# Patient Record
Sex: MALE | Race: WHITE | NOT HISPANIC OR LATINO
[De-identification: names, ages, dates, MRNs, and addresses within clinical notes are randomized per-mention and may not be internally consistent; named-entity substitution may affect disease eponyms.]

---

## 2019-02-25 ENCOUNTER — APPOINTMENT (OUTPATIENT)
Dept: NEUROLOGY | Facility: CLINIC | Age: 79
End: 2019-02-25
Payer: MEDICARE

## 2019-02-25 VITALS
HEIGHT: 68 IN | BODY MASS INDEX: 30.31 KG/M2 | DIASTOLIC BLOOD PRESSURE: 89 MMHG | HEART RATE: 93 BPM | SYSTOLIC BLOOD PRESSURE: 184 MMHG | WEIGHT: 200 LBS

## 2019-02-25 DIAGNOSIS — Z78.9 OTHER SPECIFIED HEALTH STATUS: ICD-10-CM

## 2019-02-25 PROCEDURE — 99213 OFFICE O/P EST LOW 20 MIN: CPT

## 2019-02-25 PROCEDURE — 99203 OFFICE O/P NEW LOW 30 MIN: CPT

## 2019-02-25 NOTE — DISCUSSION/SUMMARY
[FreeTextEntry1] : Patient with PD who has morning hypokinesia and more awareness of balance issues. \par Constipation is present\par Diplopia with CI\par \par \par Recommendations\par - Increase Sinemet CR to 50/200 qhs and leave Sinemet IR regimen the same for now\par - I asked him to keep track of when he experiences some imbalance during the day to determine if it is related to wearing off\par - Cont exercise regimen and balance program\par - take prune juice daily for constipation\par - he is schedule to see his ophthalmologist in a week. I also referred him for a c/s with Dr. Ely Vasquez for the diplopia. He preferred to defer MRI brain and blood work until after the consultations. \par - RTC 3months

## 2019-02-25 NOTE — PHYSICAL EXAM
[General Appearance - Alert] : alert [General Appearance - In No Acute Distress] : in no acute distress [Cranial Nerves Oculomotor (III)] : extraocular motion intact [Cranial Nerves Facial (VII)] : face symmetrical [Sensation Tactile Decrease] : light touch was intact [FreeTextEntry1] : There is masked facies. EOMI + CI. There are mild finger tremors in his hands at rest. Rapid movements are mildly slowly, left greater than right. There is  rigidity at the wrists and neck. Stands easily and walks with mildly stooped posture. Strides are good and left armswing is depressed. Recovers on pull test.  [FreeTextEntry8] : refer to Movement exam

## 2019-02-25 NOTE — HISTORY OF PRESENT ILLNESS
[FreeTextEntry1] : Patient with a 4 year hx of PD. Since his last visit, he tried tapering of abilify, which he takes for anxiety, and developed insomnia. He subsequently went back on it.  He remains on sinemet 25/100 1 tab TID. He has noticed mild increase in stiffness and some balance issues at various times of the day when standing. He is doing [employer] and participates in a dance program. He has not had any falls. In the mornings he is particularly rigid and slow. He is not really aware of sinemet kinetics or has LID.  He reports some diplopia when looking to both sides for approximately 1 month. This also occurs when he reads.  He thinks it may be related to eye lid surgery he had done in the past. \par \par Nonmotor\par sleeps well\par + constipation - has BM every other day. Uses prune juice. \par Denies any dysphagia\par \par PD meds \par sinemet 25/100 1 tab ([id number])\par Sinemet CR 25/100 1 tab qhs

## 2019-02-25 NOTE — REASON FOR VISIT
[Initial Eval - Existing Diagnosis] : an initial evaluation of an existing diagnosis [Spouse] : spouse [FreeTextEntry1] : for Parkinson's disease

## 2019-03-25 ENCOUNTER — RX RENEWAL (OUTPATIENT)
Age: 79
End: 2019-03-25

## 2019-06-10 ENCOUNTER — APPOINTMENT (OUTPATIENT)
Dept: NEUROLOGY | Facility: CLINIC | Age: 79
End: 2019-06-10

## 2019-07-15 ENCOUNTER — APPOINTMENT (OUTPATIENT)
Dept: NEUROLOGY | Facility: CLINIC | Age: 79
End: 2019-07-15
Payer: MEDICARE

## 2019-07-15 VITALS
DIASTOLIC BLOOD PRESSURE: 74 MMHG | HEART RATE: 85 BPM | HEIGHT: 68 IN | WEIGHT: 200 LBS | SYSTOLIC BLOOD PRESSURE: 177 MMHG | BODY MASS INDEX: 30.31 KG/M2

## 2019-07-15 PROCEDURE — 99214 OFFICE O/P EST MOD 30 MIN: CPT

## 2019-07-15 RX ORDER — CARBIDOPA AND LEVODOPA 50; 200 MG/1; MG/1
50-200 TABLET, EXTENDED RELEASE ORAL
Qty: 30 | Refills: 5 | Status: DISCONTINUED | COMMUNITY
Start: 2019-02-25 | End: 2019-07-15

## 2019-07-16 NOTE — END OF VISIT
[] : Fellow [FreeTextEntry3] : Patient continues to have considerable overnight stiffness and hypokinesia despite taking CR 50/200. Daytime c/l IR doses work well with absence of off time. Recent hamstring injury is improving and using a cane to maintain balance during this time. \par \par Recommendations:\par D/c CR and trial of rytary 145 3 tabs qhs\par cont sinemet IR 25/100 1 tab TID\par Cont PT and exercises\par RTO 3months [>50% of Time Spent on Counseling for ____] : Greater than 50% of the encounter time was spent on counseling for [unfilled] [Time Spent: ___ minutes] : I have spent [unfilled] minutes of face to face time with the patient

## 2019-07-16 NOTE — REASON FOR VISIT
[Initial Eval - Existing Diagnosis] : an initial evaluation of an existing diagnosis [Follow-Up: _____] : a [unfilled] follow-up visit [Spouse] : spouse [FreeTextEntry1] : for Parkinson's disease

## 2019-07-16 NOTE — HISTORY OF PRESENT ILLNESS
[FreeTextEntry1] : Patient with a hx of PD diagnosed in March of 2016. Since his last visit, he tried tapering of abilify, which he takes for anxiety, and developed insomnia, and has since resumed it. He has noticed mild increase in stiffness and some balance issues at various times of the day when standing, with mornings being the worst before he takes his first dose of Sinemet. He is doing [employer] and participates in a dance program. He has not had any falls. In the mornings he is particularly rigid and slow. He denies LID. \par \par He saw a Neuro-opthalmologist at [facility], Dr. Griffin, who treated his diplopia with prism glasses with success. He went to [place] for a trip last month and when he returned tore his hamstring tendon in 2 places. He is currently undergoing therapy and conservative treatment, with improvement and no pain. But, he does feel more unsteady since then. He reports that turning in bed has become more difficult. No wearing off of medications in the daytime.\par \par Nonmotor\par sleeps well\par + constipation - has BM every other day. Uses prune juice. \par Denies any dysphagia\par \par PD meds \par sinemet 25/100 1 tab ([id number]-8P) - works within 30 mins, then walking better. \par Sinemet CR 25/100 1 tab qhs\par

## 2019-07-16 NOTE — PHYSICAL EXAM
[General Appearance - Alert] : alert [General Appearance - In No Acute Distress] : in no acute distress [Cranial Nerves Oculomotor (III)] : extraocular motion intact [Cranial Nerves Facial (VII)] : face symmetrical [Sensation Tactile Decrease] : light touch was intact [FreeTextEntry1] : There is mildly masked facies. EOMI + CI. There are mild finger tremors in his R hand at rest. Rapid movements are mildly slowly, R greater than L. There is mild rigidity at the wrists and neck. Stands easily and walks with mildly stooped posture. Strides are good and left arm swing is depressed with a emergent tremor of the L hand not seen at rest. Recovers on pull test.  [FreeTextEntry8] : refer to Movement exam

## 2019-07-16 NOTE — DISCUSSION/SUMMARY
[FreeTextEntry1] : Patient with PD who has morning hypokinesia and more awareness of balance issues. \par Constipation is present. Resolved Diplopia since last visit. \par \par \par Recommendations\par - Discontinue Sinemet CR to 50/200 qhs and start Rytary 145mg 3 tabs at bedtime \par - Continue Sinemet IR regimen the same for now, 1 tab TID\par - Cont exercise regimen and balance program/physical therapy as tolerated\par - take prune juice daily for constipation\par - RTC 3 months

## 2019-07-29 ENCOUNTER — OTHER (OUTPATIENT)
Age: 79
End: 2019-07-29

## 2019-09-18 ENCOUNTER — APPOINTMENT (OUTPATIENT)
Dept: NEUROLOGY | Facility: CLINIC | Age: 79
End: 2019-09-18

## 2019-09-18 VITALS
BODY MASS INDEX: 29.86 KG/M2 | SYSTOLIC BLOOD PRESSURE: 171 MMHG | HEART RATE: 81 BPM | WEIGHT: 197 LBS | HEIGHT: 68 IN | DIASTOLIC BLOOD PRESSURE: 67 MMHG

## 2019-09-18 NOTE — DISCUSSION/SUMMARY
[FreeTextEntry1] : Overall bradykinesia throughout the day. Rytary at bedtime has notable positive impact\par \par Recommendations:\par - raise sinemet to 1.5 tabs 3 times per day ([phone]) to see if he gains greater independence with certain ADLs. \par - cont rytary 3 tabs at bedtime

## 2019-09-18 NOTE — PHYSICAL EXAM
[FreeTextEntry1] : Last sinemet was 5hrs\par \par There is mild masking. There is mild right hand rest tremor with trace postural and 1-2+ kinetic tremor. There is mild R>L bradykinesia with greater distal involvement. There is 2+ right side rigidity. Walks with good SL and turns. pull test is negative

## 2019-09-18 NOTE — HISTORY OF PRESENT ILLNESS
[FreeTextEntry1] : Study Visit\par \par -Reports that rytary has improved his overnight and morning akinesia. \par - Denies any motor fluctuations or awareness of sinemet kicking in\par - right hand tremor is intermittent, and increases when anxious\par - fell once due to tripping over a hose\par - needs assistance with dressing and occasionally showering. Feeds himself. \par \par \par Nonmotor\par sleeps well\par + constipation - has BM every other day. Uses prune juice. \par Denies any dysphagia\par \par PD meds \par sinemet 25/100 1 tab ([id number])\par Rytary 145 3 tabs qhs

## 2019-10-15 ENCOUNTER — APPOINTMENT (OUTPATIENT)
Dept: NEUROLOGY | Facility: CLINIC | Age: 79
End: 2019-10-15

## 2019-11-06 ENCOUNTER — RX RENEWAL (OUTPATIENT)
Age: 79
End: 2019-11-06

## 2019-12-20 ENCOUNTER — APPOINTMENT (OUTPATIENT)
Dept: NEUROLOGY | Facility: CLINIC | Age: 79
End: 2019-12-20

## 2019-12-20 VITALS
WEIGHT: 200 LBS | HEART RATE: 78 BPM | DIASTOLIC BLOOD PRESSURE: 76 MMHG | SYSTOLIC BLOOD PRESSURE: 157 MMHG | BODY MASS INDEX: 30.31 KG/M2 | HEIGHT: 68 IN

## 2019-12-20 NOTE — HISTORY OF PRESENT ILLNESS
[FreeTextEntry1] : Study Visit\par \par He reports missing one dose of sinemet daily. Its either the 2nd or 3rd dose that is missed\par over night hypokinesia is better and sleep has also improved. \par He has modest levodopa kinetics. \par He feels he is moving slower that 6months ago\par Denies any falls and thinks his FOG has resolved with med changes\par \par Nonmotor\par sleeps well\par + constipation - has BM every other day. Uses prune juice. \par Denies any dysphagia\par occasional choking episodes\par no OH\par \par \par PD meds \par sinemet 25/100 1.5 tab ([id number])\par Rytary 145 3 tabs qhs\par \par Psych\par abilify 5mg qd\par ritalin\par \par

## 2019-12-20 NOTE — DISCUSSION/SUMMARY
[FreeTextEntry1] : Global bradykinesia in part due med noncompliance and possibly  GI motility component \par \par Plan\par - take sinemet 1.5 tabs q4hrs (3doses/d)\par - take rytary 145 mg 3 tabs qhs. will consider full rytary conversation in future\par - take prune juice daily

## 2019-12-20 NOTE — PHYSICAL EXAM
[FreeTextEntry1] : There is 2+ masking. Speech is 1+. there is mild left greater than right bradykinesia with minimal rigidity. 2+ neck rigidity. SL is good and turns well. No FOG. No LID.

## 2020-05-27 ENCOUNTER — APPOINTMENT (OUTPATIENT)
Dept: NEUROLOGY | Facility: CLINIC | Age: 80
End: 2020-05-27

## 2020-05-27 NOTE — HISTORY OF PRESENT ILLNESS
[Home] : at home, [unfilled] , at the time of the visit. [Other Location: e.g. Home (Enter Location, City,State)___] : at [unfilled] [Verbal consent obtained from patient] : the patient, [unfilled] [FreeTextEntry1] : Study Visit\par \par He has not noticed significant changes with rytary regimen\par denies daytime somnolence. \par No MF or LID\par pain in lower back has been off and on for several months. Feels it impacts his gait\par He is fairly sedentary most of the day. Sits at his computer. \par Does home exercises daily\par Tremor is not bothersome\par Saw Dr. Diallo of urology and found ot have BPH\par \par Nonmotor\par sleeps well\par + constipation - has BM every other day. Uses prune juice. \par \par PD meds \par Rytary 145 3 tabs qid (930A, 230p, 730p, 11p)\par \par Psych\par abilify 5mg qd\par ritalin\par \par pLAN\par Change rytary dosing every 4hrs ( 4doses/d) \par Will focus on stretches and PT for LBP.  Consider MRI \par

## 2020-07-10 ENCOUNTER — APPOINTMENT (OUTPATIENT)
Dept: UROLOGY | Facility: CLINIC | Age: 80
End: 2020-07-10
Payer: MEDICARE

## 2020-07-10 VITALS
RESPIRATION RATE: 14 BRPM | HEART RATE: 74 BPM | HEIGHT: 68 IN | BODY MASS INDEX: 30.31 KG/M2 | SYSTOLIC BLOOD PRESSURE: 179 MMHG | DIASTOLIC BLOOD PRESSURE: 82 MMHG | TEMPERATURE: 98.8 F | WEIGHT: 200 LBS | OXYGEN SATURATION: 97 %

## 2020-07-10 DIAGNOSIS — Z86.39 PERSONAL HISTORY OF OTHER ENDOCRINE, NUTRITIONAL AND METABOLIC DISEASE: ICD-10-CM

## 2020-07-10 DIAGNOSIS — Z80.1 FAMILY HISTORY OF MALIGNANT NEOPLASM OF TRACHEA, BRONCHUS AND LUNG: ICD-10-CM

## 2020-07-10 DIAGNOSIS — Z87.438 PERSONAL HISTORY OF OTHER DISEASES OF MALE GENITAL ORGANS: ICD-10-CM

## 2020-07-10 DIAGNOSIS — Z80.0 FAMILY HISTORY OF MALIGNANT NEOPLASM OF DIGESTIVE ORGANS: ICD-10-CM

## 2020-07-10 DIAGNOSIS — Z78.9 OTHER SPECIFIED HEALTH STATUS: ICD-10-CM

## 2020-07-10 DIAGNOSIS — Z86.69 PERSONAL HISTORY OF OTHER DISEASES OF THE NERVOUS SYSTEM AND SENSE ORGANS: ICD-10-CM

## 2020-07-10 PROCEDURE — 99204 OFFICE O/P NEW MOD 45 MIN: CPT

## 2020-07-10 NOTE — ASSESSMENT
[FreeTextEntry1] : Discussed Laser enucleation of prostate with morcellation (HOLEP/ThuLEP).\par \par Indications, options including chronic Storm catheter, suprapubic catheter, intermittent self-catheterization, transurethral resection of prostate, transurethral vaporization of prostate with laser or electrocautery, open or laparoscopic enucleation of the prostate, all discussed.\par \par Potential complications of transurethral holmium or thulium laser enucleation of prostate with morcellation discussed. This included risk of infection, bleeding, transfusion, conversion to open enucleation, need for staged procedure, adjacent organ injury, bladder injury, clot retention of urine requiring return to operating room for cystoscopy clot evacuation, prolonged duration of Storm catheterization, urethral stricture, transient or permanent urinary incontinence, retrograde ejaculation is a permanent and irreversible complication, redo or staged surgery due to equipment malfunction, anesthetic complications, cardiac complications, all discussed. \par \par Printed information including of the above and other more uncommon complications provided to patient.\par Given Parkinson's, expect incontinence to be more pronounced, and risk of it being permanent increased compared to non-Parkinson's patients.\par \par Plan\par 1) Follow up in two weeks with records of previous urology workup

## 2020-07-22 ENCOUNTER — APPOINTMENT (OUTPATIENT)
Dept: UROLOGY | Facility: CLINIC | Age: 80
End: 2020-07-22
Payer: MEDICARE

## 2020-07-22 VITALS
HEIGHT: 68 IN | BODY MASS INDEX: 29.55 KG/M2 | WEIGHT: 195 LBS | DIASTOLIC BLOOD PRESSURE: 78 MMHG | HEART RATE: 74 BPM | SYSTOLIC BLOOD PRESSURE: 169 MMHG | RESPIRATION RATE: 17 BRPM

## 2020-07-22 VITALS — TEMPERATURE: 97.9 F

## 2020-07-22 PROCEDURE — 99214 OFFICE O/P EST MOD 30 MIN: CPT

## 2020-07-22 NOTE — REVIEW OF SYSTEMS
[Eyesight Problems] : eyesight problems [Poor quality erections] : Poor quality erections [Told you have blood in urine on a urine test] : told blood was present in a urine test [History of kidney stones] : history of kidney stones [Bladder pressure] : experiences bladder pressure [Slow urine stream] : slow urine stream [Leakage of urine with urgency] : leakage of urine with urgency [Difficulty Walking] : difficulty walking [Depression] : depression [Muscle Weakness] : muscle weakness [Incontinence] : incontinence [Nocturia] : nocturia [Negative] : Heme/Lymph

## 2020-07-22 NOTE — HISTORY OF PRESENT ILLNESS
[Urinary Urgency] : urinary urgency [Urinary Frequency] : urinary frequency [Nocturia] : nocturia [Weak Stream] : weak stream [Post-Void Dribbling] : post-void dribbling [FreeTextEntry1] : 79 yr old male presents to establish care for BPH. Pt complains of 20 years of urgency/frequency. Nocturia x1. Pt complaining of weak stream and dribbling. Pt denies dysuria and hematuria. Pt states "PSA always run high", biopsy x 2-3- all negative. \par Seeks evaluation for laser enucleation of prostate. He has done research on his own and spoken to former patient about the surgery. \par He has seen Dr. silvino Diallo from [facility] and was counseled on Laparoscopic robot assisted enucleation.\par \par Surgical hx: B/L THR\par Medical hx: Parkinson's, HLD\par Allergies: none\par Social: Alcohol-occasionally, Smoking-never, Drug- never, Occupation- retired  of [employer] and [employer]\par Family hx: Mother- lung ca, Father- esophageal ca \par Medications: Rytary ER, Desvenlafaxine ER, methylphenidate HCL, aripiprazole 5mg, atorvastitin, finasteride 5 mg (taking for 2 months), tamsulosin HCL 0.4 mg, aspirin 81 mg\par \par  [None] : None

## 2020-07-22 NOTE — PHYSICAL EXAM
[Heart Rate And Rhythm] : Heart rate and rhythm were normal [Abdomen Soft] : soft [FreeTextEntry1] : unsteady gait [General Appearance - Well Developed] : well developed [General Appearance - Well Nourished] : well nourished [Normal Appearance] : normal appearance [Well Groomed] : well groomed [General Appearance - In No Acute Distress] : no acute distress [Skin Color & Pigmentation] : normal skin color and pigmentation [] : no respiratory distress [Oriented To Time, Place, And Person] : oriented to person, place, and time [Exaggerated Use Of Accessory Muscles For Inspiration] : no accessory muscle use [Affect] : the affect was normal [Not Anxious] : not anxious [Mood] : the mood was normal [Normal Station and Gait] : the gait and station were normal for the patient's age

## 2020-07-22 NOTE — REASON FOR VISIT
[Initial Visit ___] : [unfilled] is here today for an initial visit  for [unfilled] [Follow-up Visit ___] : a follow-up visit  for [unfilled] [Spouse] : spouse

## 2020-07-26 NOTE — HISTORY OF PRESENT ILLNESS
[FreeTextEntry1] : see previous note\par patient is here to further discuss treatment options\par records received and reviewed with patient\par \par patient's symptoms have worsened since the diagnosis of his Parkinsons disease\par a trial of mirabegron and flomax did not help\par last prostate US 5/2020 showed a prostate of 122 cc\par PSA levels stable around 9 according to the patient, had 2 prostate biopsies in the past that were negative\par

## 2020-07-26 NOTE — ASSESSMENT
[FreeTextEntry1] : Pt wants to proceed with laser enucleation of prostate.\par We discussed alternatives, including open simple prostatectomy and robotic simple prostatectomy. \par Also re-visited discussion on potential effect of Parkinson on bladder and sphincteric function, and explained the higher risk of incontinence after surgery. Pt willing to accept these risks and proceed with surgery.

## 2020-07-26 NOTE — REVIEW OF SYSTEMS
[Hesitancy] : no urinary hesitancy [Dysuria] : no dysuria [Testicular Pain] : no testicular pain [Genital Lesion] : no genital lesions

## 2020-08-06 ENCOUNTER — APPOINTMENT (OUTPATIENT)
Dept: NEUROLOGY | Facility: CLINIC | Age: 80
End: 2020-08-06

## 2020-08-06 NOTE — HISTORY OF PRESENT ILLNESS
[FreeTextEntry1] : Study visit\par \par Feels more wobbly when walking but has no freezing. \par He is not aware of levodopa kinetics\par Morning hypokinesia is bothersome and improves later in the morning\par Denies any falls\par He is fairly sedentary and denies daytime somnolence\par Has slight tremors in his hands\par + constipation - BM qod. Uses miralax prn\par \par Meds\par Rytary 145 3 tabs 4 times per day\par \par \par Plan\par change first dose of rytary to 3tabs 195mg, leave rest of the regimen the same\par take miralax daily\par increase daily exercises\par \par f/u 3months

## 2020-09-26 ENCOUNTER — APPOINTMENT (OUTPATIENT)
Dept: DISASTER EMERGENCY | Facility: CLINIC | Age: 80
End: 2020-09-26

## 2020-09-27 LAB — SARS-COV-2 N GENE NPH QL NAA+PROBE: NOT DETECTED

## 2020-09-28 ENCOUNTER — TRANSCRIPTION ENCOUNTER (OUTPATIENT)
Age: 80
End: 2020-09-28

## 2020-09-29 ENCOUNTER — RESULT REVIEW (OUTPATIENT)
Age: 80
End: 2020-09-29

## 2020-09-29 ENCOUNTER — APPOINTMENT (OUTPATIENT)
Dept: UROLOGY | Facility: HOSPITAL | Age: 80
End: 2020-09-29

## 2020-09-29 ENCOUNTER — OUTPATIENT (OUTPATIENT)
Dept: OUTPATIENT SERVICES | Facility: HOSPITAL | Age: 80
LOS: 1 days | End: 2020-09-29
Payer: MEDICARE

## 2020-09-29 VITALS
SYSTOLIC BLOOD PRESSURE: 168 MMHG | OXYGEN SATURATION: 97 % | HEART RATE: 69 BPM | DIASTOLIC BLOOD PRESSURE: 61 MMHG | TEMPERATURE: 98 F | RESPIRATION RATE: 16 BRPM | WEIGHT: 199.96 LBS | HEIGHT: 68 IN

## 2020-09-29 DIAGNOSIS — Z01.818 ENCOUNTER FOR OTHER PREPROCEDURAL EXAMINATION: ICD-10-CM

## 2020-09-29 DIAGNOSIS — Z96.649 PRESENCE OF UNSPECIFIED ARTIFICIAL HIP JOINT: Chronic | ICD-10-CM

## 2020-09-29 DIAGNOSIS — N40.1 BENIGN PROSTATIC HYPERPLASIA WITH LOWER URINARY TRACT SYMPTOMS: ICD-10-CM

## 2020-09-29 DIAGNOSIS — Z98.49 CATARACT EXTRACTION STATUS, UNSPECIFIED EYE: Chronic | ICD-10-CM

## 2020-09-29 LAB
ANION GAP SERPL CALC-SCNC: 13 MMOL/L — SIGNIFICANT CHANGE UP (ref 5–17)
BLD GP AB SCN SERPL QL: NEGATIVE — SIGNIFICANT CHANGE UP
BUN SERPL-MCNC: 11 MG/DL — SIGNIFICANT CHANGE UP (ref 7–23)
CALCIUM SERPL-MCNC: 8.8 MG/DL — SIGNIFICANT CHANGE UP (ref 8.4–10.5)
CHLORIDE SERPL-SCNC: 107 MMOL/L — SIGNIFICANT CHANGE UP (ref 96–108)
CO2 SERPL-SCNC: 23 MMOL/L — SIGNIFICANT CHANGE UP (ref 22–31)
CREAT SERPL-MCNC: 0.88 MG/DL — SIGNIFICANT CHANGE UP (ref 0.5–1.3)
GLUCOSE SERPL-MCNC: 131 MG/DL — HIGH (ref 70–99)
HCT VFR BLD CALC: 44.7 % — SIGNIFICANT CHANGE UP (ref 39–50)
HGB BLD-MCNC: 14.4 G/DL — SIGNIFICANT CHANGE UP (ref 13–17)
MCHC RBC-ENTMCNC: 30 PG — SIGNIFICANT CHANGE UP (ref 27–34)
MCHC RBC-ENTMCNC: 32.2 GM/DL — SIGNIFICANT CHANGE UP (ref 32–36)
MCV RBC AUTO: 93.1 FL — SIGNIFICANT CHANGE UP (ref 80–100)
NRBC # BLD: 0 /100 WBCS — SIGNIFICANT CHANGE UP (ref 0–0)
PLATELET # BLD AUTO: 177 K/UL — SIGNIFICANT CHANGE UP (ref 150–400)
POTASSIUM SERPL-MCNC: 4.1 MMOL/L — SIGNIFICANT CHANGE UP (ref 3.5–5.3)
POTASSIUM SERPL-SCNC: 4.1 MMOL/L — SIGNIFICANT CHANGE UP (ref 3.5–5.3)
RBC # BLD: 4.8 M/UL — SIGNIFICANT CHANGE UP (ref 4.2–5.8)
RBC # FLD: 13.1 % — SIGNIFICANT CHANGE UP (ref 10.3–14.5)
RH IG SCN BLD-IMP: NEGATIVE — SIGNIFICANT CHANGE UP
RH IG SCN BLD-IMP: NEGATIVE — SIGNIFICANT CHANGE UP
SODIUM SERPL-SCNC: 143 MMOL/L — SIGNIFICANT CHANGE UP (ref 135–145)
WBC # BLD: 10.13 K/UL — SIGNIFICANT CHANGE UP (ref 3.8–10.5)
WBC # FLD AUTO: 10.13 K/UL — SIGNIFICANT CHANGE UP (ref 3.8–10.5)

## 2020-09-29 PROCEDURE — 52649 PROSTATE LASER ENUCLEATION: CPT

## 2020-09-29 PROCEDURE — 88305 TISSUE EXAM BY PATHOLOGIST: CPT | Mod: 26

## 2020-09-29 RX ORDER — FINASTERIDE 5 MG/1
5 TABLET, FILM COATED ORAL DAILY
Refills: 0 | Status: DISCONTINUED | OUTPATIENT
Start: 2020-09-29 | End: 2020-10-13

## 2020-09-29 RX ORDER — ACETAMINOPHEN 500 MG
650 TABLET ORAL EVERY 6 HOURS
Refills: 0 | Status: DISCONTINUED | OUTPATIENT
Start: 2020-09-29 | End: 2020-10-13

## 2020-09-29 RX ORDER — ARIPIPRAZOLE 15 MG/1
5 TABLET ORAL DAILY
Refills: 0 | Status: DISCONTINUED | OUTPATIENT
Start: 2020-09-29 | End: 2020-10-13

## 2020-09-29 RX ORDER — POLYETHYLENE GLYCOL 3350 17 G/17G
17 POWDER, FOR SOLUTION ORAL DAILY
Refills: 0 | Status: DISCONTINUED | OUTPATIENT
Start: 2020-09-29 | End: 2020-10-13

## 2020-09-29 RX ORDER — FUROSEMIDE 40 MG
10 TABLET ORAL ONCE
Refills: 0 | Status: COMPLETED | OUTPATIENT
Start: 2020-09-30 | End: 2020-09-30

## 2020-09-29 RX ORDER — TAMSULOSIN HYDROCHLORIDE 0.4 MG/1
0.8 CAPSULE ORAL AT BEDTIME
Refills: 0 | Status: DISCONTINUED | OUTPATIENT
Start: 2020-09-29 | End: 2020-10-13

## 2020-09-29 RX ORDER — HYDROMORPHONE HYDROCHLORIDE 2 MG/ML
0.5 INJECTION INTRAMUSCULAR; INTRAVENOUS; SUBCUTANEOUS
Refills: 0 | Status: DISCONTINUED | OUTPATIENT
Start: 2020-09-29 | End: 2020-09-29

## 2020-09-29 RX ORDER — DESVENLAFAXINE 50 MG/1
50 TABLET, EXTENDED RELEASE ORAL DAILY
Refills: 0 | Status: DISCONTINUED | OUTPATIENT
Start: 2020-09-29 | End: 2020-10-13

## 2020-09-29 RX ORDER — ATORVASTATIN CALCIUM 80 MG/1
10 TABLET, FILM COATED ORAL AT BEDTIME
Refills: 0 | Status: DISCONTINUED | OUTPATIENT
Start: 2020-09-29 | End: 2020-10-13

## 2020-09-29 RX ORDER — ONDANSETRON 8 MG/1
4 TABLET, FILM COATED ORAL ONCE
Refills: 0 | Status: DISCONTINUED | OUTPATIENT
Start: 2020-09-29 | End: 2020-09-29

## 2020-09-29 RX ORDER — SENNA PLUS 8.6 MG/1
2 TABLET ORAL AT BEDTIME
Refills: 0 | Status: DISCONTINUED | OUTPATIENT
Start: 2020-09-29 | End: 2020-10-13

## 2020-09-29 RX ORDER — HYDROMORPHONE HYDROCHLORIDE 2 MG/ML
1 INJECTION INTRAMUSCULAR; INTRAVENOUS; SUBCUTANEOUS
Refills: 0 | Status: DISCONTINUED | OUTPATIENT
Start: 2020-09-29 | End: 2020-09-29

## 2020-09-29 RX ORDER — CARBIDOPA AND LEVODOPA 25; 100 MG/1; MG/1
3 TABLET ORAL
Refills: 0 | Status: DISCONTINUED | OUTPATIENT
Start: 2020-09-29 | End: 2020-10-13

## 2020-09-29 RX ORDER — FUROSEMIDE 40 MG
10 TABLET ORAL ONCE
Refills: 0 | Status: COMPLETED | OUTPATIENT
Start: 2020-09-29 | End: 2020-09-29

## 2020-09-29 RX ORDER — OXYCODONE HYDROCHLORIDE 5 MG/1
5 TABLET ORAL ONCE
Refills: 0 | Status: DISCONTINUED | OUTPATIENT
Start: 2020-09-29 | End: 2020-09-29

## 2020-09-29 RX ORDER — CEFAZOLIN SODIUM 1 G
2000 VIAL (EA) INJECTION EVERY 8 HOURS
Refills: 0 | Status: DISCONTINUED | OUTPATIENT
Start: 2020-09-29 | End: 2020-10-13

## 2020-09-29 RX ORDER — SODIUM CHLORIDE 9 MG/ML
1000 INJECTION, SOLUTION INTRAVENOUS
Refills: 0 | Status: DISCONTINUED | OUTPATIENT
Start: 2020-09-29 | End: 2020-10-13

## 2020-09-29 RX ORDER — CARBIDOPA AND LEVODOPA 25; 100 MG/1; MG/1
1 TABLET ORAL THREE TIMES A DAY
Refills: 0 | Status: DISCONTINUED | OUTPATIENT
Start: 2020-09-29 | End: 2020-09-29

## 2020-09-29 RX ORDER — CARBIDOPA AND LEVODOPA 25; 100 MG/1; MG/1
2 TABLET ORAL THREE TIMES A DAY
Refills: 0 | Status: DISCONTINUED | OUTPATIENT
Start: 2020-09-29 | End: 2020-09-29

## 2020-09-29 RX ORDER — HEPARIN SODIUM 5000 [USP'U]/ML
5000 INJECTION INTRAVENOUS; SUBCUTANEOUS EVERY 8 HOURS
Refills: 0 | Status: DISCONTINUED | OUTPATIENT
Start: 2020-09-29 | End: 2020-10-13

## 2020-09-29 RX ORDER — CARBIDOPA AND LEVODOPA 25; 100 MG/1; MG/1
3 TABLET ORAL ONCE
Refills: 0 | Status: COMPLETED | OUTPATIENT
Start: 2020-09-29 | End: 2020-09-29

## 2020-09-29 RX ADMIN — CARBIDOPA AND LEVODOPA 3 CAPSULE(S): 25; 100 TABLET ORAL at 13:50

## 2020-09-29 RX ADMIN — DESVENLAFAXINE 50 MILLIGRAM(S): 50 TABLET, EXTENDED RELEASE ORAL at 15:37

## 2020-09-29 RX ADMIN — ARIPIPRAZOLE 5 MILLIGRAM(S): 15 TABLET ORAL at 15:37

## 2020-09-29 RX ADMIN — ATORVASTATIN CALCIUM 10 MILLIGRAM(S): 80 TABLET, FILM COATED ORAL at 21:00

## 2020-09-29 RX ADMIN — Medication 10 MILLIGRAM(S): at 12:16

## 2020-09-29 RX ADMIN — HEPARIN SODIUM 5000 UNIT(S): 5000 INJECTION INTRAVENOUS; SUBCUTANEOUS at 21:00

## 2020-09-29 RX ADMIN — Medication 100 MILLIGRAM(S): at 17:26

## 2020-09-29 RX ADMIN — Medication 100 MILLIGRAM(S): at 21:00

## 2020-09-29 RX ADMIN — CARBIDOPA AND LEVODOPA 3 CAPSULE(S): 25; 100 TABLET ORAL at 18:05

## 2020-09-29 RX ADMIN — CARBIDOPA AND LEVODOPA 3 CAPSULE(S): 25; 100 TABLET ORAL at 20:58

## 2020-09-29 RX ADMIN — HEPARIN SODIUM 5000 UNIT(S): 5000 INJECTION INTRAVENOUS; SUBCUTANEOUS at 17:26

## 2020-09-29 RX ADMIN — Medication 650 MILLIGRAM(S): at 23:58

## 2020-09-29 RX ADMIN — FINASTERIDE 5 MILLIGRAM(S): 5 TABLET, FILM COATED ORAL at 15:37

## 2020-09-29 RX ADMIN — TAMSULOSIN HYDROCHLORIDE 0.8 MILLIGRAM(S): 0.4 CAPSULE ORAL at 21:00

## 2020-09-29 RX ADMIN — Medication 650 MILLIGRAM(S): at 23:28

## 2020-09-29 RX ADMIN — SODIUM CHLORIDE 100 MILLILITER(S): 9 INJECTION, SOLUTION INTRAVENOUS at 12:13

## 2020-09-29 NOTE — ASU PATIENT PROFILE, ADULT - PSH
History of hip replacement, total  right hip 2008  Status post cataract extraction  right ey cataract extraction with IOL 6/26/2015

## 2020-09-29 NOTE — PROGRESS NOTE ADULT - SUBJECTIVE AND OBJECTIVE BOX
Post op Check    Pt seen and examined without complaints. Pain is controlled. Denies SOB/CP/N/V.     Vital Signs Last 24 Hrs  T(C): 36 (29 Sep 2020 13:00), Max: 36.7 (29 Sep 2020 06:30)  T(F): 96.8 (29 Sep 2020 13:00), Max: 98.1 (29 Sep 2020 06:30)  HR: 67 (29 Sep 2020 13:00) (65 - 69)  BP: 155/70 (29 Sep 2020 13:00) (132/64 - 168/61)  BP(mean): 100 (29 Sep 2020 13:00) (92 - 104)  RR: 15 (29 Sep 2020 13:00) (14 - 16)  SpO2: 100% (29 Sep 2020 13:00) (94% - 100%)    I&O's Summary      Physical Exam  Gen: NAD, A&Ox3  Pulm: No respiratory distress, no subcostal retractions  Abd: Soft, NT, ND  : No suprapubic tenderness. 3-way catheter secured with on fast drip CBI with clear pink output                          14.4   10.13 )-----------( 177      ( 29 Sep 2020 11:57 )             44.7       09-29    143  |  107  |  11  ----------------------------<  131<H>  4.1   |  23  |  0.88    Ca    8.8      29 Sep 2020 11:57

## 2020-09-29 NOTE — PRE-ANESTHESIA EVALUATION ADULT - NSANTHOSAYNRD_GEN_A_CORE
No. LIOR screening performed.  STOP BANG Legend: 0-2 = LOW Risk; 3-4 = INTERMEDIATE Risk; 5-8 = HIGH Risk

## 2020-09-29 NOTE — ASU PREOP CHECKLIST - 1.
3/13/2017          Jessica CHAPPELL [id number] [address] [address]    Dear Mae Ruvalcaba,     Here are the results from your recent testing :    Imaging Orders  CT scan    Results and Recommendations     There were no signs of compression of the ch
Emotional support and pre-op teaching provided to patient.

## 2020-09-29 NOTE — H&P ADULT - HISTORY OF PRESENT ILLNESS
79 years old male patient with parkinsons disease.  has a long history of more than 20 years with urinary urgency and frequency. multiple trials of medical therapy were unsuccessful.  last prostate measurment was 122 gr.  known elevated stable PSA values around 9, had 2 negative biopsies  patient is currently scheduled for laser enucleation of prostate with morcellation.

## 2020-09-30 VITALS
HEART RATE: 62 BPM | OXYGEN SATURATION: 97 % | RESPIRATION RATE: 18 BRPM | SYSTOLIC BLOOD PRESSURE: 112 MMHG | TEMPERATURE: 98 F | DIASTOLIC BLOOD PRESSURE: 59 MMHG

## 2020-09-30 LAB
ANION GAP SERPL CALC-SCNC: 10 MMOL/L — SIGNIFICANT CHANGE UP (ref 5–17)
BUN SERPL-MCNC: 15 MG/DL — SIGNIFICANT CHANGE UP (ref 7–23)
CALCIUM SERPL-MCNC: 9 MG/DL — SIGNIFICANT CHANGE UP (ref 8.4–10.5)
CHLORIDE SERPL-SCNC: 104 MMOL/L — SIGNIFICANT CHANGE UP (ref 96–108)
CO2 SERPL-SCNC: 24 MMOL/L — SIGNIFICANT CHANGE UP (ref 22–31)
CREAT SERPL-MCNC: 0.99 MG/DL — SIGNIFICANT CHANGE UP (ref 0.5–1.3)
GLUCOSE SERPL-MCNC: 120 MG/DL — HIGH (ref 70–99)
HCT VFR BLD CALC: 41.6 % — SIGNIFICANT CHANGE UP (ref 39–50)
HGB BLD-MCNC: 13.8 G/DL — SIGNIFICANT CHANGE UP (ref 13–17)
MCHC RBC-ENTMCNC: 30.4 PG — SIGNIFICANT CHANGE UP (ref 27–34)
MCHC RBC-ENTMCNC: 33.2 GM/DL — SIGNIFICANT CHANGE UP (ref 32–36)
MCV RBC AUTO: 91.6 FL — SIGNIFICANT CHANGE UP (ref 80–100)
NRBC # BLD: 0 /100 WBCS — SIGNIFICANT CHANGE UP (ref 0–0)
PLATELET # BLD AUTO: 200 K/UL — SIGNIFICANT CHANGE UP (ref 150–400)
POTASSIUM SERPL-MCNC: 4.5 MMOL/L — SIGNIFICANT CHANGE UP (ref 3.5–5.3)
POTASSIUM SERPL-SCNC: 4.5 MMOL/L — SIGNIFICANT CHANGE UP (ref 3.5–5.3)
RBC # BLD: 4.54 M/UL — SIGNIFICANT CHANGE UP (ref 4.2–5.8)
RBC # FLD: 12.8 % — SIGNIFICANT CHANGE UP (ref 10.3–14.5)
SODIUM SERPL-SCNC: 138 MMOL/L — SIGNIFICANT CHANGE UP (ref 135–145)
WBC # BLD: 12.53 K/UL — HIGH (ref 3.8–10.5)
WBC # FLD AUTO: 12.53 K/UL — HIGH (ref 3.8–10.5)

## 2020-09-30 PROCEDURE — 86850 RBC ANTIBODY SCREEN: CPT

## 2020-09-30 PROCEDURE — 52649 PROSTATE LASER ENUCLEATION: CPT

## 2020-09-30 PROCEDURE — C1889: CPT

## 2020-09-30 PROCEDURE — 86900 BLOOD TYPING SEROLOGIC ABO: CPT

## 2020-09-30 PROCEDURE — 80048 BASIC METABOLIC PNL TOTAL CA: CPT

## 2020-09-30 PROCEDURE — C9399: CPT

## 2020-09-30 PROCEDURE — C1782: CPT

## 2020-09-30 PROCEDURE — 85027 COMPLETE CBC AUTOMATED: CPT

## 2020-09-30 PROCEDURE — 86901 BLOOD TYPING SEROLOGIC RH(D): CPT

## 2020-09-30 PROCEDURE — 88305 TISSUE EXAM BY PATHOLOGIST: CPT

## 2020-09-30 PROCEDURE — 93010 ELECTROCARDIOGRAM REPORT: CPT | Mod: 76

## 2020-09-30 PROCEDURE — 93005 ELECTROCARDIOGRAM TRACING: CPT

## 2020-09-30 RX ORDER — CARBIDOPA AND LEVODOPA 25; 100 MG/1; MG/1
1 TABLET ORAL
Qty: 0 | Refills: 0 | DISCHARGE
Start: 2020-09-30

## 2020-09-30 RX ORDER — CARBIDOPA AND LEVODOPA 25; 100 MG/1; MG/1
1 TABLET ORAL
Qty: 0 | Refills: 0 | DISCHARGE

## 2020-09-30 RX ORDER — CARBIDOPA AND LEVODOPA 25; 100 MG/1; MG/1
2 TABLET ORAL
Qty: 0 | Refills: 0 | DISCHARGE

## 2020-09-30 RX ORDER — POLYETHYLENE GLYCOL 3350 17 G/17G
17 POWDER, FOR SOLUTION ORAL
Qty: 0 | Refills: 0 | DISCHARGE
Start: 2020-09-30

## 2020-09-30 RX ORDER — CHLORHEXIDINE GLUCONATE 213 G/1000ML
1 SOLUTION TOPICAL DAILY
Refills: 0 | Status: DISCONTINUED | OUTPATIENT
Start: 2020-09-30 | End: 2020-10-13

## 2020-09-30 RX ORDER — SENNA PLUS 8.6 MG/1
2 TABLET ORAL
Qty: 0 | Refills: 0 | DISCHARGE
Start: 2020-09-30

## 2020-09-30 RX ORDER — CEPHALEXIN 500 MG
1 CAPSULE ORAL
Qty: 6 | Refills: 0
Start: 2020-09-30 | End: 2020-10-02

## 2020-09-30 RX ORDER — CARBIDOPA AND LEVODOPA 25; 100 MG/1; MG/1
3 TABLET ORAL
Qty: 0 | Refills: 0 | DISCHARGE
Start: 2020-09-30

## 2020-09-30 RX ADMIN — Medication 10 MILLIGRAM(S): at 05:23

## 2020-09-30 RX ADMIN — FINASTERIDE 5 MILLIGRAM(S): 5 TABLET, FILM COATED ORAL at 12:10

## 2020-09-30 RX ADMIN — CARBIDOPA AND LEVODOPA 3 CAPSULE(S): 25; 100 TABLET ORAL at 12:10

## 2020-09-30 RX ADMIN — CARBIDOPA AND LEVODOPA 3 CAPSULE(S): 25; 100 TABLET ORAL at 16:11

## 2020-09-30 RX ADMIN — CARBIDOPA AND LEVODOPA 3 CAPSULE(S): 25; 100 TABLET ORAL at 10:38

## 2020-09-30 RX ADMIN — DESVENLAFAXINE 50 MILLIGRAM(S): 50 TABLET, EXTENDED RELEASE ORAL at 12:10

## 2020-09-30 RX ADMIN — CHLORHEXIDINE GLUCONATE 1 APPLICATION(S): 213 SOLUTION TOPICAL at 10:39

## 2020-09-30 RX ADMIN — HEPARIN SODIUM 5000 UNIT(S): 5000 INJECTION INTRAVENOUS; SUBCUTANEOUS at 05:23

## 2020-09-30 RX ADMIN — ARIPIPRAZOLE 5 MILLIGRAM(S): 15 TABLET ORAL at 12:10

## 2020-09-30 RX ADMIN — Medication 100 MILLIGRAM(S): at 05:24

## 2020-09-30 NOTE — PROGRESS NOTE ADULT - SUBJECTIVE AND OBJECTIVE BOX
UROLOGY DAILY PROGRESS NOTE:     Subjective: Patient seen and examined at bedside. No overnight events.       Objective:  Vital signs  T(F): , Max: 98.9 (09-30-20 @ 01:00)  HR: 55 (09-30-20 @ 04:58)  BP: 120/67 (09-30-20 @ 04:58)  SpO2: 93% (09-30-20 @ 04:58)  Wt(kg): --    I&O's Summary    29 Sep 2020 07:01  -  30 Sep 2020 07:00  --------------------------------------------------------  IN: 53524 mL / OUT: 65554 mL / NET: 2240 mL        Gen: NAD  Pulm: No respiratory distress, no subcostal retractions  CV: RRR, no JVD  Abd: Soft, NT, ND  : CBI off- urine clear     Labs:  09-30  12.53 / 41.6  /0.99   09-29  10.13 / 44.7  /0.88                           13.8   12.53 )-----------( 200      ( 30 Sep 2020 05:41 )             41.6     09-30    138  |  104  |  15  ----------------------------<  120<H>  4.5   |  24  |  0.99    Ca    9.0      30 Sep 2020 05:41      Urine Cx:

## 2020-09-30 NOTE — PROGRESS NOTE ADULT - ASSESSMENT
A/P: 79y Male PMHx of Parkinson, BPH and HLD s/p Holep POD 1    - DVT prophylaxis/OOB  - Regular Diet  - AM labs  - AM Lasix  - TOV/ PVR  - Ancef  - Dc home today

## 2020-10-05 LAB — SURGICAL PATHOLOGY STUDY: SIGNIFICANT CHANGE UP

## 2020-10-14 ENCOUNTER — APPOINTMENT (OUTPATIENT)
Dept: UROLOGY | Facility: CLINIC | Age: 80
End: 2020-10-14
Payer: MEDICARE

## 2020-10-14 VITALS
RESPIRATION RATE: 17 BRPM | DIASTOLIC BLOOD PRESSURE: 70 MMHG | HEIGHT: 68 IN | WEIGHT: 200 LBS | SYSTOLIC BLOOD PRESSURE: 153 MMHG | HEART RATE: 71 BPM | BODY MASS INDEX: 30.31 KG/M2

## 2020-10-14 VITALS — TEMPERATURE: 97.6 F

## 2020-10-14 PROCEDURE — 99024 POSTOP FOLLOW-UP VISIT: CPT

## 2020-10-14 RX ORDER — TAMSULOSIN HYDROCHLORIDE 0.4 MG/1
0.4 CAPSULE ORAL
Refills: 0 | Status: COMPLETED | COMMUNITY
End: 2020-10-14

## 2020-10-14 NOTE — PHYSICAL EXAM
[Normal Appearance] : normal appearance [General Appearance - Well Developed] : well developed [General Appearance - Well Nourished] : well nourished [Well Groomed] : well groomed [General Appearance - In No Acute Distress] : no acute distress [Abdomen Soft] : soft [Abdomen Tenderness] : non-tender [Skin Color & Pigmentation] : normal skin color and pigmentation [Edema] : no peripheral edema [Exaggerated Use Of Accessory Muscles For Inspiration] : no accessory muscle use [] : no respiratory distress [Oriented To Time, Place, And Person] : oriented to person, place, and time [Affect] : the affect was normal [Mood] : the mood was normal [Not Anxious] : not anxious [Normal Station and Gait] : the gait and station were normal for the patient's age

## 2020-10-16 NOTE — HISTORY OF PRESENT ILLNESS
[Hematuria - Gross] : gross hematuria [None] : None [Urinary Incontinence] : no urinary incontinence [FreeTextEntry1] : 80 yo patient for follow up after HoLEP on 9/29/20\par pathology - 73 grams BPH\par \par patient is feeling well, is voiding with good stream and with no complains\par has mild hematuria that is improving, no dysuria, no incontinence\par PVR today: 10 ml [Urinary Urgency] : no urinary urgency [Urinary Retention] : no urinary retention [Urinary Frequency] : no urinary frequency [Weak Stream] : no weak stream [Straining] : no straining [Intermittency] : no intermittency [Dysuria] : no dysuria [Bladder Spasm] : no bladder spasm [Abdominal Pain] : no abdominal pain

## 2020-10-16 NOTE — ASSESSMENT
[FreeTextEntry1] : \par Plan\par Uroflow and PVR next visit\par PSA next visit\par f/u 3 months\par

## 2020-11-10 ENCOUNTER — APPOINTMENT (OUTPATIENT)
Dept: NEUROLOGY | Facility: CLINIC | Age: 80
End: 2020-11-10
Payer: MEDICARE

## 2020-11-10 VITALS
HEART RATE: 75 BPM | BODY MASS INDEX: 30.31 KG/M2 | HEIGHT: 68 IN | DIASTOLIC BLOOD PRESSURE: 74 MMHG | WEIGHT: 200 LBS | SYSTOLIC BLOOD PRESSURE: 157 MMHG

## 2020-11-10 VITALS — SYSTOLIC BLOOD PRESSURE: 148 MMHG | HEART RATE: 74 BPM | DIASTOLIC BLOOD PRESSURE: 69 MMHG

## 2020-11-10 VITALS — TEMPERATURE: 96.7 F

## 2020-11-10 PROCEDURE — 99214 OFFICE O/P EST MOD 30 MIN: CPT

## 2020-11-10 NOTE — DISCUSSION/SUMMARY
[FreeTextEntry1] : Patient with PD who has morning hypokinesia, balance issues. Recently increased rytary AM dose to 195 mg, which he reports has helped with his walking. He has developed vivid dreams, but no lisa hallucinations, which could be levodopa side effect. Will need to monitor.\par \par Plan\par - take rytary 195 mg 3 tabs in AM, then rytary 145 mg 3 tabs 3 times per day. \par - c/w miralax, colace, prune juice\par - RTC 3 months

## 2020-11-10 NOTE — HISTORY OF PRESENT ILLNESS
[FreeTextEntry1] : \par reports walking has improved.\par Morning hypokinesia still present, but improves throughout the day.\par Denies any falls. reports that has been able to walk further distances, but has been more labored. \par doing daily exercises, which includes resistance training and walking.\par Has slight tremors in his hands, which has been stable.\par + constipation - BM qod. Uses miralax, colace, and prune juice, which has been helping. \par no sleeping issues, wakes up 1x daily.\par +vivid dreams, more prominent recently. no acting out of dreams.   \par occasional hallucinations - will see a person shadow at times moving.\par reports cognition, memory good.\par denies orthostatic symptoms.  \par c/o morning stiffness,\par \par Meds\par Rytary 195 3 tabs in AM, then rytary 145 3 times per day

## 2020-11-10 NOTE — PHYSICAL EXAM
[FreeTextEntry1] : There is 2+ masking. Speech is 1+. there is mild left greater than right bradykinesia with minimal rigidity. . SL is good and turns well. No FOG. No LID. +resting tremor b/l hands

## 2020-11-10 NOTE — END OF VISIT
[] : Resident [FreeTextEntry3] : Patient feels gait is somewhat better following changes to rytary. Continues to fatigue when he goes for his afternoon 1/ 2mi walk but not sure it he does it at the end of his dose period. Is not aware of levodopa kinetics. His exam is stable with noted improvement in SL. Advised him to monitor his afternoon fatigue in relation to time he takes his rytary. May consider increasing third dose of rytary in the future. Vivid dreams may be fueled by levodopa and will monitor closely. f/u 3months [Time Spent: ___ minutes] : I have spent [unfilled] minutes of time on the encounter. [>50% of the face to face encounter time was spent on counseling and/or coordination of care for ___] : Greater than 50% of the face to face encounter time was spent on counseling and/or coordination of care for [unfilled]

## 2020-12-09 ENCOUNTER — APPOINTMENT (OUTPATIENT)
Dept: OPHTHALMOLOGY | Facility: CLINIC | Age: 80
End: 2020-12-09
Payer: MEDICARE

## 2020-12-09 ENCOUNTER — NON-APPOINTMENT (OUTPATIENT)
Age: 80
End: 2020-12-09

## 2020-12-09 PROCEDURE — 92004 COMPRE OPH EXAM NEW PT 1/>: CPT

## 2020-12-21 ENCOUNTER — RX RENEWAL (OUTPATIENT)
Age: 80
End: 2020-12-21

## 2021-01-13 ENCOUNTER — APPOINTMENT (OUTPATIENT)
Dept: UROLOGY | Facility: CLINIC | Age: 81
End: 2021-01-13

## 2021-01-19 ENCOUNTER — NON-APPOINTMENT (OUTPATIENT)
Age: 81
End: 2021-01-19

## 2021-01-20 ENCOUNTER — APPOINTMENT (OUTPATIENT)
Dept: OPHTHALMOLOGY | Facility: CLINIC | Age: 81
End: 2021-01-20
Payer: MEDICARE

## 2021-01-20 ENCOUNTER — NON-APPOINTMENT (OUTPATIENT)
Age: 81
End: 2021-01-20

## 2021-01-20 PROCEDURE — 66821 AFTER CATARACT LASER SURGERY: CPT | Mod: RT

## 2021-01-29 ENCOUNTER — APPOINTMENT (OUTPATIENT)
Dept: UROLOGY | Facility: CLINIC | Age: 81
End: 2021-01-29
Payer: MEDICARE

## 2021-01-29 PROCEDURE — 99214 OFFICE O/P EST MOD 30 MIN: CPT

## 2021-01-29 RX ORDER — AMOXICILLIN AND CLAVULANATE POTASSIUM 875; 125 MG/1; MG/1
875-125 TABLET, COATED ORAL
Qty: 14 | Refills: 0 | Status: COMPLETED | COMMUNITY
Start: 2020-09-24 | End: 2021-01-29

## 2021-01-29 NOTE — PHYSICAL EXAM
[General Appearance - Well Developed] : well developed [General Appearance - Well Nourished] : well nourished [Normal Appearance] : normal appearance [Well Groomed] : well groomed [General Appearance - In No Acute Distress] : no acute distress [Abdomen Soft] : soft [Abdomen Tenderness] : non-tender [Skin Color & Pigmentation] : normal skin color and pigmentation [Edema] : no peripheral edema [] : no respiratory distress [Exaggerated Use Of Accessory Muscles For Inspiration] : no accessory muscle use [Oriented To Time, Place, And Person] : oriented to person, place, and time [Affect] : the affect was normal [Mood] : the mood was normal [Not Anxious] : not anxious [Normal Station and Gait] : the gait and station were normal for the patient's age

## 2021-01-30 NOTE — HISTORY OF PRESENT ILLNESS
[Hematuria - Gross] : gross hematuria [None] : None [FreeTextEntry1] : 79 yo patient for follow up after HoLEP on 9/29/20\par pathology - 73 grams BPH\par \par Here for f/u\par \par Uroflow: pt voided just before visit\par PVR = 20cc\par \par c/o ED, inconsistent results. he takes sildenafil 100mg right before intercourse. sometimes erections work normally, sometimes he has erection but loses it shortly after penetration, sometimes no erections at all. has normal orgasm with retrograde ejaculation\par \par c/o urinary urgency\par no incontinence [Urinary Incontinence] : no urinary incontinence [Urinary Retention] : no urinary retention [Urinary Urgency] : no urinary urgency [Urinary Frequency] : no urinary frequency [Straining] : no straining [Weak Stream] : no weak stream [Intermittency] : no intermittency [Dysuria] : no dysuria [Bladder Spasm] : no bladder spasm [Abdominal Pain] : no abdominal pain

## 2021-01-30 NOTE — ASSESSMENT
[FreeTextEntry1] : instructed on how to take silenafil\par trial of VESIcare 5mg for urgency (had used previously before surgery)\par Plan\par Uroflow and PVR next visit\par PSA today\par f/u 12 months\par

## 2021-02-09 ENCOUNTER — NON-APPOINTMENT (OUTPATIENT)
Age: 81
End: 2021-02-09

## 2021-02-09 ENCOUNTER — APPOINTMENT (OUTPATIENT)
Dept: OPHTHALMOLOGY | Facility: CLINIC | Age: 81
End: 2021-02-09
Payer: MEDICARE

## 2021-02-09 PROCEDURE — 92012 INTRM OPH EXAM EST PATIENT: CPT | Mod: 24

## 2021-02-10 ENCOUNTER — APPOINTMENT (OUTPATIENT)
Dept: NEUROLOGY | Facility: CLINIC | Age: 81
End: 2021-02-10
Payer: MEDICARE

## 2021-02-10 VITALS
DIASTOLIC BLOOD PRESSURE: 73 MMHG | HEIGHT: 68 IN | WEIGHT: 200 LBS | HEART RATE: 78 BPM | SYSTOLIC BLOOD PRESSURE: 158 MMHG | BODY MASS INDEX: 30.31 KG/M2

## 2021-02-10 VITALS — TEMPERATURE: 97.7 F

## 2021-02-10 PROCEDURE — 99214 OFFICE O/P EST MOD 30 MIN: CPT

## 2021-02-10 NOTE — DISCUSSION/SUMMARY
[FreeTextEntry1] : Patient with PD who feels more unsteady and experiencing intermittent nocturnal hallucinations; this could be related to recent overall LEDD increase\par \par Patient was counseled on the following recommendations:\par \par - reduce first dose of rytary back to 3 tabs 145. Leave rest of the regimen unchanged\par - cont PT and exercises\par - consider adding sinemet with rytary 145 if afternoon balance issues persist\par - f/u 2 months

## 2021-02-10 NOTE — HISTORY OF PRESENT ILLNESS
[FreeTextEntry1] : Since increasing first dose of rytary to 3 tabs 195, he feels his balance is off. \par He is not aware of sinemet kinetics\par Denies any falls\par He is doing balance therapy and exercise regularly\par He feels the best in the mornings\par He is not napping much\par Constipation is controlled\par Sleep is stable\par Notes recent noctural illusions/VH with retention of insight\par \par \par Meds\par rytary 195 3 tabs qAM\par Rytary 145 3 tabs 4 times per day\par dulcolax/prune juice\par abilify 5mg qd\par ritallin 18mg ER

## 2021-02-10 NOTE — PHYSICAL EXAM
[FreeTextEntry1] : There is 2+ masking. Speech is 1+. Mild resting finger tremors in both hands. there is mild left greater than right bradykinesia with minimal rigidity. . SL is good and turns well. No FOG. No LID. Pull test negative

## 2021-02-11 LAB
PSA FREE FLD-MCNC: 11 %
PSA FREE SERPL-MCNC: 0.27 NG/ML
PSA SERPL-MCNC: 2.59 NG/ML

## 2021-03-31 ENCOUNTER — RX RENEWAL (OUTPATIENT)
Age: 81
End: 2021-03-31

## 2021-04-28 ENCOUNTER — APPOINTMENT (OUTPATIENT)
Dept: NEUROLOGY | Facility: CLINIC | Age: 81
End: 2021-04-28
Payer: MEDICARE

## 2021-04-28 VITALS
SYSTOLIC BLOOD PRESSURE: 190 MMHG | WEIGHT: 200 LBS | BODY MASS INDEX: 30.31 KG/M2 | HEIGHT: 68 IN | DIASTOLIC BLOOD PRESSURE: 80 MMHG | HEART RATE: 86 BPM

## 2021-04-28 PROCEDURE — 99214 OFFICE O/P EST MOD 30 MIN: CPT

## 2021-04-28 RX ORDER — LEVODOPA AND CARBIDOPA 195; 48.75 MG/1; MG/1
48.75-195 CAPSULE, EXTENDED RELEASE ORAL
Qty: 270 | Refills: 3 | Status: DISCONTINUED | COMMUNITY
Start: 2020-08-06 | End: 2021-04-28

## 2021-04-28 NOTE — DISCUSSION/SUMMARY
[FreeTextEntry1] : Patient with PD who feels he is walking slower. His exam today is unchanged but suspect that his endurance is limited. \par Patient was counseled on the following recommendations:\par - increase rytary as follows: 2 tabs 145 + 1 tab 195 q4hrs (doses 1-3); dose 4 - 3 tabs 145\par - cont constipation regimen\par - needs f/u with PCP asap for BP management\par - cont PT and exercises\par - he is interested in the PD generation genetic test and the TOPAZ trial\par \par - RTC 3months

## 2021-04-28 NOTE — PHYSICAL EXAM
[FreeTextEntry1] : There is 2+ masking. Speech is 1+. 2+ left hand rest tremor. there is mild left greater than right bradykinesia with minimal rigidity.  SL is good and turns well. No FOG. No LID. Pull test negative

## 2021-04-28 NOTE — HISTORY OF PRESENT ILLNESS
[FreeTextEntry1] : Patient reports that since his last visit, he is walking slower. He is taking rytary 145 3 tabs QID w/o awareness of kinetics. Denies any falls. Hi BPs have been trending high of later. He walks daily for 30 minutes. He has more difficulty with dressing as well. He does PT weekly\par \par Nonmotor:\par + constipation - has BM qod\par sleep is ok\par VH are not present\par \par Meds\par Rytary 145 3 tabs 4 times per day\par dulcolax/prune juice/miralax\par abilify 5mg qd\par ritallin 18mg ER

## 2021-06-08 ENCOUNTER — NON-APPOINTMENT (OUTPATIENT)
Age: 81
End: 2021-06-08

## 2021-06-08 ENCOUNTER — APPOINTMENT (OUTPATIENT)
Dept: OPHTHALMOLOGY | Facility: CLINIC | Age: 81
End: 2021-06-08
Payer: MEDICARE

## 2021-06-08 PROCEDURE — 92015 DETERMINE REFRACTIVE STATE: CPT

## 2021-06-24 ENCOUNTER — EMERGENCY (EMERGENCY)
Facility: HOSPITAL | Age: 81
LOS: 1 days | End: 2021-06-24
Attending: EMERGENCY MEDICINE
Payer: MEDICARE

## 2021-06-24 VITALS
HEIGHT: 68 IN | TEMPERATURE: 94 F | WEIGHT: 250 LBS | SYSTOLIC BLOOD PRESSURE: 134 MMHG | DIASTOLIC BLOOD PRESSURE: 90 MMHG | HEART RATE: 78 BPM | RESPIRATION RATE: 18 BRPM

## 2021-06-24 DIAGNOSIS — Z98.49 CATARACT EXTRACTION STATUS, UNSPECIFIED EYE: Chronic | ICD-10-CM

## 2021-06-24 DIAGNOSIS — Z96.649 PRESENCE OF UNSPECIFIED ARTIFICIAL HIP JOINT: Chronic | ICD-10-CM

## 2021-06-24 LAB
ALBUMIN SERPL ELPH-MCNC: 4.1 G/DL — SIGNIFICANT CHANGE UP (ref 3.3–5)
ALP SERPL-CCNC: 67 U/L — SIGNIFICANT CHANGE UP (ref 40–120)
ALT FLD-CCNC: 5 U/L — LOW (ref 10–45)
ANION GAP SERPL CALC-SCNC: 12 MMOL/L — SIGNIFICANT CHANGE UP (ref 5–17)
AST SERPL-CCNC: 25 U/L — SIGNIFICANT CHANGE UP (ref 10–40)
BASOPHILS # BLD AUTO: 0.05 K/UL — SIGNIFICANT CHANGE UP (ref 0–0.2)
BASOPHILS NFR BLD AUTO: 0.6 % — SIGNIFICANT CHANGE UP (ref 0–2)
BILIRUB SERPL-MCNC: 0.6 MG/DL — SIGNIFICANT CHANGE UP (ref 0.2–1.2)
BUN SERPL-MCNC: 16 MG/DL — SIGNIFICANT CHANGE UP (ref 7–23)
CALCIUM SERPL-MCNC: 9.5 MG/DL — SIGNIFICANT CHANGE UP (ref 8.4–10.5)
CHLORIDE SERPL-SCNC: 107 MMOL/L — SIGNIFICANT CHANGE UP (ref 96–108)
CK SERPL-CCNC: 156 U/L — SIGNIFICANT CHANGE UP (ref 30–200)
CO2 SERPL-SCNC: 22 MMOL/L — SIGNIFICANT CHANGE UP (ref 22–31)
CREAT SERPL-MCNC: 0.9 MG/DL — SIGNIFICANT CHANGE UP (ref 0.5–1.3)
EOSINOPHIL # BLD AUTO: 0.1 K/UL — SIGNIFICANT CHANGE UP (ref 0–0.5)
EOSINOPHIL NFR BLD AUTO: 1.2 % — SIGNIFICANT CHANGE UP (ref 0–6)
GLUCOSE SERPL-MCNC: 152 MG/DL — HIGH (ref 70–99)
HCT VFR BLD CALC: 46.3 % — SIGNIFICANT CHANGE UP (ref 39–50)
HGB BLD-MCNC: 14.7 G/DL — SIGNIFICANT CHANGE UP (ref 13–17)
IMM GRANULOCYTES NFR BLD AUTO: 0.2 % — SIGNIFICANT CHANGE UP (ref 0–1.5)
LYMPHOCYTES # BLD AUTO: 1.31 K/UL — SIGNIFICANT CHANGE UP (ref 1–3.3)
LYMPHOCYTES # BLD AUTO: 15.5 % — SIGNIFICANT CHANGE UP (ref 13–44)
MCHC RBC-ENTMCNC: 29.1 PG — SIGNIFICANT CHANGE UP (ref 27–34)
MCHC RBC-ENTMCNC: 31.7 GM/DL — LOW (ref 32–36)
MCV RBC AUTO: 91.7 FL — SIGNIFICANT CHANGE UP (ref 80–100)
MONOCYTES # BLD AUTO: 0.43 K/UL — SIGNIFICANT CHANGE UP (ref 0–0.9)
MONOCYTES NFR BLD AUTO: 5.1 % — SIGNIFICANT CHANGE UP (ref 2–14)
NEUTROPHILS # BLD AUTO: 6.53 K/UL — SIGNIFICANT CHANGE UP (ref 1.8–7.4)
NEUTROPHILS NFR BLD AUTO: 77.4 % — HIGH (ref 43–77)
NRBC # BLD: 0 /100 WBCS — SIGNIFICANT CHANGE UP (ref 0–0)
PLATELET # BLD AUTO: 200 K/UL — SIGNIFICANT CHANGE UP (ref 150–400)
POTASSIUM SERPL-MCNC: 4.1 MMOL/L — SIGNIFICANT CHANGE UP (ref 3.5–5.3)
POTASSIUM SERPL-SCNC: 4.1 MMOL/L — SIGNIFICANT CHANGE UP (ref 3.5–5.3)
PROT SERPL-MCNC: 6.6 G/DL — SIGNIFICANT CHANGE UP (ref 6–8.3)
RBC # BLD: 5.05 M/UL — SIGNIFICANT CHANGE UP (ref 4.2–5.8)
RBC # FLD: 14.1 % — SIGNIFICANT CHANGE UP (ref 10.3–14.5)
SODIUM SERPL-SCNC: 141 MMOL/L — SIGNIFICANT CHANGE UP (ref 135–145)
WBC # BLD: 8.44 K/UL — SIGNIFICANT CHANGE UP (ref 3.8–10.5)
WBC # FLD AUTO: 8.44 K/UL — SIGNIFICANT CHANGE UP (ref 3.8–10.5)

## 2021-06-24 PROCEDURE — 73590 X-RAY EXAM OF LOWER LEG: CPT | Mod: 26,RT

## 2021-06-24 PROCEDURE — 73090 X-RAY EXAM OF FOREARM: CPT | Mod: 26,LT

## 2021-06-24 PROCEDURE — 73522 X-RAY EXAM HIPS BI 3-4 VIEWS: CPT | Mod: 26

## 2021-06-24 PROCEDURE — 73562 X-RAY EXAM OF KNEE 3: CPT | Mod: 26,50

## 2021-06-24 PROCEDURE — 71045 X-RAY EXAM CHEST 1 VIEW: CPT | Mod: 26

## 2021-06-24 PROCEDURE — 99285 EMERGENCY DEPT VISIT HI MDM: CPT | Mod: GC

## 2021-06-24 PROCEDURE — 73030 X-RAY EXAM OF SHOULDER: CPT | Mod: 26,LT

## 2021-06-24 PROCEDURE — 93010 ELECTROCARDIOGRAM REPORT: CPT

## 2021-06-24 RX ORDER — ACETAMINOPHEN 500 MG
1000 TABLET ORAL ONCE
Refills: 0 | Status: COMPLETED | OUTPATIENT
Start: 2021-06-24 | End: 2021-06-24

## 2021-06-24 RX ADMIN — Medication 400 MILLIGRAM(S): at 22:40

## 2021-06-24 NOTE — ED PROVIDER NOTE - PATIENT PORTAL LINK FT
You can access the FollowMyHealth Patient Portal offered by [facility] by registering at the following website: [email or web address]. By joining [facility]’s FollowMyHealth portal, you will also be able to view your health information using other applications (apps) compatible with our system.

## 2021-06-24 NOTE — ED ADULT TRIAGE NOTE - ISOLATION TYPE:
None 70 y/o female with hx  of ureteral injury during hysterectomy 1998.   Pt reports she  has been having left flank pain x 2 months which has been worsening.  Dx with left hydronephrosis, scheduled for  Left laparoscopic Nephrectomy 5/24/18.

## 2021-06-24 NOTE — ED PROVIDER NOTE - ATTENDING CONTRIBUTION TO CARE
attending Juan: 80yM h/o Parkinson disease, on ASA presents after mechanical trip and fall, witnessed by wife with +head trauma without LOC. Did not attempt to ambulate after fall. Placed in cervical collar and back board by EMS. Tdap UTD. Complaints of headache and pain to R calf. Primary survey intact, Abrasion and hematoma to L parietal region, no midline spinal tenderness, stable pelvis, soft LE compartments b/l. Will obtain labs, CT and xray imaging eval traumatic injury, pain control, reassess

## 2021-06-24 NOTE — ED PROVIDER NOTE - CARE PLAN
Principal Discharge DX:	Fall  Secondary Diagnosis:	Abrasion  Secondary Diagnosis:	Pleural effusion

## 2021-06-24 NOTE — ED PROVIDER NOTE - PHYSICAL EXAMINATION
Vitals: I have reviewed the patients vital signs. hypothermic on oral temp however rectal normal  General: appears uncomfotable  HEENT: L sided contusion and abrasion along temporal region, small oozing active bleeding, EOMI, PERRL, no maxilofacial trauma, no malocclusion  Neck: no JVD, no tracheal deviation, no midline tenderness, in c collar from ems  Chest/Lungs: no trauma, symmetric chest rise, lung sounds clear bilaterally, speaking in complete sentences  Heart: Regular rate, regular rhythm, skin well perfused, 2+ pulses  Abdomen: Obese, soft, nontender, no peritoneal signs  Neuro: A+Ox3, CNII-XII intact, non dysarthric speech, neurovascular intact without paresthesias or focal weakness  Eyes: EOMI, no conjunctival injection  MSK: Able to range all extremities with mild limitation of L shoulder due to pain, as well as bilateral knees due to pain. Right calf tender to palpation, compartments soft.   Skin: numerous small abrasions with minimal oozing, most notably and largest are L scalp, l forearm, L posterior shoulder, bilateral knees. Laceration to R thumb  Back: no midline tenderness, no step offs or obvious external trauma

## 2021-06-24 NOTE — ED PROVIDER NOTE - OBJECTIVE STATEMENT
81 y/o M pmh parkinsons, on ASA eod, here s/p mechanical trip and fall, witnessed, no loc, onto L side. Hit head and L arm. No ambulation after fall. BIBEMS with c-collar on back board. C/o pain to L side of head, L shoulder, bilateral knees. Numerous small abrasions, bandaged PTA, no significant active bleeding. One lac to the R thumb. Endorses R calf cramping and pain. Denies n/v/d, cp, sob, abd pain, urinary sx, recent infectious sx. Tetanus UTD.

## 2021-06-24 NOTE — ED PROVIDER NOTE - NSFOLLOWUPINSTRUCTIONS_ED_ALL_ED_FT
You came to the ER after a fall. We assessed you with Xrays and CT scans and did not find any acute fractures or dislocations or bleeding. We believe you are stable for discharge. You can take motrin and tylenol per  guidelines as needed for the pain.     Please return to the ER if you fall again, are unable to walk, have persistent nausea/vomiting, blurry vision, are unable to move or feel part of your body, or for any concerns you would like evaluated.     We did note a PLEURAL EFFUSION on your chest X ray. It is not causing a low oxygen level currently. It is unclear what the cause is. You should follow up with your primary care doctor within a week for further assessment and treatment.     -If you do not have a PMD, please call [phone] to find one convenient for you or call our clinic at [phone].

## 2021-06-24 NOTE — ED PROVIDER NOTE - NS ED ROS FT
Constitutional: (-) fever (-) vomiting  Eyes/ENT: (-) vision changes  Cardiovascular: (-) chest pain, (-) wheezing  Respiratory: (-) cough, (-) shortness of breath  Gastrointestinal: (-) vomiting, (-) diarrhea, (-) abdominal pain  : (-) dysuria   Musculoskeletal: +shoulder and knee pain  Integumentary: (-) rash, (-) edema  Neurological: (-)loc  Allergic/Immunologic: (-) pruritus  Endocrine: No history of thyroid disease

## 2021-06-24 NOTE — ED PROVIDER NOTE - CLINICAL SUMMARY MEDICAL DECISION MAKING FREE TEXT BOX
81 y/o M hx parkinsons, on ASA every other day, s/p mechanical trip and fall without LOC. Numerous abrasions and contusions. Most notably L parietal region without obvious deformity, no maxillofacial trauma or malocclusion. No midline tenderness, arrived in c collar. Lungs CTAB, L shoulder pain without obvious deformity. No abd tenderness, pelvis stable, BL LE sensation and strength intact, no paresthesias. Concern for ICH, do not believe pt has fractures or significant hemorrhage however will get basics, ct head/c spine and keep in collar until cleared, xray of areas of pain, ofirmev, reassess. Tetanus UTD.

## 2021-06-24 NOTE — ED ADULT NURSE NOTE - OBJECTIVE STATEMENT
79y/o male history of parkinson's presents to the ED via EMS from home c/o mechanical trip and fall with no LOC, no AC, with hematoma noted to left temporal region with no active bleeding noted. pt has no complaints at this time. Pt is AOx4, patent airway, clear lung sounds, soft non tender abdomen, strong peripheral pulses, skin is warm dry and intact, no changes in bowel/bladder patterns, ambulates independently at baseline. Patient denies fever, chills, n/v, weakness, abd pain, diarrhea/constipation, numbness/tingling, urinary s/s, in no respiratory distress, no chest pain, Patient safety provided with call bell within reach and bed in the lowest position.

## 2021-06-24 NOTE — ED PROVIDER NOTE - PROGRESS NOTE DETAILS
Yogi: patient ambulatory, saturating well, no complaints, tbdc. will notify of pleural effusion, f/u pcp

## 2021-06-25 VITALS — RESPIRATION RATE: 19 BRPM | OXYGEN SATURATION: 96 %

## 2021-06-25 PROCEDURE — 96374 THER/PROPH/DIAG INJ IV PUSH: CPT

## 2021-06-25 PROCEDURE — 73030 X-RAY EXAM OF SHOULDER: CPT

## 2021-06-25 PROCEDURE — 73522 X-RAY EXAM HIPS BI 3-4 VIEWS: CPT

## 2021-06-25 PROCEDURE — 72125 CT NECK SPINE W/O DYE: CPT | Mod: 26,MH

## 2021-06-25 PROCEDURE — 82550 ASSAY OF CK (CPK): CPT

## 2021-06-25 PROCEDURE — 73590 X-RAY EXAM OF LOWER LEG: CPT

## 2021-06-25 PROCEDURE — 93005 ELECTROCARDIOGRAM TRACING: CPT

## 2021-06-25 PROCEDURE — 99284 EMERGENCY DEPT VISIT MOD MDM: CPT | Mod: 25

## 2021-06-25 PROCEDURE — 70450 CT HEAD/BRAIN W/O DYE: CPT

## 2021-06-25 PROCEDURE — 70450 CT HEAD/BRAIN W/O DYE: CPT | Mod: 26,MH

## 2021-06-25 PROCEDURE — 80053 COMPREHEN METABOLIC PANEL: CPT

## 2021-06-25 PROCEDURE — 73562 X-RAY EXAM OF KNEE 3: CPT

## 2021-06-25 PROCEDURE — 96375 TX/PRO/DX INJ NEW DRUG ADDON: CPT

## 2021-06-25 PROCEDURE — 73090 X-RAY EXAM OF FOREARM: CPT

## 2021-06-25 PROCEDURE — 71045 X-RAY EXAM CHEST 1 VIEW: CPT

## 2021-06-25 PROCEDURE — 72125 CT NECK SPINE W/O DYE: CPT

## 2021-06-25 PROCEDURE — 85025 COMPLETE CBC W/AUTO DIFF WBC: CPT

## 2021-06-25 RX ORDER — KETOROLAC TROMETHAMINE 30 MG/ML
15 SYRINGE (ML) INJECTION ONCE
Refills: 0 | Status: DISCONTINUED | OUTPATIENT
Start: 2021-06-25 | End: 2021-06-25

## 2021-06-25 RX ADMIN — Medication 15 MILLIGRAM(S): at 03:50

## 2021-07-02 ENCOUNTER — APPOINTMENT (OUTPATIENT)
Dept: UROLOGY | Facility: CLINIC | Age: 81
End: 2021-07-02
Payer: MEDICARE

## 2021-07-02 PROCEDURE — 99213 OFFICE O/P EST LOW 20 MIN: CPT

## 2021-07-02 NOTE — ASSESSMENT
[FreeTextEntry1] : The patient is a 80 year old male presenting today for \par He has nocturia 1-2 times per night, slightly improved from 3 times per night.\par He notes his urinary stream is slightly stronger, but denies intermittency.\par Hematuria has subsided. \par He had a HoLEP done in 2020. \par \par This patient has a mechanical problem with voiding, he is markedly obese and this has resulted in him having difficulty grasping his penis so he doesn’t void on his scrotum and elsewhere. He is also complained of ED. In view of these combination of difficulties, he may be a candidate for a semi-rigid penile prosthesis. I have asked him to see Dr. Aburto to discuss this. This patient has Parkinsons and he and his wife both state that he eats very small meals and as much as he has tried to lose weight, this has not happening for him. He was circumcised as a  so I do not advise a repeat circumcision and we would only be removing redundant skin that has prolapsed because of his obesity. \par \par I advised him that he is having trouble with initiating urination because he his overweight and he needs to lose weight. \par Before visiting Dr. Aburto, I recommend the patient sits to void and see if it is improved.\par \par The patient will return to the office to see either myself or Dr. Talbot\par \par I spent 25 minutes with the patient.

## 2021-07-02 NOTE — HISTORY OF PRESENT ILLNESS
[FreeTextEntry1] : The patient is a 80 year old male presenting today for \par He has nocturia 1-2 times per night, slightly improved from 3 times per night.\par He notes his urinary stream is slightly stronger, but denies intermittency.\par Hematuria has subsided. \par He had a HoLEP done in 2020. \par \par This patient has a mechanical problem with voiding, he is markedly obese and this has resulted in him having difficulty grasping his penis so he doesn’t void on his scrotum and elsewhere. He is also complained of ED. In view of these combination of difficulties, he may be a candidate for a semi-rigid penile prosthesis. I have asked him to see Dr. Aburto to discuss this. This patient has Parkinsons and he and his wife both state that he eats very small meals and as much as he has tried to lose weight, this has not happening for him. He was circumcised as a  so I do not advise a repeat circumcision and we would only be removing redundant skin that has prolapsed because of his obesity. \par \par I advised him that he is having trouble with initiating urination because he his overweight and he needs to lose weight. \par Before visiting Dr. Aburto, I recommend the patient sits to void and see if it is improved.\par \par The patient will return to the office to see either myself or Dr. Talbot\par \par

## 2021-07-07 ENCOUNTER — APPOINTMENT (OUTPATIENT)
Dept: NEUROLOGY | Facility: CLINIC | Age: 81
End: 2021-07-07

## 2021-07-22 ENCOUNTER — RX RENEWAL (OUTPATIENT)
Age: 81
End: 2021-07-22

## 2021-08-03 ENCOUNTER — APPOINTMENT (OUTPATIENT)
Dept: UROLOGY | Facility: CLINIC | Age: 81
End: 2021-08-03
Payer: MEDICARE

## 2021-08-03 VITALS
RESPIRATION RATE: 16 BRPM | HEART RATE: 73 BPM | DIASTOLIC BLOOD PRESSURE: 75 MMHG | OXYGEN SATURATION: 98 % | SYSTOLIC BLOOD PRESSURE: 138 MMHG

## 2021-08-03 PROCEDURE — 99214 OFFICE O/P EST MOD 30 MIN: CPT

## 2021-08-03 RX ORDER — CARBIDOPA AND LEVODOPA 25; 100 MG/1; MG/1
25-100 TABLET ORAL
Qty: 135 | Refills: 3 | Status: COMPLETED | COMMUNITY
End: 2021-08-03

## 2021-08-03 NOTE — HISTORY OF PRESENT ILLNESS
[FreeTextEntry1] : ALBINO RIVAS, a 80 year old male, presented to the office with the chief complaint of erectile dysfunction and penis retreats back into pubis and cannot control urination.\par \par Nocturia x1\par \par Flow improved since [place] did TURP\par \par No morning erections\par \par Can achieve erections for intercourse occasionally \par \par Size impacted since surgery \par \par Only felt orgasm twice since surgery\par

## 2021-08-03 NOTE — PHYSICAL EXAM
[General Appearance - Well Developed] : well developed [General Appearance - Well Nourished] : well nourished [Normal Appearance] : normal appearance [Well Groomed] : well groomed [General Appearance - In No Acute Distress] : no acute distress [Abdomen Soft] : soft [Abdomen Tenderness] : non-tender [Costovertebral Angle Tenderness] : no ~M costovertebral angle tenderness [Edema] : no peripheral edema [] : no respiratory distress [Respiration, Rhythm And Depth] : normal respiratory rhythm and effort [Exaggerated Use Of Accessory Muscles For Inspiration] : no accessory muscle use [Oriented To Time, Place, And Person] : oriented to person, place, and time [Affect] : the affect was normal [Mood] : the mood was normal [Not Anxious] : not anxious [Normal Station and Gait] : the gait and station were normal for the patient's age [No Focal Deficits] : no focal deficits [No Palpable Adenopathy] : no palpable adenopathy [Urethral Meatus] : meatus normal [Urinary Bladder Findings] : the bladder was normal on palpation [Scrotum] : the scrotum was normal [Testes Mass (___cm)] : there were no testicular masses [FreeTextEntry1] : penile retraction with pubic fat

## 2021-08-03 NOTE — ASSESSMENT
[FreeTextEntry1] : ED\par \par Hidden penis - frustrated with urinating.\par \par No erectile function.\par \par Parkinson\par \par Pubic fat pat \par penis stretched length is 6 inch \par \par start cialis 5 mg daily \par posture PT\par \par if not better than may consider trial to stop Ritalin as may constrict vessels\par \par may consider trazodone at low dose works for penile retraction \par \par \par The submitted E/M billing level for this visit reflects the total time spent on the day of the visit including face-to-face time spent with the patient, non-face-to-face review of medical records and relevant information, documentation, and asynchronous communication with the patient after a visit via phone, email, or patient’s eHR portal after the visit.  \par \par The medical records reviewed are either scanned into the chart or reviewed with the patient using a patient’s electronic medical records portal for patients with records not available to [facility] via electronic transmission platforms from other institutions and labs. \par \par Time spend counseling and performing coordination of care was also included in determining the appropriate EM billing level.\par \par I have reviewed and verified information regarding the chief complaint and history recorded by the ancillary staff and/or the patient. I have independently reviewed and interpreted tests performed by other physicians and facilities as necessary. \par \par I have discussed with the patient differential diagnosis, reason for auxiliary tests if ordered, risks, benefits, alternatives, and complications of each form of therapy were discussed.

## 2021-09-24 ENCOUNTER — APPOINTMENT (OUTPATIENT)
Dept: NEUROLOGY | Facility: CLINIC | Age: 81
End: 2021-09-24

## 2021-09-24 VITALS — HEIGHT: 66.5 IN | WEIGHT: 184 LBS | BODY MASS INDEX: 29.22 KG/M2

## 2021-09-24 NOTE — HISTORY OF PRESENT ILLNESS
[FreeTextEntry1] : Study visit\par \par Since last visit, he had a heart stent placed\par Has had 2 falls: 1) mechanical in nature; 2) occurred in bathroom when trying to pick an object up from the ground\par Had TURP with improvement in nocturia\par Feels imbalanced in the afternoons\par However, overall movement is better\par Needs help with dressing, can do all other ADLs independently\par \par \par Nonmotor:\par + constipation - uses metamucil\par sleep is ok\par VH are not present\par \par Meds\par rytary 195  1 tab AM and 7p\par Rytary 145 2 tab AM - 3p - 7p - 11\par dulcolax/prune juice/miralax\par abilify 5mg qd\par ritallin 18mg ER\par desvenlafaxine 50mg qd\par \par \par Exam:\par There is 2+ L>R bradykinesia with 1+ rigidity\par 2+ rest tremors in his hands\par Walks with better SL and turns. Left armswing depressed\par \par Plan\par can add 1 tab 195 to 3Pm rytary 145 dose\par add senna qhs\par leave rest of the regimen the same\par \par f/u 3months

## 2021-09-28 ENCOUNTER — APPOINTMENT (OUTPATIENT)
Dept: UROLOGY | Facility: CLINIC | Age: 81
End: 2021-09-28
Payer: MEDICARE

## 2021-09-28 DIAGNOSIS — Q55.64 HIDDEN PENIS: ICD-10-CM

## 2021-09-28 PROCEDURE — 99213 OFFICE O/P EST LOW 20 MIN: CPT

## 2021-10-04 NOTE — ASSESSMENT
[FreeTextEntry1] : excellent response to Cialis 5 mg \par patient wanted to try Cialis 20 mg to get better erection possibly for penetration \par \par Rx given \par follow up in 1 year\par \par \par reviewed labs \par FT low - TT normal \par \par would not treat \par expected for age \par \par The submitted E/M billing level for this visit reflects the total time spent on the day of the visit including documentation in EMR, face-to-face time spent with the patient, non-face-to-face review of medical records and relevant information, review of laboratory results available via [employer] portal, as well using a patient’s electronic medical records portal for patients with records not available to [facility] directly. \par \par Time spend counseling and performing coordination of care was also included in determining the appropriate EM billing level.\par \par I have reviewed and verified information regarding the chief complaint and history recorded by the ancillary staff and/or the patient. I have independently reviewed and interpreted tests performed by other physicians and facilities as necessary. I have reviewed images pertinent to providing the care to  the patient.  \par \par I have discussed with the patient differential diagnosis, reason for auxiliary tests if ordered, risks, benefits, alternatives, and complications of each form of therapy included surgical therapy. Off label use of most of medications used in andrology was reviewed. \par \par \par

## 2021-10-04 NOTE — HISTORY OF PRESENT ILLNESS
[FreeTextEntry1] : 81 y/o male came for f/u retracted penis \par \par patient is happy. his penis is not retracted anymore\par \par patient achieved full erection and was able to have a full sexual activity\par \par patient is asking if can take more tadalafil to have better erection\par \par S testosterone: 509\par \par \par \par

## 2021-11-11 ENCOUNTER — APPOINTMENT (OUTPATIENT)
Dept: CARDIOLOGY | Facility: CLINIC | Age: 81
End: 2021-11-11
Payer: MEDICARE

## 2021-11-11 ENCOUNTER — NON-APPOINTMENT (OUTPATIENT)
Age: 81
End: 2021-11-11

## 2021-11-11 VITALS
DIASTOLIC BLOOD PRESSURE: 68 MMHG | WEIGHT: 176 LBS | SYSTOLIC BLOOD PRESSURE: 130 MMHG | HEIGHT: 68 IN | OXYGEN SATURATION: 97 % | HEART RATE: 78 BPM | RESPIRATION RATE: 16 BRPM | BODY MASS INDEX: 26.67 KG/M2

## 2021-11-11 PROCEDURE — 99205 OFFICE O/P NEW HI 60 MIN: CPT

## 2021-11-11 PROCEDURE — 93000 ELECTROCARDIOGRAM COMPLETE: CPT

## 2021-11-22 ENCOUNTER — RX RENEWAL (OUTPATIENT)
Age: 81
End: 2021-11-22

## 2021-12-14 ENCOUNTER — APPOINTMENT (OUTPATIENT)
Dept: NEUROLOGY | Facility: CLINIC | Age: 81
End: 2021-12-14
Payer: MEDICARE

## 2021-12-14 PROCEDURE — 99214 OFFICE O/P EST MOD 30 MIN: CPT

## 2021-12-14 RX ORDER — ARIPIPRAZOLE 5 MG/1
5 TABLET ORAL DAILY
Refills: 0 | Status: DISCONTINUED | COMMUNITY
End: 2021-12-14

## 2021-12-14 NOTE — DISCUSSION/SUMMARY
[FreeTextEntry1] : PD with increased bradykinesia and shuffling following cardiac procedure. There is component of deconditioning present\par constipation\par depression\par \par Patient was counseled on the following recommendations:\par - increase rytary to 2 tabs 145 + 1 tab 195 four times per day\par - increase desvenlafaxine to 75mg ER qd\par - add miralax qd for constipation \par - increase PT and daily exercises\par \par f/u 1month\par

## 2021-12-14 NOTE — HISTORY OF PRESENT ILLNESS
[FreeTextEntry1] : Since August he has had a cardiac stent and PPM placed. His mobility has declined over the past several months. He fell a few weeks ago in the bathroom; he lost balance while turning around. \par His depression has increased and has been on desvenlafaxine for 10years. No longer on abilify\par Unaware of LD kinetics. Does not have FOG or LID\par \par \par Nonmotor:\par + constipation - BM qod\par sleep is fragmented\par VH are not present\par \par Meds\par rytary 195  1 tab AM and 7p\par Rytary 145 2 tab  AM - 3p - 7p - 11\par desvenlafaxine 50mg qd\par spironolactone 25mg qd\par torsemide 20mg qd\par tadalafil

## 2021-12-14 NOTE — PHYSICAL EXAM
[FreeTextEntry1] : There is 2+ masking. Speech is 1+. 1+ left hand rest tremor. there is mild-moderate  left greater than right bradykinesia with minimal rigidity.  SL is good and turns well. No FOG. No LID. Pull test negative

## 2021-12-16 ENCOUNTER — APPOINTMENT (OUTPATIENT)
Dept: CARDIOLOGY | Facility: CLINIC | Age: 81
End: 2021-12-16
Payer: MEDICARE

## 2021-12-16 VITALS
HEIGHT: 68 IN | DIASTOLIC BLOOD PRESSURE: 72 MMHG | SYSTOLIC BLOOD PRESSURE: 140 MMHG | BODY MASS INDEX: 27.28 KG/M2 | HEART RATE: 70 BPM | OXYGEN SATURATION: 99 % | WEIGHT: 180 LBS

## 2021-12-16 PROCEDURE — 99215 OFFICE O/P EST HI 40 MIN: CPT

## 2021-12-22 ENCOUNTER — NON-APPOINTMENT (OUTPATIENT)
Age: 81
End: 2021-12-22

## 2021-12-28 ENCOUNTER — INPATIENT (INPATIENT)
Facility: HOSPITAL | Age: 81
LOS: 2 days | Discharge: HOME | End: 2021-12-31
Attending: INTERNAL MEDICINE | Admitting: INTERNAL MEDICINE
Payer: MEDICARE

## 2021-12-28 VITALS
HEIGHT: 68 IN | HEART RATE: 70 BPM | OXYGEN SATURATION: 100 % | RESPIRATION RATE: 20 BRPM | TEMPERATURE: 98 F | SYSTOLIC BLOOD PRESSURE: 153 MMHG | DIASTOLIC BLOOD PRESSURE: 78 MMHG

## 2021-12-28 DIAGNOSIS — R41.82 ALTERED MENTAL STATUS, UNSPECIFIED: ICD-10-CM

## 2021-12-28 DIAGNOSIS — Z96.649 PRESENCE OF UNSPECIFIED ARTIFICIAL HIP JOINT: Chronic | ICD-10-CM

## 2021-12-28 DIAGNOSIS — Z98.49 CATARACT EXTRACTION STATUS, UNSPECIFIED EYE: Chronic | ICD-10-CM

## 2021-12-28 LAB
ALBUMIN SERPL ELPH-MCNC: 3.8 G/DL — SIGNIFICANT CHANGE UP (ref 3.3–5)
ALP SERPL-CCNC: 102 U/L — SIGNIFICANT CHANGE UP (ref 40–120)
ALT FLD-CCNC: <5 U/L — LOW (ref 4–41)
ANION GAP SERPL CALC-SCNC: 12 MMOL/L — SIGNIFICANT CHANGE UP (ref 7–14)
APPEARANCE UR: ABNORMAL
APTT BLD: 40.5 SEC — HIGH (ref 27–36.3)
AST SERPL-CCNC: 28 U/L — SIGNIFICANT CHANGE UP (ref 4–40)
BASOPHILS # BLD AUTO: 0.05 K/UL — SIGNIFICANT CHANGE UP (ref 0–0.2)
BASOPHILS NFR BLD AUTO: 0.5 % — SIGNIFICANT CHANGE UP (ref 0–2)
BILIRUB SERPL-MCNC: 0.5 MG/DL — SIGNIFICANT CHANGE UP (ref 0.2–1.2)
BILIRUB UR-MCNC: NEGATIVE — SIGNIFICANT CHANGE UP
BUN SERPL-MCNC: 21 MG/DL — SIGNIFICANT CHANGE UP (ref 7–23)
CALCIUM SERPL-MCNC: 9.5 MG/DL — SIGNIFICANT CHANGE UP (ref 8.4–10.5)
CHLORIDE SERPL-SCNC: 101 MMOL/L — SIGNIFICANT CHANGE UP (ref 98–107)
CO2 SERPL-SCNC: 26 MMOL/L — SIGNIFICANT CHANGE UP (ref 22–31)
COLOR SPEC: YELLOW — SIGNIFICANT CHANGE UP
CREAT SERPL-MCNC: 1.49 MG/DL — HIGH (ref 0.5–1.3)
DIFF PNL FLD: ABNORMAL
EOSINOPHIL # BLD AUTO: 0.08 K/UL — SIGNIFICANT CHANGE UP (ref 0–0.5)
EOSINOPHIL NFR BLD AUTO: 0.8 % — SIGNIFICANT CHANGE UP (ref 0–6)
GLUCOSE SERPL-MCNC: 133 MG/DL — HIGH (ref 70–99)
GLUCOSE UR QL: NEGATIVE — SIGNIFICANT CHANGE UP
HCT VFR BLD CALC: 43.8 % — SIGNIFICANT CHANGE UP (ref 39–50)
HGB BLD-MCNC: 13.6 G/DL — SIGNIFICANT CHANGE UP (ref 13–17)
IANC: 7.93 K/UL — SIGNIFICANT CHANGE UP (ref 1.5–8.5)
IMM GRANULOCYTES NFR BLD AUTO: 0.4 % — SIGNIFICANT CHANGE UP (ref 0–1.5)
INR BLD: 2.43 RATIO — HIGH (ref 0.88–1.16)
KETONES UR-MCNC: NEGATIVE — SIGNIFICANT CHANGE UP
LEUKOCYTE ESTERASE UR-ACNC: NEGATIVE — SIGNIFICANT CHANGE UP
LYMPHOCYTES # BLD AUTO: 1.07 K/UL — SIGNIFICANT CHANGE UP (ref 1–3.3)
LYMPHOCYTES # BLD AUTO: 10.9 % — LOW (ref 13–44)
MCHC RBC-ENTMCNC: 30.3 PG — SIGNIFICANT CHANGE UP (ref 27–34)
MCHC RBC-ENTMCNC: 31.1 GM/DL — LOW (ref 32–36)
MCV RBC AUTO: 97.6 FL — SIGNIFICANT CHANGE UP (ref 80–100)
MONOCYTES # BLD AUTO: 0.68 K/UL — SIGNIFICANT CHANGE UP (ref 0–0.9)
MONOCYTES NFR BLD AUTO: 6.9 % — SIGNIFICANT CHANGE UP (ref 2–14)
NEUTROPHILS # BLD AUTO: 7.93 K/UL — HIGH (ref 1.8–7.4)
NEUTROPHILS NFR BLD AUTO: 80.5 % — HIGH (ref 43–77)
NITRITE UR-MCNC: NEGATIVE — SIGNIFICANT CHANGE UP
NRBC # BLD: 0 /100 WBCS — SIGNIFICANT CHANGE UP
NRBC # FLD: 0 K/UL — SIGNIFICANT CHANGE UP
PH UR: 6.5 — SIGNIFICANT CHANGE UP (ref 5–8)
PLATELET # BLD AUTO: 232 K/UL — SIGNIFICANT CHANGE UP (ref 150–400)
POTASSIUM SERPL-MCNC: 3.7 MMOL/L — SIGNIFICANT CHANGE UP (ref 3.5–5.3)
POTASSIUM SERPL-SCNC: 3.7 MMOL/L — SIGNIFICANT CHANGE UP (ref 3.5–5.3)
PROT SERPL-MCNC: 7.6 G/DL — SIGNIFICANT CHANGE UP (ref 6–8.3)
PROT UR-MCNC: ABNORMAL
PROTHROM AB SERPL-ACNC: 26.8 SEC — HIGH (ref 10.6–13.6)
RBC # BLD: 4.49 M/UL — SIGNIFICANT CHANGE UP (ref 4.2–5.8)
RBC # FLD: 14.7 % — HIGH (ref 10.3–14.5)
SODIUM SERPL-SCNC: 139 MMOL/L — SIGNIFICANT CHANGE UP (ref 135–145)
SP GR SPEC: 1.02 — SIGNIFICANT CHANGE UP (ref 1–1.05)
TROPONIN T, HIGH SENSITIVITY RESULT: 104 NG/L — CRITICAL HIGH
TROPONIN T, HIGH SENSITIVITY RESULT: 97 NG/L — CRITICAL HIGH
TSH SERPL-MCNC: 1.15 UIU/ML — SIGNIFICANT CHANGE UP (ref 0.27–4.2)
UROBILINOGEN FLD QL: ABNORMAL
WBC # BLD: 9.85 K/UL — SIGNIFICANT CHANGE UP (ref 3.8–10.5)
WBC # FLD AUTO: 9.85 K/UL — SIGNIFICANT CHANGE UP (ref 3.8–10.5)

## 2021-12-28 PROCEDURE — 70450 CT HEAD/BRAIN W/O DYE: CPT | Mod: 26,MG

## 2021-12-28 PROCEDURE — 99285 EMERGENCY DEPT VISIT HI MDM: CPT | Mod: 25

## 2021-12-28 PROCEDURE — G1004: CPT

## 2021-12-28 PROCEDURE — 93010 ELECTROCARDIOGRAM REPORT: CPT

## 2021-12-28 RX ORDER — CARBIDOPA AND LEVODOPA 25; 100 MG/1; MG/1
2 TABLET ORAL ONCE
Refills: 0 | Status: COMPLETED | OUTPATIENT
Start: 2021-12-28 | End: 2021-12-28

## 2021-12-28 RX ORDER — CARBIDOPA AND LEVODOPA 25; 100 MG/1; MG/1
3 TABLET ORAL ONCE
Refills: 0 | Status: COMPLETED | OUTPATIENT
Start: 2021-12-28 | End: 2021-12-28

## 2021-12-28 RX ADMIN — CARBIDOPA AND LEVODOPA 2 TABLET(S): 25; 100 TABLET ORAL at 19:17

## 2021-12-28 RX ADMIN — CARBIDOPA AND LEVODOPA 3 TABLET(S): 25; 100 TABLET ORAL at 16:35

## 2021-12-28 NOTE — ED PROVIDER NOTE - PHYSICAL EXAMINATION
-Cranial Nerves:  --CN II: PERRLA  --CN III, IV, VI: EOMI b/l  --CN V1-3: Facial sensation intact to touch  --CN VII: No facial asymmetry or droop  --CN VIII: Hearing intact to rubbing fingers b/l  --CN IX, X: Palate elevates symmetrically. Normal phonation  --CN XI: Heading turning and shoulder shrug intact b/l  --CN XII: Tongue midline with normal movements     Normal bilateral FTN.

## 2021-12-28 NOTE — ED ADULT NURSE NOTE - OBJECTIVE STATEMENT
Patient arrives to room 26, A&Ox3, ambulatory at baseline, c/o weakness that began this AM. patient states he woke up with weakness and frequent urination. patient states he has a PMH of PPM, cardiac stents, parkinsons disease. no neuro deficits noted, BL  strength equal. breathing even and unlabored, pallor/diaphoresis not noted. Denies chest pain, shortness of breath, nausea, vomiting or diarrhea. patient pending orders at this time.

## 2021-12-28 NOTE — CONSULT NOTE ADULT - SUBJECTIVE AND OBJECTIVE BOX
Neurology Consult    ALBINO RIVAS  81y Male  MRN-[id number]      HPI:  81 y.o. 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 At baseline patient is reported to be fully oriented, however per wife, he has had intermittent episodes of confusion for the past 1.5 months, as well as reduced PO intake. This AM he was reported to be AOx1 to self only, for which Neurology was consulted. In the ED patient was noted to have some improvement status and reported his strength was back to baseline. On evaluation patient reports symptoms of increased urinary urgency for the past 1.5 months, seemingly worse at nighttime. He reports associated hematuria, however is uncertain of the timing with respect to voiding. He does not report any recent fever, chills, nausea, vomiting, dysuria, diarrhea, or recent fall. He expresses concern his symptoms may be somehow related to his medications. Of note, he follows with Movement Disorders specialist Dr. Donte Langley in the outpatient setting, and was recently seen on 21. At that time, his Rytary regimen was adjusted to 2 tabs 145mg + 1 tab 195mg four times per day. Additionally his Desvenlafaxine ER was increased to 75mg mg qAM. He reports compliance with his medication regimen. At baseline he states he is able to ambulate independently.     A 10-system ROS was performed and is negative except as noted above and/or in the HPI.      PAST MEDICAL & SURGICAL HISTORY:  Hypertension    Endogenous hyperlipemia    Benign prostatic hypertrophy    Depression    History of hip replacement, total  right hip     Status post cataract extraction  right ey cataract extraction with IOL 2015        FAMILY HISTORY:      SOCIAL HISTORY:       MEDICATIONS    Home Medications:  ARIPiprazole 5 mg oral tablet: 1 tab(s) orally once a day (29 Sep 2020 07:10)  atorvastatin 10 mg oral tablet: 1 tab(s) orally once a day (29 Sep 2020 07:10)  desvenlafaxine 50 mg oral tablet, extended release: 1 tab(s) orally once a day (29 Sep 2020 07:10)  finasteride 5 mg oral tablet: 1 tab(s) orally once a day (29 Sep 2020 07:10)  methylphenidate 25 mg/24 hr oral capsule, (20/80 release) extended release:  (29 Sep 2020 07:10)  polyethylene glycol 3350 oral powder for reconstitution: 17 gram(s) orally once a day (30 Sep 2020 10:11)  Rytary 36.25 mg-145 mg oral capsule, extended release: 3 cap(s) orally 3 times a day (30 Sep 2020 15:55)  Rytary 48.75 mg-195 mg oral capsule, extended release: 3 cap(s) orally once a day (30 Sep 2020 15:55)  senna oral tablet: 2 tab(s) orally once a day (at bedtime) (30 Sep 2020 10:11)  tamsulosin 0.4 mg oral capsule: 1 cap(s) orally once a day (29 Sep 2020 07:10)    MEDICATIONS  (STANDING):    MEDICATIONS  (PRN):      Allergies    No Known Allergies    Intolerances        VITALS & EXAMINATION    Height (cm): 172.7 (12-28 @ 11:45)    Vital Signs Last 24 Hrs  T(C): 36.9 (28 Dec 2021 20:30), Max: 36.9 (28 Dec 2021 20:30)  T(F): 98.5 (28 Dec 2021 20:30), Max: 98.5 (28 Dec 2021 20:30)  HR: 70 (28 Dec 2021 20:30) (70 - 73)  BP: 144/80 (28 Dec 2021 20:30) (136/73 - 153/78)  RR: 17 (28 Dec 2021 20:30) (17 - 20)  SpO2: 99% (28 Dec 2021 20:30) (99% - 100%)    General:  Constitutional: M, appears stated age, in no apparent distress including pain.  Head: Normocephalic & atraumatic.  Respiratory: Breathing comfortably on room air.    Neurological (>12):  MS: Eyes open, alert, oriented to person, place (states "[facility]", situation and month, but not year (state ""). Normal affect. Follows all commands. Recalls 3/3 words at 3-5 minutes. Unable to do serial 7s, however able to say months in reverse with 1 mistake (skipped august).  Language: Speech is clear, fluent with good repetition & comprehension , naming intact (cell phone, pen, paper).  CNs: PERRLA (3mm OU). EOMI, no nystagmus, no diplopia. V1-3 intact to LT, well developed masseter muscles b/l. No facial asymmetry b/l, full eye closure strength b/l. Symmetric palate elevation in midline. Gag reflex deferred. Head turning & shoulder shrug intact b/l. Tongue midline, normal movements.  Motor: Mild tremor at rest in R hand. No pronator drift.              Deltoid	Biceps	Triceps	  R	5	5	5	 	  L	5	5	5	    	H-Flex	D-Flex	P-Flex  R	5	5	5 	   L	5	5	5	   Sensation: Intact to LT b/l throughout.  Reflexes:              Biceps(C5)       BR(C6)     Triceps(C7)               Patellar(L4)      R	2	          2	             2		        1		     L	2	          2	             2		        1		     Coordination: No dysmetria to FTN/HTS.  Gait: Deferred.    LABS:    CBC                       13.6   9.85  )-----------( 232      ( 28 Dec 2021 13:52 )             43.8     Chem     139  |  101  |  21  ----------------------------<  133<H>  3.7   |  26  |  1.49<H>    Ca    9.5      28 Dec 2021 13:52  Mg     2.30         TPro  7.6  /  Alb  3.8  /  TBili  0.5  /  DBili  x   /  AST  28  /  ALT  <5<L>  /  AlkPhos  102      Coags PT/INR - ( 28 Dec 2021 13:52 )   PT: 26.8 sec;   INR: 2.43 ratio         PTT - ( 28 Dec 2021 13:52 )  PTT:40.5 sec  LFTs LIVER FUNCTIONS - ( 28 Dec 2021 13:52 )  Alb: 3.8 g/dL / Pro: 7.6 g/dL / ALK PHOS: 102 U/L / ALT: <5 U/L / AST: 28 U/L / GGT: x           Lipid Panel   A1c   Cardiac enzymes     U/A Urinalysis Basic - ( 28 Dec 2021 15:55 )    Color: Yellow / Appearance: Slightly Turbid / S.019 / pH: x  Gluc: x / Ketone: Negative  / Bili: Negative / Urobili: 3 mg/dL   Blood: x / Protein: 300 mg/dL / Nitrite: Negative   Leuk Esterase: Negative / RBC: 14 /HPF / WBC 13 /HPF   Sq Epi: x / Non Sq Epi: 14 /HPF / Bacteria: Moderate        STUDIES & IMAGING  < from: CT Head No Cont (21 @ 18:57) >  FINDINGS:  VENTRICLES AND SULCI:  Prominent in size compatible with age-appropriate   volume loss  INTRA-AXIAL:  No mass, blood or abnormal attenuationis seen.  EXTRA-AXIAL:  No mass or collection is seen.  VISUALIZED SINUSES:  Clear.  VISUALIZED MASTOIDS:  Clear.  CALVARIUM:  Normal.  MISCELLANEOUS:  Intraocular lens implants    IMPRESSION:  No mass effect, hemorrhage or evidence of acute intracranial pathology.    < end of copied text >   ALBINO RIVAS  81y Male  MRN-[id number]      HPI:  81 y.o. 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 At baseline patient is reported to be fully oriented, however per wife, he has had intermittent episodes of confusion for the past 1.5 months, as well as reduced PO intake. This AM he was reported to be AOx1 to self only, for which Neurology was consulted. In the ED patient was reported to have some improvement in his mental status to AOx2 and reported his strength was back to baseline. On evaluation patient reports symptoms of increased urinary urgency for the past 1.5 months, seemingly worse at nighttime. He reports associated hematuria, however is uncertain of the timing with respect to voiding. He does not report any recent fever, chills, nausea, vomiting, dysuria, diarrhea, or recent fall. He expresses concern his symptoms may be somehow related to his medications. Of note, he follows with Movement Disorders specialist Dr. Donte Langley in the outpatient setting, and was recently seen on 21. At that time he was noted to have some deconditioning as well as increased bradykinesia and shuffling thought likely related to his recent cardiac procedure. His Rytary regimen was adjusted to 2 tabs 145mg + 1 tab 195mg four times per day, and his Desvenlafaxine ER was increased to 75mg mg qAM. He reports compliance with his medication regimen. At baseline he states he is able to ambulate independently, however per chart review he uses a walker/rollator at home.     A 10-system ROS was performed and is negative except as noted above and/or in the HPI.      PAST MEDICAL & SURGICAL HISTORY:  Hypertension    Endogenous hyperlipemia    Benign prostatic hypertrophy    Depression    History of hip replacement, total  right hip     Status post cataract extraction  right ey cataract extraction with IOL 2015        FAMILY HISTORY:      SOCIAL HISTORY:       MEDICATIONS    Home Medications:  ARIPiprazole 5 mg oral tablet: 1 tab(s) orally once a day (29 Sep 2020 07:10)  atorvastatin 10 mg oral tablet: 1 tab(s) orally once a day (29 Sep 2020 07:10)  desvenlafaxine 50 mg oral tablet, extended release: 1 tab(s) orally once a day (29 Sep 2020 07:10)  finasteride 5 mg oral tablet: 1 tab(s) orally once a day (29 Sep 2020 07:10)  methylphenidate 25 mg/24 hr oral capsule, (20/80 release) extended release:  (29 Sep 2020 07:10)  polyethylene glycol 3350 oral powder for reconstitution: 17 gram(s) orally once a day (30 Sep 2020 10:11)  Rytary 36.25 mg-145 mg oral capsule, extended release: 3 cap(s) orally 3 times a day (30 Sep 2020 15:55)  Rytary 48.75 mg-195 mg oral capsule, extended release: 3 cap(s) orally once a day (30 Sep 2020 15:55)  senna oral tablet: 2 tab(s) orally once a day (at bedtime) (30 Sep 2020 10:11)  tamsulosin 0.4 mg oral capsule: 1 cap(s) orally once a day (29 Sep 2020 07:10)    MEDICATIONS  (STANDING):    MEDICATIONS  (PRN):      Allergies    No Known Allergies    Intolerances      VITALS & EXAMINATION    Height (cm): 172.7 (12-28 @ 11:45)    Vital Signs Last 24 Hrs  T(C): 36.9 (28 Dec 2021 20:30), Max: 36.9 (28 Dec 2021 20:30)  T(F): 98.5 (28 Dec 2021 20:30), Max: 98.5 (28 Dec 2021 20:30)  HR: 70 (28 Dec 2021 20:30) (70 - 73)  BP: 144/80 (28 Dec 2021 20:30) (136/73 - 153/78)  RR: 17 (28 Dec 2021 20:30) (17 - 20)  SpO2: 99% (28 Dec 2021 20:30) (99% - 100%)    General:  Constitutional: M, appears stated age, in no apparent distress including pain.  Head: Normocephalic & atraumatic.  Respiratory: Breathing comfortably on room air.    Neurological (>12):  MS: Eyes open, alert, oriented to person, place ("[facility]"), situation and month, but not year ("1919"). Follows all commands. Recalls 3/3 words at 3-5 minutes. Unable to do serial 7s, however able to say months in reverse with 1 mistake (skipped August).  Language: Speech is clear, fluent with good repetition & comprehension , naming intact (cell phone, pen, paper).  CNs: PERRLA (3mm OU). EOMI, no nystagmus, no diplopia. + Masked facies. V1-3 intact to LT. No facial asymmetry b/l, full eye closure strength b/l. Symmetric palate elevation in midline. Gag reflex deferred. Head turning & shoulder shrug intact b/l. Tongue midline, normal movements.  Motor: Mild tremor at rest in the R hand. No pronator drift.              Deltoid	Biceps	Triceps	  R	5	5	5	 	  L	5	5	5	    	H-Flex	D-Flex	P-Flex  R	5	5	5 	   L	5	5	5	   Sensation: Intact to LT b/l throughout.  Reflexes:              Biceps(C5)       BR(C6)     Triceps(C7)               Patellar(L4)      R	2	          2	             2		        1		     L	2	          2	             2		        1		     Coordination: No dysmetria to FTN/HTS.  Gait: Deferred.    LABS:    CBC                       13.6   9.85  )-----------( 232      ( 28 Dec 2021 13:52 )             43.8     Chem -    139  |  101  |  21  ----------------------------<  133<H>  3.7   |  26  |  1.49<H>    Ca    9.5      28 Dec 2021 13:52  Mg     2.30         TPro  7.6  /  Alb  3.8  /  TBili  0.5  /  DBili  x   /  AST  28  /  ALT  <5<L>  /  AlkPhos  102  12-    Coags PT/INR - ( 28 Dec 2021 13:52 )   PT: 26.8 sec;   INR: 2.43 ratio         PTT - ( 28 Dec 2021 13:52 )  PTT:40.5 sec  LFTs LIVER FUNCTIONS - ( 28 Dec 2021 13:52 )  Alb: 3.8 g/dL / Pro: 7.6 g/dL / ALK PHOS: 102 U/L / ALT: <5 U/L / AST: 28 U/L / GGT: x           Lipid Panel   A1c   Cardiac enzymes     U/A Urinalysis Basic - ( 28 Dec 2021 15:55 )    Color: Yellow / Appearance: Slightly Turbid / S.019 / pH: x  Gluc: x / Ketone: Negative  / Bili: Negative / Urobili: 3 mg/dL   Blood: x / Protein: 300 mg/dL / Nitrite: Negative   Leuk Esterase: Negative / RBC: 14 /HPF / WBC 13 /HPF   Sq Epi: x / Non Sq Epi: 14 /HPF / Bacteria: Moderate        STUDIES & IMAGING  < from: CT Head No Cont (21 @ 18:57) >  FINDINGS:  VENTRICLES AND SULCI:  Prominent in size compatible with age-appropriate   volume loss  INTRA-AXIAL:  No mass, blood or abnormal attenuation is seen.  EXTRA-AXIAL:  No mass or collection is seen.  VISUALIZED SINUSES:  Clear.  VISUALIZED MASTOIDS:  Clear.  CALVARIUM:  Normal.  MISCELLANEOUS:  Intraocular lens implants    IMPRESSION:  No mass effect, hemorrhage or evidence of acute intracranial pathology.    < end of copied text >   ALBINO RIVAS  81y Male  MRN-[id number]      HPI:  81 y.o. 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 At baseline patient is reported to be fully oriented, however per wife, he has had intermittent episodes of confusion for the past 1.5 months, as well as reduced PO intake. This AM he was reported to be AOx1 to self only, for which Neurology was consulted. In the ED patient was reported to have some improvement in his mental status to AOx2 and reported his strength was back to baseline. On evaluation patient reports symptoms of increased urinary urgency for the past 1.5 months, seemingly worse at nighttime. He reports associated hematuria, however is uncertain of the timing with respect to voiding. He does not report any recent fever, chills, nausea, vomiting, dysuria, diarrhea, or recent fall. He expresses concern his symptoms may be somehow related to his medications. Of note, he follows with Movement Disorders specialist Dr. Donte Langley in the outpatient setting, and was recently seen on 21. At that time he was noted to have some deconditioning as well as increased bradykinesia and shuffling thought likely related to his recent cardiac procedure. His Rytary regimen was adjusted to 2 tabs 145mg + 1 tab 195mg four times per day, and his Desvenlafaxine ER was increased to 75mg mg qAM. He reports compliance with his medication regimen. At baseline he states he is able to ambulate independently, however per chart review he uses a walker/rollator at home.     A 10-system ROS was performed and is negative except as noted above and/or in the HPI.      PAST MEDICAL & SURGICAL HISTORY:  Hypertension    Endogenous hyperlipemia    Benign prostatic hypertrophy    Depression    History of hip replacement, total  right hip     Status post cataract extraction  right ey cataract extraction with IOL 2015        FAMILY HISTORY:      SOCIAL HISTORY:       MEDICATIONS    Home Medications:  ARIPiprazole 5 mg oral tablet: 1 tab(s) orally once a day (29 Sep 2020 07:10)  atorvastatin 10 mg oral tablet: 1 tab(s) orally once a day (29 Sep 2020 07:10)  desvenlafaxine 50 mg oral tablet, extended release: 1 tab(s) orally once a day (29 Sep 2020 07:10)  finasteride 5 mg oral tablet: 1 tab(s) orally once a day (29 Sep 2020 07:10)  methylphenidate 25 mg/24 hr oral capsule, (20/80 release) extended release:  (29 Sep 2020 07:10)  polyethylene glycol 3350 oral powder for reconstitution: 17 gram(s) orally once a day (30 Sep 2020 10:11)  Rytary 36.25 mg-145 mg oral capsule, extended release: 3 cap(s) orally 3 times a day (30 Sep 2020 15:55)  Rytary 48.75 mg-195 mg oral capsule, extended release: 3 cap(s) orally once a day (30 Sep 2020 15:55)  senna oral tablet: 2 tab(s) orally once a day (at bedtime) (30 Sep 2020 10:11)  tamsulosin 0.4 mg oral capsule: 1 cap(s) orally once a day (29 Sep 2020 07:10)    MEDICATIONS  (STANDING):    MEDICATIONS  (PRN):      Allergies    No Known Allergies    Intolerances      VITALS & EXAMINATION    Height (cm): 172.7 (12-28 @ 11:45)    Vital Signs Last 24 Hrs  T(C): 36.9 (28 Dec 2021 20:30), Max: 36.9 (28 Dec 2021 20:30)  T(F): 98.5 (28 Dec 2021 20:30), Max: 98.5 (28 Dec 2021 20:30)  HR: 70 (28 Dec 2021 20:30) (70 - 73)  BP: 144/80 (28 Dec 2021 20:30) (136/73 - 153/78)  RR: 17 (28 Dec 2021 20:30) (17 - 20)  SpO2: 99% (28 Dec 2021 20:30) (99% - 100%)    General:  Constitutional: M, appears stated age, in no apparent distress including pain.  Head: Normocephalic & atraumatic.  Respiratory: Breathing comfortably on room air.    Neurological (>12):  MS: Eyes open, alert, oriented to person, place ("[facility]"), situation and month, but not year ("1919"). Follows all commands. Recalls 3/3 words at 3-5 minutes. Unable to do serial 7s, however able to say months in reverse with 1 mistake (skipped August).  Language: Speech is clear, fluent with good repetition & comprehension , naming intact (cell phone, pen, paper).  CNs: PERRLA (3mm OU). EOMI, no nystagmus, no diplopia. + Masked facies. V1-3 intact to LT. No facial asymmetry b/l, full eye closure strength b/l. Symmetric palate elevation in midline. Gag reflex deferred. Head turning & shoulder shrug intact b/l. Tongue midline, normal movements.  Motor: Mild tremor at rest in the R hand. No pronator drift.              Deltoid	Biceps	Triceps	  R	5	5	5	 	  L	5	5	5	    	H-Flex	D-Flex	P-Flex  R	5	5	5 	   L	5	5	5	   Sensation: Intact to LT b/l throughout.  Reflexes:              Biceps(C5)       BR(C6)     Triceps(C7)               Patellar(L4)      R	2	          2	             2		        1		     L	2	          2	             2		        1		     Coordination: No dysmetria to FTN.  Gait: Deferred.    LABS:    CBC                       13.6   9.85  )-----------( 232      ( 28 Dec 2021 13:52 )             43.8     Chem     139  |  101  |  21  ----------------------------<  133<H>  3.7   |  26  |  1.49<H>    Ca    9.5      28 Dec 2021 13:52  Mg     2.30         TPro  7.6  /  Alb  3.8  /  TBili  0.5  /  DBili  x   /  AST  28  /  ALT  <5<L>  /  AlkPhos  102  12-    Coags PT/INR - ( 28 Dec 2021 13:52 )   PT: 26.8 sec;   INR: 2.43 ratio         PTT - ( 28 Dec 2021 13:52 )  PTT:40.5 sec  LFTs LIVER FUNCTIONS - ( 28 Dec 2021 13:52 )  Alb: 3.8 g/dL / Pro: 7.6 g/dL / ALK PHOS: 102 U/L / ALT: <5 U/L / AST: 28 U/L / GGT: x           Lipid Panel   A1c   Cardiac enzymes     U/A Urinalysis Basic - ( 28 Dec 2021 15:55 )    Color: Yellow / Appearance: Slightly Turbid / S.019 / pH: x  Gluc: x / Ketone: Negative  / Bili: Negative / Urobili: 3 mg/dL   Blood: x / Protein: 300 mg/dL / Nitrite: Negative   Leuk Esterase: Negative / RBC: 14 /HPF / WBC 13 /HPF   Sq Epi: x / Non Sq Epi: 14 /HPF / Bacteria: Moderate        STUDIES & IMAGING  < from: CT Head No Cont (21 @ 18:57) >  FINDINGS:  VENTRICLES AND SULCI:  Prominent in size compatible with age-appropriate   volume loss  INTRA-AXIAL:  No mass, blood or abnormal attenuation is seen.  EXTRA-AXIAL:  No mass or collection is seen.  VISUALIZED SINUSES:  Clear.  VISUALIZED MASTOIDS:  Clear.  CALVARIUM:  Normal.  MISCELLANEOUS:  Intraocular lens implants    IMPRESSION:  No mass effect, hemorrhage or evidence of acute intracranial pathology.    < end of copied text >

## 2021-12-28 NOTE — CONSULT NOTE ADULT - ASSESSMENT
81 y.o. 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 This AM he was reported to be AOx1 to self only, for which Neurology was consulted. VS on presentation: 153/78, 70/min, 20/min. On exam patient is AOx2 to person/place, with some deficits in concentration noted, however STM intact and rest of exam wnl. Labs with INR 2.43, PT 26.8, PTT 40.5, trop 104 -> 97, Cr 1.49 (prior b/l appears to be approx 0.90). UA: moderate blood, 14 RBC, 13 WBC, moderate bacteria, (14 epithelial cells). CTH negative for acute pathology    Impression: Transient recurrent episodes of mental status change of uncertain etiology. Rule out infectious etiology such as UTI, vs cardiac etiology. Less likely toxic/metabolic, however possible medication related component, although less likely given timing of symptoms prior to recent changes.     Recommendations  Labs/Studies:  [] UA, Ucx, syphillis screen  [] NH3, lactate, CK, B9, B12, TSH  [] EKG  [] Trend troponin    Other:  [] Touch base with patient's Neurologist, Dr. Langley  [] Telemonitoring; VS/Neurochecks per protocol  [] Hematuria workup  [] Rest of care per primary team    Case to be discussed with Neurology attending, Dr. Lopez. 81 y.o. 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 This AM he was reported to be AOx1 to self only, for which Neurology was consulted. VS on presentation: 153/78, 70/min, 20/min. On exam patient is AOx2 to person/place, with some deficits in concentration noted, however STM intact and rest of exam c/w prior. Labs with INR 2.43, PT 26.8, PTT 40.5, trop 104 -> 97, Cr 1.49 (prior b/l appears to be approx 0.90). UA: moderate blood, 14 RBC, 13 WBC, moderate bacteria, (14 epithelial cells). CTH negative for acute pathology    Impression: Transient recurrent episodes of mental status change of uncertain etiology. Rule out infectious etiology such as UTI, vs cardiac etiology. Less likely toxic/metabolic, however possible medication related component, although less likely given timing of symptoms prior to recent changes.     Recommendations    Labs/Studies:  [] UA, Ucx, syphillis screen  [] NH3, lactate, CK, B9, B12, TSH  [] EKG  [] Trend troponin    Other:  [] Touch base with patient's Neurologist, Dr. Langley  [] Telemonitoring; VS/Neurochecks per protocol  [] Hematuria workup  [] Rest of care per primary team    Case to be discussed/seen with Neurology attending, Dr. Lopez.

## 2021-12-28 NOTE — ED PROVIDER NOTE - CARE PLAN
1 Principal Discharge DX:	Altered mental status   Principal Discharge DX:	ALAYNA (acute kidney injury)  Secondary Diagnosis:	Altered mental status  Secondary Diagnosis:	Parkinsons disease

## 2021-12-28 NOTE — CONSULT NOTE ADULT - ATTENDING COMMENTS
The case was presented on rounds, the chart and neuro imaging were reviewed and the patient was examined at the bedside.  Agree with above history.  On today's examination, the patient is awake, alert and oriented other than to the year which he states is 2020.  While his wife is not present to corroborate, he is able to report a reasonably detailed history of his experience with Parkinson's disease.  He states he started treatment with Rytary approximately 6 months ago and the dose has been titrated by his movement disorders neurologist.  Two weeks ago, the dose was increased but it is not clear if he is taking the medications as prescribed.  He is also taking Abilify for unclear reasons.  The neurological examination is otherwise positive for mild stigmata of Parkinson's disease including mild facial masking, mildly decreased blink rate, mild bradykinesia, minimal resting tremor in the upper extremities, asymmetric increased tone with cogwheeling rigidity, right greater than left.  He is currently being treated for a urinary tract infection which may have contributed to his weakness and change in mental status.  It is not clear whether there is correlation between his confusion and escalating doses of Rytary which is known to cause confusion as a side effect.  We will attempt to contact his treating neurologist to clarify dosing.  Since Rytary is not on the hospital formulary he will take his home medications while admitted.  If not available, would start treatment with Sinemet 25/250 3 times a day.

## 2021-12-28 NOTE — ED PROVIDER NOTE - ATTENDING CONTRIBUTION TO CARE
Dr. Tate: 82 yo male with PMH Parkinson's disease, HTN, BPH, s/p laser treatment of prostate disease, in ED with generalized fatigue/weakness.  At baseline pt is able to ambulate slowly on his own without a walker, and he is still able to, but somewhat slower than usual.  He endorses feeling generally weak for a few weeks.  No fever, N/V/D, CP/SOB, abdominal pain or other complaints.  On exam pt chronically-ill appearing but in NAD, heart RRR, lungs CTAB, abd NTND, extremities without swelling, strength equally decreased (?4/5) in all extremities and skin without rash.  Pt was able to stand with assistance and then to walk slowly with shuffling gate on his own in room, states he usually walks slightly faster.

## 2021-12-28 NOTE — ED PROVIDER NOTE - CLINICAL SUMMARY MEDICAL DECISION MAKING FREE TEXT BOX
Pt is a 82 y/o M PMhx parkinson's disease (on Ritary), HTN, BPH, h/o laser nucleation of prostate p/w weakness today. -- possibly 2/2 to parkinson's disease; possibly UTI; possibly electrolyte derangement; possibly multifactorial -- labs, ua, ucx, tsh, trop, CT head

## 2021-12-28 NOTE — ED ADULT NURSE NOTE - NSIMPLEMENTINTERV_GEN_ALL_ED
Implemented All Fall with Harm Risk Interventions:  [place] to call system. Call bell, personal items and telephone within reach. Instruct patient to call for assistance. Room bathroom lighting operational. Non-slip footwear when patient is off stretcher. Physically safe environment: no spills, clutter or unnecessary equipment. Stretcher in lowest position, wheels locked, appropriate side rails in place. Provide visual cue, wrist band, yellow gown, etc. Monitor gait and stability. Monitor for mental status changes and reorient to person, place, and time. Review medications for side effects contributing to fall risk. Reinforce activity limits and safety measures with patient and family. Provide visual clues: red socks.

## 2021-12-28 NOTE — ED PROVIDER NOTE - OBJECTIVE STATEMENT
Pt is a 82 y/o M PMhx parkinson's disease (on Ritary), HTN, BPH, h/o laser nucleation of prostate p/w weakness today.  Pt reports upon waking up this morning, he felt "weak all over."  Pt reports he ate breakfast and took his AM dosage of Ritary after which weakness resolved.  Pt reports his strength is back to baseline at this time.  Pt reports he has been feeling weak every morning for a few weeks.  Pt reports he has had urinary urgency and intermittent hematuria for past 1 week.  Pt denies any fevers, chills, nausea, vomiting, chest pain, SOB, headache, neck pain, back pain, abdominal pain, diarrhea, melena, brbpr, dysuria, cloudy urine, known sick contacts, recent illness, room spinning dizziness, lightheadedness, changes in vision/hearing.  Pt reports he is ambulatory independently at baseline.  Supplemental history from pt's wife, Opal, who states that pt has had intermittent bouts of confusion for past 1.5 months.  She reports this morning, pt did not know where he was.  She reports pt is usually A&Ox3 at baseline.  She reports that he has had changes in his Ritary regimen 2 weeks ago by neurologist Dr. Donte Langley.  She reports no recent falls or head injuries.  She notes he has had decreased PO intake.

## 2021-12-29 DIAGNOSIS — I10 ESSENTIAL (PRIMARY) HYPERTENSION: ICD-10-CM

## 2021-12-29 DIAGNOSIS — R00.1 BRADYCARDIA, UNSPECIFIED: ICD-10-CM

## 2021-12-29 DIAGNOSIS — G20 PARKINSON'S DISEASE: ICD-10-CM

## 2021-12-29 DIAGNOSIS — Z29.8 ENCOUNTER FOR OTHER SPECIFIED PROPHYLACTIC MEASURES: ICD-10-CM

## 2021-12-29 DIAGNOSIS — N40.0 BENIGN PROSTATIC HYPERPLASIA WITHOUT LOWER URINARY TRACT SYMPTOMS: ICD-10-CM

## 2021-12-29 DIAGNOSIS — I25.10 ATHEROSCLEROTIC HEART DISEASE OF NATIVE CORONARY ARTERY WITHOUT ANGINA PECTORIS: ICD-10-CM

## 2021-12-29 DIAGNOSIS — R41.0 DISORIENTATION, UNSPECIFIED: ICD-10-CM

## 2021-12-29 DIAGNOSIS — I48.91 UNSPECIFIED ATRIAL FIBRILLATION: ICD-10-CM

## 2021-12-29 DIAGNOSIS — Z29.9 ENCOUNTER FOR PROPHYLACTIC MEASURES, UNSPECIFIED: ICD-10-CM

## 2021-12-29 DIAGNOSIS — Z95.0 PRESENCE OF CARDIAC PACEMAKER: Chronic | ICD-10-CM

## 2021-12-29 LAB
ALBUMIN SERPL ELPH-MCNC: 3.6 G/DL — SIGNIFICANT CHANGE UP (ref 3.3–5)
ALP SERPL-CCNC: 102 U/L — SIGNIFICANT CHANGE UP (ref 40–120)
ALT FLD-CCNC: 5 U/L — SIGNIFICANT CHANGE UP (ref 4–41)
ANION GAP SERPL CALC-SCNC: 15 MMOL/L — HIGH (ref 7–14)
ANION GAP SERPL CALC-SCNC: 16 MMOL/L — HIGH (ref 7–14)
AST SERPL-CCNC: 26 U/L — SIGNIFICANT CHANGE UP (ref 4–40)
BILIRUB SERPL-MCNC: 0.7 MG/DL — SIGNIFICANT CHANGE UP (ref 0.2–1.2)
BUN SERPL-MCNC: 18 MG/DL — SIGNIFICANT CHANGE UP (ref 7–23)
BUN SERPL-MCNC: 19 MG/DL — SIGNIFICANT CHANGE UP (ref 7–23)
CALCIUM SERPL-MCNC: 9.4 MG/DL — SIGNIFICANT CHANGE UP (ref 8.4–10.5)
CALCIUM SERPL-MCNC: 9.4 MG/DL — SIGNIFICANT CHANGE UP (ref 8.4–10.5)
CHLORIDE SERPL-SCNC: 101 MMOL/L — SIGNIFICANT CHANGE UP (ref 98–107)
CHLORIDE SERPL-SCNC: 102 MMOL/L — SIGNIFICANT CHANGE UP (ref 98–107)
CK MB CFR SERPL CALC: 5.9 NG/ML — SIGNIFICANT CHANGE UP
CK SERPL-CCNC: 92 U/L — SIGNIFICANT CHANGE UP (ref 30–200)
CO2 SERPL-SCNC: 20 MMOL/L — LOW (ref 22–31)
CO2 SERPL-SCNC: 22 MMOL/L — SIGNIFICANT CHANGE UP (ref 22–31)
CREAT SERPL-MCNC: 1.47 MG/DL — HIGH (ref 0.5–1.3)
CREAT SERPL-MCNC: 1.52 MG/DL — HIGH (ref 0.5–1.3)
CULTURE RESULTS: SIGNIFICANT CHANGE UP
FOLATE SERPL-MCNC: 17.3 NG/ML — SIGNIFICANT CHANGE UP (ref 3.1–17.5)
GLUCOSE SERPL-MCNC: 112 MG/DL — HIGH (ref 70–99)
GLUCOSE SERPL-MCNC: 124 MG/DL — HIGH (ref 70–99)
HCT VFR BLD CALC: 45.1 % — SIGNIFICANT CHANGE UP (ref 39–50)
HGB BLD-MCNC: 14.2 G/DL — SIGNIFICANT CHANGE UP (ref 13–17)
LDH SERPL L TO P-CCNC: 267 U/L — HIGH (ref 135–225)
MAGNESIUM SERPL-MCNC: 2.2 MG/DL — SIGNIFICANT CHANGE UP (ref 1.6–2.6)
MAGNESIUM SERPL-MCNC: 2.3 MG/DL — SIGNIFICANT CHANGE UP (ref 1.6–2.6)
MCHC RBC-ENTMCNC: 29.5 PG — SIGNIFICANT CHANGE UP (ref 27–34)
MCHC RBC-ENTMCNC: 31.5 GM/DL — LOW (ref 32–36)
MCV RBC AUTO: 93.8 FL — SIGNIFICANT CHANGE UP (ref 80–100)
NRBC # BLD: 0 /100 WBCS — SIGNIFICANT CHANGE UP
NRBC # FLD: 0 K/UL — SIGNIFICANT CHANGE UP
PHOSPHATE SERPL-MCNC: 2.3 MG/DL — LOW (ref 2.5–4.5)
PHOSPHATE SERPL-MCNC: 2.4 MG/DL — LOW (ref 2.5–4.5)
PLATELET # BLD AUTO: 258 K/UL — SIGNIFICANT CHANGE UP (ref 150–400)
POTASSIUM SERPL-MCNC: 3.4 MMOL/L — LOW (ref 3.5–5.3)
POTASSIUM SERPL-MCNC: 3.6 MMOL/L — SIGNIFICANT CHANGE UP (ref 3.5–5.3)
POTASSIUM SERPL-SCNC: 3.4 MMOL/L — LOW (ref 3.5–5.3)
POTASSIUM SERPL-SCNC: 3.6 MMOL/L — SIGNIFICANT CHANGE UP (ref 3.5–5.3)
PROT SERPL-MCNC: 7.3 G/DL — SIGNIFICANT CHANGE UP (ref 6–8.3)
RBC # BLD: 4.81 M/UL — SIGNIFICANT CHANGE UP (ref 4.2–5.8)
RBC # FLD: 14.6 % — HIGH (ref 10.3–14.5)
SARS-COV-2 RNA SPEC QL NAA+PROBE: SIGNIFICANT CHANGE UP
SODIUM SERPL-SCNC: 138 MMOL/L — SIGNIFICANT CHANGE UP (ref 135–145)
SODIUM SERPL-SCNC: 138 MMOL/L — SIGNIFICANT CHANGE UP (ref 135–145)
SPECIMEN SOURCE: SIGNIFICANT CHANGE UP
TROPONIN T, HIGH SENSITIVITY RESULT: 102 NG/L — CRITICAL HIGH
TROPONIN T, HIGH SENSITIVITY RESULT: 107 NG/L — CRITICAL HIGH
VIT B12 SERPL-MCNC: 2000 PG/ML — HIGH (ref 200–900)
WBC # BLD: 10.5 K/UL — SIGNIFICANT CHANGE UP (ref 3.8–10.5)
WBC # FLD AUTO: 10.5 K/UL — SIGNIFICANT CHANGE UP (ref 3.8–10.5)

## 2021-12-29 PROCEDURE — 93306 TTE W/DOPPLER COMPLETE: CPT | Mod: 26

## 2021-12-29 PROCEDURE — 99223 1ST HOSP IP/OBS HIGH 75: CPT | Mod: GC

## 2021-12-29 RX ORDER — FINASTERIDE 5 MG/1
5 TABLET, FILM COATED ORAL DAILY
Refills: 0 | Status: DISCONTINUED | OUTPATIENT
Start: 2021-12-29 | End: 2021-12-31

## 2021-12-29 RX ORDER — POTASSIUM CHLORIDE 20 MEQ
40 PACKET (EA) ORAL EVERY 4 HOURS
Refills: 0 | Status: COMPLETED | OUTPATIENT
Start: 2021-12-29 | End: 2021-12-29

## 2021-12-29 RX ORDER — CARBIDOPA AND LEVODOPA 25; 100 MG/1; MG/1
1 TABLET ORAL
Refills: 0 | Status: DISCONTINUED | OUTPATIENT
Start: 2021-12-29 | End: 2021-12-30

## 2021-12-29 RX ORDER — CEFTRIAXONE 500 MG/1
1000 INJECTION, POWDER, FOR SOLUTION INTRAMUSCULAR; INTRAVENOUS EVERY 24 HOURS
Refills: 0 | Status: DISCONTINUED | OUTPATIENT
Start: 2021-12-29 | End: 2021-12-31

## 2021-12-29 RX ORDER — TAMSULOSIN HYDROCHLORIDE 0.4 MG/1
0.4 CAPSULE ORAL AT BEDTIME
Refills: 0 | Status: DISCONTINUED | OUTPATIENT
Start: 2021-12-29 | End: 2021-12-31

## 2021-12-29 RX ORDER — CARBIDOPA AND LEVODOPA 25; 100 MG/1; MG/1
1.5 TABLET ORAL
Refills: 0 | Status: DISCONTINUED | OUTPATIENT
Start: 2021-12-29 | End: 2021-12-31

## 2021-12-29 RX ORDER — POLYETHYLENE GLYCOL 3350 17 G/17G
17 POWDER, FOR SOLUTION ORAL AT BEDTIME
Refills: 0 | Status: DISCONTINUED | OUTPATIENT
Start: 2021-12-29 | End: 2021-12-31

## 2021-12-29 RX ORDER — CARBIDOPA AND LEVODOPA 25; 100 MG/1; MG/1
2 TABLET ORAL THREE TIMES A DAY
Refills: 0 | Status: DISCONTINUED | OUTPATIENT
Start: 2021-12-29 | End: 2021-12-29

## 2021-12-29 RX ORDER — CARBIDOPA AND LEVODOPA 25; 100 MG/1; MG/1
2 TABLET ORAL
Refills: 0 | Status: DISCONTINUED | OUTPATIENT
Start: 2021-12-29 | End: 2021-12-30

## 2021-12-29 RX ORDER — ARIPIPRAZOLE 15 MG/1
5 TABLET ORAL DAILY
Refills: 0 | Status: DISCONTINUED | OUTPATIENT
Start: 2021-12-29 | End: 2021-12-31

## 2021-12-29 RX ORDER — SENNA PLUS 8.6 MG/1
2 TABLET ORAL AT BEDTIME
Refills: 0 | Status: DISCONTINUED | OUTPATIENT
Start: 2021-12-29 | End: 2021-12-31

## 2021-12-29 RX ORDER — HEPARIN SODIUM 5000 [USP'U]/ML
5000 INJECTION INTRAVENOUS; SUBCUTANEOUS EVERY 12 HOURS
Refills: 0 | Status: DISCONTINUED | OUTPATIENT
Start: 2021-12-29 | End: 2021-12-30

## 2021-12-29 RX ORDER — CARBIDOPA AND LEVODOPA 25; 100 MG/1; MG/1
3 TABLET ORAL
Qty: 0 | Refills: 0 | DISCHARGE

## 2021-12-29 RX ORDER — ATORVASTATIN CALCIUM 80 MG/1
10 TABLET, FILM COATED ORAL AT BEDTIME
Refills: 0 | Status: DISCONTINUED | OUTPATIENT
Start: 2021-12-29 | End: 2021-12-31

## 2021-12-29 RX ORDER — APIXABAN 2.5 MG/1
5 TABLET, FILM COATED ORAL ONCE
Refills: 0 | Status: DISCONTINUED | OUTPATIENT
Start: 2021-12-29 | End: 2021-12-29

## 2021-12-29 RX ORDER — CLOPIDOGREL BISULFATE 75 MG/1
75 TABLET, FILM COATED ORAL DAILY
Refills: 0 | Status: DISCONTINUED | OUTPATIENT
Start: 2021-12-29 | End: 2021-12-31

## 2021-12-29 RX ADMIN — TAMSULOSIN HYDROCHLORIDE 0.4 MILLIGRAM(S): 0.4 CAPSULE ORAL at 21:27

## 2021-12-29 RX ADMIN — CLOPIDOGREL BISULFATE 75 MILLIGRAM(S): 75 TABLET, FILM COATED ORAL at 12:06

## 2021-12-29 RX ADMIN — ATORVASTATIN CALCIUM 10 MILLIGRAM(S): 80 TABLET, FILM COATED ORAL at 21:25

## 2021-12-29 RX ADMIN — CARBIDOPA AND LEVODOPA 1.5 TABLET(S): 25; 100 TABLET ORAL at 21:27

## 2021-12-29 RX ADMIN — SENNA PLUS 2 TABLET(S): 8.6 TABLET ORAL at 21:25

## 2021-12-29 RX ADMIN — ARIPIPRAZOLE 5 MILLIGRAM(S): 15 TABLET ORAL at 12:07

## 2021-12-29 RX ADMIN — HEPARIN SODIUM 5000 UNIT(S): 5000 INJECTION INTRAVENOUS; SUBCUTANEOUS at 17:48

## 2021-12-29 RX ADMIN — POLYETHYLENE GLYCOL 3350 17 GRAM(S): 17 POWDER, FOR SOLUTION ORAL at 21:25

## 2021-12-29 RX ADMIN — Medication 40 MILLIEQUIVALENT(S): at 14:13

## 2021-12-29 RX ADMIN — CEFTRIAXONE 100 MILLIGRAM(S): 500 INJECTION, POWDER, FOR SOLUTION INTRAMUSCULAR; INTRAVENOUS at 06:33

## 2021-12-29 RX ADMIN — Medication 40 MILLIEQUIVALENT(S): at 17:48

## 2021-12-29 NOTE — H&P ADULT - PROBLEM SELECTOR PLAN 6
- Continue ASA, plavix, statin   - Low fat, low cholesterol diet - Continue ASA, plavix, statin   - f/u med rec if pt is on warfarin  - Low fat, low cholesterol diet - Continue ASA, plavix, statin  - f/u on medication reconciliation if pt is still on Eliquis  - Low fat, low cholesterol diet

## 2021-12-29 NOTE — PATIENT PROFILE ADULT - FALL HARM RISK - HARM RISK INTERVENTIONS
Assistance with ambulation/Assistance OOB with selected safe patient handling equipment/Communicate Risk of Fall with Harm to all staff/Discuss with provider need for PT consult/Monitor gait and stability/Reinforce activity limits and safety measures with patient and family/Tailored Fall Risk Interventions/Visual Cue: Yellow wristband and red socks/Bed in lowest position, wheels locked, appropriate side rails in place/Call bell, personal items and telephone in reach/Instruct patient to call for assistance before getting out of bed or chair/Non-slip footwear when patient is out of bed/[place] to call system/Physically safe environment - no spills, clutter or unnecessary equipment/Purposeful Proactive Rounding/Room/bathroom lighting operational, light cord in reach

## 2021-12-29 NOTE — H&P ADULT - MUSCULOSKELETAL
ROM intact/no joint swelling/no joint erythema/no joint warmth/no calf tenderness/diminished strength detailed exam details…

## 2021-12-29 NOTE — PROGRESS NOTE ADULT - PROBLEM SELECTOR PLAN 4
- c/w Plavix, ASA,   - f/u on medication reconciliation if pt is still on Eliquis  - asymptomatic  - f/u PTT/PT/INR  - c/w telemetry monitoring

## 2021-12-29 NOTE — H&P ADULT - ASSESSMENT
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

## 2021-12-29 NOTE — H&P ADULT - PROBLEM SELECTOR PLAN 7
- f/u w/ pt's outpt neurologist  - c/w current home regimen - f/u w/ pt's outpt neurologist for inpt regimen, as current home regimen not available in formulary - current home regimen not available in formulary, reach out to pt's wife to get home Rytary approved by hospital pharmacy  - c/w Sinemet 25/100 mg 2 tabs TID until Rytary is dropped off by pt's family

## 2021-12-29 NOTE — H&P ADULT - NSICDXPASTSURGICALHX_GEN_ALL_CORE_FT
PAST SURGICAL HISTORY:  History of hip replacement, total R hip 2008 & L hip    Presence of cardiac pacemaker 10/2021    Status post cataract extraction right eye cataract extraction with IOL 6/26/2015

## 2021-12-29 NOTE — H&P ADULT - NEGATIVE NEUROLOGICAL SYMPTOMS
no transient paralysis/no paresthesias/no syncope/no vertigo/no loss of sensation/no headache/no loss of consciousness

## 2021-12-29 NOTE — H&P ADULT - NSHPSOCIALHISTORY_GEN_ALL_CORE
Pt is a retired . Resides with his wife. Denies any current/previous tobacco, illicit drugs, or alcohol use.

## 2021-12-29 NOTE — H&P ADULT - NEGATIVE ENMT SYMPTOMS
no hearing difficulty/no ear pain/no vertigo/no sinus symptoms/no nasal congestion/no nasal discharge

## 2021-12-29 NOTE — PHARMACOTHERAPY INTERVENTION NOTE - COMMENTS
Medication history is complete. Medication list updated in Outpatient Medication Record (OMR). Please call spectra [phone] if you have any questions.

## 2021-12-29 NOTE — H&P ADULT - PROBLEM SELECTOR PLAN 1
- Neuro reccs appreciated  - Rule out infectious etiology such as UTI, vs cardiac etiology.   - Less likely toxic/metabolic, however possible medication related component, although less likely given timing of symptoms prior to recent changes.   - f/u UCx, syphilis screen, ammonia, lactate, CK, B9, B12, TSH  - EKG: atrial fibrillation w/ competing junctional pacemaker, ?RBBB; rpt EKG PRN if pt becomes symptomatic  - trops 104>97>107  - f/u w/ pt's outpt neurologist, Dr. Langley  - telemonitoring & neuro checks q4h - Neuro reccs appreciated  - Rule out infectious etiology such as UTI, vs cardiac etiology.   - Less likely toxic/metabolic, however possible medication related component, although less likely given timing of symptoms prior to recent changes.   - f/u UCx, syphilis screen, ammonia, lactate, CK, B9, B12, TSH  - EKG: atrial fibrillation w/ competing junctional pacemaker, ?RBBB; rpt EKG PRN if pt becomes symptomatic  - trops 104>97>107, trend  - TTE  - f/u w/ pt's outpt neurologist, Dr. Langley  - telemonitoring & neuro checks q4h

## 2021-12-29 NOTE — H&P ADULT - HISTORY OF PRESENT ILLNESS
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 Reports he felt "weak all over" after waking up this morning. However, after eating breakfast and taking his AM dose of Rytary, his weakness subsided and currently endorses that he is at his baseline. Also, reports some urinary urgency and hematuria over the past 1 wk. 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 Reports he felt "weak all over" after waking up this morning. However, after eating breakfast and taking his AM dose of Rytary, his weakness subsided and pt reports he now at his baseline. Pt uses a walker & rollator at home and resides with his wife. Reports some unclear history of recent urinary urgency and hematuria over the past 1 wk. Upon interview, pt had to be redirected and referred to the hospital as a hotel and asked which room his wife was checked into. Pt endorses he follows w/ Dr. Langley and recently had a change in his Rytary regimen to 2 tabs 145mg + 1 tab 195mg four times per day. Additionally his Desvenlafaxine ER was increased to 75mg mg qAM. Otherwise, pt denies any fever/chills, HA, vertigo, dizziness, lightheadedness, changes in vision/hearing, neck/back pain, CP, SOB, abd pain, n/v, melena, hematochezia, dysuria, sick contacts, or recent illness. Pt is vaccinated for COVID x3 (Moderna).

## 2021-12-29 NOTE — PHYSICAL THERAPY INITIAL EVALUATION ADULT - ADDITIONAL COMMENTS
Patient lives with wife in private home, + steps to negotiate. Patient was not using assistive device prior to admission. Patient was getting assist with ADL prior to admission from wife.

## 2021-12-29 NOTE — H&P ADULT - NSICDXPASTMEDICALHX_GEN_ALL_CORE_FT
PAST MEDICAL HISTORY:  Atrial fibrillation     BPH (benign prostatic hyperplasia) s/p laser nucleation 09/20    Bradycardia 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/21    CAD (coronary artery disease) s/p PCI 08/21    Hyperlipidemia     Hypertension     Parkinsons disease

## 2021-12-29 NOTE — H&P ADULT - NSHPLABSRESULTS_GEN_ALL_CORE
Labs:                        13.6   9.85  )-----------( 232      ( 28 Dec 2021 13:52 )             43.8         138  |  101  |  18  ----------------------------<  112<H>  3.6   |  22  |  1.47<H>    Ca    9.4      29 Dec 2021 02:36  Phos  2.3       Mg     2.30         TPro  7.6  /  Alb  3.8  /  TBili  0.5  /  DBili  x   /  AST  28  /  ALT  <5<L>  /  AlkPhos  102      PT/INR: PT: 26.8 sec | INR: 2.43 ratio (21 @ 13:52)  PTT: 40.5 sec (21 @ 13:52)    CARDIAC ENZYMES:  Troponin T, High Sensitivity: 107 ng/L (21 @ 02:36)  Troponin T, High Sensitivity: 97 ng/L (21 @ 16:33)  Troponin T, High Sensitivity: 104 ng/L (21 @ 13:52)    Urinalysis Basic (21 @ 03:52):  Color: Yellow / Appearance: Slightly Turbid / S.019 / pH: x  Gluc: x / Ketone: Negative / Bili: Negative / Urobili: 3 mg/dL  Blood: x / Protein: 300 mg/dL / Nitrite: Negative  Leuk Esterase: Negative / RBC: 14 / WBC: 13  Sq Epi: x / Non Sq Epi: x / Bacteria: Moderate    CAPILLARY BLOOD GLUCOSE:  POCT Blood Glucose: 162 mg/dL (21 @ 11:52)    COVID-19/Full RVP Panel: pending*****

## 2021-12-29 NOTE — H&P ADULT - PROBLEM SELECTOR PLAN 2
- hematuria workup  - f/u UCx, syphilis screen  - monitor for urinary retention - c/o urgency  - start on ceftriaxone for UTI  - hematuria workup; if hematuria persists will hold AC  - if hematuria persists, order CT abdomen and Urology c/s  - f/u UCx, syphilis screen  - monitor for urinary retention

## 2021-12-29 NOTE — CHART NOTE - NSCHARTNOTEFT_GEN_A_CORE
Case discussed with outpatient Neurologist Dr. Donte Langley. We recommend sinemet while waiting for family to bring in his Rytary. Suspect acute mental status change on the morning of arrival is likely due to infection, given positive urinalysis.     Plan:  [] Sinemet 25/100mg -- take 1.5 tabs four times a day -- 8AM, 12PM, 4PM, 8PM.  [] When Rytary is brought in from family, we will start patient on dose prior to increase on 12/14. Please order as follows:   - 195mg 1 tablet in AM, 1 tablet in 7PM  - 145mg 2 tablets in AM, 3PM, 7PM, 11PM    Please call Neurology [facility] #[phone] for any questions, concerns, or issues.    -  Woodrow Lee MD  PGY-2 Neurologist

## 2021-12-29 NOTE — H&P ADULT - ATTENDING COMMENTS
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 DD includes UTI, Parkinson's progression, suboptimal med regimen /compliance. Seen by neuro reccs, appreciated. Will treat presumptively for uti; if hematuria persists, would obtain CT abd/urol eval. Hold ac for now pending repeat INR, Hb. Unclear cause for  troponin elev; pt denies cp/sob, SCr 1.47. would obtain tte, cards eval. Contact oupt  neurologist to confirm PD med regimen, follow pharmacy med rec.

## 2021-12-29 NOTE — H&P ADULT - NSICDXFAMILYHX_GEN_ALL_CORE_FT
FAMILY HISTORY:  Father  Still living? Unknown  Family history of esophageal cancer, Age at diagnosis: Age Unknown    Mother  Still living? Unknown  Family history of lung cancer, Age at diagnosis: Age Unknown    Grandparent  Still living? Unknown  FH: myocardial infarction, Age at diagnosis: Age Unknown

## 2021-12-30 ENCOUNTER — TRANSCRIPTION ENCOUNTER (OUTPATIENT)
Age: 81
End: 2021-12-30

## 2021-12-30 LAB
ANION GAP SERPL CALC-SCNC: 14 MMOL/L — SIGNIFICANT CHANGE UP (ref 7–14)
BUN SERPL-MCNC: 21 MG/DL — SIGNIFICANT CHANGE UP (ref 7–23)
CALCIUM SERPL-MCNC: 9.5 MG/DL — SIGNIFICANT CHANGE UP (ref 8.4–10.5)
CHLORIDE SERPL-SCNC: 106 MMOL/L — SIGNIFICANT CHANGE UP (ref 98–107)
CO2 SERPL-SCNC: 21 MMOL/L — LOW (ref 22–31)
CREAT SERPL-MCNC: 1.51 MG/DL — HIGH (ref 0.5–1.3)
CULTURE RESULTS: SIGNIFICANT CHANGE UP
GLUCOSE SERPL-MCNC: 105 MG/DL — HIGH (ref 70–99)
HCT VFR BLD CALC: 45 % — SIGNIFICANT CHANGE UP (ref 39–50)
HGB BLD-MCNC: 14 G/DL — SIGNIFICANT CHANGE UP (ref 13–17)
MAGNESIUM SERPL-MCNC: 2.4 MG/DL — SIGNIFICANT CHANGE UP (ref 1.6–2.6)
MCHC RBC-ENTMCNC: 29.6 PG — SIGNIFICANT CHANGE UP (ref 27–34)
MCHC RBC-ENTMCNC: 31.1 GM/DL — LOW (ref 32–36)
MCV RBC AUTO: 95.1 FL — SIGNIFICANT CHANGE UP (ref 80–100)
NRBC # BLD: 0 /100 WBCS — SIGNIFICANT CHANGE UP
NRBC # FLD: 0 K/UL — SIGNIFICANT CHANGE UP
PHOSPHATE SERPL-MCNC: 1.9 MG/DL — LOW (ref 2.5–4.5)
PLATELET # BLD AUTO: 277 K/UL — SIGNIFICANT CHANGE UP (ref 150–400)
POTASSIUM SERPL-MCNC: 4.3 MMOL/L — SIGNIFICANT CHANGE UP (ref 3.5–5.3)
POTASSIUM SERPL-SCNC: 4.3 MMOL/L — SIGNIFICANT CHANGE UP (ref 3.5–5.3)
RBC # BLD: 4.73 M/UL — SIGNIFICANT CHANGE UP (ref 4.2–5.8)
RBC # FLD: 14.6 % — HIGH (ref 10.3–14.5)
SODIUM SERPL-SCNC: 141 MMOL/L — SIGNIFICANT CHANGE UP (ref 135–145)
SPECIMEN SOURCE: SIGNIFICANT CHANGE UP
WBC # BLD: 9.78 K/UL — SIGNIFICANT CHANGE UP (ref 3.8–10.5)
WBC # FLD AUTO: 9.78 K/UL — SIGNIFICANT CHANGE UP (ref 3.8–10.5)

## 2021-12-30 RX ORDER — POTASSIUM PHOSPHATE, MONOBASIC POTASSIUM PHOSPHATE, DIBASIC 236; 224 MG/ML; MG/ML
30 INJECTION, SOLUTION INTRAVENOUS ONCE
Refills: 0 | Status: COMPLETED | OUTPATIENT
Start: 2021-12-30 | End: 2021-12-30

## 2021-12-30 RX ORDER — OLANZAPINE 15 MG/1
2.5 TABLET, FILM COATED ORAL ONCE
Refills: 0 | Status: DISCONTINUED | OUTPATIENT
Start: 2021-12-30 | End: 2021-12-31

## 2021-12-30 RX ORDER — APIXABAN 2.5 MG/1
2.5 TABLET, FILM COATED ORAL
Refills: 0 | Status: DISCONTINUED | OUTPATIENT
Start: 2021-12-30 | End: 2021-12-31

## 2021-12-30 RX ORDER — DIPHENHYDRAMINE HCL 50 MG
25 CAPSULE ORAL ONCE
Refills: 0 | Status: COMPLETED | OUTPATIENT
Start: 2021-12-30 | End: 2021-12-30

## 2021-12-30 RX ORDER — SODIUM CHLORIDE 9 MG/ML
1000 INJECTION, SOLUTION INTRAVENOUS
Refills: 0 | Status: DISCONTINUED | OUTPATIENT
Start: 2021-12-30 | End: 2021-12-31

## 2021-12-30 RX ADMIN — POLYETHYLENE GLYCOL 3350 17 GRAM(S): 17 POWDER, FOR SOLUTION ORAL at 22:40

## 2021-12-30 RX ADMIN — ARIPIPRAZOLE 5 MILLIGRAM(S): 15 TABLET ORAL at 12:07

## 2021-12-30 RX ADMIN — TAMSULOSIN HYDROCHLORIDE 0.4 MILLIGRAM(S): 0.4 CAPSULE ORAL at 22:41

## 2021-12-30 RX ADMIN — FINASTERIDE 5 MILLIGRAM(S): 5 TABLET, FILM COATED ORAL at 12:07

## 2021-12-30 RX ADMIN — POTASSIUM PHOSPHATE, MONOBASIC POTASSIUM PHOSPHATE, DIBASIC 83.33 MILLIMOLE(S): 236; 224 INJECTION, SOLUTION INTRAVENOUS at 13:05

## 2021-12-30 RX ADMIN — HEPARIN SODIUM 5000 UNIT(S): 5000 INJECTION INTRAVENOUS; SUBCUTANEOUS at 05:26

## 2021-12-30 RX ADMIN — CARBIDOPA AND LEVODOPA 1.5 TABLET(S): 25; 100 TABLET ORAL at 12:09

## 2021-12-30 RX ADMIN — ATORVASTATIN CALCIUM 10 MILLIGRAM(S): 80 TABLET, FILM COATED ORAL at 22:41

## 2021-12-30 RX ADMIN — CARBIDOPA AND LEVODOPA 1.5 TABLET(S): 25; 100 TABLET ORAL at 22:41

## 2021-12-30 RX ADMIN — Medication 25 MILLIGRAM(S): at 02:00

## 2021-12-30 RX ADMIN — HEPARIN SODIUM 5000 UNIT(S): 5000 INJECTION INTRAVENOUS; SUBCUTANEOUS at 16:48

## 2021-12-30 RX ADMIN — CARBIDOPA AND LEVODOPA 1.5 TABLET(S): 25; 100 TABLET ORAL at 08:01

## 2021-12-30 RX ADMIN — CLOPIDOGREL BISULFATE 75 MILLIGRAM(S): 75 TABLET, FILM COATED ORAL at 12:08

## 2021-12-30 RX ADMIN — CEFTRIAXONE 100 MILLIGRAM(S): 500 INJECTION, POWDER, FOR SOLUTION INTRAMUSCULAR; INTRAVENOUS at 05:26

## 2021-12-30 RX ADMIN — CARBIDOPA AND LEVODOPA 1.5 TABLET(S): 25; 100 TABLET ORAL at 15:44

## 2021-12-30 RX ADMIN — APIXABAN 2.5 MILLIGRAM(S): 2.5 TABLET, FILM COATED ORAL at 22:41

## 2021-12-30 RX ADMIN — SODIUM CHLORIDE 50 MILLILITER(S): 9 INJECTION, SOLUTION INTRAVENOUS at 22:42

## 2021-12-30 RX ADMIN — SENNA PLUS 2 TABLET(S): 8.6 TABLET ORAL at 22:41

## 2021-12-30 NOTE — PROGRESS NOTE ADULT - ATTENDING COMMENTS
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 DD includes UTI, Parkinson's progression, suboptimal med regimen /compliance. Seen by neuro reccs, appreciated. Will treat presumptively for uti; if hematuria persists, would obtain CT abd/urol eval. Hold ac for now pending repeat INR, Hb. Unclear cause for  troponin elev; pt denies cp/sob, SCr 1.47. would obtain tte, cards eval. Contact oupt  neurologist to confirm PD med regimen, follow pharmacy med rec.
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 DD includes UTI, Parkinson's progression, suboptimal med regimen /compliance. Seen by neuro reccs, appreciated. Will treat presumptively for uti; if hematuria persists, would obtain CT abd/urol eval. Hold ac for now pending repeat INR, Hb. Unclear cause for  troponin elev; pt denies cp/sob, SCr 1.47. would obtain tte, cards eval. Contact oupt  neurologist to confirm PD med regimen, follow pharmacy med rec.

## 2021-12-30 NOTE — PROGRESS NOTE ADULT - PROBLEM SELECTOR PLAN 6
- Continue ASA, plavix, statin  - f/u on medication reconciliation if pt is still on Eliquis  - Low fat, low cholesterol diet
- Continue ASA, plavix, statin  - f/u on medication reconciliation if pt is still on Eliquis  - Low fat, low cholesterol diet

## 2021-12-30 NOTE — PROGRESS NOTE ADULT - PROBLEM SELECTOR PLAN 3
- Continue Home BP meds w/ parameters   - monitor BP   - DASH/TLC diet
- Continue Home BP meds w/ parameters   - monitor BP   - DASH/TLC diet

## 2021-12-30 NOTE — PROVIDER CONTACT NOTE (OTHER) - SITUATION
Pt was found with knees on blanket at side of the bed praying over bed side. VIOLETA Carlson and RACHELLE Tyson notified.

## 2021-12-30 NOTE — PROGRESS NOTE ADULT - PROBLEM SELECTOR PLAN 4
- c/w Plavix, ASA,   - f/u on medication reconciliation if pt is still on Eliquis  - asymptomatic  - f/u PTT/PT/INR  - c/w telemetry monitoring  Cont A/C. TTE results noted with aortic valve problems. Cardiology to evaluate in AM

## 2021-12-30 NOTE — DISCHARGE NOTE PROVIDER - CARE PROVIDER_API CALL
NAOMI PRIDE [place]  Internal Medicine  [id number] [place] [address]  Phone: [phone]  Fax: [phone]  Established Patient  Scheduled Appointment: 01/03/2022

## 2021-12-30 NOTE — PROGRESS NOTE ADULT - PROBLEM SELECTOR PLAN 1
- Neuro reccs appreciated  - Rule out infectious etiology such as UTI,   FU Urine culture  -
Unknown etiology. Urine culture is unrevealing. Neuro has to FU.

## 2021-12-30 NOTE — DISCHARGE NOTE PROVIDER - NSDCCPCAREPLAN_GEN_ALL_CORE_FT
PRINCIPAL DISCHARGE DIAGNOSIS  Diagnosis: Altered mental status  Assessment and Plan of Treatment: Please continue your medications as prescribed and allow help with daily acitivities of living. Maintain a safe environment and make movements in a careful manner to prevent falls. Ensure that you are eating and drinking adequately and maintaining a healthy sleep cycle. Your acute altered mental status is likely from a Urinary tract infection. Continue antibiotics as directed and monitor for signs/symptoms of infection, such as, fever/chills, burning/pain with urination, urinary frequency/hesitancy, cloudy urine, or blood in urine..      SECONDARY DISCHARGE DIAGNOSES  Diagnosis: Parkinsons disease  Assessment and Plan of Treatment: Continue your home regimen as directed.    Diagnosis: Atrial fibrillation  Assessment and Plan of Treatment: Please continue your medications as directed and follow-up with your primary provider/cardiologist to further manage your care. Monitor for signs/symptoms of uncontrolled atrial fibrillation, such as, increased heart rate, palpitations, chest pain, dizziness, or shortness of breath - Return to emergency room if these signs/symptoms are present.    Diagnosis: CAD (coronary artery disease)  Assessment and Plan of Treatment: s/p PCI 08/21; Continue all medications as directed    Diagnosis: BPH (benign prostatic hyperplasia)  Assessment and Plan of Treatment: s/p laser nucleation 09/20; Continue all medications as directed    Diagnosis: Hypertension  Assessment and Plan of Treatment: Continue blood pressure medication regimen as directed. Monitor for any visual changes, headaches or dizziness.  Monitor blood pressure regularly.  Follow up with your PCP for further management for high blood pressure.

## 2021-12-30 NOTE — DISCHARGE NOTE PROVIDER - NSDCFUSCHEDAPPT_GEN_ALL_CORE_FT
ALBINO RIVAS ; 01/20/2022 ; NPP Neuro [address] [facility]  ALIBNO RIVAS ; 02/11/2022 ; NPP Urology [phone]   ALBINO RIVAS ; 03/17/2022 ; NPP Cardio [address] [facility]

## 2021-12-30 NOTE — PROGRESS NOTE ADULT - REASON FOR ADMISSION
worsening generalized weakness over the past month
worsening generalized weakness over the past month

## 2021-12-30 NOTE — PROGRESS NOTE ADULT - ASSESSMENT
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

## 2021-12-30 NOTE — PROGRESS NOTE ADULT - PROBLEM SELECTOR PLAN 2
- c/o urgency  - start on ceftriaxone for UTI  - hematuria workup; if hematuria persists will hold AC  - if hematuria persists, order CT abdomen and Urology c/s  - f/u UCx, syphilis screen  - monitor for urinary retention
UA + but Urine culture does not reveal UTI. DC antibiotics

## 2021-12-30 NOTE — DISCHARGE NOTE PROVIDER - NSDCMRMEDTOKEN_GEN_ALL_CORE_FT
Crestor 40 mg oral tablet: 1 tab(s) orally once a day  desvenlafaxine (as succinate) 50 mg oral tablet, extended release: 1 tab(s) orally once a day  Eliquis 5 mg oral tablet: 1 tab(s) orally 2 times a day  Plavix 75 mg oral tablet: 1 tab(s) orally once a day  Rytary 36.25 mg-145 mg oral capsule, extended release: 2 cap(s) orally 4 times a day  Rytary 48.75 mg-195 mg oral capsule, extended release: 1 cap(s) orally 2 times a day (in the morning and during supper)  solifenacin 5 mg oral tablet: 1 tab(s) orally once a day  spironolactone 25 mg oral tablet: 1 tab(s) orally once a day  tadalafil 5 mg oral tablet: 1 tab(s) orally once a day  torsemide 20 mg oral tablet: 1 tab(s) orally once a day   apixaban 2.5 mg oral tablet: 1 tab(s) orally 2 times a day  cefuroxime 500 mg oral tablet: 1 tab(s) orally every 12 hours  Crestor 40 mg oral tablet: 1 tab(s) orally once a day  desvenlafaxine (as succinate) 50 mg oral tablet, extended release: 1 tab(s) orally once a day  Plavix 75 mg oral tablet: 1 tab(s) orally once a day  Rytary 36.25 mg-145 mg oral capsule, extended release: 2 cap(s) orally 4 times a day  Rytary 48.75 mg-195 mg oral capsule, extended release: 1 cap(s) orally 2 times a day (in the morning and during supper)  solifenacin 5 mg oral tablet: 1 tab(s) orally once a day  spironolactone 25 mg oral tablet: 1 tab(s) orally once a day  tadalafil 5 mg oral tablet: 1 tab(s) orally once a day  torsemide 20 mg oral tablet: 1 tab(s) orally once a day

## 2021-12-30 NOTE — PROGRESS NOTE ADULT - PROBLEM SELECTOR PLAN 7
- current home regimen not available in formulary, reach out to pt's wife to get home Rytary approved by hospital pharmacy  - c/w Sinemet 25/100 mg 2 tabs TID   Neurlogy to FU
- current home regimen not available in formulary, reach out to pt's wife to get home Rytary approved by hospital pharmacy  - c/w Sinemet 25/100 mg 2 tabs TID until Rytary is dropped off by pt's family

## 2021-12-30 NOTE — PROGRESS NOTE ADULT - SUBJECTIVE AND OBJECTIVE BOX
Patient is a 81y old  Male who presents with a chief complaint of worsening generalized weakness over the past month (29 Dec 2021 02:44)      HPI:  No new symptoms.      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    Parkinsons disease    CAD (coronary artery disease)  s/p PCI     History of hip replacement, total  R hip  &amp; L hip    Status post cataract extraction  right eye cataract extraction with IOL 2015    Presence of cardiac pacemaker  10/2021        Review of Systems:   CONSTITUTIONAL: No fever, weight loss, or fatigue  EYES: No eye pain, visual disturbances, or discharge  ENMT:  No difficulty hearing, tinnitus, vertigo; No sinus or throat pain  NECK: No pain or stiffness  BREASTS: No pain, masses, or nipple discharge  RESPIRATORY: No cough, wheezing, chills or hemoptysis; No shortness of breath  CARDIOVASCULAR: No chest pain, palpitations, dizziness, or leg swelling  GASTROINTESTINAL: No abdominal or epigastric pain. No nausea, vomiting, or hematemesis; No diarrhea or constipation. No melena or hematochezia.  GENITOURINARY: No dysuria, frequency, hematuria, or incontinence  NEUROLOGICAL: No headaches, memory loss, loss of strength, numbness, or tremors  SKIN: No itching, burning, rashes, or lesions   LYMPH NODES: No enlarged glands  ENDOCRINE: No heat or cold intolerance; No hair loss  MUSCULOSKELETAL: No joint pain or swelling; No muscle, back, or extremity pain  PSYCHIATRIC: No depression, anxiety, mood swings, or difficulty sleeping  HEME/LYMPH: No easy bruising, or bleeding gums  ALLERY AND IMMUNOLOGIC: No hives or eczema    Allergies    No Known Allergies    Intolerances        Social History:     FAMILY HISTORY:  Family history of lung cancer (Mother)    Family history of esophageal cancer (Father)    FH: myocardial infarction (Grandparent)        MEDICATIONS  (STANDING):  ARIPiprazole 5 milliGRAM(s) Oral daily  atorvastatin 10 milliGRAM(s) Oral at bedtime  carbidopa 36.25 mG/levodopa 145 mG ER 2 Capsule(s) Oral four times a day  carbidopa 48.75 mG/levodopa 195 mG ER 1 Capsule(s) Oral four times a day  cefTRIAXone   IVPB 1000 milliGRAM(s) IV Intermittent every 24 hours  clopidogrel Tablet 75 milliGRAM(s) Oral daily  finasteride 5 milliGRAM(s) Oral daily  heparin   Injectable 5000 Unit(s) SubCutaneous every 12 hours  polyethylene glycol 3350 17 Gram(s) Oral at bedtime  senna 2 Tablet(s) Oral at bedtime  tamsulosin 0.4 milliGRAM(s) Oral at bedtime    MEDICATIONS  (PRN):        CAPILLARY BLOOD GLUCOSE      POCT Blood Glucose.: 162 mg/dL (28 Dec 2021 11:52)    I&O's Summary      PHYSICAL EXAM:  Vital Signs Last 24 Hrs  T(C): 36.6 (29 Dec 2021 10:33), Max: 36.9 (28 Dec 2021 20:30)  T(F): 97.8 (29 Dec 2021 10:33), Max: 98.5 (28 Dec 2021 20:30)  HR: 69 (29 Dec 2021 10:33) (69 - 73)  BP: 150/70 (29 Dec 2021 10:33) (136/73 - 161/75)  BP(mean): --  RR: 16 (29 Dec 2021 08:43) (16 - 20)  SpO2: 98% (29 Dec 2021 10:33) (98% - 100%)    GENERAL: NAD, well-developed  HEAD:  Atraumatic, Normocephalic  EYES: EOMI, PERRLA, conjunctiva and sclera clear  NECK: Supple, No JVD  CHEST/LUNG: Clear to auscultation bilaterally; No wheeze  HEART: Regular rate and rhythm; No murmurs, rubs, or gallops  ABDOMEN: Soft, Nontender, Nondistended; Bowel sounds present  EXTREMITIES:  2+ Peripheral Pulses, No clubbing, cyanosis, or edema  PSYCH: AAOx3  NEUROLOGY: non-focal  SKIN: No rashes or lesions    LABS:                        14.2   10.50 )-----------( 258      ( 29 Dec 2021 07:04 )             45.1     -    138  |  102  |  19  ----------------------------<  124<H>  3.4<L>   |  20<L>  |  1.52<H>    Ca    9.4      29 Dec 2021 07:04  Phos  2.4     -  Mg     2.20         TPro  7.3  /  Alb  3.6  /  TBili  0.7  /  DBili  x   /  AST  26  /  ALT  5   /  AlkPhos  102  12-29    PT/INR - ( 28 Dec 2021 13:52 )   PT: 26.8 sec;   INR: 2.43 ratio         PTT - ( 28 Dec 2021 13:52 )  PTT:40.5 sec  CARDIAC MARKERS ( 29 Dec 2021 07:04 )  x     / x     / 92 U/L / x     / 5.9 ng/mL      Urinalysis Basic - ( 28 Dec 2021 15:55 )    Color: Yellow / Appearance: Slightly Turbid / S.019 / pH: x  Gluc: x / Ketone: Negative  / Bili: Negative / Urobili: 3 mg/dL   Blood: x / Protein: 300 mg/dL / Nitrite: Negative   Leuk Esterase: Negative / RBC: 14 /HPF / WBC 13 /HPF   Sq Epi: x / Non Sq Epi: 14 /HPF / Bacteria: Moderate        RADIOLOGY & ADDITIONAL TESTS:    Imaging Personally Reviewed:    Consultant(s) Notes Reviewed:      Care Discussed with Consultants/Other Providers:  
Patient is a 81y old  Male who presents with a chief complaint of worsening generalized weakness over the past month (29 Dec 2021 02:44)      HPI:  Remains sleepy and lethargic. 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    Parkinsons disease    CAD (coronary artery disease)  s/p PCI     History of hip replacement, total  R hip  &amp; L hip    Status post cataract extraction  right eye cataract extraction with IOL 2015    Presence of cardiac pacemaker  10/2021        Review of Systems:   CONSTITUTIONAL: No fever, weight loss, or fatigue  EYES: No eye pain, visual disturbances, or discharge  ENMT:  No difficulty hearing, tinnitus, vertigo; No sinus or throat pain  NECK: No pain or stiffness  BREASTS: No pain, masses, or nipple discharge  RESPIRATORY: No cough, wheezing, chills or hemoptysis; No shortness of breath  CARDIOVASCULAR: No chest pain, palpitations, dizziness, or leg swelling  GASTROINTESTINAL: No abdominal or epigastric pain. No nausea, vomiting, or hematemesis; No diarrhea or constipation. No melena or hematochezia.  GENITOURINARY: No dysuria, frequency, hematuria, or incontinence  NEUROLOGICAL: No headaches, memory loss, loss of strength, numbness, or tremors  SKIN: No itching, burning, rashes, or lesions   LYMPH NODES: No enlarged glands  ENDOCRINE: No heat or cold intolerance; No hair loss  MUSCULOSKELETAL: No joint pain or swelling; No muscle, back, or extremity pain  PSYCHIATRIC: No depression, anxiety, mood swings, or difficulty sleeping  HEME/LYMPH: No easy bruising, or bleeding gums  ALLERY AND IMMUNOLOGIC: No hives or eczema    Allergies    No Known Allergies    Intolerances        Social History:     FAMILY HISTORY:  Family history of lung cancer (Mother)    Family history of esophageal cancer (Father)    FH: myocardial infarction (Grandparent)        MEDICATIONS  (STANDING):  ARIPiprazole 5 milliGRAM(s) Oral daily  atorvastatin 10 milliGRAM(s) Oral at bedtime  carbidopa 36.25 mG/levodopa 145 mG ER 2 Capsule(s) Oral four times a day  carbidopa 48.75 mG/levodopa 195 mG ER 1 Capsule(s) Oral four times a day  cefTRIAXone   IVPB 1000 milliGRAM(s) IV Intermittent every 24 hours  clopidogrel Tablet 75 milliGRAM(s) Oral daily  finasteride 5 milliGRAM(s) Oral daily  heparin   Injectable 5000 Unit(s) SubCutaneous every 12 hours  polyethylene glycol 3350 17 Gram(s) Oral at bedtime  senna 2 Tablet(s) Oral at bedtime  tamsulosin 0.4 milliGRAM(s) Oral at bedtime    MEDICATIONS  (PRN):        CAPILLARY BLOOD GLUCOSE      POCT Blood Glucose.: 162 mg/dL (28 Dec 2021 11:52)    I&O's Summary      PHYSICAL EXAM:  Vital Signs Last 24 Hrs  T(C): 36.6 (29 Dec 2021 10:33), Max: 36.9 (28 Dec 2021 20:30)  T(F): 97.8 (29 Dec 2021 10:33), Max: 98.5 (28 Dec 2021 20:30)  HR: 69 (29 Dec 2021 10:33) (69 - 73)  BP: 150/70 (29 Dec 2021 10:33) (136/73 - 161/75)  BP(mean): --  RR: 16 (29 Dec 2021 08:43) (16 - 20)  SpO2: 98% (29 Dec 2021 10:33) (98% - 100%)    GENERAL: NAD, well-developed  HEAD:  Atraumatic, Normocephalic  EYES: EOMI, PERRLA, conjunctiva and sclera clear  NECK: Supple, No JVD  CHEST/LUNG: Clear to auscultation bilaterally; No wheeze  HEART: Regular rate and rhythm; No murmurs, rubs, or gallops  ABDOMEN: Soft, Nontender, Nondistended; Bowel sounds present  EXTREMITIES:  2+ Peripheral Pulses, No clubbing, cyanosis, or edema  PSYCH: AAOx3  NEUROLOGY: non-focal  SKIN: No rashes or lesions    LABS:                        14.2   10.50 )-----------( 258      ( 29 Dec 2021 07:04 )             45.1     -    138  |  102  |  19  ----------------------------<  124<H>  3.4<L>   |  20<L>  |  1.52<H>    Ca    9.4      29 Dec 2021 07:04  Phos  2.4       Mg     2.20         TPro  7.3  /  Alb  3.6  /  TBili  0.7  /  DBili  x   /  AST  26  /  ALT  5   /  AlkPhos  102      PT/INR - ( 28 Dec 2021 13:52 )   PT: 26.8 sec;   INR: 2.43 ratio         PTT - ( 28 Dec 2021 13:52 )  PTT:40.5 sec  CARDIAC MARKERS ( 29 Dec 2021 07:04 )  x     / x     / 92 U/L / x     / 5.9 ng/mL      Urinalysis Basic - ( 28 Dec 2021 15:55 )    Color: Yellow / Appearance: Slightly Turbid / S.019 / pH: x  Gluc: x / Ketone: Negative  / Bili: Negative / Urobili: 3 mg/dL   Blood: x / Protein: 300 mg/dL / Nitrite: Negative   Leuk Esterase: Negative / RBC: 14 /HPF / WBC 13 /HPF   Sq Epi: x / Non Sq Epi: 14 /HPF / Bacteria: Moderate        RADIOLOGY & ADDITIONAL TESTS:    Imaging Personally Reviewed:    Consultant(s) Notes Reviewed:      Care Discussed with Consultants/Other Providers:

## 2021-12-30 NOTE — DISCHARGE NOTE PROVIDER - HOSPITAL COURSE
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 CT head Negative. Neurology consulted - Suspect acute mental status change on the morning of arrival is likely due to infection, given positive urinalysis. Ceftriaxone x 3 doses received in hospital, will discharge on Ceftin for 2 more days for a total of 5 days. Sinemet given in hospital, as Rytary is non-formulary. Restart Rytary upon discharge. TTE done and unremarkable. Will need follow up with PCP (appointment on Monday) and neurology (Dr. Langley, 1/20/21). Continue ASA, Plavix, statin for CAD s/p PCI and Eliquis for AFib.  PT recommends Home PT, CM made aware.    On 12/31 this case was reviewed with Dr. Zuñiga, the patient is medically stable and optimized for discharge. All medications were reviewed and prescriptions were sent to mutually agreed upon pharmacy.

## 2021-12-31 ENCOUNTER — TRANSCRIPTION ENCOUNTER (OUTPATIENT)
Age: 81
End: 2021-12-31

## 2021-12-31 VITALS
SYSTOLIC BLOOD PRESSURE: 136 MMHG | RESPIRATION RATE: 18 BRPM | TEMPERATURE: 98 F | HEART RATE: 72 BPM | OXYGEN SATURATION: 99 % | DIASTOLIC BLOOD PRESSURE: 75 MMHG

## 2021-12-31 LAB
ANION GAP SERPL CALC-SCNC: 14 MMOL/L — SIGNIFICANT CHANGE UP (ref 7–14)
BUN SERPL-MCNC: 19 MG/DL — SIGNIFICANT CHANGE UP (ref 7–23)
CALCIUM SERPL-MCNC: 9.2 MG/DL — SIGNIFICANT CHANGE UP (ref 8.4–10.5)
CHLORIDE SERPL-SCNC: 104 MMOL/L — SIGNIFICANT CHANGE UP (ref 98–107)
CO2 SERPL-SCNC: 22 MMOL/L — SIGNIFICANT CHANGE UP (ref 22–31)
CREAT SERPL-MCNC: 1.44 MG/DL — HIGH (ref 0.5–1.3)
GLUCOSE SERPL-MCNC: 101 MG/DL — HIGH (ref 70–99)
MAGNESIUM SERPL-MCNC: 2.2 MG/DL — SIGNIFICANT CHANGE UP (ref 1.6–2.6)
PHOSPHATE SERPL-MCNC: 2.9 MG/DL — SIGNIFICANT CHANGE UP (ref 2.5–4.5)
POTASSIUM SERPL-MCNC: 4.2 MMOL/L — SIGNIFICANT CHANGE UP (ref 3.5–5.3)
POTASSIUM SERPL-SCNC: 4.2 MMOL/L — SIGNIFICANT CHANGE UP (ref 3.5–5.3)
SODIUM SERPL-SCNC: 140 MMOL/L — SIGNIFICANT CHANGE UP (ref 135–145)
T PALLIDUM AB TITR SER: NEGATIVE — SIGNIFICANT CHANGE UP

## 2021-12-31 RX ORDER — APIXABAN 2.5 MG/1
1 TABLET, FILM COATED ORAL
Qty: 0 | Refills: 0 | DISCHARGE

## 2021-12-31 RX ORDER — CEFUROXIME AXETIL 250 MG
1 TABLET ORAL
Qty: 4 | Refills: 0
Start: 2021-12-31 | End: 2022-01-01

## 2021-12-31 RX ORDER — CEFUROXIME AXETIL 250 MG
250 TABLET ORAL EVERY 12 HOURS
Refills: 0 | Status: DISCONTINUED | OUTPATIENT
Start: 2022-01-01 | End: 2021-12-31

## 2021-12-31 RX ORDER — APIXABAN 2.5 MG/1
1 TABLET, FILM COATED ORAL
Qty: 60 | Refills: 0
Start: 2021-12-31 | End: 2022-01-29

## 2021-12-31 RX ADMIN — SODIUM CHLORIDE 50 MILLILITER(S): 9 INJECTION, SOLUTION INTRAVENOUS at 07:01

## 2021-12-31 RX ADMIN — CARBIDOPA AND LEVODOPA 1.5 TABLET(S): 25; 100 TABLET ORAL at 10:01

## 2021-12-31 RX ADMIN — CARBIDOPA AND LEVODOPA 1.5 TABLET(S): 25; 100 TABLET ORAL at 11:27

## 2021-12-31 RX ADMIN — FINASTERIDE 5 MILLIGRAM(S): 5 TABLET, FILM COATED ORAL at 11:27

## 2021-12-31 RX ADMIN — CLOPIDOGREL BISULFATE 75 MILLIGRAM(S): 75 TABLET, FILM COATED ORAL at 11:27

## 2021-12-31 RX ADMIN — ARIPIPRAZOLE 5 MILLIGRAM(S): 15 TABLET ORAL at 11:27

## 2021-12-31 RX ADMIN — APIXABAN 2.5 MILLIGRAM(S): 2.5 TABLET, FILM COATED ORAL at 06:02

## 2021-12-31 RX ADMIN — CEFTRIAXONE 100 MILLIGRAM(S): 500 INJECTION, POWDER, FOR SOLUTION INTRAMUSCULAR; INTRAVENOUS at 06:03

## 2021-12-31 NOTE — DISCHARGE NOTE NURSING/CASE MANAGEMENT/SOCIAL WORK - NSDCVIVACCINE_GEN_ALL_CORE_FT
Tdap; 03-Jul-2016 07:38; Yoli King (RN); [employer]; [id number]; IntraMuscular; Deltoid Left.; 0.5 milliLiter(s); VIS (VIS Published: 09-May-2013, VIS Presented: 03-Jul-2016);

## 2021-12-31 NOTE — DISCHARGE NOTE NURSING/CASE MANAGEMENT/SOCIAL WORK - PATIENT PORTAL LINK FT
You can access the FollowMyHealth Patient Portal offered by [facility] by registering at the following website: [email or web address]. By joining [facility]’s FollowMyHealth portal, you will also be able to view your health information using other applications (apps) compatible with our system.

## 2021-12-31 NOTE — DISCHARGE NOTE NURSING/CASE MANAGEMENT/SOCIAL WORK - NSDCPEFALRISK_GEN_ALL_CORE
For information on Fall & Injury Prevention, visit: [email or web address] OR  [email or web address] OR  https://www.cdc.gov/steadi/patient.html

## 2022-01-18 PROBLEM — R00.1 BRADYCARDIA, UNSPECIFIED: Chronic | Status: ACTIVE | Noted: 2021-12-29

## 2022-01-18 PROBLEM — I25.10 ATHEROSCLEROTIC HEART DISEASE OF NATIVE CORONARY ARTERY WITHOUT ANGINA PECTORIS: Chronic | Status: ACTIVE | Noted: 2021-12-29

## 2022-01-18 PROBLEM — I48.91 UNSPECIFIED ATRIAL FIBRILLATION: Chronic | Status: ACTIVE | Noted: 2021-12-29

## 2022-01-18 PROBLEM — E78.5 HYPERLIPIDEMIA, UNSPECIFIED: Chronic | Status: ACTIVE | Noted: 2021-12-29

## 2022-01-18 PROBLEM — I10 ESSENTIAL (PRIMARY) HYPERTENSION: Chronic | Status: ACTIVE | Noted: 2021-12-29

## 2022-01-18 PROBLEM — N40.0 BENIGN PROSTATIC HYPERPLASIA WITHOUT LOWER URINARY TRACT SYMPTOMS: Chronic | Status: ACTIVE | Noted: 2021-12-29

## 2022-01-18 PROBLEM — G20 PARKINSON'S DISEASE: Chronic | Status: ACTIVE | Noted: 2021-12-29

## 2022-01-20 ENCOUNTER — APPOINTMENT (OUTPATIENT)
Dept: NEUROLOGY | Facility: CLINIC | Age: 82
End: 2022-01-20
Payer: MEDICARE

## 2022-01-20 ENCOUNTER — RX RENEWAL (OUTPATIENT)
Age: 82
End: 2022-01-20

## 2022-01-20 PROCEDURE — 99214 OFFICE O/P EST MOD 30 MIN: CPT | Mod: 95

## 2022-01-20 NOTE — HISTORY OF PRESENT ILLNESS
[FreeTextEntry1] : Patient was hospitalized at [facility] recently with UTI -delirium. His symptoms have resolved but his wife says that patient has not fully returned to cognitive baseline--patient gets confused with dates. \par He remains on the same rytary regimen\par He does not have dyskinesia or motor fluctuations\par His gait has returned to baseline and doing PT twice a week\par He feels drowsy in the mornings\par Has trouble turning in bed\par He sometimes sees things passing in the periphery of his vision. \par His cardiac status has been stable\par \par \par Nonmotor:\par + constipation - fiber, miralax\par sleep is fragmented\par VH are not present\par chronic diplopia treated with prisms\par \par Meds\par rytary 195  1 tab AM and 7p\par Rytary 145 2 tab  AM - 3p - 7p - 11\par desvenlafaxine 50mg qd\par spironolactone 25mg qd\par torsemide 20mg qd\par tadalafil

## 2022-01-20 NOTE — DISCUSSION/SUMMARY
[FreeTextEntry1] : PD with recent UTI induced exacerbation. He motorically stable and has mild residual cognitive deficits\par \par Patient was counseled on the following recommendations:\par reduce rytary 145 at 11pm to 1 tab to see if morning grogginess improves\par cont PT and daily exercises\par cont miralax\par f/u cards\par \par f/u 1month\par . \par

## 2022-01-20 NOTE — REASON FOR VISIT
[Home] : at home, [unfilled] , at the time of the visit. [Medical Office: ([facility])___] : at the medical office located in  [Verbal consent obtained from patient] : the patient, [unfilled]

## 2022-01-20 NOTE — PHYSICAL EXAM
[FreeTextEntry1] : There is 2+ masking. Speech is 1+.there is mild left greater than right bradykinesia..  SL is good and turns well. No FOG. No LID.

## 2022-02-01 ENCOUNTER — APPOINTMENT (OUTPATIENT)
Dept: NEUROLOGY | Facility: CLINIC | Age: 82
End: 2022-02-01
Payer: MEDICARE

## 2022-02-01 VITALS
HEIGHT: 68 IN | BODY MASS INDEX: 27.28 KG/M2 | HEART RATE: 70 BPM | RESPIRATION RATE: 15 BRPM | DIASTOLIC BLOOD PRESSURE: 78 MMHG | WEIGHT: 180 LBS | SYSTOLIC BLOOD PRESSURE: 157 MMHG

## 2022-02-01 PROCEDURE — 99214 OFFICE O/P EST MOD 30 MIN: CPT

## 2022-02-01 NOTE — HISTORY OF PRESENT ILLNESS
[FreeTextEntry1] : \par Over the weekend patient developed confusion and mild hallucinations. His wife reports that he is moving slower as well. He had a similar presentation 6 weeks ago related to a UTI\par He will be having UA today by his GP\par Since his last visit, his last dose of rytary was reduced to 1 tab. He continues to have daytime drowsiness, which worsened over the last 2 days\par \par \par Nonmotor:\par + constipation - fiber, miralax\par sleep is fragmented\par \par Meds\par rytary 195  1 tab AM and 7p\par Rytary 145 2 tab  AM - 3p - 7p - (1) 11\par desvenlafaxine 50mg qd\par spironolactone 25mg qd\par torsemide 20mg qd\par tadalafil

## 2022-02-01 NOTE — PHYSICAL EXAM
[FreeTextEntry1] : There is 2+ masking. Speech is 1+. 2+ left hand rest tremor. there is mild-moderate  left greater than right bradykinesia with minimal rigidity.  SL is good and turns well. No FOG. No LID. Pull test negative

## 2022-02-01 NOTE — DISCUSSION/SUMMARY
[FreeTextEntry1] : Increased bradykinesia with confusion. R/o infectious process\par \par Patient was counseled on the following recommendations:\par \par will f/u with GP today for UA. Suggested covid test as well\par leave PD meds the same\par \par will call me in 1-2 days to let me know results of his test

## 2022-02-11 ENCOUNTER — APPOINTMENT (OUTPATIENT)
Dept: UROLOGY | Facility: CLINIC | Age: 82
End: 2022-02-11
Payer: MEDICARE

## 2022-02-11 VITALS
DIASTOLIC BLOOD PRESSURE: 84 MMHG | BODY MASS INDEX: 27.28 KG/M2 | HEART RATE: 69 BPM | WEIGHT: 180 LBS | OXYGEN SATURATION: 95 % | RESPIRATION RATE: 14 BRPM | SYSTOLIC BLOOD PRESSURE: 147 MMHG | TEMPERATURE: 97.7 F | HEIGHT: 68 IN

## 2022-02-11 DIAGNOSIS — R41.89 OTHER SYMPTOMS AND SIGNS INVOLVING COGNITIVE FUNCTIONS AND AWARENESS: ICD-10-CM

## 2022-02-11 DIAGNOSIS — N52.9 MALE ERECTILE DYSFUNCTION, UNSPECIFIED: ICD-10-CM

## 2022-02-11 PROCEDURE — 99213 OFFICE O/P EST LOW 20 MIN: CPT

## 2022-02-12 PROBLEM — N52.9 ERECTILE DYSFUNCTION OF ORGANIC ORIGIN: Status: ACTIVE | Noted: 2021-08-03

## 2022-02-12 NOTE — HISTORY OF PRESENT ILLNESS
[FreeTextEntry1] : Patient is here for follow-up with his wife\par See notes from last visit\par He has seen Dr. Aburto for his ED complaints\par \par Urine flow strong\par But he continues to have urinary urgency incontinence\par He is on Vesicare 5 mg daily\par \par PVR 0 cc\par \par \par current meds\par Rytary\par Clopidogrel\par Eliquis\par Rosuvastatin\par Solifenacin 5mg\par Tadalafil 5mg\par Desvenlafaxine\par Torsemide\par Klor-Con\par Vit B12\par Vit D3\par One a day Vitamin, mens

## 2022-02-12 NOTE — ASSESSMENT
[FreeTextEntry1] : Increase Vesicare to 10 mg\par Reassess patient in 3 months with telehealth visit

## 2022-02-14 ENCOUNTER — TRANSCRIPTION ENCOUNTER (OUTPATIENT)
Age: 82
End: 2022-02-14

## 2022-02-14 ENCOUNTER — APPOINTMENT (OUTPATIENT)
Dept: NEUROLOGY | Facility: CLINIC | Age: 82
End: 2022-02-14
Payer: MEDICARE

## 2022-02-14 PROCEDURE — 95816 EEG AWAKE AND DROWSY: CPT

## 2022-02-18 ENCOUNTER — APPOINTMENT (OUTPATIENT)
Dept: PULMONOLOGY | Facility: CLINIC | Age: 82
End: 2022-02-18
Payer: MEDICARE

## 2022-02-18 ENCOUNTER — NON-APPOINTMENT (OUTPATIENT)
Age: 82
End: 2022-02-18

## 2022-02-18 VITALS
TEMPERATURE: 98 F | WEIGHT: 179 LBS | SYSTOLIC BLOOD PRESSURE: 110 MMHG | RESPIRATION RATE: 16 BRPM | BODY MASS INDEX: 28.77 KG/M2 | DIASTOLIC BLOOD PRESSURE: 60 MMHG | HEART RATE: 70 BPM | HEIGHT: 66 IN | OXYGEN SATURATION: 98 %

## 2022-02-18 DIAGNOSIS — Z83.3 FAMILY HISTORY OF DIABETES MELLITUS: ICD-10-CM

## 2022-02-18 PROCEDURE — ZZZZZ: CPT

## 2022-02-18 PROCEDURE — 71046 X-RAY EXAM CHEST 2 VIEWS: CPT

## 2022-02-18 PROCEDURE — 94618 PULMONARY STRESS TESTING: CPT

## 2022-02-18 PROCEDURE — 99204 OFFICE O/P NEW MOD 45 MIN: CPT | Mod: 25

## 2022-02-18 PROCEDURE — 94010 BREATHING CAPACITY TEST: CPT

## 2022-02-18 RX ORDER — RIVASTIGMINE TARTRATE 1.5 MG/1
1.5 CAPSULE ORAL
Qty: 60 | Refills: 3 | Status: DISCONTINUED | COMMUNITY
Start: 2022-02-11 | End: 2022-02-18

## 2022-02-18 NOTE — ASSESSMENT
[FreeTextEntry1] : Mr. RIVAS is a 81 year male with a history of Parkinson's (Dx 2016), Depression, BPH, CAD, stent placement, HLD who now comes in for an initial pulmonary evaluation for Abnormal CT chest c/w with b/l pleural effusions, SOB, ?chronic "CHF", drug effect, amyloid, LIOR\par \par The patient's SOB is felt to be multifactorial:\par -poor mechanics of breathing (poor balance) \par -out of shape/overweight\par -Pulmonary\par     -Bilateral pleural effusion\par -Cardiac (Billy Dickey) \par    -CAD\par    -?Amyloid\par \par Abnormal CT c/w B/l, right significantly larger than left  Pleural Effusion DDX:\par -Cardiac\par -Drug\par -PE\par -CA such as Lymphoma \par \par Problem 1: Pleural Effusion (right significantly larger than left) \par -complete Thoracentesis for various chemistries, cultures \par -Recommended Evaluation with Dr. Rufus Oneal \par \par \par Problem 2: Abnormal PFTs c/w restrictive dysfunction ;likely due to pleural effusion \par -complete D-Dimer \par -Full PFTs with KULWINDER on next visit\par \par Problem 3: r/o LIOR (RF: Elevated Mallampati class, neck size 17, Parkinson's)\par -complete home sleep study\par -Sleep apnea is associated with adverse clinical consequences which can affect most organ systems. Cardiovascular disease risk includes arrhythmias, atrial fibrillation, hypertension, coronary artery disease, and stroke. Metabolic disorders include diabetes type 2, non-alcoholic fatty liver disease. Mood disorder especially depression; and cognitive decline especially in the elderly. Associations with chronic reflux/Delacruz’s esophagus some but not all inclusive. \par -Reasons include arousal consistent with hypopnea; respiratory events most prominent in REM sleep or supine position; therefore sleep staging and body position are important for accurate diagnosis and estimation of AHI. \par \par Problem 4:Cardiac\par - complete blood work: BNP \par -Recommend cardiac follow up evaluation with cardiologist if needed (Billy Dickey) \par \par Problem 5:overweight/out of shape\par - Weight loss, exercise and diet control were discussed and are highly encouraged. Treatment options were given such as aqua therapy, and contacting a nutritionist. Recommended to use the elliptical, stationary bike, less use of treadmill. Mindful eating was explained to the patient. Obesity is associated with worsening asthma, SOB, and potential for cardiac disease, diabetes, and other underlying medical conditions.\par \par Problem 6: Poor mechanics of breathing (Poor Balance) \par -Recommended Wim Hof and Buteyko breathing techniques \par - Proper breathing techniques were reviewed with an emphasis on exhalation. Patient instructed to breath in for 1 second and out for four seconds. Patient was encouraged not to talk while walking.\par \par Problem 7: Health Maintenance\par -s/p flu shot\par -recommended strep pneumonia vaccines: Prevnar-13 vaccine, follow by Pneumo vaccine 23 one year following\par -recommended early intervention for URIs\par -recommended regular osteoporosis evaluations\par -recommended early dermatological evaluations\par -recommended after the age of 50 to the age of 70, colonoscopy every 5 years\par \par f/u in 6-8 weeks\par pt is encouraged to call or fax the office with any questions or concerns.\par

## 2022-02-18 NOTE — ADDENDUM
[FreeTextEntry1] : Documented by Familia Doherty acting as a scribe for Dr. Pavan Jamil on (02/18/2022).\par \par All medical record entries made by the Scribe were at my, Dr. Pavan Jamil's, direction and personally dictated by me on (02/18/2022). I have reviewed the chart and agree that the record accurately reflects my personal performance of the history, physical exam, assessment and plan. I have also personally directed, reviewed, and agree with the discharge instructions.\par

## 2022-02-18 NOTE — REASON FOR VISIT
[Initial] : an initial visit [Spouse] : spouse [TextBox_44] : Abnormal CT chest c/w with b/l pleural effusions, SOB, ?chronic "CHF", drug effect, amyloid, LIOR

## 2022-02-18 NOTE — PHYSICAL EXAM
[No Acute Distress] : no acute distress [Normal Oropharynx] : normal oropharynx [III] : Mallampati Class: III [Normal Appearance] : normal appearance [No Neck Mass] : no neck mass [Normal Rate/Rhythm] : normal rate/rhythm [Normal S1, S2] : normal s1, s2 [No Murmurs] : no murmurs [No Resp Distress] : no resp distress [Clear to Auscultation Bilaterally] : clear to auscultation bilaterally [No Abnormalities] : no abnormalities [Benign] : benign [Normal Gait] : normal gait [No Clubbing] : no clubbing [No Cyanosis] : no cyanosis [No Edema] : no edema [FROM] : FROM [Normal Color/ Pigmentation] : normal color/ pigmentation [No Focal Deficits] : no focal deficits [Oriented x3] : oriented x3 [Normal Affect] : normal affect [TextBox_2] : OW [TextBox_68] : I:E 1:3; Clear  [TextBox_89] : ventral hernia

## 2022-02-18 NOTE — PROCEDURE
[FreeTextEntry1] : CT chest 11/30/2021 reveals Large right and small left pleural effusion not significantly changed with associated compression atelectasis of the adjacent parenchyman the RML, LLL, stable. Cardiomegaly with right atrial dilatation; AICD present\par \par PFT revealed normal flows, with a FEV1 of 1.81L, which is 75% of predicted, with a normal flow volume loop\par \par Feno was unable to be completed  ; a normal value being less than 25. Fractional exhaled nitric oxide (FENO) is regarded as a simple, noninvasive method for assessing eosinophilic airway inflammation. Produced by a variety of cells within the lung, nitric oxide (NO) concentrations are generally low in healthy individuals. However, high concentrations of NO appear to be involved in nonspecific host defense mechanisms and chronic inflammatory  diseases such as asthma. The American Thoracic Society (ATS) therefore recommended using FENO to aid in the diagnosis and monitoring of eosinophilic airway inflammation and asthma, and for identifying steroid responsive individuals whose chronic respiratory symptoms may be caused by airway inflammation \par \par CXR revealed a normal sized heart; mod to large right pleural effusion. left permanent pacemaker no sig effusion on the left \par \par 6 minute walk test reveals a low saturation of 97% with no evidence of dyspnea or fatigue; walked 293.4meters\par  \par  \par \par

## 2022-02-18 NOTE — HISTORY OF PRESENT ILLNESS
[TextBox_4] : Mr. RIVAS is a 81 year male with a history of Parkinson's (Dx 2016), Depression, BPH, CAD, stent placement, HLD who now comes in for an initial pulmonary evaluation for Abnormal CT with pleural effusions, SOB. His chief complaint is\par -he is s/p CT chest and CT brain \par -he denies SOB \par -He denies SOB on his back or in the middle of the night\par -He notes GARCIA on stairs and inclines \par -He notes mild dysphagia with large pills associated with Parkinson's \par -he notes bowels are irregular due to Parkinson's \par -he now generally needs more sleep with increased fatigue\par -His memory and concentration are declining\par - he reports being able to fall asleep while watching a boring TV show \par -his neck size is approximately 17 \par -his fatigue has increased with progression of Parkinson's\par -denies h/o TB or asbestos exposure \par -he notes dry mouth recently \par -He has been on Diuretics for the past 6-8 months \par \par - patient denies any headaches, nausea, vomiting, fever, chills, sweats, chest pain, chest pressure, palpitations, coughing, wheezing, fatigue, diarrhea, constipation, dysphagia, myalgias, dizziness, leg swelling, leg pain, itchy eyes, itchy ears, heartburn, reflux or sour taste in the mouth

## 2022-02-22 ENCOUNTER — LABORATORY RESULT (OUTPATIENT)
Age: 82
End: 2022-02-22

## 2022-02-23 LAB — DEPRECATED D DIMER PPP IA-ACNC: 289 NG/ML DDU

## 2022-02-24 ENCOUNTER — FORM ENCOUNTER (OUTPATIENT)
Age: 82
End: 2022-02-24

## 2022-02-24 ENCOUNTER — NON-APPOINTMENT (OUTPATIENT)
Age: 82
End: 2022-02-24

## 2022-02-25 ENCOUNTER — NON-APPOINTMENT (OUTPATIENT)
Age: 82
End: 2022-02-25

## 2022-02-25 ENCOUNTER — APPOINTMENT (OUTPATIENT)
Dept: SLEEP CENTER | Facility: CLINIC | Age: 82
End: 2022-02-25
Payer: MEDICARE

## 2022-02-25 ENCOUNTER — OUTPATIENT (OUTPATIENT)
Dept: OUTPATIENT SERVICES | Facility: HOSPITAL | Age: 82
LOS: 1 days | End: 2022-02-25
Payer: MEDICARE

## 2022-02-25 DIAGNOSIS — Z95.0 PRESENCE OF CARDIAC PACEMAKER: Chronic | ICD-10-CM

## 2022-02-25 DIAGNOSIS — Z96.649 PRESENCE OF UNSPECIFIED ARTIFICIAL HIP JOINT: Chronic | ICD-10-CM

## 2022-02-25 DIAGNOSIS — Z98.49 CATARACT EXTRACTION STATUS, UNSPECIFIED EYE: Chronic | ICD-10-CM

## 2022-02-25 PROCEDURE — 95806 SLEEP STUDY UNATT&RESP EFFT: CPT

## 2022-02-25 PROCEDURE — 95806 SLEEP STUDY UNATT&RESP EFFT: CPT | Mod: 26

## 2022-02-25 RX ORDER — TORSEMIDE 5 MG/1
TABLET ORAL
Refills: 0 | Status: DISCONTINUED | COMMUNITY
End: 2022-02-25

## 2022-02-28 ENCOUNTER — APPOINTMENT (OUTPATIENT)
Dept: THORACIC SURGERY | Facility: CLINIC | Age: 82
End: 2022-02-28
Payer: MEDICARE

## 2022-02-28 ENCOUNTER — NON-APPOINTMENT (OUTPATIENT)
Age: 82
End: 2022-02-28

## 2022-02-28 VITALS
RESPIRATION RATE: 16 BRPM | HEART RATE: 70 BPM | DIASTOLIC BLOOD PRESSURE: 78 MMHG | WEIGHT: 179 LBS | BODY MASS INDEX: 28.77 KG/M2 | OXYGEN SATURATION: 97 % | HEIGHT: 66 IN | SYSTOLIC BLOOD PRESSURE: 149 MMHG

## 2022-02-28 PROCEDURE — 99205 OFFICE O/P NEW HI 60 MIN: CPT

## 2022-02-28 RX ORDER — POTASSIUM CHLORIDE 1500 MG/1
TABLET, EXTENDED RELEASE ORAL
Refills: 0 | Status: DISCONTINUED | COMMUNITY
End: 2022-02-28

## 2022-02-28 RX ORDER — METHYLPHENIDATE HYDROCHLORIDE 18 MG/1
18 TABLET, EXTENDED RELEASE ORAL DAILY
Refills: 0 | Status: DISCONTINUED | COMMUNITY
End: 2022-02-28

## 2022-02-28 RX ORDER — ASPIRIN 325 MG
81 TABLET ORAL
Refills: 0 | Status: DISCONTINUED | COMMUNITY
End: 2022-02-28

## 2022-02-28 RX ORDER — SPIRONOLACTONE 25 MG/1
25 TABLET ORAL DAILY
Qty: 30 | Refills: 0 | Status: DISCONTINUED | COMMUNITY
Start: 2021-08-03 | End: 2022-02-28

## 2022-02-28 RX ORDER — ATORVASTATIN CALCIUM 10 MG/1
10 TABLET, FILM COATED ORAL DAILY
Refills: 0 | Status: DISCONTINUED | COMMUNITY
End: 2022-02-28

## 2022-03-02 DIAGNOSIS — Z01.818 ENCOUNTER FOR OTHER PREPROCEDURAL EXAMINATION: ICD-10-CM

## 2022-03-04 ENCOUNTER — OUTPATIENT (OUTPATIENT)
Dept: OUTPATIENT SERVICES | Facility: HOSPITAL | Age: 82
LOS: 1 days | End: 2022-03-04

## 2022-03-04 VITALS
DIASTOLIC BLOOD PRESSURE: 76 MMHG | TEMPERATURE: 98 F | RESPIRATION RATE: 18 BRPM | SYSTOLIC BLOOD PRESSURE: 145 MMHG | HEART RATE: 70 BPM | WEIGHT: 173.94 LBS | OXYGEN SATURATION: 98 % | HEIGHT: 65 IN

## 2022-03-04 DIAGNOSIS — Z01.812 ENCOUNTER FOR PREPROCEDURAL LABORATORY EXAMINATION: ICD-10-CM

## 2022-03-04 DIAGNOSIS — Z95.0 PRESENCE OF CARDIAC PACEMAKER: ICD-10-CM

## 2022-03-04 DIAGNOSIS — Z96.649 PRESENCE OF UNSPECIFIED ARTIFICIAL HIP JOINT: Chronic | ICD-10-CM

## 2022-03-04 DIAGNOSIS — Z95.0 PRESENCE OF CARDIAC PACEMAKER: Chronic | ICD-10-CM

## 2022-03-04 DIAGNOSIS — G20 PARKINSON'S DISEASE: ICD-10-CM

## 2022-03-04 DIAGNOSIS — F41.8 OTHER SPECIFIED ANXIETY DISORDERS: ICD-10-CM

## 2022-03-04 DIAGNOSIS — J90 PLEURAL EFFUSION, NOT ELSEWHERE CLASSIFIED: ICD-10-CM

## 2022-03-04 DIAGNOSIS — I25.10 ATHEROSCLEROTIC HEART DISEASE OF NATIVE CORONARY ARTERY WITHOUT ANGINA PECTORIS: ICD-10-CM

## 2022-03-04 DIAGNOSIS — Z98.61 CORONARY ANGIOPLASTY STATUS: Chronic | ICD-10-CM

## 2022-03-04 DIAGNOSIS — Z98.49 CATARACT EXTRACTION STATUS, UNSPECIFIED EYE: Chronic | ICD-10-CM

## 2022-03-04 LAB
ALBUMIN SERPL ELPH-MCNC: 4.4 G/DL
ALP BLD-CCNC: 89 U/L
ALT SERPL-CCNC: 6 U/L
ANION GAP SERPL CALC-SCNC: 17 MMOL/L
AST SERPL-CCNC: 22 U/L
BASOPHILS # BLD AUTO: 0.07 K/UL
BASOPHILS NFR BLD AUTO: 1 %
BILIRUB SERPL-MCNC: 0.6 MG/DL
BLD GP AB SCN SERPL QL: NEGATIVE — SIGNIFICANT CHANGE UP
BUN SERPL-MCNC: 16 MG/DL
CALCIUM SERPL-MCNC: 9.8 MG/DL
CHLORIDE SERPL-SCNC: 101 MMOL/L
CO2 SERPL-SCNC: 26 MMOL/L
CREAT SERPL-MCNC: 1.85 MG/DL
EGFR: 37 ML/MIN/1.73M2
EOSINOPHIL # BLD AUTO: 0.12 K/UL
EOSINOPHIL NFR BLD AUTO: 1.6 %
ESTIMATED AVERAGE GLUCOSE: 137 MG/DL
GLUCOSE SERPL-MCNC: 118 MG/DL
HBA1C MFR BLD HPLC: 6.4 %
HCT VFR BLD CALC: 48.2 %
HGB BLD-MCNC: 14.8 G/DL
IMM GRANULOCYTES NFR BLD AUTO: 0.3 %
LYMPHOCYTES # BLD AUTO: 1.68 K/UL
LYMPHOCYTES NFR BLD AUTO: 22.9 %
MAN DIFF?: NORMAL
MCHC RBC-ENTMCNC: 28.7 PG
MCHC RBC-ENTMCNC: 30.7 GM/DL
MCV RBC AUTO: 93.4 FL
MONOCYTES # BLD AUTO: 0.6 K/UL
MONOCYTES NFR BLD AUTO: 8.2 %
NEUTROPHILS # BLD AUTO: 4.86 K/UL
NEUTROPHILS NFR BLD AUTO: 66 %
NT-PROBNP SERPL-MCNC: 1916 PG/ML
PLATELET # BLD AUTO: 211 K/UL
POTASSIUM SERPL-SCNC: 3.5 MMOL/L
PROT SERPL-MCNC: 7.2 G/DL
RBC # BLD: 5.16 M/UL
RBC # FLD: 16.7 %
RH IG SCN BLD-IMP: NEGATIVE — SIGNIFICANT CHANGE UP
SODIUM SERPL-SCNC: 143 MMOL/L
TSH SERPL-ACNC: 2.33 UIU/ML
WBC # FLD AUTO: 7.35 K/UL

## 2022-03-04 RX ORDER — CLOPIDOGREL BISULFATE 75 MG/1
0 TABLET, FILM COATED ORAL
Qty: 0 | Refills: 1 | DISCHARGE

## 2022-03-04 RX ORDER — CLOPIDOGREL BISULFATE 75 MG/1
1 TABLET, FILM COATED ORAL
Qty: 0 | Refills: 0 | DISCHARGE

## 2022-03-04 RX ORDER — TADALAFIL 10 MG/1
1 TABLET, FILM COATED ORAL
Qty: 0 | Refills: 0 | DISCHARGE

## 2022-03-04 RX ORDER — ARIPIPRAZOLE 15 MG/1
1 TABLET ORAL
Qty: 0 | Refills: 0 | DISCHARGE

## 2022-03-04 RX ORDER — CARBIDOPA AND LEVODOPA 25; 100 MG/1; MG/1
2 TABLET ORAL
Qty: 0 | Refills: 0 | DISCHARGE

## 2022-03-04 RX ORDER — SPIRONOLACTONE 25 MG/1
1 TABLET, FILM COATED ORAL
Qty: 0 | Refills: 0 | DISCHARGE

## 2022-03-04 RX ORDER — DESVENLAFAXINE 50 MG/1
1 TABLET, EXTENDED RELEASE ORAL
Qty: 0 | Refills: 0 | DISCHARGE

## 2022-03-04 RX ORDER — APIXABAN 2.5 MG/1
0 TABLET, FILM COATED ORAL
Qty: 0 | Refills: 1 | DISCHARGE

## 2022-03-04 RX ORDER — SODIUM CHLORIDE 9 MG/ML
1000 INJECTION, SOLUTION INTRAVENOUS
Refills: 0 | Status: DISCONTINUED | OUTPATIENT
Start: 2022-03-17 | End: 2022-03-17

## 2022-03-04 RX ORDER — FINASTERIDE 5 MG/1
1 TABLET, FILM COATED ORAL
Qty: 0 | Refills: 0 | DISCHARGE

## 2022-03-04 RX ORDER — METHYLPHENIDATE HCL 5 MG
0 TABLET ORAL
Qty: 0 | Refills: 0 | DISCHARGE

## 2022-03-04 RX ORDER — SOLIFENACIN SUCCINATE 10 MG/1
1 TABLET ORAL
Qty: 0 | Refills: 0 | DISCHARGE

## 2022-03-04 RX ORDER — TAMSULOSIN HYDROCHLORIDE 0.4 MG/1
1 CAPSULE ORAL
Qty: 0 | Refills: 0 | DISCHARGE

## 2022-03-04 RX ORDER — ATORVASTATIN CALCIUM 80 MG/1
1 TABLET, FILM COATED ORAL
Qty: 0 | Refills: 0 | DISCHARGE

## 2022-03-04 RX ORDER — ROSUVASTATIN CALCIUM 5 MG/1
1 TABLET ORAL
Qty: 0 | Refills: 0 | DISCHARGE

## 2022-03-04 NOTE — H&P PST ADULT - PROBLEM SELECTOR PLAN 1
Scheduled right VAT, pleural biopsy, drainage of right pleural effusion, right Pleurx catheter placement on 3/17/2022  Written & verbal preop instructions, gi prophylaxis & surgical soap given  Pt verbalized good understanding.  Teach back done on surgical soap instructions.  Instructed last dose Tadalafil on 3/15/2022  Pt with multiple comorbidities -medical eval requested PST & surgeon  Pending copy of report

## 2022-03-04 NOTE — H&P PST ADULT - NSICDXPASTMEDICALHX_GEN_ALL_CORE_FT
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

## 2022-03-04 NOTE — CONSULT LETTER
[Dear  ___] : Dear  [unfilled], [Consult Letter:] : I had the pleasure of evaluating your patient, [unfilled]. [( Thank you for referring [unfilled] for consultation for _____ )] : Thank you for referring [unfilled] for consultation for [unfilled] [Please see my note below.] : Please see my note below. [Consult Closing:] : Thank you very much for allowing me to participate in the care of this patient.  If you have any questions, please do not hesitate to contact me. [Sincerely,] : Sincerely, [FreeTextEntry2] : Dr. Pavan Jamil (Pulm/Ref)\par Santhosh Avila (PCP) [FreeTextEntry3] : Rufus Oneal MD \par Attending Surgeon \par Division of Thoracic Surgery \par , Cardiovascular and Thoracic Surgery \par [facility] [employer] [facility] at \[facility]\""/[facility]\par \par

## 2022-03-04 NOTE — H&P PST ADULT - NSANTHOSAYNRD_GEN_A_CORE
pending results of sleep study done approx 1 week  with Dr Jamil/Elizabeth. LIOR screening performed.  STOP BANG Legend: 0-2 = LOW Risk; 3-4 = INTERMEDIATE Risk; 5-8 = HIGH Risk

## 2022-03-04 NOTE — PHYSICAL EXAM
[Restricted in physically strenuous activity but ambulatory and able to carry out work of a light or sedentary nature] : Status 1- Restricted in physically strenuous activity but ambulatory and able to carry out work of a light or sedentary nature, e.g., light house work, office work [General Appearance - Alert] : alert [General Appearance - In No Acute Distress] : in no acute distress [Sclera] : the sclera and conjunctiva were normal [PERRL With Normal Accommodation] : pupils were equal in size, round, and reactive to light [Extraocular Movements] : extraocular movements were intact [Outer Ear] : the ears and nose were normal in appearance [Oropharynx] : the oropharynx was normal [Neck Appearance] : the appearance of the neck was normal [Neck Cervical Mass (___cm)] : no neck mass was observed [Jugular Venous Distention Increased] : there was no jugular-venous distention [Thyroid Diffuse Enlargement] : the thyroid was not enlarged [Thyroid Nodule] : there were no palpable thyroid nodules [Auscultation Breath Sounds / Voice Sounds] : lungs were clear to auscultation bilaterally [Heart Rate And Rhythm] : heart rate was normal and rhythm regular [Heart Sounds] : normal S1 and S2 [Heart Sounds Gallop] : no gallops [Murmurs] : no murmurs [Heart Sounds Pericardial Friction Rub] : no pericardial rub [Examination Of The Chest] : the chest was normal in appearance [Chest Visual Inspection Thoracic Asymmetry] : no chest asymmetry [Diminished Respiratory Excursion] : normal chest expansion [2+] : left 2+ [Breast Appearance] : normal in appearance [Breast Palpation Mass] : no palpable masses [Bowel Sounds] : normal bowel sounds [Abdomen Soft] : soft [Abdomen Tenderness] : non-tender [Abdomen Mass (___ Cm)] : no abdominal mass palpated [Cervical Lymph Nodes Enlarged Posterior Bilaterally] : posterior cervical [Cervical Lymph Nodes Enlarged Anterior Bilaterally] : anterior cervical [Supraclavicular Lymph Nodes Enlarged Bilaterally] : supraclavicular [No CVA Tenderness] : no ~M costovertebral angle tenderness [No Spinal Tenderness] : no spinal tenderness [Abnormal Walk] : normal gait [Nail Clubbing] : no clubbing  or cyanosis of the fingernails [Musculoskeletal - Swelling] : no joint swelling seen [Motor Tone] : muscle strength and tone were normal [Skin Color & Pigmentation] : normal skin color and pigmentation [Skin Turgor] : normal skin turgor [] : no rash [Deep Tendon Reflexes (DTR)] : deep tendon reflexes were 2+ and symmetric [Sensation] : the sensory exam was normal to light touch and pinprick [No Focal Deficits] : no focal deficits [Oriented To Time, Place, And Person] : oriented to person, place, and time [Impaired Insight] : insight and judgment were intact [Affect] : the affect was normal [FreeTextEntry1] : deferred

## 2022-03-04 NOTE — H&P PST ADULT - PROBLEM SELECTOR PLAN 4
pt with 1 coronary stent inserted in August 2021 &  h/o afib  Pt on eliquis & plavix.  Pt has appointment with cardiologist on 3/10/22  Pt to be given instructions on eliquis & plavix for preprocedure at appointment  Pending copy of recent echo, cardiac cath report & cardiology eval

## 2022-03-04 NOTE — H&P PST ADULT - PROBLEM SELECTOR PLAN 3
Pt instructed to take quetiapine & desvenlafaxine on morning of surgery. Pt instructed to take quetiapine & desvenlafaxine on morning of surgery.  LIOR precautions recommended.

## 2022-03-04 NOTE — H&P PST ADULT - NS MD HP INPLANTS MED DEV
1 coronary stent, [employer]  implanted 10/6/2021, Model #erial # [id number]/Pacemaker/Vascular stents/Clips 1 coronary stent, [employer] inserted 10/6/2021, Model # L331 serial # [id number]/Pacemaker/Vascular stents/Clips

## 2022-03-04 NOTE — H&P PST ADULT - NSICDXPASTSURGICALHX_GEN_ALL_CORE_FT
PAST SURGICAL HISTORY:  H/O coronary angioplasty 8/2021 1 stent inserted    History of hip replacement, total R hip 2008 & L hip    Presence of cardiac pacemaker 10/2021    Status post cataract extraction right eye cataract extraction with IOL 6/26/2015

## 2022-03-04 NOTE — H&P PST ADULT - NEGATIVE ENMT SYMPTOMS
no hearing difficulty/no tinnitus/no vertigo/no sinus symptoms/no nasal obstruction/no post-nasal discharge/no throat pain/no dysphagia

## 2022-03-04 NOTE — H&P PST ADULT - BP NONINVASIVE SYSTOLIC (MM HG)
[facility]    Brief Operative Note    Pre-operative diagnosis: CAD (coronary artery disease) [I25.10], Atrial Fibrillation  Post-operative diagnosis Same  Procedure: CORONARY ARTERY BYPASS X 3 ENDOSCOPIC VEIN HARVEST, INTERNAL MAMMARY ARTERY, ANESTHESIA TRANSESOPHAGEAL ECHOCARDIOGRAM, LEFT ATRIAL APPENDAGE LIGATION AND EPI AORTIC ULTRASOUND  Surgeon: Surgeon(s) and Role:     * Ashley Bai MD - Primary     * Royal Reid MD - Fellow   Ronel Pulido - Surg Tech/First Assist  Anesthesia: General   Estimated blood loss: 600 mL from 5/1/2019  7:01 AM to 5/1/2019 11:17 AM  Drains:     Chest Tube 1 Anterior Mediastinal 32 Fr. (Active)       Chest Tube 2 Left Pleural 24 Fr. (Active)       Urethral Catheter Latex;Temperature probe 16 Fr. (Active)     Specimens:   ID Type Source Tests Collected by Time   A : Left atrial appendage Tissue Tissue SURGICAL PATHOLOGY EXAM Ashley Bai MD 5/1/2019 0913     Findings:   LIMA to LAD, GSV to Diagonal and RPDA.  Complications: None.  Implants:           Implant Name Type Inv. Item Serial No.  Lot No. LRB No. Used Action   LEAD ARTIRIAL PACING TEMPORARY 53CM [id number] - [id number] Lead LEAD ARTIRIAL PACING TEMPORARY 53CM 6495F  [employer] PA [id number] N/A 2 Implanted   PLEDGET PTFE SOFT PREMIPATCH 9X5MM [id number] - [id number] Graft PLEDGET PTFE SOFT PREMIPATCH 9X5MM [id number]  [employer] [id number] N/A 2 Implanted   RAMOS STERNAL WIRE SINGLE #6 046-032 - [id number] Wire RAMOS STERNAL WIRE SINGLE #6 046-032  [employer] [id number] N/A 1 Implanted   RAMOS MYOSTERNAL WIRE - [id number] Wire RAMOS MYOSTERNAL WIRE  [employer] [id number] N/A 1 Implanted      145

## 2022-03-04 NOTE — HISTORY OF PRESENT ILLNESS
[FreeTextEntry1] : Mr. 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 \par \par CT chest on 11/30/2021:\par - interval increase in size of large right pleural effusion, previously moderate with associated compression atelectasis of the inferior RUL, RML, RLL\par - new small left pleural effusion with left basilar compression atelectasis. \par \par CT chest on 02/06/2022:\par - large right and small left pleural effusion unchanged from 11/30/2021 with associated compression atelectasis of the RML, RLL, and LLL not significantly changed\par - a 3 mm RUL calcified granuloma\par \par Of note, D-Dimer 289 (<=229) was advised to f/u with cardiologist by Dr. Jamil.\par \par Patient is here today for CT surgery consultation, referred by Dr. Pavan Jamil (Pulm). Admits to SOB on exertion.

## 2022-03-04 NOTE — H&P PST ADULT - NSICDXFAMILYHX_GEN_ALL_CORE_FT
Time-based billing (NON-critical care)
FAMILY HISTORY:  Father  Still living? Unknown  Family history of esophageal cancer, Age at diagnosis: Age Unknown    Mother  Still living? Unknown  Family history of lung cancer, Age at diagnosis: Age Unknown    Grandparent  Still living? Unknown  FH: myocardial infarction, Age at diagnosis: Age Unknown

## 2022-03-04 NOTE — ASSESSMENT
[FreeTextEntry1] : Mr. 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 \par \par CT chest on 02/06/2022:\par - large right and small left pleural effusion unchanged from 11/30/2021 with associated compression atelectasis of the RML, RLL, and LLL not significantly changed\par - a 3 mm RUL calcified granuloma\par \par I have reviewed the patient's medical records and diagnostic images at time of this office consultation and have made the following recommendation:\par 1. CT scan reviewed, I recommended a Rt VATS pleural biopsy, drainage of Rt pleural effusion, Rt PleurX catheter placement. Risks and benefits and alternatives explained to patient, all questions answered, patient agreed to proceed with surgery.\par 2. Medical and cardiac clearances, PST, COVID\par \par \par I personally performed the services described in the documentation, reviewed the documentation recorded by the scribe in my presence and it accurately and completely records my words and actions.\par \par I, Tere Aguirre NP, am scribing for and the presence of PARK Gomez, the following sections HISTORY OF PRESENT ILLNESS, PAST MEDICAL/FAMILY/SOCIAL HISTORY; REVIEW OF SYSTEMS; VITAL SIGNS; PHYSICAL EXAM; DISPOSITION.\par \par

## 2022-03-07 RX ORDER — LEVODOPA AND CARBIDOPA 195; 48.75 MG/1; MG/1
48.75-195 CAPSULE, EXTENDED RELEASE ORAL
Qty: 360 | Refills: 5 | Status: DISCONTINUED | COMMUNITY
Start: 2021-04-28 | End: 2022-03-07

## 2022-03-08 ENCOUNTER — APPOINTMENT (OUTPATIENT)
Dept: CARDIOLOGY | Facility: CLINIC | Age: 82
End: 2022-03-08
Payer: MEDICARE

## 2022-03-08 PROCEDURE — 93306 TTE W/DOPPLER COMPLETE: CPT

## 2022-03-09 DIAGNOSIS — G47.33 OBSTRUCTIVE SLEEP APNEA (ADULT) (PEDIATRIC): ICD-10-CM

## 2022-03-10 ENCOUNTER — APPOINTMENT (OUTPATIENT)
Dept: CARDIOLOGY | Facility: CLINIC | Age: 82
End: 2022-03-10
Payer: MEDICARE

## 2022-03-10 VITALS
SYSTOLIC BLOOD PRESSURE: 140 MMHG | BODY MASS INDEX: 27.13 KG/M2 | HEIGHT: 68 IN | DIASTOLIC BLOOD PRESSURE: 70 MMHG | OXYGEN SATURATION: 99 % | HEART RATE: 68 BPM | WEIGHT: 179 LBS

## 2022-03-10 PROBLEM — J90 PLEURAL EFFUSION, NOT ELSEWHERE CLASSIFIED: Chronic | Status: ACTIVE | Noted: 2022-03-04

## 2022-03-10 PROCEDURE — 99215 OFFICE O/P EST HI 40 MIN: CPT

## 2022-03-15 LAB
ALBUMIN SERPL ELPH-MCNC: 4.7 G/DL
ALP BLD-CCNC: 96 U/L
ALT SERPL-CCNC: 8 U/L
ANION GAP SERPL CALC-SCNC: 19 MMOL/L
AST SERPL-CCNC: 25 U/L
BASOPHILS # BLD AUTO: 0.07 K/UL
BASOPHILS NFR BLD AUTO: 1 %
BILIRUB SERPL-MCNC: 0.6 MG/DL
BUN SERPL-MCNC: 21 MG/DL
CALCIUM SERPL-MCNC: 10 MG/DL
CHLORIDE SERPL-SCNC: 96 MMOL/L
CO2 SERPL-SCNC: 27 MMOL/L
CREAT SERPL-MCNC: 1.95 MG/DL
EGFR: 34 ML/MIN/1.73M2
EOSINOPHIL # BLD AUTO: 0.1 K/UL
EOSINOPHIL NFR BLD AUTO: 1.5 %
ESTIMATED AVERAGE GLUCOSE: 131 MG/DL
GLUCOSE SERPL-MCNC: 135 MG/DL
HBA1C MFR BLD HPLC: 6.2 %
HCT VFR BLD CALC: 48 %
HGB BLD-MCNC: 14.9 G/DL
IMM GRANULOCYTES NFR BLD AUTO: 0.3 %
LYMPHOCYTES # BLD AUTO: 1.71 K/UL
LYMPHOCYTES NFR BLD AUTO: 24.8 %
MAN DIFF?: NORMAL
MCHC RBC-ENTMCNC: 28.5 PG
MCHC RBC-ENTMCNC: 31 GM/DL
MCV RBC AUTO: 92 FL
MONOCYTES # BLD AUTO: 0.48 K/UL
MONOCYTES NFR BLD AUTO: 7 %
NEUTROPHILS # BLD AUTO: 4.51 K/UL
NEUTROPHILS NFR BLD AUTO: 65.4 %
NT-PROBNP SERPL-MCNC: 2348 PG/ML
PLATELET # BLD AUTO: 211 K/UL
POTASSIUM SERPL-SCNC: 3.1 MMOL/L
PROT SERPL-MCNC: 7.4 G/DL
RBC # BLD: 5.22 M/UL
RBC # FLD: 15.8 %
SARS-COV-2 N GENE NPH QL NAA+PROBE: NOT DETECTED
SODIUM SERPL-SCNC: 141 MMOL/L
WBC # FLD AUTO: 6.89 K/UL

## 2022-03-16 ENCOUNTER — TRANSCRIPTION ENCOUNTER (OUTPATIENT)
Age: 82
End: 2022-03-16

## 2022-03-16 NOTE — ASU PATIENT PROFILE, ADULT - FALL HARM RISK - HARM RISK INTERVENTIONS

## 2022-03-16 NOTE — ASU PATIENT PROFILE, ADULT - NSICDXPASTMEDICALHX_GEN_ALL_CORE_FT
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

## 2022-03-17 ENCOUNTER — APPOINTMENT (OUTPATIENT)
Dept: THORACIC SURGERY | Facility: HOSPITAL | Age: 82
End: 2022-03-17

## 2022-03-17 ENCOUNTER — INPATIENT (INPATIENT)
Facility: HOSPITAL | Age: 82
LOS: 0 days | Discharge: HOME HEALTH | End: 2022-03-18
Attending: THORACIC SURGERY (CARDIOTHORACIC VASCULAR SURGERY) | Admitting: THORACIC SURGERY (CARDIOTHORACIC VASCULAR SURGERY)
Payer: MEDICARE

## 2022-03-17 ENCOUNTER — RESULT REVIEW (OUTPATIENT)
Age: 82
End: 2022-03-17

## 2022-03-17 ENCOUNTER — TRANSCRIPTION ENCOUNTER (OUTPATIENT)
Age: 82
End: 2022-03-17

## 2022-03-17 VITALS
TEMPERATURE: 98 F | WEIGHT: 179.9 LBS | HEART RATE: 70 BPM | SYSTOLIC BLOOD PRESSURE: 138 MMHG | HEIGHT: 68 IN | RESPIRATION RATE: 16 BRPM | OXYGEN SATURATION: 98 % | DIASTOLIC BLOOD PRESSURE: 64 MMHG

## 2022-03-17 DIAGNOSIS — Z96.649 PRESENCE OF UNSPECIFIED ARTIFICIAL HIP JOINT: Chronic | ICD-10-CM

## 2022-03-17 DIAGNOSIS — J90 PLEURAL EFFUSION, NOT ELSEWHERE CLASSIFIED: ICD-10-CM

## 2022-03-17 DIAGNOSIS — Z98.49 CATARACT EXTRACTION STATUS, UNSPECIFIED EYE: Chronic | ICD-10-CM

## 2022-03-17 DIAGNOSIS — Z95.0 PRESENCE OF CARDIAC PACEMAKER: Chronic | ICD-10-CM

## 2022-03-17 DIAGNOSIS — Z98.61 CORONARY ANGIOPLASTY STATUS: Chronic | ICD-10-CM

## 2022-03-17 LAB — RH IG SCN BLD-IMP: NEGATIVE — SIGNIFICANT CHANGE UP

## 2022-03-17 PROCEDURE — 88112 CYTOPATH CELL ENHANCE TECH: CPT | Mod: 26

## 2022-03-17 PROCEDURE — 71045 X-RAY EXAM CHEST 1 VIEW: CPT | Mod: 26

## 2022-03-17 PROCEDURE — 32609 THORACOSCOPY W/BX PLEURA: CPT

## 2022-03-17 PROCEDURE — 88305 TISSUE EXAM BY PATHOLOGIST: CPT | Mod: 26,59

## 2022-03-17 PROCEDURE — 32550 INSERT PLEURAL CATH: CPT

## 2022-03-17 PROCEDURE — 88305 TISSUE EXAM BY PATHOLOGIST: CPT | Mod: 26

## 2022-03-17 DEVICE — PLEURX CATHETER KIT: Type: IMPLANTABLE DEVICE | Site: RIGHT | Status: FUNCTIONAL

## 2022-03-17 RX ORDER — ACETAMINOPHEN 500 MG
975 TABLET ORAL EVERY 6 HOURS
Refills: 0 | Status: DISCONTINUED | OUTPATIENT
Start: 2022-03-17 | End: 2022-03-18

## 2022-03-17 RX ORDER — HEPARIN SODIUM 5000 [USP'U]/ML
5000 INJECTION INTRAVENOUS; SUBCUTANEOUS EVERY 8 HOURS
Refills: 0 | Status: DISCONTINUED | OUTPATIENT
Start: 2022-03-17 | End: 2022-03-18

## 2022-03-17 RX ORDER — BUMETANIDE 0.25 MG/ML
3 INJECTION INTRAMUSCULAR; INTRAVENOUS DAILY
Refills: 0 | Status: DISCONTINUED | OUTPATIENT
Start: 2022-03-17 | End: 2022-03-18

## 2022-03-17 RX ORDER — ONDANSETRON 8 MG/1
4 TABLET, FILM COATED ORAL ONCE
Refills: 0 | Status: DISCONTINUED | OUTPATIENT
Start: 2022-03-17 | End: 2022-03-17

## 2022-03-17 RX ORDER — HEPARIN SODIUM 5000 [USP'U]/ML
5000 INJECTION INTRAVENOUS; SUBCUTANEOUS ONCE
Refills: 0 | Status: COMPLETED | OUTPATIENT
Start: 2022-03-17 | End: 2022-03-17

## 2022-03-17 RX ORDER — PSYLLIUM SEED (WITH DEXTROSE)
1 POWDER (GRAM) ORAL
Refills: 0 | Status: DISCONTINUED | OUTPATIENT
Start: 2022-03-17 | End: 2022-03-18

## 2022-03-17 RX ORDER — OXYCODONE HYDROCHLORIDE 5 MG/1
5 TABLET ORAL ONCE
Refills: 0 | Status: DISCONTINUED | OUTPATIENT
Start: 2022-03-17 | End: 2022-03-17

## 2022-03-17 RX ORDER — HYDROMORPHONE HYDROCHLORIDE 2 MG/ML
0.25 INJECTION INTRAMUSCULAR; INTRAVENOUS; SUBCUTANEOUS
Refills: 0 | Status: DISCONTINUED | OUTPATIENT
Start: 2022-03-17 | End: 2022-03-17

## 2022-03-17 RX ORDER — ACETAMINOPHEN 500 MG
975 TABLET ORAL ONCE
Refills: 0 | Status: COMPLETED | OUTPATIENT
Start: 2022-03-17 | End: 2022-03-17

## 2022-03-17 RX ORDER — POTASSIUM CHLORIDE 20 MEQ
1 PACKET (EA) ORAL
Qty: 0 | Refills: 0 | DISCHARGE

## 2022-03-17 RX ORDER — CARBIDOPA AND LEVODOPA 25; 100 MG/1; MG/1
1 TABLET ORAL DAILY
Refills: 0 | Status: DISCONTINUED | OUTPATIENT
Start: 2022-03-17 | End: 2022-03-17

## 2022-03-17 RX ORDER — CARBIDOPA AND LEVODOPA 25; 100 MG/1; MG/1
3 TABLET ORAL
Refills: 0 | Status: DISCONTINUED | OUTPATIENT
Start: 2022-03-17 | End: 2022-03-17

## 2022-03-17 RX ORDER — OXYBUTYNIN CHLORIDE 5 MG
10 TABLET ORAL
Refills: 0 | Status: DISCONTINUED | OUTPATIENT
Start: 2022-03-17 | End: 2022-03-18

## 2022-03-17 RX ORDER — QUETIAPINE FUMARATE 200 MG/1
25 TABLET, FILM COATED ORAL AT BEDTIME
Refills: 0 | Status: DISCONTINUED | OUTPATIENT
Start: 2022-03-17 | End: 2022-03-18

## 2022-03-17 RX ORDER — CARBIDOPA AND LEVODOPA 25; 100 MG/1; MG/1
2 TABLET ORAL
Refills: 0 | Status: DISCONTINUED | OUTPATIENT
Start: 2022-03-17 | End: 2022-03-18

## 2022-03-17 RX ADMIN — Medication 10 MILLIGRAM(S): at 21:08

## 2022-03-17 RX ADMIN — CARBIDOPA AND LEVODOPA 2 CAPSULE(S): 25; 100 TABLET ORAL at 20:05

## 2022-03-17 RX ADMIN — HEPARIN SODIUM 5000 UNIT(S): 5000 INJECTION INTRAVENOUS; SUBCUTANEOUS at 10:05

## 2022-03-17 RX ADMIN — HEPARIN SODIUM 5000 UNIT(S): 5000 INJECTION INTRAVENOUS; SUBCUTANEOUS at 21:07

## 2022-03-17 RX ADMIN — QUETIAPINE FUMARATE 25 MILLIGRAM(S): 200 TABLET, FILM COATED ORAL at 21:08

## 2022-03-17 RX ADMIN — Medication 975 MILLIGRAM(S): at 10:05

## 2022-03-17 RX ADMIN — Medication 1 PACKET(S): at 23:22

## 2022-03-17 RX ADMIN — Medication 975 MILLIGRAM(S): at 23:22

## 2022-03-17 NOTE — BRIEF OPERATIVE NOTE - OPERATION/FINDINGS
With light sedation, RVATS with drainage of pleural effusion, pleural biopsy, and placement of tunneled Pleural catheter  800 cc serous fluid drained

## 2022-03-17 NOTE — PATIENT PROFILE ADULT - FALL HARM RISK - HARM RISK INTERVENTIONS
Assistance with ambulation/Assistance OOB with selected safe patient handling equipment/Communicate Risk of Fall with Harm to all staff/Discuss with provider need for PT consult/Monitor gait and stability/Reinforce activity limits and safety measures with patient and family/Tailored Fall Risk Interventions/Visual Cue: Yellow wristband and red socks/Bed in lowest position, wheels locked, appropriate side rails in place/Call bell, personal items and telephone in reach/Instruct patient to call for assistance before getting out of bed or chair/Non-slip footwear when patient is out of bed/[place] to call system/Physically safe environment - no spills, clutter or unnecessary equipment/Purposeful Proactive Rounding/Room/bathroom lighting operational, light cord in reach

## 2022-03-17 NOTE — BRIEF OPERATIVE NOTE - NSICDXBRIEFPOSTOP_GEN_ALL_CORE_FT
POST-OP DIAGNOSIS:  Pleural effusion, not elsewhere classified 17-Mar-2022 13:57:01  Amadou Talley

## 2022-03-17 NOTE — PROGRESS NOTE ADULT - SUBJECTIVE AND OBJECTIVE BOX
Pt is without complaints except for a slight pain at his new PleurX site upon deep inspiration.    VSS    General: awake and alert  Chest: R PleurX to Pleurevac on WS, no AL, serosanguinous drainage    CXR: No PTX    A: s/p R VATS, pleural bx, drainage of effusion, PleurX    P: Cap PleurX and Discharge home with VNS 3/18

## 2022-03-18 ENCOUNTER — TRANSCRIPTION ENCOUNTER (OUTPATIENT)
Age: 82
End: 2022-03-18

## 2022-03-18 VITALS
RESPIRATION RATE: 16 BRPM | DIASTOLIC BLOOD PRESSURE: 63 MMHG | TEMPERATURE: 98 F | OXYGEN SATURATION: 97 % | HEART RATE: 69 BPM | SYSTOLIC BLOOD PRESSURE: 125 MMHG

## 2022-03-18 PROCEDURE — 71045 X-RAY EXAM CHEST 1 VIEW: CPT | Mod: 26

## 2022-03-18 PROCEDURE — 99238 HOSP IP/OBS DSCHRG MGMT 30/<: CPT

## 2022-03-18 RX ORDER — APIXABAN 2.5 MG/1
1 TABLET, FILM COATED ORAL
Qty: 0 | Refills: 0 | DISCHARGE

## 2022-03-18 RX ORDER — CLOPIDOGREL BISULFATE 75 MG/1
1 TABLET, FILM COATED ORAL
Qty: 0 | Refills: 0 | DISCHARGE

## 2022-03-18 RX ORDER — TRAMADOL HYDROCHLORIDE 50 MG/1
1 TABLET ORAL
Qty: 12 | Refills: 0
Start: 2022-03-18 | End: 2022-03-20

## 2022-03-18 RX ADMIN — CARBIDOPA AND LEVODOPA 2 CAPSULE(S): 25; 100 TABLET ORAL at 09:46

## 2022-03-18 RX ADMIN — Medication 10 MILLIGRAM(S): at 09:46

## 2022-03-18 NOTE — DISCHARGE NOTE PROVIDER - NSDCFUADDINST_GEN_ALL_CORE_FT
The visiting nurse should come about three times a week to drain your chest via the catheter.  If you develop increasing redness, pus, fevers, or shortness of breath, call Dr Oneal. The visiting nurse should come about three times a week to drain your chest via the catheter.  If you develop increasing redness, pus, fevers, or shortness of breath, call Dr Oneal.  Walk frequently. You may climb stairs. Continue to use incentive spirometer. You may shower but Pleurx site and dressing cannot get wet. Please cover  or sponge bathe instead.

## 2022-03-18 NOTE — DISCHARGE NOTE PROVIDER - CARE PROVIDER_API CALL
Rufus Oneal)  Surgery; Thoracic Surgery  [phone] [place] [address], [place] [address]  Phone: [phone]  Fax: [phone]  Established Patient  Follow Up Time: 2 weeks

## 2022-03-18 NOTE — DISCHARGE NOTE PROVIDER - NSDCACTIVITY_GEN_ALL_CORE
Do not drive or operate machinery/Showering allowed/Do not make important decisions/Stairs allowed/Driving allowed/Walking - Indoors allowed/No heavy lifting/straining/Walking - Outdoors allowed Do not drive or operate machinery/Showering allowed/Do not make important decisions/Stairs allowed/Walking - Indoors allowed/No heavy lifting/straining/Walking - Outdoors allowed

## 2022-03-18 NOTE — DISCHARGE NOTE NURSING/CASE MANAGEMENT/SOCIAL WORK - NSDCFUADDAPPT_GEN_ALL_CORE_FT
See Dr Oneal in about 2 weeks.  Call for an appointment [phone] and bring a new chest X-ray with you.    Follow up with your PCP/Oncologist in 2 weeks.

## 2022-03-18 NOTE — DISCHARGE NOTE PROVIDER - NSDCFUADDAPPT_GEN_ALL_CORE_FT
Additional Note: Reassured benign. See Dr Oneal in about 2 weeks.  Call for an appointment and bring a new chest X-ray with you.   Hide Include Location In Plan Question?: No Detail Level: Generalized Detail Level: Detailed See Dr Oneal in about 2 weeks.  Call for an appointment [phone] and bring a new chest X-ray with you.    Follow up with your PCP/Oncologist in 2 weeks.

## 2022-03-18 NOTE — DISCHARGE NOTE NURSING/CASE MANAGEMENT/SOCIAL WORK - PATIENT PORTAL LINK FT
You can access the FollowMyHealth Patient Portal offered by [facility] by registering at the following website: [email or web address]. By joining [facility]’s FollowMyHealth portal, you will also be able to view your health information using other applications (apps) compatible with our system.

## 2022-03-18 NOTE — DISCHARGE NOTE NURSING/CASE MANAGEMENT/SOCIAL WORK - NSDCPEFALRISK_GEN_ALL_CORE
For information on Fall & Injury Prevention, visit: [email or web address] OR  [email or web address] OR  https://www.cdc.gov/steadi/patient.html

## 2022-03-18 NOTE — DISCHARGE NOTE PROVIDER - HOSPITAL COURSE
80 y/o male with h/o PPM for bradycardia and a coronary stent for CAD underwent a right VATS, pleural biopsy, drainage of right pleural effusion, right Pleurx catheter placement. on 3/17/22.   He had found the effusion via an imaging study for cardiac related issues and had only c/o mild GARCIA.  Post-op the PleurX was capped and he as discharged home for follow up. 80 y/o male with h/o PPM for bradycardia and a coronary stent for CAD underwent a right VATS, pleural biopsy, drainage of right pleural effusion, right Pleurx catheter placement. on 3/17/22.   He had found the effusion via an imaging study for cardiac related issues and had only c/o mild GARCIA.  Post-op pt did well. Today pt feels good. Less SOB. OOB w minimal assist. CXR stable. Rt. Pleurx site c/d/i  Rt. Pleurx capped at bedside this am. All VNS arranged and approved. Pt cleared for d/c to home by Cisco Oneal.

## 2022-03-18 NOTE — DISCHARGE NOTE NURSING/CASE MANAGEMENT/SOCIAL WORK - NSSCNAMETXT_GEN_ALL_CORE
[facility] [phone] initial visit will be day after discharge home. A nurse will call prior to the home visit.

## 2022-03-18 NOTE — DISCHARGE NOTE PROVIDER - NSDCCPCAREPLAN_GEN_ALL_CORE_FT
PRINCIPAL DISCHARGE DIAGNOSIS  Diagnosis: Pleural effusion, not elsewhere classified  Assessment and Plan of Treatment:

## 2022-03-18 NOTE — DISCHARGE NOTE PROVIDER - NSDCMRMEDTOKEN_GEN_ALL_CORE_FT
bumetanide 2 mg oral tablet: 1 tab(s) orally once a day  clopidogrel 75 mg oral tablet: 1 tab(s) orally once a day  desvenlafaxine: 75 milligram(s) orally once a day  Eliquis 2.5 mg oral tablet: 1 tab(s) orally 2 times a day  Metamucil: 1 cap(s) orally 5 times a day  One A Day Men&#x27;s Complete oral tablet: 1 tab(s) orally once a day  QUEtiapine 25 mg oral tablet: 1 tab(s) orally once a day (at bedtime)  rosuvastatin 40 mg oral tablet: 1 tab(s) orally once a day (at bedtime)  Rytary 36.25 mg-145 mg oral capsule, extended release: 2 cap(s) orally 3 times a day  Rytary 36.25 mg-145 mg oral capsule, extended release: 1 cap(s) orally once a day (at bedtime)  solifenacin 10 mg oral tablet: 1 tab(s) orally once a day  tadalafil 5 mg oral tablet: 1 tab(s) orally once a day  Vitamin B12: 1 tab(s) orally once a day  Vitamin D3: 1 tab(s) orally once a day   bumetanide 2 mg oral tablet: 1 tab(s) orally once a day  clopidogrel 75 mg oral tablet: 1 tab(s) orally once a day. Resume taking tomorrow, 3/19.  desvenlafaxine: 75 milligram(s) orally once a day  Eliquis 2.5 mg oral tablet: 1 tab(s) orally 2 times a day. Resume taking tomorrow, 3/19.   Metamucil: 1 cap(s) orally 5 times a day  One A Day Men&#x27;s Complete oral tablet: 1 tab(s) orally once a day  QUEtiapine 25 mg oral tablet: 1 tab(s) orally once a day (at bedtime)  rosuvastatin 40 mg oral tablet: 1 tab(s) orally once a day (at bedtime)  Rytary 36.25 mg-145 mg oral capsule, extended release: 2 cap(s) orally 3 times a day  Rytary 36.25 mg-145 mg oral capsule, extended release: 1 cap(s) orally once a day (at bedtime)  solifenacin 10 mg oral tablet: 1 tab(s) orally once a day  tadalafil 5 mg oral tablet: 1 tab(s) orally once a day  traMADol 50 mg oral tablet: 1 tab(s) orally every 6 hours, As Needed for severe pain MDD:4   Tylenol 325 mg oral tablet: 2 tab(s) orally every 6 hours, As Needed  Vitamin B12: 1 tab(s) orally once a day  Vitamin D3: 1 tab(s) orally once a day

## 2022-03-18 NOTE — DISCHARGE NOTE PROVIDER - NSDCFUSCHEDAPPT_GEN_ALL_CORE_FT
[facility] ; 03/24/2022 ; NPP Cardio [address] [facility] ; 03/25/2022 ; NPP PulmMed [address] [facility] ; 03/25/2022 ; NPP PulmMed [address] [facility] ; 04/11/2022 ; NPP PulmMed [address] [facility] ; 04/15/2022 ; NPP Neuro [address] [facility] ; 05/13/2022 ; NPP Urology [address]

## 2022-03-22 LAB — SURGICAL PATHOLOGY STUDY: SIGNIFICANT CHANGE UP

## 2022-03-23 LAB — NON-GYNECOLOGICAL CYTOLOGY STUDY: SIGNIFICANT CHANGE UP

## 2022-03-24 ENCOUNTER — APPOINTMENT (OUTPATIENT)
Dept: CARDIOLOGY | Facility: CLINIC | Age: 82
End: 2022-03-24
Payer: MEDICARE

## 2022-03-24 VITALS
SYSTOLIC BLOOD PRESSURE: 130 MMHG | OXYGEN SATURATION: 95 % | HEART RATE: 70 BPM | WEIGHT: 179 LBS | DIASTOLIC BLOOD PRESSURE: 64 MMHG | HEIGHT: 68 IN | BODY MASS INDEX: 27.13 KG/M2

## 2022-03-24 PROCEDURE — 99215 OFFICE O/P EST HI 40 MIN: CPT

## 2022-03-25 ENCOUNTER — APPOINTMENT (OUTPATIENT)
Dept: PULMONOLOGY | Facility: CLINIC | Age: 82
End: 2022-03-25
Payer: MEDICARE

## 2022-03-25 VITALS
TEMPERATURE: 97.3 F | SYSTOLIC BLOOD PRESSURE: 123 MMHG | OXYGEN SATURATION: 97 % | HEART RATE: 71 BPM | HEIGHT: 68 IN | RESPIRATION RATE: 16 BRPM | WEIGHT: 161 LBS | BODY MASS INDEX: 24.4 KG/M2 | DIASTOLIC BLOOD PRESSURE: 60 MMHG

## 2022-03-25 PROCEDURE — 94729 DIFFUSING CAPACITY: CPT

## 2022-03-25 PROCEDURE — 95012 NITRIC OXIDE EXP GAS DETER: CPT

## 2022-03-25 PROCEDURE — 94010 BREATHING CAPACITY TEST: CPT

## 2022-03-25 PROCEDURE — ZZZZZ: CPT

## 2022-03-25 PROCEDURE — 94799 UNLISTED PULMONARY SVC/PX: CPT

## 2022-03-25 PROCEDURE — 99214 OFFICE O/P EST MOD 30 MIN: CPT | Mod: 25

## 2022-03-25 PROCEDURE — 94727 GAS DIL/WSHOT DETER LNG VOL: CPT

## 2022-03-25 NOTE — REASON FOR VISIT
[Follow-Up] : a follow-up visit [Spouse] : spouse [TextBox_44] : Abnormal CT chest c/w with b/l pleural effusions, SOB, ?chronic "CHF", drug effect, amyloid, LIOR

## 2022-03-25 NOTE — ASSESSMENT
[FreeTextEntry1] : Mr. RIVAS is a 81 year male with a history of Parkinson's (Dx 2016), Depression, BPH, CAD, stent placement, HLD who now comes in for a follow-up pulmonary evaluation for Abnormal CT chest c/w with b/l pleural effusions, SOB, ?chronic "CHF", drug effect, amyloid, LIOR - improved s/p right VATS PleurX cath placement\par \par ******************************************************Pre-op Clearance*************************************************************** \par \par The patient's SOB is felt to be multifactorial:\par -poor mechanics of breathing (poor balance) \par -out of shape/overweight\par -Pulmonary\par     -Bilateral pleural effusion - c/w CHF\par -Cardiac (Billy Keli) \par    -CAD\par    -?Amyloid\par \par Abnormal CT c/w B/l, right significantly larger than left  Pleural Effusion DDX:\par -Cardiac #1\par -Drug\par -PE\par -CA such as Lymphoma \par \par Problem 1: Pleural Effusion (right significantly larger than left) s/p PleurX catheter\par -s/p various chemistries, cultures \par -Recommended follow up Evaluation with Dr. Rufus Oneal - drainage 3x/week \par \par \par Problem 2: Abnormal PFTs c/w restrictive dysfunction ;likely due to pleural effusion \par -s/p D-Dimer WNL\par -s/p Ffull PFTs with KULWINDER improved\par \par Problem 3: r/o LIOR (RF: Elevated Mallampati class, neck size 17, Parkinson's)\par -complete home sleep study\par -Sleep apnea is associated with adverse clinical consequences which can affect most organ systems. Cardiovascular disease risk includes arrhythmias, atrial fibrillation, hypertension, coronary artery disease, and stroke. Metabolic disorders include diabetes type 2, non-alcoholic fatty liver disease. Mood disorder especially depression; and cognitive decline especially in the elderly. Associations with chronic reflux/Delacruz’s esophagus some but not all inclusive. \par -Reasons include arousal consistent with hypopnea; respiratory events most prominent in REM sleep or supine position; therefore sleep staging and body position are important for accurate diagnosis and estimation of AHI. \par \par Problem 4:Cardiac\par - s/p blood work: BNP 2348 high\par -Recommend cardiac follow up evaluation with cardiologist if needed (Billy Dickey and Dr. Mayo) \par \par Problem 5:overweight/out of shape\par - Weight loss, exercise and diet control were discussed and are highly encouraged. Treatment options were given such as aqua therapy, and contacting a nutritionist. Recommended to use the elliptical, stationary bike, less use of treadmill. Mindful eating was explained to the patient. Obesity is associated with worsening asthma, SOB, and potential for cardiac disease, diabetes, and other underlying medical conditions.\par \par Problem 6: Poor mechanics of breathing (Poor Balance) \par -Recommended Wim Hof and Buteyko breathing techniques \par - Proper breathing techniques were reviewed with an emphasis on exhalation. Patient instructed to breath in for 1 second and out for four seconds. Patient was encouraged not to talk while walking.\par \par Problem 7: Pulmonary Pre-Op Clearance for TAVR\par -at this point in time there are no absolute pulmonary contraindications to go forward with the planned procedure \par -at the time of surgery s/he should have optimal pain control, incentive spirometry, early ambulation, DVT and GI prophylaxis. \par \par Problem 8: Health Maintenance\par -s/p flu shot\par -recommended strep pneumonia vaccines: Prevnar-13 vaccine, follow by Pneumo vaccine 23 one year following\par -recommended early intervention for URIs\par -recommended regular osteoporosis evaluations\par -recommended early dermatological evaluations\par -recommended after the age of 50 to the age of 70, colonoscopy every 5 years\par \par f/u in 6-8 weeks\par pt is encouraged to call or fax the office with any questions or concerns.\par

## 2022-03-25 NOTE — ADDENDUM
[FreeTextEntry1] : Documented by Reuben Coates acting as a scribe for Dr. Pavan Jamil on 03/25/2022\par \par All medical record entries made by the scribe were at my, Dr. Pavan Jamil's, direction and personally dictated by me on 03/25/2022. I have reviewed the chart and agree that the record accurately reflects my personal performance of the history, physical exam, assessment, and plan. I have also personally directed, reviewed, and agree with the discharge instructions. \par

## 2022-03-25 NOTE — HISTORY OF PRESENT ILLNESS
[TextBox_4] : Mr. RIVAS is a 81 year male with a history of Parkinson's (Dx 2016), Depression, BPH, CAD, stent placement, HLD who now comes in for a follow-up pulmonary evaluation for Abnormal CT with pleural effusions, SOB. His chief complaint is\par -he notes he is not feeling bad\par -he notes constipation since getting Parkinsons\par -he notes getting 7 hours of sleep\par -he notes walking for exercise\par -he notes that he is having problems with his vision and needs a special lens (prism) for his glasses\par -he notes recently seeing Dr. Oneal\par -he notes having a PleurX catheter \par \par \par -patient denies any headaches, nausea, vomiting, fever, chills, sweats, chest pain, chest pressure, palpitations, coughing, wheezing, fatigue, diarrhea, constipation, dysphagia, myalgias, dizziness, leg swelling, leg pain, itchy eyes, itchy ears, heartburn, reflux or sour taste in the mouth

## 2022-03-25 NOTE — PROCEDURE
[FreeTextEntry1] : FENO was 31; a normal value being less than 25. Fractional exhaled nitric oxide (FENO) is regarded as a simple, noninvasive method for assessing eosinophilic airway inflammation. Produced by a variety of cells within the lung, nitric oxide (NO) concentrations are generally low in healthy individuals. However, high concentrations of NO appear to be involved in nonspecific host defense mechanisms and chronic inflammatory  diseases such as asthma. The American Thoracic Society (ATS) therefore recommended using FENO to aid in the diagnosis and monitoring of eosinophilic airway inflammation and asthma, and for identifying steroid responsive individuals whose chronic respiratory symptoms may be caused by airway inflammation \par \par Full PFT revealed normal flows, with a FEV1 of 2.90L, which is 108% of predicted, normal lung volumes, and a diffusion of 19.6, which is 107% of predicted, with a normal flow volume loop

## 2022-04-01 ENCOUNTER — RX RENEWAL (OUTPATIENT)
Age: 82
End: 2022-04-01

## 2022-04-01 ENCOUNTER — EMERGENCY (EMERGENCY)
Facility: HOSPITAL | Age: 82
LOS: 1 days | Discharge: HOME | End: 2022-04-01
Attending: EMERGENCY MEDICINE | Admitting: EMERGENCY MEDICINE
Payer: MEDICARE

## 2022-04-01 VITALS
TEMPERATURE: 98 F | RESPIRATION RATE: 16 BRPM | SYSTOLIC BLOOD PRESSURE: 128 MMHG | HEIGHT: 68 IN | HEART RATE: 70 BPM | DIASTOLIC BLOOD PRESSURE: 59 MMHG | OXYGEN SATURATION: 100 %

## 2022-04-01 DIAGNOSIS — Z98.61 CORONARY ANGIOPLASTY STATUS: Chronic | ICD-10-CM

## 2022-04-01 DIAGNOSIS — Z96.649 PRESENCE OF UNSPECIFIED ARTIFICIAL HIP JOINT: Chronic | ICD-10-CM

## 2022-04-01 DIAGNOSIS — Z95.0 PRESENCE OF CARDIAC PACEMAKER: Chronic | ICD-10-CM

## 2022-04-01 DIAGNOSIS — Z98.49 CATARACT EXTRACTION STATUS, UNSPECIFIED EYE: Chronic | ICD-10-CM

## 2022-04-01 PROCEDURE — 99283 EMERGENCY DEPT VISIT LOW MDM: CPT | Mod: GC

## 2022-04-01 NOTE — ED ADULT TRIAGE NOTE - CHIEF COMPLAINT QUOTE
C/o constipation 6x days and rectal pain. LBM Sunday. Denies abd pain, nausea or vomiting. PMH constipation, parkinson's, afib, pacemaker, HTN, HLD

## 2022-04-02 LAB — SARS-COV-2 RNA SPEC QL NAA+PROBE: SIGNIFICANT CHANGE UP

## 2022-04-02 PROCEDURE — 74018 RADEX ABDOMEN 1 VIEW: CPT | Mod: 26

## 2022-04-02 RX ORDER — POLYETHYLENE GLYCOL 3350 17 G/17G
17 POWDER, FOR SOLUTION ORAL ONCE
Refills: 0 | Status: COMPLETED | OUTPATIENT
Start: 2022-04-02 | End: 2022-04-02

## 2022-04-02 RX ORDER — MINERAL OIL
133 OIL (ML) MISCELLANEOUS ONCE
Refills: 0 | Status: COMPLETED | OUTPATIENT
Start: 2022-04-02 | End: 2022-04-02

## 2022-04-02 RX ORDER — POLYETHYLENE GLYCOL 3350 17 G/17G
17 POWDER, FOR SOLUTION ORAL
Qty: 255 | Refills: 0
Start: 2022-04-02 | End: 2022-04-16

## 2022-04-02 RX ORDER — MULTIVIT WITH MIN/MFOLATE/K2 340-15/3 G
1 POWDER (GRAM) ORAL ONCE
Refills: 0 | Status: COMPLETED | OUTPATIENT
Start: 2022-04-02 | End: 2022-04-02

## 2022-04-02 RX ORDER — SENNA PLUS 8.6 MG/1
1 TABLET ORAL
Qty: 30 | Refills: 0
Start: 2022-04-02 | End: 2022-04-16

## 2022-04-02 RX ORDER — SENNA PLUS 8.6 MG/1
1 TABLET ORAL ONCE
Refills: 0 | Status: COMPLETED | OUTPATIENT
Start: 2022-04-02 | End: 2022-04-02

## 2022-04-02 RX ADMIN — Medication 1 BOTTLE: at 02:55

## 2022-04-02 RX ADMIN — Medication 133 MILLILITER(S): at 02:08

## 2022-04-02 RX ADMIN — POLYETHYLENE GLYCOL 3350 17 GRAM(S): 17 POWDER, FOR SOLUTION ORAL at 01:33

## 2022-04-02 RX ADMIN — SENNA PLUS 1 TABLET(S): 8.6 TABLET ORAL at 01:39

## 2022-04-02 NOTE — ED ADULT NURSE NOTE - OBJECTIVE STATEMENT
Pt received to room 2 a/o x 3 c/o constipation since sunday.  Pt states he had rectal pain that started today. Pt tried an enema at home with no relief. Pt has had hx of this in the past but hasn't been this bad. Respirations even and unlabored. Lung sounds clear with equal chest rise bilaterally. No complaints of chest pain, headache, nausea, dizziness, vomiting  SOB, fever, chills verbalized.

## 2022-04-02 NOTE — ED PROVIDER NOTE - OBJECTIVE STATEMENT
82 y/o male with h/o PPM for bradycardia and a coronary stent for CAD underwent a right VATS, parkinson's, pleural biopsy, drainage of right pleural effusion, right Pleurx catheter placement presents with x6 days of constipation and today had some rectal pain. Has had constipation in the past, but not to this extent. Tried to do enema today but without success. denies nausea/vomiting, abd pain, fevers/chills, denies hx abd surgeries. Has had a hernia in the past. denies hx malignancy weight loss night sweats, not currently on bowel regimen.    meds: bumex, plavix, venlafaxine, eliquis, seroquel, statin, rytary, tadalafil

## 2022-04-02 NOTE — ED PROVIDER NOTE - PHYSICAL EXAMINATION
[Const] well-appearing, resting comfortably, no acute distress  [HEENT] PERRL, EOMI, moist mucus membranes  [Neck] Supple, trachea midline  [CV] +S1/S2, no m/r/g appreciated  [Lungs] Clear to auscultations bilaterally, no adventitious lung sounds  [Abd] soft, non-tender, nondistended in all 4 quadrants, small umbilical hernia noted  [MSK] 5/5 upper extremity and lower extremity str bilaterally  [Skin] warm, dry, well-perfused  [Neuro] A&Ox3  [Rectal] stool burden in rectal vault, removed moderate amount, no bleeding hemorrhoids noted

## 2022-04-02 NOTE — ED PROVIDER NOTE - NSFOLLOWUPINSTRUCTIONS_ED_ALL_ED_FT
You were seen in the Emergency Department for constipation.   Today you had an x-ray  A medication senna and miralax was prescribed and sent to your pharmacy, please take it as directed.   Please follow-up with your primary care doctor within the next few days    1) Advance activity as tolerated.    2) Continue all previously prescribed medications as directed.    3) Follow up with your primary care physician in 24-48 hours - take copies of your results.    4) Return to the Emergency Department for worsening or persistent symptoms, and/or ANY NEW OR CONCERNING SYMPTOMS such as nausea/vomiting, abdominal pain, fevers/chills.

## 2022-04-02 NOTE — ED PROVIDER NOTE - PATIENT PORTAL LINK FT
You can access the FollowMyHealth Patient Portal offered by [facility] by registering at the following website: [email or web address]. By joining [facility]’s FollowMyHealth portal, you will also be able to view your health information using other applications (apps) compatible with our system.

## 2022-04-02 NOTE — ED PROVIDER NOTE - ATTENDING CONTRIBUTION TO CARE
I performed a face-to-face evaluation of the patient and performed a history and physical examination along with the resident or ACP, and/or medical student above.  I agree with the history and physical examination as documented by the resident or ACP, and/or medical student above.  Messer:   80yo M w/ pmh as above, no abd surgeries, p/w constipation x 6 days, disimpacted here in ED by resident, but still significant stool burden. XR, meds, reassess.

## 2022-04-02 NOTE — ED PROVIDER NOTE - PROGRESS NOTE DETAILS
Fernandez, PGY-3: pt disimpacted, feeling improved however did not have full bowel movement s/p senna/miralax/enema. Mag citrate bottle given to-go, no abd pain, vss, no concern for intra-abdominal pathology at this time, XR nonobstructive bowel gas, f/u outpatient. senna /miralax prescribed.

## 2022-04-02 NOTE — ED PROVIDER NOTE - CLINICAL SUMMARY MEDICAL DECISION MAKING FREE TEXT BOX
82 y/o male with h/o PPM for bradycardia and a coronary stent for CAD underwent a right VATS, pleural biopsy, drainage of right pleural effusion, parkinson's presents with constipation x6 days likely 2/2 parkinson's and medication induced (on venlafaxine). pex benign, vss, no abd pain distension or tenderness. disimpaction done, will give bowel regimen, enema, if cannot have bowel movement consider CDU. 80 y/o male with h/o PPM for bradycardia and a coronary stent for CAD underwent a right VATS, pleural biopsy, drainage of right pleural effusion, parkinson's presents with constipation x6 days likely 2/2 parkinson's and medication induced (on venlafaxine). pex benign, vss, no abd pain distension or tenderness. disimpaction done, will give bowel regimen, enema, if cannot have bowel movement consider CDU. XR to r/o obvious obstruction ileus or sigmoid volvulus 82 y/o male with h/o PPM for bradycardia and a coronary stent for CAD underwent a right VATS, pleural biopsy, drainage of right pleural effusion, parkinson's presents with constipation x6 days likely 2/2 parkinson's and medication induced (on venlafaxine). pex benign, vss, no abd pain distension or tenderness. disimpaction done, will give bowel regimen, enema, if cannot have bowel movement consider CDU. XR to r/o obvious obstruction ileus or sigmoid volvulus or gal's.

## 2022-04-04 ENCOUNTER — APPOINTMENT (OUTPATIENT)
Dept: THORACIC SURGERY | Facility: CLINIC | Age: 82
End: 2022-04-04
Payer: MEDICARE

## 2022-04-04 ENCOUNTER — OUTPATIENT (OUTPATIENT)
Dept: OUTPATIENT SERVICES | Facility: HOSPITAL | Age: 82
LOS: 1 days | End: 2022-04-04
Payer: MEDICARE

## 2022-04-04 ENCOUNTER — RESULT REVIEW (OUTPATIENT)
Age: 82
End: 2022-04-04

## 2022-04-04 ENCOUNTER — APPOINTMENT (OUTPATIENT)
Dept: RADIOLOGY | Facility: HOSPITAL | Age: 82
End: 2022-04-04

## 2022-04-04 VITALS
HEIGHT: 68 IN | HEART RATE: 70 BPM | SYSTOLIC BLOOD PRESSURE: 122 MMHG | RESPIRATION RATE: 17 BRPM | DIASTOLIC BLOOD PRESSURE: 72 MMHG | BODY MASS INDEX: 25.01 KG/M2 | OXYGEN SATURATION: 97 % | WEIGHT: 165 LBS

## 2022-04-04 DIAGNOSIS — Z98.61 CORONARY ANGIOPLASTY STATUS: Chronic | ICD-10-CM

## 2022-04-04 DIAGNOSIS — Z98.49 CATARACT EXTRACTION STATUS, UNSPECIFIED EYE: Chronic | ICD-10-CM

## 2022-04-04 DIAGNOSIS — Z96.649 PRESENCE OF UNSPECIFIED ARTIFICIAL HIP JOINT: Chronic | ICD-10-CM

## 2022-04-04 DIAGNOSIS — J90 PLEURAL EFFUSION, NOT ELSEWHERE CLASSIFIED: ICD-10-CM

## 2022-04-04 DIAGNOSIS — Z95.0 PRESENCE OF CARDIAC PACEMAKER: Chronic | ICD-10-CM

## 2022-04-04 PROCEDURE — 99214 OFFICE O/P EST MOD 30 MIN: CPT

## 2022-04-04 PROCEDURE — 71046 X-RAY EXAM CHEST 2 VIEWS: CPT | Mod: 26

## 2022-04-04 NOTE — PHYSICAL EXAM
[Neck Appearance] : the appearance of the neck was normal [Neck Cervical Mass (___cm)] : no neck mass was observed [Jugular Venous Distention Increased] : there was no jugular-venous distention [Thyroid Diffuse Enlargement] : the thyroid was not enlarged [Thyroid Nodule] : there were no palpable thyroid nodules [] : no respiratory distress [Auscultation Breath Sounds / Voice Sounds] : lungs were clear to auscultation bilaterally [Heart Rate And Rhythm] : heart rate was normal and rhythm regular [Heart Sounds] : normal S1 and S2 [Heart Sounds Gallop] : no gallops [Murmurs] : no murmurs [Heart Sounds Pericardial Friction Rub] : no pericardial rub [Examination Of The Chest] : the chest was normal in appearance [Chest Visual Inspection Thoracic Asymmetry] : no chest asymmetry [Diminished Respiratory Excursion] : normal chest expansion

## 2022-04-08 NOTE — DATA REVIEWED
[FreeTextEntry1] : Echo: 3/8/22\par EF 45%, moderate aortic insufficiency, mild/moderate mitral regurgitation

## 2022-04-08 NOTE — CONSULT LETTER
[Dear  ___] : Dear  [unfilled], [Consult Letter:] : I had the pleasure of evaluating your patient, [unfilled]. [( Thank you for referring [unfilled] for consultation for _____ )] : Thank you for referring [unfilled] for consultation for [unfilled] [Please see my note below.] : Please see my note below. [Consult Closing:] : Thank you very much for allowing me to participate in the care of this patient.  If you have any questions, please do not hesitate to contact me. [Sincerely,] : Sincerely, [FreeTextEntry2] : Dr. Pavan Jamil (Pulm/Ref)\par Santhosh Avila (PCP) [FreeTextEntry3] : Rufus Oneal MD \par Attending Surgeon \par Division of Thoracic Surgery \par , Cardiovascular and Thoracic Surgery \par [facility] [employer] [facility]\par \par

## 2022-04-08 NOTE — HISTORY OF PRESENT ILLNESS
[FreeTextEntry1] : Mr. Jeffrey is a 81 year old male referred by Dr. Clinton Dickey for evaluation of aortic insufficiency. He has had recurrent pleural effusions and underwent Right VATS and Pleurx placement with Dr. Oneal on 3/17/22. Path of right pleura bx revealed fragments of pleura with chronic inflammation and reactive changes. Fragments of skeletal muscle. Right pleural fluid negative for malignant cells. Catheter is currently being drained three times weekly for roughly 300cc. He was noted to have severe aortic insufficiency on TTE in March and is here for discussion of possible treatment options. \par \par He reports\par \par His past medical history also includes CAD s/p PCI on 8/31/21, Parkinsons Disease, bradycardia requiring PPM placement on 10/6/21, and BPH.

## 2022-04-08 NOTE — HISTORY OF PRESENT ILLNESS
[FreeTextEntry1] : Mr. 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 \par \par Patient is s/p Right VATS, pleural bx and placement of Pleurx catheter on 03/17/2022. Path of right pleura bx revealed fragments of pleura with chronic inflammation and reactive changes. Fragments of skeletal muscle. Right pleural fluid negative for malignant cells. \par \par Patient is currently drained three times a week with ~ 320 ml fluid was drained each time. \par \par CXR today: reviewed. \par \par Patient is here today for a follow up. Patient denies shortness of breath, cough, chest pain, fever, chills.

## 2022-04-11 ENCOUNTER — APPOINTMENT (OUTPATIENT)
Dept: UROLOGY | Facility: CLINIC | Age: 82
End: 2022-04-11
Payer: MEDICARE

## 2022-04-11 ENCOUNTER — APPOINTMENT (OUTPATIENT)
Dept: PULMONOLOGY | Facility: CLINIC | Age: 82
End: 2022-04-11
Payer: MEDICARE

## 2022-04-11 ENCOUNTER — OUTPATIENT (OUTPATIENT)
Dept: OUTPATIENT SERVICES | Facility: HOSPITAL | Age: 82
LOS: 1 days | End: 2022-04-11
Payer: MEDICARE

## 2022-04-11 VITALS
SYSTOLIC BLOOD PRESSURE: 130 MMHG | RESPIRATION RATE: 16 BRPM | BODY MASS INDEX: 24.4 KG/M2 | WEIGHT: 161 LBS | HEART RATE: 71 BPM | DIASTOLIC BLOOD PRESSURE: 60 MMHG | OXYGEN SATURATION: 97 % | HEIGHT: 68 IN | TEMPERATURE: 96.6 F

## 2022-04-11 DIAGNOSIS — Z96.649 PRESENCE OF UNSPECIFIED ARTIFICIAL HIP JOINT: Chronic | ICD-10-CM

## 2022-04-11 DIAGNOSIS — Z98.49 CATARACT EXTRACTION STATUS, UNSPECIFIED EYE: Chronic | ICD-10-CM

## 2022-04-11 DIAGNOSIS — Z95.0 PRESENCE OF CARDIAC PACEMAKER: Chronic | ICD-10-CM

## 2022-04-11 DIAGNOSIS — Z98.61 CORONARY ANGIOPLASTY STATUS: Chronic | ICD-10-CM

## 2022-04-11 DIAGNOSIS — R35.0 FREQUENCY OF MICTURITION: ICD-10-CM

## 2022-04-11 PROCEDURE — 99214 OFFICE O/P EST MOD 30 MIN: CPT | Mod: 25

## 2022-04-11 PROCEDURE — 71046 X-RAY EXAM CHEST 2 VIEWS: CPT

## 2022-04-11 PROCEDURE — 52000 CYSTOURETHROSCOPY: CPT

## 2022-04-11 PROCEDURE — 88112 CYTOPATH CELL ENHANCE TECH: CPT | Mod: 26

## 2022-04-11 RX ORDER — NITROFURANTOIN (MONOHYDRATE/MACROCRYSTALS) 25; 75 MG/1; MG/1
100 CAPSULE ORAL
Qty: 10 | Refills: 0 | Status: DISCONTINUED | COMMUNITY
Start: 2022-03-30

## 2022-04-11 RX ORDER — POLYETHYLENE GLYCOL 3350 17 G/17G
17 POWDER, FOR SOLUTION ORAL
Qty: 238 | Refills: 0 | Status: ACTIVE | COMMUNITY
Start: 2022-04-02

## 2022-04-11 NOTE — PHYSICAL EXAM
[No Acute Distress] : no acute distress [Normal Oropharynx] : normal oropharynx [III] : Mallampati Class: III [Normal Appearance] : normal appearance [No Neck Mass] : no neck mass [Normal Rate/Rhythm] : normal rate/rhythm [Normal S1, S2] : normal s1, s2 [No Murmurs] : no murmurs [No Resp Distress] : no resp distress [Clear to Auscultation Bilaterally] : clear to auscultation bilaterally [No Abnormalities] : no abnormalities [Benign] : benign [Normal Gait] : normal gait [No Clubbing] : no clubbing [No Cyanosis] : no cyanosis [No Edema] : no edema [FROM] : FROM [Normal Color/ Pigmentation] : normal color/ pigmentation [No Focal Deficits] : no focal deficits [Oriented x3] : oriented x3 [Normal Affect] : normal affect [Murmur ___ / 6] : murmur [unfilled] / 6 [TextBox_2] : OW, dry mouth  [TextBox_68] : I:E 1:3; Clear  [TextBox_89] : ventral hernia

## 2022-04-11 NOTE — PROCEDURE
[FreeTextEntry1] : ECHO (03/08/2022) IMPRESSION: Mild- moderate mitral regurgitation. Moderate, eccentric aortic regurgitation. Moderate concentric left ventricular hypertrophy. Moderate segmental left ventricular systolic dysfunction. Inferolateral an d mid-basal anterolateral appear akinetic. Increased E/e is consistent with elevated left ventricular filling pressure. Normal right atrium. Normal right ventricular size with reduced RV function. Estimated pulmonary artery systolic pressure equals 37 mm Hg, assuming right atrial pressure equals 8mmHg, consistent with borderline pulmonary pressures. \par \par (03/25/2022)Full PFT revealed normal flows, with a FEV1 of 2.90L,which is 108% of predicted,  normal lung volumes, and a diffusion of  19.6, which is `07% of predicted with a normal flow volume loop\par \par CXR revealed a very mild cardiomegaly; there was no significant evidence of infiltrate or effusion-- permanent pace maker on left

## 2022-04-11 NOTE — ADDENDUM
[FreeTextEntry1] : Documented by Cruz Luciano acting as a scribe for Dr. Pavan Jamil on (04/11/2022).\par \par All medical record record entries made by the Scribe were at my, Dr. Pavan Jamil's, direction and personally dictated by me on (04/11/2022). I have reviewed the chart and agree that the record accurately reflects my personal performance of the history, physical exam, assessment and plan. I have personally directed, reviewed, and agree with the discharge instructions.

## 2022-04-11 NOTE — ASSESSMENT
[FreeTextEntry1] : Mr. RIVAS is a 81 year male with a history of Parkinson's (Dx 2016), Depression, BPH, CAD, stent placement, HLD who now comes in for a follow-up pulmonary evaluation for Abnormal CT chest c/w with b/l pleural effusions, SOB, ?chronic "CHF", drug effect, amyloid, LIOR - improved s/p right VATS PleurX cath placement- improved overall- awaiting structural heart evaluation (4/12) \par  \par \par The patient's SOB is felt to be multifactorial:\par -poor mechanics of breathing (poor balance) \par -out of shape/overweight\par -Pulmonary\par     -Bilateral pleural effusion - c/w CHF\par -Cardiac (Billy Keli) \par    -CAD\par    -?Amyloid\par \par Abnormal CT c/w B/l, right significantly larger than left  Pleural Effusion DDX:\par -Cardiac #1\par -Drug\par -PE\par -CA such as Lymphoma \par \par Problem 1: Pleural Effusion (right significantly larger than left) s/p PleurX catheter\par -s/p various chemistries, cultures \par -Recommended follow up Evaluation with Dr. Rufus Oneal - drainage 3x/week \par \par \par Problem 2: Abnormal PFTs c/w restrictive dysfunction ;likely due to pleural effusion \par -s/p D-Dimer WNL\par -s/p Ffull PFTs with KULWINDER improved\par \par Problem 3: + r/o LIOR (RF: Elevated Mallampati class, neck size 17, Parkinson's) (mild)\par -complete home sleep study- "Excite" DD unabvle \par -Sleep apnea is associated with adverse clinical consequences which can affect most organ systems. Cardiovascular disease risk includes arrhythmias, atrial fibrillation, hypertension, coronary artery disease, and stroke. Metabolic disorders include diabetes type 2, non-alcoholic fatty liver disease. Mood disorder especially depression; and cognitive decline especially in the elderly. Associations with chronic reflux/Delacruz’s esophagus some but not all inclusive. \par -Reasons include arousal consistent with hypopnea; respiratory events most prominent in REM sleep or supine position; therefore sleep staging and body position are important for accurate diagnosis and estimation of AHI. \par \par Problem 4:Cardiac (structural heart evaluation- TAVR)\par - s/p blood work: BNP 2348 high\par -Recommend cardiac follow up evaluation with cardiologist if needed (Billy Dickey and Dr. Orellana)\par \par Problem 5:overweight/out of shape\par - Weight loss, exercise and diet control were discussed and are highly encouraged. Treatment options were given such as aqua therapy, and contacting a nutritionist. Recommended to use the elliptical, stationary bike, less use of treadmill. Mindful eating was explained to the patient. Obesity is associated with worsening asthma, SOB, and potential for cardiac disease, diabetes, and other underlying medical conditions.\par \par Problem 6: Poor mechanics of breathing (Poor Balance) \par -Recommended Wim Hof and Buteyko breathing techniques \par - Proper breathing techniques were reviewed with an emphasis on exhalation. Patient instructed to breath in for 1 second and out for four seconds. Patient was encouraged not to talk while walking.\par \par Problem 7: Pulmonary Pre-Op Clearance for TAVR\par -at this point in time there are no absolute pulmonary contraindications to go forward with the planned procedure \par -at the time of surgery s/he should have optimal pain control, incentive spirometry, early ambulation, DVT and GI prophylaxis. \par \par Problem 8: Health Maintenance\par -s/p flu shot\par -recommended strep pneumonia vaccines: Prevnar-13 vaccine, follow by Pneumo vaccine 23 one year following\par -recommended early intervention for URIs\par -recommended regular osteoporosis evaluations\par -recommended early dermatological evaluations\par -recommended after the age of 50 to the age of 70, colonoscopy every 5 years\par \par f/u in 6-8 weeks\par pt is encouraged to call or fax the office with any questions or concerns.\par

## 2022-04-11 NOTE — HISTORY OF PRESENT ILLNESS
[TextBox_4] : Mr. RIVAS is a 81 year male with a history of Parkinson's (Dx 2016), Depression, BPH, CAD, stent placement, HLD who now comes in for a follow-up pulmonary evaluation for Abnormal CT with pleural effusions, SOB. His chief complaint is\par - he notes surgery went well\par - he rachelle having a lot confusion with his thinking \par - he notes feeling well in general\par - he notes having constipation \par - he notes he is given new rx including MiraLax and is working \par - he denies coughing and wheezing \par \par - He denies any headaches, nausea, vomiting, fever, chills, sweats, chest pain, chest pressure, palpitations, SOB, coughing, wheezing, fatigue, diarrhea, dysphagia, myalgias, dizziness, leg swelling, leg pain, itchy eyes, itchy ears, heartburn, reflux, or sour taste in the mouth

## 2022-04-12 ENCOUNTER — NON-APPOINTMENT (OUTPATIENT)
Age: 82
End: 2022-04-12

## 2022-04-12 ENCOUNTER — RESULT REVIEW (OUTPATIENT)
Age: 82
End: 2022-04-12

## 2022-04-12 ENCOUNTER — APPOINTMENT (OUTPATIENT)
Dept: CARDIOTHORACIC SURGERY | Facility: CLINIC | Age: 82
End: 2022-04-12

## 2022-04-12 ENCOUNTER — APPOINTMENT (OUTPATIENT)
Dept: CARDIOTHORACIC SURGERY | Facility: CLINIC | Age: 82
End: 2022-04-12
Payer: MEDICARE

## 2022-04-12 VITALS
DIASTOLIC BLOOD PRESSURE: 73 MMHG | OXYGEN SATURATION: 97 % | TEMPERATURE: 98.1 F | HEART RATE: 69 BPM | BODY MASS INDEX: 24.55 KG/M2 | WEIGHT: 162 LBS | RESPIRATION RATE: 16 BRPM | HEIGHT: 68 IN | SYSTOLIC BLOOD PRESSURE: 118 MMHG

## 2022-04-12 DIAGNOSIS — R06.00 DYSPNEA, UNSPECIFIED: ICD-10-CM

## 2022-04-12 PROCEDURE — 93000 ELECTROCARDIOGRAM COMPLETE: CPT

## 2022-04-12 PROCEDURE — 99204 OFFICE O/P NEW MOD 45 MIN: CPT

## 2022-04-12 RX ORDER — POTASSIUM CHLORIDE 750 MG/1
10 TABLET, FILM COATED, EXTENDED RELEASE ORAL
Refills: 0 | Status: DISCONTINUED | COMMUNITY
End: 2022-04-12

## 2022-04-12 RX ORDER — SOLIFENACIN SUCCINATE 5 MG/1
5 TABLET ORAL
Qty: 90 | Refills: 0 | Status: DISCONTINUED | COMMUNITY
Start: 2022-04-03 | End: 2022-04-12

## 2022-04-12 RX ORDER — BUMETANIDE 2 MG/1
2 TABLET ORAL
Refills: 0 | Status: DISCONTINUED | COMMUNITY
End: 2022-04-12

## 2022-04-12 RX ORDER — TADALAFIL 20 MG/1
20 TABLET ORAL
Qty: 30 | Refills: 11 | Status: DISCONTINUED | OUTPATIENT
Start: 2021-09-28 | End: 2022-04-12

## 2022-04-12 RX ORDER — PIMAVANSERIN TARTRATE 34 MG/1
34 CAPSULE ORAL
Qty: 30 | Refills: 3 | Status: DISCONTINUED | COMMUNITY
Start: 2022-02-24 | End: 2022-04-12

## 2022-04-13 LAB
BACTERIA UR CULT: NORMAL
URINE CYTOLOGY: NORMAL

## 2022-04-14 NOTE — REVIEW OF SYSTEMS
[Feeling Fatigued] : feeling fatigued [Lower Ext Edema] : lower extremity edema [Change in Appetite] : change in appetite [Negative] : Heme/Lymph [Fever] : no fever [Chills] : no chills [Dyspnea on exertion] : not dyspnea during exertion [Chest Discomfort] : no chest discomfort [Abdominal Pain] : no abdominal pain [FreeTextEntry4] : no active dental issues [FreeTextEntry9] : see HPI [de-identified] : see HPI

## 2022-04-14 NOTE — HISTORY OF PRESENT ILLNESS
[FreeTextEntry1] : Mr. Banerjee is a 81 year old male referred by Dr. Clinton Dickey for evaluation of aortic insufficiency. He has had recurrent pleural effusions and underwent a right VATS and Pleurex catheter placement with Dr. Oneal on 3/17/22. Pathology assessment of the right pleural biopsy revealed fragments of pleura with chronic inflammatory and reactive changes. Right pleural fluid was negative for malignant cells. The catheter is currently being drained three times weekly (per their report, with roughly 300cc). He was noted to have severe aortic insufficiency on TTE in March and is here for discussion of catheter-based treatment options. \par \par He states he is feeling "not bad at all" but notes that the Pleurex catheter has not impacted his quality of life "at all". He has Parkinsons disease and is sedentary at baseline. He walks occasionally but has had increasing difficulty due to his Parkinsons disease. He exercises at home twenty minutes daily with no decline in stamina or activity tolerance. He has no dyspnea, angina, PND, orthopnea, dizziness, or syncope. He has some baseline LE edema that he states has improved since being on diuretics. \par \par His past medical history also includes CAD (s/p PCI on 8/31/21), Parkinsons Disease that was diagnosed six years ago (the treatment of which was complicated by hallucinations that have improved since his medications were adjusted), BPH, and bradycardia requiring a PPM placement on 10/6/21.\par \par I have reviewed his echocardiogram with Dr. Degroot in extensive detail; the LVEF is depressed, there is no aortic stenosis, aortic insufficiency is moderate, and both the LV and RV are thickened, suggesting a possible infiltrative process. He states his referral by Rudy Jamil and Keli was prompted by his recent TTE which demonstrated "a new leakage" in his aortic valve. He had a PCI and a PPM in 2021, as noted above. He reports no pain. His "main symptoms" are fatigue and a poor appetite. He reports no FH of known heart disease other than a grandmother who had an MI "at an early age" in [place]. He remains on Plavix for his prior PCI and Eliquis for AFIB.

## 2022-04-14 NOTE — PHYSICAL EXAM
[Frail] : frail [No Murmur] : no murmur [Normal Rate] : normal [Rhythm Regular] : regular [___ +] : bilateral [unfilled]U+ pretibial pitting edema [Clear Lung Fields] : clear lung fields [Soft] : abdomen soft [Normal Gait] : normal gait [No Rash] : no rash [Normal] : alert and oriented, normal memory [Right Carotid Bruit] : no bruit heard over the right carotid [Left Carotid Bruit] : no bruit heard over the left carotid [de-identified] : trace BLE bilaterally

## 2022-04-14 NOTE — CARDIOLOGY SUMMARY
[de-identified] : V paced 70 [de-identified] : 3/8/22\par EF 45%, moderate aortic insufficiency, mild/moderate mitral regurgitation

## 2022-04-14 NOTE — DISCUSSION/SUMMARY
[FreeTextEntry1] : Mr Banerjee is referred for evaluation of aortic insufficiency (AI) and diastolic heart failure. I have reviewed his TTE in detail which demonstrates moderate AI (of unclear hemodynamic significance with a DBP of 73 mmHg). The echocardiographic findings, per Dr Degroot, are suggestive of a potential infiltrative disorder--I recommended he have a NM SPECT (he can't have an MRI due to his PPM). As I explained in detail, there are no on-label transcatheter options for the treatment of AI. If he is found to progress to severe AI, I mentioned consideration for evaluation by the SH team at [facility], as they are participating in a trial for patients with symptomatic AI (using the JESSICA valve). I recommended he follow up with Rudy Dickey and Omero as well and I will notify them of our impressions and recommendations. No changes were made to his regimen today.

## 2022-04-15 ENCOUNTER — APPOINTMENT (OUTPATIENT)
Dept: NEUROLOGY | Facility: CLINIC | Age: 82
End: 2022-04-15
Payer: MEDICARE

## 2022-04-15 VITALS
DIASTOLIC BLOOD PRESSURE: 68 MMHG | HEART RATE: 69 BPM | WEIGHT: 162 LBS | SYSTOLIC BLOOD PRESSURE: 123 MMHG | HEIGHT: 68 IN | BODY MASS INDEX: 24.55 KG/M2

## 2022-04-15 PROCEDURE — 99214 OFFICE O/P EST MOD 30 MIN: CPT

## 2022-04-15 NOTE — HISTORY OF PRESENT ILLNESS
[FreeTextEntry1] : Patient feels that he is walking slower. He has not fallen\par His hallucinations are better controlled. There is more insight into them as well.There are days when he has no VH. His paranoia has also dampened\par Underwent VATS with catheter being drained at home by visiting RN. He is also undergoing Aortic insuff w/u \par He does some exercises weekly. \par \par \par Nonmotor:\par + constipation - fiber, miralax/senna. Needed disimpaction at ED on 4/1\par sleep is fragmented\par - dysphagia\par \par \par Meds\par Rytary 145 2 tab  AM - 3p - 7p \par seroquel 25mg qhs, 1/2 tab in the afternoon\par desvenlafaxine 75mg qd\par spironolactone 25mg qd\par torsemide 20mg qd\par tadalafil \par \par prior\par exelon

## 2022-04-15 NOTE — DISCUSSION/SUMMARY
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 Rytary may be contributing to residual symptoms along with medical conditions.Motorically stable but shuffling more. \par \par Patient was counseled on the following recommendations:\par \par -  will switch off rytary to sinemet  1.5 tabs in AM, 1 tab at 2Pm, 5p, 8p\par - cont seroquel 25mg qhs, 1/2 tab afternoon \par - consider nuplazid in the future if VH \par - increase physical activity and exercising for gait\par - f/u cardiology and pulmonary\par - cont miralax qd\par

## 2022-04-15 NOTE — PHYSICAL EXAM
[FreeTextEntry1] : There is 2+ masking. Speech is 1+. Tremor is absent.  there is mild left greater than right bradykinesia with minimal rigidity. Walks with good speed. SL are short. Turns en bloc with no FOG. Postural reflexes intact

## 2022-04-19 DIAGNOSIS — R39.15 URGENCY OF URINATION: ICD-10-CM

## 2022-04-19 DIAGNOSIS — R31.29 OTHER MICROSCOPIC HEMATURIA: ICD-10-CM

## 2022-04-19 DIAGNOSIS — N40.1 BENIGN PROSTATIC HYPERPLASIA WITH LOWER URINARY TRACT SYMPTOMS: ICD-10-CM

## 2022-04-21 ENCOUNTER — NON-APPOINTMENT (OUTPATIENT)
Age: 82
End: 2022-04-21

## 2022-04-22 ENCOUNTER — NON-APPOINTMENT (OUTPATIENT)
Age: 82
End: 2022-04-22

## 2022-04-25 ENCOUNTER — NON-APPOINTMENT (OUTPATIENT)
Age: 82
End: 2022-04-25

## 2022-04-25 RX ORDER — LEVODOPA AND CARBIDOPA 145; 36.25 MG/1; MG/1
36.25-145 CAPSULE, EXTENDED RELEASE ORAL 3 TIMES DAILY
Qty: 180 | Refills: 3 | Status: DISCONTINUED | COMMUNITY
Start: 2019-07-15 | End: 2022-04-25

## 2022-04-28 ENCOUNTER — NON-APPOINTMENT (OUTPATIENT)
Age: 82
End: 2022-04-28

## 2022-04-29 ENCOUNTER — INPATIENT (INPATIENT)
Facility: HOSPITAL | Age: 82
LOS: 12 days | Discharge: NURSING, REHAB OR LONG-TERM CARE | End: 2022-05-12
Attending: INTERNAL MEDICINE | Admitting: INTERNAL MEDICINE
Payer: MEDICARE

## 2022-04-29 VITALS
HEART RATE: 60 BPM | HEIGHT: 68 IN | RESPIRATION RATE: 16 BRPM | OXYGEN SATURATION: 98 % | TEMPERATURE: 98 F | DIASTOLIC BLOOD PRESSURE: 64 MMHG | SYSTOLIC BLOOD PRESSURE: 127 MMHG

## 2022-04-29 DIAGNOSIS — G20 PARKINSON'S DISEASE: ICD-10-CM

## 2022-04-29 DIAGNOSIS — Z29.9 ENCOUNTER FOR PROPHYLACTIC MEASURES, UNSPECIFIED: ICD-10-CM

## 2022-04-29 DIAGNOSIS — R44.1 VISUAL HALLUCINATIONS: ICD-10-CM

## 2022-04-29 DIAGNOSIS — Z98.49 CATARACT EXTRACTION STATUS, UNSPECIFIED EYE: Chronic | ICD-10-CM

## 2022-04-29 DIAGNOSIS — R31.9 HEMATURIA, UNSPECIFIED: ICD-10-CM

## 2022-04-29 DIAGNOSIS — R62.7 ADULT FAILURE TO THRIVE: ICD-10-CM

## 2022-04-29 DIAGNOSIS — Z98.61 CORONARY ANGIOPLASTY STATUS: Chronic | ICD-10-CM

## 2022-04-29 DIAGNOSIS — Z95.0 PRESENCE OF CARDIAC PACEMAKER: Chronic | ICD-10-CM

## 2022-04-29 DIAGNOSIS — E78.5 HYPERLIPIDEMIA, UNSPECIFIED: ICD-10-CM

## 2022-04-29 DIAGNOSIS — Z96.649 PRESENCE OF UNSPECIFIED ARTIFICIAL HIP JOINT: Chronic | ICD-10-CM

## 2022-04-29 DIAGNOSIS — N17.9 ACUTE KIDNEY FAILURE, UNSPECIFIED: ICD-10-CM

## 2022-04-29 DIAGNOSIS — I48.20 CHRONIC ATRIAL FIBRILLATION, UNSPECIFIED: ICD-10-CM

## 2022-04-29 LAB
ALBUMIN SERPL ELPH-MCNC: 4 G/DL — SIGNIFICANT CHANGE UP (ref 3.3–5)
ALP SERPL-CCNC: 98 U/L — SIGNIFICANT CHANGE UP (ref 40–120)
ALT FLD-CCNC: 10 U/L — SIGNIFICANT CHANGE UP (ref 4–41)
ANION GAP SERPL CALC-SCNC: 16 MMOL/L — HIGH (ref 7–14)
APPEARANCE UR: CLEAR — SIGNIFICANT CHANGE UP
AST SERPL-CCNC: 35 U/L — SIGNIFICANT CHANGE UP (ref 4–40)
BACTERIA # UR AUTO: ABNORMAL
BASOPHILS # BLD AUTO: 0.03 K/UL — SIGNIFICANT CHANGE UP (ref 0–0.2)
BASOPHILS NFR BLD AUTO: 0.5 % — SIGNIFICANT CHANGE UP (ref 0–2)
BILIRUB SERPL-MCNC: 0.8 MG/DL — SIGNIFICANT CHANGE UP (ref 0.2–1.2)
BILIRUB UR-MCNC: NEGATIVE — SIGNIFICANT CHANGE UP
BUN SERPL-MCNC: 21 MG/DL — SIGNIFICANT CHANGE UP (ref 7–23)
CALCIUM SERPL-MCNC: 9.2 MG/DL — SIGNIFICANT CHANGE UP (ref 8.4–10.5)
CHLORIDE SERPL-SCNC: 88 MMOL/L — LOW (ref 98–107)
CO2 SERPL-SCNC: 33 MMOL/L — HIGH (ref 22–31)
COLOR SPEC: YELLOW — SIGNIFICANT CHANGE UP
CREAT SERPL-MCNC: 1.98 MG/DL — HIGH (ref 0.5–1.3)
DIFF PNL FLD: ABNORMAL
EGFR: 34 ML/MIN/1.73M2 — LOW
EOSINOPHIL # BLD AUTO: 0.05 K/UL — SIGNIFICANT CHANGE UP (ref 0–0.5)
EOSINOPHIL NFR BLD AUTO: 0.8 % — SIGNIFICANT CHANGE UP (ref 0–6)
EPI CELLS # UR: 4 /HPF — SIGNIFICANT CHANGE UP (ref 0–5)
FLUAV AG NPH QL: SIGNIFICANT CHANGE UP
FLUBV AG NPH QL: SIGNIFICANT CHANGE UP
GLUCOSE SERPL-MCNC: 111 MG/DL — HIGH (ref 70–99)
GLUCOSE UR QL: NEGATIVE — SIGNIFICANT CHANGE UP
HCT VFR BLD CALC: 43.1 % — SIGNIFICANT CHANGE UP (ref 39–50)
HGB BLD-MCNC: 13.7 G/DL — SIGNIFICANT CHANGE UP (ref 13–17)
HYALINE CASTS # UR AUTO: 1 /LPF — SIGNIFICANT CHANGE UP (ref 0–7)
IANC: 4.48 K/UL — SIGNIFICANT CHANGE UP (ref 1.8–7.4)
IMM GRANULOCYTES NFR BLD AUTO: 0.3 % — SIGNIFICANT CHANGE UP (ref 0–1.5)
KETONES UR-MCNC: NEGATIVE — SIGNIFICANT CHANGE UP
LEUKOCYTE ESTERASE UR-ACNC: NEGATIVE — SIGNIFICANT CHANGE UP
LYMPHOCYTES # BLD AUTO: 1.14 K/UL — SIGNIFICANT CHANGE UP (ref 1–3.3)
LYMPHOCYTES # BLD AUTO: 18.2 % — SIGNIFICANT CHANGE UP (ref 13–44)
MAGNESIUM SERPL-MCNC: 2.7 MG/DL — HIGH (ref 1.6–2.6)
MCHC RBC-ENTMCNC: 30 PG — SIGNIFICANT CHANGE UP (ref 27–34)
MCHC RBC-ENTMCNC: 31.8 GM/DL — LOW (ref 32–36)
MCV RBC AUTO: 94.5 FL — SIGNIFICANT CHANGE UP (ref 80–100)
MONOCYTES # BLD AUTO: 0.53 K/UL — SIGNIFICANT CHANGE UP (ref 0–0.9)
MONOCYTES NFR BLD AUTO: 8.5 % — SIGNIFICANT CHANGE UP (ref 2–14)
NEUTROPHILS # BLD AUTO: 4.48 K/UL — SIGNIFICANT CHANGE UP (ref 1.8–7.4)
NEUTROPHILS NFR BLD AUTO: 71.7 % — SIGNIFICANT CHANGE UP (ref 43–77)
NITRITE UR-MCNC: NEGATIVE — SIGNIFICANT CHANGE UP
NRBC # BLD: 0 /100 WBCS — SIGNIFICANT CHANGE UP
NRBC # FLD: 0 K/UL — SIGNIFICANT CHANGE UP
PH UR: 6.5 — SIGNIFICANT CHANGE UP (ref 5–8)
PHOSPHATE SERPL-MCNC: 2.3 MG/DL — LOW (ref 2.5–4.5)
PLATELET # BLD AUTO: 184 K/UL — SIGNIFICANT CHANGE UP (ref 150–400)
POTASSIUM SERPL-MCNC: 2.8 MMOL/L — CRITICAL LOW (ref 3.5–5.3)
POTASSIUM SERPL-SCNC: 2.8 MMOL/L — CRITICAL LOW (ref 3.5–5.3)
PROT SERPL-MCNC: 7.1 G/DL — SIGNIFICANT CHANGE UP (ref 6–8.3)
PROT UR-MCNC: ABNORMAL
RBC # BLD: 4.56 M/UL — SIGNIFICANT CHANGE UP (ref 4.2–5.8)
RBC # FLD: 16.4 % — HIGH (ref 10.3–14.5)
RBC CASTS # UR COMP ASSIST: 529 /HPF — HIGH (ref 0–4)
RSV RNA NPH QL NAA+NON-PROBE: SIGNIFICANT CHANGE UP
SARS-COV-2 RNA SPEC QL NAA+PROBE: SIGNIFICANT CHANGE UP
SODIUM SERPL-SCNC: 137 MMOL/L — SIGNIFICANT CHANGE UP (ref 135–145)
SP GR SPEC: 1.02 — SIGNIFICANT CHANGE UP (ref 1–1.05)
TROPONIN T, HIGH SENSITIVITY RESULT: 104 NG/L — CRITICAL HIGH
TROPONIN T, HIGH SENSITIVITY RESULT: 132 NG/L — CRITICAL HIGH
UROBILINOGEN FLD QL: ABNORMAL
WBC # BLD: 6.25 K/UL — SIGNIFICANT CHANGE UP (ref 3.8–10.5)
WBC # FLD AUTO: 6.25 K/UL — SIGNIFICANT CHANGE UP (ref 3.8–10.5)
WBC UR QL: 6 /HPF — HIGH (ref 0–5)

## 2022-04-29 PROCEDURE — 99223 1ST HOSP IP/OBS HIGH 75: CPT

## 2022-04-29 PROCEDURE — 99285 EMERGENCY DEPT VISIT HI MDM: CPT | Mod: 25

## 2022-04-29 PROCEDURE — 93010 ELECTROCARDIOGRAM REPORT: CPT

## 2022-04-29 PROCEDURE — 71045 X-RAY EXAM CHEST 1 VIEW: CPT | Mod: 26

## 2022-04-29 PROCEDURE — 70450 CT HEAD/BRAIN W/O DYE: CPT | Mod: 26,MA

## 2022-04-29 RX ORDER — QUETIAPINE FUMARATE 200 MG/1
1 TABLET, FILM COATED ORAL
Qty: 0 | Refills: 0 | DISCHARGE

## 2022-04-29 RX ORDER — SENNA PLUS 8.6 MG/1
2 TABLET ORAL AT BEDTIME
Refills: 0 | Status: DISCONTINUED | OUTPATIENT
Start: 2022-04-29 | End: 2022-05-12

## 2022-04-29 RX ORDER — QUETIAPINE FUMARATE 200 MG/1
50 TABLET, FILM COATED ORAL ONCE
Refills: 0 | Status: COMPLETED | OUTPATIENT
Start: 2022-04-29 | End: 2022-04-29

## 2022-04-29 RX ORDER — CARBIDOPA AND LEVODOPA 25; 100 MG/1; MG/1
2 TABLET ORAL
Qty: 0 | Refills: 0 | DISCHARGE

## 2022-04-29 RX ORDER — POLYETHYLENE GLYCOL 3350 17 G/17G
17 POWDER, FOR SOLUTION ORAL DAILY
Refills: 0 | Status: DISCONTINUED | OUTPATIENT
Start: 2022-04-29 | End: 2022-05-12

## 2022-04-29 RX ORDER — ATORVASTATIN CALCIUM 80 MG/1
40 TABLET, FILM COATED ORAL AT BEDTIME
Refills: 0 | Status: DISCONTINUED | OUTPATIENT
Start: 2022-04-29 | End: 2022-05-12

## 2022-04-29 RX ORDER — CARBIDOPA AND LEVODOPA 25; 100 MG/1; MG/1
1 TABLET ORAL
Qty: 0 | Refills: 0 | DISCHARGE

## 2022-04-29 RX ORDER — ACETAMINOPHEN 500 MG
2 TABLET ORAL
Qty: 0 | Refills: 0 | DISCHARGE

## 2022-04-29 RX ORDER — CARBIDOPA AND LEVODOPA 25; 100 MG/1; MG/1
1.5 TABLET ORAL
Refills: 0 | Status: DISCONTINUED | OUTPATIENT
Start: 2022-04-29 | End: 2022-05-12

## 2022-04-29 RX ORDER — SODIUM CHLORIDE 9 MG/ML
1000 INJECTION, SOLUTION INTRAVENOUS
Refills: 0 | Status: DISCONTINUED | OUTPATIENT
Start: 2022-04-29 | End: 2022-05-09

## 2022-04-29 RX ORDER — CARBIDOPA AND LEVODOPA 25; 100 MG/1; MG/1
1 TABLET ORAL
Refills: 0 | Status: DISCONTINUED | OUTPATIENT
Start: 2022-04-29 | End: 2022-05-12

## 2022-04-29 RX ORDER — POTASSIUM CHLORIDE 20 MEQ
40 PACKET (EA) ORAL ONCE
Refills: 0 | Status: COMPLETED | OUTPATIENT
Start: 2022-04-29 | End: 2022-04-29

## 2022-04-29 RX ORDER — POTASSIUM CHLORIDE 20 MEQ
10 PACKET (EA) ORAL
Refills: 0 | Status: COMPLETED | OUTPATIENT
Start: 2022-04-29 | End: 2022-04-29

## 2022-04-29 RX ORDER — APIXABAN 2.5 MG/1
2.5 TABLET, FILM COATED ORAL
Refills: 0 | Status: DISCONTINUED | OUTPATIENT
Start: 2022-04-29 | End: 2022-05-08

## 2022-04-29 RX ORDER — DESVENLAFAXINE 50 MG/1
75 TABLET, EXTENDED RELEASE ORAL DAILY
Refills: 0 | Status: DISCONTINUED | OUTPATIENT
Start: 2022-04-29 | End: 2022-04-30

## 2022-04-29 RX ORDER — CLOPIDOGREL BISULFATE 75 MG/1
75 TABLET, FILM COATED ORAL DAILY
Refills: 0 | Status: DISCONTINUED | OUTPATIENT
Start: 2022-04-29 | End: 2022-05-12

## 2022-04-29 RX ORDER — SODIUM CHLORIDE 9 MG/ML
1000 INJECTION, SOLUTION INTRAVENOUS ONCE
Refills: 0 | Status: COMPLETED | OUTPATIENT
Start: 2022-04-29 | End: 2022-04-29

## 2022-04-29 RX ADMIN — ATORVASTATIN CALCIUM 40 MILLIGRAM(S): 80 TABLET, FILM COATED ORAL at 23:36

## 2022-04-29 RX ADMIN — SENNA PLUS 2 TABLET(S): 8.6 TABLET ORAL at 23:34

## 2022-04-29 RX ADMIN — SODIUM CHLORIDE 1000 MILLILITER(S): 9 INJECTION, SOLUTION INTRAVENOUS at 12:59

## 2022-04-29 RX ADMIN — Medication 40 MILLIEQUIVALENT(S): at 14:19

## 2022-04-29 RX ADMIN — Medication 100 MILLIEQUIVALENT(S): at 15:25

## 2022-04-29 RX ADMIN — Medication 100 MILLIEQUIVALENT(S): at 14:20

## 2022-04-29 RX ADMIN — Medication 100 MILLIEQUIVALENT(S): at 18:21

## 2022-04-29 NOTE — H&P ADULT - PROBLEM SELECTOR PLAN 5
Javier c/w eliquis Assessment:  - the patient presented with new visual hallucinations  - likely from worsening parkinsons dementia secondary to medication changes or disease progression    Plan:  - neuro consult  - fall risk  - will c/w simemet  - will give seroquel 50mg PRN for agitation and anxiety - patient with known hx of pleural effusion s/o pleurx cath placement  - only drained 17cc this AM  - would recommend IR consult for removal of pleurx if deemed no longer indicated  - CXR done shows clear lungs

## 2022-04-29 NOTE — CONSULT NOTE ADULT - SUBJECTIVE AND OBJECTIVE BOX
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 Wife at bedside as well. Patient states earlier today he felt generalized weakness but mainly in his legs which he felt he couldn't stand up. Wife endorsed change in personality in the past week where he has notably become more irritable which is unlike his baseline. He denies any chest pain, SOB, palpitations. He was recently started on seroquel.     In the ED, VSS.     EKG shows Vpacing with underlying Afib     PMHx:   Hypertension    Endogenous hyperlipemia    Benign prostatic hypertrophy    Depression    Hypertension    Hyperlipidemia    BPH (benign prostatic hyperplasia)    Atrial fibrillation    Bradycardia    Parkinsons disease    CAD (coronary artery disease)    Pleural effusion, not elsewhere classified        PSHx:   History of hip replacement, total    Status post cataract extraction    Presence of cardiac pacemaker    H/O coronary angioplasty        Allergies:  No Known Allergies      Home Meds:    Current Medications:   potassium chloride  10 mEq/100 mL IVPB 10 milliEquivalent(s) IV Intermittent every 1 hour      FAMILY HISTORY:  Family history of lung cancer (Mother)    Family history of esophageal cancer (Father)    FH: myocardial infarction (Grandparent)        Social History:  Smoking History:  Alcohol Use:  Drug Use:    REVIEW OF SYSTEMS:  Constitutional:     [x ] negative [ ] fevers [ ] chills [ ] weight loss [ ] weight gain  HEENT:                  [x ] negative [ ] dry eyes [ ] eye irritation [ ] postnasal drip [ ] nasal congestion  CV:                         [ x] negative  [ ] chest pain [ ] orthopnea [ ] palpitations [ ] murmur  Resp:                     [x ] negative [ ] cough [ ] shortness of breath [ ] dyspnea [ ] wheezing [ ] sputum [ ]hemoptysis  GI:                          [ x] negative [ ] nausea [ ] vomiting [ ] diarrhea [ ] constipation [ ] abd pain [ ] dysphagia   :                        [ x] negative [ ] dysuria [ ] nocturia [ ] hematuria [ ] increased urinary frequency  Musculoskeletal: [x ] negative [ ] back pain [ ] myalgias [ ] arthralgias [ ] fracture  Skin:                       [ x] negative [ ] rash [ ] itch  Neurological:        [ x] negative [ ] headache [ ] dizziness [ ] syncope [ ] weakness [ ] numbness  Psychiatric:           [ x] negative [ ] anxiety [ ] depression  Endocrine:            [ x] negative [ ] diabetes [ ] thyroid problem  Heme/Lymph:      [ x] negative [ ] anemia [ ] bleeding problem  Allergic/Immune: [ x] negative [ ] itchy eyes [ ] nasal discharge [ ] hives [ ] angioedema    [ x] All other systems negative  [ ] Unable to assess ROS due to      Physical Exam:  T(F): 98.2 (04-29), Max: 98.2 (04-29)  HR: 60 (04-29) (60 - 60)  BP: 122/68 (04-29) (122/68 - 127/64)  RR: 18 (04-29)  SpO2: 100% (04-29)  GENERAL: No acute distress   CHEST/LUNG: Clear to auscultation bilaterally; No wheeze, equal breath sounds bilaterally   HEART: Regular rate and rhythm; No murmurs, rubs, or gallops  EXTREMITIES:  No clubbing, cyanosis, or edema  PSYCH: Nl behavior, nl affect  NEUROLOGY: AAOx2, non-focal   SKIN: Normal color, No rashes or lesions  LINES:    Cardiovascular Diagnostic Testing:    ECG: Personally reviewed:    Echo: Personally reviewed:    Stress Testing:    Cath:    Imaging:    CXR: Personally reviewed    Labs: Personally reviewed                        13.7   6.25  )-----------( 184      ( 29 Apr 2022 12:56 )             43.1     04-29    137  |  88<L>  |  21  ----------------------------<  111<H>  2.8<LL>   |  33<H>  |  1.98<H>    Ca    9.2      29 Apr 2022 12:56  Phos  2.3     04-29  Mg     2.70     04-29    TPro  7.1  /  Alb  4.0  /  TBili  0.8  /  DBili  x   /  AST  35  /  ALT  10  /  AlkPhos  98  04-29

## 2022-04-29 NOTE — ED PROVIDER NOTE - ATTENDING CONTRIBUTION TO CARE
Attending note:   After face to face evaluation of this patient, I concur with above noted hx, pe, and care plan for this patient.  Wilder: 81 yom with hx of Parkinson's afib, brought in by wife for confusion, and worsening hallucinations. Pt unsure if due to meds. Pt not eating or drinking well. Pt denies any specific complaints. Pt appears dry but with 1+ pitting edema, some rigidity (mild), clear lungs, RRR, abd soft and non tender with right sided abd fullness below drain with no erythema edema or tn. Right sided chest drain in place for hx of pleural effusions. Pt appears dry - failure to thrive with change in mentation although calm now - labs, CT head, CXR, fluids and reassess.

## 2022-04-29 NOTE — H&P ADULT - NSHPREVIEWOFSYSTEMS_GEN_ALL_CORE
REVIEW OF SYSTEMS:    CONSTITUTIONAL: No weakness, fevers or chills  EYES/ENT: No visual changes;  No dysphagia; No sore throat; No rhinorrhea; No sinus pain/pressure  NECK: No pain or stiffness  RESPIRATORY: No cough, wheezing, hemoptysis; No shortness of breath  CARDIOVASCULAR: No chest pain or palpitations; No lower extremity edema  GASTROINTESTINAL: No abdominal or epigastric pain. No nausea, vomiting, or hematemesis; No diarrhea, + constipation. No melena or hematochezia.  GENITOURINARY: No dysuria, frequency or hematuria  NEUROLOGICAL: No numbness, + weakness in legs  MSK: ambulates with walker  SKIN: No itching, burning, rashes, or lesions   All other review of systems is negative unless indicated above.

## 2022-04-29 NOTE — H&P ADULT - NSHPLABSRESULTS_GEN_ALL_CORE
13.7   6.25  )-----------( 184      ( 2022 12:56 )             43.1           137  |  88<L>  |  21  ----------------------------<  111<H>  2.8<LL>   |  33<H>  |  1.98<H>    Ca    9.2      2022 12:56  Phos  2.3       Mg     2.70         TPro  7.1  /  Alb  4.0  /  TBili  0.8  /  DBili  x   /  AST  35  /  ALT  10  /  AlkPhos  98        CARDIAC MARKERS ( 2022 12:59 )  x     / x     / 126 U/L / x     / 5.0 ng/mL                Urinalysis Basic - ( 2022 18:06 )    Color: Yellow / Appearance: Clear / S.017 / pH: x  Gluc: x / Ketone: Negative  / Bili: Negative / Urobili: 3 mg/dL   Blood: x / Protein: 300 mg/dL / Nitrite: Negative   Leuk Esterase: Negative / RBC: 529 /HPF / WBC 6 /HPF   Sq Epi: x / Non Sq Epi: 4 /HPF / Bacteria: Occasional

## 2022-04-29 NOTE — ED ADULT NURSE REASSESSMENT NOTE - NS ED NURSE REASSESS COMMENT FT1
resting in bed, no complaints. Patiet is on monitor. Patient updated on plan.,awaiting or more results.

## 2022-04-29 NOTE — H&P ADULT - PROBLEM SELECTOR PLAN 2
Assessment:  - recent cystoscopy 1 week ago by outpatient urologist found granulation tissue on prostate  - UA shows hematuria    Plan:  - UA only shows hematuria, no bacteria  - monitor for now  - urologist outpatient follow up

## 2022-04-29 NOTE — ED ADULT NURSE NOTE - NSIMPLEMENTINTERV_GEN_ALL_ED
Implemented All Fall with Harm Risk Interventions:  [place] to call system. Call bell, personal items and telephone within reach. Instruct patient to call for assistance. Room bathroom lighting operational. Non-slip footwear when patient is off stretcher. Physically safe environment: no spills, clutter or unnecessary equipment. Stretcher in lowest position, wheels locked, appropriate side rails in place. Provide visual cue, wrist band, yellow gown, etc. Monitor gait and stability. Monitor for mental status changes and reorient to person, place, and time. Review medications for side effects contributing to fall risk. Reinforce activity limits and safety measures with patient and family. Provide visual clues: red socks.

## 2022-04-29 NOTE — ED ADULT NURSE REASSESSMENT NOTE - NS ED NURSE REASSESS COMMENT FT1
received report from  day shift GARTH Lares.  Pt is a/o x 3. pt has no new complaints at this time. pt endorses weakness consistent with chief complaint pt has GONZALO and BLE normal and equal strength. 20g to the LAC and DC from day shift. 20G to the RAC placed and infusions continued. no complaints of chest pain, headache, nausea, dizziness, vomiting, SOB, fever, chills   verbalized. pt respirations even and unlabored with no accessory muscle use. pt paced on monitor normal rhythm. pt in NAD changed and cleaned. will continue to monitor.

## 2022-04-29 NOTE — ED PROVIDER NOTE - PHYSICAL EXAMINATION
Vital signs reviewed  GENERAL: dry on appearance   HEAD: NCAT  EYES: Anicteric  ENT: dry mucous membranes   NECK: Supple, non tender  RESPIRATORY: Normal respiratory effort. CTA B/L. No wheezing, rales, rhonchi  CARDIOVASCULAR: Regular rate and rhythm  ABDOMEN: Soft. Nondistended. Nontender. No guarding or rebound. No CVA tenderness.  MUSCULOSKELETAL/EXTREMITIES: Brisk cap refill. 2+ radial pulses. No leg edema.  SKIN:  Warm and dry  NEURO: AAOx1-2. No gross FND.  PSYCHIATRIC: Cooperative. Affect appropriate.

## 2022-04-29 NOTE — H&P ADULT - PROBLEM SELECTOR PLAN 1
Assessment:  - patient presented for weakness and inability to climb the stairs  - on physical exam the patient was able to lift both legs up by himself without assistance, good strength b/l  - low suspicion for spinal cord compression  - likely due to worsening of parkinson's disease - there was also reports of changing his parkinson meds recently  - CT head negative for acute pathology    Plan:  - PT smith  - neurology consult for parkinson's disease and hallucinations  - c/w kristy

## 2022-04-29 NOTE — ED PROVIDER NOTE - NS ED ROS FT
Constitutional: No fever, chills.  Eyes:  No visual changes  ENMT:  No neck pain  Cardiac:  No chest pain  Respiratory:  No cough, SOB  GI:  No nausea, vomiting, diarrhea, abdominal pain.  :  No dysuria, hematuria  MS:  No back pain.  Neuro:  increase weakness, hallucinations   Skin:  No skin rash

## 2022-04-29 NOTE — ED PROVIDER NOTE - OBJECTIVE STATEMENT
82 y/o M hx of parkinsons, afib p/w 10 days of worsening decline state, weakness b/l legs, difficulty walking. +confusions and hallucinations 1 month ago due to medication changes (unsure), as per wife. +poor appetite, trouble managing patient at home. wife having trouble managing patients ADLs.   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 +confusions and hallucinations 1 month ago due to medication changes (unsure), as per wife. +poor appetite, trouble managing patient at home. wife having trouble managing patients ADLs.   pt denies any fever, chills, cp, palpitations, sob, abdominal pain, v/d, dysuria, numbness

## 2022-04-29 NOTE — H&P ADULT - NSHPPHYSICALEXAM_GEN_ALL_CORE
PHYSICAL EXAM:  VITALS: Vital Signs Last 24 Hrs  T(C): 37.6 (29 Apr 2022 21:14), Max: 37.6 (29 Apr 2022 21:14)  T(F): 99.6 (29 Apr 2022 21:14), Max: 99.6 (29 Apr 2022 21:14)  HR: 60 (29 Apr 2022 20:04) (60 - 60)  BP: 137/65 (29 Apr 2022 20:04) (122/68 - 137/65)  BP(mean): --  RR: 18 (29 Apr 2022 20:04) (16 - 18)  SpO2: 98% (29 Apr 2022 20:04) (98% - 100%)  GENERAL: NAD, well-developed, well-nourished  HEAD:  Atraumatic, Normocephalic  EYES: EOMI, PERRL, conjunctiva and sclera clear  ENT: Moist Mucus Membranes present, no ulcers appreciated  NECK: Supple, No JVD  CHEST/LUNG: Clear to auscultation bilaterally; No wheezes, rales or rhonchi, no accessory muscle use, + R sided Pleurx cath on the R lower chest wall, + L sided pacemaker  HEART: Regular rate and rhythm; No murmurs, rubs, or gallops, (+)S1, S2  ABDOMEN: Soft, Nontender, Nondistended; Normal Bowel sounds   EXTREMITIES:  2+ Peripheral Pulses, No clubbing, cyanosis, or edema, 5/5 strength in both legs, 5/5 dorsiflexion of feet b/l  PSYCH: normal mood and affect  NEUROLOGY: AAOx3, non-focal  SKIN: No rashes or lesions

## 2022-04-29 NOTE — ED PROVIDER NOTE - CLINICAL SUMMARY MEDICAL DECISION MAKING FREE TEXT BOX
80 y/o  atrial fibrillation, CAD, bradycardia hypertension, hyperlipidemia, Parkinson disease, BPH p/w weakness, FTT as per wife bedside. patient having difficulty with ADLs at home due to weakness, pending labs for metabolic causes and trop given new weakness. likely admission

## 2022-04-29 NOTE — CONSULT NOTE ADULT - ASSESSMENT
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 Cardiology consulted for elevated troponin.     #Elevated troponin  EKG non-ischemic. No symptoms of chest pain or SOB. Troponin number similar to prior troponin in December 2021 likely 2/2 CKD/ALAYNA. Troponin peaked at 130s.   -No need for ACS protocol at this time   -EKG prn for chest pain   -Please obtain patient's med rec to see if on DAPT vs apixaban, ideally should not be on triple therapy for his CAD     Will sign off, please call back w/ any questions

## 2022-04-29 NOTE — H&P ADULT - PROBLEM SELECTOR PLAN 4
Assessment:  - the patient presented with new visual hallucinations  - likely from worsening parkinsons dementia secondary to medication changes or disease progression    Plan:  - neuro consult  - fall risk  - will c/w simemet  - will give seroquel 50mg PRN for agitation and anxiety Assessment:  - patient with troponins 132 -> 102  - no chest pain at bedside  - known to have troponemia from previous values, likely secondary to CKD/ALAYNA  - EKG does not show ST changes, unchanged from previous    Plan:  - troponins decreasing  - cardiology consulted - low suspicion for ACS at this time. I also agree  - will trend troponins and get repeat EKG if patient complains of chest pain

## 2022-04-29 NOTE — ED ADULT NURSE NOTE - OBJECTIVE STATEMENT
patient received to room 13. Patient is a&ox4 but with some confusion. Patient stated he was started on Seroquel and has been having confusion, lethargy, weakness. Patient wife advocating for  states to come in to be checked. Patient denies any recent falls, n/v, fevers. Patient is on the monitor, paced (patient has pacemaker) Patient  noted to have a  dressing on the right side of the chest, no errthyma, no redness, no purlent drainage. Some fullness on the right side compared to the left.  Patient  iv placed from Ems, 20g on left arm. Patient is awaiting for results.

## 2022-04-29 NOTE — H&P ADULT - PROBLEM SELECTOR PLAN 3
Assessment:  - patient with no hx of CKD presented with new onset ALAYNA with Cr of 1.9. Previously Cr 1.4  - hx of taking bumex 3mg QD  - etiology likely from diuretic use and dehydration    Plan:  - hold diuretics  - resuscitate with gentle IV fluids  - Echo showed normal EF from 12/2021 Assessment:  - patient with no hx of CKD presented with new onset ALAYNA with Cr of 1.9. Previously Cr 1.4  - hx of taking bumex 3mg QD  - etiology likely from diuretic use and dehydration    Plan:  - hold diuretics  - resuscitate with gentle IV fluids  - Echo showed normal EF from 12/2021  - would recommend nephrology consult of Cr does not improve  - f/u urine lytes

## 2022-04-29 NOTE — ED ADULT TRIAGE NOTE - CHIEF COMPLAINT QUOTE
Pt c/o increasing  weakness sand confusion since Easter since the medication was changed. Denies chest pain, sob, dizziness, N/V/D, chills.  IV 20 G LAC with NS in progress

## 2022-04-29 NOTE — H&P ADULT - ASSESSMENT
80 y/o M with pmhx of parkinson's disease, CAD with stent, HLD, BPH, afib, presented to the ED for hallucinations, anxiety and weakness. The patient does not have any obvious infectious cause at this time. Likely to be worsening of the parkinson's disease.

## 2022-04-29 NOTE — H&P ADULT - HISTORY OF PRESENT ILLNESS
This is a 80 y/o M with pmhx of parkinson's disease, CAD with stent, HLD, BPH, afib, presented to the ED for hallucinations, anxiety and weakness. The patient himself endorse that yesterday he could not walk up the stairs with a walker because he couldn't lift his legs. The patient denies hx of falls or back pain or leg pain. He states he has no strength in his legs to lift to stand on the stairs.  He reports that his parkinson meds were changed recently 2 weeks ago. The patient also endorsed new hallucinations 2 days ago but has not had any new hallucinations recently. I spoke with the wife to obtain collateral, she reports that yesterday last night the patient was hallucinating, saying he is seeing a woman yelling at him. He also would wake up in the middle of the night and walks around, then goes back to sleep, then wakes up again. Would wake up 3-4 times a night. This morning the patient was more angry towards the wife which is not usual for him. The visiting nurse came by to check on his Pleurx cath and drain the liquid (he had gotten a Pleurx cath last month for pleural effusion and was discharged with it) and the RN noticed his personality changed and recommended ED admission. He drained 17 cc from the cath this AM. This is a 80 y/o M with pmhx of parkinson's disease, CAD with stent, HLD, BPH, afib, presented to the ED for hallucinations, anxiety and weakness. The patient himself endorse that yesterday he could not walk up the stairs with a walker because he couldn't lift his legs. The patient denies hx of falls or back pain or leg pain. He states he has no strength in his legs to lift to stand on the stairs.  He reports that his parkinson meds were changed recently 2 weeks ago. The patient also endorsed new hallucinations 2 days ago but has not had any new hallucinations recently. I spoke with the wife to obtain collateral, she reports that yesterday last night the patient was hallucinating, saying he is seeing a woman yelling at him. He also would wake up in the middle of the night and walks around, then goes back to sleep, then wakes up again. Would wake up 3-4 times a night, which had been happening for the last month. This morning the patient was more angry towards the wife which is not usual for him. The visiting nurse came by to check on his Pleurx cath and drain the liquid (he had gotten a Pleurx cath last month for pleural effusion and was discharged with it) and the RN noticed his personality changed and recommended ED admission. He drained 17 cc from the cath this AM. The patient was initially on seroquel 25mg but that was not working so it was increased to 100mg recently by his PCP. His parkinson's disease meds were also changed - his Rikarvy was discontinued and he was back on sinemet. Patient denies chest pain and shortness of breath. The patient also had a cystscopy outpatient with his urologist for unexplained frequent UTI and hematuria - they found granulation tissue on the prostate which is likely the source of the bleeding.

## 2022-04-29 NOTE — H&P ADULT - PROBLEM SELECTOR PLAN 6
- c/w statin Javier c/w eliquis Assessment:  - the patient presented with new visual hallucinations  - likely from worsening parkinsons dementia secondary to medication changes or disease progression    Plan:  - neuro consult  - fall risk  - will c/w simemet  - will give seroquel 50mg PRN for agitation and anxiety

## 2022-04-30 DIAGNOSIS — G20 PARKINSON'S DISEASE: ICD-10-CM

## 2022-04-30 DIAGNOSIS — F41.9 ANXIETY DISORDER, UNSPECIFIED: ICD-10-CM

## 2022-04-30 DIAGNOSIS — J90 PLEURAL EFFUSION, NOT ELSEWHERE CLASSIFIED: ICD-10-CM

## 2022-04-30 DIAGNOSIS — I21.A1 MYOCARDIAL INFARCTION TYPE 2: ICD-10-CM

## 2022-04-30 LAB
ALBUMIN SERPL ELPH-MCNC: 3.7 G/DL — SIGNIFICANT CHANGE UP (ref 3.3–5)
ALP SERPL-CCNC: 96 U/L — SIGNIFICANT CHANGE UP (ref 40–120)
ALT FLD-CCNC: 15 U/L — SIGNIFICANT CHANGE UP (ref 4–41)
ANION GAP SERPL CALC-SCNC: 14 MMOL/L — SIGNIFICANT CHANGE UP (ref 7–14)
ANION GAP SERPL CALC-SCNC: 16 MMOL/L — HIGH (ref 7–14)
AST SERPL-CCNC: 31 U/L — SIGNIFICANT CHANGE UP (ref 4–40)
BASOPHILS # BLD AUTO: 0.04 K/UL — SIGNIFICANT CHANGE UP (ref 0–0.2)
BASOPHILS NFR BLD AUTO: 0.4 % — SIGNIFICANT CHANGE UP (ref 0–2)
BILIRUB SERPL-MCNC: 0.8 MG/DL — SIGNIFICANT CHANGE UP (ref 0.2–1.2)
BUN SERPL-MCNC: 18 MG/DL — SIGNIFICANT CHANGE UP (ref 7–23)
BUN SERPL-MCNC: 20 MG/DL — SIGNIFICANT CHANGE UP (ref 7–23)
CALCIUM SERPL-MCNC: 9.1 MG/DL — SIGNIFICANT CHANGE UP (ref 8.4–10.5)
CALCIUM SERPL-MCNC: 9.3 MG/DL — SIGNIFICANT CHANGE UP (ref 8.4–10.5)
CHLORIDE SERPL-SCNC: 89 MMOL/L — LOW (ref 98–107)
CHLORIDE SERPL-SCNC: 92 MMOL/L — LOW (ref 98–107)
CO2 SERPL-SCNC: 26 MMOL/L — SIGNIFICANT CHANGE UP (ref 22–31)
CO2 SERPL-SCNC: 31 MMOL/L — SIGNIFICANT CHANGE UP (ref 22–31)
CREAT SERPL-MCNC: 1.9 MG/DL — HIGH (ref 0.5–1.3)
CREAT SERPL-MCNC: 1.99 MG/DL — HIGH (ref 0.5–1.3)
CULTURE RESULTS: SIGNIFICANT CHANGE UP
EGFR: 34 ML/MIN/1.73M2 — LOW
EGFR: 35 ML/MIN/1.73M2 — LOW
EOSINOPHIL # BLD AUTO: 0.02 K/UL — SIGNIFICANT CHANGE UP (ref 0–0.5)
EOSINOPHIL NFR BLD AUTO: 0.2 % — SIGNIFICANT CHANGE UP (ref 0–6)
GLUCOSE SERPL-MCNC: 123 MG/DL — HIGH (ref 70–99)
GLUCOSE SERPL-MCNC: 132 MG/DL — HIGH (ref 70–99)
HCT VFR BLD CALC: 42.1 % — SIGNIFICANT CHANGE UP (ref 39–50)
HGB BLD-MCNC: 13.8 G/DL — SIGNIFICANT CHANGE UP (ref 13–17)
IANC: 7.75 K/UL — HIGH (ref 1.8–7.4)
IMM GRANULOCYTES NFR BLD AUTO: 0.3 % — SIGNIFICANT CHANGE UP (ref 0–1.5)
LYMPHOCYTES # BLD AUTO: 1.81 K/UL — SIGNIFICANT CHANGE UP (ref 1–3.3)
LYMPHOCYTES # BLD AUTO: 17.6 % — SIGNIFICANT CHANGE UP (ref 13–44)
MAGNESIUM SERPL-MCNC: 2.4 MG/DL — SIGNIFICANT CHANGE UP (ref 1.6–2.6)
MAGNESIUM SERPL-MCNC: 2.4 MG/DL — SIGNIFICANT CHANGE UP (ref 1.6–2.6)
MCHC RBC-ENTMCNC: 30.8 PG — SIGNIFICANT CHANGE UP (ref 27–34)
MCHC RBC-ENTMCNC: 32.8 GM/DL — SIGNIFICANT CHANGE UP (ref 32–36)
MCV RBC AUTO: 94 FL — SIGNIFICANT CHANGE UP (ref 80–100)
MONOCYTES # BLD AUTO: 0.65 K/UL — SIGNIFICANT CHANGE UP (ref 0–0.9)
MONOCYTES NFR BLD AUTO: 6.3 % — SIGNIFICANT CHANGE UP (ref 2–14)
NEUTROPHILS # BLD AUTO: 7.75 K/UL — HIGH (ref 1.8–7.4)
NEUTROPHILS NFR BLD AUTO: 75.2 % — SIGNIFICANT CHANGE UP (ref 43–77)
NRBC # BLD: 0 /100 WBCS — SIGNIFICANT CHANGE UP
NRBC # FLD: 0 K/UL — SIGNIFICANT CHANGE UP
PHOSPHATE SERPL-MCNC: 2.2 MG/DL — LOW (ref 2.5–4.5)
PHOSPHATE SERPL-MCNC: 2.3 MG/DL — LOW (ref 2.5–4.5)
PLATELET # BLD AUTO: 176 K/UL — SIGNIFICANT CHANGE UP (ref 150–400)
POTASSIUM SERPL-MCNC: 2.4 MMOL/L — CRITICAL LOW (ref 3.5–5.3)
POTASSIUM SERPL-MCNC: 3.4 MMOL/L — LOW (ref 3.5–5.3)
POTASSIUM SERPL-SCNC: 2.4 MMOL/L — CRITICAL LOW (ref 3.5–5.3)
POTASSIUM SERPL-SCNC: 3.4 MMOL/L — LOW (ref 3.5–5.3)
PROT SERPL-MCNC: 6.9 G/DL — SIGNIFICANT CHANGE UP (ref 6–8.3)
RBC # BLD: 4.48 M/UL — SIGNIFICANT CHANGE UP (ref 4.2–5.8)
RBC # FLD: 16.7 % — HIGH (ref 10.3–14.5)
SODIUM SERPL-SCNC: 134 MMOL/L — LOW (ref 135–145)
SODIUM SERPL-SCNC: 134 MMOL/L — LOW (ref 135–145)
SPECIMEN SOURCE: SIGNIFICANT CHANGE UP
WBC # BLD: 10.3 K/UL — SIGNIFICANT CHANGE UP (ref 3.8–10.5)
WBC # FLD AUTO: 10.3 K/UL — SIGNIFICANT CHANGE UP (ref 3.8–10.5)

## 2022-04-30 PROCEDURE — 90792 PSYCH DIAG EVAL W/MED SRVCS: CPT | Mod: 95

## 2022-04-30 PROCEDURE — 93010 ELECTROCARDIOGRAM REPORT: CPT

## 2022-04-30 RX ORDER — DESVENLAFAXINE 50 MG/1
50 TABLET, EXTENDED RELEASE ORAL DAILY
Refills: 0 | Status: DISCONTINUED | OUTPATIENT
Start: 2022-05-01 | End: 2022-05-12

## 2022-04-30 RX ORDER — HALOPERIDOL DECANOATE 100 MG/ML
1 INJECTION INTRAMUSCULAR ONCE
Refills: 0 | Status: COMPLETED | OUTPATIENT
Start: 2022-04-30 | End: 2022-04-30

## 2022-04-30 RX ORDER — LANOLIN ALCOHOL/MO/W.PET/CERES
3 CREAM (GRAM) TOPICAL AT BEDTIME
Refills: 0 | Status: DISCONTINUED | OUTPATIENT
Start: 2022-04-30 | End: 2022-05-12

## 2022-04-30 RX ORDER — POTASSIUM CHLORIDE 20 MEQ
40 PACKET (EA) ORAL EVERY 4 HOURS
Refills: 0 | Status: COMPLETED | OUTPATIENT
Start: 2022-04-30 | End: 2022-04-30

## 2022-04-30 RX ORDER — HALOPERIDOL DECANOATE 100 MG/ML
1 INJECTION INTRAMUSCULAR ONCE
Refills: 0 | Status: DISCONTINUED | OUTPATIENT
Start: 2022-04-30 | End: 2022-04-30

## 2022-04-30 RX ORDER — POTASSIUM CHLORIDE 20 MEQ
10 PACKET (EA) ORAL
Refills: 0 | Status: COMPLETED | OUTPATIENT
Start: 2022-04-30 | End: 2022-04-30

## 2022-04-30 RX ADMIN — ATORVASTATIN CALCIUM 40 MILLIGRAM(S): 80 TABLET, FILM COATED ORAL at 20:39

## 2022-04-30 RX ADMIN — Medication 100 MILLIEQUIVALENT(S): at 05:50

## 2022-04-30 RX ADMIN — CARBIDOPA AND LEVODOPA 1.5 TABLET(S): 25; 100 TABLET ORAL at 13:06

## 2022-04-30 RX ADMIN — SODIUM CHLORIDE 75 MILLILITER(S): 9 INJECTION, SOLUTION INTRAVENOUS at 18:00

## 2022-04-30 RX ADMIN — CARBIDOPA AND LEVODOPA 1 TABLET(S): 25; 100 TABLET ORAL at 15:16

## 2022-04-30 RX ADMIN — Medication 100 MILLIEQUIVALENT(S): at 04:21

## 2022-04-30 RX ADMIN — POLYETHYLENE GLYCOL 3350 17 GRAM(S): 17 POWDER, FOR SOLUTION ORAL at 12:58

## 2022-04-30 RX ADMIN — Medication 100 MILLIEQUIVALENT(S): at 02:06

## 2022-04-30 RX ADMIN — CARBIDOPA AND LEVODOPA 1 TABLET(S): 25; 100 TABLET ORAL at 20:40

## 2022-04-30 RX ADMIN — APIXABAN 2.5 MILLIGRAM(S): 2.5 TABLET, FILM COATED ORAL at 20:40

## 2022-04-30 RX ADMIN — CLOPIDOGREL BISULFATE 75 MILLIGRAM(S): 75 TABLET, FILM COATED ORAL at 12:58

## 2022-04-30 RX ADMIN — HALOPERIDOL DECANOATE 1 MILLIGRAM(S): 100 INJECTION INTRAMUSCULAR at 01:58

## 2022-04-30 RX ADMIN — SENNA PLUS 2 TABLET(S): 8.6 TABLET ORAL at 20:39

## 2022-04-30 RX ADMIN — CARBIDOPA AND LEVODOPA 1 TABLET(S): 25; 100 TABLET ORAL at 17:53

## 2022-04-30 RX ADMIN — SODIUM CHLORIDE 75 MILLILITER(S): 9 INJECTION, SOLUTION INTRAVENOUS at 00:24

## 2022-04-30 RX ADMIN — DESVENLAFAXINE 75 MILLIGRAM(S): 50 TABLET, EXTENDED RELEASE ORAL at 15:13

## 2022-04-30 RX ADMIN — Medication 40 MILLIEQUIVALENT(S): at 02:23

## 2022-04-30 RX ADMIN — Medication 40 MILLIEQUIVALENT(S): at 06:37

## 2022-04-30 NOTE — PATIENT PROFILE ADULT - FALL HARM RISK - HARM RISK INTERVENTIONS
Assistance with ambulation/Assistance OOB with selected safe patient handling equipment/Communicate Risk of Fall with Harm to all staff/Discuss with provider need for PT consult/Monitor gait and stability/Reinforce activity limits and safety measures with patient and family/Tailored Fall Risk Interventions/Visual Cue: Yellow wristband and red socks/Bed in lowest position, wheels locked, appropriate side rails in place/Call bell, personal items and telephone in reach/Instruct patient to call for assistance before getting out of bed or chair/Non-slip footwear when patient is out of bed/[place] to call system/Physically safe environment - no spills, clutter or unnecessary equipment/Purposeful Proactive Rounding/Room/bathroom lighting operational, light cord in reach

## 2022-04-30 NOTE — ED ADULT NURSE REASSESSMENT NOTE - NS ED NURSE REASSESS COMMENT FT1
report given to E2 RN Eryn. pt respirations even and unlabored. 20g to the RAC with no redness or swelling. pt changed and repositioned and stable for transport.

## 2022-04-30 NOTE — BH CONSULTATION LIAISON ASSESSMENT NOTE - RISK ASSESSMENT
Risk Factors: acute medical condition, hx depression, endorses visual hallucinations, insomnia, agitation, combativeness  Protective Factors: residential stability, supportive family, no hx si/sa, no hx inpatient psychiatric admissions, no access to firearms

## 2022-04-30 NOTE — CONSULT NOTE ADULT - ATTENDING COMMENTS
elevated troponin in the setting of elevated SCr and personality changes while change in Parkinson's therapy.  denies any cardiac sx  ECG afib, V-paced with ectopic beat and no clear evidence of ischemia  Not ACS.    he should be on a single anti-platelet agent and apixaban for his afib and CAD unless there is otherwise a contraindication.    no further inpt cardiac testing at this time.
History reviewed.   Sleeping now but easily arousable to voice and remains awake during exam.   Oriented to self, family members only.  Conversation with short sentences.   Calm now.   Follows commands.   Saccadic pursuit.   Masked facies.   Low frequency rest tremor in L >R hand.   L>R rigidity.   Bradykinesia.    A/P  Parkinson disease with dementia and psychosis.  Continue his home dose of medication for Parkinson disease - sinemet 25/100:  1 1/2 tabs at 11AM and 1 tab at 2PM, 5PM, 8PM.  Management of psychosis per psychiatry - thank you.  D/W his neurologist - Dr. Langley.   D/W wife and daughter.   Thank you

## 2022-04-30 NOTE — BH CONSULTATION LIAISON ASSESSMENT NOTE - NSBHCHARTREVIEWVS_PSY_A_CORE FT
Vital Signs Last 24 Hrs  T(C): 36.6 (30 Apr 2022 17:00), Max: 37.6 (29 Apr 2022 21:14)  T(F): 97.8 (30 Apr 2022 17:00), Max: 99.6 (29 Apr 2022 21:14)  HR: 62 (30 Apr 2022 17:00) (60 - 62)  BP: 121/71 (30 Apr 2022 17:00) (121/71 - 145/60)  BP(mean): --  RR: 18 (30 Apr 2022 17:00) (18 - 18)  SpO2: 98% (30 Apr 2022 17:00) (98% - 98%)

## 2022-04-30 NOTE — BH CONSULTATION LIAISON ASSESSMENT NOTE - CASE SUMMARY
Met with the patient along with Barbara Kumar NP via tele platform on 4/30. Case discussed and plan agreed upon.

## 2022-04-30 NOTE — PHYSICAL THERAPY INITIAL EVALUATION ADULT - ADDITIONAL COMMENTS
Patient lives with wife in private home, 3 steps to enter and flight to bedroom but has chairlift at home. Patient was independent in ambulation but owns walkers from prior surgeries. Patient was getting assist with ADL prior to admission.

## 2022-04-30 NOTE — CONSULT NOTE ADULT - SUBJECTIVE AND OBJECTIVE BOX
Neurology Consultation     HPI: Patient FP is a 80 y/o M PMHx parkinson's disease, CAD with stent, HLD, BPH, afib, p/w  hallucinations, anxiety and weakness. Day before admission, had difficulty ambulating up stairs due to LE weakness. States his parkinsons meds were changed recently 2 weeks ago. Had new hallucinations 2 days ago but none recently. Per chart review, pt's wife reports night before admission pt was hallucinating, saying he is seeing a woman yelling at him. He also would wake up in the middle of the night and walks around, then goes back to sleep, then wakes up again. Would wake up 3-4 times a night, which had been happening for the last month. During the morning patient was more angry towards the wife which is not usual for him. The visiting nurse came by to check on his Pleurx cath and drain the liquid (he had gotten a Pleurx cath last month for pleural effusion and was discharged with it) and the RN noticed his personality changed and recommended ED admission. He drained 17 cc from the cath this AM. The patient was initially on seroquel 25mg but that was not working so it was increased to 100mg recently by his PCP.     Patient followed by Dr Donte Langley (neuro). Patient has had delusional thoughts for which rytary was discontinued. Per his last few communications w/ pt/family in 2022 for increasing paranoia, poor sleep, restlessness, re-emerging psychosis in AM and persisting in late AM.     Currently while at [facility] on sinemet 1 tab 25/100 at 2PM, 5PM, 8PM, 1.5 tab 11AM. Also on desvenlafaxine ER. Will need to clarify dosing. Patient was recommended to c/w seroquel      INCOMPLETE  addition of sinemet ER  1 tab qhs may have modestly helped with overnight restlessness.  Patient was confused this morning and wanted to call the police. Restlessness partially responsive to valium 2mg overnight. Wife gave him 1 tab seroquel instead of 1.5tabs qhs as well    advised that we add sinemet ER  1 tab qhs. I suspect that his dopaminergic level is low overnight and triggering some akathisia. Increase seroquel 25mg to 1.5 tabs qh and 1/2 tab in AM   sinemet  1.5 tabs in AM, 1 tab at 2Pm, 5p, 8p  - cont seroquel 25mg qhs, 1/2 tab afternoon   - consider nuplazid in the future if      Also had a cystscopy outpatient with his urologist for unexplained frequent UTI and hematuria - they found granulation tissue on the prostate which is likely the source of the bleeding.       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    Parkinsons disease    CAD (coronary artery disease)  s/p PCI     Pleural effusion, not elsewhere classified    History of hip replacement, total  R hip  &amp; L hip    Status post cataract extraction  right eye cataract extraction with IOL 2015    Presence of cardiac pacemaker  10/2021    H/O coronary angioplasty  2021 1 stent inserted      FAMILY HISTORY:  Family history of lung cancer (Mother)    Family history of esophageal cancer (Father)    FH: myocardial infarction (Grandparent)      SOCIAL HISTORY: no smoking, alcohol, drug use hx    MEDICATIONS (HOME):  Home Medications:  bumetanide 1 mg oral tablet: 1 tab(s) orally once a day (2022 21:54)  bumetanide 2 mg oral tablet: 1 tab(s) orally once a day (17 Mar 2022 10:14)  carbidopa-levodopa 25 mg-100 mg oral tablet: 1.5 tab(s) orally once a day at 11AM (2022 21:46)  carbidopa-levodopa 25 mg-100 mg oral tablet: 1 tab(s) orally 3 times a day at 2PM, 5PM, 8PM (2022 21:47)  clopidogrel 75 mg oral tablet: 1 tab(s) orally once a day. Resume taking tomorrow, 3/19. (18 Mar 2022 09:36)  desvenlafaxine: 75 milligram(s) orally once a day (17 Mar 2022 10:14)  Eliquis 2.5 mg oral tablet: 1 tab(s) orally 2 times a day. Resume taking tomorrow, 3/19.  (18 Mar 2022 09:36)  Metamucil: 1 cap(s) orally 5 times a day (17 Mar 2022 10:14)  One A Day Men&#x27;s Complete oral tablet: 1 tab(s) orally once a day (17 Mar 2022 10:14)  QUEtiapine 100 mg oral tablet: 1 tab(s) orally once a day (at bedtime) (2022 21:50)  rosuvastatin 40 mg oral tablet: 1 tab(s) orally once a day (at bedtime) (17 Mar 2022 10:14)  solifenacin 10 mg oral tablet: 1 tab(s) orally once a day (17 Mar 2022 10:14)  tadalafil 5 mg oral tablet: 1 tab(s) orally once a day (17 Mar 2022 10:14)  Vitamin B12: 1 tab(s) orally once a day (17 Mar 2022 10:14)  Vitamin D3: 1 tab(s) orally once a day (17 Mar 2022 10:14)    MEDICATIONS  (STANDING):  apixaban 2.5 milliGRAM(s) Oral two times a day  atorvastatin 40 milliGRAM(s) Oral at bedtime  carbidopa/levodopa  25/100 1.5 Tablet(s) Oral <User Schedule>  carbidopa/levodopa  25/100 1 Tablet(s) Oral <User Schedule>  clopidogrel Tablet 75 milliGRAM(s) Oral daily  desvenlafaxine ER 75 milliGRAM(s) Oral daily  haloperidol    Concentrate 1 milliGRAM(s) Oral once  lactated ringers. 1000 milliLiter(s) (75 mL/Hr) IV Continuous <Continuous>  polyethylene glycol 3350 17 Gram(s) Oral daily  senna 2 Tablet(s) Oral at bedtime    MEDICATIONS  (PRN):    ALLERGIES/INTOLERANCES:  Allergies  No Known Allergies    Intolerances    VITALS & EXAMINATION:  Vital Signs Last 24 Hrs  T(C): 36.2 (2022 09:36), Max: 37.6 (2022 21:14)  T(F): 97.1 (2022 09:36), Max: 99.6 (2022 21:14)  HR: 60 (2022 09:36) (60 - 60)  BP: 137/62 (2022 09:36) (122/68 - 145/60)  BP(mean): --  RR: 18 (2022 09:36) (16 - 18)  SpO2: 98% (2022 09:36) (98% - 100%)       LABORATORY:  CBC                       13.8   10.30 )-----------( 176      ( 2022 05:01 )             42.1     Chem     134<L>  |  92<L>  |  18  ----------------------------<  123<H>  3.4<L>   |  26  |  1.90<H>    Ca    9.3      2022 05:01  Phos  2.3     04-30  Mg     2.40     -30    TPro  6.9  /  Alb  3.7  /  TBili  0.8  /  DBili  x   /  AST  31  /  ALT  15  /  AlkPhos  96  04-30    LFTs LIVER FUNCTIONS - ( 2022 05:01 )  Alb: 3.7 g/dL / Pro: 6.9 g/dL / ALK PHOS: 96 U/L / ALT: 15 U/L / AST: 31 U/L / GGT: x           Coagulopathy   Lipid Panel   A1c   Cardiac enzymes CARDIAC MARKERS ( 2022 12:59 )  x     / x     / 126 U/L / x     / 5.0 ng/mL      U/A Urinalysis Basic - ( 2022 18:06 )    Color: Yellow / Appearance: Clear / S.017 / pH: x  Gluc: x / Ketone: Negative  / Bili: Negative / Urobili: 3 mg/dL   Blood: x / Protein: 300 mg/dL / Nitrite: Negative   Leuk Esterase: Negative / RBC: 529 /HPF / WBC 6 /HPF   Sq Epi: x / Non Sq Epi: 4 /HPF / Bacteria: Occasional      CSF  Other    STUDIES & IMAGING: (EEG, CT, MR, U/S, TTE/AJ): Neurology Consultation     HPI: Patient FP is a 82 y/o M PMHx parkinson's disease, CAD with stent, HLD, BPH, afib, p/w  hallucinations, anxiety and weakness. Has had recent difficulty ambulating up stairs due to LE weakness as well as new hallucinations few days ago. The night before admission pt was hallucinating, saying he is seeing a woman yelling at him. He also would wake up in the middle of the night and walk around, concerning for insomnia. Would wake up 3-4 times a night. Would also become very angry towards the wife which is not usual for him. The visiting nurse came by to check on his Pleurx cath and drain the liquid (he had gotten a Pleurx cath last month for pleural effusion and was discharged with it) and the RN noticed his personality changed and recommended ED admission. He drained 17 cc from the cath this AM. The patient was initially on seroquel 25mg but that was not working so it was increased to 100mg recently by his PCP.     Patient followed by Dr Donte Langley (neuro). Patient has had delusional thoughts for which rytary was discontinued. Per his last few communications w/ pt/family in 2022 for increasing paranoia, poor sleep, restlessness, re-emerging psychosis in AM and persisting in late AM he recommended patient be evaluated by psychiatry. Currently on sinemet 1 tab 25/100 at 2PM, 5PM, 8PM, 1.5 tab 11AM. Also on desvenlafaxine ER. Patient had tried seroquel 25mg BID per Dr Langley (obtained collaterals from him) but wife states he did not have any marked improvement. He was also considering starting nuplazid potentially.       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    Parkinsons disease    CAD (coronary artery disease)  s/p PCI     Pleural effusion, not elsewhere classified    History of hip replacement, total  R hip  &amp; L hip    Status post cataract extraction  right eye cataract extraction with IOL 2015    Presence of cardiac pacemaker  10/2021    H/O coronary angioplasty  2021 1 stent inserted    FAMILY HISTORY:  Family history of lung cancer (Mother)    Family history of esophageal cancer (Father)    FH: myocardial infarction (Grandparent)    MEDICATIONS (HOME):  Home Medications:  bumetanide 1 mg oral tablet: 1 tab(s) orally once a day (2022 21:54)  bumetanide 2 mg oral tablet: 1 tab(s) orally once a day (17 Mar 2022 10:14)  carbidopa-levodopa 25 mg-100 mg oral tablet: 1.5 tab(s) orally once a day at 11AM (2022 21:46)  carbidopa-levodopa 25 mg-100 mg oral tablet: 1 tab(s) orally 3 times a day at 2PM, 5PM, 8PM (2022 21:47)  clopidogrel 75 mg oral tablet: 1 tab(s) orally once a day. Resume taking tomorrow, 3/19. (18 Mar 2022 09:36)  desvenlafaxine: 75 milligram(s) orally once a day (17 Mar 2022 10:14)  Eliquis 2.5 mg oral tablet: 1 tab(s) orally 2 times a day. Resume taking tomorrow, 3/19.  (18 Mar 2022 09:36)  Metamucil: 1 cap(s) orally 5 times a day (17 Mar 2022 10:14)  One A Day Men&#x27;s Complete oral tablet: 1 tab(s) orally once a day (17 Mar 2022 10:14)  QUEtiapine 100 mg oral tablet: 1 tab(s) orally once a day (at bedtime) (2022 21:50)  rosuvastatin 40 mg oral tablet: 1 tab(s) orally once a day (at bedtime) (17 Mar 2022 10:14)  solifenacin 10 mg oral tablet: 1 tab(s) orally once a day (17 Mar 2022 10:14)  tadalafil 5 mg oral tablet: 1 tab(s) orally once a day (17 Mar 2022 10:14)  Vitamin B12: 1 tab(s) orally once a day (17 Mar 2022 10:14)  Vitamin D3: 1 tab(s) orally once a day (17 Mar 2022 10:14)    MEDICATIONS  (STANDING):  apixaban 2.5 milliGRAM(s) Oral two times a day  atorvastatin 40 milliGRAM(s) Oral at bedtime  carbidopa/levodopa  25/100 1.5 Tablet(s) Oral <User Schedule>  carbidopa/levodopa  25/100 1 Tablet(s) Oral <User Schedule>  clopidogrel Tablet 75 milliGRAM(s) Oral daily  desvenlafaxine ER 75 milliGRAM(s) Oral daily  haloperidol    Concentrate 1 milliGRAM(s) Oral once  lactated ringers. 1000 milliLiter(s) (75 mL/Hr) IV Continuous <Continuous>  polyethylene glycol 3350 17 Gram(s) Oral daily  senna 2 Tablet(s) Oral at bedtime    MEDICATIONS  (PRN):    ALLERGIES/INTOLERANCES:  Allergies  No Known Allergies    Intolerances    VITALS & EXAMINATION:  Vital Signs Last 24 Hrs  T(C): 36.2 (2022 09:36), Max: 37.6 (2022 21:14)  T(F): 97.1 (2022 09:36), Max: 99.6 (2022 21:14)  HR: 60 (2022 09:36) (60 - 60)  BP: 137/62 (2022 09:36) (122/68 - 145/60)  BP(mean): --  RR: 18 (2022 09:36) (16 - 18)  SpO2: 98% (2022 09:36) (98% - 100%)     General:  Constitutional: Male, appears stated age, in no apparent distress, sleeping initially.  Head: Normocephalic; Eyes: clear sclera;  Extremities: No cyanosis; Skin: No lisa edema of LE  Resp: breathing comfortably     Neurological (>12):  MS: Awake, alert. Follows most simple commands.   Language: Speech is clear, fluent, intact comprehension, registration of words. Has some psychomotor slowing.   CNs: PERRL. VFF to confrontation. EOMI. V1-3 intact to LT, No facial asymmetry b/l, full eye closure strength b/l.      Motor: + resting tremor. Has increase in UE tone with cogwheel rigidity worse in LUE. Otherwise moving all extremities x4 5/5 including lower extremities.   Sensation: Intact to LT b/l., Cortical: Extinction on DSS (neglect): none   Coordination: No dysmetria to FTN, has resting tremor. No lisa action tremor.      LABORATORY:  CBC                       13.8   10.30 )-----------( 176      ( 2022 05:01 )             42.1     Chem 04-    134<L>  |  92<L>  |  18  ----------------------------<  123<H>  3.4<L>   |  26  |  1.90<H>    Ca    9.3      2022 05:01  Phos  2.3     04-30  Mg     2.40     -30    TPro  6.9  /  Alb  3.7  /  TBili  0.8  /  DBili  x   /  AST  31  /  ALT  15  /  AlkPhos  96  04-30    LFTs LIVER FUNCTIONS - ( 2022 05:01 )  Alb: 3.7 g/dL / Pro: 6.9 g/dL / ALK PHOS: 96 U/L / ALT: 15 U/L / AST: 31 U/L / GGT: x           Coagulopathy   Lipid Panel   A1c   Cardiac enzymes CARDIAC MARKERS ( 2022 12:59 )  x     / x     / 126 U/L / x     / 5.0 ng/mL      U/A Urinalysis Basic - ( 2022 18:06 )    Color: Yellow / Appearance: Clear / S.017 / pH: x  Gluc: x / Ketone: Negative  / Bili: Negative / Urobili: 3 mg/dL   Blood: x / Protein: 300 mg/dL / Nitrite: Negative   Leuk Esterase: Negative / RBC: 529 /HPF / WBC 6 /HPF   Sq Epi: x / Non Sq Epi: 4 /HPF / Bacteria: Occasional      CSF  Other    STUDIES & IMAGING: (EEG, CT, MR, U/S, TTE/AJ):

## 2022-04-30 NOTE — PATIENT PROFILE ADULT - FUNCTIONAL SCREEN CURRENT LEVEL: COMMUNICATION, MLM
confusion at times/0 = understands/communicates without difficulty confusion at times/2 = difficulty understanding (not related to language barrier)

## 2022-04-30 NOTE — CONSULT NOTE ADULT - ASSESSMENT
Patient FP is a 80 y/o M PMHx parkinson's disease, CAD with stent, HLD, BPH, afib, p/w  hallucinations, anxiety and weakness. Has had recent difficulty ambulating up stairs due to LE weakness as well as new hallucinations few days ago. The night before admission pt was hallucinating, saying he is seeing a woman yelling at him. He also would wake up in the middle of the night and walk around, concerning for insomnia. Would wake up 3-4 times a night. Would also become very angry towards the wife which is not usual for him. The visiting nurse came by to check on his Pleurx cath and drain the liquid (he had gotten a Pleurx cath last month for pleural effusion and was discharged with it) and the RN noticed his personality changed and recommended ED admission. He drained 17 cc from the cath this AM. The patient was initially on seroquel 25mg but that was not working so it was increased to 100mg recently by his PCP.     Patient followed by Dr Donte Langley (neuro). Patient has had delusional thoughts for which rytary was discontinued. Per his last few communications w/ pt/family in april 2022 for increasing paranoia, poor sleep, restlessness, re-emerging psychosis in AM and persisting in late AM he recommended patient be evaluated by psychiatry. Currently on sinemet 1 tab 25/100 at 2PM, 5PM, 8PM, 1.5 tab 11AM. Also on desvenlafaxine ER. Patient had tried seroquel 25mg BID per Dr Langley (obtained collaterals from him) but wife states he did not have any marked improvement. He was also considering starting nuplazid potentially.      Recommendations:  [ ] basic infectious workup, address metabolic derangements.   [ ] discussed with Dr Langley, would like to continue current sinemet regimen. He has tried several medications in past including seroquel without patient improvement in symptoms. He is requesting patient be evaluated by psychiatry for medication management for his paranoia, psychosis.     Discussed with neurology attending Dr Donte Langley and will be formally staffed by attending during morning rounds. Recommendations will be finalized once signed by attending. Patient FP is a 82 y/o M PMHx parkinson's disease, CAD with stent, HLD, BPH, afib, p/w  hallucinations, anxiety and weakness. Has had recent difficulty ambulating up stairs due to LE weakness as well as new hallucinations few days ago. The night before admission pt was hallucinating, saying he is seeing a woman yelling at him. He also would wake up in the middle of the night and walk around, concerning for insomnia. Would wake up 3-4 times a night. Would also become very angry towards the wife which is not usual for him. The visiting nurse came by to check on his Pleurx cath and drain the liquid (he had gotten a Pleurx cath last month for pleural effusion and was discharged with it) and the RN noticed his personality changed and recommended ED admission. He drained 17 cc from the cath this AM. The patient was initially on seroquel 25mg but that was not working so it was increased to 100mg recently by his PCP.     Patient followed by Dr Donte Langley (neuro). Patient has had delusional thoughts for which rytary was discontinued. Per his last few communications w/ pt/family in april 2022 for increasing paranoia, poor sleep, restlessness, re-emerging psychosis in AM and persisting in late AM he recommended patient be evaluated by psychiatry. Currently on sinemet 25/100:  1.5 tab at 11AM and 1 tab at 2PM, 5PM, 8PM . Also on desvenlafaxine ER. Patient had tried seroquel 25mg BID per Dr Langley (obtained collaterals from him) but wife states he did not have any marked improvement. He was also considering starting nuplazid potentially.      Recommendations:  [ ] basic infectious workup, address metabolic derangements.   [ ] discussed with Dr Langley, would like to continue current sinemet regimen. He has tried several medications in past including seroquel without patient improvement in symptoms. He is requesting patient be evaluated by psychiatry for medication management for his paranoia, psychosis.     Discussed with neurology attending Dr Donte Langley and will be formally staffed by attending during morning rounds. Recommendations will be finalized once signed by attending.

## 2022-04-30 NOTE — BH CONSULTATION LIAISON ASSESSMENT NOTE - LEGAL HISTORY
Pt had water pill added to his medication about 9 days ago. He does get dizzy spells. Continue or modify medication? none known

## 2022-04-30 NOTE — BH CONSULTATION LIAISON ASSESSMENT NOTE - NSBHCHARTREVIEWLAB_PSY_A_CORE FT
CBC Full  -  ( 30 Apr 2022 05:01 )  WBC Count : 10.30 K/uL  RBC Count : 4.48 M/uL  Hemoglobin : 13.8 g/dL  Hematocrit : 42.1 %  Platelet Count - Automated : 176 K/uL  Mean Cell Volume : 94.0 fL  Mean Cell Hemoglobin : 30.8 pg  Mean Cell Hemoglobin Concentration : 32.8 gm/dL  Auto Neutrophil # : 7.75 K/uL  Auto Lymphocyte # : 1.81 K/uL  Auto Monocyte # : 0.65 K/uL  Auto Eosinophil # : 0.02 K/uL  Auto Basophil # : 0.04 K/uL  Auto Neutrophil % : 75.2 %  Auto Lymphocyte % : 17.6 %  Auto Monocyte % : 6.3 %  Auto Eosinophil % : 0.2 %  Auto Basophil % : 0.4 %  04-30    134<L>  |  92<L>  |  18  ----------------------------<  123<H>  3.4<L>   |  26  |  1.90<H>    Ca    9.3      30 Apr 2022 05:01  Phos  2.3     04-30  Mg     2.40     04-30    TPro  6.9  /  Alb  3.7  /  TBili  0.8  /  DBili  x   /  AST  31  /  ALT  15  /  AlkPhos  96  04-30

## 2022-04-30 NOTE — BH CONSULTATION LIAISON ASSESSMENT NOTE - MEDICAL RECORD REVIEWED
CLINICAL NUTRITION SERVICES    Reason for Assessment:  Low-sodium (2 g/day) nutrition education    Diet History:  Pt reports following low sodium diet, monitors sodium on food labels and does not add extra to foods. Declined need for further reveiw    Nutrition Diagnosis:  No diagnosis at this time     Nutrition Prescription/Recs:  Continue low-sodium diet.      Interventions:  Nutrition Education- reviewed low sodium diet principles, discussed patient may go over sodium for meals as long as within the recommendation of 2 g/day     Goals:    Pt will verbalize at least five high sodium foods and the importance of avoiding added salt to foods for cooking or seasoning foods.     Follow-up:   Patient to ask any further nutrition-related questions before discharge. In addition, pt may request outpatient RD appointment.    Elise Burrows RD, LD  6B pager: [phone]     Yes

## 2022-04-30 NOTE — BH CONSULTATION LIAISON ASSESSMENT NOTE - SUMMARY
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    Patient seen, alert to self, pleasantly confused, however, able to provide history, reports feeling "anxious," endorses visual hallucinations of seeing "people," dneies suicidal ideation, denies auditory hallucinations.        Collateral obtained from spouse Opal who reports change in spouse on Friday- combative towards her (first time), she reports patient has sleep disturbance which includes anxiety, agitation and combativeness starting at 11PM, she states Seroquel is ineffective, she is amenable to medication management of sleep disturbances.    Discussed with primary team prolonged qtc interval as well as recommendations below:    PLAN:  -DEFER to primary team-no psychiatric indication for 1:1 constant observation  -Labs: b12, folate, tsh  -Decrease Pristiq to 50mg daily as this is his home dose  -REPEAT EKG, if qtc is >500 considering consulted cardiology for manual calculation.  -If qtc <500: Change Seroquel to 50mg at 6PM (give dose now after repeat EKG and if qtc <500)                                  Seroquel 50mg 6AM  -Melatonin 3mg at 8PM  -If qtc <500: Anxiety/Agitation: Seroquel 25mg po q6h prn  -If qtc <500: Severe Agitation:   Zyprexa 1.25mg im q8h prn-DO not given Ativan im within 1 hour of Zyprexa im as can cause sedation and or respiratory depression  -Hold above psychotropics if qtc >500  -If qtc >500: Agitation: Ativan 0.5-1mg po/iv/im q6h prn-hold if rr<12, monitor for sedation       80yo , retired, male, domiciled with spouse with PMHx Parkinson's Disease, 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    Patient seen, alert to self, pleasantly confused, however, able to provide history, reports feeling "anxious," endorses visual hallucinations of seeing "people," dneies suicidal ideation, denies auditory hallucinations.        Collateral obtained from spouse Opal who reports change in spouse on Friday- combative towards her (first time), she reports patient has sleep disturbance which includes anxiety, agitation and combativeness starting at 11PM, she states Seroquel is ineffective, she is amenable to medication management of sleep disturbances.    Discussed with primary team prolonged qtc interval as well as recommendations below:    PLAN:  -DEFER to primary team-no psychiatric indication for 1:1 constant observation  -Labs: b12, folate, tsh  -Decrease Pristiq to 50mg daily as this is his home dose  -REPEAT EKG, if qtc is >500 considering consulted cardiology for manual calculation.  -If qtc <500: Change Seroquel to 50mg at 6PM (give dose now after repeat EKG and if qtc <500) AND                     Seroquel 50mg 6AM  -Melatonin 3mg at 8PM  -If qtc <500: Anxiety/Agitation: Seroquel 25mg po q6h prn  -If qtc <500: Severe Agitation:   Zyprexa 1.25mg im q8h prn-DO not given Ativan im within 1 hour of Zyprexa im as can cause sedation and or respiratory depression  -Hold above psychotropics if qtc >500  -If qtc >500: Agitation: Ativan 0.5mg-1mg po/iv/im q6h prn-hold if rr<12, monitor for sedation

## 2022-04-30 NOTE — BH CONSULTATION LIAISON ASSESSMENT NOTE - HPI (INCLUDE ILLNESS QUALITY, SEVERITY, DURATION, TIMING, CONTEXT, MODIFYING FACTORS, ASSOCIATED SIGNS AND SYMPTOMS)
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    Patient seen accompanied by spouse, Opal at bedside. Patient is calm, pleasant, confused to situation, month and year, however spouse states earlier patient was less confused. Patient states he feels "anxious," he endorses hx of depression for past 15 years-prescribed Pristiq by PCP, he denies any outpatient psychiatric treatment, denies any inpatient psychiatric admissions, he denies any hx si/sa, denies ah, endorses vh-seeing people, he denies any drug/etoh abuse, denies any legal issues.     Collateral obtained from spouse Opal who reports on Friday patient became combative towards her (first time), she also corroborates patient has visual hallucinations, denies auditory hallucinations, she reports patient wakes up combative, however, as day progresses he is more calm, she states patient will be drowsy from 6-10PM (she states since spouse switched to Sinemet from Rytary (cognitive problems) he is more sleepy) and she is unable to get him to go to bed, at approximately 11PM at night she gives patient his Seroquel 100mg (Seroquel managed by neurologist, Dr. Langley), however, she feels it is ineffective because patient becomes anxious, agitated and cannot sleep. She states thus far she has managed patient at home and is amenable to medication management for sleep disturbances.

## 2022-04-30 NOTE — PHYSICAL THERAPY INITIAL EVALUATION ADULT - PERTINENT HX OF CURRENT PROBLEM, REHAB EVAL
82 y/o M with pmhx of parkinson's disease, CAD with stent, HLD, BPH, afib, presented to the ED for hallucinations, anxiety and weakness. The patient does not have any obvious infectious cause at this time. Likely to be worsening of the parkinson's disease.

## 2022-04-30 NOTE — PROGRESS NOTE ADULT - SUBJECTIVE AND OBJECTIVE BOX
Patient is a 81y old  Male who presents with a chief complaint of hallucinations (2022 11:02)      HPI:  Afebrile    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    Parkinsons disease    CAD (coronary artery disease)  s/p PCI     Pleural effusion, not elsewhere classified    History of hip replacement, total  R hip  &amp; L hip    Status post cataract extraction  right eye cataract extraction with IOL 2015    Presence of cardiac pacemaker  10/2021    H/O coronary angioplasty  2021 1 stent inserted        Review of Systems:   CONSTITUTIONAL: No fever, weight loss, or fatigue  EYES: No eye pain, visual disturbances, or discharge  ENMT:  No difficulty hearing, tinnitus, vertigo; No sinus or throat pain  NECK: No pain or stiffness  BREASTS: No pain, masses, or nipple discharge  RESPIRATORY: No cough, wheezing, chills or hemoptysis; No shortness of breath  CARDIOVASCULAR: No chest pain, palpitations, dizziness, or leg swelling  GASTROINTESTINAL: No abdominal or epigastric pain. No nausea, vomiting, or hematemesis; No diarrhea or constipation. No melena or hematochezia.  GENITOURINARY: No dysuria, frequency, hematuria, or incontinence  NEUROLOGICAL: No headaches, memory loss, loss of strength, numbness, or tremors  SKIN: No itching, burning, rashes, or lesions   LYMPH NODES: No enlarged glands  ENDOCRINE: No heat or cold intolerance; No hair loss  MUSCULOSKELETAL: No joint pain or swelling; No muscle, back, or extremity pain  PSYCHIATRIC: No depression, anxiety, mood swings, or difficulty sleeping  HEME/LYMPH: No easy bruising, or bleeding gums  ALLERY AND IMMUNOLOGIC: No hives or eczema    Allergies    No Known Allergies    Intolerances        Social History:     FAMILY HISTORY:  Family history of lung cancer (Mother)    Family history of esophageal cancer (Father)    FH: myocardial infarction (Grandparent)        MEDICATIONS  (STANDING):  apixaban 2.5 milliGRAM(s) Oral two times a day  atorvastatin 40 milliGRAM(s) Oral at bedtime  carbidopa/levodopa  25/100 1.5 Tablet(s) Oral <User Schedule>  carbidopa/levodopa  25/100 1 Tablet(s) Oral <User Schedule>  clopidogrel Tablet 75 milliGRAM(s) Oral daily  desvenlafaxine ER 75 milliGRAM(s) Oral daily  lactated ringers. 1000 milliLiter(s) (75 mL/Hr) IV Continuous <Continuous>  polyethylene glycol 3350 17 Gram(s) Oral daily  senna 2 Tablet(s) Oral at bedtime    MEDICATIONS  (PRN):        CAPILLARY BLOOD GLUCOSE        I&O's Summary      PHYSICAL EXAM:  Vital Signs Last 24 Hrs  T(C): 36.6 (2022 17:00), Max: 37.6 (2022 21:14)  T(F): 97.8 (2022 17:00), Max: 99.6 (2022 21:14)  HR: 62 (2022 17:00) (60 - 62)  BP: 121/71 (2022 17:00) (121/71 - 145/60)  BP(mean): --  RR: 18 (2022 17:00) (18 - 18)  SpO2: 98% (2022 17:00) (98% - 98%)    GENERAL: NAD, well-developed  HEAD:  Atraumatic, Normocephalic  EYES: EOMI, PERRLA, conjunctiva and sclera clear  NECK: Supple, No JVD  CHEST/LUNG: Clear to auscultation bilaterally; No wheeze  HEART: Regular rate and rhythm; No murmurs, rubs, or gallops  ABDOMEN: Soft, Nontender, Nondistended; Bowel sounds present  EXTREMITIES:  2+ Peripheral Pulses, No clubbing, cyanosis, or edema  PSYCH: AAOx3  NEUROLOGY: non-focal  SKIN: No rashes or lesions    LABS:                        13.8   10.30 )-----------( 176      ( 2022 05:01 )             42.1     04-30    134<L>  |  92<L>  |  18  ----------------------------<  123<H>  3.4<L>   |  26  |  1.90<H>    Ca    9.3      2022 05:01  Phos  2.3     04-30  Mg     2.40     04-30    TPro  6.9  /  Alb  3.7  /  TBili  0.8  /  DBili  x   /  AST  31  /  ALT  15  /  AlkPhos  96  04-30      CARDIAC MARKERS ( 2022 12:59 )  x     / x     / 126 U/L / x     / 5.0 ng/mL      Urinalysis Basic - ( 2022 18:06 )    Color: Yellow / Appearance: Clear / S.017 / pH: x  Gluc: x / Ketone: Negative  / Bili: Negative / Urobili: 3 mg/dL   Blood: x / Protein: 300 mg/dL / Nitrite: Negative   Leuk Esterase: Negative / RBC: 529 /HPF / WBC 6 /HPF   Sq Epi: x / Non Sq Epi: 4 /HPF / Bacteria: Occasional        RADIOLOGY & ADDITIONAL TESTS:    Imaging Personally Reviewed:    Consultant(s) Notes Reviewed:      Care Discussed with Consultants/Other Providers:

## 2022-04-30 NOTE — BH CONSULTATION LIAISON ASSESSMENT NOTE - CURRENT MEDICATION
MEDICATIONS  (STANDING):  apixaban 2.5 milliGRAM(s) Oral two times a day  atorvastatin 40 milliGRAM(s) Oral at bedtime  carbidopa/levodopa  25/100 1.5 Tablet(s) Oral <User Schedule>  carbidopa/levodopa  25/100 1 Tablet(s) Oral <User Schedule>  clopidogrel Tablet 75 milliGRAM(s) Oral daily  desvenlafaxine ER 75 milliGRAM(s) Oral daily  lactated ringers. 1000 milliLiter(s) (75 mL/Hr) IV Continuous <Continuous>  polyethylene glycol 3350 17 Gram(s) Oral daily  senna 2 Tablet(s) Oral at bedtime    MEDICATIONS  (PRN):

## 2022-05-01 LAB
ANION GAP SERPL CALC-SCNC: 12 MMOL/L — SIGNIFICANT CHANGE UP (ref 7–14)
BUN SERPL-MCNC: 18 MG/DL — SIGNIFICANT CHANGE UP (ref 7–23)
CALCIUM SERPL-MCNC: 9.1 MG/DL — SIGNIFICANT CHANGE UP (ref 8.4–10.5)
CHLORIDE SERPL-SCNC: 91 MMOL/L — LOW (ref 98–107)
CO2 SERPL-SCNC: 30 MMOL/L — SIGNIFICANT CHANGE UP (ref 22–31)
CREAT SERPL-MCNC: 1.63 MG/DL — HIGH (ref 0.5–1.3)
EGFR: 43 ML/MIN/1.73M2 — LOW
FOLATE SERPL-MCNC: 17 NG/ML — SIGNIFICANT CHANGE UP (ref 3.1–17.5)
GLUCOSE SERPL-MCNC: 101 MG/DL — HIGH (ref 70–99)
MAGNESIUM SERPL-MCNC: 2.2 MG/DL — SIGNIFICANT CHANGE UP (ref 1.6–2.6)
PHOSPHATE SERPL-MCNC: 2.4 MG/DL — LOW (ref 2.5–4.5)
POTASSIUM SERPL-MCNC: 2.8 MMOL/L — CRITICAL LOW (ref 3.5–5.3)
POTASSIUM SERPL-SCNC: 2.8 MMOL/L — CRITICAL LOW (ref 3.5–5.3)
SODIUM SERPL-SCNC: 133 MMOL/L — LOW (ref 135–145)
TSH SERPL-MCNC: 1.07 UIU/ML — SIGNIFICANT CHANGE UP (ref 0.27–4.2)
VIT B12 SERPL-MCNC: 1167 PG/ML — HIGH (ref 200–900)

## 2022-05-01 PROCEDURE — 99223 1ST HOSP IP/OBS HIGH 75: CPT

## 2022-05-01 RX ORDER — POTASSIUM CHLORIDE 20 MEQ
10 PACKET (EA) ORAL
Refills: 0 | Status: COMPLETED | OUTPATIENT
Start: 2022-05-01 | End: 2022-05-01

## 2022-05-01 RX ORDER — POTASSIUM CHLORIDE 20 MEQ
40 PACKET (EA) ORAL EVERY 4 HOURS
Refills: 0 | Status: COMPLETED | OUTPATIENT
Start: 2022-05-01 | End: 2022-05-01

## 2022-05-01 RX ORDER — POTASSIUM PHOSPHATE, MONOBASIC POTASSIUM PHOSPHATE, DIBASIC 236; 224 MG/ML; MG/ML
30 INJECTION, SOLUTION INTRAVENOUS ONCE
Refills: 0 | Status: COMPLETED | OUTPATIENT
Start: 2022-05-01 | End: 2022-05-01

## 2022-05-01 RX ADMIN — Medication 100 MILLIEQUIVALENT(S): at 12:17

## 2022-05-01 RX ADMIN — CARBIDOPA AND LEVODOPA 1 TABLET(S): 25; 100 TABLET ORAL at 20:43

## 2022-05-01 RX ADMIN — CARBIDOPA AND LEVODOPA 1 TABLET(S): 25; 100 TABLET ORAL at 16:32

## 2022-05-01 RX ADMIN — DESVENLAFAXINE 50 MILLIGRAM(S): 50 TABLET, EXTENDED RELEASE ORAL at 11:37

## 2022-05-01 RX ADMIN — CLOPIDOGREL BISULFATE 75 MILLIGRAM(S): 75 TABLET, FILM COATED ORAL at 11:37

## 2022-05-01 RX ADMIN — Medication 100 MILLIEQUIVALENT(S): at 10:35

## 2022-05-01 RX ADMIN — CARBIDOPA AND LEVODOPA 1.5 TABLET(S): 25; 100 TABLET ORAL at 11:37

## 2022-05-01 RX ADMIN — Medication 40 MILLIEQUIVALENT(S): at 11:37

## 2022-05-01 RX ADMIN — APIXABAN 2.5 MILLIGRAM(S): 2.5 TABLET, FILM COATED ORAL at 16:32

## 2022-05-01 RX ADMIN — POLYETHYLENE GLYCOL 3350 17 GRAM(S): 17 POWDER, FOR SOLUTION ORAL at 11:31

## 2022-05-01 RX ADMIN — CARBIDOPA AND LEVODOPA 1 TABLET(S): 25; 100 TABLET ORAL at 13:03

## 2022-05-01 RX ADMIN — APIXABAN 2.5 MILLIGRAM(S): 2.5 TABLET, FILM COATED ORAL at 05:36

## 2022-05-01 RX ADMIN — Medication 100 MILLIEQUIVALENT(S): at 08:45

## 2022-05-01 RX ADMIN — POTASSIUM PHOSPHATE, MONOBASIC POTASSIUM PHOSPHATE, DIBASIC 83.33 MILLIMOLE(S): 236; 224 INJECTION, SOLUTION INTRAVENOUS at 08:20

## 2022-05-01 RX ADMIN — ATORVASTATIN CALCIUM 40 MILLIGRAM(S): 80 TABLET, FILM COATED ORAL at 20:43

## 2022-05-01 RX ADMIN — SENNA PLUS 2 TABLET(S): 8.6 TABLET ORAL at 20:43

## 2022-05-01 RX ADMIN — Medication 3 MILLIGRAM(S): at 20:44

## 2022-05-01 RX ADMIN — Medication 40 MILLIEQUIVALENT(S): at 15:20

## 2022-05-01 NOTE — CONSULT NOTE ADULT - ASSESSMENT
CARDIOLOGY ATTENDING    Agree with above. Continue lifelong a/c for AF, and asa 81mg daily for CAD. Patient's cardiologist is Billy Dickey ([facility]), and patient is already being seen by house cardiology on this admission (see note 4/29). Therefore will sign off, and defer all cardiology and EP care to house cardiology and house EP. D/W Zara

## 2022-05-01 NOTE — CONSULT NOTE ADULT - SUBJECTIVE AND OBJECTIVE BOX
HISTORY OF PRESENT ILLNESS: HPI:  This is a 80 y/o M with pmhx of parkinson's disease, CAD with stent, HLD, BPH, afib on Eliquis, PPM, presented to the ED for hallucinations, anxiety and weakness.  PT seen at bedside today, pleasantly confused, unable to obtain ROS, denies pain.  hx obtained from chart.     The patient himself endorse that yesterday he could not walk up the stairs with a walker because he couldn't lift his legs. The patient denies hx of falls or back pain or leg pain. He states he has no strength in his legs to lift to stand on the stairs.  He reports that his parkinson meds were changed recently 2 weeks ago. The patient also endorsed new hallucinations 2 days ago but has not had any new hallucinations recently. I spoke with the wife to obtain collateral, she reports that yesterday last night the patient was hallucinating, saying he is seeing a woman yelling at him. He also would wake up in the middle of the night and walks around, then goes back to sleep, then wakes up again. Would wake up 3-4 times a night, which had been happening for the last month. This morning the patient was more angry towards the wife which is not usual for him. The visiting nurse came by to check on his Pleurx cath and drain the liquid (he had gotten a Pleurx cath last month for pleural effusion and was discharged with it) and the RN noticed his personality changed and recommended ED admission. He drained 17 cc from the cath this AM. The patient was initially on seroquel 25mg but that was not working so it was increased to 100mg recently by his PCP. His parkinson's disease meds were also changed - his Rikarvy was discontinued and he was back on sinemet. Patient denies chest pain and shortness of breath. The patient also had a cystscopy outpatient with his urologist for unexplained frequent UTI and hematuria - they found granulation tissue on the prostate which is likely the source of the bleeding. (29 Apr 2022 22:02)      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    Parkinsons disease    CAD (coronary artery disease)  s/p PCI 08/21    Pleural effusion, not elsewhere classified    History of hip replacement, total  R hip 2008 &amp; L hip    Status post cataract extraction  right eye cataract extraction with IOL 6/26/2015    Presence of cardiac pacemaker  10/2021    H/O coronary angioplasty  8/2021 1 stent inserted      MEDICATIONS  (STANDING):  apixaban 2.5 milliGRAM(s) Oral two times a day  atorvastatin 40 milliGRAM(s) Oral at bedtime  carbidopa/levodopa  25/100 1.5 Tablet(s) Oral <User Schedule>  carbidopa/levodopa  25/100 1 Tablet(s) Oral <User Schedule>  clopidogrel Tablet 75 milliGRAM(s) Oral daily  desvenlafaxine ER 50 milliGRAM(s) Oral daily  lactated ringers. 1000 milliLiter(s) (75 mL/Hr) IV Continuous <Continuous>  melatonin 3 milliGRAM(s) Oral at bedtime  polyethylene glycol 3350 17 Gram(s) Oral daily  potassium chloride    Tablet ER 40 milliEquivalent(s) Oral every 4 hours  potassium chloride  10 mEq/100 mL IVPB 10 milliEquivalent(s) IV Intermittent every 1 hour  senna 2 Tablet(s) Oral at bedtime      Allergies  No Known Allergies      FAMILY HISTORY:  Family history of lung cancer (Mother)    Family history of esophageal cancer (Father)    FH: myocardial infarction (Grandparent)    Non-contributary for premature coronary disease or sudden cardiac death    SOCIAL HISTORY:    [ x] Non-smoker  [ ] Smoker  [ ] Alcohol    FLU VACCINE THIS YEAR STARTS IN AUGUST:  [ ] Yes    [ ] No    IF OVER 65 HAVE YOU EVER HAD A PNA VACCINE:  [ ] Yes    [ ] No       [ ] N/A      REVIEW OF SYSTEMS:  [ ]chest pain  [  ]shortness of breath  [  ]palpitations  [  ]syncope  [ ]near syncope [ ]upper extremity weakness   [ ] lower extremity weakness  [  ]diplopia  [  ]altered mental status   [  ]fevers  [ ]chills [ ]nausea  [ ]vomiting  [  ]dysphagia    [ ]abdominal pain  [ ]melena  [ ]BRBPR    [  ]epistaxis  [  ]rash    [ ]lower extremity edema        [x ] All others negative	  [ ] Unable to obtain      LABS:	 	    CARDIAC MARKERS:  CARDIAC MARKERS ( 29 Apr 2022 12:59 )  x     / x     / 126 U/L / x     / 5.0 ng/mL  TROP T 132, 104                          13.8   10.30 )-----------( 176      ( 30 Apr 2022 05:01 )             42.1       133<L>  |  91<L>  |  18  ----------------------------<  101<H>  2.8<LL>   |  30  |  1.63<H>    Ca    9.1      01 May 2022 07:04  Phos  2.4     05-01  Mg     2.20     05-01    TPro  6.9  /  Alb  3.7  /  TBili  0.8  /  DBili  x   /  AST  31  /  ALT  15  /  AlkPhos  96  04-30    Creatinine Trend: 1.63<--, 1.90<--, 1.99<--, 1.98<--    TSH: Thyroid Stimulating Hormone, Serum: 1.07 uIU/mL (05-01 @ 07:04)    PHYSICAL EXAM:  T(C): 36.8 (05-01-22 @ 04:33), Max: 36.8 (05-01-22 @ 04:33)  HR: 60 (05-01-22 @ 04:33) (60 - 62)  BP: 120/60 (05-01-22 @ 04:33) (120/60 - 137/62)  RR: 18 (05-01-22 @ 04:33) (18 - 18)  SpO2: 100% (05-01-22 @ 04:33) (97% - 100%)      Gen: Appears well in NAD  HEENT:  (-)icterus (-)pallor  CV: N S1 S2 1/6 ALYSHA (+)2 Pulses B/l  Resp:  Clear to ausculatation B/L, normal effort  GI: (+) BS Soft, NT, ND  Lymph:  (-)Edema, (-)obvious lymphadenopathy  Skin: Warm to touch, Normal turgor  Psych: pleasantly confused    TELEMETRY: 	      ECG:  	     < from: CT Head No Cont (04.29.22 @ 17:25) >    IMPRESSION:    No hydrocephalus, acute intracranial hemorrhage, mass effect, or brain   edema.    < end of copied text >      ASSESSMENT/PLAN: 	This is a 80 y/o M with pmhx of parkinson's disease, CAD with stent, HLD, BPH, afib on Eliquis, PPM, presented to the ED for hallucinations, anxiety and weakness. Cardio consulted for elevated Troponins and CAD/AF management    --Pt with elevated Trop t and normal CPK, not c/w ACS  --Pt without unstable anginal sx or decomp CHF  --interrogate PPM  --check TTE  --clarify GOC  --cont lifelong AC for AFib if no contraindications  --start SAPT (ASA 81mg QD) for hx CAD if no contraindications  --further reccs pending hospital course and GOC

## 2022-05-02 ENCOUNTER — APPOINTMENT (OUTPATIENT)
Dept: NEUROLOGY | Facility: CLINIC | Age: 82
End: 2022-05-02

## 2022-05-02 LAB
ANION GAP SERPL CALC-SCNC: 14 MMOL/L — SIGNIFICANT CHANGE UP (ref 7–14)
BUN SERPL-MCNC: 21 MG/DL — SIGNIFICANT CHANGE UP (ref 7–23)
CALCIUM SERPL-MCNC: 9.7 MG/DL — SIGNIFICANT CHANGE UP (ref 8.4–10.5)
CHLORIDE SERPL-SCNC: 99 MMOL/L — SIGNIFICANT CHANGE UP (ref 98–107)
CO2 SERPL-SCNC: 26 MMOL/L — SIGNIFICANT CHANGE UP (ref 22–31)
CREAT SERPL-MCNC: 1.75 MG/DL — HIGH (ref 0.5–1.3)
EGFR: 39 ML/MIN/1.73M2 — LOW
GLUCOSE SERPL-MCNC: 103 MG/DL — HIGH (ref 70–99)
MAGNESIUM SERPL-MCNC: 2 MG/DL — SIGNIFICANT CHANGE UP (ref 1.6–2.6)
PHOSPHATE SERPL-MCNC: 3.1 MG/DL — SIGNIFICANT CHANGE UP (ref 2.5–4.5)
POTASSIUM SERPL-MCNC: 4.1 MMOL/L — SIGNIFICANT CHANGE UP (ref 3.5–5.3)
POTASSIUM SERPL-SCNC: 4.1 MMOL/L — SIGNIFICANT CHANGE UP (ref 3.5–5.3)
SODIUM SERPL-SCNC: 139 MMOL/L — SIGNIFICANT CHANGE UP (ref 135–145)

## 2022-05-02 PROCEDURE — 99232 SBSQ HOSP IP/OBS MODERATE 35: CPT

## 2022-05-02 PROCEDURE — 93306 TTE W/DOPPLER COMPLETE: CPT | Mod: 26

## 2022-05-02 PROCEDURE — 99222 1ST HOSP IP/OBS MODERATE 55: CPT

## 2022-05-02 PROCEDURE — 93010 ELECTROCARDIOGRAM REPORT: CPT

## 2022-05-02 PROCEDURE — 93280 PM DEVICE PROGR EVAL DUAL: CPT | Mod: 26

## 2022-05-02 RX ORDER — ACETAMINOPHEN 500 MG
650 TABLET ORAL ONCE
Refills: 0 | Status: COMPLETED | OUTPATIENT
Start: 2022-05-02 | End: 2022-05-02

## 2022-05-02 RX ORDER — ASPIRIN/CALCIUM CARB/MAGNESIUM 324 MG
81 TABLET ORAL DAILY
Refills: 0 | Status: DISCONTINUED | OUTPATIENT
Start: 2022-05-02 | End: 2022-05-11

## 2022-05-02 RX ADMIN — DESVENLAFAXINE 50 MILLIGRAM(S): 50 TABLET, EXTENDED RELEASE ORAL at 12:00

## 2022-05-02 RX ADMIN — CARBIDOPA AND LEVODOPA 1 TABLET(S): 25; 100 TABLET ORAL at 21:02

## 2022-05-02 RX ADMIN — ATORVASTATIN CALCIUM 40 MILLIGRAM(S): 80 TABLET, FILM COATED ORAL at 21:01

## 2022-05-02 RX ADMIN — Medication 650 MILLIGRAM(S): at 23:51

## 2022-05-02 RX ADMIN — CARBIDOPA AND LEVODOPA 1 TABLET(S): 25; 100 TABLET ORAL at 14:18

## 2022-05-02 RX ADMIN — Medication 3 MILLIGRAM(S): at 21:00

## 2022-05-02 RX ADMIN — APIXABAN 2.5 MILLIGRAM(S): 2.5 TABLET, FILM COATED ORAL at 17:05

## 2022-05-02 RX ADMIN — APIXABAN 2.5 MILLIGRAM(S): 2.5 TABLET, FILM COATED ORAL at 05:01

## 2022-05-02 RX ADMIN — CLOPIDOGREL BISULFATE 75 MILLIGRAM(S): 75 TABLET, FILM COATED ORAL at 12:01

## 2022-05-02 RX ADMIN — POLYETHYLENE GLYCOL 3350 17 GRAM(S): 17 POWDER, FOR SOLUTION ORAL at 12:07

## 2022-05-02 RX ADMIN — CARBIDOPA AND LEVODOPA 1 TABLET(S): 25; 100 TABLET ORAL at 17:05

## 2022-05-02 RX ADMIN — Medication 81 MILLIGRAM(S): at 12:01

## 2022-05-02 RX ADMIN — SENNA PLUS 2 TABLET(S): 8.6 TABLET ORAL at 21:01

## 2022-05-02 RX ADMIN — CARBIDOPA AND LEVODOPA 1.5 TABLET(S): 25; 100 TABLET ORAL at 12:01

## 2022-05-02 NOTE — PROGRESS NOTE ADULT - ASSESSMENT
80 y/o M with pmhx of parkinson's disease, CAD with stent, HLD, BPH, afib, presented to the ED for hallucinations, anxiety and weakness. The patient does not have any obvious infectious cause at this time. Likely to be worsening of the parkinson's disease. 07-Nov-2018 21:58

## 2022-05-02 NOTE — BH CONSULTATION LIAISON PROGRESS NOTE - NSBHASSESSMENTFT_PSY_ALL_CORE
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    On initial assessment, Patient seen, alert to self, pleasantly confused, however, able to provide history, reports feeling "anxious," endorses visual hallucinations of seeing "people," denies suicidal ideation, denies auditory hallucinations. Collateral obtained from spouse Opal who reports change in spouse on Friday- combative towards her (first time), she reports patient has sleep disturbance which includes anxiety, agitation and combativeness starting at 11PM, she states Seroquel is ineffective, she is amenable to medication management of sleep disturbances.    5/2 - Pt calm and cooperative, appears withdrawn, denies AVH, SIIP, HIIP    PLAN:  -DEFER to primary team-no psychiatric indication for 1:1 constant observation  -Reviewed labs: b12, folate, tsh - wnl  -continue Pristiq 50mg daily per home dose  -considered Abilify to target psychosis, least risks of QTc prolongation and EPS, however Pt refused at this time   -REPEAT EKG, if qtc is >500 considering consulted cardiology for manual calculation.  -If qtc <500: Change Seroquel to 50mg at 6PM (give dose now after repeat EKG and if qtc <500) AND                     Seroquel 50mg 6AM  -Melatonin 3mg at 8PM  -If qtc <500: Anxiety/Agitation: Seroquel 25mg po q6h prn  -If qtc <500: Severe Agitation:   Zyprexa 1.25mg im q8h prn-DO not given Ativan im within 1 hour of Zyprexa im as can cause sedation and or respiratory depression  -Hold above psychotropics if qtc >500  -If qtc >500: Agitation: Ativan 0.5mg-1mg po/iv/im q6h prn-hold if rr<12, monitor for sedation

## 2022-05-02 NOTE — PROCEDURE NOTE - ADDITIONAL PROCEDURE DETAILS
Device sensing and pacing well.  Ventricular capture threshold good: valuated via iterative testing.  Atrial capture threshold not evaluated due to patient in AFib.   AT/AFib burden 100%. Patient on Eliquis.   Battery life expectancy 10.5 years. .   No NSVT/VT epiosdes.   No reprogramming.  Patient followed at [facility].

## 2022-05-02 NOTE — BH CONSULTATION LIAISON PROGRESS NOTE - NSBHFUPINTERVALHXFT_PSY_A_CORE
Chart reviewed. Case discussed in interdisciplinary rounds. No PRNs required for agitation. Medication adherent. On interview, Pt is cooperative, AAOx2 to self and location (not date). Pt appears withdrawn with eyes closed. Spoke with Pt's wife Opal at bedside. Pt reports mood as "good", denies feeling depressed or anxious since admission. Pt denies current paranoia, visual or auditory hallucinations. Pt and wife both report hx of AVH of seeing and hearing another woman at home. Denies any suicidality or homicidality. Provided psychoeducation on adding Abilify to target AVH and augment mood - Pt was previously prescribed Abilify 10mg qd by general practitioner but discontinued to avoid worsening parkinsonian symptoms; Pt denies any AVH or paranoia while taking Abilify. Discussed risks (i.e. worsening QTc prolongation, torsades de pointe, and EPS), with caveat that Abilify has the lowest risks of side effect compared to other antipsychotics. Pt appreciates risks/benefits however refuses to start Abilify for now as he prefers to minimize polypharmacy.

## 2022-05-02 NOTE — PROGRESS NOTE ADULT - SUBJECTIVE AND OBJECTIVE BOX
Patient is a 81y old  Male who presents with a chief complaint of hallucinations (2022 11:02)    2022  HPI:  Afebrile  No distress  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    Parkinsons disease    CAD (coronary artery disease)  s/p PCI     Pleural effusion, not elsewhere classified    History of hip replacement, total  R hip  &amp; L hip    Status post cataract extraction  right eye cataract extraction with IOL 2015    Presence of cardiac pacemaker  10/2021    H/O coronary angioplasty  2021 1 stent inserted        Review of Systems:   CONSTITUTIONAL: No fever, weight loss, or fatigue  EYES: No eye pain, visual disturbances, or discharge  ENMT:  No difficulty hearing, tinnitus, vertigo; No sinus or throat pain  NECK: No pain or stiffness  BREASTS: No pain, masses, or nipple discharge  RESPIRATORY: No cough, wheezing, chills or hemoptysis; No shortness of breath  CARDIOVASCULAR: No chest pain, palpitations, dizziness, or leg swelling  GASTROINTESTINAL: No abdominal or epigastric pain. No nausea, vomiting, or hematemesis; No diarrhea or constipation. No melena or hematochezia.  GENITOURINARY: No dysuria, frequency, hematuria, or incontinence  NEUROLOGICAL: No headaches, memory loss, loss of strength, numbness, or tremors  SKIN: No itching, burning, rashes, or lesions   LYMPH NODES: No enlarged glands  ENDOCRINE: No heat or cold intolerance; No hair loss  MUSCULOSKELETAL: No joint pain or swelling; No muscle, back, or extremity pain  PSYCHIATRIC: No depression, anxiety, mood swings, or difficulty sleeping  HEME/LYMPH: No easy bruising, or bleeding gums  ALLERY AND IMMUNOLOGIC: No hives or eczema    Allergies    No Known Allergies    Intolerances        Social History:     FAMILY HISTORY:  Family history of lung cancer (Mother)    Family history of esophageal cancer (Father)    FH: myocardial infarction (Grandparent)        MEDICATIONS  (STANDING):  apixaban 2.5 milliGRAM(s) Oral two times a day  atorvastatin 40 milliGRAM(s) Oral at bedtime  carbidopa/levodopa  25/100 1.5 Tablet(s) Oral <User Schedule>  carbidopa/levodopa  25/100 1 Tablet(s) Oral <User Schedule>  clopidogrel Tablet 75 milliGRAM(s) Oral daily  desvenlafaxine ER 75 milliGRAM(s) Oral daily  lactated ringers. 1000 milliLiter(s) (75 mL/Hr) IV Continuous <Continuous>  polyethylene glycol 3350 17 Gram(s) Oral daily  senna 2 Tablet(s) Oral at bedtime    MEDICATIONS  (PRN):        CAPILLARY BLOOD GLUCOSE        I&O's Summary      PHYSICAL EXAM:  Vital Signs Last 24 Hrs  T(C): 36.6 (2022 17:00), Max: 37.6 (2022 21:14)  T(F): 97.8 (2022 17:00), Max: 99.6 (2022 21:14)  HR: 62 (:00) (60 - 62)  BP: 121/71 (2022 17:00) (121/71 - 145/60)  BP(mean): --  RR: 18 (2022 17:00) (18 - 18)  SpO2: 98% (2022 17:00) (98% - 98%)    GENERAL: NAD, well-developed  HEAD:  Atraumatic, Normocephalic  EYES: EOMI, PERRLA, conjunctiva and sclera clear  NECK: Supple, No JVD  CHEST/LUNG: Clear to auscultation bilaterally; No wheeze  HEART: Regular rate and rhythm; No murmurs, rubs, or gallops  ABDOMEN: Soft, Nontender, Nondistended; Bowel sounds present  EXTREMITIES:  2+ Peripheral Pulses, No clubbing, cyanosis, or edema  PSYCH: AAOx3  NEUROLOGY: non-focal  SKIN: No rashes or lesions    LABS:                        13.8   10.30 )-----------( 176      ( 2022 05:01 )             42.1     04-30    134<L>  |  92<L>  |  18  ----------------------------<  123<H>  3.4<L>   |  26  |  1.90<H>    Ca    9.3      2022 05:01  Phos  2.3     04-30  Mg     2.40     04-30    TPro  6.9  /  Alb  3.7  /  TBili  0.8  /  DBili  x   /  AST  31  /  ALT  15  /  AlkPhos  96  04-30      CARDIAC MARKERS ( 2022 12:59 )  x     / x     / 126 U/L / x     / 5.0 ng/mL      Urinalysis Basic - ( 2022 18:06 )    Color: Yellow / Appearance: Clear / S.017 / pH: x  Gluc: x / Ketone: Negative  / Bili: Negative / Urobili: 3 mg/dL   Blood: x / Protein: 300 mg/dL / Nitrite: Negative   Leuk Esterase: Negative / RBC: 529 /HPF / WBC 6 /HPF   Sq Epi: x / Non Sq Epi: 4 /HPF / Bacteria: Occasional        RADIOLOGY & ADDITIONAL TESTS:    Imaging Personally Reviewed:    Consultant(s) Notes Reviewed:      Care Discussed with Consultants/Other Providers:

## 2022-05-02 NOTE — PROGRESS NOTE ADULT - SUBJECTIVE AND OBJECTIVE BOX
Movement Disorders    Patient is well known to me. He has PD and has developed paranoia and hallucinations over the past several weeks. Changes in levodopa regimen provided improvement in his daytime symptoms, but continue to have overnight psychosis, insomnia and restlessness that spread to the morning hours in the last 1-2 weeks. Trial of exelon was not beneficial and addition of seroquel with uptitration at home has had variable efficacy. Use of trazodone and valium at night also had little effects on his insomnia.     Since admission, patient has been calmer and is on seroquel 50mg BID per     On exam, he was drowsy this morning. Followed commands well. Tone was 1+ with mild resting tremors. His bradykinesia was mild-moderate.     Impression: PD with emergence of neurobehavioral dysregulation suggestive of progression to PDD    - leave carbidopa/levodopa regimen unchanged  - appreciate  recommendations and await f/u.    Movement Disorders    Patient is well known to me. He has PD and has developed paranoia and hallucinations over the past several weeks. Changes in levodopa regimen provided improvement in his daytime symptoms, but continue to have overnight psychosis, insomnia and restlessness that spread to the morning hours in the last 1-2 weeks. Trial of exelon was not beneficial and addition of seroquel with uptitration at home has had variable efficacy. Use of trazodone and valium at night also had little effects on his insomnia.     Since admission, patient has been calmer and is on seroquel 50mg BID per     On exam, he was drowsy this morning. Followed commands well. Tone was 1+ with mild resting tremors. His bradykinesia was mild-moderate.     Impression: PD with emergence of neurobehavioral dysregulation suggestive of progression to PDD    - leave carbidopa/levodopa regimen unchanged  - appreciate  recommendations and await f/u.   - PT  Movement Disorders    Patient is well known to me. He has PD and has developed paranoia and hallucinations over the past several weeks. Changes in levodopa regimen provided improvement in his daytime symptoms, but continue to have overnight psychosis, insomnia and restlessness that spread to the morning hours in the last 1-2 weeks. Trial of exelon was not beneficial and addition of seroquel with uptitration at home has had variable efficacy. Use of trazodone and valium at night also had little effects on his insomnia.     Since admission, patient has been calmer and is on seroquel 50mg BID per     On exam, he was drowsy this morning. Followed commands well. Tone was 1+ with mild resting tremors. His bradykinesia was mild-moderate.     Impression: PD with emergence of neurobehavioral dysregulation suggestive of progression to PDD vs. exacerbation of underlying mood disorder due to recent medical conditions.     - leave carbidopa/levodopa regimen unchanged  - appreciate  recommendations and await f/u.   - PT

## 2022-05-03 LAB
ANION GAP SERPL CALC-SCNC: 15 MMOL/L — HIGH (ref 7–14)
BASOPHILS # BLD AUTO: 0.05 K/UL — SIGNIFICANT CHANGE UP (ref 0–0.2)
BASOPHILS NFR BLD AUTO: 0.5 % — SIGNIFICANT CHANGE UP (ref 0–2)
BUN SERPL-MCNC: 23 MG/DL — SIGNIFICANT CHANGE UP (ref 7–23)
CALCIUM SERPL-MCNC: 9.1 MG/DL — SIGNIFICANT CHANGE UP (ref 8.4–10.5)
CHLORIDE SERPL-SCNC: 98 MMOL/L — SIGNIFICANT CHANGE UP (ref 98–107)
CO2 SERPL-SCNC: 26 MMOL/L — SIGNIFICANT CHANGE UP (ref 22–31)
CREAT ?TM UR-MCNC: 122 MG/DL — SIGNIFICANT CHANGE UP
CREAT SERPL-MCNC: 1.72 MG/DL — HIGH (ref 0.5–1.3)
EGFR: 40 ML/MIN/1.73M2 — LOW
EOSINOPHIL # BLD AUTO: 0.21 K/UL — SIGNIFICANT CHANGE UP (ref 0–0.5)
EOSINOPHIL NFR BLD AUTO: 2.2 % — SIGNIFICANT CHANGE UP (ref 0–6)
GLUCOSE SERPL-MCNC: 94 MG/DL — SIGNIFICANT CHANGE UP (ref 70–99)
HCT VFR BLD CALC: 39.4 % — SIGNIFICANT CHANGE UP (ref 39–50)
HGB BLD-MCNC: 13 G/DL — SIGNIFICANT CHANGE UP (ref 13–17)
IANC: 6.85 K/UL — SIGNIFICANT CHANGE UP (ref 1.8–7.4)
IMM GRANULOCYTES NFR BLD AUTO: 0.1 % — SIGNIFICANT CHANGE UP (ref 0–1.5)
LYMPHOCYTES # BLD AUTO: 1.91 K/UL — SIGNIFICANT CHANGE UP (ref 1–3.3)
LYMPHOCYTES # BLD AUTO: 19.8 % — SIGNIFICANT CHANGE UP (ref 13–44)
MAGNESIUM SERPL-MCNC: 2 MG/DL — SIGNIFICANT CHANGE UP (ref 1.6–2.6)
MCHC RBC-ENTMCNC: 31 PG — SIGNIFICANT CHANGE UP (ref 27–34)
MCHC RBC-ENTMCNC: 33 GM/DL — SIGNIFICANT CHANGE UP (ref 32–36)
MCV RBC AUTO: 94 FL — SIGNIFICANT CHANGE UP (ref 80–100)
MONOCYTES # BLD AUTO: 0.64 K/UL — SIGNIFICANT CHANGE UP (ref 0–0.9)
MONOCYTES NFR BLD AUTO: 6.6 % — SIGNIFICANT CHANGE UP (ref 2–14)
NEUTROPHILS # BLD AUTO: 6.85 K/UL — SIGNIFICANT CHANGE UP (ref 1.8–7.4)
NEUTROPHILS NFR BLD AUTO: 70.8 % — SIGNIFICANT CHANGE UP (ref 43–77)
NRBC # BLD: 0 /100 WBCS — SIGNIFICANT CHANGE UP
NRBC # FLD: 0 K/UL — SIGNIFICANT CHANGE UP
OSMOLALITY UR: 484 MOSM/KG — SIGNIFICANT CHANGE UP (ref 50–1200)
PHOSPHATE SERPL-MCNC: 3 MG/DL — SIGNIFICANT CHANGE UP (ref 2.5–4.5)
PLATELET # BLD AUTO: 178 K/UL — SIGNIFICANT CHANGE UP (ref 150–400)
POTASSIUM SERPL-MCNC: 3.2 MMOL/L — LOW (ref 3.5–5.3)
POTASSIUM SERPL-SCNC: 3.2 MMOL/L — LOW (ref 3.5–5.3)
RBC # BLD: 4.19 M/UL — LOW (ref 4.2–5.8)
RBC # FLD: 17.2 % — HIGH (ref 10.3–14.5)
SARS-COV-2 RNA SPEC QL NAA+PROBE: SIGNIFICANT CHANGE UP
SODIUM SERPL-SCNC: 139 MMOL/L — SIGNIFICANT CHANGE UP (ref 135–145)
SODIUM UR-SCNC: <20 MMOL/L — SIGNIFICANT CHANGE UP
UUN UR-MCNC: 761 MG/DL — SIGNIFICANT CHANGE UP
WBC # BLD: 9.67 K/UL — SIGNIFICANT CHANGE UP (ref 3.8–10.5)
WBC # FLD AUTO: 9.67 K/UL — SIGNIFICANT CHANGE UP (ref 3.8–10.5)

## 2022-05-03 PROCEDURE — 99232 SBSQ HOSP IP/OBS MODERATE 35: CPT

## 2022-05-03 PROCEDURE — 93010 ELECTROCARDIOGRAM REPORT: CPT

## 2022-05-03 RX ORDER — LIDOCAINE 4 G/100G
1 CREAM TOPICAL DAILY
Refills: 0 | Status: DISCONTINUED | OUTPATIENT
Start: 2022-05-03 | End: 2022-05-12

## 2022-05-03 RX ORDER — ARIPIPRAZOLE 15 MG/1
2.5 TABLET ORAL DAILY
Refills: 0 | Status: DISCONTINUED | OUTPATIENT
Start: 2022-05-03 | End: 2022-05-05

## 2022-05-03 RX ORDER — POTASSIUM CHLORIDE 20 MEQ
40 PACKET (EA) ORAL EVERY 4 HOURS
Refills: 0 | Status: COMPLETED | OUTPATIENT
Start: 2022-05-03 | End: 2022-05-03

## 2022-05-03 RX ADMIN — Medication 81 MILLIGRAM(S): at 11:55

## 2022-05-03 RX ADMIN — DESVENLAFAXINE 50 MILLIGRAM(S): 50 TABLET, EXTENDED RELEASE ORAL at 11:55

## 2022-05-03 RX ADMIN — ARIPIPRAZOLE 2.5 MILLIGRAM(S): 15 TABLET ORAL at 21:52

## 2022-05-03 RX ADMIN — LIDOCAINE 1 PATCH: 4 CREAM TOPICAL at 18:02

## 2022-05-03 RX ADMIN — Medication 40 MILLIEQUIVALENT(S): at 09:20

## 2022-05-03 RX ADMIN — LIDOCAINE 1 PATCH: 4 CREAM TOPICAL at 16:24

## 2022-05-03 RX ADMIN — APIXABAN 2.5 MILLIGRAM(S): 2.5 TABLET, FILM COATED ORAL at 05:10

## 2022-05-03 RX ADMIN — APIXABAN 2.5 MILLIGRAM(S): 2.5 TABLET, FILM COATED ORAL at 16:24

## 2022-05-03 RX ADMIN — SENNA PLUS 2 TABLET(S): 8.6 TABLET ORAL at 21:45

## 2022-05-03 RX ADMIN — CARBIDOPA AND LEVODOPA 1 TABLET(S): 25; 100 TABLET ORAL at 13:07

## 2022-05-03 RX ADMIN — CARBIDOPA AND LEVODOPA 1 TABLET(S): 25; 100 TABLET ORAL at 20:45

## 2022-05-03 RX ADMIN — ATORVASTATIN CALCIUM 40 MILLIGRAM(S): 80 TABLET, FILM COATED ORAL at 21:45

## 2022-05-03 RX ADMIN — Medication 0.5 MILLIGRAM(S): at 11:55

## 2022-05-03 RX ADMIN — Medication 650 MILLIGRAM(S): at 00:51

## 2022-05-03 RX ADMIN — CARBIDOPA AND LEVODOPA 1 TABLET(S): 25; 100 TABLET ORAL at 16:24

## 2022-05-03 RX ADMIN — CARBIDOPA AND LEVODOPA 1.5 TABLET(S): 25; 100 TABLET ORAL at 11:55

## 2022-05-03 RX ADMIN — Medication 3 MILLIGRAM(S): at 21:45

## 2022-05-03 RX ADMIN — Medication 0.5 MILLIGRAM(S): at 21:39

## 2022-05-03 RX ADMIN — CLOPIDOGREL BISULFATE 75 MILLIGRAM(S): 75 TABLET, FILM COATED ORAL at 11:55

## 2022-05-03 RX ADMIN — Medication 40 MILLIEQUIVALENT(S): at 13:07

## 2022-05-03 NOTE — BH CONSULTATION LIAISON PROGRESS NOTE - NSBHFUPINTERVALHXFT_PSY_A_CORE
Chart reviewed. Case discussed in interdisciplinary rounds. No PRNs required for agitation. Medication adherent. On interview, Pt is cooperative, AAOx2 to self and location; not date - thought it was March 2020. Pt appears more anxious, complains of back pain but unable to elaborate on details -- received Ativan 0.5mg PRN x1 at 11am with modest effect. Reports mood as "terrible". Pt is more disorganized today, refers to his treatment plan as a "program" where he "needs to speak with candidates". Pt denies paranoia, visual or auditory hallucinations. Denies any suicidality or homicidality. Reports difficulty falling and staying sleep. Spoke with Pt's wife Opal at bedside. Both Pt and wife are supportive to start low dose Abilify 2.5mg daily to target mood.

## 2022-05-03 NOTE — BH CONSULTATION LIAISON PROGRESS NOTE - CASE SUMMARY
patient more confused, disorganized today, would begin abilify as above as patient tolerated this in the past, risks/benefits discussed with patient/wife patient more confused, disorganized today, would begin abilify as above as patient tolerated this in the past, risks/benefits discussed with patient/wife. patient more confused, disorganized today, would begin abilify as above as patient tolerated this in the past, risks/benefits discussed with patient/wife. would f/u EKG and consider manual correction of QTc given wide QRS and pacing spikes. Abilify has low EPS risk and low QTc risk however would QTc w/EKG daily

## 2022-05-03 NOTE — BH CONSULTATION LIAISON PROGRESS NOTE - NSBHASSESSMENTFT_PSY_ALL_CORE
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    On initial assessment, Patient seen, alert to self, pleasantly confused, however, able to provide history, reports feeling "anxious," endorses visual hallucinations of seeing "people," denies suicidal ideation, denies auditory hallucinations. Collateral obtained from spouse Opal who reports change in spouse on Friday- combative towards her (first time), she reports patient has sleep disturbance which includes anxiety, agitation and combativeness starting at 11PM, she states Seroquel is ineffective, she is amenable to medication management of sleep disturbances.    5/2 - Pt calm and cooperative, appears withdrawn, denies AVH, SIIP, HIIP  5/3- Pt anxious this morning, received PRN Ativan 0.5mg with modest effect. AAOx2, disorganized at times (refers to treatment as a "program"). No SI/HI. Pt and wife agreed to start Abilify 2.5mg to target psychosis and mood.     PLAN:  -DEFER to primary team-no psychiatric indication for 1:1 constant observation  -Reviewed labs: b12, folate, tsh - wnl  -continue Pristiq 50mg daily per home dose  -*start Abilify 2.5mg daily to target psychosis, MONITOR QTc and EPS Sx  -REPEAT EKG, if qtc is >500 considering consulted cardiology for manual calculation.  -If qtc <500: Change Seroquel to 50mg at 6PM (give dose now after repeat EKG and if qtc <500) AND                     Seroquel 50mg 6AM  -Melatonin 3mg at 8PM  -If qtc <500: Anxiety/Agitation: Seroquel 25mg po q6h prn  -If qtc <500: Severe Agitation:   Zyprexa 1.25mg im q8h prn-DO not given Ativan im within 1 hour of Zyprexa im as can cause sedation and or respiratory depression  -Hold above psychotropics if qtc >500  -If qtc >500: Agitation: Ativan 0.5mg-1mg po/iv/im q6h prn-hold if rr<12, monitor for sedation  -Psych will continue to follow, and will provide outpatient referrals  82yo , retired, male, domiciled with spouse with PMHx Parkinson's Disease, 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    On initial assessment, Patient seen, alert to self, pleasantly confused, however, able to provide history, reports feeling "anxious," endorses visual hallucinations of seeing "people," denies suicidal ideation, denies auditory hallucinations. Collateral obtained from spouse Opal who reports change in spouse on Friday- combative towards her (first time), she reports patient has sleep disturbance which includes anxiety, agitation and combativeness starting at 11PM, she states Seroquel is ineffective, she is amenable to medication management of sleep disturbances.    5/2 - Pt calm and cooperative, appears withdrawn, denies AVH, SIIP, HIIP  5/3- Pt anxious this morning, received PRN Ativan 0.5mg with modest effect. AAOx2, disorganized at times (refers to treatment as a "program"). No SI/HI. Pt and wife agreed to start Abilify 2.5mg to target psychosis and mood.     PLAN:  -DEFER to primary team-no psychiatric indication for 1:1 constant observation  -consider manual correction for QTc w/cardio given pacing spikes, wide QRS  -Reviewed labs: b12, folate, tsh - wnl  -continue Pristiq 50mg daily per home dose  -*start Abilify 2.5mg daily to target psychosis, MONITOR QTc and EPS Sx  -REPEAT EKG, if qtc is >500 considering consulted cardiology for manual calculation.  -If qtc <500: Change Seroquel to 50mg at 6PM (give dose now after repeat EKG and if qtc <500) AND                     Seroquel 50mg 6AM  -Melatonin 3mg at 8PM  -If qtc <500: Anxiety/Agitation: Seroquel 25mg po q6h prn  -If qtc <500: Severe Agitation:   Zyprexa 1.25mg im q8h prn-DO not given Ativan im within 1 hour of Zyprexa im as can cause sedation and or respiratory depression  -Hold above psychotropics if qtc >500  -If qtc >500: Agitation: Ativan 0.5mg-1mg po/iv/im q6h prn-hold if rr<12, monitor for sedation  -Psych will continue to follow, and will provide outpatient referrals

## 2022-05-03 NOTE — PROGRESS NOTE ADULT - SUBJECTIVE AND OBJECTIVE BOX
Patient is a 81y old  Male who presents with a chief complaint of hallucinations (2022 11:02)    5/3/2022  HPI:  Afebrile  Confused, appears uncomfortable.  No distress  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    Parkinsons disease    CAD (coronary artery disease)  s/p PCI     Pleural effusion, not elsewhere classified    History of hip replacement, total  R hip  &amp; L hip    Status post cataract extraction  right eye cataract extraction with IOL 2015    Presence of cardiac pacemaker  10/2021    H/O coronary angioplasty  2021 1 stent inserted        Review of Systems:   CONSTITUTIONAL: No fever, weight loss, or fatigue  EYES: No eye pain, visual disturbances, or discharge  ENMT:  No difficulty hearing, tinnitus, vertigo; No sinus or throat pain  NECK: No pain or stiffness  BREASTS: No pain, masses, or nipple discharge  RESPIRATORY: No cough, wheezing, chills or hemoptysis; No shortness of breath  CARDIOVASCULAR: No chest pain, palpitations, dizziness, or leg swelling  GASTROINTESTINAL: No abdominal or epigastric pain. No nausea, vomiting, or hematemesis; No diarrhea or constipation. No melena or hematochezia.  GENITOURINARY: No dysuria, frequency, hematuria, or incontinence  NEUROLOGICAL: No headaches, memory loss, loss of strength, numbness, or tremors  SKIN: No itching, burning, rashes, or lesions   LYMPH NODES: No enlarged glands  ENDOCRINE: No heat or cold intolerance; No hair loss  MUSCULOSKELETAL: No joint pain or swelling; No muscle, back, or extremity pain  PSYCHIATRIC: No depression, anxiety, mood swings, or difficulty sleeping  HEME/LYMPH: No easy bruising, or bleeding gums  ALLERY AND IMMUNOLOGIC: No hives or eczema    Allergies    No Known Allergies    Intolerances        Social History:     FAMILY HISTORY:  Family history of lung cancer (Mother)    Family history of esophageal cancer (Father)    FH: myocardial infarction (Grandparent)        MEDICATIONS  (STANDING):  apixaban 2.5 milliGRAM(s) Oral two times a day  atorvastatin 40 milliGRAM(s) Oral at bedtime  carbidopa/levodopa  25/100 1.5 Tablet(s) Oral <User Schedule>  carbidopa/levodopa  25/100 1 Tablet(s) Oral <User Schedule>  clopidogrel Tablet 75 milliGRAM(s) Oral daily  desvenlafaxine ER 75 milliGRAM(s) Oral daily  lactated ringers. 1000 milliLiter(s) (75 mL/Hr) IV Continuous <Continuous>  polyethylene glycol 3350 17 Gram(s) Oral daily  senna 2 Tablet(s) Oral at bedtime    MEDICATIONS  (PRN):        CAPILLARY BLOOD GLUCOSE        I&O's Summary      PHYSICAL EXAM:  Vital Signs Last 24 Hrs  T(C): 36.6 (2022 17:00), Max: 37.6 (2022 21:14)  T(F): 97.8 (2022 17:00), Max: 99.6 (2022 21:14)  HR: 62 (2022 17:00) (60 - 62)  BP: 121/71 (2022 17:00) (121/71 - 145/60)  BP(mean): --  RR: 18 (2022 17:00) (18 - 18)  SpO2: 98% (2022 17:00) (98% - 98%)    GENERAL: NAD, well-developed  HEAD:  Atraumatic, Normocephalic  EYES: EOMI, PERRLA, conjunctiva and sclera clear  NECK: Supple, No JVD  CHEST/LUNG: Clear to auscultation bilaterally; No wheeze  HEART: Regular rate and rhythm; No murmurs, rubs, or gallops  ABDOMEN: Soft, Nontender, Nondistended; Bowel sounds present  EXTREMITIES:  2+ Peripheral Pulses, No clubbing, cyanosis, or edema  PSYCH: AAOx3  NEUROLOGY: non-focal  SKIN: No rashes or lesions    LABS:                        13.8   10.30 )-----------( 176      ( 2022 05:01 )             42.1     04-30    134<L>  |  92<L>  |  18  ----------------------------<  123<H>  3.4<L>   |  26  |  1.90<H>    Ca    9.3      2022 05:01  Phos  2.3     04-30  Mg     2.40     04-30    TPro  6.9  /  Alb  3.7  /  TBili  0.8  /  DBili  x   /  AST  31  /  ALT  15  /  AlkPhos  96  04-30      CARDIAC MARKERS ( 2022 12:59 )  x     / x     / 126 U/L / x     / 5.0 ng/mL      Urinalysis Basic - ( 2022 18:06 )    Color: Yellow / Appearance: Clear / S.017 / pH: x  Gluc: x / Ketone: Negative  / Bili: Negative / Urobili: 3 mg/dL   Blood: x / Protein: 300 mg/dL / Nitrite: Negative   Leuk Esterase: Negative / RBC: 529 /HPF / WBC 6 /HPF   Sq Epi: x / Non Sq Epi: 4 /HPF / Bacteria: Occasional        RADIOLOGY & ADDITIONAL TESTS:    Imaging Personally Reviewed:    Consultant(s) Notes Reviewed:      Care Discussed with Consultants/Other Providers:

## 2022-05-04 LAB
ANION GAP SERPL CALC-SCNC: 10 MMOL/L — SIGNIFICANT CHANGE UP (ref 7–14)
BASOPHILS # BLD AUTO: 0.07 K/UL — SIGNIFICANT CHANGE UP (ref 0–0.2)
BASOPHILS NFR BLD AUTO: 0.9 % — SIGNIFICANT CHANGE UP (ref 0–2)
BUN SERPL-MCNC: 23 MG/DL — SIGNIFICANT CHANGE UP (ref 7–23)
CALCIUM SERPL-MCNC: 9.2 MG/DL — SIGNIFICANT CHANGE UP (ref 8.4–10.5)
CHLORIDE SERPL-SCNC: 102 MMOL/L — SIGNIFICANT CHANGE UP (ref 98–107)
CO2 SERPL-SCNC: 29 MMOL/L — SIGNIFICANT CHANGE UP (ref 22–31)
CREAT SERPL-MCNC: 1.48 MG/DL — HIGH (ref 0.5–1.3)
EGFR: 48 ML/MIN/1.73M2 — LOW
EOSINOPHIL # BLD AUTO: 0.17 K/UL — SIGNIFICANT CHANGE UP (ref 0–0.5)
EOSINOPHIL NFR BLD AUTO: 2.1 % — SIGNIFICANT CHANGE UP (ref 0–6)
GLUCOSE SERPL-MCNC: 89 MG/DL — SIGNIFICANT CHANGE UP (ref 70–99)
HCT VFR BLD CALC: 41.6 % — SIGNIFICANT CHANGE UP (ref 39–50)
HGB BLD-MCNC: 13.5 G/DL — SIGNIFICANT CHANGE UP (ref 13–17)
IANC: 5.24 K/UL — SIGNIFICANT CHANGE UP (ref 1.8–7.4)
IMM GRANULOCYTES NFR BLD AUTO: 0.3 % — SIGNIFICANT CHANGE UP (ref 0–1.5)
LYMPHOCYTES # BLD AUTO: 1.87 K/UL — SIGNIFICANT CHANGE UP (ref 1–3.3)
LYMPHOCYTES # BLD AUTO: 23.6 % — SIGNIFICANT CHANGE UP (ref 13–44)
MAGNESIUM SERPL-MCNC: 2 MG/DL — SIGNIFICANT CHANGE UP (ref 1.6–2.6)
MCHC RBC-ENTMCNC: 31 PG — SIGNIFICANT CHANGE UP (ref 27–34)
MCHC RBC-ENTMCNC: 32.5 GM/DL — SIGNIFICANT CHANGE UP (ref 32–36)
MCV RBC AUTO: 95.6 FL — SIGNIFICANT CHANGE UP (ref 80–100)
MONOCYTES # BLD AUTO: 0.55 K/UL — SIGNIFICANT CHANGE UP (ref 0–0.9)
MONOCYTES NFR BLD AUTO: 6.9 % — SIGNIFICANT CHANGE UP (ref 2–14)
NEUTROPHILS # BLD AUTO: 5.24 K/UL — SIGNIFICANT CHANGE UP (ref 1.8–7.4)
NEUTROPHILS NFR BLD AUTO: 66.2 % — SIGNIFICANT CHANGE UP (ref 43–77)
NRBC # BLD: 0 /100 WBCS — SIGNIFICANT CHANGE UP
NRBC # FLD: 0 K/UL — SIGNIFICANT CHANGE UP
PHOSPHATE SERPL-MCNC: 2.9 MG/DL — SIGNIFICANT CHANGE UP (ref 2.5–4.5)
PLATELET # BLD AUTO: 189 K/UL — SIGNIFICANT CHANGE UP (ref 150–400)
POTASSIUM SERPL-MCNC: 3.6 MMOL/L — SIGNIFICANT CHANGE UP (ref 3.5–5.3)
POTASSIUM SERPL-SCNC: 3.6 MMOL/L — SIGNIFICANT CHANGE UP (ref 3.5–5.3)
RBC # BLD: 4.35 M/UL — SIGNIFICANT CHANGE UP (ref 4.2–5.8)
RBC # FLD: 17.3 % — HIGH (ref 10.3–14.5)
SODIUM SERPL-SCNC: 141 MMOL/L — SIGNIFICANT CHANGE UP (ref 135–145)
WBC # BLD: 7.92 K/UL — SIGNIFICANT CHANGE UP (ref 3.8–10.5)
WBC # FLD AUTO: 7.92 K/UL — SIGNIFICANT CHANGE UP (ref 3.8–10.5)

## 2022-05-04 PROCEDURE — 93010 ELECTROCARDIOGRAM REPORT: CPT

## 2022-05-04 RX ADMIN — CARBIDOPA AND LEVODOPA 1.5 TABLET(S): 25; 100 TABLET ORAL at 12:11

## 2022-05-04 RX ADMIN — CARBIDOPA AND LEVODOPA 1 TABLET(S): 25; 100 TABLET ORAL at 14:19

## 2022-05-04 RX ADMIN — Medication 81 MILLIGRAM(S): at 12:12

## 2022-05-04 RX ADMIN — APIXABAN 2.5 MILLIGRAM(S): 2.5 TABLET, FILM COATED ORAL at 05:19

## 2022-05-04 RX ADMIN — SENNA PLUS 2 TABLET(S): 8.6 TABLET ORAL at 22:17

## 2022-05-04 RX ADMIN — DESVENLAFAXINE 50 MILLIGRAM(S): 50 TABLET, EXTENDED RELEASE ORAL at 12:12

## 2022-05-04 RX ADMIN — CARBIDOPA AND LEVODOPA 1 TABLET(S): 25; 100 TABLET ORAL at 20:49

## 2022-05-04 RX ADMIN — ARIPIPRAZOLE 2.5 MILLIGRAM(S): 15 TABLET ORAL at 12:12

## 2022-05-04 RX ADMIN — POLYETHYLENE GLYCOL 3350 17 GRAM(S): 17 POWDER, FOR SOLUTION ORAL at 12:12

## 2022-05-04 RX ADMIN — CARBIDOPA AND LEVODOPA 1 TABLET(S): 25; 100 TABLET ORAL at 18:00

## 2022-05-04 RX ADMIN — Medication 3 MILLIGRAM(S): at 22:17

## 2022-05-04 RX ADMIN — APIXABAN 2.5 MILLIGRAM(S): 2.5 TABLET, FILM COATED ORAL at 18:00

## 2022-05-04 RX ADMIN — LIDOCAINE 1 PATCH: 4 CREAM TOPICAL at 04:34

## 2022-05-04 RX ADMIN — CLOPIDOGREL BISULFATE 75 MILLIGRAM(S): 75 TABLET, FILM COATED ORAL at 12:12

## 2022-05-04 RX ADMIN — ATORVASTATIN CALCIUM 40 MILLIGRAM(S): 80 TABLET, FILM COATED ORAL at 22:17

## 2022-05-04 NOTE — PROGRESS NOTE ADULT - SUBJECTIVE AND OBJECTIVE BOX
Patient is a 81y old  Male who presents with a chief complaint of hallucinations (2022 11:02)    2022  HPI:  Afebrile  Confused, appears sleepy. He was agitated overnight. Started on Abilify.    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    Parkinsons disease    CAD (coronary artery disease)  s/p PCI     Pleural effusion, not elsewhere classified    History of hip replacement, total  R hip  &amp; L hip    Status post cataract extraction  right eye cataract extraction with IOL 2015    Presence of cardiac pacemaker  10/2021    H/O coronary angioplasty  2021 1 stent inserted        Review of Systems:   CONSTITUTIONAL: No fever, weight loss, or fatigue  EYES: No eye pain, visual disturbances, or discharge  ENMT:  No difficulty hearing, tinnitus, vertigo; No sinus or throat pain  NECK: No pain or stiffness  BREASTS: No pain, masses, or nipple discharge  RESPIRATORY: No cough, wheezing, chills or hemoptysis; No shortness of breath  CARDIOVASCULAR: No chest pain, palpitations, dizziness, or leg swelling  GASTROINTESTINAL: No abdominal or epigastric pain. No nausea, vomiting, or hematemesis; No diarrhea or constipation. No melena or hematochezia.  GENITOURINARY: No dysuria, frequency, hematuria, or incontinence  NEUROLOGICAL: No headaches, memory loss, loss of strength, numbness, or tremors  SKIN: No itching, burning, rashes, or lesions   LYMPH NODES: No enlarged glands  ENDOCRINE: No heat or cold intolerance; No hair loss  MUSCULOSKELETAL: No joint pain or swelling; No muscle, back, or extremity pain  PSYCHIATRIC: No depression, anxiety, mood swings, or difficulty sleeping  HEME/LYMPH: No easy bruising, or bleeding gums  ALLERY AND IMMUNOLOGIC: No hives or eczema    Allergies    No Known Allergies    Intolerances        Social History:     FAMILY HISTORY:  Family history of lung cancer (Mother)    Family history of esophageal cancer (Father)    FH: myocardial infarction (Grandparent)        MEDICATIONS  (STANDING):  apixaban 2.5 milliGRAM(s) Oral two times a day  atorvastatin 40 milliGRAM(s) Oral at bedtime  carbidopa/levodopa  25/100 1.5 Tablet(s) Oral <User Schedule>  carbidopa/levodopa  25/100 1 Tablet(s) Oral <User Schedule>  clopidogrel Tablet 75 milliGRAM(s) Oral daily  desvenlafaxine ER 75 milliGRAM(s) Oral daily  lactated ringers. 1000 milliLiter(s) (75 mL/Hr) IV Continuous <Continuous>  polyethylene glycol 3350 17 Gram(s) Oral daily  senna 2 Tablet(s) Oral at bedtime    MEDICATIONS  (PRN):        CAPILLARY BLOOD GLUCOSE        I&O's Summary      PHYSICAL EXAM:  Vital Signs Last 24 Hrs  T(C): 36.6 (2022 17:00), Max: 37.6 (2022 21:14)  T(F): 97.8 (2022 17:00), Max: 99.6 (2022 21:14)  HR: 62 (:00) (60 - 62)  BP: 121/71 (2022 17:00) (121/71 - 145/60)  BP(mean): --  RR: 18 (2022 17:00) (18 - 18)  SpO2: 98% (2022 17:00) (98% - 98%)    GENERAL: NAD, well-developed  HEAD:  Atraumatic, Normocephalic  EYES: EOMI, PERRLA, conjunctiva and sclera clear  NECK: Supple, No JVD  CHEST/LUNG: Clear to auscultation bilaterally; No wheeze  HEART: Regular rate and rhythm; No murmurs, rubs, or gallops  ABDOMEN: Soft, Nontender, Nondistended; Bowel sounds present  EXTREMITIES:  2+ Peripheral Pulses, No clubbing, cyanosis, or edema  PSYCH: AAOx3  NEUROLOGY: non-focal  SKIN: No rashes or lesions    LABS:                        13.8   10.30 )-----------( 176      ( 2022 05:01 )             42.1     04-30    134<L>  |  92<L>  |  18  ----------------------------<  123<H>  3.4<L>   |  26  |  1.90<H>    Ca    9.3      2022 05:01  Phos  2.3     04-30  Mg     2.40     04-30    TPro  6.9  /  Alb  3.7  /  TBili  0.8  /  DBili  x   /  AST  31  /  ALT  15  /  AlkPhos  96  04-30      CARDIAC MARKERS ( 2022 12:59 )  x     / x     / 126 U/L / x     / 5.0 ng/mL      Urinalysis Basic - ( 2022 18:06 )    Color: Yellow / Appearance: Clear / S.017 / pH: x  Gluc: x / Ketone: Negative  / Bili: Negative / Urobili: 3 mg/dL   Blood: x / Protein: 300 mg/dL / Nitrite: Negative   Leuk Esterase: Negative / RBC: 529 /HPF / WBC 6 /HPF   Sq Epi: x / Non Sq Epi: 4 /HPF / Bacteria: Occasional        RADIOLOGY & ADDITIONAL TESTS:    Imaging Personally Reviewed:    Consultant(s) Notes Reviewed:      Care Discussed with Consultants/Other Providers:

## 2022-05-04 NOTE — PROVIDER CONTACT NOTE (MEDICATION) - DATE AND TIME:
[[email or web address]] : Patient present with an earache of his left ear. The outer is is sore to touch. He has had symptoms for about a week. He denies any recent URI symptoms or swimming. He flew in an airplane last week but the symptoms were not worse with the flight.
03-May-2022 21:39

## 2022-05-04 NOTE — PATIENT PROFILE ADULT - DOMESTIC TRAVEL HIGH RISK QUESTION
Â· Refill requested by: Pharmacy Interface  Â· Last office visit for this medication: 4/14/22  Â· Next office visit: n/a  Â· Medication(s) Requested:   calcitRIOL (ROCALTROL) 0.25 MCG capsule 30 capsule 0 3/8/2022 4/8/2022    Sig - Route:  Take 1 capsule by mouth daily. - Oral    Sent to pharmacy as: Calcitriol 0.25 MCG Oral Capsule (ROCALTROL)      Â·   Â· Quantity requested: 30  Refer to paper form refill protocol  Can not refill without MD authorization
No

## 2022-05-04 NOTE — PROVIDER CONTACT NOTE (MEDICATION) - ASSESSMENT
Patient was found very agitated, yelling. Trying to climb out of bed. Non redirectable. ACP notified.

## 2022-05-05 LAB
ANION GAP SERPL CALC-SCNC: 12 MMOL/L — SIGNIFICANT CHANGE UP (ref 7–14)
BASOPHILS # BLD AUTO: 0.06 K/UL — SIGNIFICANT CHANGE UP (ref 0–0.2)
BASOPHILS NFR BLD AUTO: 0.8 % — SIGNIFICANT CHANGE UP (ref 0–2)
BUN SERPL-MCNC: 22 MG/DL — SIGNIFICANT CHANGE UP (ref 7–23)
CALCIUM SERPL-MCNC: 9.2 MG/DL — SIGNIFICANT CHANGE UP (ref 8.4–10.5)
CHLORIDE SERPL-SCNC: 101 MMOL/L — SIGNIFICANT CHANGE UP (ref 98–107)
CO2 SERPL-SCNC: 26 MMOL/L — SIGNIFICANT CHANGE UP (ref 22–31)
CREAT SERPL-MCNC: 1.44 MG/DL — HIGH (ref 0.5–1.3)
EGFR: 49 ML/MIN/1.73M2 — LOW
EOSINOPHIL # BLD AUTO: 0.15 K/UL — SIGNIFICANT CHANGE UP (ref 0–0.5)
EOSINOPHIL NFR BLD AUTO: 2 % — SIGNIFICANT CHANGE UP (ref 0–6)
GLUCOSE SERPL-MCNC: 94 MG/DL — SIGNIFICANT CHANGE UP (ref 70–99)
HCT VFR BLD CALC: 37.8 % — LOW (ref 39–50)
HGB BLD-MCNC: 12.7 G/DL — LOW (ref 13–17)
IANC: 4.86 K/UL — SIGNIFICANT CHANGE UP (ref 1.8–7.4)
IMM GRANULOCYTES NFR BLD AUTO: 0.1 % — SIGNIFICANT CHANGE UP (ref 0–1.5)
LYMPHOCYTES # BLD AUTO: 1.79 K/UL — SIGNIFICANT CHANGE UP (ref 1–3.3)
LYMPHOCYTES # BLD AUTO: 24.3 % — SIGNIFICANT CHANGE UP (ref 13–44)
MAGNESIUM SERPL-MCNC: 1.9 MG/DL — SIGNIFICANT CHANGE UP (ref 1.6–2.6)
MCHC RBC-ENTMCNC: 31.4 PG — SIGNIFICANT CHANGE UP (ref 27–34)
MCHC RBC-ENTMCNC: 33.6 GM/DL — SIGNIFICANT CHANGE UP (ref 32–36)
MCV RBC AUTO: 93.3 FL — SIGNIFICANT CHANGE UP (ref 80–100)
MONOCYTES # BLD AUTO: 0.49 K/UL — SIGNIFICANT CHANGE UP (ref 0–0.9)
MONOCYTES NFR BLD AUTO: 6.7 % — SIGNIFICANT CHANGE UP (ref 2–14)
NEUTROPHILS # BLD AUTO: 4.86 K/UL — SIGNIFICANT CHANGE UP (ref 1.8–7.4)
NEUTROPHILS NFR BLD AUTO: 66.1 % — SIGNIFICANT CHANGE UP (ref 43–77)
NRBC # BLD: 0 /100 WBCS — SIGNIFICANT CHANGE UP
NRBC # FLD: 0 K/UL — SIGNIFICANT CHANGE UP
PHOSPHATE SERPL-MCNC: 3.5 MG/DL — SIGNIFICANT CHANGE UP (ref 2.5–4.5)
PLATELET # BLD AUTO: 201 K/UL — SIGNIFICANT CHANGE UP (ref 150–400)
POTASSIUM SERPL-MCNC: 3.7 MMOL/L — SIGNIFICANT CHANGE UP (ref 3.5–5.3)
POTASSIUM SERPL-SCNC: 3.7 MMOL/L — SIGNIFICANT CHANGE UP (ref 3.5–5.3)
RBC # BLD: 4.05 M/UL — LOW (ref 4.2–5.8)
RBC # FLD: 17.4 % — HIGH (ref 10.3–14.5)
SODIUM SERPL-SCNC: 139 MMOL/L — SIGNIFICANT CHANGE UP (ref 135–145)
WBC # BLD: 7.36 K/UL — SIGNIFICANT CHANGE UP (ref 3.8–10.5)
WBC # FLD AUTO: 7.36 K/UL — SIGNIFICANT CHANGE UP (ref 3.8–10.5)

## 2022-05-05 PROCEDURE — 93279 PRGRMG DEV EVAL PM/LDLS PM: CPT | Mod: 26

## 2022-05-05 PROCEDURE — 99232 SBSQ HOSP IP/OBS MODERATE 35: CPT

## 2022-05-05 RX ORDER — QUETIAPINE FUMARATE 200 MG/1
50 TABLET, FILM COATED ORAL AT BEDTIME
Refills: 0 | Status: DISCONTINUED | OUTPATIENT
Start: 2022-05-05 | End: 2022-05-05

## 2022-05-05 RX ORDER — OLANZAPINE 15 MG/1
2.5 TABLET, FILM COATED ORAL AT BEDTIME
Refills: 0 | Status: DISCONTINUED | OUTPATIENT
Start: 2022-05-05 | End: 2022-05-12

## 2022-05-05 RX ADMIN — LIDOCAINE 1 PATCH: 4 CREAM TOPICAL at 11:34

## 2022-05-05 RX ADMIN — Medication 3 MILLIGRAM(S): at 22:28

## 2022-05-05 RX ADMIN — CLOPIDOGREL BISULFATE 75 MILLIGRAM(S): 75 TABLET, FILM COATED ORAL at 11:33

## 2022-05-05 RX ADMIN — Medication 81 MILLIGRAM(S): at 11:33

## 2022-05-05 RX ADMIN — CARBIDOPA AND LEVODOPA 1 TABLET(S): 25; 100 TABLET ORAL at 17:41

## 2022-05-05 RX ADMIN — CARBIDOPA AND LEVODOPA 1 TABLET(S): 25; 100 TABLET ORAL at 14:09

## 2022-05-05 RX ADMIN — POLYETHYLENE GLYCOL 3350 17 GRAM(S): 17 POWDER, FOR SOLUTION ORAL at 11:34

## 2022-05-05 RX ADMIN — APIXABAN 2.5 MILLIGRAM(S): 2.5 TABLET, FILM COATED ORAL at 05:00

## 2022-05-05 RX ADMIN — LIDOCAINE 1 PATCH: 4 CREAM TOPICAL at 19:37

## 2022-05-05 RX ADMIN — DESVENLAFAXINE 50 MILLIGRAM(S): 50 TABLET, EXTENDED RELEASE ORAL at 11:33

## 2022-05-05 RX ADMIN — ATORVASTATIN CALCIUM 40 MILLIGRAM(S): 80 TABLET, FILM COATED ORAL at 22:27

## 2022-05-05 RX ADMIN — CARBIDOPA AND LEVODOPA 1.5 TABLET(S): 25; 100 TABLET ORAL at 11:34

## 2022-05-05 RX ADMIN — CARBIDOPA AND LEVODOPA 1 TABLET(S): 25; 100 TABLET ORAL at 20:48

## 2022-05-05 RX ADMIN — LIDOCAINE 1 PATCH: 4 CREAM TOPICAL at 23:40

## 2022-05-05 RX ADMIN — APIXABAN 2.5 MILLIGRAM(S): 2.5 TABLET, FILM COATED ORAL at 17:41

## 2022-05-05 RX ADMIN — SENNA PLUS 2 TABLET(S): 8.6 TABLET ORAL at 22:28

## 2022-05-05 RX ADMIN — OLANZAPINE 2.5 MILLIGRAM(S): 15 TABLET, FILM COATED ORAL at 22:27

## 2022-05-05 NOTE — PROGRESS NOTE ADULT - SUBJECTIVE AND OBJECTIVE BOX
Patient is a 81y old  Male who presents with a chief complaint of hallucinations (2022 11:02)    2022  HPI:  Afebrile  Confused, Started on Zyprexa today due to EP SE from Abilify.    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    Parkinsons disease    CAD (coronary artery disease)  s/p PCI     Pleural effusion, not elsewhere classified    History of hip replacement, total  R hip  &amp; L hip    Status post cataract extraction  right eye cataract extraction with IOL 2015    Presence of cardiac pacemaker  10/2021    H/O coronary angioplasty  2021 1 stent inserted        Review of Systems:   CONSTITUTIONAL: No fever, weight loss, or fatigue  EYES: No eye pain, visual disturbances, or discharge  ENMT:  No difficulty hearing, tinnitus, vertigo; No sinus or throat pain  NECK: No pain or stiffness  BREASTS: No pain, masses, or nipple discharge  RESPIRATORY: No cough, wheezing, chills or hemoptysis; No shortness of breath  CARDIOVASCULAR: No chest pain, palpitations, dizziness, or leg swelling  GASTROINTESTINAL: No abdominal or epigastric pain. No nausea, vomiting, or hematemesis; No diarrhea or constipation. No melena or hematochezia.  GENITOURINARY: No dysuria, frequency, hematuria, or incontinence  NEUROLOGICAL: No headaches, memory loss, loss of strength, numbness, or tremors  SKIN: No itching, burning, rashes, or lesions   LYMPH NODES: No enlarged glands  ENDOCRINE: No heat or cold intolerance; No hair loss  MUSCULOSKELETAL: No joint pain or swelling; No muscle, back, or extremity pain  PSYCHIATRIC: No depression, anxiety, mood swings, or difficulty sleeping  HEME/LYMPH: No easy bruising, or bleeding gums  ALLERY AND IMMUNOLOGIC: No hives or eczema    Allergies    No Known Allergies    Intolerances        Social History:     FAMILY HISTORY:  Family history of lung cancer (Mother)    Family history of esophageal cancer (Father)    FH: myocardial infarction (Grandparent)        MEDICATIONS  (STANDING):  apixaban 2.5 milliGRAM(s) Oral two times a day  atorvastatin 40 milliGRAM(s) Oral at bedtime  carbidopa/levodopa  25/100 1.5 Tablet(s) Oral <User Schedule>  carbidopa/levodopa  25/100 1 Tablet(s) Oral <User Schedule>  clopidogrel Tablet 75 milliGRAM(s) Oral daily  desvenlafaxine ER 75 milliGRAM(s) Oral daily  lactated ringers. 1000 milliLiter(s) (75 mL/Hr) IV Continuous <Continuous>  polyethylene glycol 3350 17 Gram(s) Oral daily  senna 2 Tablet(s) Oral at bedtime    MEDICATIONS  (PRN):        CAPILLARY BLOOD GLUCOSE        I&O's Summary      PHYSICAL EXAM:  Vital Signs Last 24 Hrs  T(C): 36.6 (2022 17:00), Max: 37.6 (2022 21:14)  T(F): 97.8 (2022 17:00), Max: 99.6 (2022 21:14)  HR: 62 (:00) (60 - 62)  BP: 121/71 (2022 17:00) (121/71 - 145/60)  BP(mean): --  RR: 18 (2022 17:00) (18 - 18)  SpO2: 98% (2022 17:00) (98% - 98%)    GENERAL: NAD, well-developed  HEAD:  Atraumatic, Normocephalic  EYES: EOMI, PERRLA, conjunctiva and sclera clear  NECK: Supple, No JVD  CHEST/LUNG: Clear to auscultation bilaterally; No wheeze  HEART: Regular rate and rhythm; No murmurs, rubs, or gallops  ABDOMEN: Soft, Nontender, Nondistended; Bowel sounds present  EXTREMITIES:  2+ Peripheral Pulses, No clubbing, cyanosis, or edema  PSYCH: AAOx3  NEUROLOGY: non-focal  SKIN: No rashes or lesions    LABS:                        13.8   10.30 )-----------( 176      ( 2022 05:01 )             42.1     04-30    134<L>  |  92<L>  |  18  ----------------------------<  123<H>  3.4<L>   |  26  |  1.90<H>    Ca    9.3      2022 05:01  Phos  2.3     04-30  Mg     2.40     04-30    TPro  6.9  /  Alb  3.7  /  TBili  0.8  /  DBili  x   /  AST  31  /  ALT  15  /  AlkPhos  96  04-30      CARDIAC MARKERS ( 2022 12:59 )  x     / x     / 126 U/L / x     / 5.0 ng/mL      Urinalysis Basic - ( 2022 18:06 )    Color: Yellow / Appearance: Clear / S.017 / pH: x  Gluc: x / Ketone: Negative  / Bili: Negative / Urobili: 3 mg/dL   Blood: x / Protein: 300 mg/dL / Nitrite: Negative   Leuk Esterase: Negative / RBC: 529 /HPF / WBC 6 /HPF   Sq Epi: x / Non Sq Epi: 4 /HPF / Bacteria: Occasional        RADIOLOGY & ADDITIONAL TESTS:    Imaging Personally Reviewed:    Consultant(s) Notes Reviewed:      Care Discussed with Consultants/Other Providers:

## 2022-05-05 NOTE — BH CONSULTATION LIAISON PROGRESS NOTE - NSBHASSESSMENTFT_PSY_ALL_CORE
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    On initial assessment, Patient seen, alert to self, pleasantly confused, however, able to provide history, reports feeling "anxious," endorses visual hallucinations of seeing "people," denies suicidal ideation, denies auditory hallucinations. Collateral obtained from spouse Opal who reports change in spouse on Friday- combative towards her (first time), she reports patient has sleep disturbance which includes anxiety, agitation and combativeness starting at 11PM, she states Seroquel is ineffective, she is amenable to medication management of sleep disturbances.    5/2 - Pt calm and cooperative, appears withdrawn, denies AVH, SIIP, HIIP  5/3 - Pt anxious this morning, received PRN Ativan 0.5mg with modest effect. AAOx2, disorganized at times (refers to treatment as a "program"). No SI/HI. Pt and wife agreed to start Abilify 2.5mg to target psychosis and mood.   5/4 - see chart note  5/5 - Pt more dysphoric and disorganized today, with reported akathisia and moderate cogwheeling and rigidity on exam, likely from Abilify -- will discontinue today. Manual calculation of QTc 478ms.     PLAN:  -DEFER to primary team-no psychiatric indication for 1:1 constant observation  -consider manual correction for QTc w/cardio given pacing spikes, wide QRS  -Reviewed labs: b12, folate, tsh - wnl  -continue Pristiq 50mg daily per home dose  -*Discontinue Abilify due to worsening EPS/PD Sx and reported akathisia  -5/5 EKG - manual calculation of QTc 478ms  -continue to monitor EKG, if qtc is >500 considering consulted cardiology for manual calculation.  -If qtc <500: Change Seroquel to 50mg at 6PM (give dose now after repeat EKG and if qtc <500) AND                     Seroquel 50mg 6AM  -Melatonin 3mg at 8PM  -If qtc <500: Anxiety/Agitation: Seroquel 25mg po q6h prn  -If qtc <500: Severe Agitation:   Zyprexa 1.25mg im q8h prn-DO not given Ativan im within 1 hour of Zyprexa im as can cause sedation and or respiratory depression  -Hold above psychotropics if qtc >500  -If qtc >500: Agitation: Ativan 0.5mg-1mg po/iv/im q6h prn-hold if rr<12, monitor for sedation  -Psych will continue to follow, and will provide outpatient referrals  82yo , retired, male, domiciled with spouse with PMHx Parkinson's Disease, 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    On initial assessment, Patient seen, alert to self, pleasantly confused, however, able to provide history, reports feeling "anxious," endorses visual hallucinations of seeing "people," denies suicidal ideation, denies auditory hallucinations. Collateral obtained from spouse Opal who reports change in spouse on Friday- combative towards her (first time), she reports patient has sleep disturbance which includes anxiety, agitation and combativeness starting at 11PM, she states Seroquel is ineffective, she is amenable to medication management of sleep disturbances.    5/2 - Pt calm and cooperative, appears withdrawn, denies AVH, SIIP, HIIP  5/3 - Pt anxious this morning, received PRN Ativan 0.5mg with modest effect. AAOx2, disorganized at times (refers to treatment as a "program"). No SI/HI. Pt and wife agreed to start Abilify 2.5mg to target psychosis and mood.   5/4 - see chart note  5/5 - Pt more dysphoric and disorganized today, with reported akathisia and moderate cogwheeling and rigidity on exam, likely from Abilify -- will discontinue today. Manual calculation of QTc 478ms.     PLAN:  -5/5 EKG - manual calculation of QTc 478ms ( EP input appreciated)   -START Seroquel 50 mg PO HS, if corrected Qtc< 500.   -D/C Abilify due to worsening EPS/PD Sx and reported akathisia  -DEFER to primary team-No psychiatric indication for 1:1 constant observation  -c/w manual correction for QTc w/cardio given pacing spikes, wide QRS ( RBBB)  -Reviewed labs: b12, folate, tsh - wnl  -continue Pristiq 50mg daily per home dose  -Melatonin 3mg at 8PM  -If qtc <500: Anxiety/Agitation: Seroquel 25mg po q6h prn  -If qtc <500: Severe Agitation:   Zyprexa 1.25mg im q8h prn-DO not given Ativan im within 1 hour of Zyprexa im as can cause sedation and or respiratory depression  -Hold above psychotropics if qtc >500  -If qtc >500: Agitation: Ativan 0.5mg-1mg po/iv/im q6h prn-hold if rr<12, monitor for sedation  -Psych will continue to follow, and will provide outpatient referrals  82yo , retired, male, domiciled with spouse with PMHx Parkinson's Disease, 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    On initial assessment, Patient seen, alert to self, pleasantly confused, however, able to provide history, reports feeling "anxious," endorses visual hallucinations of seeing "people," denies suicidal ideation, denies auditory hallucinations. Collateral obtained from spouse Opal who reports change in spouse on Friday- combative towards her (first time), she reports patient has sleep disturbance which includes anxiety, agitation and combativeness starting at 11PM, she states Seroquel is ineffective, she is amenable to medication management of sleep disturbances.    5/2 - Pt calm and cooperative, appears withdrawn, denies AVH, SIIP, HIIP  5/3 - Pt anxious this morning, received PRN Ativan 0.5mg with modest effect. AAOx2, disorganized at times (refers to treatment as a "program"). No SI/HI. Pt and wife agreed to start Abilify 2.5mg to target psychosis and mood.   5/4 - see chart note  5/5 - Pt more dysphoric and disorganized today, with reported akathisia and moderate cogwheeling and rigidity on exam, likely from Abilify -- will discontinue today. Manual calculation of QTc 478ms.     PLAN:  -5/5 EKG - manual calculation of QTc 478ms ( EP input appreciated)   -START Zyprexa 2.5mg PO HS, if corrected Qtc< 500   -D/C Abilify due to worsening EPS/PD Sx and reported akathisia  -DEFER to primary team-No psychiatric indication for 1:1 constant observation  -c/w manual correction for QTc w/cardio given pacing spikes, wide QRS ( RBBB)  -Reviewed labs: b12, folate, tsh - wnl  -continue Pristiq 50mg daily per home dose  -Melatonin 3mg at 8PM  -If qtc <500: Anxiety/Agitation: Seroquel 25mg po q6h prn  -If qtc <500: Severe Agitation:   Zyprexa 1.25mg im q8h prn-DO not given Ativan im within 1 hour of Zyprexa im as can cause sedation and or respiratory depression  -Hold above psychotropics if qtc >500  -If qtc >500: Agitation: Ativan 0.5mg-1mg po/iv/im q6h prn-hold if rr<12, monitor for sedation  -Psych will continue to follow, and will provide outpatient referrals  80yo , retired, male, domiciled with spouse with PMHx Parkinson's Disease, 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    On initial assessment, Patient seen, alert to self, pleasantly confused, however, able to provide history, reports feeling "anxious," endorses visual hallucinations of seeing "people," denies suicidal ideation, denies auditory hallucinations. Collateral obtained from spouse Opal who reports change in spouse on Friday- combative towards her (first time), she reports patient has sleep disturbance which includes anxiety, agitation and combativeness starting at 11PM, she states Seroquel is ineffective, she is amenable to medication management of sleep disturbances.    5/2 - Pt calm and cooperative, appears withdrawn, denies AVH, SIIP, HIIP  5/3 - Pt anxious this morning, received PRN Ativan 0.5mg with modest effect. AAOx2, disorganized at times (refers to treatment as a "program"). No SI/HI. Pt and wife agreed to start Abilify 2.5mg to target psychosis and mood.   5/4 - see chart note  5/5 - Pt more dysphoric and disorganized today, with reported akathisia and moderate cogwheeling and rigidity on exam, likely from Abilify -- will discontinue today. Manual calculation of QTc 478ms. Initiate Zyprexa 2.5 mg PO HS, spoke to wife at bedside who verbalized understanding.    PLAN:  -5/5 EKG - manual calculation of QTc 478ms ( EP input appreciated)   -START Zyprexa 2.5mg PO HS, if corrected Qtc< 500   -D/C Abilify due to worsening EPS/PD Sx and reported akathisia  -DEFER to primary team-No psychiatric indication for 1:1 constant observation  -c/w manual correction for QTc w/cardio given pacing spikes, wide QRS ( RBBB)  -Reviewed labs: b12, folate, tsh - wnl  -continue Pristiq 50mg daily per home dose  -Melatonin 3mg at 8PM  -If qtc <500: Anxiety/Agitation: Seroquel 25mg po q6h prn  -If qtc <500: Severe Agitation:   Zyprexa 1.25mg im q8h prn-DO not given Ativan im within 1 hour of Zyprexa im as can cause sedation and or respiratory depression  -Hold above psychotropics if qtc >500  -If qtc >500: Agitation: Ativan 0.5mg-1mg po/iv/im q6h prn-hold if rr<12, monitor for sedation  -Psych will continue to follow, and will provide outpatient referrals

## 2022-05-05 NOTE — BH CONSULTATION LIAISON PROGRESS NOTE - NSBHFUPINTERVALHXFT_PSY_A_CORE
Chart reviewed. Case discussed in interdisciplinary rounds. No PRNs required for agitation, however staff reports that Pt has been restless with poor sleep. Medication adherent. On interview, Pt is AAOx1-2 to self and location. Pt is dypshoric, reports mood as "sad", becomes tearful. States he feels "more excitable" (with hypophonic voice) and agrees that he feels anxious and wants to move around, consistent with akathisia. Pt is more disorganized, was seeking his mother at bedside. He denies paranoia, visual or auditory hallucinations. Denies any suicidality or homicidality. Moderate cogwheeling and rigidity in UE on exam.  Chart reviewed. Case discussed in interdisciplinary rounds. No PRNs required for agitation, however staff reports that Pt has been restless with poor sleep. Medication adherent. On interview, Pt is AAOx1-2 to self and location. Pt is dypshoric, reports mood as "sad", becomes tearful. States he feels "more excitable" (with hypophonic voice) and agrees that he feels anxious and wants to move around, consistent with akathisia. Pt is more disorganized, was seeking his mother at bedside. He denies paranoia, visual or auditory hallucinations. Denies any suicidality or homicidality. Moderate cogwheeling and rigidity in UE on exam.     (MD Sergo): Spoke to patient's wife at bedside who notes despite titration of Seroquel to 100 mg PO HS, patient was not sleeping. She understands that multiple medications had been changed for him at the time, however, which may have affected his overall sleep architecture. Patient and spouse amenable to initiation of Zyprexa 2.5 mg PO HS in lieu of Abilify use since he has been more restless on it. Risks including but not limited to FDA black box warning on antipsychotic use in patients w/ neurocognitive d/o discussed. Patient and spouse verbalized understanding and show agreement w/ aforementioned plan.

## 2022-05-06 ENCOUNTER — APPOINTMENT (OUTPATIENT)
Dept: NUCLEAR MEDICINE | Facility: IMAGING CENTER | Age: 82
End: 2022-05-06

## 2022-05-06 LAB
ANION GAP SERPL CALC-SCNC: 12 MMOL/L — SIGNIFICANT CHANGE UP (ref 7–14)
BASOPHILS # BLD AUTO: 0.05 K/UL — SIGNIFICANT CHANGE UP (ref 0–0.2)
BASOPHILS NFR BLD AUTO: 0.7 % — SIGNIFICANT CHANGE UP (ref 0–2)
BUN SERPL-MCNC: 19 MG/DL — SIGNIFICANT CHANGE UP (ref 7–23)
CALCIUM SERPL-MCNC: 9.3 MG/DL — SIGNIFICANT CHANGE UP (ref 8.4–10.5)
CHLORIDE SERPL-SCNC: 101 MMOL/L — SIGNIFICANT CHANGE UP (ref 98–107)
CO2 SERPL-SCNC: 28 MMOL/L — SIGNIFICANT CHANGE UP (ref 22–31)
CREAT SERPL-MCNC: 1.5 MG/DL — HIGH (ref 0.5–1.3)
EGFR: 47 ML/MIN/1.73M2 — LOW
EOSINOPHIL # BLD AUTO: 0.11 K/UL — SIGNIFICANT CHANGE UP (ref 0–0.5)
EOSINOPHIL NFR BLD AUTO: 1.5 % — SIGNIFICANT CHANGE UP (ref 0–6)
GLUCOSE SERPL-MCNC: 86 MG/DL — SIGNIFICANT CHANGE UP (ref 70–99)
HCT VFR BLD CALC: 38.8 % — LOW (ref 39–50)
HGB BLD-MCNC: 12.5 G/DL — LOW (ref 13–17)
IANC: 4.69 K/UL — SIGNIFICANT CHANGE UP (ref 1.8–7.4)
IMM GRANULOCYTES NFR BLD AUTO: 0.6 % — SIGNIFICANT CHANGE UP (ref 0–1.5)
LYMPHOCYTES # BLD AUTO: 1.68 K/UL — SIGNIFICANT CHANGE UP (ref 1–3.3)
LYMPHOCYTES # BLD AUTO: 23.7 % — SIGNIFICANT CHANGE UP (ref 13–44)
MAGNESIUM SERPL-MCNC: 1.9 MG/DL — SIGNIFICANT CHANGE UP (ref 1.6–2.6)
MCHC RBC-ENTMCNC: 30.6 PG — SIGNIFICANT CHANGE UP (ref 27–34)
MCHC RBC-ENTMCNC: 32.2 GM/DL — SIGNIFICANT CHANGE UP (ref 32–36)
MCV RBC AUTO: 95.1 FL — SIGNIFICANT CHANGE UP (ref 80–100)
MONOCYTES # BLD AUTO: 0.53 K/UL — SIGNIFICANT CHANGE UP (ref 0–0.9)
MONOCYTES NFR BLD AUTO: 7.5 % — SIGNIFICANT CHANGE UP (ref 2–14)
NEUTROPHILS # BLD AUTO: 4.69 K/UL — SIGNIFICANT CHANGE UP (ref 1.8–7.4)
NEUTROPHILS NFR BLD AUTO: 66 % — SIGNIFICANT CHANGE UP (ref 43–77)
NRBC # BLD: 0 /100 WBCS — SIGNIFICANT CHANGE UP
NRBC # FLD: 0 K/UL — SIGNIFICANT CHANGE UP
PHOSPHATE SERPL-MCNC: 3.7 MG/DL — SIGNIFICANT CHANGE UP (ref 2.5–4.5)
PLATELET # BLD AUTO: 197 K/UL — SIGNIFICANT CHANGE UP (ref 150–400)
POTASSIUM SERPL-MCNC: 3.4 MMOL/L — LOW (ref 3.5–5.3)
POTASSIUM SERPL-SCNC: 3.4 MMOL/L — LOW (ref 3.5–5.3)
RBC # BLD: 4.08 M/UL — LOW (ref 4.2–5.8)
RBC # FLD: 17.6 % — HIGH (ref 10.3–14.5)
SODIUM SERPL-SCNC: 141 MMOL/L — SIGNIFICANT CHANGE UP (ref 135–145)
WBC # BLD: 7.1 K/UL — SIGNIFICANT CHANGE UP (ref 3.8–10.5)
WBC # FLD AUTO: 7.1 K/UL — SIGNIFICANT CHANGE UP (ref 3.8–10.5)

## 2022-05-06 PROCEDURE — 99232 SBSQ HOSP IP/OBS MODERATE 35: CPT

## 2022-05-06 PROCEDURE — 93010 ELECTROCARDIOGRAM REPORT: CPT

## 2022-05-06 RX ORDER — POTASSIUM CHLORIDE 20 MEQ
40 PACKET (EA) ORAL ONCE
Refills: 0 | Status: COMPLETED | OUTPATIENT
Start: 2022-05-06 | End: 2022-05-06

## 2022-05-06 RX ADMIN — LIDOCAINE 1 PATCH: 4 CREAM TOPICAL at 11:21

## 2022-05-06 RX ADMIN — CLOPIDOGREL BISULFATE 75 MILLIGRAM(S): 75 TABLET, FILM COATED ORAL at 11:20

## 2022-05-06 RX ADMIN — CARBIDOPA AND LEVODOPA 1 TABLET(S): 25; 100 TABLET ORAL at 22:04

## 2022-05-06 RX ADMIN — APIXABAN 2.5 MILLIGRAM(S): 2.5 TABLET, FILM COATED ORAL at 05:02

## 2022-05-06 RX ADMIN — ATORVASTATIN CALCIUM 40 MILLIGRAM(S): 80 TABLET, FILM COATED ORAL at 22:04

## 2022-05-06 RX ADMIN — DESVENLAFAXINE 50 MILLIGRAM(S): 50 TABLET, EXTENDED RELEASE ORAL at 11:20

## 2022-05-06 RX ADMIN — SENNA PLUS 2 TABLET(S): 8.6 TABLET ORAL at 22:04

## 2022-05-06 RX ADMIN — APIXABAN 2.5 MILLIGRAM(S): 2.5 TABLET, FILM COATED ORAL at 17:17

## 2022-05-06 RX ADMIN — POLYETHYLENE GLYCOL 3350 17 GRAM(S): 17 POWDER, FOR SOLUTION ORAL at 11:19

## 2022-05-06 RX ADMIN — Medication 3 MILLIGRAM(S): at 22:04

## 2022-05-06 RX ADMIN — CARBIDOPA AND LEVODOPA 1.5 TABLET(S): 25; 100 TABLET ORAL at 11:20

## 2022-05-06 RX ADMIN — CARBIDOPA AND LEVODOPA 1 TABLET(S): 25; 100 TABLET ORAL at 17:17

## 2022-05-06 RX ADMIN — Medication 81 MILLIGRAM(S): at 11:20

## 2022-05-06 RX ADMIN — Medication 40 MILLIEQUIVALENT(S): at 11:19

## 2022-05-06 RX ADMIN — OLANZAPINE 2.5 MILLIGRAM(S): 15 TABLET, FILM COATED ORAL at 22:04

## 2022-05-06 RX ADMIN — CARBIDOPA AND LEVODOPA 1 TABLET(S): 25; 100 TABLET ORAL at 14:40

## 2022-05-06 NOTE — DIETITIAN INITIAL EVALUATION ADULT - OTHER INFO
Pt 82 yo male with PMHx of parkinson's disease, CAD with stent, HLD, BPH, afib, presented with hallucinations, anxiety and weakness - per chart review.     At time of visit, Pt asleep at time of visit. Spouse at bed side. Per spouse, Pt with fair appetite/PO intake lately. No report of chewing or swallowing difficulty; no report of vomiting or diarrhea @ this time. +BM (5/6) fecal incontinence, per flow sheet. Of note, Pt on Soft & bite sized consistency food/meal plans. Spouse requesting for Regular consistency food/meal options. Rec to conduct Swallow Bedside Assessment Adult &/or MBS to determine appropriate PO diet/fluid consistency.       Of note, Pt's height: ~68" & Pt's weight: ~165.3# (5/4); Pt weight has been stable for past few months, per spouse. Of note, Pt's weight: 79.8 kg (175.9#) - HIE - 11/11/21 -->> weight loss of 10.6# in 6 months. Food preferences discussed; rec to add PO supplement: Ensure Enlive - 2x daily to diet rx. RDN answered spouse's concerns related to diet. RDN remains available, spouse made aware.

## 2022-05-06 NOTE — DIETITIAN INITIAL EVALUATION ADULT - PERTINENT LABORATORY DATA
05-06    141  |  101  |  19  ----------------------------<  86  3.4<L>   |  28  |  1.50<H>    Ca    9.3      06 May 2022 06:42  Phos  3.7     05-06  Mg     1.90     05-06

## 2022-05-06 NOTE — DIETITIAN INITIAL EVALUATION ADULT - NS FNS DIET ORDER
Soft and Bite Sized: DASH/TLC {Sodium & Cholesterol Restricted} (DASH) (04-29-22 @ 21:14) [Active]

## 2022-05-06 NOTE — DIETITIAN INITIAL EVALUATION ADULT - ADD RECOMMEND
1. Swallow Bedside Assessment Adult &/or MBS to determine appropriate PO diet/fluid consistency;    2. Encourage & assist Pt with meals; Monitor PO diet tolerance; Honor food preferences;    3. Add Multivitamins 1 tab daily for micronutrient coverage;   4. Monitor labs, hydration status;

## 2022-05-06 NOTE — DIETITIAN INITIAL EVALUATION ADULT - OTHER CALCULATIONS
of note, Pt's weights: 165.3# (5/4/22), 175.9# (HIE - 11/11/21) -->> weight loss of 10.6# (weight change: ~6% x 6 months)

## 2022-05-06 NOTE — PROGRESS NOTE ADULT - SUBJECTIVE AND OBJECTIVE BOX
ALBINO RIVAS  81y  Male      Patient is a 81y old  Male who presents with a chief complaint of Failure to thrive in adult  Patient was seen and examined.chart reviewed.,covering .Patient feels better.no Hematuria,no abd pain.  no sob,no cough,no fever   (06 May 2022 12:30)      REVIEW OF SYSTEMS:  CONSTITUTIONAL: No fever  RESPIRATORY: No cough, hemoptysis or shortness of breath  CARDIOVASCULAR: No chest pain, palpitations, dizziness, or leg swelling  GASTROINTESTINAL: No abdominal pain. nausea, vomiting, hematemesis  GENITOURINARY: No dysuria, frequency, hematuria   NEUROLOGICAL: No headaches, no dizziness  MUSCULOSKELETAL: No joint pain or swelling;     INTERVAL HPI/OVERNIGHT EVENTS:  T(C): 36.3 (05-06-22 @ 11:20), Max: 36.6 (05-05-22 @ 20:15)  HR: 62 (05-06-22 @ 11:20) (61 - 63)  BP: 124/55 (05-06-22 @ 11:20) (121/57 - 128/57)  RR: 18 (05-06-22 @ 11:20) (17 - 18)  SpO2: 98% (05-06-22 @ 11:20) (98% - 99%)  Wt(kg): --  I&O's Summary    06 May 2022 07:01  -  06 May 2022 15:38  --------------------------------------------------------  IN: 0 mL / OUT: 900 mL / NET: -900 mL      T(C): 36.3 (05-06-22 @ 11:20), Max: 36.6 (05-05-22 @ 20:15)  HR: 62 (05-06-22 @ 11:20) (61 - 63)  BP: 124/55 (05-06-22 @ 11:20) (121/57 - 128/57)  RR: 18 (05-06-22 @ 11:20) (17 - 18)  SpO2: 98% (05-06-22 @ 11:20) (98% - 99%)  Wt(kg): --Vital Signs Last 24 Hrs  T(C): 36.3 (06 May 2022 11:20), Max: 36.6 (05 May 2022 20:15)  T(F): 97.4 (06 May 2022 11:20), Max: 97.8 (05 May 2022 20:15)  HR: 62 (06 May 2022 11:20) (61 - 63)  BP: 124/55 (06 May 2022 11:20) (121/57 - 128/57)  BP(mean): --  RR: 18 (06 May 2022 11:20) (17 - 18)  SpO2: 98% (06 May 2022 11:20) (98% - 99%)    LABS:                        12.5   7.10  )-----------( 197      ( 06 May 2022 06:42 )             38.8     05-06    141  |  101  |  19  ----------------------------<  86  3.4<L>   |  28  |  1.50<H>    Ca    9.3      06 May 2022 06:42  Phos  3.7     05-06  Mg     1.90     05-06          CAPILLARY BLOOD GLUCOSE                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    Parkinsons disease    CAD (coronary artery disease)  s/p PCI 08/21    Pleural effusion, not elsewhere classified    History of hip replacement, total  R hip 2008 &amp; L hip    Status post cataract extraction  right eye cataract extraction with IOL 6/26/2015    Presence of cardiac pacemaker  10/2021    H/O coronary angioplasty  8/2021 1 stent inserted        MEDICATIONS  (STANDING):  apixaban 2.5 milliGRAM(s) Oral two times a day  aspirin enteric coated 81 milliGRAM(s) Oral daily  atorvastatin 40 milliGRAM(s) Oral at bedtime  carbidopa/levodopa  25/100 1.5 Tablet(s) Oral <User Schedule>  carbidopa/levodopa  25/100 1 Tablet(s) Oral <User Schedule>  clopidogrel Tablet 75 milliGRAM(s) Oral daily  desvenlafaxine ER 50 milliGRAM(s) Oral daily  lactated ringers. 1000 milliLiter(s) (75 mL/Hr) IV Continuous <Continuous>  lidocaine   4% Patch 1 Patch Transdermal daily  melatonin 3 milliGRAM(s) Oral at bedtime  OLANZapine 2.5 milliGRAM(s) Oral at bedtime  polyethylene glycol 3350 17 Gram(s) Oral daily  senna 2 Tablet(s) Oral at bedtime    MEDICATIONS  (PRN):  LORazepam     Tablet 0.5 milliGRAM(s) Oral every 6 hours PRN Agitation  LORazepam   Injectable 0.5 milliGRAM(s) IV Push every 6 hours PRN Agitation  LORazepam   Injectable 0.5 milliGRAM(s) IntraMuscular every 6 hours PRN Agitation        RADIOLOGY & ADDITIONAL TESTS:    Imaging Personally Reviewed:  [ ] YES  [ ] NO    Consultant(s) Notes Reviewed:  [ x] YES  [ ] NO    PHYSICAL EXAM:  GENERAL: Alert and awake lying in bed in no distress  HEAD:  Atraumatic, Normocephalic  EYES: EOMI, MESFIN, conjunctiva and sclera clear  NECK: Supple, No JVD, Normal thyroid  NERVOUS SYSTEM:  Alert & Oriented X3, Motor and sensory systems are intact,   CHEST/LUNG: Bilateral clear breath sounds, no rhochi, no wheezing, no crepitations,  HEART: Regular rate and rhythm; No murmurs, rubs, or gallops  ABDOMEN: Soft, Nontender, Nondistended; Bowel sounds present  EXTREMITIES:   Peripheral Pulses are palpable, no  edema        Care Discussed with Consultants/Other Providers [x ] YES  [ ] NO      Code Status: [] Full Code [] DNR [] DNI [] Goals of Care:   Disposition: [] ICU [] Stroke Unit [] RCU []PCU []Floor [] Discharge Home         RINA Turner

## 2022-05-06 NOTE — BH CONSULTATION LIAISON PROGRESS NOTE - NSBHFUPINTERVALHXFT_PSY_A_CORE
Chart reviewed. Case discussed in interdisciplinary rounds. No PRNs required for agitation. Staff reports Pt was calm and out of the chair in the daytime, was more confused at night. Medication adherent. Pt is AAOx2-3 to self, location, and year. Pt states mood is "not bad", denies any anxiety today, smiling and making jokes. Denies paranoia, visual or auditory hallucinations. Denies any suicidality or homicidality. Reports fair sleep. Mild rigidity on exam, improved from prior day. Pt denies restlessness today. Updated wife Opal at bedside, both Pt and wife are in agreement with tx plan and rehab.

## 2022-05-06 NOTE — DIETITIAN NUTRITION RISK NOTIFICATION - TREATMENT: THE FOLLOWING DIET HAS BEEN RECOMMENDED
Diet, Soft and Bite Sized:   DASH/TLC {Sodium & Cholesterol Restricted} (DASH) (04-29-22 @ 21:14) [Active]

## 2022-05-06 NOTE — BH CONSULTATION LIAISON PROGRESS NOTE - CASE SUMMARY
patient tolerating zyprexa very well, prior QTc prolonged but may be artefactual given pacing spikes and wide QRS, correct QTc <500 per EP, would continue zyprexa for now as this appear to be helping patient's behaviors, lability, cognition well. No SI/HI, no depression/anhedonia. Wife feels patient has improved. AOx2, executive function  better today. Will continue to monitor, if no PRNs and patient stable for 48h then will clear for DC

## 2022-05-06 NOTE — BH CONSULTATION LIAISON PROGRESS NOTE - NSBHASSESSMENTFT_PSY_ALL_CORE
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    On initial assessment, Patient seen, alert to self, pleasantly confused, however, able to provide history, reports feeling "anxious," endorses visual hallucinations of seeing "people," denies suicidal ideation, denies auditory hallucinations. Collateral obtained from spouse Opal who reports change in spouse on Friday- combative towards her (first time), she reports patient has sleep disturbance which includes anxiety, agitation and combativeness starting at 11PM, she states Seroquel is ineffective, she is amenable to medication management of sleep disturbances.    5/2 - Pt calm and cooperative, appears withdrawn, denies AVH, SIIP, HIIP  5/3 - Pt anxious this morning, received PRN Ativan 0.5mg with modest effect. AAOx2, disorganized at times (refers to treatment as a "program"). No SI/HI. Pt and wife agreed to start Abilify 2.5mg to target psychosis and mood.   5/4 - see chart note  5/5 - Pt more dysphoric and disorganized today, with reported akathisia and moderate cogwheeling and rigidity on exam, likely from Abilify -- will discontinue today. Manual calculation of QTc 478ms. Initiate Zyprexa 2.5 mg PO HS, spoke to wife at bedside who verbalized understanding.  5/6 - Pt AAOx2-3 calm and cooperative, more reactive, denies AVH, SI/HI. Mild rigidity today, akathisia resolved.    PLAN:  -5/5 EKG - manual calculation of QTc 478ms ( EP input appreciated)   -continue Zyprexa 2.5mg PO HS, if corrected Qtc< 500   -D/C Abilify due to worsening EPS/PD Sx and reported akathisia  -DEFER to primary team-No psychiatric indication for 1:1 constant observation  -c/w manual correction for QTc w/cardio given pacing spikes, wide QRS ( RBBB)  -Reviewed labs: b12, folate, tsh - wnl  -continue Pristiq 50mg daily per home dose  -Melatonin 3mg at 8PM  -If qtc <500: Anxiety/Agitation: Seroquel 25mg po q6h prn  -If qtc <500: Severe Agitation:   Zyprexa 1.25mg im q8h prn-DO not given Ativan im within 1 hour of Zyprexa im as can cause sedation and or respiratory depression  -Hold above psychotropics if qtc >500  -If qtc >500: Agitation: Ativan 0.5mg-1mg po/iv/im q6h prn-hold if rr<12, monitor for sedation  -Psych will continue to follow, and will provide outpatient referrals

## 2022-05-07 LAB
ANION GAP SERPL CALC-SCNC: 11 MMOL/L — SIGNIFICANT CHANGE UP (ref 7–14)
BASOPHILS # BLD AUTO: 0.05 K/UL — SIGNIFICANT CHANGE UP (ref 0–0.2)
BASOPHILS NFR BLD AUTO: 0.7 % — SIGNIFICANT CHANGE UP (ref 0–2)
BUN SERPL-MCNC: 18 MG/DL — SIGNIFICANT CHANGE UP (ref 7–23)
CALCIUM SERPL-MCNC: 9.5 MG/DL — SIGNIFICANT CHANGE UP (ref 8.4–10.5)
CHLORIDE SERPL-SCNC: 104 MMOL/L — SIGNIFICANT CHANGE UP (ref 98–107)
CO2 SERPL-SCNC: 27 MMOL/L — SIGNIFICANT CHANGE UP (ref 22–31)
CREAT SERPL-MCNC: 1.48 MG/DL — HIGH (ref 0.5–1.3)
EGFR: 48 ML/MIN/1.73M2 — LOW
EOSINOPHIL # BLD AUTO: 0.15 K/UL — SIGNIFICANT CHANGE UP (ref 0–0.5)
EOSINOPHIL NFR BLD AUTO: 2.1 % — SIGNIFICANT CHANGE UP (ref 0–6)
GLUCOSE SERPL-MCNC: 64 MG/DL — LOW (ref 70–99)
HCT VFR BLD CALC: 44.8 % — SIGNIFICANT CHANGE UP (ref 39–50)
HGB BLD-MCNC: 14.5 G/DL — SIGNIFICANT CHANGE UP (ref 13–17)
IANC: 4.34 K/UL — SIGNIFICANT CHANGE UP (ref 1.8–7.4)
IMM GRANULOCYTES NFR BLD AUTO: 0.1 % — SIGNIFICANT CHANGE UP (ref 0–1.5)
LYMPHOCYTES # BLD AUTO: 1.91 K/UL — SIGNIFICANT CHANGE UP (ref 1–3.3)
LYMPHOCYTES # BLD AUTO: 26.9 % — SIGNIFICANT CHANGE UP (ref 13–44)
MAGNESIUM SERPL-MCNC: 2 MG/DL — SIGNIFICANT CHANGE UP (ref 1.6–2.6)
MCHC RBC-ENTMCNC: 31.5 PG — SIGNIFICANT CHANGE UP (ref 27–34)
MCHC RBC-ENTMCNC: 32.4 GM/DL — SIGNIFICANT CHANGE UP (ref 32–36)
MCV RBC AUTO: 97.2 FL — SIGNIFICANT CHANGE UP (ref 80–100)
MONOCYTES # BLD AUTO: 0.65 K/UL — SIGNIFICANT CHANGE UP (ref 0–0.9)
MONOCYTES NFR BLD AUTO: 9.1 % — SIGNIFICANT CHANGE UP (ref 2–14)
NEUTROPHILS # BLD AUTO: 4.34 K/UL — SIGNIFICANT CHANGE UP (ref 1.8–7.4)
NEUTROPHILS NFR BLD AUTO: 61.1 % — SIGNIFICANT CHANGE UP (ref 43–77)
NRBC # BLD: 0 /100 WBCS — SIGNIFICANT CHANGE UP
NRBC # FLD: 0 K/UL — SIGNIFICANT CHANGE UP
PHOSPHATE SERPL-MCNC: 3.4 MG/DL — SIGNIFICANT CHANGE UP (ref 2.5–4.5)
PLATELET # BLD AUTO: 193 K/UL — SIGNIFICANT CHANGE UP (ref 150–400)
POTASSIUM SERPL-MCNC: 4.5 MMOL/L — SIGNIFICANT CHANGE UP (ref 3.5–5.3)
POTASSIUM SERPL-SCNC: 4.5 MMOL/L — SIGNIFICANT CHANGE UP (ref 3.5–5.3)
RBC # BLD: 4.61 M/UL — SIGNIFICANT CHANGE UP (ref 4.2–5.8)
RBC # FLD: 18.3 % — HIGH (ref 10.3–14.5)
SODIUM SERPL-SCNC: 142 MMOL/L — SIGNIFICANT CHANGE UP (ref 135–145)
WBC # BLD: 7.11 K/UL — SIGNIFICANT CHANGE UP (ref 3.8–10.5)
WBC # FLD AUTO: 7.11 K/UL — SIGNIFICANT CHANGE UP (ref 3.8–10.5)

## 2022-05-07 RX ADMIN — APIXABAN 2.5 MILLIGRAM(S): 2.5 TABLET, FILM COATED ORAL at 17:36

## 2022-05-07 RX ADMIN — CARBIDOPA AND LEVODOPA 1 TABLET(S): 25; 100 TABLET ORAL at 17:36

## 2022-05-07 RX ADMIN — APIXABAN 2.5 MILLIGRAM(S): 2.5 TABLET, FILM COATED ORAL at 06:01

## 2022-05-07 RX ADMIN — CLOPIDOGREL BISULFATE 75 MILLIGRAM(S): 75 TABLET, FILM COATED ORAL at 11:44

## 2022-05-07 RX ADMIN — Medication 81 MILLIGRAM(S): at 11:43

## 2022-05-07 RX ADMIN — Medication 3 MILLIGRAM(S): at 23:14

## 2022-05-07 RX ADMIN — CARBIDOPA AND LEVODOPA 1.5 TABLET(S): 25; 100 TABLET ORAL at 11:44

## 2022-05-07 RX ADMIN — Medication 0.5 MILLIGRAM(S): at 13:07

## 2022-05-07 RX ADMIN — ATORVASTATIN CALCIUM 40 MILLIGRAM(S): 80 TABLET, FILM COATED ORAL at 23:13

## 2022-05-07 RX ADMIN — LIDOCAINE 1 PATCH: 4 CREAM TOPICAL at 18:33

## 2022-05-07 RX ADMIN — DESVENLAFAXINE 50 MILLIGRAM(S): 50 TABLET, EXTENDED RELEASE ORAL at 11:44

## 2022-05-07 RX ADMIN — POLYETHYLENE GLYCOL 3350 17 GRAM(S): 17 POWDER, FOR SOLUTION ORAL at 11:43

## 2022-05-07 RX ADMIN — CARBIDOPA AND LEVODOPA 1 TABLET(S): 25; 100 TABLET ORAL at 22:47

## 2022-05-07 RX ADMIN — LIDOCAINE 1 PATCH: 4 CREAM TOPICAL at 11:43

## 2022-05-07 RX ADMIN — OLANZAPINE 2.5 MILLIGRAM(S): 15 TABLET, FILM COATED ORAL at 23:13

## 2022-05-07 RX ADMIN — SENNA PLUS 2 TABLET(S): 8.6 TABLET ORAL at 23:13

## 2022-05-07 NOTE — PROGRESS NOTE ADULT - SUBJECTIVE AND OBJECTIVE BOX
ALBINO RIVAS  81y  Male      Patient is a 81y old  Male who presents with a chief complaint of hallucinations (06 May 2022 15:37)  Agitated earlier per wife.presently resting,arousable,nad  no sib,no cp,no fever,no cough    REVIEW OF SYSTEMS:  CONSTITUTIONAL: No fever  RESPIRATORY: No cough, hemoptysis or shortness of breath  CARDIOVASCULAR: No chest pain, palpitations, dizziness, or leg swelling  GASTROINTESTINAL: No abdominal pain. nausea, vomiting, hematemesis  INTERVAL HPI/OVERNIGHT EVENTS:  T(C): 36.9 (05-07-22 @ 13:22), Max: 36.9 (05-07-22 @ 11:42)  HR: 61 (05-07-22 @ 13:22) (57 - 81)  BP: 114/64 (05-07-22 @ 13:22) (102/58 - 127/70)  RR: 18 (05-07-22 @ 13:22) (18 - 18)  SpO2: 98% (05-07-22 @ 13:22) (98% - 99%)  Wt(kg): --  I&O's Summary    06 May 2022 07:01  -  07 May 2022 07:00  --------------------------------------------------------  IN: 0 mL / OUT: 1170 mL / NET: -1170 mL      T(C): 36.9 (05-07-22 @ 13:22), Max: 36.9 (05-07-22 @ 11:42)  HR: 61 (05-07-22 @ 13:22) (57 - 81)  BP: 114/64 (05-07-22 @ 13:22) (102/58 - 127/70)  RR: 18 (05-07-22 @ 13:22) (18 - 18)  SpO2: 98% (05-07-22 @ 13:22) (98% - 99%)  Wt(kg): --Vital Signs Last 24 Hrs  T(C): 36.9 (07 May 2022 13:22), Max: 36.9 (07 May 2022 11:42)  T(F): 98.4 (07 May 2022 13:22), Max: 98.4 (07 May 2022 11:42)  HR: 61 (07 May 2022 13:22) (57 - 81)  BP: 114/64 (07 May 2022 13:22) (102/58 - 127/70)  BP(mean): --  RR: 18 (07 May 2022 13:22) (18 - 18)  SpO2: 98% (07 May 2022 13:22) (98% - 99%)    LABS:                        14.5   7.11  )-----------( 193      ( 07 May 2022 08:43 )             44.8     05-07    142  |  104  |  18  ----------------------------<  64<L>  4.5   |  27  |  1.48<H>    Ca    9.5      07 May 2022 08:43  Phos  3.4     05-07  Mg     2.00     05-07          CAPILLARY BLOOD GLUCOSE                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    Parkinsons disease    CAD (coronary artery disease)  s/p PCI 08/21    Pleural effusion, not elsewhere classified    History of hip replacement, total  R hip 2008 &amp; L hip    Status post cataract extraction  right eye cataract extraction with IOL 6/26/2015    Presence of cardiac pacemaker  10/2021    H/O coronary angioplasty  8/2021 1 stent inserted        MEDICATIONS  (STANDING):  apixaban 2.5 milliGRAM(s) Oral two times a day  aspirin enteric coated 81 milliGRAM(s) Oral daily  atorvastatin 40 milliGRAM(s) Oral at bedtime  carbidopa/levodopa  25/100 1.5 Tablet(s) Oral <User Schedule>  carbidopa/levodopa  25/100 1 Tablet(s) Oral <User Schedule>  clopidogrel Tablet 75 milliGRAM(s) Oral daily  desvenlafaxine ER 50 milliGRAM(s) Oral daily  lactated ringers. 1000 milliLiter(s) (75 mL/Hr) IV Continuous <Continuous>  lidocaine   4% Patch 1 Patch Transdermal daily  melatonin 3 milliGRAM(s) Oral at bedtime  OLANZapine 2.5 milliGRAM(s) Oral at bedtime  polyethylene glycol 3350 17 Gram(s) Oral daily  senna 2 Tablet(s) Oral at bedtime    MEDICATIONS  (PRN):  LORazepam     Tablet 0.5 milliGRAM(s) Oral every 6 hours PRN Agitation  LORazepam   Injectable 0.5 milliGRAM(s) IV Push every 6 hours PRN Agitation  LORazepam   Injectable 0.5 milliGRAM(s) IntraMuscular every 6 hours PRN Agitation        RADIOLOGY & ADDITIONAL TESTS:    Imaging Personally Reviewed:  [ ] YES  [ ] NO    Consultant(s) Notes Reviewed:  [x ] YES  [ ] NO    PHYSICAL EXAM:  GENERAL: Resting,arousable  HEAD:  Atraumatic, Normocephalic  EYES: EOMI, MESFIN, conjunctiva and sclera clear  NECK: Supple, No JVD, Normal thyroid  NERVOUS SYSTEM:  Alert & Oriented 23, Motor and sensory systems are intact,   CHEST/LUNG: Bilateral clear breath sounds, no rhochi, no wheezing, no crepitations,  HEART: Regular rate and rhythm; No murmurs, rubs, or gallops  ABDOMEN: Soft, Nontender, Nondistended; Bowel sounds present  EXTREMITIES:   Peripheral Pulses are palpable, no  edema        Care Discussed with Consultants/Other Providers [ x] YES  [ ] NO      Code Status: [] Full Code [] DNR [] DNI [] Goals of Care:   Disposition: [] ICU [] Stroke Unit [] RCU []PCU []Floor [] Discharge Home         LASHAWN TurnerP

## 2022-05-08 LAB
ANION GAP SERPL CALC-SCNC: 13 MMOL/L — SIGNIFICANT CHANGE UP (ref 7–14)
APPEARANCE UR: ABNORMAL
BASOPHILS # BLD AUTO: 0.06 K/UL — SIGNIFICANT CHANGE UP (ref 0–0.2)
BASOPHILS NFR BLD AUTO: 0.8 % — SIGNIFICANT CHANGE UP (ref 0–2)
BILIRUB UR-MCNC: NEGATIVE — SIGNIFICANT CHANGE UP
BUN SERPL-MCNC: 17 MG/DL — SIGNIFICANT CHANGE UP (ref 7–23)
CALCIUM SERPL-MCNC: 9.7 MG/DL — SIGNIFICANT CHANGE UP (ref 8.4–10.5)
CHLORIDE SERPL-SCNC: 104 MMOL/L — SIGNIFICANT CHANGE UP (ref 98–107)
CHLORIDE UR-SCNC: 30 MMOL/L — SIGNIFICANT CHANGE UP
CO2 SERPL-SCNC: 23 MMOL/L — SIGNIFICANT CHANGE UP (ref 22–31)
COLOR SPEC: ABNORMAL
CREAT ?TM UR-MCNC: 174 MG/DL — SIGNIFICANT CHANGE UP
CREAT SERPL-MCNC: 1.34 MG/DL — HIGH (ref 0.5–1.3)
DIFF PNL FLD: ABNORMAL
EGFR: 54 ML/MIN/1.73M2 — LOW
EOSINOPHIL # BLD AUTO: 0.09 K/UL — SIGNIFICANT CHANGE UP (ref 0–0.5)
EOSINOPHIL NFR BLD AUTO: 1.2 % — SIGNIFICANT CHANGE UP (ref 0–6)
GLUCOSE SERPL-MCNC: 75 MG/DL — SIGNIFICANT CHANGE UP (ref 70–99)
GLUCOSE UR QL: NEGATIVE — SIGNIFICANT CHANGE UP
HCT VFR BLD CALC: 45.5 % — SIGNIFICANT CHANGE UP (ref 39–50)
HGB BLD-MCNC: 14.8 G/DL — SIGNIFICANT CHANGE UP (ref 13–17)
IANC: 4.78 K/UL — SIGNIFICANT CHANGE UP (ref 1.8–7.4)
IMM GRANULOCYTES NFR BLD AUTO: 0.1 % — SIGNIFICANT CHANGE UP (ref 0–1.5)
KETONES UR-MCNC: ABNORMAL
LEUKOCYTE ESTERASE UR-ACNC: ABNORMAL
LYMPHOCYTES # BLD AUTO: 1.85 K/UL — SIGNIFICANT CHANGE UP (ref 1–3.3)
LYMPHOCYTES # BLD AUTO: 25.6 % — SIGNIFICANT CHANGE UP (ref 13–44)
MAGNESIUM SERPL-MCNC: 2 MG/DL — SIGNIFICANT CHANGE UP (ref 1.6–2.6)
MCHC RBC-ENTMCNC: 31.5 PG — SIGNIFICANT CHANGE UP (ref 27–34)
MCHC RBC-ENTMCNC: 32.5 GM/DL — SIGNIFICANT CHANGE UP (ref 32–36)
MCV RBC AUTO: 96.8 FL — SIGNIFICANT CHANGE UP (ref 80–100)
MONOCYTES # BLD AUTO: 0.44 K/UL — SIGNIFICANT CHANGE UP (ref 0–0.9)
MONOCYTES NFR BLD AUTO: 6.1 % — SIGNIFICANT CHANGE UP (ref 2–14)
NEUTROPHILS # BLD AUTO: 4.78 K/UL — SIGNIFICANT CHANGE UP (ref 1.8–7.4)
NEUTROPHILS NFR BLD AUTO: 66.2 % — SIGNIFICANT CHANGE UP (ref 43–77)
NITRITE UR-MCNC: NEGATIVE — SIGNIFICANT CHANGE UP
NRBC # BLD: 0 /100 WBCS — SIGNIFICANT CHANGE UP
NRBC # FLD: 0 K/UL — SIGNIFICANT CHANGE UP
OSMOLALITY UR: 523 MOSM/KG — SIGNIFICANT CHANGE UP (ref 50–1200)
PH UR: 6 — SIGNIFICANT CHANGE UP (ref 5–8)
PHOSPHATE SERPL-MCNC: 3.5 MG/DL — SIGNIFICANT CHANGE UP (ref 2.5–4.5)
PLATELET # BLD AUTO: 231 K/UL — SIGNIFICANT CHANGE UP (ref 150–400)
POTASSIUM SERPL-MCNC: 4.5 MMOL/L — SIGNIFICANT CHANGE UP (ref 3.5–5.3)
POTASSIUM SERPL-SCNC: 4.5 MMOL/L — SIGNIFICANT CHANGE UP (ref 3.5–5.3)
POTASSIUM UR-SCNC: 85 MMOL/L — SIGNIFICANT CHANGE UP
PROT ?TM UR-MCNC: 114 MG/DL — SIGNIFICANT CHANGE UP
PROT UR-MCNC: ABNORMAL
PROT/CREAT UR-RTO: 0.7 RATIO — HIGH (ref 0–0.2)
RBC # BLD: 4.7 M/UL — SIGNIFICANT CHANGE UP (ref 4.2–5.8)
RBC # FLD: 17.7 % — HIGH (ref 10.3–14.5)
SODIUM SERPL-SCNC: 140 MMOL/L — SIGNIFICANT CHANGE UP (ref 135–145)
SODIUM UR-SCNC: 36 MMOL/L — SIGNIFICANT CHANGE UP
SP GR SPEC: 1.02 — SIGNIFICANT CHANGE UP (ref 1–1.05)
UROBILINOGEN FLD QL: ABNORMAL
UUN UR-MCNC: 685.4 MG/DL — SIGNIFICANT CHANGE UP
WBC # BLD: 7.23 K/UL — SIGNIFICANT CHANGE UP (ref 3.8–10.5)
WBC # FLD AUTO: 7.23 K/UL — SIGNIFICANT CHANGE UP (ref 3.8–10.5)

## 2022-05-08 RX ADMIN — LIDOCAINE 1 PATCH: 4 CREAM TOPICAL at 12:08

## 2022-05-08 RX ADMIN — APIXABAN 2.5 MILLIGRAM(S): 2.5 TABLET, FILM COATED ORAL at 17:15

## 2022-05-08 RX ADMIN — CLOPIDOGREL BISULFATE 75 MILLIGRAM(S): 75 TABLET, FILM COATED ORAL at 12:07

## 2022-05-08 RX ADMIN — CARBIDOPA AND LEVODOPA 1 TABLET(S): 25; 100 TABLET ORAL at 17:16

## 2022-05-08 RX ADMIN — CARBIDOPA AND LEVODOPA 1 TABLET(S): 25; 100 TABLET ORAL at 15:15

## 2022-05-08 RX ADMIN — CARBIDOPA AND LEVODOPA 1.5 TABLET(S): 25; 100 TABLET ORAL at 12:07

## 2022-05-08 RX ADMIN — LIDOCAINE 1 PATCH: 4 CREAM TOPICAL at 17:18

## 2022-05-08 RX ADMIN — OLANZAPINE 2.5 MILLIGRAM(S): 15 TABLET, FILM COATED ORAL at 22:10

## 2022-05-08 RX ADMIN — APIXABAN 2.5 MILLIGRAM(S): 2.5 TABLET, FILM COATED ORAL at 06:03

## 2022-05-08 RX ADMIN — SENNA PLUS 2 TABLET(S): 8.6 TABLET ORAL at 22:09

## 2022-05-08 RX ADMIN — CARBIDOPA AND LEVODOPA 1 TABLET(S): 25; 100 TABLET ORAL at 22:09

## 2022-05-08 RX ADMIN — POLYETHYLENE GLYCOL 3350 17 GRAM(S): 17 POWDER, FOR SOLUTION ORAL at 12:06

## 2022-05-08 RX ADMIN — DESVENLAFAXINE 50 MILLIGRAM(S): 50 TABLET, EXTENDED RELEASE ORAL at 12:07

## 2022-05-08 RX ADMIN — Medication 81 MILLIGRAM(S): at 12:06

## 2022-05-08 RX ADMIN — ATORVASTATIN CALCIUM 40 MILLIGRAM(S): 80 TABLET, FILM COATED ORAL at 22:09

## 2022-05-08 RX ADMIN — Medication 3 MILLIGRAM(S): at 22:10

## 2022-05-08 NOTE — CONSULT NOTE ADULT - ASSESSMENT
80 y/o M with pmhx of parkinson's disease, CAD with stent, HLD, BPH, afib, presented to the ED for hallucinations, anxiety and weakness now with ALAYNA      1) ALAYNA-  ddx includes pre renal vs atn vs obstruction  check ua  check urine na/cr/cl/osm/k  cr is improving  Has hematuria- urology follow up--> s/p cystoscopy and on eliquis  check bladder scan  off diuretics for now  encourage po intake  avoid nephrotoxins  will monitor with you      Dr Park   [phone]

## 2022-05-08 NOTE — PROGRESS NOTE ADULT - SUBJECTIVE AND OBJECTIVE BOX
ALBINO RIVAS  81y  Male      Patient is a 81y old  Male who presents with a chief complaint of hallucinations (08 May 2022 12:41)  Patient is calm.comfortable,nad.Hematuria persists.no abd.pain    REVIEW OF SYSTEMS:  CONSTITUTIONAL: No fever  RESPIRATORY: No cough, hemoptysis or shortness of breath  CARDIOVASCULAR: No chest pain, palpitations, dizziness, or leg swelling  GASTROINTESTINAL: No abdominal pain. nausea, vomiting, hematemesis    INTERVAL HPI/OVERNIGHT EVENTS:  T(C): 36.7 (22 @ 12:22), Max: 36.7 (22 @ 12:22)  HR: 60 (22 @ 12:22) (60 - 61)  BP: 133/56 (22 @ 12:22) (113/51 - 133/56)  RR: 17 (22 @ 12:22) (17 - 18)  SpO2: 98% (22 @ 12:22) (98% - 100%)  Wt(kg): --  I&O's Summary    T(C): 36.7 (22 @ 12:22), Max: 36.7 (22 @ 12:22)  HR: 60 (22 @ 12:22) (60 - 61)  BP: 133/56 (22 @ 12:22) (113/51 - 133/56)  RR: 17 (22 @ 12:22) (17 - 18)  SpO2: 98% (22 @ 12:22) (98% - 100%)  Wt(kg): --Vital Signs Last 24 Hrs  T(C): 36.7 (08 May 2022 12:22), Max: 36.7 (08 May 2022 12:22)  T(F): 98 (08 May 2022 12:22), Max: 98 (08 May 2022 12:22)  HR: 60 (08 May 2022 12:22) (60 - 61)  BP: 133/56 (08 May 2022 12:22) (113/51 - 133/56)  BP(mean): --  RR: 17 (08 May 2022 12:22) (17 - 18)  SpO2: 98% (08 May 2022 12:22) (98% - 100%)    LABS:                        14.8   7.23  )-----------( 231      ( 08 May 2022 07:00 )             45.5     05-08    140  |  104  |  17  ----------------------------<  75  4.5   |  23  |  1.34<H>    Ca    9.7      08 May 2022 07:00  Phos  3.5     05-08  Mg     2.00     05-08        Urinalysis Basic - ( 08 May 2022 13:20 )    Color: Orange / Appearance: Turbid / S.023 / pH: x  Gluc: x / Ketone: Trace  / Bili: Negative / Urobili: 3 mg/dL   Blood: x / Protein: 100 mg/dL / Nitrite: Negative   Leuk Esterase: Small / RBC: >720 /HPF / WBC 26 /HPF   Sq Epi: x / Non Sq Epi: 2 /HPF / Bacteria: Negative      CAPILLARY BLOOD GLUCOSE            Urinalysis Basic - ( 08 May 2022 13:20 )    Color: Orange / Appearance: Turbid / S.023 / pH: x  Gluc: x / Ketone: Trace  / Bili: Negative / Urobili: 3 mg/dL   Blood: x / Protein: 100 mg/dL / Nitrite: Negative   Leuk Esterase: Small / RBC: >720 /HPF / WBC 26 /HPF   Sq Epi: x / Non Sq Epi: 2 /HPF / Bacteria: Negative        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    Parkinsons disease    CAD (coronary artery disease)  s/p PCI     Pleural effusion, not elsewhere classified    History of hip replacement, total  R hip  &amp; L hip    Status post cataract extraction  right eye cataract extraction with IOL 2015    Presence of cardiac pacemaker  10/2021    H/O coronary angioplasty  2021 1 stent inserted        MEDICATIONS  (STANDING):  apixaban 2.5 milliGRAM(s) Oral two times a day  aspirin enteric coated 81 milliGRAM(s) Oral daily  atorvastatin 40 milliGRAM(s) Oral at bedtime  carbidopa/levodopa  25/100 1 Tablet(s) Oral <User Schedule>  carbidopa/levodopa  25/100 1.5 Tablet(s) Oral <User Schedule>  clopidogrel Tablet 75 milliGRAM(s) Oral daily  desvenlafaxine ER 50 milliGRAM(s) Oral daily  lactated ringers. 1000 milliLiter(s) (75 mL/Hr) IV Continuous <Continuous>  lidocaine   4% Patch 1 Patch Transdermal daily  melatonin 3 milliGRAM(s) Oral at bedtime  OLANZapine 2.5 milliGRAM(s) Oral at bedtime  polyethylene glycol 3350 17 Gram(s) Oral daily  senna 2 Tablet(s) Oral at bedtime    MEDICATIONS  (PRN):  LORazepam     Tablet 0.5 milliGRAM(s) Oral every 6 hours PRN Agitation  LORazepam   Injectable 0.5 milliGRAM(s) IV Push every 6 hours PRN Agitation  LORazepam   Injectable 0.5 milliGRAM(s) IntraMuscular every 6 hours PRN Agitation        RADIOLOGY & ADDITIONAL TESTS:    Imaging Personally Reviewed:  [ ] YES  [ ] NO    Consultant(s) Notes Reviewed:  [ ] YES  [ ] NO    PHYSICAL EXAM:  GENERAL: Alert and awake lying in bed in no distress  HEAD:  Atraumatic, Normocephalic  EYES: EOMI, MESFIN, conjunctiva and sclera clear  NECK: Supple, No JVD, Normal thyroid  NERVOUS SYSTEM:  Alert & Oriented X3, Motor and sensory systems are intact,   CHEST/LUNG: Bilateral clear breath sounds, no rhochi, no wheezing, no crepitations,  HEART: Regular rate and rhythm; No murmurs, rubs, or gallops  ABDOMEN: Soft, Nontender, Nondistended; Bowel sounds present  EXTREMITIES:   Peripheral Pulses are palpable, no  edema        Care Discussed with Consultants/Other Providers [ x] YES  [ ] NO      Code Status: [] Full Code [] DNR [] DNI [] Goals of Care:   Disposition: [] ICU [] Stroke Unit [] RCU []PCU []Floor [] Discharge Home         LASHAWN TurnerP

## 2022-05-08 NOTE — CONSULT NOTE ADULT - SUBJECTIVE AND OBJECTIVE BOX
[facility] Consult Note Nephrology - CONSULTATION NOTE    80 y/o M with pmhx of parkinson's disease, CAD with stent, HLD, BPH, afib, presented to the ED for hallucinations, anxiety and weakness. The patient does not have any obvious infectious cause at this time. Likely to be worsening of the parkinson's disease.    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    Parkinsons disease    CAD (coronary artery disease)  s/p PCI 08/21    Pleural effusion, not elsewhere classified    History of hip replacement, total  R hip 2008 &amp; L hip    Status post cataract extraction  right eye cataract extraction with IOL 6/26/2015    Presence of cardiac pacemaker  10/2021    H/O coronary angioplasty  8/2021 1 stent inserted      No Known Allergies    Home Medications Reviewed  Hospital Medications:   MEDICATIONS  (STANDING):  apixaban 2.5 milliGRAM(s) Oral two times a day  aspirin enteric coated 81 milliGRAM(s) Oral daily  atorvastatin 40 milliGRAM(s) Oral at bedtime  carbidopa/levodopa  25/100 1 Tablet(s) Oral <User Schedule>  carbidopa/levodopa  25/100 1.5 Tablet(s) Oral <User Schedule>  clopidogrel Tablet 75 milliGRAM(s) Oral daily  desvenlafaxine ER 50 milliGRAM(s) Oral daily  lactated ringers. 1000 milliLiter(s) (75 mL/Hr) IV Continuous <Continuous>  lidocaine   4% Patch 1 Patch Transdermal daily  melatonin 3 milliGRAM(s) Oral at bedtime  OLANZapine 2.5 milliGRAM(s) Oral at bedtime  polyethylene glycol 3350 17 Gram(s) Oral daily  senna 2 Tablet(s) Oral at bedtime    SOCIAL HISTORY:  Denies ETOh,Smoking,   FAMILY HISTORY:  Family history of lung cancer (Mother)    Family history of esophageal cancer (Father)    FH: myocardial infarction (Grandparent)      REVIEW OF SYSTEMS:  CONSTITUTIONAL: No weakness, fevers or chills  EYES/ENT: No visual changes;  No vertigo or throat pain   NECK: No pain or stiffness  RESPIRATORY: No cough, wheezing, hemoptysis; No shortness of breath  CARDIOVASCULAR: No chest pain or palpitations.  GASTROINTESTINAL: No abdominal or epigastric pain. No nausea, vomiting, or hematemesis; No diarrhea or constipation. No melena or hematochezia.  GENITOURINARY: No dysuria, frequency, foamy urine, urinary urgency, incontinence or hematuria  NEUROLOGICAL: No numbness or weakness  SKIN: No itching, burning, rashes, or lesions   VASCULAR: No bilateral lower extremity edema.   All other review of systems is negative unless indicated above.    VITALS:  T(F): 98 (05-08-22 @ 12:22), Max: 98.4 (05-07-22 @ 13:22)  HR: 60 (05-08-22 @ 12:22)  BP: 133/56 (05-08-22 @ 12:22)  RR: 17 (05-08-22 @ 12:22)  SpO2: 98% (05-08-22 @ 12:22)  Wt(kg): --      PHYSICAL EXAM:  Constitutional: NAD  HEENT: anicteric sclera, oropharynx clear, MMM  Neck: No JVD  Respiratory: CTAB, no wheezes, rales or rhonchi  Cardiovascular: S1, S2, RRR  Gastrointestinal: BS+, soft, NT/ND  Extremities: No cyanosis or clubbing. No peripheral edema  Neurological: A/O x 3, no focal deficits  Psychiatric: Normal mood, normal affect  : No CVA tenderness. No means.   Skin: No rashes      LABS:  05-08    140  |  104  |  17  ----------------------------<  75  4.5   |  23  |  1.34<H>    Ca    9.7      08 May 2022 07:00  Phos  3.5     05-08  Mg     2.00     05-08      Creatinine Trend: 1.34 <--, 1.48 <--, 1.50 <--, 1.44 <--, 1.48 <--, 1.72 <--, 1.75 <--                        14.8   7.23  )-----------( 231      ( 08 May 2022 07:00 )             45.5     Urine Studies:    Sodium, Random Urine: <20 mmol/L (05-03 @ 19:56)  Creatinine, Random Urine: 122 mg/dL (05-03 @ 19:56)  Osmolality, Random Urine: 484 mosm/kg (05-03 @ 19:56)    RADIOLOGY & ADDITIONAL STUDIES:

## 2022-05-09 ENCOUNTER — APPOINTMENT (OUTPATIENT)
Dept: THORACIC SURGERY | Facility: CLINIC | Age: 82
End: 2022-05-09

## 2022-05-09 LAB
ANION GAP SERPL CALC-SCNC: 11 MMOL/L — SIGNIFICANT CHANGE UP (ref 7–14)
BASOPHILS # BLD AUTO: 0.06 K/UL — SIGNIFICANT CHANGE UP (ref 0–0.2)
BASOPHILS NFR BLD AUTO: 1 % — SIGNIFICANT CHANGE UP (ref 0–2)
BUN SERPL-MCNC: 17 MG/DL — SIGNIFICANT CHANGE UP (ref 7–23)
CALCIUM SERPL-MCNC: 9.4 MG/DL — SIGNIFICANT CHANGE UP (ref 8.4–10.5)
CHLORIDE SERPL-SCNC: 102 MMOL/L — SIGNIFICANT CHANGE UP (ref 98–107)
CO2 SERPL-SCNC: 27 MMOL/L — SIGNIFICANT CHANGE UP (ref 22–31)
CREAT SERPL-MCNC: 1.35 MG/DL — HIGH (ref 0.5–1.3)
EGFR: 53 ML/MIN/1.73M2 — LOW
EOSINOPHIL # BLD AUTO: 0.08 K/UL — SIGNIFICANT CHANGE UP (ref 0–0.5)
EOSINOPHIL NFR BLD AUTO: 1.3 % — SIGNIFICANT CHANGE UP (ref 0–6)
GLUCOSE SERPL-MCNC: 79 MG/DL — SIGNIFICANT CHANGE UP (ref 70–99)
HCT VFR BLD CALC: 42.7 % — SIGNIFICANT CHANGE UP (ref 39–50)
HGB BLD-MCNC: 14 G/DL — SIGNIFICANT CHANGE UP (ref 13–17)
IANC: 3.78 K/UL — SIGNIFICANT CHANGE UP (ref 1.8–7.4)
IMM GRANULOCYTES NFR BLD AUTO: 0.5 % — SIGNIFICANT CHANGE UP (ref 0–1.5)
LYMPHOCYTES # BLD AUTO: 1.84 K/UL — SIGNIFICANT CHANGE UP (ref 1–3.3)
LYMPHOCYTES # BLD AUTO: 29.8 % — SIGNIFICANT CHANGE UP (ref 13–44)
MAGNESIUM SERPL-MCNC: 2 MG/DL — SIGNIFICANT CHANGE UP (ref 1.6–2.6)
MCHC RBC-ENTMCNC: 31.3 PG — SIGNIFICANT CHANGE UP (ref 27–34)
MCHC RBC-ENTMCNC: 32.8 GM/DL — SIGNIFICANT CHANGE UP (ref 32–36)
MCV RBC AUTO: 95.5 FL — SIGNIFICANT CHANGE UP (ref 80–100)
MONOCYTES # BLD AUTO: 0.39 K/UL — SIGNIFICANT CHANGE UP (ref 0–0.9)
MONOCYTES NFR BLD AUTO: 6.3 % — SIGNIFICANT CHANGE UP (ref 2–14)
NEUTROPHILS # BLD AUTO: 3.78 K/UL — SIGNIFICANT CHANGE UP (ref 1.8–7.4)
NEUTROPHILS NFR BLD AUTO: 61.1 % — SIGNIFICANT CHANGE UP (ref 43–77)
NRBC # BLD: 0 /100 WBCS — SIGNIFICANT CHANGE UP
NRBC # FLD: 0 K/UL — SIGNIFICANT CHANGE UP
PHOSPHATE SERPL-MCNC: 3.5 MG/DL — SIGNIFICANT CHANGE UP (ref 2.5–4.5)
PLATELET # BLD AUTO: 228 K/UL — SIGNIFICANT CHANGE UP (ref 150–400)
POTASSIUM SERPL-MCNC: 3.9 MMOL/L — SIGNIFICANT CHANGE UP (ref 3.5–5.3)
POTASSIUM SERPL-SCNC: 3.9 MMOL/L — SIGNIFICANT CHANGE UP (ref 3.5–5.3)
RBC # BLD: 4.47 M/UL — SIGNIFICANT CHANGE UP (ref 4.2–5.8)
RBC # FLD: 17.4 % — HIGH (ref 10.3–14.5)
SODIUM SERPL-SCNC: 140 MMOL/L — SIGNIFICANT CHANGE UP (ref 135–145)
WBC # BLD: 6.18 K/UL — SIGNIFICANT CHANGE UP (ref 3.8–10.5)
WBC # FLD AUTO: 6.18 K/UL — SIGNIFICANT CHANGE UP (ref 3.8–10.5)

## 2022-05-09 PROCEDURE — 99232 SBSQ HOSP IP/OBS MODERATE 35: CPT

## 2022-05-09 RX ORDER — OLANZAPINE 15 MG/1
1.25 TABLET, FILM COATED ORAL EVERY 6 HOURS
Refills: 0 | Status: DISCONTINUED | OUTPATIENT
Start: 2022-05-09 | End: 2022-05-12

## 2022-05-09 RX ADMIN — Medication 81 MILLIGRAM(S): at 11:51

## 2022-05-09 RX ADMIN — CARBIDOPA AND LEVODOPA 1 TABLET(S): 25; 100 TABLET ORAL at 17:33

## 2022-05-09 RX ADMIN — CARBIDOPA AND LEVODOPA 1 TABLET(S): 25; 100 TABLET ORAL at 22:07

## 2022-05-09 RX ADMIN — OLANZAPINE 2.5 MILLIGRAM(S): 15 TABLET, FILM COATED ORAL at 22:10

## 2022-05-09 RX ADMIN — ATORVASTATIN CALCIUM 40 MILLIGRAM(S): 80 TABLET, FILM COATED ORAL at 22:07

## 2022-05-09 RX ADMIN — Medication 3 MILLIGRAM(S): at 22:07

## 2022-05-09 RX ADMIN — DESVENLAFAXINE 50 MILLIGRAM(S): 50 TABLET, EXTENDED RELEASE ORAL at 11:52

## 2022-05-09 RX ADMIN — CLOPIDOGREL BISULFATE 75 MILLIGRAM(S): 75 TABLET, FILM COATED ORAL at 11:51

## 2022-05-09 RX ADMIN — CARBIDOPA AND LEVODOPA 1.5 TABLET(S): 25; 100 TABLET ORAL at 11:51

## 2022-05-09 RX ADMIN — CARBIDOPA AND LEVODOPA 1 TABLET(S): 25; 100 TABLET ORAL at 15:13

## 2022-05-09 RX ADMIN — SENNA PLUS 2 TABLET(S): 8.6 TABLET ORAL at 22:10

## 2022-05-09 RX ADMIN — POLYETHYLENE GLYCOL 3350 17 GRAM(S): 17 POWDER, FOR SOLUTION ORAL at 11:52

## 2022-05-09 NOTE — BH CONSULTATION LIAISON PROGRESS NOTE - NSBHFUPREASONCONS_PSY_A_CORE
agitation/med management

## 2022-05-09 NOTE — BH CONSULTATION LIAISON PROGRESS NOTE - NSBHASSESSMENTFT_PSY_ALL_CORE
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    On initial assessment, Patient seen, alert to self, pleasantly confused, however, able to provide history, reports feeling "anxious," endorses visual hallucinations of seeing "people," denies suicidal ideation, denies auditory hallucinations. Collateral obtained from spouse Opal who reports change in spouse on Friday- combative towards her (first time), she reports patient has sleep disturbance which includes anxiety, agitation and combativeness starting at 11PM, she states Seroquel is ineffective, she is amenable to medication management of sleep disturbances.    5/2 - Pt calm and cooperative, appears withdrawn, denies AVH, SIIP, HIIP  5/3 - Pt anxious this morning, received PRN Ativan 0.5mg with modest effect. AAOx2, disorganized at times (refers to treatment as a "program"). No SI/HI. Pt and wife agreed to start Abilify 2.5mg to target psychosis and mood.   5/4 - see chart note  5/5 - Pt more dysphoric and disorganized today, with reported akathisia and moderate cogwheeling and rigidity on exam, likely from Abilify -- will discontinue today. Manual calculation of QTc 478ms. Initiate Zyprexa 2.5 mg PO HS, spoke to wife at bedside who verbalized understanding.  5/6 - Pt AAOx2-3 calm and cooperative, more reactive, denies AVH, SI/HI. Mild rigidity today, akathisia resolved.  5/9 - Pt AAOx2 calm but more distressed due to physical discomfort, admits to +AH of voices over weekend, denies commands, VH, paranoia, SI/HI. Mild rigidity.     PLAN:  -5/5 EKG - manual calculation of QTc 478ms ( EP input appreciated)   -continue Zyprexa 2.5mg PO HS, if corrected Qtc< 500   -D/C Abilify due to worsening EPS/PD Sx and reported akathisia  -DEFER to primary team-No psychiatric indication for 1:1 constant observation  -c/w manual correction for QTc w/cardio given pacing spikes, wide QRS ( RBBB)  -Reviewed labs: b12, folate, tsh - wnl  -continue Pristiq 50mg daily per home dose  -Melatonin 3mg at 8PM  -If qtc <500: Anxiety/Agitation: Seroquel 25mg po q6h prn  -If qtc <500: Severe Agitation:   Zyprexa 1.25mg im q8h prn-DO not given Ativan im within 1 hour of Zyprexa im as can cause sedation and or respiratory depression  -Hold above psychotropics if qtc >500  -If qtc >500: Agitation: Ativan 0.5mg-1mg po/iv/im q6h prn-hold if rr<12, monitor for sedation  -Psych will continue to follow, and will provide outpatient referrals  80yo , retired, male, domiciled with spouse with PMHx Parkinson's Disease, 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    On initial assessment, Patient seen, alert to self, pleasantly confused, however, able to provide history, reports feeling "anxious," endorses visual hallucinations of seeing "people," denies suicidal ideation, denies auditory hallucinations. Collateral obtained from spouse Opal who reports change in spouse on Friday- combative towards her (first time), she reports patient has sleep disturbance which includes anxiety, agitation and combativeness starting at 11PM, she states Seroquel is ineffective, she is amenable to medication management of sleep disturbances.    5/2 - Pt calm and cooperative, appears withdrawn, denies AVH, SIIP, HIIP  5/3 - Pt anxious this morning, received PRN Ativan 0.5mg with modest effect. AAOx2, disorganized at times (refers to treatment as a "program"). No SI/HI. Pt and wife agreed to start Abilify 2.5mg to target psychosis and mood.   5/4 - see chart note  5/5 - Pt more dysphoric and disorganized today, with reported akathisia and moderate cogwheeling and rigidity on exam, likely from Abilify -- will discontinue today. Manual calculation of QTc 478ms. Initiate Zyprexa 2.5 mg PO HS, spoke to wife at bedside who verbalized understanding.  5/6 - Pt AAOx2-3 calm and cooperative, more reactive, denies AVH, SI/HI. Mild rigidity today, akathisia resolved.  5/9 - Pt AAOx2 calm but more distressed due to physical discomfort, admits to +AH of voices over weekend, denies commands, VH, paranoia, SI/HI. Mild rigidity.     PLAN:  -5/5 EKG - manual calculation of QTc 478ms ( EP input appreciated)   -continue Zyprexa 2.5mg PO HS, if corrected Qtc< 500   ---would not titrate Zyprexa further as Pt has hx of PD and sensitive to antipsychotics  -D/C Abilify due to worsening EPS/PD Sx and reported akathisia  -DEFER to primary team-No psychiatric indication for 1:1 constant observation  -c/w manual correction for QTc w/cardio given pacing spikes, wide QRS ( RBBB)  -Reviewed labs: b12, folate, tsh - wnl  -continue Pristiq 50mg daily per home dose  -Melatonin 3mg at 8PM  -If qtc <500: Anxiety/Agitation: Seroquel 25mg po q6h prn  -If qtc <500: Severe Agitation:   Zyprexa 1.25mg im q8h prn-DO not given Ativan im within 1 hour of Zyprexa im as can cause sedation and or respiratory depression  -Hold above psychotropics if qtc >500  -If qtc >500: Agitation: Ativan 0.5mg-1mg po/iv/im q6h prn-hold if rr<12, monitor for sedation  -Psych will sign off for now, please reconsult with any questions. Pt has been in appropriate behavioral control, no signs of worsening AVH/psychosis.   -Please provide the following referrals upon discharge: [facility] [phone]  [facility] (M-F 9am-7pm) at [phone] [place] [address] [phone] offers crisis psychotherapy, and short-term medication management should patient have a delay in seeing outpatient psychiatrist or need to be evaluated by a mental health provider. If patient's family feels that the patient is at high risk of harm to self or others they should take the patient to the ED or call [phone].

## 2022-05-09 NOTE — BH CONSULTATION LIAISON PROGRESS NOTE - NSBHCONSULTMEDPRNREASON_PSY_A_CORE
agitation...
anxiety.../agitation...

## 2022-05-09 NOTE — BH CONSULTATION LIAISON PROGRESS NOTE - NSBHCONSULTFOLLOWAFTERCARE_PSY_A_CORE FT
Please provide the following referrals upon discharge: [facility] [phone]  [facility] (M-F 9am-7pm) at [phone] [place] [address] [phone] offers crisis psychotherapy, and short-term medication management should patient have a delay in seeing outpatient psychiatrist or need to be evaluated by a mental health provider. If patient's family feels that the patient is at high risk of harm to self or others they should take the patient to the ED or call [phone].

## 2022-05-09 NOTE — PROGRESS NOTE ADULT - ASSESSMENT
labs, chart reviewed  will closely follow up.   poc d/w pt, pts RN bedside  [facility]  Cell -[phone]  Pager # [phone]  office # [phone] 80 y/o M with pmhx of parkinson's disease, CAD with stent, HLD, BPH, afib, presented to the ED for hallucinations, anxiety and weakness now with ALAYNA. Renal following for CKD Mx.    ALAYNA   Creatinine Trending down: 1.35 <--, 1.34 <--, 1.48 <--, 1.50 <--, 1.44 <--, 1.48 <--, 1.72 <--  DD-ddx includes pre renal vs atn vs obstruction  K, bicarb ok  euvolemic clinically  bp stable, not on meds    monitor BMP daily and u/o   check bladder scan, get renal sono   off diuretics  encourage po intake  dose all meds for eGFR <15ml/min  avoid ACEi/ARB/NSAIDs/Nephrotoxics.    Gross hematuria-no e/o UTI  -> s/p recent outpt cystoscopy and on eliquis  f/u  eval  monitor  obtain renal sono     labs, chart reviewed  will closely follow up.   poc d/w pt, [facility]  Cell -[phone]  Pager # [phone]  office # [phone]

## 2022-05-09 NOTE — BH CONSULTATION LIAISON PROGRESS NOTE - NSBHFUPINTERVALCCFT_PSY_A_CORE
"I don't feel good" 
"I would like a different program" 
"I rather wait before adding meds"
"Where is my mother?"
"Opal, give her a tip"

## 2022-05-09 NOTE — CHART NOTE - NSCHARTNOTEFT_GEN_A_CORE
Called by RN that patient has hematuria. Assessed patient at bedside. Spoke with wife at bedside who states patient had outpatient cystoscopy with Dr. Talbot (outpt urologist) for unexplained frequent UTI and hematuria - they found granulation tissue on the prostate which is likely the source of the bleeding. Urine red tinged in collection at bedside. Spoke with urology who said as urine mildly tinged, pt can follow up as outpatient with Dr. Talbot for further work up.     Also spoke with [facility] about pleurx cath that was placed by [facility] team last admission. As per team, pleurx to stay in and can be drained MWF.
Called urology for hematuria as requested by . Urology refused to come and evaluated patient. Stated outpatient records needs to be optioned prior to consult. Informed . Will attempt to obtain records Monday.
Chart reviewed. Pt received Ativan 0.5mg IV x1 overnight for severe agitation, attempting to climb out of bed. Medication adherent, started Abilify 2.5mg daily. Pt was sedated this morning, unable to interview - decided to let Pt rest based on clinical judgement. Spoke with Pt's wife at bedside, she shared no new concerns, more amenable to discharge to rehab.    Plan:  -DEFER to primary team-no psychiatric indication for 1:1 constant observation  -consider manual correction for QTc w/cardio given pacing spikes, wide QRS  -Reviewed labs: b12, folate, tsh - wnl  -continue Pristiq 50mg daily per home dose  -continue Abilify 2.5mg daily to target psychosis, MONITOR QTc and EPS Sx  -REPEAT EKG, if qtc is >500 considering consulted cardiology for manual calculation.  -If qtc <500: Change Seroquel to 50mg at 6PM (give dose now after repeat EKG and if qtc <500) AND                     Seroquel 50mg 6AM  -Melatonin 3mg at 8PM  -If qtc <500: Anxiety/Agitation: Seroquel 25mg po q6h prn  -If qtc <500: Severe Agitation:   Zyprexa 1.25mg im q8h prn-DO not given Ativan im within 1 hour of Zyprexa im as can cause sedation and or respiratory depression  -Hold above psychotropics if qtc >500  -If qtc >500: Agitation: Ativan 0.5mg-1mg po/iv/im q6h prn-hold if rr<12, monitor for sedation  -Psych will continue to follow, and will provide outpatient referrals
notified by dr hastings to hold the Eliquis for 2 days given hematuria.
80 y/o male w/ hx Parkinson disease- presented for weakness and inability to climb the stairs,  hallucinations  - CT head negative for acute pathology  - house neurology consulted for parkinson's disease anita and nilesh meds adjustment and hallucinations, will f[place] [phone]
Notified telemetry finding of bradycardia in 30's this am.  PPM mode DDD reprogrammed to VVI at 60 bpm to prevent inhibition of pacing as patient has permanent AF with CHB in 30's.  Bradycardia episodes are due to oversensing of Atrial lead.  Discussed with Dr. Baca.
Spoke to cardiology to clarify about DAPT - According to recommendations patient should be on Eliquis and SINGLE anti-platelet therapy - Eliquis is being held for hematuria and during this time patient should continue on ASA/Plavix, however, when Eliquis is resumed discontinue ASA and continue patient on Plavix and Eliquis only

## 2022-05-09 NOTE — BH CONSULTATION LIAISON PROGRESS NOTE - MSE UNSTRUCTURED FT
Mental Status Exam:  Maya: well groomed, fair hygiene     Behavior: cooperative, calm, no psychomotor retardation/agitation  Motor: mild UE tremors. +mild rigidity on exam (improved)  Gait: did not assess, pt in chair  Speech: hypophonic, normal rate, rhythm, prosody  Mood: "bad"  Affect: anxious, constricted, congruent  Thought process: linear  Thought Content: denies paranoia, delusions   Perception: +AH over weekend, denies command AH, VH   SI: denies  HI: denies  Insight: fair  Judgment: fair  Impulse control: appropriate    Cognitive Exam:  Orientation: AOx2 to self and location (unaware of month/year)  Attention: appropriate 
Mental Status Exam:  Maya: well groomed, fair hygiene     Behavior: cooperative, somewhat anxious, no psychomotor retardation/agitation  Motor: mild UE tremors. +moderate cogwheeling and rigidity on exam   Gait: did not assess, pt in bed  Speech: hypophonic, normal rate, rhythm, prosody  Mood: "horrible"  Affect: dysphoric, constricted, congruent  Thought process: more disorganized   Thought Content: denies paranoia, delusions   Perception: denies AH/VH currently   SI: denies  HI: denies  Insight: limited  Judgment: fair  Impulse control: appropriate    Cognitive Exam:  Orientation: AOx2 (unaware of date)  Attention: tenuous/ easily distracted
Mental Status Exam:  Maya: well groomed, fair hygiene     Behavior: calm, cooperative, withdrawn with eyes closed, no psychomotor retardation/agitation  Motor: no tremors, EPS, or rigidity  Gait: did not assess, pt in bed  Speech: normal rate, rhythm, prosody and volume   Mood: "good"  Affect: euthymic, constricted, congruent  Thought process: linear, goal directed, logical  Thought Content: denies paranoia, delusions   Perception: denies AH/VH currently   SI: denies  HI: denies  Insight: fair  Judgment: fair  Impulse control: appropriate    Cognitive Exam:  Orientation: AOx2 (unaware of date)  Recall: intact  Attention: intact  Abstraction: intact
Mental Status Exam:  Maya: well groomed, fair hygiene     Behavior: cooperative, calm, no psychomotor retardation/agitation  Motor: mild UE tremors. +mild rigidity on exam (improved)  Gait: did not assess, pt in bed  Speech: hypophonic, normal rate, rhythm, prosody  Mood: "not bad"  Affect: neutral, constricted, congruent  Thought process: linear, goal directed  Thought Content: denies paranoia, delusions   Perception: denies AH/VH currently   SI: denies  HI: denies  Insight: fair  Judgment: fair  Impulse control: appropriate    Cognitive Exam:  Orientation: AOx2-3 (knows year, unaware of month/date)  Attention: appropriate 
Mental Status Exam:  Maya: well groomed, fair hygiene     Behavior: cooperative, somewhat anxious, no psychomotor retardation/agitation  Motor: mild UE tremors. No EPS or rigidity  Gait: did not assess, pt in bed  Speech: hypophonic, normal rate, rhythm, prosody  Mood: "terrible"  Affect: anxious, constricted, congruent  Thought process: linear, disorganized at times (refers to treatment as a "program")  Thought Content: denies paranoia, delusions   Perception: denies AH/VH currently   SI: denies  HI: denies  Insight: fair  Judgment: fair  Impulse control: appropriate    Cognitive Exam:  Orientation: AOx2 (unaware of date)  Recall: intact  Attention: intact  Abstraction: intact

## 2022-05-09 NOTE — BH CONSULTATION LIAISON PROGRESS NOTE - CASE SUMMARY
patient calm, cooperative, AOx1-2, generally doing better on zyprexa, no EPS on exam, risks of QTc prolongation (though likey artefactual) discussed with wife, wife/patient more interested in QOL which is improved on zyprexa, amenable to continuation of meds with current risks/benefit profile. No SI/HI. No psychiatric c/i to discharge to rehab/home or other safe dispo. Wife amenable to f/u with [facility] connie psych as above after completion of rehab, number provided.     Please copy/paste [facility] referral info as above into d/c paperwork.

## 2022-05-09 NOTE — BH CONSULTATION LIAISON PROGRESS NOTE - NSBHCHARTREVIEWVS_PSY_A_CORE FT
Vital Signs Last 24 Hrs  T(C): 36.4 (02 May 2022 04:40), Max: 36.4 (02 May 2022 04:40)  T(F): 97.5 (02 May 2022 04:40), Max: 97.5 (02 May 2022 04:40)  HR: 60 (02 May 2022 04:40) (60 - 61)  BP: 137/73 (02 May 2022 04:40) (115/60 - 137/73)  BP(mean): --  RR: 18 (02 May 2022 04:40) (18 - 18)  SpO2: 98% (02 May 2022 04:40) (98% - 99%)
Vital Signs Last 24 Hrs  T(C): 36.9 (03 May 2022 11:02), Max: 37 (03 May 2022 05:01)  T(F): 98.5 (03 May 2022 11:02), Max: 98.6 (03 May 2022 05:01)  HR: 60 (03 May 2022 11:02) (60 - 63)  BP: 136/63 (03 May 2022 11:02) (114/64 - 136/69)  BP(mean): --  RR: 18 (03 May 2022 11:02) (17 - 19)  SpO2: 96% (03 May 2022 11:02) (96% - 98%)
Vital Signs Last 24 Hrs  T(C): 36.4 (09 May 2022 05:47), Max: 36.7 (08 May 2022 12:22)  T(F): 97.6 (09 May 2022 05:47), Max: 98 (08 May 2022 12:22)  HR: 60 (09 May 2022 05:47) (60 - 60)  BP: 121/74 (09 May 2022 05:47) (108/58 - 133/56)  BP(mean): --  RR: 18 (09 May 2022 05:47) (17 - 18)  SpO2: 100% (09 May 2022 05:47) (98% - 100%)
Vital Signs Last 24 Hrs  T(C): 36.4 (06 May 2022 05:10), Max: 36.6 (05 May 2022 20:15)  T(F): 97.5 (06 May 2022 05:10), Max: 97.8 (05 May 2022 20:15)  HR: 61 (06 May 2022 05:10) (61 - 63)  BP: 128/57 (06 May 2022 05:10) (121/57 - 128/57)  BP(mean): --  RR: 18 (06 May 2022 05:10) (17 - 18)  SpO2: 99% (06 May 2022 05:10) (99% - 99%)
Vital Signs Last 24 Hrs  T(C): 36.7 (05 May 2022 11:16), Max: 36.7 (05 May 2022 11:16)  T(F): 98.1 (05 May 2022 11:16), Max: 98.1 (05 May 2022 11:16)  HR: 60 (05 May 2022 11:16) (59 - 60)  BP: 116/50 (05 May 2022 11:16) (113/55 - 126/56)  BP(mean): --  RR: 18 (05 May 2022 11:16) (17 - 18)  SpO2: 99% (05 May 2022 11:16) (96% - 99%)

## 2022-05-09 NOTE — PROGRESS NOTE ADULT - SUBJECTIVE AND OBJECTIVE BOX
[facility] - S Flor / Cecilia S /D Jairo/ S Vivian/ S Nichol/ Harish Jose / M Walt/ O Yuliya  service [phone], office [phone]  ---------------------------------------------------------------------------------------------------------------    Patient seen and examined bedside    Subjective and Objective: No overnight events, denied V/D/sob. No complaints today. feeling better  family bedside reports dark urine since adm    Allergies: No Known Allergies      Hospital Medications:   MEDICATIONS  (STANDING):  aspirin enteric coated 81 milliGRAM(s) Oral daily  atorvastatin 40 milliGRAM(s) Oral at bedtime  carbidopa/levodopa  25/100 1 Tablet(s) Oral <User Schedule>  carbidopa/levodopa  25/100 1.5 Tablet(s) Oral <User Schedule>  clopidogrel Tablet 75 milliGRAM(s) Oral daily  desvenlafaxine ER 50 milliGRAM(s) Oral daily  lidocaine   4% Patch 1 Patch Transdermal daily  melatonin 3 milliGRAM(s) Oral at bedtime  OLANZapine 2.5 milliGRAM(s) Oral at bedtime  polyethylene glycol 3350 17 Gram(s) Oral daily  senna 2 Tablet(s) Oral at bedtime      VITALS:  T(F): 97.9 (22 @ 12:23), Max: 98 (22 @ 22:07)  HR: 60 (22 @ 12:23)  BP: 107/61 (22 @ 12:23)  RR: 17 (22 @ 12:23)  SpO2: 100% (22 @ 12:23)  Wt(kg): --     @ 07:01  -   @ 16:01  --------------------------------------------------------  IN: 0 mL / OUT: 500 mL / NET: -500 mL    PHYSICAL EXAM:  Constitutional: NAD  HEENT: anicteric sclera  Neck: No JVD  Respiratory: CTA b/l, no wheezes  Cardiovascular: S1, S2, RRR  Gastrointestinal: BS+, soft, NT  Extremities: No peripheral edema  Neurological: A/O x 3  Psychiatric: Normal mood, normal affect  : No means.     LABS:      140  |  102  |  17  ----------------------------<  79  3.9   |  27  |  1.35<H>    Ca    9.4      09 May 2022 06:39  Phos  3.5       Mg     2.00           Creatinine Trend: 1.35 <--, 1.34 <--, 1.48 <--, 1.50 <--, 1.44 <--, 1.48 <--, 1.72 <--                        14.0   6.18  )-----------( 228      ( 09 May 2022 06:39 )             42.7     Urine Studies:  Urinalysis Basic - ( 08 May 2022 13:20 )    Color: Orange / Appearance: Turbid / S.023 / pH:   Gluc:  / Ketone: Trace  / Bili: Negative / Urobili: 3 mg/dL   Blood:  / Protein: 100 mg/dL / Nitrite: Negative   Leuk Esterase: Small / RBC: >720 /HPF / WBC 26 /HPF   Sq Epi:  / Non Sq Epi: 2 /HPF / Bacteria: Negative      Potassium, Random Urine: 85.0 mmol/L ( @ 13:20)  Osmolality, Random Urine: 523 mosm/kg ( @ 13:20)  Sodium, Random Urine: 36 mmol/L ( @ 13:20)  Chloride, Random Urine: 30 mmol/L ( @ 13:20)  Creatinine, Random Urine: 174 mg/dL ( @ 13:20)  Protein/Creatinine Ratio Calculation: 0.7 Ratio ( @ 13:20)  Sodium, Random Urine: <20 mmol/L (05-03 @ 19:56)  Creatinine, Random Urine: 122 mg/dL ( @ 19:56)  Osmolality, Random Urine: 484 mosm/kg ( @ 19:56)      RADIOLOGY & ADDITIONAL STUDIES:   [facility] - S Flor / Cecilia S /D Jairo/ S Vivian/ S Nichol/ Harish Jose / M Walt/ O Yuliya  service -[phone], office [phone]  ---------------------------------------------------------------------------------------------------------------    Patient seen and examined bedside    Subjective and Objective: No overnight events, denied V/D/sob. No complaints today. feeling better  family bedside reports dark urine since adm    Allergies: No Known Allergies      Hospital Medications:   MEDICATIONS  (STANDING):  aspirin enteric coated 81 milliGRAM(s) Oral daily  atorvastatin 40 milliGRAM(s) Oral at bedtime  carbidopa/levodopa  25/100 1 Tablet(s) Oral <User Schedule>  carbidopa/levodopa  25/100 1.5 Tablet(s) Oral <User Schedule>  clopidogrel Tablet 75 milliGRAM(s) Oral daily  desvenlafaxine ER 50 milliGRAM(s) Oral daily  lidocaine   4% Patch 1 Patch Transdermal daily  melatonin 3 milliGRAM(s) Oral at bedtime  OLANZapine 2.5 milliGRAM(s) Oral at bedtime  polyethylene glycol 3350 17 Gram(s) Oral daily  senna 2 Tablet(s) Oral at bedtime      VITALS:  T(F): 97.9 (22 @ 12:23), Max: 98 (22 @ 22:07)  HR: 60 (22 @ 12:23)  BP: 107/61 (22 @ 12:23)  RR: 17 (22 @ 12:23)  SpO2: 100% (22 @ 12:23)  Wt(kg): --     @ 07:01  -   @ 16:01  --------------------------------------------------------  IN: 0 mL / OUT: 500 mL / NET: -500 mL    PHYSICAL EXAM:  Constitutional: NAD  HEENT: anicteric sclera  Neck: No JVD  Respiratory: CTA b/l, no wheezes  Cardiovascular: S1, S2, RRR  Gastrointestinal: BS+, soft, NT  Extremities: No peripheral edema  Neurological: A/O x 3  Psychiatric: Normal mood, normal affect  : No means.     LABS:      140  |  102  |  17  ----------------------------<  79  3.9   |  27  |  1.35<H>    Ca    9.4      09 May 2022 06:39  Phos  3.5       Mg     2.00           Creatinine Trend: 1.35 <--, 1.34 <--, 1.48 <--, 1.50 <--, 1.44 <--, 1.48 <--, 1.72 <--                        14.0   6.18  )-----------( 228      ( 09 May 2022 06:39 )             42.7     Urine Studies:  Urinalysis Basic - ( 08 May 2022 13:20 )    Color: Orange / Appearance: Turbid / S.023 / pH:   Gluc:  / Ketone: Trace  / Bili: Negative / Urobili: 3 mg/dL   Blood:  / Protein: 100 mg/dL / Nitrite: Negative   Leuk Esterase: Small / RBC: >720 /HPF / WBC 26 /HPF   Sq Epi:  / Non Sq Epi: 2 /HPF / Bacteria: Negative  Potassium, Random Urine: 85.0 mmol/L ( @ 13:20)  Osmolality, Random Urine: 523 mosm/kg ( @ 13:20)  Sodium, Random Urine: 36 mmol/L ( @ 13:20)  Chloride, Random Urine: 30 mmol/L ( @ 13:20)  Creatinine, Random Urine: 174 mg/dL ( @ 13:20)  Protein/Creatinine Ratio Calculation: 0.7 Ratio ( @ 13:20)  Sodium, Random Urine: <20 mmol/L (05-03 @ 19:56)  Creatinine, Random Urine: 122 mg/dL ( @ 19:56)  Osmolality, Random Urine: 484 mosm/kg ( @ 19:56)    RADIOLOGY & ADDITIONAL STUDIES:

## 2022-05-09 NOTE — BH CONSULTATION LIAISON PROGRESS NOTE - NSICDXBHSECONDARYDX_PSY_ALL_CORE
Parkinson disease   G20  

## 2022-05-09 NOTE — BH CONSULTATION LIAISON PROGRESS NOTE - NSICDXBHPRIMARYDX_PSY_ALL_CORE
Acute encephalopathy   G93.40  

## 2022-05-09 NOTE — BH CONSULTATION LIAISON PROGRESS NOTE - NSBHFUPINTERVALHXFT_PSY_A_CORE
Chart reviewed. Case discussed in interdisciplinary rounds. Received PRN Ativan 0.5mg IV x1 on 5/7 at 1pm when Pt spit out Sinemet and was verbally aggressive. Staff reports Pt has been calm otherwise, may be more confused at night. Pt is AAOx2 to self and location, but states it is "March 2002". Pt states mood is "bad", seen closing eyes and grimacing. Endorses intermittent auditory hallucinations of a voice saying "hello" over the weekend, which distresses Pt. Denies command AH, visual hallucinations, or paranoia. Denies any suicidality or homicidality. Reports fair sleep. Mild rigidity on exam.

## 2022-05-09 NOTE — BH CONSULTATION LIAISON PROGRESS NOTE - NSBHCHARTREVIEWLAB_PSY_A_CORE FT
LABS:    05-02    139  |  99  |  21  ----------------------------<  103<H>  4.1   |  26  |  1.75<H>    Ca    9.7      02 May 2022 05:43  Phos  3.1     05-02  Mg     2.00     05-02                Culture - Urine (collected 29 Apr 2022 21:16)  Source: Clean Catch Clean Catch (Midstream)  Final Report (30 Apr 2022 21:38):    <10,000 CFU/mL Normal Urogenital Nichelle    
LABS:                        13.0   9.67  )-----------( 178      ( 03 May 2022 06:26 )             39.4     05-03    139  |  98  |  23  ----------------------------<  94  3.2<L>   |  26  |  1.72<H>    Ca    9.1      03 May 2022 06:26  Phos  3.0     05-03  Mg     2.00     05-03              
LABS:                        12.5   7.10  )-----------( 197      ( 06 May 2022 06:42 )             38.8     05-06    141  |  101  |  19  ----------------------------<  86  3.4<L>   |  28  |  1.50<H>    Ca    9.3      06 May 2022 06:42  Phos  3.7     05-06  Mg     1.90     05-06              
LABS:                        12.5   7.10  )-----------( 197      ( 06 May 2022 06:42 )             38.8     05-06    141  |  101  |  19  ----------------------------<  86  3.4<L>   |  28  |  1.50<H>    Ca    9.3      06 May 2022 06:42  Phos  3.7     05-06  Mg     1.90     05-06              
LABS:                        13.0   9.67  )-----------( 178      ( 03 May 2022 06:26 )             39.4     05-03    139  |  98  |  23  ----------------------------<  94  3.2<L>   |  26  |  1.72<H>    Ca    9.1      03 May 2022 06:26  Phos  3.0     05-03  Mg     2.00     05-03

## 2022-05-09 NOTE — PROGRESS NOTE ADULT - SUBJECTIVE AND OBJECTIVE BOX
ALBINO RIVAS  81y  Male      Patient is a 81y old  Male who presents with a chief complaint of hallucinations (09 May 2022 16:01)  comfortable,feels ok..slight hematuria,seen by   NO SOB,NO CP,NO FEVER,NO COUGH    REVIEW OF SYSTEMS:  CONSTITUTIONAL: No fever  RESPIRATORY: No cough, hemoptysis or shortness of breath  CARDIOVASCULAR: No chest pain, palpitations, dizziness, or leg swelling  GASTROINTESTINAL: No abdominal pain. nausea, vomiting, hematemesis  INTERVAL HPI/OVERNIGHT EVENTS:  T(C): 36.7 (22 @ 20:23), Max: 36.7 (22 @ 20:23)  HR: 77 (22 @ 20:23) (60 - 77)  BP: 130/63 (22 @ 20:23) (107/61 - 130/63)  RR: 18 (22 @ 20:23) (17 - 18)  SpO2: 100% (22 @ 20:23) (100% - 100%)  Wt(kg): --  I&O's Summary    09 May 2022 07:01  -  09 May 2022 23:43  --------------------------------------------------------  IN: 0 mL / OUT: 500 mL / NET: -500 mL      T(C): 36.7 (22 @ 20:23), Max: 36.7 (22 @ 20:23)  HR: 77 (22 @ 20:23) (60 - 77)  BP: 130/63 (22 @ 20:23) (107/61 - 130/63)  RR: 18 (22 @ 20:23) (17 - 18)  SpO2: 100% (22 @ 20:23) (100% - 100%)  Wt(kg): --Vital Signs Last 24 Hrs  T(C): 36.7 (09 May 2022 20:23), Max: 36.7 (09 May 2022 20:23)  T(F): 98.1 (09 May 2022 20:23), Max: 98.1 (09 May 2022 20:23)  HR: 77 (09 May 2022 20:23) (60 - 77)  BP: 130/63 (09 May 2022 20:23) (107/61 - 130/63)  BP(mean): --  RR: 18 (09 May 2022 20:23) (17 - 18)  SpO2: 100% (09 May 2022 20:23) (100% - 100%)    LABS:                        14.0   6.18  )-----------( 228      ( 09 May 2022 06:39 )             42.7     05-09    140  |  102  |  17  ----------------------------<  79  3.9   |  27  |  1.35<H>    Ca    9.4      09 May 2022 06:39  Phos  3.5     05-09  Mg     2.00     05-09        Urinalysis Basic - ( 08 May 2022 13:20 )    Color: Orange / Appearance: Turbid / S.023 / pH: x  Gluc: x / Ketone: Trace  / Bili: Negative / Urobili: 3 mg/dL   Blood: x / Protein: 100 mg/dL / Nitrite: Negative   Leuk Esterase: Small / RBC: >720 /HPF / WBC 26 /HPF   Sq Epi: x / Non Sq Epi: 2 /HPF / Bacteria: Negative      CAPILLARY BLOOD GLUCOSE            Urinalysis Basic - ( 08 May 2022 13:20 )    Color: Orange / Appearance: Turbid / S.023 / pH: x  Gluc: x / Ketone: Trace  / Bili: Negative / Urobili: 3 mg/dL   Blood: x / Protein: 100 mg/dL / Nitrite: Negative   Leuk Esterase: Small / RBC: >720 /HPF / WBC 26 /HPF   Sq Epi: x / Non Sq Epi: 2 /HPF / Bacteria: Negative        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    Parkinsons disease    CAD (coronary artery disease)  s/p PCI     Pleural effusion, not elsewhere classified    History of hip replacement, total  R hip  &amp; L hip    Status post cataract extraction  right eye cataract extraction with IOL 2015    Presence of cardiac pacemaker  10/2021    H/O coronary angioplasty  2021 1 stent inserted        MEDICATIONS  (STANDING):  aspirin enteric coated 81 milliGRAM(s) Oral daily  atorvastatin 40 milliGRAM(s) Oral at bedtime  carbidopa/levodopa  25/100 1.5 Tablet(s) Oral <User Schedule>  carbidopa/levodopa  25/100 1 Tablet(s) Oral <User Schedule>  clopidogrel Tablet 75 milliGRAM(s) Oral daily  desvenlafaxine ER 50 milliGRAM(s) Oral daily  lidocaine   4% Patch 1 Patch Transdermal daily  melatonin 3 milliGRAM(s) Oral at bedtime  OLANZapine 2.5 milliGRAM(s) Oral at bedtime  polyethylene glycol 3350 17 Gram(s) Oral daily  senna 2 Tablet(s) Oral at bedtime    MEDICATIONS  (PRN):  OLANZapine 1.25 milliGRAM(s) Oral every 6 hours PRN Agitation  OLANZapine Injectable 1.25 milliGRAM(s) IntraMuscular every 6 hours PRN Agitation        RADIOLOGY & ADDITIONAL TESTS:    Imaging Personally Reviewed:  [ ] YES  [ ] NO    Consultant(s) Notes Reviewed:  [ ] YES  [ ] NO    PHYSICAL EXAM:  GENERAL: Alert and awake lying in bed in no distress  HEAD:  Atraumatic, Normocephalic  EYES: EOMI, MESFIN, conjunctiva and sclera clear  NECK: Supple, No JVD, Normal thyroid  NERVOUS SYSTEM:  Alert & Oriented 23, Motor and sensory systems are intact,   CHEST/LUNG: Bilateral clear breath sounds, no rhochi, no wheezing, no crepitations,  HEART: Regular rate and rhythm; No murmurs, rubs, or gallops  ABDOMEN: Soft, Nontender, Nondistended; Bowel sounds present  EXTREMITIES:   Peripheral Pulses are palpable, no  edema        Care Discussed with Consultants/Other Providers [ ] YES  [ ] NO      Code Status: [] Full Code [] DNR [] DNI [] Goals of Care:   Disposition: [] ICU [] Stroke Unit [] RCU []PCU []Floor [] Discharge Home         LASHAWN TurnerP

## 2022-05-09 NOTE — BH CONSULTATION LIAISON PROGRESS NOTE - CURRENT MEDICATION
MEDICATIONS  (STANDING):  apixaban 2.5 milliGRAM(s) Oral two times a day  aspirin enteric coated 81 milliGRAM(s) Oral daily  atorvastatin 40 milliGRAM(s) Oral at bedtime  carbidopa/levodopa  25/100 1.5 Tablet(s) Oral <User Schedule>  carbidopa/levodopa  25/100 1 Tablet(s) Oral <User Schedule>  clopidogrel Tablet 75 milliGRAM(s) Oral daily  desvenlafaxine ER 50 milliGRAM(s) Oral daily  lactated ringers. 1000 milliLiter(s) (75 mL/Hr) IV Continuous <Continuous>  melatonin 3 milliGRAM(s) Oral at bedtime  polyethylene glycol 3350 17 Gram(s) Oral daily  senna 2 Tablet(s) Oral at bedtime    MEDICATIONS  (PRN):  
MEDICATIONS  (STANDING):  apixaban 2.5 milliGRAM(s) Oral two times a day  aspirin enteric coated 81 milliGRAM(s) Oral daily  atorvastatin 40 milliGRAM(s) Oral at bedtime  carbidopa/levodopa  25/100 1.5 Tablet(s) Oral <User Schedule>  carbidopa/levodopa  25/100 1 Tablet(s) Oral <User Schedule>  clopidogrel Tablet 75 milliGRAM(s) Oral daily  desvenlafaxine ER 50 milliGRAM(s) Oral daily  lactated ringers. 1000 milliLiter(s) (75 mL/Hr) IV Continuous <Continuous>  lidocaine   4% Patch 1 Patch Transdermal daily  melatonin 3 milliGRAM(s) Oral at bedtime  polyethylene glycol 3350 17 Gram(s) Oral daily  senna 2 Tablet(s) Oral at bedtime    MEDICATIONS  (PRN):  LORazepam     Tablet 0.5 milliGRAM(s) Oral every 6 hours PRN Agitation  LORazepam   Injectable 0.5 milliGRAM(s) IV Push every 6 hours PRN Agitation  LORazepam   Injectable 0.5 milliGRAM(s) IntraMuscular every 6 hours PRN Agitation  
MEDICATIONS  (STANDING):  apixaban 2.5 milliGRAM(s) Oral two times a day  aspirin enteric coated 81 milliGRAM(s) Oral daily  atorvastatin 40 milliGRAM(s) Oral at bedtime  carbidopa/levodopa  25/100 1.5 Tablet(s) Oral <User Schedule>  carbidopa/levodopa  25/100 1 Tablet(s) Oral <User Schedule>  clopidogrel Tablet 75 milliGRAM(s) Oral daily  desvenlafaxine ER 50 milliGRAM(s) Oral daily  lactated ringers. 1000 milliLiter(s) (75 mL/Hr) IV Continuous <Continuous>  melatonin 3 milliGRAM(s) Oral at bedtime  polyethylene glycol 3350 17 Gram(s) Oral daily  senna 2 Tablet(s) Oral at bedtime    MEDICATIONS  (PRN):  
MEDICATIONS  (STANDING):  aspirin enteric coated 81 milliGRAM(s) Oral daily  atorvastatin 40 milliGRAM(s) Oral at bedtime  carbidopa/levodopa  25/100 1 Tablet(s) Oral <User Schedule>  carbidopa/levodopa  25/100 1.5 Tablet(s) Oral <User Schedule>  clopidogrel Tablet 75 milliGRAM(s) Oral daily  desvenlafaxine ER 50 milliGRAM(s) Oral daily  lactated ringers. 1000 milliLiter(s) (75 mL/Hr) IV Continuous <Continuous>  lidocaine   4% Patch 1 Patch Transdermal daily  melatonin 3 milliGRAM(s) Oral at bedtime  OLANZapine 2.5 milliGRAM(s) Oral at bedtime  polyethylene glycol 3350 17 Gram(s) Oral daily  senna 2 Tablet(s) Oral at bedtime    MEDICATIONS  (PRN):  LORazepam     Tablet 0.5 milliGRAM(s) Oral every 6 hours PRN Agitation  LORazepam   Injectable 0.5 milliGRAM(s) IV Push every 6 hours PRN Agitation  LORazepam   Injectable 0.5 milliGRAM(s) IntraMuscular every 6 hours PRN Agitation  
MEDICATIONS  (STANDING):  apixaban 2.5 milliGRAM(s) Oral two times a day  aspirin enteric coated 81 milliGRAM(s) Oral daily  atorvastatin 40 milliGRAM(s) Oral at bedtime  carbidopa/levodopa  25/100 1.5 Tablet(s) Oral <User Schedule>  carbidopa/levodopa  25/100 1 Tablet(s) Oral <User Schedule>  clopidogrel Tablet 75 milliGRAM(s) Oral daily  desvenlafaxine ER 50 milliGRAM(s) Oral daily  lactated ringers. 1000 milliLiter(s) (75 mL/Hr) IV Continuous <Continuous>  lidocaine   4% Patch 1 Patch Transdermal daily  melatonin 3 milliGRAM(s) Oral at bedtime  OLANZapine 2.5 milliGRAM(s) Oral at bedtime  polyethylene glycol 3350 17 Gram(s) Oral daily  potassium chloride   Powder 40 milliEquivalent(s) Oral once  senna 2 Tablet(s) Oral at bedtime    MEDICATIONS  (PRN):  LORazepam     Tablet 0.5 milliGRAM(s) Oral every 6 hours PRN Agitation  LORazepam   Injectable 0.5 milliGRAM(s) IV Push every 6 hours PRN Agitation  LORazepam   Injectable 0.5 milliGRAM(s) IntraMuscular every 6 hours PRN Agitation

## 2022-05-10 ENCOUNTER — TRANSCRIPTION ENCOUNTER (OUTPATIENT)
Age: 82
End: 2022-05-10

## 2022-05-10 LAB
ANION GAP SERPL CALC-SCNC: 10 MMOL/L — SIGNIFICANT CHANGE UP (ref 7–14)
BUN SERPL-MCNC: 15 MG/DL — SIGNIFICANT CHANGE UP (ref 7–23)
CALCIUM SERPL-MCNC: 9.5 MG/DL — SIGNIFICANT CHANGE UP (ref 8.4–10.5)
CHLORIDE SERPL-SCNC: 103 MMOL/L — SIGNIFICANT CHANGE UP (ref 98–107)
CK SERPL-CCNC: 161 U/L — SIGNIFICANT CHANGE UP (ref 30–200)
CO2 SERPL-SCNC: 26 MMOL/L — SIGNIFICANT CHANGE UP (ref 22–31)
CREAT SERPL-MCNC: 1.24 MG/DL — SIGNIFICANT CHANGE UP (ref 0.5–1.3)
EGFR: 59 ML/MIN/1.73M2 — LOW
GLUCOSE SERPL-MCNC: 75 MG/DL — SIGNIFICANT CHANGE UP (ref 70–99)
HCT VFR BLD CALC: 43.8 % — SIGNIFICANT CHANGE UP (ref 39–50)
HGB BLD-MCNC: 14.2 G/DL — SIGNIFICANT CHANGE UP (ref 13–17)
MAGNESIUM SERPL-MCNC: 1.9 MG/DL — SIGNIFICANT CHANGE UP (ref 1.6–2.6)
MCHC RBC-ENTMCNC: 31.2 PG — SIGNIFICANT CHANGE UP (ref 27–34)
MCHC RBC-ENTMCNC: 32.4 GM/DL — SIGNIFICANT CHANGE UP (ref 32–36)
MCV RBC AUTO: 96.3 FL — SIGNIFICANT CHANGE UP (ref 80–100)
NRBC # BLD: 0 /100 WBCS — SIGNIFICANT CHANGE UP
NRBC # FLD: 0 K/UL — SIGNIFICANT CHANGE UP
PHOSPHATE SERPL-MCNC: 3.4 MG/DL — SIGNIFICANT CHANGE UP (ref 2.5–4.5)
PLATELET # BLD AUTO: 236 K/UL — SIGNIFICANT CHANGE UP (ref 150–400)
POTASSIUM SERPL-MCNC: 4.7 MMOL/L — SIGNIFICANT CHANGE UP (ref 3.5–5.3)
POTASSIUM SERPL-SCNC: 4.7 MMOL/L — SIGNIFICANT CHANGE UP (ref 3.5–5.3)
RBC # BLD: 4.55 M/UL — SIGNIFICANT CHANGE UP (ref 4.2–5.8)
RBC # FLD: 17.2 % — HIGH (ref 10.3–14.5)
SODIUM SERPL-SCNC: 139 MMOL/L — SIGNIFICANT CHANGE UP (ref 135–145)
WBC # BLD: 8 K/UL — SIGNIFICANT CHANGE UP (ref 3.8–10.5)
WBC # FLD AUTO: 8 K/UL — SIGNIFICANT CHANGE UP (ref 3.8–10.5)

## 2022-05-10 PROCEDURE — 76770 US EXAM ABDO BACK WALL COMP: CPT | Mod: 26

## 2022-05-10 RX ADMIN — CLOPIDOGREL BISULFATE 75 MILLIGRAM(S): 75 TABLET, FILM COATED ORAL at 11:13

## 2022-05-10 RX ADMIN — OLANZAPINE 2.5 MILLIGRAM(S): 15 TABLET, FILM COATED ORAL at 22:00

## 2022-05-10 RX ADMIN — Medication 81 MILLIGRAM(S): at 11:13

## 2022-05-10 RX ADMIN — POLYETHYLENE GLYCOL 3350 17 GRAM(S): 17 POWDER, FOR SOLUTION ORAL at 11:12

## 2022-05-10 RX ADMIN — Medication 3 MILLIGRAM(S): at 22:00

## 2022-05-10 RX ADMIN — DESVENLAFAXINE 50 MILLIGRAM(S): 50 TABLET, EXTENDED RELEASE ORAL at 11:13

## 2022-05-10 RX ADMIN — CARBIDOPA AND LEVODOPA 1 TABLET(S): 25; 100 TABLET ORAL at 20:37

## 2022-05-10 RX ADMIN — SENNA PLUS 2 TABLET(S): 8.6 TABLET ORAL at 22:00

## 2022-05-10 RX ADMIN — CARBIDOPA AND LEVODOPA 1 TABLET(S): 25; 100 TABLET ORAL at 17:22

## 2022-05-10 RX ADMIN — ATORVASTATIN CALCIUM 40 MILLIGRAM(S): 80 TABLET, FILM COATED ORAL at 22:00

## 2022-05-10 RX ADMIN — CARBIDOPA AND LEVODOPA 1 TABLET(S): 25; 100 TABLET ORAL at 17:23

## 2022-05-10 RX ADMIN — CARBIDOPA AND LEVODOPA 1.5 TABLET(S): 25; 100 TABLET ORAL at 11:13

## 2022-05-10 NOTE — PROGRESS NOTE ADULT - ASSESSMENT
80 y/o M with pmhx of parkinson's disease, CAD with stent, HLD, BPH, afib, presented to the ED for hallucinations, anxiety and weakness now with ALAYNA. Renal following for CKD Mx.    ALAYNA -improving  Creatinine Trend: 1.24 <--, 1.35 <--, 1.34 <--, 1.48 <--, 1.50 <--, 1.44 <--, 1.48 <--  no h/o CKD  DD-ddx includes pre renal vs atn vs obstruction  K, bicarb ok  euvolemic clinically  bp stable, not on meds    monitor BMP daily and u/o   check bladder scan, get renal sono   off diuretics  encourage po intake  dose all meds for eGFR <15ml/min  avoid ACEi/ARB/NSAIDs/Nephrotoxics.    Gross hematuria-no e/o UTI  -> s/p recent outpt cystoscopy and on eliquis  f/u  eval  monitor  renal sono reviewed-no hydro. Diffuse bladder wall thickening also suggesting small diverticula likely   represents sequela of chronic outlet obstruction    poc d/w pt, family [facility], Dr Turner  labs, chart reviewed  will closely follow up.   poc d/w pt, [facility]  Cell -[phone]  Pager # [phone]  office # [phone]

## 2022-05-10 NOTE — DISCHARGE NOTE PROVIDER - PROVIDER TOKENS
FREE:[LAST:[Your primary care physician],PHONE:[(   )    -],FAX:[(   )    -],FOLLOWUP:[1 week]],PROVIDER:[TOKEN:[[id number]],FOLLOWUP:[1 week]]

## 2022-05-10 NOTE — DISCHARGE NOTE PROVIDER - CARE PROVIDER_API CALL
Your primary care physician,   Phone: (   )    -  Fax: (   )    -  Follow Up Time: 1 week    Danielle Talbot)  Urology  [address] [place] [address], [place] [address]  Phone: [phone]  Fax: [phone]  Follow Up Time: 1 week

## 2022-05-10 NOTE — PROGRESS NOTE ADULT - NUTRITIONAL ASSESSMENT
This patient has been assessed with a concern for Malnutrition and has been determined to have a diagnosis/diagnoses of Moderate protein-calorie malnutrition.    This patient is being managed with:   Diet Soft and Bite Sized-  DASH/TLC {Sodium & Cholesterol Restricted} (DASH)  Entered: Apr 29 2022  9:12PM    

## 2022-05-10 NOTE — DISCHARGE NOTE PROVIDER - NSDCMRMEDTOKEN_GEN_ALL_CORE_FT
bumetanide 1 mg oral tablet: 1 tab(s) orally once a day  bumetanide 2 mg oral tablet: 1 tab(s) orally once a day  carbidopa-levodopa 25 mg-100 mg oral tablet: 1.5 tab(s) orally once a day at 11AM  carbidopa-levodopa 25 mg-100 mg oral tablet: 1 tab(s) orally 3 times a day at 2PM, 5PM, 8PM  clopidogrel 75 mg oral tablet: 1 tab(s) orally once a day. Resume taking tomorrow, 3/19.  desvenlafaxine: 75 milligram(s) orally once a day  Eliquis 2.5 mg oral tablet: 1 tab(s) orally 2 times a day. Resume taking tomorrow, 3/19.   Metamucil: 1 cap(s) orally 5 times a day  MiraLax oral powder for reconstitution: 17 gram(s) orally once a day   One A Day Men&#x27;s Complete oral tablet: 1 tab(s) orally once a day  QUEtiapine 100 mg oral tablet: 1 tab(s) orally once a day (at bedtime)  rosuvastatin 40 mg oral tablet: 1 tab(s) orally once a day (at bedtime)  Senna 8.6 mg oral tablet: 1 tab(s) orally 2 times a day   solifenacin 10 mg oral tablet: 1 tab(s) orally once a day  tadalafil 5 mg oral tablet: 1 tab(s) orally once a day  Vitamin B12: 1 tab(s) orally once a day  Vitamin D3: 1 tab(s) orally once a day   carbidopa-levodopa 25 mg-100 mg oral tablet: 1.5 tab(s) orally once a day at 11AM  carbidopa-levodopa 25 mg-100 mg oral tablet: 1 tab(s) orally 3 times a day at 2PM, 5PM, 8PM  clopidogrel 75 mg oral tablet: 1 tab(s) orally once a day  desvenlafaxine (as succinate) 50 mg oral tablet, extended release: 1 tab(s) orally once a day  Eliquis 2.5 mg oral tablet: 1 tab(s) orally 2 times a day. Resume taking tomorrow, 3/19.   melatonin 3 mg oral tablet: 1 tab(s) orally once a day (at bedtime)  Metamucil: 1 cap(s) orally 5 times a day  MiraLax oral powder for reconstitution: 17 gram(s) orally once a day   OLANZapine 2.5 mg oral tablet: 1 tab(s) orally once a day (at bedtime)  One A Day Men&#x27;s Complete oral tablet: 1 tab(s) orally once a day  rosuvastatin 40 mg oral tablet: 1 tab(s) orally once a day (at bedtime)  Senna 8.6 mg oral tablet: 1 tab(s) orally 2 times a day   Vitamin B12: 1 tab(s) orally once a day  Vitamin D3: 1 tab(s) orally once a day

## 2022-05-10 NOTE — PROGRESS NOTE ADULT - SUBJECTIVE AND OBJECTIVE BOX
PAALBINO RUANO  81y  Male      Patient is a 81y old  Male who presents with a chief complaint of hallucinations (10 May 2022 17:33)  comfortable,no agitation.no sob,no cp,no fever,no cough.Hematuria-improving    REVIEW OF SYSTEMS:  CONSTITUTIONAL: No fever  RESPIRATORY: No cough, hemoptysis or shortness of breath  CARDIOVASCULAR: No chest pain, palpitations, dizziness, or leg swelling  GASTROINTESTINAL: No abdominal pain. nausea, vomiting, hematemesis    INTERVAL HPI/OVERNIGHT EVENTS:  T(C): 36.9 (05-10-22 @ 18:28), Max: 36.9 (05-10-22 @ 18:28)  HR: 60 (05-10-22 @ 18:28) (60 - 70)  BP: 129/49 (05-10-22 @ 18:28) (129/49 - 133/66)  RR: 17 (05-10-22 @ 18:28) (17 - 17)  SpO2: 100% (05-10-22 @ 18:28) (100% - 100%)  Wt(kg): --  I&O's Summary    09 May 2022 07:01  -  10 May 2022 07:00  --------------------------------------------------------  IN: 0 mL / OUT: 500 mL / NET: -500 mL      T(C): 36.9 (05-10-22 @ 18:28), Max: 36.9 (05-10-22 @ 18:28)  HR: 60 (05-10-22 @ 18:28) (60 - 70)  BP: 129/49 (05-10-22 @ 18:28) (129/49 - 133/66)  RR: 17 (05-10-22 @ 18:28) (17 - 17)  SpO2: 100% (05-10-22 @ 18:28) (100% - 100%)  Wt(kg): --Vital Signs Last 24 Hrs  T(C): 36.9 (10 May 2022 18:28), Max: 36.9 (10 May 2022 18:28)  T(F): 98.5 (10 May 2022 18:28), Max: 98.5 (10 May 2022 18:28)  HR: 60 (10 May 2022 18:28) (60 - 70)  BP: 129/49 (10 May 2022 18:28) (129/49 - 133/66)  BP(mean): --  RR: 17 (10 May 2022 18:28) (17 - 17)  SpO2: 100% (10 May 2022 18:28) (100% - 100%)    LABS:                        14.2   8.00  )-----------( 236      ( 10 May 2022 06:00 )             43.8     05-10    139  |  103  |  15  ----------------------------<  75  4.7   |  26  |  1.24    Ca    9.5      10 May 2022 06:00  Phos  3.4     05-10  Mg     1.90     05-10          CAPILLARY BLOOD GLUCOSE                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      Parkinsons disease      CAD (coronary artery disease)  s/p PCI 08/21      Pleural effusion, not elsewhere classified      History of hip replacement, total  R hip 2008 &amp; L hip      Status post cataract extraction  right eye cataract extraction with IOL 6/26/2015      Presence of cardiac pacemaker  10/2021      H/O coronary angioplasty  8/2021 1 stent inserted          MEDICATIONS  (STANDING):  aspirin enteric coated 81 milliGRAM(s) Oral daily  atorvastatin 40 milliGRAM(s) Oral at bedtime  carbidopa/levodopa  25/100 1.5 Tablet(s) Oral <User Schedule>  carbidopa/levodopa  25/100 1 Tablet(s) Oral <User Schedule>  clopidogrel Tablet 75 milliGRAM(s) Oral daily  desvenlafaxine ER 50 milliGRAM(s) Oral daily  lidocaine   4% Patch 1 Patch Transdermal daily  melatonin 3 milliGRAM(s) Oral at bedtime  OLANZapine 2.5 milliGRAM(s) Oral at bedtime  polyethylene glycol 3350 17 Gram(s) Oral daily  senna 2 Tablet(s) Oral at bedtime    MEDICATIONS  (PRN):  OLANZapine 1.25 milliGRAM(s) Oral every 6 hours PRN Agitation  OLANZapine Injectable 1.25 milliGRAM(s) IntraMuscular every 6 hours PRN Agitation        RADIOLOGY & ADDITIONAL TESTS:    Imaging Personally Reviewed:  [ ] YES  [ ] NO    Consultant(s) Notes Reviewed:  [ ] YES  [ ] NO    PHYSICAL EXAM:  GENERAL: Alert and awake lying in bed in no distress  HEAD:  Atraumatic, Normocephalic  EYES: EOMI, MESFIN, conjunctiva and sclera clear  NECK: Supple, No JVD, Normal thyroid  NERVOUS SYSTEM:  Alert & Oriented X3, Motor and sensory systems are intact,   CHEST/LUNG: Bilateral clear breath sounds, no rhochi, no wheezing, no crepitations,  HEART: Regular rate and rhythm; No murmurs, rubs, or gallops  ABDOMEN: Soft, Nontender, Nondistended; Bowel sounds present  EXTREMITIES:   Peripheral Pulses are palpable, no  edema        Care Discussed with Consultants/Other Providers [ ] YES  [ ] NO      Code Status: [] Full Code [] DNR [] DNI [] Goals of Care:   Disposition: [] ICU [] Stroke Unit [] RCU []PCU []Floor [] Discharge Home         LASHAWN TurnerP

## 2022-05-10 NOTE — DISCHARGE NOTE PROVIDER - NSDCFUSCHEDAPPT_GEN_ALL_CORE_FT
Clinton Dickey  [facility]  Cardio [address] [facility]  Scheduled Appointment: 05/31/2022    Pavan Jamil  [facility]  PulmMed [address] [facility]  Scheduled Appointment: 06/28/2022

## 2022-05-10 NOTE — PROGRESS NOTE ADULT - SUBJECTIVE AND OBJECTIVE BOX
[facility] - S Flor / Cecilia S /D Jairo/ S Vivian/ S Nichol/ Harish Jose / M Walt/ O Yuliya  service -[phone], office [phone]  ---------------------------------------------------------------------------------------------------------------    Patient seen and examined bedside    Subjective and Objective: No overnight events, denied sob. No complaints today. feeling better  family bedside    Allergies: No Known Allergies      Hospital Medications:   MEDICATIONS  (STANDING):  aspirin enteric coated 81 milliGRAM(s) Oral daily  atorvastatin 40 milliGRAM(s) Oral at bedtime  carbidopa/levodopa  25/100 1.5 Tablet(s) Oral <User Schedule>  carbidopa/levodopa  25/100 1 Tablet(s) Oral <User Schedule>  clopidogrel Tablet 75 milliGRAM(s) Oral daily  desvenlafaxine ER 50 milliGRAM(s) Oral daily  lidocaine   4% Patch 1 Patch Transdermal daily  melatonin 3 milliGRAM(s) Oral at bedtime  OLANZapine 2.5 milliGRAM(s) Oral at bedtime  polyethylene glycol 3350 17 Gram(s) Oral daily  senna 2 Tablet(s) Oral at bedtime    VITALS:  T(F): 98 (05-10-22 @ 06:44), Max: 98.1 (22 @ 20:23)  HR: 70 (05-10-22 @ 06:44)  BP: 133/66 (05-10-22 @ 06:44)  RR: 17 (05-10-22 @ 06:44)  SpO2: 100% (05-10-22 @ 06:44)  Wt(kg): --     @ 07:01  -  05-10 @ 07:00  --------------------------------------------------------  IN: 0 mL / OUT: 500 mL / NET: -500 mL      PHYSICAL EXAM:  Constitutional: NAD  HEENT: anicteric sclera  Neck: No JVD  Respiratory: CTA b/l, no wheezes. rt thoracic cath+   Cardiovascular: S1, S2, RRR  Gastrointestinal: BS+, soft, NT  Extremities: No peripheral edema  Neurological: A/O x 3  Psychiatric: Normal mood, normal affect  : No means.     LABS:  05-10    139  |  103  |  15  ----------------------------<  75  4.7   |  26  |  1.24    Ca    9.5      10 May 2022 06:00  Phos  3.4     05-10  Mg     1.90     05-10      Creatinine Trend: 1.24 <--, 1.35 <--, 1.34 <--, 1.48 <--, 1.50 <--, 1.44 <--, 1.48 <--                        14.2   8.00  )-----------( 236      ( 10 May 2022 06:00 )             43.8     Urine Studies:  Urinalysis Basic - ( 08 May 2022 13:20 )    Color: Orange / Appearance: Turbid / S.023 / pH:   Gluc:  / Ketone: Trace  / Bili: Negative / Urobili: 3 mg/dL   Blood:  / Protein: 100 mg/dL / Nitrite: Negative   Leuk Esterase: Small / RBC: >720 /HPF / WBC 26 /HPF   Sq Epi:  / Non Sq Epi: 2 /HPF / Bacteria: Negative  Potassium, Random Urine: 85.0 mmol/L ( @ 13:20)  Osmolality, Random Urine: 523 mosm/kg ( @ 13:20)  Sodium, Random Urine: 36 mmol/L ( @ 13:20)  Chloride, Random Urine: 30 mmol/L ( @ 13:20)  Creatinine, Random Urine: 174 mg/dL ( @ 13:20)  Protein/Creatinine Ratio Calculation: 0.7 Ratio ( @ 13:20)  Sodium, Random Urine: <20 mmol/L ( @ 19:56)  Creatinine, Random Urine: 122 mg/dL ( @ 19:56)  Osmolality, Random Urine: 484 mosm/kg ( @ 19:56)    RADIOLOGY & ADDITIONAL STUDIES:  < from: US Kidney and Bladder (05.10.22 @ 09:22) >    IMPRESSION:    Findings of medical renal disease. No hydronephrosis    Diffuse bladder wall thickening also suggesting small diverticula likely   represents sequela of chronic outlet obstruction        --- End of Report ---    < end of copied text >

## 2022-05-10 NOTE — DISCHARGE NOTE PROVIDER - HOSPITAL COURSE
81M h/o Parkinson's disease, CAD w/ stent, HLD, BPH, AFib p/w hallucinations, anxiety and weakness.     Parkinson disease.   - Patient presented for weakness and inability to climb the stairs on physical exam the patient was able to lift both legs up by himself without assistance, good strength b/l low suspicion for spinal cord compression likely due to worsening of parkinson's disease   - CT head negative for acute pathology  - PT eval suggests rehab   - Neurology consult for parkinson's disease and hallucinations continued patient on Sinemet      Visual hallucinations  - Likely from worsening Parkinsons dementia secondary to medication changes and was suffering from insomnia and evening psychosis symptoms now improving   - Neuro and psych consulted  - Patient's Abilify discontinued per psych as w/ worsening Parkinson's symptoms and was switched to Zyprexa titrated during hospitalization  - Patient tolerating Zyprexa, prior QTc prolonged but may be artefactual given pacing spikes and wide QRS, correct QTc <500 per EP, would continue zyprexa for now as this appear to be helping patient's behaviors, lability, cognition well; 5/6 QTc 480 EP manual   - Patient continued on Pristque; No longer on Trazodone, Seroquel, Valium     Hematuria.   - UA only shows hematuria, no bacteria  - Dr. Talbot (outpt urologist) performed cystoscopy outpatient for unexplained frequent UTI and hematuria found granulation tissue on the prostate which is likely the source of the bleeding. Urine red tinged in collection at bedside. Urology curbsided who said as urine mildly tinged, pt can follow up as outpatient with Dr. Talbot for further work-up  - Monitor CBC daily and Eliquis being held for short time     ALAYNA   - Presented with new onset ALAYNA with Cr of 1.9. Previously Cr 1.4  - On bumex 3mg QD, hold for  now  - Etiology likely from diuretic use and dehydration, on gentle IVF  - check bladder scan, get renal sono   - encourage po intake  - dose all meds for eGFR <15ml/min  - avoid ACEi/ARB/NSAIDs/Nephrotoxics.    CAD w/ stents/AFib/HTN/HLD  - Patient on ASA/Plavix/Eliquis CAD/Afib; Not on BB/CCB for rate control, however, HR is WNL at this time  - Per cardiology patient should be on Eliquis w/ single-platelet therapy (Preferred Plavix w/ Eliquis); ASA should be discontinued when Eliquis resumed after resolution of hematuria   - BP controlled off anti-hypertensive agents at this time   - Continued on statin  - Patient not continued on Bumex during hospitalizatoin; Euvolemic; CXR clear    Elevated troponin  - EKG non-ischemic - AFib, V-paced; No symptoms of chest pain or SOB. Troponin number similar to prior troponin in December 2021 likely 2/2 CKD/ALAYNA. Troponin peaked at 130s.   - ECHO w/ EF 53% w/ adequate RV/LV size/function   - Elevated troponin in the setting of elevated SCr   - PPM interrogated by EP showing AFib    Pleural effusion, right.  - Patient with known hx of pleural effusion s/o pleurx cath placement being drained MWF as per CT surgery   - CXR done shows clear lungs    On _____, case was discussed with ______, patient is medically cleared and optimized for discharge today. All medications were reviewed with attending, and sent to mutually agreed upon pharmacy.   81M h/o Parkinson's disease, CAD w/ stent, HLD, BPH, AFib p/w hallucinations, anxiety and weakness.     Parkinson disease.   - Patient presented for weakness and inability to climb the stairs on physical exam the patient was able to lift both legs up by himself without assistance, good strength b/l low suspicion for spinal cord compression likely due to worsening of parkinson's disease   - CT head negative for acute pathology  - PT eval suggests rehab   - Neurology consult for parkinson's disease and hallucinations continued patient on Sinemet      Visual hallucinations  - Likely from worsening Parkinsons dementia secondary to medication changes and was suffering from insomnia and evening psychosis symptoms now improving   - Neuro and psych consulted  - Patient's Abilify discontinued per psych as w/ worsening Parkinson's symptoms and was switched to Zyprexa titrated during hospitalization  - Patient tolerating Zyprexa, prior QTc prolonged but may be artefactual given pacing spikes and wide QRS, correct QTc <500 per EP, would continue zyprexa for now as this appear to be helping patient's behaviors, lability, cognition well; 5/6 QTc 480 EP manual   - Patient continued on Pristque; No longer on Trazodone, Seroquel, Valium     Hematuria.   - UA only shows hematuria, no bacteria  - Dr. Talbot (outpt urologist) performed cystoscopy outpatient for unexplained frequent UTI and hematuria found granulation tissue on the prostate which is likely the source of the bleeding. Urine red tinged in collection at bedside. Urology curbsided who said as urine mildly tinged, pt can follow up as outpatient with Dr. Talbot for further work-up  - Monitor CBC daily and Eliquis being held for short time     ALAYNA   - Presented with new onset ALAYNA with Cr of 1.9. Previously Cr 1.4  - On bumex 3mg QD, hold for  now  - Etiology likely from diuretic use and dehydration, on gentle IVF  - check bladder scan, get renal sono   - encourage po intake  - dose all meds for eGFR <15ml/min  - avoid ACEi/ARB/NSAIDs/Nephrotoxics.    CAD w/ stents/AFib/HTN/HLD  - Patient on ASA/Plavix/Eliquis CAD/Afib; Not on BB/CCB for rate control, however, HR is WNL at this time  - Per cardiology patient should be on Eliquis w/ single-platelet therapy (Preferred Plavix w/ Eliquis); ASA should be discontinued when Eliquis resumed after resolution of hematuria   - BP controlled off anti-hypertensive agents at this time   - Continued on statin  - Patient not continued on Bumex during hospitalizatoin; Euvolemic; CXR clear    Elevated troponin  - EKG non-ischemic - AFib, V-paced; No symptoms of chest pain or SOB. Troponin number similar to prior troponin in December 2021 likely 2/2 CKD/ALAYNA. Troponin peaked at 130s.   - ECHO w/ EF 53% w/ adequate RV/LV size/function   - Elevated troponin in the setting of elevated SCr   - PPM interrogated by EP showing AFib    Pleural effusion, right.  - Patient with known hx of pleural effusion s/o pleurx cath placement being drained MWF as per CT surgery   - CXR done shows clear lungs    On 5/12/2022, case was discussed with Dr. Marlen Zuñiga, patient is medically cleared and optimized for discharge today.   All medications were reviewed with attending, and sent to mutually agreed upon pharmacy.

## 2022-05-11 LAB — SARS-COV-2 RNA SPEC QL NAA+PROBE: SIGNIFICANT CHANGE UP

## 2022-05-11 RX ORDER — APIXABAN 2.5 MG/1
5 TABLET, FILM COATED ORAL EVERY 12 HOURS
Refills: 0 | Status: DISCONTINUED | OUTPATIENT
Start: 2022-05-11 | End: 2022-05-11

## 2022-05-11 RX ORDER — APIXABAN 2.5 MG/1
2.5 TABLET, FILM COATED ORAL EVERY 12 HOURS
Refills: 0 | Status: DISCONTINUED | OUTPATIENT
Start: 2022-05-11 | End: 2022-05-12

## 2022-05-11 RX ADMIN — POLYETHYLENE GLYCOL 3350 17 GRAM(S): 17 POWDER, FOR SOLUTION ORAL at 12:18

## 2022-05-11 RX ADMIN — CLOPIDOGREL BISULFATE 75 MILLIGRAM(S): 75 TABLET, FILM COATED ORAL at 12:19

## 2022-05-11 RX ADMIN — CARBIDOPA AND LEVODOPA 1 TABLET(S): 25; 100 TABLET ORAL at 17:18

## 2022-05-11 RX ADMIN — DESVENLAFAXINE 50 MILLIGRAM(S): 50 TABLET, EXTENDED RELEASE ORAL at 12:18

## 2022-05-11 RX ADMIN — CARBIDOPA AND LEVODOPA 1.5 TABLET(S): 25; 100 TABLET ORAL at 12:19

## 2022-05-11 RX ADMIN — Medication 3 MILLIGRAM(S): at 21:49

## 2022-05-11 RX ADMIN — APIXABAN 2.5 MILLIGRAM(S): 2.5 TABLET, FILM COATED ORAL at 17:45

## 2022-05-11 RX ADMIN — ATORVASTATIN CALCIUM 40 MILLIGRAM(S): 80 TABLET, FILM COATED ORAL at 21:49

## 2022-05-11 RX ADMIN — OLANZAPINE 2.5 MILLIGRAM(S): 15 TABLET, FILM COATED ORAL at 21:50

## 2022-05-11 RX ADMIN — APIXABAN 2.5 MILLIGRAM(S): 2.5 TABLET, FILM COATED ORAL at 17:18

## 2022-05-11 RX ADMIN — CARBIDOPA AND LEVODOPA 1 TABLET(S): 25; 100 TABLET ORAL at 17:45

## 2022-05-11 RX ADMIN — SENNA PLUS 2 TABLET(S): 8.6 TABLET ORAL at 21:50

## 2022-05-11 RX ADMIN — CARBIDOPA AND LEVODOPA 1 TABLET(S): 25; 100 TABLET ORAL at 21:49

## 2022-05-11 RX ADMIN — LIDOCAINE 1 PATCH: 4 CREAM TOPICAL at 12:18

## 2022-05-11 NOTE — PROGRESS NOTE ADULT - ASSESSMENT
80 y/o M with pmhx of parkinson's disease, CAD with stent, HLD, BPH, afib, presented to the ED for hallucinations, anxiety and weakness now with ALAYNA. Renal following for CKD Mx.    ALAYNA -improving  Creatinine Trend: 1.24 <--, 1.35 <--, 1.34 <--, 1.48 <--, 1.50 <--, 1.44 <--, 1.48 <--  no h/o CKD  DD-ddx includes pre renal vs atn vs obstruction  K, bicarb ok  euvolemic clinically  bp stable, not on meds    monitor BMP daily and u/o   check bladder scan, get renal sono   off diuretics  encourage po intake  dose all meds for eGFR <15ml/min  avoid ACEi/ARB/NSAIDs/Nephrotoxics.    Gross hematuria-no e/o UTI  -> s/p recent outpt cystoscopy and on eliquis  f/u  eval  monitor  renal sono reviewed-no hydro. Diffuse bladder wall thickening also suggesting small diverticula likely   represents sequela of chronic outlet obstruction      For any question, call:  Cell # [phone]  Pager # [phone]  Callback # [phone]

## 2022-05-11 NOTE — PROGRESS NOTE ADULT - SUBJECTIVE AND OBJECTIVE BOX
CRISTIALBINO RODRÍGUEZ  81y  Male      Patient is a 81y old  Male who presents with a chief complaint of hallucinations   5/11/2022  comfortable,no agitation.no sob,no cp,no fever,no cough.Hematuria-improving    REVIEW OF SYSTEMS:  CONSTITUTIONAL: No fever  RESPIRATORY: No cough, hemoptysis or shortness of breath  CARDIOVASCULAR: No chest pain, palpitations, dizziness, or leg swelling  GASTROINTESTINAL: No abdominal pain. nausea, vomiting, hematemesis    INTERVAL HPI/OVERNIGHT EVENTS:  T(C): 36.9 (05-10-22 @ 18:28), Max: 36.9 (05-10-22 @ 18:28)  HR: 60 (05-10-22 @ 18:28) (60 - 70)  BP: 129/49 (05-10-22 @ 18:28) (129/49 - 133/66)  RR: 17 (05-10-22 @ 18:28) (17 - 17)  SpO2: 100% (05-10-22 @ 18:28) (100% - 100%)  Wt(kg): --  I&O's Summary    09 May 2022 07:01  -  10 May 2022 07:00  --------------------------------------------------------  IN: 0 mL / OUT: 500 mL / NET: -500 mL      T(C): 36.9 (05-10-22 @ 18:28), Max: 36.9 (05-10-22 @ 18:28)  HR: 60 (05-10-22 @ 18:28) (60 - 70)  BP: 129/49 (05-10-22 @ 18:28) (129/49 - 133/66)  RR: 17 (05-10-22 @ 18:28) (17 - 17)  SpO2: 100% (05-10-22 @ 18:28) (100% - 100%)  Wt(kg): --Vital Signs Last 24 Hrs  T(C): 36.9 (10 May 2022 18:28), Max: 36.9 (10 May 2022 18:28)  T(F): 98.5 (10 May 2022 18:28), Max: 98.5 (10 May 2022 18:28)  HR: 60 (10 May 2022 18:28) (60 - 70)  BP: 129/49 (10 May 2022 18:28) (129/49 - 133/66)  BP(mean): --  RR: 17 (10 May 2022 18:28) (17 - 17)  SpO2: 100% (10 May 2022 18:28) (100% - 100%)    LABS:                        14.2   8.00  )-----------( 236      ( 10 May 2022 06:00 )             43.8     05-10    139  |  103  |  15  ----------------------------<  75  4.7   |  26  |  1.24    Ca    9.5      10 May 2022 06:00  Phos  3.4     05-10  Mg     1.90     05-10          CAPILLARY BLOOD GLUCOSE                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      Parkinsons disease      CAD (coronary artery disease)  s/p PCI 08/21      Pleural effusion, not elsewhere classified      History of hip replacement, total  R hip 2008 &amp; L hip      Status post cataract extraction  right eye cataract extraction with IOL 6/26/2015      Presence of cardiac pacemaker  10/2021      H/O coronary angioplasty  8/2021 1 stent inserted          MEDICATIONS  (STANDING):  aspirin enteric coated 81 milliGRAM(s) Oral daily  atorvastatin 40 milliGRAM(s) Oral at bedtime  carbidopa/levodopa  25/100 1.5 Tablet(s) Oral <User Schedule>  carbidopa/levodopa  25/100 1 Tablet(s) Oral <User Schedule>  clopidogrel Tablet 75 milliGRAM(s) Oral daily  desvenlafaxine ER 50 milliGRAM(s) Oral daily  lidocaine   4% Patch 1 Patch Transdermal daily  melatonin 3 milliGRAM(s) Oral at bedtime  OLANZapine 2.5 milliGRAM(s) Oral at bedtime  polyethylene glycol 3350 17 Gram(s) Oral daily  senna 2 Tablet(s) Oral at bedtime    MEDICATIONS  (PRN):  OLANZapine 1.25 milliGRAM(s) Oral every 6 hours PRN Agitation  OLANZapine Injectable 1.25 milliGRAM(s) IntraMuscular every 6 hours PRN Agitation        RADIOLOGY & ADDITIONAL TESTS:    Imaging Personally Reviewed:  [ ] YES  [ ] NO    Consultant(s) Notes Reviewed:  [ ] YES  [ ] NO    PHYSICAL EXAM:  GENERAL: Alert and awake lying in bed in no distress  HEAD:  Atraumatic, Normocephalic  EYES: EOMI, MESFIN, conjunctiva and sclera clear  NECK: Supple, No JVD, Normal thyroid  NERVOUS SYSTEM:  Alert & Oriented X3, Motor and sensory systems are intact,   CHEST/LUNG: Bilateral clear breath sounds, no rhochi, no wheezing, no crepitations,  HEART: Regular rate and rhythm; No murmurs, rubs, or gallops  ABDOMEN: Soft, Nontender, Nondistended; Bowel sounds present  EXTREMITIES:   Peripheral Pulses are palpable, no  edema        Care Discussed with Consultants/Other Providers [ ] YES  [ ] NO      Code Status: [] Full Code [] DNR [] DNI [] Goals of Care:   Disposition: [] ICU [] Stroke Unit [] RCU []PCU []Floor [] Discharge Home         LASHAWN TurnerP

## 2022-05-11 NOTE — PROGRESS NOTE ADULT - SUBJECTIVE AND OBJECTIVE BOX
[facility] [place] [facility] - ARTIE Ray / ARTIE Brooks / KAYLIE Gill/ ARTIE Park/ ARTIE Gandara/ ANGELINE Jose / JR Godoy / JOSELINE Dwyer  ---------------------------------------------------------------------------------------------------------------  seen and examined today for resolving ALAYNA  Interval : NAD  VITALS:  T(F): 98 (05-11-22 @ 05:26), Max: 98.5 (05-10-22 @ 18:28)  HR: 64 (05-11-22 @ 05:26)  BP: 130/66 (05-11-22 @ 05:26)  RR: 17 (05-11-22 @ 05:26)  SpO2: 100% (05-11-22 @ 05:26)  Wt(kg): --    Physical Exam :-  Constitutional: NAD  Neck: Supple.  Respiratory: Bilateral equal breath sounds,  Cardiovascular: S1, S2 normal,  Gastrointestinal: Bowel Sounds present, soft, non tender.  Extremities: No edema  Neurological: Alert and Oriented   Psychiatric: Normal mood, normal affect  Data:-  Allergies :   No Known Allergies    Hospital Medications:   MEDICATIONS  (STANDING):  apixaban 2.5 milliGRAM(s) Oral every 12 hours  atorvastatin 40 milliGRAM(s) Oral at bedtime  carbidopa/levodopa  25/100 1.5 Tablet(s) Oral <User Schedule>  carbidopa/levodopa  25/100 1 Tablet(s) Oral <User Schedule>  clopidogrel Tablet 75 milliGRAM(s) Oral daily  desvenlafaxine ER 50 milliGRAM(s) Oral daily  lidocaine   4% Patch 1 Patch Transdermal daily  melatonin 3 milliGRAM(s) Oral at bedtime  OLANZapine 2.5 milliGRAM(s) Oral at bedtime  polyethylene glycol 3350 17 Gram(s) Oral daily  senna 2 Tablet(s) Oral at bedtime    05-10    139  |  103  |  15  ----------------------------<  75  4.7   |  26  |  1.24    Ca    9.5      10 May 2022 06:00  Phos  3.4     05-10  Mg     1.90     05-10      Creatinine Trend: 1.24 <--, 1.35 <--, 1.34 <--, 1.48 <--, 1.50 <--, 1.44 <--                        14.2   8.00  )-----------( 236      ( 10 May 2022 06:00 )             43.8

## 2022-05-12 ENCOUNTER — TRANSCRIPTION ENCOUNTER (OUTPATIENT)
Age: 82
End: 2022-05-12

## 2022-05-12 VITALS
SYSTOLIC BLOOD PRESSURE: 132 MMHG | RESPIRATION RATE: 18 BRPM | DIASTOLIC BLOOD PRESSURE: 59 MMHG | OXYGEN SATURATION: 99 % | HEART RATE: 59 BPM | TEMPERATURE: 98 F

## 2022-05-12 LAB
ANION GAP SERPL CALC-SCNC: 12 MMOL/L — SIGNIFICANT CHANGE UP (ref 7–14)
BUN SERPL-MCNC: 14 MG/DL — SIGNIFICANT CHANGE UP (ref 7–23)
CALCIUM SERPL-MCNC: 9.9 MG/DL — SIGNIFICANT CHANGE UP (ref 8.4–10.5)
CHLORIDE SERPL-SCNC: 102 MMOL/L — SIGNIFICANT CHANGE UP (ref 98–107)
CO2 SERPL-SCNC: 28 MMOL/L — SIGNIFICANT CHANGE UP (ref 22–31)
CREAT SERPL-MCNC: 1.29 MG/DL — SIGNIFICANT CHANGE UP (ref 0.5–1.3)
EGFR: 56 ML/MIN/1.73M2 — LOW
GLUCOSE SERPL-MCNC: 72 MG/DL — SIGNIFICANT CHANGE UP (ref 70–99)
HCT VFR BLD CALC: 45.6 % — SIGNIFICANT CHANGE UP (ref 39–50)
HGB BLD-MCNC: 14.8 G/DL — SIGNIFICANT CHANGE UP (ref 13–17)
MAGNESIUM SERPL-MCNC: 1.8 MG/DL — SIGNIFICANT CHANGE UP (ref 1.6–2.6)
MCHC RBC-ENTMCNC: 30.6 PG — SIGNIFICANT CHANGE UP (ref 27–34)
MCHC RBC-ENTMCNC: 32.5 GM/DL — SIGNIFICANT CHANGE UP (ref 32–36)
MCV RBC AUTO: 94.4 FL — SIGNIFICANT CHANGE UP (ref 80–100)
NRBC # BLD: 0 /100 WBCS — SIGNIFICANT CHANGE UP
NRBC # FLD: 0 K/UL — SIGNIFICANT CHANGE UP
PHOSPHATE SERPL-MCNC: 3 MG/DL — SIGNIFICANT CHANGE UP (ref 2.5–4.5)
PLATELET # BLD AUTO: 251 K/UL — SIGNIFICANT CHANGE UP (ref 150–400)
POTASSIUM SERPL-MCNC: 3.7 MMOL/L — SIGNIFICANT CHANGE UP (ref 3.5–5.3)
POTASSIUM SERPL-SCNC: 3.7 MMOL/L — SIGNIFICANT CHANGE UP (ref 3.5–5.3)
RBC # BLD: 4.83 M/UL — SIGNIFICANT CHANGE UP (ref 4.2–5.8)
RBC # FLD: 17.2 % — HIGH (ref 10.3–14.5)
SODIUM SERPL-SCNC: 142 MMOL/L — SIGNIFICANT CHANGE UP (ref 135–145)
WBC # BLD: 6.68 K/UL — SIGNIFICANT CHANGE UP (ref 3.8–10.5)
WBC # FLD AUTO: 6.68 K/UL — SIGNIFICANT CHANGE UP (ref 3.8–10.5)

## 2022-05-12 RX ORDER — SOLIFENACIN SUCCINATE 10 MG/1
1 TABLET ORAL
Qty: 0 | Refills: 0 | DISCHARGE

## 2022-05-12 RX ORDER — BUMETANIDE 0.25 MG/ML
1 INJECTION INTRAMUSCULAR; INTRAVENOUS
Qty: 0 | Refills: 0 | DISCHARGE

## 2022-05-12 RX ORDER — DESVENLAFAXINE 50 MG/1
1 TABLET, EXTENDED RELEASE ORAL
Qty: 0 | Refills: 0 | DISCHARGE
Start: 2022-05-12

## 2022-05-12 RX ORDER — DESVENLAFAXINE 50 MG/1
75 TABLET, EXTENDED RELEASE ORAL
Qty: 0 | Refills: 0 | DISCHARGE

## 2022-05-12 RX ORDER — CLOPIDOGREL BISULFATE 75 MG/1
1 TABLET, FILM COATED ORAL
Qty: 0 | Refills: 0 | DISCHARGE
Start: 2022-05-12

## 2022-05-12 RX ORDER — CLOPIDOGREL BISULFATE 75 MG/1
1 TABLET, FILM COATED ORAL
Qty: 0 | Refills: 0 | DISCHARGE

## 2022-05-12 RX ORDER — QUETIAPINE FUMARATE 200 MG/1
1 TABLET, FILM COATED ORAL
Qty: 0 | Refills: 0 | DISCHARGE

## 2022-05-12 RX ORDER — OLANZAPINE 15 MG/1
2 TABLET, FILM COATED ORAL
Qty: 0 | Refills: 0 | DISCHARGE
Start: 2022-05-12

## 2022-05-12 RX ORDER — OLANZAPINE 15 MG/1
1 TABLET, FILM COATED ORAL
Qty: 0 | Refills: 0 | DISCHARGE
Start: 2022-05-12

## 2022-05-12 RX ORDER — TADALAFIL 10 MG/1
1 TABLET, FILM COATED ORAL
Qty: 0 | Refills: 0 | DISCHARGE

## 2022-05-12 RX ORDER — LANOLIN ALCOHOL/MO/W.PET/CERES
1 CREAM (GRAM) TOPICAL
Qty: 0 | Refills: 0 | DISCHARGE
Start: 2022-05-12

## 2022-05-12 RX ADMIN — CARBIDOPA AND LEVODOPA 1 TABLET(S): 25; 100 TABLET ORAL at 13:46

## 2022-05-12 RX ADMIN — APIXABAN 2.5 MILLIGRAM(S): 2.5 TABLET, FILM COATED ORAL at 05:53

## 2022-05-12 NOTE — PROGRESS NOTE ADULT - PROBLEM SELECTOR PROBLEM 5
Pleural effusion, right

## 2022-05-12 NOTE — PROGRESS NOTE ADULT - SUBJECTIVE AND OBJECTIVE BOX
[facility] [place] [facility] - ARTIE Ray / ARTIE Brooks / KAYLIE Gill/ ARTIE Park/ ARTIE Gandara/ ANGELINE Jose / JR Godoy / JOSELINE Dywer  ---------------------------------------------------------------------------------------------------------------  seen and examined today for ALAYNA  Interval : serum creatinine at baseline  VITALS:  T(F): 98.4 (05-12-22 @ 05:51), Max: 98.8 (05-11-22 @ 18:10)  HR: 59 (05-12-22 @ 05:51)  BP: 132/59 (05-12-22 @ 05:51)  RR: 18 (05-12-22 @ 05:51)  SpO2: 99% (05-12-22 @ 05:51)  Wt(kg): --    05-11 @ 07:01  -  05-12 @ 07:00  --------------------------------------------------------  IN: 0 mL / OUT: 550 mL / NET: -550 mL      Physical Exam :-  Constitutional: NAD  Neck: Supple.  Respiratory: Bilateral equal breath sounds,  Cardiovascular: S1, S2 normal,  Gastrointestinal: Bowel Sounds present, soft, non tender.  Extremities: No edema  Neurological: Alert and Oriented x 3, no focal deficits  Psychiatric: Normal mood, normal affect  Data:-  Allergies :   No Known Allergies    Hospital Medications:   MEDICATIONS  (STANDING):  apixaban 2.5 milliGRAM(s) Oral every 12 hours  atorvastatin 40 milliGRAM(s) Oral at bedtime  carbidopa/levodopa  25/100 1.5 Tablet(s) Oral <User Schedule>  carbidopa/levodopa  25/100 1 Tablet(s) Oral <User Schedule>  clopidogrel Tablet 75 milliGRAM(s) Oral daily  desvenlafaxine ER 50 milliGRAM(s) Oral daily  lidocaine   4% Patch 1 Patch Transdermal daily  melatonin 3 milliGRAM(s) Oral at bedtime  OLANZapine 2.5 milliGRAM(s) Oral at bedtime  polyethylene glycol 3350 17 Gram(s) Oral daily  senna 2 Tablet(s) Oral at bedtime    05-12    142  |  102  |  14  ----------------------------<  72  3.7   |  28  |  1.29    Ca    9.9      12 May 2022 07:36  Phos  3.0     05-12  Mg     1.80     05-12      Creatinine Trend: 1.29 <--, 1.24 <--, 1.35 <--, 1.34 <--, 1.48 <--, 1.50 <--                        14.8   6.68  )-----------( 251      ( 12 May 2022 07:36 )             45.6

## 2022-05-12 NOTE — PROGRESS NOTE ADULT - PROBLEM SELECTOR PLAN 2
Assessment:  - recent cystoscopy 1 week ago by outpatient urologist found granulation tissue on prostate  - UA shows hematuria    Plan:  - UA only shows hematuria, no bacteria  - monitor for now  - No need for antibiotics  - urologist outpatient follow up
Assessment:  - recent cystoscopy 1 week ago by outpatient urologist found granulation tissue on prostate  - UA shows hematuria    Plan:  - UA only shows hematuria, no bacteria  - monitor for now  - persistent hematuria-Urology consult was called.pending
Assessment:  - recent cystoscopy 1 week ago by outpatient urologist found granulation tissue on prostate  - UA shows hematuria    Plan:  - UA only shows hematuria, no bacteria  - monitor for now  - No need for antibiotics  - urologist outpatient follow up
Assessment:  - recent cystoscopy 1 week ago by outpatient urologist found granulation tissue on prostate  - UA shows hematuria    Plan:  - UA only shows hematuria, no bacteria  - monitor for now  - persistent hematuria-Urology consult as outpt
Assessment:  - recent cystoscopy 1 week ago by outpatient urologist found granulation tissue on prostate  - UA shows hematuria    Plan:  - UA only shows hematuria, no bacteria  - monitor for now  - No need for antibiotics  - urologist outpatient follow up
Assessment:  - recent cystoscopy 1 week ago by outpatient urologist found granulation tissue on prostate  - UA shows hematuria    Plan:  - UA only shows hematuria, no bacteria  - monitor for now  - persistent hematuria-Urology consult as outpt
Assessment:  - recent cystoscopy 1 week ago by outpatient urologist found granulation tissue on prostate  - UA shows hematuria    Plan:  - UA only shows hematuria, no bacteria  - monitor for now  - persistent hematuria-hold eliquisx2 days,monitor.Urology consult
Assessment:  - recent cystoscopy 1 week ago by outpatient urologist found granulation tissue on prostate  - UA shows hematuria    Plan:  - UA only shows hematuria, no bacteria  - monitor for now  - No need for antibiotics  - urologist outpatient follow up
Assessment:  - recent cystoscopy 1 week ago by outpatient urologist found granulation tissue on prostate  - UA shows hematuria    Plan:  - UA only shows hematuria, no bacteria  - monitor for now  - persistent hematuria-Urology consult as outpt
Assessment:  - recent cystoscopy 1 week ago by outpatient urologist found granulation tissue on prostate  - UA shows hematuria    Plan:  - UA only shows hematuria, no bacteria  - monitor for now  - No need for antibiotics  - urologist outpatient follow up

## 2022-05-12 NOTE — PROGRESS NOTE ADULT - PROBLEM SELECTOR PROBLEM 6
Visual hallucinations

## 2022-05-12 NOTE — PROGRESS NOTE ADULT - PROBLEM SELECTOR PLAN 3
Assessment:  - patient with no hx of CKD presented with new onset ALAYNA with Cr of 1.9. Previously Cr 1.24  - hx of taking bumex 3mg QD  - etiology likely from diuretic use and dehydration    Plan:  - hold diuretics  - resuscitate with gentle IV fluids  - Echo showed normal EF from 12/2021  - Renal f/u noted.no hydro on u/s of kidneys  - f/u urine lytes
Assessment:  - patient with no hx of CKD presented with new onset ALAYNA with Cr of 1.9. Previously Cr 1.4  - hx of taking bumex 3mg QD  - etiology likely from diuretic use and dehydration    Plan:  - hold diuretics  - resuscitate with gentle IV fluids  - Echo showed normal EF from 12/2021  - would get nephrology consult of Cr does not improve  - f/u urine lytes
Assessment:  - patient with no hx of CKD presented with new onset ALAYNA with Cr of 1.9. Previously Cr 1.4.CR-1.5 AT PRESENT  - hx of taking bumex 3mg QD  - etiology likely from diuretic use and dehydration    Plan:  - hold diuretics  - resuscitate with gentle IV fluids  - Echo showed normal EF from 12/2021  - would get nephrology consult of Cr does not improve  - f/u urine lytes
Assessment:  - patient with no hx of CKD presented with new onset ALAYNA with Cr of 1.9. Previously Cr 1.4  - hx of taking bumex 3mg QD  - etiology likely from diuretic use and dehydration    Plan:  - hold diuretics  - resuscitate with gentle IV fluids  - Echo showed normal EF from 12/2021  - would get nephrology consult of Cr does not improve  - f/u urine lytes
Assessment:  - patient with no hx of CKD presented with new onset ALAYNA with Cr of 1.9. Previously Cr 1.24  - hx of taking bumex 3mg QD  - etiology likely from diuretic use and dehydration    Plan:  - hold diuretics  - resuscitate with gentle IV fluids  - Echo showed normal EF from 12/2021  - Renal f/u noted.no hydro on u/s of kidneys  - f/u urine lytes
Assessment:  - patient with no hx of CKD presented with new onset ALAYNA with Cr of 1.9. Previously Cr 1.24  - hx of taking bumex 3mg QD  - etiology likely from diuretic use and dehydration    Plan:  - hold diuretics  - resuscitate with gentle IV fluids  - Echo showed normal EF from 12/2021  - Renal f/u noted.no hydro on u/s of kidneys  - f/u urine lytes
Assessment:  - patient with no hx of CKD presented with new onset ALAYNA with Cr of 1.9. Previously Cr 1.4  - hx of taking bumex 3mg QD  - etiology likely from diuretic use and dehydration    Plan:  - hold diuretics  - resuscitate with gentle IV fluids  - Echo showed normal EF from 12/2021  - would get nephrology consult of Cr does not improve  - f/u urine lytes
Assessment:  - patient with no hx of CKD presented with new onset ALAYNA with Cr of 1.9. Previously Cr 1.24  - hx of taking bumex 3mg QD  - etiology likely from diuretic use and dehydration    Plan:  - hold diuretics  - resuscitate with gentle IV fluids  - Echo showed normal EF from 12/2021  - Renal consult appreciated.  - f/u urine lytes
Assessment:  - patient with no hx of CKD presented with new onset ALAYNA with Cr of 1.9. Previously Cr 1.4.CR-1.5 AT PRESENT  - hx of taking bumex 3mg QD  - etiology likely from diuretic use and dehydration  -monitor BMP-Renal consult
Assessment:  - patient with no hx of CKD presented with new onset ALAYNA with Cr of 1.9. Previously Cr 1.4  - hx of taking bumex 3mg QD  - etiology likely from diuretic use and dehydration    Plan:  - hold diuretics  - resuscitate with gentle IV fluids  - Echo showed normal EF from 12/2021  - would get nephrology consult of Cr does not improve  - f/u urine lytes
Assessment:  - patient with no hx of CKD presented with new onset ALAYNA with Cr of 1.9. Previously Cr 1.24  - hx of taking bumex 3mg QD  - etiology likely from diuretic use and dehydration    Plan:  - hold diuretics  - resuscitate with gentle IV fluids  - Echo showed normal EF from 12/2021  - Renal consult appreciated.  - f/u urine lytes
Assessment:  - patient with no hx of CKD presented with new onset ALAYNA with Cr of 1.9. Previously Cr 1.4  - hx of taking bumex 3mg QD  - etiology likely from diuretic use and dehydration    Plan:  - hold diuretics  - resuscitate with gentle IV fluids  - Echo showed normal EF from 12/2021  - would get nephrology consult of Cr does not improve  - f/u urine lytes

## 2022-05-12 NOTE — PROGRESS NOTE ADULT - SUBJECTIVE AND OBJECTIVE BOX
5/12/2022    PAALBINO RUANO  81y  Male      Patient is a 81y old  Male who presents with a chief complaint of hallucinations     comfortable,no agitation.no sob,no cp,no fever,no cough.    Confused, thinks he got a call from the [facility] team  REVIEW OF SYSTEMS:  CONSTITUTIONAL: No fever  RESPIRATORY: No cough, hemoptysis or shortness of breath  CARDIOVASCULAR: No chest pain, palpitations, dizziness, or leg swelling  GASTROINTESTINAL: No abdominal pain. nausea, vomiting, hematemesis    INTERVAL HPI/OVERNIGHT EVENTS:  T(C): 36.9 (05-10-22 @ 18:28), Max: 36.9 (05-10-22 @ 18:28)  HR: 60 (05-10-22 @ 18:28) (60 - 70)  BP: 129/49 (05-10-22 @ 18:28) (129/49 - 133/66)  RR: 17 (05-10-22 @ 18:28) (17 - 17)  SpO2: 100% (05-10-22 @ 18:28) (100% - 100%)  Wt(kg): --  I&O's Summary    09 May 2022 07:01  -  10 May 2022 07:00  --------------------------------------------------------  IN: 0 mL / OUT: 500 mL / NET: -500 mL      T(C): 36.9 (05-10-22 @ 18:28), Max: 36.9 (05-10-22 @ 18:28)  HR: 60 (05-10-22 @ 18:28) (60 - 70)  BP: 129/49 (05-10-22 @ 18:28) (129/49 - 133/66)  RR: 17 (05-10-22 @ 18:28) (17 - 17)  SpO2: 100% (05-10-22 @ 18:28) (100% - 100%)  Wt(kg): --Vital Signs Last 24 Hrs  T(C): 36.9 (10 May 2022 18:28), Max: 36.9 (10 May 2022 18:28)  T(F): 98.5 (10 May 2022 18:28), Max: 98.5 (10 May 2022 18:28)  HR: 60 (10 May 2022 18:28) (60 - 70)  BP: 129/49 (10 May 2022 18:28) (129/49 - 133/66)  BP(mean): --  RR: 17 (10 May 2022 18:28) (17 - 17)  SpO2: 100% (10 May 2022 18:28) (100% - 100%)    LABS:                        14.2   8.00  )-----------( 236      ( 10 May 2022 06:00 )             43.8     05-10    139  |  103  |  15  ----------------------------<  75  4.7   |  26  |  1.24    Ca    9.5      10 May 2022 06:00  Phos  3.4     05-10  Mg     1.90     05-10          CAPILLARY BLOOD GLUCOSE                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      Parkinsons disease      CAD (coronary artery disease)  s/p PCI 08/21      Pleural effusion, not elsewhere classified      History of hip replacement, total  R hip 2008 &amp; L hip      Status post cataract extraction  right eye cataract extraction with IOL 6/26/2015      Presence of cardiac pacemaker  10/2021      H/O coronary angioplasty  8/2021 1 stent inserted          MEDICATIONS  (STANDING):  aspirin enteric coated 81 milliGRAM(s) Oral daily  atorvastatin 40 milliGRAM(s) Oral at bedtime  carbidopa/levodopa  25/100 1.5 Tablet(s) Oral <User Schedule>  carbidopa/levodopa  25/100 1 Tablet(s) Oral <User Schedule>  clopidogrel Tablet 75 milliGRAM(s) Oral daily  desvenlafaxine ER 50 milliGRAM(s) Oral daily  lidocaine   4% Patch 1 Patch Transdermal daily  melatonin 3 milliGRAM(s) Oral at bedtime  OLANZapine 2.5 milliGRAM(s) Oral at bedtime  polyethylene glycol 3350 17 Gram(s) Oral daily  senna 2 Tablet(s) Oral at bedtime    MEDICATIONS  (PRN):  OLANZapine 1.25 milliGRAM(s) Oral every 6 hours PRN Agitation  OLANZapine Injectable 1.25 milliGRAM(s) IntraMuscular every 6 hours PRN Agitation        RADIOLOGY & ADDITIONAL TESTS:    Imaging Personally Reviewed:  [ ] YES  [ ] NO    Consultant(s) Notes Reviewed:  [ ] YES  [ ] NO    PHYSICAL EXAM:  GENERAL: Alert and awake lying in bed in no distress  HEAD:  Atraumatic, Normocephalic  EYES: EOMI, MESFIN, conjunctiva and sclera clear  NECK: Supple, No JVD, Normal thyroid  NERVOUS SYSTEM:  Alert & Oriented X3, Motor and sensory systems are intact,   CHEST/LUNG: Bilateral clear breath sounds, no rhochi, no wheezing, no crepitations,  HEART: Regular rate and rhythm; No murmurs, rubs, or gallops  ABDOMEN: Soft, Nontender, Nondistended; Bowel sounds present  EXTREMITIES:   Peripheral Pulses are palpable, no  edema        Care Discussed with Consultants/Other Providers [ ] YES  [ ] NO      Code Status: [] Full Code [] DNR [] DNI [] Goals of Care:   Disposition: [] ICU [] Stroke Unit [] RCU []PCU []Floor [] Discharge Home         LASHAWN TurnerP

## 2022-05-12 NOTE — PROGRESS NOTE ADULT - TIME BILLING
d/w ACP  -d/c planning to Rehab  D/W FAMILY AT BEDSIDE.aNSWERED ALL QUESTIONSD
-d/w RN,ACP
d/w ACP  -d/c planning to Rehab  D/W FAMILY AT BEDSIDE.aNSWERED ALL QUESTIONSD
d/w ACP  -uROLOGY CONSULT  D/W FAMILY AT BEDSIDE.aNSWERED ALL QUESTIONSD
-d/w Family at bedside  -PT,D/C PLANNING
d/w ACP  -d/c planning to Rehab  D/W FAMILY AT BEDSIDE.aNSWERED ALL QUESTIONSD
d/w ACP  -uROLOGY CONSULT  D/W FAMILY AT BEDSIDE.aNSWERED ALL QUESTIONSD

## 2022-05-12 NOTE — PROGRESS NOTE ADULT - ASSESSMENT
80 y/o M with pmhx of parkinson's disease, CAD with stent, HLD, BPH, afib, presented to the ED for hallucinations, anxiety and weakness now with ALAYNA. Renal following for CKD Mx.    ALAYNA -improving  Creatinine Trend: 1.24 <--, 1.35 <--, 1.34 <--, 1.48 <--, 1.50 <--, 1.44 <--, 1.48 <--  no h/o CKD  DD-ddx includes pre renal vs atn vs obstruction  K, bicarb ok  euvolemic clinically  bp stable, not on meds    monitor BMP daily and u/o   check bladder scan, get renal sono   off diuretics  encourage po intake  dose all meds for eGFR <15ml/min  avoid ACEi/ARB/NSAIDs/Nephrotoxics.    Gross hematuria-no e/o UTI  -> s/p recent outpt cystoscopy and on eliquis  f/u  eval  monitor  renal sono reviewed-no hydro. Diffuse bladder wall thickening also suggesting small diverticula likely   represents sequela of chronic outlet obstruction      For any question, call:  Cell # [phone]  Pager # [phone]  Callback # [phone]

## 2022-05-12 NOTE — PROGRESS NOTE ADULT - PROBLEM SELECTOR PLAN 4
Assessment:  - patient with troponins 132 -> 102  - no chest pain at bedside  - known to have troponemia from previous values, likely secondary to CKD/ALAYNA  - EKG does not show ST changes, unchanged from previous  -Type 2 NSTEMI suspected. Cardiology evaluation called
Assessment:  - patient with troponins 132 -> 102  - no chest pain at bedside  - known to have troponemia from previous values, likely secondary to CKD/ALAYNA  - EKG does not show ST changes, unchanged from previous  -Type 2 NSTEMI suspected. Cardiology evaluation called
Assessment:  - patient with troponins 132 -> 102  - no chest pain at bedside  - known to have troponemia from previous values, likely secondary to CKD/ALAYNA  - EKG does not show ST changes, unchanged from previous    Plan:  - troponins decreasing  - cardiology consulted - low suspicion for ACS at this time. I also agree  - will trend troponins and get repeat EKG if patient complains of chest pain
Assessment:  - patient with troponins 132 -> 102  - no chest pain at bedside  - known to have troponemia from previous values, likely secondary to CKD/ALAYNA  - EKG does not show ST changes, unchanged from previous  -Type 2 NSTEMI suspected. Cardiology evaluation called
Assessment:  - patient with troponins 132 -> 102  - no chest pain at bedside  - known to have troponemia from previous values, likely secondary to CKD/ALAYNA  - EKG does not show ST changes, unchanged from previous    Plan:  - troponins decreasing  - cardiology consulted - low suspicion for ACS at this time. I also agree  - will trend troponins and get repeat EKG if patient complains of chest pain
Assessment:  - patient with troponins 132 -> 102  - no chest pain at bedside  - known to have troponemia from previous values, likely secondary to CKD/ALAYNA  - EKG does not show ST changes, unchanged from previous  -Type 2 NSTEMI suspected. Cardiology evaluation called
Assessment:  - patient with troponins 132 -> 102  - no chest pain at bedside  - known to have troponemia from previous values, likely secondary to CKD/ALAYNA  - EKG does not show ST changes, unchanged from previous    Plan:  - troponins decreasing  - cardiology consulted - low suspicion for ACS at this time. I also agree  - will trend troponins and get repeat EKG if patient complains of chest pain
Assessment:  - patient with troponins 132 -> 102  - no chest pain at bedside  - known to have troponemia from previous values, likely secondary to CKD/ALAYNA  - EKG does not show ST changes, unchanged from previous  -Type 2 NSTEMI suspected. Cardiology evaluation called
Assessment:  - patient with troponins 132 -> 102  - no chest pain at bedside  - known to have troponemia from previous values, likely secondary to CKD/ALAYNA  - EKG does not show ST changes, unchanged from previous  -Type 2 NSTEMI suspected. Cardiology evaluation called
Assessment:  - patient with troponins 132 -> 102  - no chest pain at bedside  - known to have troponemia from previous values, likely secondary to CKD/ALAYNA  - EKG does not show ST changes, unchanged from previous  -Type 2 NSTEMI suspected. Cardiology f/u

## 2022-05-12 NOTE — PROGRESS NOTE ADULT - PROBLEM SELECTOR PROBLEM 9
Incision infection
Prophylactic measure

## 2022-05-12 NOTE — PROGRESS NOTE ADULT - PROBLEM SELECTOR PLAN 7
Javier c/w eliquis

## 2022-05-12 NOTE — PROGRESS NOTE ADULT - PROBLEM SELECTOR PROBLEM 2
Hematuria

## 2022-05-12 NOTE — PROGRESS NOTE ADULT - PROBLEM SELECTOR PLAN 8
- c/w statin

## 2022-05-12 NOTE — PROGRESS NOTE ADULT - PROBLEM SELECTOR PROBLEM 3
ALAYNA (acute kidney injury)

## 2022-05-12 NOTE — PROGRESS NOTE ADULT - PROBLEM SELECTOR PLAN 6
Assessment:  - the patient presented with new visual hallucinations  - likely from worsening parkinsons dementia secondary to medication changes or disease progression    Plan:  - neuro consult  - fall risk  - will c/w simemet  - will give seroquel 50mg PRN for agitation and anxiety
Assessment:  - the patient presented with new visual hallucinations  - likely from worsening parkinsons dementia secondary to medication changes or disease progression    Plan:  - neuro consult  - fall risk  - will c/w simemet  - will give seroquel 50mg PRN for agitation and anxiety
Assessment:  - the patient presented with new visual hallucinations  - likely from worsening parkinsons dementia secondary to medication changes or disease progression    Plan:  -BH  f/u noted  DC planning
Assessment:  - the patient presented with new visual hallucinations  - likely from worsening parkinsons dementia secondary to medication changes or disease progression    Plan:  - neuro consult ,outpt?  - fall risk  - will c/w simemet  - will give seroquel 50mg PRN for agitation and anxiety
Assessment:  - the patient presented with new visual hallucinations  - likely from worsening parkinsons dementia secondary to medication changes or disease progression    Plan:  - neuro consult ,outpt?  - fall risk  - will c/w simemet  - will give seroquel 50mg PRN for agitation and anxiety
Assessment:  - the patient presented with new visual hallucinations  - likely from worsening parkinsons dementia secondary to medication changes or disease progression    Plan:  - neuro consult  - fall risk  - will c/w simemet  - will give seroquel 50mg PRN for agitation and anxiety
Assessment:  - the patient presented with new visual hallucinations  - likely from worsening parkinsons dementia secondary to medication changes or disease progression    Plan:  -BH  f/u noted  DC planning
Assessment:  - the patient presented with new visual hallucinations  - likely from worsening parkinsons dementia secondary to medication changes or disease progression    Plan:  - neuro consult  - fall risk  - will c/w simemet  - will give seroquel 50mg PRN for agitation and anxiety
Assessment:  - the patient presented with new visual hallucinations  - likely from worsening parkinsons dementia secondary to medication changes or disease progression    Plan:  -DION  f/u noted
Assessment:  - the patient presented with new visual hallucinations  - likely from worsening parkinsons dementia secondary to medication changes or disease progression    Plan:  -DION  f/u noted

## 2022-05-12 NOTE — PROGRESS NOTE ADULT - PROBLEM SELECTOR PLAN 1
Assessment:  - patient presented for weakness and inability to climb the stairs  - on physical exam the patient was able to lift both legs up by himself without assistance, good strength b/l  - low suspicion for spinal cord compression  - likely due to worsening of parkinson's disease - there was also reports of changing his parkinson meds recently  - CT head negative for acute pathology    Plan:  - PT smith    - c/w kristy
Meds to be continued as same for now  Psych evaluation done, started on Zyprexa  Need FU to adjust meds, DC planning.  - c/w simsot
Assessment:  - patient presented for weakness and inability to climb the stairs  - on physical exam the patient was able to lift both legs up by himself without assistance, good strength b/l  - low suspicion for spinal cord compression  - likely due to worsening of parkinson's disease - there was also reports of changing his parkinson meds recently  - CT head negative for acute pathology    Plan:  - PT smith  - neurology consult for parkinson's disease and hallucinations  - c/w kristy
Meds to be continued as same for now  Psych evaluation done, started on Zyprexa  Need FU to adjust meds, DC planning.  - c/w simsot
Assessment:  - patient presented for weakness and inability to climb the stairs  - on physical exam the patient was able to lift both legs up by himself without assistance, good strength b/l  - low suspicion for spinal cord compression  - likely due to worsening of parkinson's disease - there was also reports of changing his parkinson meds recently  - CT head negative for acute pathology    Plan:  - PT smith    - c/w kristy
Meds to be continued as same for now  Psych evaluation done, started on Zyprexa  Need FU to adjust meds, DC planning.  - c/w simsot
Neurology FU noted.  Meds to be continued as same for now  Psych evaluation done, started on Abilify  - c/w simemet
Neurology FU noted.  Meds to be continued as same for now  Psych evaluation done  - c/w kristy
Assessment:  - patient presented for weakness and inability to climb the stairs  - on physical exam the patient was able to lift both legs up by himself without assistance, good strength b/l  - low suspicion for spinal cord compression  - likely due to worsening of parkinson's disease - there was also reports of changing his parkinson meds recently  - CT head negative for acute pathology    Plan:  - PT eval done  DC planning    - c/w kristy
Neurology smith noted.  Meds to be continued as same for now  Psych evaluation suggested   - c/w kristy
Neurology FU noted.  Meds to be continued as same for now  Psych evaluation done, started on Abilify. Need FU to adjust meds, DC planning.  - c/w kristy
Assessment:  - patient presented for weakness and inability to climb the stairs  - on physical exam the patient was able to lift both legs up by himself without assistance, good strength b/l  - low suspicion for spinal cord compression  - likely due to worsening of parkinson's disease - there was also reports of changing his parkinson meds recently  - CT head negative for acute pathology    Plan:  - PT smith    - c/w kristy

## 2022-05-12 NOTE — PROGRESS NOTE ADULT - PROVIDER SPECIALTY LIST ADULT
Internal Medicine
Nephrology
Nephrology
Internal Medicine
Nephrology
Nephrology
Neurology
Internal Medicine

## 2022-05-12 NOTE — PROGRESS NOTE ADULT - REASON FOR ADMISSION
hallucinations

## 2022-05-12 NOTE — DISCHARGE NOTE NURSING/CASE MANAGEMENT/SOCIAL WORK - PATIENT PORTAL LINK FT
You can access the FollowMyHealth Patient Portal offered by [facility] by registering at the following website: [email or web address]. By joining [employer]’s FollowMyHealth portal, you will also be able to view your health information using other applications (apps) compatible with our system.

## 2022-05-12 NOTE — PROGRESS NOTE ADULT - PROBLEM SELECTOR PROBLEM 4
Type 2 myocardial infarction

## 2022-05-12 NOTE — PROGRESS NOTE ADULT - PROBLEM SELECTOR PLAN 5
- patient with known hx of pleural effusion s/o pleurx cath placement  - only drained 17cc this AM  - would get IR consult for removal of pleurx if deemed no longer indicated  - CXR done shows clear lungs
- patient with known hx of pleural effusion s/o pleurx cath placement  - only drained 17cc this AM  - would recommend IR consult for removal of pleurx if deemed no longer indicated  - CXR done shows clear lungs
- patient with known hx of pleural effusion s/o pleurx cath placement  - only drained 17cc this AM  - would recommend IR consult for removal of pleurx if deemed no longer indicated  - CXR done shows clear lungs
- patient with known hx of pleural effusion s/o pleurx cath placement  - only drained 17cc this AM  - would get IR consult for removal of pleurx if deemed no longer indicated  - CXR done shows clear lungs
- patient with known hx of pleural effusion s/o pleurx cath placement  - only drained 17cc this AM  - would recommend IR consult for removal of pleurx if deemed no longer indicated  - CXR done shows clear lungs
- patient with known hx of pleural effusion s/o pleurx cath placement  - only drained 17cc this AM  - would get IR consult for removal of pleurx if deemed no longer indicated  - CXR done shows clear lungs

## 2022-05-12 NOTE — PROGRESS NOTE ADULT - PROBLEM SELECTOR PLAN 9
- already on eliquis for dvt ppx

## 2022-05-12 NOTE — DISCHARGE NOTE NURSING/CASE MANAGEMENT/SOCIAL WORK - NSDCPEFALRISK_GEN_ALL_CORE
For information on Fall & Injury Prevention, visit: [email or web address] OR  [email or web address] OR  https://www.cdc.gov/steadi/patient.html

## 2022-05-12 NOTE — PROGRESS NOTE ADULT - TIME-BASED BILLING (NON-CRITICAL CARE)
Time-based billing (NON-critical care)
no constipation/no nausea/no abdominal pain/no vomiting/no diarrhea

## 2022-05-13 ENCOUNTER — APPOINTMENT (OUTPATIENT)
Dept: UROLOGY | Facility: CLINIC | Age: 82
End: 2022-05-13

## 2022-05-31 ENCOUNTER — NON-APPOINTMENT (OUTPATIENT)
Age: 82
End: 2022-05-31

## 2022-05-31 ENCOUNTER — APPOINTMENT (OUTPATIENT)
Dept: CARDIOLOGY | Facility: CLINIC | Age: 82
End: 2022-05-31
Payer: MEDICARE

## 2022-05-31 VITALS
DIASTOLIC BLOOD PRESSURE: 68 MMHG | HEART RATE: 63 BPM | SYSTOLIC BLOOD PRESSURE: 130 MMHG | BODY MASS INDEX: 25.61 KG/M2 | HEIGHT: 68 IN | WEIGHT: 169 LBS | OXYGEN SATURATION: 99 %

## 2022-05-31 PROCEDURE — 99215 OFFICE O/P EST HI 40 MIN: CPT

## 2022-06-01 LAB
ALBUMIN SERPL ELPH-MCNC: 3.6 G/DL
ALP BLD-CCNC: 92 U/L
ALT SERPL-CCNC: 12 U/L
ANION GAP SERPL CALC-SCNC: 15 MMOL/L
AST SERPL-CCNC: 22 U/L
BASOPHILS # BLD AUTO: 0.04 K/UL
BASOPHILS NFR BLD AUTO: 0.6 %
BILIRUB SERPL-MCNC: 0.5 MG/DL
BUN SERPL-MCNC: 21 MG/DL
CALCIUM SERPL-MCNC: 9.6 MG/DL
CHLORIDE SERPL-SCNC: 104 MMOL/L
CHOLEST SERPL-MCNC: 133 MG/DL
CO2 SERPL-SCNC: 26 MMOL/L
CREAT SERPL-MCNC: 1.28 MG/DL
EGFR: 57 ML/MIN/1.73M2
EOSINOPHIL # BLD AUTO: 0.05 K/UL
EOSINOPHIL NFR BLD AUTO: 0.8 %
GLUCOSE SERPL-MCNC: 156 MG/DL
HCT VFR BLD CALC: 41.9 %
HDLC SERPL-MCNC: 60 MG/DL
HGB BLD-MCNC: 13.2 G/DL
IMM GRANULOCYTES NFR BLD AUTO: 0.3 %
LDLC SERPL CALC-MCNC: 57 MG/DL
LYMPHOCYTES # BLD AUTO: 1.37 K/UL
LYMPHOCYTES NFR BLD AUTO: 21 %
MAN DIFF?: NORMAL
MCHC RBC-ENTMCNC: 30.8 PG
MCHC RBC-ENTMCNC: 31.5 GM/DL
MCV RBC AUTO: 97.9 FL
MONOCYTES # BLD AUTO: 0.54 K/UL
MONOCYTES NFR BLD AUTO: 8.3 %
NEUTROPHILS # BLD AUTO: 4.5 K/UL
NEUTROPHILS NFR BLD AUTO: 69 %
NONHDLC SERPL-MCNC: 72 MG/DL
NT-PROBNP SERPL-MCNC: 1960 PG/ML
PLATELET # BLD AUTO: 214 K/UL
POTASSIUM SERPL-SCNC: 4.4 MMOL/L
PROT SERPL-MCNC: 5.7 G/DL
RBC # BLD: 4.28 M/UL
RBC # FLD: 15.2 %
SODIUM SERPL-SCNC: 144 MMOL/L
TRIGL SERPL-MCNC: 78 MG/DL
TSH SERPL-ACNC: 1.35 UIU/ML
WBC # FLD AUTO: 6.52 K/UL

## 2022-06-06 ENCOUNTER — NON-APPOINTMENT (OUTPATIENT)
Age: 82
End: 2022-06-06

## 2022-06-06 ENCOUNTER — APPOINTMENT (OUTPATIENT)
Dept: UROLOGY | Facility: CLINIC | Age: 82
End: 2022-06-06
Payer: MEDICARE

## 2022-06-06 VITALS
HEART RATE: 57 BPM | WEIGHT: 169 LBS | RESPIRATION RATE: 17 BRPM | SYSTOLIC BLOOD PRESSURE: 137 MMHG | TEMPERATURE: 97.4 F | DIASTOLIC BLOOD PRESSURE: 61 MMHG | HEIGHT: 68 IN | BODY MASS INDEX: 25.61 KG/M2

## 2022-06-06 DIAGNOSIS — R31.29 OTHER MICROSCOPIC HEMATURIA: ICD-10-CM

## 2022-06-06 PROCEDURE — 99213 OFFICE O/P EST LOW 20 MIN: CPT

## 2022-06-10 ENCOUNTER — APPOINTMENT (OUTPATIENT)
Dept: OPHTHALMOLOGY | Facility: CLINIC | Age: 82
End: 2022-06-10
Payer: MEDICARE

## 2022-06-10 ENCOUNTER — NON-APPOINTMENT (OUTPATIENT)
Age: 82
End: 2022-06-10

## 2022-06-10 PROCEDURE — 92014 COMPRE OPH EXAM EST PT 1/>: CPT

## 2022-06-14 ENCOUNTER — APPOINTMENT (OUTPATIENT)
Dept: CARDIOLOGY | Facility: CLINIC | Age: 82
End: 2022-06-14
Payer: MEDICARE

## 2022-06-14 PROCEDURE — 99215 OFFICE O/P EST HI 40 MIN: CPT

## 2022-06-14 RX ORDER — CLOPIDOGREL BISULFATE 75 MG/1
75 TABLET, FILM COATED ORAL DAILY
Qty: 1 | Refills: 3 | Status: ACTIVE | COMMUNITY

## 2022-06-20 ENCOUNTER — APPOINTMENT (OUTPATIENT)
Dept: NUCLEAR MEDICINE | Facility: HOSPITAL | Age: 82
End: 2022-06-20
Payer: MEDICARE

## 2022-06-20 ENCOUNTER — OUTPATIENT (OUTPATIENT)
Dept: OUTPATIENT SERVICES | Facility: HOSPITAL | Age: 82
LOS: 1 days | End: 2022-06-20
Payer: MEDICARE

## 2022-06-20 DIAGNOSIS — Z98.49 CATARACT EXTRACTION STATUS, UNSPECIFIED EYE: Chronic | ICD-10-CM

## 2022-06-20 DIAGNOSIS — Z98.61 CORONARY ANGIOPLASTY STATUS: Chronic | ICD-10-CM

## 2022-06-20 DIAGNOSIS — Z96.649 PRESENCE OF UNSPECIFIED ARTIFICIAL HIP JOINT: Chronic | ICD-10-CM

## 2022-06-20 DIAGNOSIS — Z95.0 PRESENCE OF CARDIAC PACEMAKER: Chronic | ICD-10-CM

## 2022-06-20 DIAGNOSIS — I51.7 CARDIOMEGALY: ICD-10-CM

## 2022-06-20 PROCEDURE — G1004: CPT

## 2022-06-20 PROCEDURE — A9538: CPT

## 2022-06-20 PROCEDURE — 78830 RP LOCLZJ TUM SPECT W/CT 1: CPT | Mod: MG

## 2022-06-20 PROCEDURE — 78830 RP LOCLZJ TUM SPECT W/CT 1: CPT | Mod: 26,MG

## 2022-06-21 PROBLEM — R31.29 MICROSCOPIC HEMATURIA: Status: ACTIVE | Noted: 2022-04-18

## 2022-06-21 NOTE — REASON FOR VISIT
Repeat TSH was still very low. I am going to cut back her medications to 100 mcgs per day. Prescription has been sent to her pharmacy of record. Would like to recheck her TSH in 1-month of starting the new dose. [Follow-up Visit ___] : a follow-up visit  for [unfilled]

## 2022-06-21 NOTE — PHYSICAL EXAM
[General Appearance - In No Acute Distress] : no acute distress [Urinary Bladder Findings] : the bladder was normal on palpation [Skin Color & Pigmentation] : normal skin color and pigmentation [Edema] : no peripheral edema [] : no respiratory distress [Oriented To Time, Place, And Person] : oriented to person, place, and time [FreeTextEntry1] : using walker

## 2022-06-21 NOTE — HISTORY OF PRESENT ILLNESS
[FreeTextEntry1] : 81 yr old man presents to follow up with BPH. Pt was in April 2022 for cysto- open bladder neck and prostatic urethra, prostatic calcifications at bladder neck junction. bladder unremarkable. Pt was hospitalized [facility] in April 29th, 2022. While in the hospital, VESIcare 10 mg and Tadalafil were stopped. \par \par s/p HOLEP on 09/2020\par pathology- benign prostatic hyperplasia - 73 grams\par \par cystoscopy for hematuria 04/2022: open bladder neck and prostatic urethra, prostatic calcifications at bladder neck junction. bladder unremarkable\par \par PVR- 5 ml

## 2022-06-24 ENCOUNTER — APPOINTMENT (OUTPATIENT)
Dept: PULMONOLOGY | Facility: CLINIC | Age: 82
End: 2022-06-24

## 2022-06-24 RX ORDER — QUETIAPINE FUMARATE 25 MG/1
25 TABLET ORAL
Qty: 90 | Refills: 3 | Status: DISCONTINUED | COMMUNITY
Start: 2022-04-28 | End: 2022-06-24

## 2022-06-24 RX ORDER — TRAZODONE HYDROCHLORIDE 50 MG/1
50 TABLET ORAL
Qty: 30 | Refills: 3 | Status: COMPLETED | COMMUNITY
Start: 2022-04-28 | End: 2022-06-24

## 2022-06-24 RX ORDER — QUETIAPINE FUMARATE 25 MG/1
25 TABLET ORAL
Qty: 45 | Refills: 3 | Status: DISCONTINUED | COMMUNITY
Start: 2022-02-18 | End: 2022-06-24

## 2022-06-26 ENCOUNTER — RX RENEWAL (OUTPATIENT)
Age: 82
End: 2022-06-26

## 2022-06-27 ENCOUNTER — RESULT REVIEW (OUTPATIENT)
Age: 82
End: 2022-06-27

## 2022-06-27 ENCOUNTER — OUTPATIENT (OUTPATIENT)
Dept: OUTPATIENT SERVICES | Facility: HOSPITAL | Age: 82
LOS: 1 days | End: 2022-06-27

## 2022-06-27 ENCOUNTER — APPOINTMENT (OUTPATIENT)
Dept: THORACIC SURGERY | Facility: CLINIC | Age: 82
End: 2022-06-27

## 2022-06-27 ENCOUNTER — APPOINTMENT (OUTPATIENT)
Dept: RADIOLOGY | Facility: HOSPITAL | Age: 82
End: 2022-06-27

## 2022-06-27 VITALS
WEIGHT: 165 LBS | DIASTOLIC BLOOD PRESSURE: 77 MMHG | BODY MASS INDEX: 25.01 KG/M2 | SYSTOLIC BLOOD PRESSURE: 135 MMHG | HEART RATE: 60 BPM | HEIGHT: 68 IN | OXYGEN SATURATION: 97 %

## 2022-06-27 DIAGNOSIS — Z98.49 CATARACT EXTRACTION STATUS, UNSPECIFIED EYE: Chronic | ICD-10-CM

## 2022-06-27 DIAGNOSIS — J90 PLEURAL EFFUSION, NOT ELSEWHERE CLASSIFIED: ICD-10-CM

## 2022-06-27 DIAGNOSIS — Z96.649 PRESENCE OF UNSPECIFIED ARTIFICIAL HIP JOINT: Chronic | ICD-10-CM

## 2022-06-27 DIAGNOSIS — Z98.61 CORONARY ANGIOPLASTY STATUS: Chronic | ICD-10-CM

## 2022-06-27 DIAGNOSIS — Z95.0 PRESENCE OF CARDIAC PACEMAKER: Chronic | ICD-10-CM

## 2022-06-27 PROCEDURE — 99213 OFFICE O/P EST LOW 20 MIN: CPT

## 2022-06-27 PROCEDURE — 71046 X-RAY EXAM CHEST 2 VIEWS: CPT | Mod: 26

## 2022-06-27 RX ORDER — DIAZEPAM 2 MG/1
2 TABLET ORAL
Qty: 30 | Refills: 0 | Status: DISCONTINUED | COMMUNITY
Start: 2022-04-25 | End: 2022-06-27

## 2022-06-27 RX ORDER — CARBIDOPA AND LEVODOPA 25; 100 MG/1; MG/1
25-100 TABLET, EXTENDED RELEASE ORAL
Qty: 30 | Refills: 3 | Status: DISCONTINUED | COMMUNITY
Start: 2022-04-26 | End: 2022-06-27

## 2022-06-27 RX ORDER — ROSUVASTATIN CALCIUM 40 MG/1
40 TABLET, FILM COATED ORAL
Refills: 0 | Status: DISCONTINUED | COMMUNITY
End: 2022-06-27

## 2022-06-27 RX ORDER — DESVENLAFAXINE 25 MG/1
25 TABLET, EXTENDED RELEASE ORAL DAILY
Qty: 30 | Refills: 3 | Status: DISCONTINUED | COMMUNITY
Start: 2021-12-14 | End: 2022-06-27

## 2022-06-28 ENCOUNTER — APPOINTMENT (OUTPATIENT)
Dept: PULMONOLOGY | Facility: CLINIC | Age: 82
End: 2022-06-28

## 2022-06-28 VITALS
HEIGHT: 68 IN | RESPIRATION RATE: 16 BRPM | DIASTOLIC BLOOD PRESSURE: 64 MMHG | WEIGHT: 167 LBS | OXYGEN SATURATION: 97 % | HEART RATE: 60 BPM | BODY MASS INDEX: 25.31 KG/M2 | TEMPERATURE: 96.9 F | SYSTOLIC BLOOD PRESSURE: 124 MMHG

## 2022-06-28 DIAGNOSIS — R06.83 SNORING: ICD-10-CM

## 2022-06-28 PROCEDURE — 99214 OFFICE O/P EST MOD 30 MIN: CPT

## 2022-06-28 NOTE — HISTORY OF PRESENT ILLNESS
[TextBox_4] : Mr. RIVAS is a 81 year male with a history of Parkinson's (Dx 2016), Depression, BPH, CAD, stent placement, HLD who now comes in for a follow-up pulmonary evaluation for Abnormal CT with pleural effusions, SOB. 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

## 2022-06-28 NOTE — PHYSICAL EXAM
[No Acute Distress] : no acute distress [Normal Oropharynx] : normal oropharynx [III] : Mallampati Class: III [Normal Appearance] : normal appearance [No Neck Mass] : no neck mass [Normal Rate/Rhythm] : normal rate/rhythm [Murmur ___ / 6] : murmur [unfilled] / 6 [Normal S1, S2] : normal s1, s2 [No Murmurs] : no murmurs [No Resp Distress] : no resp distress [Clear to Auscultation Bilaterally] : clear to auscultation bilaterally [No Abnormalities] : no abnormalities [Benign] : benign [Normal Gait] : normal gait [No Clubbing] : no clubbing [No Cyanosis] : no cyanosis [No Edema] : no edema [FROM] : FROM [Normal Color/ Pigmentation] : normal color/ pigmentation [No Focal Deficits] : no focal deficits [Oriented x3] : oriented x3 [Normal Affect] : normal affect [TextBox_2] : OW, dry mouth  [TextBox_68] : I:E 1:3; Clear  [TextBox_105] : 1+ LE edema

## 2022-06-28 NOTE — ASSESSMENT
[FreeTextEntry1] : Mr. RIVAS is a 81 year male with a history of Parkinson's (Dx 2016), Depression, BPH, CAD, stent placement, HLD who now comes in for a follow-up pulmonary evaluation for Abnormal CT chest c/w with b/l pleural effusions, SOB, ?chronic "CHF", drug effect, amyloid, LIOR - improved s/p right VATS PleurX cath placement- improved overall- s/p structural heart evaluation (4/12)- likely amyloidosis (untreated)\par  \par \par The patient's SOB is felt to be multifactorial:\par -poor mechanics of breathing (poor balance) \par -out of shape/overweight\par -Pulmonary\par     -Bilateral pleural effusion - c/w CHF\par -Cardiac (Billy Keli) \par    -CAD\par    -(+)Amyloid\par \par Abnormal CT c/w B/l, right significantly larger than left  Pleural Effusion DDX:\par -Cardiac #1\par -Drug\par -PE\par -CA such as Lymphoma \par \par Problem 1: Pleural Effusion (right significantly larger than left) s/p PleurX catheter\par -s/p various chemistries, cultures (all negative)\par -Recommended follow up Evaluation with Dr. Rufus Oneal - drainage 3x/week \par \par \par Problem 2: Abnormal PFTs c/w restrictive dysfunction ;likely due to pleural effusion \par -s/p D-Dimer WNL\par -s/p Ffull PFTs with KULWINDER improved\par \par Problem 3: + r/o LIOR (RF: Elevated Mallampati class, neck size 17, Parkinson's) (mild)\par -recommended "Excite" \par -complete home sleep study- DD unable \par -Sleep apnea is associated with adverse clinical consequences which can affect most organ systems. Cardiovascular disease risk includes arrhythmias, atrial fibrillation, hypertension, coronary artery disease, and stroke. Metabolic disorders include diabetes type 2, non-alcoholic fatty liver disease. Mood disorder especially depression; and cognitive decline especially in the elderly. Associations with chronic reflux/Delacruz’s esophagus some but not all inclusive. \par -Reasons include arousal consistent with hypopnea; respiratory events most prominent in REM sleep or supine position; therefore sleep staging and body position are important for accurate diagnosis and estimation of AHI. 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 Sanchez and Dr Randolph)\par \par Problem 5:overweight/out of shape\par - Weight loss, exercise and diet control were discussed and are highly encouraged. Treatment options were given such as aqua therapy, and contacting a nutritionist. Recommended to use the elliptical, stationary bike, less use of treadmill. Mindful eating was explained to the patient. Obesity is associated with worsening asthma, SOB, and potential for cardiac disease, diabetes, and other underlying medical conditions.\par \par Problem 6: Poor mechanics of breathing (Poor Balance) \par -Recommended Wim Hof and Buteyko breathing techniques \par - Proper breathing techniques were reviewed with an emphasis on exhalation. Patient instructed to breath in for 1 second and out for four seconds. Patient was encouraged not to talk while walking.\par \par Problem 7: Health Maintenance\par -s/p flu shot\par -recommended strep pneumonia vaccines: Prevnar-13 vaccine, follow by Pneumo vaccine 23 one year following\par -recommended early intervention for URIs\par -recommended regular osteoporosis evaluations\par -recommended early dermatological evaluations\par -recommended after the age of 50 to the age of 70, colonoscopy every 5 years\par \par f/u in 6-8 weeks\par pt is encouraged to call or fax the office with any questions or concerns.\par

## 2022-06-28 NOTE — ADDENDUM
[FreeTextEntry1] : Documented by Natalie Perdomo acting as a scribe for Dr. Pavan Jamil on 06/28/2022 \par \par All medical record entries made by the Scribe were at my, Dr. Pavan Jamil's, direction and personally dictated by me on 06/28/2022 . I have reviewed the chart and agree that the record accurately reflects my personal performance of the history, physical exam, assessment and plan. I have also personally directed, reviewed, and agree with the discharge instructions

## 2022-06-29 ENCOUNTER — NON-APPOINTMENT (OUTPATIENT)
Age: 82
End: 2022-06-29

## 2022-06-29 ENCOUNTER — APPOINTMENT (OUTPATIENT)
Dept: OPHTHALMOLOGY | Facility: CLINIC | Age: 82
End: 2022-06-29

## 2022-06-29 PROCEDURE — 66821 AFTER CATARACT LASER SURGERY: CPT | Mod: LT

## 2022-06-29 NOTE — PROVIDER CONTACT NOTE (OTHER) - ASSESSMENT
I received a refill request for this patient, but the patient does not have a follow-up appointment scheduled.  A refill will be sent in this time, but the patient needs to be seen.  Can you please call and schedule a follow-up appointment for the patient at their convenience?  Thanks.    
Pt vitals were 97.9 temp, HR 75, /77, respirations 16 and Spo2 99%. Neuro check performed with no abnormal findings. skin checked with no signs of brusing. will continue to monitor.

## 2022-06-30 NOTE — ASSESSMENT
[FreeTextEntry1] : Mr. 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 \par \par Patient is s/p Right VATS, pleural bx and placement of Pleurx catheter on 03/17/2022. Path of right pleura bx revealed fragments of pleura with chronic inflammation and reactive changes. Fragments of skeletal muscle. Right pleural fluid negative for malignant cells. \par \par I have reviewed the patient's medical records and diagnostic images at time of this office consultation and have made the following recommendation:\par 1.  Follow up in 6 weeks with noncontrast Chest CT.\par 2.  Continue PleurX drainage 3x/week by visiting nurse.\par 3.  Follow up with pulmonologist as scheduled.  Pt. reports he had an appointment tomorrow.\par 4.  Follow up with cardiology.\par \par I personally performed the services described in the documentation, reviewed the documentation recorded by the scribe in my presence and it accurately and completely records my words and actions.\par \par I, Rachel Cartagena NP, am scribing for and the presence of Dr. Oneal, the following sections HISTORY OF PRESENT ILLNESS, PAST MEDICAL/FAMILY/SOCIAL HISTORY; REVIEW OF SYSTEMS; VITAL SIGNS; PHYSICAL EXAM; DISPOSITION.\par  \par \par \par

## 2022-06-30 NOTE — HISTORY OF PRESENT ILLNESS
[FreeTextEntry1] : Mr. 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 \par \par Patient is s/p Right VATS, pleural bx and placement of Pleurx catheter on 03/17/2022. Path of right pleura bx revealed fragments of pleura with chronic inflammation and reactive changes. Fragments of skeletal muscle. Right pleural fluid negative for malignant cells. \par \par Patient went to ED on 04/29/2022 for hallucinations, likely related to Parkinsons dementia. He was discharged on 5/12/22 to a rehab facility.  \par \par Of note, pt had recent SPECT scan demonstrating cardiac amyloid and was referred by his cardiologist Dr. Dickey to see Dr. Randolph. \par \par Patient is currently drained three times a week by visiting nurse.  Last drained 6/25/22 700cc, drained 950cc on 6/22/22.  \par \par CXR today: reviewed.\par \par Patient is here today for a follow up. He complains that he feels more labored breathing with exertion (walking) but denies any cough/wheeaing.  He is scheduled to follow up with his pulmonologist (Dr. Jamil ) tomorrow.  He denies fever, chills, chest pain, cough and wheezing.

## 2022-06-30 NOTE — CONSULT LETTER
[Dear  ___] : Dear  [unfilled], [Consult Letter:] : I had the pleasure of evaluating your patient, [unfilled]. [( Thank you for referring [unfilled] for consultation for _____ )] : Thank you for referring [unfilled] for consultation for [unfilled] [Please see my note below.] : Please see my note below. [Consult Closing:] : Thank you very much for allowing me to participate in the care of this patient.  If you have any questions, please do not hesitate to contact me. [Sincerely,] : Sincerely, [FreeTextEntry2] : Dr. Pavan Jamil (Pulm/Ref)\par Santhosh Avila (PCP)\par Dr. Clinton Dickey (Card)\par Dr. Randolph (Card re: cardiac amyloid) [FreeTextEntry3] : Rufus Oneal MD \par Attending Surgeon \par Division of Thoracic Surgery \par , Cardiovascular and Thoracic Surgery \par [facility]\par \par

## 2022-06-30 NOTE — PHYSICAL EXAM
[Sclera] : the sclera and conjunctiva were normal [Neck Appearance] : the appearance of the neck was normal [] : the neck was supple [Respiration, Rhythm And Depth] : normal respiratory rhythm and effort [Auscultation Breath Sounds / Voice Sounds] : lungs were clear to auscultation bilaterally [Heart Rate And Rhythm] : heart rate was normal and rhythm regular [Heart Sounds] : normal S1 and S2 [Abdomen Soft] : soft [Cervical Lymph Nodes Enlarged Posterior Bilaterally] : posterior cervical [Cervical Lymph Nodes Enlarged Anterior Bilaterally] : anterior cervical [Supraclavicular Lymph Nodes Enlarged Bilaterally] : supraclavicular [No CVA Tenderness] : no ~M costovertebral angle tenderness [Skin Color & Pigmentation] : normal skin color and pigmentation [Oriented To Time, Place, And Person] : oriented to person, place, and time [Impaired Insight] : insight and judgment were intact [Affect] : the affect was normal [FreeTextEntry1] : wheelchair bound today

## 2022-07-05 ENCOUNTER — APPOINTMENT (OUTPATIENT)
Dept: CARDIOLOGY | Facility: CLINIC | Age: 82
End: 2022-07-05

## 2022-07-05 ENCOUNTER — LABORATORY RESULT (OUTPATIENT)
Age: 82
End: 2022-07-05

## 2022-07-05 ENCOUNTER — NON-APPOINTMENT (OUTPATIENT)
Age: 82
End: 2022-07-05

## 2022-07-05 VITALS
DIASTOLIC BLOOD PRESSURE: 64 MMHG | WEIGHT: 170 LBS | HEART RATE: 59 BPM | SYSTOLIC BLOOD PRESSURE: 128 MMHG | OXYGEN SATURATION: 100 % | BODY MASS INDEX: 25.76 KG/M2 | HEIGHT: 68 IN

## 2022-07-05 PROCEDURE — 99215 OFFICE O/P EST HI 40 MIN: CPT

## 2022-07-05 PROCEDURE — 93000 ELECTROCARDIOGRAM COMPLETE: CPT

## 2022-07-06 LAB
ALBUMIN SERPL ELPH-MCNC: 3.6 G/DL
ALP BLD-CCNC: 105 U/L
ALT SERPL-CCNC: 7 U/L
ANION GAP SERPL CALC-SCNC: 14 MMOL/L
AST SERPL-CCNC: 22 U/L
BASOPHILS # BLD AUTO: 0.04 K/UL
BASOPHILS NFR BLD AUTO: 0.6 %
BILIRUB SERPL-MCNC: 0.6 MG/DL
BUN SERPL-MCNC: 28 MG/DL
CALCIUM SERPL-MCNC: 9.1 MG/DL
CHLORIDE SERPL-SCNC: 100 MMOL/L
CO2 SERPL-SCNC: 30 MMOL/L
CREAT SERPL-MCNC: 1.53 MG/DL
DEPRECATED KAPPA LC FREE/LAMBDA SER: 1.16 RATIO
EGFR: 46 ML/MIN/1.73M2
EOSINOPHIL # BLD AUTO: 0.07 K/UL
EOSINOPHIL NFR BLD AUTO: 1.1 %
GLUCOSE SERPL-MCNC: 105 MG/DL
HCT VFR BLD CALC: 39.8 %
HGB BLD-MCNC: 12.3 G/DL
IMM GRANULOCYTES NFR BLD AUTO: 0.2 %
KAPPA LC CSF-MCNC: 2.19 MG/DL
KAPPA LC SERPL-MCNC: 2.53 MG/DL
LYMPHOCYTES # BLD AUTO: 1.34 K/UL
LYMPHOCYTES NFR BLD AUTO: 21.7 %
MAN DIFF?: NORMAL
MCHC RBC-ENTMCNC: 29.4 PG
MCHC RBC-ENTMCNC: 30.9 GM/DL
MCV RBC AUTO: 95 FL
MONOCYTES # BLD AUTO: 0.61 K/UL
MONOCYTES NFR BLD AUTO: 9.9 %
NEUTROPHILS # BLD AUTO: 4.11 K/UL
NEUTROPHILS NFR BLD AUTO: 66.5 %
NT-PROBNP SERPL-MCNC: 1462 PG/ML
PLATELET # BLD AUTO: 223 K/UL
POTASSIUM SERPL-SCNC: 3.2 MMOL/L
PREALB SERPL NEPH-MCNC: 15 MG/DL
PROT SERPL-MCNC: 6 G/DL
RBC # BLD: 4.19 M/UL
RBC # FLD: 14.6 %
SODIUM SERPL-SCNC: 144 MMOL/L
TSH SERPL-ACNC: 0.92 UIU/ML
WBC # FLD AUTO: 6.18 K/UL

## 2022-07-07 LAB
IGA 24H UR QL IFE: NORMAL
M PROTEIN SPEC IFE-MCNC: NORMAL

## 2022-07-12 ENCOUNTER — APPOINTMENT (OUTPATIENT)
Dept: OPHTHALMOLOGY | Facility: CLINIC | Age: 82
End: 2022-07-12

## 2022-07-12 ENCOUNTER — NON-APPOINTMENT (OUTPATIENT)
Age: 82
End: 2022-07-12

## 2022-07-12 PROCEDURE — 99199 UNLISTED SPECIAL SVC PX/RPRT: CPT | Mod: NC

## 2022-07-17 ENCOUNTER — EMERGENCY (EMERGENCY)
Facility: HOSPITAL | Age: 82
LOS: 1 days | Discharge: HOME | End: 2022-07-17
Attending: EMERGENCY MEDICINE
Payer: MEDICARE

## 2022-07-17 VITALS
HEART RATE: 105 BPM | DIASTOLIC BLOOD PRESSURE: 80 MMHG | OXYGEN SATURATION: 98 % | HEIGHT: 68 IN | RESPIRATION RATE: 18 BRPM | SYSTOLIC BLOOD PRESSURE: 130 MMHG

## 2022-07-17 DIAGNOSIS — Z95.0 PRESENCE OF CARDIAC PACEMAKER: Chronic | ICD-10-CM

## 2022-07-17 DIAGNOSIS — Z98.61 CORONARY ANGIOPLASTY STATUS: Chronic | ICD-10-CM

## 2022-07-17 DIAGNOSIS — Z98.49 CATARACT EXTRACTION STATUS, UNSPECIFIED EYE: Chronic | ICD-10-CM

## 2022-07-17 DIAGNOSIS — Z96.649 PRESENCE OF UNSPECIFIED ARTIFICIAL HIP JOINT: Chronic | ICD-10-CM

## 2022-07-17 LAB
ALBUMIN SERPL ELPH-MCNC: 3.5 G/DL — SIGNIFICANT CHANGE UP (ref 3.3–5)
ALP SERPL-CCNC: 95 U/L — SIGNIFICANT CHANGE UP (ref 40–120)
ALT FLD-CCNC: 6 U/L — LOW (ref 10–45)
ANION GAP SERPL CALC-SCNC: 11 MMOL/L — SIGNIFICANT CHANGE UP (ref 5–17)
APTT BLD: 36.6 SEC — HIGH (ref 27.5–35.5)
AST SERPL-CCNC: 25 U/L — SIGNIFICANT CHANGE UP (ref 10–40)
BASOPHILS # BLD AUTO: 0.03 K/UL — SIGNIFICANT CHANGE UP (ref 0–0.2)
BASOPHILS NFR BLD AUTO: 0.5 % — SIGNIFICANT CHANGE UP (ref 0–2)
BILIRUB SERPL-MCNC: 0.6 MG/DL — SIGNIFICANT CHANGE UP (ref 0.2–1.2)
BLD GP AB SCN SERPL QL: NEGATIVE — SIGNIFICANT CHANGE UP
BUN SERPL-MCNC: 25 MG/DL — HIGH (ref 7–23)
CALCIUM SERPL-MCNC: 9.3 MG/DL — SIGNIFICANT CHANGE UP (ref 8.4–10.5)
CHLORIDE SERPL-SCNC: 100 MMOL/L — SIGNIFICANT CHANGE UP (ref 96–108)
CO2 SERPL-SCNC: 32 MMOL/L — HIGH (ref 22–31)
CREAT SERPL-MCNC: 1.59 MG/DL — HIGH (ref 0.5–1.3)
EGFR: 44 ML/MIN/1.73M2 — LOW
EOSINOPHIL # BLD AUTO: 0.09 K/UL — SIGNIFICANT CHANGE UP (ref 0–0.5)
EOSINOPHIL NFR BLD AUTO: 1.5 % — SIGNIFICANT CHANGE UP (ref 0–6)
GLUCOSE SERPL-MCNC: 124 MG/DL — HIGH (ref 70–99)
HCT VFR BLD CALC: 37.5 % — LOW (ref 39–50)
HGB BLD-MCNC: 11.6 G/DL — LOW (ref 13–17)
IMM GRANULOCYTES NFR BLD AUTO: 0.3 % — SIGNIFICANT CHANGE UP (ref 0–1.5)
INR BLD: 1.68 RATIO — HIGH (ref 0.88–1.16)
LYMPHOCYTES # BLD AUTO: 1.51 K/UL — SIGNIFICANT CHANGE UP (ref 1–3.3)
LYMPHOCYTES # BLD AUTO: 24.6 % — SIGNIFICANT CHANGE UP (ref 13–44)
MCHC RBC-ENTMCNC: 29.2 PG — SIGNIFICANT CHANGE UP (ref 27–34)
MCHC RBC-ENTMCNC: 30.9 GM/DL — LOW (ref 32–36)
MCV RBC AUTO: 94.5 FL — SIGNIFICANT CHANGE UP (ref 80–100)
MONOCYTES # BLD AUTO: 0.46 K/UL — SIGNIFICANT CHANGE UP (ref 0–0.9)
MONOCYTES NFR BLD AUTO: 7.5 % — SIGNIFICANT CHANGE UP (ref 2–14)
NEUTROPHILS # BLD AUTO: 4.03 K/UL — SIGNIFICANT CHANGE UP (ref 1.8–7.4)
NEUTROPHILS NFR BLD AUTO: 65.6 % — SIGNIFICANT CHANGE UP (ref 43–77)
NRBC # BLD: 0 /100 WBCS — SIGNIFICANT CHANGE UP (ref 0–0)
PLATELET # BLD AUTO: 231 K/UL — SIGNIFICANT CHANGE UP (ref 150–400)
POTASSIUM SERPL-MCNC: 3.2 MMOL/L — LOW (ref 3.5–5.3)
POTASSIUM SERPL-SCNC: 3.2 MMOL/L — LOW (ref 3.5–5.3)
PROT SERPL-MCNC: 6 G/DL — SIGNIFICANT CHANGE UP (ref 6–8.3)
PROTHROM AB SERPL-ACNC: 19.6 SEC — HIGH (ref 10.5–13.4)
RBC # BLD: 3.97 M/UL — LOW (ref 4.2–5.8)
RBC # FLD: 14.2 % — SIGNIFICANT CHANGE UP (ref 10.3–14.5)
RH IG SCN BLD-IMP: NEGATIVE — SIGNIFICANT CHANGE UP
SODIUM SERPL-SCNC: 143 MMOL/L — SIGNIFICANT CHANGE UP (ref 135–145)
WBC # BLD: 6.14 K/UL — SIGNIFICANT CHANGE UP (ref 3.8–10.5)
WBC # FLD AUTO: 6.14 K/UL — SIGNIFICANT CHANGE UP (ref 3.8–10.5)

## 2022-07-17 PROCEDURE — 99284 EMERGENCY DEPT VISIT MOD MDM: CPT | Mod: GC

## 2022-07-17 PROCEDURE — 72125 CT NECK SPINE W/O DYE: CPT | Mod: 26,MA

## 2022-07-17 PROCEDURE — 70486 CT MAXILLOFACIAL W/O DYE: CPT | Mod: 26,MA

## 2022-07-17 PROCEDURE — 71045 X-RAY EXAM CHEST 1 VIEW: CPT | Mod: 26

## 2022-07-17 PROCEDURE — 76377 3D RENDER W/INTRP POSTPROCES: CPT | Mod: 26

## 2022-07-17 PROCEDURE — 93010 ELECTROCARDIOGRAM REPORT: CPT | Mod: GC

## 2022-07-17 PROCEDURE — 70450 CT HEAD/BRAIN W/O DYE: CPT | Mod: 26,MA

## 2022-07-17 RX ORDER — DILTIAZEM HCL 120 MG
10 CAPSULE, EXT RELEASE 24 HR ORAL ONCE
Refills: 0 | Status: COMPLETED | OUTPATIENT
Start: 2022-07-17 | End: 2022-07-17

## 2022-07-17 NOTE — ED ADULT NURSE NOTE - NSICDXPASTMEDICALHX_GEN_ALL_CORE_FT
PAST MEDICAL HISTORY:  Atrial fibrillation     BPH (benign prostatic hyperplasia) s/p laser nucleation 09/20    Bradycardia 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/21    CAD (coronary artery disease) s/p PCI 08/21    COVID-19 vaccine series completed w booster    Hyperlipidemia     Hypertension     Parkinsons disease     Pleural effusion, not elsewhere classified

## 2022-07-17 NOTE — ED PROVIDER NOTE - OBJECTIVE STATEMENT
80yo M h.o HTN/HLD, Parkinsons, CAD s/p PCI on plavix, BPH, Afib on eliquis BIBEMS with facial trauma after mechanical trip and fall. Pt reports he is poor ambulator 2/2 parkinsons, tripped on area rug and fell and hit head. No LOC, unable to stand from fall until assisted by EMS. Large laceration to forehead and nose noted by EMS. Pt denies headache, neck pain, chest pain or dyspnea.

## 2022-07-17 NOTE — ED PROVIDER NOTE - NSICDXPASTMEDICALHX_GEN_ALL_CORE_FT
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  Rarely, more serious reactions can occur including but not limited to aplastic anemia, agranulocytosis, methemoglobinemia, blood dyscrasias, liver or kidney failure, lung infiltrates or desquamative/blistering drug rashes.

## 2022-07-17 NOTE — ED PROVIDER NOTE - CLINICAL SUMMARY MEDICAL DECISION MAKING FREE TEXT BOX
82yo M with facial trauma after mechanical trip and fall, lacerations to forehead and bridge of nose + nostril. On eliquis and plavix. Mentating well, no LOC, Vitals are normal. Will Get CT head and neck, check ekg and Jaison.

## 2022-07-17 NOTE — ED PROVIDER NOTE - PHYSICAL EXAMINATION
GENERAL: non-toxic appearing, alert, in NAD, covered in blood with a gauze wrap across forehead  HEENT: anterior head wound see skin, no obvious skull depressions, Vision grossly intact, no conjunctivitis or discharge, hearing grossly intact,  no nasal discharge, epistaxis   CARDIAC: RRR, normal S1S2,  no appreciable murmurs, no cyanosis, cap refill < 2 seconds  PULM: normal work of breathing, oxygen saturation on RA wnl, CTAB, no crackles, rales, rhonchi, or wheezing  GI: abdomen nondistended, soft, nontender, no guarding or rebound tenderness, no palpable masses  NEURO: awake and alert, follows commands, normal speech, PERRLA, EOMI, no focal motor or sensory deficits  MSK: spine appears normal, no joint swelling or erythema, ranging all extremities with no appreciable loss of ROM  EXT: no peripheral edema, calf tenderness, redness or swelling  SKIN: 4cm forehead laceration with macerated tissue, 3cm laceration to bridge of nose, .5cm lac to R nostril, skin otherwise warm, dry, and intact, no rashes  PSYCH: appropriate mood and affect

## 2022-07-17 NOTE — ED ADULT NURSE NOTE - NS ED NURSE DC INFO COMPLEXITY
PROVIDER:[TOKEN:[[id number]]],PROVIDER:[TOKEN:[[id number]]]
Simple: Patient demonstrates quick and easy understanding

## 2022-07-17 NOTE — ED PROVIDER NOTE - PATIENT PORTAL LINK FT
You can access the FollowMyHealth Patient Portal offered by [facility] by registering at the following website: [email or web address]. By joining [facility]’s FollowMyHealth portal, you will also be able to view your health information using other applications (apps) compatible with our system.

## 2022-07-17 NOTE — ED PROVIDER NOTE - PROGRESS NOTE DETAILS
Discussed with PT/Spouse Dr Zuñiga takes care of me in the hospital  Discussed with Dr Zuñiga if not admit/nsx/trauma will accept to his service  Alen Cobos MD, Facep PT converted from AFib/RVR to paced at 69. CT labs, wound repair pending  Endorsed to Dr Kamlesh Cobos MD, Facep Tri Whitlock MD (Attending Physician): I received sign out from Dr. Cobos    .    81F with PMHx a fib on eliquis presents for fall and bruising. Was foud to have comminuted nasal fx and hematoma. Awaiting plastics consult Tri Whitlock MD (Attending Physician): I received sign out from Dr. Cobos    .    81F with PMHx a fib on eliquis presents for fall and bruising. Was foud to have comminuted nasal fx and hematoma. Initially in A fib with RVR but spontaneously converted. Awaiting plastics consult Erasto Londono, PGY-3: Pt reexamined at bedside, resting comfortably, vitals stable. Plastics paged and will see patient shortly Tri Whitlock MD (Attending Physician):    Spoke with plastics resident who will round in 2 hours with AM team since draining of hematoma can take a long time for further dispo. Likely dc. Tri Whitlock MD (Attending Physician): I received sign out from Dr. Cobos    .    81F with PMHx a fib on eliquis presents for fall and bruising. Was foud to have comminuted fx of cartilaginous nasal septum and hematoma. Initially in A fib with RVR but spontaneously converted. Awaiting plastics consult

## 2022-07-17 NOTE — ED PROVIDER NOTE - ATTENDING CONTRIBUTION TO CARE
Private Physician HERNÁN Zuñiga  81y male pmh Afib on eliquis, BPH, CAD, HTN, HLD, Parkinsons dz, Pleural effusion. Private Physician HERNÁN Zuñiga  81y male pmh Afib on eliquis, BPH, CAD, HTN, HLD, Parkinsons dz, Pleural effusion. pt comes to ed c/o trop and fall at home. PT struck face w/o loc, Had significant bleeding. NO cp/sob/palps/dizziness/abd pain/nvdc. PE Elderly male awake alert +multiple lacs to face w swelling to nose. (mid forehead, Lac bridge nose, Rt nostril) Neck neg by nexus. CV irregular irregular, Abd soft +Bs no mass gurading rebound. Neurog gcs 15 moves all extr. Sensation intact  Alen Cobos MD, Facep

## 2022-07-17 NOTE — ED PROVIDER NOTE - CARE PROVIDER_API CALL
Phil Ly)  Plastic Surgery  [address], [address]  [place] [address]  Phone: [phone]  Fax: [phone]  Follow Up Time: Urgent

## 2022-07-17 NOTE — ED ADULT NURSE NOTE - OBJECTIVE STATEMENT
81y M arrived to the ED via  c/o fall. Pt A&Ox4 at presents reports slipping on area rug and hitting forehead on the ground. Pt denies loc, blurred vision, dizziness. Pt takes Eliquis. 3cm lac noted to pt forehead at present, currently bleeding. Pt reports he was able to ambulate s/p fall. Catheter drain noted to pt r abd. Pt denies chest pain, SOB, HA, N/V/D, abdominal pain. Skin warm, dry and pink. Pt placed in position of comfort. Pt educated on call bell system and provided call bell. Bed in lowest position, wheels locked, appropriate side rails raised. Pt denies needs at this time.

## 2022-07-17 NOTE — ED PROVIDER NOTE - NSFOLLOWUPINSTRUCTIONS_ED_ALL_ED_FT
Please followup with the plastic surgeon tomorrow.    Follow up tomorrow with Dr. Ly ([facility]).    Leave the nasal packing place.    Take the antibiotic Bactrim twice a day for 10 days. We sent it to your pharmacy.    Apply the topical antibiotic bacitracin to your lacerations twice a day. We have supplied you with bacitracin packets.    SEEK IMMEDIATE MEDICAL CARE IF YOU HAVE ANY OF THE FOLLOWING SYMPTOMS: fever, vomiting, stiff neck, loss of vision, problems with speech, muscle weakness, loss of balance, trouble walking, passing out, or confusion.

## 2022-07-18 VITALS
RESPIRATION RATE: 16 BRPM | SYSTOLIC BLOOD PRESSURE: 116 MMHG | DIASTOLIC BLOOD PRESSURE: 83 MMHG | TEMPERATURE: 98 F | OXYGEN SATURATION: 98 % | HEART RATE: 76 BPM

## 2022-07-18 PROCEDURE — 85610 PROTHROMBIN TIME: CPT

## 2022-07-18 PROCEDURE — 12002 RPR S/N/AX/GEN/TRNK2.6-7.5CM: CPT

## 2022-07-18 PROCEDURE — 72125 CT NECK SPINE W/O DYE: CPT | Mod: MA

## 2022-07-18 PROCEDURE — 86850 RBC ANTIBODY SCREEN: CPT

## 2022-07-18 PROCEDURE — 70450 CT HEAD/BRAIN W/O DYE: CPT | Mod: MA

## 2022-07-18 PROCEDURE — 12013 RPR F/E/E/N/L/M 2.6-5.0 CM: CPT | Mod: XU

## 2022-07-18 PROCEDURE — 85730 THROMBOPLASTIN TIME PARTIAL: CPT

## 2022-07-18 PROCEDURE — 70486 CT MAXILLOFACIAL W/O DYE: CPT | Mod: MA

## 2022-07-18 PROCEDURE — 86900 BLOOD TYPING SEROLOGIC ABO: CPT

## 2022-07-18 PROCEDURE — 85025 COMPLETE CBC W/AUTO DIFF WBC: CPT

## 2022-07-18 PROCEDURE — 80053 COMPREHEN METABOLIC PANEL: CPT

## 2022-07-18 PROCEDURE — 99285 EMERGENCY DEPT VISIT HI MDM: CPT | Mod: 25

## 2022-07-18 PROCEDURE — 93005 ELECTROCARDIOGRAM TRACING: CPT | Mod: XU

## 2022-07-18 PROCEDURE — 71045 X-RAY EXAM CHEST 1 VIEW: CPT

## 2022-07-18 PROCEDURE — 76377 3D RENDER W/INTRP POSTPROCES: CPT

## 2022-07-18 PROCEDURE — 96374 THER/PROPH/DIAG INJ IV PUSH: CPT | Mod: XU

## 2022-07-18 PROCEDURE — 36415 COLL VENOUS BLD VENIPUNCTURE: CPT

## 2022-07-18 PROCEDURE — 86901 BLOOD TYPING SEROLOGIC RH(D): CPT

## 2022-07-18 RX ORDER — AZTREONAM 2 G
1 VIAL (EA) INJECTION
Qty: 20 | Refills: 0
Start: 2022-07-18 | End: 2022-07-27

## 2022-07-18 RX ORDER — LIDOCAINE HYDROCHLORIDE AND EPINEPHRINE 10; 10 MG/ML; UG/ML
20 INJECTION, SOLUTION INFILTRATION; PERINEURAL ONCE
Refills: 0 | Status: DISCONTINUED | OUTPATIENT
Start: 2022-07-18 | End: 2022-07-21

## 2022-07-18 RX ORDER — LIDOCAINE 4 G/100G
1 CREAM TOPICAL ONCE
Refills: 0 | Status: DISCONTINUED | OUTPATIENT
Start: 2022-07-18 | End: 2022-07-21

## 2022-07-18 RX ORDER — MAGNESIUM SULFATE 500 MG/ML
1 VIAL (ML) INJECTION ONCE
Refills: 0 | Status: COMPLETED | OUTPATIENT
Start: 2022-07-18 | End: 2022-07-18

## 2022-07-18 RX ORDER — POTASSIUM CHLORIDE 20 MEQ
40 PACKET (EA) ORAL ONCE
Refills: 0 | Status: COMPLETED | OUTPATIENT
Start: 2022-07-18 | End: 2022-07-18

## 2022-07-18 RX ADMIN — Medication 100 GRAM(S): at 01:04

## 2022-07-18 RX ADMIN — Medication 40 MILLIEQUIVALENT(S): at 01:04

## 2022-07-18 NOTE — ED ADULT NURSE REASSESSMENT NOTE - NS ED NURSE REASSESS COMMENT FT1
Pt laying comfortable in bed, plastics just left bedside and informed pt that they will return. Pt denies being in pain and discomfort, no complaints from pt. Pt aaox3 breathing unlabored ,spontaneous and symmetrical.

## 2022-07-18 NOTE — ED ADULT NURSE REASSESSMENT NOTE - NS ED NURSE REASSESS COMMENT FT1
Report given to Luda Mendoza break coverage for GARTH Paul. RN aware of PMH, reason for stay, and has no further questions.

## 2022-07-18 NOTE — CONSULT NOTE ADULT - ASSESSMENT
81M PMH afib on eliquis/plavix presents due to fall, septal fx/hematoma, multiple facial lacerations    Plan  - I&D nasal septum  - Laceration repair  - Please do not discharge until cleared from PRS, thank you    [facility] 81M PMH afib on eliquis/plavix presents due to fall, septal fx/hematoma, multiple facial lacerations    Plan  - I&D nasal septum  - Laceration repair  - Please do not discharge until cleared from PRS, thank you    Addendum   OK for DC:  - Follow up tomorrow with Dr. Ly  - Leave nasal packing place  - BID bactim  - Bacitracin to lacerations  - Discussed with attending     [facility]

## 2022-07-18 NOTE — CONSULT NOTE ADULT - SUBJECTIVE AND OBJECTIVE BOX
Plastic Surgery Consult Note  (pg [facility]: [id number], NS: [phone])    HPI: 81M PMH Afib on eliquis, BPH, CAD, HTN, HLD, Parkinsons came in due to fall at home after tripping on rug earlier tonight. Pt with multiple "cuts" and bleeding and "swelling" to nose. Pt complaint with blood thinner medications (eliquis/plavix). Plastic surgery consulted for septal fx    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      Parkinsons disease      CAD (coronary artery disease)  s/p PCI 08/21      Pleural effusion, not elsewhere classified      COVID-19 vaccine series completed  w booster      History of hip replacement, total  R hip 2008 &amp; L hip      Status post cataract extraction  right eye cataract extraction with IOL 6/26/2015      Presence of cardiac pacemaker  10/2021      H/O coronary angioplasty  8/2021 1 stent inserted        Allergies    No Known Allergies    Intolerances      Home Medications:  carbidopa-levodopa 25 mg-100 mg oral tablet: 1.5 tab(s) orally once a day at 11AM (29 Apr 2022 21:46)  carbidopa-levodopa 25 mg-100 mg oral tablet: 1 tab(s) orally 3 times a day at 2PM, 5PM, 8PM (29 Apr 2022 21:47)  clopidogrel 75 mg oral tablet: 1 tab(s) orally once a day (12 May 2022 12:20)  desvenlafaxine (as succinate) 50 mg oral tablet, extended release: 1 tab(s) orally once a day (12 May 2022 12:20)  Eliquis 2.5 mg oral tablet: 1 tab(s) orally 2 times a day. Resume taking tomorrow, 3/19.  (18 Mar 2022 09:36)  melatonin 3 mg oral tablet: 1 tab(s) orally once a day (at bedtime) (12 May 2022 12:20)  Metamucil: 1 cap(s) orally 5 times a day (17 Mar 2022 10:14)  OLANZapine 2.5 mg oral tablet: 1 tab(s) orally once a day (at bedtime) (12 May 2022 12:20)  One A Day Men&#x27;s Complete oral tablet: 1 tab(s) orally once a day (17 Mar 2022 10:14)  rosuvastatin 40 mg oral tablet: 1 tab(s) orally once a day (at bedtime) (17 Mar 2022 10:14)  Vitamin B12: 1 tab(s) orally once a day (17 Mar 2022 10:14)  Vitamin D3: 1 tab(s) orally once a day (17 Mar 2022 10:14)    MEDICATIONS  (STANDING):  lidocaine 1%/epinephrine 1:100,000 Inj 20 milliLiter(s) Local Injection Once  lidocaine 4% Topical Solution 1 Application(s) Topical Once      SOCIAL HISTORY:  FAMILY HISTORY:  Family history of lung cancer (Mother)    Family history of esophageal cancer (Father)    FH: myocardial infarction (Grandparent)        ___________________________________________  OBJECTIVE:  Vital Signs Last 24 Hrs  T(C): 36.9 (18 Jul 2022 01:18), Max: 36.9 (18 Jul 2022 01:18)  T(F): 98.4 (18 Jul 2022 01:18), Max: 98.4 (18 Jul 2022 01:18)  HR: 68 (18 Jul 2022 02:58) (60 - 109)  BP: 122/64 (18 Jul 2022 02:58) (122/64 - 131/71)  BP(mean): --  RR: 12 (18 Jul 2022 02:58) (12 - 18)  SpO2: 99% (18 Jul 2022 02:58) (98% - 99%)    Parameters below as of 18 Jul 2022 02:58  Patient On (Oxygen Delivery Method): room air    CAPILLARY BLOOD GLUCOSE        I&O's Detail    General: Well developed, well nourished, NAD  Neuro: Alert and oriented, no focal deficits, moves all extremities spontaneously  HEENT: NCAT, EOMI,   CNII-IX intact  Intraorally intact  No visual defects  2 cm laceration 1 cm above right brow  .25 cm nasal bridge  .25 cm laceration super R nasal alar  Donn septal hematoma R nostril   Respiratory: Airway patent through mouth, trouble with nasal breathing  Abdomen: Soft, nontender, nondistended  Extremities: No edema, sensation and movement grossly intact  Skin: Warm, dry, appropriate color  ____________________________________________  LABS:  CBC Full  -  ( 17 Jul 2022 21:38 )  WBC Count : 6.14 K/uL  RBC Count : 3.97 M/uL  Hemoglobin : 11.6 g/dL  Hematocrit : 37.5 %  Platelet Count - Automated : 231 K/uL  Mean Cell Volume : 94.5 fl  Mean Cell Hemoglobin : 29.2 pg  Mean Cell Hemoglobin Concentration : 30.9 gm/dL  Auto Neutrophil # : 4.03 K/uL  Auto Lymphocyte # : 1.51 K/uL  Auto Monocyte # : 0.46 K/uL  Auto Eosinophil # : 0.09 K/uL  Auto Basophil # : 0.03 K/uL  Auto Neutrophil % : 65.6 %  Auto Lymphocyte % : 24.6 %  Auto Monocyte % : 7.5 %  Auto Eosinophil % : 1.5 %  Auto Basophil % : 0.5 %    07-17    143  |  100  |  25<H>  ----------------------------<  124<H>  3.2<L>   |  32<H>  |  1.59<H>    Ca    9.3      17 Jul 2022 21:38    TPro  6.0  /  Alb  3.5  /  TBili  0.6  /  DBili  x   /  AST  25  /  ALT  6<L>  /  AlkPhos  95  07-17    LIVER FUNCTIONS - ( 17 Jul 2022 21:38 )  Alb: 3.5 g/dL / Pro: 6.0 g/dL / ALK PHOS: 95 U/L / ALT: 6 U/L / AST: 25 U/L / GGT: x           PT/INR - ( 17 Jul 2022 21:38 )   PT: 19.6 sec;   INR: 1.68 ratio         PTT - ( 17 Jul 2022 21:38 )  PTT:36.6 sec          ____________________________________________  MICRO:  RECENT CULTURES:    ____________________________________________  RADIOLOGY:

## 2022-07-19 ENCOUNTER — APPOINTMENT (OUTPATIENT)
Dept: OPHTHALMOLOGY | Facility: CLINIC | Age: 82
End: 2022-07-19

## 2022-07-19 PROBLEM — Z92.29 PERSONAL HISTORY OF OTHER DRUG THERAPY: Chronic | Status: ACTIVE | Noted: 2022-07-17

## 2022-07-21 ENCOUNTER — APPOINTMENT (OUTPATIENT)
Dept: PLASTIC SURGERY | Facility: CLINIC | Age: 82
End: 2022-07-21

## 2022-07-21 VITALS
HEIGHT: 68 IN | SYSTOLIC BLOOD PRESSURE: 130 MMHG | TEMPERATURE: 97.5 F | HEART RATE: 59 BPM | DIASTOLIC BLOOD PRESSURE: 60 MMHG | WEIGHT: 175 LBS | BODY MASS INDEX: 26.52 KG/M2

## 2022-07-21 PROCEDURE — 99203 OFFICE O/P NEW LOW 30 MIN: CPT

## 2022-07-25 ENCOUNTER — APPOINTMENT (OUTPATIENT)
Dept: UROLOGY | Facility: CLINIC | Age: 82
End: 2022-07-25

## 2022-07-28 ENCOUNTER — NON-APPOINTMENT (OUTPATIENT)
Age: 82
End: 2022-07-28

## 2022-07-28 NOTE — DISCUSSION/SUMMARY
[FreeTextEntry1] : He is a pleasant 81 year old white man with newly diagnosed TTR cardiac amyloidosis. \par \par We discussed the pathophysiology of cardiac amyloidosis to include types and symptoms. \par We also discussed the role of stabilizer therapy with Vyndamax.\par \par He will continue all his medication regimen as is, no changes were suggested. \par \par Labs today to include genetic testing.\par \par Follow up in 2 months.  [EKG obtained to assist in diagnosis and management of assessed problem(s)] : EKG obtained to assist in diagnosis and management of assessed problem(s)

## 2022-07-28 NOTE — END OF VISIT
[General Appearance - Well Developed] : well developed [Normal Appearance] : normal appearance [FreeTextEntry3] : I saw the patient with Radha Moody NP and I agree with the findings and plan as above.  [Well Groomed] : well groomed [General Appearance - Well Nourished] : well nourished [Time Spent: ___ minutes] : I have spent [unfilled] minutes of time on the encounter. [No Deformities] : no deformities [General Appearance - In No Acute Distress] : no acute distress [Normal Conjunctiva] : the conjunctiva exhibited no abnormalities [Eyelids - No Xanthelasma] : the eyelids demonstrated no xanthelasmas [Normal Oral Mucosa] : normal oral mucosa [No Oral Pallor] : no oral pallor [No Oral Cyanosis] : no oral cyanosis [Normal Jugular Venous A Waves Present] : normal jugular venous A waves present [Normal Jugular Venous V Waves Present] : normal jugular venous V waves present [No Jugular Venous Mcgee A Waves] : no jugular venous mcgee A waves [Respiration, Rhythm And Depth] : normal respiratory rhythm and effort [Exaggerated Use Of Accessory Muscles For Inspiration] : no accessory muscle use [Auscultation Breath Sounds / Voice Sounds] : lungs were clear to auscultation bilaterally [Heart Rate And Rhythm] : heart rate and rhythm were normal [Heart Sounds] : normal S1 and S2 [Murmurs] : no murmurs present [Abdomen Soft] : soft [Abdomen Tenderness] : non-tender [Abdomen Mass (___ Cm)] : no abdominal mass palpated [Abnormal Walk] : normal gait [Gait - Sufficient For Exercise Testing] : the gait was sufficient for exercise testing [Nail Clubbing] : no clubbing of the fingernails [Cyanosis, Localized] : no localized cyanosis [Petechial Hemorrhages (___cm)] : no petechial hemorrhages [Skin Color & Pigmentation] : normal skin color and pigmentation [] : no rash [No Venous Stasis] : no venous stasis [Skin Lesions] : no skin lesions [No Skin Ulcers] : no skin ulcer [No Xanthoma] : no  xanthoma was observed [Oriented To Time, Place, And Person] : oriented to person, place, and time [Affect] : the affect was normal [Mood] : the mood was normal [No Anxiety] : not feeling anxious [FreeTextEntry1] : reproducible pain left hip and back area

## 2022-07-28 NOTE — CARDIOLOGY SUMMARY
[de-identified] : PROCEDURE: Transthoracic echocardiogram with 2-D, M-Mode\par and complete spectral and color flow Doppler.\par INDICATION: Chest pain, unspecified (R07.9)\par ------------------------------------------------------------------------\par DIMENSIONS:\par Dimensions:     Normal Values:\par LA:     3.6 cm    2.0 - 4.0 cm\par Ao:     2.8 cm    2.0 - 3.8 cm\par SEPTUM: 0.9 cm    0.6 - 1.2 cm\par PWT:    0.9 cm    0.6 - 1.1 cm\par LVIDd:  5.0 cm    3.0 - 5.6 cm\par LVIDs:  3.8 cm    1.8 - 4.0 cm\par Derived Variables:\par LVMI: 84 g/m2\par RWT: 0.36\par Fractional short: 24 %\par Ejection Fraction (Cote Rule): 53 %\par ------------------------------------------------------------------------\par OBSERVATIONS:\par Mitral Valve: Mitral annular calcification, otherwise\par normal mitral valve. Mild mitral regurgitation. \par Aortic Root: Normal aortic root.\par Aortic Valve: Aortic valve leaflet morphology not well\par visualized. Mild-moderate aortic regurgitation.\par Left Atrium: Normal left atrium.\par Left Ventricle: Low normal left ventricular systolic\par function. No segmental wall motion abnormalities. \par Paradoxical septal wall motion, consistent with ventricular\par pacing. Normal left ventricular internal dimensions and\par wall thicknesses.\par Right Heart: Normal right atrium. Normal right ventricular\par size and function.  A device wire is noted in the right\par heart. Normal tricuspid valve.  Mild tricuspid\par regurgitation. Normal pulmonic valve. Minimal pulmonic\par regurgitation.\par Pericardium/PleuraNormal pericardium with no pericardial\par effusion.\par Hemodynamic: Estimated right ventricular systolic pressure\par equals 45 mm Hg, assuming right atrial pressure equals 10\par mm Hg, consistent with mild pulmonary hypertension.\par ------------------------------------------------------------------------\par CONCLUSIONS:\par 1. Mitral annular calcification, otherwise normal mitral\par valve. Mild mitral regurgitation. \par 2. Aortic valve leaflet morphology not well visualized.\par Mild-moderate aortic regurgitation.\par 3. Normal left ventricular internal dimensions and wall\par thicknesses.\par 4. Low normal left ventricular systolic function. No\par segmental wall motion abnormalities.  Paradoxical septal\par wall motion, consistent with ventricular pacing.\par 5. Normal right ventricular size and function.  A device\par wire is noted in the right heart.\par 6. Estimated right ventricular systolic pressure equals 45\par mm Hg, assuming right atrial pressure equals 10 mm Hg,\par consistent with mild pulmonary hypertension.\par ------------------------------------------------------------------------\par Confirmed on  5/2/2022 - 15:43:03 by Edin Beltran M.D.,\par [facility], [facility]ASHMI\par

## 2022-07-28 NOTE — PHYSICAL EXAM
[Well Developed] : well developed [Well Nourished] : well nourished [No Acute Distress] : no acute distress [Normal Conjunctiva] : normal conjunctiva [No Carotid Bruit] : no carotid bruit [Normal Venous Pressure] : normal venous pressure [Normal S1, S2] : normal S1, S2 [No Murmur] : no murmur [No Rub] : no rub [No Gallop] : no gallop [Clear Lung Fields] : clear lung fields [Good Air Entry] : good air entry [No Respiratory Distress] : no respiratory distress  [Soft] : abdomen soft [Non Tender] : non-tender [No Masses/organomegaly] : no masses/organomegaly [Normal Bowel Sounds] : normal bowel sounds [Normal Gait] : normal gait [No Edema] : no edema [No Cyanosis] : no cyanosis [No Clubbing] : no clubbing [No Varicosities] : no varicosities [Edema ___] : edema [unfilled] [No Rash] : no rash [No Skin Lesions] : no skin lesions [Moves all extremities] : moves all extremities [No Focal Deficits] : no focal deficits [Normal Speech] : normal speech [Alert and Oriented] : alert and oriented [Normal memory] : normal memory [de-identified] : right sided Pleurex drain covered in bandage

## 2022-07-28 NOTE — HISTORY OF PRESENT ILLNESS
[FreeTextEntry1] : Mr. Banerjee is a 81 year old male referred by Dr. Clinton Dickey  and Dr. Pedro Sanchez for evaluation of TTR cardiac amyloidosis. He is accompanied by his wife, Opal. \par \par He has had recurrent pleural effusions and underwent a right VATS and Pleurex catheter placement with Dr. Oneal on 3/17/22. \par His catheter is currently being drained every other day by a home care nurse. Usual output is (per report) 700 mL.\par He was found to have severe aortic insufficiency on TTE in March and was referred to the structural heart team for further evaluation. \par \par Otherwise his history is significant for Parkinson's disease, coronary artery disease s/p stent placement (8/3/2021), PPM, hypertension, hyperlipidemia, atrial fibrillation and progressive dementia with occasional hallucinations.\par \par For exercise he exercises at home twenty minutes daily with no decline in stamina or activity tolerance. \par He denies any chest discomfort, shortness of breath, palpitations, lightheadedness or syncope. He also denies any orthopnea or PND. He has chronic lower extremity edema which has improved on diuretic therapy.\par \par A recent echocardiogram revealed a depressed LVEF, moderate aortic insufficiency, and a thickening of both the LV and RV, suggesting a possible infiltrative process.\par \par Subsequent pyrophosphate scan of 6/20/2022 was strongly suggestive of transthyretin cardiac amyloidosis.

## 2022-08-02 PROCEDURE — 90792 PSYCH DIAG EVAL W/MED SRVCS: CPT | Mod: 95

## 2022-08-04 ENCOUNTER — APPOINTMENT (OUTPATIENT)
Dept: PLASTIC SURGERY | Facility: CLINIC | Age: 82
End: 2022-08-04

## 2022-08-04 DIAGNOSIS — S09.93XA UNSPECIFIED INJURY OF FACE, INITIAL ENCOUNTER: ICD-10-CM

## 2022-08-04 PROCEDURE — 99213 OFFICE O/P EST LOW 20 MIN: CPT

## 2022-08-04 NOTE — HISTORY OF PRESENT ILLNESS
[FreeTextEntry1] : Patient presents to the office for a septum hematoma due to a fall on \par Imaging taken - \par any problem's breathing - \par Any drainage - \par Any over the counter treatments? Including inhaler?\par swelling/ pain -\par

## 2022-08-04 NOTE — HISTORY OF PRESENT ILLNESS
[FreeTextEntry1] : follow up from a fall on 7/21/22\par S/P septum hematoma \par Feels well and offers no new complaints \par No over the counter treatments. \par No pain/tenderness.\par

## 2022-08-05 PROBLEM — S09.93XA FACIAL TRAUMA: Status: ACTIVE | Noted: 2022-08-04

## 2022-08-08 ENCOUNTER — NON-APPOINTMENT (OUTPATIENT)
Age: 82
End: 2022-08-08

## 2022-08-08 ENCOUNTER — APPOINTMENT (OUTPATIENT)
Dept: OPHTHALMOLOGY | Facility: CLINIC | Age: 82
End: 2022-08-08

## 2022-08-08 PROCEDURE — 92060 SENSORIMOTOR EXAMINATION: CPT

## 2022-08-08 PROCEDURE — 92012 INTRM OPH EXAM EST PATIENT: CPT | Mod: 24

## 2022-08-16 ENCOUNTER — APPOINTMENT (OUTPATIENT)
Dept: NEUROLOGY | Facility: CLINIC | Age: 82
End: 2022-08-16

## 2022-08-16 VITALS
HEART RATE: 59 BPM | SYSTOLIC BLOOD PRESSURE: 145 MMHG | RESPIRATION RATE: 14 BRPM | DIASTOLIC BLOOD PRESSURE: 62 MMHG | HEIGHT: 68 IN | BODY MASS INDEX: 25.76 KG/M2 | WEIGHT: 170 LBS

## 2022-08-16 PROCEDURE — 99214 OFFICE O/P EST MOD 30 MIN: CPT

## 2022-08-16 NOTE — PHYSICAL EXAM
[FreeTextEntry1] : There is 2+ masking. Speech is 1+. Tremor in right>left at rest.  there is mild left greater than right bradykinesia with minimal rigidity. \par \par Takes multiple times to get out of chair and needs to use arms. Walks with worse speed than previous visit. SL are short. Turns en bloc with no FOG. Postural reflexes intact

## 2022-08-16 NOTE — DISCUSSION/SUMMARY
[FreeTextEntry1] : 80yo male with PD and neurocognitive syndrome that was initially controlled but then started getting hallucinations. He was hospitalized recently after becoming delusional with anxiety and Seroquel was switched to olanzapine with good effect. His PD has worsened with more shuffling gait which could be d/t anti dopaminergic properties of olanzapine. Also was previously switched from rytary back to sinemet with better AE profile. \par \par Patient was counseled on the following recommendations:\par \par - Change Sinemet 25/100 closer together q3 hrs. 9am 1.5tabs, then 1 tabs every 3 hrs.\par - Will trial Ramelteon 8mg 1/2 pill daily. Will try to improve sleep for his insomnia and memory problems by addressing sleep first. Then will consider trazadone vs mirtazapine with consultation from his psychaitrist \par - c/w olanzipine 2.5mg qhs, psychiatrist following  along \par - consider nuplazid in the future if VH \par - increase physical activity and exercising for gait\par - f/u cardiology and pulmonary\par - cont miralax qd\par \par RTC 1.5 months \par \par Case discussed and pt examined with movement attending Dr. Langley\par \par Frank De La Vega MD\par Movement Fellow

## 2022-08-16 NOTE — HISTORY OF PRESENT ILLNESS
[FreeTextEntry1] : Presents with his wife and aid\par - July 17 2022 Fall at home hitting his right side and taken to ED and found no serious issues. tripped over carpet in the den. had bleeding of his forehead, now with a scar.\par \par Nonmotor:\par + constipation - fiber, miralax/senna. Needed disimpaction at ED on 4/1\par - dysphagia\par - April having delusions/hallucinations. saw a physiatrist once, Dr. Mccarthy. was hospitalized for 2 weeks. placed on olanzapine. went to rehab for 2 weeks. Gradually weaker with walking, turning, Stopped with olanzapine.\par - trouble sleeping at night. goes to sleep at 10:30. wakes up variably up to 1-4x/night, and lays there. tried melatonin 3mg, increased to 9mg without effect. \par - bowel movements well with miralax \par \par \par Meds\par sinemet 25/100 1.5 pills am, then 1 tab for the rest of 3 doses, q3 hrs (9am, 2pm and q3hrs)\par Olanzipine 2.5mg qd (was previously on it)\par desvenlafaxine 75mg qd\par spironolactone 25mg qd\par torsemide 20mg qd\par tadalafil \par \par prior\par exelon\par seroquel 25mg qhs, 1/2 tab in the afternoon\par rytary

## 2022-08-16 NOTE — END OF VISIT
[] : Fellow [FreeTextEntry3] : Patient's hallucinations have improved since starting olanzapine. He is following with psychiatrist outpatient as well. Since his hospitalization for psychosis his overall mobility has slowed and needs assistance from aides with some ADLs and IADls. He takes sinemet 1.5 tabs at 9A, followed by 1 tab at 2p, 5p and 8p. He is not aware of LD kinetics. In addition, his sleep fragmentation persists and has not responded to 9mg melatonin. His constipation has improved with miralax. He was also started on amyloid treatment by cardiologist as he tested position for WT amyloid mutation on genetics. \par \par On exam: He is masked. Thought processing is linear. Moderate bradykinesia with minimal rigidity. Shuffles considerably with stooped posture and en bloc turning. Recovers after 2 steps on pull\par \par Impression:PD with neurobehavioral dysregulation that has improved. Increased bradykinesia and gait disorder is noted on exam\par Sleep fragmentation\par \par - take sinemet 1.5 tab at 9 and 1 tab at 12-3-6-9\par - trial of ramelteon 4-8mg qhs\par - cont PT \par - f/u psychiatry \par  [Time Spent: ___ minutes] : I have spent [unfilled] minutes of time on the encounter.

## 2022-08-17 ENCOUNTER — OUTPATIENT (OUTPATIENT)
Dept: OUTPATIENT SERVICES | Facility: HOSPITAL | Age: 82
LOS: 1 days | End: 2022-08-17
Payer: MEDICARE

## 2022-08-17 ENCOUNTER — APPOINTMENT (OUTPATIENT)
Dept: CT IMAGING | Facility: IMAGING CENTER | Age: 82
End: 2022-08-17

## 2022-08-17 DIAGNOSIS — Z96.649 PRESENCE OF UNSPECIFIED ARTIFICIAL HIP JOINT: Chronic | ICD-10-CM

## 2022-08-17 DIAGNOSIS — J90 PLEURAL EFFUSION, NOT ELSEWHERE CLASSIFIED: ICD-10-CM

## 2022-08-17 DIAGNOSIS — Z95.0 PRESENCE OF CARDIAC PACEMAKER: Chronic | ICD-10-CM

## 2022-08-17 DIAGNOSIS — Z98.49 CATARACT EXTRACTION STATUS, UNSPECIFIED EYE: Chronic | ICD-10-CM

## 2022-08-17 DIAGNOSIS — Z98.61 CORONARY ANGIOPLASTY STATUS: Chronic | ICD-10-CM

## 2022-08-17 PROCEDURE — 71250 CT THORAX DX C-: CPT

## 2022-08-17 PROCEDURE — 71250 CT THORAX DX C-: CPT | Mod: 26,MH

## 2022-08-18 ENCOUNTER — NON-APPOINTMENT (OUTPATIENT)
Age: 82
End: 2022-08-18

## 2022-08-22 ENCOUNTER — APPOINTMENT (OUTPATIENT)
Dept: ELECTROPHYSIOLOGY | Facility: CLINIC | Age: 82
End: 2022-08-22

## 2022-08-22 ENCOUNTER — NON-APPOINTMENT (OUTPATIENT)
Age: 82
End: 2022-08-22

## 2022-08-22 VITALS
OXYGEN SATURATION: 99 % | HEART RATE: 70 BPM | WEIGHT: 170 LBS | SYSTOLIC BLOOD PRESSURE: 125 MMHG | HEIGHT: 68 IN | DIASTOLIC BLOOD PRESSURE: 70 MMHG | BODY MASS INDEX: 25.76 KG/M2

## 2022-08-22 PROCEDURE — 93000 ELECTROCARDIOGRAM COMPLETE: CPT | Mod: 59

## 2022-08-22 PROCEDURE — 99203 OFFICE O/P NEW LOW 30 MIN: CPT

## 2022-08-22 PROCEDURE — 93280 PM DEVICE PROGR EVAL DUAL: CPT

## 2022-08-22 RX ORDER — CHLORHEXIDINE GLUCONATE 4 %
1000 LIQUID (ML) TOPICAL
Qty: 30 | Refills: 0 | Status: DISCONTINUED | COMMUNITY
Start: 2022-05-29

## 2022-08-22 RX ORDER — ACETAMINOPHEN 325 MG/1
325 TABLET ORAL
Qty: 30 | Refills: 0 | Status: DISCONTINUED | COMMUNITY
Start: 2022-05-29

## 2022-08-22 RX ORDER — MULTIVITAMIN WITH FOLIC ACID 400 MCG
TABLET ORAL
Qty: 30 | Refills: 0 | Status: DISCONTINUED | COMMUNITY
Start: 2022-05-29

## 2022-08-22 RX ORDER — QUETIAPINE FUMARATE 50 MG/1
50 TABLET ORAL
Qty: 30 | Refills: 0 | Status: DISCONTINUED | COMMUNITY
Start: 2022-04-29

## 2022-08-22 RX ORDER — MULTIVITAMIN
3 TABLET ORAL
Qty: 30 | Refills: 0 | Status: DISCONTINUED | COMMUNITY
Start: 2022-05-29

## 2022-08-22 RX ORDER — TRAMADOL HYDROCHLORIDE 50 MG/1
50 TABLET, COATED ORAL
Qty: 12 | Refills: 0 | Status: DISCONTINUED | COMMUNITY
Start: 2022-03-18

## 2022-08-22 RX ORDER — ATORVASTATIN CALCIUM 40 MG/1
40 TABLET, FILM COATED ORAL
Refills: 0 | Status: DISCONTINUED | COMMUNITY
End: 2022-08-22

## 2022-08-22 RX ORDER — OLANZAPINE 2.5 MG/1
2.5 TABLET, FILM COATED ORAL
Refills: 0 | Status: DISCONTINUED | COMMUNITY
End: 2022-08-22

## 2022-08-22 RX ORDER — PREDNISOLONE ACETATE 10 MG/ML
1 SUSPENSION/ DROPS OPHTHALMIC
Qty: 5 | Refills: 0 | Status: DISCONTINUED | COMMUNITY
Start: 2022-06-29

## 2022-08-22 RX ORDER — PSYLLIUM SEED
28.3 PACKET (EA) ORAL
Refills: 0 | Status: DISCONTINUED | COMMUNITY
End: 2022-08-22

## 2022-08-22 RX ORDER — SULFAMETHOXAZOLE AND TRIMETHOPRIM 800; 160 MG/1; MG/1
800-160 TABLET ORAL
Qty: 20 | Refills: 0 | Status: DISCONTINUED | COMMUNITY
Start: 2022-07-18

## 2022-08-23 ENCOUNTER — APPOINTMENT (OUTPATIENT)
Dept: CARDIOLOGY | Facility: CLINIC | Age: 82
End: 2022-08-23

## 2022-08-23 VITALS
OXYGEN SATURATION: 100 % | BODY MASS INDEX: 25.76 KG/M2 | SYSTOLIC BLOOD PRESSURE: 102 MMHG | WEIGHT: 170 LBS | HEART RATE: 66 BPM | DIASTOLIC BLOOD PRESSURE: 58 MMHG | HEIGHT: 68 IN

## 2022-08-23 PROCEDURE — 99215 OFFICE O/P EST HI 40 MIN: CPT

## 2022-08-23 NOTE — DISCUSSION/SUMMARY
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 Given his cardiac amyloid and reduced LV function chronic RV pacing has a high risk of worsening his CM and symptoms. We discussed the physiology and benefits of cardiac resynchronization therapy as well as procedures, outcomes and risks. We discussed treatment options for his persistent atrial fibrillation as well. Given his stiff heart being in SR will most likely help his symptomatology.  The patient will start an amiodarone load and will undergo a device upgrade to CRT-P. (If we CV him first we would not be able to stop AC for the procedure for a month. After he is fully loaded he will undergo a cardioversion.

## 2022-08-23 NOTE — END OF VISIT
[FreeTextEntry3] : I, Dr. Vergara, personally performed the evaluation and management (E/M) services for this new patient. That E/M includes conducting the initial examination, assessing all conditions, and establishing the plan of care. Today, my fellow, Dr. Silveira, was here to observe my evaluation and management services for this patient to be followed going forward.

## 2022-08-23 NOTE — HISTORY OF PRESENT ILLNESS
[FreeTextEntry1] : I had the pleasure of seeing your patient Donn Banerjee today in the arrhythmia clinic of [facility]. 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 The patient previously followed at [facility] for his device related care. He had a dual chamber [place] [employer]olade MRI implanted on 10/6/21 for 2nd degree heart block. \par \par The patient's cardiac workup included an echocardiogram on 10/6/21. His left ventricle was normal in size. His systolic function was normal with a ejection fraction of 60-65%, but analysis of diastolic function showed grade III diastolic dysfunction. His left atrium diameter was 4.3 cm. He had a sclerotic aortic valve with moderate. There was mild mitral regurgitation and moderate tricuspid regurgitation. He had a repeat echo on 3/8/22. He had moderate segmental left ventricular systolic dysfunction with akinetic inferolateral and mid-basal anterolateral walls with a LV ejection fraction of 45%. His left atrium was 5.1 cm. He had a thickened aortic valve with moderate, eccentric aortic regurgitation. There was mild-moderate mitral regurgitation.  He had a nuclear SPECT scan on 6/20/22 which was strongly suggestive of TTR cardiac amyloid. He was recently formally diagnosed and begun on treatment. \par \par His ECG today reveals AF with RV pacing. Interrogation of his device reveals he is paced 100% of the time when he is in AF. The patient had been in sinus rhythm from late May 22 to the end of June 22. He since has been in % of the time\par \par The patient denies any chest discomfort, SOB, palpitations, lightheadedness, or syncope. He exercises at home with the aid of a rollator. He does note he feels a little fatigued. His BP is 102/58 with a pulse of 66. Interrogation of his [facility] pacemaker revealed it is functioning normally. The battery voltage suggests a longevity of 11.5 years. The P was 5.1mV with an impedance of 687. The R wave was 14mV with an impedance of 571 and a threshold of 1.2V@0.4msec.  \par \par

## 2022-08-29 ENCOUNTER — APPOINTMENT (OUTPATIENT)
Dept: THORACIC SURGERY | Facility: CLINIC | Age: 82
End: 2022-08-29

## 2022-08-29 VITALS
RESPIRATION RATE: 15 BRPM | HEART RATE: 59 BPM | HEIGHT: 68.5 IN | DIASTOLIC BLOOD PRESSURE: 65 MMHG | BODY MASS INDEX: 25.47 KG/M2 | SYSTOLIC BLOOD PRESSURE: 103 MMHG | WEIGHT: 170 LBS | OXYGEN SATURATION: 100 %

## 2022-08-29 PROCEDURE — 99214 OFFICE O/P EST MOD 30 MIN: CPT

## 2022-08-31 NOTE — ASSESSMENT
[FreeTextEntry1] : Mr. 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 \par \par Patient is s/p Right VATS, pleural bx and placement of Pleurx catheter on 03/17/2022. Path of right pleura bx revealed fragments of pleura with chronic inflammation and reactive changes. Fragments of skeletal muscle. Right pleural fluid negative for malignant cells. \par \par I have reviewed the patient's medical records and diagnostic images at time of this office consultation and have made the following recommendation:\par 1. Continue 3x week pleurx drainage.\par 2. Return to office in 4 months with CXR\par \par I personally performed the services described in the documentation, reviewed the documentation recorded by the scribe in my presence and it accurately and completely records my words and actions.\par \par I, TIMOTHY Farrar, am scribing for and in the presence of PARK Gomez, the following sections HISTORY OF PRESENT ILLNESS, PAST MEDICAL/FAMILY/SOCIAL HISTORY; REVIEW OF SYSTEMS; VITAL SIGNS; PHYSICAL EXAM; DISPOSITION.\par  	\par \par

## 2022-08-31 NOTE — HISTORY OF PRESENT ILLNESS
[FreeTextEntry1] : Mr. 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 \par \par Patient is s/p Right VATS, pleural bx and placement of Pleurx catheter on 03/17/2022. Path of right pleura bx revealed fragments of pleura with chronic inflammation and reactive changes. Fragments of skeletal muscle. Right pleural fluid negative for malignant cells. \par \par Patient went to ED on 04/29/2022 for hallucinations, likely related to Parkinsons dementia. He was discharged on 5/12/22 to a rehab facility.  \par \par Of note, pt had recent SPECT scan demonstrating cardiac amyloid and was referred by his cardiologist Dr. Dickey to see Dr. Randolph. \par \par Patient is currently drained three times a week as follows: 8/24 650ml, 8/26 600ml, 8/29 600ml\par \par CT chest on 08/17/2022:\par -  There is a trace right pleural effusion with Pleurx catheter in place, markedly decreased in size compared to the 2/6/2022 chest CT. A small left pleural effusion has also decreased compared to the 2/6/2022 scan, now trace.\par \par Patient is here today for a follow up. Patient denies worsening shortness of breath, cough, chest pain, fever, chills, unintentional weight loss, hemoptysis.

## 2022-08-31 NOTE — CONSULT LETTER
[Dear  ___] : Dear  [unfilled], [( Thank you for referring [unfilled] for consultation for _____ )] : Thank you for referring [unfilled] for consultation for [unfilled] [Please see my note below.] : Please see my note below. [Sincerely,] : Sincerely, [Courtesy Letter:] : I had the pleasure of seeing your patient, [unfilled], in my office today. [FreeTextEntry2] : Dr. Pavan Jamil (Pulm/Ref)\par Santhosh Avila (PCP)\par Dr. Clinton Dickey (Card)\par Dr. Randolph (Card re: cardiac amyloid) [FreeTextEntry3] : Rufus Oneal MD \par Attending Surgeon \par Division of Thoracic Surgery \par , Cardiovascular and Thoracic Surgery \par [facility] [employer] [facility]\par \par

## 2022-09-08 ENCOUNTER — INPATIENT (INPATIENT)
Facility: HOSPITAL | Age: 82
LOS: 3 days | Discharge: HOME HEALTH | End: 2022-09-12
Attending: HOSPITALIST | Admitting: HOSPITALIST

## 2022-09-08 VITALS
HEART RATE: 67 BPM | TEMPERATURE: 99 F | OXYGEN SATURATION: 100 % | DIASTOLIC BLOOD PRESSURE: 60 MMHG | RESPIRATION RATE: 16 BRPM | SYSTOLIC BLOOD PRESSURE: 138 MMHG | HEIGHT: 68 IN

## 2022-09-08 DIAGNOSIS — Z96.649 PRESENCE OF UNSPECIFIED ARTIFICIAL HIP JOINT: Chronic | ICD-10-CM

## 2022-09-08 DIAGNOSIS — Z95.0 PRESENCE OF CARDIAC PACEMAKER: Chronic | ICD-10-CM

## 2022-09-08 DIAGNOSIS — R41.0 DISORIENTATION, UNSPECIFIED: ICD-10-CM

## 2022-09-08 DIAGNOSIS — Z98.49 CATARACT EXTRACTION STATUS, UNSPECIFIED EYE: Chronic | ICD-10-CM

## 2022-09-08 DIAGNOSIS — Z98.61 CORONARY ANGIOPLASTY STATUS: Chronic | ICD-10-CM

## 2022-09-08 LAB
ALBUMIN SERPL ELPH-MCNC: 3.8 G/DL — SIGNIFICANT CHANGE UP (ref 3.3–5)
ALP SERPL-CCNC: 149 U/L — HIGH (ref 40–120)
ALT FLD-CCNC: 6 U/L — SIGNIFICANT CHANGE UP (ref 4–41)
ANION GAP SERPL CALC-SCNC: 13 MMOL/L — SIGNIFICANT CHANGE UP (ref 7–14)
APPEARANCE UR: ABNORMAL
AST SERPL-CCNC: 22 U/L — SIGNIFICANT CHANGE UP (ref 4–40)
BACTERIA # UR AUTO: NEGATIVE — SIGNIFICANT CHANGE UP
BASE EXCESS BLDV CALC-SCNC: 9.3 MMOL/L — HIGH (ref -2–3)
BASOPHILS # BLD AUTO: 0.03 K/UL — SIGNIFICANT CHANGE UP (ref 0–0.2)
BASOPHILS NFR BLD AUTO: 0.3 % — SIGNIFICANT CHANGE UP (ref 0–2)
BILIRUB SERPL-MCNC: 0.5 MG/DL — SIGNIFICANT CHANGE UP (ref 0.2–1.2)
BILIRUB UR-MCNC: NEGATIVE — SIGNIFICANT CHANGE UP
BLOOD GAS VENOUS COMPREHENSIVE RESULT: SIGNIFICANT CHANGE UP
BUN SERPL-MCNC: 27 MG/DL — HIGH (ref 7–23)
CALCIUM SERPL-MCNC: 9.2 MG/DL — SIGNIFICANT CHANGE UP (ref 8.4–10.5)
CHLORIDE BLDV-SCNC: 101 MMOL/L — SIGNIFICANT CHANGE UP (ref 96–108)
CHLORIDE SERPL-SCNC: 98 MMOL/L — SIGNIFICANT CHANGE UP (ref 98–107)
CO2 BLDV-SCNC: 37.2 MMOL/L — HIGH (ref 22–26)
CO2 SERPL-SCNC: 31 MMOL/L — SIGNIFICANT CHANGE UP (ref 22–31)
COLOR SPEC: SIGNIFICANT CHANGE UP
CREAT SERPL-MCNC: 2.37 MG/DL — HIGH (ref 0.5–1.3)
DIFF PNL FLD: ABNORMAL
EGFR: 27 ML/MIN/1.73M2 — LOW
EOSINOPHIL # BLD AUTO: 0.02 K/UL — SIGNIFICANT CHANGE UP (ref 0–0.5)
EOSINOPHIL NFR BLD AUTO: 0.2 % — SIGNIFICANT CHANGE UP (ref 0–6)
EPI CELLS # UR: 2 /HPF — SIGNIFICANT CHANGE UP (ref 0–5)
FLUAV AG NPH QL: SIGNIFICANT CHANGE UP
FLUBV AG NPH QL: SIGNIFICANT CHANGE UP
GAS PNL BLDV: 140 MMOL/L — SIGNIFICANT CHANGE UP (ref 136–145)
GLUCOSE BLDV-MCNC: 102 MG/DL — HIGH (ref 70–99)
GLUCOSE SERPL-MCNC: 104 MG/DL — HIGH (ref 70–99)
GLUCOSE UR QL: NEGATIVE — SIGNIFICANT CHANGE UP
HCO3 BLDV-SCNC: 36 MMOL/L — HIGH (ref 22–29)
HCT VFR BLD CALC: 36.9 % — LOW (ref 39–50)
HCT VFR BLDA CALC: 32 % — LOW (ref 39–51)
HGB BLD CALC-MCNC: 10.8 G/DL — LOW (ref 13–17)
HGB BLD-MCNC: 10.7 G/DL — LOW (ref 13–17)
HYALINE CASTS # UR AUTO: 2 /LPF — SIGNIFICANT CHANGE UP (ref 0–7)
IANC: 7.09 K/UL — SIGNIFICANT CHANGE UP (ref 1.8–7.4)
IMM GRANULOCYTES NFR BLD AUTO: 0.3 % — SIGNIFICANT CHANGE UP (ref 0–1.5)
KETONES UR-MCNC: NEGATIVE — SIGNIFICANT CHANGE UP
LACTATE BLDV-MCNC: 1.8 MMOL/L — SIGNIFICANT CHANGE UP (ref 0.5–2)
LEUKOCYTE ESTERASE UR-ACNC: NEGATIVE — SIGNIFICANT CHANGE UP
LYMPHOCYTES # BLD AUTO: 1.06 K/UL — SIGNIFICANT CHANGE UP (ref 1–3.3)
LYMPHOCYTES # BLD AUTO: 11.9 % — LOW (ref 13–44)
MAGNESIUM SERPL-MCNC: 2.6 MG/DL — SIGNIFICANT CHANGE UP (ref 1.6–2.6)
MCHC RBC-ENTMCNC: 23.8 PG — LOW (ref 27–34)
MCHC RBC-ENTMCNC: 29 GM/DL — LOW (ref 32–36)
MCV RBC AUTO: 82 FL — SIGNIFICANT CHANGE UP (ref 80–100)
MONOCYTES # BLD AUTO: 0.7 K/UL — SIGNIFICANT CHANGE UP (ref 0–0.9)
MONOCYTES NFR BLD AUTO: 7.8 % — SIGNIFICANT CHANGE UP (ref 2–14)
NEUTROPHILS # BLD AUTO: 7.09 K/UL — SIGNIFICANT CHANGE UP (ref 1.8–7.4)
NEUTROPHILS NFR BLD AUTO: 79.5 % — HIGH (ref 43–77)
NITRITE UR-MCNC: NEGATIVE — SIGNIFICANT CHANGE UP
NRBC # BLD: 0 /100 WBCS — SIGNIFICANT CHANGE UP (ref 0–0)
NRBC # FLD: 0 K/UL — SIGNIFICANT CHANGE UP (ref 0–0)
PCO2 BLDV: 56 MMHG — HIGH (ref 42–55)
PH BLDV: 7.41 — SIGNIFICANT CHANGE UP (ref 7.32–7.43)
PH UR: 7 — SIGNIFICANT CHANGE UP (ref 5–8)
PHOSPHATE SERPL-MCNC: 2.1 MG/DL — LOW (ref 2.5–4.5)
PLATELET # BLD AUTO: 276 K/UL — SIGNIFICANT CHANGE UP (ref 150–400)
PO2 BLDV: 27 MMHG — SIGNIFICANT CHANGE UP
POTASSIUM BLDV-SCNC: 2.8 MMOL/L — CRITICAL LOW (ref 3.5–5.1)
POTASSIUM SERPL-MCNC: 2.8 MMOL/L — CRITICAL LOW (ref 3.5–5.3)
POTASSIUM SERPL-SCNC: 2.8 MMOL/L — CRITICAL LOW (ref 3.5–5.3)
PROT SERPL-MCNC: 6.8 G/DL — SIGNIFICANT CHANGE UP (ref 6–8.3)
PROT UR-MCNC: ABNORMAL
RBC # BLD: 4.5 M/UL — SIGNIFICANT CHANGE UP (ref 4.2–5.8)
RBC # FLD: 16.4 % — HIGH (ref 10.3–14.5)
RBC CASTS # UR COMP ASSIST: >720 /HPF — HIGH (ref 0–4)
RSV RNA NPH QL NAA+NON-PROBE: SIGNIFICANT CHANGE UP
SAO2 % BLDV: 29.4 % — SIGNIFICANT CHANGE UP
SARS-COV-2 RNA SPEC QL NAA+PROBE: SIGNIFICANT CHANGE UP
SODIUM SERPL-SCNC: 142 MMOL/L — SIGNIFICANT CHANGE UP (ref 135–145)
SP GR SPEC: 1.01 — SIGNIFICANT CHANGE UP (ref 1.01–1.05)
TROPONIN T, HIGH SENSITIVITY RESULT: 102 NG/L — CRITICAL HIGH
TROPONIN T, HIGH SENSITIVITY RESULT: 99 NG/L — CRITICAL HIGH
UROBILINOGEN FLD QL: SIGNIFICANT CHANGE UP
WBC # BLD: 8.93 K/UL — SIGNIFICANT CHANGE UP (ref 3.8–10.5)
WBC # FLD AUTO: 8.93 K/UL — SIGNIFICANT CHANGE UP (ref 3.8–10.5)
WBC UR QL: 4 /HPF — SIGNIFICANT CHANGE UP (ref 0–5)

## 2022-09-08 PROCEDURE — 99223 1ST HOSP IP/OBS HIGH 75: CPT

## 2022-09-08 PROCEDURE — 71045 X-RAY EXAM CHEST 1 VIEW: CPT | Mod: 26

## 2022-09-08 PROCEDURE — 93010 ELECTROCARDIOGRAM REPORT: CPT | Mod: GC

## 2022-09-08 PROCEDURE — 74176 CT ABD & PELVIS W/O CONTRAST: CPT | Mod: 26,MA

## 2022-09-08 PROCEDURE — 99285 EMERGENCY DEPT VISIT HI MDM: CPT | Mod: 25,GC

## 2022-09-08 RX ORDER — POTASSIUM CHLORIDE 20 MEQ
40 PACKET (EA) ORAL ONCE
Refills: 0 | Status: COMPLETED | OUTPATIENT
Start: 2022-09-08 | End: 2022-09-08

## 2022-09-08 RX ORDER — SODIUM CHLORIDE 9 MG/ML
1000 INJECTION INTRAMUSCULAR; INTRAVENOUS; SUBCUTANEOUS ONCE
Refills: 0 | Status: COMPLETED | OUTPATIENT
Start: 2022-09-08 | End: 2022-09-08

## 2022-09-08 RX ORDER — CARBIDOPA AND LEVODOPA 25; 100 MG/1; MG/1
1 TABLET ORAL ONCE
Refills: 0 | Status: COMPLETED | OUTPATIENT
Start: 2022-09-08 | End: 2022-09-08

## 2022-09-08 RX ORDER — POTASSIUM CHLORIDE 20 MEQ
10 PACKET (EA) ORAL
Refills: 0 | Status: COMPLETED | OUTPATIENT
Start: 2022-09-08 | End: 2022-09-08

## 2022-09-08 RX ADMIN — Medication 100 MILLIEQUIVALENT(S): at 18:17

## 2022-09-08 RX ADMIN — Medication 100 MILLIEQUIVALENT(S): at 19:44

## 2022-09-08 RX ADMIN — Medication 40 MILLIEQUIVALENT(S): at 19:00

## 2022-09-08 RX ADMIN — SODIUM CHLORIDE 1000 MILLILITER(S): 9 INJECTION INTRAMUSCULAR; INTRAVENOUS; SUBCUTANEOUS at 15:31

## 2022-09-08 RX ADMIN — CARBIDOPA AND LEVODOPA 1 TABLET(S): 25; 100 TABLET ORAL at 17:52

## 2022-09-08 NOTE — ED ADULT TRIAGE NOTE - CHIEF COMPLAINT QUOTE
pt c/o generalized weakness and hematuria x few days. sent by PMD for eval. pt is on Eliquis. pt denies pain and discomfort. no fever/chills/dysuria.  pt states he has a pleural effusion drainage tube on left side.

## 2022-09-08 NOTE — ED PROVIDER NOTE - PHYSICAL EXAMINATION
General: Alert and Orientated x 3. No apparent distress.  Head: Normocephalic and atraumatic.  Eyes: PERRLA with EOMI.  Neck: Supple. Trachea midline.   Cardiac: Normal S1 and S2 w/ RRR. No murmurs appreciated. No JVD appreciated.  Pulmonary: CTA bilaterally. No increased WOB. No wheezes or crackles.  Abdominal: Soft, non-tender. No peritoneal signs. (+) bowel sounds appreciated in all 4 quadrants. No hepatosplenomegaly.   Neurologic: No focal sensory or motor deficits.  Musculoskeletal: Strength appropriate in all 4 extremities for age with no limited ROM.  Skin: Color appropriate for race. Intact, warm, and well-perfused.  Psychiatric: Appropriate mood and affect. No apparent risk to self or others.

## 2022-09-08 NOTE — ED PROVIDER NOTE - NS ED ROS FT
CONSTITUTIONAL: No fevers, no chills, no lightheadedness, no dizziness  EYES: no visual changes, no eye pain  EARS: no ear drainage, no ear pain, no change in hearing  NOSE: no nasal congestion  MOUTH/THROAT: no sore throat  CV: No chest pain, no palpitations  RESP: No SOB, no cough  GI: No n/v/d, no abd pain  : see hpi   MSK: no back pain, no extremity pain  SKIN: no rashes  NEURO: no headache, no decreased sensation/parasthesias   PSYCHIATRIC: no known mental health issues

## 2022-09-08 NOTE — ED ADULT NURSE NOTE - OBJECTIVE STATEMENT
Pt received to rm 28 , awake and alert, A&OX4, ambulatory with assistance per family. C/o hematuria x1 day and increasing weakness x5 days, worsening today. Reporting lower abd pain- straight catheterized with relief using coude using sterile technique, tolerated well. 700cc of clear dark yellow urine output obtained. Respirations even and unlabored. Denies CP, SOB, N/V, HA, dizziness, palpitations, fatigue. 20G IV placed to RAC . Bed in lowest position, call bell within reach. Family at bedside. Paced on CM.

## 2022-09-08 NOTE — ED PROVIDER NOTE - CLINICAL SUMMARY MEDICAL DECISION MAKING FREE TEXT BOX
Yonis: 81M h/o Parkinsons, ammyloid with chronic R pleural effusion s/p pleurex, s/p PPM, AFib and eliquis brought in for eval on increased confusion and weakness. Will r/o infectious causes vs less likely cardiac, no recent changes to medications or doses - will get trop, ekg, sespsis workup, cxr - IVF to be given

## 2022-09-08 NOTE — ED PROVIDER NOTE - ATTENDING CONTRIBUTION TO CARE
81M h/o Parkinsons, ammyloid with chronic R pleural effusion s/p pleurex, s/p PPM, AFib and eliquis brought in for eval on increased confusion and weakness. Pt's wife and caregiver with injury 2 wks ago making her unable to care per usual, and per daughter has noted decline since then. Went to PCP and UA "+blood" but daughter unclear if lisa blood or RBC. Unknown when last ppm interrogation.   Gen: confused but awake and following commands  CV: regular, paced on tele  Pulm: R pleurex in place with mild decreased bs b/l bases  Abd: soft, ntnd  Ext: no edema/swelling  Skin: contact dermatitis of groin  MDM: 81M h/o Parkinsons, ammyloid with chronic R pleural effusion s/p pleurex, s/p PPM, AFib and eliquis brought in for eval on increased confusion and weakness. Will r/o infectious causes vs less likely cardiac, no recent changes to medications or doses - will get trop, ekg, sespsis workup, cxr - IVF to be given

## 2022-09-08 NOTE — ED PROVIDER NOTE - OBJECTIVE STATEMENT
Patient is a 82 y/o M with hx of Parkinson Disease, AFIB on Eliquis, HTN  who presents to the ED with hematuria and weakness x 5 days. Patient is a 80 y/o M with hx of Parkinson Disease, AFIB on Eliquis, amyloid with chronic R pleural effusion s/p pleurex, HTN  who presents to the ED with hematuria and weakness x 5 days. No hx of hematuria. Painless. No uti sx, no hx of stones. Describes weakness as generalized fatigue. No falls or injuries. No chest pain, palpitations, syncope, lightheadedness or dizziness. No new med changes. No blood in stool.

## 2022-09-09 ENCOUNTER — APPOINTMENT (OUTPATIENT)
Dept: UROLOGY | Facility: CLINIC | Age: 82
End: 2022-09-09

## 2022-09-09 DIAGNOSIS — E87.6 HYPOKALEMIA: ICD-10-CM

## 2022-09-09 DIAGNOSIS — I25.10 ATHEROSCLEROTIC HEART DISEASE OF NATIVE CORONARY ARTERY WITHOUT ANGINA PECTORIS: ICD-10-CM

## 2022-09-09 DIAGNOSIS — I48.91 UNSPECIFIED ATRIAL FIBRILLATION: ICD-10-CM

## 2022-09-09 DIAGNOSIS — E78.5 HYPERLIPIDEMIA, UNSPECIFIED: ICD-10-CM

## 2022-09-09 DIAGNOSIS — D50.9 IRON DEFICIENCY ANEMIA, UNSPECIFIED: ICD-10-CM

## 2022-09-09 DIAGNOSIS — R31.9 HEMATURIA, UNSPECIFIED: ICD-10-CM

## 2022-09-09 DIAGNOSIS — G20 PARKINSON'S DISEASE: ICD-10-CM

## 2022-09-09 DIAGNOSIS — N17.9 ACUTE KIDNEY FAILURE, UNSPECIFIED: ICD-10-CM

## 2022-09-09 DIAGNOSIS — E85.4 ORGAN-LIMITED AMYLOIDOSIS: ICD-10-CM

## 2022-09-09 DIAGNOSIS — R21 RASH AND OTHER NONSPECIFIC SKIN ERUPTION: ICD-10-CM

## 2022-09-09 DIAGNOSIS — R94.31 ABNORMAL ELECTROCARDIOGRAM [ECG] [EKG]: ICD-10-CM

## 2022-09-09 DIAGNOSIS — J90 PLEURAL EFFUSION, NOT ELSEWHERE CLASSIFIED: ICD-10-CM

## 2022-09-09 DIAGNOSIS — Z29.9 ENCOUNTER FOR PROPHYLACTIC MEASURES, UNSPECIFIED: ICD-10-CM

## 2022-09-09 DIAGNOSIS — R53.1 WEAKNESS: ICD-10-CM

## 2022-09-09 LAB
ANION GAP SERPL CALC-SCNC: 16 MMOL/L — HIGH (ref 7–14)
APTT BLD: 37.1 SEC — HIGH (ref 27–36.3)
BASOPHILS # BLD AUTO: 0.04 K/UL — SIGNIFICANT CHANGE UP (ref 0–0.2)
BASOPHILS NFR BLD AUTO: 0.5 % — SIGNIFICANT CHANGE UP (ref 0–2)
BLD GP AB SCN SERPL QL: NEGATIVE — SIGNIFICANT CHANGE UP
BUN SERPL-MCNC: 23 MG/DL — SIGNIFICANT CHANGE UP (ref 7–23)
CALCIUM SERPL-MCNC: 8.9 MG/DL — SIGNIFICANT CHANGE UP (ref 8.4–10.5)
CHLORIDE SERPL-SCNC: 99 MMOL/L — SIGNIFICANT CHANGE UP (ref 98–107)
CO2 SERPL-SCNC: 27 MMOL/L — SIGNIFICANT CHANGE UP (ref 22–31)
CREAT SERPL-MCNC: 2.1 MG/DL — HIGH (ref 0.5–1.3)
CULTURE RESULTS: NO GROWTH — SIGNIFICANT CHANGE UP
EGFR: 31 ML/MIN/1.73M2 — LOW
EOSINOPHIL # BLD AUTO: 0.2 K/UL — SIGNIFICANT CHANGE UP (ref 0–0.5)
EOSINOPHIL NFR BLD AUTO: 2.5 % — SIGNIFICANT CHANGE UP (ref 0–6)
FERRITIN SERPL-MCNC: 21 NG/ML — LOW (ref 30–400)
GLUCOSE SERPL-MCNC: 83 MG/DL — SIGNIFICANT CHANGE UP (ref 70–99)
HCT VFR BLD CALC: 36.3 % — LOW (ref 39–50)
HGB BLD-MCNC: 10.4 G/DL — LOW (ref 13–17)
IANC: 5.62 K/UL — SIGNIFICANT CHANGE UP (ref 1.8–7.4)
IMM GRANULOCYTES NFR BLD AUTO: 0.4 % — SIGNIFICANT CHANGE UP (ref 0–1.5)
INR BLD: 1.65 RATIO — HIGH (ref 0.88–1.16)
IRON SATN MFR SERPL: 31 UG/DL — LOW (ref 45–165)
IRON SATN MFR SERPL: 7 % — LOW (ref 14–50)
LYMPHOCYTES # BLD AUTO: 1.46 K/UL — SIGNIFICANT CHANGE UP (ref 1–3.3)
LYMPHOCYTES # BLD AUTO: 18.4 % — SIGNIFICANT CHANGE UP (ref 13–44)
MAGNESIUM SERPL-MCNC: 2.6 MG/DL — SIGNIFICANT CHANGE UP (ref 1.6–2.6)
MCHC RBC-ENTMCNC: 23.9 PG — LOW (ref 27–34)
MCHC RBC-ENTMCNC: 28.7 GM/DL — LOW (ref 32–36)
MCV RBC AUTO: 83.3 FL — SIGNIFICANT CHANGE UP (ref 80–100)
MONOCYTES # BLD AUTO: 0.59 K/UL — SIGNIFICANT CHANGE UP (ref 0–0.9)
MONOCYTES NFR BLD AUTO: 7.4 % — SIGNIFICANT CHANGE UP (ref 2–14)
NEUTROPHILS # BLD AUTO: 5.62 K/UL — SIGNIFICANT CHANGE UP (ref 1.8–7.4)
NEUTROPHILS NFR BLD AUTO: 70.8 % — SIGNIFICANT CHANGE UP (ref 43–77)
NRBC # BLD: 0 /100 WBCS — SIGNIFICANT CHANGE UP (ref 0–0)
NRBC # FLD: 0 K/UL — SIGNIFICANT CHANGE UP (ref 0–0)
NT-PROBNP SERPL-SCNC: 2196 PG/ML — HIGH
PHOSPHATE SERPL-MCNC: 2.1 MG/DL — LOW (ref 2.5–4.5)
PLATELET # BLD AUTO: 283 K/UL — SIGNIFICANT CHANGE UP (ref 150–400)
POTASSIUM SERPL-MCNC: 2.8 MMOL/L — CRITICAL LOW (ref 3.5–5.3)
POTASSIUM SERPL-SCNC: 2.8 MMOL/L — CRITICAL LOW (ref 3.5–5.3)
PROTHROM AB SERPL-ACNC: 19.2 SEC — HIGH (ref 10.5–13.4)
RBC # BLD: 4.36 M/UL — SIGNIFICANT CHANGE UP (ref 4.2–5.8)
RBC # FLD: 16.6 % — HIGH (ref 10.3–14.5)
RH IG SCN BLD-IMP: NEGATIVE — SIGNIFICANT CHANGE UP
SODIUM SERPL-SCNC: 142 MMOL/L — SIGNIFICANT CHANGE UP (ref 135–145)
SPECIMEN SOURCE: SIGNIFICANT CHANGE UP
TIBC SERPL-MCNC: 425 UG/DL — SIGNIFICANT CHANGE UP (ref 220–430)
TSH SERPL-MCNC: 1.45 UIU/ML — SIGNIFICANT CHANGE UP (ref 0.27–4.2)
UIBC SERPL-MCNC: 394 UG/DL — HIGH (ref 110–370)
WBC # BLD: 7.94 K/UL — SIGNIFICANT CHANGE UP (ref 3.8–10.5)
WBC # FLD AUTO: 7.94 K/UL — SIGNIFICANT CHANGE UP (ref 3.8–10.5)

## 2022-09-09 PROCEDURE — 99222 1ST HOSP IP/OBS MODERATE 55: CPT

## 2022-09-09 PROCEDURE — 99233 SBSQ HOSP IP/OBS HIGH 50: CPT | Mod: FS

## 2022-09-09 PROCEDURE — 99233 SBSQ HOSP IP/OBS HIGH 50: CPT | Mod: GC

## 2022-09-09 RX ORDER — APIXABAN 2.5 MG/1
2.5 TABLET, FILM COATED ORAL EVERY 12 HOURS
Refills: 0 | Status: DISCONTINUED | OUTPATIENT
Start: 2022-09-09 | End: 2022-09-12

## 2022-09-09 RX ORDER — ATORVASTATIN CALCIUM 80 MG/1
80 TABLET, FILM COATED ORAL AT BEDTIME
Refills: 0 | Status: DISCONTINUED | OUTPATIENT
Start: 2022-09-09 | End: 2022-09-12

## 2022-09-09 RX ORDER — AMIODARONE HYDROCHLORIDE 400 MG/1
1 TABLET ORAL
Qty: 0 | Refills: 0 | DISCHARGE

## 2022-09-09 RX ORDER — MIRTAZAPINE 45 MG/1
7.5 TABLET, ORALLY DISINTEGRATING ORAL AT BEDTIME
Refills: 0 | Status: DISCONTINUED | OUTPATIENT
Start: 2022-09-09 | End: 2022-09-12

## 2022-09-09 RX ORDER — OLANZAPINE 15 MG/1
2.5 TABLET, FILM COATED ORAL AT BEDTIME
Refills: 0 | Status: DISCONTINUED | OUTPATIENT
Start: 2022-09-09 | End: 2022-09-12

## 2022-09-09 RX ORDER — CARBIDOPA AND LEVODOPA 25; 100 MG/1; MG/1
1.5 TABLET ORAL DAILY
Refills: 0 | Status: DISCONTINUED | OUTPATIENT
Start: 2022-09-09 | End: 2022-09-09

## 2022-09-09 RX ORDER — DESVENLAFAXINE 50 MG/1
50 TABLET, EXTENDED RELEASE ORAL DAILY
Refills: 0 | Status: DISCONTINUED | OUTPATIENT
Start: 2022-09-09 | End: 2022-09-12

## 2022-09-09 RX ORDER — PSYLLIUM SEED (WITH DEXTROSE)
1 POWDER (GRAM) ORAL
Qty: 0 | Refills: 0 | DISCHARGE

## 2022-09-09 RX ORDER — CARBIDOPA AND LEVODOPA 25; 100 MG/1; MG/1
1.5 TABLET ORAL DAILY
Refills: 0 | Status: DISCONTINUED | OUTPATIENT
Start: 2022-09-10 | End: 2022-09-11

## 2022-09-09 RX ORDER — CLOPIDOGREL BISULFATE 75 MG/1
75 TABLET, FILM COATED ORAL DAILY
Refills: 0 | Status: DISCONTINUED | OUTPATIENT
Start: 2022-09-09 | End: 2022-09-12

## 2022-09-09 RX ORDER — BUMETANIDE 0.25 MG/ML
1.5 INJECTION INTRAMUSCULAR; INTRAVENOUS
Refills: 0 | Status: DISCONTINUED | OUTPATIENT
Start: 2022-09-09 | End: 2022-09-12

## 2022-09-09 RX ORDER — MIRTAZAPINE 45 MG/1
2 TABLET, ORALLY DISINTEGRATING ORAL
Qty: 0 | Refills: 0 | DISCHARGE

## 2022-09-09 RX ORDER — AMIODARONE HYDROCHLORIDE 400 MG/1
400 TABLET ORAL DAILY
Refills: 0 | Status: DISCONTINUED | OUTPATIENT
Start: 2022-09-09 | End: 2022-09-12

## 2022-09-09 RX ORDER — POTASSIUM PHOSPHATE, MONOBASIC POTASSIUM PHOSPHATE, DIBASIC 236; 224 MG/ML; MG/ML
15 INJECTION, SOLUTION INTRAVENOUS ONCE
Refills: 0 | Status: COMPLETED | OUTPATIENT
Start: 2022-09-09 | End: 2022-09-09

## 2022-09-09 RX ORDER — POTASSIUM CHLORIDE 20 MEQ
40 PACKET (EA) ORAL EVERY 4 HOURS
Refills: 0 | Status: COMPLETED | OUTPATIENT
Start: 2022-09-09 | End: 2022-09-09

## 2022-09-09 RX ORDER — PREGABALIN 225 MG/1
1 CAPSULE ORAL
Qty: 0 | Refills: 0 | DISCHARGE

## 2022-09-09 RX ORDER — CARBIDOPA AND LEVODOPA 25; 100 MG/1; MG/1
1 TABLET ORAL
Refills: 0 | Status: DISCONTINUED | OUTPATIENT
Start: 2022-09-09 | End: 2022-09-12

## 2022-09-09 RX ORDER — CHOLECALCIFEROL (VITAMIN D3) 125 MCG
1 CAPSULE ORAL
Qty: 0 | Refills: 0 | DISCHARGE

## 2022-09-09 RX ADMIN — OLANZAPINE 2.5 MILLIGRAM(S): 15 TABLET, FILM COATED ORAL at 21:46

## 2022-09-09 RX ADMIN — APIXABAN 2.5 MILLIGRAM(S): 2.5 TABLET, FILM COATED ORAL at 17:42

## 2022-09-09 RX ADMIN — CARBIDOPA AND LEVODOPA 1 TABLET(S): 25; 100 TABLET ORAL at 15:42

## 2022-09-09 RX ADMIN — Medication 40 MILLIEQUIVALENT(S): at 12:20

## 2022-09-09 RX ADMIN — CARBIDOPA AND LEVODOPA 1 TABLET(S): 25; 100 TABLET ORAL at 21:46

## 2022-09-09 RX ADMIN — Medication 100 MILLIEQUIVALENT(S): at 00:08

## 2022-09-09 RX ADMIN — CLOPIDOGREL BISULFATE 75 MILLIGRAM(S): 75 TABLET, FILM COATED ORAL at 12:19

## 2022-09-09 RX ADMIN — CARBIDOPA AND LEVODOPA 1.5 TABLET(S): 25; 100 TABLET ORAL at 12:19

## 2022-09-09 RX ADMIN — Medication 40 MILLIEQUIVALENT(S): at 17:44

## 2022-09-09 RX ADMIN — POTASSIUM PHOSPHATE, MONOBASIC POTASSIUM PHOSPHATE, DIBASIC 62.5 MILLIMOLE(S): 236; 224 INJECTION, SOLUTION INTRAVENOUS at 12:18

## 2022-09-09 RX ADMIN — MIRTAZAPINE 7.5 MILLIGRAM(S): 45 TABLET, ORALLY DISINTEGRATING ORAL at 21:46

## 2022-09-09 RX ADMIN — CARBIDOPA AND LEVODOPA 1 TABLET(S): 25; 100 TABLET ORAL at 17:43

## 2022-09-09 RX ADMIN — BUMETANIDE 1.5 MILLIGRAM(S): 0.25 INJECTION INTRAMUSCULAR; INTRAVENOUS at 06:43

## 2022-09-09 RX ADMIN — BUMETANIDE 1.5 MILLIGRAM(S): 0.25 INJECTION INTRAMUSCULAR; INTRAVENOUS at 12:20

## 2022-09-09 RX ADMIN — Medication 1 APPLICATION(S): at 17:43

## 2022-09-09 RX ADMIN — ATORVASTATIN CALCIUM 80 MILLIGRAM(S): 80 TABLET, FILM COATED ORAL at 21:46

## 2022-09-09 RX ADMIN — CARBIDOPA AND LEVODOPA 1 TABLET(S): 25; 100 TABLET ORAL at 12:19

## 2022-09-09 RX ADMIN — AMIODARONE HYDROCHLORIDE 400 MILLIGRAM(S): 400 TABLET ORAL at 06:43

## 2022-09-09 RX ADMIN — DESVENLAFAXINE 50 MILLIGRAM(S): 50 TABLET, EXTENDED RELEASE ORAL at 12:20

## 2022-09-09 NOTE — PROGRESS NOTE ADULT - PROBLEM SELECTOR PLAN 11
Hospital Bundle  Fluids: PO ad andrey  Electrolytes: Replete K > 4, Mg > 2, Phos > 3  Nutrition: Diet   PPX  ---VTE: Held ico anemia, hematuria  ---GI: Diet  Access: PIV  Code Status: FULL  Dispo: Pending medical evaluation Hospital Bundle  Fluids: PO ad andrey  Electrolytes: Replete K > 4, Mg > 2, Phos > 3  Nutrition: Diet   PPX  ---VTE: Held ico anemia, hematuria  ---GI: Diet, may consider PPI if c/f GIB continues  Access: PIV  Code Status: FULL  Dispo: Pending medical evaluation

## 2022-09-09 NOTE — H&P ADULT - PROBLEM SELECTOR PLAN 11
restart apixaban pending repeat CBC   diet pending dysphagia screen (dysphagia 3 diet)   aspiration, fall precautions   PT consult given report of weakness

## 2022-09-09 NOTE — H&P ADULT - PROBLEM SELECTOR PLAN 8
c/w Vyndamax s/p VATS and PleurX which is drained 3x/week  if prolonged hospital stay would consult pulm

## 2022-09-09 NOTE — PROGRESS NOTE ADULT - SUBJECTIVE AND OBJECTIVE BOX
Medicine Progress Note  --------------------  Randall Hansen M.D.  Internal Medicine/Emergency Medicine  PGY-2  --------------------      Patient: ALBINO RIVAS, MRN: [id number], : 1940  Admitted on 22 for Disorientation      LANGUAGE - English ]OR[ PI (_): #                ------------------------------------------------------------------------------------------------------------  SUMMARY (from last progress note through 22 @ 07:34):   -  ------------------------------------------------------------------------------------------------------------  OVERNIGHT EVENTS  NAEON    SUBJECTIVE  -       ROS negative unless noted above.  ------------------------------------------------------------------------------------------------------------  OBJECTIVE:  Physical Exam  CONST:     NAD, well-appearing; well-developed; appears stated age  EYES:         Conjunctiva clear; PERRL; no conjunctival pallor; no lid lag  ENMT:       MMM, no pharyngeal injection or exudates; normal dentition/dentures present  NECK:        Supple, no LAD; no palpable masses; no thyromegaly  RESP :        Normal respiratory effort; CTA bilaterally; no W/R/R  CHEST:       No TTP, no lines/ports; symmetric chest expansion  CARDIO:     RRR, normal S1 and S2, no M/R/G; apical impulse at MCL  VASC:         No JVD/AJR, no peripheral edema, pulses 2+ B/L, Cap refill <2s  ABD:           Soft, NT/ND, norm bowel sounds; no R/G; No HSM; No CVAT  MSK:          No clubbing of digits; no joint swelling or TTP  EXT:           WWP, no reduction in body hair, no LE skin changes  PSYCH        A&O to person, place, and time; affect appropriate  NEURO:     Non-focal; no gross sensory deficits; moving all extremities   SKIN:          No rashes; no palpable lesions; axillae not dry    Vital Signs Last 24 Hrs  T(F): 97.4, Max: 99.2 (22 @ 00:04)  HR: 59 (59 - 67)  BP: 129/59 (117/55 - 138/60)  RR: 18 (13 - 18)  SpO2: 100% (99% - 100%)        DAILY MEASUREMENTS:  I&O's Summary    Daily Height in cm: 172.72 (09 Sep 2022 02:05)    Daily   Weight (kg): 78.5 (22 @ 02:05)  Orthostatic VS        LABS:                        10.7   8.93  )-----------( 276      ( 08 Sep 2022 15:00 )             36.9     Hgb Trend: 10.7<--      142  |  98  |  27<H>  ----------------------------<  104<H>  2.8<LL>   |  31  |  2.37<H>    Ca    9.2      08 Sep 2022 15:00  Phos  2.1       Mg     2.60         TPro  6.8  /  Alb  3.8  /  TBili  0.5  /  DBili  x   /  AST  22  /  ALT  6   /  AlkPhos  149<H>        CAPILLARY BLOOD GLUCOSE            Urinalysis Basic - ( 08 Sep 2022 15:20 )    Color: Light Yellow / Appearance: Slightly Turbid / S.010 / pH: x  Gluc: x / Ketone: Negative  / Bili: Negative / Urobili: <2 mg/dL   Blood: x / Protein: 30 mg/dL / Nitrite: Negative   Leuk Esterase: Negative / RBC: >720 /HPF / WBC 4 /HPF   Sq Epi: x / Non Sq Epi: 2 /HPF / Bacteria: Negative          Venous Blood Gas:  22 @ 15:15  7.41/56/27/36/29.4  VBG Lactate: 1.8      RADIOLOGY & ADDITIONAL TESTS:  Results Reviewed:     Imaging Reviewed:  Electrocardiogram Reviewed:      ------------------------------------------------------------------------------------------------------------  MEDICATIONS  (STANDING):  aMIOdarone    Tablet 400 milliGRAM(s) Oral daily  atorvastatin 80 milliGRAM(s) Oral at bedtime  buMETAnide 1.5 milliGRAM(s) Oral two times a day  carbidopa/levodopa  25/100 1.5 Tablet(s) Oral daily  carbidopa/levodopa  25/100 1 Tablet(s) Oral <User Schedule>  clopidogrel Tablet 75 milliGRAM(s) Oral daily  desvenlafaxine ER 50 milliGRAM(s) Oral daily  mirtazapine 7.5 milliGRAM(s) Oral at bedtime  OLANZapine 2.5 milliGRAM(s) Oral at bedtime  potassium phosphate IVPB 15 milliMole(s) IV Intermittent once    MEDICATIONS  (PRN):    ------------------------------------------------------------------------------------------------------------  COORDINATION OF CARE:  Care discussed with consultants/other providers and notes reviewed [Y]     Medicine Progress Note  --------------------  Randall Hansen M.D.  Internal Medicine/Emergency Medicine  PGY-2  --------------------      Patient: ALBINO RIVAS, MRN: [id number], : 1940  Admitted on 22 for Disorientation      LANGUAGE - English ]OR[ PI (_): #                ------------------------------------------------------------------------------------------------------------  SUMMARY (from last progress note through 22 @ 07:34):   - 81M   ------------------------------------------------------------------------------------------------------------  OVERNIGHT EVENTS  NAEON    SUBJECTIVE  -       ROS negative unless noted above.  ------------------------------------------------------------------------------------------------------------  OBJECTIVE:  Physical Exam  CONST:     NAD, well-appearing; well-developed; appears stated age  EYES:         Conjunctiva clear; PERRL; no conjunctival pallor; no lid lag  ENMT:       MMM, no pharyngeal injection or exudates; normal dentition/dentures present  NECK:        Supple, no LAD; no palpable masses; no thyromegaly  RESP :        Normal respiratory effort; CTA bilaterally; no W/R/R  CHEST:       No TTP, no lines/ports; symmetric chest expansion  CARDIO:     RRR, normal S1 and S2, no M/R/G; apical impulse at MCL  VASC:         No JVD/AJR, no peripheral edema, pulses 2+ B/L, Cap refill <2s  ABD:           Soft, NT/ND, norm bowel sounds; no R/G; No HSM; No CVAT  MSK:          No clubbing of digits; no joint swelling or TTP  EXT:           WWP, no reduction in body hair, no LE skin changes  PSYCH        A&O to person, place, and time; affect appropriate  NEURO:     Non-focal; no gross sensory deficits; moving all extremities   SKIN:          No rashes; no palpable lesions; axillae not dry    Vital Signs Last 24 Hrs  T(F): 97.4, Max: 99.2 (22 @ 00:04)  HR: 59 (59 - 67)  BP: 129/59 (117/55 - 138/60)  RR: 18 (13 - 18)  SpO2: 100% (99% - 100%)    DAILY MEASUREMENTS:  I&O's Summary    Daily Height in cm: 172.72 (09 Sep 2022 02:05)    Daily   Weight (kg): 78.5 (22 @ 02:05)  Orthostatic VS    LABS:                        10.7   8.93  )-----------( 276      ( 08 Sep 2022 15:00 )             36.9     Hgb Trend: 10.7<--      142  |  98  |  27<H>  ----------------------------<  104<H>  2.8<LL>   |  31  |  2.37<H>    Ca    9.2      08 Sep 2022 15:00  Phos  2.1       Mg     2.60         TPro  6.8  /  Alb  3.8  /  TBili  0.5  /  DBili  x   /  AST  22  /  ALT  6   /  AlkPhos  149<H>        CAPILLARY BLOOD GLUCOSE      Urinalysis Basic - ( 08 Sep 2022 15:20 )    Color: Light Yellow / Appearance: Slightly Turbid / S.010 / pH: x  Gluc: x / Ketone: Negative  / Bili: Negative / Urobili: <2 mg/dL   Blood: x / Protein: 30 mg/dL / Nitrite: Negative   Leuk Esterase: Negative / RBC: >720 /HPF / WBC 4 /HPF   Sq Epi: x / Non Sq Epi: 2 /HPF / Bacteria: Negative      Venous Blood Gas:  22 @ 15:15  7.41/56/27/36/29.4  VBG Lactate: 1.8      RADIOLOGY & ADDITIONAL TESTS:  Results Reviewed: as above  Imaging Reviewed:   < from: CT Abdomen and Pelvis No Cont (22 @ 21:12) >  IMPRESSION:  Moderate right, trace left pleural effusion. Right-sided pleural   catheter. Moderate cardiomegaly.    No hydronephrosis. No renal or ureteral stones.    No other acute findings.    Please refer to detailed findings otherwise described above.    < end of copied text >    Electrocardiogram Reviewed:      ------------------------------------------------------------------------------------------------------------  MEDICATIONS  (STANDING):  aMIOdarone    Tablet 400 milliGRAM(s) Oral daily  atorvastatin 80 milliGRAM(s) Oral at bedtime  buMETAnide 1.5 milliGRAM(s) Oral two times a day  carbidopa/levodopa  25/100 1.5 Tablet(s) Oral daily  carbidopa/levodopa  25/100 1 Tablet(s) Oral <User Schedule>  clopidogrel Tablet 75 milliGRAM(s) Oral daily  desvenlafaxine ER 50 milliGRAM(s) Oral daily  mirtazapine 7.5 milliGRAM(s) Oral at bedtime  OLANZapine 2.5 milliGRAM(s) Oral at bedtime  potassium phosphate IVPB 15 milliMole(s) IV Intermittent once    MEDICATIONS  (PRN):    ------------------------------------------------------------------------------------------------------------  COORDINATION OF CARE:  Care discussed with consultants/other providers and notes reviewed [Y]     Medicine Progress Note  --------------------  Randall Hansen M.D.  Internal Medicine/Emergency Medicine  PGY-2  --------------------      Patient: ALBINO RIVAS, MRN: [id number], : 1940  Admitted on 22 for Disorientation      LANGUAGE - English ]OR[ PI (_): #                ------------------------------------------------------------------------------------------------------------  SUMMARY (from last progress note through 22 @ 07:34):   - 81M   ------------------------------------------------------------------------------------------------------------  OVERNIGHT EVENTS  NAEON    SUBJECTIVE  Mr. Rivas isn't able to express what exactly brought him into the hospital. He is joined by his aide who provides collateral history. He says that he's concerned about how "such a young woman could find herself in the hospital with gonorrhea". He denies any chest pain, SOB, abdominal pain, dysuria, cough, fever, chills, or other complaints this morning.      ROS negative unless noted above.  ------------------------------------------------------------------------------------------------------------  OBJECTIVE:  Physical Exam  CONST:     NAD, tired-appearing;  EYES:         Conjunctiva clear; PERRL;   ENMT:       dry lips, scattered petechial lesions   NECK:        Supple, no LAD;  RESP :        Normal respiratory effort; CTA bilaterally superiorly; distant lung sounds on RML & RLL  CHEST:       No TTP  CARDIO:     RRR, normal S1 and S2, no M/R/G  VASC:         No JVD/AJR, no peripheral edema, pulses 2+ B/L, Cap refill <2s  ABD:           Soft, NT/ND  EXT:           WWP, no reduction in body hair, no LE skin changes  PSYCH        AAOx2 (person, place; not to time or situation)  NEURO:     Non-focal; moving all extremities (4+/5)  SKIN:          No rashes; no palpable lesions; axillae not dry    Vital Signs Last 24 Hrs  T(F): 97.4, Max: 99.2 (22 @ 00:04)  HR: 59 (59 - 67)  BP: 129/59 (117/55 - 138/60)  RR: 18 (13 - 18)  SpO2: 100% (99% - 100%)    DAILY MEASUREMENTS:  I&O's Summary    Daily Height in cm: 172.72 (09 Sep 2022 02:05)    Daily   Weight (kg): 78.5 (22 @ 02:05)  Orthostatic VS    LABS:                        10.7   8.93  )-----------( 276      ( 08 Sep 2022 15:00 )             36.9     Hgb Trend: 10.7<--      142  |  98  |  27<H>  ----------------------------<  104<H>  2.8<LL>   |  31  |  2.37<H>    Ca    9.2      08 Sep 2022 15:00  Phos  2.1       Mg     2.60         TPro  6.8  /  Alb  3.8  /  TBili  0.5  /  DBili  x   /  AST  22  /  ALT  6   /  AlkPhos  149<H>        CAPILLARY BLOOD GLUCOSE      Urinalysis Basic - ( 08 Sep 2022 15:20 )    Color: Light Yellow / Appearance: Slightly Turbid / S.010 / pH: x  Gluc: x / Ketone: Negative  / Bili: Negative / Urobili: <2 mg/dL   Blood: x / Protein: 30 mg/dL / Nitrite: Negative   Leuk Esterase: Negative / RBC: >720 /HPF / WBC 4 /HPF   Sq Epi: x / Non Sq Epi: 2 /HPF / Bacteria: Negative      Venous Blood Gas:  22 @ 15:15  7.41/56/27/36/29.4  VBG Lactate: 1.8      RADIOLOGY & ADDITIONAL TESTS:  Results Reviewed: as above  Imaging Reviewed:   < from: CT Abdomen and Pelvis No Cont (22 @ 21:12) >  IMPRESSION:  Moderate right, trace left pleural effusion. Right-sided pleural   catheter. Moderate cardiomegaly.    No hydronephrosis. No renal or ureteral stones.    No other acute findings.    Please refer to detailed findings otherwise described above.    < end of copied text >    Electrocardiogram Reviewed:      ------------------------------------------------------------------------------------------------------------  MEDICATIONS  (STANDING):  aMIOdarone    Tablet 400 milliGRAM(s) Oral daily  atorvastatin 80 milliGRAM(s) Oral at bedtime  buMETAnide 1.5 milliGRAM(s) Oral two times a day  carbidopa/levodopa  25/100 1.5 Tablet(s) Oral daily  carbidopa/levodopa  25/100 1 Tablet(s) Oral <User Schedule>  clopidogrel Tablet 75 milliGRAM(s) Oral daily  desvenlafaxine ER 50 milliGRAM(s) Oral daily  mirtazapine 7.5 milliGRAM(s) Oral at bedtime  OLANZapine 2.5 milliGRAM(s) Oral at bedtime  potassium phosphate IVPB 15 milliMole(s) IV Intermittent once    MEDICATIONS  (PRN):    ------------------------------------------------------------------------------------------------------------  COORDINATION OF CARE:  Care discussed with consultants/other providers and notes reviewed [Y]

## 2022-09-09 NOTE — H&P ADULT - ASSESSMENT
81-year-old male with Parkinson's, Afib on apixaban, bradycardia s/p PPM, CAD s/p stent 8/2021, amyloid, chronic R pleural effusion s/p pleurx, HTN, HLD, BPH, presenting from home with weakness x1 week, found to have hematuria, ALAYNA, and abnormal electrolytes

## 2022-09-09 NOTE — H&P ADULT - PROBLEM SELECTOR PLAN 1
w/calcific density at the posterior aspect of the bladder thought to be likely phlebolith  would consult urology in AM  hold apixaban for now  trend H/H w/calcific density at the posterior aspect of the bladder thought to be likely phlebolith  would consult urology in AM  hold apixaban for now pending repeat CBC  trend H/H    #anemia  -patient reports intermittently seeing small amt BRBPR at end of defecation, no known history of hemorrhoids, last colonoscopy reportedly 7 years ago, with polypectomy, would have outpt f/u with GI   -check iron/TIBC, ferritin with AM labs

## 2022-09-09 NOTE — PROGRESS NOTE ADULT - ASSESSMENT
81-year-old male with Parkinson's, Afib on apixaban, bradycardia s/p PPM, CAD s/p stent 8/2021, amyloid, chronic R pleural effusion s/p pleurx, HTN, HLD, BPH, presenting from home with weakness x1 week, found to have hematuria, ALAYNA, and abnormal electrolytes  81-year-old male with Parkinson's, Afib on apixaban, bradycardia s/p PPM, CAD s/p stent 8/2021, amyloid, chronic R pleural effusion s/p pleurx, HTN, HLD, BPH, presenting from home with weakness x2-3 days prior to admission. Etiology for weakness unclear.  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 He is hemodynamically stable while we clarify the contributions of these multiple medical conditions and correct his electrolyte disturbances.

## 2022-09-09 NOTE — PROCEDURE NOTE - ADDITIONAL PROCEDURE DETAILS
Asked to interrogate device for AFib burden and possible ventricular ectopy.  Device pacing and sensing well.   Capture thresholds evaluated via iterative testing.   Battery estimated life of 11.5 years.   Patient found to be in sinus rhythm since early September. Started Amiodarone load 8/22/22.   No ventricular ectopy recorded.   No reprogramming

## 2022-09-09 NOTE — H&P ADULT - NSHPLABSRESULTS_GEN_ALL_CORE
10.7   8.93  )-----------( 276      ( 08 Sep 2022 15:00 )             36.9         142  |  98  |  27<H>  ----------------------------<  104<H>  2.8<LL>   |  31  |  2.37<H>    Ca    9.2      08 Sep 2022 15:00  Phos  2.1       Mg     2.60         TPro  6.8  /  Alb  3.8  /  TBili  0.5  /  DBili  x   /  AST  22  /  ALT  6   /  AlkPhos  149<H>      15:15 - VBG - pH: 7.41  | pCO2: 56    | pO2: 27    | Lactate: 1.8      Troponin T, High Sensitivity Result: 99 ng/L (22 @ 19:40)    Troponin T, High Sensitivity Result: 102 ng/L (22 @ 15:00)    Urinalysis Basic - ( 08 Sep 2022 15:20 )  Color: Light Yellow / Appearance: Slightly Turbid / S.010 / pH: x  Gluc: x / Ketone: Negative  / Bili: Negative / Urobili: <2 mg/dL   Blood: x / Protein: 30 mg/dL / Nitrite: Negative   Leuk Esterase: Negative / RBC: >720 /HPF / WBC 4 /HPF   Sq Epi: x / Non Sq Epi: 2 /HPF / Bacteria: Negative    Flu With COVID-19 By JOSE (22 @ 21:37)    SARS-CoV-2 Result: NotDetec    Influenza A Result: NotDetec    Influenza B Result: NotDetec    Resp Syn Virus Result: NotDetec    < from: Xray Chest 1 View- PORTABLE-Urgent (Xray Chest 1 View- PORTABLE-Urgent .) (22 @ 17:48) >  Left chest pacemaker with intact leads. Right-sided Pleurx catheter is present unchanged. Patient is slightly rotated towards the right. The heart is enlarged. No focal consolidation. Trace right pleural effusion. No pneumothorax. There are no acute osseous abnormalities.    IMPRESSION: Pleurx catheter in place. No focal consolidation.  < end of copied text >    < from: CT Abdomen and Pelvis No Cont (09.08.22 @ 21:12) >  LOWER CHEST: Moderate right, trace left pleural effusion. Right-sided pleural catheter. Moderate cardiomegaly.  LIVER: Multiple left hepatic cysts.  BILE DUCTS: Normal caliber.  GALLBLADDER: Within normal limits.  SPLEEN: Within normal limits.  PANCREAS: Within normal limits.  ADRENALS: Within normal limits.  KIDNEYS/URETERS: No hydronephrosis. No urinary tract stones. Calcific density at the posterior aspect of the bladder (601-58) likely phlebolith. Streak artifact limits evaluation of pelvic structures.  BLADDER: Underdistended.  REPRODUCTIVE ORGANS: Prostate is enlarged. There is a moderate amount of stool throughout the colon. Diverticulosis descending and sigmoid portions the colon without acute diverticulitis.  BOWEL: No bowel obstruction. Appendix is normal.  PERITONEUM: No ascites.  VESSELS: Within normal limits.  RETROPERITONEUM/LYMPH NODES: No lymphadenopathy.  ABDOMINAL WALL: Within normal limits.  BONES: Bilateral hip replacement. Degenerative changes.  IMPRESSION: Moderate right, trace left pleural effusion. Right-sided pleural catheter. Moderate cardiomegaly. No hydronephrosis. No renal or ureteral stones. No other acute findings. Please refer to detailed findings otherwise described above.  < end of copied text >    EKG personally reviewed - 65bpm sinus rhythm, V paced, QTc 569ms

## 2022-09-09 NOTE — PHYSICAL THERAPY INITIAL EVALUATION ADULT - LIVES WITH, PROFILE
family, private 2 story home with chair lift. Pt has home health aide daily in day time/night unsure of hours

## 2022-09-09 NOTE — PROGRESS NOTE ADULT - PROBLEM SELECTOR PLAN 9
c/w rosuvastatin #Iron Deficiency Anemia  Pt with iron deficiency on studies. Likely 2/2 ongoing hematuria, which is problematic given the drop since only 05/2022. Will have to clarify OP colonoscopy history as well to r/o occult malignancy.   - Documentation suggesting 1x BRBPR at end defecation. Again clarify OP colonoscopy hx  - Calculated Iron Deficit (Target 14): 1178mg, may benefit from IV Iron Sucrose in this context  - Transfuse > 7  - T&S (9/10-)

## 2022-09-09 NOTE — PROGRESS NOTE ADULT - PROBLEM SELECTOR PLAN 3
repleted in ED, check repeat level with AM labs #Groin Rash  Well-demarcated, erythematous rash with peripheral raised edges. May have incontinence-associated dermatitis vs candidal intertrigo.  - Wound C/S, Appreciate Recs  - Barrier creams  - START Clotrimazole 1% crm TOP BID

## 2022-09-09 NOTE — H&P ADULT - PROBLEM SELECTOR PLAN 7
apixaban on hold in setting of hematuria, monitor H/H  c/w amiodarone persistent afib per outpt cards review   apixaban on hold in setting of hematuria, monitor H/H, would restart in AM if H/H stable as is currently undergoing amiodarone load  c/w amiodarone, on 400mg QD for another 2 weeks

## 2022-09-09 NOTE — PROGRESS NOTE ADULT - PROBLEM SELECTOR PLAN 5
c/w clopidogrel, statin #Hematuria  Chronic hematuria evaluated as OP with cystoscopy in 04/2022. Now pt has had fairly significant drop in Hgb since 05/2022. No ongoing gross hematuria here.   - Uro C/S if persistent gross hematuria or obstruction  - Likely at least in part mediated by anticoagulation

## 2022-09-09 NOTE — CONSULT NOTE ADULT - NS ATTEND AMEND GEN_ALL_CORE FT
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  Found to have hypokalemia of 2.8, anemia with Hgb of 10.4 and hematuria. Patient started on Amio load as outpatient on 8/22/22. Now in SR. Eliquis held for hematuria. Continue amio load as documented above. Restart Eliquis when able to, risk of CVA with amyloid is high. Please call back with any questions. EP to sign off.

## 2022-09-09 NOTE — H&P ADULT - NSHPREVIEWOFSYSTEMS_GEN_ALL_CORE
REVIEW OF SYSTEMS:    CONSTITUTIONAL: (+) weakness; No fevers or chills  EYES/ENT: No visual changes; No dysphagia, has difficulty swallowing pills at times; No sore throat; No rhinorrhea; No sinus pain/pressure  NECK: No pain or stiffness  RESPIRATORY: No cough, wheezing, hemoptysis; No shortness of breath  CARDIOVASCULAR: No chest pain or palpitations; (+)chronic/stable lower extremity edema  GASTROINTESTINAL: No abdominal or epigastric pain. No nausea, vomiting, or hematemesis; No diarrhea or constipation. No melena or hematochezia.  GENITOURINARY: No dysuria, nml frequency/chronic nocturia; No hematuria  NEUROLOGICAL: No numbness, paresthesias, or weakness; No HA; No LH/dizziness  MSK: ambulates with walker/rollator, last fall 1 month ago (denies head trauma or LOC)  SKIN: No itching, burning, rashes, or lesions   All other review of systems is negative unless indicated above.

## 2022-09-09 NOTE — PROGRESS NOTE ADULT - PROBLEM SELECTOR PLAN 10
c/w Vyndamax (tafamidis) if family able to bring from home (spoke with pharmacy, we do not carry) #Pleural Effusion #S/P VATS  Etiology unclear based on OP review. Family notes that they've been told primarily cardiac in origin, but volumes drained have shifted previously by fairly large amounts.   - may need Pulm C/S for drainage if inpatient for long enough

## 2022-09-09 NOTE — H&P ADULT - NSHPPHYSICALEXAM_GEN_ALL_CORE
Vital Signs Last 24 Hrs  T(C): 37.3 (09 Sep 2022 00:04), Max: 37.3 (09 Sep 2022 00:04)  T(F): 99.2 (09 Sep 2022 00:04), Max: 99.2 (09 Sep 2022 00:04)  HR: 63 (09 Sep 2022 00:04) (60 - 67)  BP: 130/58 (09 Sep 2022 00:04) (117/55 - 138/60)  RR: 18 (09 Sep 2022 00:04) (13 - 18)  SpO2: 99% (09 Sep 2022 00:04) (99% - 100%)    Parameters below as of 09 Sep 2022 00:04  Patient On (Oxygen Delivery Method): room air    PHYSICAL EXAM:  GENERAL: NAD, well-developed, well-nourished  HEAD:  Atraumatic, Normocephalic  EYES: EOMI, PERRL, conjunctiva and sclera clear  NECK: Supple, No JVD  CHEST/LUNG: Clear to auscultation bilaterally; No wheezes, rales or rhonchi; normal work of breathing, speaking in full sentences  HEART: Regular rate and rhythm; No murmurs, rubs, or gallops, (+)S1, S2  ABDOMEN: Soft, Nontender, Nondistended; Normal Bowel sounds   EXTREMITIES:  2+ Peripheral Pulses, No clubbing, cyanosis, or edema  PSYCH: normal mood and affect, A&Ox3  NEUROLOGY: no focal neuro deficits  SKIN: No rashes or lesions Vital Signs Last 24 Hrs  T(C): 37.3 (09 Sep 2022 00:04), Max: 37.3 (09 Sep 2022 00:04)  T(F): 99.2 (09 Sep 2022 00:04), Max: 99.2 (09 Sep 2022 00:04)  HR: 63 (09 Sep 2022 00:04) (60 - 67)  BP: 130/58 (09 Sep 2022 00:04) (117/55 - 138/60)  RR: 18 (09 Sep 2022 00:04) (13 - 18)  SpO2: 99% (09 Sep 2022 00:04) (99% - 100%)    Parameters below as of 09 Sep 2022 00:04  Patient On (Oxygen Delivery Method): room air    PHYSICAL EXAM:  GENERAL: NAD, well-developed, well-nourished  HEAD:  Atraumatic, Normocephalic  EYES: EOMI, PERRL, conjunctiva and sclera clear  NECK: Supple, No JVD  CHEST/LUNG: Clear to auscultation bilaterally; No wheezes, rales or rhonchi; normal work of breathing, speaking in full sentences; R CW pleural catheter dressing c/d/i  HEART: Regular rate and rhythm; No murmurs, rubs, or gallops, (+)S1, S2  ABDOMEN: Soft, Nontender, Nondistended; Normal Bowel sounds   EXTREMITIES:  2+ Peripheral Pulses, No clubbing, cyanosis, or edema  PSYCH: normal mood and affect, A&Oxperson, place (when given multiple choice), year  NEUROLOGY: no focal neuro deficits, CN II-XII intact, FTN intact b/l, strength 5/5 x4 extremities, sensation grossly intact   SKIN: No rashes or lesions

## 2022-09-09 NOTE — PHYSICAL THERAPY INITIAL EVALUATION ADULT - PERTINENT HX OF CURRENT PROBLEM, REHAB EVAL
81-year-old male with Parkinson's, Afib, bradycardia s/p PPM, CAD s/p stent 8/2021, amyloid, chronic R pleural effusion s/p pleurx, HTN, HLD, BPH, presenting from home with weakness x1 week, found to have hematuria, ALAYNA, and abnormal electrolytes

## 2022-09-09 NOTE — H&P ADULT - HISTORY OF PRESENT ILLNESS
81-year-old male with Parkinson's, Afib on apixaban, bradycardia s/p PPM, CAD s/p stent 8/2021, amyloid, chronic R pleural effusion s/p pleurx, HTN, HLD, BPH, presenting from home with weakness x1 week. Patient went to his PMD who recommended ED eval. Collateral history obtained from son, at bedside; patient has a history of recurrent hospitalizations for weakness related to hypokalemia. Patient is without complaint. Per son, patient has anxiety, medications have been titrated, with recent change from Ramelteon to mirtazepine. Patient denies hematuria, dysuria, has chronic nocturia. Per son, has had a history of hematuria in past.    In the ED VS: 98.6  60-67  117-138/55-64  13-18  100%RA, received 1L NS IVF, carbidopa/levodopa 25/100 PO x1, potassium chloride 40mEq PO x1 and 10mEq IVPB x3

## 2022-09-09 NOTE — PROGRESS NOTE ADULT - PROBLEM SELECTOR PLAN 6
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 Now with weakness, but w/o SOB, peripheral edema, other evidence of HFe.   OP Gen Cards: Dr. Boucher ([facility])  OP Amyloid Specialist: Dr. Randolph ([facility])  - Attempted gen cards involvement, declined at this time  - C/W Bumetanide 1.5mg PO BID (home dose 3mg PO QD)  - C/W Home Tafamidis ("Vyndamax") if family able to bring from home  - C/W Clopidogrel 75mg PO QD    #HTN #HLD  Pt with h/o HTN, HLD. SBP basically at goal for age, hospitalization. Manage as per HFmEF  - C/W Home Atorvastatin 80mg PO QHS

## 2022-09-09 NOTE — PATIENT PROFILE ADULT - FALL HARM RISK - HARM RISK INTERVENTIONS
Assistance with ambulation/Assistance OOB with selected safe patient handling equipment/Communicate Risk of Fall with Harm to all staff/Discuss with provider need for PT consult/Monitor gait and stability/Reinforce activity limits and safety measures with patient and family/Tailored Fall Risk Interventions/Visual Cue: Yellow wristband and red socks/Bed in lowest position, wheels locked, appropriate side rails in place/Call bell, personal items and telephone in reach/Instruct patient to call for assistance before getting out of bed or chair/Non-slip footwear when patient is out of bed/[place] to call system/Physically safe environment - no spills, clutter or unnecessary equipment/Purposeful Proactive Rounding/Room/bathroom lighting operational, light cord in reach

## 2022-09-09 NOTE — CONSULT NOTE ADULT - SUBJECTIVE AND OBJECTIVE BOX
Neurology - Consult Note - 09-09-22  -  Adrienne Azar DO  PGY-2 Neurology  Spectra: [phone] ([facility]), [phone] ([facility])  -  Name: ALBINO RIVAS; 81y (1940)    HPI:81-year-old male with Parkinson's, Afib on apixaban, bradycardia s/p PPM, CAD s/p stent 8/2021, amyloid, chronic R pleural effusion s/p pleurx, HTN, HLD, BPH, presenting from home with weakness x1 week. Neurology consulted for weakness. Patient went to his PMD who recommended ED eval. Per angel review, collateral history obtained from son, patient has a history of anxiety, medications have been titrated, with recent change from Ramelteon to mirtazepine. Patient has history of admissions for weakness related to hypokalemia and/or hematuria. Patient found to have hypokalemia and hematuria on this admission.       Review of Systems: Refer to HPI    Allergies:    PMHx: Hypertension, Endogenous hyperlipemia, Benign prostatic hypertrophy, Depression, Hypertension, Hyperlipidemia, Atrial fibrillation, Bradycardia, Parkinsons disease, CAD , Pleural effusion, not elsewhere classified, COVID-19 vaccine series completed    PFHx: lung cancer (Mother), esophageal cancer (Father)    PSuHx: History of total hip replacement, Status post cataract extraction, Presence of cardiac pacemaker, H/O coronary angioplasty    Medications:  MEDICATIONS  (STANDING):  aMIOdarone    Tablet 400 milliGRAM(s) Oral daily  apixaban 2.5 milliGRAM(s) Oral every 12 hours  atorvastatin 80 milliGRAM(s) Oral at bedtime  buMETAnide 1.5 milliGRAM(s) Oral two times a day  carbidopa/levodopa  25/100 1 Tablet(s) Oral <User Schedule>  clopidogrel Tablet 75 milliGRAM(s) Oral daily  clotrimazole 1% Cream 1 Application(s) Topical every 12 hours  desvenlafaxine ER 50 milliGRAM(s) Oral daily  mirtazapine 7.5 milliGRAM(s) Oral at bedtime  OLANZapine 2.5 milliGRAM(s) Oral at bedtime  potassium chloride    Tablet ER 40 milliEquivalent(s) Oral every 4 hours    Vitals:  T(C): 36.7 (09-09-22 @ 06:40), Max: 37.3 (09-09-22 @ 00:04)  HR: 59 (09-09-22 @ 06:40) (59 - 63)  BP: 117/60 (09-09-22 @ 06:40) (117/60 - 130/58)  RR: 18 (09-09-22 @ 06:40) (18 - 18)  SpO2: 100% (09-09-22 @ 06:40) (99% - 100%)    Neurological Examination:    - Mental Status: Alert, awake, oriented to person, place, and time; speech is fluent with intact naming, repetition, and follows 1-step and 3-step mid-line crossing commands; good overall fund of knowledge (aware of current events, relevant past history, and vocabulary appropriate for level of education); immediate recall is 3/3 words and delayed recall is 3/3 words at 5 minutes; able to spell WORLD backwards and recite the days of the week in reverse; able to read a sentence.    - Cranial Nerves:  II: Visual fields are full to confrontation; pupils are equal, round, and reactive to light   III, IV, VI: Extraocular movements are intact without nystagmus  V: Facial sensation is intact in the V1-V3 distribution bilaterally  VII: Face is symmetric with normal eye closure and smile  VIII: Hearing is intact to finger rub  IX, X: Uvula is midline and soft palate rises symmetrically  XI: Shoulder shrug intact  XII: Tongue protrudes in the midline    - Motor/Strength Testing:                                 Right           Left  Deltoid                     5                 5  Biceps                      5                 5  Triceps                     5                 5  Wrist Ext (radial)       5                 5  Hand                  5                 5    Hip Flex                   5                  5  Knee Ext	      5                  5  Dorsiflex                  5                  5  Plantarflex               5                  5    - There is no pronator drift.   - Normal muscle bulk and tone throughout.    - Reflexes:   Bicep (C5/C6):                  R 2+ --- L 2+   Brachioradialis (C5/C6) :   R 2+ --- L 2+   Patella (L3/L4) :                 R 2+ --- L 2+   Ankle (S1) :                       R 2+ --- L 2+     - Plant responses down bilaterally.    - Sensory: Intact throughout to light touch.   - Coordination: Finger-nose-finger intact without ataxia or dysmetria.    - Gait: Normal steps, base, arm swing, and turning.     Labs:                        10.4   7.94  )-----------( 283      ( 09 Sep 2022 06:25 )             36.3     09-09    142  |  99  |  23  ----------------------------<  83  2.8<LL>   |  27  |  2.10<H>    Ca    8.9      09 Sep 2022 06:25  Phos  2.1     09-09  Mg     2.60     09-09    TPro  6.8  /  Alb  3.8  /  TBili  0.5  /  DBili  x   /  AST  22  /  ALT  6   /  AlkPhos  149<H>  09-08    CAPILLARY BLOOD GLUCOSE        LIVER FUNCTIONS - ( 08 Sep 2022 15:00 )  Alb: 3.8 g/dL / Pro: 6.8 g/dL / ALK PHOS: 149 U/L / ALT: 6 U/L / AST: 22 U/L / GGT: x             PT/INR - ( 09 Sep 2022 08:40 )   PT: 19.2 sec;   INR: 1.65 ratio         PTT - ( 09 Sep 2022 08:40 )  PTT:37.1 sec    Radiology:  7/17/22    CT BRAIN: No acute intracranial hemorrhage or calvarial fracture. Forehead soft tissue swelling.    CT CERVICAL SPINE: No acute cervical spine fracture or evidence of traumatic malalignment. Cervical spondylosis contributing to varying degrees of spinal canal stenosis. Partially imaged right pleural effusion.    CT MAXILLOFACIAL BONES: Comminuted fracture of the cartilaginous nasal septum, which demonstrates marked thickening, likely representing underlying septal hematoma. Associated obstruction of the bilateral nasal valves.     Neurology - Consult Note - 09-09-22  -  Adrienne Azar DO  PGY-2 Neurology  Spectra: [id number] ([facility]), [phone] ([facility])  -  Name: ALBINO RIVAS; 81y (1940)    HPI:81-year-old male with Parkinson's, Afib on apixaban, bradycardia s/p PPM, CAD s/p stent 8/2021, amyloid, chronic R pleural effusion s/p pleurx, HTN, HLD, BPH, presenting from home with weakness x1 week. Neurology consulted for weakness. Patient went to his PMD who recommended ED eval. Per angel review, collateral history obtained from son, patient has a history of anxiety, medications have been titrated, with recent change from Ramelteon to mirtazepine. Patient has history of admissions for weakness related to hypokalemia and/or hematuria. Patient found to have hypokalemia and hematuria on this admission. Patient admits to generalized weakness. Son at bedside, called the wife who reports occasional leaning to the right. Son says this weakness is similar in level of severity as similar admissions. He usually can walk with walker but lately needed more assistance. Denies weakness greater to one side than other. Denies localized weakness, numbness, tingling, vision changes.     Review of Systems: Refer to HPI    Allergies:    PMHx: Hypertension, Endogenous hyperlipemia, Benign prostatic hypertrophy, Depression, Hypertension, Hyperlipidemia, Atrial fibrillation, Bradycardia, Parkinsons disease, CAD , Pleural effusion, not elsewhere classified, COVID-19 vaccine series completed    PFHx: lung cancer (Mother), esophageal cancer (Father)    PSuHx: History of total hip replacement, Status post cataract extraction, Presence of cardiac pacemaker, H/O coronary angioplasty    Medications:  MEDICATIONS  (STANDING):  aMIOdarone    Tablet 400 milliGRAM(s) Oral daily  apixaban 2.5 milliGRAM(s) Oral every 12 hours  atorvastatin 80 milliGRAM(s) Oral at bedtime  buMETAnide 1.5 milliGRAM(s) Oral two times a day  carbidopa/levodopa  25/100 1 Tablet(s) Oral <User Schedule>  clopidogrel Tablet 75 milliGRAM(s) Oral daily  clotrimazole 1% Cream 1 Application(s) Topical every 12 hours  desvenlafaxine ER 50 milliGRAM(s) Oral daily  mirtazapine 7.5 milliGRAM(s) Oral at bedtime  OLANZapine 2.5 milliGRAM(s) Oral at bedtime  potassium chloride    Tablet ER 40 milliEquivalent(s) Oral every 4 hours    Vitals:  T(C): 36.7 (09-09-22 @ 06:40), Max: 37.3 (09-09-22 @ 00:04)  HR: 59 (09-09-22 @ 06:40) (59 - 63)  BP: 117/60 (09-09-22 @ 06:40) (117/60 - 130/58)  RR: 18 (09-09-22 @ 06:40) (18 - 18)  SpO2: 100% (09-09-22 @ 06:40) (99% - 100%)    Neurological Examination:    - Mental Status: Alert, awake, oriented to person, place, and year. Said the month was august. Could not spell WORLD backwards  speech is fluent, intact naming, has comprehension    - Cranial Nerves:  II: Visual fields are full to confrontation; pupils are equal, round, and reactive to light   III, IV, VI: Extraocular movements are intact without nystagmus  V: Facial sensation is intact in the V1-V3 distribution bilaterally  VII: Face is symmetric with normal eye closure and smile  VIII: Hearing is intact to finger rub  IX, X: Uvula is midline and soft palate rises symmetrically  XI: Shoulder shrug intact  XII: Tongue protrudes in the midline    - Motor/Strength Testing:                                 Right           Left  Deltoid                     5                 5  Biceps                      5                 5  Triceps                     5                 5  Wrist Ext (radial)       5                 5  Hand                  5                 5  Knee Ext	      5                  5  Dorsiflex                  5                  5  Plantarflex               5                  5  - slight pronator drift of right arm   - increased tone throughout, worse in left upper extremity  - resting tremor b/l worse, worse in left hand  - Reflexes:   Bicep (C5/C6):                  R 2+ --- L 2+   Brachioradialis (C5/C6) :   R 2+ --- L 2+   Patella (L3/L4) :                 R 2+ --- L 2+   Ankle (S1) :                       R 2+ --- L 2+   - Plant responses down bilaterally.  - clonus noted in left foot  - Sensory: Intact throughout to light touch.   - Coordination: Finger-nose-finger intact without ataxia or dysmetria.  unable to preform heel up and down shin      Labs:                        10.4   7.94  )-----------( 283      ( 09 Sep 2022 06:25 )             36.3     09-09    142  |  99  |  23  ----------------------------<  83  2.8<LL>   |  27  |  2.10<H>    Ca    8.9      09 Sep 2022 06:25  Phos  2.1     09-09  Mg     2.60     09-09    TPro  6.8  /  Alb  3.8  /  TBili  0.5  /  DBili  x   /  AST  22  /  ALT  6   /  AlkPhos  149<H>  09-08    CAPILLARY BLOOD GLUCOSE        LIVER FUNCTIONS - ( 08 Sep 2022 15:00 )  Alb: 3.8 g/dL / Pro: 6.8 g/dL / ALK PHOS: 149 U/L / ALT: 6 U/L / AST: 22 U/L / GGT: x             PT/INR - ( 09 Sep 2022 08:40 )   PT: 19.2 sec;   INR: 1.65 ratio         PTT - ( 09 Sep 2022 08:40 )  PTT:37.1 sec    Radiology:  7/17/22    CT BRAIN: No acute intracranial hemorrhage or calvarial fracture. Forehead soft tissue swelling.    CT CERVICAL SPINE: No acute cervical spine fracture or evidence of traumatic malalignment. Cervical spondylosis contributing to varying degrees of spinal canal stenosis. Partially imaged right pleural effusion.    CT MAXILLOFACIAL BONES: Comminuted fracture of the cartilaginous nasal septum, which demonstrates marked thickening, likely representing underlying septal hematoma. Associated obstruction of the bilateral nasal valves.     Neurology - Consult Note - 09-09-22  -  Adrienne Azar DO  PGY-2 Neurology  Spectra: [phone] ([facility]), [phone] ([facility])  -  Name: ALBINO RIVAS; 81y (1940)    HPI:81-year-old male with Parkinson's, Afib on apixaban, bradycardia s/p PPM, CAD s/p stent 8/2021, amyloid, chronic R pleural effusion s/p pleurx, HTN, HLD, BPH, presenting from home with weakness x1 week. Neurology consulted for weakness. Patient went to his PMD who recommended ED eval. Per angel review, collateral history obtained from son, patient has a history of anxiety, medications have been titrated, with recent change from Ramelteon to mirtazepine. Patient has history of admissions for weakness related to hypokalemia and/or hematuria. Patient found to have hypokalemia and hematuria on this admission. Patient admits to generalized weakness. Son at bedside, called the wife who reports occasional leaning to the right. Son says this weakness is similar in level of severity as similar admissions. He usually can walk with walker but lately needed more assistance. Denies weakness greater to one side than other. Denies localized weakness, numbness, tingling, vision changes.     Review of Systems: Refer to HPI    Allergies:    PMHx: Hypertension, Endogenous hyperlipemia, Benign prostatic hypertrophy, Depression, Hypertension, Hyperlipidemia, Atrial fibrillation, Bradycardia, Parkinsons disease, CAD , Pleural effusion, not elsewhere classified, COVID-19 vaccine series completed    PFHx: lung cancer (Mother), esophageal cancer (Father)    PSuHx: History of total hip replacement, Status post cataract extraction, Presence of cardiac pacemaker, H/O coronary angioplasty    Medications:  MEDICATIONS  (STANDING):  aMIOdarone    Tablet 400 milliGRAM(s) Oral daily  apixaban 2.5 milliGRAM(s) Oral every 12 hours  atorvastatin 80 milliGRAM(s) Oral at bedtime  buMETAnide 1.5 milliGRAM(s) Oral two times a day  carbidopa/levodopa  25/100 1 Tablet(s) Oral <User Schedule>  clopidogrel Tablet 75 milliGRAM(s) Oral daily  clotrimazole 1% Cream 1 Application(s) Topical every 12 hours  desvenlafaxine ER 50 milliGRAM(s) Oral daily  mirtazapine 7.5 milliGRAM(s) Oral at bedtime  OLANZapine 2.5 milliGRAM(s) Oral at bedtime  potassium chloride    Tablet ER 40 milliEquivalent(s) Oral every 4 hours    Vitals:  T(C): 36.7 (09-09-22 @ 06:40), Max: 37.3 (09-09-22 @ 00:04)  HR: 59 (09-09-22 @ 06:40) (59 - 63)  BP: 117/60 (09-09-22 @ 06:40) (117/60 - 130/58)  RR: 18 (09-09-22 @ 06:40) (18 - 18)  SpO2: 100% (09-09-22 @ 06:40) (99% - 100%)    Neurological Examination:  general: masked faces  - Mental Status: Alert, awake, oriented to person, place, and year. Said the month was august. Could not spell WORLD backwards  speech is slow but fluent, intact naming, has comprehension    - Cranial Nerves:  II: Visual fields are full to confrontation; pupils are equal, round, and reactive to light   III, IV, VI: Extraocular movements are intact without nystagmus  V: Facial sensation is intact in the V1-V3 distribution bilaterally  VII: Face is symmetric with normal eye closure and smile  VIII: Hearing is intact to finger rub  IX, X: Uvula is midline and soft palate rises symmetrically  XI: Shoulder shrug intact  XII: Tongue protrudes in the midline    - Motor/Strength Testing:                                 Right           Left  Deltoid                     5                 5  Biceps                      5                 5  Triceps                     5                 5  Wrist Ext (radial)       5                 5  Hand                  5                 5  Knee Ext	      5                  5  Dorsiflex                  5                  5  Plantarflex               5                  5  - slight pronator drift of right arm   - increased tone throughout  - resting tremor b/l worse, worse in left hand  - Reflexes:   Bicep (C5/C6):                  R 2+ --- L 2+   Brachioradialis (C5/C6) :   R 2+ --- L 2+   Patella (L3/L4) :                 R 2+ --- L 2+   Ankle (S1) :                       R 2+ --- L 2+   - Plant responses down bilaterally.  - clonus noted in left foot  - Sensory: Intact throughout to light touch.   - Coordination: Finger-nose-finger intact without ataxia or dysmetria.  unable to preform heel up and down shin  - Gait deferred due to patient not feeling strong enough and PT could only walk him 1 step today    Labs:                        10.4   7.94  )-----------( 283      ( 09 Sep 2022 06:25 )             36.3     09-09    142  |  99  |  23  ----------------------------<  83  2.8<LL>   |  27  |  2.10<H>    Ca    8.9      09 Sep 2022 06:25  Phos  2.1     09-09  Mg     2.60     09-09    TPro  6.8  /  Alb  3.8  /  TBili  0.5  /  DBili  x   /  AST  22  /  ALT  6   /  AlkPhos  149<H>  09-08    CAPILLARY BLOOD GLUCOSE        LIVER FUNCTIONS - ( 08 Sep 2022 15:00 )  Alb: 3.8 g/dL / Pro: 6.8 g/dL / ALK PHOS: 149 U/L / ALT: 6 U/L / AST: 22 U/L / GGT: x             PT/INR - ( 09 Sep 2022 08:40 )   PT: 19.2 sec;   INR: 1.65 ratio         PTT - ( 09 Sep 2022 08:40 )  PTT:37.1 sec    Radiology:  7/17/22    CT BRAIN: No acute intracranial hemorrhage or calvarial fracture. Forehead soft tissue swelling.    CT CERVICAL SPINE: No acute cervical spine fracture or evidence of traumatic malalignment. Cervical spondylosis contributing to varying degrees of spinal canal stenosis. Partially imaged right pleural effusion.    CT MAXILLOFACIAL BONES: Comminuted fracture of the cartilaginous nasal septum, which demonstrates marked thickening, likely representing underlying septal hematoma. Associated obstruction of the bilateral nasal valves.

## 2022-09-09 NOTE — PROGRESS NOTE ADULT - PROBLEM SELECTOR PLAN 2
underdistended bladder on CT, however given history of BPH would check post void bladder scan to eval for residual (lower concern for retention)  check urine UN, Cr  monitor Cr  renally dose meds  avoid nephrotoxins #Hypokalemia  Pt with K 2.8. may be 2/2 wasting ico Bumetanide use. This will be the 3rd total admission in ~1 year. Notably, Amio started only recently, so not likely related to any amio-induced inflammatory responses. Hasn't had endocrine workup for hyperaldosteronism historically on review. DDx: K shifts (e.g. acid/base disturbances, medication-induced), increased utilization (RBC production ico bleeding, thyrotoxicosis ico amio - less likely ico normal TSH). Also considering fluid losses ico pleurex catheter.   Diagnostics  - TSH WNL   - RBCs low, relatively decreased from baseline normal in May, may have production = increased use of potassium  - If persistently low despite repletion (i.e. paradoxical hypokalemia), will have to check T3/T4 r/o occult thyrotoxicosis with normal TSH    Therapeutics  - Replete K > 4

## 2022-09-09 NOTE — PROGRESS NOTE ADULT - PROBLEM SELECTOR PLAN 7
persistent afib per outpt cards review   apixaban on hold in setting of hematuria, monitor H/H, would restart in AM if H/H stable as is currently undergoing amiodarone load  c/w amiodarone, on 400mg QD for another 2 weeks Electrophysiology  #pAF #CHB s/p PPM ([employer]) #Prolonged QTc  Pt started on Amiodarone for persistent AF underlying rhythm c/b significant RV pacing c/b worsening CM ico cardiac amyloidosis. Prolonged QT likely artefactual ico pacing, but will confirm with cardiology/EP.   OP EP: Dr. Powell  - EP C/S, Appreciate Recs  - reSTART Home Apixaban 2.5mg PO BID  - C/W Amiodarone 400mg PO QD (through sept), then 200mg PO QD

## 2022-09-09 NOTE — CONSULT NOTE ADULT - ASSESSMENT
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  Found to have hypokalemia of 2.8, anemia with Hgb of 10.4 and hematuria. Collateral history obtained from his son:  patient has a history of recurrent hospitalizations for weakness related to hypokalemia.   ECHO 10/6/21: demonstrated normal systolic function w/LVEF 60-65%, grade II diastolic dysfunction, left atrium 4.3cm and mild mitral regurgitation.  ECHO 3/8/22: showed moderate segmental left ventricular systolic dysfunction with akinetic inferolateral and mid-basal anterolateral walls with a LVEF of 45% and mild -moderated mitral regurgitation. His EKG today reveals sinus rhythm with 1st degree AVB with ventricular pacing 65 bpm.   ECHO 5/22/22: showed LVEF 53%  Device interrogation today showed A pacing and V pacing at 60 bpm.  Underlying rhythm : sinus bradycardia at 56 bpm with CHB w an escape rhythm. A pacing < 1% and V pacing 95%.   Last interrogation was 8/22/22. At that time he was found to be in atrial fibrillation with intermittent RVR (170's). His AFib burden was 100% and RV pacing > 95%. Given his cardiac amyloid and reduced LV function, there was concern for worsening cardiomyopathy with chronic RV pacing. He was started on Amiodarone 8/22/22 for rhythm control with the thought that restoration of sinus rhythm would improve his symptomatology.  Today's interrogation showed conversion to sinus rhythm early September.  Eliquis now on hold due to hematuria.   Plan: Would continue Amiodarone load, No significant decrease in sinus rate or prolonged QT.  Current dosage to be 400mg daily for the month of September, then 200mg daily.   Would resume Eliquis for prevention of thromboembolic event if CBC remains stable and hematuria resolves.

## 2022-09-09 NOTE — CONSULT NOTE ADULT - ASSESSMENT
Assessment: 81-year-old male with Parkinson's, Afib on apixaban, bradycardia s/p PPM, CAD s/p stent 8/2021, amyloid, chronic R pleural effusion s/p pleurx, HTN, HLD, BPH, presenting from home with weakness x1 week. Neurology consulted for weakness. Per chart review, collateral history obtained from son patient has a history anxiety, medications have been titrated, with recent change from Ramelteon to mirtazepine. Patient has history of admissions for weakness related to hypokalemia and/or hematuria. Patient found to have hypokalemia of 2.8, anemia with hgb of 10.4, and hematuria on this admission.       Impression: weakness 2/2 to unknown etiology. Could be related to anemia and electrolyte abnormalities       Plan  daily CMP, mg, phos, replete electrolytes as need  daily CBC to monitor anemia   urology consult for persistent hematuria  Assessment: 81-year-old male with Parkinson's, Afib on apixaban, bradycardia s/p PPM, CAD s/p stent 8/2021, amyloid, chronic R pleural effusion s/p pleurx, HTN, HLD, BPH, presenting from home with weakness x1 week. Neurology consulted for weakness. Per chart review, collateral history obtained from son patient has a history anxiety, medications have been titrated, with recent change from Ramelteon to mirtazepine. Patient has history of admissions for weakness related to hypokalemia and/or hematuria. Patient found to have hypokalemia of 2.8, anemia with hgb of 10.4, and hematuria on this admission.       Impression: weakness 2/2 to unknown etiology. Could be related to anemia and electrolyte abnormalities       Plan  daily CMP, mg, phos, replete electrolytes as need  daily CBC to monitor anemia   urology consult for persistent hematuria   continue to see outpatient neurologist for Parkinson's disease care Assessment: 81-year-old male with Parkinson's, Afib on apixaban, bradycardia s/p PPM, CAD s/p stent 8/2021, amyloid, chronic R pleural effusion s/p pleurx, HTN, HLD, BPH, presenting from home with weakness x1 week. Neurology consulted for weakness. Per chart review, collateral history obtained from son patient has a history anxiety, medications have been titrated, with recent change from Ramelteon to mirtazepine. Patient has history of admissions for weakness related to hypokalemia and/or hematuria. Patient found to have hypokalemia of 2.8, anemia with hgb of 10.4, and hematuria on this admission. No localized weakness on exam.      Impression: weakness 2/2 to unknown etiology. Likely related to anemia and electrolyte abnormalities.       Plan  recommend bmp tonight to ensure rise in potassium  daily CMP, mg, phos, replete electrolytes as need  daily CBC to monitor anemia   urology consult for persistent hematuria   continue to see outpatient neurologist for Parkinson's disease care, please continue his home medications exactly as prescribed (awaiting son to retrieve medication list, plz follow up).

## 2022-09-09 NOTE — CONSULT NOTE ADULT - SUBJECTIVE AND OBJECTIVE BOX
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 Patient went to his PMD who recommended ED eval. Collateral history obtained from son, at bedside; patient has a history of recurrent hospitalizations for weakness related to hypokalemia. Patient is without complaint. Per son, patient has anxiety, medications have been titrated, with recent change from Ramelteon to mirtazepine. Patient denies hematuria, dysuria, has chronic nocturia. Per son, has had a history of hematuria in past.    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  Parkinsons disease  CAD (coronary artery disease) s/p PCI   Pleural effusion, not elsewhere classified  COVID-19 vaccine series completed w/booster  History of hip replacement, total R hip  &amp; L hip  Status post cataract extraction right eye cataract extraction with IOL 2015  Presence of cardiac pacemaker ([employer] dual chamber) 10/2021        No Known Drug Allergies    MEDICATIONS  (STANDING):  aMIOdarone    Tablet 400 milliGRAM(s) Oral daily  apixaban 2.5 milliGRAM(s) Oral every 12 hours  atorvastatin 80 milliGRAM(s) Oral at bedtime  buMETAnide 1.5 milliGRAM(s) Oral two times a day  carbidopa/levodopa  25/100 1 Tablet(s) Oral <User Schedule>  clopidogrel Tablet 75 milliGRAM(s) Oral daily  clotrimazole 1% Cream 1 Application(s) Topical every 12 hours  desvenlafaxine ER 50 milliGRAM(s) Oral daily  mirtazapine 7.5 milliGRAM(s) Oral at bedtime  OLANZapine 2.5 milliGRAM(s) Oral at bedtime  potassium chloride    Tablet ER 40 milliEquivalent(s) Oral every 4 hours    MEDICATIONS  (PRN):      FAMILY HISTORY:  Family history of lung cancer (Mother)  Family history of diabetes (sister)  Family history of esophageal cancer (Father)  Family history of myocardial infarction (Grandparent)        SOCIAL HISTORY:   with 3 children. Lives with his wife.    CIGARETTES: No  DRUGS: No  ALCOHOL: no    REVIEW OF SYSTEMS:    CONSTITUTIONAL: No fever, weight loss, chills, shakes, or fatigue  EYES: No eye pain, visual disturbances, or discharge  ENMT:  No difficulty hearing, tinnitus, vertigo; No sinus or throat pain  NECK: No pain or stiffness  BREASTS: No pain, masses, or nipple discharge  RESPIRATORY: No cough, wheezing, hemoptysis, or shortness of breath  CARDIOVASCULAR: No chest pain, dyspnea, palpitations, dizziness, syncope, paroxysmal nocturnal dyspnea, orthopnea, or arm or leg swelling  GASTROINTESTINAL: No abdominal  or epigastric pain, nausea, vomiting, hematemesis, diarrhea, constipation, melena or bright red blood.  GENITOURINARY: No dysuria, nocturia, hematuria, or urinary incontinence  NEUROLOGICAL: No headaches, memory loss, slurred speech, limb weakness, loss of strength, numbness, or tremors  SKIN: No itching, burning, rashes, or lesions   LYMPH NODES: No enlarged glands  ENDOCRINE: No heat or cold intolerance, or hair loss  MUSCULOSKELETAL: No joint pain or swelling, muscle, back, or extremity pain  PSYCHIATRIC: No depression, anxiety, or difficulty sleeping  HEME/LYMPH: No easy bruising or bleeding gums  ALLERY AND IMMUNOLOGIC: No hives or rash.      Vital Signs Last 24 Hrs  T(C): 36.7 (09 Sep 2022 06:40), Max: 37.3 (09 Sep 2022 00:04)  T(F): 98.1 (09 Sep 2022 06:40), Max: 99.2 (09 Sep 2022 00:04)  HR: 59 (09 Sep 2022 06:40) (59 - 63)  BP: 117/60 (09 Sep 2022 06:40) (117/60 - 130/58)  BP(mean): --  RR: 18 (09 Sep 2022 06:40) (18 - 18)  SpO2: 100% (09 Sep 2022 06:40) (99% - 100%)    Parameters below as of 09 Sep 2022 06:40  Patient On (Oxygen Delivery Method): room air        PHYSICAL EXAM:    GENERAL: Awake, alert In no apparent distress, well nourished, and hydrated.  HEAD:  Atraumatic, Normocephalic  EYES: EOMI, PERRLA, conjunctiva and sclera clear  ENMT: No tonsillar erythema, exudates, or enlargement; Moist mucous membranes, Good dentition, No lesions  NECK: Supple and normal thyroid.  No JVD or carotid bruit.  Carotid pulse is 2+ bilaterally.  HEART: Regular rate and rhythm; No murmurs, rubs, or gallops. Right   PULMONARY: Clear to auscultation and perfusion.  No rales, wheezing, or rhonchi bilaterally.   ABDOMEN: Soft, Nontender, Nondistended; Bowel sounds present No pain on palpation  EXTREMITIES:  2+ Peripheral Pulses, No clubbing or cyanosis, Trace pedal edema. Lower legs w/mild erythema  LYMPH: No lymphadenopathy noted  NEUROLOGICAL: Grossly nonfocal          INTERPRETATION OF TELEMETRY: not on telemetry    ECG:          LABS:                        10.4   7.94  )-----------( 283      ( 09 Sep 2022 06:25 )             36.3         142  |  99  |  23  ----------------------------<  83  2.8<LL>   |  27  |  2.10<H>    Ca    8.9      09 Sep 2022 06:25  Phos  2.1       Mg     2.60         TPro  6.8  /  Alb  3.8  /  TBili  0.5  /  DBili  x   /  AST  22  /  ALT  6   /  AlkPhos  149<H>          PT/INR - ( 09 Sep 2022 08:40 )   PT: 19.2 sec;   INR: 1.65 ratio         PTT - ( 09 Sep 2022 08:40 )  PTT:37.1 sec  Urinalysis Basic - ( 08 Sep 2022 15:20 )    Color: Light Yellow / Appearance: Slightly Turbid / S.010 / pH: x  Gluc: x / Ketone: Negative  / Bili: Negative / Urobili: <2 mg/dL   Blood: x / Protein: 30 mg/dL / Nitrite: Negative   Leuk Esterase: Negative / RBC: >720 /HPF / WBC 4 /HPF   Sq Epi: x / Non Sq Epi: 2 /HPF / Bacteria: Negative      BNPSerum Pro-Brain Natriuretic Peptide: 2196 pg/mL ( @ 06:25)    I&O's Detail    Daily Height in cm: 172.72 (09 Sep 2022 02:05)    Daily     RADIOLOGY & ADDITIONAL STUDIES:  PREVIOUS DIAGNOSTIC TESTING:      ECHO  FINDINGS:  < from: Transthoracic Echocardiogram (22 @ 14:33) >  Patient name: ALBINO RIVAS  YOB: 1940   Age: 81 (M)   MR#: [id number]  Study Date: 2022  Location: [id number]: Lauren Finkelstein, [facility]  Study quality: Technically Fair  Referring Physician: Marlen Zuñiga MD  Blood Pressure: 114/64 mmHg  Height: 172 cm  Weight: 75 kg  BSA: 1.9 m2  ------------------------------------------------------------------------  PROCEDURE: Transthoracic echocardiogram with 2-D, M-Mode  and complete spectral and color flow Doppler.  INDICATION: Chest pain, unspecified (R07.9)  ------------------------------------------------------------------------  DIMENSIONS:  Dimensions:     Normal Values:  LA:     3.6 cm    2.0 - 4.0 cm  Ao:     2.8 cm    2.0 - 3.8 cm  SEPTUM: 0.9 cm    0.6 - 1.2 cm  PWT:    0.9 cm    0.6 - 1.1 cm  LVIDd:  5.0 cm    3.0 - 5.6 cm  LVIDs:  3.8 cm    1.8 - 4.0 cm  Derived Variables:  LVMI: 84 g/m2  RWT: 0.36  Fractional short: 24 %  Ejection Fraction (Cote Rule): 53 %  ------------------------------------------------------------------------  OBSERVATIONS:  Mitral Valve: Mitral annular calcification, otherwise  normal mitral valve. Mild mitral regurgitation.  Aortic Root: Normal aortic root.  Aortic Valve: Aortic valve leaflet morphology not well  visualized. Mild-moderate aortic regurgitation.  Left Atrium: Normal left atrium.  Left Ventricle: Low normal left ventricular systolic  function. No segmental wall motion abnormalities.  Paradoxical septal wall motion, consistent with ventricular  pacing. Normal left ventricular internal dimensions and  wall thicknesses.  Right Heart: Normal right atrium. Normal right ventricular  size and function.  A device wire is noted in the right  heart. Normal tricuspid valve.  Mild tricuspid  regurgitation. Normal pulmonic valve. Minimal pulmonic  regurgitation.  Pericardium/PleuraNormal pericardium with no pericardial  effusion.  Hemodynamic: Estimated right ventricular systolic pressure  equals 45 mm Hg, assuming right atrial pressure equals 10  mm Hg, consistent with mild pulmonary hypertension.  ------------------------------------------------------------------------  CONCLUSIONS:  1. Mitral annular calcification, otherwise normal mitral  valve. Mild mitral regurgitation.  2. Aortic valve leaflet morphology not well visualized.  Mild-moderate aortic regurgitation.  3. Normal left ventricular internal dimensions and wall  thicknesses.  4. Low normal left ventricular systolic function. No  segmental wall motion abnormalities.  Paradoxical septal  wall motion, consistent with ventricular pacing.  5. Normal right ventricular size and function.  A device  wire is noted in the right heart.  6. Estimated right ventricular systolic pressure equals 45  mm Hg, assuming right atrial pressure equals 10 mm Hg,  consistent with mild pulmonary hypertension.  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 Patient went to his PMD who recommended ED evaluation.  Found to be hypokalemic with K+ 2.8.  Collateral history obtained from his son:  patient has a history of recurrent hospitalizations for weakness related to hypokalemia.   ECHO 10/6/21 demonstrated normal systolic function w/LVEF 60-65%, grade II diastolic dysfunction, left atrium 4.3cm and mild mitral regurgitation. Repeat echo 3/8/22 showed moderate segmental left ventricular systolic dysfunction with akinetic inferolateral and mid-basal anterolateral walls with a LVEF of 45% and mild -moderated mitral regurgitation. His EKG today reveals sinus rhythm with 1st degree AVB with ventricular pacing 65 bpm.   Device interrogation showed A pacing and V pacing at 60 bpm. Underlying rhythm : sinus bradycardia at 56 bpm with CHB w an escape.   His last interrogation was 22. At that time he was found to be in atrial fibrillation with intermittent RVR (170's). His AFib burden was 100% and RV pacing > 95%. Given his cardiac amyloid and reduced LV function, there was concern for worsening cardiomyopathy with chronic RV pacing. He was started on Amiodarone 22 for rhythm control with the thought that restoration of sinus rhythm would improve his symptomatology. After completing  the Amiodarone load he would undergo upgrade to CRT-P. Today's interrogation showed he converted to sinus rhythm early September.   He denies chest pain, shortness of breath, palpitations, lightheadedness or dizziness. His only complaint is that his legs feel heavy and weak.   EP called for clarification of Amiodarone dosing and prolonged QT on EKG.     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  Parkinsons disease  CAD (coronary artery disease) s/p PCI   Pleural effusion, not elsewhere classified  COVID-19 vaccine series completed w/booster  History of hip replacement, total R hip 2008 &amp; L hip  Status post cataract extraction right eye cataract extraction with IOL 2015  Presence of cardiac pacemaker ([employer] dual chamber) 10/2021        No Known Drug Allergies    MEDICATIONS  (STANDING):  aMIOdarone    Tablet 400 milliGRAM(s) Oral daily  apixaban 2.5 milliGRAM(s) Oral every 12 hours  atorvastatin 80 milliGRAM(s) Oral at bedtime  buMETAnide 1.5 milliGRAM(s) Oral two times a day  carbidopa/levodopa  25/100 1 Tablet(s) Oral <User Schedule>  clopidogrel Tablet 75 milliGRAM(s) Oral daily  clotrimazole 1% Cream 1 Application(s) Topical every 12 hours  desvenlafaxine ER 50 milliGRAM(s) Oral daily  mirtazapine 7.5 milliGRAM(s) Oral at bedtime  OLANZapine 2.5 milliGRAM(s) Oral at bedtime  potassium chloride    Tablet ER 40 milliEquivalent(s) Oral every 4 hours    MEDICATIONS  (PRN):      FAMILY HISTORY:  Family history of lung cancer (Mother)  Family history of diabetes (sister)  Family history of esophageal cancer (Father)  Family history of myocardial infarction (Grandparent)        SOCIAL HISTORY:   with 3 children. Lives with his wife.    CIGARETTES: No  DRUGS: No  ALCOHOL: no    REVIEW OF SYSTEMS:    CONSTITUTIONAL: No fever, weight loss, chills, shakes, or fatigue  EYES: No eye pain, visual disturbances, or discharge  ENMT:  No difficulty hearing, tinnitus, vertigo; No sinus or throat pain  NECK: No pain or stiffness  BREASTS: No pain, masses, or nipple discharge  RESPIRATORY: No cough, wheezing, hemoptysis, or shortness of breath  CARDIOVASCULAR: No chest pain, dyspnea, palpitations, dizziness, syncope, paroxysmal nocturnal dyspnea, orthopnea, or arm or leg swelling  GASTROINTESTINAL: No abdominal  or epigastric pain, nausea, vomiting, hematemesis, diarrhea, constipation, melena or bright red blood.  GENITOURINARY: No dysuria, nocturia, hematuria, or urinary incontinence  NEUROLOGICAL: No headaches, memory loss, slurred speech, limb weakness, loss of strength, numbness, or tremors  SKIN: No itching, burning, rashes, or lesions   LYMPH NODES: No enlarged glands  ENDOCRINE: No heat or cold intolerance, or hair loss  MUSCULOSKELETAL: No joint pain or swelling, muscle, back, or extremity pain  PSYCHIATRIC: No depression, anxiety, or difficulty sleeping  HEME/LYMPH: No easy bruising or bleeding gums  ALLERY AND IMMUNOLOGIC: No hives or rash.      Vital Signs Last 24 Hrs  T(C): 36.7 (09 Sep 2022 06:40), Max: 37.3 (09 Sep 2022 00:04)  T(F): 98.1 (09 Sep 2022 06:40), Max: 99.2 (09 Sep 2022 00:04)  HR: 59 (09 Sep 2022 06:40) (59 - 63)  BP: 117/60 (09 Sep 2022 06:40) (117/60 - 130/58)  BP(mean): --  RR: 18 (09 Sep 2022 06:40) (18 - 18)  SpO2: 100% (09 Sep 2022 06:40) (99% - 100%)    Parameters below as of 09 Sep 2022 06:40  Patient On (Oxygen Delivery Method): room air        PHYSICAL EXAM:    GENERAL: Awake, alert In no apparent distress, well nourished, and hydrated.  HEAD:  Atraumatic, Normocephalic  EYES: EOMI, PERRLA, conjunctiva and sclera clear  ENMT: No tonsillar erythema, exudates, or enlargement; Moist mucous membranes, Good dentition, No lesions  NECK: Supple and normal thyroid.  No JVD or carotid bruit.  Carotid pulse is 2+ bilaterally.  HEART: Regular rate and rhythm; No murmurs, rubs, or gallops. Right   PULMONARY: Clear to auscultation and perfusion.  No rales, wheezing, or rhonchi bilaterally.   ABDOMEN: Soft, Nontender, Nondistended; Bowel sounds present No pain on palpation  EXTREMITIES:  2+ Peripheral Pulses, No clubbing or cyanosis, Trace pedal edema. Lower legs w/mild erythema  LYMPH: No lymphadenopathy noted  NEUROLOGICAL: Grossly nonfocal          INTERPRETATION OF TELEMETRY: not on telemetry    ECG:          LABS:                        10.4   7.94  )-----------( 283      ( 09 Sep 2022 06:25 )             36.3     -    142  |  99  |  23  ----------------------------<  83  2.8<LL>   |  27  |  2.10<H>    Ca    8.9      09 Sep 2022 06:25  Phos  2.1       Mg     2.60         TPro  6.8  /  Alb  3.8  /  TBili  0.5  /  DBili  x   /  AST  22  /  ALT  6   /  AlkPhos  149<H>          PT/INR - ( 09 Sep 2022 08:40 )   PT: 19.2 sec;   INR: 1.65 ratio         PTT - ( 09 Sep 2022 08:40 )  PTT:37.1 sec  Urinalysis Basic - ( 08 Sep 2022 15:20 )    Color: Light Yellow / Appearance: Slightly Turbid / S.010 / pH: x  Gluc: x / Ketone: Negative  / Bili: Negative / Urobili: <2 mg/dL   Blood: x / Protein: 30 mg/dL / Nitrite: Negative   Leuk Esterase: Negative / RBC: >720 /HPF / WBC 4 /HPF   Sq Epi: x / Non Sq Epi: 2 /HPF / Bacteria: Negative      BNPSerum Pro-Brain Natriuretic Peptide: 2196 pg/mL ( @ 06:25)    I&O's Detail    Daily Height in cm: 172.72 (09 Sep 2022 02:05)    Daily     RADIOLOGY & ADDITIONAL STUDIES:  PREVIOUS DIAGNOSTIC TESTING:      ECHO  FINDINGS:  < from: Transthoracic Echocardiogram (22 @ 14:33) >  Patient name: ALBINO RIVAS  YOB: 1940   Age: 81 (M)   MR#: [id number]  Study Date: 2022  Location: [id number]: Lauren Finkelstein, [facility]  Study quality: Technically Fair  Referring Physician: Marlen Zuñiga MD  Blood Pressure: 114/64 mmHg  Height: 172 cm  Weight: 75 kg  BSA: 1.9 m2  ------------------------------------------------------------------------  PROCEDURE: Transthoracic echocardiogram with 2-D, M-Mode  and complete spectral and color flow Doppler.  INDICATION: Chest pain, unspecified (R07.9)  ------------------------------------------------------------------------  DIMENSIONS:  Dimensions:     Normal Values:  LA:     3.6 cm    2.0 - 4.0 cm  Ao:     2.8 cm    2.0 - 3.8 cm  SEPTUM: 0.9 cm    0.6 - 1.2 cm  PWT:    0.9 cm    0.6 - 1.1 cm  LVIDd:  5.0 cm    3.0 - 5.6 cm  LVIDs:  3.8 cm    1.8 - 4.0 cm  Derived Variables:  LVMI: 84 g/m2  RWT: 0.36  Fractional short: 24 %  Ejection Fraction (Cote Rule): 53 %  ------------------------------------------------------------------------  OBSERVATIONS:  Mitral Valve: Mitral annular calcification, otherwise  normal mitral valve. Mild mitral regurgitation.  Aortic Root: Normal aortic root.  Aortic Valve: Aortic valve leaflet morphology not well  visualized. Mild-moderate aortic regurgitation.  Left Atrium: Normal left atrium.  Left Ventricle: Low normal left ventricular systolic  function. No segmental wall motion abnormalities.  Paradoxical septal wall motion, consistent with ventricular  pacing. Normal left ventricular internal dimensions and  wall thicknesses.  Right Heart: Normal right atrium. Normal right ventricular  size and function.  A device wire is noted in the right  heart. Normal tricuspid valve.  Mild tricuspid  regurgitation. Normal pulmonic valve. Minimal pulmonic  regurgitation.  Pericardium/PleuraNormal pericardium with no pericardial  effusion.  Hemodynamic: Estimated right ventricular systolic pressure  equals 45 mm Hg, assuming right atrial pressure equals 10  mm Hg, consistent with mild pulmonary hypertension.  ------------------------------------------------------------------------  CONCLUSIONS:  1. Mitral annular calcification, otherwise normal mitral  valve. Mild mitral regurgitation.  2. Aortic valve leaflet morphology not well visualized.  Mild-moderate aortic regurgitation.  3. Normal left ventricular internal dimensions and wall  thicknesses.  4. Low normal left ventricular systolic function. No  segmental wall motion abnormalities.  Paradoxical septal  wall motion, consistent with ventricular pacing.  5. Normal right ventricular size and function.  A device  wire is noted in the right heart.  6. Estimated right ventricular systolic pressure equals 45  mm Hg, assuming right atrial pressure equals 10 mm Hg,  consistent with mild pulmonary hypertension.  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 Patient went to his PMD who recommended ED evaluation.  Found to have hypokalemia of 2.8, anemia with Hgb of 10.4 and hematuria.  Collateral history obtained from his son:  patient has a history of recurrent hospitalizations for weakness related to hypokalemia.   ECHO 10/6/21 demonstrated normal systolic function w/LVEF 60-65%, grade II diastolic dysfunction, left atrium 4.3cm and mild mitral regurgitation. Repeat echo 3/8/22 showed moderate segmental left ventricular systolic dysfunction with akinetic inferolateral and mid-basal anterolateral walls with a LVEF of 45% and mild -moderated mitral regurgitation. His EKG today reveals sinus rhythm with 1st degree AVB with ventricular pacing 65 bpm.   Device interrogation showed A pacing and V pacing at 60 bpm. Underlying rhythm : sinus bradycardia at 56 bpm with CHB w an escape.   His last interrogation was 22. At that time he was found to be in atrial fibrillation with intermittent RVR (170's). His AFib burden was 100% and RV pacing > 95%. Given his cardiac amyloid and reduced LV function, there was concern for worsening cardiomyopathy with chronic RV pacing. He was started on Amiodarone 22 for rhythm control with the thought that restoration of sinus rhythm would improve his symptomatology. After completing  the Amiodarone load he would undergo upgrade to CRT-P. Today's interrogation showed he converted to sinus rhythm early September. Eliquis now on hold due to hematuria.   He denies chest pain, shortness of breath, palpitations, lightheadedness or dizziness. His only complaint is that his legs feel heavy and weak.   EP called for clarification of Amiodarone dosing and prolonged QT on EKG.     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  Parkinsons disease  CAD (coronary artery disease) s/p PCI   Pleural effusion, not elsewhere classified  COVID-19 vaccine series completed w/booster  History of hip replacement, total R hip  &amp; L hip  Status post cataract extraction right eye cataract extraction with IOL 2015  Presence of cardiac pacemaker ([employer] dual chamber) 10/2021        No Known Drug Allergies    MEDICATIONS  (STANDING):  aMIOdarone    Tablet 400 milliGRAM(s) Oral daily  apixaban 2.5 milliGRAM(s) Oral every 12 hours  atorvastatin 80 milliGRAM(s) Oral at bedtime  buMETAnide 1.5 milliGRAM(s) Oral two times a day  carbidopa/levodopa  25/100 1 Tablet(s) Oral <User Schedule>  clopidogrel Tablet 75 milliGRAM(s) Oral daily  clotrimazole 1% Cream 1 Application(s) Topical every 12 hours  desvenlafaxine ER 50 milliGRAM(s) Oral daily  mirtazapine 7.5 milliGRAM(s) Oral at bedtime  OLANZapine 2.5 milliGRAM(s) Oral at bedtime  potassium chloride    Tablet ER 40 milliEquivalent(s) Oral every 4 hours    MEDICATIONS  (PRN):      FAMILY HISTORY:  Family history of lung cancer (Mother)  Family history of diabetes (sister)  Family history of esophageal cancer (Father)  Family history of myocardial infarction (Grandparent)        SOCIAL HISTORY:   with 3 children. Lives with his wife.    CIGARETTES: No  DRUGS: No  ALCOHOL: no    REVIEW OF SYSTEMS:    CONSTITUTIONAL: No fever, weight loss, chills, shakes, or fatigue  EYES: No eye pain, visual disturbances, or discharge  ENMT:  No difficulty hearing, tinnitus, vertigo; No sinus or throat pain  NECK: No pain or stiffness  BREASTS: No pain, masses, or nipple discharge  RESPIRATORY: No cough, wheezing, hemoptysis, or shortness of breath  CARDIOVASCULAR: No chest pain, dyspnea, palpitations, dizziness, syncope, paroxysmal nocturnal dyspnea, orthopnea, or arm or leg swelling  GASTROINTESTINAL: No abdominal  or epigastric pain, nausea, vomiting, hematemesis, diarrhea, constipation, melena or bright red blood.  GENITOURINARY: No dysuria, nocturia, hematuria, or urinary incontinence  NEUROLOGICAL: No headaches, memory loss, slurred speech, limb weakness, loss of strength, numbness, or tremors  SKIN: No itching, burning, rashes, or lesions   LYMPH NODES: No enlarged glands  ENDOCRINE: No heat or cold intolerance, or hair loss  MUSCULOSKELETAL: No joint pain or swelling, muscle, back, or extremity pain  PSYCHIATRIC: No depression, anxiety, or difficulty sleeping  HEME/LYMPH: No easy bruising or bleeding gums  ALLERY AND IMMUNOLOGIC: No hives or rash.      Vital Signs Last 24 Hrs  T(C): 36.7 (09 Sep 2022 06:40), Max: 37.3 (09 Sep 2022 00:04)  T(F): 98.1 (09 Sep 2022 06:40), Max: 99.2 (09 Sep 2022 00:04)  HR: 59 (09 Sep 2022 06:40) (59 - 63)  BP: 117/60 (09 Sep 2022 06:40) (117/60 - 130/58)  BP(mean): --  RR: 18 (09 Sep 2022 06:40) (18 - 18)  SpO2: 100% (09 Sep 2022 06:40) (99% - 100%)    Parameters below as of 09 Sep 2022 06:40  Patient On (Oxygen Delivery Method): room air        PHYSICAL EXAM:    GENERAL: Awake, alert In no apparent distress, well nourished, and hydrated.  HEAD:  Atraumatic, Normocephalic  EYES: EOMI, PERRLA, conjunctiva and sclera clear  ENMT: No tonsillar erythema, exudates, or enlargement; Moist mucous membranes, Good dentition, No lesions  NECK: Supple and normal thyroid.  No JVD or carotid bruit.  Carotid pulse is 2+ bilaterally.  HEART: Regular rate and rhythm; No murmurs, rubs, or gallops. Right   PULMONARY: Clear to auscultation and perfusion.  No rales, wheezing, or rhonchi bilaterally.   ABDOMEN: Soft, Nontender, Nondistended; Bowel sounds present No pain on palpation  EXTREMITIES:  2+ Peripheral Pulses, No clubbing or cyanosis, Trace pedal edema. Lower legs w/mild erythema  LYMPH: No lymphadenopathy noted  NEUROLOGICAL: Grossly nonfocal    INTERPRETATION OF TELEMETRY: not on telemetry    ECG: Sinus rhythm with first degree AVB and ventricular pacing 63bpm.           QTc is         LABS:                        10.4   7.94  )-----------( 283      ( 09 Sep 2022 06:25 )             36.3         142  |  99  |  23  ----------------------------<  83  2.8<LL>   |  27  |  2.10<H>    Ca    8.9      09 Sep 2022 06:25  Phos  2.1       Mg     2.60         TPro  6.8  /  Alb  3.8  /  TBili  0.5  /  DBili  x   /  AST  22  /  ALT  6   /  AlkPhos  149<H>          PT/INR - ( 09 Sep 2022 08:40 )   PT: 19.2 sec;   INR: 1.65 ratio         PTT - ( 09 Sep 2022 08:40 )  PTT:37.1 sec  Urinalysis Basic - ( 08 Sep 2022 15:20 )    Color: Light Yellow / Appearance: Slightly Turbid / S.010 / pH: x  Gluc: x / Ketone: Negative  / Bili: Negative / Urobili: <2 mg/dL   Blood: x / Protein: 30 mg/dL / Nitrite: Negative   Leuk Esterase: Negative / RBC: >720 /HPF / WBC 4 /HPF   Sq Epi: x / Non Sq Epi: 2 /HPF / Bacteria: Negative      BNPSerum Pro-Brain Natriuretic Peptide: 2196 pg/mL ( @ 06:25)    I&O's Detail    Daily Height in cm: 172.72 (09 Sep 2022 02:05)    Daily     RADIOLOGY & ADDITIONAL STUDIES:  PREVIOUS DIAGNOSTIC TESTING:      ECHO  FINDINGS:  < from: Transthoracic Echocardiogram (22 @ 14:33) >  Patient name: ALBINO RIVAS  YOB: 1940   Age: 81 (M)   MR#: [id number]  Study Date: 2022  Location: [id number]: Lauren Finkelstein, RDCS  Study quality: Technically Fair  Referring Physician: Marlen Zuñiga MD  Blood Pressure: 114/64 mmHg  Height: 172 cm  Weight: 75 kg  BSA: 1.9 m2  ------------------------------------------------------------------------  PROCEDURE: Transthoracic echocardiogram with 2-D, M-Mode  and complete spectral and color flow Doppler.  INDICATION: Chest pain, unspecified (R07.9)  ------------------------------------------------------------------------  DIMENSIONS:  Dimensions:     Normal Values:  LA:     3.6 cm    2.0 - 4.0 cm  Ao:     2.8 cm    2.0 - 3.8 cm  SEPTUM: 0.9 cm    0.6 - 1.2 cm  PWT:    0.9 cm    0.6 - 1.1 cm  LVIDd:  5.0 cm    3.0 - 5.6 cm  LVIDs:  3.8 cm    1.8 - 4.0 cm  Derived Variables:  LVMI: 84 g/m2  RWT: 0.36  Fractional short: 24 %  Ejection Fraction (Cote Rule): 53 %  ------------------------------------------------------------------------  OBSERVATIONS:  Mitral Valve: Mitral annular calcification, otherwise  normal mitral valve. Mild mitral regurgitation.  Aortic Root: Normal aortic root.  Aortic Valve: Aortic valve leaflet morphology not well  visualized. Mild-moderate aortic regurgitation.  Left Atrium: Normal left atrium.  Left Ventricle: Low normal left ventricular systolic  function. No segmental wall motion abnormalities.  Paradoxical septal wall motion, consistent with ventricular  pacing. Normal left ventricular internal dimensions and  wall thicknesses.  Right Heart: Normal right atrium. Normal right ventricular  size and function.  A device wire is noted in the right  heart. Normal tricuspid valve.  Mild tricuspid  regurgitation. Normal pulmonic valve. Minimal pulmonic  regurgitation.  Pericardium/PleuraNormal pericardium with no pericardial  effusion.  Hemodynamic: Estimated right ventricular systolic pressure  equals 45 mm Hg, assuming right atrial pressure equals 10  mm Hg, consistent with mild pulmonary hypertension.  ------------------------------------------------------------------------  CONCLUSIONS:  1. Mitral annular calcification, otherwise normal mitral  valve. Mild mitral regurgitation.  2. Aortic valve leaflet morphology not well visualized.  Mild-moderate aortic regurgitation.  3. Normal left ventricular internal dimensions and wall  thicknesses.  4. Low normal left ventricular systolic function. No  segmental wall motion abnormalities.  Paradoxical septal  wall motion, consistent with ventricular pacing.  5. Normal right ventricular size and function.  A device  wire is noted in the right heart.  6. Estimated right ventricular systolic pressure equals 45  mm Hg, assuming right atrial pressure equals 10 mm Hg,  consistent with mild pulmonary hypertension.  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 Patient went to his PMD who recommended ED evaluation.  Found to have hypokalemia of 2.8, anemia with Hgb of 10.4 and hematuria.  Collateral history obtained from his son:  patient has a history of recurrent hospitalizations for weakness related to hypokalemia.   ECHO 10/6/21 demonstrated normal systolic function w/LVEF 60-65%, grade II diastolic dysfunction, left atrium 4.3cm and mild mitral regurgitation. Repeat echo 3/8/22 showed moderate segmental left ventricular systolic dysfunction with akinetic inferolateral and mid-basal anterolateral walls with a LVEF of 45% and mild -moderated mitral regurgitation. His EKG today reveals sinus rhythm with 1st degree AVB with ventricular pacing 65 bpm.   Device interrogation showed A pacing and V pacing at 60 bpm. Underlying rhythm : sinus bradycardia at 56 bpm with CHB w an escape rhythm.   His last interrogation was 22. At that time he was found to be in atrial fibrillation with intermittent RVR (170's). His AFib burden was 100% and RV pacing > 95%. Given his cardiac amyloid and reduced LV function, there was concern for worsening cardiomyopathy with chronic RV pacing. He was started on Amiodarone 22 for rhythm control with the thought that restoration of sinus rhythm would improve his symptomatology. After completing  the Amiodarone load he would undergo upgrade to CRT-P. Today's interrogation showed he converted to sinus rhythm early September. Eliquis now on hold due to hematuria.   He denies chest pain, shortness of breath, palpitations, lightheadedness or dizziness. His only complaint is that his legs feel heavy and weak.   EP called for clarification of Amiodarone dosing and prolonged QT on EKG.     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  Parkinsons disease  CAD (coronary artery disease) s/p PCI   Pleural effusion, not elsewhere classified  COVID-19 vaccine series completed w/booster  History of hip replacement, total R hip  &amp; L hip  Status post cataract extraction right eye cataract extraction with IOL 2015  Presence of cardiac pacemaker ([employer] dual chamber) 10/2021        No Known Drug Allergies    MEDICATIONS  (STANDING):  aMIOdarone    Tablet 400 milliGRAM(s) Oral daily  apixaban 2.5 milliGRAM(s) Oral every 12 hours  atorvastatin 80 milliGRAM(s) Oral at bedtime  buMETAnide 1.5 milliGRAM(s) Oral two times a day  carbidopa/levodopa  25/100 1 Tablet(s) Oral <User Schedule>  clopidogrel Tablet 75 milliGRAM(s) Oral daily  clotrimazole 1% Cream 1 Application(s) Topical every 12 hours  desvenlafaxine ER 50 milliGRAM(s) Oral daily  mirtazapine 7.5 milliGRAM(s) Oral at bedtime  OLANZapine 2.5 milliGRAM(s) Oral at bedtime  potassium chloride    Tablet ER 40 milliEquivalent(s) Oral every 4 hours    MEDICATIONS  (PRN):      FAMILY HISTORY:  Family history of lung cancer (Mother)  Family history of diabetes (sister)  Family history of esophageal cancer (Father)  Family history of myocardial infarction (Grandparent)        SOCIAL HISTORY:   with 3 children. Lives with his wife.    CIGARETTES: No  DRUGS: No  ALCOHOL: no    REVIEW OF SYSTEMS:    CONSTITUTIONAL: No fever, weight loss, chills, shakes, or fatigue  EYES: No eye pain, visual disturbances, or discharge  ENMT:  No difficulty hearing, tinnitus, vertigo; No sinus or throat pain  NECK: No pain or stiffness  BREASTS: No pain, masses, or nipple discharge  RESPIRATORY: No cough, wheezing, hemoptysis, or shortness of breath  CARDIOVASCULAR: No chest pain, dyspnea, palpitations, dizziness, syncope, paroxysmal nocturnal dyspnea, orthopnea, or arm or leg swelling  GASTROINTESTINAL: No abdominal  or epigastric pain, nausea, vomiting, hematemesis, diarrhea, constipation, melena or bright red blood.  GENITOURINARY: No dysuria, nocturia, hematuria, or urinary incontinence  NEUROLOGICAL: No headaches, memory loss, slurred speech, limb weakness, loss of strength, numbness, or tremors  SKIN: No itching, burning, rashes, or lesions   LYMPH NODES: No enlarged glands  ENDOCRINE: No heat or cold intolerance, or hair loss  MUSCULOSKELETAL: No joint pain or swelling, muscle, back, or extremity pain  PSYCHIATRIC: No depression, anxiety, or difficulty sleeping  HEME/LYMPH: No easy bruising or bleeding gums  ALLERY AND IMMUNOLOGIC: No hives or rash.      Vital Signs Last 24 Hrs  T(C): 36.7 (09 Sep 2022 06:40), Max: 37.3 (09 Sep 2022 00:04)  T(F): 98.1 (09 Sep 2022 06:40), Max: 99.2 (09 Sep 2022 00:04)  HR: 59 (09 Sep 2022 06:40) (59 - 63)  BP: 117/60 (09 Sep 2022 06:40) (117/60 - 130/58)  BP(mean): --  RR: 18 (09 Sep 2022 06:40) (18 - 18)  SpO2: 100% (09 Sep 2022 06:40) (99% - 100%)    Parameters below as of 09 Sep 2022 06:40  Patient On (Oxygen Delivery Method): room air        PHYSICAL EXAM:    GENERAL: Awake, alert In no apparent distress, well nourished, and hydrated.  HEAD:  Atraumatic, Normocephalic  EYES: EOMI, PERRLA, conjunctiva and sclera clear  ENMT: No tonsillar erythema, exudates, or enlargement; Moist mucous membranes, Good dentition, No lesions  NECK: Supple and normal thyroid.  No JVD or carotid bruit.  Carotid pulse is 2+ bilaterally.  HEART: Regular rate and rhythm; No murmurs, rubs, or gallops. Right   PULMONARY: Clear to auscultation and perfusion.  No rales, wheezing, or rhonchi bilaterally.   ABDOMEN: Soft, Nontender, Nondistended; Bowel sounds present No pain on palpation  EXTREMITIES:  2+ Peripheral Pulses, No clubbing or cyanosis, Trace pedal edema. Lower legs w/mild erythema  LYMPH: No lymphadenopathy noted  NEUROLOGICAL: Grossly nonfocal    INTERPRETATION OF TELEMETRY: not on telemetry    ECG: Sinus rhythm with first degree AVB and ventricular pacing 63bpm.           QT interval 466msec QTc 463 msec.  (Correction made for paced rhythm)        LABS:                        10.4   7.94  )-----------( 283      ( 09 Sep 2022 06:25 )             36.3     09-09    142  |  99  |  23  ----------------------------<  83  2.8<LL>   |  27  |  2.10<H>    Ca    8.9      09 Sep 2022 06:25  Phos  2.1       Mg     2.60         TPro  6.8  /  Alb  3.8  /  TBili  0.5  /  DBili  x   /  AST  22  /  ALT  6   /  AlkPhos  149<H>          PT/INR - ( 09 Sep 2022 08:40 )   PT: 19.2 sec;   INR: 1.65 ratio         PTT - ( 09 Sep 2022 08:40 )  PTT:37.1 sec  Urinalysis Basic - ( 08 Sep 2022 15:20 )    Color: Light Yellow / Appearance: Slightly Turbid / S.010 / pH: x  Gluc: x / Ketone: Negative  / Bili: Negative / Urobili: <2 mg/dL   Blood: x / Protein: 30 mg/dL / Nitrite: Negative   Leuk Esterase: Negative / RBC: >720 /HPF / WBC 4 /HPF   Sq Epi: x / Non Sq Epi: 2 /HPF / Bacteria: Negative      BNPSerum Pro-Brain Natriuretic Peptide: 2196 pg/mL ( @ 06:25)    Daily Height in cm: 172.72 (09 Sep 2022 02:05)    Daily     RADIOLOGY & ADDITIONAL STUDIES:  PREVIOUS DIAGNOSTIC TESTING:      ECHO  FINDINGS:  < from: Transthoracic Echocardiogram (22 @ 14:33) >  Patient name: ALBINO RIVAS  YOB: 1940   Age: 81 (M)   MR#: [id number]  Study Date: 2022  Location: [id number]: Lauren Finkelstein, [facility]  Study quality: Technically Fair  Referring Physician: Marlen Zuñiga MD  Blood Pressure: 114/64 mmHg  Height: 172 cm  Weight: 75 kg  BSA: 1.9 m2  ------------------------------------------------------------------------  PROCEDURE: Transthoracic echocardiogram with 2-D, M-Mode  and complete spectral and color flow Doppler.  INDICATION: Chest pain, unspecified (R07.9)  ------------------------------------------------------------------------  DIMENSIONS:  Dimensions:     Normal Values:  LA:     3.6 cm    2.0 - 4.0 cm  Ao:     2.8 cm    2.0 - 3.8 cm  SEPTUM: 0.9 cm    0.6 - 1.2 cm  PWT:    0.9 cm    0.6 - 1.1 cm  LVIDd:  5.0 cm    3.0 - 5.6 cm  LVIDs:  3.8 cm    1.8 - 4.0 cm  Derived Variables:  LVMI: 84 g/m2  RWT: 0.36  Fractional short: 24 %  Ejection Fraction (Cote Rule): 53 %  ------------------------------------------------------------------------  OBSERVATIONS:  Mitral Valve: Mitral annular calcification, otherwise  normal mitral valve. Mild mitral regurgitation.  Aortic Root: Normal aortic root.  Aortic Valve: Aortic valve leaflet morphology not well  visualized. Mild-moderate aortic regurgitation.  Left Atrium: Normal left atrium.  Left Ventricle: Low normal left ventricular systolic  function. No segmental wall motion abnormalities.  Paradoxical septal wall motion, consistent with ventricular  pacing. Normal left ventricular internal dimensions and  wall thicknesses.  Right Heart: Normal right atrium. Normal right ventricular  size and function.  A device wire is noted in the right  heart. Normal tricuspid valve.  Mild tricuspid  regurgitation. Normal pulmonic valve. Minimal pulmonic  regurgitation.  Pericardium/PleuraNormal pericardium with no pericardial  effusion.  Hemodynamic: Estimated right ventricular systolic pressure  equals 45 mm Hg, assuming right atrial pressure equals 10  mm Hg, consistent with mild pulmonary hypertension.  ------------------------------------------------------------------------  CONCLUSIONS:  1. Mitral annular calcification, otherwise normal mitral  valve. Mild mitral regurgitation.  2. Aortic valve leaflet morphology not well visualized.  Mild-moderate aortic regurgitation.  3. Normal left ventricular internal dimensions and wall  thicknesses.  4. Low normal left ventricular systolic function. No  segmental wall motion abnormalities.  Paradoxical septal  wall motion, consistent with ventricular pacing.  5. Normal right ventricular size and function.  A device  wire is noted in the right heart.  6. Estimated right ventricular systolic pressure equals 45  mm Hg, assuming right atrial pressure equals 10 mm Hg,  consistent with mild pulmonary hypertension.  ------------------------------------------------------------------------  Confirmed on  2022 - 15:43:03 by Edin Beltran M.D.,  [facility], RASHMI  ------------------------------------------------------------------------

## 2022-09-09 NOTE — PROGRESS NOTE ADULT - PROBLEM SELECTOR PLAN 1
#Anemia #Hematuria  Pt with BRBPR at end defecation. No h/o hemorrhoids, last colonoscopy 7 years ago with polypectomy.   w/ calcific density at the posterior aspect of the bladder thought to be likely phlebolith    Diagnostics:   - Urology C/S, Appreciate Recs  - Trend H/H  - check iron/TIBC, ferritin with AM labs    Therapeutics:  - #Weakness  Subacute weakness / lethargy ico hypokalemia, likey primarily electrolyte abnl, but also with groin rash (?delirium component to AMS ico irritation / infection). But given underlying severe cardiac dz & Parkinson's will keep broad differential.  Diagnostics:  - trend BMP, replete K > 4  - Neuro C/S, Appreciate Recs (as per Parkinson's dz)  - PT C/S, Appreciate Recs    Therapeutics: pending evaluation

## 2022-09-09 NOTE — H&P ADULT - PROBLEM SELECTOR PLAN 2
underdistended bladder on CT, however given history of BPH would check post void bladder scan to eval for residual (lower concern for retention)  check urine UN, Cr  monitor Cr  renally dose meds  avoid nephrotoxins

## 2022-09-09 NOTE — PROGRESS NOTE ADULT - PROBLEM SELECTOR PLAN 8
s/p VATS and PleurX which is drained 3x/week  if prolonged hospital stay would consult pulm #ALAYNA  Baseline 1.2-1.5. Etiology may be multifactorial, but improving. Monitor H/H for pre-renal etiologies; CT A/p without nephrolithiases or hydronephrosis. Will monitor acid-base status, no indication for dialysis at this time.  - Bladder Scan to assess for obstructive pathology (less likely)

## 2022-09-09 NOTE — CONSULT NOTE ADULT - ATTENDING COMMENTS
Mr. Banerjee is a 80 yo man with a h/o idiopathic Parkinson disease with c/o "weakness".  Diagnosis is worsening motor function of Parkinson disease related to acute medical illness.   Continue his home Parkinson disease medications.  Physical therapy.     Thank you    Please call us for any further questions.

## 2022-09-09 NOTE — PROGRESS NOTE ADULT - PROBLEM SELECTOR PLAN 4
stable in comparison to prior EKG  replete electrolytes   repeat EKG in AM stable in comparison to prior EKG  replete electrolytes  repeat EKG in AM #Parkinson's Disease  Pt with mild low frequency tremor. Walks with rollator at baseline. Wife usually helps care for pt at home but has broken her foot recently.  OP Neurology: Dr BOYD  - Neuro C/S, Appreciate Recs  - C/W Carvedopa/Levodopa 25/100 1.5 qam 0900 (confirmed 9/9 with Opal - wife)  - C/W Carvedopa/Levodopa 25/100 1.0 q 1200 1500 1800 2100 (confirmed 9/9 with Opal - wife)    #Mood Disturbances  Likely at least partially as a consequence of Parkinson's dz. Pt expressing c/f "Gonorrhea", unclear reasons - may be related to groin rash.  - C/W Home Mirtazapine 7.5mg PO QHS  - C/W Home Olanzapine 2.5mg PO QHS  - C/W Desvenlafaxine ER 50mg PO QD (Home dose 150mg PO QD, reduced ico QTc prolongation)

## 2022-09-10 ENCOUNTER — TRANSCRIPTION ENCOUNTER (OUTPATIENT)
Age: 82
End: 2022-09-10

## 2022-09-10 LAB
ALBUMIN SERPL ELPH-MCNC: 3.9 G/DL — SIGNIFICANT CHANGE UP (ref 3.3–5)
ALP SERPL-CCNC: 154 U/L — HIGH (ref 40–120)
ALT FLD-CCNC: 7 U/L — SIGNIFICANT CHANGE UP (ref 4–41)
ANION GAP SERPL CALC-SCNC: 13 MMOL/L — SIGNIFICANT CHANGE UP (ref 7–14)
AST SERPL-CCNC: 22 U/L — SIGNIFICANT CHANGE UP (ref 4–40)
BASE EXCESS BLDV CALC-SCNC: 4.2 MMOL/L — HIGH (ref -2–3)
BASOPHILS # BLD AUTO: 0.06 K/UL — SIGNIFICANT CHANGE UP (ref 0–0.2)
BASOPHILS NFR BLD AUTO: 0.6 % — SIGNIFICANT CHANGE UP (ref 0–2)
BILIRUB SERPL-MCNC: 0.8 MG/DL — SIGNIFICANT CHANGE UP (ref 0.2–1.2)
BLOOD GAS VENOUS COMPREHENSIVE RESULT: SIGNIFICANT CHANGE UP
BUN SERPL-MCNC: 22 MG/DL — SIGNIFICANT CHANGE UP (ref 7–23)
CALCIUM SERPL-MCNC: 9.5 MG/DL — SIGNIFICANT CHANGE UP (ref 8.4–10.5)
CHLORIDE BLDV-SCNC: 104 MMOL/L — SIGNIFICANT CHANGE UP (ref 96–108)
CHLORIDE SERPL-SCNC: 103 MMOL/L — SIGNIFICANT CHANGE UP (ref 98–107)
CO2 BLDV-SCNC: 32.2 MMOL/L — HIGH (ref 22–26)
CO2 SERPL-SCNC: 26 MMOL/L — SIGNIFICANT CHANGE UP (ref 22–31)
CREAT SERPL-MCNC: 2 MG/DL — HIGH (ref 0.5–1.3)
EGFR: 33 ML/MIN/1.73M2 — LOW
EOSINOPHIL # BLD AUTO: 0.03 K/UL — SIGNIFICANT CHANGE UP (ref 0–0.5)
EOSINOPHIL NFR BLD AUTO: 0.3 % — SIGNIFICANT CHANGE UP (ref 0–6)
FOLATE SERPL-MCNC: 16.2 NG/ML — SIGNIFICANT CHANGE UP (ref 3.1–17.5)
GAS PNL BLDV: 139 MMOL/L — SIGNIFICANT CHANGE UP (ref 136–145)
GLUCOSE BLDV-MCNC: 96 MG/DL — SIGNIFICANT CHANGE UP (ref 70–99)
GLUCOSE SERPL-MCNC: 95 MG/DL — SIGNIFICANT CHANGE UP (ref 70–99)
HCO3 BLDV-SCNC: 31 MMOL/L — HIGH (ref 22–29)
HCT VFR BLD CALC: 40.9 % — SIGNIFICANT CHANGE UP (ref 39–50)
HCT VFR BLDA CALC: 37 % — LOW (ref 39–51)
HGB BLD CALC-MCNC: 12.4 G/DL — LOW (ref 13–17)
HGB BLD-MCNC: 11.9 G/DL — LOW (ref 13–17)
IANC: 8.21 K/UL — HIGH (ref 1.8–7.4)
IMM GRANULOCYTES NFR BLD AUTO: 0.4 % — SIGNIFICANT CHANGE UP (ref 0–1.5)
LACTATE BLDV-MCNC: 2.1 MMOL/L — HIGH (ref 0.5–2)
LYMPHOCYTES # BLD AUTO: 1.53 K/UL — SIGNIFICANT CHANGE UP (ref 1–3.3)
LYMPHOCYTES # BLD AUTO: 14.6 % — SIGNIFICANT CHANGE UP (ref 13–44)
MAGNESIUM SERPL-MCNC: 2.5 MG/DL — SIGNIFICANT CHANGE UP (ref 1.6–2.6)
MCHC RBC-ENTMCNC: 24.1 PG — LOW (ref 27–34)
MCHC RBC-ENTMCNC: 29.1 GM/DL — LOW (ref 32–36)
MCV RBC AUTO: 82.8 FL — SIGNIFICANT CHANGE UP (ref 80–100)
MONOCYTES # BLD AUTO: 0.62 K/UL — SIGNIFICANT CHANGE UP (ref 0–0.9)
MONOCYTES NFR BLD AUTO: 5.9 % — SIGNIFICANT CHANGE UP (ref 2–14)
NEUTROPHILS # BLD AUTO: 8.21 K/UL — HIGH (ref 1.8–7.4)
NEUTROPHILS NFR BLD AUTO: 78.2 % — HIGH (ref 43–77)
NRBC # BLD: 0 /100 WBCS — SIGNIFICANT CHANGE UP (ref 0–0)
NRBC # FLD: 0 K/UL — SIGNIFICANT CHANGE UP (ref 0–0)
PCO2 BLDV: 53 MMHG — SIGNIFICANT CHANGE UP (ref 42–55)
PH BLDV: 7.37 — SIGNIFICANT CHANGE UP (ref 7.32–7.43)
PHOSPHATE SERPL-MCNC: 2.9 MG/DL — SIGNIFICANT CHANGE UP (ref 2.5–4.5)
PLATELET # BLD AUTO: 342 K/UL — SIGNIFICANT CHANGE UP (ref 150–400)
PO2 BLDV: 30 MMHG — SIGNIFICANT CHANGE UP
POTASSIUM BLDV-SCNC: 3.3 MMOL/L — LOW (ref 3.5–5.1)
POTASSIUM SERPL-MCNC: 3.5 MMOL/L — SIGNIFICANT CHANGE UP (ref 3.5–5.3)
POTASSIUM SERPL-SCNC: 3.5 MMOL/L — SIGNIFICANT CHANGE UP (ref 3.5–5.3)
PROT SERPL-MCNC: 7.3 G/DL — SIGNIFICANT CHANGE UP (ref 6–8.3)
RBC # BLD: 4.94 M/UL — SIGNIFICANT CHANGE UP (ref 4.2–5.8)
RBC # FLD: 16.8 % — HIGH (ref 10.3–14.5)
SAO2 % BLDV: 36.6 % — SIGNIFICANT CHANGE UP
SODIUM SERPL-SCNC: 142 MMOL/L — SIGNIFICANT CHANGE UP (ref 135–145)
WBC # BLD: 10.49 K/UL — SIGNIFICANT CHANGE UP (ref 3.8–10.5)
WBC # FLD AUTO: 10.49 K/UL — SIGNIFICANT CHANGE UP (ref 3.8–10.5)

## 2022-09-10 PROCEDURE — 99233 SBSQ HOSP IP/OBS HIGH 50: CPT | Mod: GC

## 2022-09-10 RX ORDER — SENNA PLUS 8.6 MG/1
2 TABLET ORAL AT BEDTIME
Refills: 0 | Status: DISCONTINUED | OUTPATIENT
Start: 2022-09-10 | End: 2022-09-12

## 2022-09-10 RX ORDER — POTASSIUM CHLORIDE 20 MEQ
20 PACKET (EA) ORAL
Refills: 0 | Status: COMPLETED | OUTPATIENT
Start: 2022-09-10 | End: 2022-09-10

## 2022-09-10 RX ORDER — POLYETHYLENE GLYCOL 3350 17 G/17G
17 POWDER, FOR SOLUTION ORAL
Refills: 0 | Status: DISCONTINUED | OUTPATIENT
Start: 2022-09-10 | End: 2022-09-11

## 2022-09-10 RX ORDER — POTASSIUM CHLORIDE 20 MEQ
20 PACKET (EA) ORAL
Refills: 0 | Status: DISCONTINUED | OUTPATIENT
Start: 2022-09-10 | End: 2022-09-10

## 2022-09-10 RX ADMIN — CARBIDOPA AND LEVODOPA 1 TABLET(S): 25; 100 TABLET ORAL at 12:05

## 2022-09-10 RX ADMIN — OLANZAPINE 2.5 MILLIGRAM(S): 15 TABLET, FILM COATED ORAL at 22:24

## 2022-09-10 RX ADMIN — SENNA PLUS 2 TABLET(S): 8.6 TABLET ORAL at 22:23

## 2022-09-10 RX ADMIN — AMIODARONE HYDROCHLORIDE 400 MILLIGRAM(S): 400 TABLET ORAL at 06:19

## 2022-09-10 RX ADMIN — Medication 20 MILLIEQUIVALENT(S): at 17:37

## 2022-09-10 RX ADMIN — CARBIDOPA AND LEVODOPA 1 TABLET(S): 25; 100 TABLET ORAL at 22:24

## 2022-09-10 RX ADMIN — APIXABAN 2.5 MILLIGRAM(S): 2.5 TABLET, FILM COATED ORAL at 06:19

## 2022-09-10 RX ADMIN — CLOPIDOGREL BISULFATE 75 MILLIGRAM(S): 75 TABLET, FILM COATED ORAL at 12:05

## 2022-09-10 RX ADMIN — Medication 1 APPLICATION(S): at 06:19

## 2022-09-10 RX ADMIN — Medication 20 MILLIEQUIVALENT(S): at 18:52

## 2022-09-10 RX ADMIN — CARBIDOPA AND LEVODOPA 1 TABLET(S): 25; 100 TABLET ORAL at 17:35

## 2022-09-10 RX ADMIN — CARBIDOPA AND LEVODOPA 1 TABLET(S): 25; 100 TABLET ORAL at 18:52

## 2022-09-10 RX ADMIN — APIXABAN 2.5 MILLIGRAM(S): 2.5 TABLET, FILM COATED ORAL at 17:35

## 2022-09-10 RX ADMIN — Medication 1 APPLICATION(S): at 17:46

## 2022-09-10 RX ADMIN — MIRTAZAPINE 7.5 MILLIGRAM(S): 45 TABLET, ORALLY DISINTEGRATING ORAL at 22:24

## 2022-09-10 RX ADMIN — BUMETANIDE 1.5 MILLIGRAM(S): 0.25 INJECTION INTRAMUSCULAR; INTRAVENOUS at 06:19

## 2022-09-10 RX ADMIN — DESVENLAFAXINE 50 MILLIGRAM(S): 50 TABLET, EXTENDED RELEASE ORAL at 12:06

## 2022-09-10 RX ADMIN — BUMETANIDE 1.5 MILLIGRAM(S): 0.25 INJECTION INTRAMUSCULAR; INTRAVENOUS at 17:41

## 2022-09-10 RX ADMIN — CARBIDOPA AND LEVODOPA 1.5 TABLET(S): 25; 100 TABLET ORAL at 12:05

## 2022-09-10 RX ADMIN — ATORVASTATIN CALCIUM 80 MILLIGRAM(S): 80 TABLET, FILM COATED ORAL at 22:24

## 2022-09-10 NOTE — DISCHARGE NOTE PROVIDER - CARE PROVIDERS DIRECT ADDRESSES
[email or web address]

## 2022-09-10 NOTE — PROGRESS NOTE ADULT - PROBLEM SELECTOR PLAN 5
#Hematuria  Chronic hematuria evaluated as OP with cystoscopy in 04/2022. Now pt has had fairly significant drop in Hgb since 05/2022. No ongoing gross hematuria here.   - Uro C/S if persistent gross hematuria or obstruction  - Likely at least in part mediated by anticoagulation  - Hgb stable

## 2022-09-10 NOTE — PROGRESS NOTE ADULT - ASSESSMENT
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 He is hemodynamically stable while we clarify the contributions of these multiple medical conditions and correct his electrolyte disturbances.

## 2022-09-10 NOTE — PROGRESS NOTE ADULT - PROBLEM SELECTOR PLAN 2
#Hypokalemia  Pt with K 2.8. may be 2/2 wasting ico Bumetanide use. This will be the 3rd total admission in ~1 year. Notably, Amio started only recently, so not likely related to any amio-induced inflammatory responses. Hasn't had endocrine workup for hyperaldosteronism historically on review. DDx: K shifts (e.g. acid/base disturbances, medication-induced), increased utilization (RBC production ico bleeding, thyrotoxicosis ico amio - less likely ico normal TSH). Also considering fluid losses ico pleurex catheter.   Diagnostics  - TSH WNL   - RBCs low, relatively decreased from baseline normal in May, may have production = increased use of potassium  - If persistently low despite repletion (i.e. paradoxical hypokalemia), will have to check T3/T4 r/o occult thyrotoxicosis with normal TSH    Therapeutics  - Replete K > 4

## 2022-09-10 NOTE — DISCHARGE NOTE PROVIDER - CARE PROVIDER_API CALL
Pavan Jamil)  Internal Medicine; Pulmonary Disease  [address], [address]  [place] [address]  Phone: [phone]  Fax: [phone]  Follow Up Time: 2 weeks    Clinton Dickey)  Cardiovascular Disease; Internal Medicine  [address] [place] [address]  Phone: [phone]  Fax: [phone]  Follow Up Time: 2 weeks   Pavan Jamil (MD)  Internal Medicine; Pulmonary Disease  [address], [address]  [place] [address]  Phone: [phone]  Fax: [phone]  Established Patient  Follow Up Time: 1 week    Clinton Dickey)  Cardiovascular Disease; Internal Medicine  [address] [place] [address]  Phone: [phone]  Fax: [phone]  Follow Up Time: 2 weeks

## 2022-09-10 NOTE — PROGRESS NOTE ADULT - PROBLEM SELECTOR PLAN 9
#Iron Deficiency Anemia  Pt with iron deficiency on studies. Likely 2/2 ongoing hematuria, which is problematic given the drop since only 05/2022. Will have to clarify OP colonoscopy history as well to r/o occult malignancy.   - Documentation suggesting 1x BRBPR at end defecation. Again clarify OP colonoscopy hx  - Calculated Iron Deficit (Target 14): 1178mg, may benefit from IV Iron Sucrose in this context  - Transfuse > 7  - T&S (9/10-)

## 2022-09-10 NOTE — PROGRESS NOTE ADULT - PROBLEM SELECTOR PLAN 8
IMPROVING  Baseline 1.2-1.5. Etiology may be multifactorial, but improving. Monitor H/H for pre-renal etiologies; CT A/p without nephrolithiases or hydronephrosis. Will monitor acid-base status, no indication for dialysis at this time.  - Bladder Scan to assess for obstructive pathology (less likely)

## 2022-09-10 NOTE — DISCHARGE NOTE PROVIDER - NSDCCPCAREPLAN_GEN_ALL_CORE_FT
PRINCIPAL DISCHARGE DIAGNOSIS  Diagnosis: Hypokalemia  Assessment and Plan of Treatment: You came in to the hospital for weakness and feeling tired. You were found to have low potassium, which likely caused some of your symptoms. You were treated for your low potassium and your electrolytes were monitored and they gradually improved to a normal range. Please continue to take your potassium supplements as prescribed. Please follow up with your primary care doctor within 2 weeks of discharge and mention that you were recently in the hospital. If you start feeling similar symptoms again, please seek medical attention.      SECONDARY DISCHARGE DIAGNOSES  Diagnosis: Groin rash  Assessment and Plan of Treatment: You were noted to have groin rash and were started on some antifungal cream to help with your symptoms. Please continue to take the cream as prescribed for a total of 2 weeks.    Diagnosis: ALAYNA (acute kidney injury)  Assessment and Plan of Treatment: Your kidney numbers were noted to be elevated while you were in the hospital. They gradually came down as you were treated.       PRINCIPAL DISCHARGE DIAGNOSIS  Diagnosis: Hypokalemia  Assessment and Plan of Treatment: You came in to the hospital for weakness and feeling tired. You were found to have low potassium, which likely caused some of your symptoms. You were treated for your low potassium and your electrolytes were monitored and they gradually improved to a normal range. Please continue to take your potassium supplements as prescribed. Please follow up with your primary care doctor within 1 week of discharge and mention that you were recently in the hospital. You should have a blood test within 1 week of discharge to check your potassium levels. If you start feeling similar symptoms again, please seek medical attention.      SECONDARY DISCHARGE DIAGNOSES  Diagnosis: Groin rash  Assessment and Plan of Treatment: You were noted to have groin rash and were started on some antifungal cream to help with your symptoms. Please continue to take the cream as prescribed for a total of 2 weeks.    Diagnosis: ALAYNA (acute kidney injury)  Assessment and Plan of Treatment: Your kidney numbers were noted to be elevated while you were in the hospital. They gradually came down as you were treated.

## 2022-09-10 NOTE — DISCHARGE NOTE PROVIDER - NSDCFUSCHEDAPPT_GEN_ALL_CORE_FT
Clinton Dickey  [facility]  CARDIOLOGY [address] [facility]   Scheduled Appointment: 09/19/2022    [facility]  PULED [address] [facility]  Scheduled Appointment: 09/27/2022    Pavan Jamil  [facility]  PULED [address] [facility]  Scheduled Appointment: 09/27/2022    Donte Langley  [facility]  Neuro [address] [facility]  Scheduled Appointment: 09/29/2022     [facility]  PULED [address] [facility]  Scheduled Appointment: 09/27/2022    Pavan Jamil  [facility]  PULED [address] [facility]  Scheduled Appointment: 09/27/2022    Donte Langley  [facility]  Neuro [address] [facility]  Scheduled Appointment: 09/29/2022    Clinton Dickey  [facility]  CARDIOLOGY [address] [facility]   Scheduled Appointment: 10/03/2022    Danielle Talbot  [facility]  UROLOGY [address] [facility]  Scheduled Appointment: 12/12/2022

## 2022-09-10 NOTE — PROGRESS NOTE ADULT - PROBLEM SELECTOR PLAN 7
Electrophysiology  #pAF #CHB s/p PPM ([employer]) #Prolonged QTc  Pt started on Amiodarone for persistent AF underlying rhythm c/b significant RV pacing c/b worsening CM ico cardiac amyloidosis. .   OP EP: Dr. Powell  - EP C/S, Appreciate Recs  - reSTART Home Apixaban 2.5mg PO BID  - C/W Amiodarone 400mg PO QD (through sept), then 200mg PO QD  - appreciate EP comment on QT - not prolonged

## 2022-09-10 NOTE — PROGRESS NOTE ADULT - PROBLEM SELECTOR PLAN 1
#Weakness  Subacute weakness / lethargy ico hypokalemia, likely primarily electrolyte abnl, but also with groin rash (?delirium component to AMS ico irritation / infection), progression of underlying PD. But given underlying severe cardiac dz & Parkinson's will keep broad differential.  Diagnostics:  - trend BMP, replete K > 4  - Neuro C/S, Appreciate Recs (as per Parkinson's dz)  - PT C/S, rec ALEXANDRA    Therapeutics: pending evaluation no

## 2022-09-10 NOTE — PROGRESS NOTE ADULT - PROBLEM SELECTOR PLAN 10
#Pleural Effusion #S/P VATS  Etiology unclear based on OP review. Family notes that they've been told primarily cardiac in origin, but volumes drained have shifted previously by fairly large amounts.   - pleurex in place

## 2022-09-10 NOTE — DISCHARGE NOTE PROVIDER - PROVIDER TOKENS
PROVIDER:[TOKEN:[[id number]],FOLLOWUP:[2 weeks]],PROVIDER:[TOKEN:[[id number]],FOLLOWUP:[2 weeks]] PROVIDER:[TOKEN:[[id number]],FOLLOWUP:[1 week],ESTABLISHEDPATIENT:[T]],PROVIDER:[TOKEN:[[id number]],FOLLOWUP:[2 weeks]]

## 2022-09-10 NOTE — PROGRESS NOTE ADULT - PROBLEM SELECTOR PLAN 6
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 Now with weakness, but w/o SOB, peripheral edema, other evidence of HFe.   OP Gen Cards: Dr. Boucher ([facility])  OP Amyloid Specialist: Dr. Randolph ([facility])  - Attempted gen cards involvement, declined at this time  - C/W Bumetanide 1.5mg PO BID (home dose 3mg PO QD)  - C/W Home Tafamidis ("Vyndamax")   - C/W Clopidogrel 75mg PO QD    #HTN #HLD  Pt with h/o HTN, HLD. SBP basically at goal for age, hospitalization. Manage as per HFmEF  - C/W Home Atorvastatin 80mg PO QHS

## 2022-09-10 NOTE — PROGRESS NOTE ADULT - PROBLEM SELECTOR PLAN 3
[Dear  ___] : Dear  [unfilled], [Consult Letter:] : I had the pleasure of evaluating your patient, [unfilled]. [Please see my note below.] : Please see my note below. [Sincerely,] : Sincerely, [FreeTextEntry3] : Shadi Corbett [DrShanell  ___] : Dr. LOVING [DrShanell ___] : Dr. LOVING #Groin Rash  Well-demarcated, erythematous rash with peripheral raised edges. May have incontinence-associated dermatitis vs candidal intertrigo.  - Wound C/S, Appreciate Recs  - Barrier creams  - START Clotrimazole 1% crm TOP BID

## 2022-09-10 NOTE — PROGRESS NOTE ADULT - PROBLEM SELECTOR PLAN 11
Hospital Bundle  Fluids: PO ad andrey  Electrolytes: Replete K > 4, Mg > 2, Phos > 3  Nutrition: Diet   PPX  ---VTE: eliquis  ---GI: Diet, may consider PPI if c/f GIB continues  Access: PIV  Code Status: FULL  Dispo: Pending medical evaluation

## 2022-09-10 NOTE — DISCHARGE NOTE PROVIDER - HOSPITAL COURSE
HPI:  81-year-old male with Parkinson's, Afib on apixaban, bradycardia s/p PPM, CAD s/p stent 8/2021, amyloid, chronic R pleural effusion s/p pleurx, HTN, HLD, BPH, presenting from home with weakness x1 week. Patient went to his PMD who recommended ED eval. Collateral history obtained from son, at bedside; patient has a history of recurrent hospitalizations for weakness related to hypokalemia. Patient is without complaint. Per son, patient has anxiety, medications have been titrated, with recent change from Ramelteon to mirtazepine. Patient denies hematuria, dysuria, has chronic nocturia. Per son, has had a history of hematuria in past.    In the ED VS: 98.6  60-67  117-138/55-64  13-18  100%RA, received 1L NS IVF, carbidopa/levodopa 25/100 PO x1, potassium chloride 40mEq PO x1 and 10mEq IVPB x3 (09 Sep 2022 00:44)    He was found to have electrolyte disorders which were corrected but patient's symptoms did not improve. He was continued on his home medications and seen by cardiology and neurology who felt that presentation was most consistent with progression of parkinson's disease. He had diarrhea and GI PCR showed ____. Remainder of infectious workup was not revealing. Family felt that they could no longer take care of patient and PT recommended ALEXANDRA. HPI:  81-year-old male with Parkinson's, Afib on apixaban, bradycardia s/p PPM, CAD s/p stent 8/2021, amyloid, chronic R pleural effusion s/p pleurx, HTN, HLD, BPH, presenting from home with weakness x1 week. Patient went to his PMD who recommended ED eval. Collateral history obtained from son, at bedside; patient has a history of recurrent hospitalizations for weakness related to hypokalemia. Patient is without complaint. Per son, patient has anxiety, medications have been titrated, with recent change from Ramelteon to mirtazepine. Patient denies hematuria, dysuria, has chronic nocturia. Per son, has had a history of hematuria in past.    In the ED VS: 98.6  60-67  117-138/55-64  13-18  100%RA, received 1L NS IVF, carbidopa/levodopa 25/100 PO x1, potassium chloride 40mEq PO x1 and 10mEq IVPB x3 (09 Sep 2022 00:44)    He was found to have electrolyte disorders which were corrected but patient's symptoms did not improve. He was continued on his home medications and seen by cardiology and neurology who felt that presentation was most consistent with progression of parkinson's disease. He was found to have cardiac amyloidosis on SPECT-CT. He was found to have moderate R pleural effusions (pt has chronic effusions and pleural catheter) on CTAP as well as cardiomegaly. Course was also complicated by ALAYNA though his SCr gradually downtrended during his visit. Remainder of infectious workup was not revealing. PT recommended ALEXANDRA however family wants home with home PT. Aldosterone level was noted to be elevated, though with no other stigmata of hyperaldosteronism his hypokalemia may be followed outpatient. HPI:  81-year-old male with Parkinson's, Afib on apixaban, bradycardia s/p PPM, CAD s/p stent 8/2021, amyloid, chronic R pleural effusion s/p pleurx, HTN, HLD, BPH, presenting from home with weakness x1 week. Patient went to his PMD who recommended ED eval. Collateral history obtained from son, at bedside; patient has a history of recurrent hospitalizations for weakness related to hypokalemia. He had a recent change from Ramelteon to mirtazepine. Patient denies hematuria, dysuria, has chronic nocturia. Per son, has had a history of hematuria in past.    He was found to have electrolyte disorders which were corrected but patient's symptoms did not improve. He was continued on his home medications and seen by cardiology and neurology who felt that presentation was most consistent with progression of parkinson's disease. He was found to have cardiac amyloidosis on SPECT-CT. He was found to have moderate R pleural effusions (pt has chronic effusions and pleural catheter) on CTAP as well as cardiomegaly. Course was also complicated by ALAYNA though his SCr gradually downtrended during his visit. Remainder of infectious workup was not revealing. PT recommended ALEXANDRA however family wants home with home PT. Aldosterone level was noted to be elevated, though with no other stigmata of hyperaldosteronism his hypokalemia may be followed outpatient.

## 2022-09-10 NOTE — DISCHARGE NOTE PROVIDER - NSDCMRMEDTOKEN_GEN_ALL_CORE_FT
amiodarone 200 mg oral tablet: 2 tab(s) orally once a day  bumetanide 1 mg oral tablet: 1 tab(s) orally once a day  bumetanide 2 mg oral tablet: 1 tab(s) orally once a day  carbidopa-levodopa 25 mg-100 mg oral tablet: 1.5 tab(s) orally once a day  carbidopa-levodopa 25 mg-100 mg oral tablet: 1 tab(s) orally 3 times a day at 12PM, 3PM, 6PM, 9PM  clopidogrel 75 mg oral tablet: 1 tab(s) orally once a day  desvenlafaxine (as succinate) 50 mg oral tablet, extended release: 1 tab(s) orally once a day  Eliquis 2.5 mg oral tablet: 1 tab(s) orally 2 times a day. Resume taking tomorrow, 3/19.   Klor-Con M20 oral tablet, extended release: 1 tab(s) orally once a day  mirtazapine 7.5 mg oral tablet: 1 tab(s) orally once a day (at bedtime)  OLANZapine 2.5 mg oral tablet: 1 tab(s) orally once a day (at bedtime)  One A Day Men&#x27;s Complete oral tablet: 1 tab(s) orally once a day  rosuvastatin 40 mg oral tablet: 1 tab(s) orally once a day (at bedtime)  solifenacin 10 mg oral tablet: 1 tab(s) orally once a day  tadalafil 5 mg oral tablet: 1 tab(s) orally once a day  Vitamin B12 500 mcg oral tablet: 1 tab(s) orally once a day  Vitamin D3 25 mcg (1000 intl units) oral tablet: 1 tab(s) orally once a day  Vyndamax 61 mg oral capsule: 1 cap(s) orally once a day   amiodarone 200 mg oral tablet: 2 tab(s) orally once a day  bumetanide 1 mg oral tablet: 1 tab(s) orally once a day  bumetanide 2 mg oral tablet: 1 tab(s) orally once a day  carbidopa-levodopa 25 mg-100 mg oral tablet: 1.5 tab(s) orally once a day  carbidopa-levodopa 25 mg-100 mg oral tablet: 1 tab(s) orally 3 times a day at 12PM, 3PM, 6PM, 9PM  clopidogrel 75 mg oral tablet: 1 tab(s) orally once a day  clotrimazole 1% topical cream: Apply topically to affected area every 12 hours MDD:2 apps   desvenlafaxine (as succinate) 50 mg oral tablet, extended release: 1 tab(s) orally once a day  Eliquis 2.5 mg oral tablet: 1 tab(s) orally 2 times a day. Resume taking tomorrow, 3/19.   Klor-Con M20 oral tablet, extended release: 1 tab(s) orally once a day  mirtazapine 7.5 mg oral tablet: 1 tab(s) orally once a day (at bedtime)  OLANZapine 2.5 mg oral tablet: 1 tab(s) orally once a day (at bedtime)  One A Day Men&#x27;s Complete oral tablet: 1 tab(s) orally once a day  rosuvastatin 40 mg oral tablet: 1 tab(s) orally once a day (at bedtime)  solifenacin 10 mg oral tablet: 1 tab(s) orally once a day  tadalafil 5 mg oral tablet: 1 tab(s) orally once a day  tafamidis 61 mg oral capsule: 1 cap(s) orally once a day  Vitamin B12 500 mcg oral tablet: 1 tab(s) orally once a day  Vitamin D3 25 mcg (1000 intl units) oral tablet: 1 tab(s) orally once a day  Vyndamax 61 mg oral capsule: 1 cap(s) orally once a day   amiodarone 200 mg oral tablet: 2 tab(s) orally once a day  bumetanide 1 mg oral tablet: 1 tab(s) orally once a day (in the evening)  bumetanide 2 mg oral tablet: 1 tab(s) orally once a day (in the morning)  carbidopa-levodopa 25 mg-100 mg oral tablet: 1.5 tab(s) orally once a day  carbidopa-levodopa 25 mg-100 mg oral tablet: 1 tab(s) orally 3 times a day at 12PM, 3PM, 6PM, 9PM  clopidogrel 75 mg oral tablet: 1 tab(s) orally once a day  clotrimazole 1% topical cream: Apply topically to affected area every 12 hours MDD:2 apps   desvenlafaxine (as succinate) 50 mg oral tablet, extended release: 1 tab(s) orally once a day  Eliquis 2.5 mg oral tablet: 1 tab(s) orally 2 times a day. Resume taking tomorrow, 3/19.   Klor-Con M20 oral tablet, extended release: 1 tab(s) orally once a day  mirtazapine 7.5 mg oral tablet: 1 tab(s) orally once a day (at bedtime)  OLANZapine 2.5 mg oral tablet: 1 tab(s) orally once a day (at bedtime)  One A Day Men&#x27;s Complete oral tablet: 1 tab(s) orally once a day  rosuvastatin 40 mg oral tablet: 1 tab(s) orally once a day (at bedtime)  solifenacin 10 mg oral tablet: 1 tab(s) orally once a day  tadalafil 5 mg oral tablet: 1 tab(s) orally once a day  Vitamin B12 500 mcg oral tablet: 1 tab(s) orally once a day  Vitamin D3 25 mcg (1000 intl units) oral tablet: 1 tab(s) orally once a day  Vyndamax 61 mg oral capsule: 1 cap(s) orally once a day

## 2022-09-10 NOTE — PROGRESS NOTE ADULT - PROBLEM SELECTOR PLAN 4
#Parkinson's Disease  Pt with mild low frequency tremor. Walks with rollator at baseline. Wife usually helps care for pt at home but has broken her foot recently.  OP Neurology: Dr BOYD  - Neuro C/S, Appreciate Recs  - C/W Carvedopa/Levodopa 25/100 1.5 qam 0900 (confirmed 9/9 with Opal - wife)  - C/W Carvedopa/Levodopa 25/100 1.0 q 1200 1500 1800 2100 (confirmed 9/9 with Opal - wife)    #Mood Disturbances  Likely at least partially as a consequence of Parkinson's dz. Pt expressing c/f "Gonorrhea", unclear reasons - may be related to groin rash.  - C/W Home Mirtazapine 7.5mg PO QHS  - C/W Home Olanzapine 2.5mg PO QHS  - C/W Desvenlafaxine ER 50mg PO QD (Home dose 150mg PO QD, reduced ico QTc prolongation)

## 2022-09-10 NOTE — PROGRESS NOTE ADULT - SUBJECTIVE AND OBJECTIVE BOX
Medicine Progress Note  ---Authored by Dav Dunn MD, PGY3, Internal Medicine    Patient: ALBINO RIVAS, MRN: [id number], : 1940  Admitted on 22 for Disorientation      LANGUAGE - English           ------------------------------------------------------------------------------------------------------------  SUMMARY (from last progress note through 09-10-22 @ 13:43):   - pending ALEXANDRA  ------------------------------------------------------------------------------------------------------------  SUBJECTIVE / OVERNIGHT EVENTS:  No acute events overnight. Patient seen and evaluated at bedside.      ROS unable to be assessed due to mental status  ------------------------------------------------------------------------------------------------------------  OBJECTIVE:  Physical Exam  CONST:     NAD, chronically ill appearing;  RESP :        Normal respiratory effort; CTA bilaterally; no W/R/R  CHEST:       No TTP, no lines/ports; symmetric chest expansion  CARDIO:     RRR, normal S1 and S2, no M/R/G   VASC:         No JVD/AJR, no peripheral edema, pulses 2+ B/L, Cap refill <2s  ABD:           Soft, NT/ND, norm bowel sounds; no R/G; No HSM; No CVAT  MSK:          No clubbing of digits; no joint swelling or TTP  NEURO:     pill rolling tremor noted      Vital Signs Last 24 Hrs  T(F): 97.6, Max: 98 (22 @ 14:00)  HR: 64 (63 - 68)  BP: 147/54 (114/63 - 147/54)  RR: 18 (18 - 18)  SpO2: 98% (98% - 100%)        DAILY MEASUREMENTS:  I&O's Summary    Daily     Daily   Weight (kg): 78.5 (22 @ 02:05)  Orthostatic VS        LABS:                        11.9   10.49 )-----------( 342      ( 10 Sep 2022 06:10 )             40.9     Hgb Trend: 11.9<--, 10.4<--, 10.7<--  0910    142  |  103  |  22  ----------------------------<  95  3.5   |  26  |  2.00<H>    Ca    9.5      10 Sep 2022 06:10  Phos  2.9     09-10  Mg     2.50     09-10    TPro  7.3  /  Alb  3.9  /  TBili  0.8  /  DBili  x   /  AST  22  /  ALT  7   /  AlkPhos  154<H>  09-10    PT/INR - ( 09 Sep 2022 08:40 )   PT: 19.2 sec;   INR: 1.65 ratio         PTT - ( 09 Sep 2022 08:40 )  PTT:37.1 sec  CAPILLARY BLOOD GLUCOSE            Urinalysis Basic - ( 08 Sep 2022 15:20 )    Color: Light Yellow / Appearance: Slightly Turbid / S.010 / pH: x  Gluc: x / Ketone: Negative  / Bili: Negative / Urobili: <2 mg/dL   Blood: x / Protein: 30 mg/dL / Nitrite: Negative   Leuk Esterase: Negative / RBC: >720 /HPF / WBC 4 /HPF   Sq Epi: x / Non Sq Epi: 2 /HPF / Bacteria: Negative        Culture - Blood (collected 08 Sep 2022 15:00)  Source: .Blood Blood-Peripheral  Preliminary Report (09 Sep 2022 19:02):    No growth to date.    Culture - Urine (collected 08 Sep 2022 15:00)  Source: Clean Catch Clean Catch (Midstream)  Final Report (09 Sep 2022 20:24):    No growth    Culture - Blood (collected 08 Sep 2022 14:30)  Source: .Blood Blood  Preliminary Report (09 Sep 2022 19:02):    No growth to date.        Venous Blood Gas:  09-10-22 @ 05:50  7.37/53/30/31/36.6  VBG Lactate: 2.1      RADIOLOGY & ADDITIONAL TESTS:  Results Reviewed:     Imaging Reviewed:  Electrocardiogram Reviewed:      ------------------------------------------------------------------------------------------------------------  MEDICATIONS  (STANDING):  aMIOdarone    Tablet 400 milliGRAM(s) Oral daily  apixaban 2.5 milliGRAM(s) Oral every 12 hours  atorvastatin 80 milliGRAM(s) Oral at bedtime  buMETAnide 1.5 milliGRAM(s) Oral two times a day  carbidopa/levodopa  25/100 1.5 Tablet(s) Oral daily  carbidopa/levodopa  25/100 1 Tablet(s) Oral <User Schedule>  clopidogrel Tablet 75 milliGRAM(s) Oral daily  clotrimazole 1% Cream 1 Application(s) Topical every 12 hours  desvenlafaxine ER 50 milliGRAM(s) Oral daily  mirtazapine 7.5 milliGRAM(s) Oral at bedtime  OLANZapine 2.5 milliGRAM(s) Oral at bedtime  polyethylene glycol 3350 17 Gram(s) Oral two times a day  potassium chloride    Tablet ER 20 milliEquivalent(s) Oral every 2 hours  senna 2 Tablet(s) Oral at bedtime  Tafamidis 61 milliGRAM(s)   Oral daily    MEDICATIONS  (PRN):    ------------------------------------------------------------------------------------------------------------  COORDINATION OF CARE:  Care discussed with consultants/other providers and notes reviewed [Y]

## 2022-09-11 LAB
ALBUMIN SERPL ELPH-MCNC: 3.2 G/DL — LOW (ref 3.3–5)
ALP SERPL-CCNC: 136 U/L — HIGH (ref 40–120)
ALT FLD-CCNC: <5 U/L — LOW (ref 4–41)
ANION GAP SERPL CALC-SCNC: 12 MMOL/L — SIGNIFICANT CHANGE UP (ref 7–14)
AST SERPL-CCNC: 17 U/L — SIGNIFICANT CHANGE UP (ref 4–40)
BASOPHILS # BLD AUTO: 0.05 K/UL — SIGNIFICANT CHANGE UP (ref 0–0.2)
BASOPHILS NFR BLD AUTO: 0.6 % — SIGNIFICANT CHANGE UP (ref 0–2)
BILIRUB SERPL-MCNC: 0.8 MG/DL — SIGNIFICANT CHANGE UP (ref 0.2–1.2)
BUN SERPL-MCNC: 19 MG/DL — SIGNIFICANT CHANGE UP (ref 7–23)
CALCIUM SERPL-MCNC: 8.9 MG/DL — SIGNIFICANT CHANGE UP (ref 8.4–10.5)
CHLORIDE SERPL-SCNC: 103 MMOL/L — SIGNIFICANT CHANGE UP (ref 98–107)
CO2 SERPL-SCNC: 26 MMOL/L — SIGNIFICANT CHANGE UP (ref 22–31)
CREAT SERPL-MCNC: 1.91 MG/DL — HIGH (ref 0.5–1.3)
EGFR: 35 ML/MIN/1.73M2 — LOW
EOSINOPHIL # BLD AUTO: 0.05 K/UL — SIGNIFICANT CHANGE UP (ref 0–0.5)
EOSINOPHIL NFR BLD AUTO: 0.6 % — SIGNIFICANT CHANGE UP (ref 0–6)
GLUCOSE SERPL-MCNC: 84 MG/DL — SIGNIFICANT CHANGE UP (ref 70–99)
HCT VFR BLD CALC: 40.9 % — SIGNIFICANT CHANGE UP (ref 39–50)
HGB BLD-MCNC: 11.7 G/DL — LOW (ref 13–17)
IANC: 5.8 K/UL — SIGNIFICANT CHANGE UP (ref 1.8–7.4)
IMM GRANULOCYTES NFR BLD AUTO: 0.4 % — SIGNIFICANT CHANGE UP (ref 0–1.5)
LYMPHOCYTES # BLD AUTO: 1.44 K/UL — SIGNIFICANT CHANGE UP (ref 1–3.3)
LYMPHOCYTES # BLD AUTO: 18 % — SIGNIFICANT CHANGE UP (ref 13–44)
MAGNESIUM SERPL-MCNC: 2.4 MG/DL — SIGNIFICANT CHANGE UP (ref 1.6–2.6)
MCHC RBC-ENTMCNC: 23.8 PG — LOW (ref 27–34)
MCHC RBC-ENTMCNC: 28.6 GM/DL — LOW (ref 32–36)
MCV RBC AUTO: 83.1 FL — SIGNIFICANT CHANGE UP (ref 80–100)
MONOCYTES # BLD AUTO: 0.62 K/UL — SIGNIFICANT CHANGE UP (ref 0–0.9)
MONOCYTES NFR BLD AUTO: 7.8 % — SIGNIFICANT CHANGE UP (ref 2–14)
NEUTROPHILS # BLD AUTO: 5.8 K/UL — SIGNIFICANT CHANGE UP (ref 1.8–7.4)
NEUTROPHILS NFR BLD AUTO: 72.6 % — SIGNIFICANT CHANGE UP (ref 43–77)
NRBC # BLD: 0 /100 WBCS — SIGNIFICANT CHANGE UP (ref 0–0)
NRBC # FLD: 0 K/UL — SIGNIFICANT CHANGE UP (ref 0–0)
PHOSPHATE SERPL-MCNC: 3.1 MG/DL — SIGNIFICANT CHANGE UP (ref 2.5–4.5)
PLATELET # BLD AUTO: 318 K/UL — SIGNIFICANT CHANGE UP (ref 150–400)
POTASSIUM SERPL-MCNC: 3.2 MMOL/L — LOW (ref 3.5–5.3)
POTASSIUM SERPL-SCNC: 3.2 MMOL/L — LOW (ref 3.5–5.3)
PROT SERPL-MCNC: 6.5 G/DL — SIGNIFICANT CHANGE UP (ref 6–8.3)
RBC # BLD: 4.92 M/UL — SIGNIFICANT CHANGE UP (ref 4.2–5.8)
RBC # FLD: 16.6 % — HIGH (ref 10.3–14.5)
SARS-COV-2 RNA SPEC QL NAA+PROBE: SIGNIFICANT CHANGE UP
SODIUM SERPL-SCNC: 141 MMOL/L — SIGNIFICANT CHANGE UP (ref 135–145)
WBC # BLD: 7.99 K/UL — SIGNIFICANT CHANGE UP (ref 3.8–10.5)
WBC # FLD AUTO: 7.99 K/UL — SIGNIFICANT CHANGE UP (ref 3.8–10.5)

## 2022-09-11 PROCEDURE — 99233 SBSQ HOSP IP/OBS HIGH 50: CPT | Mod: GC

## 2022-09-11 RX ORDER — POTASSIUM CHLORIDE 20 MEQ
40 PACKET (EA) ORAL EVERY 4 HOURS
Refills: 0 | Status: COMPLETED | OUTPATIENT
Start: 2022-09-11 | End: 2022-09-11

## 2022-09-11 RX ORDER — POLYETHYLENE GLYCOL 3350 17 G/17G
17 POWDER, FOR SOLUTION ORAL DAILY
Refills: 0 | Status: DISCONTINUED | OUTPATIENT
Start: 2022-09-11 | End: 2022-09-12

## 2022-09-11 RX ORDER — IRON SUCROSE 20 MG/ML
200 INJECTION, SOLUTION INTRAVENOUS EVERY 24 HOURS
Refills: 0 | Status: DISCONTINUED | OUTPATIENT
Start: 2022-09-11 | End: 2022-09-12

## 2022-09-11 RX ORDER — CARBIDOPA AND LEVODOPA 25; 100 MG/1; MG/1
1.5 TABLET ORAL
Refills: 0 | Status: DISCONTINUED | OUTPATIENT
Start: 2022-09-12 | End: 2022-09-12

## 2022-09-11 RX ADMIN — ATORVASTATIN CALCIUM 80 MILLIGRAM(S): 80 TABLET, FILM COATED ORAL at 21:09

## 2022-09-11 RX ADMIN — Medication 1 APPLICATION(S): at 17:34

## 2022-09-11 RX ADMIN — CARBIDOPA AND LEVODOPA 1 TABLET(S): 25; 100 TABLET ORAL at 17:35

## 2022-09-11 RX ADMIN — CARBIDOPA AND LEVODOPA 1 TABLET(S): 25; 100 TABLET ORAL at 11:57

## 2022-09-11 RX ADMIN — CLOPIDOGREL BISULFATE 75 MILLIGRAM(S): 75 TABLET, FILM COATED ORAL at 11:55

## 2022-09-11 RX ADMIN — CARBIDOPA AND LEVODOPA 1 TABLET(S): 25; 100 TABLET ORAL at 14:30

## 2022-09-11 RX ADMIN — SENNA PLUS 2 TABLET(S): 8.6 TABLET ORAL at 21:09

## 2022-09-11 RX ADMIN — DESVENLAFAXINE 50 MILLIGRAM(S): 50 TABLET, EXTENDED RELEASE ORAL at 11:55

## 2022-09-11 RX ADMIN — APIXABAN 2.5 MILLIGRAM(S): 2.5 TABLET, FILM COATED ORAL at 17:33

## 2022-09-11 RX ADMIN — Medication 40 MILLIEQUIVALENT(S): at 14:30

## 2022-09-11 RX ADMIN — Medication 1 APPLICATION(S): at 06:40

## 2022-09-11 RX ADMIN — CARBIDOPA AND LEVODOPA 1 TABLET(S): 25; 100 TABLET ORAL at 21:09

## 2022-09-11 RX ADMIN — OLANZAPINE 2.5 MILLIGRAM(S): 15 TABLET, FILM COATED ORAL at 21:09

## 2022-09-11 RX ADMIN — AMIODARONE HYDROCHLORIDE 400 MILLIGRAM(S): 400 TABLET ORAL at 06:40

## 2022-09-11 RX ADMIN — MIRTAZAPINE 7.5 MILLIGRAM(S): 45 TABLET, ORALLY DISINTEGRATING ORAL at 21:09

## 2022-09-11 RX ADMIN — BUMETANIDE 1.5 MILLIGRAM(S): 0.25 INJECTION INTRAMUSCULAR; INTRAVENOUS at 06:39

## 2022-09-11 RX ADMIN — IRON SUCROSE 110 MILLIGRAM(S): 20 INJECTION, SOLUTION INTRAVENOUS at 17:33

## 2022-09-11 RX ADMIN — CARBIDOPA AND LEVODOPA 1.5 TABLET(S): 25; 100 TABLET ORAL at 11:56

## 2022-09-11 RX ADMIN — APIXABAN 2.5 MILLIGRAM(S): 2.5 TABLET, FILM COATED ORAL at 06:39

## 2022-09-11 RX ADMIN — BUMETANIDE 1.5 MILLIGRAM(S): 0.25 INJECTION INTRAMUSCULAR; INTRAVENOUS at 14:30

## 2022-09-11 RX ADMIN — Medication 40 MILLIEQUIVALENT(S): at 17:34

## 2022-09-11 NOTE — PROGRESS NOTE ADULT - PROBLEM SELECTOR PLAN 6
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 Now with weakness, but w/o SOB, peripheral edema, other evidence of HFe.   OP Gen Cards: Dr. Boucher ([facility])  OP Amyloid Specialist: Dr. Randolph ([facility])  - Attempted gen cards involvement, declined at this time  - C/W Bumetanide 1.5mg PO BID (home dose 3mg PO QD)  - C/W Home Tafamidis ("Vyndamax")   - C/W Clopidogrel 75mg PO QD    #HTN #HLD  Pt with h/o HTN, HLD. SBP basically at goal for age, hospitalization. Manage as per HFmEF  - C/W Home Atorvastatin 80mg PO QHS

## 2022-09-11 NOTE — PROGRESS NOTE ADULT - PROBLEM SELECTOR PLAN 7
n/a Electrophysiology  #pAF #CHB s/p PPM ([employer]) #Prolonged QTc  Pt started on Amiodarone for persistent AF underlying rhythm c/b significant RV pacing c/b worsening CM ico cardiac amyloidosis. .   OP EP: Dr. Powell  - EP C/S, Appreciate Recs  - reSTART Home Apixaban 2.5mg PO BID  - C/W Amiodarone 400mg PO QD (through sept), then 200mg PO QD  - appreciate EP comment on QT - not prolonged

## 2022-09-11 NOTE — PROGRESS NOTE ADULT - ASSESSMENT
81-year-old male with Parkinson's c/b dementia (AOx2 at baseline) & mood disturbance, AFib (on Apixaban, Amio), 2nd degree/CHB s/p [employer] Accolade MRI PPM (10/2021), CAD s/p stent (8/31/2021), TTR cardiac amyloidosis (10/6/21), HFmEF (45%), chronic right pleural effusion s/p pleurx, HTN, HLD, BPH who p/w 2-5 days of progressive weakness, lethargy ico chronic hematuria, intermittent hypokalemia. He is hemodynamically stable while we clarify the contributions of these multiple medical conditions and correct his electrolyte disturbances.

## 2022-09-11 NOTE — PROGRESS NOTE ADULT - PROBLEM SELECTOR PLAN 3
#Groin Rash  Well-demarcated, erythematous rash with peripheral raised edges. May have incontinence-associated dermatitis vs candidal intertrigo.  - Wound C/S, Appreciate Recs  - Barrier creams  - START Clotrimazole 1% crm TOP BID #Groin Rash  Well-demarcated, erythematous rash with peripheral raised edges. May have incontinence-associated dermatitis vs candidal intertrigo.  - Wound C/S, Appreciate Recs  - Barrier creams  - C/W Clotrimazole 1% crm TOP BID

## 2022-09-11 NOTE — PROGRESS NOTE ADULT - SUBJECTIVE AND OBJECTIVE BOX
Medicine Progress Note  --------------------  Randall Hansen M.D.  Internal Medicine/Emergency Medicine  PGY-2  --------------------      Patient: ALBINO RIVAS, MRN: [id number], : 1940  Admitted on 22 for Disorientation      LANGUAGE - English ]OR[ PI (_): #                ------------------------------------------------------------------------------------------------------------  SUMMARY (from last progress note through 22 @ 07:14):   -  ------------------------------------------------------------------------------------------------------------  OVERNIGHT EVENTS  NAEON    SUBJECTIVE  -       ROS negative unless noted above.  ------------------------------------------------------------------------------------------------------------  OBJECTIVE:  Physical Exam  CONST:     NAD, well-appearing; well-developed; appears stated age  EYES:         Conjunctiva clear; PERRL; no conjunctival pallor; no lid lag  ENMT:       MMM, no pharyngeal injection or exudates; normal dentition/dentures present  NECK:        Supple, no LAD; no palpable masses; no thyromegaly  RESP :        Normal respiratory effort; CTA bilaterally; no W/R/R  CHEST:       No TTP, no lines/ports; symmetric chest expansion  CARDIO:     RRR, normal S1 and S2, no M/R/G; apical impulse at MCL  VASC:         No JVD/AJR, no peripheral edema, pulses 2+ B/L, Cap refill <2s  ABD:           Soft, NT/ND, norm bowel sounds; no R/G; No HSM; No CVAT  MSK:          No clubbing of digits; no joint swelling or TTP  EXT:           WWP, no reduction in body hair, no LE skin changes  PSYCH        A&O to person, place, and time; affect appropriate  NEURO:     Non-focal; no gross sensory deficits; moving all extremities   SKIN:          No rashes; no palpable lesions; axillae not dry    Vital Signs Last 24 Hrs  T(F): 97.6, Max: 98.5 (09-10-22 @ 21:31)  HR: 60 (60 - 65)  BP: 105/60 (103/60 - 134/63)  RR: 18 (17 - 18)  SpO2: 99% (98% - 99%)        DAILY MEASUREMENTS:  I&O's Summary    Daily     Daily   Weight (kg): 78.5 (22 @ 02:05)  Orthostatic VS        LABS:                        11.9   10.49 )-----------( 342      ( 10 Sep 2022 06:10 )             40.9     Hgb Trend: 11.9<--, 10.4<--, 10.7<--  09-10    142  |  103  |  22  ----------------------------<  95  3.5   |  26  |  2.00<H>    Ca    9.5      10 Sep 2022 06:10  Phos  2.9     09-10  Mg     2.50     09-10    TPro  7.3  /  Alb  3.9  /  TBili  0.8  /  DBili  x   /  AST  22  /  ALT  7   /  AlkPhos  154<H>  09-10    PT/INR - ( 09 Sep 2022 08:40 )   PT: 19.2 sec;   INR: 1.65 ratio         PTT - ( 09 Sep 2022 08:40 )  PTT:37.1 sec  CAPILLARY BLOOD GLUCOSE                Culture - Blood (collected 08 Sep 2022 15:00)  Source: .Blood Blood-Peripheral  Preliminary Report (09 Sep 2022 19:02):    No growth to date.    Culture - Urine (collected 08 Sep 2022 15:00)  Source: Clean Catch Clean Catch (Midstream)  Final Report (09 Sep 2022 20:24):    No growth    Culture - Blood (collected 08 Sep 2022 14:30)  Source: .Blood Blood  Preliminary Report (09 Sep 2022 19:02):    No growth to date.            RADIOLOGY & ADDITIONAL TESTS:  Results Reviewed:     Imaging Reviewed:  Electrocardiogram Reviewed:      ------------------------------------------------------------------------------------------------------------  MEDICATIONS  (STANDING):  aMIOdarone    Tablet 400 milliGRAM(s) Oral daily  apixaban 2.5 milliGRAM(s) Oral every 12 hours  atorvastatin 80 milliGRAM(s) Oral at bedtime  buMETAnide 1.5 milliGRAM(s) Oral two times a day  carbidopa/levodopa  25/100 1.5 Tablet(s) Oral daily  carbidopa/levodopa  25/100 1 Tablet(s) Oral <User Schedule>  clopidogrel Tablet 75 milliGRAM(s) Oral daily  clotrimazole 1% Cream 1 Application(s) Topical every 12 hours  desvenlafaxine ER 50 milliGRAM(s) Oral daily  mirtazapine 7.5 milliGRAM(s) Oral at bedtime  OLANZapine 2.5 milliGRAM(s) Oral at bedtime  polyethylene glycol 3350 17 Gram(s) Oral two times a day  senna 2 Tablet(s) Oral at bedtime  Tafamidis 61 milliGRAM(s) 1 Capsule(s) Oral daily    MEDICATIONS  (PRN):    ------------------------------------------------------------------------------------------------------------  COORDINATION OF CARE:  Care discussed with consultants/other providers and notes reviewed [Y]     Medicine Progress Note  --------------------  Randall Hansen M.D.  Internal Medicine/Emergency Medicine  PGY-2  --------------------      Patient: ABLINO RIVAS, MRN: [id number], : 1940  Admitted on 22 for Disorientation      LANGUAGE - English ]OR[ PI (_): #                ------------------------------------------------------------------------------------------------------------  SUMMARY (from last progress note through 22 @ 07:14):   - P/w weakness, improving. likely electrolyte imbalance - etiology ? Bumex vs other   - Braden + B1 pending  ------------------------------------------------------------------------------------------------------------  OVERNIGHT EVENTS  NAEON    SUBJECTIVE  Mr. Rivas says that he feels fine; he doesn't know why he's in the hospital. He says that his groin rash is improved.       ROS negative unless noted above.  ------------------------------------------------------------------------------------------------------------  OBJECTIVE:  Physical Exam  GEN: Awake, AOx3, NAD.  HEENT: NCAT  ---EYES: no scleral icterus, EOMI, PERRLA  CARDIO: RRR.   RESP: Normal respiratory effort  ABD: Soft, NTND.  MSK: No obvious deformity or ROM deficit. 2+ pulses x4. No edema.  SKIN: Warm, dry. No rashes. R groin rash improved with barrier protection, clotrimazole  NEURO: Moves all four extremities spontaneously  PSYCH: Appropriate mood & affect.       Vital Signs Last 24 Hrs  T(F): 97.6, Max: 98.5 (09-10-22 @ 21:31)  HR: 60 (60 - 65)  BP: 105/60 (103/60 - 134/63)  RR: 18 (17 - 18)  SpO2: 99% (98% - 99%)        DAILY MEASUREMENTS:  I&O's Summary    Daily     Daily   Weight (kg): 78.5 (22 @ 02:05)  Orthostatic VS        LABS:                        11.9   10.49 )-----------( 342      ( 10 Sep 2022 06:10 )             40.9     Hgb Trend: 11.9<--, 10.4<--, 10.7<--  09-10    142  |  103  |  22  ----------------------------<  95  3.5   |  26  |  2.00<H>    Ca    9.5      10 Sep 2022 06:10  Phos  2.9     09-10  Mg     2.50     09-10    TPro  7.3  /  Alb  3.9  /  TBili  0.8  /  DBili  x   /  AST  22  /  ALT  7   /  AlkPhos  154<H>  10    PT/INR - ( 09 Sep 2022 08:40 )   PT: 19.2 sec;   INR: 1.65 ratio         PTT - ( 09 Sep 2022 08:40 )  PTT:37.1 sec  CAPILLARY BLOOD GLUCOSE    Culture - Blood (collected 08 Sep 2022 15:00)  Source: .Blood Blood-Peripheral  Preliminary Report (09 Sep 2022 19:02):    No growth to date.    Culture - Urine (collected 08 Sep 2022 15:00)  Source: Clean Catch Clean Catch (Midstream)  Final Report (09 Sep 2022 20:24):    No growth    Culture - Blood (collected 08 Sep 2022 14:30)  Source: .Blood Blood  Preliminary Report (09 Sep 2022 19:02):    No growth to date.    RADIOLOGY & ADDITIONAL TESTS:  Results Reviewed:  n/A  Imaging Reviewed: N/A  Electrocardiogram Reviewed: N/A      ------------------------------------------------------------------------------------------------------------  MEDICATIONS  (STANDING):  aMIOdarone    Tablet 400 milliGRAM(s) Oral daily  apixaban 2.5 milliGRAM(s) Oral every 12 hours  atorvastatin 80 milliGRAM(s) Oral at bedtime  buMETAnide 1.5 milliGRAM(s) Oral two times a day  carbidopa/levodopa  25/100 1.5 Tablet(s) Oral daily  carbidopa/levodopa  25/100 1 Tablet(s) Oral <User Schedule>  clopidogrel Tablet 75 milliGRAM(s) Oral daily  clotrimazole 1% Cream 1 Application(s) Topical every 12 hours  desvenlafaxine ER 50 milliGRAM(s) Oral daily  mirtazapine 7.5 milliGRAM(s) Oral at bedtime  OLANZapine 2.5 milliGRAM(s) Oral at bedtime  polyethylene glycol 3350 17 Gram(s) Oral two times a day  senna 2 Tablet(s) Oral at bedtime  Tafamidis 61 milliGRAM(s) 1 Capsule(s) Oral daily    MEDICATIONS  (PRN):    ------------------------------------------------------------------------------------------------------------  COORDINATION OF CARE:  Care discussed with consultants/other providers and notes reviewed [Y]

## 2022-09-11 NOTE — PROGRESS NOTE ADULT - PROBLEM SELECTOR PLAN 2
#Hypokalemia  Pt with K 2.8. may be 2/2 wasting ico Bumetanide use. This will be the 3rd total admission in ~1 year. Notably, Amio started only recently, so not likely related to any amio-induced inflammatory responses. Hasn't had endocrine workup for hyperaldosteronism historically on review. DDx: K shifts (e.g. acid/base disturbances, medication-induced), increased utilization (RBC production ico bleeding, thyrotoxicosis ico amio - less likely ico normal TSH). Also considering fluid losses ico pleurex catheter.   Diagnostics  - TSH WNL   - RBCs low, relatively decreased from baseline normal in May, may have production = increased use of potassium  - If persistently low despite repletion (i.e. paradoxical hypokalemia), will have to check T3/T4 r/o occult thyrotoxicosis with normal TSH    Therapeutics  - Replete K > 4 #Hypokalemia  Pt with K 2.8. may be 2/2 wasting ico Bumetanide use. This will be the 3rd total admission in ~1 year. Notably, Amio started only recently, so not likely related to any amio-induced inflammatory responses. Hasn't had endocrine workup for hyperaldosteronism historically on review. DDx: K shifts (e.g. acid/base disturbances, medication-induced), increased utilization (RBC production ico bleeding, thyrotoxicosis ico amio - less likely ico normal TSH). Also considering fluid losses ico pleurex catheter.   Diagnostics  - TSH WNL   - RBCs low, relatively decreased from baseline normal in May, may have production = increased use of potassium  - If persistently low despite repletion (i.e. paradoxical hypokalemia), will have to check T3/T4 r/o occult thyroid issues with normal TSH    Therapeutics  - Replete K > 4

## 2022-09-11 NOTE — PROGRESS NOTE ADULT - PROBLEM SELECTOR PLAN 11
Hospital Bundle  Fluids: PO ad andrey  Electrolytes: Replete K > 4, Mg > 2, Phos > 3  Nutrition: Diet   PPX  ---VTE: eliquis  ---GI: Diet, may consider PPI if c/f GIB continues  Access: PIV  Code Status: FULL  Dispo: Pending medical evaluation hearing voices to kill himself for 3 days , admit drinking beer daily about 10-15 bottles , last drink yesterday

## 2022-09-11 NOTE — PROGRESS NOTE ADULT - PROBLEM SELECTOR PLAN 1
#Weakness  Subacute weakness / lethargy ico hypokalemia, likely primarily electrolyte abnl, but also with groin rash (?delirium component to AMS ico irritation / infection), progression of underlying PD. But given underlying severe cardiac dz & Parkinson's will keep broad differential.  Diagnostics:  - trend BMP, replete K > 4  - Neuro C/S, Appreciate Recs (as per Parkinson's dz)  - PT C/S, rec ALEXANDRA    Therapeutics: pending evaluation

## 2022-09-12 ENCOUNTER — TRANSCRIPTION ENCOUNTER (OUTPATIENT)
Age: 82
End: 2022-09-12

## 2022-09-12 VITALS
DIASTOLIC BLOOD PRESSURE: 52 MMHG | RESPIRATION RATE: 16 BRPM | TEMPERATURE: 98 F | SYSTOLIC BLOOD PRESSURE: 115 MMHG | HEART RATE: 60 BPM | OXYGEN SATURATION: 100 %

## 2022-09-12 LAB
ALBUMIN SERPL ELPH-MCNC: 3.1 G/DL — LOW (ref 3.3–5)
ALDOST SERPL-MCNC: 61.5 NG/DL — HIGH
ALP SERPL-CCNC: 155 U/L — HIGH (ref 40–120)
ALT FLD-CCNC: <5 U/L — SIGNIFICANT CHANGE UP (ref 4–41)
ANION GAP SERPL CALC-SCNC: 9 MMOL/L — SIGNIFICANT CHANGE UP (ref 7–14)
AST SERPL-CCNC: 19 U/L — SIGNIFICANT CHANGE UP (ref 4–40)
BASE EXCESS BLDV CALC-SCNC: -1.1 MMOL/L — SIGNIFICANT CHANGE UP (ref -2–3)
BASE EXCESS BLDV CALC-SCNC: 5.2 MMOL/L — HIGH (ref -2–3)
BASOPHILS # BLD AUTO: 0.05 K/UL — SIGNIFICANT CHANGE UP (ref 0–0.2)
BASOPHILS NFR BLD AUTO: 0.6 % — SIGNIFICANT CHANGE UP (ref 0–2)
BILIRUB SERPL-MCNC: 0.8 MG/DL — SIGNIFICANT CHANGE UP (ref 0.2–1.2)
BLOOD GAS VENOUS COMPREHENSIVE RESULT: SIGNIFICANT CHANGE UP
BLOOD GAS VENOUS COMPREHENSIVE RESULT: SIGNIFICANT CHANGE UP
BUN SERPL-MCNC: 17 MG/DL — SIGNIFICANT CHANGE UP (ref 7–23)
CALCIUM SERPL-MCNC: 8.8 MG/DL — SIGNIFICANT CHANGE UP (ref 8.4–10.5)
CHLORIDE BLDV-SCNC: 104 MMOL/L — SIGNIFICANT CHANGE UP (ref 96–108)
CHLORIDE BLDV-SCNC: 105 MMOL/L — SIGNIFICANT CHANGE UP (ref 96–108)
CHLORIDE SERPL-SCNC: 104 MMOL/L — SIGNIFICANT CHANGE UP (ref 98–107)
CO2 BLDV-SCNC: 30.9 MMOL/L — HIGH (ref 22–26)
CO2 BLDV-SCNC: 32.5 MMOL/L — HIGH (ref 22–26)
CO2 SERPL-SCNC: 28 MMOL/L — SIGNIFICANT CHANGE UP (ref 22–31)
CREAT SERPL-MCNC: 1.82 MG/DL — HIGH (ref 0.5–1.3)
EGFR: 37 ML/MIN/1.73M2 — LOW
EOSINOPHIL # BLD AUTO: 0.06 K/UL — SIGNIFICANT CHANGE UP (ref 0–0.5)
EOSINOPHIL NFR BLD AUTO: 0.7 % — SIGNIFICANT CHANGE UP (ref 0–6)
GAS PNL BLDV: 132 MMOL/L — LOW (ref 136–145)
GAS PNL BLDV: 140 MMOL/L — SIGNIFICANT CHANGE UP (ref 136–145)
GAS PNL BLDV: SIGNIFICANT CHANGE UP
GLUCOSE BLDV-MCNC: 87 MG/DL — SIGNIFICANT CHANGE UP (ref 70–99)
GLUCOSE BLDV-MCNC: 93 MG/DL — SIGNIFICANT CHANGE UP (ref 70–99)
GLUCOSE SERPL-MCNC: 85 MG/DL — SIGNIFICANT CHANGE UP (ref 70–99)
HCO3 BLDV-SCNC: 29 MMOL/L — SIGNIFICANT CHANGE UP (ref 22–29)
HCO3 BLDV-SCNC: 31 MMOL/L — HIGH (ref 22–29)
HCT VFR BLD CALC: 35.7 % — LOW (ref 39–50)
HCT VFR BLDA CALC: 33 % — LOW (ref 39–51)
HCT VFR BLDA CALC: 35 % — LOW (ref 39–51)
HGB BLD CALC-MCNC: 11.1 G/DL — LOW (ref 13–17)
HGB BLD CALC-MCNC: 11.8 G/DL — LOW (ref 13–17)
HGB BLD-MCNC: 10.3 G/DL — LOW (ref 13–17)
IANC: 5.87 K/UL — SIGNIFICANT CHANGE UP (ref 1.8–7.4)
IMM GRANULOCYTES NFR BLD AUTO: 0.2 % — SIGNIFICANT CHANGE UP (ref 0–1.5)
LACTATE BLDV-MCNC: 1.3 MMOL/L — SIGNIFICANT CHANGE UP (ref 0.5–2)
LACTATE BLDV-MCNC: 1.7 MMOL/L — SIGNIFICANT CHANGE UP (ref 0.5–2)
LYMPHOCYTES # BLD AUTO: 1.47 K/UL — SIGNIFICANT CHANGE UP (ref 1–3.3)
LYMPHOCYTES # BLD AUTO: 17.3 % — SIGNIFICANT CHANGE UP (ref 13–44)
MAGNESIUM SERPL-MCNC: 2.3 MG/DL — SIGNIFICANT CHANGE UP (ref 1.6–2.6)
MCHC RBC-ENTMCNC: 24.1 PG — LOW (ref 27–34)
MCHC RBC-ENTMCNC: 28.9 GM/DL — LOW (ref 32–36)
MCV RBC AUTO: 83.6 FL — SIGNIFICANT CHANGE UP (ref 80–100)
MONOCYTES # BLD AUTO: 1.05 K/UL — HIGH (ref 0–0.9)
MONOCYTES NFR BLD AUTO: 12.3 % — SIGNIFICANT CHANGE UP (ref 2–14)
NEUTROPHILS # BLD AUTO: 5.87 K/UL — SIGNIFICANT CHANGE UP (ref 1.8–7.4)
NEUTROPHILS NFR BLD AUTO: 68.9 % — SIGNIFICANT CHANGE UP (ref 43–77)
NRBC # BLD: 0 /100 WBCS — SIGNIFICANT CHANGE UP (ref 0–0)
NRBC # FLD: 0 K/UL — SIGNIFICANT CHANGE UP (ref 0–0)
PCO2 BLDV: 50 MMHG — SIGNIFICANT CHANGE UP (ref 42–55)
PCO2 BLDV: 75 MMHG — HIGH (ref 42–55)
PH BLDV: 7.19 — LOW (ref 7.32–7.43)
PH BLDV: 7.4 — SIGNIFICANT CHANGE UP (ref 7.32–7.43)
PHOSPHATE SERPL-MCNC: 2.8 MG/DL — SIGNIFICANT CHANGE UP (ref 2.5–4.5)
PLATELET # BLD AUTO: 277 K/UL — SIGNIFICANT CHANGE UP (ref 150–400)
PO2 BLDV: 28 MMHG — SIGNIFICANT CHANGE UP
PO2 BLDV: 33 MMHG — SIGNIFICANT CHANGE UP
POTASSIUM BLDV-SCNC: 3.4 MMOL/L — LOW (ref 3.5–5.1)
POTASSIUM BLDV-SCNC: >11 MMOL/L — CRITICAL HIGH (ref 3.5–5.1)
POTASSIUM SERPL-MCNC: 3.7 MMOL/L — SIGNIFICANT CHANGE UP (ref 3.5–5.3)
POTASSIUM SERPL-SCNC: 3.7 MMOL/L — SIGNIFICANT CHANGE UP (ref 3.5–5.3)
PROT SERPL-MCNC: 6.5 G/DL — SIGNIFICANT CHANGE UP (ref 6–8.3)
RBC # BLD: 4.27 M/UL — SIGNIFICANT CHANGE UP (ref 4.2–5.8)
RBC # FLD: 16.9 % — HIGH (ref 10.3–14.5)
SAO2 % BLDV: 37.9 % — SIGNIFICANT CHANGE UP
SAO2 % BLDV: 38.4 % — SIGNIFICANT CHANGE UP
SODIUM SERPL-SCNC: 141 MMOL/L — SIGNIFICANT CHANGE UP (ref 135–145)
WBC # BLD: 8.52 K/UL — SIGNIFICANT CHANGE UP (ref 3.8–10.5)
WBC # FLD AUTO: 8.52 K/UL — SIGNIFICANT CHANGE UP (ref 3.8–10.5)

## 2022-09-12 PROCEDURE — 99239 HOSP IP/OBS DSCHRG MGMT >30: CPT

## 2022-09-12 RX ORDER — TAFAMIDIS 61 MG/1
1 CAPSULE, LIQUID FILLED ORAL
Qty: 0 | Refills: 0 | DISCHARGE

## 2022-09-12 RX ORDER — BUMETANIDE 0.25 MG/ML
1 INJECTION INTRAMUSCULAR; INTRAVENOUS
Qty: 0 | Refills: 0 | DISCHARGE

## 2022-09-12 RX ORDER — POTASSIUM CHLORIDE 20 MEQ
20 PACKET (EA) ORAL DAILY
Refills: 0 | Status: DISCONTINUED | OUTPATIENT
Start: 2022-09-12 | End: 2022-09-12

## 2022-09-12 RX ADMIN — APIXABAN 2.5 MILLIGRAM(S): 2.5 TABLET, FILM COATED ORAL at 05:39

## 2022-09-12 RX ADMIN — AMIODARONE HYDROCHLORIDE 400 MILLIGRAM(S): 400 TABLET ORAL at 05:40

## 2022-09-12 RX ADMIN — Medication 1 APPLICATION(S): at 05:43

## 2022-09-12 RX ADMIN — DESVENLAFAXINE 50 MILLIGRAM(S): 50 TABLET, EXTENDED RELEASE ORAL at 11:25

## 2022-09-12 RX ADMIN — BUMETANIDE 1.5 MILLIGRAM(S): 0.25 INJECTION INTRAMUSCULAR; INTRAVENOUS at 05:40

## 2022-09-12 RX ADMIN — BUMETANIDE 1.5 MILLIGRAM(S): 0.25 INJECTION INTRAMUSCULAR; INTRAVENOUS at 13:07

## 2022-09-12 RX ADMIN — CARBIDOPA AND LEVODOPA 1.5 TABLET(S): 25; 100 TABLET ORAL at 09:37

## 2022-09-12 RX ADMIN — Medication 20 MILLIEQUIVALENT(S): at 11:24

## 2022-09-12 RX ADMIN — CARBIDOPA AND LEVODOPA 1 TABLET(S): 25; 100 TABLET ORAL at 11:25

## 2022-09-12 RX ADMIN — CLOPIDOGREL BISULFATE 75 MILLIGRAM(S): 75 TABLET, FILM COATED ORAL at 11:25

## 2022-09-12 NOTE — DISCHARGE NOTE NURSING/CASE MANAGEMENT/SOCIAL WORK - PATIENT PORTAL LINK FT
You can access the FollowMyHealth Patient Portal offered by [facility] by registering at the following website: [email or web address]. By joining [facility]’s FollowMyHealth portal, you will also be able to view your health information using other applications (apps) compatible with our system.

## 2022-09-12 NOTE — PROGRESS NOTE ADULT - ATTENDING SUPERVISION STATEMENT
Unable to obtain a formal assessment of oral-motor function given patient's difficulty following low-level verbal and visual directives.
Unable to obtain a formal assessment of oral-motor function given patient's difficulty following low-level verbal and visual directives.
intact
Resident

## 2022-09-12 NOTE — DISCHARGE NOTE NURSING/CASE MANAGEMENT/SOCIAL WORK - NSDCPEFALRISK_GEN_ALL_CORE
For information on Fall & Injury Prevention, visit: [email or web address] OR  [email or web address] OR  https://www.cdc.gov/steadi/patient.html

## 2022-09-12 NOTE — PROGRESS NOTE ADULT - PROBLEM SELECTOR PLAN 2
#Hypokalemia  Pt with K 2.8. may be 2/2 wasting ico Bumetanide use. This will be the 3rd total admission in ~1 year. Notably, Amio started only recently, so not likely related to any amio-induced inflammatory responses. Hasn't had endocrine workup for hyperaldosteronism historically on review. DDx: K shifts (e.g. acid/base disturbances, medication-induced), increased utilization (RBC production ico bleeding, thyrotoxicosis ico amio - less likely ico normal TSH). Also considering fluid losses ico pleurex catheter.   Diagnostics  - TSH WNL   - RBCs low, relatively decreased from baseline normal in May, may have production = increased use of potassium  - If persistently low despite repletion (i.e. paradoxical hypokalemia), will have to check T3/T4 r/o occult thyroid issues with normal TSH    Therapeutics  - Replete K > 4 #Hypokalemia  Pt with K 2.8. may be 2/2 wasting ico Bumetanide use. This will be the 3rd total admission in ~1 year. Notably, Amio started only recently, so not likely related to any amio-induced inflammatory responses. Hasn't had endocrine workup for hyperaldosteronism historically on review. DDx: K shifts (e.g. acid/base disturbances, medication-induced), increased utilization (RBC production ico bleeding, thyrotoxicosis ico amio - less likely ico normal TSH). Also considering fluid losses ico pleurex catheter.   - TSH WNL   - RBCs low, relatively decreased from baseline normal in May, may have production = increased use of potassium  - If persistently low despite repletion (i.e. paradoxical hypokalemia), will have to check T3/T4 r/o occult thyroid issues with normal TSH

## 2022-09-12 NOTE — PROGRESS NOTE ADULT - PROBLEM SELECTOR PLAN 7
Electrophysiology  #pAF #CHB s/p PPM ([employer]) #Prolonged QTc  Pt started on Amiodarone for persistent AF underlying rhythm c/b significant RV pacing c/b worsening CM ico cardiac amyloidosis. .   OP EP: Dr. Powell  - EP C/S, Appreciate Recs  - reSTART Home Apixaban 2.5mg PO BID  - C/W Amiodarone 400mg PO QD (through sept), then 200mg PO QD  - appreciate EP comment on QT - not prolonged

## 2022-09-12 NOTE — PROGRESS NOTE ADULT - PROBLEM SELECTOR PLAN 6
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 Now with weakness, but w/o SOB, peripheral edema, other evidence of HFe.   OP Gen Cards: Dr. Boucher ([facility])  OP Amyloid Specialist: Dr. Randolph ([facility])  - Attempted gen cards involvement, declined at this time  - C/W Bumetanide 1.5mg PO BID (home dose 3mg PO QD)  - C/W Home Tafamidis ("Vyndamax")   - C/W Clopidogrel 75mg PO QD    #HTN #HLD  Pt with h/o HTN, HLD. SBP basically at goal for age, hospitalization. Manage as per HFmEF  - C/W Home Atorvastatin 80mg PO QHS

## 2022-09-12 NOTE — PROGRESS NOTE ADULT - ATTENDING COMMENTS
Pt feeling well. Now states he would prefer not to go to [facility]. Has had 24-aides at home. Will work with care coordination team to arrange these services.
Pt without significant clinical change since yesterday, but appears generally comfortable. ALAYNA slowly improving, hgb stable. Anticipate ALEXANDRA.
Pt currently comfortable. Ambulated only 5 feet with PT, recommending ALEXANDRA. Continue to monitor hgb, Cr, K+. F/u Urology recommendations. Continue supportive care and discussions with family regarding goals of care.
ALAYNA improving, no further hematuria  Patient refused ALEXANDRA, plans to resume 24 hours home health aides. CM made aware  DC time: 33 min

## 2022-09-12 NOTE — PROGRESS NOTE ADULT - PROBLEM SELECTOR PLAN 1
#Weakness  Subacute weakness / lethargy ico hypokalemia, likely primarily electrolyte abnl, but also with groin rash (?delirium component to AMS ico irritation / infection), progression of underlying PD. But given underlying severe cardiac dz & Parkinson's will keep broad differential.  Diagnostics:  - trend BMP, replete K > 4  - Neuro C/S, Appreciate Recs (as per Parkinson's dz)  - PT C/S, rec ALEXANDRA    Therapeutics: pending evaluation #Weakness  Subacute weakness / lethargy ico hypokalemia, likely primarily electrolyte abnl, but also with groin rash (?delirium component to AMS ico irritation / infection), progression of underlying PD. But given underlying severe cardiac dz & Parkinson's will keep broad differential.  - trend BMP, replete K > 4  - Neuro C/S, Appreciate Recs (as per Parkinson's dz)  - PT C/S, rec ALEXANDRA

## 2022-09-12 NOTE — PROGRESS NOTE ADULT - PROBLEM SELECTOR PLAN 3
#Groin Rash  Well-demarcated, erythematous rash with peripheral raised edges. May have incontinence-associated dermatitis vs candidal intertrigo.  - Wound C/S, Appreciate Recs  - Barrier creams  - C/W Clotrimazole 1% crm TOP BID

## 2022-09-12 NOTE — PROGRESS NOTE ADULT - SUBJECTIVE AND OBJECTIVE BOX
Patient is a 81y old  Male who presents with a chief complaint of weakness (11 Sep 2022 07:14)      INTERVAL HPI/OVERNIGHT EVENTS:      REVIEW OF SYSTEMS:  CONSTITUTIONAL: No fever, weight loss, or fatigue  EYES: No eye pain, visual disturbances, or discharge  ENMT:  No difficulty hearing, tinnitus, vertigo; No sinus or throat pain  NECK: No pain or stiffness  BREASTS: No pain, masses, or nipple discharge  RESPIRATORY: No cough, wheezing, chills or hemoptysis; No shortness of breath  CARDIOVASCULAR: No chest pain, palpitations, dizziness, or leg swelling  GASTROINTESTINAL: No abdominal or epigastric pain. No nausea, vomiting, or hematemesis; No diarrhea or constipation. No melena or hematochezia.  GENITOURINARY: No dysuria, frequency, hematuria, or incontinence  NEUROLOGICAL: No headaches, memory loss, loss of strength, numbness, or tremors  SKIN: No itching, burning, rashes, or lesions   LYMPH NODES: No enlarged glands  ENDOCRINE: No heat or cold intolerance; No hair loss  MUSCULOSKELETAL: No joint pain or swelling; No muscle, back, or extremity pain  PSYCHIATRIC: No depression, anxiety, mood swings, or difficulty sleeping  HEME/LYMPH: No easy bruising, or bleeding gums  ALLERY AND IMMUNOLOGIC: No hives or eczema    T(C): 36.8 (09-12-22 @ 05:40), Max: 36.8 (09-12-22 @ 05:40)  HR: 60 (09-12-22 @ 05:40) (60 - 66)  BP: 118/61 (09-12-22 @ 05:40) (112/61 - 123/60)  RR: 17 (09-12-22 @ 05:40) (17 - 18)  SpO2: 98% (09-12-22 @ 05:40) (96% - 98%)  Wt(kg): --Vital Signs Last 24 Hrs  T(C): 36.8 (12 Sep 2022 05:40), Max: 36.8 (12 Sep 2022 05:40)  T(F): 98.3 (12 Sep 2022 05:40), Max: 98.3 (12 Sep 2022 05:40)  HR: 60 (12 Sep 2022 05:40) (60 - 66)  BP: 118/61 (12 Sep 2022 05:40) (112/61 - 123/60)  BP(mean): --  RR: 17 (12 Sep 2022 05:40) (17 - 18)  SpO2: 98% (12 Sep 2022 05:40) (96% - 98%)    Parameters below as of 12 Sep 2022 05:40  Patient On (Oxygen Delivery Method): room air        PHYSICAL EXAM:  GENERAL: NAD, well-groomed, well-developed  HEAD:  Atraumatic, Normocephalic  EYES: EOMI, PERRLA, conjunctiva and sclera clear  ENMT: No tonsillar erythema, exudates, or enlargement; Moist mucous membranes, Good dentition, No lesions  NECK: Supple, No JVD, Normal thyroid  NERVOUS SYSTEM:  Alert & Oriented X3, Good concentration; Motor Strength 5/5 B/L upper and lower extremities; DTRs 2+ intact and symmetric  CHEST/LUNG: Clear to auscultation bilaterally; No rales, rhonchi, wheezing, or rubs  HEART: Regular rate and rhythm; No murmurs, rubs, or gallops  ABDOMEN: Soft, Nontender, Nondistended; Bowel sounds present  EXTREMITIES:  2+ Peripheral Pulses, No clubbing, cyanosis, or edema  LYMPH: No lymphadenopathy noted  SKIN: No rashes or lesions    Consultant(s) Notes Reviewed:  [x ] YES  [ ] NO  Care Discussed with Consultants/Other Providers [ x] YES  [ ] NO    LABS:  T(C): 36.8 (09-12-22 @ 05:40), Max: 36.8 (09-12-22 @ 05:40)  HR: 60 (09-12-22 @ 05:40) (60 - 66)  BP: 118/61 (09-12-22 @ 05:40) (112/61 - 123/60)  RR: 17 (09-12-22 @ 05:40) (17 - 18)  SpO2: 98% (09-12-22 @ 05:40) (96% - 98%)  Wt(kg): --cret                        11.7   7.99  )-----------( 318      ( 11 Sep 2022 07:25 )             40.9     09-11    141  |  103  |  19  ----------------------------<  84  3.2<L>   |  26  |  1.91<H>    Ca    8.9      11 Sep 2022 07:25  Phos  3.1     09-11  Mg     2.40     09-11    TPro  6.5  /  Alb  3.2<L>  /  TBili  0.8  /  DBili  x   /  AST  17  /  ALT  <5<L>  /  AlkPhos  136<H>  09-11        RADIOLOGY & ADDITIONAL TESTS:    Imaging Personally Reviewed:  [ ] YES  [ ] NO  aMIOdarone    Tablet 400 milliGRAM(s) Oral daily  apixaban 2.5 milliGRAM(s) Oral every 12 hours  atorvastatin 80 milliGRAM(s) Oral at bedtime  buMETAnide 1.5 milliGRAM(s) Oral two times a day  carbidopa/levodopa  25/100 1 Tablet(s) Oral <User Schedule>  carbidopa/levodopa  25/100 1.5 Tablet(s) Oral <User Schedule>  clopidogrel Tablet 75 milliGRAM(s) Oral daily  clotrimazole 1% Cream 1 Application(s) Topical every 12 hours  desvenlafaxine ER 50 milliGRAM(s) Oral daily  iron sucrose IVPB 200 milliGRAM(s) IV Intermittent every 24 hours  mirtazapine 7.5 milliGRAM(s) Oral at bedtime  OLANZapine 2.5 milliGRAM(s) Oral at bedtime  polyethylene glycol 3350 17 Gram(s) Oral daily PRN  senna 2 Tablet(s) Oral at bedtime  Tafamidis 61 milliGRAM(s) 1 Capsule(s) Oral daily       Patient is a 81y old  Male who presents with a chief complaint of weakness (11 Sep 2022 07:14)      INTERVAL HPI/OVERNIGHT EVENTS:  No acute events. Pt endorsing diffuse body pain which he reports is unchanged from his baseline. Today pt AOx1 to person only. Aid at bedside states that this is his mental status baseline.    REVIEW OF SYSTEMS:  CONSTITUTIONAL: +diffuse body pain. No fever, weight loss, or fatigue  EYES: No eye pain, visual disturbances, or discharge  ENMT:  No difficulty hearing, tinnitus, vertigo; No sinus or throat pain  NECK: No pain or stiffness  BREASTS: No pain, masses, or nipple discharge  RESPIRATORY: No cough, wheezing, chills or hemoptysis; No shortness of breath  CARDIOVASCULAR: No chest pain, palpitations, dizziness, or leg swelling  GASTROINTESTINAL: No abdominal or epigastric pain. No nausea, vomiting, or hematemesis; No diarrhea or constipation. No melena or hematochezia.  GENITOURINARY: No dysuria, frequency, hematuria, or incontinence  NEUROLOGICAL: No headaches, memory loss, loss of strength, numbness, or tremors  SKIN: No itching, burning, rashes, or lesions     T(C): 36.8 (09-12-22 @ 05:40), Max: 36.8 (09-12-22 @ 05:40)  HR: 60 (09-12-22 @ 05:40) (60 - 66)  BP: 118/61 (09-12-22 @ 05:40) (112/61 - 123/60)  RR: 17 (09-12-22 @ 05:40) (17 - 18)  SpO2: 98% (09-12-22 @ 05:40) (96% - 98%)  Wt(kg): --Vital Signs Last 24 Hrs  T(C): 36.8 (12 Sep 2022 05:40), Max: 36.8 (12 Sep 2022 05:40)  T(F): 98.3 (12 Sep 2022 05:40), Max: 98.3 (12 Sep 2022 05:40)  HR: 60 (12 Sep 2022 05:40) (60 - 66)  BP: 118/61 (12 Sep 2022 05:40) (112/61 - 123/60)  BP(mean): --  RR: 17 (12 Sep 2022 05:40) (17 - 18)  SpO2: 98% (12 Sep 2022 05:40) (96% - 98%)    Parameters below as of 12 Sep 2022 05:40  Patient On (Oxygen Delivery Method): room air        PHYSICAL EXAM:  GENERAL: NAD  HEAD:  Atraumatic, Normocephalic  EYES: EOMI, PERRLA, conjunctiva and sclera clear  ENMT: No tonsillar erythema, exudates, or enlargement; Moist mucous membranes, Good dentition, No lesions  NECK: Supple, No JVD, Normal thyroid  NERVOUS SYSTEM:  Alert & Oriented X1 to person only  CHEST/LUNG: Clear to auscultation bilaterally; No rales, rhonchi, wheezing, or rubs  HEART: Regular rate and rhythm; No murmurs, rubs, or gallops  ABDOMEN: Soft, Nontender, Nondistended; Bowel sounds present  EXTREMITIES:  2+ Peripheral Pulses, No clubbing, cyanosis, or edema  LYMPH: No lymphadenopathy noted  SKIN: No rashes or lesions    Consultant(s) Notes Reviewed:  [x ] YES  [ ] NO  Care Discussed with Consultants/Other Providers [ x] YES  [ ] NO    LABS:  T(C): 36.8 (09-12-22 @ 05:40), Max: 36.8 (09-12-22 @ 05:40)  HR: 60 (09-12-22 @ 05:40) (60 - 66)  BP: 118/61 (09-12-22 @ 05:40) (112/61 - 123/60)  RR: 17 (09-12-22 @ 05:40) (17 - 18)  SpO2: 98% (09-12-22 @ 05:40) (96% - 98%)  Wt(kg): --cret                        11.7   7.99  )-----------( 318      ( 11 Sep 2022 07:25 )             40.9     09-11    141  |  103  |  19  ----------------------------<  84  3.2<L>   |  26  |  1.91<H>    Ca    8.9      11 Sep 2022 07:25  Phos  3.1     09-11  Mg     2.40     09-11    TPro  6.5  /  Alb  3.2<L>  /  TBili  0.8  /  DBili  x   /  AST  17  /  ALT  <5<L>  /  AlkPhos  136<H>  09-11        RADIOLOGY & ADDITIONAL TESTS:    Imaging Personally Reviewed:  [ ] YES  [ ] NO  aMIOdarone    Tablet 400 milliGRAM(s) Oral daily  apixaban 2.5 milliGRAM(s) Oral every 12 hours  atorvastatin 80 milliGRAM(s) Oral at bedtime  buMETAnide 1.5 milliGRAM(s) Oral two times a day  carbidopa/levodopa  25/100 1 Tablet(s) Oral <User Schedule>  carbidopa/levodopa  25/100 1.5 Tablet(s) Oral <User Schedule>  clopidogrel Tablet 75 milliGRAM(s) Oral daily  clotrimazole 1% Cream 1 Application(s) Topical every 12 hours  desvenlafaxine ER 50 milliGRAM(s) Oral daily  iron sucrose IVPB 200 milliGRAM(s) IV Intermittent every 24 hours  mirtazapine 7.5 milliGRAM(s) Oral at bedtime  OLANZapine 2.5 milliGRAM(s) Oral at bedtime  polyethylene glycol 3350 17 Gram(s) Oral daily PRN  senna 2 Tablet(s) Oral at bedtime  Tafamidis 61 milliGRAM(s) 1 Capsule(s) Oral daily

## 2022-09-12 NOTE — PROGRESS NOTE ADULT - ASSESSMENT
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 He is hemodynamically stable while we clarify the contributions of these multiple medical conditions and correct his electrolyte disturbances.

## 2022-09-13 LAB
CULTURE RESULTS: SIGNIFICANT CHANGE UP
CULTURE RESULTS: SIGNIFICANT CHANGE UP
SPECIMEN SOURCE: SIGNIFICANT CHANGE UP
SPECIMEN SOURCE: SIGNIFICANT CHANGE UP

## 2022-09-15 ENCOUNTER — NON-APPOINTMENT (OUTPATIENT)
Age: 82
End: 2022-09-15

## 2022-09-15 LAB — VIT B1 SERPL-MCNC: 213 NMOL/L — HIGH (ref 66.5–200)

## 2022-09-15 RX ORDER — RAMELTEON 8 MG/1
8 TABLET ORAL
Qty: 90 | Refills: 3 | Status: COMPLETED | COMMUNITY
Start: 2022-08-16 | End: 2022-09-15

## 2022-09-19 ENCOUNTER — APPOINTMENT (OUTPATIENT)
Dept: CARDIOLOGY | Facility: CLINIC | Age: 82
End: 2022-09-19

## 2022-09-21 ENCOUNTER — NON-APPOINTMENT (OUTPATIENT)
Age: 82
End: 2022-09-21

## 2022-09-22 ENCOUNTER — OUTPATIENT (OUTPATIENT)
Dept: OUTPATIENT SERVICES | Facility: HOSPITAL | Age: 82
LOS: 1 days | End: 2022-09-22

## 2022-09-22 ENCOUNTER — APPOINTMENT (OUTPATIENT)
Dept: THORACIC SURGERY | Facility: CLINIC | Age: 82
End: 2022-09-22

## 2022-09-22 ENCOUNTER — RESULT REVIEW (OUTPATIENT)
Age: 82
End: 2022-09-22

## 2022-09-22 ENCOUNTER — APPOINTMENT (OUTPATIENT)
Dept: RADIOLOGY | Facility: HOSPITAL | Age: 82
End: 2022-09-22

## 2022-09-22 ENCOUNTER — NON-APPOINTMENT (OUTPATIENT)
Age: 82
End: 2022-09-22

## 2022-09-22 DIAGNOSIS — Z98.49 CATARACT EXTRACTION STATUS, UNSPECIFIED EYE: Chronic | ICD-10-CM

## 2022-09-22 DIAGNOSIS — Z98.61 CORONARY ANGIOPLASTY STATUS: Chronic | ICD-10-CM

## 2022-09-22 DIAGNOSIS — Z95.0 PRESENCE OF CARDIAC PACEMAKER: Chronic | ICD-10-CM

## 2022-09-22 DIAGNOSIS — J90 PLEURAL EFFUSION, NOT ELSEWHERE CLASSIFIED: ICD-10-CM

## 2022-09-22 DIAGNOSIS — Z96.649 PRESENCE OF UNSPECIFIED ARTIFICIAL HIP JOINT: Chronic | ICD-10-CM

## 2022-09-22 PROCEDURE — 99214 OFFICE O/P EST MOD 30 MIN: CPT

## 2022-09-22 PROCEDURE — 71046 X-RAY EXAM CHEST 2 VIEWS: CPT | Mod: 26

## 2022-09-22 NOTE — CONSULT LETTER
[Dear  ___] : Dear  [unfilled], [Courtesy Letter:] : I had the pleasure of seeing your patient, [unfilled], in my office today. [( Thank you for referring [unfilled] for consultation for _____ )] : Thank you for referring [unfilled] for consultation for [unfilled] [Please see my note below.] : Please see my note below. [Sincerely,] : Sincerely, [FreeTextEntry2] : Dr. Pavan Jamil (Pulm/Ref)\par Santhosh Avila (PCP)\par Dr. Clinton Dickey (Card)\par Dr. Randolph (Card re: cardiac amyloid) [FreeTextEntry3] : Rufus Oneal MD \par Attending Surgeon \par Division of Thoracic Surgery \par , Cardiovascular and Thoracic Surgery \par [facility] [employer] [facility]\par \par

## 2022-09-22 NOTE — HISTORY OF PRESENT ILLNESS
[FreeTextEntry1] : Mr. 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 \par \par Patient is s/p Right VATS, pleural bx and placement of Pleurx catheter on 03/17/2022. Path of right pleura bx revealed fragments of pleura with chronic inflammation and reactive changes. Fragments of skeletal muscle. Right pleural fluid negative for malignant cells. \par \par Patient went to ED on 04/29/2022 for hallucinations, likely related to Parkinsons dementia. He was discharged on 5/12/22 to a rehab facility.  \par \par Of note, pt had recent SPECT scan demonstrating cardiac amyloid and was referred by his cardiologist Dr. Dickey to see Dr. Randolph. \par \par Patient is currently drained three times a week as follows: 8/24 650 ml, 8/26 600 ml, 8/29 600 ml\par \par CT chest on 08/17/2022:\par -  There is a trace right pleural effusion with Pleurx catheter in place, markedly decreased in size compared to the 2/6/2022 chest CT. A small left pleural effusion has also decreased compared to the 2/6/2022 scan, now trace.\par \par Patient went to ED on 09/08/2022 c/o weakness, founded to have electrolyte disorders which were corrected but patient's symptoms did not improve. felt that presentation was most consistent with  progression of Parkinson's disease.\par \par CXR today: reviewed. \par \par Patient is here today for a follow up. Patient denies shortness of breath, cough, chest pain, fever, chills.

## 2022-09-22 NOTE — ASSESSMENT
[FreeTextEntry1] : Mr. 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 \par \par Patient is s/p Right VATS, pleural bx and placement of Pleurx catheter on 03/17/2022. Path of right pleura bx revealed fragments of pleura with chronic inflammation and reactive changes. Fragments of skeletal muscle. Right pleural fluid negative for malignant cells. \par \par Patient went to ED on 04/29/2022 for hallucinations, likely related to Parkinsons dementia. He was discharged on 5/12/22 to a rehab facility.  \par \par Of note, pt had recent SPECT scan demonstrating cardiac amyloid and was referred by his cardiologist Dr. Dickey to see Dr. Randolph. \par \par Patient is currently drained three times a week as follows: 8/24 650 ml, 8/26 600 ml, 8/29 600 ml\par \par CT chest on 08/17/2022:\par -  There is a trace right pleural effusion with Pleurx catheter in place, markedly decreased in size compared to the 2/6/2022 chest CT. A small left pleural effusion has also decreased compared to the 2/6/2022 scan, now trace.\par \par Patient went to ED on 09/08/2022 c/o weakness, founded to have electrolyte disorders which were corrected but patient's symptoms did not improve. felt that presentation was most consistent with  progression of Parkinson's disease.\par \par CXR today: reviewed. \par \par I have reviewed the patient's medical records and diagnostic images at time of this office consultation and have made the following recommendation:\par 1. Fibrous tissue was removed from Pleurx catheter, 900 ml of serosanguineous fluid drained after, I recommended continue drainage 3x/week, and RTC in 4 weeks with CXR. 	\par \par I personally performed the services described in the documentation, reviewed the documentation recorded by the scribe in my presence and it accurately and completely records my words and actions.\par \par Sanjana JIM NP, am scribing for and the presence of SANJANA Tang, the following sections HISTORY OF PRESENT ILLNESS, PAST MEDICAL/FAMILY/SOCIAL HISTORY; REVIEW OF SYSTEMS; VITAL SIGNS; PHYSICAL EXAM; DISPOSITION.

## 2022-09-27 ENCOUNTER — APPOINTMENT (OUTPATIENT)
Dept: PULMONOLOGY | Facility: CLINIC | Age: 82
End: 2022-09-27

## 2022-09-29 ENCOUNTER — APPOINTMENT (OUTPATIENT)
Dept: NEUROLOGY | Facility: CLINIC | Age: 82
End: 2022-09-29

## 2022-09-29 PROCEDURE — 99214 OFFICE O/P EST MOD 30 MIN: CPT | Mod: 95

## 2022-09-29 NOTE — DISCUSSION/SUMMARY
[FreeTextEntry1] : PD with neurobehavioral dysregulation with increased bradykinesia and morning drowsiness. Zyprexa/remeron changes likely exacerbating his movements and lethargy\par \par Patient was counseled on the following recommendations:\par \par - increase sinemet  to 1.5 tabsat 9-12-3 and leave 6p at 1 tab\par - f/u with psychiatry next week to discuss further adjustments on mirtazepine and zyprexa\par cont PT\par \par f/u 3months\par \par

## 2022-09-29 NOTE — END OF VISIT
[Time Spent: ___ minutes] : I have spent [unfilled] minutes of time on the encounter.
soft/nontender/no distention/no masses palpable/bowel sounds normal

## 2022-09-29 NOTE — HISTORY OF PRESENT ILLNESS
[FreeTextEntry1] : Patient has required an increase in zyprexa due to hallucinations and overnight restless. Has also had an increase in remeron to address his sleep. \par As a result he is more drowsy in the mornings\par Wife says that he is weaker in the general which started since recent hospitalization for pleural effusion\par getting PT at home\par \par \par Nonmotor:\par + constipation - fiber, miralax/senna. Needed disimpaction at ED on 4/1\par sleep is fragmented\par - dysphagia\par \par PMH:  Parkinsons, bradycardia s/p PPM 10/6/2021, hypercholesterolemia, Mod to Sev AI; Pleueral Drain CAD s/p PCI 8/31/2021\par \par Meds\par sinemet 25/100 1.5 pills am and 1 tab at 12p,3pm, 6pm\par Olanzapine 5mg qhs\par remeron 15mg 1.5tabs qhs \par desvenlafaxine 75mg qd\par spironolactone 25mg qd\par torsemide 20mg qd\par tadalafil \par \par \par prior\par exelon

## 2022-09-29 NOTE — REASON FOR VISIT
[Home] : at home, [unfilled] , at the time of the visit. [Medical Office: ([facility])___] : at the medical office located in

## 2022-09-29 NOTE — PHYSICAL EXAM
[FreeTextEntry1] : There is 2+ masking. Speech is 1+. Tremor is absent.  there is mild-moderate left greater than right bradykinesia with minimal rigidity. Gait deferred as he is unable to stand up. no LID or tremors

## 2022-10-01 NOTE — DISCHARGE NOTE PROVIDER - NSDCHHHOMEBOUND_GEN_ALL_CORE
· Creatinine baseline is 0 9 suspect secondary to cardiorenal   Improved with diuresis   · Mild creatinine elevation up to 1 39 noted  Continue with current dose of torsemide     Creatinine has improved today Fall risk

## 2022-10-07 ENCOUNTER — INPATIENT (INPATIENT)
Facility: HOSPITAL | Age: 82
LOS: 4 days | Discharge: NURSING, REHAB OR LONG-TERM CARE | End: 2022-10-12
Attending: INTERNAL MEDICINE | Admitting: INTERNAL MEDICINE

## 2022-10-07 VITALS
SYSTOLIC BLOOD PRESSURE: 126 MMHG | RESPIRATION RATE: 17 BRPM | DIASTOLIC BLOOD PRESSURE: 58 MMHG | OXYGEN SATURATION: 98 % | TEMPERATURE: 98 F | HEART RATE: 60 BPM | HEIGHT: 68 IN

## 2022-10-07 DIAGNOSIS — I25.10 ATHEROSCLEROTIC HEART DISEASE OF NATIVE CORONARY ARTERY WITHOUT ANGINA PECTORIS: ICD-10-CM

## 2022-10-07 DIAGNOSIS — Z95.0 PRESENCE OF CARDIAC PACEMAKER: Chronic | ICD-10-CM

## 2022-10-07 DIAGNOSIS — N39.0 URINARY TRACT INFECTION, SITE NOT SPECIFIED: ICD-10-CM

## 2022-10-07 DIAGNOSIS — Z96.649 PRESENCE OF UNSPECIFIED ARTIFICIAL HIP JOINT: Chronic | ICD-10-CM

## 2022-10-07 DIAGNOSIS — Z98.49 CATARACT EXTRACTION STATUS, UNSPECIFIED EYE: Chronic | ICD-10-CM

## 2022-10-07 DIAGNOSIS — E78.5 HYPERLIPIDEMIA, UNSPECIFIED: ICD-10-CM

## 2022-10-07 DIAGNOSIS — Z98.61 CORONARY ANGIOPLASTY STATUS: Chronic | ICD-10-CM

## 2022-10-07 DIAGNOSIS — Z29.9 ENCOUNTER FOR PROPHYLACTIC MEASURES, UNSPECIFIED: ICD-10-CM

## 2022-10-07 DIAGNOSIS — N17.9 ACUTE KIDNEY FAILURE, UNSPECIFIED: ICD-10-CM

## 2022-10-07 DIAGNOSIS — I48.91 UNSPECIFIED ATRIAL FIBRILLATION: ICD-10-CM

## 2022-10-07 DIAGNOSIS — G93.40 ENCEPHALOPATHY, UNSPECIFIED: ICD-10-CM

## 2022-10-07 DIAGNOSIS — E85.4 ORGAN-LIMITED AMYLOIDOSIS: ICD-10-CM

## 2022-10-07 DIAGNOSIS — R94.31 ABNORMAL ELECTROCARDIOGRAM [ECG] [EKG]: ICD-10-CM

## 2022-10-07 DIAGNOSIS — G20 PARKINSON'S DISEASE: ICD-10-CM

## 2022-10-07 LAB
ALBUMIN SERPL ELPH-MCNC: 3.5 G/DL — SIGNIFICANT CHANGE UP (ref 3.3–5)
ALP SERPL-CCNC: 282 U/L — HIGH (ref 40–120)
ALT FLD-CCNC: 13 U/L — SIGNIFICANT CHANGE UP (ref 4–41)
ANION GAP SERPL CALC-SCNC: 13 MMOL/L — SIGNIFICANT CHANGE UP (ref 7–14)
APPEARANCE UR: ABNORMAL
APTT BLD: 41.5 SEC — HIGH (ref 27–36.3)
AST SERPL-CCNC: 30 U/L — SIGNIFICANT CHANGE UP (ref 4–40)
BACTERIA # UR AUTO: ABNORMAL
BASE EXCESS BLDV CALC-SCNC: 5.8 MMOL/L — HIGH (ref -2–3)
BASOPHILS # BLD AUTO: 0.04 K/UL — SIGNIFICANT CHANGE UP (ref 0–0.2)
BASOPHILS NFR BLD AUTO: 0.5 % — SIGNIFICANT CHANGE UP (ref 0–2)
BILIRUB SERPL-MCNC: 0.5 MG/DL — SIGNIFICANT CHANGE UP (ref 0.2–1.2)
BILIRUB UR-MCNC: NEGATIVE — SIGNIFICANT CHANGE UP
BLOOD GAS VENOUS COMPREHENSIVE RESULT: SIGNIFICANT CHANGE UP
BUN SERPL-MCNC: 22 MG/DL — SIGNIFICANT CHANGE UP (ref 7–23)
CALCIUM SERPL-MCNC: 9.5 MG/DL — SIGNIFICANT CHANGE UP (ref 8.4–10.5)
CHLORIDE BLDV-SCNC: 102 MMOL/L — SIGNIFICANT CHANGE UP (ref 96–108)
CHLORIDE SERPL-SCNC: 102 MMOL/L — SIGNIFICANT CHANGE UP (ref 98–107)
CO2 BLDV-SCNC: 32 MMOL/L — HIGH (ref 22–26)
CO2 SERPL-SCNC: 26 MMOL/L — SIGNIFICANT CHANGE UP (ref 22–31)
COLOR SPEC: SIGNIFICANT CHANGE UP
CREAT SERPL-MCNC: 2.16 MG/DL — HIGH (ref 0.5–1.3)
DIFF PNL FLD: ABNORMAL
EGFR: 30 ML/MIN/1.73M2 — LOW
EOSINOPHIL # BLD AUTO: 0.05 K/UL — SIGNIFICANT CHANGE UP (ref 0–0.5)
EOSINOPHIL NFR BLD AUTO: 0.6 % — SIGNIFICANT CHANGE UP (ref 0–6)
EPI CELLS # UR: SIGNIFICANT CHANGE UP
GAS PNL BLDV: 137 MMOL/L — SIGNIFICANT CHANGE UP (ref 136–145)
GAS PNL BLDV: SIGNIFICANT CHANGE UP
GLUCOSE BLDV-MCNC: 114 MG/DL — HIGH (ref 70–99)
GLUCOSE SERPL-MCNC: 109 MG/DL — HIGH (ref 70–99)
GLUCOSE UR QL: NEGATIVE — SIGNIFICANT CHANGE UP
HCO3 BLDV-SCNC: 31 MMOL/L — HIGH (ref 22–29)
HCT VFR BLD CALC: 42.5 % — SIGNIFICANT CHANGE UP (ref 39–50)
HCT VFR BLDA CALC: 38 % — LOW (ref 39–51)
HGB BLD CALC-MCNC: 12.8 G/DL — LOW (ref 13–17)
HGB BLD-MCNC: 12.5 G/DL — LOW (ref 13–17)
IANC: 6.35 K/UL — SIGNIFICANT CHANGE UP (ref 1.8–7.4)
IMM GRANULOCYTES NFR BLD AUTO: 0.2 % — SIGNIFICANT CHANGE UP (ref 0–0.9)
INR BLD: 1.54 RATIO — HIGH (ref 0.88–1.16)
KETONES UR-MCNC: NEGATIVE — SIGNIFICANT CHANGE UP
LACTATE BLDV-MCNC: 1.7 MMOL/L — SIGNIFICANT CHANGE UP (ref 0.5–2)
LEUKOCYTE ESTERASE UR-ACNC: ABNORMAL
LYMPHOCYTES # BLD AUTO: 1.07 K/UL — SIGNIFICANT CHANGE UP (ref 1–3.3)
LYMPHOCYTES # BLD AUTO: 13.2 % — SIGNIFICANT CHANGE UP (ref 13–44)
MCHC RBC-ENTMCNC: 24.5 PG — LOW (ref 27–34)
MCHC RBC-ENTMCNC: 29.4 GM/DL — LOW (ref 32–36)
MCV RBC AUTO: 83.3 FL — SIGNIFICANT CHANGE UP (ref 80–100)
MONOCYTES # BLD AUTO: 0.57 K/UL — SIGNIFICANT CHANGE UP (ref 0–0.9)
MONOCYTES NFR BLD AUTO: 7 % — SIGNIFICANT CHANGE UP (ref 2–14)
NEUTROPHILS # BLD AUTO: 6.35 K/UL — SIGNIFICANT CHANGE UP (ref 1.8–7.4)
NEUTROPHILS NFR BLD AUTO: 78.5 % — HIGH (ref 43–77)
NITRITE UR-MCNC: POSITIVE
NRBC # BLD: 0 /100 WBCS — SIGNIFICANT CHANGE UP (ref 0–0)
NRBC # FLD: 0 K/UL — SIGNIFICANT CHANGE UP (ref 0–0)
PCO2 BLDV: 44 MMHG — SIGNIFICANT CHANGE UP (ref 42–55)
PH BLDV: 7.45 — HIGH (ref 7.32–7.43)
PH UR: 6.5 — SIGNIFICANT CHANGE UP (ref 5–8)
PLATELET # BLD AUTO: 236 K/UL — SIGNIFICANT CHANGE UP (ref 150–400)
PO2 BLDV: 23 MMHG — SIGNIFICANT CHANGE UP
POTASSIUM BLDV-SCNC: 3.7 MMOL/L — SIGNIFICANT CHANGE UP (ref 3.5–5.1)
POTASSIUM SERPL-MCNC: 3.8 MMOL/L — SIGNIFICANT CHANGE UP (ref 3.5–5.3)
POTASSIUM SERPL-SCNC: 3.8 MMOL/L — SIGNIFICANT CHANGE UP (ref 3.5–5.3)
PROT SERPL-MCNC: 7.1 G/DL — SIGNIFICANT CHANGE UP (ref 6–8.3)
PROT UR-MCNC: ABNORMAL
PROTHROM AB SERPL-ACNC: 17.9 SEC — HIGH (ref 10.5–13.4)
RBC # BLD: 5.1 M/UL — SIGNIFICANT CHANGE UP (ref 4.2–5.8)
RBC # FLD: 22.5 % — HIGH (ref 10.3–14.5)
RBC CASTS # UR COMP ASSIST: SIGNIFICANT CHANGE UP /HPF (ref 0–4)
SAO2 % BLDV: 35.6 % — SIGNIFICANT CHANGE UP
SODIUM SERPL-SCNC: 141 MMOL/L — SIGNIFICANT CHANGE UP (ref 135–145)
SP GR SPEC: 1.01 — SIGNIFICANT CHANGE UP (ref 1.01–1.05)
TROPONIN T, HIGH SENSITIVITY RESULT: 92 NG/L — CRITICAL HIGH
TROPONIN T, HIGH SENSITIVITY RESULT: 97 NG/L — CRITICAL HIGH
UROBILINOGEN FLD QL: SIGNIFICANT CHANGE UP
WBC # BLD: 8.1 K/UL — SIGNIFICANT CHANGE UP (ref 3.8–10.5)
WBC # FLD AUTO: 8.1 K/UL — SIGNIFICANT CHANGE UP (ref 3.8–10.5)
WBC UR QL: >50 /HPF — SIGNIFICANT CHANGE UP (ref 0–5)

## 2022-10-07 PROCEDURE — 71045 X-RAY EXAM CHEST 1 VIEW: CPT | Mod: 26

## 2022-10-07 PROCEDURE — 70450 CT HEAD/BRAIN W/O DYE: CPT | Mod: 26,MA

## 2022-10-07 PROCEDURE — 99285 EMERGENCY DEPT VISIT HI MDM: CPT

## 2022-10-07 PROCEDURE — 99223 1ST HOSP IP/OBS HIGH 75: CPT

## 2022-10-07 RX ORDER — CHOLECALCIFEROL (VITAMIN D3) 125 MCG
1 CAPSULE ORAL
Qty: 0 | Refills: 0 | DISCHARGE

## 2022-10-07 RX ORDER — OLANZAPINE 15 MG/1
2.5 TABLET, FILM COATED ORAL AT BEDTIME
Refills: 0 | Status: DISCONTINUED | OUTPATIENT
Start: 2022-10-07 | End: 2022-10-12

## 2022-10-07 RX ORDER — OXYBUTYNIN CHLORIDE 5 MG
10 TABLET ORAL
Refills: 0 | Status: DISCONTINUED | OUTPATIENT
Start: 2022-10-07 | End: 2022-10-12

## 2022-10-07 RX ORDER — AMIODARONE HYDROCHLORIDE 400 MG/1
2 TABLET ORAL
Qty: 0 | Refills: 0 | DISCHARGE

## 2022-10-07 RX ORDER — CARBIDOPA AND LEVODOPA 25; 100 MG/1; MG/1
1.5 TABLET ORAL DAILY
Refills: 0 | Status: DISCONTINUED | OUTPATIENT
Start: 2022-10-08 | End: 2022-10-12

## 2022-10-07 RX ORDER — PREGABALIN 225 MG/1
1 CAPSULE ORAL
Qty: 0 | Refills: 0 | DISCHARGE

## 2022-10-07 RX ORDER — BUMETANIDE 0.25 MG/ML
1 INJECTION INTRAMUSCULAR; INTRAVENOUS AT BEDTIME
Refills: 0 | Status: DISCONTINUED | OUTPATIENT
Start: 2022-10-07 | End: 2022-10-08

## 2022-10-07 RX ORDER — SODIUM CHLORIDE 9 MG/ML
1000 INJECTION INTRAMUSCULAR; INTRAVENOUS; SUBCUTANEOUS ONCE
Refills: 0 | Status: COMPLETED | OUTPATIENT
Start: 2022-10-07 | End: 2022-10-07

## 2022-10-07 RX ORDER — MIRTAZAPINE 45 MG/1
11.25 TABLET, ORALLY DISINTEGRATING ORAL AT BEDTIME
Refills: 0 | Status: DISCONTINUED | OUTPATIENT
Start: 2022-10-07 | End: 2022-10-07

## 2022-10-07 RX ORDER — CLOPIDOGREL BISULFATE 75 MG/1
75 TABLET, FILM COATED ORAL DAILY
Refills: 0 | Status: DISCONTINUED | OUTPATIENT
Start: 2022-10-08 | End: 2022-10-12

## 2022-10-07 RX ORDER — MIRTAZAPINE 45 MG/1
1 TABLET, ORALLY DISINTEGRATING ORAL
Qty: 0 | Refills: 0 | DISCHARGE

## 2022-10-07 RX ORDER — DESVENLAFAXINE 50 MG/1
50 TABLET, EXTENDED RELEASE ORAL DAILY
Refills: 0 | Status: DISCONTINUED | OUTPATIENT
Start: 2022-10-07 | End: 2022-10-12

## 2022-10-07 RX ORDER — CARBIDOPA AND LEVODOPA 25; 100 MG/1; MG/1
1 TABLET ORAL
Refills: 0 | Status: DISCONTINUED | OUTPATIENT
Start: 2022-10-08 | End: 2022-10-12

## 2022-10-07 RX ORDER — BUMETANIDE 0.25 MG/ML
2 INJECTION INTRAMUSCULAR; INTRAVENOUS DAILY
Refills: 0 | Status: DISCONTINUED | OUTPATIENT
Start: 2022-10-08 | End: 2022-10-08

## 2022-10-07 RX ORDER — CEFTRIAXONE 500 MG/1
1000 INJECTION, POWDER, FOR SOLUTION INTRAMUSCULAR; INTRAVENOUS ONCE
Refills: 0 | Status: COMPLETED | OUTPATIENT
Start: 2022-10-07 | End: 2022-10-07

## 2022-10-07 RX ORDER — APIXABAN 2.5 MG/1
2.5 TABLET, FILM COATED ORAL
Refills: 0 | Status: DISCONTINUED | OUTPATIENT
Start: 2022-10-07 | End: 2022-10-12

## 2022-10-07 RX ORDER — AMIODARONE HYDROCHLORIDE 400 MG/1
200 TABLET ORAL DAILY
Refills: 0 | Status: DISCONTINUED | OUTPATIENT
Start: 2022-10-07 | End: 2022-10-12

## 2022-10-07 RX ORDER — CEFTRIAXONE 500 MG/1
1000 INJECTION, POWDER, FOR SOLUTION INTRAMUSCULAR; INTRAVENOUS EVERY 24 HOURS
Refills: 0 | Status: DISCONTINUED | OUTPATIENT
Start: 2022-10-08 | End: 2022-10-12

## 2022-10-07 RX ORDER — OLANZAPINE 15 MG/1
5 TABLET, FILM COATED ORAL AT BEDTIME
Refills: 0 | Status: DISCONTINUED | OUTPATIENT
Start: 2022-10-07 | End: 2022-10-07

## 2022-10-07 RX ORDER — ATORVASTATIN CALCIUM 80 MG/1
80 TABLET, FILM COATED ORAL AT BEDTIME
Refills: 0 | Status: DISCONTINUED | OUTPATIENT
Start: 2022-10-07 | End: 2022-10-12

## 2022-10-07 RX ADMIN — CEFTRIAXONE 100 MILLIGRAM(S): 500 INJECTION, POWDER, FOR SOLUTION INTRAMUSCULAR; INTRAVENOUS at 20:04

## 2022-10-07 RX ADMIN — SODIUM CHLORIDE 500 MILLILITER(S): 9 INJECTION INTRAMUSCULAR; INTRAVENOUS; SUBCUTANEOUS at 20:09

## 2022-10-07 NOTE — ED ADULT TRIAGE NOTE - CHIEF COMPLAINT QUOTE
Pt arrives with aide, c/o confusion since Wednesday night. Per home health aide, after taking medications at night, is trying to grab things that are not there, kicking the air. Pt A&OX2, states normally does not know the year at baseline. Phx: Dementia, CAD, Afib

## 2022-10-07 NOTE — H&P ADULT - PROBLEM SELECTOR PLAN 4
Topical Clindamycin Counseling: Patient counseled that this medication may cause skin irritation or allergic reactions.  In the event of skin irritation, the patient was advised to reduce the amount of the drug applied or use it less frequently.   The patient verbalized understanding of the proper use and possible adverse effects of clindamycin.  All of the patient's questions and concerns were addressed. - c/w Plavix, ASA  - asymptomatic  - c/w telemetry monitoring - c/w Plavix, ASA  - f/u med reconciliation to see if pt is on warfarin  - asymptomatic  - f/u PTT/PT/INR in AM  - c/w telemetry monitoring - c/w Plavix, ASA,   - f/u on medication reconciliation if pt is still on Eliquis  - asymptomatic  - f/u PTT/PT/INR in AM  - c/w telemetry monitoring

## 2022-10-07 NOTE — ED ADULT NURSE REASSESSMENT NOTE - NS ED NURSE REASSESS COMMENT FT1
report received from day shift RN. pt is AxOx2, pt confused at baseline, hx of dementia. pt denies complaints at this time. pt VSS. RR are even and unlabored. pt NSR on cardiac monitor. pt has bleeding in gums,  son states pt used a toothpick after brushing teeth and has bleeding gums. pt is pending bed assignment. Call bell within reach and son at bedside. Will continue to monitor.

## 2022-10-07 NOTE — ED PROVIDER NOTE - CLINICAL SUMMARY MEDICAL DECISION MAKING FREE TEXT BOX
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  Differential diagnosis includes but is not limited to UTI, infection, ICH, CVA, ACS, worsening dementia. would get labs, CTH, urine studies, cxr, will consider dispo pending w/u. Pt has been safe at home per daughter, he hasn't fallen.

## 2022-10-07 NOTE — H&P ADULT - PROBLEM SELECTOR PLAN 4
slightly more prolonged in comparison to prior admission ekg, reportedly olanzapine increased from 2.5 to 5mg of recent  repeat EKG in morning, holding mirtazepine for now, going back down to lower dose of olanzapine pending repeat EKG  monitor on tele pending repeat ekg

## 2022-10-07 NOTE — H&P ADULT - HISTORY OF PRESENT ILLNESS
81-year-old male with Parkinson's, Afib on apixaban, bradycardia s/p PPM, CAD s/p stent 8/2021, amyloid, chronic R pleural effusion s/p pleurx (last drained last week), HTN, HLD, BPH, presenting from home with worsening confusion over the past few days. Collateral history obtained from son, at bedside; patient has a history of recurrent hospitalizations for weakness related to hypokalemia, last hospitalized 9/2022, also with history of UTIs, last UTI 8/2022, however had blood work and UA performed last week that was reportedly positive however he had not yet started on antibiotics outpatient. Patient is without complaint. Patient has chronic nocturia but otherwise has no urinary complaints.

## 2022-10-07 NOTE — ED PROVIDER NOTE - PROGRESS NOTE DETAILS
Erasto Londono, PGY-3: Pt signed out to me, I agree with the plan. Pt is currently comfortable and hemodynamically stable. Labs/imaging reviewed. 80yo M pmh parkinsons from home for confusion (AAOx2 at baseline) now agitated more than usual and weaker. Found to have UTI and ALAYNA, pt endorsed to hospitalist, will be admitting to inpatient service.

## 2022-10-07 NOTE — ED PROVIDER NOTE - PHYSICAL EXAMINATION
General: WN/WD NAD  Head: Atraumatic, normocephalic  Eyes: EOM grossly in tact, no scleral icterus, no discharge  ENT: dry mucous membranes with some black substance in mouth  Neurology: A&Ox 1, nonfocal, GUILLORY x 4  Respiratory: CTAB, no wheezing, normal respiratory effort  CV: RRR, good s1/s2, no S3, Extremities warm and well perfused  Abdominal: Soft, non-distended, non-tender, no masses  Extremities: No edema, no deformities  Skin: warm and dry. No rashes

## 2022-10-07 NOTE — H&P ADULT - PROBLEM SELECTOR PLAN 3
technically meeting criteria for ALAYNA in comparison to prior labs from 9/12   suspect secondary to UTI  s/p IVF and abx, c/w abx  repeat BMP in AM, trend Cr  renally dose meds, avoid nephrotoxins

## 2022-10-07 NOTE — ED ADULT NURSE NOTE - NSIMPLEMENTINTERV_GEN_ALL_ED
Implemented All Fall Risk Interventions:  [place] to call system. Call bell, personal items and telephone within reach. Instruct patient to call for assistance. Room bathroom lighting operational. Non-slip footwear when patient is off stretcher. Physically safe environment: no spills, clutter or unnecessary equipment. Stretcher in lowest position, wheels locked, appropriate side rails in place. Provide visual cue, wrist band, yellow gown, etc. Monitor gait and stability. Monitor for mental status changes and reorient to person, place, and time. Review medications for side effects contributing to fall risk. Reinforce activity limits and safety measures with patient and family.

## 2022-10-07 NOTE — H&P ADULT - NSHPREVIEWOFSYSTEMS_GEN_ALL_CORE
Per son:    REVIEW OF SYSTEMS:    CONSTITUTIONAL: No fevers or chills  EYES/ENT: No dysphagia  RESPIRATORY: No cough; No shortness of breath  CARDIOVASCULAR: No chest pain or palpitations; No lower extremity edema  GASTROINTESTINAL: No abdominal or epigastric pain. No nausea, vomiting, or hematemesis; No diarrhea or constipation. No melena or hematochezia.  GENITOURINARY: No dysuria, frequency or hematuria  NEUROLOGICAL: No numbness, paresthesias, or weakness; No HA; No LH/dizziness  MSK: ambulates with  SKIN: No itching, burning, rashes, or lesions   All other review of systems is negative unless indicated above. Per son:    REVIEW OF SYSTEMS:    CONSTITUTIONAL: No fevers or chills  EYES/ENT: No dysphagia; (+)dried blood on gums reportedly secondary to patient aggressively flossing  RESPIRATORY: No cough  CARDIOVASCULAR: No chest pain; No lower extremity edema  GASTROINTESTINAL: No abdominal pain. No nausea, vomiting; No diarrhea   GENITOURINARY: (+)chronic nocturia; No dysuria, frequency or hematuria  NEUROLOGICAL: (+)confusion  MSK: ambulates with walker  All other review of systems is negative unless indicated above.

## 2022-10-07 NOTE — ED PROVIDER NOTE - ATTENDING CONTRIBUTION TO CARE
Attending Statement: I have personally seen and examined this patient. I have fully participated in the care of this patient. 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 Daughter w pt states he is more confused than usual. no fever no fall/trauma. no sob no cough no n/v/d   Vital signs noted. elderly male, laying down, no distress. no sign of head/facial trauma. normal S1-S2 poor inspiratory effort, no work of breathing. right pleurx no erythema or swelling at insertion site  no pedal edema. no calf tenderness. normal pulses bilateral feet. Alert to name and family, following simple commands.   plan labs, ekg, ua, cxr, ct head, tba. Daughter w pt  ekg paced rhythm

## 2022-10-07 NOTE — ED ADULT TRIAGE NOTE - BP NONINVASIVE DIASTOLIC (MM HG)
05/25/18 1322   Recommendations/Interventions   Summary poor intake predicted; per son pt tends to prefer sweets/ ice cream over protein foods    Malnutrition/BMI Present Yes   Malnutrition type Chronic illness  (in the context of multiple chronic illnesses including COPD, treated with oral diet and ensure compact supplements bid)   Degree of Malnutrition Other severe protein calorie malnutrition  (prominent ribs reported indicative of fat loss; depressed temples indicative of muscle loss)   Malnutrition Characteristics Muscle loss; Fat loss   Interventions Diet: continued as ordered; Supplement initiate  (in light of general decreased nutrition intake will liberalize diet to 4 gram sodium and add ensure compact bid)   Nutrition Recommendations Continue diet as ordered 58

## 2022-10-07 NOTE — H&P ADULT - NSHPLABSRESULTS_GEN_ALL_CORE
12.5   8.10  )-----------( 236      ( 07 Oct 2022 17:29 )             42.5     10-    141  |  102  |  22  ----------------------------<  109<H>  3.8   |  26  |  2.16<H>    Ca    9.5      07 Oct 2022 17:29    TPro  7.1  /  Alb  3.5  /  TBili  0.5  /  DBili  x   /  AST  30  /  ALT  13  /  AlkPhos  282<H>  10-07    PT/INR - ( 07 Oct 2022 17:29 )   PT: 17.9 sec;   INR: 1.54 ratio    PTT - ( 07 Oct 2022 17:29 )  PTT:41.5 sec    17:22 - VBG - pH: 7.45  | pCO2: 44    | pO2: 23    | Lactate: 1.7      Troponin T, High Sensitivity Result: 92 ng/L (10.07.22 @ 18:20)  Troponin T, High Sensitivity Result: 97 ng/L (10.07.22 @ 17:29)    Urinalysis Basic - ( 07 Oct 2022 17:45 )  Color: Light Yellow / Appearance: Turbid / S.015 / pH: x  Gluc: x / Ketone: Negative  / Bili: Negative / Urobili: <2 mg/dL   Blood: x / Protein: 300 mg/dL / Nitrite: Positive   Leuk Esterase: Large / RBC: 25-50 /HPF / WBC >50 /HPF   Sq Epi: x / Non Sq Epi: Few / Bacteria: Few    < from: CT Head No Cont (10.07.22 @ 18:49) >  VENTRICLES AND SULCI:  No hydrocephalus.  INTRA-AXIAL:  No acute intraparenchymal hemorrhage, mass effect, or cerebral edema. Mild chronic white matter microvascular change, predominantly throughout the periventricular white matter.  EXTRA-AXIAL:  No mass or collection is seen.  VISUALIZED SINUSES:  Mild mucosal thickening of the right maxillary sinus.  VISUALIZED MASTOIDS:  Clear.  CALVARIUM:  Intact.  MISCELLANEOUS:  Bilateral cataract surgery.  IMPRESSION: No acute intracranial hemorrhage, mass effect, or cerebral edema.    CXR personally reviewed - No focal consolidations, L CW PPM, R pleurex    EKG personally reviewed - AV paced 60bpm, QTc 600ms

## 2022-10-07 NOTE — ED ADULT NURSE NOTE - OBJECTIVE STATEMENT
Pt arrives with aide, c/o confusion since Wednesday night. Per home health aide, after taking medications at night, is trying to grab things that are not there, kicking the air. Pt A&OX2, states normally does not know the year at baseline. Phx: Dementia, CAD, Afib. PT A&OX1.  No distress noted.  Denies CP, no SOB noted.  Breathing non-labored.  Skin intact.  Labs sent.  IVL in place and tolerating well.  Straight cath done and Urine sent.  Cardiac monitor in place.  Will continue to monitor.

## 2022-10-07 NOTE — H&P ADULT - ASSESSMENT
81-year-old male with Parkinson's, Afib on apixaban, bradycardia s/p PPM, CAD s/p stent 8/2021, amyloid, chronic R pleural effusion s/p pleurx (last drained last week), HTN, HLD, BPH, presenting from home with worsening confusion over the past few days, found to have a UTI.

## 2022-10-07 NOTE — H&P ADULT - PROBLEM SELECTOR PLAN 5
c/w current home meds - carbidopa/levodopa, olanzapine (lower dose due to QT)  reorient as needed  fall, aspiration precautions   PT consult

## 2022-10-07 NOTE — H&P ADULT - PROBLEM SELECTOR PLAN 1
likely secondary to UTI  reorient as needed  CTH without acute path  fall, aspiration precautions  PT consult

## 2022-10-07 NOTE — H&P ADULT - NSHPPHYSICALEXAM_GEN_ALL_CORE
Vital Signs Last 24 Hrs  T(C): 36.6 (07 Oct 2022 20:53), Max: 37.6 (07 Oct 2022 17:24)  T(F): 97.9 (07 Oct 2022 20:53), Max: 99.7 (07 Oct 2022 17:24)  HR: 60 (07 Oct 2022 20:53) (60 - 60)  BP: 120/82 (07 Oct 2022 20:53) (120/82 - 153/64)  RR: 12 (07 Oct 2022 20:53) (12 - 18)  SpO2: 100% (07 Oct 2022 20:53) (97% - 100%)    Parameters below as of 07 Oct 2022 20:53  Patient On (Oxygen Delivery Method): room air    PHYSICAL EXAM:  GENERAL: NAD, well-developed, well-nourished  HEAD:  Atraumatic, Normocephalic  EYES: EOMI, PERRL, conjunctiva and sclera clear  NECK: Supple, No LAD  ENT: dried blood roof of mouth extending from posterior gums and along lower anterior gums   CHEST/LUNG: Clear to auscultation bilaterally; No wheezes, rales or rhonchi; normal work of breathing, speaking in full sentences; R CW chest tube dressing c/d/i  HEART: Regular rate and rhythm; No murmurs, rubs, or gallops, (+)S1, S2  ABDOMEN: Soft, Nontender, Nondistended; Normal Bowel sounds   EXTREMITIES:  2+ Peripheral Pulses, No clubbing, cyanosis, or edema  PSYCH: normal mood and affect, A&Oxperson, place, POTUS (not to month, year)  NEUROLOGY: no focal neuro deficits, CN II-XII intact, 5/5 strength x4 extremities, sensation grossly intact, minimal cogwheel rigidity LUE  SKIN: No rashes or lesions

## 2022-10-08 DIAGNOSIS — J90 PLEURAL EFFUSION, NOT ELSEWHERE CLASSIFIED: ICD-10-CM

## 2022-10-08 LAB
ANION GAP SERPL CALC-SCNC: 14 MMOL/L — SIGNIFICANT CHANGE UP (ref 7–14)
B PERT DNA SPEC QL NAA+PROBE: SIGNIFICANT CHANGE UP
B PERT+PARAPERT DNA PNL SPEC NAA+PROBE: SIGNIFICANT CHANGE UP
BASE EXCESS BLDV CALC-SCNC: 2.2 MMOL/L — SIGNIFICANT CHANGE UP (ref -2–3)
BORDETELLA PARAPERTUSSIS (RAPRVP): SIGNIFICANT CHANGE UP
BUN SERPL-MCNC: 20 MG/DL — SIGNIFICANT CHANGE UP (ref 7–23)
C PNEUM DNA SPEC QL NAA+PROBE: SIGNIFICANT CHANGE UP
CALCIUM SERPL-MCNC: 9.3 MG/DL — SIGNIFICANT CHANGE UP (ref 8.4–10.5)
CHLORIDE SERPL-SCNC: 105 MMOL/L — SIGNIFICANT CHANGE UP (ref 98–107)
CO2 BLDV-SCNC: 28.6 MMOL/L — HIGH (ref 22–26)
CO2 SERPL-SCNC: 25 MMOL/L — SIGNIFICANT CHANGE UP (ref 22–31)
CREAT SERPL-MCNC: 2.12 MG/DL — HIGH (ref 0.5–1.3)
EGFR: 31 ML/MIN/1.73M2 — LOW
FLUAV SUBTYP SPEC NAA+PROBE: SIGNIFICANT CHANGE UP
FLUBV RNA SPEC QL NAA+PROBE: SIGNIFICANT CHANGE UP
GLUCOSE SERPL-MCNC: 85 MG/DL — SIGNIFICANT CHANGE UP (ref 70–99)
HADV DNA SPEC QL NAA+PROBE: SIGNIFICANT CHANGE UP
HCO3 BLDV-SCNC: 27 MMOL/L — SIGNIFICANT CHANGE UP (ref 22–29)
HCOV 229E RNA SPEC QL NAA+PROBE: SIGNIFICANT CHANGE UP
HCOV HKU1 RNA SPEC QL NAA+PROBE: SIGNIFICANT CHANGE UP
HCOV NL63 RNA SPEC QL NAA+PROBE: SIGNIFICANT CHANGE UP
HCOV OC43 RNA SPEC QL NAA+PROBE: SIGNIFICANT CHANGE UP
HCT VFR BLD CALC: 39.1 % — SIGNIFICANT CHANGE UP (ref 39–50)
HGB BLD-MCNC: 11.5 G/DL — LOW (ref 13–17)
HMPV RNA SPEC QL NAA+PROBE: SIGNIFICANT CHANGE UP
HPIV1 RNA SPEC QL NAA+PROBE: SIGNIFICANT CHANGE UP
HPIV2 RNA SPEC QL NAA+PROBE: SIGNIFICANT CHANGE UP
HPIV3 RNA SPEC QL NAA+PROBE: SIGNIFICANT CHANGE UP
HPIV4 RNA SPEC QL NAA+PROBE: SIGNIFICANT CHANGE UP
M PNEUMO DNA SPEC QL NAA+PROBE: SIGNIFICANT CHANGE UP
MAGNESIUM SERPL-MCNC: 2.3 MG/DL — SIGNIFICANT CHANGE UP (ref 1.6–2.6)
MCHC RBC-ENTMCNC: 24.6 PG — LOW (ref 27–34)
MCHC RBC-ENTMCNC: 29.4 GM/DL — LOW (ref 32–36)
MCV RBC AUTO: 83.7 FL — SIGNIFICANT CHANGE UP (ref 80–100)
NRBC # BLD: 0 /100 WBCS — SIGNIFICANT CHANGE UP (ref 0–0)
NRBC # FLD: 0 K/UL — SIGNIFICANT CHANGE UP (ref 0–0)
PCO2 BLDV: 43 MMHG — SIGNIFICANT CHANGE UP (ref 42–55)
PH BLDV: 7.41 — SIGNIFICANT CHANGE UP (ref 7.32–7.43)
PHOSPHATE SERPL-MCNC: 3.3 MG/DL — SIGNIFICANT CHANGE UP (ref 2.5–4.5)
PLATELET # BLD AUTO: 223 K/UL — SIGNIFICANT CHANGE UP (ref 150–400)
PO2 BLDV: 34 MMHG — SIGNIFICANT CHANGE UP
POTASSIUM SERPL-MCNC: 3.5 MMOL/L — SIGNIFICANT CHANGE UP (ref 3.5–5.3)
POTASSIUM SERPL-SCNC: 3.5 MMOL/L — SIGNIFICANT CHANGE UP (ref 3.5–5.3)
RAPID RVP RESULT: SIGNIFICANT CHANGE UP
RBC # BLD: 4.67 M/UL — SIGNIFICANT CHANGE UP (ref 4.2–5.8)
RBC # FLD: 22.6 % — HIGH (ref 10.3–14.5)
RSV RNA SPEC QL NAA+PROBE: SIGNIFICANT CHANGE UP
RV+EV RNA SPEC QL NAA+PROBE: SIGNIFICANT CHANGE UP
SAO2 % BLDV: 55.5 % — SIGNIFICANT CHANGE UP
SARS-COV-2 RNA SPEC QL NAA+PROBE: SIGNIFICANT CHANGE UP
SODIUM SERPL-SCNC: 144 MMOL/L — SIGNIFICANT CHANGE UP (ref 135–145)
WBC # BLD: 9.42 K/UL — SIGNIFICANT CHANGE UP (ref 3.8–10.5)
WBC # FLD AUTO: 9.42 K/UL — SIGNIFICANT CHANGE UP (ref 3.8–10.5)

## 2022-10-08 PROCEDURE — 99233 SBSQ HOSP IP/OBS HIGH 50: CPT

## 2022-10-08 PROCEDURE — 76770 US EXAM ABDO BACK WALL COMP: CPT | Mod: 26

## 2022-10-08 RX ORDER — POTASSIUM CHLORIDE 20 MEQ
10 PACKET (EA) ORAL ONCE
Refills: 0 | Status: COMPLETED | OUTPATIENT
Start: 2022-10-08 | End: 2022-10-08

## 2022-10-08 RX ORDER — BUMETANIDE 0.25 MG/ML
1 INJECTION INTRAMUSCULAR; INTRAVENOUS
Refills: 0 | Status: DISCONTINUED | OUTPATIENT
Start: 2022-10-08 | End: 2022-10-09

## 2022-10-08 RX ORDER — POTASSIUM PHOSPHATE, MONOBASIC POTASSIUM PHOSPHATE, DIBASIC 236; 224 MG/ML; MG/ML
15 INJECTION, SOLUTION INTRAVENOUS ONCE
Refills: 0 | Status: COMPLETED | OUTPATIENT
Start: 2022-10-08 | End: 2022-10-08

## 2022-10-08 RX ORDER — BUMETANIDE 0.25 MG/ML
1 INJECTION INTRAMUSCULAR; INTRAVENOUS
Refills: 0 | Status: DISCONTINUED | OUTPATIENT
Start: 2022-10-08 | End: 2022-10-08

## 2022-10-08 RX ORDER — BUMETANIDE 0.25 MG/ML
2 INJECTION INTRAMUSCULAR; INTRAVENOUS DAILY
Refills: 0 | Status: DISCONTINUED | OUTPATIENT
Start: 2022-10-09 | End: 2022-10-09

## 2022-10-08 RX ORDER — BUMETANIDE 0.25 MG/ML
2 INJECTION INTRAMUSCULAR; INTRAVENOUS DAILY
Refills: 0 | Status: DISCONTINUED | OUTPATIENT
Start: 2022-10-08 | End: 2022-10-08

## 2022-10-08 RX ORDER — SODIUM CHLORIDE 9 MG/ML
1000 INJECTION, SOLUTION INTRAVENOUS
Refills: 0 | Status: DISCONTINUED | OUTPATIENT
Start: 2022-10-08 | End: 2022-10-09

## 2022-10-08 RX ADMIN — DESVENLAFAXINE 50 MILLIGRAM(S): 50 TABLET, EXTENDED RELEASE ORAL at 11:55

## 2022-10-08 RX ADMIN — DESVENLAFAXINE 50 MILLIGRAM(S): 50 TABLET, EXTENDED RELEASE ORAL at 02:16

## 2022-10-08 RX ADMIN — OLANZAPINE 2.5 MILLIGRAM(S): 15 TABLET, FILM COATED ORAL at 22:29

## 2022-10-08 RX ADMIN — BUMETANIDE 2 MILLIGRAM(S): 0.25 INJECTION INTRAMUSCULAR; INTRAVENOUS at 06:28

## 2022-10-08 RX ADMIN — CARBIDOPA AND LEVODOPA 1 TABLET(S): 25; 100 TABLET ORAL at 18:33

## 2022-10-08 RX ADMIN — Medication 10 MILLIGRAM(S): at 06:27

## 2022-10-08 RX ADMIN — CLOPIDOGREL BISULFATE 75 MILLIGRAM(S): 75 TABLET, FILM COATED ORAL at 11:55

## 2022-10-08 RX ADMIN — CEFTRIAXONE 100 MILLIGRAM(S): 500 INJECTION, POWDER, FOR SOLUTION INTRAMUSCULAR; INTRAVENOUS at 21:11

## 2022-10-08 RX ADMIN — CARBIDOPA AND LEVODOPA 1.5 TABLET(S): 25; 100 TABLET ORAL at 11:56

## 2022-10-08 RX ADMIN — CARBIDOPA AND LEVODOPA 1 TABLET(S): 25; 100 TABLET ORAL at 11:55

## 2022-10-08 RX ADMIN — Medication 100 MILLIEQUIVALENT(S): at 13:19

## 2022-10-08 RX ADMIN — ATORVASTATIN CALCIUM 80 MILLIGRAM(S): 80 TABLET, FILM COATED ORAL at 21:11

## 2022-10-08 RX ADMIN — APIXABAN 2.5 MILLIGRAM(S): 2.5 TABLET, FILM COATED ORAL at 18:33

## 2022-10-08 RX ADMIN — POTASSIUM PHOSPHATE, MONOBASIC POTASSIUM PHOSPHATE, DIBASIC 62.5 MILLIMOLE(S): 236; 224 INJECTION, SOLUTION INTRAVENOUS at 15:06

## 2022-10-08 RX ADMIN — APIXABAN 2.5 MILLIGRAM(S): 2.5 TABLET, FILM COATED ORAL at 06:27

## 2022-10-08 RX ADMIN — BUMETANIDE 1 MILLIGRAM(S): 0.25 INJECTION INTRAMUSCULAR; INTRAVENOUS at 21:12

## 2022-10-08 RX ADMIN — AMIODARONE HYDROCHLORIDE 200 MILLIGRAM(S): 400 TABLET ORAL at 00:34

## 2022-10-08 RX ADMIN — Medication 10 MILLIGRAM(S): at 18:33

## 2022-10-08 RX ADMIN — CARBIDOPA AND LEVODOPA 1 TABLET(S): 25; 100 TABLET ORAL at 15:58

## 2022-10-08 NOTE — PROGRESS NOTE ADULT - PROBLEM SELECTOR PLAN 10
DVT ppx: on Eliquis  Dispo: pending once medically optimized c/w Vyndamax (tafamidis) family to brought from home

## 2022-10-08 NOTE — PROVIDER CONTACT NOTE (OTHER) - ASSESSMENT
Patient choked on water during med administration. Patient did tolerate taking meds crushed in yogurt. Patient lethargic since RN took over at 8am. Son at bedside, states no issues at home with food or drink.

## 2022-10-08 NOTE — PROGRESS NOTE ADULT - PROBLEM SELECTOR PLAN 6
persistent Afib    Continue apixaban and Amiodarone has right Pleurex cath and dressing in place and drained last week    Monitor pulse ox and VS

## 2022-10-08 NOTE — PHYSICAL THERAPY INITIAL EVALUATION ADULT - PERTINENT HX OF CURRENT PROBLEM, REHAB EVAL
81-year-old male with Parkinson's, Afib on apixaban, bradycardia s/p PPM, CAD s/p stent 8/2021, amyloid, chronic Right pleural effusion s/p pleurx (last drained last week), HTN, HLD, BPH, presenting from home with worsening confusion over the past few days, found to have a UTI.

## 2022-10-08 NOTE — PROGRESS NOTE ADULT - SUBJECTIVE AND OBJECTIVE BOX
Patient is a 81y old  Male who presents with a chief complaint of confusion (07 Oct 2022 22:21)      SUBJECTIVE / OVERNIGHT EVENTS:    No events overnight. This AM, patient without n/v/d/cp/sob.      MEDICATIONS  (STANDING):  aMIOdarone    Tablet 200 milliGRAM(s) Oral daily  apixaban 2.5 milliGRAM(s) Oral two times a day  atorvastatin 80 milliGRAM(s) Oral at bedtime  buMETAnide Injectable 1 milliGRAM(s) IV Push <User Schedule>  carbidopa/levodopa  25/100 1.5 Tablet(s) Oral daily  carbidopa/levodopa  25/100 1 Tablet(s) Oral <User Schedule>  cefTRIAXone   IVPB 1000 milliGRAM(s) IV Intermittent every 24 hours  clopidogrel Tablet 75 milliGRAM(s) Oral daily  desvenlafaxine ER 50 milliGRAM(s) Oral daily  lactated ringers. 1000 milliLiter(s) (70 mL/Hr) IV Continuous <Continuous>  OLANZapine 2.5 milliGRAM(s) Oral at bedtime  oxybutynin 10 milliGRAM(s) Oral two times a day  vyndamax 61 milliGRAM(s) 1 Capsule(s) Oral daily    MEDICATIONS  (PRN):      PHYSICAL EXAM:  T(C): 36.4 (10-08-22 @ 17:00), Max: 36.8 (10-07-22 @ 19:40)  HR: 60 (10-08-22 @ 17:00) (60 - 62)  BP: 124/61 (10-08-22 @ 17:00) (118/70 - 135/65)  RR: 17 (10-08-22 @ 17:00) (12 - 18)  SpO2: 100% (10-08-22 @ 17:00) (97% - 100%)  I&O's Summary    GENERAL: NAD, well-developed  HEAD:  Atraumatic, Normocephalic, MMM  CHEST/LUNG: No use of accessory muscles, CTAB, breathing non-labored  COR: RR, no mrcg  ABD: Soft, ND/NT, +BS  PSYCH: AAOx3  NEUROLOGY: CN II-XII grossly intact, moving all extremities  SKIN: No rashes or lesions  EXT: wwp, no cce    LABS:  CAPILLARY BLOOD GLUCOSE                              11.5   9.42  )-----------( 223      ( 08 Oct 2022 06:50 )             39.1     10    144  |  105  |  20  ----------------------------<  85  3.5   |  25  |  2.12<H>    Ca    9.3      08 Oct 2022 06:50  Phos  3.3     10  Mg     2.30     10-08    TPro  7.1  /  Alb  3.5  /  TBili  0.5  /  DBili  x   /  AST  30  /  ALT  13  /  AlkPhos  282<H>  10-07    PT/INR - ( 07 Oct 2022 17:29 )   PT: 17.9 sec;   INR: 1.54 ratio         PTT - ( 07 Oct 2022 17:29 )  PTT:41.5 sec      Urinalysis Basic - ( 07 Oct 2022 17:45 )    Color: Light Yellow / Appearance: Turbid / S.015 / pH: x  Gluc: x / Ketone: Negative  / Bili: Negative / Urobili: <2 mg/dL   Blood: x / Protein: 300 mg/dL / Nitrite: Positive   Leuk Esterase: Large / RBC: 25-50 /HPF / WBC >50 /HPF   Sq Epi: x / Non Sq Epi: Few / Bacteria: Few          RADIOLOGY & ADDITIONAL TESTS:    Telemetry Personally Reviewed -     Imaging Personally Reviewed -     Imaging Reviewed -     Consultant(s) Notes Reviewed -       Care Discussed with Consultants/Other Providers -  Patient is a 81y old  Male who presents with a chief complaint of confusion (07 Oct 2022 22:21)      SUBJECTIVE / OVERNIGHT EVENTS:    No events overnight. This AM, patient without n/v/d/cp/sob.  He remains somnolent but wakes up with pain stimuli and able to answer few questions. AOx2.   24 hr aide at bedside.     MEDICATIONS  (STANDING):  aMIOdarone    Tablet 200 milliGRAM(s) Oral daily  apixaban 2.5 milliGRAM(s) Oral two times a day  atorvastatin 80 milliGRAM(s) Oral at bedtime  buMETAnide Injectable 1 milliGRAM(s) IV Push <User Schedule>  carbidopa/levodopa  25/100 1.5 Tablet(s) Oral daily  carbidopa/levodopa  25/100 1 Tablet(s) Oral <User Schedule>  cefTRIAXone   IVPB 1000 milliGRAM(s) IV Intermittent every 24 hours  clopidogrel Tablet 75 milliGRAM(s) Oral daily  desvenlafaxine ER 50 milliGRAM(s) Oral daily  lactated ringers. 1000 milliLiter(s) (70 mL/Hr) IV Continuous <Continuous>  OLANZapine 2.5 milliGRAM(s) Oral at bedtime  oxybutynin 10 milliGRAM(s) Oral two times a day  vyndamax 61 milliGRAM(s) 1 Capsule(s) Oral daily    MEDICATIONS  (PRN):      PHYSICAL EXAM:  T(C): 36.4 (10-08-22 @ 17:00), Max: 36.8 (10-07-22 @ 19:40)  HR: 60 (10-08-22 @ 17:00) (60 - 62)  BP: 124/61 (10-08-22 @ 17:00) (118/70 - 135/65)  RR: 17 (10-08-22 @ 17:00) (12 - 18)  SpO2: 100% (10-08-22 @ 17:00) (97% - 100%)  I&O's Summary    GENERAL: NAD, elderly thin male with aide at bedside  HEAD:  Atraumatic, Normocephalic, dried blood in mouth and teeth  CHEST/LUNG: No use of accessory muscles, CTAB, breathing non-labored, Pleurex dressing in place on right side of upper back.   COR: RR, no mrcg  ABD: Soft, ND/NT, +BS  PSYCH: somnolent and awakens with painful stimuli  NEUROLOGY: CN II-XII grossly intact, moving all extremities  SKIN: No rashes or lesions  EXT: no LE edema,  2+ Peripheral Pulses, No clubbing, cyanosis, or edema      LABS:                              11.5   9.42  )-----------( 223      ( 08 Oct 2022 06:50 )             39.1     10    144  |  105  |  20  ----------------------------<  85  3.5   |  25  |  2.12<H>    Ca    9.3      08 Oct 2022 06:50  Phos  3.3     10-  Mg     2.30     10-    TPro  7.1  /  Alb  3.5  /  TBili  0.5  /  DBili  x   /  AST  30  /  ALT  13  /  AlkPhos  282<H>  10-07    PT/INR - ( 07 Oct 2022 17:29 )   PT: 17.9 sec;   INR: 1.54 ratio         PTT - ( 07 Oct 2022 17:29 )  PTT:41.5 sec      Urinalysis Basic - ( 07 Oct 2022 17:45 )    Color: Light Yellow / Appearance: Turbid / S.015 / pH: x  Gluc: x / Ketone: Negative  / Bili: Negative / Urobili: <2 mg/dL   Blood: x / Protein: 300 mg/dL / Nitrite: Positive   Leuk Esterase: Large / RBC: 25-50 /HPF / WBC >50 /HPF   Sq Epi: x / Non Sq Epi: Few / Bacteria: Few          RADIOLOGY & ADDITIONAL TESTS:    Telemetry Personally Reviewed - V paced     Imaging Reviewed -

## 2022-10-08 NOTE — PROGRESS NOTE ADULT - PROBLEM SELECTOR PLAN 5
Continue home meds - carbidopa/levodopa and olanzapine (lower dose due to QT)  reorient as needed  fall and aspiration precautions. Swallow eval pending  PT consult pending

## 2022-10-08 NOTE — PROGRESS NOTE ADULT - PROBLEM SELECTOR PLAN 4
slightly more prolonged in comparison to prior admission ekg, reportedly olanzapine increased from 2.5 to 5mg recently   repeat EKG in morning similar to admission ECG.     holding mirtazepine for now, will decrease to previous dose (2.5mg)  repeat ECG in am. Replete electrolytes as needed  monitor on tele

## 2022-10-08 NOTE — PROGRESS NOTE ADULT - PROBLEM SELECTOR PLAN 3
Cx slightly worse since last admission and likely secondary to UTI  slight improvement with IVF    Continue gentle IVF for now   Follow up Renal U/S and Monitor BUN/Cr   renally dose meds, avoid nephrotoxic agents

## 2022-10-08 NOTE — PATIENT PROFILE ADULT - FALL HARM RISK - HARM RISK INTERVENTIONS

## 2022-10-08 NOTE — PATIENT PROFILE ADULT - FUNCTIONAL ASSESSMENT - BASIC MOBILITY 6.
Not able to assess (calculate score using AMPAC averaging method) 2-calculated by average/Not able to assess (calculate score using [facility] averaging method)

## 2022-10-08 NOTE — PROVIDER CONTACT NOTE (OTHER) - ACTION/TREATMENT ORDERED:
Provider aware, to order speech and swallow consult. Keep NPO pending results of speech and swallow.

## 2022-10-08 NOTE — PHYSICAL THERAPY INITIAL EVALUATION ADULT - PLANNED THERAPY INTERVENTIONS, PT EVAL
DISPLAY PLAN FREE TEXT
balance training/bed mobility training/gait training/strengthening/transfer training

## 2022-10-08 NOTE — PHYSICAL THERAPY INITIAL EVALUATION ADULT - ADDITIONAL COMMENTS
Pt is a poor historian secondary to mental status, able to gather that he lives in a private house with his wife, +steps to enter/inside, uses rolling walker for functional mobility.     Pt left semi-supine on stretcher, all lines intact, in NAD. RN aware.

## 2022-10-08 NOTE — PROGRESS NOTE ADULT - PROBLEM SELECTOR PLAN 1
likely etiology secondary to UTI with hx of Dementia   CTH unremarkable for acute findings     Continue Ceftriaxone for UTI   Aspiration precautions. NPO until further evaluation by speech/swallow   reorient as needed  Fall precautions  Monitor VS. Follow up labs and Blood Cx/Urine Cx  PT consult

## 2022-10-09 DIAGNOSIS — N42.0 CALCULUS OF PROSTATE: ICD-10-CM

## 2022-10-09 LAB
ALBUMIN SERPL ELPH-MCNC: 3.1 G/DL — LOW (ref 3.3–5)
ALP SERPL-CCNC: 233 U/L — HIGH (ref 40–120)
ALT FLD-CCNC: 5 U/L — SIGNIFICANT CHANGE UP (ref 4–41)
ANION GAP SERPL CALC-SCNC: 12 MMOL/L — SIGNIFICANT CHANGE UP (ref 7–14)
ANION GAP SERPL CALC-SCNC: 14 MMOL/L — SIGNIFICANT CHANGE UP (ref 7–14)
AST SERPL-CCNC: 23 U/L — SIGNIFICANT CHANGE UP (ref 4–40)
BASE EXCESS BLDV CALC-SCNC: 2.6 MMOL/L — SIGNIFICANT CHANGE UP (ref -2–3)
BILIRUB SERPL-MCNC: 0.5 MG/DL — SIGNIFICANT CHANGE UP (ref 0.2–1.2)
BUN SERPL-MCNC: 21 MG/DL — SIGNIFICANT CHANGE UP (ref 7–23)
BUN SERPL-MCNC: 23 MG/DL — SIGNIFICANT CHANGE UP (ref 7–23)
CA-I SERPL-SCNC: 1.23 MMOL/L — SIGNIFICANT CHANGE UP (ref 1.15–1.33)
CALCIUM SERPL-MCNC: 9.1 MG/DL — SIGNIFICANT CHANGE UP (ref 8.4–10.5)
CALCIUM SERPL-MCNC: 9.4 MG/DL — SIGNIFICANT CHANGE UP (ref 8.4–10.5)
CHLORIDE BLDV-SCNC: 104 MMOL/L — SIGNIFICANT CHANGE UP (ref 96–108)
CHLORIDE SERPL-SCNC: 104 MMOL/L — SIGNIFICANT CHANGE UP (ref 98–107)
CHLORIDE SERPL-SCNC: 105 MMOL/L — SIGNIFICANT CHANGE UP (ref 98–107)
CO2 BLDV-SCNC: 29.3 MMOL/L — HIGH (ref 22–26)
CO2 SERPL-SCNC: 21 MMOL/L — LOW (ref 22–31)
CO2 SERPL-SCNC: 24 MMOL/L — SIGNIFICANT CHANGE UP (ref 22–31)
CREAT SERPL-MCNC: 2.09 MG/DL — HIGH (ref 0.5–1.3)
CREAT SERPL-MCNC: 2.31 MG/DL — HIGH (ref 0.5–1.3)
CULTURE RESULTS: SIGNIFICANT CHANGE UP
EGFR: 28 ML/MIN/1.73M2 — LOW
EGFR: 32 ML/MIN/1.73M2 — LOW
GAS PNL BLDV: 139 MMOL/L — SIGNIFICANT CHANGE UP (ref 136–145)
GAS PNL BLDV: SIGNIFICANT CHANGE UP
GLUCOSE BLDV-MCNC: 117 MG/DL — HIGH (ref 70–99)
GLUCOSE SERPL-MCNC: 109 MG/DL — HIGH (ref 70–99)
GLUCOSE SERPL-MCNC: 76 MG/DL — SIGNIFICANT CHANGE UP (ref 70–99)
HCO3 BLDV-SCNC: 28 MMOL/L — SIGNIFICANT CHANGE UP (ref 22–29)
HCT VFR BLD CALC: 39.3 % — SIGNIFICANT CHANGE UP (ref 39–50)
HCT VFR BLDA CALC: 35 % — LOW (ref 39–51)
HGB BLD CALC-MCNC: 11.8 G/DL — LOW (ref 13–17)
HGB BLD-MCNC: 11.6 G/DL — LOW (ref 13–17)
LACTATE BLDV-MCNC: 2.1 MMOL/L — HIGH (ref 0.5–2)
LACTATE SERPL-SCNC: 1.2 MMOL/L — SIGNIFICANT CHANGE UP (ref 0.5–2)
MAGNESIUM SERPL-MCNC: 2.4 MG/DL — SIGNIFICANT CHANGE UP (ref 1.6–2.6)
MAGNESIUM SERPL-MCNC: 2.5 MG/DL — SIGNIFICANT CHANGE UP (ref 1.6–2.6)
MCHC RBC-ENTMCNC: 25.1 PG — LOW (ref 27–34)
MCHC RBC-ENTMCNC: 29.5 GM/DL — LOW (ref 32–36)
MCV RBC AUTO: 84.9 FL — SIGNIFICANT CHANGE UP (ref 80–100)
NRBC # BLD: 0 /100 WBCS — SIGNIFICANT CHANGE UP (ref 0–0)
NRBC # FLD: 0 K/UL — SIGNIFICANT CHANGE UP (ref 0–0)
PCO2 BLDV: 45 MMHG — SIGNIFICANT CHANGE UP (ref 42–55)
PH BLDV: 7.4 — SIGNIFICANT CHANGE UP (ref 7.32–7.43)
PHOSPHATE SERPL-MCNC: 4.1 MG/DL — SIGNIFICANT CHANGE UP (ref 2.5–4.5)
PHOSPHATE SERPL-MCNC: 4.5 MG/DL — SIGNIFICANT CHANGE UP (ref 2.5–4.5)
PLATELET # BLD AUTO: 216 K/UL — SIGNIFICANT CHANGE UP (ref 150–400)
PO2 BLDV: 28 MMHG — SIGNIFICANT CHANGE UP
POTASSIUM BLDV-SCNC: 3.5 MMOL/L — SIGNIFICANT CHANGE UP (ref 3.5–5.1)
POTASSIUM SERPL-MCNC: 3.6 MMOL/L — SIGNIFICANT CHANGE UP (ref 3.5–5.3)
POTASSIUM SERPL-MCNC: 6.1 MMOL/L — HIGH (ref 3.5–5.3)
POTASSIUM SERPL-SCNC: 3.6 MMOL/L — SIGNIFICANT CHANGE UP (ref 3.5–5.3)
POTASSIUM SERPL-SCNC: 6.1 MMOL/L — HIGH (ref 3.5–5.3)
PROT SERPL-MCNC: 6.4 G/DL — SIGNIFICANT CHANGE UP (ref 6–8.3)
RBC # BLD: 4.63 M/UL — SIGNIFICANT CHANGE UP (ref 4.2–5.8)
RBC # FLD: 22.6 % — HIGH (ref 10.3–14.5)
SAO2 % BLDV: 37.2 % — SIGNIFICANT CHANGE UP
SODIUM SERPL-SCNC: 139 MMOL/L — SIGNIFICANT CHANGE UP (ref 135–145)
SODIUM SERPL-SCNC: 141 MMOL/L — SIGNIFICANT CHANGE UP (ref 135–145)
SPECIMEN SOURCE: SIGNIFICANT CHANGE UP
WBC # BLD: 9.21 K/UL — SIGNIFICANT CHANGE UP (ref 3.8–10.5)
WBC # FLD AUTO: 9.21 K/UL — SIGNIFICANT CHANGE UP (ref 3.8–10.5)

## 2022-10-09 PROCEDURE — 99233 SBSQ HOSP IP/OBS HIGH 50: CPT | Mod: FS

## 2022-10-09 PROCEDURE — 93010 ELECTROCARDIOGRAM REPORT: CPT

## 2022-10-09 RX ORDER — BUMETANIDE 0.25 MG/ML
1 INJECTION INTRAMUSCULAR; INTRAVENOUS
Refills: 0 | Status: DISCONTINUED | OUTPATIENT
Start: 2022-10-09 | End: 2022-10-12

## 2022-10-09 RX ADMIN — OLANZAPINE 2.5 MILLIGRAM(S): 15 TABLET, FILM COATED ORAL at 21:40

## 2022-10-09 RX ADMIN — CARBIDOPA AND LEVODOPA 1.5 TABLET(S): 25; 100 TABLET ORAL at 05:04

## 2022-10-09 RX ADMIN — CARBIDOPA AND LEVODOPA 1 TABLET(S): 25; 100 TABLET ORAL at 12:36

## 2022-10-09 RX ADMIN — APIXABAN 2.5 MILLIGRAM(S): 2.5 TABLET, FILM COATED ORAL at 18:02

## 2022-10-09 RX ADMIN — Medication 10 MILLIGRAM(S): at 18:02

## 2022-10-09 RX ADMIN — APIXABAN 2.5 MILLIGRAM(S): 2.5 TABLET, FILM COATED ORAL at 05:03

## 2022-10-09 RX ADMIN — ATORVASTATIN CALCIUM 80 MILLIGRAM(S): 80 TABLET, FILM COATED ORAL at 21:40

## 2022-10-09 RX ADMIN — BUMETANIDE 1 MILLIGRAM(S): 0.25 INJECTION INTRAMUSCULAR; INTRAVENOUS at 21:42

## 2022-10-09 RX ADMIN — BUMETANIDE 2 MILLIGRAM(S): 0.25 INJECTION INTRAMUSCULAR; INTRAVENOUS at 05:02

## 2022-10-09 RX ADMIN — CARBIDOPA AND LEVODOPA 1 TABLET(S): 25; 100 TABLET ORAL at 18:02

## 2022-10-09 RX ADMIN — AMIODARONE HYDROCHLORIDE 200 MILLIGRAM(S): 400 TABLET ORAL at 05:03

## 2022-10-09 RX ADMIN — CARBIDOPA AND LEVODOPA 1 TABLET(S): 25; 100 TABLET ORAL at 15:46

## 2022-10-09 RX ADMIN — CEFTRIAXONE 100 MILLIGRAM(S): 500 INJECTION, POWDER, FOR SOLUTION INTRAMUSCULAR; INTRAVENOUS at 19:05

## 2022-10-09 RX ADMIN — CLOPIDOGREL BISULFATE 75 MILLIGRAM(S): 75 TABLET, FILM COATED ORAL at 12:36

## 2022-10-09 RX ADMIN — Medication 10 MILLIGRAM(S): at 05:04

## 2022-10-09 RX ADMIN — DESVENLAFAXINE 50 MILLIGRAM(S): 50 TABLET, EXTENDED RELEASE ORAL at 12:36

## 2022-10-09 NOTE — PROGRESS NOTE ADULT - PROBLEM SELECTOR PLAN 3
Cx slightly worse since last admission and likely secondary to UTI  slight improvement with IVF    Continue gentle IVF for now   Follow up Renal U/S and Monitor BUN/Cr   renally dose meds, avoid nephrotoxic agents Cx slightly worse since last admission and likely secondary to UTI  slight improvement with IVF  was receiving IV bumex due to AMS  Renal U/S: No hydronephrosis.      Monitor BUN/Cr   renally dose meds, avoid nephrotoxic agents

## 2022-10-09 NOTE — PROGRESS NOTE ADULT - PROBLEM SELECTOR PLAN 11
DVT ppx: on Eliquis  Dispo: pending once medically optimized Calcification measuring approximately 1.2 cm along the bladder neck/base of the prostate, which could reflect a bladder calcification or calculus, or prostatic calcification.  Layering debris within the urinary bladder.    Consider Urology consult

## 2022-10-09 NOTE — PROGRESS NOTE ADULT - SUBJECTIVE AND OBJECTIVE BOX
Patient is a 81y old  Male who presents with a chief complaint of confusion (08 Oct 2022 16:00)      SUBJECTIVE / OVERNIGHT EVENTS:    No events overnight. This AM, patient without n/v/d/cp/sob.      MEDICATIONS  (STANDING):  aMIOdarone    Tablet 200 milliGRAM(s) Oral daily  apixaban 2.5 milliGRAM(s) Oral two times a day  atorvastatin 80 milliGRAM(s) Oral at bedtime  buMETAnide Injectable 2 milliGRAM(s) IV Push daily  buMETAnide Injectable 1 milliGRAM(s) IV Push <User Schedule>  carbidopa/levodopa  25/100 1.5 Tablet(s) Oral daily  carbidopa/levodopa  25/100 1 Tablet(s) Oral <User Schedule>  cefTRIAXone   IVPB 1000 milliGRAM(s) IV Intermittent every 24 hours  clopidogrel Tablet 75 milliGRAM(s) Oral daily  desvenlafaxine ER 50 milliGRAM(s) Oral daily  lactated ringers. 1000 milliLiter(s) (70 mL/Hr) IV Continuous <Continuous>  OLANZapine 2.5 milliGRAM(s) Oral at bedtime  oxybutynin 10 milliGRAM(s) Oral two times a day  vyndamax 61 milliGRAM(s) 1 Capsule(s) Oral daily    MEDICATIONS  (PRN):      PHYSICAL EXAM:  T(C): 36.7 (10-09-22 @ 05:00), Max: 37 (10-08-22 @ 21:05)  HR: 80 (10-09-22 @ 05:00) (60 - 85)  BP: 152/78 (10-09-22 @ 05:00) (118/70 - 152/78)  RR: 18 (10-09-22 @ 05:00) (14 - 18)  SpO2: 99% (10-09-22 @ 05:00) (99% - 100%)  I&O's Summary    GENERAL: NAD, well-developed  HEAD:  Atraumatic, Normocephalic, MMM  CHEST/LUNG: No use of accessory muscles, CTAB, breathing non-labored  COR: RR, no mrcg  ABD: Soft, ND/NT, +BS  PSYCH: AAOx3  NEUROLOGY: CN II-XII grossly intact, moving all extremities  SKIN: No rashes or lesions  EXT: wwp, no cce    LABS:  CAPILLARY BLOOD GLUCOSE                              11.5   9.42  )-----------( 223      ( 08 Oct 2022 06:50 )             39.1     10    139  |  104  |  21  ----------------------------<  76  6.1<H>   |  21<L>  |  2.09<H>    Ca    9.1      09 Oct 2022 06:00  Phos  4.5     10  Mg     2.50     10-09    TPro  7.1  /  Alb  3.5  /  TBili  0.5  /  DBili  x   /  AST  30  /  ALT  13  /  AlkPhos  282<H>  10    PT/INR - ( 07 Oct 2022 17:29 )   PT: 17.9 sec;   INR: 1.54 ratio         PTT - ( 07 Oct 2022 17:29 )  PTT:41.5 sec      Urinalysis Basic - ( 07 Oct 2022 17:45 )    Color: Light Yellow / Appearance: Turbid / S.015 / pH: x  Gluc: x / Ketone: Negative  / Bili: Negative / Urobili: <2 mg/dL   Blood: x / Protein: 300 mg/dL / Nitrite: Positive   Leuk Esterase: Large / RBC: 25-50 /HPF / WBC >50 /HPF   Sq Epi: x / Non Sq Epi: Few / Bacteria: Few          RADIOLOGY & ADDITIONAL TESTS:    Telemetry Personally Reviewed -     Imaging Personally Reviewed -     Imaging Reviewed -     Consultant(s) Notes Reviewed -       Care Discussed with Consultants/Other Providers -  Patient is a 81y old  Male who presents with a chief complaint of confusion (08 Oct 2022 16:00)      SUBJECTIVE / OVERNIGHT EVENTS:    No events overnight. This AM, patient without n/v/d/cp/sob.  Patient is awake but still remains confused and as per family not yet back to baseline. He is alert and oriented to self only. He otherwise denies any acute complaints at this time.     At baseline he is able to identify family members and aide. Today he thinks son is his coworker at [employer] and aide is his .     MEDICATIONS  (STANDING):  aMIOdarone    Tablet 200 milliGRAM(s) Oral daily  apixaban 2.5 milliGRAM(s) Oral two times a day  atorvastatin 80 milliGRAM(s) Oral at bedtime  buMETAnide Injectable 2 milliGRAM(s) IV Push daily  buMETAnide Injectable 1 milliGRAM(s) IV Push <User Schedule>  carbidopa/levodopa  25/100 1.5 Tablet(s) Oral daily  carbidopa/levodopa  25/100 1 Tablet(s) Oral <User Schedule>  cefTRIAXone   IVPB 1000 milliGRAM(s) IV Intermittent every 24 hours  clopidogrel Tablet 75 milliGRAM(s) Oral daily  desvenlafaxine ER 50 milliGRAM(s) Oral daily  lactated ringers. 1000 milliLiter(s) (70 mL/Hr) IV Continuous <Continuous>  OLANZapine 2.5 milliGRAM(s) Oral at bedtime  oxybutynin 10 milliGRAM(s) Oral two times a day  vyndamax 61 milliGRAM(s) 1 Capsule(s) Oral daily    MEDICATIONS  (PRN):      PHYSICAL EXAM:  T(C): 36.7 (10-09-22 @ 05:00), Max: 37 (10-08-22 @ 21:05)  HR: 80 (10-09-22 @ 05:00) (60 - 85)  BP: 152/78 (10-09-22 @ 05:00) (118/70 - 152/78)  RR: 18 (10-09-22 @ 05:00) (14 - 18)  SpO2: 99% (10-09-22 @ 05:00) (99% - 100%)  I&O's Summary    GENERAL: NAD, elderly thin male with son and aide at bedside  HEAD:  Atraumatic, Normocephalic, dried blood in mouth and teeth  CHEST/LUNG: No use of accessory muscles, CTAB, breathing non-labored, Pleurex dressing in place on right side of upper back.   COR: RR, no mrcg  ABD: Soft, ND/NT, +BS  PSYCH: somnolent and awakens with painful stimuli  NEUROLOGY: CN II-XII grossly intact, moving all extremities  SKIN: No rashes or lesions  EXT: no LE edema,  2+ Peripheral Pulses, No clubbing, cyanosis, or edema    LABS:                            11.5   9.42  )-----------( 223      ( 08 Oct 2022 06:50 )             39.1     10-09    139  |  104  |  21  ----------------------------<  76  6.1<H>   |  21<L>  |  2.09<H>    Ca    9.1      09 Oct 2022 06:00  Phos  4.5     10-09  Mg     2.50     10-09    TPro  7.1  /  Alb  3.5  /  TBili  0.5  /  DBili  x   /  AST  30  /  ALT  13  /  AlkPhos  282<H>  10-07    PT/INR - ( 07 Oct 2022 17:29 )   PT: 17.9 sec;   INR: 1.54 ratio         PTT - ( 07 Oct 2022 17:29 )  PTT:41.5 sec      Urinalysis Basic - ( 07 Oct 2022 17:45 )    Color: Light Yellow / Appearance: Turbid / S.015 / pH: x  Gluc: x / Ketone: Negative  / Bili: Negative / Urobili: <2 mg/dL   Blood: x / Protein: 300 mg/dL / Nitrite: Positive   Leuk Esterase: Large / RBC: 25-50 /HPF / WBC >50 /HPF   Sq Epi: x / Non Sq Epi: Few / Bacteria: Few          RADIOLOGY & ADDITIONAL TESTS:    Telemetry Personally Reviewed - AV paced

## 2022-10-09 NOTE — PROGRESS NOTE ADULT - PROBLEM SELECTOR PLAN 4
slightly more prolonged in comparison to prior admission ekg, reportedly olanzapine increased from 2.5 to 5mg recently   repeat EKG in morning similar to admission ECG.     holding mirtazepine for now, will decrease to previous dose (2.5mg)  repeat ECG in am. Replete electrolytes as needed  monitor on tele slightly more prolonged in comparison to prior admission ekg, reportedly olanzapine increased from 2.5 to 5mg recently   repeat EKG in morning similar to admission ECG.     holding mirtazepine for now, will decrease to previous dose (2.5mg)  Replete electrolytes as needed  monitor on tele slightly more prolonged in comparison to prior admission ekg, reportedly olanzapine increased from 2.5 to 5mg recently   repeat EKG in morning similar to admission ECG.     holding mirtazepine for now. Decreased Zyprexa to 2.5mg  Replete electrolytes as needed  monitor on tele

## 2022-10-09 NOTE — PROGRESS NOTE ADULT - PROBLEM SELECTOR PLAN 5
Continue home meds - carbidopa/levodopa and olanzapine (lower dose due to QT)  reorient as needed  fall and aspiration precautions. Swallow eval pending  PT consult pending Continue home meds - carbidopa/levodopa and olanzapine (lower dose due to QT)  reorient as needed  fall and aspiration precautions. Official Swallow eval pending  PT recommends ALEXANDRA

## 2022-10-09 NOTE — PROGRESS NOTE ADULT - PROBLEM SELECTOR PLAN 1
likely etiology secondary to UTI with hx of Dementia   CTH unremarkable for acute findings     Continue Ceftriaxone for UTI   Aspiration precautions. NPO until further evaluation by speech/swallow   reorient as needed  Fall precautions  Monitor VS. Follow up labs and Blood Cx/Urine Cx  PT consult likely etiology secondary to UTI with hx of Dementia and also on Zyprexa  CTH unremarkable for acute findings     Continue Ceftriaxone for UTI   Aspiration precautions. Passed bedside evaluation. will have speech see for official evaluation  reorient as needed  Fall precautions  Monitor VS. Follow up labs and Blood Cx/Urine Cx  PT consult and recommend ALEXANDRA

## 2022-10-10 DIAGNOSIS — K59.00 CONSTIPATION, UNSPECIFIED: ICD-10-CM

## 2022-10-10 LAB
ANION GAP SERPL CALC-SCNC: 11 MMOL/L — SIGNIFICANT CHANGE UP (ref 7–14)
BUN SERPL-MCNC: 22 MG/DL — SIGNIFICANT CHANGE UP (ref 7–23)
CALCIUM SERPL-MCNC: 9.3 MG/DL — SIGNIFICANT CHANGE UP (ref 8.4–10.5)
CHLORIDE SERPL-SCNC: 105 MMOL/L — SIGNIFICANT CHANGE UP (ref 98–107)
CO2 SERPL-SCNC: 26 MMOL/L — SIGNIFICANT CHANGE UP (ref 22–31)
CREAT SERPL-MCNC: 2.26 MG/DL — HIGH (ref 0.5–1.3)
EGFR: 29 ML/MIN/1.73M2 — LOW
GLUCOSE SERPL-MCNC: 86 MG/DL — SIGNIFICANT CHANGE UP (ref 70–99)
HCT VFR BLD CALC: 34.7 % — LOW (ref 39–50)
HGB BLD-MCNC: 10.4 G/DL — LOW (ref 13–17)
MAGNESIUM SERPL-MCNC: 2.4 MG/DL — SIGNIFICANT CHANGE UP (ref 1.6–2.6)
MCHC RBC-ENTMCNC: 25.2 PG — LOW (ref 27–34)
MCHC RBC-ENTMCNC: 30 GM/DL — LOW (ref 32–36)
MCV RBC AUTO: 84.2 FL — SIGNIFICANT CHANGE UP (ref 80–100)
NRBC # BLD: 0 /100 WBCS — SIGNIFICANT CHANGE UP (ref 0–0)
NRBC # FLD: 0 K/UL — SIGNIFICANT CHANGE UP (ref 0–0)
PHOSPHATE SERPL-MCNC: 4.3 MG/DL — SIGNIFICANT CHANGE UP (ref 2.5–4.5)
PLATELET # BLD AUTO: 211 K/UL — SIGNIFICANT CHANGE UP (ref 150–400)
POTASSIUM SERPL-MCNC: 3.3 MMOL/L — LOW (ref 3.5–5.3)
POTASSIUM SERPL-SCNC: 3.3 MMOL/L — LOW (ref 3.5–5.3)
RBC # BLD: 4.12 M/UL — LOW (ref 4.2–5.8)
RBC # FLD: 22.8 % — HIGH (ref 10.3–14.5)
SODIUM SERPL-SCNC: 142 MMOL/L — SIGNIFICANT CHANGE UP (ref 135–145)
WBC # BLD: 7.36 K/UL — SIGNIFICANT CHANGE UP (ref 3.8–10.5)
WBC # FLD AUTO: 7.36 K/UL — SIGNIFICANT CHANGE UP (ref 3.8–10.5)

## 2022-10-10 PROCEDURE — 93010 ELECTROCARDIOGRAM REPORT: CPT

## 2022-10-10 PROCEDURE — 99233 SBSQ HOSP IP/OBS HIGH 50: CPT

## 2022-10-10 RX ORDER — MIRTAZAPINE 45 MG/1
11.25 TABLET, ORALLY DISINTEGRATING ORAL AT BEDTIME
Refills: 0 | Status: DISCONTINUED | OUTPATIENT
Start: 2022-10-10 | End: 2022-10-12

## 2022-10-10 RX ORDER — POTASSIUM CHLORIDE 20 MEQ
40 PACKET (EA) ORAL ONCE
Refills: 0 | Status: COMPLETED | OUTPATIENT
Start: 2022-10-10 | End: 2022-10-10

## 2022-10-10 RX ORDER — POTASSIUM CHLORIDE 20 MEQ
10 PACKET (EA) ORAL ONCE
Refills: 0 | Status: COMPLETED | OUTPATIENT
Start: 2022-10-10 | End: 2022-10-10

## 2022-10-10 RX ORDER — POLYETHYLENE GLYCOL 3350 17 G/17G
17 POWDER, FOR SOLUTION ORAL DAILY
Refills: 0 | Status: DISCONTINUED | OUTPATIENT
Start: 2022-10-10 | End: 2022-10-12

## 2022-10-10 RX ORDER — SENNA PLUS 8.6 MG/1
2 TABLET ORAL AT BEDTIME
Refills: 0 | Status: DISCONTINUED | OUTPATIENT
Start: 2022-10-10 | End: 2022-10-12

## 2022-10-10 RX ADMIN — Medication 10 MILLIGRAM(S): at 05:49

## 2022-10-10 RX ADMIN — MIRTAZAPINE 11.25 MILLIGRAM(S): 45 TABLET, ORALLY DISINTEGRATING ORAL at 23:01

## 2022-10-10 RX ADMIN — BUMETANIDE 1 MILLIGRAM(S): 0.25 INJECTION INTRAMUSCULAR; INTRAVENOUS at 23:02

## 2022-10-10 RX ADMIN — CARBIDOPA AND LEVODOPA 1 TABLET(S): 25; 100 TABLET ORAL at 15:17

## 2022-10-10 RX ADMIN — CARBIDOPA AND LEVODOPA 1 TABLET(S): 25; 100 TABLET ORAL at 13:13

## 2022-10-10 RX ADMIN — CEFTRIAXONE 100 MILLIGRAM(S): 500 INJECTION, POWDER, FOR SOLUTION INTRAMUSCULAR; INTRAVENOUS at 20:35

## 2022-10-10 RX ADMIN — Medication 40 MILLIEQUIVALENT(S): at 09:50

## 2022-10-10 RX ADMIN — APIXABAN 2.5 MILLIGRAM(S): 2.5 TABLET, FILM COATED ORAL at 05:49

## 2022-10-10 RX ADMIN — CARBIDOPA AND LEVODOPA 1.5 TABLET(S): 25; 100 TABLET ORAL at 05:48

## 2022-10-10 RX ADMIN — OLANZAPINE 2.5 MILLIGRAM(S): 15 TABLET, FILM COATED ORAL at 23:01

## 2022-10-10 RX ADMIN — AMIODARONE HYDROCHLORIDE 200 MILLIGRAM(S): 400 TABLET ORAL at 05:49

## 2022-10-10 RX ADMIN — ATORVASTATIN CALCIUM 80 MILLIGRAM(S): 80 TABLET, FILM COATED ORAL at 23:02

## 2022-10-10 RX ADMIN — APIXABAN 2.5 MILLIGRAM(S): 2.5 TABLET, FILM COATED ORAL at 17:58

## 2022-10-10 RX ADMIN — Medication 100 MILLIEQUIVALENT(S): at 13:13

## 2022-10-10 RX ADMIN — CARBIDOPA AND LEVODOPA 1 TABLET(S): 25; 100 TABLET ORAL at 17:57

## 2022-10-10 RX ADMIN — SENNA PLUS 2 TABLET(S): 8.6 TABLET ORAL at 23:02

## 2022-10-10 RX ADMIN — POLYETHYLENE GLYCOL 3350 17 GRAM(S): 17 POWDER, FOR SOLUTION ORAL at 13:17

## 2022-10-10 RX ADMIN — CLOPIDOGREL BISULFATE 75 MILLIGRAM(S): 75 TABLET, FILM COATED ORAL at 13:13

## 2022-10-10 RX ADMIN — Medication 10 MILLIGRAM(S): at 17:58

## 2022-10-10 RX ADMIN — DESVENLAFAXINE 50 MILLIGRAM(S): 50 TABLET, EXTENDED RELEASE ORAL at 15:17

## 2022-10-10 NOTE — PROGRESS NOTE ADULT - SUBJECTIVE AND OBJECTIVE BOX
[facility] Division of Hospital Medicine  Loc Tyson MD  Available via MS Teams  Pager: [phone]    SUBJECTIVE / OVERNIGHT EVENTS:    Per HHA and daughter at bedside. Patient has returned at baseline currently   Patient knows he TONY, knows his name/knows HHA. Has difficulty with the year   Patient denies any burning or pain with urination  Denies rest of ROS     MEDICATIONS  (STANDING):  aMIOdarone    Tablet 200 milliGRAM(s) Oral daily  apixaban 2.5 milliGRAM(s) Oral two times a day  atorvastatin 80 milliGRAM(s) Oral at bedtime  buMETAnide 1 milliGRAM(s) Oral <User Schedule>  carbidopa/levodopa  25/100 1.5 Tablet(s) Oral daily  carbidopa/levodopa  25/100 1 Tablet(s) Oral <User Schedule>  cefTRIAXone   IVPB 1000 milliGRAM(s) IV Intermittent every 24 hours  clopidogrel Tablet 75 milliGRAM(s) Oral daily  desvenlafaxine ER 50 milliGRAM(s) Oral daily  OLANZapine 2.5 milliGRAM(s) Oral at bedtime  oxybutynin 10 milliGRAM(s) Oral two times a day  polyethylene glycol 3350 17 Gram(s) Oral daily  potassium chloride  10 mEq/100 mL IVPB 10 milliEquivalent(s) IV Intermittent once  senna 2 Tablet(s) Oral at bedtime  vyndamax 61 milliGRAM(s) 1 Capsule(s) Oral daily    MEDICATIONS  (PRN):      I&O's Summary      PHYSICAL EXAM:  Vital Signs Last 24 Hrs  T(C): 36.3 (10 Oct 2022 09:18), Max: 37 (09 Oct 2022 21:40)  T(F): 97.3 (10 Oct 2022 09:18), Max: 98.6 (09 Oct 2022 21:40)  HR: 60 (10 Oct 2022 09:18) (60 - 80)  BP: 114/56 (10 Oct 2022 09:18) (114/56 - 158/74)  BP(mean): 88 (10 Oct 2022 09:18) (88 - 88)  RR: 18 (10 Oct 2022 09:18) (17 - 18)  SpO2: 100% (10 Oct 2022 09:18) (100% - 100%)    Parameters below as of 10 Oct 2022 09:18  Patient On (Oxygen Delivery Method): room air      CONSTITUTIONAL: NAD, well-developed  EYES:  conjunctiva and sclera clear  NECK: Supple  RESPIRATORY: Normal respiratory effort; lungs are clear to auscultation bilaterally  CARDIOVASCULAR: Regular rate and rhythm, normal S1 and S2, no murmur/rub/gallop  ABDOMEN: Nontender to palpation, normoactive bowel sounds, no rebound/guarding; No hepatosplenomegaly  MUSCULOSKELETAL:  No lower extremity edema; Peripheral pulses are 2+ bilaterally   PSYCH: A+O to person, place. Not to time. Does repeat the same statement over and over again    NEUROLOGY: no gross motor or sensory deficits   SKIN: No rashes; no palpable lesions    LABS:                        10.4   7.36  )-----------( 211      ( 10 Oct 2022 07:17 )             34.7     10-10    142  |  105  |  22  ----------------------------<  86  3.3<L>   |  26  |  2.26<H>    Ca    9.3      10 Oct 2022 07:17  Phos  4.3     10-10  Mg     2.40     10-10    TPro  6.4  /  Alb  3.1<L>  /  TBili  0.5  /  DBili  x   /  AST  23  /  ALT  5   /  AlkPhos  233<H>  10-09              Culture - Blood (collected 08 Oct 2022 11:05)  Source: .Blood Blood-Peripheral  Preliminary Report (09 Oct 2022 17:01):    No growth to date.    Culture - Blood (collected 08 Oct 2022 10:50)  Source: .Blood Blood  Preliminary Report (09 Oct 2022 17:01):    No growth to date.    Culture - Urine (collected 07 Oct 2022 17:45)  Source: Catheterized Catheterized  Final Report (09 Oct 2022 12:04):    >100,000 CFU/ml Bacillus species not anthracis "Susceptibilities not    performed"      SARS-CoV-2: NotDetec (08 Oct 2022 11:57)  COVID-19 PCR: NotDetec (11 Sep 2022 07:18)  COVID-19 PCR: NotDetec (11 May 2022 17:00)  COVID-19 PCR: NotDetec (03 May 2022 10:29)      RADIOLOGY & ADDITIONAL TESTS:  New Results Reviewed Today:   New Imaging Personally Reviewed Today:  New Electrocardiogram Personally Reviewed Today:  Prior or Outpatient Records Reviewed Today:    COMMUNICATION:  Care Discussed with Consultants/Other Providers and Details of Discussion:  Discussions with Patient/Family:  PCP Communication:

## 2022-10-10 NOTE — PROGRESS NOTE ADULT - PROBLEM SELECTOR PLAN 5
- ALAYNA on CKD vs progression of CKD   - noted to be 1.59 in 07/2022   - subsequently trended up to low 2s in 09/2022 and has been stable there since then   - most probably new baseline is low 2s   - treat UTI   - sp IVF  - Renal U/S appreciated: No hydronephrosis.  - Monitor BUN/Cr   - renally dose meds, avoid nephrotoxic agents

## 2022-10-10 NOTE — PROGRESS NOTE ADULT - PROBLEM SELECTOR PLAN 4
- for home, will prescribe senna, miralax, and dulcolax   - patient has been known to get constipated   - family counseled to monitor closely and titrate bowel regimen as needed

## 2022-10-10 NOTE — PROGRESS NOTE ADULT - PROBLEM SELECTOR PLAN 1
- likely etiology secondary to UTI with hx of Dementia. Improving. Mental status now at baseline    - CTH unremarkable for acute findings   - Continue Ceftriaxone for UTI (end 10/14)   - Aspiration precautions. Passed bedside evaluation. will have speech see for official evaluation  - per family, patient tends to wax and wane at baseline. reorient as needed  - Fall precautions  - Follow up labs and Blood Cx/Urine Cx  - PT consult recommends ALEXANDRA  - family deciding on ALEXANDRA vs home (with services PT, HHA, Nursing)

## 2022-10-10 NOTE — CONSULT NOTE ADULT - SUBJECTIVE AND OBJECTIVE BOX
Patient is a 81y old  Male who presents with a chief complaint of confusion (10 Oct 2022 13:05)      HPI:  81-year-old male with Parkinson's, Afib on apixaban, bradycardia s/p PPM, CAD s/p stent 8/2021, amyloid, chronic R pleural effusion s/p pleurx (last drained last week), HTN, HLD, BPH, presenting from home with worsening confusion over the past few days. Collateral history obtained from son, at bedside; patient has a history of recurrent hospitalizations for weakness related to hypokalemia, last hospitalized 9/2022, also with history of UTIs, last UTI 8/2022, however had blood work and UA performed last week that was reportedly positive however he had not yet started on antibiotics outpatient. Patient is without complaint. Patient has chronic nocturia but otherwise has no urinary complaints. (07 Oct 2022 22:21)  Dental consulted regarding tooth mobility. Moreover, per medical aide, pt complains of generalized bleeding.      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      Parkinsons disease      CAD (coronary artery disease)  s/p PCI 08/21      Pleural effusion, not elsewhere classified      COVID-19 vaccine series completed  w booster      History of hip replacement, total  R hip 2008 &amp; L hip      Status post cataract extraction  right eye cataract extraction with IOL 6/26/2015      Presence of cardiac pacemaker  10/2021      H/O coronary angioplasty  8/2021 1 stent inserted        MEDICATIONS  (STANDING):  aMIOdarone    Tablet 200 milliGRAM(s) Oral daily  apixaban 2.5 milliGRAM(s) Oral two times a day  atorvastatin 80 milliGRAM(s) Oral at bedtime  buMETAnide 1 milliGRAM(s) Oral <User Schedule>  carbidopa/levodopa  25/100 1.5 Tablet(s) Oral daily  carbidopa/levodopa  25/100 1 Tablet(s) Oral <User Schedule>  cefTRIAXone   IVPB 1000 milliGRAM(s) IV Intermittent every 24 hours  clopidogrel Tablet 75 milliGRAM(s) Oral daily  desvenlafaxine ER 50 milliGRAM(s) Oral daily  mirtazapine 11.25 milliGRAM(s) Oral at bedtime  OLANZapine 2.5 milliGRAM(s) Oral at bedtime  oxybutynin 10 milliGRAM(s) Oral two times a day  polyethylene glycol 3350 17 Gram(s) Oral daily  senna 2 Tablet(s) Oral at bedtime  vyndamax 61 milliGRAM(s) 1 Capsule(s) Oral daily    MEDICATIONS  (PRN):      Allergies    No Known Allergies    Intolerances        FAMILY HISTORY:  Family history of lung cancer (Mother)    Family history of esophageal cancer (Father)    FH: myocardial infarction (Grandparent)        *SOCIAL HISTORY: (guardian or who pt came with), (smoking hx)    *Last Dental Visit:    Vital Signs Last 24 Hrs  T(C): 36.3 (10 Oct 2022 09:18), Max: 37 (09 Oct 2022 21:40)  T(F): 97.3 (10 Oct 2022 09:18), Max: 98.6 (09 Oct 2022 21:40)  HR: 60 (10 Oct 2022 09:18) (60 - 80)  BP: 114/56 (10 Oct 2022 09:18) (114/56 - 158/74)  BP(mean): 88 (10 Oct 2022 09:18) (88 - 88)  RR: 18 (10 Oct 2022 09:18) (17 - 18)  SpO2: 100% (10 Oct 2022 09:18) (100% - 100%)    Parameters below as of 10 Oct 2022 09:18  Patient On (Oxygen Delivery Method): room air        EOE:  TMJ ( - ) clicks                    (  -  ) pops                    (  -  ) crepitus             Mandible FROM             Facial bones and MOM grossly intact             ( - ) trismus             ( -  ) LAD             ( -  ) swelling             ( -  ) asymmetry             ( -  ) dysphagia             ( -  ) LOC    IOE:  permanent dentition: grossly intact           hard/soft palate:  ( - ) palatal torus           tongue/FOM WNL           labial/buccal mucosa WNL           ( -  ) swelling            ( - ) mobility           (  + ) malpositioned #24, located lingual to rest of arch due to severe crowding    Radiographs: Not obtained - limited bedside exam performed.    LABS:                        10.4   7.36  )-----------( 211      ( 10 Oct 2022 07:17 )             34.7     10-10    142  |  105  |  22  ----------------------------<  86  3.3<L>   |  26  |  2.26<H>    Ca    9.3      10 Oct 2022 07:17  Phos  4.3     10-10  Mg     2.40     10-10    TPro  6.4  /  Alb  3.1<L>  /  TBili  0.5  /  DBili  x   /  AST  23  /  ALT  5   /  AlkPhos  233<H>  10-09    WBC Count: 7.36 K/uL [3.80 - 10.50] (10-10-22 @ 07:17)  Platelet Count - Automated: 211 K/uL [150 - 400] (10-10-22 @ 07:17)  WBC Count: 9.21 K/uL [3.80 - 10.50] (10-09-22 @ 06:00)  Platelet Count - Automated: 216 K/uL [150 - 400] (10-09-22 @ 06:00)  Culture Results:   No growth to date. (10-08-22 @ 11:05)  Culture Results:   No growth to date. (10-08-22 @ 10:50)  WBC Count: 9.42 K/uL [3.80 - 10.50] (10-08-22 @ 06:50)  Platelet Count - Automated: 223 K/uL [150 - 400] (10-08-22 @ 06:50)  Culture Results:   >100,000 CFU/ml Bacillus species not anthracis "Susceptibilities not  performed" (10-07-22 @ 17:45)  Platelet Count - Automated: 236 K/uL [150 - 400] (10-07-22 @ 17:29)  WBC Count: 8.10 K/uL [3.80 - 10.50] (10-07-22 @ 17:29)  INR: 1.54 ratio *H* [0.88 - 1.16] (10-07-22 @ 17:29)    Assessment: Based on limited bedside exam, no mobility noted. Generalized periodontitis noted. Medical aide is unsure when pt was last seen by dentist.    Recommendations: F/U with outside dentist or [facility] ([phone]) for comprehensive care    Magdalene Birch DDS, #[id number]

## 2022-10-10 NOTE — PROGRESS NOTE ADULT - PROBLEM SELECTOR PLAN 7
- Continue home meds:  carbidopa/levodopa and olanzapine (lower dose due to QT)  reorient as needed  fall and aspiration precautions. Official Swallow eval pending  PT recommends ALEXANDRA

## 2022-10-10 NOTE — PROGRESS NOTE ADULT - PROBLEM SELECTOR PLAN 6
- holding mirtazepine for now. Decreased Zyprexa from 5 mg to 2.5mg  - check EKG today for qtc   - restart home mirtazepine 11.75 mg today   - Replete electrolytes as needed  - monitor on tele

## 2022-10-10 NOTE — PROGRESS NOTE ADULT - PROBLEM SELECTOR PLAN 3
- +UA on admission and hx of UTI in the past  - Continue Ceftriaxone   - Follow up urine and blood Cx

## 2022-10-11 ENCOUNTER — RX RENEWAL (OUTPATIENT)
Age: 82
End: 2022-10-11

## 2022-10-11 ENCOUNTER — TRANSCRIPTION ENCOUNTER (OUTPATIENT)
Age: 82
End: 2022-10-11

## 2022-10-11 LAB
ALBUMIN SERPL ELPH-MCNC: 3.4 G/DL — SIGNIFICANT CHANGE UP (ref 3.3–5)
ALP SERPL-CCNC: 210 U/L — HIGH (ref 40–120)
ALT FLD-CCNC: 9 U/L — SIGNIFICANT CHANGE UP (ref 4–41)
ANION GAP SERPL CALC-SCNC: 15 MMOL/L — HIGH (ref 7–14)
AST SERPL-CCNC: 21 U/L — SIGNIFICANT CHANGE UP (ref 4–40)
BASOPHILS # BLD AUTO: 0.06 K/UL — SIGNIFICANT CHANGE UP (ref 0–0.2)
BASOPHILS NFR BLD AUTO: 0.7 % — SIGNIFICANT CHANGE UP (ref 0–2)
BILIRUB SERPL-MCNC: 0.4 MG/DL — SIGNIFICANT CHANGE UP (ref 0.2–1.2)
BUN SERPL-MCNC: 21 MG/DL — SIGNIFICANT CHANGE UP (ref 7–23)
CALCIUM SERPL-MCNC: 9.9 MG/DL — SIGNIFICANT CHANGE UP (ref 8.4–10.5)
CHLORIDE SERPL-SCNC: 105 MMOL/L — SIGNIFICANT CHANGE UP (ref 98–107)
CO2 SERPL-SCNC: 24 MMOL/L — SIGNIFICANT CHANGE UP (ref 22–31)
CREAT SERPL-MCNC: 2.29 MG/DL — HIGH (ref 0.5–1.3)
EGFR: 28 ML/MIN/1.73M2 — LOW
EOSINOPHIL # BLD AUTO: 0.12 K/UL — SIGNIFICANT CHANGE UP (ref 0–0.5)
EOSINOPHIL NFR BLD AUTO: 1.5 % — SIGNIFICANT CHANGE UP (ref 0–6)
GLUCOSE SERPL-MCNC: 91 MG/DL — SIGNIFICANT CHANGE UP (ref 70–99)
HCT VFR BLD CALC: 38.8 % — LOW (ref 39–50)
HGB BLD-MCNC: 11.4 G/DL — LOW (ref 13–17)
IANC: 5.88 K/UL — SIGNIFICANT CHANGE UP (ref 1.8–7.4)
IMM GRANULOCYTES NFR BLD AUTO: 0.2 % — SIGNIFICANT CHANGE UP (ref 0–0.9)
LYMPHOCYTES # BLD AUTO: 1.51 K/UL — SIGNIFICANT CHANGE UP (ref 1–3.3)
LYMPHOCYTES # BLD AUTO: 18.5 % — SIGNIFICANT CHANGE UP (ref 13–44)
MCHC RBC-ENTMCNC: 24.5 PG — LOW (ref 27–34)
MCHC RBC-ENTMCNC: 29.4 GM/DL — LOW (ref 32–36)
MCV RBC AUTO: 83.3 FL — SIGNIFICANT CHANGE UP (ref 80–100)
MONOCYTES # BLD AUTO: 0.57 K/UL — SIGNIFICANT CHANGE UP (ref 0–0.9)
MONOCYTES NFR BLD AUTO: 7 % — SIGNIFICANT CHANGE UP (ref 2–14)
NEUTROPHILS # BLD AUTO: 5.88 K/UL — SIGNIFICANT CHANGE UP (ref 1.8–7.4)
NEUTROPHILS NFR BLD AUTO: 72.1 % — SIGNIFICANT CHANGE UP (ref 43–77)
NRBC # BLD: 0 /100 WBCS — SIGNIFICANT CHANGE UP (ref 0–0)
NRBC # FLD: 0 K/UL — SIGNIFICANT CHANGE UP (ref 0–0)
PLATELET # BLD AUTO: 237 K/UL — SIGNIFICANT CHANGE UP (ref 150–400)
POTASSIUM SERPL-MCNC: 3.6 MMOL/L — SIGNIFICANT CHANGE UP (ref 3.5–5.3)
POTASSIUM SERPL-SCNC: 3.6 MMOL/L — SIGNIFICANT CHANGE UP (ref 3.5–5.3)
PROT SERPL-MCNC: 6.7 G/DL — SIGNIFICANT CHANGE UP (ref 6–8.3)
RBC # BLD: 4.66 M/UL — SIGNIFICANT CHANGE UP (ref 4.2–5.8)
RBC # FLD: 23.1 % — HIGH (ref 10.3–14.5)
SODIUM SERPL-SCNC: 144 MMOL/L — SIGNIFICANT CHANGE UP (ref 135–145)
WBC # BLD: 8.16 K/UL — SIGNIFICANT CHANGE UP (ref 3.8–10.5)
WBC # FLD AUTO: 8.16 K/UL — SIGNIFICANT CHANGE UP (ref 3.8–10.5)

## 2022-10-11 PROCEDURE — 99233 SBSQ HOSP IP/OBS HIGH 50: CPT

## 2022-10-11 RX ORDER — SENNA PLUS 8.6 MG/1
2 TABLET ORAL
Qty: 0 | Refills: 0 | DISCHARGE
Start: 2022-10-11

## 2022-10-11 RX ORDER — CEFPODOXIME PROXETIL 100 MG
1 TABLET ORAL
Qty: 8 | Refills: 0
Start: 2022-10-11 | End: 2022-10-14

## 2022-10-11 RX ORDER — ROSUVASTATIN CALCIUM 5 MG/1
1 TABLET ORAL
Qty: 0 | Refills: 0 | DISCHARGE

## 2022-10-11 RX ORDER — POLYETHYLENE GLYCOL 3350 17 G/17G
17 POWDER, FOR SOLUTION ORAL
Qty: 0 | Refills: 0 | DISCHARGE
Start: 2022-10-11

## 2022-10-11 RX ORDER — POLYETHYLENE GLYCOL 3350 17 G/17G
17 POWDER, FOR SOLUTION ORAL
Qty: 510 | Refills: 0
Start: 2022-10-11 | End: 2022-11-09

## 2022-10-11 RX ORDER — OLANZAPINE 15 MG/1
1 TABLET, FILM COATED ORAL
Qty: 0 | Refills: 0 | DISCHARGE
Start: 2022-10-11

## 2022-10-11 RX ORDER — OLANZAPINE 15 MG/1
1 TABLET, FILM COATED ORAL
Qty: 30 | Refills: 0
Start: 2022-10-11 | End: 2022-11-09

## 2022-10-11 RX ORDER — OLANZAPINE 15 MG/1
2 TABLET, FILM COATED ORAL
Qty: 0 | Refills: 0 | DISCHARGE
Start: 2022-10-11

## 2022-10-11 RX ORDER — ATORVASTATIN CALCIUM 80 MG/1
1 TABLET, FILM COATED ORAL
Qty: 30 | Refills: 0
Start: 2022-10-11 | End: 2022-11-09

## 2022-10-11 RX ORDER — ATORVASTATIN CALCIUM 80 MG/1
1 TABLET, FILM COATED ORAL
Qty: 0 | Refills: 0 | DISCHARGE
Start: 2022-10-11

## 2022-10-11 RX ORDER — BUMETANIDE 0.25 MG/ML
1 INJECTION INTRAMUSCULAR; INTRAVENOUS
Qty: 0 | Refills: 0 | DISCHARGE

## 2022-10-11 RX ORDER — SENNA PLUS 8.6 MG/1
2 TABLET ORAL
Qty: 60 | Refills: 0
Start: 2022-10-11 | End: 2022-11-09

## 2022-10-11 RX ORDER — OLANZAPINE 15 MG/1
1.5 TABLET, FILM COATED ORAL
Qty: 0 | Refills: 0 | DISCHARGE
Start: 2022-10-11

## 2022-10-11 RX ADMIN — AMIODARONE HYDROCHLORIDE 200 MILLIGRAM(S): 400 TABLET ORAL at 07:22

## 2022-10-11 RX ADMIN — CARBIDOPA AND LEVODOPA 1 TABLET(S): 25; 100 TABLET ORAL at 16:09

## 2022-10-11 RX ADMIN — CARBIDOPA AND LEVODOPA 1 TABLET(S): 25; 100 TABLET ORAL at 19:15

## 2022-10-11 RX ADMIN — ATORVASTATIN CALCIUM 80 MILLIGRAM(S): 80 TABLET, FILM COATED ORAL at 22:52

## 2022-10-11 RX ADMIN — OLANZAPINE 2.5 MILLIGRAM(S): 15 TABLET, FILM COATED ORAL at 22:52

## 2022-10-11 RX ADMIN — DESVENLAFAXINE 50 MILLIGRAM(S): 50 TABLET, EXTENDED RELEASE ORAL at 12:53

## 2022-10-11 RX ADMIN — CARBIDOPA AND LEVODOPA 1 TABLET(S): 25; 100 TABLET ORAL at 12:51

## 2022-10-11 RX ADMIN — BUMETANIDE 1 MILLIGRAM(S): 0.25 INJECTION INTRAMUSCULAR; INTRAVENOUS at 22:52

## 2022-10-11 RX ADMIN — Medication 10 MILLIGRAM(S): at 19:15

## 2022-10-11 RX ADMIN — APIXABAN 2.5 MILLIGRAM(S): 2.5 TABLET, FILM COATED ORAL at 07:22

## 2022-10-11 RX ADMIN — CEFTRIAXONE 100 MILLIGRAM(S): 500 INJECTION, POWDER, FOR SOLUTION INTRAMUSCULAR; INTRAVENOUS at 19:15

## 2022-10-11 RX ADMIN — APIXABAN 2.5 MILLIGRAM(S): 2.5 TABLET, FILM COATED ORAL at 19:15

## 2022-10-11 RX ADMIN — MIRTAZAPINE 11.25 MILLIGRAM(S): 45 TABLET, ORALLY DISINTEGRATING ORAL at 22:52

## 2022-10-11 RX ADMIN — CARBIDOPA AND LEVODOPA 1.5 TABLET(S): 25; 100 TABLET ORAL at 07:22

## 2022-10-11 RX ADMIN — CLOPIDOGREL BISULFATE 75 MILLIGRAM(S): 75 TABLET, FILM COATED ORAL at 12:51

## 2022-10-11 RX ADMIN — Medication 10 MILLIGRAM(S): at 07:22

## 2022-10-11 RX ADMIN — POLYETHYLENE GLYCOL 3350 17 GRAM(S): 17 POWDER, FOR SOLUTION ORAL at 12:51

## 2022-10-11 NOTE — DISCHARGE NOTE NURSING/CASE MANAGEMENT/SOCIAL WORK - PATIENT PORTAL LINK FT
You can access the FollowMyHealth Patient Portal offered by [facility] by registering at the following website: [email or web address]. By joining [employer]’s FollowMyHealth portal, you will also be able to view your health information using other applications (apps) compatible with our system.

## 2022-10-11 NOTE — SWALLOW BEDSIDE ASSESSMENT ADULT - ASR SWALLOW RECOMMEND DIAG
Objective testing NOT warranted given no overt signs of penetration/aspiration and CXR does not indicate pneumonia.

## 2022-10-11 NOTE — SWALLOW BEDSIDE ASSESSMENT ADULT - ORAL PREPARATORY PHASE
Within functional limits slow/delayed Render Note In Bullet Format When Appropriate: No Detail Level: Simple Consent: The patient's consent was obtained including but not limited to risks of crusting, scabbing, blistering, scarring, darker or lighter pigmentary change, recurrence, incomplete removal and infection. Number Of Freeze-Thaw Cycles: 1 freeze-thaw cycle Duration Of Freeze Thaw-Cycle (Seconds): 0 Post-Care Instructions: I reviewed with the patient in detail post-care instructions. Patient is to wear sunprotection, and avoid picking at any of the treated lesions. Pt may apply Vaseline to crusted or scabbing areas.

## 2022-10-11 NOTE — PROGRESS NOTE ADULT - PROBLEM SELECTOR PLAN 1
- likely etiology secondary to UTI with hx of Dementia. Improving. Mental status now at baseline    - CTH unremarkable for acute findings   - Continue Ceftriaxone for UTI (end 10/14), can switch to cefpodoxime on DC   - Aspiration precautions. Passed bedside evaluation. Reg solids + thin liquids   - per family, patient tends to wax and wane at baseline. reorient as needed  - Fall precautions  - Urine cx growing bacillus, Blood Cx NGTD   - PT consult recommends ALEXANDRA, family agrees, will start process with MARCIAL

## 2022-10-11 NOTE — DISCHARGE NOTE PROVIDER - NSDCCPCAREPLAN_GEN_ALL_CORE_FT
PRINCIPAL DISCHARGE DIAGNOSIS  Diagnosis: Encephalopathy  Assessment and Plan of Treatment: ·  Problem: Encephalopathy acute.   ·  Plan: - likely etiology secondary to UTI with hx of Dementia. Improving. Mental status now at baseline    - CTH unremarkable for acute findings   - Continue Ceftriaxone for UTI (end 10/14)   - Aspiration precautions. Passed bedside evaluation. will have speech see for official evaluation  - per family, patient tends to wax and wane at baseline. reorient as needed  - Fall precautions  - Follow up labs and Blood Cx/Urine Cx  - PT consult recommends [facility]  - family deciding on [facility] vs home (with services PT, HHA, Nursing).        SECONDARY DISCHARGE DIAGNOSES  Diagnosis: UTI (urinary tract infection)  Assessment and Plan of Treatment: ·  Problem: Acute Continue antibiotics as directed and monitor for signs/symptoms of infection, such as, fever/chills, burning/pain with urination, urinary frequency/hesitancy, cloudy urine, or blood in urine..   ·  Plan: - +UA on admission and hx of Continue antibiotics as directed and monitor for signs/symptoms of infection, such as, fever/chills, burning/pain with urination, urinary frequency/hesitancy, cloudy urine, or blood in urine. in the past  - Continue Ceftriaxone   - Follow up urine and blood Cx.    Diagnosis: Prostatic calculi  Assessment and Plan of Treatment: ·  Problem: Prostatic calculi.   ·  Plan: - reviewed renal US: Calcification measuring approximately 1.2 cm along the bladder neck/base of the prostate, which could reflect a bladder calcification or calculus, or prostatic calcification. Layering debris within the urinary bladder.  - will consult Urology given patient has had multiple UTIs in the past.    Diagnosis: Constipation  Assessment and Plan of Treatment: ·  Problem: Constipation.   ·  Plan: - for home, will prescribe senna, miralax, and dulcolax   - patient has been known to get constipated   - family counseled to monitor closely and titrate bowel regimen as needed.    Diagnosis: ALAYNA (acute kidney injury)  Assessment and Plan of Treatment: ·  Problem: ALAYNA (acute kidney injury).   ·  Plan: - ALAYNA on CKD vs progression of CKD   - noted to be 1.59 in 07/2022   - subsequently trended up to low 2s in 09/2022 and has been stable there since then   - most probably new baseline is low 2s   - treat Continue antibiotics as directed and monitor for signs/symptoms of infection, such as, fever/chills, burning/pain with urination, urinary frequency/hesitancy, cloudy urine, or blood in urine.   - sp IVF  - Renal U/S appreciated: No hydronephrosis.  - Monitor BUN/Cr   - renally dose meds, avoid nephrotoxic agents.     PRINCIPAL DISCHARGE DIAGNOSIS  Diagnosis: Encephalopathy  Assessment and Plan of Treatment: ·  Problem: Encephalopathy acute.   ·  Plan: - likely etiology secondary to UTI with hx of Dementia. Improving. Mental status now at baseline    - CTH unremarkable for acute findings   - Continue Ceftriaxone for UTI (end 10/14)   - Aspiration precautions. Passed bedside evaluation. will have speech see for official evaluation  - per family, patient tends to wax and wane at baseline. reorient as needed  - Fall precautions  - Follow up labs and Blood Cx/Urine Cx  - PT consult recommends [facility]  - family deciding on [facility] vs home (with services PT, HHA, Nursing).        SECONDARY DISCHARGE DIAGNOSES  Diagnosis: UTI (urinary tract infection)  Assessment and Plan of Treatment: Continue antibiotics as directed and monitor for signs/symptoms of infection, such as, fever/chills, burning/pain with urination, urinary frequency/hesitancy, cloudy urine, or blood in urine.      Diagnosis: Prostatic calculi  Assessment and Plan of Treatment: Your sonogram Calcification measuring approximately 1.2 cm along the bladder neck/base of the prostate, which could reflect a bladder calcification or calculus, or prostatic calcification. Layering debris within the urinary bladder.  - will consult Urology given patient has had multiple UTIs in the past.    Diagnosis: Constipation  Assessment and Plan of Treatment: Take prescribed sennaand miralax . Follow up with your primary doctor    Diagnosis: ALAYNA (acute kidney injury)  Assessment and Plan of Treatment: ·  Problem: ALAYNA (acute kidney injury).   ·  Plan: - ALAYNA on CKD vs progression of CKD   - noted to be 1.59 in 07/2022   - subsequently trended up to low 2s in 09/2022 and has been stable there since then   - most probably new baseline is low 2s   - treat Continue antibiotics as directed and monitor for signs/symptoms of infection, such as, fever/chills, burning/pain with urination, urinary frequency/hesitancy, cloudy urine, or blood in urine.   - sp IVF  - Renal U/S appreciated: No hydronephrosis.  - Monitor BUN/Cr   - renally dose meds, avoid nephrotoxic agents.    Diagnosis: Prolonged QT interval  Assessment and Plan of Treatment: Your EKG showed Prolonged QT interval, your olanzapine dose was reduced and electrolites like potassium was replaced. Please follow up with your primary doctor. Come back to ER if any chest pain        Diagnosis: Parkinson's disease dementia  Assessment and Plan of Treatment: Continue home meds:  carbidopa/levodopa and olanzapine with lower dose due to EKG changes - prolonged QTC   Follow up with your medical doctor. Call in 1 week for appointment .    Diagnosis: Chronic pleural effusion  Assessment and Plan of Treatment: Follow up with your Thoracic surgery doctor - keep appointment with them . Your catheter Pleurx is drained by home care nurses 2 times a week at your home.    Diagnosis: Hyperlipidemia  Assessment and Plan of Treatment: Continue  Atorvastatin as prescribed.  Continue DASH diet. Follow up with your PCP within 1 week of discharge for further management and monitoring of lipid and cholesterol panels.   statin.    Diagnosis: CAD (coronary artery disease)  Assessment and Plan of Treatment: Continue Plavix and statin.Follow up with your Cardiology doctor in 1 week, call for appointment .    Diagnosis: Atrial fibrillation  Assessment and Plan of Treatment:      PRINCIPAL DISCHARGE DIAGNOSIS  Diagnosis: Encephalopathy  Assessment and Plan of Treatment: You likely etiology secondary to Continue antibiotics as directed and monitor for signs/symptoms of infection, such as, fever/chills, burning/pain with urination, urinary frequency/hesitancy, cloudy urine, or blood in urine. with hx of Dementia. Improving. Mental status now at baseline    - CTH unremarkable for acute findings   - Continue Ceftriaxone for Continue antibiotics as directed and monitor for signs/symptoms of infection, such as, fever/chills, burning/pain with urination, urinary frequency/hesitancy, cloudy urine, or blood in urine. (end 10/14)   - Aspiration precautions. Passed bedside evaluation. will have speech see for official evaluation  - per family, patient tends to wax and wane at baseline. reorient as needed  - You were seen for a fall. Maintain a safe environment. Do not change positions quickly. Particpate in program recommended by physical therapy precautions  - Follow up labs and Blood Cx/Urine Cx  - PT consult recommends [facility]  - family deciding on [facility] vs home (with services PT, HHA, Nursing).  were diagnosed witrh =      SECONDARY DISCHARGE DIAGNOSES  Diagnosis: UTI (urinary tract infection)  Assessment and Plan of Treatment: Continue antibiotics as directed and monitor for signs/symptoms of infection, such as, fever/chills, burning/pain with urination, urinary frequency/hesitancy, cloudy urine, or blood in urine.      Diagnosis: Prostatic calculi  Assessment and Plan of Treatment: Your sonogram Calcification measuring approximately 1.2 cm along the bladder neck/base of the prostate, which could reflect a bladder calcification or calculus, or prostatic calcification. Layering debris within the urinary bladder.  - will consult Urology given patient has had multiple UTIs in the past.    Diagnosis: Constipation  Assessment and Plan of Treatment: Take prescribed sennaand miralax . Follow up with your primary doctor    Diagnosis: ALAYNA (acute kidney injury)  Assessment and Plan of Treatment: ·  Problem: ALAYNA (acute kidney injury).   ·  Plan: - ALAYNA on CKD vs progression of CKD   - noted to be 1.59 in 07/2022   - subsequently trended up to low 2s in 09/2022 and has been stable there since then   - most probably new baseline is low 2s   - treat Continue antibiotics as directed and monitor for signs/symptoms of infection, such as, fever/chills, burning/pain with urination, urinary frequency/hesitancy, cloudy urine, or blood in urine.   - sp IVF  - Renal U/S appreciated: No hydronephrosis.  - Monitor BUN/Cr   - renally dose meds, avoid nephrotoxic agents.    Diagnosis: Prolonged QT interval  Assessment and Plan of Treatment: Your EKG showed Prolonged QT interval, your olanzapine dose was reduced and electrolites like potassium was replaced. Please follow up with your primary doctor. Come back to ER if any chest pain        Diagnosis: Parkinson's disease dementia  Assessment and Plan of Treatment: Continue home meds:  carbidopa/levodopa and olanzapine with lower dose due to EKG changes - prolonged QTC   Follow up with your medical doctor. Call in 1 week for appointment .    Diagnosis: Chronic pleural effusion  Assessment and Plan of Treatment: Follow up with your Thoracic surgery doctor - keep appointment with them . Your catheter Pleurx is drained by home care nurses 2 times a week at your home.    Diagnosis: Hyperlipidemia  Assessment and Plan of Treatment: Continue  Atorvastatin as prescribed.  Continue DASH diet. Follow up with your PCP within 1 week of discharge for further management and monitoring of lipid and cholesterol panels.   statin.    Diagnosis: CAD (coronary artery disease)  Assessment and Plan of Treatment: Continue Plavix and statin.Follow up with your Cardiology doctor in 1 week, call for appointment .    Diagnosis: Atrial fibrillation  Assessment and Plan of Treatment:      PRINCIPAL DISCHARGE DIAGNOSIS  Diagnosis: Encephalopathy  Assessment and Plan of Treatment: You were diagnosed with Encephalopathy , your head CT did not show any bleading or stroke . You were on fall precautions and you were seen for fall . Maintain a safe environment. Do not change positions quickly. Particpate in program recommended by physical therapy precautions  You had PT evaluation and they decided that safe is going for you to be discharged to rehab.         SECONDARY DISCHARGE DIAGNOSES  Diagnosis: UTI (urinary tract infection)  Assessment and Plan of Treatment: Continue antibiotics up to 10/14 including Cefpodoxime 200 mg -one tablet 2 times a day as directed and monitor yourself for signs/symptoms of infection, such as, fever/chills, burning/pain with urination, urinary frequency/hesitancy, cloudy urine, or blood in urine.      Diagnosis: Prostatic calculi  Assessment and Plan of Treatment: Your sonogram showed  calcification measuring approximately 1.2 cm along the bladder neck/base of the prostate, which could reflect a bladder calcification or calculus, or prostatic calcification. You were seen by Urology and there is no need for any surgical intervention. Please follow up with urologist- you have set up aoointment.       Diagnosis: Constipation  Assessment and Plan of Treatment: Take prescribed sennaand miralax . Follow up with your primary doctor    Diagnosis: ALAYNA (acute kidney injury)  Assessment and Plan of Treatment: You were noted with elevated Creatinine on admission, , you received fluids and you had renal sonogram not showing any hydronephrosis. Please follow up with your primary doctor to repeat BUN and vreatinine and take  renally adjusted dosages of medications and avoid medications that can injury kidneys ,    Diagnosis: Prolonged QT interval  Assessment and Plan of Treatment: Your EKG showed Prolonged QT interval, your olanzapine dose was reduced and electrolites like potassium was replaced. Please follow up with your primary doctor. Come back to ER if any chest pain        Diagnosis: Parkinson's disease dementia  Assessment and Plan of Treatment: Continue home meds:  carbidopa/levodopa and olanzapine with lower dose due to EKG changes - prolonged QTC   Follow up with your medical doctor. Call in 1 week for appointment .    Diagnosis: Chronic pleural effusion  Assessment and Plan of Treatment: Follow up with your Thoracic surgery doctor - keep appointment with them . Your catheter Pleurx is drained by home care nurses 2 times a week at your home.    Diagnosis: Hyperlipidemia  Assessment and Plan of Treatment: Continue  Atorvastatin as prescribed.  Continue DASH diet. Follow up with your PCP within 1 week of discharge for further management and monitoring of lipid and cholesterol panels.   statin.    Diagnosis: CAD (coronary artery disease)  Assessment and Plan of Treatment: Continue Plavix and statin.Follow up with your Cardiology doctor in 1 week, call for appointment .    Diagnosis: Atrial fibrillation  Assessment and Plan of Treatment: Keep your appointment with your EP cardiologist for pacemaker interrogation and take Eliquis and Amiodarone as ordered    Diagnosis: Tooth loose  Assessment and Plan of Treatment: You were evaluated for loose tooth by dental service , no need for in hospital intervention , please follow up with your dentist or you can follow up with dental clinic  [facility] ([phone]) for comprehensive care call for appointment        PRINCIPAL DISCHARGE DIAGNOSIS  Diagnosis: Encephalopathy  Assessment and Plan of Treatment: You were diagnosed with Encephalopathy , your head CT did not show any bleading or stroke . You were on fall precautions and you were seen for fall . Maintain a safe environment. Do not change positions quickly. Particpate in program recommended by physical therapy precautions  You had PT evaluation and they decided that safe is going for you to be discharged to rehab.         SECONDARY DISCHARGE DIAGNOSES  Diagnosis: UTI (urinary tract infection)  Assessment and Plan of Treatment: Continue antibiotics up to 10/14 including Cefpodoxime 200 mg -one tablet 2 times a day as directed and monitor yourself for signs/symptoms of infection, such as, fever/chills, burning/pain with urination, urinary frequency/hesitancy, cloudy urine, or blood in urine.      Diagnosis: Prostatic calculi  Assessment and Plan of Treatment: Your sonogram showed  calcification measuring approximately 1.2 cm along the bladder neck/base of the prostate, which could reflect a bladder calcification or calculus, or prostatic calcification. You were seen by Urology and there is no need for any surgical intervention. Please follow up with urologist- you have set up aoointment.       Diagnosis: Constipation  Assessment and Plan of Treatment: Take prescribed sennaand miralax . Follow up with your primary doctor    Diagnosis: ALAYNA (acute kidney injury)  Assessment and Plan of Treatment: You were noted with elevated Creatinine on admission, , you received fluids and you had renal sonogram not showing any hydronephrosis. Please follow up with your primary doctor to repeat BUN and vreatinine and take  renally adjusted dosages of medications and avoid medications that can injury kidneys.    Diagnosis: Prolonged QT interval  Assessment and Plan of Treatment: Your EKG showed Prolonged QT interval, your olanzapine dose was reduced and electrolites like potassium was replaced. Please follow up with your primary doctor. Come back to ER if any chest pain        Diagnosis: Parkinson's disease dementia  Assessment and Plan of Treatment: Continue home meds:  carbidopa/levodopa and olanzapine with lower dose due to EKG changes - prolonged QTC   Follow up with your medical doctor. Call in 1 week for appointment .    Diagnosis: Chronic pleural effusion  Assessment and Plan of Treatment: Follow up with your Thoracic surgery doctor - keep appointment with them . Your catheter Pleurx is drained by home care nurses 2 times a week at your home.    Diagnosis: Atrial fibrillation  Assessment and Plan of Treatment: Keep your appointment with your EP cardiologist for pacemaker interrogation and take Eliquis and Amiodarone as ordered    Diagnosis: Hyperlipidemia  Assessment and Plan of Treatment: Continue  Atorvastatin as prescribed.  Continue DASH diet. Follow up with your PCP within 1 week of discharge for further management and monitoring of lipid and cholesterol panels.   statin.    Diagnosis: CAD (coronary artery disease)  Assessment and Plan of Treatment: Continue Plavix and statin.Follow up with your Cardiology doctor in 1 week, call for appointment .    Diagnosis: Tooth loose  Assessment and Plan of Treatment: You were evaluated for loose tooth by dental service , no need for in hospital intervention , please follow up with your dentist or you can follow up with dental clinic  [facility] ([phone]) for comprehensive care call for appointment        PRINCIPAL DISCHARGE DIAGNOSIS  Diagnosis: Encephalopathy  Assessment and Plan of Treatment: You were diagnosed with Encephalopathy , your head CT did not show any bleading or stroke . You were on fall precautions and you were seen for fall . Maintain a safe environment. Do not change positions quickly. Particpate in program recommended by physical therapy precautions  You had PT evaluation and they decided that safe is going for you to be discharged to rehab.         SECONDARY DISCHARGE DIAGNOSES  Diagnosis: UTI (urinary tract infection)  Assessment and Plan of Treatment: Continue antibiotics up to 10/14 including Cefpodoxime 200 mg -one tablet 2 times a day as directed and monitor yourself for signs/symptoms of infection, such as, fever/chills, burning/pain with urination, urinary frequency/hesitancy, cloudy urine, or blood in urine.      Diagnosis: Prostatic calculi  Assessment and Plan of Treatment: Your sonogram showed  calcification measuring approximately 1.2 cm along the bladder neck/base of the prostate, which could reflect a bladder calcification or calculus, or prostatic calcification. You were seen by Urology and there is no need for any surgical intervention. Please follow up with your urologist for your scheduled appointment on December 12th at 10:20am.      Diagnosis: Constipation  Assessment and Plan of Treatment: Take prescribed sennaand miralax . Follow up with your primary doctor    Diagnosis: ALAYNA (acute kidney injury)  Assessment and Plan of Treatment: You were noted with elevated Creatinine on admission. You received fluids and you had renal sonogram not showing any hydronephrosis. Please follow up with your primary doctor to repeat BUN and vreatinine and take  renally adjusted dosages of medications and avoid medications that can injury kidneys.    Diagnosis: Prolonged QT interval  Assessment and Plan of Treatment: Your EKG showed Prolonged QT interval, your olanzapine dose was reduced and electrolites like potassium was replaced. Please follow up with your primary doctor. Come back to ER if you experience any chest pain.    Diagnosis: Parkinson's disease dementia  Assessment and Plan of Treatment: Continue home meds:  carbidopa/levodopa and olanzapine with lower dose due to EKG changes - prolonged QTC   Follow up with your medical doctor. Call in 1 week for appointment .    Diagnosis: Chronic pleural effusion  Assessment and Plan of Treatment: Follow up with your Thoracic surgery doctor - keep appointment with them . Your catheter Pleurx is drained by home care nurses 2 times a week at your home.    Diagnosis: Atrial fibrillation  Assessment and Plan of Treatment: Keep your appointment with your EP cardiologist for pacemaker interrogation and take Eliquis and Amiodarone as ordered    Diagnosis: Hyperlipidemia  Assessment and Plan of Treatment: Continue  Atorvastatin as prescribed.  Continue DASH diet. Follow up with your PCP within 1 week of discharge for further management and monitoring of lipid and cholesterol panels.   statin.    Diagnosis: CAD (coronary artery disease)  Assessment and Plan of Treatment: Continue Plavix and statin.Follow up with your Cardiology doctor in 1 week, call for appointment .    Diagnosis: Tooth loose  Assessment and Plan of Treatment: You were evaluated for a loose tooth by dental service. There was no need for in hospital intervention , please follow up with your dentist or you can follow up with dental clinic  [facility] ([phone]). Please call for an appointment.

## 2022-10-11 NOTE — PROGRESS NOTE ADULT - PROBLEM SELECTOR PROBLEM 5
hx of HTN & GOUT
ALAYNA (acute kidney injury)
ALAYNA (acute kidney injury)
Parkinson's disease dementia
Parkinson's disease dementia

## 2022-10-11 NOTE — DISCHARGE NOTE PROVIDER - CARE PROVIDER_API CALL
NAOMI PRIDE [place]  Internal Medicine  [address] [place] [address]  Phone: [phone]  Fax: [phone]  Follow Up Time: 1 week

## 2022-10-11 NOTE — DISCHARGE NOTE PROVIDER - NSDCMRMEDTOKEN_GEN_ALL_CORE_FT
amiodarone 200 mg oral tablet: 1 tab(s) orally once a day  bumetanide 1 mg oral tablet: 1 tab(s) orally once a day (in the evening)  bumetanide 2 mg oral tablet: 1 tab(s) orally once a day (in the morning)  carbidopa-levodopa 25 mg-100 mg oral tablet: 1.5 tab(s) orally once a day  carbidopa-levodopa 25 mg-100 mg oral tablet: 1 tab(s) orally 3 times a day at 12PM, 3PM, 6PM  clopidogrel 75 mg oral tablet: 1 tab(s) orally once a day  desvenlafaxine (as succinate) 50 mg oral tablet, extended release: 1 tab(s) orally once a day  Eliquis 2.5 mg oral tablet: 1 tab(s) orally 2 times a day. Resume taking tomorrow, 3/19.   Klor-Con M20 oral tablet, extended release: 1 tab(s) orally once a day  mirtazapine 7.5 mg oral tablet: 1.5 tab(s) orally once a day (at bedtime)  OLANZapine 2.5 mg oral tablet: 2 tab(s) orally once a day (at bedtime)  OLANZapine 2.5 mg oral tablet: 1 tab(s) orally once a day (at bedtime)  One A Day Men&#x27;s Complete oral tablet: 1 tab(s) orally once a day  rosuvastatin 40 mg oral tablet: 1 tab(s) orally once a day (at bedtime)  solifenacin 10 mg oral tablet: 1 tab(s) orally once a day  tadalafil 5 mg oral tablet: 1 tab(s) orally once a day  Vyndamax 61 mg oral capsule: 1 cap(s) orally once a day  Vyndamax 61 mg oral capsule: 1 cap(s) orally once a day   amiodarone 200 mg oral tablet: 1 tab(s) orally once a day  atorvastatin 80 mg oral tablet: 1 tab(s) orally once a day (at bedtime)  bumetanide 1 mg oral tablet: 1 tab(s) orally once a day (in the evening)  carbidopa-levodopa 25 mg-100 mg oral tablet: 1.5 tab(s) orally once a day  carbidopa-levodopa 25 mg-100 mg oral tablet: 1 tab(s) orally 3 times a day at 12PM, 3PM, 6PM  cefpodoxime 200 mg oral tablet: 1 tab(s) orally 2 times a day   clopidogrel 75 mg oral tablet: 1 tab(s) orally once a day  desvenlafaxine (as succinate) 50 mg oral tablet, extended release: 1 tab(s) orally once a day  Eliquis 2.5 mg oral tablet: 1 tab(s) orally 2 times a day. Resume taking tomorrow, 3/19.   Klor-Con M20 oral tablet, extended release: 1 tab(s) orally once a day  mirtazapine 7.5 mg oral tablet: 1.5 tab(s) orally once a day (at bedtime)  OLANZapine 2.5 mg oral tablet: 1 tab(s) orally once a day (at bedtime)  One A Day Men&#x27;s Complete oral tablet: 1 tab(s) orally once a day  polyethylene glycol 3350 oral powder for reconstitution: 17 gram(s) orally once a day  senna leaf extract oral tablet: 2 tab(s) orally once a day (at bedtime)  solifenacin 10 mg oral tablet: 1 tab(s) orally once a day  tadalafil 5 mg oral tablet: 1 tab(s) orally once a day  Vyndamax 61 mg oral capsule: 1 cap(s) orally once a day  Vyndamax 61 mg oral capsule: 1 cap(s) orally once a day   amiodarone 200 mg oral tablet: 1 tab(s) orally once a day  atorvastatin 80 mg oral tablet: 1 tab(s) orally once a day (at bedtime)  bumetanide 1 mg oral tablet: 1 tab(s) orally once a day (in the evening)  carbidopa-levodopa 25 mg-100 mg oral tablet: 1.5 tab(s) orally once a day  carbidopa-levodopa 25 mg-100 mg oral tablet: 1 tab(s) orally 3 times a day at 12PM, 3PM, 6PM  cefpodoxime 200 mg oral tablet: 1 tab(s) orally 2 times a day until 10/14 included  clopidogrel 75 mg oral tablet: 1 tab(s) orally once a day  desvenlafaxine (as succinate) 50 mg oral tablet, extended release: 1 tab(s) orally once a day  Eliquis 2.5 mg oral tablet: 1 tab(s) orally 2 times a day. Resume taking tomorrow, 3/19.   Klor-Con M20 oral tablet, extended release: 1 tab(s) orally once a day  mirtazapine 7.5 mg oral tablet: 1.5 tab(s) orally once a day (at bedtime)  OLANZapine 2.5 mg oral tablet: 1 tab(s) orally once a day (at bedtime)  One A Day Men&#x27;s Complete oral tablet: 1 tab(s) orally once a day  polyethylene glycol 3350 oral powder for reconstitution: 17 gram(s) orally once a day  senna leaf extract oral tablet: 2 tab(s) orally once a day (at bedtime)  solifenacin 10 mg oral tablet: 1 tab(s) orally once a day  tadalafil 5 mg oral tablet: 1 tab(s) orally once a day  Vyndamax 61 mg oral capsule: 1 cap(s) orally once a day  Vyndamax 61 mg oral capsule: 1 cap(s) orally once a day

## 2022-10-11 NOTE — PROGRESS NOTE ADULT - PROBLEM SELECTOR PLAN 7
- Continue home meds:  carbidopa/levodopa and olanzapine (lower dose due to QT)  reorient as needed  fall and aspiration precautions  PT recommends ALEXANDRA

## 2022-10-11 NOTE — DISCHARGE NOTE PROVIDER - NSDCHHASSISTILLNESS_GEN_ALL_CORE
Interval History: NAEON. Feels back to baseline this morning and wants to go home.    ROS  Objective:     Vital Signs (Most Recent):  Temp: 98.1 °F (36.7 °C) (04/08/22 0838)  Pulse: (!) 117 (04/08/22 0838)  Resp: 18 (04/08/22 1036)  BP: 112/64 (04/08/22 0838)  SpO2: 97 % (04/08/22 0838) Vital Signs (24h Range):  Temp:  [97.8 °F (36.6 °C)-98.5 °F (36.9 °C)] 98.1 °F (36.7 °C)  Pulse:  [] 117  Resp:  [16-20] 18  SpO2:  [94 %-97 %] 97 %  BP: (112-130)/(56-67) 112/64     Weight: 102 kg (224 lb 13.9 oz)  Body mass index is 35.22 kg/m².     SpO2: 97 %  O2 Device (Oxygen Therapy): nasal cannula      Intake/Output Summary (Last 24 hours) at 4/8/2022 1041  Last data filed at 4/8/2022 0900  Gross per 24 hour   Intake 942 ml   Output 2251 ml   Net -1309 ml         Lines/Drains/Airways       Peripherally Inserted Central Catheter Line  Duration             PICC Double Lumen right basilic -- days                    Physical Exam  Vitals and nursing note reviewed.   Constitutional:       General: He is not in acute distress.     Appearance: He is not toxic-appearing or diaphoretic.   HENT:      Head: Normocephalic.   Cardiovascular:      Rate and Rhythm: Regular rhythm. Tachycardia present.   Pulmonary:      Effort: Pulmonary effort is normal. No respiratory distress.   Abdominal:      Palpations: Abdomen is soft.      Tenderness: There is no guarding.   Musculoskeletal:      Right lower leg: No edema.      Left lower leg: No edema.   Skin:     General: Skin is warm and dry.   Neurological:      Mental Status: He is alert. Mental status is at baseline.      Cranial Nerves: No dysarthria.   Psychiatric:         Mood and Affect: Mood normal.         Behavior: Behavior normal.       Significant Labs: All pertinent lab results from the last 24 hours have been reviewed.    Significant Imaging:  na   fall risk

## 2022-10-11 NOTE — PROGRESS NOTE ADULT - PROBLEM SELECTOR PLAN 12
c/w Vyndamax (tafamidis) family to brought from home
c/w Vyndamax (tafamidis) family to brought from home
DVT ppx: on Eliquis  Dispo: pending once medically optimized

## 2022-10-11 NOTE — PROGRESS NOTE ADULT - PROBLEM SELECTOR PLAN 6
- holding mirtazepine for now. Decreased Zyprexa from 5 mg to 2.5mg  - continue home mirtazepine 11.75 mg    - Replete electrolytes as needed  - monitor on tele

## 2022-10-11 NOTE — SWALLOW BEDSIDE ASSESSMENT ADULT - ADDITIONAL RECOMMENDATIONS
Medical team to reconsult this service if change in medical status and/or observed change in patients ability to tolerate recommended PO diet.

## 2022-10-11 NOTE — DISCHARGE NOTE NURSING/CASE MANAGEMENT/SOCIAL WORK - NSDCPEFALRISK_GEN_ALL_CORE
For information on Fall & Injury Prevention, visit: [email or web address] OR  [email or web address] OR  https://www.cdc.gov/steadi/patient.html

## 2022-10-11 NOTE — PROGRESS NOTE ADULT - PROBLEM SELECTOR PLAN 8
has right Pleurex cath and dressing in place   Monitor pulse ox and VS  - attempted to drain on 10/11, but no drainage noted

## 2022-10-11 NOTE — PROGRESS NOTE ADULT - PROBLEM SELECTOR PLAN 3
- +UA on admission and hx of UTI in the past  - Continue Ceftriaxone for UTI (end 10/14), can switch to cefpodoxime on DC   - Urine cx growing bacillus, Blood Cx NGTD  - OP follow up with urology for cystoscopy

## 2022-10-11 NOTE — DISCHARGE NOTE NURSING/CASE MANAGEMENT/SOCIAL WORK - NSDCFUADDAPPT_GEN_ALL_CORE_FT
F/U with outside dentist or [facility] ([phone]) for comprehensive care  call to make appointment

## 2022-10-11 NOTE — PROGRESS NOTE ADULT - PROBLEM SELECTOR PLAN 2
- reviewed renal US: Calcification measuring approximately 1.2 cm along the bladder neck/base of the prostate, which could reflect a bladder calcification or calculus, or prostatic calcification. Layering debris within the urinary bladder.  - will consult Urology given patient has had multiple UTIs in the past
+UA on admission and hx of UTI in the past      Continue Ceftriaxone   Follow up urine and blood Cx
- reviewed renal US: Calcification measuring approximately 1.2 cm along the bladder neck/base of the prostate, which could reflect a bladder calcification or calculus, or prostatic calcification. Layering debris within the urinary bladder.  - urology c/s appreciated, will need OP follow up to discuss cystoscopy
+UA on admission and hx of UTI in the past      Continue Ceftriaxone   Follow up urine and blood Cx

## 2022-10-11 NOTE — DISCHARGE NOTE PROVIDER - ATTENDING DISCHARGE PHYSICAL EXAMINATION:
CONSTITUTIONAL: NAD, well-developed  EYES:  conjunctiva and sclera clear  NECK: Supple  RESPIRATORY: Normal respiratory effort; lungs are clear to auscultation bilaterally  CARDIOVASCULAR: Regular rate and rhythm, normal S1 and S2, no murmur/rub/gallop  ABDOMEN: Nontender to palpation, normoactive bowel sounds, no rebound/guarding; No hepatosplenomegaly  MUSCULOSKELETAL:  No lower extremity edema; Peripheral pulses are 2+ bilaterally   PSYCH: A+O to person, place. Not to time. Does repeat the same statement over and over again    NEUROLOGY: no gross motor or sensory deficits   SKIN: No rashes; no palpable lesions

## 2022-10-11 NOTE — DISCHARGE NOTE PROVIDER - NSDCFUSCHEDAPPT_GEN_ALL_CORE_FT
Clinton Dickey  [facility]  CARDIOLOGY [address] [facility]   Scheduled Appointment: 10/13/2022    Lida Vergara  [facility] PreAdmits  Scheduled Appointment: 10/19/2022    Rufus Oneal  [facility]  [address]  Scheduled Appointment: 10/24/2022    [facility]  ELECTROPH [address]  Scheduled Appointment: 11/02/2022    Danielle Talbot  [facility]  UROLOGY [address] [facility]  Scheduled Appointment: 12/12/2022     Lida Vergara  [facility] PreAdmits  Scheduled Appointment: 10/19/2022    Rufus Oneal  [facility]  [address]  Scheduled Appointment: 10/24/2022    [facility]  ELECTROPH [address]  Scheduled Appointment: 11/02/2022    Danielle Talbot  [facility]  UROLOGY [address] [facility]  Scheduled Appointment: 12/12/2022

## 2022-10-11 NOTE — PROGRESS NOTE ADULT - ASSESSMENT
81-year-old male with Parkinson's, Afib on apixaban, bradycardia s/p PPM, CAD s/p stent 8/2021, amyloid, chronic R pleural effusion s/p pleurx (last drained last week), HTN, HLD, BPH, presented for worsening confusion from home. 
81-year-old male with Parkinson's, Afib on apixaban, bradycardia s/p PPM, CAD s/p stent 8/2021, amyloid, chronic R pleural effusion s/p pleurx (last drained last week), HTN, HLD, BPH, presented for worsening confusion from home.     Admitted for further evaluation 
81-year-old male with Parkinson's, Afib on apixaban, bradycardia s/p PPM, CAD s/p stent 8/2021, amyloid, chronic R pleural effusion s/p pleurx (last drained last week), HTN, HLD, BPH, presented for worsening confusion from home. 
81-year-old male with Parkinson's, Afib on apixaban, bradycardia s/p PPM, CAD s/p stent 8/2021, amyloid, chronic R pleural effusion s/p pleurx (last drained last week), HTN, HLD, BPH, presented for worsening confusion from home.     Admitted for further evaluation

## 2022-10-11 NOTE — PROGRESS NOTE ADULT - PROBLEM SELECTOR PLAN 4
30w0d overall feeling ok.  Slowly getting over her recent cold.  ezcema is flaring so working with derm, may restart light therapy.  Will recheck CBC and ferritin today.   Will plan for Tdap next visit once she is over the cold.  RTC 2 weeks brittany   - for home, will prescribe senna, miralax, and dulcolax   - patient has been known to get constipated   - family counseled to monitor closely and titrate bowel regimen as needed

## 2022-10-11 NOTE — SWALLOW BEDSIDE ASSESSMENT ADULT - COMMENTS
Hospitalist note 10/10 "81-year-old male with Parkinson's, Afib on apixaban, bradycardia s/p PPM, CAD s/p stent 8/2021, amyloid, chronic R pleural effusion s/p pleurx (last drained last week), HTN, HLD, BPH, presented for worsening confusion from home. "    CXR 10/7 "No focal consolidation."    Patient seen at bedside in [facility] this AM for an initial assessment of the swallow function, at which time patient was alert. Home Health Aide present and reporting patient eats regular solids and thin liquids at baseline. Patient is able to follow simple directives. Patient verbalizes wants/needs.

## 2022-10-11 NOTE — DISCHARGE NOTE PROVIDER - HOSPITAL COURSE
81-year-old male with Parkinson's, Afib on apixaban, bradycardia s/p PPM, CAD s/p stent 8/2021, amyloid, chronic R pleural effusion s/p pleurx (last drained last week), HTN, HLD, BPH, presented for worsening confusion from home.      Problem/Plan - 1:  ·  Problem: Encephalopathy acute.   ·  Plan: - likely etiology secondary to UTI with hx of Dementia. Improving. Mental status now at baseline    - CTH unremarkable for acute findings   - Continue Ceftriaxone for UTI (end 10/14)   - Aspiration precautions. Passed bedside evaluation. will have speech see for official evaluation  - per family, patient tends to wax and wane at baseline. reorient as needed  - Fall precautions  - Follow up labs and Blood Cx/Urine Cx  - PT consult recommends ALEXANDRA  - family deciding on ALEXANDRA vs home (with services PT, HHA, Nursing).     Problem/Plan - 2:  ·  Problem: Prostatic calculi.   ·  Plan: - reviewed renal US: Calcification measuring approximately 1.2 cm along the bladder neck/base of the prostate, which could reflect a bladder calcification or calculus, or prostatic calcification. Layering debris within the urinary bladder.  - will consult Urology given patient has had multiple UTIs in the past.     Problem/Plan - 3:  ·  Problem: Acute UTI.   ·  Plan: - +UA on admission and hx of UTI in the past  - Continue Ceftriaxone   - Follow up urine and blood Cx.     Problem/Plan - 4:  ·  Problem: Constipation.   ·  Plan: - for home, will prescribe senna, miralax, and dulcolax   - patient has been known to get constipated   - family counseled to monitor closely and titrate bowel regimen as needed.     Problem/Plan - 5:  ·  Problem: ALAYNA (acute kidney injury).   ·  Plan: - ALAYNA on CKD vs progression of CKD   - noted to be 1.59 in 07/2022   - subsequently trended up to low 2s in 09/2022 and has been stable there since then   - most probably new baseline is low 2s   - treat UTI   - sp IVF  - Renal U/S appreciated: No hydronephrosis.  - Monitor BUN/Cr   - renally dose meds, avoid nephrotoxic agents.     Problem/Plan - 6:  ·  Problem: Prolonged QT interval.   ·  Plan: - holding mirtazepine for now. Decreased Zyprexa from 5 mg to 2.5mg  - check EKG today for qtc   - restart home mirtazepine 11.75 mg today   - Replete electrolytes as needed  - monitor on tele.     Problem/Plan - 7:  ·  Problem: Parkinson's disease dementia.   ·  Plan: - Continue home meds:  carbidopa/levodopa and olanzapine (lower dose due to QT)  reorient as needed  fall and aspiration precautions. Official Swallow eval pending  PT recommends ALEXANDRA.     Problem/Plan - 8:  ·  Problem: Chronic pleural effusion.   ·  Plan: has right Pleurex cath and dressing in place and drained last week  Monitor pulse ox and VS.     Problem/Plan - 9:  ·  Problem: Atrial fibrillation.   ·  Plan: persistent Afib  Continue apixaban and Amiodarone.     Problem/Plan - 10:  ·  Problem: Hyperlipidemia.   ·  Plan; Continue statin.     Problem/Plan - 11:  ·  Problem: CAD (coronary artery disease).   ·  Plan: Continue Plavix and statin.     Problem/Plan - 12:  ·  Problem: Cardiac amyloidosis.   ·  Plan: c/w Vyndamax (tafamidis) family to brought from home.    On 10/ 11/2022 this case was reviewed with Dr. Tyson, the patient is medically stable and optimized for discharge. All medications were reviewed and prescriptions were sent to mutually agreed upon pharmacy.     81-year-old male with Parkinson's, Afib on apixaban, bradycardia s/p PPM, CAD s/p stent 8/2021, amyloid, chronic R pleural effusion s/p pleurx (last drained last week), HTN, HLD, BPH, presented for worsening confusion from home.      Problem/Plan - 1:  ·  Problem: Encephalopathy acute.   ·  Plan: - likely etiology secondary to UTI with hx of Dementia. Improving. Mental status now at baseline    - CTH unremarkable for acute findings   - Continue Ceftriaxone for UTI, can switch to cefpodoxime  on DC (end 10/14)   - Aspiration precautions. Passed bedside evaluation   - per family, patient tends to wax and wane at baseline. reorient as needed  - Fall precautions  - Follow up labs and Blood Cx/Urine Cx  - PT consult recommends ALEXANDRA  - family deciding on ALEXANDRA vs home (with services PT, HHA, Nursing).     Problem/Plan - 2:  ·  Problem: Prostatic calculi.   ·  Plan: - reviewed renal US: Calcification measuring approximately 1.2 cm along the bladder neck/base of the prostate, which could reflect a bladder calcification or calculus, or prostatic calcification. Layering debris within the urinary bladder.  - will consult Urology given patient has had multiple UTIs in the past.     Problem/Plan - 3:  ·  Problem: Acute UTI.   ·  Plan: - +UA on admission and hx of UTI in the past  - Continue Ceftriaxone   - Follow up urine and blood Cx.     Problem/Plan - 4:  ·  Problem: Constipation.   ·  Plan: - for home, will prescribe senna, miralax, and dulcolax   - patient has been known to get constipated   - family counseled to monitor closely and titrate bowel regimen as needed.     Problem/Plan - 5:  ·  Problem: ALAYNA (acute kidney injury).   ·  Plan: - ALAYNA on CKD vs progression of CKD   - noted to be 1.59 in 07/2022   - subsequently trended up to low 2s in 09/2022 and has been stable there since then   - most probably new baseline is low 2s   - treat UTI   - sp IVF  - Renal U/S appreciated: No hydronephrosis.  - Monitor BUN/Cr   - renally dose meds, avoid nephrotoxic agents.     Problem/Plan - 6:  ·  Problem: Prolonged QT interval.   ·  Plan: - holding mirtazepine for now. Decreased Zyprexa from 5 mg to 2.5mg  - check EKG today for qtc   - restart home mirtazepine 11.75 mg today   - Replete electrolytes as needed  - monitor on tele.     Problem/Plan - 7:  ·  Problem: Parkinson's disease dementia.   ·  Plan: - Continue home meds:  carbidopa/levodopa and olanzapine (lower dose due to QT)  reorient as needed  fall and aspiration precautions. Official Swallow eval pending  PT recommends ALEXANDRA.     Problem/Plan - 8:  ·  Problem: Chronic pleural effusion.   ·  Plan: has right Pleurex cath and dressing in place and drained last week  Monitor pulse ox and VS.     Problem/Plan - 9:  ·  Problem: Atrial fibrillation.   ·  Plan: persistent Afib  Continue apixaban and Amiodarone.     Problem/Plan - 10:  ·  Problem: Hyperlipidemia.   ·  Plan; Continue statin.     Problem/Plan - 11:  ·  Problem: CAD (coronary artery disease).   ·  Plan: Continue Plavix and statin.     Problem/Plan - 12:  ·  Problem: Cardiac amyloidosis.   ·  Plan: c/w Vyndamax (tafamidis) family to brought from home.    On 10/ 11/2022 this case was reviewed with Dr. Tyson, the patient is medically stable and optimized for discharge. All medications were reviewed and prescriptions were sent to mutually agreed upon pharmacy.     81-year-old male with Parkinson's, Afib on apixaban, bradycardia s/p PPM, CAD s/p stent 8/2021, amyloid, chronic R pleural effusion s/p pleurx (last drained last week), HTN, HLD, BPH, presented for worsening confusion from home.      Problem/Plan - 1:  ·  Problem: Encephalopathy acute.   ·  Plan: - likely etiology secondary to UTI with hx of Dementia. Improving. Mental status now at baseline    - CTH unremarkable for acute findings   - s/p Ceftriaxone for UTI. will switch to cefpodoxime  on DC (end 10/14)   - Aspiration precautions. Passed bedside evaluation   - per family, patient tends to wax and wane at baseline. reorient as needed  - Fall precautions  - Follow up labs and Blood Cx/Urine Cx  - PT consult recommends ALEXANDRA  - family deciding on ALEXANDRA vs home (with services PT, HHA, Nursing).     Problem/Plan - 2:  ·  Problem: Prostatic calculi.   ·  Plan: - reviewed renal US: Calcification measuring approximately 1.2 cm along the bladder neck/base of the prostate, which could reflect a bladder calcification or calculus, or prostatic calcification. Layering debris within the urinary bladder.  - will consult Urology given patient has had multiple UTIs in the past.     Problem/Plan - 3:  ·  Problem: Acute UTI.   ·  Plan: - +UA on admission and hx of UTI in the past  - s/p Ceftriaxone   - Follow up urine and blood Cx.     Problem/Plan - 4:  ·  Problem: Constipation.   ·  Plan: - for home, will prescribe senna, miralax, and dulcolax   - patient has been known to get constipated   - family counseled to monitor closely and titrate bowel regimen as needed.     Problem/Plan - 5:  ·  Problem: ALAYNA (acute kidney injury).   ·  Plan: - ALAYNA on CKD vs progression of CKD   - noted to be 1.59 in 07/2022   - subsequently trended up to low 2s in 09/2022 and has been stable there since then   - most probably new baseline is low 2s   - treat UTI   - sp IVF  - Renal U/S appreciated: No hydronephrosis.  - Monitor BUN/Cr   - renally dose meds, avoid nephrotoxic agents.     Problem/Plan - 6:  ·  Problem: Prolonged QT interval.   ·  Plan: - holding mirtazepine for now. Decreased Zyprexa from 5 mg to 2.5mg  - check EKG today for qtc   - restart home mirtazepine 11.75 mg today   - Replete electrolytes as needed  - monitor on tele.     Problem/Plan - 7:  ·  Problem: Parkinson's disease dementia.   ·  Plan: - Continue home meds:  carbidopa/levodopa and olanzapine (lower dose due to QT)  reorient as needed  fall and aspiration precautions. Official Swallow eval pending  PT recommends ALEXANDRA.     Problem/Plan - 8:  ·  Problem: Chronic pleural effusion.   ·  Plan: has right Pleurex cath and dressing in place and drained last week  Monitor pulse ox and VS.     Problem/Plan - 9:  ·  Problem: Atrial fibrillation.   ·  Plan: persistent Afib  Continue apixaban and Amiodarone.     Problem/Plan - 10:  ·  Problem: Hyperlipidemia.   ·  Plan; Continue statin.     Problem/Plan - 11:  ·  Problem: CAD (coronary artery disease).   ·  Plan: Continue Plavix and statin.     Problem/Plan - 12:  ·  Problem: Cardiac amyloidosis.   ·  Plan: c/w Vyndamax (tafamidis) family to brought from home.    On 10/12/2022 this case was reviewed with Dr. Tyson, the patient is medically stable and optimized for discharge.      81-year-old male with Parkinson's, Afib on apixaban, bradycardia s/p PPM, CAD s/p stent 8/2021, amyloid, chronic R pleural effusion s/p pleurx (last drained last week), HTN, HLD, BPH, presented for worsening confusion from home.      Problem/Plan - 1:  ·  Problem: Encephalopathy acute.   ·  Plan: - likely etiology secondary to UTI with hx of Dementia. Improving. Mental status now at baseline    - CTH unremarkable for acute findings   - s/p Ceftriaxone for UTI. will switch to cefpodoxime on DC (end 10/14)   - Aspiration precautions. Passed bedside evaluation   - per family, patient tends to wax and wane at baseline. reorient as needed  - Fall precautions  - PT consult recommends ALEXANDRA, dispo to ALEXANDRA      Problem/Plan - 2:  ·  Problem: Prostatic calculi.   ·  Plan: - reviewed renal US: Calcification measuring approximately 1.2 cm along the bladder neck/base of the prostate, which could reflect a bladder calcification or calculus, or prostatic calcification. Layering debris within the urinary bladder.  - urology consulted this admission   - needs OP urology follow up for further work up      Problem/Plan - 3:  ·  Problem: Peridentitis   - F/U with outside dentist or [facility] ([phone]) for comprehensive care     Problem/Plan - 4:  ·  Problem: Constipation.   ·  Plan: - for home, will prescribe senna, miralax, and dulcolax   - patient has been known to get constipated   - family counseled to monitor closely and titrate bowel regimen as needed.     Problem/Plan - 5:  ·  Problem: ALAYNA (acute kidney injury).   ·  Plan: - ALAYNA on CKD vs progression of CKD   - noted to be 1.59 in 07/2022   - subsequently trended up to low 2s in 09/2022 and has been stable there since then   - most probably new baseline is low 2s   - treat UTI   - Renal U/S appreciated: No hydronephrosis.  - Monitor BUN/Cr as OP   - renally dose meds, avoid nephrotoxic agents.     Problem/Plan - 6:  ·  Problem: Prolonged QT interval.   ·  Plan: - holding mirtazepine for now. Decreased Zyprexa from 5 mg to 2.5mg  - restart home mirtazepine 11.75 mg      Problem/Plan - 7:  ·  Problem: Parkinson's disease dementia.   ·  Plan: - Continue home meds:  carbidopa/levodopa and olanzapine (lower dose due to QT)  reorient as needed  fall and aspiration precautions   PT recommends ALEXANDRA.     Problem/Plan - 8:  ·  Problem: Chronic pleural effusion.   ·  Plan: has right Pleurex cath and dressing in place and drained on 10/11 (no drainage was actually noted)   Monitor pulse ox and VS.     Problem/Plan - 9:  ·  Problem: Atrial fibrillation.   ·  Plan: persistent Afib  Continue apixaban and Amiodarone.     Problem/Plan - 10:  ·  Problem: Hyperlipidemia.   ·  Plan; Continue statin.     Problem/Plan - 11:  ·  Problem: CAD (coronary artery disease).   ·  Plan: Continue Plavix and statin.     Problem/Plan - 12:  ·  Problem: Cardiac amyloidosis.   ·  Plan: c/w Vyndamax (tafamidis) family to brought from home.    On 10/12/2022 this case was reviewed with Dr. Tyson, the patient is medically stable and optimized for discharge.      81-year-old male with Parkinson's, Afib on apixaban, bradycardia s/p PPM, CAD s/p stent 8/2021, amyloid, chronic R pleural effusion s/p pleurx (last drained last week), HTN, HLD, BPH, presented for worsening confusion from home.      Problem/Plan - 1:  ·  Problem: Encephalopathy acute.   ·  Plan: - likely etiology secondary to UTI with hx of Dementia. Improving. Mental status now at baseline    - CTH unremarkable for acute findings   - s/p Ceftriaxone for UTI. will switch to cefpodoxime on DC (end 10/14)   - Aspiration precautions. Passed bedside evaluation   - per family, patient tends to wax and wane at baseline. reorient as needed  - Fall precautions  - PT consult recommends ALEXANDRA, dispo to ALEXANDRA      Problem/Plan - 2:  ·  Problem: Prostatic calculi.   ·  Plan: - reviewed renal US: Calcification measuring approximately 1.2 cm along the bladder neck/base of the prostate, which could reflect a bladder calcification or calculus, or prostatic calcification. Layering debris within the urinary bladder.  - urology consulted this admission   - needs OP urology follow up for further work up      Problem/Plan - 3:  ·  Problem: Peridentitis   - F/U with outside dentist or [facility] ([phone]) for comprehensive care     Problem/Plan - 4:  ·  Problem: Constipation.   ·  Plan: - for home, will prescribe senna, miralax, and dulcolax   - patient has been known to get constipated   - family counseled to monitor closely and titrate bowel regimen as needed.     Problem/Plan - 5:  ·  Problem: ALAYNA (acute kidney injury).   ·  Plan: - ALAYNA on CKD vs progression of CKD   - noted to be 1.59 in 07/2022   - subsequently trended up to low 2s in 09/2022 and has been stable there since then   - most probably new baseline is low 2s   - treat UTI   - Renal U/S appreciated: No hydronephrosis.  - Monitor BUN/Cr as OP   - renally dose meds, avoid nephrotoxic agents.     Problem/Plan - 6:  ·  Problem: Prolonged QT interval.   ·  Plan: - holding mirtazepine for now. Decreased Zyprexa from 5 mg to 2.5mg  - restart home mirtazepine 11.75 mg      Problem/Plan - 7:  ·  Problem: Parkinson's disease dementia.   ·  Plan: - Continue home meds:  carbidopa/levodopa and olanzapine (lower dose due to QT)  reorient as needed  fall and aspiration precautions   PT recommends ALEXANDRA.     Problem/Plan - 8:  ·  Problem: Chronic pleural effusion.   ·  Plan: has right Pleurex cath and dressing in place and drained on 10/11 (no drainage was actually noted)   Monitor pulse ox and VS.     Problem/Plan - 9:  ·  Problem: Atrial fibrillation.   ·  Plan: persistent Afib  Continue apixaban and Amiodarone.     Problem/Plan - 10:  ·  Problem: Hyperlipidemia.   ·  Plan; Continue statin.     Problem/Plan - 11:  ·  Problem: CAD (coronary artery disease).   ·  Plan: Continue Plavix and statin.     Problem/Plan - 12:  ·  Problem: Cardiac amyloidosis.   ·  Plan: c/w Vyndamax (tafamidis) family to brought from home.    On 10/12/2022 this case was reviewed with Dr. Tyson, the patient is medically stable and optimized for discharge.         Addendum** 10/13/2022  Patient has : Encephalopathy due to UTI

## 2022-10-11 NOTE — PROGRESS NOTE ADULT - SUBJECTIVE AND OBJECTIVE BOX
[facility] Division of Hospital Medicine  Loc Tyson MD  Available via MS Teams  Pager: [phone]    SUBJECTIVE / OVERNIGHT EVENTS:    No new complaints today   Able to say hes in 2022, his name, and he is at [facility]     MEDICATIONS  (STANDING):  aMIOdarone    Tablet 200 milliGRAM(s) Oral daily  apixaban 2.5 milliGRAM(s) Oral two times a day  atorvastatin 80 milliGRAM(s) Oral at bedtime  buMETAnide 1 milliGRAM(s) Oral <User Schedule>  carbidopa/levodopa  25/100 1.5 Tablet(s) Oral daily  carbidopa/levodopa  25/100 1 Tablet(s) Oral <User Schedule>  cefTRIAXone   IVPB 1000 milliGRAM(s) IV Intermittent every 24 hours  clopidogrel Tablet 75 milliGRAM(s) Oral daily  desvenlafaxine ER 50 milliGRAM(s) Oral daily  mirtazapine 11.25 milliGRAM(s) Oral at bedtime  OLANZapine 2.5 milliGRAM(s) Oral at bedtime  oxybutynin 10 milliGRAM(s) Oral two times a day  polyethylene glycol 3350 17 Gram(s) Oral daily  senna 2 Tablet(s) Oral at bedtime  vyndamax 61 milliGRAM(s) 1 Capsule(s) Oral daily    MEDICATIONS  (PRN):      I&O's Summary    10 Oct 2022 07:01  -  11 Oct 2022 07:00  --------------------------------------------------------  IN: 0 mL / OUT: 1 mL / NET: -1 mL    11 Oct 2022 07:01  -  11 Oct 2022 13:56  --------------------------------------------------------  IN: 0 mL / OUT: 0 mL / NET: 0 mL        PHYSICAL EXAM:  Vital Signs Last 24 Hrs  T(C): 36.7 (11 Oct 2022 09:20), Max: 36.7 (10 Oct 2022 23:17)  T(F): 98.1 (11 Oct 2022 09:20), Max: 98.1 (11 Oct 2022 09:20)  HR: 60 (11 Oct 2022 09:20) (60 - 66)  BP: 108/54 (11 Oct 2022 09:20) (108/54 - 141/59)  BP(mean): 79 (11 Oct 2022 09:20) (79 - 86)  RR: 18 (11 Oct 2022 09:20) (14 - 18)  SpO2: 99% (11 Oct 2022 09:20) (97% - 100%)    Parameters below as of 11 Oct 2022 09:20  Patient On (Oxygen Delivery Method): room air      CONSTITUTIONAL: NAD, well-developed  EYES:  conjunctiva and sclera clear  NECK: Supple  RESPIRATORY: Normal respiratory effort; lungs are clear to auscultation bilaterally  CARDIOVASCULAR: Regular rate and rhythm, normal S1 and S2, no murmur/rub/gallop  ABDOMEN: Nontender to palpation, normoactive bowel sounds, no rebound/guarding; No hepatosplenomegaly  MUSCULOSKELETAL:  No lower extremity edema; Peripheral pulses are 2+ bilaterally  PSYCH: A+O to person, place, and time; does not know president. Can talk incongruently   NEUROLOGY: no gross motor or sensory deficits   SKIN: No rashes; no palpable lesions    LABS:                        11.4   8.16  )-----------( 237      ( 11 Oct 2022 06:18 )             38.8     10-11    144  |  105  |  21  ----------------------------<  91  3.6   |  24  |  2.29<H>    Ca    9.9      11 Oct 2022 06:18  Phos  4.3     10-10  Mg     2.40     10-10    TPro  6.7  /  Alb  3.4  /  TBili  0.4  /  DBili  x   /  AST  21  /  ALT  9   /  AlkPhos  210<H>  10-11              SARS-CoV-2: NotDetec (08 Oct 2022 11:57)  COVID-19 PCR: NotDetec (11 Sep 2022 07:18)  COVID-19 PCR: NotDetec (11 May 2022 17:00)  COVID-19 PCR: NotDetec (03 May 2022 10:29)      RADIOLOGY & ADDITIONAL TESTS:  New Results Reviewed Today:   New Imaging Personally Reviewed Today:  New Electrocardiogram Personally Reviewed Today:  Prior or Outpatient Records Reviewed Today:    COMMUNICATION:  Care Discussed with Consultants/Other Providers and Details of Discussion:  Discussions with Patient/Family:  PCP Communication:

## 2022-10-11 NOTE — SWALLOW BEDSIDE ASSESSMENT ADULT - SWALLOW EVAL: DIAGNOSIS
1. Functional oral stage for puree and thin liquids marked by adequate oral acceptance, collection and transfer. 2. Mild oral dysphagia for regular solids marked by mildly slow/delayed chewing and transfer. 3. Functional pharyngeal phase for puree, regular solids and thin liquids marked by a present pharyngeal swallow trigger with hyolaryngeal elevation upon palpation without evidence of airway penetration/aspiration.

## 2022-10-12 VITALS
OXYGEN SATURATION: 98 % | HEART RATE: 60 BPM | DIASTOLIC BLOOD PRESSURE: 84 MMHG | SYSTOLIC BLOOD PRESSURE: 120 MMHG | TEMPERATURE: 98 F | RESPIRATION RATE: 18 BRPM

## 2022-10-12 LAB
ALBUMIN SERPL ELPH-MCNC: 3.2 G/DL — LOW (ref 3.3–5)
ALP SERPL-CCNC: 189 U/L — HIGH (ref 40–120)
ALT FLD-CCNC: <5 U/L — LOW (ref 4–41)
ANION GAP SERPL CALC-SCNC: 15 MMOL/L — HIGH (ref 7–14)
AST SERPL-CCNC: 22 U/L — SIGNIFICANT CHANGE UP (ref 4–40)
BASOPHILS # BLD AUTO: 0.05 K/UL — SIGNIFICANT CHANGE UP (ref 0–0.2)
BASOPHILS NFR BLD AUTO: 0.7 % — SIGNIFICANT CHANGE UP (ref 0–2)
BILIRUB SERPL-MCNC: 0.4 MG/DL — SIGNIFICANT CHANGE UP (ref 0.2–1.2)
BUN SERPL-MCNC: 21 MG/DL — SIGNIFICANT CHANGE UP (ref 7–23)
CALCIUM SERPL-MCNC: 9.5 MG/DL — SIGNIFICANT CHANGE UP (ref 8.4–10.5)
CHLORIDE SERPL-SCNC: 106 MMOL/L — SIGNIFICANT CHANGE UP (ref 98–107)
CO2 SERPL-SCNC: 25 MMOL/L — SIGNIFICANT CHANGE UP (ref 22–31)
CREAT SERPL-MCNC: 2.27 MG/DL — HIGH (ref 0.5–1.3)
EGFR: 29 ML/MIN/1.73M2 — LOW
EOSINOPHIL # BLD AUTO: 0.15 K/UL — SIGNIFICANT CHANGE UP (ref 0–0.5)
EOSINOPHIL NFR BLD AUTO: 2 % — SIGNIFICANT CHANGE UP (ref 0–6)
GLUCOSE SERPL-MCNC: 87 MG/DL — SIGNIFICANT CHANGE UP (ref 70–99)
HCT VFR BLD CALC: 38 % — LOW (ref 39–50)
HGB BLD-MCNC: 11.1 G/DL — LOW (ref 13–17)
IANC: 5.32 K/UL — SIGNIFICANT CHANGE UP (ref 1.8–7.4)
IMM GRANULOCYTES NFR BLD AUTO: 0.3 % — SIGNIFICANT CHANGE UP (ref 0–0.9)
LYMPHOCYTES # BLD AUTO: 1.52 K/UL — SIGNIFICANT CHANGE UP (ref 1–3.3)
LYMPHOCYTES # BLD AUTO: 19.9 % — SIGNIFICANT CHANGE UP (ref 13–44)
MCHC RBC-ENTMCNC: 24.7 PG — LOW (ref 27–34)
MCHC RBC-ENTMCNC: 29.2 GM/DL — LOW (ref 32–36)
MCV RBC AUTO: 84.4 FL — SIGNIFICANT CHANGE UP (ref 80–100)
MONOCYTES # BLD AUTO: 0.57 K/UL — SIGNIFICANT CHANGE UP (ref 0–0.9)
MONOCYTES NFR BLD AUTO: 7.5 % — SIGNIFICANT CHANGE UP (ref 2–14)
NEUTROPHILS # BLD AUTO: 5.32 K/UL — SIGNIFICANT CHANGE UP (ref 1.8–7.4)
NEUTROPHILS NFR BLD AUTO: 69.6 % — SIGNIFICANT CHANGE UP (ref 43–77)
NRBC # BLD: 0 /100 WBCS — SIGNIFICANT CHANGE UP (ref 0–0)
NRBC # FLD: 0 K/UL — SIGNIFICANT CHANGE UP (ref 0–0)
PLATELET # BLD AUTO: 242 K/UL — SIGNIFICANT CHANGE UP (ref 150–400)
POTASSIUM SERPL-MCNC: 3.2 MMOL/L — LOW (ref 3.5–5.3)
POTASSIUM SERPL-SCNC: 3.2 MMOL/L — LOW (ref 3.5–5.3)
PROT SERPL-MCNC: 6.5 G/DL — SIGNIFICANT CHANGE UP (ref 6–8.3)
RBC # BLD: 4.5 M/UL — SIGNIFICANT CHANGE UP (ref 4.2–5.8)
RBC # FLD: 23 % — HIGH (ref 10.3–14.5)
SARS-COV-2 RNA SPEC QL NAA+PROBE: SIGNIFICANT CHANGE UP
SODIUM SERPL-SCNC: 146 MMOL/L — HIGH (ref 135–145)
WBC # BLD: 7.63 K/UL — SIGNIFICANT CHANGE UP (ref 3.8–10.5)
WBC # FLD AUTO: 7.63 K/UL — SIGNIFICANT CHANGE UP (ref 3.8–10.5)

## 2022-10-12 PROCEDURE — 99239 HOSP IP/OBS DSCHRG MGMT >30: CPT

## 2022-10-12 RX ORDER — POTASSIUM CHLORIDE 20 MEQ
10 PACKET (EA) ORAL
Refills: 0 | Status: COMPLETED | OUTPATIENT
Start: 2022-10-12 | End: 2022-10-12

## 2022-10-12 RX ADMIN — CLOPIDOGREL BISULFATE 75 MILLIGRAM(S): 75 TABLET, FILM COATED ORAL at 12:21

## 2022-10-12 RX ADMIN — CARBIDOPA AND LEVODOPA 1 TABLET(S): 25; 100 TABLET ORAL at 12:21

## 2022-10-12 RX ADMIN — Medication 100 MILLIEQUIVALENT(S): at 09:16

## 2022-10-12 RX ADMIN — AMIODARONE HYDROCHLORIDE 200 MILLIGRAM(S): 400 TABLET ORAL at 06:56

## 2022-10-12 RX ADMIN — Medication 100 MILLIEQUIVALENT(S): at 10:43

## 2022-10-12 RX ADMIN — POLYETHYLENE GLYCOL 3350 17 GRAM(S): 17 POWDER, FOR SOLUTION ORAL at 12:21

## 2022-10-12 RX ADMIN — CARBIDOPA AND LEVODOPA 1.5 TABLET(S): 25; 100 TABLET ORAL at 06:59

## 2022-10-12 RX ADMIN — Medication 100 MILLIEQUIVALENT(S): at 12:08

## 2022-10-12 RX ADMIN — APIXABAN 2.5 MILLIGRAM(S): 2.5 TABLET, FILM COATED ORAL at 06:56

## 2022-10-12 RX ADMIN — CARBIDOPA AND LEVODOPA 1 TABLET(S): 25; 100 TABLET ORAL at 15:06

## 2022-10-12 RX ADMIN — DESVENLAFAXINE 50 MILLIGRAM(S): 50 TABLET, EXTENDED RELEASE ORAL at 12:21

## 2022-10-12 RX ADMIN — Medication 10 MILLIGRAM(S): at 06:57

## 2022-10-13 ENCOUNTER — APPOINTMENT (OUTPATIENT)
Dept: CARDIOLOGY | Facility: CLINIC | Age: 82
End: 2022-10-13

## 2022-10-13 NOTE — PHYSICAL THERAPY INITIAL EVALUATION ADULT - LEVEL OF CONSCIOUSNESS, REHAB EVAL
Received request via: Pharmacy    Was the patient seen in the last year in this department? Yes    Does the patient have an active prescription (recently filled or refills available) for medication(s) requested? No    
alert/confused

## 2022-10-14 ENCOUNTER — NON-APPOINTMENT (OUTPATIENT)
Age: 82
End: 2022-10-14

## 2022-10-19 ENCOUNTER — TRANSCRIPTION ENCOUNTER (OUTPATIENT)
Age: 82
End: 2022-10-19

## 2022-10-19 ENCOUNTER — INPATIENT (INPATIENT)
Facility: HOSPITAL | Age: 82
LOS: 5 days | Discharge: NURSING, REHAB OR LONG-TERM CARE | DRG: 242 | End: 2022-10-25
Attending: HOSPITALIST | Admitting: INTERNAL MEDICINE
Payer: MEDICARE

## 2022-10-19 VITALS
DIASTOLIC BLOOD PRESSURE: 60 MMHG | HEART RATE: 60 BPM | SYSTOLIC BLOOD PRESSURE: 133 MMHG | RESPIRATION RATE: 20 BRPM | HEIGHT: 68 IN | TEMPERATURE: 98 F | WEIGHT: 169.98 LBS | OXYGEN SATURATION: 97 %

## 2022-10-19 DIAGNOSIS — I48.0 PAROXYSMAL ATRIAL FIBRILLATION: ICD-10-CM

## 2022-10-19 DIAGNOSIS — I44.2 ATRIOVENTRICULAR BLOCK, COMPLETE: ICD-10-CM

## 2022-10-19 DIAGNOSIS — Z95.0 PRESENCE OF CARDIAC PACEMAKER: Chronic | ICD-10-CM

## 2022-10-19 DIAGNOSIS — Z96.649 PRESENCE OF UNSPECIFIED ARTIFICIAL HIP JOINT: Chronic | ICD-10-CM

## 2022-10-19 DIAGNOSIS — Z98.49 CATARACT EXTRACTION STATUS, UNSPECIFIED EYE: Chronic | ICD-10-CM

## 2022-10-19 DIAGNOSIS — Z98.61 CORONARY ANGIOPLASTY STATUS: Chronic | ICD-10-CM

## 2022-10-19 LAB
ANION GAP SERPL CALC-SCNC: 13 MMOL/L — SIGNIFICANT CHANGE UP (ref 5–17)
ANISOCYTOSIS BLD QL: SLIGHT — SIGNIFICANT CHANGE UP
APTT BLD: 36.8 SEC — HIGH (ref 27.5–35.5)
BASE EXCESS BLDA CALC-SCNC: 0.1 MMOL/L — SIGNIFICANT CHANGE UP (ref -2–3)
BASOPHILS # BLD AUTO: 0.07 K/UL — SIGNIFICANT CHANGE UP (ref 0–0.2)
BASOPHILS NFR BLD AUTO: 0.8 % — SIGNIFICANT CHANGE UP (ref 0–2)
BLD GP AB SCN SERPL QL: NEGATIVE — SIGNIFICANT CHANGE UP
BUN SERPL-MCNC: 17 MG/DL — SIGNIFICANT CHANGE UP (ref 7–23)
BURR CELLS BLD QL SMEAR: PRESENT — SIGNIFICANT CHANGE UP
CALCIUM SERPL-MCNC: 9.1 MG/DL — SIGNIFICANT CHANGE UP (ref 8.4–10.5)
CHLORIDE SERPL-SCNC: 109 MMOL/L — HIGH (ref 96–108)
CO2 BLDA-SCNC: 25 MMOL/L — HIGH (ref 19–24)
CO2 SERPL-SCNC: 23 MMOL/L — SIGNIFICANT CHANGE UP (ref 22–31)
CREAT SERPL-MCNC: 1.9 MG/DL — HIGH (ref 0.5–1.3)
DACRYOCYTES BLD QL SMEAR: SLIGHT — SIGNIFICANT CHANGE UP
EGFR: 35 ML/MIN/1.73M2 — LOW
EOSINOPHIL # BLD AUTO: 0 K/UL — SIGNIFICANT CHANGE UP (ref 0–0.5)
EOSINOPHIL NFR BLD AUTO: 0 % — SIGNIFICANT CHANGE UP (ref 0–6)
GAS PNL BLDA: SIGNIFICANT CHANGE UP
GLUCOSE SERPL-MCNC: 100 MG/DL — HIGH (ref 70–99)
HCO3 BLDA-SCNC: 24 MMOL/L — SIGNIFICANT CHANGE UP (ref 21–28)
HCT VFR BLD CALC: 39.6 % — SIGNIFICANT CHANGE UP (ref 39–50)
HGB BLD-MCNC: 11.8 G/DL — LOW (ref 13–17)
HOROWITZ INDEX BLDA+IHG-RTO: 21 — SIGNIFICANT CHANGE UP
HYPOCHROMIA BLD QL: SLIGHT — SIGNIFICANT CHANGE UP
INR BLD: 1.44 RATIO — HIGH (ref 0.88–1.16)
LYMPHOCYTES # BLD AUTO: 1.37 K/UL — SIGNIFICANT CHANGE UP (ref 1–3.3)
LYMPHOCYTES # BLD AUTO: 15.7 % — SIGNIFICANT CHANGE UP (ref 13–44)
MACROCYTES BLD QL: SLIGHT — SIGNIFICANT CHANGE UP
MANUAL SMEAR VERIFICATION: SIGNIFICANT CHANGE UP
MCHC RBC-ENTMCNC: 25.2 PG — LOW (ref 27–34)
MCHC RBC-ENTMCNC: 29.8 GM/DL — LOW (ref 32–36)
MCV RBC AUTO: 84.6 FL — SIGNIFICANT CHANGE UP (ref 80–100)
MONOCYTES # BLD AUTO: 0.57 K/UL — SIGNIFICANT CHANGE UP (ref 0–0.9)
MONOCYTES NFR BLD AUTO: 6.6 % — SIGNIFICANT CHANGE UP (ref 2–14)
NEUTROPHILS # BLD AUTO: 6.69 K/UL — SIGNIFICANT CHANGE UP (ref 1.8–7.4)
NEUTROPHILS NFR BLD AUTO: 76.1 % — SIGNIFICANT CHANGE UP (ref 43–77)
NEUTS BAND # BLD: 0.8 % — SIGNIFICANT CHANGE UP (ref 0–8)
OVALOCYTES BLD QL SMEAR: SLIGHT — SIGNIFICANT CHANGE UP
PCO2 BLDA: 35 MMHG — SIGNIFICANT CHANGE UP (ref 35–48)
PH BLDA: 7.44 — SIGNIFICANT CHANGE UP (ref 7.35–7.45)
PLAT MORPH BLD: NORMAL — SIGNIFICANT CHANGE UP
PLATELET # BLD AUTO: 292 K/UL — SIGNIFICANT CHANGE UP (ref 150–400)
PO2 BLDA: 107 MMHG — SIGNIFICANT CHANGE UP (ref 83–108)
POIKILOCYTOSIS BLD QL AUTO: SIGNIFICANT CHANGE UP
POLYCHROMASIA BLD QL SMEAR: SLIGHT — SIGNIFICANT CHANGE UP
POTASSIUM SERPL-MCNC: 3.9 MMOL/L — SIGNIFICANT CHANGE UP (ref 3.5–5.3)
POTASSIUM SERPL-SCNC: 3.9 MMOL/L — SIGNIFICANT CHANGE UP (ref 3.5–5.3)
PROTHROM AB SERPL-ACNC: 16.8 SEC — HIGH (ref 10.5–13.4)
RBC # BLD: 4.68 M/UL — SIGNIFICANT CHANGE UP (ref 4.2–5.8)
RBC # FLD: 24.9 % — HIGH (ref 10.3–14.5)
RBC BLD AUTO: ABNORMAL
RH IG SCN BLD-IMP: NEGATIVE — SIGNIFICANT CHANGE UP
SAO2 % BLDA: 98.1 % — HIGH (ref 94–98)
SCHISTOCYTES BLD QL AUTO: SLIGHT — SIGNIFICANT CHANGE UP
SODIUM SERPL-SCNC: 145 MMOL/L — SIGNIFICANT CHANGE UP (ref 135–145)
WBC # BLD: 8.7 K/UL — SIGNIFICANT CHANGE UP (ref 3.8–10.5)
WBC # FLD AUTO: 8.7 K/UL — SIGNIFICANT CHANGE UP (ref 3.8–10.5)

## 2022-10-19 PROCEDURE — 93010 ELECTROCARDIOGRAM REPORT: CPT | Mod: 77

## 2022-10-19 PROCEDURE — 71045 X-RAY EXAM CHEST 1 VIEW: CPT | Mod: 26

## 2022-10-19 PROCEDURE — 99221 1ST HOSP IP/OBS SF/LOW 40: CPT | Mod: 57

## 2022-10-19 PROCEDURE — 33229 REMV&REPLC PM GEN MULT LEADS: CPT

## 2022-10-19 PROCEDURE — 93010 ELECTROCARDIOGRAM REPORT: CPT

## 2022-10-19 PROCEDURE — 33225 L VENTRIC PACING LEAD ADD-ON: CPT

## 2022-10-19 RX ORDER — OXYBUTYNIN CHLORIDE 5 MG
5 TABLET ORAL DAILY
Refills: 0 | Status: DISCONTINUED | OUTPATIENT
Start: 2022-10-19 | End: 2022-10-25

## 2022-10-19 RX ORDER — SENNA PLUS 8.6 MG/1
2 TABLET ORAL AT BEDTIME
Refills: 0 | Status: DISCONTINUED | OUTPATIENT
Start: 2022-10-19 | End: 2022-10-25

## 2022-10-19 RX ORDER — CARBIDOPA AND LEVODOPA 25; 100 MG/1; MG/1
1.5 TABLET ORAL
Refills: 0 | Status: DISCONTINUED | OUTPATIENT
Start: 2022-10-19 | End: 2022-10-25

## 2022-10-19 RX ORDER — ATORVASTATIN CALCIUM 80 MG/1
80 TABLET, FILM COATED ORAL AT BEDTIME
Refills: 0 | Status: DISCONTINUED | OUTPATIENT
Start: 2022-10-19 | End: 2022-10-25

## 2022-10-19 RX ORDER — CARBIDOPA AND LEVODOPA 25; 100 MG/1; MG/1
1 TABLET ORAL
Refills: 0 | Status: DISCONTINUED | OUTPATIENT
Start: 2022-10-19 | End: 2022-10-25

## 2022-10-19 RX ORDER — NALOXONE HYDROCHLORIDE 4 MG/.1ML
2 SPRAY NASAL ONCE
Refills: 0 | Status: COMPLETED | OUTPATIENT
Start: 2022-10-19 | End: 2022-10-19

## 2022-10-19 RX ORDER — AMIODARONE HYDROCHLORIDE 400 MG/1
200 TABLET ORAL DAILY
Refills: 0 | Status: DISCONTINUED | OUTPATIENT
Start: 2022-10-20 | End: 2022-10-25

## 2022-10-19 RX ORDER — CARBIDOPA AND LEVODOPA 25; 100 MG/1; MG/1
1.5 TABLET ORAL
Qty: 0 | Refills: 0 | DISCHARGE

## 2022-10-19 RX ORDER — OLANZAPINE 15 MG/1
5 TABLET, FILM COATED ORAL AT BEDTIME
Refills: 0 | Status: DISCONTINUED | OUTPATIENT
Start: 2022-10-19 | End: 2022-10-21

## 2022-10-19 RX ORDER — MULTIVIT-MIN/FERROUS GLUCONATE 9 MG/15 ML
1 LIQUID (ML) ORAL DAILY
Refills: 0 | Status: DISCONTINUED | OUTPATIENT
Start: 2022-10-19 | End: 2022-10-25

## 2022-10-19 RX ORDER — AMIODARONE HYDROCHLORIDE 400 MG/1
200 TABLET ORAL DAILY
Refills: 0 | Status: DISCONTINUED | OUTPATIENT
Start: 2022-10-19 | End: 2022-10-19

## 2022-10-19 RX ORDER — CHLORHEXIDINE GLUCONATE 213 G/1000ML
1 SOLUTION TOPICAL ONCE
Refills: 0 | Status: COMPLETED | OUTPATIENT
Start: 2022-10-19 | End: 2022-10-21

## 2022-10-19 RX ORDER — CARBIDOPA AND LEVODOPA 25; 100 MG/1; MG/1
1 TABLET ORAL
Qty: 0 | Refills: 0 | DISCHARGE

## 2022-10-19 RX ORDER — POLYETHYLENE GLYCOL 3350 17 G/17G
17 POWDER, FOR SOLUTION ORAL DAILY
Refills: 0 | Status: DISCONTINUED | OUTPATIENT
Start: 2022-10-19 | End: 2022-10-25

## 2022-10-19 RX ORDER — CEFAZOLIN SODIUM 1 G
2000 VIAL (EA) INJECTION ONCE
Refills: 0 | Status: DISCONTINUED | OUTPATIENT
Start: 2022-10-19 | End: 2022-10-21

## 2022-10-19 RX ORDER — ACETAMINOPHEN 500 MG
2 TABLET ORAL
Qty: 0 | Refills: 0 | DISCHARGE

## 2022-10-19 RX ORDER — TAFAMIDIS 61 MG/1
1 CAPSULE, LIQUID FILLED ORAL
Qty: 0 | Refills: 0 | DISCHARGE

## 2022-10-19 RX ORDER — BUMETANIDE 0.25 MG/ML
1 INJECTION INTRAMUSCULAR; INTRAVENOUS
Qty: 0 | Refills: 0 | DISCHARGE

## 2022-10-19 RX ORDER — BUMETANIDE 0.25 MG/ML
1 INJECTION INTRAMUSCULAR; INTRAVENOUS
Qty: 0 | Refills: 0 | DISCHARGE
Start: 2022-10-19

## 2022-10-19 RX ORDER — APIXABAN 2.5 MG/1
1 TABLET, FILM COATED ORAL
Qty: 0 | Refills: 0 | DISCHARGE

## 2022-10-19 RX ORDER — BUMETANIDE 0.25 MG/ML
2 INJECTION INTRAMUSCULAR; INTRAVENOUS DAILY
Refills: 0 | Status: DISCONTINUED | OUTPATIENT
Start: 2022-10-19 | End: 2022-10-25

## 2022-10-19 RX ORDER — ACETAMINOPHEN 500 MG
650 TABLET ORAL EVERY 6 HOURS
Refills: 0 | Status: DISCONTINUED | OUTPATIENT
Start: 2022-10-19 | End: 2022-10-25

## 2022-10-19 RX ADMIN — CARBIDOPA AND LEVODOPA 1 TABLET(S): 25; 100 TABLET ORAL at 22:21

## 2022-10-19 RX ADMIN — NALOXONE HYDROCHLORIDE 2 MILLIGRAM(S): 4 SPRAY NASAL at 17:56

## 2022-10-19 RX ADMIN — Medication 650 MILLIGRAM(S): at 22:53

## 2022-10-19 RX ADMIN — SENNA PLUS 2 TABLET(S): 8.6 TABLET ORAL at 22:21

## 2022-10-19 RX ADMIN — OLANZAPINE 5 MILLIGRAM(S): 15 TABLET, FILM COATED ORAL at 22:21

## 2022-10-19 RX ADMIN — NALOXONE HYDROCHLORIDE 2 MILLIGRAM(S): 4 SPRAY NASAL at 16:40

## 2022-10-19 RX ADMIN — ATORVASTATIN CALCIUM 80 MILLIGRAM(S): 80 TABLET, FILM COATED ORAL at 22:21

## 2022-10-19 RX ADMIN — Medication 650 MILLIGRAM(S): at 23:23

## 2022-10-19 NOTE — DISCHARGE NOTE PROVIDER - PROVIDER TOKENS
PROVIDER:[TOKEN:[[id number]],SCHEDULEDAPPT:[11/02/2022],SCHEDULEDAPPTTIME:[11:40 AM],ESTABLISHEDPATIENT:[T]] PROVIDER:[TOKEN:[[id number]],SCHEDULEDAPPT:[11/02/2022],SCHEDULEDAPPTTIME:[11:40 AM],ESTABLISHEDPATIENT:[T]],PROVIDER:[TOKEN:[[id number]],FOLLOWUP:[1 month]]

## 2022-10-19 NOTE — ASU DISCHARGE PLAN (ADULT/PEDIATRIC) - PROVIDER TOKENS
FREE:[LAST:[wound check with ACP at [facility]],PHONE:[phone]],FAX:[(   [phone]    -],ADDRESS:[[address] [place] [address]],SCHEDULEDAPPT:[11/02/2022],SCHEDULEDAPPTTIME:[11:40 AM],ESTABLISHEDPATIENT:[T]]

## 2022-10-19 NOTE — H&P CARDIOLOGY - REASON
Progress Note  Date:2022       YGZZ:[id number]  Patient Name:Mason Muhammad     YOB: 1951     Age:70 y.o. Subjective    Subjective:  Symptoms:  He reports malaise and weakness. No shortness of breath, cough, chest pain, headache, chest pressure, anorexia, diarrhea or anxiety. Diet:  No nausea or vomiting. Review of Systems   Respiratory: Negative for cough and shortness of breath. Cardiovascular: Negative for chest pain. Gastrointestinal: Negative for anorexia, diarrhea, nausea and vomiting. Neurological: Positive for weakness. Objective         Vitals Last 24 Hours:  TEMPERATURE:  Temp  Av.2 °F (36.8 °C)  Min: 97.9 °F (36.6 °C)  Max: 98.4 °F (36.9 °C)  RESPIRATIONS RANGE: Resp  Av  Min: 18  Max: 18  PULSE OXIMETRY RANGE: SpO2  Av.5 %  Min: 96 %  Max: 97 %  PULSE RANGE: Pulse  Av  Min: 97  Max: 101  BLOOD PRESSURE RANGE: Systolic (61DWJ), KYV:900 , Min:120 , SOQ:784   ; Diastolic (25ILM), BVO:81, Min:55, Max:71    I/O (24Hr): Intake/Output Summary (Last 24 hours) at 2022 1153  Last data filed at 2022 0537  Gross per 24 hour   Intake 3489.72 ml   Output --   Net 3489.72 ml     Objective:  General Appearance: In no acute distress. Vital signs: (most recent): Blood pressure 120/71, pulse 97, temperature 97.9 °F (36.6 °C), temperature source Oral, resp. rate 18, height 6' (1.829 m), weight 277 lb (125.6 kg), SpO2 97 %. HEENT: Normal HEENT exam.    Lungs:  He is not in respiratory distress. No decreased breath sounds or wheezes. Heart: S1 normal and S2 normal.    Abdomen: Abdomen is soft. Bowel sounds are normal.   There is no epigastric area or suprapubic area tenderness. Pulses: Distal pulses are intact. Neurological: Patient is alert. Pupils:  Pupils are equal, round, and reactive to light. Skin:  Warm and dry.     generalized rash, oozing yellowish material from scartching  Labs/Imaging/Diagnostics Labs:  CBC:  Recent Labs     03/30/22  1115 03/31/22  1350 04/01/22  0658   WBC 20.1* 16.6* 10.4   RBC 6.32* 5.45 5.50   HGB 17.1 14.8 15.0   HCT 51.7 44.5 44.7   MCV 81.7 81.6 81.2   RDW 15.4* 14.9* 15.0*    239 228     CHEMISTRIES:  Recent Labs     03/30/22  1115 03/31/22  1350 04/01/22  0657   * 127* 132*   K 4.5 4.5 4.8   CL 95 99 100   CO2 19* 18* 19*   BUN 15 18 19   CREATININE 1.30* 1.23* 1.03   GLUCOSE 168* 173* 125*     PT/INR:No results for input(s): PROTIME, INR in the last 72 hours. APTT:No results for input(s): APTT in the last 72 hours. LIVER PROFILE:  Recent Labs     03/30/22  1115 03/31/22  1350 04/01/22  0657   AST 18 10 8   ALT 36 28 26   BILITOT 0.9* 0.3 <0.2   ALKPHOS 102 67 66       Imaging Last 24 Hours:  No results found. Assessment//Plan           Hospital Problems           Last Modified POA    * (Principal) Allergic reaction due to antibacterial drug 3/30/2022 Yes      fever   Tachycardia  hyponatremia  Assessment & Plan    3/31: Spoke with ID hold doxycycline for now. Patient right foot does not look infected. Patient symptoms has been better since yesterday. But still itching. We will get a Vanco level. Follow-up labs today. Continue with NS. Tachycardia due to fever, lopressor prn.  4/1: Patient was seen and evaluated. Symptoms slowly improving. Fevers improving. Hyponatremia room. Make benadryl prn, anticipate discharge in 1 to 2 days based on pt clinical course.   Electronically signed by Fatou Pierre MD on 3/31/22 at 10:24 AM EDT BIV upgrade

## 2022-10-19 NOTE — H&P CARDIOLOGY - HISTORY OF PRESENT ILLNESS
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

## 2022-10-19 NOTE — PRE-ANESTHESIA EVALUATION ADULT - NS MD HP INPLANTS MED DEV
1 coronary stent, [employer] inserted 10/6/2021, Model # L331 serial # [id number]/Pacemaker/Vascular stents/Clips 1 coronary stent, [employer] inserted 10/6/2021, Model # L331 serial # [id number], L THR/Artificial joint/Pacemaker/Vascular stents/Clips 1 coronary stent, [employer] Source inserted 10/6/2021, Model # L331 serial # [id number], L THR, R pleurex/Artificial joint/Pacemaker/Vascular stents/Clips

## 2022-10-19 NOTE — DISCHARGE NOTE PROVIDER - NSDCCPTREATMENT_GEN_ALL_CORE_FT
PRINCIPAL PROCEDURE  Procedure: Implantation of biventricular defibrillator  Findings and Treatment: DDD 60 - 110

## 2022-10-19 NOTE — CHART NOTE - NSCHARTNOTEFT_GEN_A_CORE
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  Received pt from [facility] drowsy but arousable, following commands bilaterally, Resp 8-10, pt was given Narcan 2mg x2 doses, pt did respond to commands ( squeezed hand when asked too) will answer question but will not open eyes ), tried to draw ABG unable x2 called respiratory therapist to attempt ABG, will continue to monitor pt closely. Dr Vergara aware.

## 2022-10-19 NOTE — DISCHARGE NOTE PROVIDER - HOSPITAL COURSE
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 (19 Oct 2022 09:20)    10/19: PPM upgrade to BiV PPM. Patient and access site are stable. 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 (19 Oct 2022 09:20)  10/19: PPM upgrade to BiV PPM. Patient and access site are stable.   80yo M with hx of Parkinson's, Afib (eliquis), bradycardia s/p PPM (2021), CAD s/p stent (08/2021), amyloid (vyndamax), chronic R pleural effusion s/p Pleurx HTD, BPH presented from rehab for BiV upgrade 10/19 with Dr. Vergara complicated with acute metabolic encephalopathy and now on medicine service with mild improvement      Problem/Plan - 1:  ·  Problem: Acute metabolic encephalopathy.   ·  Plan: lethargic post BiV upgrade 10/19. s/p narcan. Reported baseline AOx2-3 at home. UA+. CTH neg. EEG neg for seizures. Mental status is now inproving and patient is AAO to person. Reportedly patient is AAOx2 at baseline.   -outpatient MRI head (need to wait 6 weeks post ICD placement_  -neuro recs appreciated  -Continue to hold home Remeron and zyprexa for lethargy.     Problem/Plan - 2:  ·  Problem: Encounter for adjustment of biventricular implantable cardioverter-defibrillator (ICD).   ·  Plan: s/p ICD placement 10/19  -EP recs   -restart Eliquis 2.5 mg PO BID today   -monitor on Telemetry  -c/w amiodarone as per cardiology recommendations.     Problem/Plan - 3:  ·  Problem: Chronic kidney disease.   ·  Plan: Cr baseline 1.8-2.0  -renally dose and avoid nephrotoxic agents  -check renal bladder US (prior showed Calcification 1.2 cm along the bladder neck/base   of the prostate.     Problem/Plan - 4:  ·  Problem: Chronic pleural effusion.   ·  Plan: stable. Pleurx drainage M/F. Last drained 10/21.     Problem/Plan - 5:  ·  Problem: Afib.   ·  Plan: monitor on telemetry  -c/w amiodarone  -restart eliquis today.     Problem/Plan - 6:  ·  Problem: Cardiac amyloidosis.   ·  Plan: TTE EF 54% with moderate aortic regurg  -resume home vyndamax  -c/w home Bumex.     Problem/Plan - 7:  ·  Problem: Coronary artery disease.   ·  Plan: s/p stent.   -resume Plavix  -c/w home statin.     Problem/Plan - 8:  ·  Problem: Parkinsons.   ·  Plan: - c/w home sinmet.     Problem/Plan - 9:  ·  Problem: Preventive measure.   ·  Plan: dvt: heparin  Diet: DASH  Dispo: PT [facility], likely today.   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 (19 Oct 2022 09:20)  10/19: PPM upgrade to BiV PPM. Patient and access site are stable.   Hospital course complicated with acute metabolic encephalopathy   # Acute metabolic encephalopathy.    Lethargic post BiV upgrade 10/19. s/p narcan. Reported baseline AOx2-3 at home. UA+. CTH neg. EEG neg for seizures. Mental status is now inproving and patient is AAO to person. Reportedly patient is AAOx2 at baseline.   outpatient MRI head (need to wait 6 weeks post ICD placement).   s/p neurology evaluation. No need for AED. home Remeron and Zyprexa were on hold for lethargy   Continue to hold home Remeron and zyprexa for lethargy.    # Encounter for adjustment of biventricular implantable cardioverter-defibrillator (ICD).   s/p ICD placement 10/19. Eliquis 2.5 mg PO BID restarted   Patient is scheduled for follow up in EP clinic on 11/2 @ 11:40 AM.     #Chronic kidney disease.    Cr baseline 1.8-2.0. renally dose and avoid nephrotoxic agents   Renal  bladder US on 10/8/22 (prior showed Calcification 1.2 cm along the bladder neck/base of the prostate.    # Chronic pleural effusion.   stable. Pleurx drainage M/F.    #Afib.   Monitored on tele   Amiodarone and Eliquis continued     #Cardiac amyloidosis.   TTE EF 54% with moderate aortic regurg  resume home vyndamax  c/w home Bumex.    #Coronary artery disease.   s/p stent.   -resume Plavix  -c/w home statin.     # Parkinsons.    c/w home sinmet.    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 (19 Oct 2022 09:20)  10/19: PPM upgrade to BiV PPM.    Hospital course complicated with acute metabolic encephalopathy   # Acute metabolic encephalopathy.    Lethargic post BiV upgrade 10/19. s/p narcan. Reported baseline AOx2-3 at home. UA+. CTH neg. EEG neg for seizures. Mental status is now inproving and patient is AAO to person. Reportedly patient is AAOx2 at baseline.   outpatient MRI head (need to wait 6 weeks post ICD placement).   s/p neurology evaluation. No need for AED. home Remeron and Zyprexa were on hold for lethargy   Continue to hold home Remeron and zyprexa for lethargy    # Encounter for adjustment of biventricular implantable cardioverter-defibrillator (ICD).   s/p ICD placement 10/19. Eliquis 2.5 mg PO BID restarted   Patient is scheduled for follow up in EP clinic on 11/2 @ 11:40 AM.     #Chronic kidney disease.    Cr baseline 1.8-2.0. renally dose and avoid nephrotoxic agents   Renal  bladder US on 10/8/22 (prior showed Calcification 1.2 cm along the bladder neck/base of the prostate). Pt without  symptoms, stones are incidental and resumed to be from BPH. Pt to follow up with urology outpt  Cr stable upon discharge   Creatinine Trend: 1.46<--, 1.55<--, 1.68<--, 1.77<--, 1.87<--, 1.93<--    # Chronic pleural effusion.   stable. Pleurx drainage M/F.    #Afib.   Monitored on tele   Amiodarone and Eliquis continued after procedure    #Cardiac amyloidosis.   TTE EF 54% with moderate aortic regurg  resume home vyndamax  c/w home Bumex.  pt remained euvolemic, he does require Potassium supplementation so we restarted his standing potassium but gave liquid version given that it is hard for patient to swallow potassium pills     #Coronary artery disease.   s/p stent.   -resume Plavix  -c/w home statin.     # Parkinsons.    c/w home sinmet.    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 (19 Oct 2022 09:20)  10/19: PPM upgrade to BiV PPM.    Hospital course complicated with acute metabolic encephalopathy   # Acute metabolic encephalopathy.    Lethargic post BiV upgrade 10/19. s/p narcan. Reported baseline AOx2-3 at home. UA+. CTH neg. EEG neg for seizures. Mental status is now inproving and patient is AAO to person. Reportedly patient is AAOx2 at baseline.   outpatient MRI head (need to wait 6 weeks post ICD placement).   s/p neurology evaluation. No need for AED.  Remeron and zyprexa was on hold for lethargy but then patient was having difficulty sleeping so it was restarted.    # Encounter for adjustment of biventricular implantable cardioverter-defibrillator (ICD).   s/p ICD placement 10/19. Eliquis 2.5 mg PO BID restarted   Patient is scheduled for follow up in EP clinic on 11/2 @ 11:40 AM.     #Chronic kidney disease.    Cr baseline 1.8-2.0. renally dose and avoid nephrotoxic agents   Renal  bladder US on 10/8/22 (prior showed Calcification 1.2 cm along the bladder neck/base of the prostate). Pt without  symptoms, stones are incidental and resumed to be from BPH. Pt to follow up with urology outpt  Cr stable upon discharge   Creatinine Trend: 1.46<--, 1.55<--, 1.68<--, 1.77<--, 1.87<--, 1.93<--    # Chronic pleural effusion.   stable. Pleurx drainage M/F.    #Afib.   Monitored on tele   Amiodarone and Eliquis continued after procedure    #Cardiac amyloidosis.   TTE EF 54% with moderate aortic regurg  resume home vyndamax  c/w home Bumex.  pt remained euvolemic, he does require Potassium supplementation so we restarted his standing potassium but gave liquid version given that it is hard for patient to swallow potassium pills     #Coronary artery disease.   s/p stent.   -resume Plavix  -c/w home statin.     # Parkinsons.    c/w home sinmet.    pt stable for discharge back to [facility]  plan of care d/w wife who was in aggreement

## 2022-10-19 NOTE — DISCHARGE NOTE PROVIDER - NSDCFUSCHEDAPPT_GEN_ALL_CORE_FT
Rufus Oneal  [facility]  THORSURG [address]  Scheduled Appointment: 10/24/2022    [facility]  ELECTROPH [address]  Scheduled Appointment: 11/02/2022    Danielle Talbot  [facility]  UROLOGY [address] [facility]  Scheduled Appointment: 12/12/2022     [facility]  ELECTROPH [address]  Scheduled Appointment: 11/02/2022    Danielle Talbot  [facility]  UROLOGY [address] [facility]  Scheduled Appointment: 12/12/2022

## 2022-10-19 NOTE — PATIENT PROFILE ADULT - NSPROSPHOSPCHAPLAINYN_GEN_A_NUR
She presented for induction of labor for oligohydramnios. She had an uncomplicated vaginal delivery of female infant.  She presented for induction of labor for oligohydramnios. She had an uncomplicated vaginal delivery of female infant and unremarkable postpartum course  no

## 2022-10-19 NOTE — DISCHARGE NOTE PROVIDER - NSDCMRMEDTOKEN_GEN_ALL_CORE_FT
amiodarone 200 mg oral tablet: 1 tab(s) orally once a day hosp/home  bumetanide 2 mg oral tablet: 1 tab(s) orally once a day  carbidopa-levodopa 25 mg-100 mg oral tablet: 1 tab(s) orally once a day - at 6PM  carbidopa-levodopa 25 mg-100 mg oral tablet: 1.5 tab(s) orally once a day - morning, midday and midafternoon  clopidogrel 75 mg oral tablet: 1 tab(s) orally once a day hosp/ home  Crestor 40 mg oral tablet: 1 tab(s) orally once a day home  desvenlafaxine (as succinate) 50 mg oral tablet, extended release: 1 tab(s) orally once a day hosp  Eliquis 2.5 mg oral tablet: 1 tab(s) orally 2 times a day. hosp /home  resume on isa 10/23   Klor-Con M20 oral tablet, extended release: 1 tab(s) orally once a day home / hosp  mirtazapine 7.5 mg oral tablet: 1.5 tab(s) orally once a day (at bedtime) hosp/ home  OLANZapine 2.5 mg oral tablet: 2 tab(s) orally once a day (at bedtime) home &amp; hosp  One A Day Men&#x27;s Complete oral tablet: 1 tab(s) orally once a day  polyethylene glycol 3350 oral powder for reconstitution: 17 gram(s) orally once a day hosp  senna leaf extract oral tablet: 2 tab(s) orally once a day (at bedtime) hosp  solifenacin 10 mg oral tablet: 1 tab(s) orally once a day hosp/home  tadalafil 5 mg oral tablet: 1 tab(s) orally once a day home  Vyndamax 61 mg oral capsule: 1 cap(s) orally once a day hosp/ home   amiodarone 200 mg oral tablet: 1 tab(s) orally once a day hosp/home  bumetanide 2 mg oral tablet: 1 tab(s) orally once a day  carbidopa-levodopa 25 mg-100 mg oral tablet: 1.5 tab(s) orally three times a day - 6am, 12pm, 3pm  carbidopa-levodopa 25 mg-100 mg oral tablet: 1 tab(s) orally once a day - at 6PM  clopidogrel 75 mg oral tablet: 1 tab(s) orally once a day hosp/ home  Crestor 40 mg oral tablet: 1 tab(s) orally once a day home  desvenlafaxine (as succinate) 50 mg oral tablet, extended release: 1 tab(s) orally once a day hosp  Eliquis 2.5 mg oral tablet: 1 tab(s) orally 2 times a day. hosp /home  resume on isa 10/23   mirtazapine 7.5 mg oral tablet: 1.5 tab(s) orally once a day (at bedtime) hosp/ home  OLANZapine 2.5 mg oral tablet: 2 tab(s) orally once a day (at bedtime) home &amp; hosp  One A Day Men&#x27;s Complete oral tablet: 1 tab(s) orally once a day  polyethylene glycol 3350 oral powder for reconstitution: 17 gram(s) orally once a day hosp  potassium chloride 20 mEq/15 mL oral liquid: 15 milliliter(s) orally once a day  senna leaf extract oral tablet: 2 tab(s) orally once a day (at bedtime) hosp  solifenacin 10 mg oral tablet: 1 tab(s) orally once a day hosp/home  tadalafil 5 mg oral tablet: 1 tab(s) orally once a day home  Vyndamax 61 mg oral capsule: 1 cap(s) orally once a day hosp/ home

## 2022-10-19 NOTE — DISCHARGE NOTE PROVIDER - NSDCCPCAREPLAN_GEN_ALL_CORE_FT
PRINCIPAL DISCHARGE DIAGNOSIS  Diagnosis: Atrial fibrillation  Assessment and Plan of Treatment: Continue with your cardiologist and primary care MD. Continue your current medications. Call your physician for palpitations, feelings of rapid heart beat, lightheadedness, or dizziness.      SECONDARY DISCHARGE DIAGNOSES  Diagnosis: CAD (coronary artery disease)  Assessment and Plan of Treatment: Low salt, low fat diet.   Weight management.   Take medications as prescribed.    No smoking.  Follow up appointments with your doctor(s)  as instructed.    Diagnosis: HTN (hypertension)  Assessment and Plan of Treatment: Continue with your blood pressure medications; eat a heart healthy diet with low salt diet; exercise regularly (consult with your physician or cardiologist first); maintain a heart healthy weight; if you smoke - quit (A resource to help you stop smoking is the [facility] – phone number [phone].); include healthy ways to manage stress. Continue to follow with your primary care physician or cardiologist.    Diagnosis: HLD (hyperlipidemia)  Assessment and Plan of Treatment: Goal is to keep LDL<70. Continue with your cholesterol medications as prescribed. Eat a heart healthy diet that is low in saturated fats and salt, and includes whole grains, fruits, vegetables and lean protein; exercise regularly (consult with your physician or cardiologist first); maintain a heart healthy weight; if you smoke - quit (A resource to help you stop smoking is the [facility] – phone number [phone].). Continue to follow with your primary physician or cardiologist.     PRINCIPAL DISCHARGE DIAGNOSIS  Diagnosis: Encounter for adjustment of biventricular implantable cardioverter-defibrillator (ICD)  Assessment and Plan of Treatment: s/p ICD placement 10/19.   Monitor ICD site for swelling, redness and discharge   You are scheduled for follow up in EP clinic on 11/2 @ 11:40 AM.   Please follow up with your primary care physician in one week         SECONDARY DISCHARGE DIAGNOSES  Diagnosis: Acute metabolic encephalopathy  Assessment and Plan of Treatment: UA+. CTH neg. EEG neg for seizures.   You were seen by neurology   Mental status is now inproving and patient is AAO to person. Reportedly patient is AAOx2 at baseline.   Outpatient MRI head (need to wait 6 weeks post ICD placement).    Please follow up with your primray care physician and neurologist    Diagnosis: Atrial fibrillation  Assessment and Plan of Treatment: Continue with your cardiologist and primary care MD. Continue your current medications. Call your physician for palpitations, feelings of rapid heart beat, lightheadedness, or dizziness.    Diagnosis: CAD (coronary artery disease)  Assessment and Plan of Treatment: Low salt, low fat diet.   Weight management.   Take medications as prescribed.    No smoking.  Follow up appointments with your doctor(s)  as instructed.    Diagnosis: HTN (hypertension)  Assessment and Plan of Treatment: Continue with your blood pressure medications; eat a heart healthy diet with low salt diet; exercise regularly (consult with your physician or cardiologist first); maintain a heart healthy weight; if you smoke - quit (A resource to help you stop smoking is the [facility] [employer] [facility] – phone number [phone].); include healthy ways to manage stress. Continue to follow with your primary care physician or cardiologist.    Diagnosis: Chronic pleural effusion  Assessment and Plan of Treatment: You have pleurex catheter  Drainage on Mondays and Fridays    Diagnosis: Cardiac amyloidosis  Assessment and Plan of Treatment: Echocardiogram with EF 54% with moderate aortic regurg  Continue with vyndamax and Bumex       Diagnosis: HLD (hyperlipidemia)  Assessment and Plan of Treatment: Goal is to keep LDL<70. Continue with your cholesterol medications as prescribed. Eat a heart healthy diet that is low in saturated fats and salt, and includes whole grains, fruits, vegetables and lean protein; exercise regularly (consult with your physician or cardiologist first); maintain a heart healthy weight; if you smoke - quit (A resource to help you stop smoking is the [facility] – phone number [phone].). Continue to follow with your primary physician or cardiologist.    Diagnosis: Parkinsons  Assessment and Plan of Treatment: Continue with Sinemet   Continue supportive care

## 2022-10-19 NOTE — H&P CARDIOLOGY - REVIEW OF SYSTEMS
Left ACW PPM Left ACW PPM  left pleurx cath   pt is incontinent of Bowel & bladder   alert & verbal responds to commands, is transported to bed via magali lift   from rehab

## 2022-10-19 NOTE — PACU DISCHARGE NOTE - COMMENTS
pt easily arousable to voice, follows commands bilaterallly, able to state his name, answers simple questions appropriately

## 2022-10-19 NOTE — DISCHARGE NOTE PROVIDER - CARE PROVIDERS DIRECT ADDRESSES
,[email or web address]

## 2022-10-19 NOTE — DISCHARGE NOTE PROVIDER - CARE PROVIDER_API CALL
Lida Vergara (MD)  Cardiac Electrophysiology; Cardiovascular Disease; Internal Medicine  [address] [place] [address]  Phone: [phone]  Fax: [phone]  Established Patient  Scheduled Appointment: 11/02/2022 11:40 AM   Lida Vergara)  Cardiac Electrophysiology; Cardiovascular Disease; Internal Medicine  [address] [place] [address]  Phone: [phone]  Fax: [phone]  Established Patient  Scheduled Appointment: 11/02/2022 11:40 AM    Efren Elizalde)  Clinical Neurophysiology; EEGEpilepsy; Neurology; Sleep Medicine  [address] [place] [address]  Phone: [phone]  Fax: [phone]  Follow Up Time: 1 month

## 2022-10-19 NOTE — ASU DISCHARGE PLAN (ADULT/PEDIATRIC) - NS MD DC FALL RISK RISK
For information on Fall & Injury Prevention, visit: [email or web address] OR  [email or web address] OR  https://www.cdc.gov/steadi/patient.html

## 2022-10-19 NOTE — ASU DISCHARGE PLAN (ADULT/PEDIATRIC) - CARE PROVIDER_API CALL
wound check with ACP at [facility],   [address] [place], [place] [id number]  Phone: [phone]  Fax: (   )    -  Established Patient  Scheduled Appointment: 11/02/2022 11:40 AM

## 2022-10-19 NOTE — PATIENT PROFILE ADULT - FALL HARM RISK - HARM RISK INTERVENTIONS

## 2022-10-20 LAB
AMMONIA BLD-MCNC: 20 UMOL/L — SIGNIFICANT CHANGE UP (ref 11–55)
ANION GAP SERPL CALC-SCNC: 11 MMOL/L — SIGNIFICANT CHANGE UP (ref 5–17)
APPEARANCE UR: ABNORMAL
BACTERIA # UR AUTO: ABNORMAL
BILIRUB UR-MCNC: NEGATIVE — SIGNIFICANT CHANGE UP
BUN SERPL-MCNC: 17 MG/DL — SIGNIFICANT CHANGE UP (ref 7–23)
CALCIUM SERPL-MCNC: 9 MG/DL — SIGNIFICANT CHANGE UP (ref 8.4–10.5)
CHLORIDE SERPL-SCNC: 109 MMOL/L — HIGH (ref 96–108)
CO2 SERPL-SCNC: 23 MMOL/L — SIGNIFICANT CHANGE UP (ref 22–31)
COD CRY URNS QL: ABNORMAL
COLOR SPEC: SIGNIFICANT CHANGE UP
CREAT SERPL-MCNC: 1.93 MG/DL — HIGH (ref 0.5–1.3)
DIFF PNL FLD: ABNORMAL
EGFR: 35 ML/MIN/1.73M2 — LOW
EPI CELLS # UR: 2 /HPF — SIGNIFICANT CHANGE UP
GLUCOSE SERPL-MCNC: 121 MG/DL — HIGH (ref 70–99)
GLUCOSE UR QL: NEGATIVE — SIGNIFICANT CHANGE UP
GRAN CASTS # UR COMP ASSIST: 3 /LPF — SIGNIFICANT CHANGE UP
HCT VFR BLD CALC: 34.8 % — LOW (ref 39–50)
HGB BLD-MCNC: 10.4 G/DL — LOW (ref 13–17)
HYALINE CASTS # UR AUTO: 1 /LPF — SIGNIFICANT CHANGE UP (ref 0–7)
KETONES UR-MCNC: NEGATIVE — SIGNIFICANT CHANGE UP
LACTATE SERPL-SCNC: 1.5 MMOL/L — SIGNIFICANT CHANGE UP (ref 0.5–2)
LEUKOCYTE ESTERASE UR-ACNC: ABNORMAL
MAGNESIUM SERPL-MCNC: 2.2 MG/DL — SIGNIFICANT CHANGE UP (ref 1.6–2.6)
MCHC RBC-ENTMCNC: 25.2 PG — LOW (ref 27–34)
MCHC RBC-ENTMCNC: 29.9 GM/DL — LOW (ref 32–36)
MCV RBC AUTO: 84.5 FL — SIGNIFICANT CHANGE UP (ref 80–100)
NITRITE UR-MCNC: NEGATIVE — SIGNIFICANT CHANGE UP
NRBC # BLD: 0 /100 WBCS — SIGNIFICANT CHANGE UP (ref 0–0)
PH UR: 6 — SIGNIFICANT CHANGE UP (ref 5–8)
PLATELET # BLD AUTO: 247 K/UL — SIGNIFICANT CHANGE UP (ref 150–400)
POTASSIUM SERPL-MCNC: 3.4 MMOL/L — LOW (ref 3.5–5.3)
POTASSIUM SERPL-SCNC: 3.4 MMOL/L — LOW (ref 3.5–5.3)
PROT UR-MCNC: ABNORMAL
RBC # BLD: 4.12 M/UL — LOW (ref 4.2–5.8)
RBC # FLD: 24.8 % — HIGH (ref 10.3–14.5)
RBC CASTS # UR COMP ASSIST: >50 /HPF — SIGNIFICANT CHANGE UP (ref 0–4)
SODIUM SERPL-SCNC: 143 MMOL/L — SIGNIFICANT CHANGE UP (ref 135–145)
SP GR SPEC: 1.04 — HIGH (ref 1.01–1.02)
UROBILINOGEN FLD QL: NEGATIVE — SIGNIFICANT CHANGE UP
WBC # BLD: 7.26 K/UL — SIGNIFICANT CHANGE UP (ref 3.8–10.5)
WBC # FLD AUTO: 7.26 K/UL — SIGNIFICANT CHANGE UP (ref 3.8–10.5)
WBC UR QL: 30 /HPF — HIGH (ref 0–5)

## 2022-10-20 PROCEDURE — 99233 SBSQ HOSP IP/OBS HIGH 50: CPT | Mod: 24

## 2022-10-20 PROCEDURE — 70450 CT HEAD/BRAIN W/O DYE: CPT | Mod: 26

## 2022-10-20 PROCEDURE — 93010 ELECTROCARDIOGRAM REPORT: CPT | Mod: 76

## 2022-10-20 PROCEDURE — 99223 1ST HOSP IP/OBS HIGH 75: CPT | Mod: GC

## 2022-10-20 PROCEDURE — 71046 X-RAY EXAM CHEST 2 VIEWS: CPT | Mod: 26

## 2022-10-20 RX ORDER — SODIUM CHLORIDE 9 MG/ML
1000 INJECTION INTRAMUSCULAR; INTRAVENOUS; SUBCUTANEOUS
Refills: 0 | Status: DISCONTINUED | OUTPATIENT
Start: 2022-10-20 | End: 2022-10-21

## 2022-10-20 RX ORDER — POTASSIUM CHLORIDE 20 MEQ
20 PACKET (EA) ORAL ONCE
Refills: 0 | Status: COMPLETED | OUTPATIENT
Start: 2022-10-20 | End: 2022-10-20

## 2022-10-20 RX ORDER — ACETAMINOPHEN 500 MG
1000 TABLET ORAL ONCE
Refills: 0 | Status: COMPLETED | OUTPATIENT
Start: 2022-10-20 | End: 2022-10-20

## 2022-10-20 RX ADMIN — ATORVASTATIN CALCIUM 80 MILLIGRAM(S): 80 TABLET, FILM COATED ORAL at 20:23

## 2022-10-20 RX ADMIN — Medication 650 MILLIGRAM(S): at 21:04

## 2022-10-20 RX ADMIN — SODIUM CHLORIDE 50 MILLILITER(S): 9 INJECTION INTRAMUSCULAR; INTRAVENOUS; SUBCUTANEOUS at 08:13

## 2022-10-20 RX ADMIN — Medication 400 MILLIGRAM(S): at 07:17

## 2022-10-20 RX ADMIN — Medication 650 MILLIGRAM(S): at 20:34

## 2022-10-20 RX ADMIN — Medication 50 MILLIEQUIVALENT(S): at 07:31

## 2022-10-20 RX ADMIN — OLANZAPINE 5 MILLIGRAM(S): 15 TABLET, FILM COATED ORAL at 20:23

## 2022-10-20 RX ADMIN — SENNA PLUS 2 TABLET(S): 8.6 TABLET ORAL at 20:23

## 2022-10-20 RX ADMIN — CARBIDOPA AND LEVODOPA 1 TABLET(S): 25; 100 TABLET ORAL at 20:23

## 2022-10-20 RX ADMIN — Medication 1000 MILLIGRAM(S): at 07:47

## 2022-10-20 NOTE — CONSULT NOTE ADULT - SUBJECTIVE AND OBJECTIVE BOX
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 (19 Oct 2022 09:20)    Neurology consulted due to mental status change. Per provider at bedside, patient underwent BIV upgrade yesterday 10/19/22, during which he received fentanyl 25 and propofol 10. Upon return to the [facility], he was noted to be drowsy but arouseable, with decreased respirations (8-10/min). He was administered IV narcan 2mg x 2 (at 16:40 and 17:56) and later noted to be following commands and answering some questions but would not open his eyes. ABG done at that time was normal. At around 22:00 he was able to receive some of  his home nighttime medications (zyprexa 5mg, sinemet 25/100, lipitor 80mg, and senna). This AM he was still drowsy, but was able to let his nurse know he was in pain when receiving IV KCl, and was able to participate in a bedside oral dysphagia screen. However shortly after passing the screen, he was again noted to be very lethargic, asleep, not following commands. As such he did not receive any medications this morning and per nurse, did not receive any scheduled medications prior to the procedure and during the daytime yesterday.     REVIEW OF SYSTEMS:  Unable to be performed due to mental status.       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      Parkinsons disease      CAD (coronary artery disease)  s/p PCI 08/21      Pleural effusion, not elsewhere classified      COVID-19 vaccine series completed  w booster      History of hip replacement, total  R hip 2008 &amp; L hip      Status post cataract extraction  right eye cataract extraction with IOL 6/26/2015      Presence of cardiac pacemaker  10/2021      H/O coronary angioplasty  8/2021 1 stent inserted        FAMILY HISTORY:  Family history of lung cancer (Mother)    Family history of esophageal cancer (Father)    FH: myocardial infarction (Grandparent)      SOCIAL HISTORY:   As stated in HPI, unless otherwise noted above.     MEDICATIONS    Home Medications:  amiodarone 200 mg oral tablet: 1 tab(s) orally once a day hosp/home (19 Oct 2022 09:52)  bumetanide 2 mg oral tablet: 1 tab(s) orally once a day (19 Oct 2022 15:07)  carbidopa-levodopa 25 mg-100 mg oral tablet: 1 tab(s) orally once a day - at 6PM (19 Oct 2022 15:07)  carbidopa-levodopa 25 mg-100 mg oral tablet: 1.5 tab(s) orally once a day - morning, midday and midafternoon (19 Oct 2022 15:07)  clopidogrel 75 mg oral tablet: 1 tab(s) orally once a day hosp/ home (19 Oct 2022 09:52)  Crestor 40 mg oral tablet: 1 tab(s) orally once a day home (19 Oct 2022 09:52)  desvenlafaxine (as succinate) 50 mg oral tablet, extended release: 1 tab(s) orally once a day hosp (19 Oct 2022 09:52)  Eliquis 2.5 mg oral tablet: 1 tab(s) orally 2 times a day. hosp /home  resume on isa 10/23  (19 Oct 2022 14:52)  Klor-Con M20 oral tablet, extended release: 1 tab(s) orally once a day home / hosp (19 Oct 2022 09:52)  mirtazapine 7.5 mg oral tablet: 1.5 tab(s) orally once a day (at bedtime) hosp/ home (19 Oct 2022 09:52)  OLANZapine 2.5 mg oral tablet: 2 tab(s) orally once a day (at bedtime) home &amp; hosp (19 Oct 2022 09:52)  One A Day Men&#x27;s Complete oral tablet: 1 tab(s) orally once a day (19 Oct 2022 09:52)  polyethylene glycol 3350 oral powder for reconstitution: 17 gram(s) orally once a day hosp (19 Oct 2022 09:52)  senna leaf extract oral tablet: 2 tab(s) orally once a day (at bedtime) hosp (19 Oct 2022 09:52)  solifenacin 10 mg oral tablet: 1 tab(s) orally once a day hosp/home (19 Oct 2022 09:52)  tadalafil 5 mg oral tablet: 1 tab(s) orally once a day home (19 Oct 2022 09:52)  Vyndamax 61 mg oral capsule: 1 cap(s) orally once a day hosp/ home (19 Oct 2022 09:52)    MEDICATIONS  (STANDING):  aMIOdarone    Tablet 200 milliGRAM(s) Oral daily  atorvastatin 80 milliGRAM(s) Oral at bedtime  buMETAnide 2 milliGRAM(s) Oral daily  carbidopa/levodopa  25/100 1.5 Tablet(s) Oral <User Schedule>  carbidopa/levodopa  25/100 1 Tablet(s) Oral <User Schedule>  ceFAZolin   IVPB 2000 milliGRAM(s) IV Intermittent once  chlorhexidine 4% Liquid 1 Application(s) Topical once  multivitamin/minerals 1 Tablet(s) Oral daily  OLANZapine 5 milliGRAM(s) Oral at bedtime  oxybutynin 5 milliGRAM(s) Oral daily  polyethylene glycol 3350 17 Gram(s) Oral daily  senna 2 Tablet(s) Oral at bedtime  sodium chloride 0.9%. 1000 milliLiter(s) (50 mL/Hr) IV Continuous <Continuous>    MEDICATIONS  (PRN):  acetaminophen     Tablet .. 650 milliGRAM(s) Oral every 6 hours PRN Mild Pain (1 - 3), Moderate Pain (4 - 6)      Allergies  No Known Allergies        VITALS & EXAMINATION    Vital Signs Last 24 Hrs  T(C): 36.5 (20 Oct 2022 09:30), Max: 36.7 (20 Oct 2022 04:25)  T(F): 97.7 (20 Oct 2022 09:30), Max: 98 (20 Oct 2022 04:25)  HR: 60 (20 Oct 2022 09:30) (60 - 71)  BP: 127/61 (20 Oct 2022 09:30) (102/51 - 143/63)  BP(mean): 80 (20 Oct 2022 09:30) (72 - 113)  RR: 16 (20 Oct 2022 09:30) (8 - 16)  SpO2: 97% (20 Oct 2022 09:30) (97% - 100%)    Parameters below as of 20 Oct 2022 09:30  Patient On (Oxygen Delivery Method): room air    General:  Constitutional: Laying in bed, appears stated age.  Head: Normocephalic & atraumatic.  Respiratory: Breathing comfortably on room air.  CV: Cool to touch, no edema, pulses palpable throughout.     Neurological (>12):  MS: Eyes closed, do not open to noxious stimuli. Groans to noxious stimuli. Does not follow commands.     CNs: PERRL (3mm OU). Blink to threat intact. Face appears symmetric. Eyes in primary gaze when eyelids are elevated, no roving movements noted. Unable to formally assess facial sensation, eye closure strength, hearing, palate elevation, SCM/Traps and tongue movements.     Motor: Increased tone and moderate cogwheeling rigidity in the upper extremities. No spontaneous movements noted, however withdraws all extremities to noxious stimulation.     Sensation: Grimaces and groans to noxious stimulation throughout.    Reflexes: No costello's, 4-5 beats of ankle clonus b/l.  2+ biceps, brachioradialis, triceps and achilles bilaterally. 1+ patellars bilaterally. Plantar response neutral b/l.     Coordination: Unable to assess due to mental status.     Gait: Unable to assess due to mental status; wheelchair bound at baseline.       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 (19 Oct 2022 09:20)    Neurology consulted due to mental status change. Per provider at bedside, patient underwent BIV upgrade yesterday 10/19/22, during which he received fentanyl 25 and propofol 10. Upon return to the [facility], he was noted to be drowsy but arouseable, with decreased respirations (8-10/min). He was administered IV narcan 2mg x 2 (at 16:40 and 17:56) and later noted to be following commands and answering some questions but would not open his eyes. ABG done at that time was normal. At around 22:00 he was able to receive some of  his home nighttime medications (zyprexa 5mg, sinemet 25/100, lipitor 80mg, and senna). This AM he was still drowsy, but was able to let his nurse know he was in pain when receiving IV KCl, and was able to participate in a bedside oral dysphagia screen. However shortly after passing the screen, he was again noted to be very lethargic, asleep, not following commands. As such he did not receive any medications this morning and per nurse, did not receive any scheduled medications prior to the procedure and during the daytime yesterday.     REVIEW OF SYSTEMS:  Unable to be performed due to mental status.       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      Parkinsons disease      CAD (coronary artery disease)  s/p PCI 08/21      Pleural effusion, not elsewhere classified      COVID-19 vaccine series completed  w booster      History of hip replacement, total  R hip 2008 &amp; L hip      Status post cataract extraction  right eye cataract extraction with IOL 6/26/2015      Presence of cardiac pacemaker  10/2021      H/O coronary angioplasty  8/2021 1 stent inserted        FAMILY HISTORY:  Family history of lung cancer (Mother)    Family history of esophageal cancer (Father)    FH: myocardial infarction (Grandparent)      SOCIAL HISTORY:   As stated in HPI, unless otherwise noted above.     MEDICATIONS    Home Medications:  amiodarone 200 mg oral tablet: 1 tab(s) orally once a day hosp/home (19 Oct 2022 09:52)  bumetanide 2 mg oral tablet: 1 tab(s) orally once a day (19 Oct 2022 15:07)  carbidopa-levodopa 25 mg-100 mg oral tablet: 1 tab(s) orally once a day - at 6PM (19 Oct 2022 15:07)  carbidopa-levodopa 25 mg-100 mg oral tablet: 1.5 tab(s) orally once a day - morning, midday and midafternoon (19 Oct 2022 15:07)  clopidogrel 75 mg oral tablet: 1 tab(s) orally once a day hosp/ home (19 Oct 2022 09:52)  Crestor 40 mg oral tablet: 1 tab(s) orally once a day home (19 Oct 2022 09:52)  desvenlafaxine (as succinate) 50 mg oral tablet, extended release: 1 tab(s) orally once a day hosp (19 Oct 2022 09:52)  Eliquis 2.5 mg oral tablet: 1 tab(s) orally 2 times a day. hosp /home  resume on isa 10/23  (19 Oct 2022 14:52)  Klor-Con M20 oral tablet, extended release: 1 tab(s) orally once a day home / hosp (19 Oct 2022 09:52)  mirtazapine 7.5 mg oral tablet: 1.5 tab(s) orally once a day (at bedtime) hosp/ home (19 Oct 2022 09:52)  OLANZapine 2.5 mg oral tablet: 2 tab(s) orally once a day (at bedtime) home &amp; hosp (19 Oct 2022 09:52)  One A Day Men&#x27;s Complete oral tablet: 1 tab(s) orally once a day (19 Oct 2022 09:52)  polyethylene glycol 3350 oral powder for reconstitution: 17 gram(s) orally once a day hosp (19 Oct 2022 09:52)  senna leaf extract oral tablet: 2 tab(s) orally once a day (at bedtime) hosp (19 Oct 2022 09:52)  solifenacin 10 mg oral tablet: 1 tab(s) orally once a day hosp/home (19 Oct 2022 09:52)  tadalafil 5 mg oral tablet: 1 tab(s) orally once a day home (19 Oct 2022 09:52)  Vyndamax 61 mg oral capsule: 1 cap(s) orally once a day hosp/ home (19 Oct 2022 09:52)    MEDICATIONS  (STANDING):  aMIOdarone    Tablet 200 milliGRAM(s) Oral daily  atorvastatin 80 milliGRAM(s) Oral at bedtime  buMETAnide 2 milliGRAM(s) Oral daily  carbidopa/levodopa  25/100 1.5 Tablet(s) Oral <User Schedule>  carbidopa/levodopa  25/100 1 Tablet(s) Oral <User Schedule>  ceFAZolin   IVPB 2000 milliGRAM(s) IV Intermittent once  chlorhexidine 4% Liquid 1 Application(s) Topical once  multivitamin/minerals 1 Tablet(s) Oral daily  OLANZapine 5 milliGRAM(s) Oral at bedtime  oxybutynin 5 milliGRAM(s) Oral daily  polyethylene glycol 3350 17 Gram(s) Oral daily  senna 2 Tablet(s) Oral at bedtime  sodium chloride 0.9%. 1000 milliLiter(s) (50 mL/Hr) IV Continuous <Continuous>    MEDICATIONS  (PRN):  acetaminophen     Tablet .. 650 milliGRAM(s) Oral every 6 hours PRN Mild Pain (1 - 3), Moderate Pain (4 - 6)      Allergies  No Known Allergies        VITALS & EXAMINATION    Vital Signs Last 24 Hrs  T(C): 36.5 (20 Oct 2022 09:30), Max: 36.7 (20 Oct 2022 04:25)  T(F): 97.7 (20 Oct 2022 09:30), Max: 98 (20 Oct 2022 04:25)  HR: 60 (20 Oct 2022 09:30) (60 - 71)  BP: 127/61 (20 Oct 2022 09:30) (102/51 - 143/63)  BP(mean): 80 (20 Oct 2022 09:30) (72 - 113)  RR: 16 (20 Oct 2022 09:30) (8 - 16)  SpO2: 97% (20 Oct 2022 09:30) (97% - 100%)    Parameters below as of 20 Oct 2022 09:30  Patient On (Oxygen Delivery Method): room air    General:  Constitutional: Laying in bed, appears stated age.  Head: Normocephalic & atraumatic.  Respiratory: Breathing comfortably on room air.  CV/Extremities: Cool to touch, no edema, pulses palpable throughout.     Neurological (>12):  MS: Eyes closed, do not open to noxious stimuli. Groans to noxious stimuli. Does not follow commands. Unable to assess attention/concentration/memory/fund of knowledge due to mental status.      CNs: PERRL (3mm OU). Blink to threat intact. Face appears symmetric. Eyes in primary gaze when eyelids are elevated, no roving movements noted. Unable to formally assess facial sensation, eye closure strength, hearing, palate elevation, SCM/Traps and tongue movements.     Motor: Increased tone and moderate cogwheeling rigidity in the upper extremities. No spontaneous movements noted, however withdraws all extremities to noxious stimulation.     Sensation: Grimaces and groans to noxious stimulation throughout.    Reflexes: No costello's, 4-5 beats of ankle clonus b/l.  2+ biceps, brachioradialis, triceps and achilles bilaterally. 1+ patellars bilaterally. Plantar response neutral b/l.     Coordination: Unable to assess due to mental status.     Gait: Unable to assess due to mental status; wheelchair bound at baseline.       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 (19 Oct 2022 09:20)    Neurology consulted due to mental status change. Per provider at bedside, patient underwent BIV upgrade yesterday 10/19/22, during which he received fentanyl 25 and propofol 10. Upon return to the [facility], he was noted to be drowsy but arouseable, with decreased respirations (8-10/min). He was administered IV narcan 2mg x 2 (at 16:40 and 17:56) and later noted to be following commands and answering some questions but would not open his eyes. ABG done at that time was normal. At around 22:00 he was able to receive some of  his home nighttime medications (zyprexa 5mg, sinemet 25/100, lipitor 80mg, and senna). This AM he was still drowsy, but was able to let his nurse know he was in pain when receiving IV KCl, and was able to participate in a bedside oral dysphagia screen. However shortly after passing the screen, he was again noted to be very lethargic, asleep, not following commands. As such he did not receive any medications this morning and per nurse, did not receive any scheduled medications prior to the procedure and during the daytime yesterday.     REVIEW OF SYSTEMS:  Unable to be performed due to mental status.       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      Parkinsons disease      CAD (coronary artery disease)  s/p PCI 08/21      Pleural effusion, not elsewhere classified      COVID-19 vaccine series completed  w booster      History of hip replacement, total  R hip 2008 &amp; L hip      Status post cataract extraction  right eye cataract extraction with IOL 6/26/2015      Presence of cardiac pacemaker  10/2021      H/O coronary angioplasty  8/2021 1 stent inserted        FAMILY HISTORY:  Family history of lung cancer (Mother)    Family history of esophageal cancer (Father)    FH: myocardial infarction (Grandparent)      SOCIAL HISTORY:   As stated in HPI.    MEDICATIONS    Home Medications:  amiodarone 200 mg oral tablet: 1 tab(s) orally once a day hosp/home (19 Oct 2022 09:52)  bumetanide 2 mg oral tablet: 1 tab(s) orally once a day (19 Oct 2022 15:07)  carbidopa-levodopa 25 mg-100 mg oral tablet: 1 tab(s) orally once a day - at 6PM (19 Oct 2022 15:07)  carbidopa-levodopa 25 mg-100 mg oral tablet: 1.5 tab(s) orally once a day - morning, midday and midafternoon (19 Oct 2022 15:07)  clopidogrel 75 mg oral tablet: 1 tab(s) orally once a day hosp/ home (19 Oct 2022 09:52)  Crestor 40 mg oral tablet: 1 tab(s) orally once a day home (19 Oct 2022 09:52)  desvenlafaxine (as succinate) 50 mg oral tablet, extended release: 1 tab(s) orally once a day hosp (19 Oct 2022 09:52)  Eliquis 2.5 mg oral tablet: 1 tab(s) orally 2 times a day. hosp /home  resume on isa 10/23  (19 Oct 2022 14:52)  Klor-Con M20 oral tablet, extended release: 1 tab(s) orally once a day home / hosp (19 Oct 2022 09:52)  mirtazapine 7.5 mg oral tablet: 1.5 tab(s) orally once a day (at bedtime) hosp/ home (19 Oct 2022 09:52)  OLANZapine 2.5 mg oral tablet: 2 tab(s) orally once a day (at bedtime) home &amp; hosp (19 Oct 2022 09:52)  One A Day Men&#x27;s Complete oral tablet: 1 tab(s) orally once a day (19 Oct 2022 09:52)  polyethylene glycol 3350 oral powder for reconstitution: 17 gram(s) orally once a day hosp (19 Oct 2022 09:52)  senna leaf extract oral tablet: 2 tab(s) orally once a day (at bedtime) hosp (19 Oct 2022 09:52)  solifenacin 10 mg oral tablet: 1 tab(s) orally once a day hosp/home (19 Oct 2022 09:52)  tadalafil 5 mg oral tablet: 1 tab(s) orally once a day home (19 Oct 2022 09:52)  Vyndamax 61 mg oral capsule: 1 cap(s) orally once a day hosp/ home (19 Oct 2022 09:52)    MEDICATIONS  (STANDING):  aMIOdarone    Tablet 200 milliGRAM(s) Oral daily  atorvastatin 80 milliGRAM(s) Oral at bedtime  buMETAnide 2 milliGRAM(s) Oral daily  carbidopa/levodopa  25/100 1.5 Tablet(s) Oral <User Schedule>  carbidopa/levodopa  25/100 1 Tablet(s) Oral <User Schedule>  ceFAZolin   IVPB 2000 milliGRAM(s) IV Intermittent once  chlorhexidine 4% Liquid 1 Application(s) Topical once  multivitamin/minerals 1 Tablet(s) Oral daily  OLANZapine 5 milliGRAM(s) Oral at bedtime  oxybutynin 5 milliGRAM(s) Oral daily  polyethylene glycol 3350 17 Gram(s) Oral daily  senna 2 Tablet(s) Oral at bedtime  sodium chloride 0.9%. 1000 milliLiter(s) (50 mL/Hr) IV Continuous <Continuous>    MEDICATIONS  (PRN):  acetaminophen     Tablet .. 650 milliGRAM(s) Oral every 6 hours PRN Mild Pain (1 - 3), Moderate Pain (4 - 6)      Allergies  No Known Allergies        VITALS & EXAMINATION    Vital Signs Last 24 Hrs  T(C): 36.5 (20 Oct 2022 09:30), Max: 36.7 (20 Oct 2022 04:25)  T(F): 97.7 (20 Oct 2022 09:30), Max: 98 (20 Oct 2022 04:25)  HR: 60 (20 Oct 2022 09:30) (60 - 71)  BP: 127/61 (20 Oct 2022 09:30) (102/51 - 143/63)  BP(mean): 80 (20 Oct 2022 09:30) (72 - 113)  RR: 16 (20 Oct 2022 09:30) (8 - 16)  SpO2: 97% (20 Oct 2022 09:30) (97% - 100%)    Parameters below as of 20 Oct 2022 09:30  Patient On (Oxygen Delivery Method): room air    General:  Constitutional: Laying in bed, appears stated age.  Head: Normocephalic & atraumatic.  Eyes: Fundoscopy not well visualized.  Respiratory: Breathing comfortably on room air.  CV/Extremities: Cool to touch, no edema, pulses palpable throughout.     Neurological (>12):  MS: Eyes closed, do not open to noxious stimuli. Groans to noxious stimuli. Does not follow commands. Unable to assess attention/concentration/memory/fund of knowledge due to mental status.      CNs: PERRL (3mm OU). Blink to threat intact. Face appears symmetric. Eyes in primary gaze when eyelids are elevated, no roving movements noted. Unable to formally assess facial sensation, eye closure strength, hearing, palate elevation, SCM/Traps and tongue movements.     Motor: Increased tone and moderate cogwheeling rigidity in the upper extremities. No spontaneous movements noted, however withdraws all extremities to noxious stimulation.     Sensation: Grimaces and groans to noxious stimulation throughout.    Reflexes: No costello's, 4-5 beats of ankle clonus b/l.  2+ biceps, brachioradialis, triceps and achilles bilaterally. 1+ patellars bilaterally. Plantar response neutral b/l.     Coordination: Unable to assess due to mental status.     Gait: Unable to assess due to mental status; wheelchair bound at baseline.       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 (19 Oct 2022 09:20)    Neurology consulted due to mental status change. Per provider at bedside, patient underwent BIV upgrade yesterday 10/19/22, during which he received fentanyl 25 and propofol 10. [facility] return to the [facility], he was noted to be drowsy but arouseable, with decreased respirations (8-10/min). He was administered IV narcan 2mg x 2 (at 16:40 and 17:56) and later noted to be following commands and answering some questions but would not open his eyes. ABG done at that time was normal. At around 22:00 he was able to receive some of  his home nighttime medications (zyprexa 5mg, sinemet 25/100, lipitor 80mg, and senna). This AM he was still drowsy, but was able to let his nurse know he was in pain when receiving IV KCl, and was able to participate in a bedside oral dysphagia screen. However shortly after passing the screen, he was again noted to be very lethargic, asleep, not following commands. As such he did not receive any medications this morning and per nurse, did not receive any scheduled medications prior to the procedure and during the daytime yesterday.     REVIEW OF SYSTEMS:  Unable to be performed due to mental status.       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      Parkinsons disease      CAD (coronary artery disease)  s/p PCI 08/21      Pleural effusion, not elsewhere classified      COVID-19 vaccine series completed  w booster      History of hip replacement, total  R hip 2008 &amp; L hip      Status post cataract extraction  right eye cataract extraction with IOL 6/26/2015      Presence of cardiac pacemaker  10/2021      H/O coronary angioplasty  8/2021 1 stent inserted        FAMILY HISTORY:  Family history of lung cancer (Mother)    Family history of esophageal cancer (Father)    FH: myocardial infarction (Grandparent)      SOCIAL HISTORY:   As stated in HPI. No reports of current tobacco, alcohol, or illicit drug use    MEDICATIONS    Home Medications:  amiodarone 200 mg oral tablet: 1 tab(s) orally once a day hosp/home (19 Oct 2022 09:52)  bumetanide 2 mg oral tablet: 1 tab(s) orally once a day (19 Oct 2022 15:07)  carbidopa-levodopa 25 mg-100 mg oral tablet: 1 tab(s) orally once a day - at 6PM (19 Oct 2022 15:07)  carbidopa-levodopa 25 mg-100 mg oral tablet: 1.5 tab(s) orally once a day - morning, midday and midafternoon (19 Oct 2022 15:07)  clopidogrel 75 mg oral tablet: 1 tab(s) orally once a day hosp/ home (19 Oct 2022 09:52)  Crestor 40 mg oral tablet: 1 tab(s) orally once a day home (19 Oct 2022 09:52)  desvenlafaxine (as succinate) 50 mg oral tablet, extended release: 1 tab(s) orally once a day hosp (19 Oct 2022 09:52)  Eliquis 2.5 mg oral tablet: 1 tab(s) orally 2 times a day. hosp /home  resume on isa 10/23  (19 Oct 2022 14:52)  Klor-Con M20 oral tablet, extended release: 1 tab(s) orally once a day home / hosp (19 Oct 2022 09:52)  mirtazapine 7.5 mg oral tablet: 1.5 tab(s) orally once a day (at bedtime) hosp/ home (19 Oct 2022 09:52)  OLANZapine 2.5 mg oral tablet: 2 tab(s) orally once a day (at bedtime) home &amp; hosp (19 Oct 2022 09:52)  One A Day Men&#x27;s Complete oral tablet: 1 tab(s) orally once a day (19 Oct 2022 09:52)  polyethylene glycol 3350 oral powder for reconstitution: 17 gram(s) orally once a day hosp (19 Oct 2022 09:52)  senna leaf extract oral tablet: 2 tab(s) orally once a day (at bedtime) hosp (19 Oct 2022 09:52)  solifenacin 10 mg oral tablet: 1 tab(s) orally once a day hosp/home (19 Oct 2022 09:52)  tadalafil 5 mg oral tablet: 1 tab(s) orally once a day home (19 Oct 2022 09:52)  Vyndamax 61 mg oral capsule: 1 cap(s) orally once a day hosp/ home (19 Oct 2022 09:52)    MEDICATIONS  (STANDING):  aMIOdarone    Tablet 200 milliGRAM(s) Oral daily  atorvastatin 80 milliGRAM(s) Oral at bedtime  buMETAnide 2 milliGRAM(s) Oral daily  carbidopa/levodopa  25/100 1.5 Tablet(s) Oral <User Schedule>  carbidopa/levodopa  25/100 1 Tablet(s) Oral <User Schedule>  ceFAZolin   IVPB 2000 milliGRAM(s) IV Intermittent once  chlorhexidine 4% Liquid 1 Application(s) Topical once  multivitamin/minerals 1 Tablet(s) Oral daily  OLANZapine 5 milliGRAM(s) Oral at bedtime  oxybutynin 5 milliGRAM(s) Oral daily  polyethylene glycol 3350 17 Gram(s) Oral daily  senna 2 Tablet(s) Oral at bedtime  sodium chloride 0.9%. 1000 milliLiter(s) (50 mL/Hr) IV Continuous <Continuous>    MEDICATIONS  (PRN):  acetaminophen     Tablet .. 650 milliGRAM(s) Oral every 6 hours PRN Mild Pain (1 - 3), Moderate Pain (4 - 6)      Allergies  No Known Allergies        VITALS & EXAMINATION    Vital Signs Last 24 Hrs  T(C): 36.5 (20 Oct 2022 09:30), Max: 36.7 (20 Oct 2022 04:25)  T(F): 97.7 (20 Oct 2022 09:30), Max: 98 (20 Oct 2022 04:25)  HR: 60 (20 Oct 2022 09:30) (60 - 71)  BP: 127/61 (20 Oct 2022 09:30) (102/51 - 143/63)  BP(mean): 80 (20 Oct 2022 09:30) (72 - 113)  RR: 16 (20 Oct 2022 09:30) (8 - 16)  SpO2: 97% (20 Oct 2022 09:30) (97% - 100%)    Parameters below as of 20 Oct 2022 09:30  Patient On (Oxygen Delivery Method): room air    General:  Constitutional: Laying in bed, appears stated age.  Head: Normocephalic & atraumatic.  Eyes: Fundoscopy not well visualized.  Respiratory: Breathing comfortably on room air.  CV/Extremities: Cool to touch, no edema, pulses palpable throughout.     Neurological (>12):  MS: Eyes closed, do not open to noxious stimuli. Groans to noxious stimuli. Does not follow commands. Unable to assess attention/concentration/memory/fund of knowledge due to mental status.      CNs: PERRL (3mm OU). Blink to threat intact. Face appears symmetric. Eyes in primary gaze when eyelids are elevated, no roving movements noted. Unable to formally assess facial sensation, eye closure strength, hearing, palate elevation, SCM/Traps and tongue movements.     Motor: Increased tone and moderate cogwheeling rigidity in the upper extremities. No spontaneous movements noted, however withdraws all extremities to noxious stimulation.     Sensation: Grimaces and groans to noxious stimulation throughout.    Reflexes: No costello's, 4-5 beats of ankle clonus b/l.  2+ biceps, brachioradialis, triceps and achilles bilaterally. 1+ patellars bilaterally. Plantar response neutral b/l.     Coordination: Unable to assess due to mental status.     Gait: Unable to assess due to mental status; wheelchair bound at baseline.       LABS:    CBC                       10.4   7.26  )-----------( 247      ( 20 Oct 2022 05:52 )             34.8     Chem 10-20    143  |  109<H>  |  17  ----------------------------<  121<H>  3.4<L>   |  23  |  1.93<H>    Ca    9.0      20 Oct 2022 05:52  Mg     2.2     10-20      Coags PT/INR - ( 19 Oct 2022 09:51 )   PT: 16.8 sec;   INR: 1.44 ratio      PTT - ( 19 Oct 2022 09:51 )  PTT:36.8 sec        STUDIES & IMAGING

## 2022-10-20 NOTE — CONSULT NOTE ADULT - ASSESSMENT
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 Neurology consulted 10/20/22 for evaluation of altered mental status s/p BIV upgrade 10/19. Patient received fentanyl 25 and propofol 10 during the procedure and was administered IV narcan 2mg x 2 (at 16:40 and 17:56) upon return to the [facility], after he was noted to be drowsy and following some commands, with eyes closed. At around 22:00 he was able to receive some of  his home nighttime medications (zyprexa 5mg, sinemet 25/100, lipitor 80mg, and senna), and was slightly more alert this AM, but was subsequently noted to be lethargic once more this AM, not following commands. VSS on evaluation. On exam patient groans, withdraws and grimaces throughout to noxious, but does not open his eyes or follow any commands.     Impression: Altered mental status, reportedly waxing/waning, due to diffuse cerebral dysfunction, likely multifactorial secondary to sedating medications, and possible component of hospital delirium in an elderly patient with prior neurocognitive dysfunction in the post-op setting.     Recommendations:   [] Check UA, NH3, serum lactate, CK  [] CTH w/o contrast  [] Consider routine EEG ([place] order name: EEG awake and asleep)   [] Neurochecks/VS per unit protocol  [] NPO until clears dysphagia screen  [] Aspiration, fall precautions  [] Measures to reduce delirium: frequent reorientation, maintain sleep/wake routine, avoid sleeping during the day, keep lights on during the day, use of hearing aids or glasses if patient needs them, regular toileting.   [] Rest of care as per primary team      To be discussed and seen with Neurology attending, Dr. Elizalde.    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 Neurology consulted 10/20/22 for evaluation of altered mental status s/p BIV upgrade 10/19. Patient received fentanyl 25 and propofol 10 during the procedure and was administered IV narcan 2mg x 2 (at 16:40 and 17:56) upon return to the [facility], after he was noted to be drowsy and following some commands, with eyes closed. At around 22:00 he was able to receive some of  his home nighttime medications (zyprexa 5mg, sinemet 25/100, lipitor 80mg, and senna), and was slightly more alert this AM, but was subsequently noted to be lethargic once more this AM, not following commands. VSS on evaluation. On exam patient groans, withdraws and grimaces throughout to noxious, but does not open his eyes or follow any commands.     Impression: Altered mental status, reportedly waxing/waning, due to diffuse cerebral dysfunction, likely multifactorial secondary to sedating medications, and possible component of hospital delirium in an elderly patient with prior neurocognitive dysfunction in the post-op setting.     Recommendations:   [] Check UA, NH3, serum lactate, CK  [] CTH w/o contrast  [] Consider routine EEG ([place] order name: EEG awake and asleep)   [] Neurochecks/VS per unit protocol  [] NPO until clears dysphagia screen  [] Aspiration, fall precautions  [] Measures to reduce delirium: frequent reorientation, maintain sleep/wake routine, avoid sleeping during the day, keep lights on during the day, use of hearing aids or glasses if patient needs them, regular toileting.   [] Rest of care as per primary team      To be discussed and seen with Neurology attending, Dr. Elizalde.    81-year-old man with a PMH of Parkinson's Disease and hallucinations (on sinemet 25/100 1.5 pills 09:00am and 1 tab at 12p,3pm, 6pm and Zyprexa 5mg qhs), Afib (on Eliquis), bradycardia (s/p PPM 10/6/2021), CAD (s/p stent 8/2021), chronic R pleural effusion (s/p pleurx; last drained the week PTA), HTN, HLD, BPH, cardiac amyloidosis (on Vyndamax), HFrEF (TTE 03/2022 with EF 45%, borderline mod to severe AI and borderline RV function), admitted 10/19/22 for BIV upgrade. Neurology consulted 10/20/22 for evaluation of altered mental status s/p BIV upgrade 10/19. Patient received fentanyl 25 and propofol 10 during the procedure and was administered IV narcan 2mg x 2 (at 16:40 and 17:56) upon return to the [facility], after he was noted to be drowsy and following some commands, with eyes closed. At around 22:00 he was able to receive some of  his home nighttime medications (zyprexa 5mg, sinemet 25/100, lipitor 80mg, and senna), and was slightly more alert this AM, but was subsequently noted to be lethargic once more this AM, not following commands. VSS on evaluation. On exam patient groans, withdraws and grimaces throughout to noxious, but does not open his eyes or follow any commands.     Impression: Altered mental status, reportedly waxing/waning, due to diffuse cerebral dysfunction, likely multifactorial secondary to sedating medications, and possible component of hospital delirium in an elderly patient with prior neurocognitive dysfunction in the post-op setting.     Recommendations:   [] Check UA, NH3, serum lactate, CK  [] CTH w/o contrast  [] Consider routine EEG ([place] order name: EEG awake and asleep)   [] Neurochecks/VS per unit protocol  [] NPO until clears dysphagia screen  [] Aspiration, fall precautions  [] Measures to reduce delirium: frequent reorientation, maintain sleep/wake routine, avoid sleeping during the day, keep lights on during the day, use of hearing aids or glasses if patient needs them, regular toileting.   [] Rest of care as per primary team      To be discussed and seen with Neurology attending, Dr. Elizalde.    81-year-old man with a PMH of Parkinson's Disease and hallucinations (on sinemet 25/100 1.5 pills 09:00am and 1 tab at 12p,3pm, 6pm and Zyprexa 5mg qhs), Afib (on Eliquis), bradycardia (s/p PPM 10/6/2021), CAD (s/p stent 8/2021), chronic R pleural effusion (s/p pleurx; last drained the week PTA), HTN, HLD, BPH, cardiac amyloidosis (on Vyndamax), HFrEF (TTE 03/2022 with EF 45%, borderline mod to severe AI and borderline RV function), admitted 10/19/22 for BIV upgrade. Neurology consulted 10/20/22 for evaluation of altered mental status s/p BIV upgrade 10/19. Patient received fentanyl 25 and propofol 10 during the procedure and was administered IV narcan 2mg x 2 (at 16:40 and 17:56) upon return to the [facility], after he was noted to be drowsy and following some commands, with eyes closed. At around 22:00 he was able to receive some of  his home nighttime medications (zyprexa 5mg, sinemet 25/100, lipitor 80mg, and senna), and was slightly more alert this AM, but was subsequently noted to be lethargic once more this AM, not following commands. VSS on evaluation. On exam patient groans, withdraws and grimaces throughout to noxious, but does not open his eyes or follow any commands.     Impression: Altered mental status, reportedly waxing/waning, due to diffuse cerebral dysfunction, likely multifactorial secondary to sedating medications, and possible component of hospital delirium in an elderly patient with prior neurocognitive dysfunction in the post-op setting. Rule out underlying toxic-metabolic or infectious causes.    Recommendations:   [] Check UA, NH3, serum lactate, CK  [] CTH w/o contrast  [] Consider routine EEG ([place] order name: EEG awake and asleep)   [] Neurochecks/VS per unit protocol  [] NPO until clears dysphagia screen  [] If no improvement in mental status 24 hours from last dose of Sinemet, recommend NGT placement to resume home Sinemet  [] Aspiration, fall precautions  [] Measures to reduce delirium: frequent reorientation, maintain sleep/wake routine, avoid sleeping during the day, keep lights on during the day, use of hearing aids or glasses if patient needs them, regular toileting.   [] Rest of care as per primary team      To be discussed and seen with Neurology attending, Dr. Elizalde.

## 2022-10-20 NOTE — CONSULT NOTE ADULT - ATTENDING COMMENTS
HPI as per resident note, personally verified by me. Patient with advanced Parkinson disease and hallucinations followed by Dr. Donte Langley (outpatient movement disorders) on Sinemet and olanzapine daily. Had PPM upgraded to BiVentricular device and following procedure noted to have decreased mental status. He did show some improvement with naloxone x2 (received fentanyl and propofol during procedure) but this improvement was fleeting. Mental status continued to wax and wane without abnormal movements or focal neurologic deficits seen. Last Sinemet dose yesterday. Patient is incontinent of urine at baseline and when nurse straight cathed him remarked it smelled foul and was cloudy.    Neurologic exam:  MS - Obtunded, attends to auditory and visual stimuli b/l, minimal speech output, does not follow commands. Due to clinical condition unable to assess (MILAGROS) orientation, rep/naming, attn/conc/recent and remote memory/fund of knowledge  CN - PERRL, EOMI by OCR, (+) face sens/str intact by corneals b/l without obvious asymmetry, hearing intact to conversation b/l, SCM at least 4/5 b/l and symmetric. MILAGROS VF, tongue/palate  Motor - Normal bulk. Cogwheeling noted of L > R UE, inc tone versus paratonia of BLE's. Minimal spontaneous movements. He grimaces, withdraws, and localizes to strong noxious stimuli in each extremity BUE's 2+/5, BLE's 2/5  Sens - As per Motor above  DTR's - 2+ BUE's, 0+ BLE's, and neutral R and downgoing L plantar response  Coord - Grossly appropriate for level of strength  Gait and station - MILAGROS    A/P:  Encephalopathy  Parkinson disease (PD)  Atrial fibrillation on apixaban  Bradycardia with PPM -> BiV  CAD s/p stent  HTN  CKD (BUN/Cr inc 17/1.93, GFR dec 35)  Cardiac amyloidosis    - Etiology for current presentation is broad and includes encephalopathy secondary to iatrogenic cause (sedation on top of decreased cognitive baseline from neurodegenerative disorder/PD), toxic/metabolic (UTI?), cardiac abnormality, or even non-convulsive seizure. Less likely cerebrovascular as no focal neurologic deficits on exam. Also need to consider neuroleptic malignant-like syndrome from abrupt withdrawal of Sinemet but typically happens 24-48 hours after last dose  - rEEG to evaluate for focal slowing, epileptiform discharges, or seizures  - CT head w/o to assess for acute intracranial process  - Labs as per resident note  - Would recommend placing NG tube to resume Sinemet if mental status does not improve enough to swallow to ensure no more than 48 hours since last dose  - Continue to address above medical problems, as you are doing  - Will continue to follow patient with you

## 2022-10-20 NOTE — PROGRESS NOTE ADULT - SUBJECTIVE AND OBJECTIVE BOX
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 (19 Oct 2022 09:20)        Subjective/Observations: Patient seen and examined thoroughly.  Patient denies chest pain, dyspena, palpitations, dizziness, headache, nausea, and vomiting.      Review of Systems all WNL except below indicated:    Constitutional: [ ] Fever [ ] Chills [ ] Fatigue [ ] Weight change   HEENT: [ ] Blurred vision [ ] Eye Pain [ ] Headache [ ] Runny nose [ ] Sore Throat   Respiratory: [ ] Cough [ ] Wheezing [ ] Shortness of breath  Cardiovascular: [ ] Chest Pain [ ] Palpitations [ ] GARCIA [ ] PND [ ] Orthopnea  Gastrointestinal: [ ] Abdominal Pain [ ] Diarrhea [ ] Constipation [ ] Hemorrhoids [ ] Nausea [ ] Vomiting  Genitourinary: [ ] Nocturia [ ] Dysuria [ ] Incontinence  Extremities: [ ] Swelling [ ] Joint Pain  Neurologic: [ ] Focal deficit [ ] Paresthesias [ ] Syncope  Lymphatic: [ ] Swelling [ ] Lymphadenopathy   Skin: [ ] Rash [ ] Ecchymoses [ ] Wounds [ ] Lesions  Psychiatry: [ ] Depression [ ] Suicidal/Homicidal Ideation [ ] Anxiety [ ] Sleep Disturbances  [x] 10 point review of systems is otherwise negative except as mentioned above            [ ]Unable to obtain    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      Parkinsons disease      CAD (coronary artery disease)  s/p PCI 08/21      Pleural effusion, not elsewhere classified      COVID-19 vaccine series completed  w booster      History of hip replacement, total  R hip 2008 &amp; L hip      Status post cataract extraction  right eye cataract extraction with IOL 6/26/2015      Presence of cardiac pacemaker  10/2021      H/O coronary angioplasty  8/2021 1 stent inserted          MEDICATIONS  (STANDING):  aMIOdarone    Tablet 200 milliGRAM(s) Oral daily  atorvastatin 80 milliGRAM(s) Oral at bedtime  buMETAnide 2 milliGRAM(s) Oral daily  carbidopa/levodopa  25/100 1.5 Tablet(s) Oral <User Schedule>  carbidopa/levodopa  25/100 1 Tablet(s) Oral <User Schedule>  ceFAZolin   IVPB 2000 milliGRAM(s) IV Intermittent once  chlorhexidine 4% Liquid 1 Application(s) Topical once  multivitamin/minerals 1 Tablet(s) Oral daily  OLANZapine 5 milliGRAM(s) Oral at bedtime  oxybutynin 5 milliGRAM(s) Oral daily  polyethylene glycol 3350 17 Gram(s) Oral daily  senna 2 Tablet(s) Oral at bedtime    MEDICATIONS  (PRN):  acetaminophen     Tablet .. 650 milliGRAM(s) Oral every 6 hours PRN Mild Pain (1 - 3), Moderate Pain (4 - 6)      Allergies    No Known Allergies    Intolerances          Vital Signs Last 24 Hrs  T(C): 36.4 (19 Oct 2022 15:50), Max: 36.6 (19 Oct 2022 09:20)  T(F): 97.5 (19 Oct 2022 15:50), Max: 97.9 (19 Oct 2022 09:20)  HR: 70 (19 Oct 2022 20:50) (60 - 71)  BP: 109/55 (19 Oct 2022 20:50) (102/51 - 143/63)  BP(mean): 72 (19 Oct 2022 20:50) (72 - 113)  RR: 12 (19 Oct 2022 20:50) (8 - 20)  SpO2: 98% (19 Oct 2022 20:50) (97% - 100%)    Parameters below as of 19 Oct 2022 20:50  Patient On (Oxygen Delivery Method): room air        I&O's Summary    19 Oct 2022 07:01  -  20 Oct 2022 00:39  --------------------------------------------------------  IN: 240 mL / OUT: 0 mL / NET: 240 mL      Weight (kg): 74 (10-19 @ 10:33)    FOCUSED PHYSICAL EXAM:  Pulmonary: Non-labored, breath sounds are clear bilaterally, No wheezing, rales or rhonchi  Cardiovascular: Regular, S1 and S2, No murmurs, rubs, gallops or clicks    LABS: All Labs Reviewed:                        11.8   8.70  )-----------( 292      ( 19 Oct 2022 09:51 )             39.6     19 Oct 2022 09:51    145    |  109    |  17     ----------------------------<  100    3.9     |  23     |  1.90     Ca    9.1        19 Oct 2022 09:51      PT/INR - ( 19 Oct 2022 09:51 )   PT: 16.8 sec;   INR: 1.44 ratio         PTT - ( 19 Oct 2022 09:51 )  PTT:36.8 sec          RESULTS:      ECHO:  CONCLUSIONS:  1. Mitral annular calcification, otherwise normal mitral  valve. Mild mitral regurgitation.  2. Aortic valve leaflet morphology not well visualized.  Mild-moderate aortic regurgitation.  3. Normal left ventricular internal dimensions and wall  thicknesses.  4. Low normal left ventricular systolic function. No  segmental wall motion abnormalities.  Paradoxical septal  wall motion, consistent with ventricular pacing.  5. Normal right ventricular size and function.  A device  wire is noted in the right heart.  6. Estimated right ventricular systolic pressure equals 45  mm Hg, assuming right atrial pressure equals 10 mm Hg,  consistent with mild pulmonary hypertension.  ------------------------------------------------------------------------  Confirmed on  5/2/2022 - 15:43:03 by Edin Beltran M.D.,  [facility], [facility]  ------------------------------------------------------------------------    EP report  Brief Clinical History   The pateint is a 81 year old man with a history of cardiac amyloid,  CHB, atrial fibrillation and heart failure. The  patients LV function has dropped due to chronic RV pacing. He was  therefore referred for an upgrad erto  CRT-P. In addition the plan was to load the patient on amiodarone and  have him undergo a CV a month after  the upgrade. He comes into the hospital today having convertedto  sinus rhythm.    Indications   Complete Heart Block   Chronic RV pacing   Cardiac Amyloid     Primary ICD-10   I50.22 - Chronic systolic (congestive) heart failure     Secondary ICD-10   I44.2 - Atrioventricular block, complete     CPT Code:            94145 - Pacemaker Generator Change,  Bi-Ventricular (Biv)    27680 - Insertion of LV lead, At time of ICD or PCM insertion     72920 -  Amrita-procedural device evaluation and programming, PCM     Procedures Performed   Primary Procedures:    Upgrade to BiV pacemaker     Conclusions   Successful upgrade of DDD pacemaker to BiV pacemaker.      Follow-Up   CXR   ECG   resume Eliquis Rick   Continue amiodarone   Device clinic 10-14 days     Complications     Patient: ALBINO RIVAS        MRN: [id number]

## 2022-10-20 NOTE — CHART NOTE - NSCHARTNOTEFT_GEN_A_CORE
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     Pt with altered mental status, sluggish, post procedure on 10/19/22.   Patient received fentanyl 25 and propofol 10 during the procedure and was administered IV narcan 2mg x 2 (at 16:40 and 17:56)   10/20 early this AM pt was noted to be drowsy and following some commands, with eyes closed. VSS on evaluation.   At 0800 pt received IV Tylenol for pain at the incisional site.  At 1015 pt was screened for dysphagia.  At 1030 pt sleeping and snoring.  On exam patient groans, withdraws and grimaces throughout to noxious, but does not open his eyes or follow any commands.  d/w Dr Vergara.     Neuro consulted & Recommended  [] Check UA, NH3, serum lactate, CK  [] CTH w/o contrast pending today @ 1700 hrs  [] Consider routine EEG ([facility] order name: EEG awake and asleep)   [] Neurochecks/VS per unit protocol  [] NPO until clears dysphagia screen  [] Aspiration, fall precautions  [] Measures to reduce delirium: frequent reorientation, maintain sleep/wake routine, avoid sleeping during the day, keep lights on during the day, use of hearing aids or glasses if patient needs them, regular toileting.     Will continue to observe

## 2022-10-20 NOTE — PROGRESS NOTE ADULT - ASSESSMENT
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 (19 Oct 2022 09:20)    s/p Successful upgrade of DDD pacemaker to BiV pacemaker. Access site and patient are stable.     Plan:  CXR   ECG   resume Eliquis Sunday   Continue amiodarone   ceFAZolin   IVPB 2000 milliGRAM(s) IV Intermittent once  Device clinic 10-14 days appt 11/2/22 on 1140AM  Keep Mg >2 K >4  f/u appt in 2 weeks post dc with oupt cardiologist  for all  general cardiology questions please contact patient's primary cards team   all other care as per primary medicine  team       DA Maki  Interventional Cardiology   [phone]

## 2022-10-21 DIAGNOSIS — G20 PARKINSON'S DISEASE: ICD-10-CM

## 2022-10-21 DIAGNOSIS — Z29.9 ENCOUNTER FOR PROPHYLACTIC MEASURES, UNSPECIFIED: ICD-10-CM

## 2022-10-21 DIAGNOSIS — J90 PLEURAL EFFUSION, NOT ELSEWHERE CLASSIFIED: ICD-10-CM

## 2022-10-21 DIAGNOSIS — E78.5 HYPERLIPIDEMIA, UNSPECIFIED: ICD-10-CM

## 2022-10-21 DIAGNOSIS — I10 ESSENTIAL (PRIMARY) HYPERTENSION: ICD-10-CM

## 2022-10-21 DIAGNOSIS — N18.9 CHRONIC KIDNEY DISEASE, UNSPECIFIED: ICD-10-CM

## 2022-10-21 DIAGNOSIS — R41.82 ALTERED MENTAL STATUS, UNSPECIFIED: ICD-10-CM

## 2022-10-21 DIAGNOSIS — I48.91 UNSPECIFIED ATRIAL FIBRILLATION: ICD-10-CM

## 2022-10-21 DIAGNOSIS — I25.10 ATHEROSCLEROTIC HEART DISEASE OF NATIVE CORONARY ARTERY WITHOUT ANGINA PECTORIS: ICD-10-CM

## 2022-10-21 DIAGNOSIS — Z45.02 ENCOUNTER FOR ADJUSTMENT AND MANAGEMENT OF AUTOMATIC IMPLANTABLE CARDIAC DEFIBRILLATOR: ICD-10-CM

## 2022-10-21 DIAGNOSIS — E85.4 ORGAN-LIMITED AMYLOIDOSIS: ICD-10-CM

## 2022-10-21 DIAGNOSIS — G93.41 METABOLIC ENCEPHALOPATHY: ICD-10-CM

## 2022-10-21 LAB
ANION GAP SERPL CALC-SCNC: 11 MMOL/L — SIGNIFICANT CHANGE UP (ref 5–17)
APPEARANCE UR: CLEAR — SIGNIFICANT CHANGE UP
BACTERIA # UR AUTO: NEGATIVE — SIGNIFICANT CHANGE UP
BILIRUB UR-MCNC: NEGATIVE — SIGNIFICANT CHANGE UP
BUN SERPL-MCNC: 17 MG/DL — SIGNIFICANT CHANGE UP (ref 7–23)
CALCIUM SERPL-MCNC: 9.1 MG/DL — SIGNIFICANT CHANGE UP (ref 8.4–10.5)
CHLORIDE SERPL-SCNC: 108 MMOL/L — SIGNIFICANT CHANGE UP (ref 96–108)
CO2 SERPL-SCNC: 23 MMOL/L — SIGNIFICANT CHANGE UP (ref 22–31)
COLOR SPEC: SIGNIFICANT CHANGE UP
CREAT SERPL-MCNC: 1.87 MG/DL — HIGH (ref 0.5–1.3)
DIFF PNL FLD: ABNORMAL
EGFR: 36 ML/MIN/1.73M2 — LOW
EPI CELLS # UR: 0 — SIGNIFICANT CHANGE UP
GLUCOSE SERPL-MCNC: 110 MG/DL — HIGH (ref 70–99)
GLUCOSE UR QL: NEGATIVE — SIGNIFICANT CHANGE UP
HCT VFR BLD CALC: 37 % — LOW (ref 39–50)
HGB BLD-MCNC: 11.1 G/DL — LOW (ref 13–17)
HYALINE CASTS # UR AUTO: 2 /LPF — SIGNIFICANT CHANGE UP (ref 0–7)
KETONES UR-MCNC: NEGATIVE — SIGNIFICANT CHANGE UP
LEUKOCYTE ESTERASE UR-ACNC: NEGATIVE — SIGNIFICANT CHANGE UP
MCHC RBC-ENTMCNC: 25.3 PG — LOW (ref 27–34)
MCHC RBC-ENTMCNC: 30 GM/DL — LOW (ref 32–36)
MCV RBC AUTO: 84.3 FL — SIGNIFICANT CHANGE UP (ref 80–100)
NITRITE UR-MCNC: NEGATIVE — SIGNIFICANT CHANGE UP
NRBC # BLD: 0 /100 WBCS — SIGNIFICANT CHANGE UP (ref 0–0)
PH UR: 5.5 — SIGNIFICANT CHANGE UP (ref 5–8)
PLATELET # BLD AUTO: 260 K/UL — SIGNIFICANT CHANGE UP (ref 150–400)
POTASSIUM SERPL-MCNC: 3.7 MMOL/L — SIGNIFICANT CHANGE UP (ref 3.5–5.3)
POTASSIUM SERPL-SCNC: 3.7 MMOL/L — SIGNIFICANT CHANGE UP (ref 3.5–5.3)
PROT UR-MCNC: ABNORMAL
RBC # BLD: 4.39 M/UL — SIGNIFICANT CHANGE UP (ref 4.2–5.8)
RBC # FLD: 24.6 % — HIGH (ref 10.3–14.5)
RBC CASTS # UR COMP ASSIST: 13 /HPF — HIGH (ref 0–4)
SODIUM SERPL-SCNC: 142 MMOL/L — SIGNIFICANT CHANGE UP (ref 135–145)
SP GR SPEC: 1.01 — SIGNIFICANT CHANGE UP (ref 1.01–1.02)
UROBILINOGEN FLD QL: NEGATIVE — SIGNIFICANT CHANGE UP
WBC # BLD: 6.61 K/UL — SIGNIFICANT CHANGE UP (ref 3.8–10.5)
WBC # FLD AUTO: 6.61 K/UL — SIGNIFICANT CHANGE UP (ref 3.8–10.5)
WBC UR QL: 3 /HPF — SIGNIFICANT CHANGE UP (ref 0–5)

## 2022-10-21 PROCEDURE — 99223 1ST HOSP IP/OBS HIGH 75: CPT | Mod: GC

## 2022-10-21 PROCEDURE — 95816 EEG AWAKE AND DROWSY: CPT | Mod: 26

## 2022-10-21 RX ORDER — CEFTRIAXONE 500 MG/1
1000 INJECTION, POWDER, FOR SOLUTION INTRAMUSCULAR; INTRAVENOUS ONCE
Refills: 0 | Status: COMPLETED | OUTPATIENT
Start: 2022-10-21 | End: 2022-10-21

## 2022-10-21 RX ORDER — HEPARIN SODIUM 5000 [USP'U]/ML
5000 INJECTION INTRAVENOUS; SUBCUTANEOUS EVERY 12 HOURS
Refills: 0 | Status: DISCONTINUED | OUTPATIENT
Start: 2022-10-21 | End: 2022-10-23

## 2022-10-21 RX ORDER — DESVENLAFAXINE 50 MG/1
50 TABLET, EXTENDED RELEASE ORAL DAILY
Refills: 0 | Status: DISCONTINUED | OUTPATIENT
Start: 2022-10-21 | End: 2022-10-25

## 2022-10-21 RX ORDER — CEFTRIAXONE 500 MG/1
1000 INJECTION, POWDER, FOR SOLUTION INTRAMUSCULAR; INTRAVENOUS EVERY 24 HOURS
Refills: 0 | Status: DISCONTINUED | OUTPATIENT
Start: 2022-10-22 | End: 2022-10-22

## 2022-10-21 RX ORDER — CEFTRIAXONE 500 MG/1
INJECTION, POWDER, FOR SOLUTION INTRAMUSCULAR; INTRAVENOUS
Refills: 0 | Status: DISCONTINUED | OUTPATIENT
Start: 2022-10-21 | End: 2022-10-22

## 2022-10-21 RX ORDER — CLOPIDOGREL BISULFATE 75 MG/1
75 TABLET, FILM COATED ORAL DAILY
Refills: 0 | Status: DISCONTINUED | OUTPATIENT
Start: 2022-10-21 | End: 2022-10-25

## 2022-10-21 RX ADMIN — BUMETANIDE 2 MILLIGRAM(S): 0.25 INJECTION INTRAMUSCULAR; INTRAVENOUS at 05:18

## 2022-10-21 RX ADMIN — CARBIDOPA AND LEVODOPA 1.5 TABLET(S): 25; 100 TABLET ORAL at 05:18

## 2022-10-21 RX ADMIN — Medication 5 MILLIGRAM(S): at 13:28

## 2022-10-21 RX ADMIN — CARBIDOPA AND LEVODOPA 1 TABLET(S): 25; 100 TABLET ORAL at 17:18

## 2022-10-21 RX ADMIN — ATORVASTATIN CALCIUM 80 MILLIGRAM(S): 80 TABLET, FILM COATED ORAL at 21:58

## 2022-10-21 RX ADMIN — CARBIDOPA AND LEVODOPA 1.5 TABLET(S): 25; 100 TABLET ORAL at 15:15

## 2022-10-21 RX ADMIN — DESVENLAFAXINE 50 MILLIGRAM(S): 50 TABLET, EXTENDED RELEASE ORAL at 20:11

## 2022-10-21 RX ADMIN — SENNA PLUS 2 TABLET(S): 8.6 TABLET ORAL at 21:57

## 2022-10-21 RX ADMIN — HEPARIN SODIUM 5000 UNIT(S): 5000 INJECTION INTRAVENOUS; SUBCUTANEOUS at 20:11

## 2022-10-21 RX ADMIN — Medication 1 TABLET(S): at 13:28

## 2022-10-21 RX ADMIN — CARBIDOPA AND LEVODOPA 1.5 TABLET(S): 25; 100 TABLET ORAL at 13:27

## 2022-10-21 RX ADMIN — CLOPIDOGREL BISULFATE 75 MILLIGRAM(S): 75 TABLET, FILM COATED ORAL at 17:37

## 2022-10-21 RX ADMIN — AMIODARONE HYDROCHLORIDE 200 MILLIGRAM(S): 400 TABLET ORAL at 05:18

## 2022-10-21 RX ADMIN — CEFTRIAXONE 100 MILLIGRAM(S): 500 INJECTION, POWDER, FOR SOLUTION INTRAMUSCULAR; INTRAVENOUS at 20:26

## 2022-10-21 NOTE — CHART NOTE - NSCHARTNOTEFT_GEN_A_CORE
Pt seen by neurology   MRI w and w/out con requested but cannot have MRI x 6 weeks per EP (new device). Neuro notified accordingly  UA +  repeat UA and U CX -same ordered     Admitted under Hospitalist.   Per Dr Arzate - resume plavix (hx prior stent 8/2011)  resume desvenlafaxine (will be entered by pharmacy)  d/c mirtazapine and zyprexa   continue Vyndamax   continue Amiodarone   will resume Eliquis on isa 10/23 as per EP Pt seen by neurology   MRI w and w/out con requested but cannot have MRI x 6 weeks per EP (new device). Neuro notified accordingly  UA +  repeat UA and U CX -same ordered     Admitted under Hospitalist.   Per Dr Bustamante - resume plavix (hx prior stent 8/2011)  resume desvenlafaxine (will be entered by pharmacy)  d/c mirtazapine and zyprexa for now- until further evaluation  continue Vyndamax   continue Amiodarone   will resume Eliquis on isa 10/23 as per EP    sign out given to Hospitalist Pt seen by neurology   MRI w and w/out con requested but cannot have MRI x 6 weeks per EP (new device). Neuro notified accordingly  UA +  repeat UA and U CX -same ordered     Admitted under Hospitalist.   Per Dr Bustamante - resume plavix (hx prior stent 8/2011)  resume desvenlafaxine (will be entered by pharmacy)  d/c mirtazapine and zyprexa for now- until further evaluation  continue Vyndamax   continue Amiodarone   will resume Eliquis on isa 10/23 as per EP    sign out given to Hospitalist and floor RACHELLE Ferrer

## 2022-10-21 NOTE — PROGRESS NOTE ADULT - SUBJECTIVE AND OBJECTIVE BOX
ALBINO RIVAS  81y  Male      Patient is a 81y old  Male who presents with a chief complaint of BIV upgrade  (19 Oct 2022 20:42)    HPI: 82yo M with hx of Parkinson's (AOX2), Afib (eliquis), bradycardia s/p PPM (2021), CAD s/p stent (08/2021), amyloid (vyndameax), chornic R pleural effusion s/p pleurax, HTD, BPH presented for BiV upgrade 10/19 with Dr. Vergara. Post procedure, patient drowsy s/p narcan. Patient continued to be lethargic, intermittently not following commands. Received home medication zyprexa 10/20. Neurology consulted     Neurology consulted      PAST MEDICAL/SURGICAL HISTORY  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      Parkinsons disease      CAD (coronary artery disease)  s/p PCI 08/21      Pleural effusion, not elsewhere classified      COVID-19 vaccine series completed  w booster      History of hip replacement, total  R hip 2008 &amp; L hip      Status post cataract extraction  right eye cataract extraction with IOL 6/26/2015      Presence of cardiac pacemaker  10/2021      H/O coronary angioplasty  8/2021 1 stent inserted          REVIEW OF SYSTEMS:  CONSTITUTIONAL: No fever, weight loss, or fatigue  EYES: No eye pain, visual disturbances, or discharge  ENMT:  No difficulty hearing, tinnitus, vertigo; No sinus or throat pain  NECK: No pain or stiffness  BREASTS: No pain, masses, or nipple discharge  RESPIRATORY: No cough, wheezing, chills or hemoptysis; No shortness of breath  CARDIOVASCULAR: No chest pain, palpitations, dizziness, or leg swelling  GASTROINTESTINAL: No abdominal or epigastric pain. No nausea, vomiting, or hematemesis; No diarrhea or constipation. No melena or hematochezia.  GENITOURINARY: No dysuria, frequency, hematuria, or incontinence  NEUROLOGICAL: No headaches, memory loss, loss of strength, numbness, or tremors  SKIN: No itching, burning, rashes, or lesions   LYMPH NODES: No enlarged glands  ENDOCRINE: No heat or cold intolerance; No hair loss  MUSCULOSKELETAL: No joint pain or swelling; No muscle, back, or extremity pain  PSYCHIATRIC: No depression, anxiety, mood swings, or difficulty sleeping  HEME/LYMPH: No easy bruising, or bleeding gums  ALLERY AND IMMUNOLOGIC: No hives or eczema    T(C): 36.5 (10-21-22 @ 09:16), Max: 36.7 (10-20-22 @ 19:25)  HR: 60 (10-21-22 @ 09:16) (60 - 60)  BP: 120/56 (10-21-22 @ 09:16) (120/56 - 129/59)  RR: 11 (10-21-22 @ 09:16) (11 - 18)  SpO2: 99% (10-21-22 @ 09:16) (95% - 100%)  Wt(kg): --Vital Signs Last 24 Hrs  T(C): 36.5 (21 Oct 2022 09:16), Max: 36.7 (20 Oct 2022 19:25)  T(F): 97.7 (21 Oct 2022 09:16), Max: 98 (20 Oct 2022 19:25)  HR: 60 (21 Oct 2022 09:16) (60 - 60)  BP: 120/56 (21 Oct 2022 09:16) (120/56 - 129/59)  BP(mean): 75 (21 Oct 2022 09:16) (75 - 80)  RR: 11 (21 Oct 2022 09:16) (11 - 18)  SpO2: 99% (21 Oct 2022 09:16) (95% - 100%)    Parameters below as of 21 Oct 2022 09:16  Patient On (Oxygen Delivery Method): room air        PHYSICAL EXAM:  GENERAL: NAD, well-groomed, well-developed  HEAD:  Atraumatic, Normocephalic  EYES: EOMI, PERRLA, conjunctiva and sclera clear  ENMT: No tonsillar erythema, exudates, or enlargement; Moist mucous membranes, Good dentition, No lesions  NECK: Supple, No JVD, Normal thyroid  NERVOUS SYSTEM:  Alert & Oriented X3, Good concentration; Motor Strength 5/5 B/L upper and lower extremities; DTRs 2+ intact and symmetric  CHEST/LUNG: Clear to percussion bilaterally; No rales, rhonchi, wheezing, or rubs  HEART: Regular rate and rhythm; No murmurs, rubs, or gallops  ABDOMEN: Soft, Nontender, Nondistended; Bowel sounds present  EXTREMITIES:  2+ Peripheral Pulses, No clubbing, cyanosis, or edema  LYMPH: No lymphadenopathy noted  SKIN: No rashes or lesions    Consultant(s) Notes Reviewed:  [x ] YES  [ ] NO  Care Discussed with Consultants/Other Providers [ x] YES  [ ] NO    LABS:  CBC   10-21-22 @ 01:34  Hematcorit 37.0  Hemoglobin 11.1  Mean Cell Hemoglobin 25.3  Platelet Count-Automated 260  RBC Count 4.39  Red Cell Distrib Width 24.6  Wbc Count 6.61      BMP  10-21-22 @ 01:34  Anion Gap. Serum 11  Blood Urea Nitrogen,Serm 17  Calcium, Total Serum 9.1  Carbon Dioxide, Serum 23  Chloride, Serum 108  Creatinine, Serum 1.87  eGFR in  --  eGFR in Non Afican American --  Gloucose, serum 110  Potassium, Serum 3.7  Sodium, Serum 142              10-20-22 @ 05:52  Anion Gap. Serum 11  Blood Urea Nitrogen,Serm 17  Calcium, Total Serum 9.0  Carbon Dioxide, Serum 23  Chloride, Serum 109  Creatinine, Serum 1.93  eGFR in  --  eGFR in Non Afican American --  Gloucose, serum 121  Potassium, Serum 3.4  Sodium, Serum 143              10-19-22 @ 09:51  Anion Gap. Serum 13  Blood Urea Nitrogen,Serm 17  Calcium, Total Serum 9.1  Carbon Dioxide, Serum 23  Chloride, Serum 109  Creatinine, Serum 1.90  eGFR in  --  eGFR in Non Afican American --  Gloucose, serum 100  Potassium, Serum 3.9  Sodium, Serum 145                  CMP  10-21-22 @ 01:34  Leighann Aminotransferase(ALT/SGPT)--  Albumin, Serum --  Alkaline Phosphatase, Serum --  Anion Gap, Serum 11  Aspartate Aminotransferase (AST/SGOT)--  Bilirubin Total, Serum --  Blood Urea Nitrogen, Serum 17  Calcium,Total Serum 9.1  Carbon Dioxide, Serum 23  Chloride, Serum 108  Creatinine, Serum 1.87  eGFR if  --  eGFR if Non African American --  Glucose, Serum 110  Potassium, Serum 3.7  Protein Total, Serum --  Sodium, Serum 142                      10-20-22 @ 05:52  Leighann Aminotransferase(ALT/SGPT)--  Albumin, Serum --  Alkaline Phosphatase, Serum --  Anion Gap, Serum 11  Aspartate Aminotransferase (AST/SGOT)--  Bilirubin Total, Serum --  Blood Urea Nitrogen, Serum 17  Calcium,Total Serum 9.0  Carbon Dioxide, Serum 23  Chloride, Serum 109  Creatinine, Serum 1.93  eGFR if  --  eGFR if Non African American --  Glucose, Serum 121  Potassium, Serum 3.4  Protein Total, Serum --  Sodium, Serum 143                      10-19-22 @ 09:51  Leighann Aminotransferase(ALT/SGPT)--  Albumin, Serum --  Alkaline Phosphatase, Serum --  Anion Gap, Serum 13  Aspartate Aminotransferase (AST/SGOT)--  Bilirubin Total, Serum --  Blood Urea Nitrogen, Serum 17  Calcium,Total Serum 9.1  Carbon Dioxide, Serum 23  Chloride, Serum 109  Creatinine, Serum 1.90  eGFR if  --  eGFR if Non African American --  Glucose, Serum 100  Potassium, Serum 3.9  Protein Total, Serum --  Sodium, Serum 145                          PT/INR  PT/INR  10-19-22 @ 09:51  INR 1.44  Prothrombin Time Comment --  Prothrobin Time, [id number]      Amylase/Lipase            RADIOLOGY & ADDITIONAL TESTS:    Imaging Personally Reviewed:  [ ] YES  [ ] NO PACHITOALBINO RODRÍGUEZ  81y  Male      Patient is a 81y old  Male who presents with a chief complaint of BIV upgrade  (19 Oct 2022 20:42)    HPI: 80yo M with hx of Parkinson's, Afib (eliquis), bradycardia s/p PPM (2021), CAD s/p stent (08/2021), amyloid (vyndameax), chornic R pleural effusion s/p pleurax, HTD, BPH presented for BiV upgrade 10/19 with Dr. Vergara. Post procedure, patient drowsy s/p narcan. Patient continued to be lethargic, intermittently not following commands. Received home medication zyprexa 10/20. Neurology consulted for AMS. EEG neg for seizures but right hemispheric cortical/subcortical structural lesion. Accepted to medicine for lethargy and UTI    Patient seen and examined with wife at bedside. At baseline, patient AOx2-3. He was recently hospitalized at [facility] for encephalopathy 2/2 UTI, discharged to rehab and then sent in for BiV upgrade. Limited history from patient. No acute complaints including chest pain, sob, cough, fevers. Endorses generalized weakness different from rehab but unable to elaborate. Per wife, patient's mental status is not at baseline. Prior to his UTI infection, patient AOx2 and required assistance from aides.       PAST MEDICAL/SURGICAL HISTORY  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      Parkinsons disease      CAD (coronary artery disease)  s/p PCI 08/21      Pleural effusion, not elsewhere classified      COVID-19 vaccine series completed  w booster      History of hip replacement, total  R hip 2008 &amp; L hip      Status post cataract extraction  right eye cataract extraction with IOL 6/26/2015      Presence of cardiac pacemaker  10/2021      H/O coronary angioplasty  8/2021 1 stent inserted          REVIEW OF SYSTEMS:  Limited. Patient Aox1 (name). Denies chest pain, SOB, abdominal pain, N/V. Endorses generalized weakness and prior constipation    T(C): 36.5 (10-21-22 @ 09:16), Max: 36.7 (10-20-22 @ 19:25)  HR: 60 (10-21-22 @ 09:16) (60 - 60)  BP: 120/56 (10-21-22 @ 09:16) (120/56 - 129/59)  RR: 11 (10-21-22 @ 09:16) (11 - 18)  SpO2: 99% (10-21-22 @ 09:16) (95% - 100%)  Wt(kg): --Vital Signs Last 24 Hrs  T(C): 36.5 (21 Oct 2022 09:16), Max: 36.7 (20 Oct 2022 19:25)  T(F): 97.7 (21 Oct 2022 09:16), Max: 98 (20 Oct 2022 19:25)  HR: 60 (21 Oct 2022 09:16) (60 - 60)  BP: 120/56 (21 Oct 2022 09:16) (120/56 - 129/59)  BP(mean): 75 (21 Oct 2022 09:16) (75 - 80)  RR: 11 (21 Oct 2022 09:16) (11 - 18)  SpO2: 99% (21 Oct 2022 09:16) (95% - 100%)    Parameters below as of 21 Oct 2022 09:16  Patient On (Oxygen Delivery Method): room air        PHYSICAL EXAM:  GENERAL: NAD, well-groomed, well-developed  HEAD:  Atraumatic, Normocephalic  EYES+MOUTH: EOMI, PERRLA, conjunctiva and sclera clear. Moist mucous membranes  NECK: Supple, Normal thyroid  NERVOUS SYSTEM:  Alert & Oriented X1, tangential thoughts but redirectable; moving all extremities spontaneously   CHEST/LUNG: CTA bilaterally; No rales, rhonchi, wheezing, or rubs. Right pluerax cath   HEART: Regular rate and rhythm; No rubs, or gallops  ABDOMEN: Soft, Nontender, Nondistended; Bowel sounds present  EXTREMITIES:  2+ Peripheral Pulses, No clubbing, cyanosis, or edema  SKIN: No rashes or lesions    Consultant(s) Notes Reviewed:  [x ] YES  [ ] NO  Care Discussed with Consultants/Other Providers [ x] YES  [ ] NO    LABS:  CBC   10-21-22 @ 01:34  Hematcorit 37.0  Hemoglobin 11.1  Mean Cell Hemoglobin 25.3  Platelet Count-Automated 260  RBC Count 4.39  Red Cell Distrib Width 24.6  Wbc Count 6.61      BMP  10-21-22 @ 01:34  Anion Gap. Serum 11  Blood Urea Nitrogen,Serm 17  Calcium, Total Serum 9.1  Carbon Dioxide, Serum 23  Chloride, Serum 108  Creatinine, Serum 1.87  eGFR in  --  eGFR in Non Afican American --  Gloucose, serum 110  Potassium, Serum 3.7  Sodium, Serum 142      PT/INR  PT/INR  10-19-22 @ 09:51  INR 1.44  Prothrombin Time Comment --  Prothrobin Time, [id number]      Amylase/Lipase    RADIOLOGY & ADDITIONAL TESTS:  < from: CT Head No Cont (10.20.22 @ 16:47) >  ACC: [id number] EXAM:  CT BRAIN                          PROCEDURE DATE:  10/20/2022          INTERPRETATION:  Noncontrast CT of the brain.    CLINICAL INDICATION:  Altered mental status    TECHNIQUE : Axial CT scanning of the brain was obtained fromthe skull   base to the vertex without the administration of intravenous contrast.   Sagittal and coronal reformats were provided.    COMPARISON: CT brain 10/7/2022    FINDINGS:    No hydrocephalus, mass effect, midline shift, acute intracranial   hemorrhage, or brain edema.    Visualized paranasal sinuses and mastoid air cells are clear.    IMPRESSION:    No hydrocephalus, acute intracranial hemorrhage, mass effect, or brain   edema.    --- End of Report ---        < end of copied text >  EEG Classification / Summary:  Abnormal EEG study, awake / drowsy     Continuous polymorphic slowing, focal, right hemisphere  Background slowing, generalized, mild  -----------------------------------------------------------------------------------------------------    Clinical Impression:    Evidence for right hemispheric cortical/subcortical structural lesion  Mild diffuse/multifocal cerebral dysfunction, not specific as to etiology  There were no epileptiform abnormalities recorded.      Imaging Personally Reviewed:  [ ] YES  [ ] NO

## 2022-10-21 NOTE — PROGRESS NOTE ADULT - ASSESSMENT
82yo M with hx of Parkinson's, Afib (eliquis), bradycardia s/p PPM (2021), CAD s/p stent (08/2021), amyloid (vyndamax), chronic R pleural effusion s/p Pleurx HTD, BPH presented from rehab for BiV upgrade 10/19 with Dr. Vergara complicated with acute metabolic encephalopathy

## 2022-10-21 NOTE — PROGRESS NOTE ADULT - SUBJECTIVE AND OBJECTIVE BOX
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 (19 Oct 2022 09:20)    Patient is a 81y old  Male who presents with a chief complaint of BIV upgrade  (19 Oct 2022 20:42)          Allergies    No Known Allergies    Intolerances        Medications:  acetaminophen     Tablet .. 650 milliGRAM(s) Oral every 6 hours PRN  aMIOdarone    Tablet 200 milliGRAM(s) Oral daily  atorvastatin 80 milliGRAM(s) Oral at bedtime  buMETAnide 2 milliGRAM(s) Oral daily  carbidopa/levodopa  25/100 1.5 Tablet(s) Oral <User Schedule>  carbidopa/levodopa  25/100 1 Tablet(s) Oral <User Schedule>  ceFAZolin   IVPB 2000 milliGRAM(s) IV Intermittent once  chlorhexidine 4% Liquid 1 Application(s) Topical once  multivitamin/minerals 1 Tablet(s) Oral daily  OLANZapine 5 milliGRAM(s) Oral at bedtime  oxybutynin 5 milliGRAM(s) Oral daily  polyethylene glycol 3350 17 Gram(s) Oral daily  senna 2 Tablet(s) Oral at bedtime  sodium chloride 0.9%. 1000 milliLiter(s) IV Continuous <Continuous>      Vitals:  T(C): 36.7 (10-20-22 @ 19:25), Max: 36.7 (10-20-22 @ 04:25)  HR: 60 (10-20-22 @ 20:20) (60 - 70)  BP: 129/59 (10-20-22 @ 20:20) (101/66 - 129/59)  BP(mean): 80 (10-20-22 @ 20:20) (79 - 80)  RR: 16 (10-20-22 @ 20:20) (9 - 16)  SpO2: 99% (10-20-22 @ 20:20) (97% - 100%)  Wt(kg): --  Daily     Daily   I&O's Summary    19 Oct 2022 07:01  -  20 Oct 2022 07:00  --------------------------------------------------------  IN: 240 mL / OUT: 0 mL / NET: 240 mL    20 Oct 2022 07:01  -  21 Oct 2022 02:07  --------------------------------------------------------  IN: 1000 mL / OUT: 100 mL / NET: 900 mL          Physical Exam:  Appearance: Normal  Eyes: PERRL, EOMI  HENT: Normal oral mus[place]a, NC/AT  Cardiovascular: S1S2, RRR, No M/R/G, no JVD, No Lower extremity edema  Procedural Access Site: No hematoma, Non-tender to palpation, 2+ pulse, No bruit, No Ecchymosis  Respiratory: Clear to auscultation bilaterally  Gastrointestinal: Soft, Non tender, Normal Bowel Sounds  Musculoskeletal: No clubbing, No joint deformity   Neurologic: Non-focal  Lymphatic: No lymphadenopathy  Psychiatry: AAOx3, Mood & affect appropriate  Skin: No rashes, No ecchymoses, No cyanosis    10-21    142  |  108  |  17  ----------------------------<  110<H>  3.7   |  23  |  1.87<H>    Ca    9.1      21 Oct 2022 01:34  Mg     2.2     10-20      PT/INR - ( 19 Oct 2022 09:51 )   PT: 16.8 sec;   INR: 1.44 ratio         PTT - ( 19 Oct 2022 09:51 )  PTT:36.8 sec        Lipid panel   Hgb A1c                         11.1   6.61  )-----------( 260      ( 21 Oct 2022 01:34 )             37.0

## 2022-10-21 NOTE — CHART NOTE - NSCHARTNOTEFT_GEN_A_CORE
EEG Classification / Summary:  Abnormal EEG study, awake / drowsy     Continuous polymorphic slowing, focal, right hemisphere  Background slowing, generalized, mild  -----------------------------------------------------------------------------------------------------    Clinical Impression:    Evidence for right hemispheric cortical/subcortical structural lesion  Mild diffuse/multifocal cerebral dysfunction, not specific as to etiology  There were no epileptiform abnormalities recorded.      EEG reviewed, no epileptiform pattern. Focal slowing of right hemisphere noted, possibly underlying structural lesion.     - Order MRI brain w/w/o contrast  - No indication for anti-seizure medications at this time  - Continue workup/treatment of UTI (pending urine culture)    Will follow up imaging. Discussed with Dr. Elizalde. Thank you.

## 2022-10-21 NOTE — EEG REPORT - NS EEG TEXT BOX
ALBINO RIVAS [id number] (1940)M  Admitting MD: Dr. Lida Vergara    Study Date: 10-21-22    --------------------------------------------------------------------------------------------------  History:  CC/ HPI Patient is a 81y old  Male who presents with a chief complaint of BIV upgrade  (19 Oct 2022 20:42)    aMIOdarone    Tablet 200 milliGRAM(s) Oral daily  atorvastatin 80 milliGRAM(s) Oral at bedtime  buMETAnide 2 milliGRAM(s) Oral daily  carbidopa/levodopa  25/100 1.5 Tablet(s) Oral <User Schedule>  carbidopa/levodopa  25/100 1 Tablet(s) Oral <User Schedule>  ceFAZolin   IVPB 2000 milliGRAM(s) IV Intermittent once  chlorhexidine 4% Liquid 1 Application(s) Topical once  multivitamin/minerals 1 Tablet(s) Oral daily  OLANZapine 5 milliGRAM(s) Oral at bedtime  oxybutynin 5 milliGRAM(s) Oral daily  polyethylene glycol 3350 17 Gram(s) Oral daily  senna 2 Tablet(s) Oral at bedtime  sodium chloride 0.9%. 1000 milliLiter(s) IV Continuous <Continuous>    --------------------------------------------------------------------------------------------------  Study Interpretation:    [[[Abbreviation Key:  PDR=alpha rhythm/posterior dominant rhythm. A-P=anterior posterior gradient.  Amplitude: ‘very low’:<20; ‘low’:20-50; ‘medium’:; ‘high’:>200uV.  Persistence for periodic/rhythmic patterns (% of epoch) ‘rare’:<1%; ‘occasional’:1-10%; ‘frequent’:10-50%; ‘abundant’:50-90%; ‘continuous’:>90%.  Persistence for sporadic discharges: ‘rare’:<1/hr; ‘occasional’:1/min-1/hr; ‘frequent’:>1/min; ‘abundant’:>1/10 sec.  GRDA=generalized rhythmic delta activity; FIRDA=frontal intermittent GRDA; LRDA=lateralized rhythmic delta activity; TIRDA=temporal intermittent rhythmic delta activity;  LPD=PLED=lateralized periodic discharges; GPD=generalized periodic discharges; BiPDs=BiPLEDs=bilateral independent periodic epileptiform discharges; SIRPID=stimulus induced rhythmic, periodic, or ictal appearing discharges; BIRDs=brief potentially ictal rhythmic discharges >4 Hz, lasting .5-10s; PFA (paroxysmal bursts >13 Hz or =8 Hz).  Modifiers: +F=with fast component; +S=with spike component; +R=with rhythmic component.  S-B=burst suppression pattern.  Max=maximal. N1-drowsy; N2-stage II sleep; N3-slow wave sleep. SSS/BETS=small sharp spikes/benign epileptiform transients of sleep. HV=hyperventilation; PS=photic stimulation]]]    FINDINGS:  The background was continuous, spontaneously variable and reactive.  During wakefulness, the posteriorly dominant rhythm consisted of7 Hz activity, better visualized on the left vs right, with an amplitude to 40 uV, that attenuated to eye opening.  Low amplitude central beta was noted in wakefulness.    Background Slowing:  Generalized slowing: Continuous diffuse polymorphic delta and theta  Focal slowing: Continuous right hemispheric delta and theta with attenuation of fast frequency compared to left    Sleep Background:  -Drowsiness was characterized by fragmentation, attenuation, and slowing of the background activity.    -N2 sleep transients were not recorded.    Epileptiform Activity:   No interictal epileptiform discharges were present.    Events:  No clinical events were recorded.  No seizures were recorded.    Activation Procedures:   -Hyperventilation was not performed.    -Photic stimulation was not performed.    Artifacts:  Intermittent myogenic and external motion artifacts were noted.    ECG:  The heart rate on single channel ECG at baseline was predominantly near BPM = 60-70  -----------------------------------------------------------------------------------------------------    EEG Classification / Summary:  Abnormal EEG study, awake / drowsy     Continuous polymorphic slowing, focal, right hemisphere  Background slowing, generalized, mild  -----------------------------------------------------------------------------------------------------    Clinical Impression:    Evidence for right hemispheric cortical/subcortical structural lesion  Mild diffuse/multifocal cerebral dysfunction, not specific as to etiology  There were no epileptiform abnormalities recorded.      This is a prelim report only, pending review with attending prior to finalization.    -------------------------------------------------------------------------------------------------------  [facility] EEG Reading Room Ph#: [phone]  Epilepsy Answering Service after 5PM and before 8:30AM: Ph#: [phone]    Pedro Resendiz M.D, epilepsy fellow   ALBINO RIVAS [id number] (1940)M  Admitting MD: Dr. Lida Vergara    Study Date: 10-21-22    --------------------------------------------------------------------------------------------------  History:  CC/ HPI Patient is a 81y old  Male who presents with a chief complaint of BIV upgrade  (19 Oct 2022 20:42)    aMIOdarone    Tablet 200 milliGRAM(s) Oral daily  atorvastatin 80 milliGRAM(s) Oral at bedtime  buMETAnide 2 milliGRAM(s) Oral daily  carbidopa/levodopa  25/100 1.5 Tablet(s) Oral <User Schedule>  carbidopa/levodopa  25/100 1 Tablet(s) Oral <User Schedule>  ceFAZolin   IVPB 2000 milliGRAM(s) IV Intermittent once  chlorhexidine 4% Liquid 1 Application(s) Topical once  multivitamin/minerals 1 Tablet(s) Oral daily  OLANZapine 5 milliGRAM(s) Oral at bedtime  oxybutynin 5 milliGRAM(s) Oral daily  polyethylene glycol 3350 17 Gram(s) Oral daily  senna 2 Tablet(s) Oral at bedtime  sodium chloride 0.9%. 1000 milliLiter(s) IV Continuous <Continuous>    --------------------------------------------------------------------------------------------------  Study Interpretation:    [[[Abbreviation Key:  PDR=alpha rhythm/posterior dominant rhythm. A-P=anterior posterior gradient.  Amplitude: ‘very low’:<20; ‘low’:20-50; ‘medium’:; ‘high’:>200uV.  Persistence for periodic/rhythmic patterns (% of epoch) ‘rare’:<1%; ‘occasional’:1-10%; ‘frequent’:10-50%; ‘abundant’:50-90%; ‘continuous’:>90%.  Persistence for sporadic discharges: ‘rare’:<1/hr; ‘occasional’:1/min-1/hr; ‘frequent’:>1/min; ‘abundant’:>1/10 sec.  GRDA=generalized rhythmic delta activity; FIRDA=frontal intermittent GRDA; LRDA=lateralized rhythmic delta activity; TIRDA=temporal intermittent rhythmic delta activity;  LPD=PLED=lateralized periodic discharges; GPD=generalized periodic discharges; BiPDs=BiPLEDs=bilateral independent periodic epileptiform discharges; SIRPID=stimulus induced rhythmic, periodic, or ictal appearing discharges; BIRDs=brief potentially ictal rhythmic discharges >4 Hz, lasting .5-10s; PFA (paroxysmal bursts >13 Hz or =8 Hz).  Modifiers: +F=with fast component; +S=with spike component; +R=with rhythmic component.  S-B=burst suppression pattern.  Max=maximal. N1-drowsy; N2-stage II sleep; N3-slow wave sleep. SSS/BETS=small sharp spikes/benign epileptiform transients of sleep. HV=hyperventilation; PS=photic stimulation]]]    FINDINGS:  The background was continuous, spontaneously variable and reactive.  During wakefulness, the posteriorly dominant rhythm consisted of7 Hz activity, better visualized on the left vs right, with an amplitude to 40 uV, that attenuated to eye opening.  Low amplitude central beta was noted in wakefulness.    Background Slowing:  Generalized slowing: Continuous diffuse polymorphic delta and theta  Focal slowing: Continuous right hemispheric delta and theta with attenuation of fast frequency compared to left    Sleep Background:  -Drowsiness was characterized by fragmentation, attenuation, and slowing of the background activity.    -N2 sleep transients were not recorded.    Epileptiform Activity:   No interictal epileptiform discharges were present.    Events:  No clinical events were recorded.  No seizures were recorded.    Activation Procedures:   -Hyperventilation was not performed.    -Photic stimulation was not performed.    Artifacts:  Intermittent myogenic and external motion artifacts were noted.    ECG:  The heart rate on single channel ECG at baseline was predominantly near BPM = 60-70  -----------------------------------------------------------------------------------------------------    EEG Classification / Summary:  Abnormal EEG study, awake / drowsy     Continuous polymorphic slowing, focal, right hemisphere  Background slowing, generalized, mild  -----------------------------------------------------------------------------------------------------    Clinical Impression:    Evidence for right hemispheric cortical/subcortical structural lesion  Mild diffuse/multifocal cerebral dysfunction, not specific as to etiology  There were no epileptiform abnormalities recorded.        -------------------------------------------------------------------------------------------------------  [facility] EEG Reading Room Ph#: [phone]  Epilepsy Answering Service after 5PM and before 8:30AM: Ph#: [phone]    Pedro Resendiz M.D, epilepsy fellow    Kwesi Brand MD  EEG/Epilepsy Attending

## 2022-10-22 LAB
ANION GAP SERPL CALC-SCNC: 13 MMOL/L — SIGNIFICANT CHANGE UP (ref 5–17)
BUN SERPL-MCNC: 16 MG/DL — SIGNIFICANT CHANGE UP (ref 7–23)
CALCIUM SERPL-MCNC: 9.4 MG/DL — SIGNIFICANT CHANGE UP (ref 8.4–10.5)
CHLORIDE SERPL-SCNC: 106 MMOL/L — SIGNIFICANT CHANGE UP (ref 96–108)
CO2 SERPL-SCNC: 23 MMOL/L — SIGNIFICANT CHANGE UP (ref 22–31)
CREAT SERPL-MCNC: 1.77 MG/DL — HIGH (ref 0.5–1.3)
EGFR: 39 ML/MIN/1.73M2 — LOW
FOLATE SERPL-MCNC: 15 NG/ML — SIGNIFICANT CHANGE UP
GLUCOSE SERPL-MCNC: 92 MG/DL — SIGNIFICANT CHANGE UP (ref 70–99)
HCT VFR BLD CALC: 40.9 % — SIGNIFICANT CHANGE UP (ref 39–50)
HGB BLD-MCNC: 12.1 G/DL — LOW (ref 13–17)
MCHC RBC-ENTMCNC: 25.2 PG — LOW (ref 27–34)
MCHC RBC-ENTMCNC: 29.6 GM/DL — LOW (ref 32–36)
MCV RBC AUTO: 85.2 FL — SIGNIFICANT CHANGE UP (ref 80–100)
NRBC # BLD: 0 /100 WBCS — SIGNIFICANT CHANGE UP (ref 0–0)
PLATELET # BLD AUTO: 267 K/UL — SIGNIFICANT CHANGE UP (ref 150–400)
POTASSIUM SERPL-MCNC: 3.4 MMOL/L — LOW (ref 3.5–5.3)
POTASSIUM SERPL-SCNC: 3.4 MMOL/L — LOW (ref 3.5–5.3)
RBC # BLD: 4.8 M/UL — SIGNIFICANT CHANGE UP (ref 4.2–5.8)
RBC # FLD: 24.8 % — HIGH (ref 10.3–14.5)
SODIUM SERPL-SCNC: 142 MMOL/L — SIGNIFICANT CHANGE UP (ref 135–145)
TSH SERPL-MCNC: 2.12 UIU/ML — SIGNIFICANT CHANGE UP (ref 0.27–4.2)
VIT B12 SERPL-MCNC: 1356 PG/ML — HIGH (ref 232–1245)
WBC # BLD: 6.41 K/UL — SIGNIFICANT CHANGE UP (ref 3.8–10.5)
WBC # FLD AUTO: 6.41 K/UL — SIGNIFICANT CHANGE UP (ref 3.8–10.5)

## 2022-10-22 PROCEDURE — 99232 SBSQ HOSP IP/OBS MODERATE 35: CPT

## 2022-10-22 PROCEDURE — 93010 ELECTROCARDIOGRAM REPORT: CPT

## 2022-10-22 RX ORDER — POTASSIUM CHLORIDE 20 MEQ
10 PACKET (EA) ORAL ONCE
Refills: 0 | Status: COMPLETED | OUTPATIENT
Start: 2022-10-22 | End: 2022-10-22

## 2022-10-22 RX ADMIN — CARBIDOPA AND LEVODOPA 1.5 TABLET(S): 25; 100 TABLET ORAL at 14:17

## 2022-10-22 RX ADMIN — CARBIDOPA AND LEVODOPA 1.5 TABLET(S): 25; 100 TABLET ORAL at 05:54

## 2022-10-22 RX ADMIN — ATORVASTATIN CALCIUM 80 MILLIGRAM(S): 80 TABLET, FILM COATED ORAL at 22:55

## 2022-10-22 RX ADMIN — HEPARIN SODIUM 5000 UNIT(S): 5000 INJECTION INTRAVENOUS; SUBCUTANEOUS at 17:53

## 2022-10-22 RX ADMIN — AMIODARONE HYDROCHLORIDE 200 MILLIGRAM(S): 400 TABLET ORAL at 05:54

## 2022-10-22 RX ADMIN — CARBIDOPA AND LEVODOPA 1 TABLET(S): 25; 100 TABLET ORAL at 17:53

## 2022-10-22 RX ADMIN — HEPARIN SODIUM 5000 UNIT(S): 5000 INJECTION INTRAVENOUS; SUBCUTANEOUS at 05:55

## 2022-10-22 RX ADMIN — CLOPIDOGREL BISULFATE 75 MILLIGRAM(S): 75 TABLET, FILM COATED ORAL at 12:24

## 2022-10-22 RX ADMIN — POLYETHYLENE GLYCOL 3350 17 GRAM(S): 17 POWDER, FOR SOLUTION ORAL at 12:24

## 2022-10-22 RX ADMIN — CARBIDOPA AND LEVODOPA 1.5 TABLET(S): 25; 100 TABLET ORAL at 12:23

## 2022-10-22 RX ADMIN — Medication 5 MILLIGRAM(S): at 12:24

## 2022-10-22 RX ADMIN — BUMETANIDE 2 MILLIGRAM(S): 0.25 INJECTION INTRAMUSCULAR; INTRAVENOUS at 05:54

## 2022-10-22 RX ADMIN — Medication 1 TABLET(S): at 12:23

## 2022-10-22 RX ADMIN — DESVENLAFAXINE 50 MILLIGRAM(S): 50 TABLET, EXTENDED RELEASE ORAL at 22:55

## 2022-10-22 RX ADMIN — Medication 100 MILLIEQUIVALENT(S): at 11:19

## 2022-10-22 NOTE — PHYSICAL THERAPY INITIAL EVALUATION ADULT - GENERAL OBSERVATIONS, REHAB EVAL
Pt rec'd semi-supine in bed +LUE sling, +tele, +B/L heel gauze pads, +lethargic. GARTH Constantino present t/o.

## 2022-10-22 NOTE — PHYSICAL THERAPY INITIAL EVALUATION ADULT - PASSIVE RANGE OF MOTION EXAMINATION, REHAB EVAL
LUE PROM limited 2/2 cardiac precautions, RUE shoulder flexion ~75 degrees, R elbow flexion/extension WFL, BLEs unable to formally assess

## 2022-10-22 NOTE — PROGRESS NOTE ADULT - SUBJECTIVE AND OBJECTIVE BOX
24H hour events: No over night events.        MEDICATIONS:  aMIOdarone    Tablet 200 milliGRAM(s) Oral daily  buMETAnide 2 milliGRAM(s) Oral daily  clopidogrel Tablet 75 milliGRAM(s) Oral daily  heparin   Injectable 5000 Unit(s) SubCutaneous every 12 hours    acetaminophen     Tablet .. 650 milliGRAM(s) Oral every 6 hours PRN  carbidopa/levodopa  25/100 1.5 Tablet(s) Oral <User Schedule>  carbidopa/levodopa  25/100 1 Tablet(s) Oral <User Schedule>  desvenlafaxine ER 50 milliGRAM(s) Oral daily    polyethylene glycol 3350 17 Gram(s) Oral daily  senna 2 Tablet(s) Oral at bedtime    atorvastatin 80 milliGRAM(s) Oral at bedtime    multivitamin/minerals 1 Tablet(s) Oral daily  oxybutynin 5 milliGRAM(s) Oral daily      REVIEW OF SYSTEMS:  Complete 12point ROS negative.    PHYSICAL EXAM:  T(C): 36.7 (10-22-22 @ 11:36), Max: 36.9 (10-22-22 @ 04:06)  HR: 60 (10-22-22 @ 11:36) (60 - 60)  BP: 120/64 (10-22-22 @ 11:36) (115/64 - 129/66)  RR: 18 (10-22-22 @ 11:36) (17 - 18)  SpO2: 98% (10-22-22 @ 11:36) (95% - 99%)    21 Oct 2022 07:01  -  22 Oct 2022 07:00  --------------------------------------------------------  IN: 120 mL / OUT: 50 mL / NET: 70 mL    22 Oct 2022 07:01  -  22 Oct 2022 14:25  --------------------------------------------------------  IN: 210 mL / OUT: 0 mL / NET: 210 mL    Appearance: Normal	  HEENT:   Normal oral mucosa, PERRL, EOMI	  Cardiovascular: Normal S1 S2, regular. No JVD, No murmurs, No edema  Respiratory: Lungs clear to auscultation	  Psychiatry: A & O x 1, Mood & affect appropriate. Follows simple commands.   Gastrointestinal:  Soft, Non-tender, + BS	  Skin: left chest wall incision, clean, dry and intact. Dry, sterile dressing in place.   Extremities: Normal range of motion, No clubbing, cyanosis or edema  Vascular: Peripheral pulses palpable 2+ bilaterally      LABS:	 	    CBC Full  -  ( 22 Oct 2022 06:51 )  WBC Count : 6.41 K/uL  Hemoglobin : 12.1 g/dL  Hematocrit : 40.9 %  Platelet Count - Automated : 267 K/uL  Mean Cell Volume : 85.2 fl  Mean Cell Hemoglobin : 25.2 pg  Mean Cell Hemoglobin Concentration : 29.6 gm/dL  Auto Neutrophil # : x  Auto Lymphocyte # : x  Auto Monocyte # : x  Auto Eosinophil # : x  Auto Basophil # : x  Auto Neutrophil % : x  Auto Lymphocyte % : x  Auto Monocyte % : x  Auto Eosinophil % : x  Auto Basophil % : x    10-22    142  |  106  |  16  ----------------------------<  92  3.4<L>   |  23  |  1.77<H>  10-21    142  |  108  |  17  ----------------------------<  110<H>  3.7   |  23  |  1.87<H>    Ca    9.4      22 Oct 2022 06:51  Ca    9.1      21 Oct 2022 01:34        TELEMETRY: 	AV Paced 60bpm

## 2022-10-22 NOTE — PHYSICAL THERAPY INITIAL EVALUATION ADULT - TRANSFER TRAINING, PT EVAL
GOAL: Pt will perform ALL transfers with MinAx1, w/use of appropriate assistive device as needed, in 4 weeks.

## 2022-10-22 NOTE — PHYSICAL THERAPY INITIAL EVALUATION ADULT - STRENGTHENING, PT EVAL
GOAL: Pt will improve bilateral LE strength by one MMT grade, for increased limb stability and to facilitate functional transfers in 4 weeks.

## 2022-10-22 NOTE — PHYSICAL THERAPY INITIAL EVALUATION ADULT - PERTINENT HX OF CURRENT PROBLEM, REHAB EVAL
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 Pt present for BIV upgrade. Hospital Course: s/p BSCI upgrade on 10/19 to BIV PPM. 10/20 pt +lethary and AMS. XRAY CHEST (10/19): Right lower lobe haziness, may indicate mild right basilar atelectasis and small right pleural effusion. CT HEAD (10/20): No hydrocephalus, acute intracranial hemorrhage, mass effect, or brain edema.

## 2022-10-22 NOTE — PHYSICAL THERAPY INITIAL EVALUATION ADULT - BALANCE TRAINING, PT EVAL
GOAL: Pt will demonstrate improved static/dynamic standing balance to fair in order to improve stability, decrease fall risk and increase independence with ADLs within 4 weeks.

## 2022-10-22 NOTE — PROGRESS NOTE ADULT - ASSESSMENT
81 year old male with a history of Parkinson's, cardiac amyloid, CHB, atrial fibrillation (Eliquis), PPM 2021, CAD s/p stent (8/2021), chronic R pleural effusion s/p Pleurx and heart failure. The patients LV function has dropped due to chronic RV pacing, presents from Rehab for pacemaker upgrade to CRT-P on 10/19/22.  Post procedure patient has had acute metabolic encephalopathy.      1. Chronic RV pacing, s/p BiV upgrade on 10/19  2. Acute metabolic encephalopathy, lethargy post   3. History of Parkinson's with baseline A&0 2-3 at home  4. History of AFib       - remains with lethargic but easily arousable post BiV upgrade 10/19.   Follows simple commands.   - CT Head negative, EEG neg for seizures  - resume Eliquis on Rick 10/23  - considering NG Tube for meals and medications   - continue to monitor closely   - Neuro follow up       BAKARI Bond [facility]  [phone]  81 year old male with a history of Parkinson's, cardiac amyloid, CHB, atrial fibrillation (Eliquis), PPM 2021, CAD s/p stent (8/2021), chronic R pleural effusion s/p Pleurx and heart failure. The patients LV function has dropped due to chronic RV pacing, presents from Rehab for pacemaker upgrade to CRT-P on 10/19/22.  Post procedure patient has had acute metabolic encephalopathy.      1. Chronic RV pacing, s/p BiV upgrade on 10/19  2. Acute metabolic encephalopathy, lethargy post   3. History of Parkinson's with baseline A&0 2-3 at home  4. History of AFib       - remains with lethargic but easily arousable post BiV upgrade 10/19.   Follows simple commands.   - CT Head negative, EEG neg for seizures, UA (-)   - resume Eliquis on Rick 10/23  - considering NG Tube for meals and medications   - continue to monitor closely   - Neuro follow up       BAKARI Bond [facility]  [phone]

## 2022-10-22 NOTE — PHYSICAL THERAPY INITIAL EVALUATION ADULT - ASR WT BEARING STATUS EVAL
no weight-bearing restrictions Minocycline Pregnancy And Lactation Text: This medication is Pregnancy Category D and not consider safe during pregnancy. It is also excreted in breast milk.

## 2022-10-22 NOTE — PROGRESS NOTE ADULT - ASSESSMENT
80yo M with hx of Parkinson's, Afib (eliquis), bradycardia s/p PPM (2021), CAD s/p stent (08/2021), amyloid (vyndamax), chronic R pleural effusion s/p Pleurx HTD, BPH presented from rehab for BiV upgrade 10/19 with Dr. Vergara complicated with acute metabolic encephalopathy and now on medicine service.

## 2022-10-22 NOTE — PROGRESS NOTE ADULT - SUBJECTIVE AND OBJECTIVE BOX
[facility] Division of Hospital Medicine  Phil Potter MD  Available via MS Teams  Pager [phone]    SUBJECTIVE / OVERNIGHT EVENTS: No acute events reported overnight    ADDITIONAL REVIEW OF SYSTEMS: Unable to participate in review of systems due to current neurologic status.     MEDICATIONS  (STANDING):  aMIOdarone    Tablet 200 milliGRAM(s) Oral daily  atorvastatin 80 milliGRAM(s) Oral at bedtime  buMETAnide 2 milliGRAM(s) Oral daily  carbidopa/levodopa  25/100 1.5 Tablet(s) Oral <User Schedule>  carbidopa/levodopa  25/100 1 Tablet(s) Oral <User Schedule>  cefTRIAXone   IVPB      cefTRIAXone   IVPB 1000 milliGRAM(s) IV Intermittent every 24 hours  clopidogrel Tablet 75 milliGRAM(s) Oral daily  desvenlafaxine ER 50 milliGRAM(s) Oral daily  heparin   Injectable 5000 Unit(s) SubCutaneous every 12 hours  multivitamin/minerals 1 Tablet(s) Oral daily  oxybutynin 5 milliGRAM(s) Oral daily  polyethylene glycol 3350 17 Gram(s) Oral daily  senna 2 Tablet(s) Oral at bedtime    MEDICATIONS  (PRN):  acetaminophen     Tablet .. 650 milliGRAM(s) Oral every 6 hours PRN Mild Pain (1 - 3), Moderate Pain (4 - 6)      I&O's Summary    21 Oct 2022 07:  -  22 Oct 2022 07:00  --------------------------------------------------------  IN: 120 mL / OUT: 50 mL / NET: 70 mL    22 Oct 2022 07:01  -  22 Oct 2022 12:20  --------------------------------------------------------  IN: 120 mL / OUT: 0 mL / NET: 120 mL        PHYSICAL EXAM:  Vital Signs Last 24 Hrs  T(C): 36.9 (22 Oct 2022 09:14), Max: 36.9 (22 Oct 2022 04:06)  T(F): 98.5 (22 Oct 2022 09:14), Max: 98.5 (22 Oct 2022 09:14)  HR: 60 (22 Oct 2022 09:14) (60 - 60)  BP: 129/66 (22 Oct 2022 09:14) (115/64 - 129/66)  BP(mean): 76 (21 Oct 2022 17:29) (76 - 76)  RR: 18 (22 Oct 2022 04:06) (17 - 18)  SpO2: 95% (22 Oct 2022 09:14) (95% - 99%)    Parameters below as of 22 Oct 2022 09:14  Patient On (Oxygen Delivery Method): room air      CONSTITUTIONAL: NAD, well-groomed  EYES: PERRLA; conjunctiva and sclera clear  ENMT: Moist oral mucosa, no pharyngeal injection or exudates; normal dentition  NECK: Supple, no palpable masses; no thyromegaly  RESPIRATORY: Normal respiratory effort; lungs are clear to auscultation bilaterally  CARDIOVASCULAR: normal S1 and S2, no murmur/rub/gallop; No lower extremity edema  ABDOMEN: Nontender to palpation, normoactive bowel sounds, no rebound/guarding; No hepatosplenomegaly  MUSCULOSKELETAL:  no clubbing or cyanosis of digits; no joint swelling or tenderness to palpation  PSYCH: A+O to person, place, and time; affect appropriate  NEUROLOGY: CN 2-12 are intact and symmetric; no gross sensory deficits   SKIN: No rashes; no palpable lesions    LABS:                        12.1   6.41  )-----------( 267      ( 22 Oct 2022 06:51 )             40.9     10-22    142  |  106  |  16  ----------------------------<  92  3.4<L>   |  23  |  1.77<H>    Ca    9.4      22 Oct 2022 06:51            Urinalysis Basic - ( 21 Oct 2022 17:31 )    Color: Light Yellow / Appearance: Clear / S.015 / pH: x  Gluc: x / Ketone: Negative  / Bili: Negative / Urobili: Negative   Blood: x / Protein: 30 mg/dL / Nitrite: Negative   Leuk Esterase: Negative / RBC: 13 /hpf / WBC 3 /HPF   Sq Epi: x / Non Sq Epi: 0 / Bacteria: Negative        COVID-19 PCR: NotDetec (12 Oct 2022 11:08)  SARS-CoV-2: NotDetec (08 Oct 2022 11:57)  COVID-19 PCR: NotDetec (11 Sep 2022 07:18)  COVID-19 PCR: NotDetec (11 May 2022 17:00)  COVID-19 PCR: NotDetec (03 May 2022 10:29)

## 2022-10-23 LAB
ANION GAP SERPL CALC-SCNC: 11 MMOL/L — SIGNIFICANT CHANGE UP (ref 5–17)
BUN SERPL-MCNC: 16 MG/DL — SIGNIFICANT CHANGE UP (ref 7–23)
CALCIUM SERPL-MCNC: 9 MG/DL — SIGNIFICANT CHANGE UP (ref 8.4–10.5)
CHLORIDE SERPL-SCNC: 106 MMOL/L — SIGNIFICANT CHANGE UP (ref 96–108)
CO2 SERPL-SCNC: 25 MMOL/L — SIGNIFICANT CHANGE UP (ref 22–31)
CREAT SERPL-MCNC: 1.68 MG/DL — HIGH (ref 0.5–1.3)
CULTURE RESULTS: NO GROWTH — SIGNIFICANT CHANGE UP
EGFR: 41 ML/MIN/1.73M2 — LOW
GLUCOSE SERPL-MCNC: 85 MG/DL — SIGNIFICANT CHANGE UP (ref 70–99)
HCT VFR BLD CALC: 34.1 % — LOW (ref 39–50)
HGB BLD-MCNC: 10.5 G/DL — LOW (ref 13–17)
MCHC RBC-ENTMCNC: 25.7 PG — LOW (ref 27–34)
MCHC RBC-ENTMCNC: 30.8 GM/DL — LOW (ref 32–36)
MCV RBC AUTO: 83.6 FL — SIGNIFICANT CHANGE UP (ref 80–100)
NRBC # BLD: 0 /100 WBCS — SIGNIFICANT CHANGE UP (ref 0–0)
PLATELET # BLD AUTO: 251 K/UL — SIGNIFICANT CHANGE UP (ref 150–400)
POTASSIUM SERPL-MCNC: 3.3 MMOL/L — LOW (ref 3.5–5.3)
POTASSIUM SERPL-SCNC: 3.3 MMOL/L — LOW (ref 3.5–5.3)
RBC # BLD: 4.08 M/UL — LOW (ref 4.2–5.8)
RBC # FLD: 24.4 % — HIGH (ref 10.3–14.5)
SODIUM SERPL-SCNC: 142 MMOL/L — SIGNIFICANT CHANGE UP (ref 135–145)
SPECIMEN SOURCE: SIGNIFICANT CHANGE UP
WBC # BLD: 7.21 K/UL — SIGNIFICANT CHANGE UP (ref 3.8–10.5)
WBC # FLD AUTO: 7.21 K/UL — SIGNIFICANT CHANGE UP (ref 3.8–10.5)

## 2022-10-23 PROCEDURE — 99232 SBSQ HOSP IP/OBS MODERATE 35: CPT

## 2022-10-23 RX ORDER — POTASSIUM CHLORIDE 20 MEQ
40 PACKET (EA) ORAL ONCE
Refills: 0 | Status: DISCONTINUED | OUTPATIENT
Start: 2022-10-23 | End: 2022-10-23

## 2022-10-23 RX ORDER — APIXABAN 2.5 MG/1
5 TABLET, FILM COATED ORAL EVERY 12 HOURS
Refills: 0 | Status: DISCONTINUED | OUTPATIENT
Start: 2022-10-23 | End: 2022-10-23

## 2022-10-23 RX ORDER — APIXABAN 2.5 MG/1
2.5 TABLET, FILM COATED ORAL EVERY 12 HOURS
Refills: 0 | Status: DISCONTINUED | OUTPATIENT
Start: 2022-10-23 | End: 2022-10-25

## 2022-10-23 RX ORDER — POTASSIUM CHLORIDE 20 MEQ
40 PACKET (EA) ORAL ONCE
Refills: 0 | Status: COMPLETED | OUTPATIENT
Start: 2022-10-23 | End: 2022-10-23

## 2022-10-23 RX ORDER — POTASSIUM CHLORIDE 20 MEQ
20 PACKET (EA) ORAL ONCE
Refills: 0 | Status: DISCONTINUED | OUTPATIENT
Start: 2022-10-23 | End: 2022-10-23

## 2022-10-23 RX ORDER — POTASSIUM CHLORIDE 20 MEQ
20 PACKET (EA) ORAL ONCE
Refills: 0 | Status: COMPLETED | OUTPATIENT
Start: 2022-10-23 | End: 2022-10-23

## 2022-10-23 RX ADMIN — DESVENLAFAXINE 50 MILLIGRAM(S): 50 TABLET, EXTENDED RELEASE ORAL at 12:57

## 2022-10-23 RX ADMIN — CARBIDOPA AND LEVODOPA 1.5 TABLET(S): 25; 100 TABLET ORAL at 06:25

## 2022-10-23 RX ADMIN — Medication 5 MILLIGRAM(S): at 12:56

## 2022-10-23 RX ADMIN — Medication 20 MILLIEQUIVALENT(S): at 13:22

## 2022-10-23 RX ADMIN — CARBIDOPA AND LEVODOPA 1.5 TABLET(S): 25; 100 TABLET ORAL at 17:10

## 2022-10-23 RX ADMIN — CARBIDOPA AND LEVODOPA 1 TABLET(S): 25; 100 TABLET ORAL at 20:08

## 2022-10-23 RX ADMIN — Medication 40 MILLIEQUIVALENT(S): at 09:57

## 2022-10-23 RX ADMIN — POLYETHYLENE GLYCOL 3350 17 GRAM(S): 17 POWDER, FOR SOLUTION ORAL at 12:58

## 2022-10-23 RX ADMIN — CLOPIDOGREL BISULFATE 75 MILLIGRAM(S): 75 TABLET, FILM COATED ORAL at 12:56

## 2022-10-23 RX ADMIN — Medication 1 TABLET(S): at 12:56

## 2022-10-23 RX ADMIN — BUMETANIDE 2 MILLIGRAM(S): 0.25 INJECTION INTRAMUSCULAR; INTRAVENOUS at 06:25

## 2022-10-23 RX ADMIN — CARBIDOPA AND LEVODOPA 1.5 TABLET(S): 25; 100 TABLET ORAL at 12:57

## 2022-10-23 RX ADMIN — APIXABAN 2.5 MILLIGRAM(S): 2.5 TABLET, FILM COATED ORAL at 17:15

## 2022-10-23 RX ADMIN — AMIODARONE HYDROCHLORIDE 200 MILLIGRAM(S): 400 TABLET ORAL at 06:25

## 2022-10-23 RX ADMIN — ATORVASTATIN CALCIUM 80 MILLIGRAM(S): 80 TABLET, FILM COATED ORAL at 21:53

## 2022-10-23 RX ADMIN — HEPARIN SODIUM 5000 UNIT(S): 5000 INJECTION INTRAVENOUS; SUBCUTANEOUS at 06:25

## 2022-10-23 RX ADMIN — SENNA PLUS 2 TABLET(S): 8.6 TABLET ORAL at 21:53

## 2022-10-23 NOTE — PROGRESS NOTE ADULT - SUBJECTIVE AND OBJECTIVE BOX
[facility] Division of Hospital Medicine  Phil Potter MD  Available via MS Teams  Pager [phone]    SUBJECTIVE / OVERNIGHT EVENTS: No acute events. Patients neurologic status is improved from Saturday and he is now alert, however remains oriented only to name and is unsure of year and current location. Reports he is in no acute distress. Denies chest pain or palpitations. Denies shortness of breath or cough. Denies abdominal pain, nausea or vomiting. ROS otherwise negative.    MEDICATIONS  (STANDING):  aMIOdarone    Tablet 200 milliGRAM(s) Oral daily  apixaban 2.5 milliGRAM(s) Oral every 12 hours  atorvastatin 80 milliGRAM(s) Oral at bedtime  buMETAnide 2 milliGRAM(s) Oral daily  carbidopa/levodopa  25/100 1.5 Tablet(s) Oral <User Schedule>  carbidopa/levodopa  25/100 1 Tablet(s) Oral <User Schedule>  clopidogrel Tablet 75 milliGRAM(s) Oral daily  desvenlafaxine ER 50 milliGRAM(s) Oral daily  multivitamin/minerals 1 Tablet(s) Oral daily  oxybutynin 5 milliGRAM(s) Oral daily  polyethylene glycol 3350 17 Gram(s) Oral daily  potassium chloride   Powder 20 milliEquivalent(s) Oral once  senna 2 Tablet(s) Oral at bedtime    MEDICATIONS  (PRN):  acetaminophen     Tablet .. 650 milliGRAM(s) Oral every 6 hours PRN Mild Pain (1 - 3), Moderate Pain (4 - 6)      I&O's Summary    22 Oct 2022 07:01  -  23 Oct 2022 07:00  --------------------------------------------------------  IN: 390 mL / OUT: 0 mL / NET: 390 mL        PHYSICAL EXAM:  Vital Signs Last 24 Hrs  T(C): 36.8 (23 Oct 2022 11:29), Max: 36.8 (23 Oct 2022 11:29)  T(F): 98.3 (23 Oct 2022 11:29), Max: 98.3 (23 Oct 2022 11:29)  HR: 62 (23 Oct 2022 11:29) (60 - 62)  BP: 177/98 (23 Oct 2022 11:29) (114/62 - 177/98)  BP(mean): 85 (23 Oct 2022 05:30) (85 - 85)  RR: 18 (23 Oct 2022 11:29) (16 - 18)  SpO2: 98% (23 Oct 2022 11:29) (98% - 99%)    Parameters below as of 23 Oct 2022 11:29  Patient On (Oxygen Delivery Method): room air      CONSTITUTIONAL: NAD, well-groomed  EYES: PERRLA; conjunctiva and sclera clear  ENMT: Moist oral mucosa, no pharyngeal injection or exudates; normal dentition  NECK: Supple, no palpable masses; no thyromegaly  RESPIRATORY: Normal respiratory effort; lungs are clear to auscultation bilaterally  CARDIOVASCULAR: normal S1 and S2, no murmur/rub/gallop; No lower extremity edema  ABDOMEN: Nontender to palpation, normoactive bowel sounds, no rebound/guarding; No hepatosplenomegaly  MUSCULOSKELETAL:  no clubbing or cyanosis of digits; no joint swelling or tenderness to palpation  PSYCH: A+O to person, place, and time; affect appropriate  NEUROLOGY: CN 2-12 are intact and symmetric; no gross sensory deficits   SKIN: No rashes; no palpable lesions    LABS:                        10.5   7.21  )-----------( 251      ( 23 Oct 2022 11:54 )             34.1     10    142  |  106  |  16  ----------------------------<  85  3.3<L>   |  25  |  1.68<H>    Ca    9.0      23 Oct 2022 06:00            Urinalysis Basic - ( 21 Oct 2022 17:31 )    Color: Light Yellow / Appearance: Clear / S.015 / pH: x  Gluc: x / Ketone: Negative  / Bili: Negative / Urobili: Negative   Blood: x / Protein: 30 mg/dL / Nitrite: Negative   Leuk Esterase: Negative / RBC: 13 /hpf / WBC 3 /HPF   Sq Epi: x / Non Sq Epi: 0 / Bacteria: Negative        Culture - Urine (collected 21 Oct 2022 17:31)  Source: Clean Catch Clean Catch (Midstream)  Final Report (23 Oct 2022 07:57):    No growth      COVID-19 PCR: NotDetec (12 Oct 2022 11:08)  SARS-CoV-2: NotDetec (08 Oct 2022 11:57)  COVID-19 PCR: NotDetec (11 Sep 2022 07:18)  COVID-19 PCR: NotDetec (11 May 2022 17:00)  COVID-19 PCR: NotDetec (03 May 2022 10:29) [facility] Division of Hospital Medicine  Phil Potter MD  Available via MS Teams  Pager [phone]    SUBJECTIVE / OVERNIGHT EVENTS: No acute events. Patients neurologic status is improved from Saturday and he is now alert, however remains oriented only to name and is unsure of year and current location. Reports he is in no acute distress. Denies chest pain or palpitations. Denies shortness of breath or cough. Denies abdominal pain, nausea or vomiting. ROS otherwise negative.    MEDICATIONS  (STANDING):  aMIOdarone    Tablet 200 milliGRAM(s) Oral daily  apixaban 2.5 milliGRAM(s) Oral every 12 hours  atorvastatin 80 milliGRAM(s) Oral at bedtime  buMETAnide 2 milliGRAM(s) Oral daily  carbidopa/levodopa  25/100 1.5 Tablet(s) Oral <User Schedule>  carbidopa/levodopa  25/100 1 Tablet(s) Oral <User Schedule>  clopidogrel Tablet 75 milliGRAM(s) Oral daily  desvenlafaxine ER 50 milliGRAM(s) Oral daily  multivitamin/minerals 1 Tablet(s) Oral daily  oxybutynin 5 milliGRAM(s) Oral daily  polyethylene glycol 3350 17 Gram(s) Oral daily  potassium chloride   Powder 20 milliEquivalent(s) Oral once  senna 2 Tablet(s) Oral at bedtime    MEDICATIONS  (PRN):  acetaminophen     Tablet .. 650 milliGRAM(s) Oral every 6 hours PRN Mild Pain (1 - 3), Moderate Pain (4 - 6)      I&O's Summary    22 Oct 2022 07:01  -  23 Oct 2022 07:00  --------------------------------------------------------  IN: 390 mL / OUT: 0 mL / NET: 390 mL        PHYSICAL EXAM:  Vital Signs Last 24 Hrs  T(C): 36.8 (23 Oct 2022 11:29), Max: 36.8 (23 Oct 2022 11:29)  T(F): 98.3 (23 Oct 2022 11:29), Max: 98.3 (23 Oct 2022 11:29)  HR: 62 (23 Oct 2022 11:29) (60 - 62)  BP: 177/98 (23 Oct 2022 11:29) (114/62 - 177/98)  BP(mean): 85 (23 Oct 2022 05:30) (85 - 85)  RR: 18 (23 Oct 2022 11:29) (16 - 18)  SpO2: 98% (23 Oct 2022 11:29) (98% - 99%)    Parameters below as of 23 Oct 2022 11:29  Patient On (Oxygen Delivery Method): room air      CONSTITUTIONAL: NAD, well-groomed  EYES: PERRLA; conjunctiva and sclera clear  ENMT: Moist oral mucosa, no pharyngeal injection or exudates; normal dentition  NECK: Supple, no palpable masses; no thyromegaly  RESPIRATORY: Normal respiratory effort; lungs are clear to auscultation bilaterally  CARDIOVASCULAR: normal S1 and S2, no murmur/rub/gallop; No lower extremity edema  ABDOMEN: Nontender to palpation, normoactive bowel sounds, no rebound/guarding; No hepatosplenomegaly  MUSCULOSKELETAL:  no clubbing or cyanosis of digits; no joint swelling or tenderness to palpation  PSYCH: A+O to person only, not to place or time.   NEUROLOGY: CN 2-12 are intact and symmetric; no gross sensory deficits   SKIN: No rashes; no palpable lesions    LABS:                        10.5   7.21  )-----------( 251      ( 23 Oct 2022 11:54 )             34.1     10    142  |  106  |  16  ----------------------------<  85  3.3<L>   |  25  |  1.68<H>    Ca    9.0      23 Oct 2022 06:00            Urinalysis Basic - ( 21 Oct 2022 17:31 )    Color: Light Yellow / Appearance: Clear / S.015 / pH: x  Gluc: x / Ketone: Negative  / Bili: Negative / Urobili: Negative   Blood: x / Protein: 30 mg/dL / Nitrite: Negative   Leuk Esterase: Negative / RBC: 13 /hpf / WBC 3 /HPF   Sq Epi: x / Non Sq Epi: 0 / Bacteria: Negative        Culture - Urine (collected 21 Oct 2022 17:31)  Source: Clean Catch Clean Catch (Midstream)  Final Report (23 Oct 2022 07:57):    No growth      COVID-19 PCR: NotDetec (12 Oct 2022 11:08)  SARS-CoV-2: NotDetec (08 Oct 2022 11:57)  COVID-19 PCR: NotDetec (11 Sep 2022 07:18)  COVID-19 PCR: NotDetec (11 May 2022 17:00)  COVID-19 PCR: NotDetec (03 May 2022 10:29)

## 2022-10-23 NOTE — PROGRESS NOTE ADULT - ASSESSMENT
80yo M with hx of Parkinson's, Afib (eliquis), bradycardia s/p PPM (2021), CAD s/p stent (08/2021), amyloid (vyndamax), chronic R pleural effusion s/p Pleurx HTD, BPH presented from rehab for BiV upgrade 10/19 with Dr. Vergara complicated with acute metabolic encephalopathy and now on medicine service with mild improvement

## 2022-10-24 ENCOUNTER — APPOINTMENT (OUTPATIENT)
Dept: THORACIC SURGERY | Facility: CLINIC | Age: 82
End: 2022-10-24

## 2022-10-24 LAB
ANION GAP SERPL CALC-SCNC: 12 MMOL/L — SIGNIFICANT CHANGE UP (ref 5–17)
BUN SERPL-MCNC: 16 MG/DL — SIGNIFICANT CHANGE UP (ref 7–23)
CALCIUM SERPL-MCNC: 8.9 MG/DL — SIGNIFICANT CHANGE UP (ref 8.4–10.5)
CHLORIDE SERPL-SCNC: 104 MMOL/L — SIGNIFICANT CHANGE UP (ref 96–108)
CO2 SERPL-SCNC: 24 MMOL/L — SIGNIFICANT CHANGE UP (ref 22–31)
CREAT SERPL-MCNC: 1.55 MG/DL — HIGH (ref 0.5–1.3)
EGFR: 45 ML/MIN/1.73M2 — LOW
GLUCOSE SERPL-MCNC: 88 MG/DL — SIGNIFICANT CHANGE UP (ref 70–99)
POTASSIUM SERPL-MCNC: 3.7 MMOL/L — SIGNIFICANT CHANGE UP (ref 3.5–5.3)
POTASSIUM SERPL-SCNC: 3.7 MMOL/L — SIGNIFICANT CHANGE UP (ref 3.5–5.3)
SODIUM SERPL-SCNC: 140 MMOL/L — SIGNIFICANT CHANGE UP (ref 135–145)

## 2022-10-24 PROCEDURE — 76770 US EXAM ABDO BACK WALL COMP: CPT | Mod: 26

## 2022-10-24 PROCEDURE — 99232 SBSQ HOSP IP/OBS MODERATE 35: CPT

## 2022-10-24 RX ORDER — CHLORHEXIDINE GLUCONATE 213 G/1000ML
1 SOLUTION TOPICAL
Refills: 0 | Status: DISCONTINUED | OUTPATIENT
Start: 2022-10-24 | End: 2022-10-25

## 2022-10-24 RX ORDER — POTASSIUM CHLORIDE 20 MEQ
20 PACKET (EA) ORAL DAILY
Refills: 0 | Status: DISCONTINUED | OUTPATIENT
Start: 2022-10-24 | End: 2022-10-25

## 2022-10-24 RX ADMIN — CARBIDOPA AND LEVODOPA 1.5 TABLET(S): 25; 100 TABLET ORAL at 13:27

## 2022-10-24 RX ADMIN — CARBIDOPA AND LEVODOPA 1.5 TABLET(S): 25; 100 TABLET ORAL at 18:02

## 2022-10-24 RX ADMIN — ATORVASTATIN CALCIUM 80 MILLIGRAM(S): 80 TABLET, FILM COATED ORAL at 21:32

## 2022-10-24 RX ADMIN — APIXABAN 2.5 MILLIGRAM(S): 2.5 TABLET, FILM COATED ORAL at 18:02

## 2022-10-24 RX ADMIN — CARBIDOPA AND LEVODOPA 1 TABLET(S): 25; 100 TABLET ORAL at 18:02

## 2022-10-24 RX ADMIN — Medication 1 TABLET(S): at 13:28

## 2022-10-24 RX ADMIN — Medication 20 MILLIEQUIVALENT(S): at 21:32

## 2022-10-24 RX ADMIN — AMIODARONE HYDROCHLORIDE 200 MILLIGRAM(S): 400 TABLET ORAL at 05:19

## 2022-10-24 RX ADMIN — CARBIDOPA AND LEVODOPA 1.5 TABLET(S): 25; 100 TABLET ORAL at 05:19

## 2022-10-24 RX ADMIN — APIXABAN 2.5 MILLIGRAM(S): 2.5 TABLET, FILM COATED ORAL at 05:19

## 2022-10-24 RX ADMIN — BUMETANIDE 2 MILLIGRAM(S): 0.25 INJECTION INTRAMUSCULAR; INTRAVENOUS at 05:19

## 2022-10-24 RX ADMIN — SENNA PLUS 2 TABLET(S): 8.6 TABLET ORAL at 21:32

## 2022-10-24 RX ADMIN — CLOPIDOGREL BISULFATE 75 MILLIGRAM(S): 75 TABLET, FILM COATED ORAL at 13:27

## 2022-10-24 RX ADMIN — DESVENLAFAXINE 50 MILLIGRAM(S): 50 TABLET, EXTENDED RELEASE ORAL at 13:28

## 2022-10-24 RX ADMIN — Medication 5 MILLIGRAM(S): at 13:28

## 2022-10-24 NOTE — PROGRESS NOTE ADULT - ASSESSMENT
82yo M with hx of Parkinson's, Afib (eliquis), bradycardia s/p PPM (2021), CAD s/p stent (08/2021), amyloid (vyndamax), chronic R pleural effusion s/p Pleurx HTD, BPH presented from rehab for BiV upgrade 10/19 with Dr. Vergara complicated with acute metabolic encephalopathy and now on medicine service with improvement

## 2022-10-24 NOTE — PROGRESS NOTE ADULT - SUBJECTIVE AND OBJECTIVE BOX
Andre Reyes, M.D.  Pager: [phone]  Office: [phone]    Patient is a 81y old  Male who presents with a chief complaint of Device upgrade (23 Oct 2022 12:05)          SUBJECTIVE / OVERNIGHT EVENTS:    No acute overnight events.    ROS: ( - ) Fever, ( - )Chills,  ( - )Nausea/Vomiting, ( - ) Cough, ( - )Shortness of breath, ( - )Chest Pain    MEDICATIONS  (STANDING):  aMIOdarone    Tablet 200 milliGRAM(s) Oral daily  apixaban 2.5 milliGRAM(s) Oral every 12 hours  atorvastatin 80 milliGRAM(s) Oral at bedtime  buMETAnide 2 milliGRAM(s) Oral daily  carbidopa/levodopa  25/100 1.5 Tablet(s) Oral <User Schedule>  carbidopa/levodopa  25/100 1 Tablet(s) Oral <User Schedule>  clopidogrel Tablet 75 milliGRAM(s) Oral daily  desvenlafaxine ER 50 milliGRAM(s) Oral daily  multivitamin/minerals 1 Tablet(s) Oral daily  oxybutynin 5 milliGRAM(s) Oral daily  polyethylene glycol 3350 17 Gram(s) Oral daily  senna 2 Tablet(s) Oral at bedtime    MEDICATIONS  (PRN):  acetaminophen     Tablet .. 650 milliGRAM(s) Oral every 6 hours PRN Mild Pain (1 - 3), Moderate Pain (4 - 6)          T(C): 36.6 (10-24 @ 03:53), Max: 36.8 (10-23 @ 11:29)   HR: 60   BP: 101/55   RR: 18   SpO2: 93%    PHYSICAL EXAM:    CONSTITUTIONAL: NAD, well-developed, well-groomed  EYES: PERRLA; conjunctiva and sclera clear  ENMT: Moist oral mucosa, no pharyngeal injection or exudates; normal dentition  NECK: Supple, no palpable masses; no thyromegaly  RESPIRATORY: Normal respiratory effort; lungs are clear to auscultation bilaterally  CARDIOVASCULAR: Regular rate and rhythm, normal S1 and S2, no murmur/rub/gallop; No lower extremity edema; Peripheral pulses are 2+ bilaterally  ABDOMEN: Nontender to palpation, normoactive bowel sounds, no rebound/guarding; No hepatosplenomegaly  MUSCULOSKELETAL:  Normal gait; no clubbing or cyanosis of digits; no joint swelling or tenderness to palpation  PSYCH: A+O to person, place, and time; affect appropriate  NEUROLOGY: CN 2-12 are intact and symmetric; no gross sensory deficits   SKIN: No rashes; no palpable lesions      LABS:                        10.5   7.21  )-----------( 251      ( 23 Oct 2022 11:54 )             34.1      10-24    140  |  104  |  16  ----------------------------<  88  3.7   |  24  |  1.55<H>    Ca    8.9      24 Oct 2022 06:47         CAPILLARY BLOOD GLUCOSE          RADIOLOGY & ADDITIONAL TESTS:    Imaging Personally Reviewed:  Consultant(s) Notes Reviewed:    Care Discussed with Consultants/Other Providers:   Andre Reyes, M.D.  Pager: [phone]  Office: [phone]    Patient is a 81y old  Male who presents with a chief complaint of Device upgrade (23 Oct 2022 12:05)          SUBJECTIVE / OVERNIGHT EVENTS:    No acute overnight events.  No new complaints     ROS: ( - ) Fever, ( - )Chills,  ( - )Nausea/Vomiting, ( - ) Cough, ( - )Shortness of breath, ( - )Chest Pain    MEDICATIONS  (STANDING):  aMIOdarone    Tablet 200 milliGRAM(s) Oral daily  apixaban 2.5 milliGRAM(s) Oral every 12 hours  atorvastatin 80 milliGRAM(s) Oral at bedtime  buMETAnide 2 milliGRAM(s) Oral daily  carbidopa/levodopa  25/100 1.5 Tablet(s) Oral <User Schedule>  carbidopa/levodopa  25/100 1 Tablet(s) Oral <User Schedule>  clopidogrel Tablet 75 milliGRAM(s) Oral daily  desvenlafaxine ER 50 milliGRAM(s) Oral daily  multivitamin/minerals 1 Tablet(s) Oral daily  oxybutynin 5 milliGRAM(s) Oral daily  polyethylene glycol 3350 17 Gram(s) Oral daily  senna 2 Tablet(s) Oral at bedtime    MEDICATIONS  (PRN):  acetaminophen     Tablet .. 650 milliGRAM(s) Oral every 6 hours PRN Mild Pain (1 - 3), Moderate Pain (4 - 6)          T(C): 36.6 (10-24 @ 03:53), Max: 36.8 (10-23 @ 11:29)   HR: 60   BP: 101/55   RR: 18   SpO2: 93%    PHYSICAL EXAM:    CONSTITUTIONAL: NAD, well-developed, well-groomed  EYES: PERRLA; conjunctiva and sclera clear  ENMT: Moist oral mucosa, no pharyngeal injection or exudates; normal dentition  NECK: Supple, no palpable masses; no thyromegaly  RESPIRATORY: Normal respiratory effort; lungs are clear to auscultation bilaterally  CARDIOVASCULAR: Regular rate and rhythm, normal S1 and S2, no murmur/rub/gallop; No lower extremity edema; Peripheral pulses are 2+ bilaterally  ABDOMEN: Nontender to palpation, normoactive bowel sounds, no rebound/guarding; No hepatosplenomegaly  MUSCULOSKELETAL:  no clubbing or cyanosis of digits; no joint swelling or tenderness to palpation  PSYCH: A+O to person and place still mildly confused.  NEUROLOGY: CN 2-12 are intact and symmetric; no gross sensory deficits   SKIN: No rashes; no palpable lesions      LABS:                        10.5   7.21  )-----------( 251      ( 23 Oct 2022 11:54 )             34.1      10-24    140  |  104  |  16  ----------------------------<  88  3.7   |  24  |  1.55<H>    Ca    8.9      24 Oct 2022 06:47         CAPILLARY BLOOD GLUCOSE          RADIOLOGY & ADDITIONAL TESTS:    Imaging Personally Reviewed:  Consultant(s) Notes Reviewed:    Care Discussed with Consultants/Other Providers:

## 2022-10-24 NOTE — CHART NOTE - NSCHARTNOTEFT_GEN_A_CORE
Patient is scheduled for follow up in EP clinic on 11/2 @ 11:40 AM. EP will sign off at this time. Reconsult PRN.

## 2022-10-25 ENCOUNTER — TRANSCRIPTION ENCOUNTER (OUTPATIENT)
Age: 82
End: 2022-10-25

## 2022-10-25 VITALS
OXYGEN SATURATION: 97 % | SYSTOLIC BLOOD PRESSURE: 108 MMHG | DIASTOLIC BLOOD PRESSURE: 60 MMHG | HEART RATE: 60 BPM | RESPIRATION RATE: 18 BRPM | TEMPERATURE: 98 F

## 2022-10-25 LAB
ANION GAP SERPL CALC-SCNC: 11 MMOL/L — SIGNIFICANT CHANGE UP (ref 5–17)
APPEARANCE UR: ABNORMAL
BACTERIA # UR AUTO: ABNORMAL
BILIRUB UR-MCNC: NEGATIVE — SIGNIFICANT CHANGE UP
BUN SERPL-MCNC: 15 MG/DL — SIGNIFICANT CHANGE UP (ref 7–23)
CALCIUM SERPL-MCNC: 8.8 MG/DL — SIGNIFICANT CHANGE UP (ref 8.4–10.5)
CHLORIDE SERPL-SCNC: 104 MMOL/L — SIGNIFICANT CHANGE UP (ref 96–108)
CO2 SERPL-SCNC: 25 MMOL/L — SIGNIFICANT CHANGE UP (ref 22–31)
COLOR SPEC: YELLOW — SIGNIFICANT CHANGE UP
COMMENT - URINE: SIGNIFICANT CHANGE UP
CREAT SERPL-MCNC: 1.46 MG/DL — HIGH (ref 0.5–1.3)
DIFF PNL FLD: ABNORMAL
EGFR: 49 ML/MIN/1.73M2 — LOW
EPI CELLS # UR: 1 /HPF — SIGNIFICANT CHANGE UP
GLUCOSE SERPL-MCNC: 82 MG/DL — SIGNIFICANT CHANGE UP (ref 70–99)
GLUCOSE UR QL: NEGATIVE — SIGNIFICANT CHANGE UP
HCT VFR BLD CALC: 35.7 % — LOW (ref 39–50)
HGB BLD-MCNC: 10.9 G/DL — LOW (ref 13–17)
HYALINE CASTS # UR AUTO: 2 /LPF — SIGNIFICANT CHANGE UP (ref 0–2)
KETONES UR-MCNC: NEGATIVE — SIGNIFICANT CHANGE UP
LEUKOCYTE ESTERASE UR-ACNC: ABNORMAL
MCHC RBC-ENTMCNC: 25.5 PG — LOW (ref 27–34)
MCHC RBC-ENTMCNC: 30.5 GM/DL — LOW (ref 32–36)
MCV RBC AUTO: 83.4 FL — SIGNIFICANT CHANGE UP (ref 80–100)
MRSA PCR RESULT.: SIGNIFICANT CHANGE UP
NITRITE UR-MCNC: POSITIVE
NRBC # BLD: 0 /100 WBCS — SIGNIFICANT CHANGE UP (ref 0–0)
PH UR: 6 — SIGNIFICANT CHANGE UP (ref 5–8)
PLATELET # BLD AUTO: 244 K/UL — SIGNIFICANT CHANGE UP (ref 150–400)
POTASSIUM SERPL-MCNC: 3.6 MMOL/L — SIGNIFICANT CHANGE UP (ref 3.5–5.3)
POTASSIUM SERPL-SCNC: 3.6 MMOL/L — SIGNIFICANT CHANGE UP (ref 3.5–5.3)
PROT UR-MCNC: 100 — SIGNIFICANT CHANGE UP
RBC # BLD: 4.28 M/UL — SIGNIFICANT CHANGE UP (ref 4.2–5.8)
RBC # FLD: 24.8 % — HIGH (ref 10.3–14.5)
RBC CASTS # UR COMP ASSIST: 21 /HPF — HIGH (ref 0–4)
S AUREUS DNA NOSE QL NAA+PROBE: SIGNIFICANT CHANGE UP
SARS-COV-2 RNA SPEC QL NAA+PROBE: SIGNIFICANT CHANGE UP
SODIUM SERPL-SCNC: 140 MMOL/L — SIGNIFICANT CHANGE UP (ref 135–145)
SP GR SPEC: 1.02 — SIGNIFICANT CHANGE UP (ref 1.01–1.02)
UROBILINOGEN FLD QL: NEGATIVE — SIGNIFICANT CHANGE UP
WBC # BLD: 8.16 K/UL — SIGNIFICANT CHANGE UP (ref 3.8–10.5)
WBC # FLD AUTO: 8.16 K/UL — SIGNIFICANT CHANGE UP (ref 3.8–10.5)
WBC UR QL: 8 /HPF — HIGH (ref 0–5)

## 2022-10-25 PROCEDURE — 82140 ASSAY OF AMMONIA: CPT

## 2022-10-25 PROCEDURE — C1894: CPT

## 2022-10-25 PROCEDURE — C1769: CPT

## 2022-10-25 PROCEDURE — C1889: CPT

## 2022-10-25 PROCEDURE — 33229 REMV&REPLC PM GEN MULT LEADS: CPT

## 2022-10-25 PROCEDURE — C1892: CPT

## 2022-10-25 PROCEDURE — 80048 BASIC METABOLIC PNL TOTAL CA: CPT

## 2022-10-25 PROCEDURE — C1900: CPT

## 2022-10-25 PROCEDURE — 33225 L VENTRIC PACING LEAD ADD-ON: CPT

## 2022-10-25 PROCEDURE — 87086 URINE CULTURE/COLONY COUNT: CPT

## 2022-10-25 PROCEDURE — C2621: CPT

## 2022-10-25 PROCEDURE — 97110 THERAPEUTIC EXERCISES: CPT

## 2022-10-25 PROCEDURE — 82607 VITAMIN B-12: CPT

## 2022-10-25 PROCEDURE — 70450 CT HEAD/BRAIN W/O DYE: CPT

## 2022-10-25 PROCEDURE — 86900 BLOOD TYPING SEROLOGIC ABO: CPT

## 2022-10-25 PROCEDURE — 97161 PT EVAL LOW COMPLEX 20 MIN: CPT

## 2022-10-25 PROCEDURE — 71046 X-RAY EXAM CHEST 2 VIEWS: CPT

## 2022-10-25 PROCEDURE — 95816 EEG AWAKE AND DROWSY: CPT

## 2022-10-25 PROCEDURE — 82803 BLOOD GASES ANY COMBINATION: CPT

## 2022-10-25 PROCEDURE — 86901 BLOOD TYPING SEROLOGIC RH(D): CPT

## 2022-10-25 PROCEDURE — 85610 PROTHROMBIN TIME: CPT

## 2022-10-25 PROCEDURE — 82746 ASSAY OF FOLIC ACID SERUM: CPT

## 2022-10-25 PROCEDURE — 86850 RBC ANTIBODY SCREEN: CPT

## 2022-10-25 PROCEDURE — 82962 GLUCOSE BLOOD TEST: CPT

## 2022-10-25 PROCEDURE — 83605 ASSAY OF LACTIC ACID: CPT

## 2022-10-25 PROCEDURE — 85027 COMPLETE CBC AUTOMATED: CPT

## 2022-10-25 PROCEDURE — 85730 THROMBOPLASTIN TIME PARTIAL: CPT

## 2022-10-25 PROCEDURE — 76857 US EXAM PELVIC LIMITED: CPT

## 2022-10-25 PROCEDURE — 97530 THERAPEUTIC ACTIVITIES: CPT

## 2022-10-25 PROCEDURE — 99239 HOSP IP/OBS DSCHRG MGMT >30: CPT

## 2022-10-25 PROCEDURE — 87640 STAPH A DNA AMP PROBE: CPT

## 2022-10-25 PROCEDURE — C1725: CPT

## 2022-10-25 PROCEDURE — 71045 X-RAY EXAM CHEST 1 VIEW: CPT

## 2022-10-25 PROCEDURE — 36600 WITHDRAWAL OF ARTERIAL BLOOD: CPT

## 2022-10-25 PROCEDURE — 93005 ELECTROCARDIOGRAM TRACING: CPT

## 2022-10-25 PROCEDURE — 36415 COLL VENOUS BLD VENIPUNCTURE: CPT

## 2022-10-25 PROCEDURE — 76775 US EXAM ABDO BACK WALL LIM: CPT

## 2022-10-25 PROCEDURE — 81001 URINALYSIS AUTO W/SCOPE: CPT

## 2022-10-25 PROCEDURE — C1887: CPT

## 2022-10-25 PROCEDURE — 84443 ASSAY THYROID STIM HORMONE: CPT

## 2022-10-25 PROCEDURE — 85025 COMPLETE CBC W/AUTO DIFF WBC: CPT

## 2022-10-25 PROCEDURE — U0003: CPT

## 2022-10-25 PROCEDURE — 83735 ASSAY OF MAGNESIUM: CPT

## 2022-10-25 PROCEDURE — U0005: CPT

## 2022-10-25 PROCEDURE — 87641 MR-STAPH DNA AMP PROBE: CPT

## 2022-10-25 RX ORDER — POTASSIUM CHLORIDE 20 MEQ
15 PACKET (EA) ORAL
Qty: 0 | Refills: 0 | DISCHARGE
Start: 2022-10-25

## 2022-10-25 RX ORDER — POTASSIUM CHLORIDE 20 MEQ
1 PACKET (EA) ORAL
Qty: 0 | Refills: 0 | DISCHARGE

## 2022-10-25 RX ORDER — CARBIDOPA AND LEVODOPA 25; 100 MG/1; MG/1
1.5 TABLET ORAL
Qty: 0 | Refills: 0 | DISCHARGE

## 2022-10-25 RX ORDER — OLANZAPINE 15 MG/1
5 TABLET, FILM COATED ORAL AT BEDTIME
Refills: 0 | Status: DISCONTINUED | OUTPATIENT
Start: 2022-10-25 | End: 2022-10-25

## 2022-10-25 RX ORDER — MIRTAZAPINE 45 MG/1
7.5 TABLET, ORALLY DISINTEGRATING ORAL AT BEDTIME
Refills: 0 | Status: DISCONTINUED | OUTPATIENT
Start: 2022-10-25 | End: 2022-10-25

## 2022-10-25 RX ADMIN — CLOPIDOGREL BISULFATE 75 MILLIGRAM(S): 75 TABLET, FILM COATED ORAL at 13:48

## 2022-10-25 RX ADMIN — CARBIDOPA AND LEVODOPA 1.5 TABLET(S): 25; 100 TABLET ORAL at 05:03

## 2022-10-25 RX ADMIN — APIXABAN 2.5 MILLIGRAM(S): 2.5 TABLET, FILM COATED ORAL at 05:04

## 2022-10-25 RX ADMIN — CARBIDOPA AND LEVODOPA 1.5 TABLET(S): 25; 100 TABLET ORAL at 16:26

## 2022-10-25 RX ADMIN — CHLORHEXIDINE GLUCONATE 1 APPLICATION(S): 213 SOLUTION TOPICAL at 05:07

## 2022-10-25 RX ADMIN — Medication 5 MILLIGRAM(S): at 13:48

## 2022-10-25 RX ADMIN — AMIODARONE HYDROCHLORIDE 200 MILLIGRAM(S): 400 TABLET ORAL at 05:04

## 2022-10-25 RX ADMIN — Medication 20 MILLIEQUIVALENT(S): at 13:48

## 2022-10-25 RX ADMIN — Medication 1 TABLET(S): at 13:47

## 2022-10-25 RX ADMIN — CARBIDOPA AND LEVODOPA 1.5 TABLET(S): 25; 100 TABLET ORAL at 13:47

## 2022-10-25 RX ADMIN — DESVENLAFAXINE 50 MILLIGRAM(S): 50 TABLET, EXTENDED RELEASE ORAL at 13:47

## 2022-10-25 RX ADMIN — BUMETANIDE 2 MILLIGRAM(S): 0.25 INJECTION INTRAMUSCULAR; INTRAVENOUS at 05:04

## 2022-10-25 RX ADMIN — POLYETHYLENE GLYCOL 3350 17 GRAM(S): 17 POWDER, FOR SOLUTION ORAL at 13:49

## 2022-10-25 NOTE — PROGRESS NOTE ADULT - PROBLEM SELECTOR PLAN 2
s/p ICD placement 10/19  -EP recs   -c/w Eliquis 2.5 mg PO BID    -monitor on Telemetry  -c/w amiodarone as per cardiology recommendations
Continue antihypertensive medications with hold parameters
s/p ICD placement 10/19  -EP recs   -restart Eliquis Rick 10/23  -monitor on Telemetry  -c/w amiodarone as per cardiology recommendations
s/p ICD placement 10/19  -EP recs   -restart Eliquis Rick 10/23  -monitor on Telemetry  -repeat ecg AM for prolong qtc  -cw amiodarone
s/p ICD placement 10/19  -EP recs   -c/w Eliquis 2.5 mg PO BID    -monitor on Telemetry  -c/w amiodarone as per cardiology recommendations
s/p ICD placement 10/19  -EP recs   -restart Eliquis 2.5 mg PO BID today   -monitor on Telemetry  -c/w amiodarone as per cardiology recommendations

## 2022-10-25 NOTE — PROGRESS NOTE ADULT - PROBLEM SELECTOR PROBLEM 4
Hyperlipidemia
Chronic pleural effusion

## 2022-10-25 NOTE — DISCHARGE NOTE NURSING/CASE MANAGEMENT/SOCIAL WORK - NSDCPETBCESMAN_GEN_ALL_CORE
Alert-The patient is alert, awake and responds to voice. The patient is oriented to time, place, and person. The triage nurse is able to obtain subjective information.
If you are a smoker, it is important for your health to stop smoking. Please be aware that second hand smoke is also harmful.

## 2022-10-25 NOTE — PROGRESS NOTE ADULT - PROBLEM SELECTOR PLAN 5
monitor on telemetry  - amiodarone  -restart eliquis 10/23
monitor on telemetry  -c/w amiodarone  -restart eliquis 10/23 as per EP recs
monitor on telemetry  -c/w amiodarone  -restart eliquis today

## 2022-10-25 NOTE — PROGRESS NOTE ADULT - NSPROGADDITIONALINFOA_GEN_ALL_CORE
d/w ACP    Arlet Bustamante MD  Division of Hospital Medicine  Available on Microsoft Teams
Plan of care d/w Wife Opal. All questions answered  stable for discharge today  Discharge time spent: 3 min
d/w ACP Elo Toth
Plan of care d/w Wife Opal. All questions answered
Case d/w RACHELLE Sampson

## 2022-10-25 NOTE — PROGRESS NOTE ADULT - PROBLEM SELECTOR PROBLEM 1
Acute metabolic encephalopathy
Encounter for adjustment of biventricular implantable cardioverter-defibrillator (ICD)
Acute metabolic encephalopathy
Acute metabolic encephalopathy

## 2022-10-25 NOTE — PROGRESS NOTE ADULT - PROBLEM SELECTOR PROBLEM 3
Altered mental status
Chronic kidney disease

## 2022-10-25 NOTE — PROGRESS NOTE ADULT - PROBLEM SELECTOR PLAN 6
TTE EF 54% with moderate aortic regurg  -resume home vyndamax  -c/w home bumex
TTE EF 54% with moderate aortic regurg  -resume home vyndamax  -cw home bumex

## 2022-10-25 NOTE — PROGRESS NOTE ADULT - PROBLEM SELECTOR PLAN 9
dvt: heparin  Diet: DASH  Dispo: ALEXANDRA
dvt: heparin  Diet: DASH  Dispo: PT eval
dvt: heparin  Diet: DASH  Dispo: ALEXANDRA
dvt: heparin  Diet: DASH  Dispo: PT eval pending neurologic improvement
dvt: heparin  Diet: DASH  Dispo: PT eval, likely today

## 2022-10-25 NOTE — PROVIDER CONTACT NOTE (OTHER) - SITUATION
Family concerned no output from Pleurex catheter yesterday. Received handoff report from night RN 0 output from pleurex catheter yesterday.
Performed dysphagia screen and returned to pt with po meds to find pt somnolent and non-responsive to painful stimuli
Reported off by night RN that pt unable to swallow oral medications. Pt with c/o pain unable to rate.

## 2022-10-25 NOTE — PROGRESS NOTE ADULT - PROVIDER SPECIALTY LIST ADULT
Hospitalist
Internal Medicine
Hospitalist
Cardiology
Electrophysiology
Cardiology
Internal Medicine
Internal Medicine

## 2022-10-25 NOTE — PROGRESS NOTE ADULT - PROBLEM SELECTOR PROBLEM 2
Hypertension
Encounter for adjustment of biventricular implantable cardioverter-defibrillator (ICD)

## 2022-10-25 NOTE — DISCHARGE NOTE NURSING/CASE MANAGEMENT/SOCIAL WORK - PATIENT PORTAL LINK FT
You can access the FollowMyHealth Patient Portal offered by [facility] by registering at the following website: [email or web address]. By joining [facility]’s FollowMyHealth portal, you will also be able to view your health information using other applications (apps) compatible with our system.

## 2022-10-25 NOTE — PROVIDER CONTACT NOTE (OTHER) - ASSESSMENT
Patient A&O2, alert. Patient able to make needs known. Patient denies CP, SOB. [facility]

## 2022-10-25 NOTE — PROVIDER CONTACT NOTE (OTHER) - BACKGROUND
pt is s/p pacemaker upgrade
s/p pacemaker upgrade.
Patient DX: S/P upgrade from dual chamber PPM. Pleural effusion ; Pleurx cath drainage on mondays and friday, UTI s/p CTX

## 2022-10-25 NOTE — PROGRESS NOTE ADULT - ASSESSMENT
80yo M with hx of Parkinson's, Afib (eliquis), bradycardia s/p PPM (2021), CAD s/p stent (08/2021), amyloid (vyndamax), chronic R pleural effusion s/p Pleurx HTD, BPH presented from rehab for BiV upgrade 10/19 with Dr. Vergara complicated with acute metabolic encephalopathy and now on medicine service with improvement

## 2022-10-25 NOTE — PROGRESS NOTE ADULT - PROBLEM SELECTOR PLAN 7
s/p stent.   -resume plavix  -cw home statin
s/p stent.   -resume plavix  -cw home statin
s/p stent.   -resume Plavix  -c/w home statin

## 2022-10-25 NOTE — PROGRESS NOTE ADULT - PROBLEM SELECTOR PLAN 3
EEG ordered  Head CT (-)
Cr baseline 1.8-2.0  -renally dose and avoid nephrotoxic agents  -check renal bladder US (prior showed Calcification 1.2 cm along the bladder neck/base   of the prostate) --> pt to f/u with urology outpt (sees Dr. Martínez)    Creatinine Trend: 1.55<--, 1.68<--, 1.77<--, 1.87<--, 1.93<--, 1.90<--
Cr baseline 1.8-2.0  -renally dose and avoid nephrotoxic agents  -check renal bladder US (prior showed Calcification 1.2 cm along the bladder neck/base   of the prostate
Cr baseline 1.8-2.0  -renally dose and avoid nephrotoxic agents  -check renal bladder US (prior showed Calcification 1.2 cm along the bladder neck/base   of the prostate
Cr baseline 1.8-2.0  -renally dose and avoid nephrotoxic agents  -check renal bladder US (prior showed Calcification 1.2 cm along the bladder neck/base   of the prostate    Creatinine Trend: 1.55<--, 1.68<--, 1.77<--, 1.87<--, 1.93<--, 1.90<--
Cr baseline 1.8-2.0  -renally dose and avoid nephrotoxic agents  -check renal bladder US (prior showed Calcification 1.2 cm along the bladder neck/base   of the prostate

## 2022-10-25 NOTE — PROVIDER CONTACT NOTE (OTHER) - ACTION/TREATMENT ORDERED:
NP Abigail to contact Neuro. for consult
Pt receiving acetaminophen as per order
NP Analia Vidal notified. Instructed by NP to drain Pleurex  cath today. 570 cc drained from pleurex. NP notified. Patient ok to be discharged to rehab per NP.

## 2022-10-25 NOTE — PROGRESS NOTE ADULT - SUBJECTIVE AND OBJECTIVE BOX
Andre Reyes, M.D.  Pager: [phone]  Office: [phone]    Patient is a 81y old  Male who presents with a chief complaint of Device upgrade (23 Oct 2022 12:05)          SUBJECTIVE / OVERNIGHT EVENTS:    No acute overnight events.    ROS: ( - ) Fever, ( - )Chills,  ( - )Nausea/Vomiting, ( - ) Cough, ( - )Shortness of breath, ( - )Chest Pain    MEDICATIONS  (STANDING):  aMIOdarone    Tablet 200 milliGRAM(s) Oral daily  apixaban 2.5 milliGRAM(s) Oral every 12 hours  atorvastatin 80 milliGRAM(s) Oral at bedtime  buMETAnide 2 milliGRAM(s) Oral daily  carbidopa/levodopa  25/100 1 Tablet(s) Oral <User Schedule>  carbidopa/levodopa  25/100 1.5 Tablet(s) Oral <User Schedule>  chlorhexidine 2% Cloths 1 Application(s) Topical <User Schedule>  clopidogrel Tablet 75 milliGRAM(s) Oral daily  desvenlafaxine ER 50 milliGRAM(s) Oral daily  multivitamin/minerals 1 Tablet(s) Oral daily  oxybutynin 5 milliGRAM(s) Oral daily  polyethylene glycol 3350 17 Gram(s) Oral daily  potassium chloride   Solution 20 milliEquivalent(s) Oral daily  senna 2 Tablet(s) Oral at bedtime    MEDICATIONS  (PRN):  acetaminophen     Tablet .. 650 milliGRAM(s) Oral every 6 hours PRN Mild Pain (1 - 3), Moderate Pain (4 - 6)          T(C): 36.8 (10-25 @ 04:39), Max: 36.9 (10-24 @ 11:07)   HR: 60   BP: 110/61   RR: 18   SpO2: 98%    PHYSICAL EXAM:    CONSTITUTIONAL: NAD, well-developed, well-groomed  EYES: PERRLA; conjunctiva and sclera clear  ENMT: Moist oral mucosa, no pharyngeal injection or exudates; normal dentition  NECK: Supple, no palpable masses; no thyromegaly  RESPIRATORY: Normal respiratory effort; lungs are clear to auscultation bilaterally  CARDIOVASCULAR: Regular rate and rhythm, normal S1 and S2, no murmur/rub/gallop; No lower extremity edema; Peripheral pulses are 2+ bilaterally  ABDOMEN: Nontender to palpation, normoactive bowel sounds, no rebound/guarding; No hepatosplenomegaly  MUSCULOSKELETAL:  Normal gait; no clubbing or cyanosis of digits; no joint swelling or tenderness to palpation  PSYCH: A+O to person, place, and time; affect appropriate  NEUROLOGY: CN 2-12 are intact and symmetric; no gross sensory deficits   SKIN: No rashes; no palpable lesions      LABS:                        10.9   8.16  )-----------( 244      ( 25 Oct 2022 07:06 )             35.7      10-25    140  |  104  |  15  ----------------------------<  82  3.6   |  25  |  1.46<H>    Ca    8.8      25 Oct 2022 07:06         CAPILLARY BLOOD GLUCOSE          RADIOLOGY & ADDITIONAL TESTS:    Imaging Personally Reviewed:  Consultant(s) Notes Reviewed:    Care Discussed with Consultants/Other Providers:   Andre Reyes, M.D.  Pager: [phone]  Office: [phone]    Patient is a 81y old  Male who presents with a chief complaint of Device upgrade (23 Oct 2022 12:05)          SUBJECTIVE / OVERNIGHT EVENTS:    wife at bedside.  pt was sleepy because he did not sleep very well last night, was up at 2am    ROS: ( - ) Fever, ( - )Chills,  ( - )Nausea/Vomiting, ( - ) Cough, ( - )Shortness of breath, ( - )Chest Pain    MEDICATIONS  (STANDING):  aMIOdarone    Tablet 200 milliGRAM(s) Oral daily  apixaban 2.5 milliGRAM(s) Oral every 12 hours  atorvastatin 80 milliGRAM(s) Oral at bedtime  buMETAnide 2 milliGRAM(s) Oral daily  carbidopa/levodopa  25/100 1 Tablet(s) Oral <User Schedule>  carbidopa/levodopa  25/100 1.5 Tablet(s) Oral <User Schedule>  chlorhexidine 2% Cloths 1 Application(s) Topical <User Schedule>  clopidogrel Tablet 75 milliGRAM(s) Oral daily  desvenlafaxine ER 50 milliGRAM(s) Oral daily  multivitamin/minerals 1 Tablet(s) Oral daily  oxybutynin 5 milliGRAM(s) Oral daily  polyethylene glycol 3350 17 Gram(s) Oral daily  potassium chloride   Solution 20 milliEquivalent(s) Oral daily  senna 2 Tablet(s) Oral at bedtime    MEDICATIONS  (PRN):  acetaminophen     Tablet .. 650 milliGRAM(s) Oral every 6 hours PRN Mild Pain (1 - 3), Moderate Pain (4 - 6)          T(C): 36.8 (10-25 @ 04:39), Max: 36.9 (10-24 @ 11:07)   HR: 60   BP: 110/61   RR: 18   SpO2: 98%    PHYSICAL EXAM:    CONSTITUTIONAL: NAD, well-developed, well-groomed  EYES: PERRLA; conjunctiva and sclera clear  ENMT: Moist oral mucosa, no pharyngeal injection or exudates; normal dentition  NECK: Supple, no palpable masses; no thyromegaly  RESPIRATORY: Normal respiratory effort; lungs are clear to auscultation bilaterally  CARDIOVASCULAR: Regular rate and rhythm, normal S1 and S2, no murmur/rub/gallop; No lower extremity edema; Peripheral pulses are 2+ bilaterally  ABDOMEN: Nontender to palpation, normoactive bowel sounds, no rebound/guarding; No hepatosplenomegaly  MUSCULOSKELETAL:  no clubbing or cyanosis of digits; no joint swelling or tenderness to palpation  PSYCH: A+O to person and place still mildly confused.  NEUROLOGY: CN 2-12 are intact and symmetric; no gross sensory deficits   SKIN: No rashes; no palpable lesions      LABS:                        10.9   8.16  )-----------( 244      ( 25 Oct 2022 07:06 )             35.7      10-25    140  |  104  |  15  ----------------------------<  82  3.6   |  25  |  1.46<H>    Ca    8.8      25 Oct 2022 07:06         CAPILLARY BLOOD GLUCOSE          RADIOLOGY & ADDITIONAL TESTS:    Imaging Personally Reviewed:  Consultant(s) Notes Reviewed:    Care Discussed with Consultants/Other Providers:

## 2022-10-25 NOTE — PROGRESS NOTE ADULT - PROBLEM SELECTOR PLAN 4
stable. Pleurx drainage M/F. Last drained 10/21
stable. Pleurx drainage M/F. Last drained 10/21
continue statin, confirm lipid panel results
stable. Pleurx drainage M/F. Last drained 10/21

## 2022-10-25 NOTE — PROGRESS NOTE ADULT - PROBLEM SELECTOR PLAN 1
lethargic post BiV upgrade 10/19. s/p narcan. Reported baseline AOx2-3 at home. UA+. CTH neg. EEG neg for seizures.   -Mental status is now improving and patient is AAO to person. Reportedly patient is AAOx2 at baseline.   -outpatient MRI head (need to wait 6 weeks post ICD placement)  -neuro recs appreciated  -was holding home Remeron and zyprexa for lethargy but given lack of sleep will restart these home meds
Monitor chest wall site for swelling, bleeding.  Confirm PA/LAT chest xray results before discharge home.
lethargic post BiV upgrade 10/19. s/p narcan. Reported baseline AOx2-3 at home. UA+. CTH neg. EEG neg for seizures  - UA negative, discontinued Ceftriaxone  -outpatient MRI head (need to wait 6 weeks post ICD placement  -neuro recs appreciated  -hold home Remeron and zyprexa for lethargy
lethargic post BiV upgrade 10/19. s/p narcan. Baseline AOx2-3 at home. UA+. CTH neg. EEG neg for seizures  -repeat UA and obtain urine cultures  -start CTX after urine studies obtained  -outpatient MRI head (need to wait 6 weeks post ICD placement  -neuro recs apprecated  -check B12, folate, tsh  -hold home Remeron and zyprexa for lethargy
lethargic post BiV upgrade 10/19. s/p narcan. Reported baseline AOx2-3 at home. UA+. CTH neg. EEG neg for seizures.   -Mental status is now improving and patient is AAO to person. Reportedly patient is AAOx2 at baseline.   -outpatient MRI head (need to wait 6 weeks post ICD placement)  -neuro recs appreciated  -Continue to hold home Remeron and zyprexa for lethargy
lethargic post BiV upgrade 10/19. s/p narcan. Reported baseline AOx2-3 at home. UA+. CTH neg. EEG neg for seizures. Mental status is now inproving and patient is AAO to person. Reportedly patient is AAOx2 at baseline.   -outpatient MRI head (need to wait 6 weeks post ICD placement_  -neuro recs appreciated  -Continue to hold home Remeron and zyprexa for lethargy

## 2022-11-02 ENCOUNTER — APPOINTMENT (OUTPATIENT)
Dept: ELECTROPHYSIOLOGY | Facility: CLINIC | Age: 82
End: 2022-11-02

## 2022-11-30 LAB
PROLACTIN SERPL-MCNC: 7.7 NG/ML
TESTOST BND SERPL-MCNC: 6.1 PG/ML
TESTOSTERONE TOTAL S: 509 NG/DL

## 2022-12-05 ENCOUNTER — APPOINTMENT (OUTPATIENT)
Dept: THORACIC SURGERY | Facility: CLINIC | Age: 82
End: 2022-12-05

## 2022-12-05 ENCOUNTER — NON-APPOINTMENT (OUTPATIENT)
Age: 82
End: 2022-12-05

## 2022-12-05 DIAGNOSIS — F32.A DEPRESSION, UNSPECIFIED: ICD-10-CM

## 2022-12-05 PROCEDURE — 99443: CPT | Mod: 95

## 2022-12-06 ENCOUNTER — APPOINTMENT (OUTPATIENT)
Dept: PULMONOLOGY | Facility: CLINIC | Age: 82
End: 2022-12-06

## 2022-12-06 DIAGNOSIS — R93.89 ABNORMAL FINDINGS ON DIAGNOSTIC IMAGING OF OTHER SPECIFIED BODY STRUCTURES: ICD-10-CM

## 2022-12-06 PROBLEM — F32.A DEPRESSION, UNSPECIFIED DEPRESSION TYPE: Status: ACTIVE | Noted: 2021-12-14

## 2022-12-06 PROCEDURE — 99213 OFFICE O/P EST LOW 20 MIN: CPT | Mod: 95

## 2022-12-07 NOTE — ASSESSMENT
[FreeTextEntry1] : Mr. ALBINO RIVAS, 81 year old male, never smoker, w/ hx of Parkinson's; found to have moderate right pleural effusion on CT coronary angio on 08/03/2021 for dyspnea. \par \par s/p Right VATS, pleural bx and placement of Pleurx catheter on 03/17/2022. Path of right pleura bx revealed fragments of pleura with chronic inflammation and reactive changes. Fragments of skeletal muscle. Right pleural fluid negative for malignant cells. \par \par CXR on 11/30/22: Bilateral, Hazy groundglass opacities likely secondary to pneumonia and/or underlying layering pleural effusion. \par \par Patient is here today for a 4 week follow up. Endorses multiple hospitalizations since last visit (UTI, PPM update); Required admission to rehab. Discharged home two weeks ago. While in rehab, lost Visiting Nurse services. Has been paying a private nurse to drain PleurX cath weekly. Output this week was 600ml; Cannot recall previous outputs. Today, patient denies worsening SOB, chest pain, cough, hemoptysis, fever, chills, night sweats, lightheadedness or dizziness.\par \par I have independently reviewed the medical records and imaging at the time of this office consultation, and discussed the following interpretations with the patient:\par - CXR report reviewed; Will need to obtain images for independent evaluation. He will be following up with Dr. Jamil's office via telehealth tomorrow. \par - Referral given to commence Visiting Nurse Services 3 times weekly\par - Denies respiratory distress, no distress noted over the phone. Recommended to return to clinic in 6 weeks with repeat CXR to re-evaluate effusion as well as respiratory status. \par \par Recommendations reviewed with patient during this office visit, and all questions answered; Patient instructed on the importance of follow up and verbalizes understanding.\par \par I, PARK Gomez, personally performed the evaluation and management (E/M) services for this established patient. That E/M includes assessing all new/exacerbated conditions, and establishing a new plan of care. Today, My ACP, Wendy Hunter, was here to observe my evaluation and management services for this patient to be followed going forward.\par

## 2022-12-07 NOTE — REASON FOR VISIT
[Home] : at home, [unfilled] , at the time of the visit. [Medical Office: ([facility])___] : at the medical office located in  [Spouse] : spouse [Patient] : the patient [This encounter was initiated by telehealth (audio with video) and converted to telephone (audio only) due to technical difficulties.] : This encounter was initiated by telehealth (audio with video) and converted to telephone (audio only) due to technical difficulties.

## 2022-12-07 NOTE — CONSULT LETTER
[FreeTextEntry2] : Dr. Pavan Jamil (Pulm/Ref)\par Snathosh Avila (PCP)\par Dr. Clinton Dickey (Card)\par Dr. Randolph (Card re: cardiac amyloid) [FreeTextEntry3] : Rufus Oneal MD \par Attending Surgeon \par Division of Thoracic Surgery \par , Cardiovascular and Thoracic Surgery \par [facility] [employer] [facility] at [facility]\par \par

## 2022-12-07 NOTE — HISTORY OF PRESENT ILLNESS
[FreeTextEntry1] : Mr. 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 \par \par Patient is s/p Right VATS, pleural bx and placement of Pleurx catheter on 03/17/2022. Path of right pleura bx revealed fragments of pleura with chronic inflammation and reactive changes. Fragments of skeletal muscle. Right pleural fluid negative for malignant cells. \par \par Patient went to ED on 04/29/2022 for hallucinations, likely related to Parkinsons dementia. He was discharged on 5/12/22 to a rehab facility.  \par \par Of note, pt had recent SPECT scan demonstrating cardiac amyloid and was referred by his cardiologist Dr. Dickey to see Dr. Randolph. \par \par CT chest on 08/17/2022:\par -  There is a trace right pleural effusion with Pleurx catheter in place, markedly decreased in size compared to the 2/6/2022 chest CT. A small left pleural effusion has also decreased compared to the 2/6/2022 scan, now trace.\par \par Patient went to ED on 09/08/2022 c/o weakness, founded to have electrolyte disorders which were corrected but patient's symptoms did not improve. felt that presentation was most consistent with  progression of Parkinson's disease.\par \par Patient is currently drained three times a week as follows: \par \par CXR on 11/30/22: Bilateral, Hazy groundglass opacities likely secondary to pneumonia and/or underlying layering pleural effusion. \par \par Patient is here today for a 4 week follow up. Endorses multiple hospitalizations since last visit (UTI, PPM update); Required admission to rehab. Discharged home two weeks ago. While in rehab, lost Visiting Nurse services. Has been paying a private nurse to drain PleurX cath weekly. Output this week was 600ml; Cannot recall previous outputs. Today, patient denies worsening SOB, chest pain, cough, hemoptysis, fever, chills, night sweats, lightheadedness or dizziness.\par

## 2022-12-12 ENCOUNTER — APPOINTMENT (OUTPATIENT)
Dept: UROLOGY | Facility: CLINIC | Age: 82
End: 2022-12-12

## 2022-12-13 ENCOUNTER — NON-APPOINTMENT (OUTPATIENT)
Age: 82
End: 2022-12-13

## 2022-12-19 PROBLEM — R93.89 ABNORMAL CXR: Status: ACTIVE | Noted: 2022-12-19

## 2022-12-19 NOTE — REVIEW OF SYSTEMS
[Tremor] : tremor [Involuntary Movements] : involuntary movements [Negative] : Psychiatric [Headache] : no headache [Focal Weakness] : no focal weakness [Seizures] : no seizures [Head Injury] : no head injury [Dizziness] : no dizziness [Numbness] : no numbness [Memory Loss] : no memory loss [Paralysis] : no paralysis [Confusion] : no confusion

## 2022-12-19 NOTE — DISCUSSION/SUMMARY
[FreeTextEntry1] : 11/230/2022 CXR - bilateral hazy ground-glass opacities likely secondary to pneumonia and or underlying pleural effusion.

## 2022-12-19 NOTE — HISTORY OF PRESENT ILLNESS
[TextBox_4] : Mr. RIVAS is a 82 year male with a history of Parkinson's (Dx 2016), Depression, BPH, CAD, stent placement, HLD who now presents via video for abnormal CXR. Patient's wife also participated in visit.\par \par His chief complaint is abnormal CXR.\par \par Patient reports as part home care, he had CXR 11/30/2022 which showed ground glass opacity which could be pleural effusion or PNA. He states he spoke to CTS Dr. Oneal, who is not concerned with pleural effusion as patient has chest tube in. Dr. Oneal wanted patient to follow up with pulmonology for potential PNA. \par \par Patient denies fever, chills, SOB @ rest or exertion, cough, wheezing, nasal congestion, runny nose or heart burn/reflux.\par \par Patient has limited mobility due to Parkinson's. \par

## 2022-12-19 NOTE — ASSESSMENT
[FreeTextEntry1] : Mr. RIVAS is a 82 year male with a history of Parkinson's (Dx 2016), Depression, BPH, CAD, stent placement, HLD who now presents via video for abnormal CXR. Patient's wife also participated in visit.\par \par His chief complaint is abnormal CXR.\par \par 1. Abnormal CXR\par -add decubitus chest Xray\par RX faxed to [facility] X ray\par \par 2. Pleural Effusion\par -follow up with Dr. Oneal\par \par Consulted with Dr. Jamil, he is agreement with treatment plan. \par \par Patient to follow up with Dr. Jamil as scheduled.\par Patient to call with further questions and concerns.\par Patient verbalizes understanding of care plan and is agreeable.\par

## 2022-12-29 ENCOUNTER — NON-APPOINTMENT (OUTPATIENT)
Age: 82
End: 2022-12-29

## 2023-01-03 ENCOUNTER — NON-APPOINTMENT (OUTPATIENT)
Age: 83
End: 2023-01-03

## 2023-01-04 ENCOUNTER — APPOINTMENT (OUTPATIENT)
Dept: UROLOGY | Facility: CLINIC | Age: 83
End: 2023-01-04
Payer: MEDICARE

## 2023-01-04 DIAGNOSIS — R39.15 URGENCY OF URINATION: ICD-10-CM

## 2023-01-04 PROCEDURE — 99443: CPT | Mod: 95

## 2023-01-06 ENCOUNTER — RX RENEWAL (OUTPATIENT)
Age: 83
End: 2023-01-06

## 2023-01-06 ENCOUNTER — RX CHANGE (OUTPATIENT)
Age: 83
End: 2023-01-06

## 2023-01-06 PROBLEM — R39.15 URGENCY OF URINATION: Status: ACTIVE | Noted: 2021-01-30

## 2023-01-06 NOTE — HISTORY OF PRESENT ILLNESS
[FreeTextEntry1] : Telehealth for follow up today\par Pt is now mostly bedridden\par He has been in and out of the hospital and subacute rehab of late\par Voiding has been stable and has not changed\par He is still taking Solifenacin 10mg and Tadalafil 5mg daily\par \par No other c/o\par \par plan\par No changes to his regimen\par Yearly follow-up\par Sooner if needed\par \par \par time spent as above per HPI, chart review, imaging review, treatment options discussion, risks, benefits and alternatives discussion.\par

## 2023-01-09 ENCOUNTER — NON-APPOINTMENT (OUTPATIENT)
Age: 83
End: 2023-01-09

## 2023-01-10 ENCOUNTER — NON-APPOINTMENT (OUTPATIENT)
Age: 83
End: 2023-01-10

## 2023-01-10 ENCOUNTER — RX RENEWAL (OUTPATIENT)
Age: 83
End: 2023-01-10

## 2023-01-10 ENCOUNTER — APPOINTMENT (OUTPATIENT)
Dept: CARDIOTHORACIC SURGERY | Facility: CLINIC | Age: 83
End: 2023-01-10
Payer: MEDICARE

## 2023-01-10 VITALS — RESPIRATION RATE: 16 BRPM | DIASTOLIC BLOOD PRESSURE: 85 MMHG | HEART RATE: 59 BPM | SYSTOLIC BLOOD PRESSURE: 118 MMHG

## 2023-01-10 PROCEDURE — 99214 OFFICE O/P EST MOD 30 MIN: CPT

## 2023-01-10 NOTE — PHYSICAL EXAM
[] : no respiratory distress [Heart Rate And Rhythm] : heart rate was normal and rhythm regular [Heart Sounds] : normal S1 and S2 [Heart Sounds Gallop] : no gallops [Murmurs] : no murmurs [Heart Sounds Pericardial Friction Rub] : no pericardial rub [Examination Of The Chest] : the chest was normal in appearance [Chest Visual Inspection Thoracic Asymmetry] : no chest asymmetry [Diminished Respiratory Excursion] : normal chest expansion

## 2023-01-12 NOTE — ASSESSMENT
[FreeTextEntry1] : Mr. 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 \par \par Patient is s/p Right VATS, pleural bx and placement of Pleurx catheter on 03/17/2022. Path of right pleura bx revealed fragments of pleura with chronic inflammation and reactive changes. Fragments of skeletal muscle. Right pleural fluid negative for malignant cells. \par \par Patient went to ED on 04/29/2022 for hallucinations, likely related to Parkinsons dementia. He was discharged on 5/12/22 to a rehab facility.  \par \par Of note, pt had recent SPECT scan demonstrating cardiac amyloid and was referred by his cardiologist Dr. Dickey to see Dr. Randolph. \par \par CT chest on 08/17/2022:\par -  There is a trace right pleural effusion with Pleurx catheter in place, markedly decreased in size compared to the 2/6/2022 chest CT. A small left pleural effusion has also decreased compared to the 2/6/2022 scan, now trace.\par \par Patient went to ED on 09/08/2022 c/o weakness, founded to have electrolyte disorders which were corrected but patient's symptoms did not improve. felt that presentation was most consistent with  progression of Parkinson's disease.\par \par Patient is currently drained three times a week. \par \par I have reviewed the patient's medical records and diagnostic images at time of this office consultation and have made the following recommendation:\par 1. PleurX catheter was unclogged using three-way stop cock and flushes. Pt was drained about 700cc output, tolerated well. \par 2. RTC as scheduled with Dr. Oneal. \par

## 2023-01-12 NOTE — HISTORY OF PRESENT ILLNESS
[FreeTextEntry1] : Mr. 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 \par \par Patient is s/p Right VATS, pleural bx and placement of Pleurx catheter on 03/17/2022. Path of right pleura bx revealed fragments of pleura with chronic inflammation and reactive changes. Fragments of skeletal muscle. Right pleural fluid negative for malignant cells. \par \par Patient went to ED on 04/29/2022 for hallucinations, likely related to Parkinsons dementia. He was discharged on 5/12/22 to a rehab facility.  \par \par Of note, pt had recent SPECT scan demonstrating cardiac amyloid and was referred by his cardiologist Dr. Dickey to see Dr. Randolph. \par \par CT chest on 08/17/2022:\par -  There is a trace right pleural effusion with Pleurx catheter in place, markedly decreased in size compared to the 2/6/2022 chest CT. A small left pleural effusion has also decreased compared to the 2/6/2022 scan, now trace.\par \par Patient went to ED on 09/08/2022 c/o weakness, founded to have electrolyte disorders which were corrected but patient's symptoms did not improve. felt that presentation was most consistent with  progression of Parkinson's disease.\par \par Patient is currently drained three times a week as follows: \par \par CXR on 11/30/22: Bilateral, Hazy groundglass opacities likely secondary to pneumonia and/or underlying layering pleural effusion. \par \par Pt is here today for clogged PleurX catheter. Last drainage from last Friday  with output of 400-500cc. \par

## 2023-01-12 NOTE — CONSULT LETTER
[Dear  ___] : Dear  [unfilled], [Courtesy Letter:] : I had the pleasure of seeing your patient, [unfilled], in my office today. [( Thank you for referring [unfilled] for consultation for _____ )] : Thank you for referring [unfilled] for consultation for [unfilled] [Please see my note below.] : Please see my note below. [Sincerely,] : Sincerely, [FreeTextEntry2] : Dr. Pavan Jamil (Pulm/Ref)\par Santhosh Avila (PCP)\par Dr. Clinton Dickey (Card)\par Dr. Randolph (Card re: cardiac amyloid) [FreeTextEntry3] : Rufus Oneal MD \par Attending Surgeon \par Division of Thoracic Surgery \par , Cardiovascular and Thoracic Surgery \par [facility] at [facility]\par \par

## 2023-01-23 ENCOUNTER — NON-APPOINTMENT (OUTPATIENT)
Age: 83
End: 2023-01-23

## 2023-01-23 ENCOUNTER — APPOINTMENT (OUTPATIENT)
Dept: THORACIC SURGERY | Facility: CLINIC | Age: 83
End: 2023-01-23

## 2023-01-24 ENCOUNTER — INPATIENT (INPATIENT)
Facility: HOSPITAL | Age: 83
LOS: 5 days | Discharge: HOME HEALTH | End: 2023-01-30
Attending: STUDENT IN AN ORGANIZED HEALTH CARE EDUCATION/TRAINING PROGRAM | Admitting: STUDENT IN AN ORGANIZED HEALTH CARE EDUCATION/TRAINING PROGRAM
Payer: MEDICARE

## 2023-01-24 ENCOUNTER — APPOINTMENT (OUTPATIENT)
Dept: UROLOGY | Facility: CLINIC | Age: 83
End: 2023-01-24

## 2023-01-24 VITALS
HEART RATE: 62 BPM | DIASTOLIC BLOOD PRESSURE: 53 MMHG | SYSTOLIC BLOOD PRESSURE: 86 MMHG | OXYGEN SATURATION: 100 % | TEMPERATURE: 97 F | RESPIRATION RATE: 12 BRPM

## 2023-01-24 DIAGNOSIS — Z98.61 CORONARY ANGIOPLASTY STATUS: Chronic | ICD-10-CM

## 2023-01-24 DIAGNOSIS — Z95.0 PRESENCE OF CARDIAC PACEMAKER: Chronic | ICD-10-CM

## 2023-01-24 DIAGNOSIS — Z96.649 PRESENCE OF UNSPECIFIED ARTIFICIAL HIP JOINT: Chronic | ICD-10-CM

## 2023-01-24 DIAGNOSIS — Z98.49 CATARACT EXTRACTION STATUS, UNSPECIFIED EYE: Chronic | ICD-10-CM

## 2023-01-24 LAB
ALBUMIN SERPL ELPH-MCNC: 3.2 G/DL — LOW (ref 3.3–5)
ALP SERPL-CCNC: 405 U/L — HIGH (ref 40–120)
ALT FLD-CCNC: 13 U/L — SIGNIFICANT CHANGE UP (ref 4–41)
ANION GAP SERPL CALC-SCNC: 16 MMOL/L — HIGH (ref 7–14)
APPEARANCE UR: ABNORMAL
APTT BLD: 39 SEC — HIGH (ref 27–36.3)
AST SERPL-CCNC: 46 U/L — HIGH (ref 4–40)
B PERT DNA SPEC QL NAA+PROBE: SIGNIFICANT CHANGE UP
B PERT+PARAPERT DNA PNL SPEC NAA+PROBE: SIGNIFICANT CHANGE UP
BACTERIA # UR AUTO: ABNORMAL
BASE EXCESS BLDV CALC-SCNC: -7.9 MMOL/L — LOW (ref -2–3)
BASE EXCESS BLDV CALC-SCNC: -7.9 MMOL/L — LOW (ref -2–3)
BASOPHILS # BLD AUTO: 0.05 K/UL — SIGNIFICANT CHANGE UP (ref 0–0.2)
BASOPHILS NFR BLD AUTO: 0.3 % — SIGNIFICANT CHANGE UP (ref 0–2)
BILIRUB SERPL-MCNC: 0.4 MG/DL — SIGNIFICANT CHANGE UP (ref 0.2–1.2)
BILIRUB UR-MCNC: NEGATIVE — SIGNIFICANT CHANGE UP
BLD GP AB SCN SERPL QL: NEGATIVE — SIGNIFICANT CHANGE UP
BLOOD GAS VENOUS COMPREHENSIVE RESULT: SIGNIFICANT CHANGE UP
BLOOD GAS VENOUS COMPREHENSIVE RESULT: SIGNIFICANT CHANGE UP
BORDETELLA PARAPERTUSSIS (RAPRVP): SIGNIFICANT CHANGE UP
BUN SERPL-MCNC: 48 MG/DL — HIGH (ref 7–23)
C PNEUM DNA SPEC QL NAA+PROBE: SIGNIFICANT CHANGE UP
CALCIUM SERPL-MCNC: 9.2 MG/DL — SIGNIFICANT CHANGE UP (ref 8.4–10.5)
CHLORIDE BLDV-SCNC: 107 MMOL/L — SIGNIFICANT CHANGE UP (ref 96–108)
CHLORIDE BLDV-SCNC: 109 MMOL/L — HIGH (ref 96–108)
CHLORIDE SERPL-SCNC: 105 MMOL/L — SIGNIFICANT CHANGE UP (ref 98–107)
CO2 BLDV-SCNC: 20.7 MMOL/L — LOW (ref 22–26)
CO2 BLDV-SCNC: 20.7 MMOL/L — LOW (ref 22–26)
CO2 SERPL-SCNC: 18 MMOL/L — LOW (ref 22–31)
COD CRY URNS QL: ABNORMAL
COLOR SPEC: YELLOW — SIGNIFICANT CHANGE UP
CREAT SERPL-MCNC: 3.4 MG/DL — HIGH (ref 0.5–1.3)
DIFF PNL FLD: ABNORMAL
EGFR: 18 ML/MIN/1.73M2 — LOW
EOSINOPHIL # BLD AUTO: 0 K/UL — SIGNIFICANT CHANGE UP (ref 0–0.5)
EOSINOPHIL NFR BLD AUTO: 0 % — SIGNIFICANT CHANGE UP (ref 0–6)
EPI CELLS # UR: 8 /HPF — HIGH (ref 0–5)
FLUAV SUBTYP SPEC NAA+PROBE: SIGNIFICANT CHANGE UP
FLUBV RNA SPEC QL NAA+PROBE: SIGNIFICANT CHANGE UP
GAS PNL BLDV: 136 MMOL/L — SIGNIFICANT CHANGE UP (ref 136–145)
GAS PNL BLDV: 136 MMOL/L — SIGNIFICANT CHANGE UP (ref 136–145)
GLUCOSE BLDV-MCNC: 147 MG/DL — HIGH (ref 70–99)
GLUCOSE BLDV-MCNC: 148 MG/DL — HIGH (ref 70–99)
GLUCOSE SERPL-MCNC: 147 MG/DL — HIGH (ref 70–99)
GLUCOSE UR QL: NEGATIVE — SIGNIFICANT CHANGE UP
HADV DNA SPEC QL NAA+PROBE: SIGNIFICANT CHANGE UP
HCO3 BLDV-SCNC: 19 MMOL/L — LOW (ref 22–29)
HCO3 BLDV-SCNC: 19 MMOL/L — LOW (ref 22–29)
HCOV 229E RNA SPEC QL NAA+PROBE: SIGNIFICANT CHANGE UP
HCOV HKU1 RNA SPEC QL NAA+PROBE: SIGNIFICANT CHANGE UP
HCOV NL63 RNA SPEC QL NAA+PROBE: SIGNIFICANT CHANGE UP
HCOV OC43 RNA SPEC QL NAA+PROBE: SIGNIFICANT CHANGE UP
HCT VFR BLD CALC: 30.6 % — LOW (ref 39–50)
HCT VFR BLDA CALC: 27 % — LOW (ref 39–51)
HCT VFR BLDA CALC: 29 % — LOW (ref 39–51)
HGB BLD CALC-MCNC: 9.1 G/DL — LOW (ref 13–17)
HGB BLD CALC-MCNC: 9.6 G/DL — LOW (ref 13–17)
HGB BLD-MCNC: 9.1 G/DL — LOW (ref 13–17)
HMPV RNA SPEC QL NAA+PROBE: SIGNIFICANT CHANGE UP
HPIV1 RNA SPEC QL NAA+PROBE: SIGNIFICANT CHANGE UP
HPIV2 RNA SPEC QL NAA+PROBE: SIGNIFICANT CHANGE UP
HPIV3 RNA SPEC QL NAA+PROBE: SIGNIFICANT CHANGE UP
HPIV4 RNA SPEC QL NAA+PROBE: SIGNIFICANT CHANGE UP
HYALINE CASTS # UR AUTO: 23 /LPF — HIGH (ref 0–7)
IANC: 13.95 K/UL — HIGH (ref 1.8–7.4)
IMM GRANULOCYTES NFR BLD AUTO: 0.6 % — SIGNIFICANT CHANGE UP (ref 0–0.9)
INR BLD: 2.17 RATIO — HIGH (ref 0.88–1.16)
KETONES UR-MCNC: NEGATIVE — SIGNIFICANT CHANGE UP
LACTATE BLDV-MCNC: 2.2 MMOL/L — HIGH (ref 0.5–2)
LACTATE BLDV-MCNC: 3.4 MMOL/L — HIGH (ref 0.5–2)
LEUKOCYTE ESTERASE UR-ACNC: ABNORMAL
LYMPHOCYTES # BLD AUTO: 0.89 K/UL — LOW (ref 1–3.3)
LYMPHOCYTES # BLD AUTO: 5.7 % — LOW (ref 13–44)
M PNEUMO DNA SPEC QL NAA+PROBE: SIGNIFICANT CHANGE UP
MAGNESIUM SERPL-MCNC: 2.1 MG/DL — SIGNIFICANT CHANGE UP (ref 1.6–2.6)
MCHC RBC-ENTMCNC: 27.2 PG — SIGNIFICANT CHANGE UP (ref 27–34)
MCHC RBC-ENTMCNC: 29.7 GM/DL — LOW (ref 32–36)
MCV RBC AUTO: 91.3 FL — SIGNIFICANT CHANGE UP (ref 80–100)
MONOCYTES # BLD AUTO: 0.62 K/UL — SIGNIFICANT CHANGE UP (ref 0–0.9)
MONOCYTES NFR BLD AUTO: 4 % — SIGNIFICANT CHANGE UP (ref 2–14)
NEUTROPHILS # BLD AUTO: 13.95 K/UL — HIGH (ref 1.8–7.4)
NEUTROPHILS NFR BLD AUTO: 89.4 % — HIGH (ref 43–77)
NITRITE UR-MCNC: NEGATIVE — SIGNIFICANT CHANGE UP
NRBC # BLD: 0 /100 WBCS — SIGNIFICANT CHANGE UP (ref 0–0)
NRBC # FLD: 0 K/UL — SIGNIFICANT CHANGE UP (ref 0–0)
NT-PROBNP SERPL-SCNC: 7573 PG/ML — HIGH
PCO2 BLDV: 46 MMHG — SIGNIFICANT CHANGE UP (ref 42–55)
PCO2 BLDV: 46 MMHG — SIGNIFICANT CHANGE UP (ref 42–55)
PH BLDV: 7.23 — LOW (ref 7.32–7.43)
PH BLDV: 7.23 — LOW (ref 7.32–7.43)
PH UR: 6 — SIGNIFICANT CHANGE UP (ref 5–8)
PHOSPHATE SERPL-MCNC: 3.8 MG/DL — SIGNIFICANT CHANGE UP (ref 2.5–4.5)
PLATELET # BLD AUTO: 304 K/UL — SIGNIFICANT CHANGE UP (ref 150–400)
PO2 BLDV: 30 MMHG — SIGNIFICANT CHANGE UP
PO2 BLDV: 32 MMHG — SIGNIFICANT CHANGE UP
POTASSIUM BLDV-SCNC: 4.7 MMOL/L — SIGNIFICANT CHANGE UP (ref 3.5–5.1)
POTASSIUM BLDV-SCNC: 4.9 MMOL/L — SIGNIFICANT CHANGE UP (ref 3.5–5.1)
POTASSIUM SERPL-MCNC: 5 MMOL/L — SIGNIFICANT CHANGE UP (ref 3.5–5.3)
POTASSIUM SERPL-SCNC: 5 MMOL/L — SIGNIFICANT CHANGE UP (ref 3.5–5.3)
PROT SERPL-MCNC: 6.9 G/DL — SIGNIFICANT CHANGE UP (ref 6–8.3)
PROT UR-MCNC: ABNORMAL
PROTHROM AB SERPL-ACNC: 25.4 SEC — HIGH (ref 10.5–13.4)
RAPID RVP RESULT: SIGNIFICANT CHANGE UP
RBC # BLD: 3.35 M/UL — LOW (ref 4.2–5.8)
RBC # FLD: 20.2 % — HIGH (ref 10.3–14.5)
RBC CASTS # UR COMP ASSIST: 90 /HPF — HIGH (ref 0–4)
RH IG SCN BLD-IMP: NEGATIVE — SIGNIFICANT CHANGE UP
RSV RNA SPEC QL NAA+PROBE: SIGNIFICANT CHANGE UP
RV+EV RNA SPEC QL NAA+PROBE: SIGNIFICANT CHANGE UP
SAO2 % BLDV: 31.2 % — SIGNIFICANT CHANGE UP
SAO2 % BLDV: 33.7 % — SIGNIFICANT CHANGE UP
SARS-COV-2 RNA SPEC QL NAA+PROBE: SIGNIFICANT CHANGE UP
SODIUM SERPL-SCNC: 139 MMOL/L — SIGNIFICANT CHANGE UP (ref 135–145)
SP GR SPEC: 1.02 — SIGNIFICANT CHANGE UP (ref 1.01–1.05)
TROPONIN T, HIGH SENSITIVITY RESULT: 202 NG/L — CRITICAL HIGH
TROPONIN T, HIGH SENSITIVITY RESULT: 230 NG/L — CRITICAL HIGH
UROBILINOGEN FLD QL: SIGNIFICANT CHANGE UP
WBC # BLD: 15.61 K/UL — HIGH (ref 3.8–10.5)
WBC # FLD AUTO: 15.61 K/UL — HIGH (ref 3.8–10.5)
WBC UR QL: 31 /HPF — HIGH (ref 0–5)

## 2023-01-24 PROCEDURE — 99285 EMERGENCY DEPT VISIT HI MDM: CPT | Mod: GC

## 2023-01-24 PROCEDURE — 71046 X-RAY EXAM CHEST 2 VIEWS: CPT | Mod: 26

## 2023-01-24 PROCEDURE — 74176 CT ABD & PELVIS W/O CONTRAST: CPT | Mod: 26,MA

## 2023-01-24 RX ORDER — SODIUM CHLORIDE 9 MG/ML
1000 INJECTION INTRAMUSCULAR; INTRAVENOUS; SUBCUTANEOUS ONCE
Refills: 0 | Status: COMPLETED | OUTPATIENT
Start: 2023-01-24 | End: 2023-01-24

## 2023-01-24 RX ORDER — CEFTRIAXONE 500 MG/1
1000 INJECTION, POWDER, FOR SOLUTION INTRAMUSCULAR; INTRAVENOUS ONCE
Refills: 0 | Status: COMPLETED | OUTPATIENT
Start: 2023-01-24 | End: 2023-01-24

## 2023-01-24 RX ORDER — SODIUM CHLORIDE 9 MG/ML
500 INJECTION INTRAMUSCULAR; INTRAVENOUS; SUBCUTANEOUS ONCE
Refills: 0 | Status: COMPLETED | OUTPATIENT
Start: 2023-01-24 | End: 2023-01-24

## 2023-01-24 RX ADMIN — SODIUM CHLORIDE 1000 MILLILITER(S): 9 INJECTION INTRAMUSCULAR; INTRAVENOUS; SUBCUTANEOUS at 16:59

## 2023-01-24 RX ADMIN — CEFTRIAXONE 100 MILLIGRAM(S): 500 INJECTION, POWDER, FOR SOLUTION INTRAMUSCULAR; INTRAVENOUS at 18:13

## 2023-01-24 RX ADMIN — SODIUM CHLORIDE 500 MILLILITER(S): 9 INJECTION INTRAMUSCULAR; INTRAVENOUS; SUBCUTANEOUS at 22:49

## 2023-01-24 NOTE — ED PROVIDER NOTE - PHYSICAL EXAMINATION
General: Alert and Orientated x 0, non-verbal. No apparent distress.  Head: Normocephalic and atraumatic.  Eyes: PERRLA with EOMI.  Neck: Supple. Trachea midline.   Cardiac: Normal S1 and S2 w/ RRR. No murmurs appreciated. No JVD appreciated.  Pulmonary: CTA bilaterally. No increased WOB. No wheezes or crackles.  Abdominal: Soft, non-tender. (+) bowel sounds appreciated in all 4 quadrants. No hepatosplenomegaly.   Neurologic: non-verbal. Unable to fully assess exam give mental status.   Musculoskeletal: contracted extremities, L knee abrasion. Pulses equal and intact.   Skin: Color appropriate for race. Intact, warm, and well-perfused.  Psychiatric: Appropriate mood and affect. No apparent risk to self or others.

## 2023-01-24 NOTE — PROCEDURE NOTE - ADDITIONAL PROCEDURE DETAILS
Dual Chamber pacemaker in DDD mode   Normal sensing and pacing via iterative testing  Excellent threshold capture  No events recorded   No reprogramming

## 2023-01-24 NOTE — ED PROVIDER NOTE - OBJECTIVE STATEMENT
Patient is an 81 y/o M with hx of Parkinson's, Pleural effusions w/ Pleurex in place, AFib, AICD in place, and HTN who presents to the ED via EMS for generalized weakness. No known falls or injuries. Has pleurex drained 3x a week. Per daughter, patient w/ urinary retention. No fever, chills, nausea, vomiting, chest pain, sob. En route, patient hypotensive to SBP 80s. Brought back to room 23. Patient is an 83 y/o M with hx of Parkinson's, Pleural effusions w/ Pleurex in place, AFib on Eliquis, Bradycardia w/ AICD in place, and HTN who presents to the ED via EMS for generalized weakness. No known falls or injuries. Has pleurex drained 3x a week. Per daughter, patient w/ urinary retention x3 days. No fever, chills, nausea, vomiting, chest pain, sob. En route, patient hypotensive to SBP 80s. Brought back to room 23.

## 2023-01-24 NOTE — ED PROVIDER NOTE - CLINICAL SUMMARY MEDICAL DECISION MAKING FREE TEXT BOX
83 y/o M with PMHx sig for CAD, Parkinsonism, HTN, AFIB, pacemaker placement and Pleural effusion with pleurex in place pted with generalized weakness. Borught in by EMS, patient hypotensive. Brought back to Rm 123. HR labile between . On NC sat well. Exam w/ pleurx in place. No abd ttp. lungs cta. Contracted extremities. POCUS w/o urinary retention. Concern for metabolic vs infectious derangements causing weakness. Will get labs,  ekg, cxr. EP consult. likely tba.

## 2023-01-24 NOTE — ED PROVIDER NOTE - PROGRESS NOTE DETAILS
B[place] Hill MD (PGY2): Patient A&O x2 now. POCUS w/ collapsible IVC. B-lines on Right. Cardiac views not adequate. [place] MD (PGY2): UA suggestive of UTI, will give ceftriaxone as no prior cultures. Troponin 230, likely i/s/o renal disease/non-ischemic. Will get repeat and see trend. [place] MD (PGY2): On repeat pocus, ivc plethoric. Will d/c IVF. US abdomen w/ dilated loops vs liver cysts. will get CT A/P NONCON

## 2023-01-24 NOTE — ED ADULT NURSE REASSESSMENT NOTE - NS ED NURSE REASSESS COMMENT FT1
Patient resting on stretcher. A+O x 0, nonverbal. Responding to verbal stimuli, not showing any signs of pain. Safety maintained, Remains hypotensive. MD Chiang made aware. patient for IVF.

## 2023-01-24 NOTE — ED ADULT NURSE NOTE - OBJECTIVE STATEMENT
ARVIND RN: pt. received to 23 direct from ambulance bay triage A&Ox2 (person, time) p/w generalized weakness. As per EMS and pt. wife at bedside, pt. has been generally "weak" over the "past couple of days". As per pt. wife, pt. has been retaining urine x3 days. MD Londono at bedside performing bladder US. NAD noted at this time. pt. noted to have generalized stiffness due to Parkinsons. respirations even and unlabored on 2L supplemental O2 via NC. Paced rhythm noted on CM. Skin tears noted on the R shin (bandage removed), L Knee, L Hand. Pt. noted to Stage 2 Pressure ulcer on Sacrum, barrier cream applied. Pt. noted to have pressure ulcer on L Heel, dressed and intact as per wife. Pleurex noted on the R Lateral aspect of the Chest (drained 3x a week). 20g IV noted in the L AC placed by EMS, flushes well with good blood return, dressing clean dry and intact. Labs drawn and sent as per orders. pt. cleaned, changed, placed in diaper, and repositioned. comfort measures provided. safety precautions maintained. report endorses to primary RN Dixie.

## 2023-01-24 NOTE — ED PROVIDER NOTE - NS ED ROS FT
CONSTITUTIONAL: see hpi   EYES: no visual changes, no eye pain  EARS: no ear drainage, no ear pain, no change in hearing  NOSE: no nasal congestion  MOUTH/THROAT: no sore throat  CV: No chest pain, no palpitations  RESP: No SOB, no cough  GI: No n/v/d, no abd pain  : see hpi   MSK: no back pain, no extremity pain  SKIN: no rashes  NEURO: no headache, no focal weakness, no decreased sensation/parasthesias   PSYCHIATRIC: no known mental health issues

## 2023-01-24 NOTE — ED PROVIDER NOTE - ATTENDING CONTRIBUTION TO CARE
DR. RODRIGUEZ, ATTENDING MD-  I performed a face to face bedside interview with the patient regarding history of present illness, review of symptoms and past medical history. I completed an independent physical exam.  I have discussed the patient's plan of care with the resident.   Documentation as above in the note.    81 y/o maleh/o parkinsons, afib on eliquis, aicd, cad, stent, chronic r pl eff with catheter bibems for weakness.  Pt hypotensive.  Per wife, pt has had dec urine output x3 days.  Due to see uro today but too weak for transport.  Eval for anemia lyte abn worsening pl eff, arrhythmia, acs, occult infection.  Obtain cbc cmp trop bnp mag phos ekg cxr ua ucx viral swab consult ep admit for further care and evaluation.

## 2023-01-24 NOTE — ED PROVIDER NOTE - HIV OFFER
Verified Results  GLUCOSE [id number] 11:12AM [facility]     Test Name Result Flag Reference   FASTING STATUS 12 hrs     GLUCOSE 78 mg/dl  65-99     LIPID PNL [id number] 11:12AM [facility]     Test Name Result Flag Reference   FASTING STATUS 12 hrs     CHOLESTEROL 143 mg/dl  <200   Desirable            <200  Borderline High      200 to 239  High                 >=240   LDL CHOLESTEROL (CALCULATED) 66 mg/dl  <130   OPTIMAL               <100  NEAR OPTIMAL          100-129  BORDERLINE HIGH       130-159  HIGH                  160-189  VERY HIGH             >=190   HDL CHOLESTEROL 60 mg/dl  >39   Low            <40  Borderline Low 40 to 49  Near Optimal   50 to 59  Optimal        >=60   TRIGLYCERIDES 83 mg/dl  <150   Normal                   <150  Borderline High          150 to 199  High                     200 to 499  Very High                >=500   NON-HDL CHOLESTEROL 83 mg/dl     Therapeutic Target:  CHD and risk equivalents <130  Multiple risk factors    <160  0 to 1 risk factors      <190   CHOLESTEROL/HDL RATIO 2.4  <4.5        Previously Declined (within the last year)

## 2023-01-24 NOTE — ED ADULT TRIAGE NOTE - CHIEF COMPLAINT QUOTE
Pt brought in by EMS from home complaining of generalized weakness x few days and hypotension. Pt brought to room 23

## 2023-01-25 DIAGNOSIS — N18.9 CHRONIC KIDNEY DISEASE, UNSPECIFIED: ICD-10-CM

## 2023-01-25 DIAGNOSIS — R00.1 BRADYCARDIA, UNSPECIFIED: ICD-10-CM

## 2023-01-25 DIAGNOSIS — A41.9 SEPSIS, UNSPECIFIED ORGANISM: ICD-10-CM

## 2023-01-25 DIAGNOSIS — K59.00 CONSTIPATION, UNSPECIFIED: ICD-10-CM

## 2023-01-25 DIAGNOSIS — I10 ESSENTIAL (PRIMARY) HYPERTENSION: ICD-10-CM

## 2023-01-25 DIAGNOSIS — I25.10 ATHEROSCLEROTIC HEART DISEASE OF NATIVE CORONARY ARTERY WITHOUT ANGINA PECTORIS: ICD-10-CM

## 2023-01-25 DIAGNOSIS — Z29.9 ENCOUNTER FOR PROPHYLACTIC MEASURES, UNSPECIFIED: ICD-10-CM

## 2023-01-25 DIAGNOSIS — R53.1 WEAKNESS: ICD-10-CM

## 2023-01-25 DIAGNOSIS — D64.9 ANEMIA, UNSPECIFIED: ICD-10-CM

## 2023-01-25 DIAGNOSIS — E85.4 ORGAN-LIMITED AMYLOIDOSIS: ICD-10-CM

## 2023-01-25 DIAGNOSIS — G20 PARKINSON'S DISEASE: ICD-10-CM

## 2023-01-25 DIAGNOSIS — I48.91 UNSPECIFIED ATRIAL FIBRILLATION: ICD-10-CM

## 2023-01-25 DIAGNOSIS — R77.8 OTHER SPECIFIED ABNORMALITIES OF PLASMA PROTEINS: ICD-10-CM

## 2023-01-25 DIAGNOSIS — D72.829 ELEVATED WHITE BLOOD CELL COUNT, UNSPECIFIED: ICD-10-CM

## 2023-01-25 DIAGNOSIS — J90 PLEURAL EFFUSION, NOT ELSEWHERE CLASSIFIED: ICD-10-CM

## 2023-01-25 DIAGNOSIS — N39.0 URINARY TRACT INFECTION, SITE NOT SPECIFIED: ICD-10-CM

## 2023-01-25 LAB
ALBUMIN SERPL ELPH-MCNC: 3 G/DL — LOW (ref 3.3–5)
ALP SERPL-CCNC: 332 U/L — HIGH (ref 40–120)
ALT FLD-CCNC: 9 U/L — SIGNIFICANT CHANGE UP (ref 4–41)
ANION GAP SERPL CALC-SCNC: 10 MMOL/L — SIGNIFICANT CHANGE UP (ref 7–14)
AST SERPL-CCNC: 36 U/L — SIGNIFICANT CHANGE UP (ref 4–40)
BASE EXCESS BLDV CALC-SCNC: -7.3 MMOL/L — LOW (ref -2–3)
BILIRUB SERPL-MCNC: 0.3 MG/DL — SIGNIFICANT CHANGE UP (ref 0.2–1.2)
BUN SERPL-MCNC: 48 MG/DL — HIGH (ref 7–23)
CALCIUM SERPL-MCNC: 9 MG/DL — SIGNIFICANT CHANGE UP (ref 8.4–10.5)
CHLORIDE SERPL-SCNC: 110 MMOL/L — HIGH (ref 98–107)
CK MB BLD-MCNC: 1.3 % — SIGNIFICANT CHANGE UP (ref 0–2.5)
CK MB BLD-MCNC: 1.9 % — SIGNIFICANT CHANGE UP (ref 0–2.5)
CK MB CFR SERPL CALC: 4.7 NG/ML — SIGNIFICANT CHANGE UP
CK MB CFR SERPL CALC: 7.1 NG/ML — HIGH
CK SERPL-CCNC: 355 U/L — HIGH (ref 30–200)
CK SERPL-CCNC: 376 U/L — HIGH (ref 30–200)
CO2 BLDV-SCNC: 20.6 MMOL/L — LOW (ref 22–26)
CO2 SERPL-SCNC: 20 MMOL/L — LOW (ref 22–31)
CREAT SERPL-MCNC: 3.17 MG/DL — HIGH (ref 0.5–1.3)
CULTURE RESULTS: NO GROWTH — SIGNIFICANT CHANGE UP
EGFR: 19 ML/MIN/1.73M2 — LOW
FERRITIN SERPL-MCNC: 111 NG/ML — SIGNIFICANT CHANGE UP (ref 30–400)
FOLATE SERPL-MCNC: 12.2 NG/ML — SIGNIFICANT CHANGE UP (ref 3.1–17.5)
GGT SERPL-CCNC: 206 U/L — HIGH (ref 8–61)
GLUCOSE SERPL-MCNC: 77 MG/DL — SIGNIFICANT CHANGE UP (ref 70–99)
HCO3 BLDV-SCNC: 19 MMOL/L — LOW (ref 22–29)
HCT VFR BLD CALC: 28 % — LOW (ref 39–50)
HGB BLD-MCNC: 8.3 G/DL — LOW (ref 13–17)
IRON SATN MFR SERPL: 19 UG/DL — LOW (ref 45–165)
IRON SATN MFR SERPL: 8 % — LOW (ref 14–50)
LACTATE SERPL-SCNC: 1.4 MMOL/L — SIGNIFICANT CHANGE UP (ref 0.5–2)
MAGNESIUM SERPL-MCNC: 2.1 MG/DL — SIGNIFICANT CHANGE UP (ref 1.6–2.6)
MCHC RBC-ENTMCNC: 26.9 PG — LOW (ref 27–34)
MCHC RBC-ENTMCNC: 29.6 GM/DL — LOW (ref 32–36)
MCV RBC AUTO: 90.6 FL — SIGNIFICANT CHANGE UP (ref 80–100)
MRSA PCR RESULT.: SIGNIFICANT CHANGE UP
NRBC # BLD: 0 /100 WBCS — SIGNIFICANT CHANGE UP (ref 0–0)
NRBC # FLD: 0 K/UL — SIGNIFICANT CHANGE UP (ref 0–0)
PCO2 BLDV: 42 MMHG — SIGNIFICANT CHANGE UP (ref 42–55)
PH BLDV: 7.27 — LOW (ref 7.32–7.43)
PHOSPHATE SERPL-MCNC: 3.1 MG/DL — SIGNIFICANT CHANGE UP (ref 2.5–4.5)
PLATELET # BLD AUTO: 294 K/UL — SIGNIFICANT CHANGE UP (ref 150–400)
PO2 BLDV: 24 MMHG — SIGNIFICANT CHANGE UP
POTASSIUM SERPL-MCNC: 4.7 MMOL/L — SIGNIFICANT CHANGE UP (ref 3.5–5.3)
POTASSIUM SERPL-SCNC: 4.7 MMOL/L — SIGNIFICANT CHANGE UP (ref 3.5–5.3)
PROCALCITONIN SERPL-MCNC: 2.73 NG/ML — HIGH (ref 0.02–0.1)
PROT SERPL-MCNC: 5.9 G/DL — LOW (ref 6–8.3)
RBC # BLD: 3.09 M/UL — LOW (ref 4.2–5.8)
RBC # FLD: 20.1 % — HIGH (ref 10.3–14.5)
S AUREUS DNA NOSE QL NAA+PROBE: SIGNIFICANT CHANGE UP
SAO2 % BLDV: 25.1 % — SIGNIFICANT CHANGE UP
SODIUM SERPL-SCNC: 140 MMOL/L — SIGNIFICANT CHANGE UP (ref 135–145)
SPECIMEN SOURCE: SIGNIFICANT CHANGE UP
TIBC SERPL-MCNC: 224 UG/DL — SIGNIFICANT CHANGE UP (ref 220–430)
TROPONIN T, HIGH SENSITIVITY RESULT: 174 NG/L — CRITICAL HIGH
UIBC SERPL-MCNC: 205 UG/DL — SIGNIFICANT CHANGE UP (ref 110–370)
VIT B12 SERPL-MCNC: 1331 PG/ML — HIGH (ref 200–900)
WBC # BLD: 12.44 K/UL — HIGH (ref 3.8–10.5)
WBC # FLD AUTO: 12.44 K/UL — HIGH (ref 3.8–10.5)

## 2023-01-25 PROCEDURE — 99233 SBSQ HOSP IP/OBS HIGH 50: CPT | Mod: GC

## 2023-01-25 PROCEDURE — 76770 US EXAM ABDO BACK WALL COMP: CPT | Mod: 26

## 2023-01-25 PROCEDURE — 93306 TTE W/DOPPLER COMPLETE: CPT | Mod: 26

## 2023-01-25 PROCEDURE — 99223 1ST HOSP IP/OBS HIGH 75: CPT | Mod: GC

## 2023-01-25 RX ORDER — SODIUM CHLORIDE 9 MG/ML
1000 INJECTION INTRAMUSCULAR; INTRAVENOUS; SUBCUTANEOUS ONCE
Refills: 0 | Status: COMPLETED | OUTPATIENT
Start: 2023-01-25 | End: 2023-01-25

## 2023-01-25 RX ORDER — MIRTAZAPINE 45 MG/1
1.5 TABLET, ORALLY DISINTEGRATING ORAL
Qty: 0 | Refills: 0 | DISCHARGE

## 2023-01-25 RX ORDER — CLOPIDOGREL BISULFATE 75 MG/1
75 TABLET, FILM COATED ORAL DAILY
Refills: 0 | Status: DISCONTINUED | OUTPATIENT
Start: 2023-01-25 | End: 2023-01-30

## 2023-01-25 RX ORDER — MIDODRINE HYDROCHLORIDE 2.5 MG/1
20 TABLET ORAL ONCE
Refills: 0 | Status: COMPLETED | OUTPATIENT
Start: 2023-01-25 | End: 2023-01-25

## 2023-01-25 RX ORDER — PIPERACILLIN AND TAZOBACTAM 4; .5 G/20ML; G/20ML
3.38 INJECTION, POWDER, LYOPHILIZED, FOR SOLUTION INTRAVENOUS EVERY 12 HOURS
Refills: 0 | Status: DISCONTINUED | OUTPATIENT
Start: 2023-01-25 | End: 2023-01-30

## 2023-01-25 RX ORDER — ASPIRIN/CALCIUM CARB/MAGNESIUM 324 MG
81 TABLET ORAL DAILY
Refills: 0 | Status: DISCONTINUED | OUTPATIENT
Start: 2023-01-25 | End: 2023-01-25

## 2023-01-25 RX ORDER — POLYETHYLENE GLYCOL 3350 17 G/17G
17 POWDER, FOR SOLUTION ORAL
Refills: 0 | Status: DISCONTINUED | OUTPATIENT
Start: 2023-01-25 | End: 2023-01-30

## 2023-01-25 RX ORDER — SODIUM CHLORIDE 9 MG/ML
1000 INJECTION INTRAMUSCULAR; INTRAVENOUS; SUBCUTANEOUS ONCE
Refills: 0 | Status: DISCONTINUED | OUTPATIENT
Start: 2023-01-25 | End: 2023-01-28

## 2023-01-25 RX ORDER — DESVENLAFAXINE 50 MG/1
25 TABLET, EXTENDED RELEASE ORAL DAILY
Refills: 0 | Status: DISCONTINUED | OUTPATIENT
Start: 2023-01-25 | End: 2023-01-30

## 2023-01-25 RX ORDER — OLANZAPINE 15 MG/1
2.5 TABLET, FILM COATED ORAL AT BEDTIME
Refills: 0 | Status: DISCONTINUED | OUTPATIENT
Start: 2023-01-25 | End: 2023-01-25

## 2023-01-25 RX ORDER — TAMSULOSIN HYDROCHLORIDE 0.4 MG/1
0.4 CAPSULE ORAL AT BEDTIME
Refills: 0 | Status: DISCONTINUED | OUTPATIENT
Start: 2023-01-25 | End: 2023-01-26

## 2023-01-25 RX ORDER — SENNA PLUS 8.6 MG/1
2 TABLET ORAL AT BEDTIME
Refills: 0 | Status: DISCONTINUED | OUTPATIENT
Start: 2023-01-25 | End: 2023-01-30

## 2023-01-25 RX ORDER — CARBIDOPA AND LEVODOPA 25; 100 MG/1; MG/1
1 TABLET ORAL
Qty: 0 | Refills: 0 | DISCHARGE

## 2023-01-25 RX ORDER — MIDODRINE HYDROCHLORIDE 2.5 MG/1
30 TABLET ORAL ONCE
Refills: 0 | Status: COMPLETED | OUTPATIENT
Start: 2023-01-25 | End: 2023-01-25

## 2023-01-25 RX ORDER — MIRTAZAPINE 45 MG/1
7.5 TABLET, ORALLY DISINTEGRATING ORAL AT BEDTIME
Refills: 0 | Status: DISCONTINUED | OUTPATIENT
Start: 2023-01-25 | End: 2023-01-25

## 2023-01-25 RX ORDER — PIPERACILLIN AND TAZOBACTAM 4; .5 G/20ML; G/20ML
3.38 INJECTION, POWDER, LYOPHILIZED, FOR SOLUTION INTRAVENOUS ONCE
Refills: 0 | Status: COMPLETED | OUTPATIENT
Start: 2023-01-25 | End: 2023-01-25

## 2023-01-25 RX ORDER — SODIUM CHLORIDE 9 MG/ML
500 INJECTION INTRAMUSCULAR; INTRAVENOUS; SUBCUTANEOUS ONCE
Refills: 0 | Status: COMPLETED | OUTPATIENT
Start: 2023-01-25 | End: 2023-01-25

## 2023-01-25 RX ORDER — AMIODARONE HYDROCHLORIDE 400 MG/1
200 TABLET ORAL DAILY
Refills: 0 | Status: DISCONTINUED | OUTPATIENT
Start: 2023-01-25 | End: 2023-01-30

## 2023-01-25 RX ORDER — ATORVASTATIN CALCIUM 80 MG/1
80 TABLET, FILM COATED ORAL AT BEDTIME
Refills: 0 | Status: DISCONTINUED | OUTPATIENT
Start: 2023-01-25 | End: 2023-01-30

## 2023-01-25 RX ORDER — SODIUM CHLORIDE 9 MG/ML
1000 INJECTION, SOLUTION INTRAVENOUS
Refills: 0 | Status: DISCONTINUED | OUTPATIENT
Start: 2023-01-25 | End: 2023-01-25

## 2023-01-25 RX ORDER — CARBIDOPA AND LEVODOPA 25; 100 MG/1; MG/1
1 TABLET ORAL
Refills: 0 | Status: DISCONTINUED | OUTPATIENT
Start: 2023-01-25 | End: 2023-01-30

## 2023-01-25 RX ORDER — LANOLIN ALCOHOL/MO/W.PET/CERES
3 CREAM (GRAM) TOPICAL AT BEDTIME
Refills: 0 | Status: DISCONTINUED | OUTPATIENT
Start: 2023-01-25 | End: 2023-01-30

## 2023-01-25 RX ORDER — CARBIDOPA AND LEVODOPA 25; 100 MG/1; MG/1
1.5 TABLET ORAL
Qty: 0 | Refills: 0 | DISCHARGE

## 2023-01-25 RX ORDER — ASPIRIN/CALCIUM CARB/MAGNESIUM 324 MG
1 TABLET ORAL
Qty: 0 | Refills: 0 | DISCHARGE

## 2023-01-25 RX ORDER — APIXABAN 2.5 MG/1
2.5 TABLET, FILM COATED ORAL
Refills: 0 | Status: DISCONTINUED | OUTPATIENT
Start: 2023-01-25 | End: 2023-01-30

## 2023-01-25 RX ORDER — TADALAFIL 10 MG/1
5 TABLET, FILM COATED ORAL DAILY
Refills: 0 | Status: DISCONTINUED | OUTPATIENT
Start: 2023-01-25 | End: 2023-01-30

## 2023-01-25 RX ORDER — OXYBUTYNIN CHLORIDE 5 MG
10 TABLET ORAL
Refills: 0 | Status: DISCONTINUED | OUTPATIENT
Start: 2023-01-25 | End: 2023-01-30

## 2023-01-25 RX ORDER — ROSUVASTATIN CALCIUM 5 MG/1
1 TABLET ORAL
Qty: 0 | Refills: 0 | DISCHARGE

## 2023-01-25 RX ADMIN — Medication 10 MILLIGRAM(S): at 09:03

## 2023-01-25 RX ADMIN — SODIUM CHLORIDE 100 MILLILITER(S): 9 INJECTION, SOLUTION INTRAVENOUS at 15:39

## 2023-01-25 RX ADMIN — AMIODARONE HYDROCHLORIDE 200 MILLIGRAM(S): 400 TABLET ORAL at 06:49

## 2023-01-25 RX ADMIN — ATORVASTATIN CALCIUM 80 MILLIGRAM(S): 80 TABLET, FILM COATED ORAL at 21:14

## 2023-01-25 RX ADMIN — PIPERACILLIN AND TAZOBACTAM 200 GRAM(S): 4; .5 INJECTION, POWDER, LYOPHILIZED, FOR SOLUTION INTRAVENOUS at 01:24

## 2023-01-25 RX ADMIN — CARBIDOPA AND LEVODOPA 1 TABLET(S): 25; 100 TABLET ORAL at 18:06

## 2023-01-25 RX ADMIN — TAMSULOSIN HYDROCHLORIDE 0.4 MILLIGRAM(S): 0.4 CAPSULE ORAL at 21:14

## 2023-01-25 RX ADMIN — Medication 10 MILLIGRAM(S): at 19:30

## 2023-01-25 RX ADMIN — APIXABAN 2.5 MILLIGRAM(S): 2.5 TABLET, FILM COATED ORAL at 18:03

## 2023-01-25 RX ADMIN — CARBIDOPA AND LEVODOPA 1 TABLET(S): 25; 100 TABLET ORAL at 06:20

## 2023-01-25 RX ADMIN — CLOPIDOGREL BISULFATE 75 MILLIGRAM(S): 75 TABLET, FILM COATED ORAL at 15:40

## 2023-01-25 RX ADMIN — PIPERACILLIN AND TAZOBACTAM 25 GRAM(S): 4; .5 INJECTION, POWDER, LYOPHILIZED, FOR SOLUTION INTRAVENOUS at 05:20

## 2023-01-25 RX ADMIN — DESVENLAFAXINE 25 MILLIGRAM(S): 50 TABLET, EXTENDED RELEASE ORAL at 15:40

## 2023-01-25 RX ADMIN — MIDODRINE HYDROCHLORIDE 30 MILLIGRAM(S): 2.5 TABLET ORAL at 01:24

## 2023-01-25 RX ADMIN — POLYETHYLENE GLYCOL 3350 17 GRAM(S): 17 POWDER, FOR SOLUTION ORAL at 06:20

## 2023-01-25 RX ADMIN — SODIUM CHLORIDE 1000 MILLILITER(S): 9 INJECTION INTRAMUSCULAR; INTRAVENOUS; SUBCUTANEOUS at 20:37

## 2023-01-25 RX ADMIN — TADALAFIL 5 MILLIGRAM(S): 10 TABLET, FILM COATED ORAL at 15:41

## 2023-01-25 RX ADMIN — SENNA PLUS 2 TABLET(S): 8.6 TABLET ORAL at 21:14

## 2023-01-25 RX ADMIN — POLYETHYLENE GLYCOL 3350 17 GRAM(S): 17 POWDER, FOR SOLUTION ORAL at 18:03

## 2023-01-25 RX ADMIN — SODIUM CHLORIDE 1000 MILLILITER(S): 9 INJECTION INTRAMUSCULAR; INTRAVENOUS; SUBCUTANEOUS at 06:48

## 2023-01-25 RX ADMIN — CARBIDOPA AND LEVODOPA 1 TABLET(S): 25; 100 TABLET ORAL at 15:41

## 2023-01-25 RX ADMIN — Medication 3 MILLIGRAM(S): at 21:14

## 2023-01-25 RX ADMIN — Medication 10 MILLIGRAM(S): at 06:20

## 2023-01-25 RX ADMIN — APIXABAN 2.5 MILLIGRAM(S): 2.5 TABLET, FILM COATED ORAL at 06:19

## 2023-01-25 RX ADMIN — MIDODRINE HYDROCHLORIDE 20 MILLIGRAM(S): 2.5 TABLET ORAL at 20:36

## 2023-01-25 RX ADMIN — PIPERACILLIN AND TAZOBACTAM 25 GRAM(S): 4; .5 INJECTION, POWDER, LYOPHILIZED, FOR SOLUTION INTRAVENOUS at 18:02

## 2023-01-25 NOTE — CONSULT NOTE ADULT - ATTENDING COMMENTS
pt is an 83 yo male with hx htn, parkinsonism, aicd placement who presents  from [facility], pt with hx pleurex for pleural effusion, hx urinary  retention x 3 days.  Pt noted to have hypotension with sbp in the 80's reported,    PE bp 104/55 rr 18 heent  poor dentition,   neck supple, lungs rhonchi b/l  aicd in place no erythema,  heart s1s2 abdomen nontender  ext skin laceration ru ext and left lower ext.      wbc 15 hgb 9 hct 30  k 5.0  bicarb 18 cr 3.4   lactate 3.4 --2.2   ua + LE    cxr no opacity or effusion    A/P   83 yo male with parkinsonism with episode of hypotension s/p  ivf ,   likely urinary sepsis  -panculture, blood, urine  -iv zosyn for broad coverage  -check echo to evaluate lv function  -dvt prophylaxis  monitor urine output  -neurology evaluation for parkinsonism  -pt does not require icu level care

## 2023-01-25 NOTE — H&P ADULT - PROBLEM SELECTOR PLAN 5
- Continue Sinemet  - Continue Remeron  - Continue Zyprexa - Continue Sinemet  - Continue Remeron  - Continue Zyprexa  - Neurology evaluation in AM Patient with anemia to 9.1 on admission, baseline ~10  No evidence of active bleeding  - Check iron studies  - B12, folate  - Monitor CBC qd  - Transfuse hgb <7    //Elevated INR  Likely i/s/o Eliquis w/ ALAYNA & possibly nutritional component  - Monitor for bleeding  - Trend INR q48-72 hrs

## 2023-01-25 NOTE — PROGRESS NOTE ADULT - PROBLEM SELECTOR PLAN 8
- c/w Eliquis and amiodarone  - will obtain cardiology records about utility of amiodorone i/s/o transaminitis

## 2023-01-25 NOTE — H&P ADULT - PROBLEM SELECTOR PLAN 6
- Continue Eliquis Patient w/ history of cardiac amyloidosis  - Continue Vyndamax (not on formulary, bring from home)  - Holding diuretics, as patient clinically dry  - Holding olmesartan i/s/o hypotension  - TTE - Continue Sinemet  - Continue Remeron  - Continue Zyprexa  - Neurology evaluation in AM - Continue Sinemet  - Continue Remeron  - Continue Zyprexa  - Can consider Neurology evaluation in AM

## 2023-01-25 NOTE — PROGRESS NOTE ADULT - PROBLEM SELECTOR PLAN 1
Patient presented with confusion, shock state, leukocytosis, and elevated lactate, likely 2/2 severe sepsis  No tachycardia on admission, though patient PPM dependent ([facility])  Urinalysis with evidence of urinary tract infection, GGOs noted in b/l lower   S/p vanc/Zosyn in ED as well as 1.5L IVF, 30mg midodrine with improvement in BP & mentation  - UCx: NGTD  - TTE 1/25/23: EF 73%, severe concentric L vent hypertrophy, mod pulm HTN    =====PLAN====  - c/w Zosyn (1/24-)  - F/u UCx, BCx, Pleural fluid, MRSA, legionella  - IVF 100cc/hr x 10 hrs

## 2023-01-25 NOTE — H&P ADULT - NSHPPHYSICALEXAM_GEN_ALL_CORE
VITALS:   T(C): 36.4 (01-25-23 @ 01:29), Max: 36.4 (01-24-23 @ 22:04)  HR: 60 (01-25-23 @ 02:43) (60 - 71)  BP: 109/50 (01-25-23 @ 02:43) (84/43 - 109/50)  RR: 16 (01-25-23 @ 02:43) (12 - 16)  SpO2: 100% (01-25-23 @ 02:43) (100% - 100%)    GENERAL: NAD, lying in bed comfortably  HEAD:  Atraumatic, Normocephalic  EYES: EOMI, PERRLA, conjunctiva and sclera clear  ENT: Moist mucous membranes  NECK: Supple, No JVD  CHEST/LUNG: Clear to auscultation bilaterally; No rales, rhonchi, wheezing, or rubs. Unlabored respirations  HEART: Regular rate and rhythm; No murmurs, rubs, or gallops  ABDOMEN: BSx4; Soft, nontender, nondistended  EXTREMITIES:  2+ Peripheral Pulses, brisk capillary refill. No clubbing, cyanosis, or edema  NERVOUS SYSTEM:  A&Ox3, no focal deficits   SKIN: No rashes or lesions  Psych: Normal speech, normal behavior, normal affect VITALS:   T(C): 36.4 (01-25-23 @ 01:29), Max: 36.4 (01-24-23 @ 22:04)  HR: 60 (01-25-23 @ 02:43) (60 - 71)  BP: 109/50 (01-25-23 @ 02:43) (84/43 - 109/50)  RR: 16 (01-25-23 @ 02:43) (12 - 16)  SpO2: 100% (01-25-23 @ 02:43) (100% - 100%)    GENERAL: NAD, sleeping, cachectic, arms rigid w/ tremors; awakens to command  HEAD:  Atraumatic, Normocephalic  EYES: EOMI, PERRLA, conjunctiva and sclera clear  ENT: Dry mucous membranes  NECK: Supple, No JVD  CHEST/LUNG: Difficult to assess due to snoring. Unlabored respirations  HEART: Regular rate and rhythm; No murmurs, rubs, or gallops  ABDOMEN: BSx4; Soft, nontender, nondistended  EXTREMITIES:  2+ Peripheral Pulses, brisk capillary refill. No clubbing, cyanosis, or edema  NERVOUS SYSTEM:  A&Ox3, no focal deficits   SKIN: Scattered skin wounds noted w/o surrounding edema, erythema.   Psych: Normal speech, normal behavior, normal affect VITALS:   T(C): 36.4 (01-25-23 @ 01:29), Max: 36.4 (01-24-23 @ 22:04)  HR: 60 (01-25-23 @ 02:43) (60 - 71)  BP: 109/50 (01-25-23 @ 02:43) (84/43 - 109/50)  RR: 16 (01-25-23 @ 02:43) (12 - 16)  SpO2: 100% (01-25-23 @ 02:43) (100% - 100%)    GENERAL: NAD, sleeping, cachectic, arms rigid w/ tremors; awakens to command  HEAD:  Atraumatic, Normocephalic  EYES: EOMI, PERRLA, conjunctiva and sclera clear  ENT: Dry mucous membranes  NECK: Supple, No JVD  CHEST/LUNG: Difficult to assess due to snoring. Unlabored respirations  HEART: Regular rate and rhythm; No murmurs, rubs, or gallops  ABDOMEN: BSx4; Soft, nontender, nondistended  EXTREMITIES:  2+ Peripheral Pulses, brisk capillary refill. No clubbing, cyanosis, or edema  NERVOUS SYSTEM:  A&Ox3, no focal deficits   SKIN: Scattered skin wounds noted w/o surrounding edema, erythema. VITALS:   T(C): 36.4 (01-25-23 @ 01:29), Max: 36.4 (01-24-23 @ 22:04)  HR: 60 (01-25-23 @ 02:43) (60 - 71)  BP: 109/50 (01-25-23 @ 02:43) (84/43 - 109/50)  RR: 16 (01-25-23 @ 02:43) (12 - 16)  SpO2: 100% (01-25-23 @ 02:43) (100% - 100%)    GENERAL: NAD, sleeping, cachectic, arms rigid w/ tremors; awakens to command  HEAD:  Atraumatic, Normocephalic  EYES: EOMI, PERRL, conjunctiva and sclera clear  ENT: Dry mucous membranes  NECK: Supple, No JVD  CHEST/LUNG: +R sided crackles, otherwise clear, no wheezingDifficult to assess due to snoring. Unlabored respirations  HEART: Regular rate and rhythm; No murmurs, rubs, or gallops  ABDOMEN: BSx4; Soft, nontender, nondistended  EXTREMITIES:  2+ Peripheral Pulses, brisk capillary refill. No clubbing, cyanosis, or edema  NERVOUS SYSTEM:  A&Ox2 (to self, to place (hospital), not to time), no focal deficits   SKIN: Scattered skin wounds noted w/o surrounding edema, erythema.

## 2023-01-25 NOTE — H&P ADULT - PROBLEM SELECTOR PLAN 10
DVT ppx: Eliquis  Diet:  Dispo: Pending intervention s/p VATS, pleural biopsy, pleur-x placement  Follows outpatient w/ Dr. Oneal  - CT surgery consult in AM for Pleur-X management 2/2 complete heart block, s/p PPM placement  s/p EP interrogation in ED  - Continue to monitor vital signs q4hr

## 2023-01-25 NOTE — H&P ADULT - NSHPLABSRESULTS_GEN_ALL_CORE
EKG - Electronically paced rhythm, QT prolonged in setting of paced rhythm (chronically prolonged to similar levels on prior EKGs)

## 2023-01-25 NOTE — H&P ADULT - HISTORY OF PRESENT ILLNESS
82M PMH Parkinson's, Pleural effusion s/p PleurX, a. fib on Eliquis, complete heart block s/p PPM, CAD s/p 1 stent, HTN, BPH who was BIBEMS for generalized weakness.     In the ED, afebrile, BP 80s/40s, HR 60, RR 14, O2 sat 100% on 2L. Labs w/ leukocytosis, Cr 3.4 (last 1.46) w/ AGMA, lactate 3.4-->2.2. UA w/ LE, bacteria, pyruia. He received 1.5L NS, 1g CTX. EP interrogated pacemaker which has normal pacing and sending, is dual chamber and is at rate 60. MICU evaluated patient and was deemed not an ICU candidate. He was admitted to medicine for further management.  82M PMH Parkinson's, Pleural effusion s/p PleurX, a. fib on Eliquis, cardiac amyloid, complete heart block s/p PPM/AICD, CAD s/p 1 stent, HTN, BPH who was BIBEMS for generalized weakness. Attempt to reach family was made without success. Per chart review, patient with urinary retention for three days, so brought into the ED for further evaluation. No fever, chills, nausea, vomiting, chest pain, sob. Hypotensive en route to the hospital.    In the ED, afebrile, BP 80s/40s, HR 60, RR 14, O2 sat 100% on 2L. Labs w/ leukocytosis, Cr 3.4 (last 1.46) w/ AGMA, lactate 3.4-->2.2. UA w/ LE, bacteria, pyruia. He received 1.5L NS, 1g CTX. EP interrogated pacemaker which has normal pacing and sending, is dual chamber and is at rate 60. MICU evaluated patient and was deemed not an ICU candidate. He was admitted to medicine for further management.  82M PMH Parkinson's, Pleural effusion s/p PleurX, a. fib on Eliquis, cardiac amyloid, complete heart block s/p PPM/AICD, CAD s/p 1 stent, HTN, BPH who was BIBEMS for generalized weakness. Attempt to reach family was made without success. Per chart review, patient with urinary retention for three days, so brought into the ED for further evaluation. No fever, chills, nausea, vomiting, chest pain, sob. Hypotensive en route to the hospital.    In the ED, afebrile, BP 80s/40s, HR 60, RR 14, O2 sat 100% on 2L. Labs w/ leukocytosis, Cr 3.4 (last 1.46) w/ AGMA, lactate 3.4-->2.2. UA w/ LE, bacteria, pyruia. He received 1.5L NS, 1g CTX. EP interrogated pacemaker which has normal pacing and sending, is dual chamber and is at rate 60. MICU evaluated patient and was deemed not an ICU candidate. He was admitted to medicine for further management.     Attending Addendum:  Patient seen and examined on 1/25/23, patient AAO x2 (to self and place but not time). States that he lives at home with his wife and has a live in [facility], unable to state what brought him to the hospital but he is aware he is in a hospital. Reported ?diarrhea but no GI complaints at present. Denies any chest pain, palpitations, dizziness/syncope, no SOB, no cough/sputum, no URI complaints, no  complaints. Denies any active blood losses. No other acute complaints.

## 2023-01-25 NOTE — PROGRESS NOTE ADULT - PROBLEM SELECTOR PLAN 7
Patient w/ history of cardiac amyloidosis  - Continue Vyndamax (not on formulary, bring from home)  - Holding diuretics, as patient clinically dry  - Holding olmesartan i/s/o hypotension  - TTE 1/25/23: EF 73%, severe concentric L vent hypertrophy, mod pulm HTN

## 2023-01-25 NOTE — CONSULT NOTE ADULT - SUBJECTIVE AND OBJECTIVE BOX
HPI  82 year old male w/ PMH Parkinson's, pleural effusion s/p PleurX, atrial fibrillation on Eliquis, cardiac amyloid, complete heart block s/p PPM/AICD, CAD s/p stent, HTN, BPH s/p HoLEP in 2020 presenting with generalized weakness. Patient was last seen by Dr. Talbot earlier this month without complaints. Two days ago, he called the office reporting inability to void in the afternoon and was scheduled for appointment yesterday for random bladder scan. Due to weakness and inability to get out of bed, family brought patient to [facility] ED. Wife reports he was weaker, more lethargic, and not as interactive.    Patient's PVR was 5 mL in . His last cystoscopy was in April for hematuria, which showed open bladder neck and prostatic urethra, prostatic calcifications, and unremarkable bladder. He continues to take 10 mg solifenacin daily. Primary team added Flomax. Bladder scan this morning showed volume of 310 mL, and patient was subsequently catheterized for 350 mL. Repeat bladder scan this afternoon was 212 mL.    Patient has been on Zosyn, with urine culture showing no growth. Cr today 3.17 from 3.40, with previous recorded Cr 1.46. Per wife, patient seems more at baseline after being in hospital.      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      Parkinsons disease      CAD (coronary artery disease)  s/p PCI       Pleural effusion, not elsewhere classified      COVID-19 vaccine series completed  w booster      History of hip replacement, total  R hip  &amp; L hip      Status post cataract extraction  right eye cataract extraction with IOL 2015      Presence of cardiac pacemaker  10/2021      H/O coronary angioplasty  2021 1 stent inserted          MEDICATIONS  (STANDING):  aMIOdarone    Tablet 200 milliGRAM(s) Oral daily  apixaban 2.5 milliGRAM(s) Oral two times a day  atorvastatin 80 milliGRAM(s) Oral at bedtime  carbidopa/levodopa  25/100 1 Tablet(s) Oral <User Schedule>  carbidopa/levodopa  25/100 1 Tablet(s) Oral <User Schedule>  clopidogrel Tablet 75 milliGRAM(s) Oral daily  desvenlafaxine ER 25 milliGRAM(s) Oral daily  lactated ringers. 1000 milliLiter(s) (100 mL/Hr) IV Continuous <Continuous>  oxybutynin 10 milliGRAM(s) Oral two times a day  piperacillin/tazobactam IVPB.. 3.375 Gram(s) IV Intermittent every 12 hours  polyethylene glycol 3350 17 Gram(s) Oral two times a day  senna 2 Tablet(s) Oral at bedtime  tadalafil 5 milliGRAM(s) Oral daily  tamsulosin 0.4 milliGRAM(s) Oral at bedtime    MEDICATIONS  (PRN):      FAMILY HISTORY:  Family history of lung cancer (Mother)    Family history of esophageal cancer (Father)    FH: myocardial infarction (Grandparent)        Allergies    No Known Allergies    Intolerances        SOCIAL HISTORY:    REVIEW OF SYSTEMS:   Otherwise negative as stated in HPI    Physical Exam  Vital signs  T(C): 36.4 (23 @ 16:10), Max: 36.9 (23 @ 12:06)  HR: 61 (23 @ 16:10)  BP: 107/82 (23 @ 16:10)  SpO2: 100% (23 @ 16:10)  Wt(kg): --    Output    OUT:    Post-Void Residual per Intermittent Catheterization (mL): 350 mL  Total OUT: 350 mL    Total NET: -350 mL          Gen: NAD  Pulm: No respiratory distress, on nasal cannula  Abd: Soft, mildly tender to palpation in RLQ, nondistended  : No suprapubic tenderness, nonpalpable bladder      LABS:       @ 12:00    WBC 12.44 / Hct 28.0  / SCr 3.17      @ 16:10    WBC 15.61 / Hct 30.6  / SCr 3.40         140  |  110<H>  |  48<H>  ----------------------------<  77  4.7   |  20<L>  |  3.17<H>    Ca    9.0      2023 12:00  Phos  3.1       Mg     2.10         TPro  5.9<L>  /  Alb  3.0<L>  /  TBili  0.3  /  DBili  x   /  AST  36  /  ALT  9   /  AlkPhos  332<H>      PT/INR - ( 2023 16:10 )   PT: 25.4 sec;   INR: 2.17 ratio         PTT - ( 2023 16:10 )  PTT:39.0 sec  Urinalysis Basic - ( 2023 16:10 )    Color: Yellow / Appearance: Slightly Turbid / S.019 / pH: x  Gluc: x / Ketone: Negative  / Bili: Negative / Urobili: <2 mg/dL   Blood: x / Protein: 100 mg/dL / Nitrite: Negative   Leuk Esterase: Large / RBC: 90 /HPF / WBC 31 /HPF   Sq Epi: x / Non Sq Epi: 8 /HPF / Bacteria: Few        Urine Cx: No growth  Blood Cx: Pending      RADIOLOGY:    ACC: [id number] EXAM: US KIDNEYS AND BLADDER ORDERED BY: MAINOR BECERRIL    PROCEDURE DATE: 2023        INTERPRETATION: CLINICAL INFORMATION: Decreased urinary output. Acute kidney injury.    COMPARISON: 10/24/2022. CT 2023.    TECHNIQUE: Sonography of the kidneys and bladder.    FINDINGS:  Right kidney: 11.2 cm. No renal mass, hydronephrosis or calculi.    Left kidney: 11.3 cm. No renal mass, hydronephrosis or calculi.    Urinary bladder: Contains stones. Both ureteral jets are visualized.    IMPRESSION:  Urinary bladder stones. No hydronephrosis.      ACC: [id number] EXAM: CT ABDOMEN AND PELVIS ORDERED BY: ALEXANDRA MORRIS    PROCEDURE DATE: 2023        INTERPRETATION: CLINICAL INFORMATION: Abdominal distention    COMPARISON: CT abdomen and pelvis 2022    CONTRAST/COMPLICATIONS:  IV Contrast: None  Oral Contrast: None  Complications: None    PROCEDURE:  CT of the Abdomen and Pelvis was performed.  Sagittal and coronal reformats were performed.    FINDINGS:    Evaluation of the abdominal and pelvic viscera is limited without intravenous contrast.    LOWER CHEST: Bilateral lower lobe and left upper lobe groundglass opacities. Small bilateral pleural effusions with right chest tube in place. Cardiomegaly.    LIVER: Multiple large cysts, replacing most of the parenchyma of the left lobe. Additional scattered subcentimeter hypodensities too small to characterize.  BILE DUCTS: Normal caliber.  GALLBLADDER: Within normal limits.  SPLEEN: Within normal limits.  PANCREAS: Within normal limits.  ADRENALS: Within normal limits.  KIDNEYS/URETERS: Within normal limits.    BLADDER: Within normal limits.  REPRODUCTIVE ORGANS: Evaluation of the adnexa is limited secondary to extensive streak artifact from bilateral hip hardware.    BOWEL: Large colonic and rectal stool burden. There is mild rectal wall thickening with presacral edema. No bowel obstruction. Appendix is normal.  PERITONEUM: No ascites.  VESSELS: Atherosclerotic changes.  RETROPERITONEUM/LYMPH NODES: No lymphadenopathy.  ABDOMINAL WALL: Within normal limits.  BONES: Degenerative changes. Status post bilateral total hip arthroplasties.    IMPRESSION:    Large colonic and rectal stool burden with stercoral colitis.

## 2023-01-25 NOTE — CONSULT NOTE ADULT - ASSESSMENT
83 y/o M with hx of Parkinson's, Pleural effusions w/ PleurX in place, AFib on Eliquis, Bradycardia w/ AICD in place, and HTN who presents to the ED via EMS for generalized weakness, found to have BP 80s/40s, ALAYNA and UTI. S/p IV fluids and ceftriaxone with persistent low BPs. On MICU exam pt is mentating likely at baseline, BP taken on the leg 110/44. MICU consulted for hypotension and consideration of initiating IV vasopressors.    - No ICU needs at this time  - BP taken on the leg shows 110/44, wide pulse pressure but in this case with his history of advanced Parkinson disease and likely resultant dysautonomia would titrate to Systolic BP goal rather than MAP  - Pt awake and alert, A&O 1-2 now, s/p 1.5L NS may have improved mental status, recommend bedside swallow eval so that patient can be administered midodrine to support BP while being treated for hypovolemia and infection
82M w/ BPH s/p HoLEP in 2020 presenting with possible urinary retention and ALAYNA. WBC 12.44 with Cr 3.17 from baseline of 1.46. Urine culture showing no growth. CT shows no hydronephrosis or kidney stones with large stool burden. JULY shows no hydronephrosis with ?bladder stones.    - Obtain serial PVRs - if PVR elevated (>200-300), consider Storm placement  - Please record urine outputs  - Urine culture showing no growth, so UTI can likely be excluded  - Trend Cr  - Continue bowel regimen to prevent constipation from contributing to urinary retention    Case discussed w/ Dr. Cortés

## 2023-01-25 NOTE — H&P ADULT - PROBLEM SELECTOR PLAN 9
s/p VATS, pleural biopsy, pleur-x placement  Follows outpatient w/ Dr. Oneal  - CT surgery consult in AM for Pleur-X management 2/2 complete heart block, s/p PPM placement  s/p EP interrogation in ED  - Continue to monitor vital signs q4hr - Continue Lipitor  - Continue Plavix  - Continue ASA

## 2023-01-25 NOTE — H&P ADULT - PROBLEM SELECTOR PLAN 4
Noted on CTAP  - Enema x1  - Oral Noted on CTAP  - Bowel regimen w/ senna, Miralax, dulcolax  - Enema prn

## 2023-01-25 NOTE — PROGRESS NOTE ADULT - PROBLEM SELECTOR PLAN 11
s/p VATS, pleural biopsy, pleur-x placement  Follows outpatient w/ Dr. Oneal  - f/u CT surg recs for Pleur-X management  - continue drainage 3x/week

## 2023-01-25 NOTE — H&P ADULT - PROBLEM SELECTOR PLAN 2
SCr 3.40 on admission, baseline ~1.5  Possibly i/s/o recent urinary retention vs. pre-renal i/s/o hypotension 2/2 urinary tract infection  - Check PVR  - If no urinary retention, fluid challenge tonight  - Trend SCr w/ BMP qd  - Strict Is/Os  - Avoid nephrotoxins  - Dose medications per eGFR SCr 3.40 on admission, baseline ~1.5  Possibly i/s/o recent urinary retention vs. pre-renal i/s/o hypotension 2/2 urinary tract infection  - Check PVR  - If no urinary retention, can give IVF  - Trend SCr w/ BMP qd  - Strict Is/Os  - Avoid nephrotoxins  - Dose medications per eGFR

## 2023-01-25 NOTE — H&P ADULT - PROBLEM SELECTOR PLAN 3
Troponin 230-->202, EKG  Likely 2/2 supply/demand imbalance i/s/o sepsis  - No need to trend further  - Jaison, EKG prn chest pain Troponin 230-->202, EKG non-ischemic, paced; denies chest pain  Likely 2/2 supply/demand imbalance i/s/o sepsis  - Check troponin in AM  - Jaison, EKG prn chest pain Troponin 230-->202, EKG non-ischemic, paced; denies chest pain  Likely 2/2 supply/demand imbalance i/s/o sepsis  - Check troponin in AM, check CK/CKMB  - Jaison, EKG prn chest pain  - Noted to have prolonged QT interval in setting of paced rhythm, similar chronic prolonged QT intervals previously, can recheck EKG prn for QT interval

## 2023-01-25 NOTE — H&P ADULT - PROBLEM SELECTOR PLAN 8
2/2 complete heart block, s/p PPM placement  s/p EP interrogation in ED  - Continue to monitor vital signs q4hr - Continue Lipitor  - Continue Plavix - Continue Lipitor  - Continue Plavix  - Continue ASA - Continue Eliquis  - Continue amiodarone

## 2023-01-25 NOTE — H&P ADULT - PROBLEM SELECTOR PLAN 12
DVT ppx: Eliquis  Diet: NPO except meds for now- S&S eval when more awake  Dispo: Pending intervention    Unable to reach family overnight. Med rec completed via outside records & recently filled prescriptions, would confirm with family in AM. Should also discuss further GOC with family in AM.

## 2023-01-25 NOTE — H&P ADULT - PROBLEM SELECTOR PLAN 11
DVT ppx: Eliquis  Diet:  Dispo: Pending intervention    Unable to reach family overnight. Med rec should be completed with family tomorrow. DVT ppx: Eliquis  Diet: NPO except meds for now- S&S eval when more awake  Dispo: Pending intervention    Unable to reach family overnight. Med rec completed via outside records & recently filled prescriptions, would confirm with family in AM. Should also discuss further GOC with family in AM. s/p VATS, pleural biopsy, pleur-x placement  Follows outpatient w/ Dr. Oneal  - CT surgery consult in AM for Pleur-X management

## 2023-01-25 NOTE — PROGRESS NOTE ADULT - SUBJECTIVE AND OBJECTIVE BOX
Carlos Manuel Metcalf MD  PGY-3 Department of Internal Medicine  Available on [employer] Teams      Patient is a 82y old  Male who presents with a chief complaint of Hypotension (2023 12:03)      OVERNIGHT EVENTS: No acute overnight events.    SUBJECTIVE: Pt seen and examined. Denies fevers, chills, CP, SOB, Abdominal pain, N/V, Constipation, Diarrhea    MEDICATIONS  (STANDING):  aMIOdarone    Tablet 200 milliGRAM(s) Oral daily  apixaban 2.5 milliGRAM(s) Oral two times a day  aspirin enteric coated 81 milliGRAM(s) Oral daily  atorvastatin 80 milliGRAM(s) Oral at bedtime  carbidopa/levodopa  25/100 1 Tablet(s) Oral <User Schedule>  carbidopa/levodopa  25/100 1 Tablet(s) Oral <User Schedule>  clopidogrel Tablet 75 milliGRAM(s) Oral daily  desvenlafaxine ER 25 milliGRAM(s) Oral daily  lactated ringers. 1000 milliLiter(s) (100 mL/Hr) IV Continuous <Continuous>  oxybutynin 10 milliGRAM(s) Oral two times a day  piperacillin/tazobactam IVPB.. 3.375 Gram(s) IV Intermittent every 12 hours  polyethylene glycol 3350 17 Gram(s) Oral two times a day  senna 2 Tablet(s) Oral at bedtime  tadalafil 5 milliGRAM(s) Oral daily  tamsulosin 0.4 milliGRAM(s) Oral at bedtime    MEDICATIONS  (PRN):      I&O's Summary    2023 07:01  -  2023 07:00  --------------------------------------------------------  IN: 0 mL / OUT: 350 mL / NET: -350 mL        Vital Signs Last 24 Hrs  T(C): 36.4 (2023 03:43), Max: 36.4 (2023 22:04)  T(F): 97.6 (2023 03:43), Max: 97.6 (2023 01:29)  HR: 63 (2023 03:43) (60 - 71)  BP: 102/80 (2023 03:43) (84/43 - 109/50)  BP(mean): 62 (2023 22:04) (62 - 62)  RR: 16 (2023 03:43) (12 - 16)  SpO2: 100% (2023 03:43) (100% - 100%)    Parameters below as of 2023 03:43  Patient On (Oxygen Delivery Method): room air        =================PHYSICAL EXAM=================    GENERAL: Laying comfortably, NAD  EYES: EOMI, PERRL, no scleral icterus  NECK: No JVD  LUNG: Clear to auscultation bilaterally; No wheeze, crackles or rhonci  HEART: Regular rate and rhythm; No murmurs, rubs, or gallops  ABDOMEN: Soft, Nontender, Nondistended  EXTREMITIES:  No LE edema, 2+ Peripheral Pulses, No clubbing, cyanosis, or edema  PSYCH: AAOx3  NEUROLOGY: non-focal, strength 5/5 in all extremities, sensation intact  SKIN: No rashes or lesions    =================================================    LABS:                        8.3    12.44 )-----------( 294      ( 2023 12:00 )             28.0     Auto Eosinophil # x     / Auto Eosinophil % x     / Auto Neutrophil # x     / Auto Neutrophil % x     / BANDS % x                            9.1    15.61 )-----------( 304      ( 2023 16:10 )             30.6     Auto Eosinophil # 0.00  / Auto Eosinophil % 0.0   / Auto Neutrophil # 13.95 / Auto Neutrophil % 89.4  / BANDS % x            139  |  105  |  48<H>  ----------------------------<  147<H>  5.0   |  18<L>  |  3.40<H>    Ca    9.2      2023 16:10  Mg     2.10       Phos  3.8       TPro  6.9  /  Alb  3.2<L>  /  TBili  0.4  /  DBili  x   /  AST  46<H>  /  ALT  13  /  AlkPhos  405<H>      PT/INR - ( 2023 16:10 )   PT: 25.4 sec;   INR: 2.17 ratio         PTT - ( 2023 16:10 )  PTT:39.0 sec  CARDIAC MARKERS ( 2023 18:12 )  x     / x     / 355 U/L / x     / 4.7 ng/mL      Urinalysis Basic - ( 2023 16:10 )    Color: Yellow / Appearance: Slightly Turbid / S.019 / pH: x  Gluc: x / Ketone: Negative  / Bili: Negative / Urobili: <2 mg/dL   Blood: x / Protein: 100 mg/dL / Nitrite: Negative   Leuk Esterase: Large / RBC: 90 /HPF / WBC 31 /HPF   Sq Epi: x / Non Sq Epi: 8 /HPF / Bacteria: Few            RADIOLOGY & ADDITIONAL TESTS:    Imaging Personally Reviewed:    Consultant(s) Notes Reviewed:      Care Discussed with Consultants/Other Providers:   Carlos Manuel Metcalf MD  PGY-3 Department of Internal Medicine  Available on [employer] Teams      Patient is a 82y old  Male who presents with a chief complaint of Hypotension (2023 12:03)      OVERNIGHT EVENTS: No acute overnight events.    SUBJECTIVE: Pt seen and examined. Unable to obtain ROS d/t AMS    MEDICATIONS  (STANDING):  aMIOdarone    Tablet 200 milliGRAM(s) Oral daily  apixaban 2.5 milliGRAM(s) Oral two times a day  aspirin enteric coated 81 milliGRAM(s) Oral daily  atorvastatin 80 milliGRAM(s) Oral at bedtime  carbidopa/levodopa  25/100 1 Tablet(s) Oral <User Schedule>  carbidopa/levodopa  25/100 1 Tablet(s) Oral <User Schedule>  clopidogrel Tablet 75 milliGRAM(s) Oral daily  desvenlafaxine ER 25 milliGRAM(s) Oral daily  lactated ringers. 1000 milliLiter(s) (100 mL/Hr) IV Continuous <Continuous>  oxybutynin 10 milliGRAM(s) Oral two times a day  piperacillin/tazobactam IVPB.. 3.375 Gram(s) IV Intermittent every 12 hours  polyethylene glycol 3350 17 Gram(s) Oral two times a day  senna 2 Tablet(s) Oral at bedtime  tadalafil 5 milliGRAM(s) Oral daily  tamsulosin 0.4 milliGRAM(s) Oral at bedtime    MEDICATIONS  (PRN):      I&O's Summary    2023 07:01  -  2023 07:00  --------------------------------------------------------  IN: 0 mL / OUT: 350 mL / NET: -350 mL        Vital Signs Last 24 Hrs  T(C): 36.4 (2023 03:43), Max: 36.4 (2023 22:04)  T(F): 97.6 (2023 03:43), Max: 97.6 (2023 01:29)  HR: 63 (2023 03:43) (60 - 71)  BP: 102/80 (2023 03:43) (84/43 - 109/50)  BP(mean): 62 (2023 22:04) (62 - 62)  RR: 16 (2023 03:43) (12 - 16)  SpO2: 100% (2023 03:43) (100% - 100%)    Parameters below as of 2023 03:43  Patient On (Oxygen Delivery Method): room air        =================PHYSICAL EXAM=================    GENERAL: Laying comfortably, NAD  EYES: EOMI, PERRL, no scleral icterus  NECK: No JVD  LUNG: Clear to auscultation bilaterally; No wheeze, crackles or rhonci  HEART: Regular rate and rhythm; No murmurs, rubs, or gallops  ABDOMEN: Soft, Nontender, Nondistended  EXTREMITIES:  No LE edema, 2+ Peripheral Pulses, No clubbing, cyanosis, or edema  PSYCH: AAOx1, lethargic  NEUROLOGY: non-focal, unable to obtain strength exam d/t lack of cooperation  SKIN: No rashes or lesions    =================================================    LABS:                        8.3    12.44 )-----------( 294      ( 2023 12:00 )             28.0     Auto Eosinophil # x     / Auto Eosinophil % x     / Auto Neutrophil # x     / Auto Neutrophil % x     / BANDS % x                            9.1    15.61 )-----------( 304      ( 2023 16:10 )             30.6     Auto Eosinophil # 0.00  / Auto Eosinophil % 0.0   / Auto Neutrophil # 13.95 / Auto Neutrophil % 89.4  / BANDS % x            139  |  105  |  48<H>  ----------------------------<  147<H>  5.0   |  18<L>  |  3.40<H>    Ca    9.2      2023 16:10  Mg     2.10       Phos  3.8       TPro  6.9  /  Alb  3.2<L>  /  TBili  0.4  /  DBili  x   /  AST  46<H>  /  ALT  13  /  AlkPhos  405<H>      PT/INR - ( 2023 16:10 )   PT: 25.4 sec;   INR: 2.17 ratio         PTT - ( 2023 16:10 )  PTT:39.0 sec  CARDIAC MARKERS ( 2023 18:12 )  x     / x     / 355 U/L / x     / 4.7 ng/mL      Urinalysis Basic - ( 2023 16:10 )    Color: Yellow / Appearance: Slightly Turbid / S.019 / pH: x  Gluc: x / Ketone: Negative  / Bili: Negative / Urobili: <2 mg/dL   Blood: x / Protein: 100 mg/dL / Nitrite: Negative   Leuk Esterase: Large / RBC: 90 /HPF / WBC 31 /HPF   Sq Epi: x / Non Sq Epi: 8 /HPF / Bacteria: Few            RADIOLOGY & ADDITIONAL TESTS:    Imaging Personally Reviewed:    Consultant(s) Notes Reviewed:      Care Discussed with Consultants/Other Providers:

## 2023-01-25 NOTE — PROGRESS NOTE ADULT - PROBLEM SELECTOR PLAN 10
2/2 complete heart block, s/p PPM placement  s/p EP interrogation in ED  - Continue to monitor vital signs q4hr

## 2023-01-25 NOTE — H&P ADULT - PROBLEM SELECTOR PLAN 7
- Continue Eliquis  - Continue amiodarone Patient w/ history of cardiac amyloidosis  - Continue Vyndamax (not on formulary, bring from home)  - Holding diuretics, as patient clinically dry  - Holding olmesartan i/s/o hypotension  - TTE

## 2023-01-25 NOTE — PROGRESS NOTE ADULT - PROBLEM SELECTOR PLAN 2
SCr 3.40 on admission, baseline ~1.5  Possibly i/s/o recent urinary retention vs. pre-renal i/s/o hypotension 2/2 urinary tract infection  - Renal U/S: no hydronephrosis  - c/w fluid resusitation and bladder scans

## 2023-01-25 NOTE — PATIENT PROFILE ADULT - VISION (WITH CORRECTIVE LENSES IF THE PATIENT USUALLY WEARS THEM):
Patient had CBC drawn yesterday?   Normal vision: sees adequately in most situations; can see medication labels, newsprint

## 2023-01-25 NOTE — CONSULT NOTE ADULT - SUBJECTIVE AND OBJECTIVE BOX
HPI:  82M PMH Parkinson's, Pleural effusion s/p PleurX, a. fib on Eliquis, cardiac amyloid, complete heart block s/p PPM/AICD, CAD s/p 1 stent, HTN, BPH who was BIBEMS for generalized weakness. Attempt to reach family was made without success. Per chart review, patient with urinary retention for three days  found to be in severe sepsis state likely secondary to UTI +/- aspiration pneumonia.  patient is AAO x2 (to self and place but not time). States that he lives at home with his wife and has a live in [facility], unable to state what brought him to the hospital but he is aware he is in a hospital. Reported ?diarrhea but no GI complaints at present. Denies any chest pain, palpitations, dizziness/syncope, no SOB, no cough/sputum, no URI complaints, no  complaints. Denies any active blood losses. No other acute complaints.       PAST MEDICAL & SURGICAL HISTORY:  Hypertension  Hyperlipidemia  BPH (benign prostatic hyperplasia)  s/p laser nucleation   Atrial fibrillation  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  Parkinsons disease  CAD (coronary artery disease)  s/p PCI   Pleural effusion, not elsewhere classified  COVID-19 vaccine series completed  w booster  History of hip replacement, total  R hip  &amp; L hip  Status post cataract extraction  right eye cataract extraction with IOL 2015  Presence of cardiac pacemaker  10/2021  H/O coronary angioplasty  2021 1 stent inserted          REVIEW OF SYSTEMS  General:No Weight change/ Fatigue/ HA/Dizzy	  Skin/Breast: No Rashes/ Lesions/ Masses	  Ophthalmologic: No Blurry vision/ Glaucoma/ Blindness	  ENMT: No Hearing loss/ Drainage/ Lesions	  Respiratory and Thorax: No Cough/ Wheezing/ SOB/ Hemoptysis/ Sputum production	  Cardiovascular: No Chest pain/ Palpitations/ Diaphoresis	  Gastrointestinal: No Nausea/ Vomiting/ Constipation/ Appetite Change	  Genitourinary: No Heamturia/ Dysuria/ Frequency change/ Impotence	  Musculoskeletal: No Pain/ Weakness/ Claudication	  Neurological: No Seizures/ TIA/CVA/ Parastesias	  Psychiatric: No Dementia/ Depression/ SI/HI	  Hematology/Lymphatics: No hx of bleeding/ Edema	  Endocrine:	No Hyperglycemia/ Hypoglycemia  Allergic/Immunologic:	 No Anaphylaxis/ Intolerance/ Recent illnesses        MEDICATIONS  (STANDING):  aMIOdarone    Tablet 200 milliGRAM(s) Oral daily  apixaban 2.5 milliGRAM(s) Oral two times a day  aspirin enteric coated 81 milliGRAM(s) Oral daily  atorvastatin 80 milliGRAM(s) Oral at bedtime  carbidopa/levodopa  25/100 1 Tablet(s) Oral <User Schedule>  carbidopa/levodopa  25/100 1 Tablet(s) Oral <User Schedule>  clopidogrel Tablet 75 milliGRAM(s) Oral daily  desvenlafaxine ER 25 milliGRAM(s) Oral daily  oxybutynin 10 milliGRAM(s) Oral two times a day  piperacillin/tazobactam IVPB.. 3.375 Gram(s) IV Intermittent every 12 hours  polyethylene glycol 3350 17 Gram(s) Oral two times a day  senna 2 Tablet(s) Oral at bedtime  tadalafil 5 milliGRAM(s) Oral daily    MEDICATIONS  (PRN):          Allergies  No Known Allergies  Intolerances         Social History:  · Substance use	Unable to obtain  · Social History (marital status, living situation, occupation, and sexual history)	Per chart review, patient mostly bedbound with recurrent admissions to the hospital. No current smoking, drinking, illicit drug use.      FAMILY HISTORY:  Family history of lung cancer (Mother)  Family history of esophageal cancer (Father)  FH: myocardial infarction (Grandparent)  unless noted, no significant family hx with Mother, Father, Siblings        Vital Signs Last 24 Hrs  T(C): 36.4 (2023 03:43), Max: 36.4 (2023 22:04)  T(F): 97.6 (2023 03:43), Max: 97.6 (2023 01:29)  HR: 63 (2023 03:43) (60 - 71)  BP: 102/80 (2023 03:43) (84/43 - 109/50)  BP(mean): 62 (2023 22:04) (62 - 62)  RR: 16 (2023 03:43) (12 - 16)  SpO2: 100% (2023 03:43) (100% - 100%)    Parameters below as of 2023 03:43  Patient On (Oxygen Delivery Method): room air      PHYSICAL EXAM  General: WN/WD NAD  Neurology: Awake, nonfocal, GUILLORY x 4  Eyes: Scleras clear, PERRLA/ EOMI, Gross vision intact  ENT:Gross hearing intact, grossly patent pharynx, no stridor  Neck: Neck supple, trachea midline, No JVD,   Respiratory: CTA B/L, No wheezing, rales, rhonchi  CV: RRR, S1S2, no murmurs, rubs or gallops  Abdominal: Soft, NT, ND +BS,   Extremities: No edema, + peripheral pulses  Skin: No Rashes, Hematoma, Ecchymosis  Lymphatic: No Neck, axilla, groin LAD  Psych: Oriented x 3, normal affect  Incisions:   Tubes:    LABS:                        9.1    15.61 )-----------( 304      ( 2023 16:10 )             30.6         139  |  105  |  48<H>  ----------------------------<  147<H>  5.0   |  18<L>  |  3.40<H>    Ca    9.2      2023 16:10  Phos  3.8       Mg     2.10         TPro  6.9  /  Alb  3.2<L>  /  TBili  0.4  /  DBili  x   /  AST  46<H>  /  ALT  13  /  AlkPhos  405<H>      PT/INR - ( 2023 16:10 )   PT: 25.4 sec;   INR: 2.17 ratio         PTT - ( 2023 16:10 )  PTT:39.0 sec  Urinalysis Basic - ( 2023 16:10 )    Color: Yellow / Appearance: Slightly Turbid / S.019 / pH: x  Gluc: x / Ketone: Negative  / Bili: Negative / Urobili: <2 mg/dL   Blood: x / Protein: 100 mg/dL / Nitrite: Negative   Leuk Esterase: Large / RBC: 90 /HPF / WBC 31 /HPF   Sq Epi: x / Non Sq Epi: 8 /HPF / Bacteria: Few        RADIOLOGY & ADDITIONAL STUDIES:  < from: CT Abdomen and Pelvis No Cont (23 @ 22:12) >    ACC: [id number] EXAM:  CT ABDOMEN AND PELVIS   ORDERED BY: ALEXANDRA MORRIS     PROCEDURE DATE:  2023          INTERPRETATION:  CLINICAL INFORMATION: Abdominal distention    COMPARISON: CT abdomen and pelvis 2022    CONTRAST/COMPLICATIONS:  IVContrast: None  Oral Contrast: None  Complications: None    PROCEDURE:  CT of the Abdomen and Pelvis was performed.  Sagittal and coronal reformats were performed.    FINDINGS:    Evaluation of the abdominal and pelvic viscera is limited without   intravenous contrast.    LOWER CHEST: Bilateral lower lobe and left upper lobe groundglass   opacities. Small bilateral pleural effusions with right chest tube in   place. Cardiomegaly.    LIVER: Multiple large cysts, replacing most of the parenchyma ofthe left   lobe. Additional scattered subcentimeter hypodensities too small to   characterize.  BILE DUCTS: Normal caliber.  GALLBLADDER: Within normal limits.  SPLEEN: Within normal limits.  PANCREAS: Within normal limits.  ADRENALS: Within normal limits.  KIDNEYS/URETERS: Within normal limits.    BLADDER: Within normal limits.  REPRODUCTIVE ORGANS: Evaluation of the adnexa is limited secondary to   extensive streak artifact from bilateral hip hardware.    BOWEL: Large colonic and rectal stool burden. There is mild rectal wall   thickening with presacral edema. No bowel obstruction. Appendix is normal.  PERITONEUM: No ascites.  VESSELS: Atherosclerotic changes.  RETROPERITONEUM/LYMPH NODES: No lymphadenopathy.  ABDOMINAL WALL: Within normal limits.  BONES: Degenerative changes. Status post bilateral total hip   arthroplasties.    IMPRESSION:    Large colonic and rectal stool burden with stercoral colitis.    --- End of Report ---          PÉREZ HURTADO MD; Resident Radiology  This document has been electronically signed.  OLEG PÉREZ MD; Attending Radiologist  This document has been electronically signed. 2023 12:39AM    < end of copied text >        ASSESSMENT:   82yMalePAST MEDICAL & SURGICAL HISTORY:  Hypertension  Hyperlipidemia  BPH (benign prostatic hyperplasia)  s/p laser nucleation   Atrial fibrillation  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  Parkinsons disease  CAD (coronary artery disease)  s/p PCI   Pleural effusion, not elsewhere classified  COVID-19 vaccine series completed  w booster  History of hip replacement, total  R hip  &amp; L hip  Status post cataract extraction  right eye cataract extraction with IOL 2015  Presence of cardiac pacemaker  10/2021  H/O coronary angioplasty  2021 1 stent inserted      HEALTH ISSUES - PROBLEM Dx:  Parkinson&#x27;s disease    Bradycardia    Pleural effusion, right    Constipation    Acute kidney injury superimposed on CKD    Atrial fibrillation    Preventive measure    Elevated troponin    CAD S/P percutaneous coronary angioplasty    Cardiac amyloidosis    Severe sepsis    Anemia        HEALTH ISSUES - R/O PROBLEM Dx: pleural effusion      PLAN:  will d/w Dr Oneal  recommendations to follow  continue care per primary medical team    Radha ROBERSON [id number] HPI:  82M PMH Parkinson's, Pleural effusion s/p PleurX, a. fib on Eliquis, cardiac amyloid, complete heart block s/p PPM/AICD, CAD s/p 1 stent, HTN, BPH who was BIBEMS for generalized weakness. Attempt to reach family was made without success. Per chart review, patient with urinary retention for three days  found to be in severe sepsis state likely secondary to UTI +/- aspiration pneumonia.  patient is AAO x2 (to self and place but not time). States that he lives at home with his wife and has a live in [facility], unable to state what brought him to the hospital but he is aware he is in a hospital. Reported ?diarrhea but no GI complaints at present. Denies any chest pain, palpitations, dizziness/syncope, no SOB, no cough/sputum, no URI complaints, no  complaints. Denies any active blood losses. No other acute complaints.       PAST MEDICAL & SURGICAL HISTORY:  Hypertension  Hyperlipidemia  BPH (benign prostatic hyperplasia)  s/p laser nucleation   Atrial fibrillation  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  Parkinsons disease  CAD (coronary artery disease)  s/p PCI   Pleural effusion, not elsewhere classified  COVID-19 vaccine series completed  w booster  History of hip replacement, total  R hip  &amp; L hip  Status post cataract extraction  right eye cataract extraction with IOL 2015  Presence of cardiac pacemaker  10/2021  H/O coronary angioplasty  2021 1 stent inserted          REVIEW OF SYSTEMS  General:No Weight change/ Fatigue/ HA/Dizzy	  Skin/Breast: No Rashes/ Lesions/ Masses	  Ophthalmologic: No Blurry vision/ Glaucoma/ Blindness	  ENMT: No Hearing loss/ Drainage/ Lesions	  Respiratory and Thorax: No Cough/ Wheezing/ SOB/ Hemoptysis/ Sputum production	  Cardiovascular: No Chest pain/ Palpitations/ Diaphoresis	  Gastrointestinal: No Nausea/ Vomiting/ Constipation/ Appetite Change	  Genitourinary: No Heamturia/ Dysuria/ Frequency change/ Impotence	  Musculoskeletal: No Pain/ Weakness/ Claudication	  Neurological: No Seizures/ TIA/CVA/ Parastesias	  Psychiatric: No Dementia/ Depression/ SI/HI	  Hematology/Lymphatics: No hx of bleeding/ Edema	  Endocrine:	No Hyperglycemia/ Hypoglycemia  Allergic/Immunologic:	 No Anaphylaxis/ Intolerance/ Recent illnesses        MEDICATIONS  (STANDING):  aMIOdarone    Tablet 200 milliGRAM(s) Oral daily  apixaban 2.5 milliGRAM(s) Oral two times a day  aspirin enteric coated 81 milliGRAM(s) Oral daily  atorvastatin 80 milliGRAM(s) Oral at bedtime  carbidopa/levodopa  25/100 1 Tablet(s) Oral <User Schedule>  carbidopa/levodopa  25/100 1 Tablet(s) Oral <User Schedule>  clopidogrel Tablet 75 milliGRAM(s) Oral daily  desvenlafaxine ER 25 milliGRAM(s) Oral daily  oxybutynin 10 milliGRAM(s) Oral two times a day  piperacillin/tazobactam IVPB.. 3.375 Gram(s) IV Intermittent every 12 hours  polyethylene glycol 3350 17 Gram(s) Oral two times a day  senna 2 Tablet(s) Oral at bedtime  tadalafil 5 milliGRAM(s) Oral daily    MEDICATIONS  (PRN):          Allergies  No Known Allergies  Intolerances         Social History:  · Substance use	Unable to obtain  · Social History (marital status, living situation, occupation, and sexual history)	Per chart review, patient mostly bedbound with recurrent admissions to the hospital. No current smoking, drinking, illicit drug use.      FAMILY HISTORY:  Family history of lung cancer (Mother)  Family history of esophageal cancer (Father)  FH: myocardial infarction (Grandparent)  unless noted, no significant family hx with Mother, Father, Siblings        Vital Signs Last 24 Hrs  T(C): 36.4 (2023 03:43), Max: 36.4 (2023 22:04)  T(F): 97.6 (2023 03:43), Max: 97.6 (2023 01:29)  HR: 63 (2023 03:43) (60 - 71)  BP: 102/80 (2023 03:43) (84/43 - 109/50)  BP(mean): 62 (2023 22:04) (62 - 62)  RR: 16 (2023 03:43) (12 - 16)  SpO2: 100% (2023 03:43) (100% - 100%)    Parameters below as of 2023 03:43  Patient On (Oxygen Delivery Method): room air      PHYSICAL EXAM  General: WN/WD NAD  Neurology: Awake, nonfocal, GUILLORY x 4  Eyes: Scleras clear, PERRLA/ EOMI, Gross vision intact  ENT:Gross hearing intact, grossly patent pharynx, no stridor  Neck: Neck supple, trachea midline, No JVD,   Respiratory: CTA B/L, No wheezing, rales, rhonchi  CV: RRR, S1S2, no murmurs, rubs or gallops  Abdominal: Soft, NT, ND +BS,   Extremities: No edema, + peripheral pulses  Skin: No Rashes, Hematoma, Ecchymosis  Lymphatic: No Neck, axilla, groin LAD  Psych: Oriented x 3, normal affect  Incisions:   Tubes:    LABS:                        9.1    15.61 )-----------( 304      ( 2023 16:10 )             30.6         139  |  105  |  48<H>  ----------------------------<  147<H>  5.0   |  18<L>  |  3.40<H>    Ca    9.2      2023 16:10  Phos  3.8       Mg     2.10         TPro  6.9  /  Alb  3.2<L>  /  TBili  0.4  /  DBili  x   /  AST  46<H>  /  ALT  13  /  AlkPhos  405<H>      PT/INR - ( 2023 16:10 )   PT: 25.4 sec;   INR: 2.17 ratio         PTT - ( 2023 16:10 )  PTT:39.0 sec  Urinalysis Basic - ( 2023 16:10 )    Color: Yellow / Appearance: Slightly Turbid / S.019 / pH: x  Gluc: x / Ketone: Negative  / Bili: Negative / Urobili: <2 mg/dL   Blood: x / Protein: 100 mg/dL / Nitrite: Negative   Leuk Esterase: Large / RBC: 90 /HPF / WBC 31 /HPF   Sq Epi: x / Non Sq Epi: 8 /HPF / Bacteria: Few        RADIOLOGY & ADDITIONAL STUDIES:  < from: CT Abdomen and Pelvis No Cont (23 @ 22:12) >    ACC: [id number] EXAM:  CT ABDOMEN AND PELVIS   ORDERED BY: ALEXANDRA MORRIS     PROCEDURE DATE:  2023          INTERPRETATION:  CLINICAL INFORMATION: Abdominal distention    COMPARISON: CT abdomen and pelvis 2022    CONTRAST/COMPLICATIONS:  IVContrast: None  Oral Contrast: None  Complications: None    PROCEDURE:  CT of the Abdomen and Pelvis was performed.  Sagittal and coronal reformats were performed.    FINDINGS:    Evaluation of the abdominal and pelvic viscera is limited without   intravenous contrast.    LOWER CHEST: Bilateral lower lobe and left upper lobe groundglass   opacities. Small bilateral pleural effusions with right chest tube in   place. Cardiomegaly.    LIVER: Multiple large cysts, replacing most of the parenchyma ofthe left   lobe. Additional scattered subcentimeter hypodensities too small to   characterize.  BILE DUCTS: Normal caliber.  GALLBLADDER: Within normal limits.  SPLEEN: Within normal limits.  PANCREAS: Within normal limits.  ADRENALS: Within normal limits.  KIDNEYS/URETERS: Within normal limits.    BLADDER: Within normal limits.  REPRODUCTIVE ORGANS: Evaluation of the adnexa is limited secondary to   extensive streak artifact from bilateral hip hardware.    BOWEL: Large colonic and rectal stool burden. There is mild rectal wall   thickening with presacral edema. No bowel obstruction. Appendix is normal.  PERITONEUM: No ascites.  VESSELS: Atherosclerotic changes.  RETROPERITONEUM/LYMPH NODES: No lymphadenopathy.  ABDOMINAL WALL: Within normal limits.  BONES: Degenerative changes. Status post bilateral total hip   arthroplasties.    IMPRESSION:    Large colonic and rectal stool burden with stercoral colitis.    --- End of Report ---          PÉREZ HURTADO MD; Resident Radiology  This document has been electronically signed.  OLEG PÉREZ MD; Attending Radiologist  This document has been electronically signed. 2023 12:39AM    < end of copied text >        ASSESSMENT:   82yMalePAST MEDICAL & SURGICAL HISTORY:  Hypertension  Hyperlipidemia  BPH (benign prostatic hyperplasia)  s/p laser nucleation   Atrial fibrillation  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  Parkinsons disease  CAD (coronary artery disease)  s/p PCI   Pleural effusion, not elsewhere classified  COVID-19 vaccine series completed  w booster  History of hip replacement, total  R hip  &amp; L hip  Status post cataract extraction  right eye cataract extraction with IOL 2015  Presence of cardiac pacemaker  10/2021  H/O coronary angioplasty  2021 1 stent inserted      HEALTH ISSUES - PROBLEM Dx:  Parkinson&#x27;s disease    Bradycardia    Pleural effusion, right    Constipation    Acute kidney injury superimposed on CKD    Atrial fibrillation    Preventive measure    Elevated troponin    CAD S/P percutaneous coronary angioplasty    Cardiac amyloidosis    Severe sepsis    Anemia        HEALTH ISSUES - R/O PROBLEM Dx: pleural effusion      PLAN:  will d/w Dr Oneal  recommendation bedside nurse to drain pleurX on   max drainage 1L at a time  contact nurse educator with questions    Radha ROBERSON [id number]

## 2023-01-25 NOTE — PROGRESS NOTE ADULT - PROBLEM SELECTOR PLAN 12
DVT ppx: Eliquis  Diet: Soft diet, S&S swallow  Dispo: Pending intervention, PT consulted    Updated wife Mo, DNR/DNI

## 2023-01-25 NOTE — PROGRESS NOTE ADULT - PROBLEM SELECTOR PLAN 5
Iron low, ferritin 111 (previous history of low ferritin). Folate/B12 WNL  - defer IV iron i/s/o infection  - will start oral iron after patient has bowel movements  - Monitor CBC qd, Transfuse hgb <7

## 2023-01-25 NOTE — H&P ADULT - ASSESSMENT
82M PMH Parkinson's, Pleural effusion s/p PleurX, a. fib on Eliquis, bradycardia w/ AICD, HTN who was BIBEMS for generalized weakness, found to be in severe sepsis state likely secondary to UTI +/- aspiration pneumonia.

## 2023-01-25 NOTE — PROGRESS NOTE ADULT - PROBLEM SELECTOR PLAN 3
Troponin 230-->202, EKG non-ischemic, paced; denies chest pain  Likely 2/2 supply/demand imbalance i/s/o sepsis  - TTE 1/25/23: EF 73%, severe concentric L vent hypertrophy, mod pulm HTN

## 2023-01-25 NOTE — H&P ADULT - PROBLEM SELECTOR PLAN 1
Patient presented with confusion, hypotension, leukocytosis, and elevated lactate  Urinalysis with evidence of urinary tract infection, patient with urinary retention x3 days Patient presented with confusion, hypotension, leukocytosis, and elevated lactate  Urinalysis with evidence of urinary tract infection, patient with urinary retention x3 days  - Patient presented with confusion, hypotension, leukocytosis, and elevated lactate  Urinalysis with evidence of urinary tract infection, patient with urinary retention x3 days  S/p vanc/Zosyn in ED as well as fluids, 30mg midodrine  - Continue Zosyn (1/24-)  - F/u urine culture  - F/u blood cultures  - Trend vital signs q4hr  - Fluid resuscitation  - If remains hypotensive, can continue midodrine  - Check TTE to assess EF Patient presented with confusion, shock state, leukocytosis, and elevated lactate, likely 2/2 severe sepsis  No tachycardia on admission, though patient PPM dependent ([facility])  Urinalysis with evidence of urinary tract infection, GGOs noted in b/l lower   S/p vanc/Zosyn in ED as well as 1.5L IVF, 30mg midodrine with improvement in BP & mentation  - Continue Zosyn (1/24-)  - F/u urine culture  - F/u blood cultures  - Trend vital signs q4hr  - Check procalcitonin  - Fluid resuscitation  - If remains hypotensive, can continue midodrine  - Check TTE to assess EF Patient presented with confusion, shock state, leukocytosis, and elevated lactate, likely 2/2 severe sepsis  No tachycardia on admission, though patient PPM dependent ([facility])  Urinalysis with evidence of urinary tract infection, GGOs noted in b/l lower   S/p vanc/Zosyn in ED as well as 1.5L IVF, 30mg midodrine with improvement in BP & mentation  - Continue Zosyn (1/24-)  - F/u urine culture  - F/u blood cultures  - Consider sending pleur-X fluid for analysis for infection  - Trend vital signs q4hr  - Check procalcitonin  - Fluid resuscitation  - If remains hypotensive, can continue midodrine  - Check TTE to assess EF

## 2023-01-25 NOTE — CONSULT NOTE ADULT - SUBJECTIVE AND OBJECTIVE BOX
HPI:    83 y/o M with hx of Parkinson's, Pleural effusions w/ PleurX in place, AFib on Eliquis, Bradycardia w/ AICD in place, and HTN who presents to the ED via EMS for generalized weakness. No known falls or injuries. Has pleurX drained 3x a week. Per daughter, patient w/ urinary retention x3 days. No fever, chills, nausea, vomiting, chest pain, sob. En route, patient hypotensive to SBP 80s.     Upon arrival to the ER, BP was 80s/40s, HR 60, RR 14 with O2 100% on 2L, normal temp. Labs showed elevated WBC, ALAYNA with Cr 3.4 (last Cr in system 1.46 on 10/2022) with mild anion gap metabolic acidosis and lactate 3.4 -> 2.2, UA+. In the ER he received 1.5L NS, 1gm ceftriaxone.     EP interrogated pacemaker which has normal pacing and sensing, is dual chamber and at rate 60. Originally on presentation pt was too altered to receive PO meds from report by ED team, upon MICU interview after IV fluids he is alert and interactive, not complaining of any symptoms at this time including urinary, SOB, pain anywhere. A&Ox1-2 and lives in a group home uncertain why he was brought to the ER.        PHYSICAL EXAM:    GENERAL: Sitting comfortably in bed in no acute distress, arms contracted and rigid with some   NEURO: Alert and awake and Oriented to person only, to place with queueing. Pupils symmetric, No ptosis, no dysarthria. Tremor and cogwheeling present  HEENT: No conjunctival injection or scleral icterus.   CARD: Normal rate and regular rhythm, no murmurs and no gallops appreciated.  RESP: Clear to auscultation bilaterally in anterior lung fields, No wheezes, rales or rhonchi.   ABD: Nondistended, Soft and nontender to palpation in all quadrants, no guarding, no rigidity. No masses appreciated.  EXT: No pedal edema. 2+DP pulses bilaterally.  SKIN: abrasion to the L knee, wound to the R shin and L ankle with clean bandages (our team did not remove them to inspect), arms with some mild bruising.      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      Parkinsons disease      CAD (coronary artery disease)  s/p PCI       Pleural effusion, not elsewhere classified      COVID-19 vaccine series completed  w booster      History of hip replacement, total  R hip  &amp; L hip      Status post cataract extraction  right eye cataract extraction with IOL 2015      Presence of cardiac pacemaker  10/2021      H/O coronary angioplasty  2021 1 stent inserted          Allergies    No Known Allergies    Intolerances        OBJECTIVE:  ICU Vital Signs Last 24 Hrs  T(C): 36.4 (2023 22:04), Max: 36.4 (2023 22:04)  T(F): 97.5 (2023 22:04), Max: 97.5 (2023 22:04)  HR: 60 (2023 23:40) (60 - 71)  BP: 84/43 (2023 23:40) (84/43 - 95/36)  BP(mean): 62 (2023 22:04) (62 - 62)  ABP: --  ABP(mean): --  RR: 12 (2023 23:40) (12 - 16)  SpO2: 100% (2023 23:40) (100% - 100%)    O2 Parameters below as of 2023 23:40  Patient On (Oxygen Delivery Method): nasal cannula  O2 Flow (L/min): 2        CAPILLARY BLOOD GLUCOSE        HOSPITAL MEDICATIONS:  MEDICATIONS  (STANDING):  midodrine. 30 milliGRAM(s) Oral once    MEDICATIONS  (PRN):      LABS:                        9.1    15.61 )-----------( 304      ( 2023 16:10 )             30.6         139  |  105  |  48<H>  ----------------------------<  147<H>  5.0   |  18<L>  |  3.40<H>    Ca    9.2      2023 16:10  Phos  3.8       Mg     2.10         TPro  6.9  /  Alb  3.2<L>  /  TBili  0.4  /  DBili  x   /  AST  46<H>  /  ALT  13  /  AlkPhos  405<H>      PT/INR - ( 2023 16:10 )   PT: 25.4 sec;   INR: 2.17 ratio         PTT - ( 2023 16:10 )  PTT:39.0 sec  Urinalysis Basic - ( 2023 16:10 )    Color: Yellow / Appearance: Slightly Turbid / S.019 / pH: x  Gluc: x / Ketone: Negative  / Bili: Negative / Urobili: <2 mg/dL   Blood: x / Protein: 100 mg/dL / Nitrite: Negative   Leuk Esterase: Large / RBC: 90 /HPF / WBC 31 /HPF   Sq Epi: x / Non Sq Epi: 8 /HPF / Bacteria: Few        Venous Blood Gas:   @ 18:12  7.23/46/30/19/31.2  VBG Lactate: 2.2  Venous Blood Gas:   @ 16:10  7.23/46/32/19/33.7  VBG Lactate: 3.4

## 2023-01-25 NOTE — PROGRESS NOTE ADULT - ATTENDING COMMENTS
82 y.o. M w/ a hx of Parkinson's, pleural effusions s/p PleurX, afib on Eliquis, cardiac amyloidosis, CHB s/p PPM/AICD, CAD s/p stent, HTN, BPH hospitalized for encephalopathy, decreased urine output and weakness.     Wife and HHA at bedside- report over the last few days, patient has been weaker, more lethargic, not as interactive. At baseline, can make some complaints/needs known, cannot have conversation, oriented x 2. Can be transferred from bed to chair with a lot of assistance but does not walk on his own. Has not been urinating much over the last few days, did not complain of dysuria, urgency, N/V, abdominal pain. Was treated for a UTI a few weeks ago and had some diarrhea following antibiotic course which improved. Wife notes multiple soft, small BM's throughout the day. PE: A&Ox2 but lethargic, quickly falls asleep, follows commands.     # Severe sepsis: 2/2 UTI +/- pneumonia. Does not endorse any sx. Cont Zosyn for now pending Ucx, u legionella, MRSA swab, Bcx. Give additional 1L IVF today.   # Urinary retention: Elevated PVR, straight cath x2, may require Storm if patient continues to retain. Start Flomax.   # ALAYNA on CKD: Likely multifactorial- urinary obstruction as above, hypotension, pre-renal       INCOMPLETE NOTE 82 y.o. M w/ a hx of Parkinson's, pleural effusions s/p PleurX, afib on Eliquis, cardiac amyloidosis, CHB s/p PPM/AICD, CAD s/p stent, HTN, BPH hospitalized for encephalopathy, decreased urine output and weakness.     Wife and HHA at bedside- report over the last few days, patient has been weaker, more lethargic, not as interactive. At baseline, can make some complaints/needs known, cannot have conversation, oriented x 2. Can be transferred from bed to chair with a lot of assistance but does not walk on his own. Has not been urinating much over the last few days, did not complain of dysuria, urgency, N/V, abdominal pain. Was treated for a UTI a few weeks ago and had some diarrhea following antibiotic course which improved. Wife notes multiple soft, small BM's throughout the day. PE: A&Ox2 but lethargic, quickly falls asleep, follows commands.     # Severe sepsis: 2/2 UTI +/- pneumonia. Does not endorse any sx. Cont Zosyn for now pending Ucx, u legionella, MRSA swab, Bcx. Give additional 1L IVF today.   # Encephalopathy: Likely toxic metabolic encephalopathy 2/2 UTI +/- PNA. Per wife, MS improving, close to baseline. SLP evaluation.   # Urinary retention: Elevated PVR, straight cath x2, may require Storm if patient continues to retain. Start Flomax.   # ALAYNA on CKD: Likely multifactorial- urinary obstruction as above, hypotension, pre-renal   # Stercoral colitis: Start bowel regimen   # Afib: Cont Eliquis for now. If ALAYNA improves, patient should be on higher dose Eliquis. Does not qualify for reduced dose otherwise. Clarify role of amiodarone as patient is already PPM dependent.   # Elevated ALP: Check GGT, has been elevated in the past.

## 2023-01-25 NOTE — H&P ADULT - ATTENDING COMMENTS
Patient seen and examined on 1/25/23, case discussed with Dr. Edvin Taylor. This is an 82M with history as above who presents to the hospital with generalized weakness here noted to have severe sepsis possibly 2/2 UTI vs PNA. Procalcitonin noted to be elevated, will c/w zosyn, trend lactate, monitor BPs. Troponin levels elevated but in setting of ALAYNA on CKD and shock state on initial presentation, no current chest pain, EKG paced, will add on CK and CKMB to his troponin levels, recheck troponin this AM to ensure downtrend. Clarify additional history in AM to family. Other management as above.

## 2023-01-25 NOTE — H&P ADULT - NSHPSOCIALHISTORY_GEN_ALL_CORE
Per chart review, patient mostly bedbound with recurrent admissions to the hospital. No current smoking, drinking, illicit drug use.

## 2023-01-26 DIAGNOSIS — M79.605 PAIN IN LEFT LEG: ICD-10-CM

## 2023-01-26 LAB
ALBUMIN SERPL ELPH-MCNC: 2.6 G/DL — LOW (ref 3.3–5)
ALP SERPL-CCNC: 273 U/L — HIGH (ref 40–120)
ALT FLD-CCNC: 8 U/L — SIGNIFICANT CHANGE UP (ref 4–41)
ANION GAP SERPL CALC-SCNC: 10 MMOL/L — SIGNIFICANT CHANGE UP (ref 7–14)
AST SERPL-CCNC: 30 U/L — SIGNIFICANT CHANGE UP (ref 4–40)
BASE EXCESS BLDV CALC-SCNC: -11 MMOL/L — LOW (ref -2–3)
BASOPHILS # BLD AUTO: 0.05 K/UL — SIGNIFICANT CHANGE UP (ref 0–0.2)
BASOPHILS NFR BLD AUTO: 0.6 % — SIGNIFICANT CHANGE UP (ref 0–2)
BILIRUB SERPL-MCNC: 0.3 MG/DL — SIGNIFICANT CHANGE UP (ref 0.2–1.2)
BLOOD GAS VENOUS COMPREHENSIVE RESULT: SIGNIFICANT CHANGE UP
BUN SERPL-MCNC: 43 MG/DL — HIGH (ref 7–23)
CALCIUM SERPL-MCNC: 8.4 MG/DL — SIGNIFICANT CHANGE UP (ref 8.4–10.5)
CHLORIDE BLDV-SCNC: 115 MMOL/L — HIGH (ref 96–108)
CHLORIDE SERPL-SCNC: 114 MMOL/L — HIGH (ref 98–107)
CO2 BLDV-SCNC: 16.4 MMOL/L — LOW (ref 22–26)
CO2 SERPL-SCNC: 16 MMOL/L — LOW (ref 22–31)
CREAT SERPL-MCNC: 2.87 MG/DL — HIGH (ref 0.5–1.3)
EGFR: 21 ML/MIN/1.73M2 — LOW
EOSINOPHIL # BLD AUTO: 0.1 K/UL — SIGNIFICANT CHANGE UP (ref 0–0.5)
EOSINOPHIL NFR BLD AUTO: 1.2 % — SIGNIFICANT CHANGE UP (ref 0–6)
GAS PNL BLDV: 136 MMOL/L — SIGNIFICANT CHANGE UP (ref 136–145)
GAS PNL BLDV: SIGNIFICANT CHANGE UP
GLUCOSE BLDV-MCNC: 64 MG/DL — LOW (ref 70–99)
GLUCOSE SERPL-MCNC: 70 MG/DL — SIGNIFICANT CHANGE UP (ref 70–99)
GRAM STN FLD: SIGNIFICANT CHANGE UP
HCO3 BLDV-SCNC: 15 MMOL/L — LOW (ref 22–29)
HCT VFR BLD CALC: 26.7 % — LOW (ref 39–50)
HCT VFR BLDA CALC: 25 % — LOW (ref 39–51)
HGB BLD CALC-MCNC: 8.2 G/DL — LOW (ref 13–17)
HGB BLD-MCNC: 7.8 G/DL — LOW (ref 13–17)
IANC: 6.87 K/UL — SIGNIFICANT CHANGE UP (ref 1.8–7.4)
IMM GRANULOCYTES NFR BLD AUTO: 0.5 % — SIGNIFICANT CHANGE UP (ref 0–0.9)
LACTATE BLDV-MCNC: 1.6 MMOL/L — SIGNIFICANT CHANGE UP (ref 0.5–2)
LEGIONELLA AG UR QL: NEGATIVE — SIGNIFICANT CHANGE UP
LYMPHOCYTES # BLD AUTO: 1.04 K/UL — SIGNIFICANT CHANGE UP (ref 1–3.3)
LYMPHOCYTES # BLD AUTO: 12 % — LOW (ref 13–44)
MAGNESIUM SERPL-MCNC: 2.1 MG/DL — SIGNIFICANT CHANGE UP (ref 1.6–2.6)
MCHC RBC-ENTMCNC: 27.2 PG — SIGNIFICANT CHANGE UP (ref 27–34)
MCHC RBC-ENTMCNC: 29.2 GM/DL — LOW (ref 32–36)
MCV RBC AUTO: 93 FL — SIGNIFICANT CHANGE UP (ref 80–100)
MONOCYTES # BLD AUTO: 0.56 K/UL — SIGNIFICANT CHANGE UP (ref 0–0.9)
MONOCYTES NFR BLD AUTO: 6.5 % — SIGNIFICANT CHANGE UP (ref 2–14)
NEUTROPHILS # BLD AUTO: 6.87 K/UL — SIGNIFICANT CHANGE UP (ref 1.8–7.4)
NEUTROPHILS NFR BLD AUTO: 79.2 % — HIGH (ref 43–77)
NRBC # BLD: 0 /100 WBCS — SIGNIFICANT CHANGE UP (ref 0–0)
NRBC # FLD: 0 K/UL — SIGNIFICANT CHANGE UP (ref 0–0)
PCO2 BLDV: 35 MMHG — LOW (ref 42–55)
PH BLDV: 7.25 — LOW (ref 7.32–7.43)
PHOSPHATE SERPL-MCNC: 3.3 MG/DL — SIGNIFICANT CHANGE UP (ref 2.5–4.5)
PLATELET # BLD AUTO: 232 K/UL — SIGNIFICANT CHANGE UP (ref 150–400)
PO2 BLDV: 164 MMHG — SIGNIFICANT CHANGE UP
POTASSIUM BLDV-SCNC: 4.7 MMOL/L — SIGNIFICANT CHANGE UP (ref 3.5–5.1)
POTASSIUM SERPL-MCNC: 4.5 MMOL/L — SIGNIFICANT CHANGE UP (ref 3.5–5.3)
POTASSIUM SERPL-SCNC: 4.5 MMOL/L — SIGNIFICANT CHANGE UP (ref 3.5–5.3)
PROT SERPL-MCNC: 5.7 G/DL — LOW (ref 6–8.3)
RBC # BLD: 2.87 M/UL — LOW (ref 4.2–5.8)
RBC # FLD: 20.4 % — HIGH (ref 10.3–14.5)
SAO2 % BLDV: 99 % — SIGNIFICANT CHANGE UP
SODIUM SERPL-SCNC: 140 MMOL/L — SIGNIFICANT CHANGE UP (ref 135–145)
SPECIMEN SOURCE: SIGNIFICANT CHANGE UP
WBC # BLD: 8.66 K/UL — SIGNIFICANT CHANGE UP (ref 3.8–10.5)
WBC # FLD AUTO: 8.66 K/UL — SIGNIFICANT CHANGE UP (ref 3.8–10.5)

## 2023-01-26 PROCEDURE — 99232 SBSQ HOSP IP/OBS MODERATE 35: CPT

## 2023-01-26 RX ORDER — ACETAMINOPHEN 500 MG
650 TABLET ORAL EVERY 6 HOURS
Refills: 0 | Status: DISCONTINUED | OUTPATIENT
Start: 2023-01-26 | End: 2023-01-30

## 2023-01-26 RX ORDER — SODIUM CHLORIDE 9 MG/ML
1000 INJECTION INTRAMUSCULAR; INTRAVENOUS; SUBCUTANEOUS ONCE
Refills: 0 | Status: COMPLETED | OUTPATIENT
Start: 2023-01-26 | End: 2023-01-26

## 2023-01-26 RX ORDER — SODIUM CHLORIDE 9 MG/ML
1000 INJECTION, SOLUTION INTRAVENOUS ONCE
Refills: 0 | Status: COMPLETED | OUTPATIENT
Start: 2023-01-26 | End: 2023-01-26

## 2023-01-26 RX ORDER — SODIUM CHLORIDE 9 MG/ML
500 INJECTION, SOLUTION INTRAVENOUS ONCE
Refills: 0 | Status: COMPLETED | OUTPATIENT
Start: 2023-01-26 | End: 2023-01-26

## 2023-01-26 RX ORDER — MIDODRINE HYDROCHLORIDE 2.5 MG/1
20 TABLET ORAL ONCE
Refills: 0 | Status: COMPLETED | OUTPATIENT
Start: 2023-01-26 | End: 2023-01-26

## 2023-01-26 RX ADMIN — ATORVASTATIN CALCIUM 80 MILLIGRAM(S): 80 TABLET, FILM COATED ORAL at 23:03

## 2023-01-26 RX ADMIN — CARBIDOPA AND LEVODOPA 1 TABLET(S): 25; 100 TABLET ORAL at 05:37

## 2023-01-26 RX ADMIN — PIPERACILLIN AND TAZOBACTAM 25 GRAM(S): 4; .5 INJECTION, POWDER, LYOPHILIZED, FOR SOLUTION INTRAVENOUS at 06:02

## 2023-01-26 RX ADMIN — Medication 10 MILLIGRAM(S): at 05:39

## 2023-01-26 RX ADMIN — CARBIDOPA AND LEVODOPA 1 TABLET(S): 25; 100 TABLET ORAL at 18:59

## 2023-01-26 RX ADMIN — Medication 3 MILLIGRAM(S): at 23:03

## 2023-01-26 RX ADMIN — PIPERACILLIN AND TAZOBACTAM 25 GRAM(S): 4; .5 INJECTION, POWDER, LYOPHILIZED, FOR SOLUTION INTRAVENOUS at 19:00

## 2023-01-26 RX ADMIN — Medication 650 MILLIGRAM(S): at 12:17

## 2023-01-26 RX ADMIN — DESVENLAFAXINE 25 MILLIGRAM(S): 50 TABLET, EXTENDED RELEASE ORAL at 12:17

## 2023-01-26 RX ADMIN — TADALAFIL 5 MILLIGRAM(S): 10 TABLET, FILM COATED ORAL at 12:18

## 2023-01-26 RX ADMIN — CARBIDOPA AND LEVODOPA 1 TABLET(S): 25; 100 TABLET ORAL at 12:30

## 2023-01-26 RX ADMIN — MIDODRINE HYDROCHLORIDE 20 MILLIGRAM(S): 2.5 TABLET ORAL at 02:24

## 2023-01-26 RX ADMIN — Medication 650 MILLIGRAM(S): at 13:17

## 2023-01-26 RX ADMIN — CLOPIDOGREL BISULFATE 75 MILLIGRAM(S): 75 TABLET, FILM COATED ORAL at 12:17

## 2023-01-26 RX ADMIN — APIXABAN 2.5 MILLIGRAM(S): 2.5 TABLET, FILM COATED ORAL at 19:00

## 2023-01-26 RX ADMIN — APIXABAN 2.5 MILLIGRAM(S): 2.5 TABLET, FILM COATED ORAL at 05:37

## 2023-01-26 RX ADMIN — AMIODARONE HYDROCHLORIDE 200 MILLIGRAM(S): 400 TABLET ORAL at 05:36

## 2023-01-26 RX ADMIN — SODIUM CHLORIDE 2000 MILLILITER(S): 9 INJECTION INTRAMUSCULAR; INTRAVENOUS; SUBCUTANEOUS at 09:35

## 2023-01-26 RX ADMIN — SODIUM CHLORIDE 1000 MILLILITER(S): 9 INJECTION, SOLUTION INTRAVENOUS at 18:44

## 2023-01-26 RX ADMIN — SODIUM CHLORIDE 1000 MILLILITER(S): 9 INJECTION INTRAMUSCULAR; INTRAVENOUS; SUBCUTANEOUS at 02:24

## 2023-01-26 RX ADMIN — SENNA PLUS 2 TABLET(S): 8.6 TABLET ORAL at 23:02

## 2023-01-26 RX ADMIN — Medication 10 MILLIGRAM(S): at 19:00

## 2023-01-26 RX ADMIN — SODIUM CHLORIDE 1000 MILLILITER(S): 9 INJECTION, SOLUTION INTRAVENOUS at 14:05

## 2023-01-26 RX ADMIN — CARBIDOPA AND LEVODOPA 1 TABLET(S): 25; 100 TABLET ORAL at 15:40

## 2023-01-26 NOTE — PROGRESS NOTE ADULT - PROBLEM SELECTOR PLAN 10
2/2 complete heart block, s/p PPM placement  s/p EP interrogation in ED  - Continue to monitor vital signs q4hr - c/w Lipitor and Plavix

## 2023-01-26 NOTE — PROGRESS NOTE ADULT - PROBLEM SELECTOR PLAN 1
Patient presented with confusion, shock state, leukocytosis, and elevated lactate, likely 2/2 severe sepsis  No tachycardia on admission, though patient PPM dependent (CHB)  Urinalysis with evidence of urinary tract infection, GGOs noted in b/l lower   S/p vanc/Zosyn in ED as well as 1.5L IVF, 30mg midodrine with improvement in BP & mentation  - UCx: NGTD  - TTE 1/25/23: EF 73%, severe concentric L vent hypertrophy, mod pulm HTN    =====PLAN====  - c/w Zosyn (1/24-)  - F/u UCx, BCx, Pleural fluid, MRSA, legionella  - IVF 100cc/hr x 10 hrs Patient presented with confusion, shock state, leukocytosis, and elevated lactate, likely 2/2 severe sepsis. No tachycardia on admission, though patient PPM dependent (CHB)  - Likely 2/2 GNR in pleural fluid  - UCx, BCx : NGTD, MRSA, Legionella negative  - TTE 1/25/23: EF 73%, severe concentric L vent hypertrophy, mod pulm HTN    =====PLAN====  - c/w Zosyn (1/24-)  - F/u UCx, BCx, Pleural fluid sensitivities

## 2023-01-26 NOTE — PROGRESS NOTE ADULT - PROBLEM SELECTOR PLAN 4
Noted on CTAP  - Bowel regimen w/ senna, Miralax, dulcolax  - Enema given 1/25 Troponin 230-->202, EKG non-ischemic, paced; denies chest pain  Likely 2/2 supply/demand imbalance i/s/o sepsis  - TTE 1/25/23: EF 73%, severe concentric L vent hypertrophy, mod pulm HTN

## 2023-01-26 NOTE — SWALLOW BEDSIDE ASSESSMENT ADULT - ASR SWALLOW RECOMMEND DIAG
Objective testing NOT warranted at this time given no overt signs of aspiration and recent CXR (1/24) indicating no focal consolidations

## 2023-01-26 NOTE — PROGRESS NOTE ADULT - ATTENDING COMMENTS
82 y.o. M w/ a hx of Parkinson's, pleural effusions s/p PleurX, afib on Eliquis, cardiac amyloidosis, CHB s/p PPM/AICD, CAD s/p stent, HTN, BPH hospitalized for encephalopathy, decreased urine output and weakness.     Patient reporting L calf pain, PE unchanged. Wife at bedside- DNR/DNI. Briefly discussed role of pressors if BP continues to drop and is unresponsive to IVF. Ucx ngtd.     # Severe sepsis: 2/2 PNA, awaiting pleural effusion cx. Cont Zosyn for now.   # Hypotension: BP intermittently low but MS improving and patient asymptomatic. Will give IVF for now but given history of Parkinson's, hypotension may be 2/2 dysautonomia. Will ctm.   # Encephalopathy: Likely toxic metabolic encephalopathy 2/2 UTI +/- PNA. Per wife, MS improving, close to baseline.   # Urinary retention: Resolved, required 2 straight cath's on admission, now urinating well.   # ALAYNA on CKD: Likely multifactorial- urinary obstruction as above, hypotension, pre-renal, ARB use. Cr improving.   # Stercoral colitis: 2 BM's yest, continue bowel regimen.   # Afib: Cont Eliquis for now. If ALAYNA improves, patient should be on higher dose Eliquis. Does not qualify for reduced dose otherwise. Clarify role of amiodarone as patient is already PPM dependent.   # Elevated ALP: Improving.

## 2023-01-26 NOTE — PROGRESS NOTE ADULT - PROBLEM SELECTOR PLAN 11
s/p VATS, pleural biopsy, pleur-x placement  Follows outpatient w/ Dr. Oneal  - f/u CT surg recs for Pleur-X management  - continue drainage 3x/week 2/2 complete heart block, s/p PPM placement  s/p EP interrogation in ED  - Continue to monitor vital signs q4hr

## 2023-01-26 NOTE — SWALLOW BEDSIDE ASSESSMENT ADULT - COMMENTS
SLP attempted to see patient this PM for clinical swallow evaluation, however patient off unit for ultrasound. This department to follow up as schedule permits for re-attempt at swallow evaluation.
5
Progress Note- Internal Medicine 1/26: "82M PMH Parkinson's, Pleural effusion s/p PleurX, a. fib on Eliquis, bradycardia w/ AICD, HTN who was BIBEMS for generalized weakness, found to be in severe sepsis state likely secondary to UTI +/- aspiration pneumonia."    CR Chest 1/24: "Impression: No focal consolidations. Trace right pleural effusion with right Pleurx catheter."    No recent head imaging available at this time.     Patient is familiar to this department, last seen on a previous admission in 10/2022 with recommendations of Regular Solids with Thin Liquids. (see full report for details)     Patient seen awake/alert and pleasantly confused during clinical swallow evaluation this AM with patient's wife present at bedside. Patient is able to follow simple commands.

## 2023-01-26 NOTE — PROGRESS NOTE ADULT - PROBLEM SELECTOR PLAN 3
Troponin 230-->202, EKG non-ischemic, paced; denies chest pain  Likely 2/2 supply/demand imbalance i/s/o sepsis  - TTE 1/25/23: EF 73%, severe concentric L vent hypertrophy, mod pulm HTN R/o DVT w/ VA dopplers - ordered on 1/26

## 2023-01-26 NOTE — SWALLOW BEDSIDE ASSESSMENT ADULT - SWALLOW EVAL: DIAGNOSIS
1- Mild oral stage for puree, soft and bite sized solids, regular solids, and thin liquids marked by adequate oral containment, slowed mastication and slowed manipulation likely exacerbated by incomplete dentition with adequate anterior to posterior transport with trace oral residue noted and cleared with liquid wash. 2- Functional pharyngeal stage for puree, soft and bite sized solids, regular solids, and thin liquids marked by initiation of pharyngeal swallow and hyolaryngeal excursion upon palpation. No overt signs or symptoms of aspiration noted across PO consistencies.

## 2023-01-26 NOTE — PROGRESS NOTE ADULT - PROBLEM SELECTOR PLAN 9
- c/w Lipitor and Plavix - c/w Eliquis and amiodarone  - will obtain cardiology records about utility of amiodorone i/s/o transaminitis - c/w Eliquis and amiodarone  - will obtain cardiology records about utility of amiodorone i/s/o transaminitis    #complete heart block, s/p PPM placement  s/p EP interrogation in ED  - Continue to monitor vital signs q4hr

## 2023-01-26 NOTE — PROGRESS NOTE ADULT - PROBLEM SELECTOR PLAN 7
Patient w/ history of cardiac amyloidosis  - Continue Vyndamax (not on formulary, bring from home)  - Holding diuretics, as patient clinically dry  - Holding olmesartan i/s/o hypotension  - TTE 1/25/23: EF 73%, severe concentric L vent hypertrophy, mod pulm HTN - Continue Sinemet  - Continue Remeron  - Continue Zyprexa

## 2023-01-26 NOTE — PROGRESS NOTE ADULT - PROBLEM SELECTOR PLAN 6
- Continue Sinemet  - Continue Remeron  - Continue Zyprexa Iron low, ferritin 111 (previous history of low ferritin). Folate/B12 WNL  - defer IV iron i/s/o infection  - will start oral iron after patient has bowel movements  - Monitor CBC qd, Transfuse hgb <7 Iron low, ferritin 111 (previous history of low ferritin). Folate/B12 WNL  - defer IV iron i/s/o infection  - will start oral iron after patient has bowel movements  - will check LDH, hapto, retic  - Monitor CBC qd, Transfuse hgb <7

## 2023-01-26 NOTE — PHYSICAL THERAPY INITIAL EVALUATION ADULT - PERTINENT HX OF CURRENT PROBLEM, REHAB EVAL
Patient is 82 year old M PMH Parkinson's, Pleural effusion s/p PleurX, a. fib on Eliquis, bradycardia w/ AICD, HTN who was BIBEMS for generalized weakness, found to be in severe sepsis state likely secondary to PNA and pleural fluid + GNR

## 2023-01-26 NOTE — PROGRESS NOTE ADULT - SUBJECTIVE AND OBJECTIVE BOX
Carlos Manuel Metcalf MD  PGY-3 Department of Internal Medicine  Available on [employer] Teams      Patient is a 82y old  Male who presents with a chief complaint of Hypotension (2023 17:18)      OVERNIGHT EVENTS: No acute overnight events.    SUBJECTIVE: Pt seen and examined. Unable to assess ROS d/t mental status    MEDICATIONS  (STANDING):  aMIOdarone    Tablet 200 milliGRAM(s) Oral daily  apixaban 2.5 milliGRAM(s) Oral two times a day  atorvastatin 80 milliGRAM(s) Oral at bedtime  carbidopa/levodopa  25/100 1 Tablet(s) Oral <User Schedule>  carbidopa/levodopa  25/100 1 Tablet(s) Oral <User Schedule>  clopidogrel Tablet 75 milliGRAM(s) Oral daily  desvenlafaxine ER 25 milliGRAM(s) Oral daily  melatonin 3 milliGRAM(s) Oral at bedtime  oxybutynin 10 milliGRAM(s) Oral two times a day  piperacillin/tazobactam IVPB.. 3.375 Gram(s) IV Intermittent every 12 hours  polyethylene glycol 3350 17 Gram(s) Oral two times a day  senna 2 Tablet(s) Oral at bedtime  sodium chloride 0.9% Bolus 1000 milliLiter(s) IV Bolus once  sodium chloride 0.9% Bolus 1000 milliLiter(s) IV Bolus once  tadalafil 5 milliGRAM(s) Oral daily  tamsulosin 0.4 milliGRAM(s) Oral at bedtime    MEDICATIONS  (PRN):      I&O's Summary    2023 07:01  -  2023 07:00  --------------------------------------------------------  IN: 350 mL / OUT: 400 mL / NET: -50 mL        Vital Signs Last 24 Hrs  T(C): 36.3 (2023 09:06), Max: 37 (2023 03:00)  T(F): 97.3 (2023 09:06), Max: 98.6 (2023 03:00)  HR: 60 (2023 09:06) (60 - 72)  BP: 80/45 (2023 09:06) (80/45 - 134/106)  BP(mean): --  RR: 18 (2023 09:06) (16 - 18)  SpO2: 94% (2023 09:06) (94% - 100%)    Parameters below as of 2023 09:06  Patient On (Oxygen Delivery Method): room air        =================PHYSICAL EXAM=================    GENERAL: Laying comfortably, NAD  EYES: EOMI, PERRL, no scleral icterus  NECK: No JVD  LUNG: Clear to auscultation bilaterally; No wheeze, crackles or rhonci  HEART: Regular rate and rhythm; No murmurs, rubs, or gallops  ABDOMEN: Soft, Nontender, Nondistended  EXTREMITIES:  No LE edema, 2+ Peripheral Pulses, No clubbing, cyanosis, or edema  PSYCH: AAOx1, lethargic  NEUROLOGY: non-focal, unable to obtain strength exam d/t lack of cooperation  SKIN: No rashes or lesions    =================================================    LABS:                        7.8    8.66  )-----------( 232      ( 2023 06:43 )             26.7     Auto Eosinophil # 0.10  / Auto Eosinophil % 1.2   / Auto Neutrophil # 6.87  / Auto Neutrophil % 79.2  / BANDS % x                            8.3    12.44 )-----------( 294      ( 2023 12:00 )             28.0     Auto Eosinophil # x     / Auto Eosinophil % x     / Auto Neutrophil # x     / Auto Neutrophil % x     / BANDS % x                            9.1    15.61 )-----------( 304      ( 2023 16:10 )             30.6     Auto Eosinophil # 0.00  / Auto Eosinophil % 0.0   / Auto Neutrophil # 13.95 / Auto Neutrophil % 89.4  / BANDS % x        01-26    140  |  114<H>  |  43<H>  ----------------------------<  70  4.5   |  16<L>  |  2.87<H>      140  |  110<H>  |  48<H>  ----------------------------<  77  4.7   |  20<L>  |  3.17<H>      139  |  105  |  48<H>  ----------------------------<  147<H>  5.0   |  18<L>  |  3.40<H>    Ca    8.4      2023 06:43  Mg     2.10       Phos  3.3       TPro  5.7<L>  /  Alb  2.6<L>  /  TBili  0.3  /  DBili  x   /  AST  30  /  ALT  8   /  AlkPhos  273<H>    TPro  5.9<L>  /  Alb  3.0<L>  /  TBili  0.3  /  DBili  x   /  AST  36  /  ALT  9   /  AlkPhos  332<H>    TPro  6.9  /  Alb  3.2<L>  /  TBili  0.4  /  DBili  x   /  AST  46<H>  /  ALT  13  /  AlkPhos  405<H>      PT/INR - ( 2023 16:10 )   PT: 25.4 sec;   INR: 2.17 ratio         PTT - ( 2023 16:10 )  PTT:39.0 sec  CARDIAC MARKERS ( 2023 12:00 )  x     / x     / 376 U/L / x     / 7.1 ng/mL  CARDIAC MARKERS ( 2023 18:12 )  x     / x     / 355 U/L / x     / 4.7 ng/mL      Urinalysis Basic - ( 2023 16:10 )    Color: Yellow / Appearance: Slightly Turbid / S.019 / pH: x  Gluc: x / Ketone: Negative  / Bili: Negative / Urobili: <2 mg/dL   Blood: x / Protein: 100 mg/dL / Nitrite: Negative   Leuk Esterase: Large / RBC: 90 /HPF / WBC 31 /HPF   Sq Epi: x / Non Sq Epi: 8 /HPF / Bacteria: Few      Lactate, Blood: 1.4 mmol/L ( @ 12:00)        RADIOLOGY & ADDITIONAL TESTS:    Imaging Personally Reviewed:    Consultant(s) Notes Reviewed:      Care Discussed with Consultants/Other Providers:   Carlos Manuel Metcalf MD  PGY-3 Department of Internal Medicine  Available on [employer] Teams      Patient is a 82y old  Male who presents with a chief complaint of Hypotension (2023 17:18)      OVERNIGHT EVENTS: Hypotension - improved with 2L fluid and 20mg midodrine x 2    SUBJECTIVE: Pt seen and examined. Unable to assess ROS d/t mental status    MEDICATIONS  (STANDING):  aMIOdarone    Tablet 200 milliGRAM(s) Oral daily  apixaban 2.5 milliGRAM(s) Oral two times a day  atorvastatin 80 milliGRAM(s) Oral at bedtime  carbidopa/levodopa  25/100 1 Tablet(s) Oral <User Schedule>  carbidopa/levodopa  25/100 1 Tablet(s) Oral <User Schedule>  clopidogrel Tablet 75 milliGRAM(s) Oral daily  desvenlafaxine ER 25 milliGRAM(s) Oral daily  melatonin 3 milliGRAM(s) Oral at bedtime  oxybutynin 10 milliGRAM(s) Oral two times a day  piperacillin/tazobactam IVPB.. 3.375 Gram(s) IV Intermittent every 12 hours  polyethylene glycol 3350 17 Gram(s) Oral two times a day  senna 2 Tablet(s) Oral at bedtime  sodium chloride 0.9% Bolus 1000 milliLiter(s) IV Bolus once  sodium chloride 0.9% Bolus 1000 milliLiter(s) IV Bolus once  tadalafil 5 milliGRAM(s) Oral daily  tamsulosin 0.4 milliGRAM(s) Oral at bedtime    MEDICATIONS  (PRN):      I&O's Summary    2023 07:01  -  2023 07:00  --------------------------------------------------------  IN: 350 mL / OUT: 400 mL / NET: -50 mL        Vital Signs Last 24 Hrs  T(C): 36.3 (2023 09:06), Max: 37 (2023 03:00)  T(F): 97.3 (2023 09:06), Max: 98.6 (2023 03:00)  HR: 60 (2023 09:06) (60 - 72)  BP: 80/45 (2023 09:06) (80/45 - 134/106)  BP(mean): --  RR: 18 (2023 09:06) (16 - 18)  SpO2: 94% (2023 09:06) (94% - 100%)    Parameters below as of 2023 09:06  Patient On (Oxygen Delivery Method): room air        =================PHYSICAL EXAM=================    GENERAL: Laying comfortably, NAD  EYES: EOMI, PERRL, no scleral icterus  NECK: No JVD  LUNG: Clear to auscultation bilaterally; No wheeze, crackles or rhonci  HEART: Regular rate and rhythm; No murmurs, rubs, or gallops  ABDOMEN: Soft, Nontender, Nondistended  EXTREMITIES:  No LE edema, 2+ Peripheral Pulses, No clubbing, cyanosis, or edema  PSYCH: AAOx1, lethargic  NEUROLOGY: non-focal, unable to obtain strength exam d/t lack of cooperation  SKIN: No rashes or lesions    =================================================    LABS:                        7.8    8.66  )-----------( 232      ( 2023 06:43 )             26.7     Auto Eosinophil # 0.10  / Auto Eosinophil % 1.2   / Auto Neutrophil # 6.87  / Auto Neutrophil % 79.2  / BANDS % x                            8.3    12.44 )-----------( 294      ( 2023 12:00 )             28.0     Auto Eosinophil # x     / Auto Eosinophil % x     / Auto Neutrophil # x     / Auto Neutrophil % x     / BANDS % x                            9.1    15.61 )-----------( 304      ( 2023 16:10 )             30.6     Auto Eosinophil # 0.00  / Auto Eosinophil % 0.0   / Auto Neutrophil # 13.95 / Auto Neutrophil % 89.4  / BANDS % x        01-26    140  |  114<H>  |  43<H>  ----------------------------<  70  4.5   |  16<L>  |  2.87<H>      140  |  110<H>  |  48<H>  ----------------------------<  77  4.7   |  20<L>  |  3.17<H>      139  |  105  |  48<H>  ----------------------------<  147<H>  5.0   |  18<L>  |  3.40<H>    Ca    8.4      2023 06:43  Mg     2.10       Phos  3.3       TPro  5.7<L>  /  Alb  2.6<L>  /  TBili  0.3  /  DBili  x   /  AST  30  /  ALT  8   /  AlkPhos  273<H>    TPro  5.9<L>  /  Alb  3.0<L>  /  TBili  0.3  /  DBili  x   /  AST  36  /  ALT  9   /  AlkPhos  332<H>    TPro  6.9  /  Alb  3.2<L>  /  TBili  0.4  /  DBili  x   /  AST  46<H>  /  ALT  13  /  AlkPhos  405<H>      PT/INR - ( 2023 16:10 )   PT: 25.4 sec;   INR: 2.17 ratio         PTT - ( 2023 16:10 )  PTT:39.0 sec  CARDIAC MARKERS ( 2023 12:00 )  x     / x     / 376 U/L / x     / 7.1 ng/mL  CARDIAC MARKERS ( 2023 18:12 )  x     / x     / 355 U/L / x     / 4.7 ng/mL      Urinalysis Basic - ( 2023 16:10 )    Color: Yellow / Appearance: Slightly Turbid / S.019 / pH: x  Gluc: x / Ketone: Negative  / Bili: Negative / Urobili: <2 mg/dL   Blood: x / Protein: 100 mg/dL / Nitrite: Negative   Leuk Esterase: Large / RBC: 90 /HPF / WBC 31 /HPF   Sq Epi: x / Non Sq Epi: 8 /HPF / Bacteria: Few      Lactate, Blood: 1.4 mmol/L ( @ 12:00)        RADIOLOGY & ADDITIONAL TESTS:    Imaging Personally Reviewed:    Consultant(s) Notes Reviewed:      Care Discussed with Consultants/Other Providers:

## 2023-01-26 NOTE — PROGRESS NOTE ADULT - PROBLEM SELECTOR PLAN 8
- c/w Eliquis and amiodarone  - will obtain cardiology records about utility of amiodorone i/s/o transaminitis Patient w/ history of cardiac amyloidosis  - Continue Vyndamax (not on formulary, bring from home)  - Holding diuretics, as patient clinically dry  - Holding olmesartan i/s/o hypotension  - TTE 1/25/23: EF 73%, severe concentric L vent hypertrophy, mod pulm HTN

## 2023-01-26 NOTE — PROGRESS NOTE ADULT - ASSESSMENT
82M PMH Parkinson's, Pleural effusion s/p PleurX, a. fib on Eliquis, bradycardia w/ AICD, HTN who was BIBEMS for generalized weakness, found to be in severe sepsis state likely secondary to UTI +/- aspiration pneumonia. 82M PMH Parkinson's, Pleural effusion s/p PleurX, a. fib on Eliquis, bradycardia w/ AICD, HTN who was BIBEMS for generalized weakness, found to be in severe sepsis state likely secondary to PNA and pleural fluid + GNR

## 2023-01-26 NOTE — PROGRESS NOTE ADULT - PROBLEM SELECTOR PLAN 2
SCr 3.40 on admission, baseline ~1.5  Possibly i/s/o recent urinary retention vs. pre-renal i/s/o hypotension 2/2 urinary tract infection  - Renal U/S: no hydronephrosis  - c/w fluid resusitation and bladder scans SCr 3.40 on admission, baseline ~1.5  Possibly i/s/o recent urinary retention vs. pre-renal i/s/o hypotension 2/2 urinary tract infection  - Cr improving  - Renal U/S: no hydronephrosis  - c/w fluid resusitation and bladder scans

## 2023-01-26 NOTE — SWALLOW BEDSIDE ASSESSMENT ADULT - ASR SWALLOW LINGUAL MOBILITY
[facility]etric[facility] Post-Partum Discharge Summary Note          Assessment and Plan:    Assessment:   Post-partum day #1  32 year old  presented at 40w4d with spontaneous rupture of membranes that occurred at 2100, 9/3.  Delivery occurred at 08:35 on 20  L&D complications: VE assisted vag delivery for Cat 3 tracing   Delivery complicated by a R shoulder dystocia  Pt desires F/C home today      Doing well.  Clean wound without signs of infection.  Normal healing wound.  No excessive bleeding  Pain well-controlled.      Plan:   Ambulation encouraged  Breast feeding strategies discussed  Monitor wound for signs of infection  Pain control measures as needed  Reportable signs and symptoms dicussed with the patient  Significance of a shoulder dystocia discussed with pt as were the discharge instructions with the aid of the French   Pt understands and accepts  Take your tempature twice daily for 3 days and call if > 100.4  Nothing in vagina for 6 wks  Continue taking prenatal MVI daily for 1 month    Discharge later today           Interval History:   Doing well.  Pain is well-controlled.  No fevers.  No history of foul-smelling vaginal discharge.  Good appetite.  Denies chest pain, shortness of breath, nausea or vomiting.  Vaginal bleeding is similar to a heavy menstrual flow.  Ambulatory.  Breastfeeding well.          Significant Problems:    No past medical history on file.          Review of Systems:    The patient denies any chest pain, shortness of breath, excessive pain, fever, chills, purulent drainage from the wound, nausea or vomiting.          Medications:     All medications related to the patient's surgery have been reviewed  Current Facility-Administered Medications   Medication     acetaminophen (TYLENOL) tablet 650 mg     [START ON 2020] bisacodyl (DULCOLAX) Suppository 10 mg     ePHEDrine injection 5 mg     fentaNYL (SUBLIMAZE) 2 mcg/mL, bupivacaine (MARCAINE) 0.125% in  NS premix for PCEA     hydrocortisone 2.5 % cream     ibuprofen (ADVIL/MOTRIN) tablet 800 mg     IF subcutaneous (SQ) Unfractionated heparin (UFH) ordered for thromboprophylaxis    Or     IF intravenous (IV) Unfractionated heparin (UFH) ordered    Or     IF LOW-dose Low molecular weight heparin (LMWH) thromboprophylaxis ordered    Or     IF HIGHER-dose Low molecular weight heparin (LMWH) thromboprophylaxis ordered     lactated ringers BOLUS 1,000 mL     lactated ringers BOLUS 1,000 mL    Or     lactated ringers BOLUS 500 mL     lactated ringers BOLUS 250 mL     lanolin cream     medication instruction     nalbuphine (NUBAIN) injection 2.5-5 mg     naloxone (NARCAN) injection 0.1-0.4 mg     naloxone (NARCAN) injection 0.1-0.4 mg     No MMR Needed - Assessment: Patient does not need MMR vaccine     NO Rho (D) immune globulin (RhoGam) needed - mother Rh POSITIVE     No Tdap Needed - Assessment: Patient does not need Tdap vaccine     ondansetron (ZOFRAN-ODT) ODT tab 4 mg    Or     ondansetron (ZOFRAN) injection 4 mg     Opioid plan postpartum - medication instruction     oxytocin (PITOCIN) 30 units in 500 mL 0.9% NaCl infusion     oxytocin (PITOCIN) 30 units in 500 mL 0.9% NaCl infusion     oxytocin (PITOCIN) injection 10 Units     prenatal multivitamin w/iron per tablet 1 tablet     senna-docusate (SENOKOT-S/PERICOLACE) 8.6-50 MG per tablet 1 tablet    Or     senna-docusate (SENOKOT-S/PERICOLACE) 8.6-50 MG per tablet 2 tablet     [START ON 9/6/2020] sodium phosphate (FLEET ENEMA) 1 enema     tranexamic acid 1 g in 100 mL 0.7% NaCl IV bag (premix)             Physical Exam:   Vitals were reviewed  All vitals stable  Temp: 97.7  F (36.5  C) Temp src: Oral BP: 99/47 Pulse: 89   Resp: 18        Uterine fundus is firm, non-tender and at the level of the umbilicus          Data:     All laboratory data related to this surgery reviewed  Hemoglobin   Date Value Ref Range Status   09/03/2020 11.9 11.7 - 15.7 g/dL Final    05/26/2020 10.0 (L) 11.7 - 15.7 g/dL Final   03/20/2020 11.7 11.7 - 15.7 g/dL Final     No imaging studies have been ordered    Jasvir Mendez MD      within functional limits

## 2023-01-26 NOTE — PROGRESS NOTE ADULT - PROBLEM SELECTOR PLAN 5
Iron low, ferritin 111 (previous history of low ferritin). Folate/B12 WNL  - defer IV iron i/s/o infection  - will start oral iron after patient has bowel movements  - Monitor CBC qd, Transfuse hgb <7 Noted on CTAP  - Bowel regimen w/ senna, Miralax, dulcolax  - Enema given 1/25 which caused patient to have 2 BMs on 1/25

## 2023-01-26 NOTE — SWALLOW BEDSIDE ASSESSMENT ADULT - ADDITIONAL RECOMMENDATIONS
This department to follow up as schedule permits to assess for diet tolerance. Medical team further advised to reconsult if there is a change in medical status and/or observed changed in patient's tolerance of recommended PO diet.

## 2023-01-27 LAB
ALBUMIN SERPL ELPH-MCNC: 2.5 G/DL — LOW (ref 3.3–5)
ALP SERPL-CCNC: 235 U/L — HIGH (ref 40–120)
ALT FLD-CCNC: 6 U/L — SIGNIFICANT CHANGE UP (ref 4–41)
ANION GAP SERPL CALC-SCNC: 9 MMOL/L — SIGNIFICANT CHANGE UP (ref 7–14)
AST SERPL-CCNC: 28 U/L — SIGNIFICANT CHANGE UP (ref 4–40)
BASE EXCESS BLDV CALC-SCNC: -7.6 MMOL/L — LOW (ref -2–3)
BASOPHILS # BLD AUTO: 0.04 K/UL — SIGNIFICANT CHANGE UP (ref 0–0.2)
BASOPHILS NFR BLD AUTO: 0.6 % — SIGNIFICANT CHANGE UP (ref 0–2)
BILIRUB SERPL-MCNC: 0.3 MG/DL — SIGNIFICANT CHANGE UP (ref 0.2–1.2)
BLD GP AB SCN SERPL QL: NEGATIVE — SIGNIFICANT CHANGE UP
BLOOD GAS VENOUS COMPREHENSIVE RESULT: SIGNIFICANT CHANGE UP
BUN SERPL-MCNC: 39 MG/DL — HIGH (ref 7–23)
CALCIUM SERPL-MCNC: 8.5 MG/DL — SIGNIFICANT CHANGE UP (ref 8.4–10.5)
CHLORIDE BLDV-SCNC: 113 MMOL/L — HIGH (ref 96–108)
CHLORIDE SERPL-SCNC: 113 MMOL/L — HIGH (ref 98–107)
CO2 BLDV-SCNC: 17.5 MMOL/L — LOW (ref 22–26)
CO2 SERPL-SCNC: 17 MMOL/L — LOW (ref 22–31)
CREAT SERPL-MCNC: 2.53 MG/DL — HIGH (ref 0.5–1.3)
EGFR: 25 ML/MIN/1.73M2 — LOW
EOSINOPHIL # BLD AUTO: 0.11 K/UL — SIGNIFICANT CHANGE UP (ref 0–0.5)
EOSINOPHIL NFR BLD AUTO: 1.6 % — SIGNIFICANT CHANGE UP (ref 0–6)
GAS PNL BLDV: 136 MMOL/L — SIGNIFICANT CHANGE UP (ref 136–145)
GLUCOSE BLDV-MCNC: 86 MG/DL — SIGNIFICANT CHANGE UP (ref 70–99)
GLUCOSE SERPL-MCNC: 89 MG/DL — SIGNIFICANT CHANGE UP (ref 70–99)
GRAM STN FLD: SIGNIFICANT CHANGE UP
HAPTOGLOB SERPL-MCNC: 219 MG/DL — HIGH (ref 34–200)
HCO3 BLDV-SCNC: 17 MMOL/L — LOW (ref 22–29)
HCT VFR BLD CALC: 24.5 % — LOW (ref 39–50)
HCT VFR BLDA CALC: 24 % — LOW (ref 39–51)
HGB BLD CALC-MCNC: 7.9 G/DL — LOW (ref 13–17)
HGB BLD-MCNC: 7.3 G/DL — LOW (ref 13–17)
IANC: 5.22 K/UL — SIGNIFICANT CHANGE UP (ref 1.8–7.4)
IMM GRANULOCYTES NFR BLD AUTO: 0.6 % — SIGNIFICANT CHANGE UP (ref 0–0.9)
LACTATE BLDV-MCNC: 1.8 MMOL/L — SIGNIFICANT CHANGE UP (ref 0.5–2)
LDH SERPL L TO P-CCNC: 190 U/L — SIGNIFICANT CHANGE UP (ref 135–225)
LYMPHOCYTES # BLD AUTO: 1.09 K/UL — SIGNIFICANT CHANGE UP (ref 1–3.3)
LYMPHOCYTES # BLD AUTO: 15.6 % — SIGNIFICANT CHANGE UP (ref 13–44)
MAGNESIUM SERPL-MCNC: 2 MG/DL — SIGNIFICANT CHANGE UP (ref 1.6–2.6)
MCHC RBC-ENTMCNC: 27.3 PG — SIGNIFICANT CHANGE UP (ref 27–34)
MCHC RBC-ENTMCNC: 29.8 GM/DL — LOW (ref 32–36)
MCV RBC AUTO: 91.8 FL — SIGNIFICANT CHANGE UP (ref 80–100)
MONOCYTES # BLD AUTO: 0.49 K/UL — SIGNIFICANT CHANGE UP (ref 0–0.9)
MONOCYTES NFR BLD AUTO: 7 % — SIGNIFICANT CHANGE UP (ref 2–14)
NEUTROPHILS # BLD AUTO: 5.22 K/UL — SIGNIFICANT CHANGE UP (ref 1.8–7.4)
NEUTROPHILS NFR BLD AUTO: 74.6 % — SIGNIFICANT CHANGE UP (ref 43–77)
NRBC # BLD: 0 /100 WBCS — SIGNIFICANT CHANGE UP (ref 0–0)
NRBC # FLD: 0 K/UL — SIGNIFICANT CHANGE UP (ref 0–0)
PCO2 BLDV: 28 MMHG — LOW (ref 42–55)
PH BLDV: 7.38 — SIGNIFICANT CHANGE UP (ref 7.32–7.43)
PHOSPHATE SERPL-MCNC: 2.7 MG/DL — SIGNIFICANT CHANGE UP (ref 2.5–4.5)
PLATELET # BLD AUTO: 237 K/UL — SIGNIFICANT CHANGE UP (ref 150–400)
PO2 BLDV: 41 MMHG — SIGNIFICANT CHANGE UP
POTASSIUM BLDV-SCNC: 4.1 MMOL/L — SIGNIFICANT CHANGE UP (ref 3.5–5.1)
POTASSIUM SERPL-MCNC: 4.1 MMOL/L — SIGNIFICANT CHANGE UP (ref 3.5–5.3)
POTASSIUM SERPL-SCNC: 4.1 MMOL/L — SIGNIFICANT CHANGE UP (ref 3.5–5.3)
PROT SERPL-MCNC: 5.4 G/DL — LOW (ref 6–8.3)
RBC # BLD: 2.67 M/UL — LOW (ref 4.2–5.8)
RBC # BLD: 2.67 M/UL — LOW (ref 4.2–5.8)
RBC # FLD: 20.2 % — HIGH (ref 10.3–14.5)
RETICS #: 45.9 K/UL — SIGNIFICANT CHANGE UP (ref 25–125)
RETICS/RBC NFR: 1.7 % — SIGNIFICANT CHANGE UP (ref 0.5–2.5)
RH IG SCN BLD-IMP: NEGATIVE — SIGNIFICANT CHANGE UP
SAO2 % BLDV: 65.4 % — SIGNIFICANT CHANGE UP
SODIUM SERPL-SCNC: 139 MMOL/L — SIGNIFICANT CHANGE UP (ref 135–145)
WBC # BLD: 6.99 K/UL — SIGNIFICANT CHANGE UP (ref 3.8–10.5)
WBC # FLD AUTO: 6.99 K/UL — SIGNIFICANT CHANGE UP (ref 3.8–10.5)

## 2023-01-27 PROCEDURE — 93970 EXTREMITY STUDY: CPT | Mod: 26

## 2023-01-27 PROCEDURE — 74018 RADEX ABDOMEN 1 VIEW: CPT | Mod: 26

## 2023-01-27 PROCEDURE — 99232 SBSQ HOSP IP/OBS MODERATE 35: CPT | Mod: GC

## 2023-01-27 RX ORDER — OLANZAPINE 15 MG/1
2.5 TABLET, FILM COATED ORAL AT BEDTIME
Refills: 0 | Status: DISCONTINUED | OUTPATIENT
Start: 2023-01-27 | End: 2023-01-27

## 2023-01-27 RX ORDER — ASCORBIC ACID 60 MG
500 TABLET,CHEWABLE ORAL DAILY
Refills: 0 | Status: DISCONTINUED | OUTPATIENT
Start: 2023-01-27 | End: 2023-01-30

## 2023-01-27 RX ORDER — MIRTAZAPINE 45 MG/1
7.5 TABLET, ORALLY DISINTEGRATING ORAL AT BEDTIME
Refills: 0 | Status: DISCONTINUED | OUTPATIENT
Start: 2023-01-27 | End: 2023-01-30

## 2023-01-27 RX ORDER — OLANZAPINE 15 MG/1
2.5 TABLET, FILM COATED ORAL AT BEDTIME
Refills: 0 | Status: DISCONTINUED | OUTPATIENT
Start: 2023-01-28 | End: 2023-01-30

## 2023-01-27 RX ORDER — OLANZAPINE 15 MG/1
2.5 TABLET, FILM COATED ORAL ONCE
Refills: 0 | Status: COMPLETED | OUTPATIENT
Start: 2023-01-27 | End: 2023-01-27

## 2023-01-27 RX ADMIN — POLYETHYLENE GLYCOL 3350 17 GRAM(S): 17 POWDER, FOR SOLUTION ORAL at 17:49

## 2023-01-27 RX ADMIN — CLOPIDOGREL BISULFATE 75 MILLIGRAM(S): 75 TABLET, FILM COATED ORAL at 12:26

## 2023-01-27 RX ADMIN — Medication 3 MILLIGRAM(S): at 21:26

## 2023-01-27 RX ADMIN — CARBIDOPA AND LEVODOPA 1 TABLET(S): 25; 100 TABLET ORAL at 15:08

## 2023-01-27 RX ADMIN — DESVENLAFAXINE 25 MILLIGRAM(S): 50 TABLET, EXTENDED RELEASE ORAL at 12:26

## 2023-01-27 RX ADMIN — SENNA PLUS 2 TABLET(S): 8.6 TABLET ORAL at 21:26

## 2023-01-27 RX ADMIN — TADALAFIL 5 MILLIGRAM(S): 10 TABLET, FILM COATED ORAL at 12:26

## 2023-01-27 RX ADMIN — POLYETHYLENE GLYCOL 3350 17 GRAM(S): 17 POWDER, FOR SOLUTION ORAL at 05:48

## 2023-01-27 RX ADMIN — Medication 10 MILLIGRAM(S): at 05:48

## 2023-01-27 RX ADMIN — MIRTAZAPINE 7.5 MILLIGRAM(S): 45 TABLET, ORALLY DISINTEGRATING ORAL at 21:31

## 2023-01-27 RX ADMIN — Medication 10 MILLIGRAM(S): at 17:50

## 2023-01-27 RX ADMIN — OLANZAPINE 2.5 MILLIGRAM(S): 15 TABLET, FILM COATED ORAL at 18:01

## 2023-01-27 RX ADMIN — CARBIDOPA AND LEVODOPA 1 TABLET(S): 25; 100 TABLET ORAL at 12:26

## 2023-01-27 RX ADMIN — CARBIDOPA AND LEVODOPA 1 TABLET(S): 25; 100 TABLET ORAL at 17:50

## 2023-01-27 RX ADMIN — AMIODARONE HYDROCHLORIDE 200 MILLIGRAM(S): 400 TABLET ORAL at 05:48

## 2023-01-27 RX ADMIN — PIPERACILLIN AND TAZOBACTAM 25 GRAM(S): 4; .5 INJECTION, POWDER, LYOPHILIZED, FOR SOLUTION INTRAVENOUS at 05:47

## 2023-01-27 RX ADMIN — APIXABAN 2.5 MILLIGRAM(S): 2.5 TABLET, FILM COATED ORAL at 17:50

## 2023-01-27 RX ADMIN — CARBIDOPA AND LEVODOPA 1 TABLET(S): 25; 100 TABLET ORAL at 05:48

## 2023-01-27 RX ADMIN — PIPERACILLIN AND TAZOBACTAM 25 GRAM(S): 4; .5 INJECTION, POWDER, LYOPHILIZED, FOR SOLUTION INTRAVENOUS at 17:49

## 2023-01-27 RX ADMIN — Medication 500 MILLIGRAM(S): at 18:01

## 2023-01-27 RX ADMIN — APIXABAN 2.5 MILLIGRAM(S): 2.5 TABLET, FILM COATED ORAL at 05:48

## 2023-01-27 RX ADMIN — ATORVASTATIN CALCIUM 80 MILLIGRAM(S): 80 TABLET, FILM COATED ORAL at 21:24

## 2023-01-27 NOTE — DIETITIAN INITIAL EVALUATION ADULT - ORAL INTAKE PTA/DIET HISTORY
Patient seen for assessment w/ wife at bed side. Wife reports patient w/ fair PO intake at home, but will have days where he won't eat much.

## 2023-01-27 NOTE — DIETITIAN INITIAL EVALUATION ADULT - NSFNSGIIOFT_GEN_A_CORE
01-26-23 @ 07:01  -  01-27-23 @ 07:00  --------------------------------------------------------  OUT:    Stool (mL): 1 mL  Total OUT: 1 mL    Total NET: -1 mL

## 2023-01-27 NOTE — PROGRESS NOTE ADULT - PROBLEM SELECTOR PLAN 3
R/o DVT w/ VA dopplers - ordered on 1/26 Troponin 230-->202, EKG non-ischemic, paced; denies chest pain  Likely 2/2 supply/demand imbalance i/s/o sepsis  - TTE 1/25/23: EF 73%, severe concentric L vent hypertrophy, mod pulm HTN

## 2023-01-27 NOTE — PROGRESS NOTE ADULT - PROBLEM SELECTOR PLAN 4
Troponin 230-->202, EKG non-ischemic, paced; denies chest pain  Likely 2/2 supply/demand imbalance i/s/o sepsis  - TTE 1/25/23: EF 73%, severe concentric L vent hypertrophy, mod pulm HTN Noted on CTAP  - Bowel regimen w/ senna, Miralax, dulcolax  - Enema given 1/25 which caused patient to have 2 BMs on 1/25, now having BMs daily  - 1/27: abd distended, ordering Abd Xray to eval

## 2023-01-27 NOTE — DIETITIAN INITIAL EVALUATION ADULT - PERTINENT LABORATORY DATA
01-27    139  |  113<H>  |  39<H>  ----------------------------<  89  4.1   |  17<L>  |  2.53<H>    Ca    8.5      27 Jan 2023 06:45  Phos  2.7     01-27  Mg     2.00     01-27    TPro  5.4<L>  /  Alb  2.5<L>  /  TBili  0.3  /  DBili  x   /  AST  28  /  ALT  6   /  AlkPhos  235<H>  01-27

## 2023-01-27 NOTE — PROGRESS NOTE ADULT - PROBLEM SELECTOR PLAN 11
s/p VATS, pleural biopsy, pleur-x placement  Follows outpatient w/ Dr. Oneal  - f/u CT surg recs for Pleur-X management  - continue drainage 3x/week Dispo: [facility] PT  Diet: Regular  CODE: DNR/DNI, family would want pressors

## 2023-01-27 NOTE — PROVIDER CONTACT NOTE (CRITICAL VALUE NOTIFICATION) - TEST AND RESULT REPORTED:
Positive body fluid 125. Polymorphonuclear leucocyte seen. No organism seen biocentrifuge.
polymorphonuclear leukocytes in gram negative rods

## 2023-01-27 NOTE — DIETITIAN INITIAL EVALUATION ADULT - OTHER INFO
82 year old male with a PMH of Parkinson's, Pleural effusion, HTN who was BIBEMS for generalized weakness, found to be in severe sepsis state likely secondary to PNA and pleural fluid + GNR, s/p swallow evaluation w/ rec for regular/thin liquids per chart.    Wife reports patient w/ fair appetite in house. Noted w/ intakes 0-75% per RN flow sheet. Amenable to nutritional supplement and food preferences were reviewed. No GI distress reported. Has no food allergies. Wife reports weight loss. Per HIE, noted w/ weight 79.4 kg (7/21/22). ABW is 66.4 kg (1/25) per chart indicating a -16.4% weight loss x 6 months, significant. Noted w/ L heel stage 3 and sacrum stage 2 pressure injuries w/ no edema per RN flow sheet.

## 2023-01-27 NOTE — PROGRESS NOTE ADULT - ATTENDING COMMENTS
82 y.o. M w/ a hx of Parkinson's, pleural effusions s/p PleurX, afib on Eliquis, cardiac amyloidosis, CHB s/p PPM/AICD, CAD s/p stent, HTN, BPH hospitalized for encephalopathy, decreased urine output and weakness.     Patient reports some ongoing L calf pain, more interactive, answers questions though not always appropriately. Wife at bedside reports patient is at baseline MS. Wife reports patient was restless/agitated last night.     # Severe sepsis: 2/2 PNA w/ complicated pleural effusion. Cont Zosyn for now given intermittent continued hypotension though overall, making improvement. Can likely transition to PO to complete course once BP improves.   # Hypotension: BP intermittently low but MS improving and patient appears asymptomatic. Will give IVF PRN for now, consider dysautonomia 2/2 Parkinson's as alternative etiology.   # ALAYNA on CKD: Likely multifactorial- urinary obstruction as above, hypotension, pre-renal, ARB use. Cr improving.   # Encephalopathy: Likely toxic metabolic encephalopathy 2/2 PNA. MS now back to baseline.   # L calf pain: LUE dopplers pending. Has been on Eliquis but inappropriately reduced dose.   # Urinary retention: Resolved, required 2 straight cath's on admission, now urinating well.   # Stercoral colitis: Continues to have BM's, continue bowel regimen.   # Afib: Cont Eliquis for now. If ALAYNA improves, patient should be on higher dose Eliquis. Does not qualify for reduced dose otherwise. Clarify role of amiodarone as patient is already PPM dependent. 82 y.o. M w/ a hx of Parkinson's, pleural effusions s/p PleurX, afib on Eliquis, cardiac amyloidosis, CHB s/p PPM/AICD, CAD s/p stent, HTN, BPH hospitalized for encephalopathy, decreased urine output and weakness.     Patient reports some ongoing L calf pain, more interactive, answers questions though not always appropriately. Wife at bedside reports patient is at baseline MS. Wife reports patient was restless/agitated last night.     # Severe sepsis: 2/2 PNA w/ complicated pleural effusion. Cont Zosyn for now given intermittent continued hypotension though overall, making improvement. Can likely transition to PO to complete course once BP improves.   # Hypotension: BP intermittently low but MS improving and patient appears asymptomatic. Will give IVF PRN for now, consider dysautonomia 2/2 Parkinson's as alternative etiology.   # ALAYNA on CKD: Likely multifactorial- urinary obstruction as above, hypotension, pre-renal, ARB use. Cr improving.   # Encephalopathy: Likely toxic metabolic encephalopathy 2/2 PNA. MS now back to baseline.   # L calf pain: LUE dopplers pending. H  # Urinary retention: Resolved, required 2 straight cath's on admission, now urinating well.   # Stercoral colitis: Continues to have BM's, continue bowel regimen.   # Afib: Cont Eliquis.   # Parkinson's: Continue Sinemet, can resume Mirtazapine and Zyprexa.

## 2023-01-27 NOTE — PROGRESS NOTE ADULT - PROBLEM SELECTOR PLAN 6
Iron low, ferritin 111 (previous history of low ferritin). Folate/B12 WNL  - defer IV iron i/s/o infection  - will start oral iron after patient has bowel movements  - will check LDH, hapto, retic  - Monitor CBC qd, Transfuse hgb <7 - Continue Sinemet  - Continue Remeron  - Continue Zyprexa

## 2023-01-27 NOTE — PROVIDER CONTACT NOTE (CRITICAL VALUE NOTIFICATION) - SITUATION
polymorphonuclear leukocytes in gram negative rods
Positive body fluid 125. Polymorphonuclear leucocyte seen. No organism seen biocentrifuge.

## 2023-01-27 NOTE — PROGRESS NOTE ADULT - PROBLEM SELECTOR PLAN 2
SCr 3.40 on admission, baseline ~1.5  Possibly i/s/o recent urinary retention vs. pre-renal i/s/o hypotension 2/2 urinary tract infection  - Cr improving  - Renal U/S: no hydronephrosis  - c/w fluid resusitation and bladder scans IMPROVING - On admission Cr 3.40, now 2.5, baseline ~1.5  Possibly i/s/o recent urinary retention vs. pre-renal i/s/o hypotension 2/2 urinary tract infection  - Renal U/S: no hydronephrosis  - patient now voiding appropriately, bladderscans all negative  - c/w fluid resusitation

## 2023-01-27 NOTE — PROGRESS NOTE ADULT - PROBLEM SELECTOR PLAN 10
- c/w Lipitor and Plavix s/p VATS, pleural biopsy, pleur-x placement  Follows outpatient w/ Dr. Oneal  - CT surg recs appreciated for Pleur-X management  - continue drainage 3x/week

## 2023-01-27 NOTE — DIETITIAN INITIAL EVALUATION ADULT - PERTINENT MEDS FT
MEDICATIONS  (STANDING):  aMIOdarone    Tablet 200 milliGRAM(s) Oral daily  apixaban 2.5 milliGRAM(s) Oral two times a day  atorvastatin 80 milliGRAM(s) Oral at bedtime  carbidopa/levodopa  25/100 1 Tablet(s) Oral <User Schedule>  carbidopa/levodopa  25/100 1 Tablet(s) Oral <User Schedule>  clopidogrel Tablet 75 milliGRAM(s) Oral daily  desvenlafaxine ER 25 milliGRAM(s) Oral daily  melatonin 3 milliGRAM(s) Oral at bedtime  mirtazapine 7.5 milliGRAM(s) Oral at bedtime  OLANZapine 2.5 milliGRAM(s) Oral at bedtime  oxybutynin 10 milliGRAM(s) Oral two times a day  piperacillin/tazobactam IVPB.. 3.375 Gram(s) IV Intermittent every 12 hours  polyethylene glycol 3350 17 Gram(s) Oral two times a day  senna 2 Tablet(s) Oral at bedtime  sodium chloride 0.9% Bolus 1000 milliLiter(s) IV Bolus once  tadalafil 5 milliGRAM(s) Oral daily    MEDICATIONS  (PRN):  acetaminophen     Tablet .. 650 milliGRAM(s) Oral every 6 hours PRN Mild Pain (1 - 3)

## 2023-01-27 NOTE — PROGRESS NOTE ADULT - PROBLEM SELECTOR PLAN 8
Patient w/ history of cardiac amyloidosis  - Continue Vyndamax (not on formulary, bring from home)  - Holding diuretics, as patient clinically dry  - Holding olmesartan i/s/o hypotension  - TTE 1/25/23: EF 73%, severe concentric L vent hypertrophy, mod pulm HTN - c/w Eliquis and amiodarone  - will obtain cardiology records about utility of amiodorone i/s/o transaminitis    #complete heart block, s/p PPM placement  s/p EP interrogation in ED  - Continue to monitor vital signs q4hr

## 2023-01-27 NOTE — PROVIDER CONTACT NOTE (CRITICAL VALUE NOTIFICATION) - ACTION/TREATMENT ORDERED:
MD made aware. Antibiotic treatment is being infused.
MD notified. no new interventions at this time

## 2023-01-27 NOTE — PROGRESS NOTE ADULT - PROBLEM SELECTOR PLAN 7
- Continue Sinemet  - Continue Remeron  - Continue Zyprexa Patient w/ history of cardiac amyloidosis  - Continue Vyndamax (not on formulary, bring from home)  - Holding diuretics, as patient clinically dry  - Holding olmesartan i/s/o hypotension  - TTE 1/25/23: EF 73%, severe concentric L vent hypertrophy, mod pulm HTN

## 2023-01-27 NOTE — PROVIDER CONTACT NOTE (CRITICAL VALUE NOTIFICATION) - ASSESSMENT
Patient exhibiting so signs of additional distress
Positive body fluid 125. Polymorphonuclear leucocyte seen. No organism seen biocentrifuge.

## 2023-01-27 NOTE — DIETITIAN INITIAL EVALUATION ADULT - ADD RECOMMEND
Continue diet as ordered. Will provide magic cup 1x daily (290 kcal, 9 g pro). Vitamin C for wound healing. Document PO intake to monitor trend.

## 2023-01-27 NOTE — PROGRESS NOTE ADULT - PROBLEM SELECTOR PLAN 9
- c/w Eliquis and amiodarone  - will obtain cardiology records about utility of amiodorone i/s/o transaminitis    #complete heart block, s/p PPM placement  s/p EP interrogation in ED  - Continue to monitor vital signs q4hr - c/w Lipitor and Plavix

## 2023-01-27 NOTE — PROGRESS NOTE ADULT - ASSESSMENT
82M PMH Parkinson's, Pleural effusion s/p PleurX, a. fib on Eliquis, bradycardia w/ AICD, HTN who was BIBEMS for generalized weakness, found to be in severe sepsis state likely secondary to PNA and pleural fluid + GNR

## 2023-01-27 NOTE — PROGRESS NOTE ADULT - SUBJECTIVE AND OBJECTIVE BOX
Carlos Manuel Metcalf MD  PGY-3 Department of Internal Medicine  Available on [employer] Teams      Patient is a 82y old  Male who presents with a chief complaint of Hypotension (26 Jan 2023 09:26)      OVERNIGHT EVENTS: Hypotensive, s/p 1L fluid    SUBJECTIVE: Pt seen and examined. Unable to assess ROS d/t dementia.    MEDICATIONS  (STANDING):  aMIOdarone    Tablet 200 milliGRAM(s) Oral daily  apixaban 2.5 milliGRAM(s) Oral two times a day  atorvastatin 80 milliGRAM(s) Oral at bedtime  carbidopa/levodopa  25/100 1 Tablet(s) Oral <User Schedule>  carbidopa/levodopa  25/100 1 Tablet(s) Oral <User Schedule>  clopidogrel Tablet 75 milliGRAM(s) Oral daily  desvenlafaxine ER 25 milliGRAM(s) Oral daily  melatonin 3 milliGRAM(s) Oral at bedtime  oxybutynin 10 milliGRAM(s) Oral two times a day  piperacillin/tazobactam IVPB.. 3.375 Gram(s) IV Intermittent every 12 hours  polyethylene glycol 3350 17 Gram(s) Oral two times a day  senna 2 Tablet(s) Oral at bedtime  sodium chloride 0.9% Bolus 1000 milliLiter(s) IV Bolus once  tadalafil 5 milliGRAM(s) Oral daily    MEDICATIONS  (PRN):  acetaminophen     Tablet .. 650 milliGRAM(s) Oral every 6 hours PRN Mild Pain (1 - 3)      I&O's Summary    26 Jan 2023 07:01  -  27 Jan 2023 07:00  --------------------------------------------------------  IN: 240 mL / OUT: 801 mL / NET: -561 mL        Vital Signs Last 24 Hrs  T(C): 36.2 (27 Jan 2023 04:43), Max: 36.7 (27 Jan 2023 02:00)  T(F): 97.2 (27 Jan 2023 04:43), Max: 98.1 (27 Jan 2023 02:00)  HR: 60 (27 Jan 2023 04:43) (59 - 100)  BP: 94/45 (27 Jan 2023 04:30) (80/45 - 124/100)  BP(mean): --  RR: 17 (27 Jan 2023 04:43) (17 - 18)  SpO2: 99% (27 Jan 2023 04:43) (94% - 100%)    Parameters below as of 27 Jan 2023 04:43  Patient On (Oxygen Delivery Method): room air        =================PHYSICAL EXAM=================    GENERAL: Laying comfortably, NAD  EYES: EOMI, PERRL, no scleral icterus  NECK: No JVD  LUNG: Clear to auscultation bilaterally; No wheeze, crackles or rhonci  HEART: Regular rate and rhythm; No murmurs, rubs, or gallops  ABDOMEN: Soft, Nontender, Nondistended  EXTREMITIES:  No LE edema, 2+ Peripheral Pulses, No clubbing, cyanosis, or edema  PSYCH: AAOx1, lethargic  NEUROLOGY: non-focal, unable to obtain strength exam d/t lack of cooperation  SKIN: No rashes or lesions    =================================================    LABS:                        7.8    8.66  )-----------( 232      ( 26 Jan 2023 06:43 )             26.7     Auto Eosinophil # 0.10  / Auto Eosinophil % 1.2   / Auto Neutrophil # 6.87  / Auto Neutrophil % 79.2  / BANDS % x                            8.3    12.44 )-----------( 294      ( 25 Jan 2023 12:00 )             28.0     Auto Eosinophil # x     / Auto Eosinophil % x     / Auto Neutrophil # x     / Auto Neutrophil % x     / BANDS % x        01-26    140  |  114<H>  |  43<H>  ----------------------------<  70  4.5   |  16<L>  |  2.87<H>  01-25    140  |  110<H>  |  48<H>  ----------------------------<  77  4.7   |  20<L>  |  3.17<H>    Ca    8.4      26 Jan 2023 06:43  Mg     2.10     01-26  Phos  3.3     01-26  TPro  5.7<L>  /  Alb  2.6<L>  /  TBili  0.3  /  DBili  x   /  AST  30  /  ALT  8   /  AlkPhos  273<H>  01-26  TPro  5.9<L>  /  Alb  3.0<L>  /  TBili  0.3  /  DBili  x   /  AST  36  /  ALT  9   /  AlkPhos  332<H>  01-25      CARDIAC MARKERS ( 25 Jan 2023 12:00 )  x     / x     / 376 U/L / x     / 7.1 ng/mL        Lactate, Blood: 1.4 mmol/L (01-25 @ 12:00)        RADIOLOGY & ADDITIONAL TESTS:    Imaging Personally Reviewed:    Consultant(s) Notes Reviewed:      Care Discussed with Consultants/Other Providers:   Carlos Manuel Metcalf MD  PGY-3 Department of Internal Medicine  Available on [employer] Teams      Patient is a 82y old  Male who presents with a chief complaint of Hypotension (26 Jan 2023 09:26)      OVERNIGHT EVENTS: Hypotensive, s/p 1L fluid    SUBJECTIVE: Pt seen and examined. denies fevers, chills, chest pain, SOB, dysuria    MEDICATIONS  (STANDING):  aMIOdarone    Tablet 200 milliGRAM(s) Oral daily  apixaban 2.5 milliGRAM(s) Oral two times a day  atorvastatin 80 milliGRAM(s) Oral at bedtime  carbidopa/levodopa  25/100 1 Tablet(s) Oral <User Schedule>  carbidopa/levodopa  25/100 1 Tablet(s) Oral <User Schedule>  clopidogrel Tablet 75 milliGRAM(s) Oral daily  desvenlafaxine ER 25 milliGRAM(s) Oral daily  melatonin 3 milliGRAM(s) Oral at bedtime  oxybutynin 10 milliGRAM(s) Oral two times a day  piperacillin/tazobactam IVPB.. 3.375 Gram(s) IV Intermittent every 12 hours  polyethylene glycol 3350 17 Gram(s) Oral two times a day  senna 2 Tablet(s) Oral at bedtime  sodium chloride 0.9% Bolus 1000 milliLiter(s) IV Bolus once  tadalafil 5 milliGRAM(s) Oral daily    MEDICATIONS  (PRN):  acetaminophen     Tablet .. 650 milliGRAM(s) Oral every 6 hours PRN Mild Pain (1 - 3)      I&O's Summary    26 Jan 2023 07:01  -  27 Jan 2023 07:00  --------------------------------------------------------  IN: 240 mL / OUT: 801 mL / NET: -561 mL        Vital Signs Last 24 Hrs  T(C): 36.2 (27 Jan 2023 04:43), Max: 36.7 (27 Jan 2023 02:00)  T(F): 97.2 (27 Jan 2023 04:43), Max: 98.1 (27 Jan 2023 02:00)  HR: 60 (27 Jan 2023 04:43) (59 - 100)  BP: 94/45 (27 Jan 2023 04:30) (80/45 - 124/100)  BP(mean): --  RR: 17 (27 Jan 2023 04:43) (17 - 18)  SpO2: 99% (27 Jan 2023 04:43) (94% - 100%)    Parameters below as of 27 Jan 2023 04:43  Patient On (Oxygen Delivery Method): room air        =================PHYSICAL EXAM=================    GENERAL: Laying comfortably, NAD  EYES: EOMI, PERRL, no scleral icterus  NECK: No JVD  LUNG: Clear to auscultation bilaterally; No wheeze, crackles or rhonci  HEART: Regular rate and rhythm; No murmurs, rubs, or gallops  ABDOMEN: Soft, Nontender, Nondistended  EXTREMITIES:  No LE edema, 2+ Peripheral Pulses, No clubbing, cyanosis, or edema  PSYCH: AAOx1, lethargic  NEUROLOGY: non-focal, unable to obtain strength exam d/t lack of cooperation  SKIN: No rashes or lesions    =================================================    LABS:                        7.8    8.66  )-----------( 232      ( 26 Jan 2023 06:43 )             26.7     Auto Eosinophil # 0.10  / Auto Eosinophil % 1.2   / Auto Neutrophil # 6.87  / Auto Neutrophil % 79.2  / BANDS % x                            8.3    12.44 )-----------( 294      ( 25 Jan 2023 12:00 )             28.0     Auto Eosinophil # x     / Auto Eosinophil % x     / Auto Neutrophil # x     / Auto Neutrophil % x     / BANDS % x        01-26    140  |  114<H>  |  43<H>  ----------------------------<  70  4.5   |  16<L>  |  2.87<H>  01-25    140  |  110<H>  |  48<H>  ----------------------------<  77  4.7   |  20<L>  |  3.17<H>    Ca    8.4      26 Jan 2023 06:43  Mg     2.10     01-26  Phos  3.3     01-26  TPro  5.7<L>  /  Alb  2.6<L>  /  TBili  0.3  /  DBili  x   /  AST  30  /  ALT  8   /  AlkPhos  273<H>  01-26  TPro  5.9<L>  /  Alb  3.0<L>  /  TBili  0.3  /  DBili  x   /  AST  36  /  ALT  9   /  AlkPhos  332<H>  01-25      CARDIAC MARKERS ( 25 Jan 2023 12:00 )  x     / x     / 376 U/L / x     / 7.1 ng/mL        Lactate, Blood: 1.4 mmol/L (01-25 @ 12:00)        RADIOLOGY & ADDITIONAL TESTS:    Imaging Personally Reviewed:    Consultant(s) Notes Reviewed:      Care Discussed with Consultants/Other Providers:

## 2023-01-27 NOTE — PROGRESS NOTE ADULT - PROBLEM SELECTOR PLAN 5
Noted on CTAP  - Bowel regimen w/ senna, Miralax, dulcolax  - Enema given 1/25 which caused patient to have 2 BMs on 1/25 Iron low, ferritin 111 (previous history of low ferritin).   - defer IV iron i/s/o infection  - will start oral iron after patient has bowel movements  - Folate/B12 WNL, LDH, hapto, retic WNL  - Monitor CBC qd, Transfuse hgb <7

## 2023-01-27 NOTE — PROGRESS NOTE ADULT - PROBLEM SELECTOR PLAN 1
Patient presented with confusion, shock state, leukocytosis, and elevated lactate, likely 2/2 severe sepsis. No tachycardia on admission, though patient PPM dependent ([facility])  - Likely 2/2 GNR in pleural fluid  - UCx, BCx : NGTD, MRSA, Legionella negative  - TTE 1/25/23: EF 73%, severe concentric L vent hypertrophy, mod pulm HTN    =====PLAN====  - c/w Zosyn (1/24-)  - F/u UCx, BCx, Pleural fluid sensitivities Patient presented with confusion, shock state, leukocytosis, and elevated lactate, likely 2/2 severe sepsis. No tachycardia on admission, though patient PPM dependent ([facility])  - Likely 2/2 GNR in pleural fluid, although pleural fluid culture NGTD  - UCx, BCx : NGTD, MRSA, Legionella negative  - TTE 1/25/23: EF 73%, severe concentric L vent hypertrophy, mod pulm HTN    =====PLAN====  - c/w Zosyn (1/24-) plan for 7 day course

## 2023-01-28 LAB
ALBUMIN SERPL ELPH-MCNC: 2.7 G/DL — LOW (ref 3.3–5)
ALP SERPL-CCNC: 227 U/L — HIGH (ref 40–120)
ALT FLD-CCNC: 9 U/L — SIGNIFICANT CHANGE UP (ref 4–41)
ANION GAP SERPL CALC-SCNC: 9 MMOL/L — SIGNIFICANT CHANGE UP (ref 7–14)
APTT BLD: 36.2 SEC — SIGNIFICANT CHANGE UP (ref 27–36.3)
AST SERPL-CCNC: 24 U/L — SIGNIFICANT CHANGE UP (ref 4–40)
BASOPHILS # BLD AUTO: 0.03 K/UL — SIGNIFICANT CHANGE UP (ref 0–0.2)
BASOPHILS NFR BLD AUTO: 0.4 % — SIGNIFICANT CHANGE UP (ref 0–2)
BILIRUB SERPL-MCNC: 0.3 MG/DL — SIGNIFICANT CHANGE UP (ref 0.2–1.2)
BUN SERPL-MCNC: 32 MG/DL — HIGH (ref 7–23)
CALCIUM SERPL-MCNC: 8.7 MG/DL — SIGNIFICANT CHANGE UP (ref 8.4–10.5)
CHLORIDE SERPL-SCNC: 112 MMOL/L — HIGH (ref 98–107)
CO2 SERPL-SCNC: 18 MMOL/L — LOW (ref 22–31)
CREAT SERPL-MCNC: 2.24 MG/DL — HIGH (ref 0.5–1.3)
EGFR: 29 ML/MIN/1.73M2 — LOW
EOSINOPHIL # BLD AUTO: 0.13 K/UL — SIGNIFICANT CHANGE UP (ref 0–0.5)
EOSINOPHIL NFR BLD AUTO: 1.8 % — SIGNIFICANT CHANGE UP (ref 0–6)
GLUCOSE SERPL-MCNC: 98 MG/DL — SIGNIFICANT CHANGE UP (ref 70–99)
HCT VFR BLD CALC: 25.1 % — LOW (ref 39–50)
HGB BLD-MCNC: 7.6 G/DL — LOW (ref 13–17)
IANC: 5.41 K/UL — SIGNIFICANT CHANGE UP (ref 1.8–7.4)
IMM GRANULOCYTES NFR BLD AUTO: 0.4 % — SIGNIFICANT CHANGE UP (ref 0–0.9)
INR BLD: 1.88 RATIO — HIGH (ref 0.88–1.16)
LYMPHOCYTES # BLD AUTO: 1.21 K/UL — SIGNIFICANT CHANGE UP (ref 1–3.3)
LYMPHOCYTES # BLD AUTO: 16.5 % — SIGNIFICANT CHANGE UP (ref 13–44)
MAGNESIUM SERPL-MCNC: 1.9 MG/DL — SIGNIFICANT CHANGE UP (ref 1.6–2.6)
MCHC RBC-ENTMCNC: 27.3 PG — SIGNIFICANT CHANGE UP (ref 27–34)
MCHC RBC-ENTMCNC: 30.3 GM/DL — LOW (ref 32–36)
MCV RBC AUTO: 90.3 FL — SIGNIFICANT CHANGE UP (ref 80–100)
MONOCYTES # BLD AUTO: 0.54 K/UL — SIGNIFICANT CHANGE UP (ref 0–0.9)
MONOCYTES NFR BLD AUTO: 7.3 % — SIGNIFICANT CHANGE UP (ref 2–14)
NEUTROPHILS # BLD AUTO: 5.41 K/UL — SIGNIFICANT CHANGE UP (ref 1.8–7.4)
NEUTROPHILS NFR BLD AUTO: 73.6 % — SIGNIFICANT CHANGE UP (ref 43–77)
NRBC # BLD: 0 /100 WBCS — SIGNIFICANT CHANGE UP (ref 0–0)
NRBC # FLD: 0 K/UL — SIGNIFICANT CHANGE UP (ref 0–0)
PHOSPHATE SERPL-MCNC: 2.5 MG/DL — SIGNIFICANT CHANGE UP (ref 2.5–4.5)
PLATELET # BLD AUTO: 247 K/UL — SIGNIFICANT CHANGE UP (ref 150–400)
POTASSIUM SERPL-MCNC: 4 MMOL/L — SIGNIFICANT CHANGE UP (ref 3.5–5.3)
POTASSIUM SERPL-SCNC: 4 MMOL/L — SIGNIFICANT CHANGE UP (ref 3.5–5.3)
PROT SERPL-MCNC: 5.7 G/DL — LOW (ref 6–8.3)
PROTHROM AB SERPL-ACNC: 22 SEC — HIGH (ref 10.5–13.4)
RBC # BLD: 2.78 M/UL — LOW (ref 4.2–5.8)
RBC # FLD: 20.5 % — HIGH (ref 10.3–14.5)
SODIUM SERPL-SCNC: 139 MMOL/L — SIGNIFICANT CHANGE UP (ref 135–145)
WBC # BLD: 7.35 K/UL — SIGNIFICANT CHANGE UP (ref 3.8–10.5)
WBC # FLD AUTO: 7.35 K/UL — SIGNIFICANT CHANGE UP (ref 3.8–10.5)

## 2023-01-28 PROCEDURE — 93010 ELECTROCARDIOGRAM REPORT: CPT

## 2023-01-28 PROCEDURE — 99233 SBSQ HOSP IP/OBS HIGH 50: CPT

## 2023-01-28 RX ORDER — OLANZAPINE 15 MG/1
2.5 TABLET, FILM COATED ORAL ONCE
Refills: 0 | Status: COMPLETED | OUTPATIENT
Start: 2023-01-28 | End: 2023-01-28

## 2023-01-28 RX ADMIN — TADALAFIL 5 MILLIGRAM(S): 10 TABLET, FILM COATED ORAL at 11:18

## 2023-01-28 RX ADMIN — MIRTAZAPINE 7.5 MILLIGRAM(S): 45 TABLET, ORALLY DISINTEGRATING ORAL at 21:17

## 2023-01-28 RX ADMIN — PIPERACILLIN AND TAZOBACTAM 25 GRAM(S): 4; .5 INJECTION, POWDER, LYOPHILIZED, FOR SOLUTION INTRAVENOUS at 17:57

## 2023-01-28 RX ADMIN — CLOPIDOGREL BISULFATE 75 MILLIGRAM(S): 75 TABLET, FILM COATED ORAL at 11:17

## 2023-01-28 RX ADMIN — DESVENLAFAXINE 25 MILLIGRAM(S): 50 TABLET, EXTENDED RELEASE ORAL at 11:18

## 2023-01-28 RX ADMIN — POLYETHYLENE GLYCOL 3350 17 GRAM(S): 17 POWDER, FOR SOLUTION ORAL at 17:59

## 2023-01-28 RX ADMIN — ATORVASTATIN CALCIUM 80 MILLIGRAM(S): 80 TABLET, FILM COATED ORAL at 21:17

## 2023-01-28 RX ADMIN — AMIODARONE HYDROCHLORIDE 200 MILLIGRAM(S): 400 TABLET ORAL at 06:04

## 2023-01-28 RX ADMIN — CARBIDOPA AND LEVODOPA 1 TABLET(S): 25; 100 TABLET ORAL at 15:31

## 2023-01-28 RX ADMIN — Medication 500 MILLIGRAM(S): at 11:17

## 2023-01-28 RX ADMIN — Medication 10 MILLIGRAM(S): at 06:04

## 2023-01-28 RX ADMIN — APIXABAN 2.5 MILLIGRAM(S): 2.5 TABLET, FILM COATED ORAL at 17:59

## 2023-01-28 RX ADMIN — CARBIDOPA AND LEVODOPA 1 TABLET(S): 25; 100 TABLET ORAL at 06:14

## 2023-01-28 RX ADMIN — Medication 10 MILLIGRAM(S): at 17:59

## 2023-01-28 RX ADMIN — Medication 3 MILLIGRAM(S): at 21:17

## 2023-01-28 RX ADMIN — SENNA PLUS 2 TABLET(S): 8.6 TABLET ORAL at 21:18

## 2023-01-28 RX ADMIN — CARBIDOPA AND LEVODOPA 1 TABLET(S): 25; 100 TABLET ORAL at 11:17

## 2023-01-28 RX ADMIN — OLANZAPINE 2.5 MILLIGRAM(S): 15 TABLET, FILM COATED ORAL at 01:49

## 2023-01-28 RX ADMIN — APIXABAN 2.5 MILLIGRAM(S): 2.5 TABLET, FILM COATED ORAL at 06:04

## 2023-01-28 RX ADMIN — CARBIDOPA AND LEVODOPA 1 TABLET(S): 25; 100 TABLET ORAL at 17:57

## 2023-01-28 RX ADMIN — OLANZAPINE 2.5 MILLIGRAM(S): 15 TABLET, FILM COATED ORAL at 21:23

## 2023-01-28 RX ADMIN — PIPERACILLIN AND TAZOBACTAM 25 GRAM(S): 4; .5 INJECTION, POWDER, LYOPHILIZED, FOR SOLUTION INTRAVENOUS at 06:03

## 2023-01-28 NOTE — PROGRESS NOTE ADULT - ATTENDING COMMENTS
82 y.o. M w/ a hx of Parkinson's, pleural effusions s/p PleurX, afib on Eliquis, cardiac amyloidosis, CHB s/p PPM/AICD, CAD s/p stent, HTN, BPH hospitalized for encephalopathy, decreased urine output and weakness.       # Severe sepsis: 2/2 PNA w/ complicated pleural effusion. Cont Zosyn for now given intermittent continued hypotension though overall, making improvement.   # Hypotension: BP intermittently low but MS improving and patient appears asymptomatic. s/p IVF, dysautonomia in setting of Parkinson's likely contributory  # ALAYNA on CKD: Likely multifactorial- urinary obstruction, hypotension, pre-renal, ARB use. Cr improving. Monitor urine output  # Encephalopathy: Likely toxic metabolic encephalopathy 2/2 PNA. MS now back to baseline.   # L calf pain: LUE dopplers w/o evidence of DVT   # Urinary retention: Resolved, required 2 straight cath's on admission, now urinating well. C/w serial bladder scans  # Stercoral colitis: Continues to have BM's, continue bowel regimen.   # Afib: Cont Eliquis.   # Parkinson's: Continue Sinemet, can resume Mirtazapine and Zyprexa.

## 2023-01-28 NOTE — PROVIDER CONTACT NOTE (OTHER) - BACKGROUND
Patient admitted for weakness and pleural effusion.
Patient admitted for weakness.
patient admitted for weakness.
Patient admitted for weakness and pleural effusion.
patient admitted for weakness.

## 2023-01-28 NOTE — PROVIDER CONTACT NOTE (OTHER) - ASSESSMENT
BP 80/45 HR 60 Temp 97.3 O2 94% RR 18  Patient assessed. No signs and symptoms of distress noted.
BP 91/45 HR 59 Temp 97.5 O2 100% RR 18  Patient assessed. No signs and symptoms of distress noted.
Patient in no acute distress. AOx1 confused. No signs or symptoms of chest pain noted.
BP 88/37 HR 59 Temp 97.2 O2 100% RR 18  Patient assessed. No signs and symptoms of distress noted.
/49 HR 60. Patient is stable. no acute distress.
BP 84/46 HR 60 post pleurx drainage. patient is asymptomatic. No acute distress.
Patient BP (L) 91/45 BP (R) 124/100 hr 61  Patient assessed. No signs and symptoms of distress noted. No signs or symptoms of chest pain noted. bladder scan 196
BP 96/45 HR 60. Patient is stable. No acute distress noted.

## 2023-01-28 NOTE — PROVIDER CONTACT NOTE (OTHER) - NAME OF MD/NP/PA/DO NOTIFIED:
Carlos Manuel Metcalf MD
Olivia Carrillo MD
Olivia Carrillo MD
Carlos Manuel Metcalf MD
Carlos Manuel Metcalf MD
Tea Horowitz MD
Carlos Manuel Metcalf MD
Stuart Metcalf MD

## 2023-01-28 NOTE — PROVIDER CONTACT NOTE (OTHER) - RECOMMENDATIONS
Notify MD. Recheck blood pressure and monitor patient.
Notify MD.
Notify MD.
Notify MD. administer Bolus of NS
Notify MD.
Notify MD.
Notify MD. Continue to monitor patient.
Notify MD. Continue to monitor patient.

## 2023-01-28 NOTE — PROVIDER CONTACT NOTE (OTHER) - ACTION/TREATMENT ORDERED:
MD made aware. reassess in 30 minutes. will come to see patient
MD made aware. Order EKG. Will order medication depending on EKG results
MD made aware. LR bolus ordered.
MD made aware. NS bolus ordered.
MD made aware. Patient is being monitored.
MD notified. BP to be rechecked. Patient being monitored.
MD made aware. LR bolus ordered.
MD made aware. NS bolus initiated.

## 2023-01-28 NOTE — PROGRESS NOTE ADULT - PROBLEM SELECTOR PLAN 2
IMPROVING - On admission Cr 3.40, now 2.5, baseline ~1.5  Possibly i/s/o recent urinary retention vs. pre-renal i/s/o hypotension 2/2 urinary tract infection  - Renal U/S: no hydronephrosis  - patient now voiding appropriately, bladderscans all negative  - c/w fluid resusitation

## 2023-01-28 NOTE — PROVIDER CONTACT NOTE (OTHER) - REASON
/49 HR 60
Patient is restless and agitated
BP 84/46 HR 60 post pleurx drainage
BP 80/45
BP 88/37
BP 91/49
BP 96/45 HR 60
Patient BP (L) 91/45 BP (R) 124/100

## 2023-01-28 NOTE — PROGRESS NOTE ADULT - PROBLEM SELECTOR PLAN 10
s/p VATS, pleural biopsy, pleur-x placement  Follows outpatient w/ Dr. Oneal  - CT surg recs appreciated for Pleur-X management  - continue drainage 3x/week

## 2023-01-28 NOTE — PROGRESS NOTE ADULT - PROBLEM SELECTOR PLAN 5
Iron low, ferritin 111 (previous history of low ferritin).   - defer IV iron i/s/o infection  - will start oral iron after patient has bowel movements  - Folate/B12 WNL, LDH, hapto, retic WNL  - Monitor CBC qd, Transfuse hgb <7

## 2023-01-28 NOTE — PROVIDER CONTACT NOTE (OTHER) - DATE AND TIME:
27-Jan-2023 02:34
27-Jan-2023 13:46
26-Jan-2023 09:07
26-Jan-2023 13:07
26-Jan-2023 18:07
27-Jan-2023 09:07
28-Jan-2023 00:34
25-Jan-2023 19:06

## 2023-01-28 NOTE — PROGRESS NOTE ADULT - PROBLEM SELECTOR PLAN 11
Patient: Alondra Staples Age: 36 year old Sex: female  MRN: [id number] Encounter Date: 6/24/2021    G25/G25    HISTORY     Chief Complaint   Patient presents with   • Headache New Onset on New Symptom       HPI    6/24/2021  12:02 PM Alondra Staples is a 36 year old female who presents to the ED regarding a headache which has worsened since she was seen in the ED three days ago. During her previous ED visit, CT imaging of the head and lumbar puncture were normal. She reports that her headache worsens with trying to stand or sit up. Associated symptoms include mild nausea. The patient denies cough, vomiting, weakness in the arms or legs, or any other associated symptoms. The patient denies any significant medical history, and is not on any regular medications. There are no other complaints or modifying factors that were reported at this time.    PCP: DELMA Ye     No Known Allergies    Current Outpatient Medications   Medication Sig   • ZOLMitriptan (ZOMIG) 5 MG nasal spray Spray 1 spray in alternating nostrils as needed for Migraine. Use at migraine onset. May repeat 1x in 2 hours if not resolved. Max 10mg/24hr   • tiZANidine (ZANAFLEX) 4 MG tablet Take 1 tablet by mouth every 6 hours as needed (neck pain).   • terbinafine (LamISIL) 250 MG tablet Take 1 tablet by mouth daily. For nail fungus.   • albuterol 108 (90 Base) MCG/ACT inhaler Inhale 2 puffs into the lungs every 4 hours as needed for Shortness of Breath or Wheezing.   • ALBUTEROL IN by Misc.(Non-Drug; Combo Route) route.   • hydroCORTisone (ANUSOL-HC) 2.5 % rectal cream Place 1 application rectally 2 times daily.   • hydroCORTisone (ANUSOL-HC) 25 MG suppository Place 1 suppository rectally 2 times daily.   • triamcinolone (ARISTOCORT) 0.1 % cream Apply thin layer to affected areas on arms bid prn itchy eczema   • ergocalciferol (DRISDOL) 33821 units capsule Take 1 capsule by mouth 1 day a week. Refill every month x 3; then recheck level   •  predniSONE (DELTASONE) 20 MG tablet Take 2 tablets by mouth daily. Take in AM with food, do not take Naproxen or Ibuprofen while on this medication       Past Medical History:   Diagnosis Date   • Eczema    • Hemorrhoid      History reviewed. No pertinent surgical history.    History reviewed. No pertinent family history.     Social History     Social History     Tobacco Use   • Smoking status: Former Smoker   • Smokeless tobacco: Never Used   Substance Use Topics   • Alcohol use: Yes     Comment: socially   • Drug use: No       Review of Systems     Review of Systems   Constitutional: Negative for chills and fever.   HENT: Negative for congestion and sore throat.    Eyes: Negative for pain and redness.   Respiratory: Negative for cough and shortness of breath.    Cardiovascular: Negative for chest pain and leg swelling.   Gastrointestinal: Positive for nausea. Negative for abdominal pain.   Genitourinary: Negative for difficulty urinating and dysuria.   Musculoskeletal: Negative for arthralgias and myalgias.   Skin: Negative for color change and rash.   Neurological: Positive for headaches. Negative for weakness.       Physical Exam     ED Triage Vitals [06/24/21 1155]   ED Triage Vitals Group      Temp (!) 101 °F (38.3 °C)      Heart Rate (!) 58      Resp 16      /72      SpO2 99 %      EtCO2 mmHg       Height       Weight       Weight Scale Used       BMI (Calculated)       IBW/kg (Calculated)         Physical Exam  Vitals and nursing note reviewed.   Constitutional:       Appearance: She is well-developed.   HENT:      Head: Normocephalic and atraumatic.   Eyes:      General: No scleral icterus.     Pupils: Pupils are equal, round, and reactive to light.   Cardiovascular:      Rate and Rhythm: Normal rate and regular rhythm.   Pulmonary:      Effort: Pulmonary effort is normal.      Breath sounds: Normal breath sounds.   Abdominal:      General: There is no distension.      Palpations: Abdomen is soft.    Musculoskeletal:         General: No tenderness.      Cervical back: Neck supple.   Lymphadenopathy:      Cervical: No cervical adenopathy.   Skin:     General: Skin is warm and dry.      Findings: No erythema.   Neurological:      Mental Status: She is alert and oriented to person, place, and time.      Comments: No finger to nose dysmetria.  No pronator drift.  Nl sensation throughout all extremities.   Nl strength throughout all extremities.   No aphasia, no dysarthria.    Psychiatric:         Behavior: Behavior normal.         ED COURSE     Procedures    Lab Results     Results for orders placed or performed during the hospital encounter of 06/24/21   Comprehensive Metabolic Panel   Result Value Ref Range    Fasting Status      Sodium 138 135 - 145 mmol/L    Potassium 3.6 3.4 - 5.1 mmol/L    Chloride 105 98 - 107 mmol/L    Carbon Dioxide 27 21 - 32 mmol/L    Anion Gap 10 10 - 20 mmol/L    Glucose 119 (H) 65 - 99 mg/dL    BUN 11 6 - 20 mg/dL    Creatinine 0.49 (L) 0.51 - 0.95 mg/dL    Glomerular Filtration Rate >90 >90 mL/min/1.73m2    BUN/ Creatinine Ratio 22 7 - 25    Calcium 9.6 8.4 - 10.2 mg/dL    Bilirubin, Total 0.6 0.2 - 1.0 mg/dL    GOT/AST 16 <=37 Units/L    GPT/ALT 23 <64 Units/L    Alkaline Phosphatase 60 45 - 117 Units/L    Albumin 4.2 3.6 - 5.1 g/dL    Protein, Total 8.0 6.4 - 8.2 g/dL    Globulin 3.8 2.0 - 4.0 g/dL    A/G Ratio 1.1 1.0 - 2.4   Urinalysis & Reflex Microscopy With Culture If Indicated   Result Value Ref Range    COLOR, URINALYSIS Yellow     APPEARANCE, URINALYSIS Clear     GLUCOSE, URINALYSIS Negative Negative mg/dL    BILIRUBIN, URINALYSIS Negative Negative    KETONES, URINALYSIS Negative Negative mg/dL    SPECIFIC GRAVITY, URINALYSIS <1.005 (L) 1.005 - 1.030    OCCULT BLOOD, URINALYSIS Negative Negative    PH, URINALYSIS 6.0 5.0 - 7.0    PROTEIN, URINALYSIS Negative Negative mg/dL    UROBILINOGEN, URINALYSIS 0.2 0.2, 1.0 mg/dL    NITRITE, URINALYSIS Negative Negative    LEUKOCYTE  ESTERASE, URINALYSIS Negative Negative   CBC with Automated Differential (performable only)   Result Value Ref Range    WBC 9.6 4.2 - 11.0 K/mcL    RBC 4.53 4.00 - 5.20 mil/mcL    HGB 13.8 12.0 - 15.5 g/dL    HCT 40.8 36.0 - 46.5 %    MCV 90.1 78.0 - 100.0 fl    MCH 30.5 26.0 - 34.0 pg    MCHC 33.8 32.0 - 36.5 g/dL    RDW-CV 13.7 11.0 - 15.0 %    RDW-SD 45.1 39.0 - 50.0 fL     140 - 450 K/mcL    NRBC 0 <=0 /100 WBC    Neutrophil, Percent 64 %    Lymphocytes, Percent 26 %    Mono, Percent 7 %    Eosinophils, Percent 2 %    Basophils, Percent 1 %    Immature Granulocytes 0 %    Absolute Neutrophils 6.1 1.8 - 7.7 K/mcL    Absolute Lymphocytes 2.5 1.0 - 4.8 K/mcL    Absolute Monocytes 0.7 0.3 - 0.9 K/mcL    Absolute Eosinophils  0.2 0.0 - 0.5 K/mcL    Absolute Basophils 0.1 0.0 - 0.3 K/mcL    Absolute Immmature Granulocytes 0.0 0.0 - 0.2 K/mcL   Rapid SARS-CoV-2 by PCR    Specimen: Nasopharyngeal; Swab   Result Value Ref Range    Rapid SARS-COV-2 by PCR Not Detected Not Detected / Detected / Presumptive Positive / Inhibitors present    Isolation Guidelines      Procedural Comment     ISTAT8 VENOUS  POINT OF CARE   Result Value Ref Range    BUN - POINT OF CARE 10 6 - 20 mg/dL    SODIUM - POINT OF CARE 141 135 - 145 mmol/L    POTASSIUM - POINT OF CARE 3.6 3.4 - 5.1 mmol/L    CHLORIDE - POINT OF CARE 101 98 - 107 mmol/L    TCO2 - POINT OF CARE 24 19 - 24 mmol/L    ANION GAP - POINT OF CARE 21 (H) 10 - 20 mmol/L    HEMATOCRIT - POINT OF CARE 44.0 36.0 - 46.5 %    HEMOGLOBIN - POINT OF CARE 15.0 12.0 - 15.5 g/dL    GLUCOSE - POINT OF CARE 118 (H) 70 - 99 mg/dL    CALCIUM, IONIZED - POINT OF CARE 1.28 1.15 - 1.29 mmol/L    Creatinine 0.40 (L) 0.51 - 0.95 mg/dL    Glomerular Filtration Rate >90 >90 mL/min/1.73m2   LACTIC ACID VENOUS POINT OF CARE   Result Value Ref Range    LACTIC ACID, VENOUS - POINT OF CARE 1.1 <2.0 mmol/L   ISTAT BETA HCG - POINT OF CARE   Result Value Ref Range    ISTAT BETA HCG - POINT OF CARE <5.0  <5.0 IU/L           Radiology Results     Imaging Results          XR CHEST AP OR PA - PORTABLE (Final result)  Result time 06/24/21 15:03:01    Final result                 Impression:    IMPRESSION:    1.  Normal chest.                 Narrative:    EXAM: XR CHEST AP OR PA    TECHNIQUE: Portable upright.    INDICATION: fever.    COMPARISON: None.    FINDINGS:      The heart is normal in size. The pulmonary vasculature is within normal  limits. The lungs are clear. No pleural effusions are seen. There is no  evidence for pneumothorax.                                  ED Medication Orders (From admission, onward)    Ordered Start     Status Ordering Provider    06/24/21 1159 06/24/21 1200  metoCLOPramide (REGLAN) injection 10 mg  ONCE      Last MAR action: Given ANTONY LEHMAN    06/24/21 1159 06/24/21 1200  diphenhydrAMINE (BENADRYL) injection 25 mg  ONCE      Last MAR action: Given ANTONY LEHMAN    06/24/21 1159 06/24/21 1200  sodium chloride (NORMAL SALINE) 0.9 % bolus 1,000 mL  ONCE      Last MAR action: Completed ANTONY LEHMAN    06/24/21 1159 06/24/21 1200  acetaminophen (TYLENOL) tablet 650 mg  ONCE      Last MAR action: Given ANTONY LEHMAN                 Vitals  Vitals:    06/24/21 1430 06/24/21 1500 06/24/21 1530 06/24/21 1600   BP: 111/63 114/78 121/68 118/61   BP Location: LUE - Left upper extremity LUE - Left upper extremity LUE - Left upper extremity LUE - Left upper extremity   Patient Position: Semi-Pinedo's Semi-Pinedo's Semi-Pinedo's Semi-Pinedo's   Pulse: 72 63 61 78   Resp:  16 16 16   Temp:       SpO2: 100% 99% 98% 97%   LMP: 05/24/2021         ED Course    Review: I reviewed the patient's medications, allergies, and past medical and surgical history in Epic. Lumbar puncture on 6/21/21 was unremarkable.     Initial Impression:  Alondra Staples is a 36 year old female who presents with a worsening headache. Physical examination was unremarkable. I discussed with the patient the plan  to evaluate their symptoms with blood work and urinalysis. We further discussed the plan for pain and symptom control in the ED. The patient understands and agrees with the plan of care. All additional questions and concerns have been addressed at this time.     12:14 PM - Consult: I spoke with Dr. Forrest, Pain Management, regarding the patient's presentation and the ED work up. We discussed possible post dural puncture headache, and having them perform a blood patch. They agree with the plan and will perform the blood patch.     12:18 PM - Recheck: The patient is resting. I updated the patient on the plan for blood patch. The patient understands and agrees with the plan of care. All additional questions and concerns have been addressed at this time.          2:19 PM  Pt returned from pain management. Head elevation trial for 30 minutes.    4:45 PM I reassessed the patient his symptoms remained significantly improved.  She is able to ambulate without difficulty.  I discussed her otherwise unremarkable workup.  I discussed lifting precautions for 7 days, recommended general resting for the next few days.    MDM    36 year old woman with headache. CT head and LP from previous visit reviewed and unremarkable. Sx consistent with post-dural puncture HA. Discussed with pain mgmt, blood patch performed with significant improvement in symptoms. Pt monitored in the ED with slow transition for lying to standing. Febrile, though work up otherwise unremarkable. Remained improved on reassessment. Recommending outpatient follow up. Discharged with RTED precautions.    Critical Care time spent on this patient outside of billable procedures:  None    Clinical Impression  ED Diagnosis        Final diagnosis    Post-dural puncture headache                Follow Up:  DELMA Ye  [address] [place] [address]  [phone]    Call             Summary of your Discharge Medications      ASK your doctor about  these medications      Details   ZOLMitriptan 5 MG nasal spray  Commonly known as: ZOMIG  Ask about: Which instructions should I use?   Spray 1 spray in alternating nostrils as needed for Migraine. Use at migraine onset. May repeat 1x in 2 hours if not resolved. Max 10mg/24hr            Pt is discharged to home/self care in stable condition.        I have reviewed the information recorded by the scribe for accuracy and agree with its contents.    ____________________________________________________________________    David Pride acting as a scribe for Dr. Nazario Cartagena.    Dr. Nazario Cartagena  Dictation # [id number]  Scribe: David Cartagena MD  06/24/21 5866     Dispo: [facility] PT  Diet: Regular  CODE: DNR/DNI, family would want pressors

## 2023-01-28 NOTE — PROGRESS NOTE ADULT - PROBLEM SELECTOR PLAN 1
Patient presented with confusion, shock state, leukocytosis, and elevated lactate, likely 2/2 severe sepsis. No tachycardia on admission, though patient PPM dependent ([facility])  - Likely 2/2 GNR in pleural fluid, although pleural fluid culture NGTD  - UCx, BCx : NGTD, MRSA, Legionella negative  - TTE 1/25/23: EF 73%, severe concentric L vent hypertrophy, mod pulm HTN    =====PLAN====  - c/w Zosyn (1/24-) plan for 7 day course

## 2023-01-28 NOTE — PROGRESS NOTE ADULT - PROBLEM SELECTOR PLAN 4
Noted on CTAP  - Bowel regimen w/ senna, Miralax, dulcolax  - Enema given 1/25 which caused patient to have 2 BMs on 1/25, now having BMs daily  - 1/27: abd distended, ordering Abd Xray to eval

## 2023-01-28 NOTE — PROVIDER CONTACT NOTE (OTHER) - SITUATION
BP 96/45 HR 60
BP 84/46 HR 60 post pleurx drainage
BP 88/37
/49 HR 60
BP 80/45
BP 91/49
Patient BP (L) 91/45 BP (R) 124/100
Patient is restless and agitated. Pulling at lines

## 2023-01-28 NOTE — PROGRESS NOTE ADULT - PROBLEM SELECTOR PLAN 8
- c/w Eliquis and amiodarone  - will obtain cardiology records about utility of amiodorone i/s/o transaminitis    #complete heart block, s/p PPM placement  s/p EP interrogation in ED  - Continue to monitor vital signs q4hr

## 2023-01-29 LAB
ALBUMIN SERPL ELPH-MCNC: 3 G/DL — LOW (ref 3.3–5)
ALP SERPL-CCNC: 203 U/L — HIGH (ref 40–120)
ALT FLD-CCNC: 8 U/L — SIGNIFICANT CHANGE UP (ref 4–41)
ANION GAP SERPL CALC-SCNC: 13 MMOL/L — SIGNIFICANT CHANGE UP (ref 7–14)
APTT BLD: 36.2 SEC — SIGNIFICANT CHANGE UP (ref 27–36.3)
AST SERPL-CCNC: 21 U/L — SIGNIFICANT CHANGE UP (ref 4–40)
BASOPHILS # BLD AUTO: 0.05 K/UL — SIGNIFICANT CHANGE UP (ref 0–0.2)
BASOPHILS NFR BLD AUTO: 0.7 % — SIGNIFICANT CHANGE UP (ref 0–2)
BILIRUB SERPL-MCNC: 0.4 MG/DL — SIGNIFICANT CHANGE UP (ref 0.2–1.2)
BUN SERPL-MCNC: 25 MG/DL — HIGH (ref 7–23)
CALCIUM SERPL-MCNC: 8.7 MG/DL — SIGNIFICANT CHANGE UP (ref 8.4–10.5)
CHLORIDE SERPL-SCNC: 110 MMOL/L — HIGH (ref 98–107)
CO2 SERPL-SCNC: 18 MMOL/L — LOW (ref 22–31)
CREAT SERPL-MCNC: 2.15 MG/DL — HIGH (ref 0.5–1.3)
CULTURE RESULTS: SIGNIFICANT CHANGE UP
CULTURE RESULTS: SIGNIFICANT CHANGE UP
EGFR: 30 ML/MIN/1.73M2 — LOW
EOSINOPHIL # BLD AUTO: 0.17 K/UL — SIGNIFICANT CHANGE UP (ref 0–0.5)
EOSINOPHIL NFR BLD AUTO: 2.3 % — SIGNIFICANT CHANGE UP (ref 0–6)
GLUCOSE SERPL-MCNC: 80 MG/DL — SIGNIFICANT CHANGE UP (ref 70–99)
HCT VFR BLD CALC: 26.3 % — LOW (ref 39–50)
HGB BLD-MCNC: 7.9 G/DL — LOW (ref 13–17)
IANC: 5.37 K/UL — SIGNIFICANT CHANGE UP (ref 1.8–7.4)
IMM GRANULOCYTES NFR BLD AUTO: 0.5 % — SIGNIFICANT CHANGE UP (ref 0–0.9)
INR BLD: 1.79 RATIO — HIGH (ref 0.88–1.16)
LYMPHOCYTES # BLD AUTO: 1.25 K/UL — SIGNIFICANT CHANGE UP (ref 1–3.3)
LYMPHOCYTES # BLD AUTO: 16.7 % — SIGNIFICANT CHANGE UP (ref 13–44)
MAGNESIUM SERPL-MCNC: 1.9 MG/DL — SIGNIFICANT CHANGE UP (ref 1.6–2.6)
MCHC RBC-ENTMCNC: 26.9 PG — LOW (ref 27–34)
MCHC RBC-ENTMCNC: 30 GM/DL — LOW (ref 32–36)
MCV RBC AUTO: 89.5 FL — SIGNIFICANT CHANGE UP (ref 80–100)
MONOCYTES # BLD AUTO: 0.61 K/UL — SIGNIFICANT CHANGE UP (ref 0–0.9)
MONOCYTES NFR BLD AUTO: 8.1 % — SIGNIFICANT CHANGE UP (ref 2–14)
NEUTROPHILS # BLD AUTO: 5.37 K/UL — SIGNIFICANT CHANGE UP (ref 1.8–7.4)
NEUTROPHILS NFR BLD AUTO: 71.7 % — SIGNIFICANT CHANGE UP (ref 43–77)
NRBC # BLD: 0 /100 WBCS — SIGNIFICANT CHANGE UP (ref 0–0)
NRBC # FLD: 0 K/UL — SIGNIFICANT CHANGE UP (ref 0–0)
PHOSPHATE SERPL-MCNC: 2.9 MG/DL — SIGNIFICANT CHANGE UP (ref 2.5–4.5)
PLATELET # BLD AUTO: 263 K/UL — SIGNIFICANT CHANGE UP (ref 150–400)
POTASSIUM SERPL-MCNC: 3.8 MMOL/L — SIGNIFICANT CHANGE UP (ref 3.5–5.3)
POTASSIUM SERPL-SCNC: 3.8 MMOL/L — SIGNIFICANT CHANGE UP (ref 3.5–5.3)
PROT SERPL-MCNC: 5.9 G/DL — LOW (ref 6–8.3)
PROTHROM AB SERPL-ACNC: 20.9 SEC — HIGH (ref 10.5–13.4)
RBC # BLD: 2.94 M/UL — LOW (ref 4.2–5.8)
RBC # FLD: 20.7 % — HIGH (ref 10.3–14.5)
SODIUM SERPL-SCNC: 141 MMOL/L — SIGNIFICANT CHANGE UP (ref 135–145)
SPECIMEN SOURCE: SIGNIFICANT CHANGE UP
SPECIMEN SOURCE: SIGNIFICANT CHANGE UP
WBC # BLD: 7.49 K/UL — SIGNIFICANT CHANGE UP (ref 3.8–10.5)
WBC # FLD AUTO: 7.49 K/UL — SIGNIFICANT CHANGE UP (ref 3.8–10.5)

## 2023-01-29 PROCEDURE — 99233 SBSQ HOSP IP/OBS HIGH 50: CPT

## 2023-01-29 RX ADMIN — Medication 10 MILLIGRAM(S): at 05:16

## 2023-01-29 RX ADMIN — POLYETHYLENE GLYCOL 3350 17 GRAM(S): 17 POWDER, FOR SOLUTION ORAL at 05:16

## 2023-01-29 RX ADMIN — PIPERACILLIN AND TAZOBACTAM 25 GRAM(S): 4; .5 INJECTION, POWDER, LYOPHILIZED, FOR SOLUTION INTRAVENOUS at 17:32

## 2023-01-29 RX ADMIN — DESVENLAFAXINE 25 MILLIGRAM(S): 50 TABLET, EXTENDED RELEASE ORAL at 11:46

## 2023-01-29 RX ADMIN — CARBIDOPA AND LEVODOPA 1 TABLET(S): 25; 100 TABLET ORAL at 17:32

## 2023-01-29 RX ADMIN — APIXABAN 2.5 MILLIGRAM(S): 2.5 TABLET, FILM COATED ORAL at 17:33

## 2023-01-29 RX ADMIN — Medication 3 MILLIGRAM(S): at 21:53

## 2023-01-29 RX ADMIN — CARBIDOPA AND LEVODOPA 1 TABLET(S): 25; 100 TABLET ORAL at 15:33

## 2023-01-29 RX ADMIN — CARBIDOPA AND LEVODOPA 1 TABLET(S): 25; 100 TABLET ORAL at 11:48

## 2023-01-29 RX ADMIN — CLOPIDOGREL BISULFATE 75 MILLIGRAM(S): 75 TABLET, FILM COATED ORAL at 11:46

## 2023-01-29 RX ADMIN — OLANZAPINE 2.5 MILLIGRAM(S): 15 TABLET, FILM COATED ORAL at 21:53

## 2023-01-29 RX ADMIN — PIPERACILLIN AND TAZOBACTAM 25 GRAM(S): 4; .5 INJECTION, POWDER, LYOPHILIZED, FOR SOLUTION INTRAVENOUS at 05:16

## 2023-01-29 RX ADMIN — Medication 500 MILLIGRAM(S): at 11:46

## 2023-01-29 RX ADMIN — SENNA PLUS 2 TABLET(S): 8.6 TABLET ORAL at 21:54

## 2023-01-29 RX ADMIN — TADALAFIL 5 MILLIGRAM(S): 10 TABLET, FILM COATED ORAL at 11:46

## 2023-01-29 RX ADMIN — Medication 10 MILLIGRAM(S): at 17:33

## 2023-01-29 RX ADMIN — CARBIDOPA AND LEVODOPA 1 TABLET(S): 25; 100 TABLET ORAL at 05:15

## 2023-01-29 RX ADMIN — AMIODARONE HYDROCHLORIDE 200 MILLIGRAM(S): 400 TABLET ORAL at 05:16

## 2023-01-29 RX ADMIN — ATORVASTATIN CALCIUM 80 MILLIGRAM(S): 80 TABLET, FILM COATED ORAL at 21:53

## 2023-01-29 RX ADMIN — APIXABAN 2.5 MILLIGRAM(S): 2.5 TABLET, FILM COATED ORAL at 05:16

## 2023-01-29 RX ADMIN — MIRTAZAPINE 7.5 MILLIGRAM(S): 45 TABLET, ORALLY DISINTEGRATING ORAL at 21:55

## 2023-01-29 NOTE — PROGRESS NOTE ADULT - SUBJECTIVE AND OBJECTIVE BOX
Carlos Manuel Metcalf MD  PGY-3 Department of Internal Medicine  Available on [employer] Teams      Patient is a 82y old  Male who presents with a chief complaint of Hypotension (28 Jan 2023 08:30)      OVERNIGHT EVENTS: No acute overnight events.    SUBJECTIVE: Pt seen and examined. Denies fevers, chills, CP, SOB, Abdominal pain, N/V, Constipation, Diarrhea    MEDICATIONS  (STANDING):  aMIOdarone    Tablet 200 milliGRAM(s) Oral daily  apixaban 2.5 milliGRAM(s) Oral two times a day  ascorbic acid 500 milliGRAM(s) Oral daily  atorvastatin 80 milliGRAM(s) Oral at bedtime  carbidopa/levodopa  25/100 1 Tablet(s) Oral <User Schedule>  carbidopa/levodopa  25/100 1 Tablet(s) Oral <User Schedule>  clopidogrel Tablet 75 milliGRAM(s) Oral daily  desvenlafaxine ER 25 milliGRAM(s) Oral daily  melatonin 3 milliGRAM(s) Oral at bedtime  mirtazapine 7.5 milliGRAM(s) Oral at bedtime  OLANZapine 2.5 milliGRAM(s) Oral at bedtime  oxybutynin 10 milliGRAM(s) Oral two times a day  piperacillin/tazobactam IVPB.. 3.375 Gram(s) IV Intermittent every 12 hours  polyethylene glycol 3350 17 Gram(s) Oral two times a day  senna 2 Tablet(s) Oral at bedtime  tadalafil 5 milliGRAM(s) Oral daily    MEDICATIONS  (PRN):  acetaminophen     Tablet .. 650 milliGRAM(s) Oral every 6 hours PRN Mild Pain (1 - 3)      I&O's Summary    28 Jan 2023 07:01  -  29 Jan 2023 07:00  --------------------------------------------------------  IN: 250 mL / OUT: 501 mL / NET: -251 mL        Vital Signs Last 24 Hrs  T(C): 36.6 (29 Jan 2023 05:14), Max: 36.8 (28 Jan 2023 09:51)  T(F): 97.9 (29 Jan 2023 05:14), Max: 98.2 (28 Jan 2023 09:51)  HR: 60 (29 Jan 2023 05:14) (60 - 73)  BP: 110/53 (29 Jan 2023 05:14) (95/62 - 110/53)  BP(mean): --  RR: 17 (29 Jan 2023 05:14) (16 - 17)  SpO2: 100% (29 Jan 2023 05:14) (99% - 100%)    Parameters below as of 29 Jan 2023 05:14  Patient On (Oxygen Delivery Method): room air        =================PHYSICAL EXAM=================    GENERAL: Laying comfortably, NAD  EYES: EOMI, PERRL, no scleral icterus  NECK: No JVD  LUNG: Clear to auscultation bilaterally; No wheeze, crackles or rhonci  HEART: Regular rate and rhythm; No murmurs, rubs, or gallops  ABDOMEN: Soft, Nontender, Nondistended  EXTREMITIES:  No LE edema, 2+ Peripheral Pulses, No clubbing, cyanosis, or edema  PSYCH: AAOx3  NEUROLOGY: non-focal, strength 5/5 in all extremities, sensation intact  SKIN: No rashes or lesions    =================================================    LABS:                        7.9    7.49  )-----------( 263      ( 29 Jan 2023 07:15 )             26.3     Auto Eosinophil # 0.17  / Auto Eosinophil % 2.3   / Auto Neutrophil # 5.37  / Auto Neutrophil % 71.7  / BANDS % x                            7.6    7.35  )-----------( 247      ( 28 Jan 2023 06:39 )             25.1     Auto Eosinophil # 0.13  / Auto Eosinophil % 1.8   / Auto Neutrophil # 5.41  / Auto Neutrophil % 73.6  / BANDS % x        01-28    139  |  112<H>  |  32<H>  ----------------------------<  98  4.0   |  18<L>  |  2.24<H>    Ca    8.7      28 Jan 2023 06:39  Mg     1.90     01-28  Phos  2.5     01-28  TPro  5.7<L>  /  Alb  2.7<L>  /  TBili  0.3  /  DBili  x   /  AST  24  /  ALT  9   /  AlkPhos  227<H>  01-28    PT/INR - ( 29 Jan 2023 07:15 )   PT: 20.9 sec;   INR: 1.79 ratio         PTT - ( 29 Jan 2023 07:15 )  PTT:36.2 sec        Lactate, Blood: 1.4 mmol/L (01-25 @ 12:00)        RADIOLOGY & ADDITIONAL TESTS:    Imaging Personally Reviewed:    Consultant(s) Notes Reviewed:      Care Discussed with Consultants/Other Providers:   Carlos Manuel Metcalf MD  PGY-3 Department of Internal Medicine  Available on [employer] Teams      Patient is a 82y old  Male who presents with a chief complaint of Hypotension (28 Jan 2023 08:30)      OVERNIGHT EVENTS: No acute overnight events.    SUBJECTIVE: Pt seen and examined. Denies fevers, chills, CP, SOB, Abdominal pain, N/V, Constipation, Diarrhea    MEDICATIONS  (STANDING):  aMIOdarone    Tablet 200 milliGRAM(s) Oral daily  apixaban 2.5 milliGRAM(s) Oral two times a day  ascorbic acid 500 milliGRAM(s) Oral daily  atorvastatin 80 milliGRAM(s) Oral at bedtime  carbidopa/levodopa  25/100 1 Tablet(s) Oral <User Schedule>  carbidopa/levodopa  25/100 1 Tablet(s) Oral <User Schedule>  clopidogrel Tablet 75 milliGRAM(s) Oral daily  desvenlafaxine ER 25 milliGRAM(s) Oral daily  melatonin 3 milliGRAM(s) Oral at bedtime  mirtazapine 7.5 milliGRAM(s) Oral at bedtime  OLANZapine 2.5 milliGRAM(s) Oral at bedtime  oxybutynin 10 milliGRAM(s) Oral two times a day  piperacillin/tazobactam IVPB.. 3.375 Gram(s) IV Intermittent every 12 hours  polyethylene glycol 3350 17 Gram(s) Oral two times a day  senna 2 Tablet(s) Oral at bedtime  tadalafil 5 milliGRAM(s) Oral daily    MEDICATIONS  (PRN):  acetaminophen     Tablet .. 650 milliGRAM(s) Oral every 6 hours PRN Mild Pain (1 - 3)      I&O's Summary    28 Jan 2023 07:01  -  29 Jan 2023 07:00  --------------------------------------------------------  IN: 250 mL / OUT: 501 mL / NET: -251 mL        Vital Signs Last 24 Hrs  T(C): 36.6 (29 Jan 2023 05:14), Max: 36.8 (28 Jan 2023 09:51)  T(F): 97.9 (29 Jan 2023 05:14), Max: 98.2 (28 Jan 2023 09:51)  HR: 60 (29 Jan 2023 05:14) (60 - 73)  BP: 110/53 (29 Jan 2023 05:14) (95/62 - 110/53)  BP(mean): --  RR: 17 (29 Jan 2023 05:14) (16 - 17)  SpO2: 100% (29 Jan 2023 05:14) (99% - 100%)    Parameters below as of 29 Jan 2023 05:14  Patient On (Oxygen Delivery Method): room air        =================PHYSICAL EXAM=================    GENERAL: Laying comfortably, NAD  EYES: EOMI, PERRL, no scleral icterus  NECK: No JVD  LUNG: Clear to auscultation bilaterally; No wheeze, crackles or rhonci  HEART: Regular rate and rhythm; No murmurs, rubs, or gallops  ABDOMEN: Soft, Nontender, Nondistended  EXTREMITIES:  No LE edema, 2+ Peripheral Pulses, No clubbing, cyanosis, or edema  PSYCH: AAOx1  NEUROLOGY: non-focal, strength 5/5 in all extremities, sensation intact  SKIN: No rashes or lesions    =================================================    LABS:                        7.9    7.49  )-----------( 263      ( 29 Jan 2023 07:15 )             26.3     Auto Eosinophil # 0.17  / Auto Eosinophil % 2.3   / Auto Neutrophil # 5.37  / Auto Neutrophil % 71.7  / BANDS % x                            7.6    7.35  )-----------( 247      ( 28 Jan 2023 06:39 )             25.1     Auto Eosinophil # 0.13  / Auto Eosinophil % 1.8   / Auto Neutrophil # 5.41  / Auto Neutrophil % 73.6  / BANDS % x        01-28    139  |  112<H>  |  32<H>  ----------------------------<  98  4.0   |  18<L>  |  2.24<H>    Ca    8.7      28 Jan 2023 06:39  Mg     1.90     01-28  Phos  2.5     01-28  TPro  5.7<L>  /  Alb  2.7<L>  /  TBili  0.3  /  DBili  x   /  AST  24  /  ALT  9   /  AlkPhos  227<H>  01-28    PT/INR - ( 29 Jan 2023 07:15 )   PT: 20.9 sec;   INR: 1.79 ratio         PTT - ( 29 Jan 2023 07:15 )  PTT:36.2 sec        Lactate, Blood: 1.4 mmol/L (01-25 @ 12:00)        RADIOLOGY & ADDITIONAL TESTS:    Imaging Personally Reviewed:    Consultant(s) Notes Reviewed:      Care Discussed with Consultants/Other Providers:

## 2023-01-29 NOTE — PROGRESS NOTE ADULT - ASSESSMENT
82M PMH Parkinson's, Pleural effusion s/p PleurX, a. fib on Eliquis, bradycardia w/ AICD, HTN who was BIBEMS for generalized weakness, found to be in severe sepsis state likely secondary to PNA and pleural fluid + GNR 82M PMH Parkinson's, Pleural effusion s/p PleurX, a. fib on Eliquis, bradycardia w/ AICD, HTN who was BIBEMS for generalized weakness, found to be in severe sepsis state likely secondary to PNA.

## 2023-01-29 NOTE — PROGRESS NOTE ADULT - PROBLEM SELECTOR PLAN 1
Patient presented with confusion, shock state, leukocytosis, and elevated lactate, likely 2/2 severe sepsis. No tachycardia on admission, though patient PPM dependent (CHB)  - Likely 2/2 GNR in pleural fluid, although pleural fluid culture NGTD  - UCx, BCx : NGTD, MRSA, Legionella negative  - TTE 1/25/23: EF 73%, severe concentric L vent hypertrophy, mod pulm HTN    =====PLAN====  - c/w Zosyn (1/24-) plan for 7 day course Patient presented with confusion, shock state, leukocytosis, and elevated lactate, likely 2/2 severe sepsis. No tachycardia on admission, though patient PPM dependent (CHB)  - Likely 2/2 PNA (GGO on CT chest). Pleural fluid intially read as GNR NGTD but was changed to no organisms and NGTD  - UCx, BCx : NGTD, MRSA, Legionella negative  - TTE 1/25/23: EF 73%, severe concentric L vent hypertrophy, mod pulm HTN    =====PLAN====  - c/w Zosyn (1/25- 1/31) plan for 7 day course

## 2023-01-29 NOTE — PROGRESS NOTE ADULT - PROBLEM SELECTOR PLAN 4
Noted on CTAP  - Bowel regimen w/ senna, Miralax, dulcolax  - Enema given 1/25 which caused patient to have 2 BMs on 1/25, now having BMs daily  - 1/27: abd distended, ordering Abd Xray to eval Noted on CTAP  - Bowel regimen w/ senna, Miralax, dulcolax  - Enema given 1/25 which caused patient to have 2 BMs on 1/25, now having BMs daily  - 1/27: abd distended, abd xray showed gas, now improved with aggressive bowel regimen

## 2023-01-29 NOTE — PROGRESS NOTE ADULT - PROBLEM SELECTOR PLAN 2
IMPROVING - On admission Cr 3.40, now 2.5, baseline ~1.5  Possibly i/s/o recent urinary retention vs. pre-renal i/s/o hypotension 2/2 urinary tract infection  - Renal U/S: no hydronephrosis  - patient now voiding appropriately, bladderscans all negative  - c/w fluid resusitation IMPROVING - On admission Cr 3.40, now 2.1, baseline ~1.5  Possibly i/s/o recent urinary retention vs. pre-renal i/s/o hypotension 2/2 urinary tract infection  - Renal U/S: no hydronephrosis  - patient now voiding appropriately, bladderscans all negative  - c/w fluid resusitation

## 2023-01-30 ENCOUNTER — APPOINTMENT (OUTPATIENT)
Dept: THORACIC SURGERY | Facility: CLINIC | Age: 83
End: 2023-01-30

## 2023-01-30 ENCOUNTER — TRANSCRIPTION ENCOUNTER (OUTPATIENT)
Age: 83
End: 2023-01-30

## 2023-01-30 VITALS — DIASTOLIC BLOOD PRESSURE: 48 MMHG | TEMPERATURE: 98 F | HEART RATE: 86 BPM | SYSTOLIC BLOOD PRESSURE: 107 MMHG

## 2023-01-30 LAB
ALBUMIN SERPL ELPH-MCNC: 3 G/DL — LOW (ref 3.3–5)
ALP SERPL-CCNC: 200 U/L — HIGH (ref 40–120)
ALT FLD-CCNC: 5 U/L — SIGNIFICANT CHANGE UP (ref 4–41)
ANION GAP SERPL CALC-SCNC: 12 MMOL/L — SIGNIFICANT CHANGE UP (ref 7–14)
AST SERPL-CCNC: 21 U/L — SIGNIFICANT CHANGE UP (ref 4–40)
BILIRUB SERPL-MCNC: 0.4 MG/DL — SIGNIFICANT CHANGE UP (ref 0.2–1.2)
BUN SERPL-MCNC: 22 MG/DL — SIGNIFICANT CHANGE UP (ref 7–23)
CALCIUM SERPL-MCNC: 8.6 MG/DL — SIGNIFICANT CHANGE UP (ref 8.4–10.5)
CHLORIDE SERPL-SCNC: 111 MMOL/L — HIGH (ref 98–107)
CO2 SERPL-SCNC: 19 MMOL/L — LOW (ref 22–31)
CREAT SERPL-MCNC: 1.96 MG/DL — HIGH (ref 0.5–1.3)
CULTURE RESULTS: SIGNIFICANT CHANGE UP
EGFR: 34 ML/MIN/1.73M2 — LOW
GLUCOSE SERPL-MCNC: 78 MG/DL — SIGNIFICANT CHANGE UP (ref 70–99)
HCT VFR BLD CALC: 27.3 % — LOW (ref 39–50)
HGB BLD-MCNC: 8.2 G/DL — LOW (ref 13–17)
MAGNESIUM SERPL-MCNC: 1.8 MG/DL — SIGNIFICANT CHANGE UP (ref 1.6–2.6)
MCHC RBC-ENTMCNC: 27.2 PG — SIGNIFICANT CHANGE UP (ref 27–34)
MCHC RBC-ENTMCNC: 30 GM/DL — LOW (ref 32–36)
MCV RBC AUTO: 90.4 FL — SIGNIFICANT CHANGE UP (ref 80–100)
NRBC # BLD: 0 /100 WBCS — SIGNIFICANT CHANGE UP (ref 0–0)
NRBC # FLD: 0 K/UL — SIGNIFICANT CHANGE UP (ref 0–0)
PHOSPHATE SERPL-MCNC: 3.1 MG/DL — SIGNIFICANT CHANGE UP (ref 2.5–4.5)
PLATELET # BLD AUTO: 280 K/UL — SIGNIFICANT CHANGE UP (ref 150–400)
POTASSIUM SERPL-MCNC: 3.5 MMOL/L — SIGNIFICANT CHANGE UP (ref 3.5–5.3)
POTASSIUM SERPL-SCNC: 3.5 MMOL/L — SIGNIFICANT CHANGE UP (ref 3.5–5.3)
PROT SERPL-MCNC: 6 G/DL — SIGNIFICANT CHANGE UP (ref 6–8.3)
RBC # BLD: 3.02 M/UL — LOW (ref 4.2–5.8)
RBC # FLD: 21 % — HIGH (ref 10.3–14.5)
SARS-COV-2 RNA SPEC QL NAA+PROBE: SIGNIFICANT CHANGE UP
SODIUM SERPL-SCNC: 142 MMOL/L — SIGNIFICANT CHANGE UP (ref 135–145)
SPECIMEN SOURCE: SIGNIFICANT CHANGE UP
WBC # BLD: 7.66 K/UL — SIGNIFICANT CHANGE UP (ref 3.8–10.5)
WBC # FLD AUTO: 7.66 K/UL — SIGNIFICANT CHANGE UP (ref 3.8–10.5)

## 2023-01-30 PROCEDURE — 93010 ELECTROCARDIOGRAM REPORT: CPT

## 2023-01-30 PROCEDURE — 99238 HOSP IP/OBS DSCHRG MGMT 30/<: CPT

## 2023-01-30 RX ORDER — POTASSIUM CHLORIDE 20 MEQ
1 PACKET (EA) ORAL
Qty: 0 | Refills: 0 | DISCHARGE

## 2023-01-30 RX ORDER — SPIRONOLACTONE 25 MG/1
1 TABLET, FILM COATED ORAL
Qty: 0 | Refills: 0 | DISCHARGE

## 2023-01-30 RX ORDER — OLMESARTAN MEDOXOMIL 5 MG/1
1 TABLET, FILM COATED ORAL
Qty: 0 | Refills: 0 | DISCHARGE

## 2023-01-30 RX ORDER — ASCORBIC ACID 60 MG
1 TABLET,CHEWABLE ORAL
Qty: 0 | Refills: 0 | DISCHARGE
Start: 2023-01-30

## 2023-01-30 RX ADMIN — APIXABAN 2.5 MILLIGRAM(S): 2.5 TABLET, FILM COATED ORAL at 18:48

## 2023-01-30 RX ADMIN — POLYETHYLENE GLYCOL 3350 17 GRAM(S): 17 POWDER, FOR SOLUTION ORAL at 18:48

## 2023-01-30 RX ADMIN — APIXABAN 2.5 MILLIGRAM(S): 2.5 TABLET, FILM COATED ORAL at 06:36

## 2023-01-30 RX ADMIN — CARBIDOPA AND LEVODOPA 1 TABLET(S): 25; 100 TABLET ORAL at 16:26

## 2023-01-30 RX ADMIN — CLOPIDOGREL BISULFATE 75 MILLIGRAM(S): 75 TABLET, FILM COATED ORAL at 12:19

## 2023-01-30 RX ADMIN — Medication 10 MILLIGRAM(S): at 18:49

## 2023-01-30 RX ADMIN — CARBIDOPA AND LEVODOPA 1 TABLET(S): 25; 100 TABLET ORAL at 18:48

## 2023-01-30 RX ADMIN — TADALAFIL 5 MILLIGRAM(S): 10 TABLET, FILM COATED ORAL at 12:19

## 2023-01-30 RX ADMIN — AMIODARONE HYDROCHLORIDE 200 MILLIGRAM(S): 400 TABLET ORAL at 05:35

## 2023-01-30 RX ADMIN — Medication 500 MILLIGRAM(S): at 12:20

## 2023-01-30 RX ADMIN — CARBIDOPA AND LEVODOPA 1 TABLET(S): 25; 100 TABLET ORAL at 06:34

## 2023-01-30 RX ADMIN — PIPERACILLIN AND TAZOBACTAM 25 GRAM(S): 4; .5 INJECTION, POWDER, LYOPHILIZED, FOR SOLUTION INTRAVENOUS at 06:45

## 2023-01-30 RX ADMIN — CARBIDOPA AND LEVODOPA 1 TABLET(S): 25; 100 TABLET ORAL at 12:22

## 2023-01-30 RX ADMIN — POLYETHYLENE GLYCOL 3350 17 GRAM(S): 17 POWDER, FOR SOLUTION ORAL at 05:35

## 2023-01-30 RX ADMIN — DESVENLAFAXINE 25 MILLIGRAM(S): 50 TABLET, EXTENDED RELEASE ORAL at 12:19

## 2023-01-30 RX ADMIN — Medication 10 MILLIGRAM(S): at 05:36

## 2023-01-30 NOTE — PROGRESS NOTE ADULT - ASSESSMENT
82M PMH Parkinson's, Pleural effusion s/p PleurX, a. fib on Eliquis, bradycardia w/ AICD, HTN who was BIBEMS for generalized weakness, found to be in severe sepsis state likely secondary to PNA.

## 2023-01-30 NOTE — DISCHARGE NOTE NURSING/CASE MANAGEMENT/SOCIAL WORK - PATIENT PORTAL LINK FT
You can access the FollowMyHealth Patient Portal offered by [facility] by registering at the following website: [email or web address]. By joining [email or web address] FollowMyHealth portal, you will also be able to view your health information using other applications (apps) compatible with our system.

## 2023-01-30 NOTE — DISCHARGE NOTE NURSING/CASE MANAGEMENT/SOCIAL WORK - NSDCVIVACCINE_GEN_ALL_CORE_FT
Tdap; 03-Jul-2016 07:38; Yoli Kign (RN); [employer]; [id number]; IntraMuscular; Deltoid Left.; 0.5 milliLiter(s); VIS (VIS Published: 09-May-2013, VIS Presented: 03-Jul-2016);

## 2023-01-30 NOTE — PROGRESS NOTE ADULT - PROBLEM SELECTOR PLAN 4
Noted on CTAP  - Bowel regimen w/ senna, Miralax, dulcolax  - Enema given 1/25 which caused patient to have 2 BMs on 1/25, now having BMs daily  - 1/27: abd distended, abd xray showed gas, now improved with aggressive bowel regimen

## 2023-01-30 NOTE — DISCHARGE NOTE PROVIDER - NSDCCPCAREPLAN_GEN_ALL_CORE_FT
PRINCIPAL DISCHARGE DIAGNOSIS  Diagnosis: Pneumonia, aspiration  Assessment and Plan of Treatment: You came to the hospital because you were lethargic and were not urinating. You were found to have a lung infection and you were treated with antibiotics. Your symptoms improved. YOu were found to be retaining urine so your bladder was emptied and you were started on medications and after that you were able to pee freely. You had severe constipation so you were started on laxatives. Please follow up with your PCP and neurologist on discharge. Please take 2 more days of your antibiotic: Augmentin , last day should be 1/31. Please come back to the emergency room if you have fevers, shortness of breath, lethargy or difficulty urinating.      SECONDARY DISCHARGE DIAGNOSES  Diagnosis: Acute UTI  Assessment and Plan of Treatment:     Diagnosis: ALAYNA (acute kidney injury)  Assessment and Plan of Treatment:     Diagnosis: Pneumonia, aspiration  Assessment and Plan of Treatment:      PRINCIPAL DISCHARGE DIAGNOSIS  Diagnosis: Pneumonia, aspiration  Assessment and Plan of Treatment: You came to the hospital because you were sleepy and were not urinating. You were found to have a lung infection and you were treated with antibiotics. Your symptoms improved. You were found to be retaining urine so your bladder was emptied and you were started on medications and after that you were able to pee freely. You had severe constipation so you were started on laxatives. Please follow up with your PCP and neurologist on discharge. Please take 2 more days of your antibiotic: Augmentin , last day should be 1/31. Please come back to the emergency room if you have fevers, shortness of breath, lethargy or difficulty urinating.      SECONDARY DISCHARGE DIAGNOSES  Diagnosis: Acute UTI  Assessment and Plan of Treatment:     Diagnosis: ALAYNA (acute kidney injury)  Assessment and Plan of Treatment: Your kidney numbers were elevated because your blood pressure was low and you were having difficulty urinating Your blood pressure improved with IV fluids and your kidney numbers improved.   You were also able to urinate on your own. We think you had difficulty urinating because you were constipated.    Diagnosis: Constipation  Assessment and Plan of Treatment: Please continue to take laxatives as prescribed to prevent constipation.    Diagnosis: Pneumonia, aspiration  Assessment and Plan of Treatment:     Diagnosis: Low blood pressure  Assessment and Plan of Treatment: We STOPPED your Spirinolactone, Olmesartan and Bumex because your blood pressure was low and your kidney numbers were too high. When you were discharged from the hospital, your blood pressure was normal so please do not re-start your blood pressure medications until your primary care doctor advises you to.

## 2023-01-30 NOTE — CHART NOTE - NSCHARTNOTEFT_GEN_A_CORE
Source: Patient [ ]    Family [ ]     other [ x] wife, chart review    Patient seen for severe malnutrition f/u. 82 year old male with a PMH of Parkinson's, Afib on Eliquis, HTN who was BIBEMS for generalized weakness, found to be in severe sepsis state likely secondary to PNA per chart.    Wife reports patient w/ fair appetite, noted w/ intakes 0-75% and 1x % per RN flow sheet. Food preferences reviewed. Patient has been consuming about half of supplement. No GI distress or chewing/swallowing difficulties reported. Noted w/ sacrum stage 2 and L heel stage 3 pressure injuries w/ no edema per RN flow sheet.    Diet : Diet, Regular:   Supplement Feeding Modality:  Oral  Ensure Plus High Protein Cans or Servings Per Day:  2       Frequency:  Daily (01-27-23 @ 17:09)    Current Weight: Weight (kg): no new weight to assess  66.4 (01-25 @ 03:43)    Pertinent Medications: MEDICATIONS  (STANDING):  aMIOdarone    Tablet 200 milliGRAM(s) Oral daily  apixaban 2.5 milliGRAM(s) Oral two times a day  ascorbic acid 500 milliGRAM(s) Oral daily  atorvastatin 80 milliGRAM(s) Oral at bedtime  carbidopa/levodopa  25/100 1 Tablet(s) Oral <User Schedule>  carbidopa/levodopa  25/100 1 Tablet(s) Oral <User Schedule>  clopidogrel Tablet 75 milliGRAM(s) Oral daily  desvenlafaxine ER 25 milliGRAM(s) Oral daily  melatonin 3 milliGRAM(s) Oral at bedtime  mirtazapine 7.5 milliGRAM(s) Oral at bedtime  OLANZapine 2.5 milliGRAM(s) Oral at bedtime  oxybutynin 10 milliGRAM(s) Oral two times a day  piperacillin/tazobactam IVPB.. 3.375 Gram(s) IV Intermittent every 12 hours  polyethylene glycol 3350 17 Gram(s) Oral two times a day  senna 2 Tablet(s) Oral at bedtime  tadalafil 5 milliGRAM(s) Oral daily    MEDICATIONS  (PRN):  acetaminophen     Tablet .. 650 milliGRAM(s) Oral every 6 hours PRN Mild Pain (1 - 3)    Pertinent Labs:  01-30 Na142 mmol/L Glu 78 mg/dL K+ 3.5 mmol/L Cr  1.96 mg/dL<H> BUN 22 mg/dL 01-30 Phos 3.1 mg/dL 01-30 Alb 3.0 g/dL<L>    Estimated Needs:   [x ] no change since previous assessment    Previous Nutrition Diagnosis: Severe malnutrition    Nutrition Diagnosis is [x ] ongoing  [ ] resolved [ ] not applicable     Recommend  - continue diet as ordered  - continue w/ ensure plus high protein BID (700 kcal, 40 g pro)  - will continue w/ magic cup 1x daily (290 kcal, 9 g pro)  - continue w/ vitamin C  - obtain weekly weight and continue to document PO intake to monitor trend     Monitoring and Evaluation:   [x ] PO intake [ x] Tolerance to diet prescription [x ] weights [x ] follow up per protocol

## 2023-01-30 NOTE — DISCHARGE NOTE NURSING/CASE MANAGEMENT/SOCIAL WORK - NSDCPEFALRISK_GEN_ALL_CORE
For information on Fall & Injury Prevention, visit: [email or web address] OR  [email or web address] OR  https://www.cdc.gov/steadi/patient.html

## 2023-01-30 NOTE — DISCHARGE NOTE PROVIDER - NSDCCPTREATMENT_GEN_ALL_CORE_FT
PRINCIPAL PROCEDURE  Procedure: Transthoracic echo  Findings and Treatment:   Ejection Fraction (Modified Cote Rule): 73 %  ------------------------------------------------------------------------  OBSERVATIONS:  Mitral Valve: Normal mitral valve. Mild mitral  regurgitation.  Aortic Root: Normal aortic root.  Aortic Valve: Calcified trileaflet aortic valve with normal  opening. Mild aortic regurgitation.  Left Atrium: Mildly dilated left atrium.  LA volume index =  38 cc/m2.  Left Ventricle: Normal left ventricular systolic function.  No segmental wall motion abnormalities. Increased E/e'  is  consistent with elevated left ventricular filling pressure.  Right Heart: Normal right atrium. A device wire is noted in  the right heart. Normal right ventricular size and  function. Normal tricuspid valve. Normal pulmonic valve.  Pericardium/PleuraNormal pericardium with no pericardial  effusion.  Hemodynamic: Estimated right ventricular systolic pressure  equals 51 mm Hg, assuming right atrial pressure equals 10  mm Hg, consistent with moderate pulmonary hypertension.  ------------------------------------------------------------------------  CONCLUSIONS:  1. Calcified trileaflet aortic valve with normal opening.  Mild aortic regurgitation.  2. Mildly dilated left atrium.  LA volume index = 38 cc/m2.  3. Normal left ventricular systolic function. No segmental  wall motion abnormalities.  4. Increased E/e'  is consistent with elevated left  ventricular filling pressure.  5. Normal right atrium. A device wire is noted in the right  heart.  6. Normal right ventricular size and function.  7. Estimated pulmonary artery systolic pressure equals 51  mm Hg, assuming right atrial pressure equals 10  mm Hg,  consistent with moderate pulmonary hypertension.  -- Severe concentric left ventricular  hypertrophy.

## 2023-01-30 NOTE — DISCHARGE NOTE PROVIDER - NSDCFUADDAPPT_GEN_ALL_CORE_FT
Please follow up with your PCP Dr. Santhosh Gallardo on March 8th at 4pm.    Please follow up with your other doctors:  CT surg: Dr. Oneal  Cardiologist: Dr. Keli jesus / [phone]  Neuro: Dr. Haddad  EP: Dr. Lida Vergara - [phone]  Please follow up with your PCP (Dr. BAILEY Loera) on 2/6 1pm [phone] - please call office to see if you can come earlier      Please follow up with your other doctors:  CT surg: Dr. Oneal  Cardiologist: Dr. Keli jesus / [phone]  Neuro: Dr. Haddad  EP: Dr. Lida Vergara - [phone]

## 2023-01-30 NOTE — PROGRESS NOTE ADULT - SUBJECTIVE AND OBJECTIVE BOX
Carlos Manuel Metcalf MD  PGY-3 Department of Internal Medicine  Available on [employer] Teams      Patient is a 82y old  Male who presents with a chief complaint of Hypotension (29 Jan 2023 08:07)      OVERNIGHT EVENTS: No acute overnight events.    SUBJECTIVE: Pt seen and examined. Denies fevers, chills, CP, SOB, Abdominal pain, N/V, Constipation, Diarrhea    MEDICATIONS  (STANDING):  aMIOdarone    Tablet 200 milliGRAM(s) Oral daily  apixaban 2.5 milliGRAM(s) Oral two times a day  ascorbic acid 500 milliGRAM(s) Oral daily  atorvastatin 80 milliGRAM(s) Oral at bedtime  carbidopa/levodopa  25/100 1 Tablet(s) Oral <User Schedule>  carbidopa/levodopa  25/100 1 Tablet(s) Oral <User Schedule>  clopidogrel Tablet 75 milliGRAM(s) Oral daily  desvenlafaxine ER 25 milliGRAM(s) Oral daily  melatonin 3 milliGRAM(s) Oral at bedtime  mirtazapine 7.5 milliGRAM(s) Oral at bedtime  OLANZapine 2.5 milliGRAM(s) Oral at bedtime  oxybutynin 10 milliGRAM(s) Oral two times a day  piperacillin/tazobactam IVPB.. 3.375 Gram(s) IV Intermittent every 12 hours  polyethylene glycol 3350 17 Gram(s) Oral two times a day  senna 2 Tablet(s) Oral at bedtime  tadalafil 5 milliGRAM(s) Oral daily    MEDICATIONS  (PRN):  acetaminophen     Tablet .. 650 milliGRAM(s) Oral every 6 hours PRN Mild Pain (1 - 3)      I&O's Summary    29 Jan 2023 07:01  -  30 Jan 2023 07:00  --------------------------------------------------------  IN: 810 mL / OUT: 1 mL / NET: 809 mL        Vital Signs Last 24 Hrs  T(C): 36.8 (30 Jan 2023 05:23), Max: 36.8 (29 Jan 2023 09:00)  T(F): 98.3 (30 Jan 2023 05:23), Max: 98.3 (30 Jan 2023 05:23)  HR: 59 (30 Jan 2023 05:23) (59 - 68)  BP: 96/56 (30 Jan 2023 05:23) (96/56 - 132/94)  BP(mean): --  RR: 17 (30 Jan 2023 05:23) (17 - 18)  SpO2: 98% (30 Jan 2023 05:23) (97% - 100%)    Parameters below as of 30 Jan 2023 05:23  Patient On (Oxygen Delivery Method): room air        =================PHYSICAL EXAM=================    GENERAL: Laying comfortably, NAD  EYES: EOMI, PERRL, no scleral icterus  NECK: No JVD  LUNG: Clear to auscultation bilaterally; No wheeze, crackles or rhonci  HEART: Regular rate and rhythm; No murmurs, rubs, or gallops  ABDOMEN: Soft, Nontender, Nondistended  EXTREMITIES:  No LE edema, 2+ Peripheral Pulses, No clubbing, cyanosis, or edema  PSYCH: AAOx1, lethargic  NEUROLOGY: non-focal, unable to obtain strength exam d/t lack of cooperation  SKIN: No rashes or lesions  =================================================    LABS:                        7.9    7.49  )-----------( 263      ( 29 Jan 2023 07:15 )             26.3     Auto Eosinophil # 0.17  / Auto Eosinophil % 2.3   / Auto Neutrophil # 5.37  / Auto Neutrophil % 71.7  / BANDS % x        01-29    141  |  110<H>  |  25<H>  ----------------------------<  80  3.8   |  18<L>  |  2.15<H>    Ca    8.7      29 Jan 2023 07:15  Mg     1.90     01-29  Phos  2.9     01-29  TPro  5.9<L>  /  Alb  3.0<L>  /  TBili  0.4  /  DBili  x   /  AST  21  /  ALT  8   /  AlkPhos  203<H>  01-29    PT/INR - ( 29 Jan 2023 07:15 )   PT: 20.9 sec;   INR: 1.79 ratio         PTT - ( 29 Jan 2023 07:15 )  PTT:36.2 sec        Lactate, Blood: 1.4 mmol/L (01-25 @ 12:00)        RADIOLOGY & ADDITIONAL TESTS:    Imaging Personally Reviewed:    Consultant(s) Notes Reviewed:      Care Discussed with Consultants/Other Providers:   Yes

## 2023-01-30 NOTE — DISCHARGE NOTE PROVIDER - ATTENDING DISCHARGE PHYSICAL EXAMINATION:
NAD, resting in bed, answers some questions appropriately  Psych: Normal affect, oriented to self   CV: RRR , no m/r/g   Resp: CTABL   abdomen: Soft, non-tender, non-distended   Ext: No edema

## 2023-01-30 NOTE — PROGRESS NOTE ADULT - PROBLEM SELECTOR PLAN 2
IMPROVING - On admission Cr 3.40, now 2.1, baseline ~1.5  Possibly i/s/o recent urinary retention vs. pre-renal i/s/o hypotension 2/2 urinary tract infection  - Renal U/S: no hydronephrosis  - patient now voiding appropriately, bladderscans all negative  - c/w fluid resusitation

## 2023-01-30 NOTE — DISCHARGE NOTE PROVIDER - HOSPITAL COURSE
82M PMH Parkinson's, R Pleural effusion s/p VATS and PleurX, a. fib on Eliquis, bradycardia w/ AICD, HTN who was BIBEMS for generalized weakness and decreased UOP, found to be in severe sepsis state likely secondary to UTI +/- aspiration pneumonia.    BCx, UCx RVP drawn, patient was started on zosyn. Pleural fluid culture NGTD, BCx and UCx no growth to date. CT abd/pelvis showed large stool burden and Bilateral lower lobe and left upper lobe groundglass opacities. Small bilateral pleural effusions with right chest tube in place. Echo was done for hypotension which showed EF 73% with concentric LV hypertrophy. Course c/b hypotenison - was given fluids and midodrine with improvement in his blood pressure (Baseline BP per wife is 90s/60s). Patient had ALAYNA and urinary distention on admission - s/p 2 straight caths and multiple L of fluids with improvement of his ALAYNA. CT notable for stercolitis and patient was started on abx and aggresive bowel regimen. Left leg dopper done for LLE pain and no DVT was found. Patient should take augmentin on discharge for 2 more days, last day 1/31 to complete a 7 day course of abx for pneumonia. Patient should follow up with PCP and neurologist on discharge. Patient is hemodynamically stable and ready for dischage to home.    Patient's diuretics and BP meds are on hold on discharge since patient's blood pressure has been consistently 90s/60s. Patient should follow up with PCP to initiate these medicaiton back.     82M PMH Parkinson's, R Pleural effusion s/p VATS and PleurX, a. fib on Eliquis, bradycardia w/ AICD, HTN who was BIBEMS for generalized weakness and decreased UOP, found to be in severe sepsis state likely secondary to UTI +/- aspiration pneumonia.    BCx, UCx RVP drawn, patient was started on zosyn. Pleural fluid culture NGTD, BCx and UCx no growth to date. CT abd/pelvis showed large stool burden and Bilateral lower lobe and left upper lobe groundglass opacities. Small bilateral pleural effusions with right chest tube in place. Echo was done for hypotension which showed EF 73% with concentric LV hypertrophy. Course c/b hypotenison - was given fluids and midodrine with improvement in his blood pressure (Baseline BP per wife is 90s/60s). Patient had ALAYNA and urinary distention on admission - s/p 2 straight caths and multiple L of fluids with improvement of his ALAYNA. CT notable for stercolitis and patient was started on abx and aggresive bowel regimen. Left leg dopper done for LLE pain and no DVT was found. Patient should take augmentin on discharge for 2 more days, last day 1/31 to complete a 7 day course of abx for pneumonia. Patient should follow up with PCP and neurologist on discharge. Patient is hemodynamically stable and ready for dischage to home. GOC was discussed during admission and patient was made DNR/DNI by wife jorge walsh.    Patient's diuretics and BP meds are on hold on discharge since patient's blood pressure has been consistently 90s/60s. Patient should follow up with PCP to initiate these medicaiton back.     82M PMH Parkinson's, R Pleural effusion s/p VATS and PleurX, a. fib on Eliquis, bradycardia w/ AICD, HTN who was BIBEMS for generalized weakness and decreased UOP, found to be in severe sepsis state likely secondary to UTI +/- aspiration pneumonia.    BCx, UCx RVP drawn, patient was started on zosyn. Pleural fluid culture NGTD, BCx and UCx no growth to date. CT abd/pelvis showed large stool burden and Bilateral lower lobe and left upper lobe groundglass opacities. Small bilateral pleural effusions with right chest tube in place. Echo was done for hypotension which showed EF 73% with concentric LV hypertrophy. Course c/b hypotenison - was given fluids and midodrine with improvement in his blood pressure (Baseline BP per wife is 90s/60s). Patient had ALAYNA and urinary distention on admission - s/p 2 straight caths and multiple L of fluids with improvement of his ALAYNA. CT notable for stercolitis and patient was started on abx and aggresive bowel regimen. Left leg dopper done for LLE pain and no DVT was found. Patient should take augmentin on discharge for 2 more days, last day 1/31 to complete a 7 day course of abx for pneumonia. Patient should follow up with PCP and neurologist on discharge. Patient is hemodynamically stable and ready for dischage to home. GOC was discussed during admission and patient was made DNR/DNI by wife jorge walsh.    Patient's diuretics and BP meds are on hold on discharge since patient's blood pressure has been consistently 90s/60s. Patient should follow up with PCP to initiate these medicaiton back. Pt has an appt on March 8th at 4pm with PCP (dr. Santhosh Avila) but office will work on expediting the appt/   82M PMH Parkinson's, R Pleural effusion s/p VATS and PleurX, a. fib on Eliquis, bradycardia w/ AICD, HTN who was BIBEMS for generalized weakness and decreased UOP, found to be in severe sepsis state likely secondary to UTI +/- aspiration pneumonia.    BCx, UCx RVP drawn, patient was started on zosyn. Pleural fluid culture NGTD, BCx and UCx no growth to date. CT abd/pelvis showed large stool burden and Bilateral lower lobe and left upper lobe groundglass opacities. Small bilateral pleural effusions with right chest tube in place. Echo was done for hypotension which showed EF 73% with concentric LV hypertrophy. Course c/b hypotenison - was given fluids and midodrine with improvement in his blood pressure (Baseline BP per wife is 90s/60s). Patient had ALAYNA and urinary distention on admission - s/p 2 straight caths and multiple L of fluids with improvement of his ALAYNA. CT notable for stercolitis and patient was started on abx and aggresive bowel regimen. Left leg dopper done for LLE pain and no DVT was found. Patient should take augmentin on discharge for 2 more days, last day 1/31 to complete a 7 day course of abx for pneumonia. Patient should follow up with PCP and neurologist on discharge. Patient is hemodynamically stable and ready for dischage to home. GOC was discussed during admission and patient was made DNR/DNI by wife jorge walsh.    Patient's diuretics and BP meds are on hold on discharge since patient's blood pressure has been consistently 90s/60s. Patient should follow up with PCP to initiate these medicaiton back. Pt has an appt with PCP (Dr. BAILEY Loera) on 2/6 1pm [phone] - please call office to see if you can come earlier   82M PMH Parkinson's, R Pleural effusion s/p VATS and PleurX, a. fib on Eliquis, bradycardia w/ AICD, HTN who was BIBEMS for generalized weakness and decreased UOP, found to be in severe sepsis state likely secondary to pneumonia.    While hospitalized, patient had BCx, UCx RVP drawn and was started on zosyn. Pleural fluid culture NGTD, BCx and UCx no growth to date. CT abd/pelvis showed large stool burden and Bilateral lower lobe and left upper lobe groundglass opacities. Small bilateral pleural effusions with right chest tube in place. Echo was done for hypotension which showed EF 73% with concentric LV hypertrophy. Course c/b hypotenison - was given fluids and midodrine with improvement in his blood pressure (Baseline BP per wife is 90s/60s). Patient had ALAYNA and urinary distention on admission - s/p 2 straight caths and multiple L of fluids with improvement of his ALAYNA. CT notable for stercolitis and patient was started on abx and aggresive bowel regimen. Left leg dopper done for LLE pain and no DVT was found. Patient should take augmentin on discharge for 2 more days, last day 1/31 to complete a 7 day course of abx for pneumonia. Patient should follow up with PCP and neurologist on discharge. Patient is hemodynamically stable and ready for dischage to home. GOC was discussed during admission and patient was made DNR/DNI by wife jorge walsh.    Patient's diuretics and BP meds are on hold on discharge since patient's blood pressure has been in 100-110's on discharge. Patient should follow up with PCP to re-start these medications as necessary. Pt has an appt with PCP (Dr. BAILEY Loera) on 2/6 1pm [phone] - please call office to see if you can be seen earlier.

## 2023-01-30 NOTE — DISCHARGE NOTE PROVIDER - YES NO FOR MLM POSITIVE OR NEGATIVE COVID RESULT
Chief complaint:   Chief Complaint   Patient presents with   • Vomiting   • Fever   • Ear Pain       Vitals:  Visit Vitals  Pulse 140   Temp 98.6 °F (37 °C) (Temporal)   Resp (!) 32   Wt 11.6 kg   SpO2 98%       HISTORY OF PRESENT ILLNESS     20-month-old female was run by her mother complaining of fever that began 2 days ago. Fever has been as high as 101.8°F. She had one episode of diarrhea yesterday. Patient had 2 episodes of vomiting today. She has had some nasal congestion. Patient attends . She has decreased appetite.      Other significant problems:  There are no active problems to display for this patient.      PAST MEDICAL, FAMILY AND SOCIAL HISTORY     Medications:  Current Outpatient Medications   Medication   • pediatric multivitamin with iron (POLY-VI-SOL WITH IRON) 10 MG/ML Solution     No current facility-administered medications for this visit.        Allergies:  ALLERGIES:  No Known Allergies    Past Medical  History/Surgeries:  No past medical history on file.    No past surgical history on file.    Family History:  No family history on file.    Social History:  Social History     Tobacco Use   • Smoking status: Not on file   Substance Use Topics   • Alcohol use: Not on file       REVIEW OF SYSTEMS     Review of Systems   Constitutional: Positive for fever.   HENT: Positive for congestion. Negative for trouble swallowing.    Gastrointestinal: Positive for diarrhea and vomiting.       PHYSICAL EXAM     Physical Exam   Constitutional: She appears well-developed and well-nourished. She is active. No distress.   HENT:   Head: Normocephalic and atraumatic.   Right Ear: External ear and canal normal. Tympanic membrane is abnormal (Right tympanic membrane is intact with erythema and decreased landmarks.).   Left Ear: Tympanic membrane, external ear and canal normal.   Nose: Nose normal.   Mouth/Throat: Mucous membranes are moist. Pharynx erythema present.   White spots noted in oropharynx.   Eyes:  Pupils are equal, round, and reactive to light. Conjunctivae are normal. Right eye exhibits no discharge. Left eye exhibits no discharge.   Neck: Normal range of motion. Neck supple. No neck rigidity or neck adenopathy.   Cardiovascular: Normal rate, regular rhythm, S1 normal and S2 normal.   No murmur heard.  Pulmonary/Chest: Effort normal and breath sounds normal. No nasal flaring. No respiratory distress. She exhibits no retraction.   Abdominal: Soft. Bowel sounds are normal. She exhibits no distension. There is no tenderness.   Neurological: She is alert.   Skin: Skin is warm. Capillary refill takes less than 3 seconds. No rash noted. She is not diaphoretic.   Nursing note and vitals reviewed.      Patient tolerated a popsicle in the exam room.    Results for orders placed or performed in visit on 06/17/19   STREP GROUP A SCREEN RAPID WITH REFLEX TO CULTURE   Result Value    RAPID STREP GROUP A negative       ASSESSMENT/PLAN     See patient instructions and after visit summary.    Sri was seen today for vomiting, fever and ear pain.    Diagnoses and all orders for this visit:    Upper respiratory tract infection, unspecified type  -     STREP GROUP A SCREEN RAPID WITH REFLEX TO CULTURE    Acute suppurative otitis media of right ear without spontaneous rupture of tympanic membrane, recurrence not specified  -     amoxicillin (AMOXIL) 400 MG/5ML suspension; Take 5.8 mLs by mouth every 12 hours for 10 days.    Vomiting and diarrhea      Supervising physician is Dr. Krys Mirza.     ,

## 2023-01-30 NOTE — PROGRESS NOTE ADULT - NUTRITIONAL ASSESSMENT
This patient has been assessed with a concern for Malnutrition and has been determined to have a diagnosis/diagnoses of Severe protein-calorie malnutrition.    This patient is being managed with:   Diet Regular-  Supplement Feeding Modality:  Oral  Ensure Plus High Protein Cans or Servings Per Day:  2       Frequency:  Daily  Entered: Jan 27 2023  5:08PM    

## 2023-01-30 NOTE — PROGRESS NOTE ADULT - PROBLEM SELECTOR PLAN 1
Patient presented with confusion, shock state, leukocytosis, and elevated lactate, likely 2/2 severe sepsis. No tachycardia on admission, though patient PPM dependent ([facility])  - Likely 2/2 PNA (GGO on CT chest). Pleural fluid intially read as GNR NGTD but was changed to no organisms and NGTD  - UCx, BCx : NGTD, MRSA, Legionella negative  - TTE 1/25/23: EF 73%, severe concentric L vent hypertrophy, mod pulm HTN    =====PLAN====  - c/w Zosyn (1/25- 1/31) plan for 7 day course Patient presented with confusion, shock state, leukocytosis, and elevated lactate, likely 2/2 severe sepsis. No tachycardia on admission, though patient PPM dependent ([facility])  - Likely 2/2 PNA (GGO on CT chest). Pleural fluid intially read as GNR NGTD but was changed to no organisms and NGTD  - UCx, BCx : NGTD, MRSA, Legionella negative  - TTE 1/25/23: EF 73%, severe concentric L vent hypertrophy, mod pulm HTN    =====PLAN====  - c/w Zosyn (1/25- ) plan for 7 day course, transition to augmentin on discharge

## 2023-01-30 NOTE — DISCHARGE NOTE PROVIDER - NSDCMRMEDTOKEN_GEN_ALL_CORE_FT
amiodarone 200 mg oral tablet: 1 tab(s) orally once a day hosp/home  ascorbic acid 500 mg oral tablet: 1 tab(s) orally once a day  carbidopa-levodopa 25 mg-100 mg oral tablet: 1.5 tab(s) orally 4 times a day  clopidogrel 75 mg oral tablet: 1 tab(s) orally once a day hosp/ home  Crestor 40 mg oral tablet: 1 tab(s) orally once a day home  desvenlafaxine (as succinate) 50 mg oral tablet, extended release: 1 tab(s) orally once a day  Eliquis 2.5 mg oral tablet: 1 tab(s) orally 2 times a day. hosp /home  resume on isa 10/23   Melatonin 1 mg oral tablet: 4 tab(s) orally once a day (at bedtime)  mirtazapine 7.5 mg oral tablet: 1 tab(s) orally once a day (at bedtime)  OLANZapine 2.5 mg oral tablet: 1 tab(s) orally once a day (at bedtime)  olmesartan 20 mg oral tablet: 1 tab(s) orally once a day  One A Day Men&#x27;s Complete oral tablet: 1 tab(s) orally once a day  polyethylene glycol 3350 oral powder for reconstitution: 17 gram(s) orally once a day hosp  senna leaf extract oral tablet: 2 tab(s) orally once a day (at bedtime) hosp  solifenacin 10 mg oral tablet: 1 tab(s) orally once a day hosp/home  tadalafil 5 mg oral tablet: 1 tab(s) orally once a day home  Vyndamax 61 mg oral capsule: 1 cap(s) orally once a day hosp/ home   amiodarone 200 mg oral tablet: 1 tab(s) orally once a day hosp/home  amoxicillin-clavulanate 875 mg-125 mg oral tablet: 1 tab(s) orally 2 times a day   ascorbic acid 500 mg oral tablet: 1 tab(s) orally once a day  carbidopa-levodopa 25 mg-100 mg oral tablet: 1.5 tab(s) orally 4 times a day  clopidogrel 75 mg oral tablet: 1 tab(s) orally once a day hosp/ home  Crestor 40 mg oral tablet: 1 tab(s) orally once a day home  desvenlafaxine (as succinate) 50 mg oral tablet, extended release: 1 tab(s) orally once a day  Eliquis 2.5 mg oral tablet: 1 tab(s) orally 2 times a day. hosp /home  resume on isa 10/23   Melatonin 1 mg oral tablet: 4 tab(s) orally once a day (at bedtime)  mirtazapine 7.5 mg oral tablet: 1 tab(s) orally once a day (at bedtime)  OLANZapine 2.5 mg oral tablet: 1 tab(s) orally once a day (at bedtime)  olmesartan 20 mg oral tablet: 1 tab(s) orally once a day  One A Day Men&#x27;s Complete oral tablet: 1 tab(s) orally once a day  polyethylene glycol 3350 oral powder for reconstitution: 17 gram(s) orally once a day hosp  senna leaf extract oral tablet: 2 tab(s) orally once a day (at bedtime) [facility]  solifenacin 10 mg oral tablet: 1 tab(s) orally once a day hosp/home  tadalafil 5 mg oral tablet: 1 tab(s) orally once a day home  Vyndamax 61 mg oral capsule: 1 cap(s) orally once a day hosp/ home   amiodarone 200 mg oral tablet: 1 tab(s) orally once a day hosp/home  amoxicillin-clavulanate 875 mg-125 mg oral tablet: 1 tab(s) orally 2 times a day   ascorbic acid 500 mg oral tablet: 1 tab(s) orally once a day  carbidopa-levodopa 25 mg-100 mg oral tablet: 1.5 tab(s) orally 4 times a day  clopidogrel 75 mg oral tablet: 1 tab(s) orally once a day hosp/ home  Crestor 40 mg oral tablet: 1 tab(s) orally once a day home  desvenlafaxine (as succinate) 50 mg oral tablet, extended release: 1 tab(s) orally once a day  Eliquis 2.5 mg oral tablet: 1 tab(s) orally 2 times a day. hosp /home  resume on isa 10/23   Melatonin 1 mg oral tablet: 4 tab(s) orally once a day (at bedtime)  mirtazapine 7.5 mg oral tablet: 1 tab(s) orally once a day (at bedtime)  OLANZapine 2.5 mg oral tablet: 1 tab(s) orally once a day (at bedtime)  One A Day Men&#x27;s Complete oral tablet: 1 tab(s) orally once a day  polyethylene glycol 3350 oral powder for reconstitution: 17 gram(s) orally once a day hosp  senna leaf extract oral tablet: 2 tab(s) orally once a day (at bedtime) [facility]  solifenacin 10 mg oral tablet: 1 tab(s) orally once a day hosp/home  tadalafil 5 mg oral tablet: 1 tab(s) orally once a day home  Vyndamax 61 mg oral capsule: 1 cap(s) orally once a day hosp/ home

## 2023-01-30 NOTE — DISCHARGE NOTE PROVIDER - DETAILS OF MALNUTRITION DIAGNOSIS/DIAGNOSES
This patient has been assessed with a concern for Malnutrition and was treated during this hospitalization for the following Nutrition diagnosis/diagnoses:     -  01/27/2023: Severe protein-calorie malnutrition

## 2023-01-31 ENCOUNTER — TRANSCRIPTION ENCOUNTER (OUTPATIENT)
Age: 83
End: 2023-01-31

## 2023-01-31 RX ORDER — METRONIDAZOLE 500 MG
1 TABLET ORAL
Qty: 21 | Refills: 0
Start: 2023-01-31 | End: 2023-02-06

## 2023-01-31 RX ORDER — CEFPODOXIME PROXETIL 100 MG
1 TABLET ORAL
Qty: 14 | Refills: 0
Start: 2023-01-31 | End: 2023-02-06

## 2023-02-01 ENCOUNTER — TRANSCRIPTION ENCOUNTER (OUTPATIENT)
Age: 83
End: 2023-02-01

## 2023-02-01 ENCOUNTER — APPOINTMENT (OUTPATIENT)
Age: 83
End: 2023-02-01
Payer: MEDICARE

## 2023-02-01 VITALS
OXYGEN SATURATION: 99 % | HEART RATE: 62 BPM | DIASTOLIC BLOOD PRESSURE: 50 MMHG | RESPIRATION RATE: 14 BRPM | SYSTOLIC BLOOD PRESSURE: 104 MMHG | TEMPERATURE: 98.2 F

## 2023-02-01 DIAGNOSIS — J18.9 PNEUMONIA, UNSPECIFIED ORGANISM: ICD-10-CM

## 2023-02-01 DIAGNOSIS — K59.00 CONSTIPATION, UNSPECIFIED: ICD-10-CM

## 2023-02-01 PROCEDURE — 99497 ADVNCD CARE PLAN 30 MIN: CPT

## 2023-02-01 PROCEDURE — 99495 TRANSJ CARE MGMT MOD F2F 14D: CPT

## 2023-02-02 ENCOUNTER — INPATIENT (INPATIENT)
Facility: HOSPITAL | Age: 83
LOS: 7 days | Discharge: HOME HEALTH | End: 2023-02-10
Attending: HOSPITALIST | Admitting: HOSPITALIST
Payer: MEDICARE

## 2023-02-02 VITALS — HEIGHT: 71 IN

## 2023-02-02 DIAGNOSIS — D64.9 ANEMIA, UNSPECIFIED: ICD-10-CM

## 2023-02-02 DIAGNOSIS — Z96.649 PRESENCE OF UNSPECIFIED ARTIFICIAL HIP JOINT: Chronic | ICD-10-CM

## 2023-02-02 DIAGNOSIS — U07.1 COVID-19: ICD-10-CM

## 2023-02-02 DIAGNOSIS — Z95.0 PRESENCE OF CARDIAC PACEMAKER: Chronic | ICD-10-CM

## 2023-02-02 DIAGNOSIS — N18.30 CHRONIC KIDNEY DISEASE, STAGE 3 UNSPECIFIED: ICD-10-CM

## 2023-02-02 DIAGNOSIS — N40.0 BENIGN PROSTATIC HYPERPLASIA WITHOUT LOWER URINARY TRACT SYMPTOMS: ICD-10-CM

## 2023-02-02 DIAGNOSIS — I10 ESSENTIAL (PRIMARY) HYPERTENSION: ICD-10-CM

## 2023-02-02 DIAGNOSIS — E78.5 HYPERLIPIDEMIA, UNSPECIFIED: ICD-10-CM

## 2023-02-02 DIAGNOSIS — Z29.9 ENCOUNTER FOR PROPHYLACTIC MEASURES, UNSPECIFIED: ICD-10-CM

## 2023-02-02 DIAGNOSIS — Z98.61 CORONARY ANGIOPLASTY STATUS: Chronic | ICD-10-CM

## 2023-02-02 DIAGNOSIS — Z98.49 CATARACT EXTRACTION STATUS, UNSPECIFIED EYE: Chronic | ICD-10-CM

## 2023-02-02 DIAGNOSIS — I48.0 PAROXYSMAL ATRIAL FIBRILLATION: ICD-10-CM

## 2023-02-02 DIAGNOSIS — J18.9 PNEUMONIA, UNSPECIFIED ORGANISM: ICD-10-CM

## 2023-02-02 DIAGNOSIS — G93.41 METABOLIC ENCEPHALOPATHY: ICD-10-CM

## 2023-02-02 PROBLEM — K59.00 CONSTIPATION: Status: ACTIVE | Noted: 2023-02-01

## 2023-02-02 LAB
ALBUMIN SERPL ELPH-MCNC: 2.4 G/DL — LOW (ref 3.3–5)
ALBUMIN SERPL ELPH-MCNC: 3.3 G/DL — SIGNIFICANT CHANGE UP (ref 3.3–5)
ALP SERPL-CCNC: 217 U/L — HIGH (ref 40–120)
ALP SERPL-CCNC: 295 U/L — HIGH (ref 40–120)
ALT FLD-CCNC: 11 U/L — SIGNIFICANT CHANGE UP (ref 4–41)
ALT FLD-CCNC: 7 U/L — SIGNIFICANT CHANGE UP (ref 4–41)
ANION GAP SERPL CALC-SCNC: 21 MMOL/L — HIGH (ref 7–14)
ANION GAP SERPL CALC-SCNC: 7 MMOL/L — SIGNIFICANT CHANGE UP (ref 7–14)
APPEARANCE UR: ABNORMAL
APTT BLD: 37.7 SEC — HIGH (ref 27–36.3)
AST SERPL-CCNC: 24 U/L — SIGNIFICANT CHANGE UP (ref 4–40)
AST SERPL-CCNC: 42 U/L — HIGH (ref 4–40)
BACTERIA # UR AUTO: ABNORMAL
BASE EXCESS BLDV CALC-SCNC: -10.9 MMOL/L — LOW (ref -2–3)
BASE EXCESS BLDV CALC-SCNC: -7.1 MMOL/L — LOW (ref -2–3)
BASE EXCESS BLDV CALC-SCNC: 0.6 MMOL/L — SIGNIFICANT CHANGE UP (ref -2–3)
BASOPHILS # BLD AUTO: 0.06 K/UL — SIGNIFICANT CHANGE UP (ref 0–0.2)
BASOPHILS NFR BLD AUTO: 0.5 % — SIGNIFICANT CHANGE UP (ref 0–2)
BILIRUB SERPL-MCNC: 0.4 MG/DL — SIGNIFICANT CHANGE UP (ref 0.2–1.2)
BILIRUB SERPL-MCNC: 0.6 MG/DL — SIGNIFICANT CHANGE UP (ref 0.2–1.2)
BILIRUB UR-MCNC: NEGATIVE — SIGNIFICANT CHANGE UP
BLD GP AB SCN SERPL QL: NEGATIVE — SIGNIFICANT CHANGE UP
BLOOD GAS ARTERIAL COMPREHENSIVE RESULT: SIGNIFICANT CHANGE UP
BLOOD GAS VENOUS COMPREHENSIVE RESULT: SIGNIFICANT CHANGE UP
BUN SERPL-MCNC: 17 MG/DL — SIGNIFICANT CHANGE UP (ref 7–23)
BUN SERPL-MCNC: 18 MG/DL — SIGNIFICANT CHANGE UP (ref 7–23)
CALCIUM SERPL-MCNC: 8.4 MG/DL — SIGNIFICANT CHANGE UP (ref 8.4–10.5)
CALCIUM SERPL-MCNC: 9.1 MG/DL — SIGNIFICANT CHANGE UP (ref 8.4–10.5)
CHLORIDE BLDV-SCNC: 107 MMOL/L — SIGNIFICANT CHANGE UP (ref 96–108)
CHLORIDE BLDV-SCNC: 109 MMOL/L — HIGH (ref 96–108)
CHLORIDE BLDV-SCNC: 111 MMOL/L — HIGH (ref 96–108)
CHLORIDE SERPL-SCNC: 106 MMOL/L — SIGNIFICANT CHANGE UP (ref 98–107)
CHLORIDE SERPL-SCNC: 113 MMOL/L — HIGH (ref 98–107)
CO2 BLDV-SCNC: 18.1 MMOL/L — LOW (ref 22–26)
CO2 BLDV-SCNC: 18.2 MMOL/L — LOW (ref 22–26)
CO2 BLDV-SCNC: 27.4 MMOL/L — HIGH (ref 22–26)
CO2 SERPL-SCNC: 13 MMOL/L — LOW (ref 22–31)
CO2 SERPL-SCNC: 18 MMOL/L — LOW (ref 22–31)
COD CRY URNS QL: ABNORMAL
COLOR SPEC: YELLOW — SIGNIFICANT CHANGE UP
CREAT SERPL-MCNC: 1.65 MG/DL — HIGH (ref 0.5–1.3)
CREAT SERPL-MCNC: 1.66 MG/DL — HIGH (ref 0.5–1.3)
DIFF PNL FLD: ABNORMAL
EGFR: 41 ML/MIN/1.73M2 — LOW
EGFR: 42 ML/MIN/1.73M2 — LOW
EOSINOPHIL # BLD AUTO: 0.04 K/UL — SIGNIFICANT CHANGE UP (ref 0–0.5)
EOSINOPHIL NFR BLD AUTO: 0.3 % — SIGNIFICANT CHANGE UP (ref 0–6)
EPI CELLS # UR: 4 /HPF — SIGNIFICANT CHANGE UP (ref 0–5)
FLUAV AG NPH QL: SIGNIFICANT CHANGE UP
FLUBV AG NPH QL: SIGNIFICANT CHANGE UP
GAS PNL BLDV: 138 MMOL/L — SIGNIFICANT CHANGE UP (ref 136–145)
GAS PNL BLDV: 139 MMOL/L — SIGNIFICANT CHANGE UP (ref 136–145)
GAS PNL BLDV: 141 MMOL/L — SIGNIFICANT CHANGE UP (ref 136–145)
GAS PNL BLDV: SIGNIFICANT CHANGE UP
GAS PNL BLDV: SIGNIFICANT CHANGE UP
GLUCOSE BLDV-MCNC: 112 MG/DL — HIGH (ref 70–99)
GLUCOSE BLDV-MCNC: 145 MG/DL — HIGH (ref 70–99)
GLUCOSE BLDV-MCNC: 287 MG/DL — HIGH (ref 70–99)
GLUCOSE SERPL-MCNC: 111 MG/DL — HIGH (ref 70–99)
GLUCOSE SERPL-MCNC: 170 MG/DL — HIGH (ref 70–99)
GLUCOSE UR QL: NEGATIVE — SIGNIFICANT CHANGE UP
GRAN CASTS # UR COMP ASSIST: 4 /LPF — HIGH
HCO3 BLDV-SCNC: 17 MMOL/L — LOW (ref 22–29)
HCO3 BLDV-SCNC: 17 MMOL/L — LOW (ref 22–29)
HCO3 BLDV-SCNC: 26 MMOL/L — SIGNIFICANT CHANGE UP (ref 22–29)
HCT VFR BLD CALC: 24.5 % — LOW (ref 39–50)
HCT VFR BLD CALC: 27.3 % — LOW (ref 39–50)
HCT VFR BLD CALC: 33.4 % — LOW (ref 39–50)
HCT VFR BLDA CALC: 24 % — LOW (ref 39–51)
HCT VFR BLDA CALC: 24 % — LOW (ref 39–51)
HCT VFR BLDA CALC: 30 % — LOW (ref 39–51)
HGB BLD CALC-MCNC: 10.1 G/DL — LOW (ref 13–17)
HGB BLD CALC-MCNC: 7.9 G/DL — LOW (ref 13–17)
HGB BLD CALC-MCNC: 8.1 G/DL — LOW (ref 13–17)
HGB BLD-MCNC: 7.3 G/DL — LOW (ref 13–17)
HGB BLD-MCNC: 8.1 G/DL — LOW (ref 13–17)
HGB BLD-MCNC: 9.6 G/DL — LOW (ref 13–17)
HYALINE CASTS # UR AUTO: 11 /LPF — HIGH (ref 0–7)
IANC: 10.45 K/UL — HIGH (ref 1.8–7.4)
IMM GRANULOCYTES NFR BLD AUTO: 0.6 % — SIGNIFICANT CHANGE UP (ref 0–0.9)
INR BLD: 1.79 RATIO — HIGH (ref 0.88–1.16)
KETONES UR-MCNC: ABNORMAL
LACTATE BLDV-MCNC: 1.4 MMOL/L — SIGNIFICANT CHANGE UP (ref 0.5–2)
LACTATE BLDV-MCNC: 10.2 MMOL/L — CRITICAL HIGH (ref 0.5–2)
LACTATE BLDV-MCNC: 3.6 MMOL/L — HIGH (ref 0.5–2)
LACTATE SERPL-SCNC: 1.8 MMOL/L — SIGNIFICANT CHANGE UP (ref 0.5–2)
LEUKOCYTE ESTERASE UR-ACNC: ABNORMAL
LYMPHOCYTES # BLD AUTO: 0.65 K/UL — LOW (ref 1–3.3)
LYMPHOCYTES # BLD AUTO: 5.5 % — LOW (ref 13–44)
MAGNESIUM SERPL-MCNC: 1.7 MG/DL — SIGNIFICANT CHANGE UP (ref 1.6–2.6)
MCHC RBC-ENTMCNC: 27.2 PG — SIGNIFICANT CHANGE UP (ref 27–34)
MCHC RBC-ENTMCNC: 27.2 PG — SIGNIFICANT CHANGE UP (ref 27–34)
MCHC RBC-ENTMCNC: 27.5 PG — SIGNIFICANT CHANGE UP (ref 27–34)
MCHC RBC-ENTMCNC: 28.7 GM/DL — LOW (ref 32–36)
MCHC RBC-ENTMCNC: 29.7 GM/DL — LOW (ref 32–36)
MCHC RBC-ENTMCNC: 29.8 GM/DL — LOW (ref 32–36)
MCV RBC AUTO: 91.4 FL — SIGNIFICANT CHANGE UP (ref 80–100)
MCV RBC AUTO: 92.5 FL — SIGNIFICANT CHANGE UP (ref 80–100)
MCV RBC AUTO: 94.6 FL — SIGNIFICANT CHANGE UP (ref 80–100)
MONOCYTES # BLD AUTO: 0.6 K/UL — SIGNIFICANT CHANGE UP (ref 0–0.9)
MONOCYTES NFR BLD AUTO: 5.1 % — SIGNIFICANT CHANGE UP (ref 2–14)
NEUTROPHILS # BLD AUTO: 10.45 K/UL — HIGH (ref 1.8–7.4)
NEUTROPHILS NFR BLD AUTO: 88 % — HIGH (ref 43–77)
NITRITE UR-MCNC: NEGATIVE — SIGNIFICANT CHANGE UP
NRBC # BLD: 0 /100 WBCS — SIGNIFICANT CHANGE UP (ref 0–0)
NRBC # FLD: 0 K/UL — SIGNIFICANT CHANGE UP (ref 0–0)
PCO2 BLDV: 29 MMHG — LOW (ref 42–55)
PCO2 BLDV: 44 MMHG — SIGNIFICANT CHANGE UP (ref 42–55)
PCO2 BLDV: 45 MMHG — SIGNIFICANT CHANGE UP (ref 42–55)
PH BLDV: 7.19 — LOW (ref 7.32–7.43)
PH BLDV: 7.37 — SIGNIFICANT CHANGE UP (ref 7.32–7.43)
PH BLDV: 7.38 — SIGNIFICANT CHANGE UP (ref 7.32–7.43)
PH UR: 6 — SIGNIFICANT CHANGE UP (ref 5–8)
PHOSPHATE SERPL-MCNC: 2.7 MG/DL — SIGNIFICANT CHANGE UP (ref 2.5–4.5)
PLATELET # BLD AUTO: 227 K/UL — SIGNIFICANT CHANGE UP (ref 150–400)
PLATELET # BLD AUTO: 239 K/UL — SIGNIFICANT CHANGE UP (ref 150–400)
PLATELET # BLD AUTO: 432 K/UL — HIGH (ref 150–400)
PO2 BLDV: 30 MMHG — SIGNIFICANT CHANGE UP
PO2 BLDV: 53 MMHG — SIGNIFICANT CHANGE UP
PO2 BLDV: <20 MMHG — SIGNIFICANT CHANGE UP
POTASSIUM BLDV-SCNC: 3.9 MMOL/L — SIGNIFICANT CHANGE UP (ref 3.5–5.1)
POTASSIUM BLDV-SCNC: 4.2 MMOL/L — SIGNIFICANT CHANGE UP (ref 3.5–5.1)
POTASSIUM BLDV-SCNC: SIGNIFICANT CHANGE UP MMOL/L (ref 3.5–5.1)
POTASSIUM SERPL-MCNC: 4.2 MMOL/L — SIGNIFICANT CHANGE UP (ref 3.5–5.3)
POTASSIUM SERPL-MCNC: 5.2 MMOL/L — SIGNIFICANT CHANGE UP (ref 3.5–5.3)
POTASSIUM SERPL-SCNC: 4.2 MMOL/L — SIGNIFICANT CHANGE UP (ref 3.5–5.3)
POTASSIUM SERPL-SCNC: 5.2 MMOL/L — SIGNIFICANT CHANGE UP (ref 3.5–5.3)
PROT SERPL-MCNC: 5.2 G/DL — LOW (ref 6–8.3)
PROT SERPL-MCNC: 6.9 G/DL — SIGNIFICANT CHANGE UP (ref 6–8.3)
PROT UR-MCNC: ABNORMAL
PROTHROM AB SERPL-ACNC: 20.9 SEC — HIGH (ref 10.5–13.4)
RBC # BLD: 2.68 M/UL — LOW (ref 4.2–5.8)
RBC # BLD: 2.95 M/UL — LOW (ref 4.2–5.8)
RBC # BLD: 3.53 M/UL — LOW (ref 4.2–5.8)
RBC # FLD: 22.1 % — HIGH (ref 10.3–14.5)
RBC # FLD: 22.1 % — HIGH (ref 10.3–14.5)
RBC # FLD: 22.5 % — HIGH (ref 10.3–14.5)
RBC CASTS # UR COMP ASSIST: 548 /HPF — HIGH (ref 0–4)
RH IG SCN BLD-IMP: NEGATIVE — SIGNIFICANT CHANGE UP
RSV RNA NPH QL NAA+NON-PROBE: SIGNIFICANT CHANGE UP
SAO2 % BLDV: 13.6 % — SIGNIFICANT CHANGE UP
SAO2 % BLDV: 46.6 % — SIGNIFICANT CHANGE UP
SAO2 % BLDV: 84.8 % — SIGNIFICANT CHANGE UP
SARS-COV-2 RNA SPEC QL NAA+PROBE: DETECTED
SODIUM SERPL-SCNC: 138 MMOL/L — SIGNIFICANT CHANGE UP (ref 135–145)
SODIUM SERPL-SCNC: 140 MMOL/L — SIGNIFICANT CHANGE UP (ref 135–145)
SP GR SPEC: 1.03 — SIGNIFICANT CHANGE UP (ref 1.01–1.05)
UROBILINOGEN FLD QL: SIGNIFICANT CHANGE UP
WBC # BLD: 11.87 K/UL — HIGH (ref 3.8–10.5)
WBC # BLD: 8.73 K/UL — SIGNIFICANT CHANGE UP (ref 3.8–10.5)
WBC # BLD: 9.79 K/UL — SIGNIFICANT CHANGE UP (ref 3.8–10.5)
WBC # FLD AUTO: 11.87 K/UL — HIGH (ref 3.8–10.5)
WBC # FLD AUTO: 8.73 K/UL — SIGNIFICANT CHANGE UP (ref 3.8–10.5)
WBC # FLD AUTO: 9.79 K/UL — SIGNIFICANT CHANGE UP (ref 3.8–10.5)
WBC UR QL: 16 /HPF — HIGH (ref 0–5)

## 2023-02-02 PROCEDURE — 99223 1ST HOSP IP/OBS HIGH 75: CPT

## 2023-02-02 PROCEDURE — 99223 1ST HOSP IP/OBS HIGH 75: CPT | Mod: GC

## 2023-02-02 PROCEDURE — 71045 X-RAY EXAM CHEST 1 VIEW: CPT | Mod: 26

## 2023-02-02 PROCEDURE — 99285 EMERGENCY DEPT VISIT HI MDM: CPT | Mod: CS,GC

## 2023-02-02 RX ORDER — LANOLIN ALCOHOL/MO/W.PET/CERES
3 CREAM (GRAM) TOPICAL AT BEDTIME
Refills: 0 | Status: DISCONTINUED | OUTPATIENT
Start: 2023-02-02 | End: 2023-02-10

## 2023-02-02 RX ORDER — SODIUM BICARBONATE 1 MEQ/ML
0.07 SYRINGE (ML) INTRAVENOUS
Qty: 150 | Refills: 0 | Status: DISCONTINUED | OUTPATIENT
Start: 2023-02-02 | End: 2023-02-02

## 2023-02-02 RX ORDER — SODIUM BICARBONATE 1 MEQ/ML
0.07 SYRINGE (ML) INTRAVENOUS
Qty: 50 | Refills: 0 | Status: DISCONTINUED | OUTPATIENT
Start: 2023-02-02 | End: 2023-02-02

## 2023-02-02 RX ORDER — SODIUM CHLORIDE 9 MG/ML
1000 INJECTION INTRAMUSCULAR; INTRAVENOUS; SUBCUTANEOUS ONCE
Refills: 0 | Status: COMPLETED | OUTPATIENT
Start: 2023-02-02 | End: 2023-02-02

## 2023-02-02 RX ORDER — APIXABAN 2.5 MG/1
2.5 TABLET, FILM COATED ORAL EVERY 12 HOURS
Refills: 0 | Status: DISCONTINUED | OUTPATIENT
Start: 2023-02-02 | End: 2023-02-10

## 2023-02-02 RX ORDER — ATORVASTATIN CALCIUM 80 MG/1
80 TABLET, FILM COATED ORAL AT BEDTIME
Refills: 0 | Status: DISCONTINUED | OUTPATIENT
Start: 2023-02-02 | End: 2023-02-07

## 2023-02-02 RX ORDER — ACETAMINOPHEN 500 MG
650 TABLET ORAL EVERY 6 HOURS
Refills: 0 | Status: DISCONTINUED | OUTPATIENT
Start: 2023-02-02 | End: 2023-02-10

## 2023-02-02 RX ORDER — PIPERACILLIN AND TAZOBACTAM 4; .5 G/20ML; G/20ML
3.38 INJECTION, POWDER, LYOPHILIZED, FOR SOLUTION INTRAVENOUS ONCE
Refills: 0 | Status: COMPLETED | OUTPATIENT
Start: 2023-02-02 | End: 2023-02-02

## 2023-02-02 RX ORDER — CLOPIDOGREL BISULFATE 75 MG/1
75 TABLET, FILM COATED ORAL DAILY
Refills: 0 | Status: DISCONTINUED | OUTPATIENT
Start: 2023-02-02 | End: 2023-02-10

## 2023-02-02 RX ORDER — ASCORBIC ACID 60 MG
500 TABLET,CHEWABLE ORAL DAILY
Refills: 0 | Status: DISCONTINUED | OUTPATIENT
Start: 2023-02-02 | End: 2023-02-10

## 2023-02-02 RX ORDER — SENNA PLUS 8.6 MG/1
2 TABLET ORAL AT BEDTIME
Refills: 0 | Status: DISCONTINUED | OUTPATIENT
Start: 2023-02-02 | End: 2023-02-10

## 2023-02-02 RX ORDER — DEXAMETHASONE 0.5 MG/5ML
6 ELIXIR ORAL ONCE
Refills: 0 | Status: COMPLETED | OUTPATIENT
Start: 2023-02-02 | End: 2023-02-02

## 2023-02-02 RX ORDER — DEXAMETHASONE 0.5 MG/5ML
6 ELIXIR ORAL DAILY
Refills: 0 | Status: DISCONTINUED | OUTPATIENT
Start: 2023-02-03 | End: 2023-02-10

## 2023-02-02 RX ORDER — POLYETHYLENE GLYCOL 3350 17 G/17G
17 POWDER, FOR SOLUTION ORAL DAILY
Refills: 0 | Status: DISCONTINUED | OUTPATIENT
Start: 2023-02-02 | End: 2023-02-10

## 2023-02-02 RX ORDER — OLANZAPINE 15 MG/1
2.5 TABLET, FILM COATED ORAL AT BEDTIME
Refills: 0 | Status: DISCONTINUED | OUTPATIENT
Start: 2023-02-02 | End: 2023-02-10

## 2023-02-02 RX ORDER — REMDESIVIR 5 MG/ML
INJECTION INTRAVENOUS
Refills: 0 | Status: COMPLETED | OUTPATIENT
Start: 2023-02-02 | End: 2023-02-06

## 2023-02-02 RX ORDER — AMIODARONE HYDROCHLORIDE 400 MG/1
200 TABLET ORAL DAILY
Refills: 0 | Status: DISCONTINUED | OUTPATIENT
Start: 2023-02-02 | End: 2023-02-10

## 2023-02-02 RX ORDER — ACETAMINOPHEN 500 MG
1000 TABLET ORAL ONCE
Refills: 0 | Status: COMPLETED | OUTPATIENT
Start: 2023-02-02 | End: 2023-02-02

## 2023-02-02 RX ORDER — ONDANSETRON 8 MG/1
4 TABLET, FILM COATED ORAL EVERY 8 HOURS
Refills: 0 | Status: DISCONTINUED | OUTPATIENT
Start: 2023-02-02 | End: 2023-02-10

## 2023-02-02 RX ORDER — VANCOMYCIN HCL 1 G
1000 VIAL (EA) INTRAVENOUS ONCE
Refills: 0 | Status: COMPLETED | OUTPATIENT
Start: 2023-02-02 | End: 2023-02-02

## 2023-02-02 RX ORDER — CARBIDOPA AND LEVODOPA 25; 100 MG/1; MG/1
1.5 TABLET ORAL
Refills: 0 | Status: DISCONTINUED | OUTPATIENT
Start: 2023-02-02 | End: 2023-02-10

## 2023-02-02 RX ORDER — PIPERACILLIN AND TAZOBACTAM 4; .5 G/20ML; G/20ML
3.38 INJECTION, POWDER, LYOPHILIZED, FOR SOLUTION INTRAVENOUS EVERY 8 HOURS
Refills: 0 | Status: DISCONTINUED | OUTPATIENT
Start: 2023-02-02 | End: 2023-02-02

## 2023-02-02 RX ORDER — REMDESIVIR 5 MG/ML
200 INJECTION INTRAVENOUS EVERY 24 HOURS
Refills: 0 | Status: COMPLETED | OUTPATIENT
Start: 2023-02-02 | End: 2023-02-02

## 2023-02-02 RX ORDER — SODIUM CHLORIDE 9 MG/ML
1000 INJECTION, SOLUTION INTRAVENOUS
Refills: 0 | Status: DISCONTINUED | OUTPATIENT
Start: 2023-02-02 | End: 2023-02-10

## 2023-02-02 RX ORDER — REMDESIVIR 5 MG/ML
100 INJECTION INTRAVENOUS EVERY 24 HOURS
Refills: 0 | Status: COMPLETED | OUTPATIENT
Start: 2023-02-03 | End: 2023-02-06

## 2023-02-02 RX ADMIN — SODIUM CHLORIDE 75 MILLILITER(S): 9 INJECTION, SOLUTION INTRAVENOUS at 11:48

## 2023-02-02 RX ADMIN — PIPERACILLIN AND TAZOBACTAM 200 GRAM(S): 4; .5 INJECTION, POWDER, LYOPHILIZED, FOR SOLUTION INTRAVENOUS at 01:23

## 2023-02-02 RX ADMIN — Medication 400 MILLIGRAM(S): at 01:13

## 2023-02-02 RX ADMIN — Medication 6 MILLIGRAM(S): at 03:30

## 2023-02-02 RX ADMIN — SODIUM CHLORIDE 1000 MILLILITER(S): 9 INJECTION INTRAMUSCULAR; INTRAVENOUS; SUBCUTANEOUS at 01:13

## 2023-02-02 RX ADMIN — SODIUM CHLORIDE 75 MILLILITER(S): 9 INJECTION, SOLUTION INTRAVENOUS at 23:23

## 2023-02-02 RX ADMIN — Medication 33.1 MEQ/KG/HR: at 02:35

## 2023-02-02 RX ADMIN — REMDESIVIR 200 MILLIGRAM(S): 5 INJECTION INTRAVENOUS at 15:05

## 2023-02-02 RX ADMIN — SODIUM CHLORIDE 1000 MILLILITER(S): 9 INJECTION INTRAMUSCULAR; INTRAVENOUS; SUBCUTANEOUS at 02:13

## 2023-02-02 RX ADMIN — Medication 250 MILLIGRAM(S): at 02:18

## 2023-02-02 NOTE — H&P ADULT - HISTORY OF PRESENT ILLNESS
82-year-old male presents with acute onset of shortness of breath as per wife bedside.  Wife states that patient recently was discharged from the hospital for pneumonia and started on outpatient antibiotics.  Also endorsing patient has a history of right-sided pleural effusions for which she gets drained  3 times a week.  Last drainage earlier today.   Today patient began having trouble breathing and wife called 911 and patient was found to be hypoxic and warm to the touch and more confused than baseline.no falls, trauma, vomiting, diarrhea, ill contacts  Baseline , patient is AxOX1.  In ED , patient with acute hypoxic respiratory failure on HFNC , 40 L, 40% FIO2  sec to COVID pneumonia , sepsis , lactic acidosis.  In ED, patient was placed on bicarb drip, received Vancomycin and Zosyn and placed on HFNC as above.  Telemetry- paced rhythm.

## 2023-02-02 NOTE — ED ADULT TRIAGE NOTE - CHIEF COMPLAINT QUOTE
pt received as notification for SOB on CPAP, recently diagnosis of pneumonia. pt awake and alert tolerating CPAP well on arrival. brought directly to trauma b.

## 2023-02-02 NOTE — HISTORY OF PRESENT ILLNESS
[Post-hospitalization from ___ Hospital] : Post-hospitalization from [unfilled] Hospital [Admitted on: ___] : The patient was admitted on [unfilled] [Discharged on ___] : discharged on [unfilled] [Discharge Summary] : discharge summary [Pertinent Labs] : pertinent labs [Discharge Med List] : discharge medication list [Med Reconciliation] : medication reconciliation has been completed [Patient Contacted By: ____] : and contacted by [unfilled] [FreeTextEntry3] : F/u post hospital discharge [facility] Pneumonia [FreeTextEntry2] : Mr. Banerjee is a 82 year old male with pmh of Parkinson's, R Pleural effusion s/p VATS and PleurX, a. fib on Eliquis, bradycardia w/ AICD, HTN who was BIBEMS for generalized weakness and decreased UOP, found to be in severe sepsis state likely secondary to pneumonia. CT abd/pelvis showed large stool burden and Bilateral lower lobe and left upper lobe ground glass opacities. Small bilateral pleural effusions with right chest tube in place. Echo was done for hypotension which showed EF 73% with concentric LV hypertrophy. Hospital course complicated by hypotension, resolved with fluids and midodrine. ALAYNA and urinary distention s/p straight cath with improvement. Diuretics and anti-hypertensives were stopped due to low BPs and ALAYNA. Seen by speech on 1/26, regular solids with thin liquids. Pt was discharged to home with home care services.\par \par Upon arrival to home, Mr. Banerjee is observed in bed, alert, verbal, appears comfortable and in nad. Pleasantly confused at baseline and history provided by spouse [place].\par Since discharge, states pt feeling more fatigue but cognitively at baseline, today more alert and slept well last night. States appetite is okay, urine output improved a little and holding diuretic per discharge instructions until seen by PMD. Aware to complete antibiotics as prescribed, spouse to  rx. Denies any foul smell, hematuria or cloudiness. Denies any fever/chills, SOB, CP, worsening confusion/agitation or any abnormal bruising/bleeding. +cough + weakness +pleurx in place, draining done by home care RN. \par States patient remains mainly bedbound since September 2022, has hospital bed and wheelchair in the home. DME company scheduled to adjust bed. Has 24/7 private hire HHA. \par Spouse noted to be teary during encounter. Caregiver strain reviewed, emotional support provided and referred to Behavioral health team. \par \[facility] Has active home care services, wound care RN scheduled today and confirmed PT referral in place.\[facility] Has house calls MD Dr. Loera ([phone]) scheduled 2/6, I left a message with office for earlier.\par Spouse aware to schedule follow ups with urology, neurology and thoracic surgery.

## 2023-02-02 NOTE — H&P ADULT - NSHPPHYSICALEXAM_GEN_ALL_CORE
.  VITAL SIGNS:  T(C): 36.4 (02-02-23 @ 07:45), Max: 39.7 (02-02-23 @ 01:05)  T(F): 97.5 (02-02-23 @ 07:45), Max: 103.5 (02-02-23 @ 01:05)  HR: 60 (02-02-23 @ 07:45) (60 - 83)  BP: 98/65 (02-02-23 @ 07:45) (92/44 - 98/65)  BP(mean): 58 (02-02-23 @ 02:35) (58 - 78)  RR: 16 (02-02-23 @ 07:45) (16 - 22)  SpO2: 100% (02-02-23 @ 07:45) (100% - 100%)  Wt(kg): --    PHYSICAL EXAM:    Constitutional: lethargic , response to voice , GCS 9  Head: NC/AT  Eyes: PERRL, clear conjunctiva  ENT: HFNC  Neck: supple;   Respiratory: diminished breath sounds B/L , rt Pleurx   Cardiac: +S1/S2;   Gastrointestinal: soft, NT/ND; no rebound or guarding; +BSx4  Back: spine midline, no bony tenderness or step-offs  Extremities: WWP, no clubbing or cyanosis;trace  peripheral edema, left leg wound  Neurologic: GCS 9;  Psychiatric: not cooperating

## 2023-02-02 NOTE — PHYSICAL EXAM
[No Acute Distress] : no acute distress [Normal Sclera/Conjunctiva] : normal sclera/conjunctiva [Normal Outer Ear/Nose] : the outer ears and nose were normal in appearance [No Respiratory Distress] : no respiratory distress  [No Accessory Muscle Use] : no accessory muscle use [Normal Rate] : normal rate  [Regular Rhythm] : with a regular rhythm [No Edema] : there was no peripheral edema [No Extremity Clubbing/Cyanosis] : no extremity clubbing/cyanosis [Normal Bowel Sounds] : normal bowel sounds [de-identified] : pleasantly confused gentlemen, alert, verbal  [de-identified] : eric - unable to get clear assessment due to pt with limited mobility and cognition [de-identified] : + dsg on sacrum, left heel and left knee, dsg c,d,i and managed by wound care RN - scheduled today [de-identified] : bedbound with limited mobility [de-identified] : Parkinson's

## 2023-02-02 NOTE — SWALLOW BEDSIDE ASSESSMENT ADULT - ASR SWALLOW RECOMMEND DIAG
objective testing is NOT indicated given inability to maintain awake/alert state to participate in swallow evaluation

## 2023-02-02 NOTE — ED PROVIDER NOTE - ATTENDING CONTRIBUTION TO CARE
I performed a face-to-face evaluation of the patient and performed a history and physical examination along with the resident or ACP, and/or medical student above.  I agree with the history and physical examination as documented by the resident or ACP, and/or medical student above.  Messer:  Gen: Alert, coughing, mild resp distress  Head: NC, AT,  EOMI, normal lids/conjunctiva  ENT:  normal hearing, patent oropharynx without erythema/exudate  Neck: +supple, no tenderness/meningismus/JVD, +Trachea midline  Chest: no chest wall tenderness, equal chest rise  Pulm: Bilateral BS, tachypneic, no wheeze/stridor/retractions  CV: tachy, no M/R/G, +dist pulses  Abd: +BS, soft, NT/ND  Rectal: deferred  Mskel: no edema/erythema/cyanosis  Skin: no rash  Neuro: AA, verbal (comprehensible but inappropriate words), not oriented, moves all extremities, follows some commands

## 2023-02-02 NOTE — SWALLOW BEDSIDE ASSESSMENT ADULT - SWALLOW EVAL: DIAGNOSIS
Unable to objectively assess oral and pharyngeal stage of swallow as patient unable to maintain awake/alert state to safely participate in swallow evaluation despite maximal verbal and tactile cues provided by SLP (sternal rub, painful stimuli, cold compress). PO trials deferred given patient clinical presentation

## 2023-02-02 NOTE — ED PROVIDER NOTE - OBJECTIVE STATEMENT
82y m 82-year-old male presents with acute onset of shortness of breath as per wife bedside.  Wife states that patient recently was discharged from the hospital for pneumonia and started on outpatient antibiotics.  Also endorsing patient has a history of right-sided pleural effusions for which she gets drained  3 times a week.  Last drainage earlier today.   Today patient began having trouble breathing and wife called 911 and patient was found to be hypoxic and warm to the touch and more confused than baseline.  no falls, trauma, vomiting, diarrhea, ill contacts  normall aaox1

## 2023-02-02 NOTE — PLAN
[FreeTextEntry1] : Educated patient and family extensively on fall and safety precautions, aspiration precautions, good skin care and importance to return to ED for any change in mental status or worsening s/s as reviewed. Followed by house call MD Dr. Loera and reinforced importance of specialty follow up. Reinforced TCM contacts.

## 2023-02-02 NOTE — HEALTH RISK ASSESSMENT
[Low Salt Diet] : low salt [General Adherence] : general adherence [With Significant Other] : lives with significant other [] :  [Full assistance needed] : managing finances [With Patient/Caregiver] : , with patient/caregiver [DNR] : DNR [DNI] : DNI [Last Verification Date: ___] : Last Verification Date: [unfilled] [I will adhere to the patient's wishes.] : I will adhere to the patient's wishes. [Time Spent: ___ minutes] : Time Spent: [unfilled] minutes [Never] : Never [de-identified] : social [AdvancecareDate] : 2/1/2023 [FreeTextEntry4] : Has completed MOLST signed 1/16/2023 for DNR/DNI, confirmed with spouse no changes to instructions or code status.

## 2023-02-02 NOTE — ED PROVIDER NOTE - PROGRESS NOTE DETAILS
Freeman Frazier PGY-3 Patient resuscitated with fluids and bicarb drip lactate has improved.    Satting 99% on high flow last blood pressure 93/54 MAP of 67 heart rate in the 60s   MOLST form filled out bedside with wife DNR/DNI confirmed no feeding tube form placed in chart   stable for floor admission on continuous pulse ox

## 2023-02-02 NOTE — PATIENT PROFILE ADULT - FALL HARM RISK - HARM RISK INTERVENTIONS

## 2023-02-02 NOTE — ED PROVIDER NOTE - CLINICAL SUMMARY MEDICAL DECISION MAKING FREE TEXT BOX
82-year-old male with trouble breathing and right-sided pleural effusions recently diagnosed with pneumonia.  Febrile, tachycardic, satting 50% on room air, tachypneic to 20s  Coarse breath sounds bilaterally, mild retractions, ANO 0.  Right-sided Pleurx catheter in place.  No leg edema abdomen soft nontender nondistended no jaundice no JVD.  Differentials include acute hypoxic respiratory failure secondary to aspiration, PE, pleural effusions, lung abscess.  Plan to evaluate with labs, blood cultures, x-ray treat with antipyretics resuscitate with fluids and start antibiotics empirically

## 2023-02-02 NOTE — CONSULT NOTE ADULT - ASSESSMENT
82-year-old male PMHx pleural effusion s/p pleurx, parkinson disease, dementia, afib, ppm, CAD, presents with AHRF, covid-19 positive    #AHRF  #COVID-19  - continue with remdexivir/dexamethasone  - drain pleurX at least 3x/week, can drain more frequently if symptomatic or escalating O2 requirement, up to 500cc/day  - wean O2 as tolerated, maintain sats >92%  - most recent gas 7.4/31/182/19, consider decreasing or d/c bicarb gtt and following up repeat blood gas

## 2023-02-02 NOTE — SWALLOW BEDSIDE ASSESSMENT ADULT - ADDITIONAL RECOMMENDATIONS
1. reconsult this department as patient's condition becomes medically optimized   2. this department to follow up as schedule permits for PO candidacy/candidacy for objective testing

## 2023-02-02 NOTE — SWALLOW BEDSIDE ASSESSMENT ADULT - COMMENTS
Per H&P 2/2/23: "82-year-old male presents with acute onset of shortness of breath as per wife bedside.  Wife states that patient recently was discharged from the hospital for pneumonia and started on outpatient antibiotics.  Also endorsing patient has a history of right-sided pleural effusions for which she gets drained  3 times a week.  Last drainage earlier today.   Today patient began having trouble breathing and wife called 911 and patient was found to be hypoxic and warm to the touch and more confused than baseline.no falls, trauma, vomiting, diarrhea, ill contactsBaseline , patient is AxOX1.In ED , patient with acute hypoxic respiratory failure on HFNC , 40 L, 40% FIO2  sec to COVID pneumonia , sepsis , lactic acidosis."     Patient received upright in EDU stretcher in deep sleep state with HFNC in place (40 LPMC, 40% Fio2). Wife present at bedside. Patient unable to maintain awake/alert state to safely participate in swallow evaluation despite maximal verbal and tactile cues provided by SLP (sternal rub, painful stimuli, cold compress). Prior to start of evaluation, RN reporting patient with intermittent eyes open state via maximal tactile cues (sternal rub) and noted with lethargy.

## 2023-02-02 NOTE — PATIENT PROFILE ADULT - FUNCTIONAL ASSESSMENT - BASIC MOBILITY 6.
1-calculated by average/Not able to assess (calculate score using [facility] averaging method)

## 2023-02-02 NOTE — REVIEW OF SYSTEMS
[Fatigue] : fatigue [Cough] : cough [Constipation] : constipation [Incontinence] : incontinence [Negative] : ENT [Chest Pain] : no chest pain [Lower Ext Edema] : no lower extremity edema [Shortness Of Breath] : no shortness of breath [Wheezing] : no wheezing [FreeTextEntry2] : history provided by spouse Mo [FreeTextEntry8] : diapered [de-identified] : wounds [de-identified] : Parkinson's with confusion at baseline per spouse

## 2023-02-02 NOTE — ED ADULT NURSE NOTE - OBJECTIVE STATEMENT
carl RN: pt presents to Trauma B, brought in by EMS for respiratory distress on CPAP. Patient with home health aid who endorsed pt breathing worsening so called 911. Patient hypoxic to 50-60s at home on RA as per EMS, placed on CPAP prior to arrival. Patient was recently admitted and treated for pneumonia. Patient with R chest pigtail catheter for ~1year for pleural effusion, being drained 3x/week. Respirations even, unlabored, abd soft round, nontender. placed on cardiac monitor, 20GLAC in place, awaiting further orders.

## 2023-02-02 NOTE — H&P ADULT - PROBLEM SELECTOR PLAN 2
In setting of lactic / metabolic acidosis , acute/ chronic as baseline AXOX1  GCS 9  C/w lactate ringer   F/w labs , correct electrolytes   Parkison disease - c/w carbidopa/ levodopa  Neuro checks   Aspiration precautions  c/w bowel regimen In setting of lactic / metabolic acidosis , acute/ chronic as baseline AXOX1  GCS 9  C/w lactate ringer   F/w labs , correct electrolytes   Parkison disease - c/w carbidopa/ levodopa  Neuro checks   Aspiration precautions  c/w bowel regimen  c/w olanzapine, hold for sedation In setting of lactic / metabolic acidosis , acute/ chronic as baseline AXOX1  GCS 9  C/w lactate ringer   F/w labs , correct electrolytes   Parkison disease - c/w carbidopa/ levodopa  Neuro checks   Aspiration precautions  c/w bowel regimen  c/w olanzapine, hold for sedation  NPO   ST eval

## 2023-02-02 NOTE — H&P ADULT - ASSESSMENT
82M PMH Parkinson's, Pleural effusion s/p PleurX, a. fib on Eliquis, bradycardia w/ AICD, HTN who was BIBEMS for  sepsis sec to COVID pneumonia and hypoxic respiratory failure , lactic metabolic acidosis , metabolic encephalopathy.   Code status was rediscussed in ED , patient is DNR , palliative care consult requested.

## 2023-02-02 NOTE — ED ADULT NURSE REASSESSMENT NOTE - NS ED NURSE REASSESS COMMENT FT1
Break Coverage RN: Pt A&Ox 2-3, confused. Pt at baseline mental status as per wife at bedside. Respirations equal and unlabored. Pt resting in stretcher at this time with no complaints. 2nd 20G IV placed to R forearm. Repeat blood work sent. Pt turned and repositioned. Rectal temperature taken. Intermittent catheterization performed for urine sample. 200cc of dark colored urine drained. Sterile procedure maintained. Pt 100% O2 sat on high flow nasal cannula. Medicated as EMR orders. Safety maintained. Pending XR. Will continue to monitor.
Respiratory here for transfer patient to [facility], pt appears to be resting comfortably at this time.

## 2023-02-02 NOTE — H&P ADULT - PROBLEM SELECTOR PLAN 3
patient placed on HFNC in ED   C/w steroids , apixaban   Wean off as tolerated  RT Pleurx, CXR min effusion patient placed on HFNC in ED   C/w steroids , apixaban   Wean off as tolerated  RT Pleurx, CXR min effusion  TTE- Moderate pulm HTN, hold phosphodiesterase inhibitors in setting of sepsis , hypotension

## 2023-02-02 NOTE — ED PROVIDER NOTE - PHYSICAL EXAMINATION
Vital signs reviewed  GENERAL: aaox0, Wet cough  HEAD: NCAT  EYES: Anicteric  ENT: MMM  NECK: Supple, non tender  RESPIRATORY: tachypneic with coarse breath sounds satting 99 on high flow  CARDIOVASCULAR:  tachycardic s1s2  ABDOMEN: Soft. Nondistended. Nontender. No guarding or rebound. No CVA tenderness.  MUSCULOSKELETAL/EXTREMITIES: Brisk cap refill. 2+ radial pulses. No leg edema.  SKIN:  Warm and dry  NEURO: AAOx1

## 2023-02-02 NOTE — H&P ADULT - PROBLEM SELECTOR PLAN 1
Patient with sepsis sec to COVID   Lactic metabolic acidosis , s/p bicarb drip in ED , will c/w LR @75ml/h   Start Remedesivir , Dexamethasone  check pro-anthony   Received vanco & zosyn in ED , will hold as sepsis in setting of COVID infection, completed atb 1/31/23   F/w blood cultures, respiratory cultures   ID consult if worsening   Apixaban

## 2023-02-02 NOTE — ED PROVIDER NOTE - CONVERSATION DETAILS
Freeman Frazier PGY-3  Wife bedside, KAMLESH signed, patient DNR DNI. discussed interventions would like non invasive interventions at this time. can benefit from palliative consult inpatient

## 2023-02-02 NOTE — H&P ADULT - NSHPLABSRESULTS_GEN_ALL_CORE
.  LABS:                         8.1    8.73  )-----------( 227      ( 2023 09:15 )             27.3         138  |  113<H>  |  18  ----------------------------<  170<H>  4.2   |  18<L>  |  1.65<H>    Ca    8.4      2023 07:25  Phos  2.7       Mg     1.70         TPro  5.2<L>  /  Alb  2.4<L>  /  TBili  0.4  /  DBili  x   /  AST  24  /  ALT  7   /  AlkPhos  217<H>      PT/INR - ( 2023 01:09 )   PT: 20.9 sec;   INR: 1.79 ratio         PTT - ( 2023 01:09 )  PTT:37.7 sec  Urinalysis Basic - ( 2023 02:32 )    Color: Yellow / Appearance: Turbid / S.027 / pH: x  Gluc: x / Ketone: Trace  / Bili: Negative / Urobili: <2 mg/dL   Blood: x / Protein: 300 mg/dL / Nitrite: Negative   Leuk Esterase: Small / RBC: 548 /HPF / WBC 16 /HPF   Sq Epi: x / Non Sq Epi: 4 /HPF / Bacteria: Few            Lactate, Blood: 1.8 mmol/L ( @ 07:25)      RADIOLOGY, EKG & ADDITIONAL TESTS: Reviewed.

## 2023-02-02 NOTE — CONSULT NOTE ADULT - ATTENDING COMMENTS
82-year-old male PMHx pleural effusion s/p pleurx, parkinson disease, dementia, afib, ppm, CAD, presents with AHRF in the setting of covid-19 infxn.   Pt recently with swallow study and on dysphagia diet but family denies any recent aspiration event and cxr w/o typical consolidation. Is covid + and may have covid pna and would tx w/ remdesevir and dexamethasone. cont hfnc to maintain sat >90%. Pt w/ chronic benign effusion s/p pleurex, cont drainage. cont a/c for AF.

## 2023-02-03 ENCOUNTER — APPOINTMENT (OUTPATIENT)
Dept: CARDIOLOGY | Facility: CLINIC | Age: 83
End: 2023-02-03

## 2023-02-03 LAB
A1C WITH ESTIMATED AVERAGE GLUCOSE RESULT: 5.4 % — SIGNIFICANT CHANGE UP (ref 4–5.6)
ALBUMIN SERPL ELPH-MCNC: 2.6 G/DL — LOW (ref 3.3–5)
ALP SERPL-CCNC: 235 U/L — HIGH (ref 40–120)
ALT FLD-CCNC: 8 U/L — SIGNIFICANT CHANGE UP (ref 4–41)
ANION GAP SERPL CALC-SCNC: 9 MMOL/L — SIGNIFICANT CHANGE UP (ref 7–14)
APTT BLD: 35.4 SEC — SIGNIFICANT CHANGE UP (ref 27–36.3)
AST SERPL-CCNC: 29 U/L — SIGNIFICANT CHANGE UP (ref 4–40)
BASOPHILS # BLD AUTO: 0.01 K/UL — SIGNIFICANT CHANGE UP (ref 0–0.2)
BASOPHILS NFR BLD AUTO: 0.1 % — SIGNIFICANT CHANGE UP (ref 0–2)
BILIRUB SERPL-MCNC: 0.3 MG/DL — SIGNIFICANT CHANGE UP (ref 0.2–1.2)
BUN SERPL-MCNC: 23 MG/DL — SIGNIFICANT CHANGE UP (ref 7–23)
CALCIUM SERPL-MCNC: 8.7 MG/DL — SIGNIFICANT CHANGE UP (ref 8.4–10.5)
CHLORIDE SERPL-SCNC: 108 MMOL/L — HIGH (ref 98–107)
CHOLEST SERPL-MCNC: 93 MG/DL — SIGNIFICANT CHANGE UP
CO2 SERPL-SCNC: 21 MMOL/L — LOW (ref 22–31)
CREAT SERPL-MCNC: 1.59 MG/DL — HIGH (ref 0.5–1.3)
CULTURE RESULTS: NO GROWTH — SIGNIFICANT CHANGE UP
EGFR: 44 ML/MIN/1.73M2 — LOW
EOSINOPHIL # BLD AUTO: 0 K/UL — SIGNIFICANT CHANGE UP (ref 0–0.5)
EOSINOPHIL NFR BLD AUTO: 0 % — SIGNIFICANT CHANGE UP (ref 0–6)
ESTIMATED AVERAGE GLUCOSE: 108 — SIGNIFICANT CHANGE UP
GLUCOSE SERPL-MCNC: 92 MG/DL — SIGNIFICANT CHANGE UP (ref 70–99)
HCT VFR BLD CALC: 24.2 % — LOW (ref 39–50)
HDLC SERPL-MCNC: 46 MG/DL — SIGNIFICANT CHANGE UP
HGB BLD-MCNC: 7.4 G/DL — LOW (ref 13–17)
IANC: 12.82 K/UL — HIGH (ref 1.8–7.4)
IMM GRANULOCYTES NFR BLD AUTO: 0.5 % — SIGNIFICANT CHANGE UP (ref 0–0.9)
INR BLD: 1.66 RATIO — HIGH (ref 0.88–1.16)
LIPID PNL WITH DIRECT LDL SERPL: 38 MG/DL — SIGNIFICANT CHANGE UP
LYMPHOCYTES # BLD AUTO: 0.52 K/UL — LOW (ref 1–3.3)
LYMPHOCYTES # BLD AUTO: 3.7 % — LOW (ref 13–44)
MAGNESIUM SERPL-MCNC: 1.8 MG/DL — SIGNIFICANT CHANGE UP (ref 1.6–2.6)
MCHC RBC-ENTMCNC: 27.6 PG — SIGNIFICANT CHANGE UP (ref 27–34)
MCHC RBC-ENTMCNC: 30.6 GM/DL — LOW (ref 32–36)
MCV RBC AUTO: 90.3 FL — SIGNIFICANT CHANGE UP (ref 80–100)
MONOCYTES # BLD AUTO: 0.45 K/UL — SIGNIFICANT CHANGE UP (ref 0–0.9)
MONOCYTES NFR BLD AUTO: 3.2 % — SIGNIFICANT CHANGE UP (ref 2–14)
NEUTROPHILS # BLD AUTO: 12.82 K/UL — HIGH (ref 1.8–7.4)
NEUTROPHILS NFR BLD AUTO: 92.5 % — HIGH (ref 43–77)
NON HDL CHOLESTEROL: 47 MG/DL — SIGNIFICANT CHANGE UP
NRBC # BLD: 0 /100 WBCS — SIGNIFICANT CHANGE UP (ref 0–0)
NRBC # FLD: 0 K/UL — SIGNIFICANT CHANGE UP (ref 0–0)
PHOSPHATE SERPL-MCNC: 2.9 MG/DL — SIGNIFICANT CHANGE UP (ref 2.5–4.5)
PLATELET # BLD AUTO: 285 K/UL — SIGNIFICANT CHANGE UP (ref 150–400)
POTASSIUM SERPL-MCNC: 4.1 MMOL/L — SIGNIFICANT CHANGE UP (ref 3.5–5.3)
POTASSIUM SERPL-SCNC: 4.1 MMOL/L — SIGNIFICANT CHANGE UP (ref 3.5–5.3)
PROT SERPL-MCNC: 5.5 G/DL — LOW (ref 6–8.3)
PROTHROM AB SERPL-ACNC: 19.3 SEC — HIGH (ref 10.5–13.4)
RBC # BLD: 2.68 M/UL — LOW (ref 4.2–5.8)
RBC # FLD: 22.2 % — HIGH (ref 10.3–14.5)
SODIUM SERPL-SCNC: 138 MMOL/L — SIGNIFICANT CHANGE UP (ref 135–145)
SPECIMEN SOURCE: SIGNIFICANT CHANGE UP
TRIGL SERPL-MCNC: 45 MG/DL — SIGNIFICANT CHANGE UP
WBC # BLD: 13.87 K/UL — HIGH (ref 3.8–10.5)
WBC # FLD AUTO: 13.87 K/UL — HIGH (ref 3.8–10.5)

## 2023-02-03 PROCEDURE — 99233 SBSQ HOSP IP/OBS HIGH 50: CPT

## 2023-02-03 RX ORDER — PANTOPRAZOLE SODIUM 20 MG/1
40 TABLET, DELAYED RELEASE ORAL DAILY
Refills: 0 | Status: DISCONTINUED | OUTPATIENT
Start: 2023-02-03 | End: 2023-02-10

## 2023-02-03 RX ADMIN — CARBIDOPA AND LEVODOPA 1.5 TABLET(S): 25; 100 TABLET ORAL at 18:48

## 2023-02-03 RX ADMIN — OLANZAPINE 2.5 MILLIGRAM(S): 15 TABLET, FILM COATED ORAL at 23:13

## 2023-02-03 RX ADMIN — SODIUM CHLORIDE 75 MILLILITER(S): 9 INJECTION, SOLUTION INTRAVENOUS at 13:11

## 2023-02-03 RX ADMIN — REMDESIVIR 200 MILLIGRAM(S): 5 INJECTION INTRAVENOUS at 15:46

## 2023-02-03 RX ADMIN — CARBIDOPA AND LEVODOPA 1.5 TABLET(S): 25; 100 TABLET ORAL at 13:11

## 2023-02-03 RX ADMIN — CARBIDOPA AND LEVODOPA 1.5 TABLET(S): 25; 100 TABLET ORAL at 00:48

## 2023-02-03 RX ADMIN — ATORVASTATIN CALCIUM 80 MILLIGRAM(S): 80 TABLET, FILM COATED ORAL at 23:13

## 2023-02-03 RX ADMIN — Medication 6 MILLIGRAM(S): at 06:48

## 2023-02-03 RX ADMIN — Medication 500 MILLIGRAM(S): at 13:11

## 2023-02-03 RX ADMIN — AMIODARONE HYDROCHLORIDE 200 MILLIGRAM(S): 400 TABLET ORAL at 05:15

## 2023-02-03 RX ADMIN — APIXABAN 2.5 MILLIGRAM(S): 2.5 TABLET, FILM COATED ORAL at 18:48

## 2023-02-03 RX ADMIN — PANTOPRAZOLE SODIUM 40 MILLIGRAM(S): 20 TABLET, DELAYED RELEASE ORAL at 13:10

## 2023-02-03 RX ADMIN — POLYETHYLENE GLYCOL 3350 17 GRAM(S): 17 POWDER, FOR SOLUTION ORAL at 13:10

## 2023-02-03 RX ADMIN — CLOPIDOGREL BISULFATE 75 MILLIGRAM(S): 75 TABLET, FILM COATED ORAL at 13:10

## 2023-02-03 RX ADMIN — APIXABAN 2.5 MILLIGRAM(S): 2.5 TABLET, FILM COATED ORAL at 05:15

## 2023-02-03 RX ADMIN — CARBIDOPA AND LEVODOPA 1.5 TABLET(S): 25; 100 TABLET ORAL at 05:15

## 2023-02-03 RX ADMIN — SENNA PLUS 2 TABLET(S): 8.6 TABLET ORAL at 23:13

## 2023-02-03 RX ADMIN — CARBIDOPA AND LEVODOPA 1.5 TABLET(S): 25; 100 TABLET ORAL at 23:35

## 2023-02-03 NOTE — PROGRESS NOTE ADULT - PROBLEM SELECTOR PLAN 3
patient placed on HFNC in ED   C/w steroids , apixaban   Wean off as tolerated  RT Pleurx, CXR min effusion  improved today  titrate off HFNC to NC   pt not on home O2

## 2023-02-04 LAB
ALBUMIN SERPL ELPH-MCNC: 2.8 G/DL — LOW (ref 3.3–5)
ALP SERPL-CCNC: 287 U/L — HIGH (ref 40–120)
ALT FLD-CCNC: 8 U/L — SIGNIFICANT CHANGE UP (ref 4–41)
ANION GAP SERPL CALC-SCNC: 16 MMOL/L — HIGH (ref 7–14)
APTT BLD: 37.4 SEC — HIGH (ref 27–36.3)
AST SERPL-CCNC: 37 U/L — SIGNIFICANT CHANGE UP (ref 4–40)
BASOPHILS # BLD AUTO: 0.01 K/UL — SIGNIFICANT CHANGE UP (ref 0–0.2)
BASOPHILS NFR BLD AUTO: 0.1 % — SIGNIFICANT CHANGE UP (ref 0–2)
BILIRUB SERPL-MCNC: 0.3 MG/DL — SIGNIFICANT CHANGE UP (ref 0.2–1.2)
BUN SERPL-MCNC: 29 MG/DL — HIGH (ref 7–23)
CALCIUM SERPL-MCNC: 9 MG/DL — SIGNIFICANT CHANGE UP (ref 8.4–10.5)
CHLORIDE SERPL-SCNC: 105 MMOL/L — SIGNIFICANT CHANGE UP (ref 98–107)
CO2 SERPL-SCNC: 18 MMOL/L — LOW (ref 22–31)
CREAT SERPL-MCNC: 1.6 MG/DL — HIGH (ref 0.5–1.3)
CRP SERPL-MCNC: 65.8 MG/L — HIGH
D DIMER BLD IA.RAPID-MCNC: 483 NG/ML DDU — HIGH
EGFR: 43 ML/MIN/1.73M2 — LOW
EOSINOPHIL # BLD AUTO: 0 K/UL — SIGNIFICANT CHANGE UP (ref 0–0.5)
EOSINOPHIL NFR BLD AUTO: 0 % — SIGNIFICANT CHANGE UP (ref 0–6)
FERRITIN SERPL-MCNC: 91 NG/ML — SIGNIFICANT CHANGE UP (ref 30–400)
GLUCOSE SERPL-MCNC: 109 MG/DL — HIGH (ref 70–99)
HCT VFR BLD CALC: 28.9 % — LOW (ref 39–50)
HGB BLD-MCNC: 8.7 G/DL — LOW (ref 13–17)
IANC: 12.4 K/UL — HIGH (ref 1.8–7.4)
IMM GRANULOCYTES NFR BLD AUTO: 0.7 % — SIGNIFICANT CHANGE UP (ref 0–0.9)
INR BLD: 1.6 RATIO — HIGH (ref 0.88–1.16)
LDH SERPL L TO P-CCNC: 281 U/L — HIGH (ref 135–225)
LYMPHOCYTES # BLD AUTO: 0.5 K/UL — LOW (ref 1–3.3)
LYMPHOCYTES # BLD AUTO: 3.7 % — LOW (ref 13–44)
MAGNESIUM SERPL-MCNC: 1.9 MG/DL — SIGNIFICANT CHANGE UP (ref 1.6–2.6)
MCHC RBC-ENTMCNC: 27.4 PG — SIGNIFICANT CHANGE UP (ref 27–34)
MCHC RBC-ENTMCNC: 30.1 GM/DL — LOW (ref 32–36)
MCV RBC AUTO: 90.9 FL — SIGNIFICANT CHANGE UP (ref 80–100)
MONOCYTES # BLD AUTO: 0.4 K/UL — SIGNIFICANT CHANGE UP (ref 0–0.9)
MONOCYTES NFR BLD AUTO: 3 % — SIGNIFICANT CHANGE UP (ref 2–14)
NEUTROPHILS # BLD AUTO: 12.4 K/UL — HIGH (ref 1.8–7.4)
NEUTROPHILS NFR BLD AUTO: 92.5 % — HIGH (ref 43–77)
NRBC # BLD: 0 /100 WBCS — SIGNIFICANT CHANGE UP (ref 0–0)
NRBC # FLD: 0 K/UL — SIGNIFICANT CHANGE UP (ref 0–0)
PHOSPHATE SERPL-MCNC: 2.5 MG/DL — SIGNIFICANT CHANGE UP (ref 2.5–4.5)
PLATELET # BLD AUTO: 341 K/UL — SIGNIFICANT CHANGE UP (ref 150–400)
POTASSIUM SERPL-MCNC: 4.1 MMOL/L — SIGNIFICANT CHANGE UP (ref 3.5–5.3)
POTASSIUM SERPL-SCNC: 4.1 MMOL/L — SIGNIFICANT CHANGE UP (ref 3.5–5.3)
PROCALCITONIN SERPL-MCNC: 1.38 NG/ML — HIGH (ref 0.02–0.1)
PROT SERPL-MCNC: 6.2 G/DL — SIGNIFICANT CHANGE UP (ref 6–8.3)
PROTHROM AB SERPL-ACNC: 18.6 SEC — HIGH (ref 10.5–13.4)
RBC # BLD: 3.18 M/UL — LOW (ref 4.2–5.8)
RBC # FLD: 22.1 % — HIGH (ref 10.3–14.5)
SODIUM SERPL-SCNC: 139 MMOL/L — SIGNIFICANT CHANGE UP (ref 135–145)
WBC # BLD: 13.4 K/UL — HIGH (ref 3.8–10.5)
WBC # FLD AUTO: 13.4 K/UL — HIGH (ref 3.8–10.5)

## 2023-02-04 PROCEDURE — 70450 CT HEAD/BRAIN W/O DYE: CPT | Mod: 26

## 2023-02-04 PROCEDURE — 99232 SBSQ HOSP IP/OBS MODERATE 35: CPT

## 2023-02-04 RX ADMIN — CARBIDOPA AND LEVODOPA 1.5 TABLET(S): 25; 100 TABLET ORAL at 23:42

## 2023-02-04 RX ADMIN — AMIODARONE HYDROCHLORIDE 200 MILLIGRAM(S): 400 TABLET ORAL at 06:49

## 2023-02-04 RX ADMIN — CLOPIDOGREL BISULFATE 75 MILLIGRAM(S): 75 TABLET, FILM COATED ORAL at 12:49

## 2023-02-04 RX ADMIN — SODIUM CHLORIDE 75 MILLILITER(S): 9 INJECTION, SOLUTION INTRAVENOUS at 15:02

## 2023-02-04 RX ADMIN — REMDESIVIR 200 MILLIGRAM(S): 5 INJECTION INTRAVENOUS at 15:02

## 2023-02-04 RX ADMIN — CARBIDOPA AND LEVODOPA 1.5 TABLET(S): 25; 100 TABLET ORAL at 06:40

## 2023-02-04 RX ADMIN — CARBIDOPA AND LEVODOPA 1.5 TABLET(S): 25; 100 TABLET ORAL at 18:02

## 2023-02-04 RX ADMIN — OLANZAPINE 2.5 MILLIGRAM(S): 15 TABLET, FILM COATED ORAL at 22:27

## 2023-02-04 RX ADMIN — ATORVASTATIN CALCIUM 80 MILLIGRAM(S): 80 TABLET, FILM COATED ORAL at 22:27

## 2023-02-04 RX ADMIN — Medication 6 MILLIGRAM(S): at 06:41

## 2023-02-04 RX ADMIN — CARBIDOPA AND LEVODOPA 1.5 TABLET(S): 25; 100 TABLET ORAL at 12:49

## 2023-02-04 RX ADMIN — APIXABAN 2.5 MILLIGRAM(S): 2.5 TABLET, FILM COATED ORAL at 06:40

## 2023-02-04 RX ADMIN — Medication 500 MILLIGRAM(S): at 12:52

## 2023-02-04 RX ADMIN — POLYETHYLENE GLYCOL 3350 17 GRAM(S): 17 POWDER, FOR SOLUTION ORAL at 12:49

## 2023-02-04 RX ADMIN — APIXABAN 2.5 MILLIGRAM(S): 2.5 TABLET, FILM COATED ORAL at 17:00

## 2023-02-04 RX ADMIN — PANTOPRAZOLE SODIUM 40 MILLIGRAM(S): 20 TABLET, DELAYED RELEASE ORAL at 12:49

## 2023-02-04 RX ADMIN — SENNA PLUS 2 TABLET(S): 8.6 TABLET ORAL at 22:27

## 2023-02-04 NOTE — DIETITIAN INITIAL EVALUATION ADULT - REASON FOR ADMISSION
As per chart, pt is a 82 year old male PMH Parkinson's, Pleural effusion s/p PleurX, a. fib on Eliquis, bradycardia w/ AICD, HTN who was BIBEMS for  sepsis sec to COVID pneumonia and hypoxic respiratory failure , lactic metabolic acidosis , metabolic encephalopathy.

## 2023-02-04 NOTE — PROVIDER CONTACT NOTE (OTHER) - ACTION/TREATMENT ORDERED:
PA will come see patient. carroll ponce to monitor
Provider Aware; Will recheck in 1 hour
Continue to monitor

## 2023-02-04 NOTE — PROVIDER CONTACT NOTE (OTHER) - BACKGROUND
PNA, PE, and COVID+
Pt admitted with pneumonia due to infectious disease
pt is responding to stimuli . but is not verbally responsive

## 2023-02-04 NOTE — PROVIDER CONTACT NOTE (OTHER) - ASSESSMENT
pt is on highflow satting 100%, all other vitals are stable
BP 93/44 (56)
Pt A&Ox2, no acute signs of distress noted. Pt does not c/o dizziness.

## 2023-02-04 NOTE — DIETITIAN INITIAL EVALUATION ADULT - PERTINENT MEDS FT
MEDICATIONS  (STANDING):  aMIOdarone    Tablet 200 milliGRAM(s) Oral daily  apixaban 2.5 milliGRAM(s) Oral every 12 hours  ascorbic acid 500 milliGRAM(s) Oral daily  atorvastatin 80 milliGRAM(s) Oral at bedtime  carbidopa/levodopa  25/100 1.5 Tablet(s) Oral four times a day  clopidogrel Tablet 75 milliGRAM(s) Oral daily  dexAMETHasone     Tablet 6 milliGRAM(s) Oral daily  lactated ringers. 1000 milliLiter(s) (75 mL/Hr) IV Continuous <Continuous>  OLANZapine 2.5 milliGRAM(s) Oral at bedtime  pantoprazole   Suspension 40 milliGRAM(s) Oral daily  polyethylene glycol 3350 17 Gram(s) Oral daily  remdesivir  IVPB   IV Intermittent   remdesivir  IVPB 100 milliGRAM(s) IV Intermittent every 24 hours  senna 2 Tablet(s) Oral at bedtime    MEDICATIONS  (PRN):  acetaminophen     Tablet .. 650 milliGRAM(s) Oral every 6 hours PRN Temp greater or equal to 38C (100.4F), Mild Pain (1 - 3)  aluminum hydroxide/magnesium hydroxide/simethicone Suspension 30 milliLiter(s) Oral every 4 hours PRN Dyspepsia  melatonin 3 milliGRAM(s) Oral at bedtime PRN Insomnia  ondansetron Injectable 4 milliGRAM(s) IV Push every 8 hours PRN Nausea and/or Vomiting

## 2023-02-04 NOTE — DIETITIAN INITIAL EVALUATION ADULT - OTHER INFO
As per SLP note (2/2), recommend NPO as testing is not indicated given pt's inability to maintain awake/alert state to participate in swallow evaluation. As per attending note (2/3), mental status appeared to be at baseline, sepsis resolved. Diet initiated 2/3 as per chart. Pt is DNR, to be f/u with palliative care team.     Pt is A&Ox1. Pt's wife, Opal, presented at bedside at time of visit. As per wife, pt had good po intake yesterday. Pt completed 100% of breakfast with assistance as per PCA. Noticed IVF in use. No reported GI issues (nausea/vomiting/diarrhea/constipation). Noticed bowel regimen and Zofran in use. Admission weight 74.9 kg (2/2), wt hx as per HIE 66.4 kg (1/25) - 8.5kg wt gain x 1.5 weeks. Admission weight may be inaccurate. Recommend Ensure plus HP and educated family to ensure protein intake for wound healing. Family amenable.

## 2023-02-04 NOTE — DIETITIAN INITIAL EVALUATION ADULT - ADD RECOMMEND
- Diet as per medical team discrete.   - Continue to encourage PO intake when medically feasible.   - Continue to monitor diet/supplement tolerance.   - Please obtain and record current weight trends to best assess for acute changes in nutritional intake / status.  - Diet as per medical team discrete.   - Continue to encourage PO intake when medically feasible.   - Continue to monitor diet/supplement tolerance.   - Recommend obtain new weight as major discrepancy in current weight trend.

## 2023-02-04 NOTE — DIETITIAN INITIAL EVALUATION ADULT - PERTINENT LABORATORY DATA
02-04    139  |  105  |  29<H>  ----------------------------<  109<H>  4.1   |  18<L>  |  1.60<H>    Ca    9.0      04 Feb 2023 05:10  Phos  2.5     02-04  Mg     1.90     02-04    TPro  6.2  /  Alb  2.8<L>  /  TBili  0.3  /  DBili  x   /  AST  37  /  ALT  8   /  AlkPhos  287<H>  02-04  A1C with Estimated Average Glucose Result: 5.4 % (02-03-23 @ 05:23)

## 2023-02-04 NOTE — DIETITIAN INITIAL EVALUATION ADULT - PERSON TAUGHT/METHOD
Encouraged PO intake and protein intake with supplement. Pt's family amenable./verbal instruction/spouse instructed

## 2023-02-04 NOTE — DIETITIAN INITIAL EVALUATION ADULT - ORAL INTAKE PTA/DIET HISTORY
As per pt's wife at bedside, pt has fair po intake PTA. Pt takes Liquid I.V. (16oz) 1x daily, multivitamin, vitamin D and vitamin B12 and vitamin C PTA. Pt consumes Ensure occasionally. Wt about 1.5 week ago, 146 lbs as per family, no recent weight change noticed.

## 2023-02-04 NOTE — PROGRESS NOTE ADULT - PROBLEM SELECTOR PLAN 3
patient placed on HFNC in ED   C/w steroids , apixaban   Wean off as tolerated  RT Pleurx, CXR min effusion  improved today, now on NC   pt not on home O2

## 2023-02-05 LAB
ALBUMIN SERPL ELPH-MCNC: 2.6 G/DL — LOW (ref 3.3–5)
ALP SERPL-CCNC: 311 U/L — HIGH (ref 40–120)
ALT FLD-CCNC: 6 U/L — SIGNIFICANT CHANGE UP (ref 4–41)
ANION GAP SERPL CALC-SCNC: 7 MMOL/L — SIGNIFICANT CHANGE UP (ref 7–14)
APTT BLD: 34.7 SEC — SIGNIFICANT CHANGE UP (ref 27–36.3)
AST SERPL-CCNC: 55 U/L — HIGH (ref 4–40)
BASOPHILS # BLD AUTO: 0.01 K/UL — SIGNIFICANT CHANGE UP (ref 0–0.2)
BASOPHILS NFR BLD AUTO: 0.1 % — SIGNIFICANT CHANGE UP (ref 0–2)
BILIRUB SERPL-MCNC: 0.4 MG/DL — SIGNIFICANT CHANGE UP (ref 0.2–1.2)
BUN SERPL-MCNC: 29 MG/DL — HIGH (ref 7–23)
CALCIUM SERPL-MCNC: 8.7 MG/DL — SIGNIFICANT CHANGE UP (ref 8.4–10.5)
CHLORIDE SERPL-SCNC: 108 MMOL/L — HIGH (ref 98–107)
CO2 SERPL-SCNC: 22 MMOL/L — SIGNIFICANT CHANGE UP (ref 22–31)
CREAT SERPL-MCNC: 1.41 MG/DL — HIGH (ref 0.5–1.3)
EGFR: 50 ML/MIN/1.73M2 — LOW
EOSINOPHIL # BLD AUTO: 0 K/UL — SIGNIFICANT CHANGE UP (ref 0–0.5)
EOSINOPHIL NFR BLD AUTO: 0 % — SIGNIFICANT CHANGE UP (ref 0–6)
GLUCOSE SERPL-MCNC: 117 MG/DL — HIGH (ref 70–99)
HCT VFR BLD CALC: 26.9 % — LOW (ref 39–50)
HGB BLD-MCNC: 8.2 G/DL — LOW (ref 13–17)
IANC: 11.33 K/UL — HIGH (ref 1.8–7.4)
IMM GRANULOCYTES NFR BLD AUTO: 0.6 % — SIGNIFICANT CHANGE UP (ref 0–0.9)
INR BLD: 1.82 RATIO — HIGH (ref 0.88–1.16)
LYMPHOCYTES # BLD AUTO: 0.54 K/UL — LOW (ref 1–3.3)
LYMPHOCYTES # BLD AUTO: 4.3 % — LOW (ref 13–44)
MAGNESIUM SERPL-MCNC: 1.9 MG/DL — SIGNIFICANT CHANGE UP (ref 1.6–2.6)
MCHC RBC-ENTMCNC: 27.2 PG — SIGNIFICANT CHANGE UP (ref 27–34)
MCHC RBC-ENTMCNC: 30.5 GM/DL — LOW (ref 32–36)
MCV RBC AUTO: 89.4 FL — SIGNIFICANT CHANGE UP (ref 80–100)
MONOCYTES # BLD AUTO: 0.56 K/UL — SIGNIFICANT CHANGE UP (ref 0–0.9)
MONOCYTES NFR BLD AUTO: 4.5 % — SIGNIFICANT CHANGE UP (ref 2–14)
NEUTROPHILS # BLD AUTO: 11.33 K/UL — HIGH (ref 1.8–7.4)
NEUTROPHILS NFR BLD AUTO: 90.5 % — HIGH (ref 43–77)
NRBC # BLD: 0 /100 WBCS — SIGNIFICANT CHANGE UP (ref 0–0)
NRBC # FLD: 0 K/UL — SIGNIFICANT CHANGE UP (ref 0–0)
PHOSPHATE SERPL-MCNC: 2.3 MG/DL — LOW (ref 2.5–4.5)
PLATELET # BLD AUTO: 306 K/UL — SIGNIFICANT CHANGE UP (ref 150–400)
POTASSIUM SERPL-MCNC: 4.1 MMOL/L — SIGNIFICANT CHANGE UP (ref 3.5–5.3)
POTASSIUM SERPL-SCNC: 4.1 MMOL/L — SIGNIFICANT CHANGE UP (ref 3.5–5.3)
PROT SERPL-MCNC: 5.3 G/DL — LOW (ref 6–8.3)
PROTHROM AB SERPL-ACNC: 21.2 SEC — HIGH (ref 10.5–13.4)
RBC # BLD: 3.01 M/UL — LOW (ref 4.2–5.8)
RBC # FLD: 21.5 % — HIGH (ref 10.3–14.5)
SODIUM SERPL-SCNC: 137 MMOL/L — SIGNIFICANT CHANGE UP (ref 135–145)
WBC # BLD: 12.52 K/UL — HIGH (ref 3.8–10.5)
WBC # FLD AUTO: 12.52 K/UL — HIGH (ref 3.8–10.5)

## 2023-02-05 PROCEDURE — 99232 SBSQ HOSP IP/OBS MODERATE 35: CPT

## 2023-02-05 RX ORDER — CEFTRIAXONE 500 MG/1
1000 INJECTION, POWDER, FOR SOLUTION INTRAMUSCULAR; INTRAVENOUS EVERY 24 HOURS
Refills: 0 | Status: DISCONTINUED | OUTPATIENT
Start: 2023-02-05 | End: 2023-02-07

## 2023-02-05 RX ORDER — AZITHROMYCIN 500 MG/1
400 TABLET, FILM COATED ORAL DAILY
Refills: 0 | Status: DISCONTINUED | OUTPATIENT
Start: 2023-02-05 | End: 2023-02-05

## 2023-02-05 RX ORDER — AZITHROMYCIN 500 MG/1
500 TABLET, FILM COATED ORAL DAILY
Refills: 0 | Status: DISCONTINUED | OUTPATIENT
Start: 2023-02-05 | End: 2023-02-07

## 2023-02-05 RX ADMIN — Medication 6 MILLIGRAM(S): at 05:25

## 2023-02-05 RX ADMIN — AMIODARONE HYDROCHLORIDE 200 MILLIGRAM(S): 400 TABLET ORAL at 05:25

## 2023-02-05 RX ADMIN — CARBIDOPA AND LEVODOPA 1.5 TABLET(S): 25; 100 TABLET ORAL at 22:08

## 2023-02-05 RX ADMIN — POLYETHYLENE GLYCOL 3350 17 GRAM(S): 17 POWDER, FOR SOLUTION ORAL at 13:51

## 2023-02-05 RX ADMIN — CEFTRIAXONE 100 MILLIGRAM(S): 500 INJECTION, POWDER, FOR SOLUTION INTRAMUSCULAR; INTRAVENOUS at 18:35

## 2023-02-05 RX ADMIN — Medication 500 MILLIGRAM(S): at 13:51

## 2023-02-05 RX ADMIN — OLANZAPINE 2.5 MILLIGRAM(S): 15 TABLET, FILM COATED ORAL at 22:08

## 2023-02-05 RX ADMIN — CARBIDOPA AND LEVODOPA 1.5 TABLET(S): 25; 100 TABLET ORAL at 17:42

## 2023-02-05 RX ADMIN — SODIUM CHLORIDE 75 MILLILITER(S): 9 INJECTION, SOLUTION INTRAVENOUS at 05:28

## 2023-02-05 RX ADMIN — SENNA PLUS 2 TABLET(S): 8.6 TABLET ORAL at 22:07

## 2023-02-05 RX ADMIN — REMDESIVIR 200 MILLIGRAM(S): 5 INJECTION INTRAVENOUS at 17:40

## 2023-02-05 RX ADMIN — CARBIDOPA AND LEVODOPA 1.5 TABLET(S): 25; 100 TABLET ORAL at 05:25

## 2023-02-05 RX ADMIN — CARBIDOPA AND LEVODOPA 1.5 TABLET(S): 25; 100 TABLET ORAL at 13:50

## 2023-02-05 RX ADMIN — SODIUM CHLORIDE 75 MILLILITER(S): 9 INJECTION, SOLUTION INTRAVENOUS at 22:09

## 2023-02-05 RX ADMIN — APIXABAN 2.5 MILLIGRAM(S): 2.5 TABLET, FILM COATED ORAL at 05:25

## 2023-02-05 RX ADMIN — CLOPIDOGREL BISULFATE 75 MILLIGRAM(S): 75 TABLET, FILM COATED ORAL at 13:50

## 2023-02-05 RX ADMIN — ATORVASTATIN CALCIUM 80 MILLIGRAM(S): 80 TABLET, FILM COATED ORAL at 22:08

## 2023-02-05 RX ADMIN — APIXABAN 2.5 MILLIGRAM(S): 2.5 TABLET, FILM COATED ORAL at 17:42

## 2023-02-05 RX ADMIN — PANTOPRAZOLE SODIUM 40 MILLIGRAM(S): 20 TABLET, DELAYED RELEASE ORAL at 13:51

## 2023-02-05 NOTE — PROGRESS NOTE ADULT - PROBLEM SELECTOR PLAN 3
patient placed on HFNC in ED   C/w steroids , apixaban   Wean off as tolerated  RT Pleurx, CXR min effusion  improved today, now on NC   pt not on home O2 patient placed on HFNC in ED   C/w steroids, apixaban   Wean off as tolerated  RT Pleurx, CXR min effusion  now on NC   pt not on home O2

## 2023-02-05 NOTE — PROGRESS NOTE ADULT - ASSESSMENT
82M PMH Parkinson's, Pleural effusion s/p PleurX, a. fib on Eliquis, bradycardia w/ AICD, HTN who was BIBEMS for  sepsis sec to COVID pneumonia and hypoxic respiratory failure , lactic metabolic acidosis , metabolic encephalopathy.   Code status was rediscussed in ED , patient is DNR , palliative care consult requested.   82M PMH Parkinson's, Pleural effusion s/p PleurX, a. fib on Eliquis, bradycardia w/ AICD, HTN who was BIBEMS for  sepsis sec to COVID pneumonia and hypoxic respiratory failure , lactic metabolic acidosis , metabolic encephalopathy.

## 2023-02-05 NOTE — PROGRESS NOTE ADULT - PROBLEM SELECTOR PLAN 1
clinically improved  sepsis resolving  cont rem/dex  wean NC as able - continues to have lethargy   - continued on remdes and steroids   - add ceftriaxone and azithro on 2/5 to cover for bacterial PNA on top   - monitor mental and resp status

## 2023-02-05 NOTE — PROGRESS NOTE ADULT - PROBLEM SELECTOR PLAN 6
patient with hypotension sec to sepsis COVID   Hold antihypertensives  IVF , LR patient with hypotension sec to sepsis COVID   Hold antihypertensives

## 2023-02-06 LAB
ALBUMIN SERPL ELPH-MCNC: 2.7 G/DL — LOW (ref 3.3–5)
ALBUMIN SERPL ELPH-MCNC: 2.7 G/DL — LOW (ref 3.3–5)
ALP SERPL-CCNC: 337 U/L — HIGH (ref 40–120)
ALP SERPL-CCNC: 343 U/L — HIGH (ref 40–120)
ALT FLD-CCNC: 11 U/L — SIGNIFICANT CHANGE UP (ref 4–41)
ALT FLD-CCNC: 12 U/L — SIGNIFICANT CHANGE UP (ref 4–41)
ANION GAP SERPL CALC-SCNC: 12 MMOL/L — SIGNIFICANT CHANGE UP (ref 7–14)
APTT BLD: 32.8 SEC — SIGNIFICANT CHANGE UP (ref 27–36.3)
AST SERPL-CCNC: 60 U/L — HIGH (ref 4–40)
AST SERPL-CCNC: 61 U/L — HIGH (ref 4–40)
BASOPHILS # BLD AUTO: 0.01 K/UL — SIGNIFICANT CHANGE UP (ref 0–0.2)
BASOPHILS NFR BLD AUTO: 0.1 % — SIGNIFICANT CHANGE UP (ref 0–2)
BILIRUB DIRECT SERPL-MCNC: <0.2 MG/DL — SIGNIFICANT CHANGE UP (ref 0–0.3)
BILIRUB INDIRECT FLD-MCNC: >0.2 MG/DL — SIGNIFICANT CHANGE UP (ref 0–1)
BILIRUB SERPL-MCNC: 0.4 MG/DL — SIGNIFICANT CHANGE UP (ref 0.2–1.2)
BILIRUB SERPL-MCNC: 0.4 MG/DL — SIGNIFICANT CHANGE UP (ref 0.2–1.2)
BUN SERPL-MCNC: 29 MG/DL — HIGH (ref 7–23)
CALCIUM SERPL-MCNC: 8.9 MG/DL — SIGNIFICANT CHANGE UP (ref 8.4–10.5)
CHLORIDE SERPL-SCNC: 109 MMOL/L — HIGH (ref 98–107)
CO2 SERPL-SCNC: 19 MMOL/L — LOW (ref 22–31)
CREAT SERPL-MCNC: 1.28 MG/DL — SIGNIFICANT CHANGE UP (ref 0.5–1.3)
CREAT SERPL-MCNC: 1.3 MG/DL — SIGNIFICANT CHANGE UP (ref 0.5–1.3)
EGFR: 56 ML/MIN/1.73M2 — LOW
EGFR: 57 ML/MIN/1.73M2 — LOW
EOSINOPHIL # BLD AUTO: 0 K/UL — SIGNIFICANT CHANGE UP (ref 0–0.5)
EOSINOPHIL NFR BLD AUTO: 0 % — SIGNIFICANT CHANGE UP (ref 0–6)
GLUCOSE SERPL-MCNC: 115 MG/DL — HIGH (ref 70–99)
HCT VFR BLD CALC: 31.7 % — LOW (ref 39–50)
HGB BLD-MCNC: 9.5 G/DL — LOW (ref 13–17)
IANC: 11.89 K/UL — HIGH (ref 1.8–7.4)
IMM GRANULOCYTES NFR BLD AUTO: 0.7 % — SIGNIFICANT CHANGE UP (ref 0–0.9)
INR BLD: 1.43 RATIO — HIGH (ref 0.88–1.16)
LYMPHOCYTES # BLD AUTO: 0.5 K/UL — LOW (ref 1–3.3)
LYMPHOCYTES # BLD AUTO: 3.8 % — LOW (ref 13–44)
MAGNESIUM SERPL-MCNC: 1.9 MG/DL — SIGNIFICANT CHANGE UP (ref 1.6–2.6)
MCHC RBC-ENTMCNC: 27.1 PG — SIGNIFICANT CHANGE UP (ref 27–34)
MCHC RBC-ENTMCNC: 30 GM/DL — LOW (ref 32–36)
MCV RBC AUTO: 90.6 FL — SIGNIFICANT CHANGE UP (ref 80–100)
MONOCYTES # BLD AUTO: 0.57 K/UL — SIGNIFICANT CHANGE UP (ref 0–0.9)
MONOCYTES NFR BLD AUTO: 4.4 % — SIGNIFICANT CHANGE UP (ref 2–14)
NEUTROPHILS # BLD AUTO: 11.89 K/UL — HIGH (ref 1.8–7.4)
NEUTROPHILS NFR BLD AUTO: 91 % — HIGH (ref 43–77)
NRBC # BLD: 0 /100 WBCS — SIGNIFICANT CHANGE UP (ref 0–0)
NRBC # FLD: 0 K/UL — SIGNIFICANT CHANGE UP (ref 0–0)
PHOSPHATE SERPL-MCNC: 2.4 MG/DL — LOW (ref 2.5–4.5)
PLATELET # BLD AUTO: 339 K/UL — SIGNIFICANT CHANGE UP (ref 150–400)
POTASSIUM SERPL-MCNC: 4.5 MMOL/L — SIGNIFICANT CHANGE UP (ref 3.5–5.3)
POTASSIUM SERPL-SCNC: 4.5 MMOL/L — SIGNIFICANT CHANGE UP (ref 3.5–5.3)
PROT SERPL-MCNC: 5.4 G/DL — LOW (ref 6–8.3)
PROT SERPL-MCNC: 5.7 G/DL — LOW (ref 6–8.3)
PROTHROM AB SERPL-ACNC: 16.6 SEC — HIGH (ref 10.5–13.4)
RBC # BLD: 3.5 M/UL — LOW (ref 4.2–5.8)
RBC # FLD: 21.7 % — HIGH (ref 10.3–14.5)
SODIUM SERPL-SCNC: 140 MMOL/L — SIGNIFICANT CHANGE UP (ref 135–145)
WBC # BLD: 13.06 K/UL — HIGH (ref 3.8–10.5)
WBC # FLD AUTO: 13.06 K/UL — HIGH (ref 3.8–10.5)

## 2023-02-06 PROCEDURE — 99232 SBSQ HOSP IP/OBS MODERATE 35: CPT

## 2023-02-06 RX ORDER — SODIUM,POTASSIUM PHOSPHATES 278-250MG
1 POWDER IN PACKET (EA) ORAL ONCE
Refills: 0 | Status: COMPLETED | OUTPATIENT
Start: 2023-02-06 | End: 2023-02-06

## 2023-02-06 RX ADMIN — CEFTRIAXONE 100 MILLIGRAM(S): 500 INJECTION, POWDER, FOR SOLUTION INTRAMUSCULAR; INTRAVENOUS at 18:17

## 2023-02-06 RX ADMIN — AZITHROMYCIN 500 MILLIGRAM(S): 500 TABLET, FILM COATED ORAL at 12:20

## 2023-02-06 RX ADMIN — CARBIDOPA AND LEVODOPA 1.5 TABLET(S): 25; 100 TABLET ORAL at 22:36

## 2023-02-06 RX ADMIN — CARBIDOPA AND LEVODOPA 1.5 TABLET(S): 25; 100 TABLET ORAL at 12:19

## 2023-02-06 RX ADMIN — SENNA PLUS 2 TABLET(S): 8.6 TABLET ORAL at 22:36

## 2023-02-06 RX ADMIN — PANTOPRAZOLE SODIUM 40 MILLIGRAM(S): 20 TABLET, DELAYED RELEASE ORAL at 12:19

## 2023-02-06 RX ADMIN — APIXABAN 2.5 MILLIGRAM(S): 2.5 TABLET, FILM COATED ORAL at 18:18

## 2023-02-06 RX ADMIN — OLANZAPINE 2.5 MILLIGRAM(S): 15 TABLET, FILM COATED ORAL at 22:36

## 2023-02-06 RX ADMIN — APIXABAN 2.5 MILLIGRAM(S): 2.5 TABLET, FILM COATED ORAL at 05:24

## 2023-02-06 RX ADMIN — CARBIDOPA AND LEVODOPA 1.5 TABLET(S): 25; 100 TABLET ORAL at 18:18

## 2023-02-06 RX ADMIN — REMDESIVIR 200 MILLIGRAM(S): 5 INJECTION INTRAVENOUS at 16:01

## 2023-02-06 RX ADMIN — POLYETHYLENE GLYCOL 3350 17 GRAM(S): 17 POWDER, FOR SOLUTION ORAL at 12:20

## 2023-02-06 RX ADMIN — SODIUM CHLORIDE 75 MILLILITER(S): 9 INJECTION, SOLUTION INTRAVENOUS at 22:36

## 2023-02-06 RX ADMIN — Medication 6 MILLIGRAM(S): at 05:23

## 2023-02-06 RX ADMIN — Medication 1 PACKET(S): at 18:54

## 2023-02-06 RX ADMIN — AMIODARONE HYDROCHLORIDE 200 MILLIGRAM(S): 400 TABLET ORAL at 05:23

## 2023-02-06 RX ADMIN — CARBIDOPA AND LEVODOPA 1.5 TABLET(S): 25; 100 TABLET ORAL at 05:24

## 2023-02-06 RX ADMIN — CLOPIDOGREL BISULFATE 75 MILLIGRAM(S): 75 TABLET, FILM COATED ORAL at 12:20

## 2023-02-06 RX ADMIN — Medication 500 MILLIGRAM(S): at 12:20

## 2023-02-06 RX ADMIN — ATORVASTATIN CALCIUM 80 MILLIGRAM(S): 80 TABLET, FILM COATED ORAL at 22:36

## 2023-02-06 NOTE — PROGRESS NOTE ADULT - PROBLEM SELECTOR PLAN 1
mental status seems to wax and wane; better today  - continued on remdes and steroids   - add ceftriaxone and azithro on 2/5 to cover for bacterial PNA, MS improved today poss related to rx; will cont to total 5 days, change to PO if dc sooner

## 2023-02-06 NOTE — PROGRESS NOTE ADULT - ASSESSMENT
82M PMH Parkinson's, Pleural effusion s/p PleurX, a. fib on Eliquis, bradycardia w/ AICD, HTN who was BIBEMS for  sepsis sec to COVID pneumonia and hypoxic respiratory failure , lactic metabolic acidosis , metabolic encephalopathy.

## 2023-02-07 DIAGNOSIS — R79.89 OTHER SPECIFIED ABNORMAL FINDINGS OF BLOOD CHEMISTRY: ICD-10-CM

## 2023-02-07 LAB
ALBUMIN SERPL ELPH-MCNC: 2.8 G/DL — LOW (ref 3.3–5)
ALP SERPL-CCNC: 428 U/L — HIGH (ref 40–120)
ALT FLD-CCNC: 20 U/L — SIGNIFICANT CHANGE UP (ref 4–41)
ANION GAP SERPL CALC-SCNC: 10 MMOL/L — SIGNIFICANT CHANGE UP (ref 7–14)
AST SERPL-CCNC: 132 U/L — HIGH (ref 4–40)
BILIRUB DIRECT SERPL-MCNC: 0.2 MG/DL — SIGNIFICANT CHANGE UP (ref 0–0.3)
BILIRUB INDIRECT FLD-MCNC: 0.3 MG/DL — SIGNIFICANT CHANGE UP (ref 0–1)
BILIRUB SERPL-MCNC: 0.5 MG/DL — SIGNIFICANT CHANGE UP (ref 0.2–1.2)
BUN SERPL-MCNC: 29 MG/DL — HIGH (ref 7–23)
CALCIUM SERPL-MCNC: 8.6 MG/DL — SIGNIFICANT CHANGE UP (ref 8.4–10.5)
CHLORIDE SERPL-SCNC: 109 MMOL/L — HIGH (ref 98–107)
CO2 SERPL-SCNC: 21 MMOL/L — LOW (ref 22–31)
CREAT SERPL-MCNC: 1.29 MG/DL — SIGNIFICANT CHANGE UP (ref 0.5–1.3)
CREAT SERPL-MCNC: 1.29 MG/DL — SIGNIFICANT CHANGE UP (ref 0.5–1.3)
CULTURE RESULTS: SIGNIFICANT CHANGE UP
CULTURE RESULTS: SIGNIFICANT CHANGE UP
EGFR: 56 ML/MIN/1.73M2 — LOW
EGFR: 56 ML/MIN/1.73M2 — LOW
GLUCOSE SERPL-MCNC: 130 MG/DL — HIGH (ref 70–99)
HCT VFR BLD CALC: 29.3 % — LOW (ref 39–50)
HGB BLD-MCNC: 9 G/DL — LOW (ref 13–17)
MCHC RBC-ENTMCNC: 26.8 PG — LOW (ref 27–34)
MCHC RBC-ENTMCNC: 30.7 GM/DL — LOW (ref 32–36)
MCV RBC AUTO: 87.2 FL — SIGNIFICANT CHANGE UP (ref 80–100)
NRBC # BLD: 0 /100 WBCS — SIGNIFICANT CHANGE UP (ref 0–0)
NRBC # FLD: 0 K/UL — SIGNIFICANT CHANGE UP (ref 0–0)
PLATELET # BLD AUTO: 356 K/UL — SIGNIFICANT CHANGE UP (ref 150–400)
POTASSIUM SERPL-MCNC: 3.9 MMOL/L — SIGNIFICANT CHANGE UP (ref 3.5–5.3)
POTASSIUM SERPL-SCNC: 3.9 MMOL/L — SIGNIFICANT CHANGE UP (ref 3.5–5.3)
PROT SERPL-MCNC: 5.5 G/DL — LOW (ref 6–8.3)
RBC # BLD: 3.36 M/UL — LOW (ref 4.2–5.8)
RBC # FLD: 21.5 % — HIGH (ref 10.3–14.5)
SODIUM SERPL-SCNC: 140 MMOL/L — SIGNIFICANT CHANGE UP (ref 135–145)
SPECIMEN SOURCE: SIGNIFICANT CHANGE UP
SPECIMEN SOURCE: SIGNIFICANT CHANGE UP
WBC # BLD: 15.81 K/UL — HIGH (ref 3.8–10.5)
WBC # FLD AUTO: 15.81 K/UL — HIGH (ref 3.8–10.5)

## 2023-02-07 PROCEDURE — 99232 SBSQ HOSP IP/OBS MODERATE 35: CPT

## 2023-02-07 RX ADMIN — CARBIDOPA AND LEVODOPA 1.5 TABLET(S): 25; 100 TABLET ORAL at 05:42

## 2023-02-07 RX ADMIN — CARBIDOPA AND LEVODOPA 1.5 TABLET(S): 25; 100 TABLET ORAL at 12:34

## 2023-02-07 RX ADMIN — APIXABAN 2.5 MILLIGRAM(S): 2.5 TABLET, FILM COATED ORAL at 18:39

## 2023-02-07 RX ADMIN — APIXABAN 2.5 MILLIGRAM(S): 2.5 TABLET, FILM COATED ORAL at 05:45

## 2023-02-07 RX ADMIN — CLOPIDOGREL BISULFATE 75 MILLIGRAM(S): 75 TABLET, FILM COATED ORAL at 12:34

## 2023-02-07 RX ADMIN — Medication 6 MILLIGRAM(S): at 05:43

## 2023-02-07 RX ADMIN — SENNA PLUS 2 TABLET(S): 8.6 TABLET ORAL at 22:28

## 2023-02-07 RX ADMIN — PANTOPRAZOLE SODIUM 40 MILLIGRAM(S): 20 TABLET, DELAYED RELEASE ORAL at 12:34

## 2023-02-07 RX ADMIN — OLANZAPINE 2.5 MILLIGRAM(S): 15 TABLET, FILM COATED ORAL at 22:29

## 2023-02-07 RX ADMIN — CARBIDOPA AND LEVODOPA 1.5 TABLET(S): 25; 100 TABLET ORAL at 22:29

## 2023-02-07 RX ADMIN — Medication 500 MILLIGRAM(S): at 12:34

## 2023-02-07 RX ADMIN — POLYETHYLENE GLYCOL 3350 17 GRAM(S): 17 POWDER, FOR SOLUTION ORAL at 12:34

## 2023-02-07 RX ADMIN — AMIODARONE HYDROCHLORIDE 200 MILLIGRAM(S): 400 TABLET ORAL at 05:45

## 2023-02-07 RX ADMIN — CARBIDOPA AND LEVODOPA 1.5 TABLET(S): 25; 100 TABLET ORAL at 18:39

## 2023-02-07 NOTE — PHYSICAL THERAPY INITIAL EVALUATION ADULT - NSPTDISCHREC_GEN_A_CORE
home PT for home safety assessment, family education, bed mobility & transfer training to avoid further deterioration

## 2023-02-07 NOTE — PHYSICAL THERAPY INITIAL EVALUATION ADULT - ADDITIONAL COMMENTS
history obtained from wife. patient has had a physical and cognitive decline since thanksgiving. has been unable to walk since then. mostly stays in bed or wheelchair. requires 3 person assist for transfer OOB. prior to thanksgiving could walk short distance with walker and assist. they own wheelcahir, hospital bed,walker, commode, stair lift

## 2023-02-07 NOTE — PHYSICAL THERAPY INITIAL EVALUATION ADULT - LEVEL OF INDEPENDENCE: SIT/STAND, REHAB EVAL
patient requiring max assist to remain upright in sitting despite multimodal cueing to correct/unable to perform

## 2023-02-07 NOTE — PROGRESS NOTE ADULT - PROBLEM SELECTOR PLAN 1
MS at baseline  - continued on remdes and steroids   completed rendes  ctx, zithro stopped given elevated LFT

## 2023-02-08 ENCOUNTER — TRANSCRIPTION ENCOUNTER (OUTPATIENT)
Age: 83
End: 2023-02-08

## 2023-02-08 LAB
ALBUMIN SERPL ELPH-MCNC: 2.2 G/DL — LOW (ref 3.3–5)
ALBUMIN SERPL ELPH-MCNC: 2.3 G/DL — LOW (ref 3.3–5)
ALP SERPL-CCNC: 313 U/L — HIGH (ref 40–120)
ALP SERPL-CCNC: 314 U/L — HIGH (ref 40–120)
ALT FLD-CCNC: 19 U/L — SIGNIFICANT CHANGE UP (ref 4–41)
ALT FLD-CCNC: 20 U/L — SIGNIFICANT CHANGE UP (ref 4–41)
ANION GAP SERPL CALC-SCNC: 8 MMOL/L — SIGNIFICANT CHANGE UP (ref 7–14)
AST SERPL-CCNC: 59 U/L — HIGH (ref 4–40)
AST SERPL-CCNC: 60 U/L — HIGH (ref 4–40)
BILIRUB DIRECT SERPL-MCNC: <0.2 MG/DL — SIGNIFICANT CHANGE UP (ref 0–0.3)
BILIRUB DIRECT SERPL-MCNC: <0.2 MG/DL — SIGNIFICANT CHANGE UP (ref 0–0.3)
BILIRUB INDIRECT FLD-MCNC: >0.1 MG/DL — SIGNIFICANT CHANGE UP (ref 0–1)
BILIRUB INDIRECT FLD-MCNC: >0.1 MG/DL — SIGNIFICANT CHANGE UP (ref 0–1)
BILIRUB SERPL-MCNC: 0.3 MG/DL — SIGNIFICANT CHANGE UP (ref 0.2–1.2)
BUN SERPL-MCNC: 29 MG/DL — HIGH (ref 7–23)
CALCIUM SERPL-MCNC: 8.2 MG/DL — LOW (ref 8.4–10.5)
CHLORIDE SERPL-SCNC: 110 MMOL/L — HIGH (ref 98–107)
CO2 SERPL-SCNC: 22 MMOL/L — SIGNIFICANT CHANGE UP (ref 22–31)
CREAT SERPL-MCNC: 1.28 MG/DL — SIGNIFICANT CHANGE UP (ref 0.5–1.3)
CREAT SERPL-MCNC: 1.29 MG/DL — SIGNIFICANT CHANGE UP (ref 0.5–1.3)
EGFR: 56 ML/MIN/1.73M2 — LOW
EGFR: 57 ML/MIN/1.73M2 — LOW
GLUCOSE SERPL-MCNC: 124 MG/DL — HIGH (ref 70–99)
HCT VFR BLD CALC: 23.9 % — LOW (ref 39–50)
HCT VFR BLD CALC: 24.2 % — LOW (ref 39–50)
HCT VFR BLD CALC: 24.8 % — LOW (ref 39–50)
HGB BLD-MCNC: 7.3 G/DL — LOW (ref 13–17)
HGB BLD-MCNC: 7.4 G/DL — LOW (ref 13–17)
HGB BLD-MCNC: 7.6 G/DL — LOW (ref 13–17)
MAGNESIUM SERPL-MCNC: 1.8 MG/DL — SIGNIFICANT CHANGE UP (ref 1.6–2.6)
MCHC RBC-ENTMCNC: 26.5 PG — LOW (ref 27–34)
MCHC RBC-ENTMCNC: 26.7 PG — LOW (ref 27–34)
MCHC RBC-ENTMCNC: 26.8 PG — LOW (ref 27–34)
MCHC RBC-ENTMCNC: 30.5 GM/DL — LOW (ref 32–36)
MCHC RBC-ENTMCNC: 30.6 GM/DL — LOW (ref 32–36)
MCHC RBC-ENTMCNC: 30.6 GM/DL — LOW (ref 32–36)
MCV RBC AUTO: 86.9 FL — SIGNIFICANT CHANGE UP (ref 80–100)
MCV RBC AUTO: 87.3 FL — SIGNIFICANT CHANGE UP (ref 80–100)
MCV RBC AUTO: 87.4 FL — SIGNIFICANT CHANGE UP (ref 80–100)
NRBC # BLD: 0 /100 WBCS — SIGNIFICANT CHANGE UP (ref 0–0)
NRBC # FLD: 0 K/UL — SIGNIFICANT CHANGE UP (ref 0–0)
PHOSPHATE SERPL-MCNC: 2.2 MG/DL — LOW (ref 2.5–4.5)
PLATELET # BLD AUTO: 274 K/UL — SIGNIFICANT CHANGE UP (ref 150–400)
PLATELET # BLD AUTO: 277 K/UL — SIGNIFICANT CHANGE UP (ref 150–400)
PLATELET # BLD AUTO: 278 K/UL — SIGNIFICANT CHANGE UP (ref 150–400)
POTASSIUM SERPL-MCNC: 3.8 MMOL/L — SIGNIFICANT CHANGE UP (ref 3.5–5.3)
POTASSIUM SERPL-SCNC: 3.8 MMOL/L — SIGNIFICANT CHANGE UP (ref 3.5–5.3)
PROT SERPL-MCNC: 4.6 G/DL — LOW (ref 6–8.3)
PROT SERPL-MCNC: 4.7 G/DL — LOW (ref 6–8.3)
RBC # BLD: 2.75 M/UL — LOW (ref 4.2–5.8)
RBC # BLD: 2.77 M/UL — LOW (ref 4.2–5.8)
RBC # BLD: 2.84 M/UL — LOW (ref 4.2–5.8)
RBC # FLD: 21 % — HIGH (ref 10.3–14.5)
RBC # FLD: 21.2 % — HIGH (ref 10.3–14.5)
RBC # FLD: 21.2 % — HIGH (ref 10.3–14.5)
SODIUM SERPL-SCNC: 140 MMOL/L — SIGNIFICANT CHANGE UP (ref 135–145)
WBC # BLD: 10.13 K/UL — SIGNIFICANT CHANGE UP (ref 3.8–10.5)
WBC # BLD: 11.05 K/UL — HIGH (ref 3.8–10.5)
WBC # BLD: 12.64 K/UL — HIGH (ref 3.8–10.5)
WBC # FLD AUTO: 10.13 K/UL — SIGNIFICANT CHANGE UP (ref 3.8–10.5)
WBC # FLD AUTO: 11.05 K/UL — HIGH (ref 3.8–10.5)
WBC # FLD AUTO: 12.64 K/UL — HIGH (ref 3.8–10.5)

## 2023-02-08 PROCEDURE — 99232 SBSQ HOSP IP/OBS MODERATE 35: CPT | Mod: FS

## 2023-02-08 RX ORDER — COLLAGENASE CLOSTRIDIUM HIST. 250 UNIT/G
1 OINTMENT (GRAM) TOPICAL DAILY
Refills: 0 | Status: DISCONTINUED | OUTPATIENT
Start: 2023-02-08 | End: 2023-02-10

## 2023-02-08 RX ADMIN — APIXABAN 2.5 MILLIGRAM(S): 2.5 TABLET, FILM COATED ORAL at 17:59

## 2023-02-08 RX ADMIN — OLANZAPINE 2.5 MILLIGRAM(S): 15 TABLET, FILM COATED ORAL at 21:59

## 2023-02-08 RX ADMIN — SODIUM CHLORIDE 75 MILLILITER(S): 9 INJECTION, SOLUTION INTRAVENOUS at 06:03

## 2023-02-08 RX ADMIN — Medication 650 MILLIGRAM(S): at 14:36

## 2023-02-08 RX ADMIN — AMIODARONE HYDROCHLORIDE 200 MILLIGRAM(S): 400 TABLET ORAL at 06:03

## 2023-02-08 RX ADMIN — SENNA PLUS 2 TABLET(S): 8.6 TABLET ORAL at 21:59

## 2023-02-08 RX ADMIN — CLOPIDOGREL BISULFATE 75 MILLIGRAM(S): 75 TABLET, FILM COATED ORAL at 13:32

## 2023-02-08 RX ADMIN — Medication 6 MILLIGRAM(S): at 06:03

## 2023-02-08 RX ADMIN — CARBIDOPA AND LEVODOPA 1.5 TABLET(S): 25; 100 TABLET ORAL at 13:30

## 2023-02-08 RX ADMIN — CARBIDOPA AND LEVODOPA 1.5 TABLET(S): 25; 100 TABLET ORAL at 06:03

## 2023-02-08 RX ADMIN — PANTOPRAZOLE SODIUM 40 MILLIGRAM(S): 20 TABLET, DELAYED RELEASE ORAL at 13:33

## 2023-02-08 RX ADMIN — CARBIDOPA AND LEVODOPA 1.5 TABLET(S): 25; 100 TABLET ORAL at 18:02

## 2023-02-08 RX ADMIN — APIXABAN 2.5 MILLIGRAM(S): 2.5 TABLET, FILM COATED ORAL at 06:03

## 2023-02-08 RX ADMIN — SODIUM CHLORIDE 75 MILLILITER(S): 9 INJECTION, SOLUTION INTRAVENOUS at 18:59

## 2023-02-08 RX ADMIN — Medication 500 MILLIGRAM(S): at 13:32

## 2023-02-08 RX ADMIN — Medication 650 MILLIGRAM(S): at 13:36

## 2023-02-08 RX ADMIN — POLYETHYLENE GLYCOL 3350 17 GRAM(S): 17 POWDER, FOR SOLUTION ORAL at 13:33

## 2023-02-08 NOTE — PROGRESS NOTE ADULT - PROBLEM/PLAN-9
"  Rene Goodman is a 34 y.o. male who presents with   Chief Complaint   Patient presents with   • Hypertension     To chlorothiazide 12.5 mg; amlodipine/benazepril 5-10 mg   .    34-year-old male.  Blood pressure follow-up.  He is checking his blood pressures infrequently.  He says his wife checks him \"a couple of times a week\".  His blood pressures at home though run in the 130s over 80s consistently he says.  Also of note is the fact that his weight has decreased from 313 pounds in December to his current weight of 275 pounds.      Hypertension   This is a chronic problem. The current episode started more than 1 month ago. The problem has been gradually improving since onset. The problem is controlled. Current antihypertension treatment includes diuretics, calcium channel blockers and ACE inhibitors. The current treatment provides moderate improvement. There are no compliance problems.         The following portions of the patient's history were reviewed and updated as appropriate: allergies, current medications, past medical history and problem list.    Review of Systems   Constitutional: Negative.    HENT: Negative.    Eyes: Negative.    Respiratory: Negative.    Cardiovascular: Negative.    Genitourinary: Negative.    Musculoskeletal: Negative.    Skin: Negative.    Neurological: Negative.    Psychiatric/Behavioral: Negative.        Objective   Physical Exam   Constitutional: He is oriented to person, place, and time. He appears well-developed and well-nourished. No distress.   HENT:   Head: Normocephalic and atraumatic.   Eyes: Conjunctivae and EOM are normal. Pupils are equal, round, and reactive to light.   Neck: Normal range of motion. Neck supple. No thyromegaly present.   Neck exam negative.  Carotid auscultation normal-no bruits heard.   Cardiovascular: Normal rate, regular rhythm, normal heart sounds and intact distal pulses. Exam reveals no gallop and no friction rub.   No murmur "
heard.  Pulmonary/Chest: Effort normal and breath sounds normal. No respiratory distress. He has no wheezes. He has no rales. He exhibits no tenderness.   Neurological: He is alert and oriented to person, place, and time.   Psychiatric: He has a normal mood and affect. His behavior is normal. Judgment and thought content normal.   Nursing note and vitals reviewed.      Assessment/Plan   Rene was seen today for hypertension.    Diagnoses and all orders for this visit:    Essential hypertension        Plan: Blood pressure seems to be improving by his home readings and by my reading today which is 138/88.  Continued weight loss advised.  Continue treatment as prescribed.    Follow-up checkup/lab update visit advised for February    Last physical was September 10th 2018.  He will pass for this year and consider it next year.       
DISPLAY PLAN FREE TEXT

## 2023-02-08 NOTE — ADVANCED PRACTICE NURSE CONSULT - RECOMMEDATIONS
Recommend Podiatry for assessment of left heel wound.     Topical recommendations:     Sacral fold extending to bilateral buttocks- Cleanse with skin cleanser, pat dry. Apply TRIAD paste twice a day and PRN with incontinent episodes. With episodes of incontinence only remove soiled layer of Triad, then reinforce with thin layer.     Left foot- Cleanse with NS, pat dry. Apply Liquid barrier film to periwound skin (allow to dry). Apply Medihoney gel to base of wound, cover with gauze, wrap with kerlix. Change daily or PRN if compromised.     Right heel and right lateral thigh- apply LBF daily. Monitor for tissue type changes.     Continue low air loss bed therapy,  heel elevation with offloading boots, turn & reposition q2h with  fluidized pillow, continue moisture management with barrier creams as specified above & single breathable pad, continue measures to decrease friction/shear.   Plan discussed with patients wife at bedside- educated on topical wound therapy to optimize wound healing. Questions answered.     Please contact Wound/Ostomy Care Service Line if we can be of further assistance (ext [phone]).       Recommend Podiatry for assessment of left heel wound.     Topical recommendations:     Sacral fold extending to bilateral buttocks- Cleanse with skin cleanser, pat dry. Apply TRIAD paste twice a day and PRN with incontinent episodes. With episodes of incontinence only remove soiled layer of Triad, then reinforce with thin layer.     Left foot- Cleanse with NS, pat dry. Apply Liquid barrier film to periwound skin (allow to dry). Apply Medihoney gel to base of wound, cover with gauze, wrap with kerlix. Change daily or PRN if compromised.     Right heel and right lateral thigh- apply LBF daily. Monitor for tissue type changes.     Continue low air loss bed therapy,  heel elevation with offloading boots, turn & reposition q2h with  fluidized pillow, continue moisture management with barrier creams as specified above & single breathable pad, continue measures to decrease friction/shear.   Plan discussed with patients wife at bedside- educated on topical wound therapy to optimize wound healing. Questions answered.     Notified medicine ACP of findings and recommendations.     Please contact Wound/Ostomy Care Service Line if we can be of further assistance (ext [phone]).

## 2023-02-08 NOTE — PROGRESS NOTE ADULT - PROBLEM SELECTOR PLAN 9
Creatinine at baseline   Avoid nephrotoxics   F/w BMP daily on remdesivir

## 2023-02-08 NOTE — PROGRESS NOTE ADULT - PROBLEM SELECTOR PLAN 8
hold vezicare for now   Frequent bladder scans

## 2023-02-08 NOTE — DISCHARGE NOTE PROVIDER - NSDCMRMEDTOKEN_GEN_ALL_CORE_FT
amiodarone 200 mg oral tablet: 1 tab(s) orally once a day hosp/home  ascorbic acid 500 mg oral tablet: 1 tab(s) orally once a day  carbidopa-levodopa 25 mg-100 mg oral tablet: 1.5 tab(s) orally 4 times a day  clopidogrel 75 mg oral tablet: 1 tab(s) orally once a day hosp/ home  Crestor 40 mg oral tablet: 1 tab(s) orally once a day home  desvenlafaxine (as succinate) 50 mg oral tablet, extended release: 1 tab(s) orally once a day  Eliquis 2.5 mg oral tablet: 1 tab(s) orally 2 times a day. hosp /home  resume on isa 10/23   Melatonin 1 mg oral tablet: 4 tab(s) orally once a day (at bedtime)  mirtazapine 7.5 mg oral tablet: 1 tab(s) orally once a day (at bedtime)  OLANZapine 2.5 mg oral tablet: 1 tab(s) orally once a day (at bedtime)  One A Day Men&#x27;s Complete oral tablet: 1 tab(s) orally once a day  Pleur-X catheter Drain: 1 application orally 3 times a week, max drainage 500ml per session.  polyethylene glycol 3350 oral powder for reconstitution: 17 gram(s) orally once a day hosp  senna leaf extract oral tablet: 2 tab(s) orally once a day (at bedtime) hosp  solifenacin 10 mg oral tablet: 1 tab(s) orally once a day hosp/home  tadalafil 5 mg oral tablet: 1 tab(s) orally once a day home  Vyndamax 61 mg oral capsule: 1 cap(s) orally once a day hosp/ home   amiodarone 200 mg oral tablet: 1 tab(s) orally once a day hosp/home  ascorbic acid 500 mg oral tablet: 1 tab(s) orally once a day  carbidopa-levodopa 25 mg-100 mg oral tablet: 1.5 tab(s) orally 4 times a day  clopidogrel 75 mg oral tablet: 1 tab(s) orally once a day hosp/ home  desvenlafaxine (as succinate) 50 mg oral tablet, extended release: 1 tab(s) orally once a day  Eliquis 2.5 mg oral tablet: 1 tab(s) orally 2 times a day. hosp /home  resume on isa 10/23   Melatonin 1 mg oral tablet: 4 tab(s) orally once a day (at bedtime)  mirtazapine 7.5 mg oral tablet: 1 tab(s) orally once a day (at bedtime)  OLANZapine 2.5 mg oral tablet: 1 tab(s) orally once a day (at bedtime)  One A Day Men&#x27;s Complete oral tablet: 1 tab(s) orally once a day  Pleur-X catheter Drain: 1 application orally 3 times a week, max drainage 500ml per session.  polyethylene glycol 3350 oral powder for reconstitution: 17 gram(s) orally once a day hosp  senna leaf extract oral tablet: 2 tab(s) orally once a day (at bedtime) hosp  solifenacin 10 mg oral tablet: 1 tab(s) orally once a day hosp/home  tadalafil 5 mg oral tablet: 1 tab(s) orally once a day home  Vyndamax 61 mg oral capsule: 1 cap(s) orally once a day hosp/ home   amiodarone 200 mg oral tablet: 1 tab(s) orally once a day hosp/home  ascorbic acid 500 mg oral tablet: 1 tab(s) orally once a day  carbidopa-levodopa 25 mg-100 mg oral tablet: 1.5 tab(s) orally 4 times a day  clopidogrel 75 mg oral tablet: 1 tab(s) orally once a day hosp/ home  collagenase 250 units/g topical ointment: 1 application topically once a day  Eliquis 2.5 mg oral tablet: 1 tab(s) orally 2 times a day. hosp /home  resume on isa 10/23   Melatonin 1 mg oral tablet: 4 tab(s) orally once a day (at bedtime)  OLANZapine 2.5 mg oral tablet: 1 tab(s) orally once a day (at bedtime)  Pleur-X catheter Drain: 1 application orally 3 times a week, max drainage 500ml per session.  polyethylene glycol 3350 oral powder for reconstitution: 17 gram(s) orally once a day hosp  senna leaf extract oral tablet: 2 tab(s) orally once a day (at bedtime) hosp   amiodarone 200 mg oral tablet: 1 tab(s) orally once a day hosp/home  ascorbic acid 500 mg oral tablet: 1 tab(s) orally once a day  carbidopa-levodopa 25 mg-100 mg oral tablet: 1.5 tab(s) orally 4 times a day  clopidogrel 75 mg oral tablet: 1 tab(s) orally once a day hosp/ home  collagenase 250 units/g topical ointment: 1 application topically once a day  wound 3cmx1.5cmx0.2cm   Eliquis 2.5 mg oral tablet: 1 tab(s) orally 2 times a day. hosp /home  resume on isa 10/23   Melatonin 1 mg oral tablet: 4 tab(s) orally once a day (at bedtime)  OLANZapine 2.5 mg oral tablet: 1 tab(s) orally once a day (at bedtime)  Pleur-X catheter Drain: 1 application orally 3 times a week, max drainage 500ml per session.  polyethylene glycol 3350 oral powder for reconstitution: 17 gram(s) orally once a day hosp  senna leaf extract oral tablet: 2 tab(s) orally once a day (at bedtime) hosp

## 2023-02-08 NOTE — DISCHARGE NOTE PROVIDER - CARE PROVIDER_API CALL
geo shields  Phone: [phone]  Fax: (   )    -  Follow Up Time: 1 week   NAOMI [facility]  Internal Medicine  [address] [place] [address]  Phone: ([phone]  Fax: [phone]  Follow Up Time: 1 week   [facility]  Internal Medicine  [address] [place] [address]  Phone: ()-  Fax: [phone]  Follow Up Time: 1 week    Pavan Jamil)  Internal Medicine; Pulmonary Disease  [address] [facility] [address] [facility], [address]  [place] [address]  Phone: [phone]  Fax: [phone]  Scheduled Appointment: 02/13/2023 08:00 AM

## 2023-02-08 NOTE — CONSULT NOTE ADULT - SUBJECTIVE AND OBJECTIVE BOX
Patient is a 82y old  Male who presents with a chief complaint of hypoxic respiratory failure (08 Feb 2023 16:10)      HPI:   82-year-old male presents with acute onset of shortness of breath as per wife bedside.  Wife states that patient recently was discharged from the hospital for pneumonia and started on outpatient antibiotics.  Also endorsing patient has a history of right-sided pleural effusions for which she gets drained  3 times a week.  Last drainage earlier today.   Today patient began having trouble breathing and wife called 911 and patient was found to be hypoxic and warm to the touch and more confused than baseline.no falls, trauma, vomiting, diarrhea, ill contacts  Baseline , patient is AxOX1.  In ED , patient with acute hypoxic respiratory failure on HFNC , 40 L, 40% FIO2  sec to COVID pneumonia , sepsis , lactic acidosis.  In ED, patient was placed on bicarb drip, received Vancomycin and Zosyn and placed on HFNC as above.  Telemetry- paced rhythm.    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      Parkinsons disease      CAD (coronary artery disease)  s/p PCI 08/21      Pleural effusion, not elsewhere classified      COVID-19 vaccine series completed  w booster      History of hip replacement, total  R hip 2008 &amp; L hip      Status post cataract extraction  right eye cataract extraction with IOL 6/26/2015      Presence of cardiac pacemaker  10/2021      H/O coronary angioplasty  8/2021 1 stent inserted          MEDICATIONS  (STANDING):  aMIOdarone    Tablet 200 milliGRAM(s) Oral daily  apixaban 2.5 milliGRAM(s) Oral every 12 hours  ascorbic acid 500 milliGRAM(s) Oral daily  carbidopa/levodopa  25/100 1.5 Tablet(s) Oral four times a day  clopidogrel Tablet 75 milliGRAM(s) Oral daily  dexAMETHasone     Tablet 6 milliGRAM(s) Oral daily  lactated ringers. 1000 milliLiter(s) (75 mL/Hr) IV Continuous <Continuous>  OLANZapine 2.5 milliGRAM(s) Oral at bedtime  pantoprazole   Suspension 40 milliGRAM(s) Oral daily  polyethylene glycol 3350 17 Gram(s) Oral daily  senna 2 Tablet(s) Oral at bedtime    MEDICATIONS  (PRN):  acetaminophen     Tablet .. 650 milliGRAM(s) Oral every 6 hours PRN Temp greater or equal to 38C (100.4F), Mild Pain (1 - 3)  aluminum hydroxide/magnesium hydroxide/simethicone Suspension 30 milliLiter(s) Oral every 4 hours PRN Dyspepsia  bisacodyl Suppository 10 milliGRAM(s) Rectal daily PRN Constipation  melatonin 3 milliGRAM(s) Oral at bedtime PRN Insomnia  ondansetron Injectable 4 milliGRAM(s) IV Push every 8 hours PRN Nausea and/or Vomiting      Allergies    No Known Allergies    Intolerances        VITALS:    Vital Signs Last 24 Hrs  T(C): 36.4 (08 Feb 2023 15:53), Max: 37.2 (08 Feb 2023 06:03)  T(F): 97.6 (08 Feb 2023 15:53), Max: 99 (08 Feb 2023 06:03)  HR: 59 (08 Feb 2023 15:53) (59 - 60)  BP: 102/47 (08 Feb 2023 15:53) (100/58 - 104/55)  BP(mean): --  RR: 17 (08 Feb 2023 15:53) (17 - 18)  SpO2: 97% (08 Feb 2023 15:53) (97% - 98%)    Parameters below as of 08 Feb 2023 15:53  Patient On (Oxygen Delivery Method): room air        LABS:                          7.4    12.64 )-----------( 277      ( 08 Feb 2023 14:05 )             24.2       02-08    140  |  110<H>  |  29<H>  ----------------------------<  124<H>  3.8   |  22  |  1.28    Ca    8.2<L>      08 Feb 2023 06:53  Phos  2.2     02-08  Mg     1.80     02-08    TPro  4.7<L>  /  Alb  2.3<L>  /  TBili  0.3  /  DBili  <0.2  /  AST  59<H>  /  ALT  20  /  AlkPhos  313<H>  02-08      CAPILLARY BLOOD GLUCOSE              LOWER EXTREMITY PHYSICAL EXAM:    Vascular: R DP 2/4, L DP 1/4, BL PT 2/4, CFT <3 seconds B/L, Temperature gradient warm to cool, B/L.   Neuro: Epicritic sensation dimished to the level of toes, B/L.  Musculoskeletal/Ortho: Unremarkable.  Skin: Left posterior heel wound to subq, no erythema, no excess warmth, no edema, no acute signs of infection. Right mild rubor, no open wounds, no acute signs of infection.      RADIOLOGY & ADDITIONAL STUDIES:    
  CHIEF COMPLAINT:Patient is a 82y old  Male who presents with a chief complaint of hypoxic respiratory failure (2023 09:37)      HPI:   82-year-old male presents with acute onset of shortness of breath as per wife bedside.  Wife states that patient recently was discharged from the hospital for pneumonia and started on outpatient antibiotics.  Also endorsing patient has a history of right-sided pleural effusions for which she gets drained  3 times a week.  Last drainage earlier today.   Today patient began having trouble breathing and wife called 911 and patient was found to be hypoxic and warm to the touch and more confused than baseline.no falls, trauma, vomiting, diarrhea, ill contacts  Baseline , patient is AxOX1.  In ED , patient with acute hypoxic respiratory failure on HFNC , 40 L, 40% FIO2  sec to COVID pneumonia , sepsis , lactic acidosis.  In ED, patient was placed on bicarb drip, received Vancomycin and Zosyn and placed on HFNC as above.  Telemetry- paced rhythm.    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      Parkinsons disease      CAD (coronary artery disease)  s/p PCI       Pleural effusion, not elsewhere classified      COVID-19 vaccine series completed  w booster      History of hip replacement, total  R hip  &amp; L hip      Status post cataract extraction  right eye cataract extraction with IOL 2015      Presence of cardiac pacemaker  10/2021      H/O coronary angioplasty  2021 1 stent inserted          FAMILY HISTORY:  Family history of lung cancer (Mother)    Family history of esophageal cancer (Father)    FH: myocardial infarction (Grandparent)        SOCIAL HISTORY:  Smoking: [ ] Never Smoked [ ] Former Smoker (__ packs x ___ years) [ ] Current Smoker  (__ packs x ___ years)  Substance Use: [ ] Never Used [ ] Used ____  EtOH Use:  Marital Status: [ ] Single [ ]  [ ]  [ ]   Sexual History:   Occupation:  Recent Travel:  Country of Birth:  Advance Directives:    Allergies    No Known Allergies    Intolerances        HOME MEDICATIONS:  Home Medications:  amiodarone 200 mg oral tablet: 1 tab(s) orally once a day hosp/home (2023 18:33)  ascorbic acid 500 mg oral tablet: 1 tab(s) orally once a day (2023 09:49)  carbidopa-levodopa 25 mg-100 mg oral tablet: 1.5 tab(s) orally 4 times a day (2023 18:33)  clopidogrel 75 mg oral tablet: 1 tab(s) orally once a day hosp/ home (2023 18:33)  Crestor 40 mg oral tablet: 1 tab(s) orally once a day home (2023 18:33)  desvenlafaxine (as succinate) 50 mg oral tablet, extended release: 1 tab(s) orally once a day (2023 18:33)  Eliquis 2.5 mg oral tablet: 1 tab(s) orally 2 times a day. hosp /home  resume on isa 10/23  (2023 18:33)  Melatonin 1 mg oral tablet: 4 tab(s) orally once a day (at bedtime) (2023 18:33)  mirtazapine 7.5 mg oral tablet: 1 tab(s) orally once a day (at bedtime) (2023 18:33)  OLANZapine 2.5 mg oral tablet: 1 tab(s) orally once a day (at bedtime) (2023 18:33)  One A Day Men&#x27;s Complete oral tablet: 1 tab(s) orally once a day (2023 18:33)  polyethylene glycol 3350 oral powder for reconstitution: 17 gram(s) orally once a day hosp (2023 18:33)  senna leaf extract oral tablet: 2 tab(s) orally once a day (at bedtime) hosp (2023 18:33)  solifenacin 10 mg oral tablet: 1 tab(s) orally once a day hosp/home (2023 18:33)  tadalafil 5 mg oral tablet: 1 tab(s) orally once a day home (2023 18:33)  Vyndamax 61 mg oral capsule: 1 cap(s) orally once a day hosp/ home (2023 18:33)      REVIEW OF SYSTEMS:  Constitutional: [ ] negative [ ] fevers [ ] chills [ ] weight loss [ ] weight gain  HEENT: [ ] negative [ ] dry eyes [ ] eye irritation [ ] postnasal drip [ ] nasal congestion  CV: [ ] negative  [ ] chest pain [ ] orthopnea [ ] palpitations [ ] murmur  Resp: [ ] negative [ ] cough [ ] shortness of breath [ ] dyspnea [ ] wheezing [ ] sputum [ ] hemoptysis  GI: [ ] negative [ ] nausea [ ] vomiting [ ] diarrhea [ ] constipation [ ] abd pain [ ] dysphagia   : [ ] negative [ ] dysuria [ ] nocturia [ ] hematuria [ ] increased urinary frequency  Musculoskeletal: [ ] negative [ ] back pain [ ] myalgias [ ] arthralgias [ ] fracture  Skin: [ ] negative [ ] rash [ ] itch  Neurological: [ ] negative [ ] headache [ ] dizziness [ ] syncope [ ] weakness [ ] numbness  Psychiatric: [ ] negative [ ] anxiety [ ] depression  Endocrine: [ ] negative [ ] diabetes [ ] thyroid problem  Hematologic/Lymphatic: [ ] negative [ ] anemia [ ] bleeding problem  Allergic/Immunologic: [ ] negative [ ] itchy eyes [ ] nasal discharge [ ] hives [ ] angioedema  [ ] All other systems negative  [ ] Unable to assess ROS because ________    OBJECTIVE:  ICU Vital Signs Last 24 Hrs  T(C): 36.4 (2023 09:55), Max: 39.7 (2023 01:05)  T(F): 97.5 (2023 09:55), Max: 103.5 (2023 01:05)  HR: 60 (2023 09:55) (60 - 83)  BP: 94/52 (2023 09:55) (92/44 - 98/65)  BP(mean): 58 (2023 02:35) (58 - 78)  ABP: --  ABP(mean): --  RR: 16 (2023 11:42) (16 - 22)  SpO2: 100% (2023 11:42) (100% - 100%)    O2 Parameters below as of 2023 11:42  Patient On (Oxygen Delivery Method): nasal cannula, high flow  O2 Flow (L/min): 40  O2 Concentration (%): 40          CAPILLARY BLOOD GLUCOSE      POCT Blood Glucose.: 110 mg/dL (2023 00:51)      PHYSICAL EXAM:  GENERAL: NAD, well-groomed, well-developed  HEAD:  Atraumatic, Normocephalic  EYES: EOMI, PERRLA, conjunctiva and sclera clear  ENMT: No tonsillar erythema, exudates, or enlargement; Moist mucous membranes, Good dentition, No lesions  NECK: Supple, No JVD, Normal thyroid  CHEST/LUNG: Clear to auscultation bilaterally; No rales, rhonchi, wheezing, or rubs  HEART: Regular rate and rhythm; No murmurs, rubs, or gallops  ABDOMEN: Soft, Nontender, Nondistended; Bowel sounds present  VASCULAR:  2+ Peripheral Pulses, No clubbing, cyanosis, or edema  LYMPH: No lymphadenopathy noted  SKIN: No rashes or lesions  NERVOUS SYSTEM:  Alert & Oriented X3, Good concentration; Motor Strength 5/5 B/L upper and lower extremities; DTRs 2+ intact and symmetric    HOSPITAL MEDICATIONS:  Standing Meds:  aMIOdarone    Tablet 200 milliGRAM(s) Oral daily  apixaban 2.5 milliGRAM(s) Oral every 12 hours  ascorbic acid 500 milliGRAM(s) Oral daily  atorvastatin 80 milliGRAM(s) Oral at bedtime  carbidopa/levodopa  25/100 1.5 Tablet(s) Oral four times a day  clopidogrel Tablet 75 milliGRAM(s) Oral daily  dexAMETHasone     Tablet 6 milliGRAM(s) Oral daily  lactated ringers. 1000 milliLiter(s) IV Continuous <Continuous>  OLANZapine 2.5 milliGRAM(s) Oral at bedtime  polyethylene glycol 3350 17 Gram(s) Oral daily  remdesivir  IVPB   IV Intermittent   senna 2 Tablet(s) Oral at bedtime  sodium bicarbonate  Infusion 0.071 mEq/kG/Hr IV Continuous <Continuous>      PRN Meds:  acetaminophen     Tablet .. 650 milliGRAM(s) Oral every 6 hours PRN  aluminum hydroxide/magnesium hydroxide/simethicone Suspension 30 milliLiter(s) Oral every 4 hours PRN  melatonin 3 milliGRAM(s) Oral at bedtime PRN  ondansetron Injectable 4 milliGRAM(s) IV Push every 8 hours PRN      LABS:    The Labs were reviewed by me   The Radiology was reviewed by me    EKG tracing reviewed by me        138  |  113<H>  |  18  ----------------------------<  170<H>  4.2   |  18<L>  |  1.65<H>      140  |  106  |  17  ----------------------------<  111<H>  5.2   |  13<L>  |  1.66<H>    Ca    8.4      2023 07:25  Ca    9.1      2023 01:09  Phos  2.7       Mg     1.70         TPro  5.2<L>  /  Alb  2.4<L>  /  TBili  0.4  /  DBili  x   /  AST  24  /  ALT  7   /  AlkPhos  217<H>    TPro  6.9  /  Alb  3.3  /  TBili  0.6  /  DBili  x   /  AST  42<H>  /  ALT  11  /  AlkPhos  295<H>      Magnesium, Serum: 1.70 mg/dL (23 @ 07:25)    Phosphorus Level, Serum: 2.7 mg/dL (23 @ 07:25)      PT/INR - ( 2023 01:09 )   PT: 20.9 sec;   INR: 1.79 ratio         PTT - ( 2023 01:09 )  PTT:37.7 sec              Urinalysis Basic - ( 2023 02:32 )    Color: Yellow / Appearance: Turbid / S.027 / pH: x  Gluc: x / Ketone: Trace  / Bili: Negative / Urobili: <2 mg/dL   Blood: x / Protein: 300 mg/dL / Nitrite: Negative   Leuk Esterase: Small / RBC: 548 /HPF / WBC 16 /HPF   Sq Epi: x / Non Sq Epi: 4 /HPF / Bacteria: Few      ABG - ( 2023 10:25 )  pH, Arterial: 7.40  pH, Blood: x     /  pCO2: 31    /  pO2: 182   / HCO3: 19    / Base Excess: -5.0  /  SaO2: 99.0                                    8.1    8.73  )-----------( 227      ( 2023 09:15 )             27.3                         7.3    9.79  )-----------( 239      ( 2023 07:25 )             24.5                         9.6    11.87 )-----------( 432      ( 2023 01:09 )             33.4     CAPILLARY BLOOD GLUCOSE      POCT Blood Glucose.: 110 mg/dL (2023 00:51)    Blood Gas Source Venous: Venous (23 @ 02:10)  Blood Gas Source Venous: Venous (23 @ 01:09)      MICROBIOLOGY:       RADIOLOGY:  [ ] Reviewed and interpreted by me    PULMONARY FUNCTION TESTS:    EKG:

## 2023-02-08 NOTE — PROGRESS NOTE ADULT - PROBLEM SELECTOR PLAN 4
HH slight drop   Outpatient anemia work-up  transfuse PRBC for HB <7
HH slight drop, intermittent fluctuation of hg, no evidence of acute bleeding  check occult  transfuse PRBC for HB <7
HH slight drop   Outpatient anemia work-up  transfuse PRBC for HB <7
HH slight drop   Outpatient anemia work-up  transfuse PRBC for HB <7

## 2023-02-08 NOTE — DISCHARGE NOTE PROVIDER - CARE PROVIDERS DIRECT ADDRESSES
,DirectAddress_Unknown ,DirectAddress_Unknown,[email or web address]

## 2023-02-08 NOTE — PROGRESS NOTE ADULT - REASON FOR ADMISSION
hypoxic respiratory failure

## 2023-02-08 NOTE — PROGRESS NOTE ADULT - PROBLEM SELECTOR PLAN 7
C/w Simvastatin  - lipid panel largely negative f/w lipid panel
statin on hold due to elevated LFTs  - lipid panel largely negative
C/w Simvastatin  - lipid panel largely negative f/w lipid panel
c/w Simvastatin  f/w lipid panel

## 2023-02-08 NOTE — ADVANCED PRACTICE NURSE CONSULT - ASSESSMENT
Full note to follow.  General: Patient alert, disoriented, bedbound, two person assist, rigid lower extremities, incontinent of urine and stool- perineal care provided for episode of urinary and fecal incontinence. Skin warm, dry with increased moisture in intertriginous folds,  scattered areas of hyperpigmentation and hypopigmentation, scattered areas of ecchymosis on bilateral upper extremities, right heel, right lateral thigh. Blanchable erythema on right heel. Right Pleurax cathter.     Sacral fold to bilateral inner buttocks- mixed etiology Stage 2 pressure injury and incontinence associated dermatitis measuring 2.5cmx1.5cmx0.2cm on right buttock and left buttock measuring [id number] both exposing 80% pale pink dermis and 20% yellow fibrinous base. Irregular borders. Mirroring effect noted. Measurements taken while patient lying on left side, unable to be fully seen in natural anatomical position. No induration, no erythema, no increased warmth. Goals of care: Moisture management, continue to offload, moist wound healing, autolytic debridement.     Left heel- Stage 3 pressure injury measuring 3cmx1.5cmx0.2cm exposing 70% moist agranular base and 30% fibrin. Small serosanguinous drainage, no odor. No bone palpable. Pain upon palpation. Periwound with erythema extending 1.5cm. no increased warmth, no induration. No s/s of infection at this time. Goals of care: moist wound healing, antimicrobial, continue to offload.  General: Patient alert, disoriented, bedbound, two person assist, rigid lower extremities, incontinent of urine and stool- perineal care provided for episode of urinary and fecal incontinence. Skin warm, dry with increased moisture in intertriginous folds,  scattered areas of hyperpigmentation and hypopigmentation, scattered areas of ecchymosis on bilateral upper extremities, right heel, right lateral thigh. Blanchable erythema on right heel. Right Pleurax cathter.     Sacral fold to bilateral inner buttocks- mixed etiology Stage 2 pressure injury and incontinence associated dermatitis measuring 2.5cmx1.5cmx0.2cm on right buttock and left buttock measuring 5pdc7odc7.2cm both exposing 80% pale pink dermis and 20% yellow fibrinous base. Irregular borders. Mirroring effect noted. Measurements taken while patient lying on left side, unable to be fully seen in natural anatomical position. No induration, no erythema, no increased warmth. Goals of care: Moisture management, continue to offload, moist wound healing, autolytic debridement.     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 Small serosanguinous drainage, no odor. No bone palpable. Irregular borders. Pain upon palpation. Periwound with erythema extending 1.5cm. no increased warmth, no induration. No s/s of infection at this time. Goals of care: moist wound healing, antimicrobial, continue to offload.     Patient continues to be at high risk for skin breakdown. On assessment patient on a low air loss surface, heel offloading boots in place, Z-flow positioning pillow utilized, moisture management in place with use of one incontinence pad. Turning and positioning as tolerated.

## 2023-02-08 NOTE — PROGRESS NOTE ADULT - PROBLEM SELECTOR PLAN 11
likely medication related  remdes completed  lipitor held for now  abx stopped for poss contribution to LFTs and don't feel strongly they are necessary, likely covid pna, not bacterial  monitor for improvement
likely medication related  remdes completed  lipitor held for now  abx stopped for poss contribution to LFTs and don't feel strongly they are necessary, likely covid pna, not bacterial  monitor for improvement

## 2023-02-08 NOTE — PROGRESS NOTE ADULT - SUBJECTIVE AND OBJECTIVE BOX
[facility] Division of Hospital Medicine  Courtney Campos MD  Pager [phone]    Patient is a 82y old  Male who presents with a chief complaint of hypoxic respiratory failure      SUBJECTIVE / OVERNIGHT EVENTS: more sleepy this AM per wife, but woke early this AM; d/w wife need to cont to monitor blood work given elevated LFT and anemia      MEDICATIONS  (STANDING):  aMIOdarone    Tablet 200 milliGRAM(s) Oral daily  apixaban 2.5 milliGRAM(s) Oral every 12 hours  ascorbic acid 500 milliGRAM(s) Oral daily  carbidopa/levodopa  25/100 1.5 Tablet(s) Oral four times a day  clopidogrel Tablet 75 milliGRAM(s) Oral daily  dexAMETHasone     Tablet 6 milliGRAM(s) Oral daily  lactated ringers. 1000 milliLiter(s) (75 mL/Hr) IV Continuous <Continuous>  OLANZapine 2.5 milliGRAM(s) Oral at bedtime  pantoprazole   Suspension 40 milliGRAM(s) Oral daily  polyethylene glycol 3350 17 Gram(s) Oral daily  senna 2 Tablet(s) Oral at bedtime    MEDICATIONS  (PRN):  acetaminophen     Tablet .. 650 milliGRAM(s) Oral every 6 hours PRN Temp greater or equal to 38C (100.4F), Mild Pain (1 - 3)  aluminum hydroxide/magnesium hydroxide/simethicone Suspension 30 milliLiter(s) Oral every 4 hours PRN Dyspepsia  bisacodyl Suppository 10 milliGRAM(s) Rectal daily PRN Constipation  melatonin 3 milliGRAM(s) Oral at bedtime PRN Insomnia  ondansetron Injectable 4 milliGRAM(s) IV Push every 8 hours PRN Nausea and/or Vomiting      PHYSICAL EXAM:  Vital Signs Last 24 Hrs  T(F): 97.6 (08 Feb 2023 15:53), Max: 99 (08 Feb 2023 06:03)  HR: 59 (08 Feb 2023 15:53) (59 - 60)  BP: 102/47 (08 Feb 2023 15:53) (100/58 - 104/55)  RR: 17 (08 Feb 2023 15:53) (17 - 18)  SpO2: 97% (08 Feb 2023 15:53) (97% - 98%)    Parameters below as of 08 Feb 2023 15:53  Patient On (Oxygen Delivery Method): room air      exam limited as pt receiving skin eval  CONSTITUTIONAL: NAD, appears comfortable  EYES: PERRLA; conjunctiva and sclera clear  ENMT: Moist oral mucosa; normal dentition  RESPIRATORY: Normal respiratory effort;   CARDIOVASCULAR: No lower extremity edema;   MUSCULOSKELETAL:  no clubbing or cyanosis of digits; no joint swelling or tenderness to palpation  PSYCH: A+O to person, place, and time; affect appropriate  NEUROLOGY: CN 2-12 are intact and symmetric; no gross sensory deficits   SKIN: No rashes; no palpable lesions; R pleurx in place    LABS:                        7.4    12.64 )-----------( 277      ( 08 Feb 2023 14:05 )             24.2     02-08    140  |  110<H>  |  29<H>  ----------------------------<  124<H>  3.8   |  22  |  1.28    Ca    8.2<L>      08 Feb 2023 06:53  Phos  2.2     02-08  Mg     1.80     02-08    TPro  4.7<L>  /  Alb  2.3<L>  /  TBili  0.3  /  DBili  <0.2  /  AST  59<H>  /  ALT  20  /  AlkPhos  313<H>  02-08          
[facility] Division of Hospital Medicine  Courtney Campos MD  Pager [phone]    Patient is a 82y old  Male who presents with a chief complaint of hypoxic respiratory failure       SUBJECTIVE / OVERNIGHT EVENTS: wife at bedside who states pt is much improved from yesterday; he was lethargic and not interacting yesterday; today pt alert and being fed by wife upon my arrival; denies complaints; on HFNC      MEDICATIONS  (STANDING):  aMIOdarone    Tablet 200 milliGRAM(s) Oral daily  apixaban 2.5 milliGRAM(s) Oral every 12 hours  ascorbic acid 500 milliGRAM(s) Oral daily  atorvastatin 80 milliGRAM(s) Oral at bedtime  carbidopa/levodopa  25/100 1.5 Tablet(s) Oral four times a day  clopidogrel Tablet 75 milliGRAM(s) Oral daily  dexAMETHasone     Tablet 6 milliGRAM(s) Oral daily  lactated ringers. 1000 milliLiter(s) (75 mL/Hr) IV Continuous <Continuous>  OLANZapine 2.5 milliGRAM(s) Oral at bedtime  pantoprazole   Suspension 40 milliGRAM(s) Oral daily  polyethylene glycol 3350 17 Gram(s) Oral daily  remdesivir  IVPB   IV Intermittent   remdesivir  IVPB 100 milliGRAM(s) IV Intermittent every 24 hours  senna 2 Tablet(s) Oral at bedtime    MEDICATIONS  (PRN):  acetaminophen     Tablet .. 650 milliGRAM(s) Oral every 6 hours PRN Temp greater or equal to 38C (100.4F), Mild Pain (1 - 3)  aluminum hydroxide/magnesium hydroxide/simethicone Suspension 30 milliLiter(s) Oral every 4 hours PRN Dyspepsia  melatonin 3 milliGRAM(s) Oral at bedtime PRN Insomnia  ondansetron Injectable 4 milliGRAM(s) IV Push every 8 hours PRN Nausea and/or Vomiting        PHYSICAL EXAM:  Vital Signs Last 24 Hrs  T(F): 97.9 (2023 13:22), Max: 98.3 (2023 19:03)  HR: 60 (2023 13:22) (58 - 60)  BP: 100/60 (2023 13:22) (93/44 - 101/49)  RR: 16 (2023 13:22) (12 - 18)  SpO2: 100% (2023 13:22) (98% - 100%)    Parameters below as of 2023 13:22  Patient On (Oxygen Delivery Method): nasal cannula  O2 Flow (L/min): 6      CONSTITUTIONAL: NAD, appears comfortable  EYES: PERRLA; conjunctiva and sclera clear  ENMT: Moist oral mucosa; normal dentition  RESPIRATORY: Normal respiratory effort; grossly b/l AE  CARDIOVASCULAR: Regular rate and rhythm; No lower extremity edema  ABDOMEN: Nontender to palpation, normoactive bowel sounds  MUSCULOSKELETAL: no clubbing or cyanosis of digits; no joint swelling or tenderness to palpation  PSYCH: calm, coop; affect appropriate  NEUROLOGY: CN 2-12 are intact and symmetric; no gross sensory deficits   SKIN: No rashes; no palpable lesions    LABS:                        7.4    13.87 )-----------( 285      ( 2023 05:23 )             24.2     02-03    138  |  108<H>  |  23  ----------------------------<  92  4.1   |  21<L>  |  1.59<H>    Ca    8.7      2023 05:23  Phos  2.9     02-03  Mg     1.80     02-03    TPro  5.5<L>  /  Alb  2.6<L>  /  TBili  0.3  /  DBili  x   /  AST  29  /  ALT  8   /  AlkPhos  235<H>  02-03    PT/INR - ( 2023 05:23 )   PT: 19.3 sec;   INR: 1.66 ratio         PTT - ( 2023 05:23 )  PTT:35.4 sec      Urinalysis Basic - ( 2023 02:32 )    Color: Yellow / Appearance: Turbid / S.027 / pH: x  Gluc: x / Ketone: Trace  / Bili: Negative / Urobili: <2 mg/dL   Blood: x / Protein: 300 mg/dL / Nitrite: Negative   Leuk Esterase: Small / RBC: 548 /HPF / WBC 16 /HPF   Sq Epi: x / Non Sq Epi: 4 /HPF / Bacteria: Few        Culture - Urine (collected 2023 02:32)  Source: Clean Catch Clean Catch (Midstream)  Final Report (2023 08:25):    No growth    Culture - Blood (collected 2023 01:45)  Source: .Blood Blood-Peripheral  Preliminary Report (2023 06:01):    No growth to date.    Culture - Blood (collected 2023 01:09)  Source: .Blood Blood-Peripheral  Preliminary Report (2023 06:01):    No growth to date.      
[facility] Division of Hospital Medicine  Courtney Campos MD  Pager [phone]    Patient is a 82y old  Male who presents with a chief complaint of hypoxic respiratory failure     SUBJECTIVE / OVERNIGHT EVENTS: doing well, wife at bedside; d/w them need to monitor LFT prior to dc, hopeful for dc tomorrow      MEDICATIONS  (STANDING):  aMIOdarone    Tablet 200 milliGRAM(s) Oral daily  apixaban 2.5 milliGRAM(s) Oral every 12 hours  ascorbic acid 500 milliGRAM(s) Oral daily  carbidopa/levodopa  25/100 1.5 Tablet(s) Oral four times a day  clopidogrel Tablet 75 milliGRAM(s) Oral daily  dexAMETHasone     Tablet 6 milliGRAM(s) Oral daily  lactated ringers. 1000 milliLiter(s) (75 mL/Hr) IV Continuous <Continuous>  OLANZapine 2.5 milliGRAM(s) Oral at bedtime  pantoprazole   Suspension 40 milliGRAM(s) Oral daily  polyethylene glycol 3350 17 Gram(s) Oral daily  senna 2 Tablet(s) Oral at bedtime    MEDICATIONS  (PRN):  acetaminophen     Tablet .. 650 milliGRAM(s) Oral every 6 hours PRN Temp greater or equal to 38C (100.4F), Mild Pain (1 - 3)  aluminum hydroxide/magnesium hydroxide/simethicone Suspension 30 milliLiter(s) Oral every 4 hours PRN Dyspepsia  melatonin 3 milliGRAM(s) Oral at bedtime PRN Insomnia  ondansetron Injectable 4 milliGRAM(s) IV Push every 8 hours PRN Nausea and/or Vomiting      PHYSICAL EXAM:  Vital Signs Last 24 Hrs  T(F): 98.1 (07 Feb 2023 13:14), Max: 98.3 (06 Feb 2023 22:34)  HR: 59 (07 Feb 2023 13:14) (59 - 60)  BP: 111/55 (07 Feb 2023 13:14) (100/56 - 111/55)  RR: 18 (07 Feb 2023 13:14) (18 - 18)  SpO2: 100% (07 Feb 2023 13:14) (97% - 100%)    Parameters below as of 07 Feb 2023 13:14  Patient On (Oxygen Delivery Method): room air        CONSTITUTIONAL: NAD, appears comfortable  EYES: PERRLA; conjunctiva and sclera clear  ENMT: Moist oral mucosa; normal dentition  RESPIRATORY: Normal respiratory effort; grossly b/l AE  CARDIOVASCULAR: Regular rate and rhythm; No lower extremity edema;   ABDOMEN: Nontender to palpation, normoactive bowel sounds  MUSCULOSKELETAL:  No clubbing or cyanosis of digits; no joint swelling or tenderness to palpation  PSYCH: calm, coop; affect appropriate  NEUROLOGY: CN 2-12 are intact and symmetric; no gross sensory deficits   SKIN: No rashes; no palpable lesions    LABS:                        9.0    15.81 )-----------( 356      ( 07 Feb 2023 07:01 )             29.3     02-07    140  |  109<H>  |  29<H>  ----------------------------<  130<H>  3.9   |  21<L>  |  1.29    Ca    8.6      07 Feb 2023 07:01    TPro  5.5<L>  /  Alb  2.8<L>  /  TBili  0.5  /  DBili  0.2  /  AST  132<H>  /  ALT  20  /  AlkPhos  428<H>  02-07      
[facility] Division of Hospital Medicine  Courtney Campos MD  Pager [phone]    Patient is a 82y old  Male who presents with a chief complaint of hypoxic respiratory failure     SUBJECTIVE / OVERNIGHT EVENTS: feeling much better; wife at bedside inquiring about pleurex drainage      MEDICATIONS  (STANDING):  aMIOdarone    Tablet 200 milliGRAM(s) Oral daily  apixaban 2.5 milliGRAM(s) Oral every 12 hours  ascorbic acid 500 milliGRAM(s) Oral daily  atorvastatin 80 milliGRAM(s) Oral at bedtime  azithromycin   Tablet 500 milliGRAM(s) Oral daily  carbidopa/levodopa  25/100 1.5 Tablet(s) Oral four times a day  cefTRIAXone   IVPB 1000 milliGRAM(s) IV Intermittent every 24 hours  clopidogrel Tablet 75 milliGRAM(s) Oral daily  dexAMETHasone     Tablet 6 milliGRAM(s) Oral daily  lactated ringers. 1000 milliLiter(s) (75 mL/Hr) IV Continuous <Continuous>  OLANZapine 2.5 milliGRAM(s) Oral at bedtime  pantoprazole   Suspension 40 milliGRAM(s) Oral daily  polyethylene glycol 3350 17 Gram(s) Oral daily  remdesivir  IVPB   IV Intermittent   remdesivir  IVPB 100 milliGRAM(s) IV Intermittent every 24 hours  senna 2 Tablet(s) Oral at bedtime    MEDICATIONS  (PRN):  acetaminophen     Tablet .. 650 milliGRAM(s) Oral every 6 hours PRN Temp greater or equal to 38C (100.4F), Mild Pain (1 - 3)  aluminum hydroxide/magnesium hydroxide/simethicone Suspension 30 milliLiter(s) Oral every 4 hours PRN Dyspepsia  melatonin 3 milliGRAM(s) Oral at bedtime PRN Insomnia  ondansetron Injectable 4 milliGRAM(s) IV Push every 8 hours PRN Nausea and/or Vomiting      PHYSICAL EXAM:  Vital Signs Last 24 Hrs  T(F): 97.7 (06 Feb 2023 05:00), Max: 97.9 (05 Feb 2023 22:01)  HR: 60 (06 Feb 2023 05:00) (60 - 60)  BP: 109/58 (06 Feb 2023 05:00) (102/58 - 109/60)  RR: 17 (06 Feb 2023 05:00) (16 - 18)  SpO2: 100% (06 Feb 2023 05:00) (100% - 100%)    Parameters below as of 06 Feb 2023 05:00  Patient On (Oxygen Delivery Method): nasal cannula  O2 Flow (L/min): 2      CONSTITUTIONAL: NAD, appears comfortable  EYES: PERRLA; conjunctiva and sclera clear  ENMT: Moist oral mucosa; normal dentition  RESPIRATORY: Normal respiratory effort; grossly b/l AE  CARDIOVASCULAR: Regular rate and rhythm; No lower extremity edema;   ABDOMEN: Nontender to palpation, normoactive bowel sounds  MUSCULOSKELETAL:  no clubbing or cyanosis of digits; no joint swelling or tenderness to palpation  PSYCH: calm, coop; affect appropriate  NEUROLOGY: CN 2-12 are intact and symmetric; no gross sensory deficits   SKIN: No rashes; no palpable lesions; R chest pleurx    LABS:                        9.5    13.06 )-----------( 339      ( 06 Feb 2023 04:30 )             31.7     02-06    140  |  109<H>  |  29<H>  ----------------------------<  115<H>  4.5   |  19<L>  |  1.30    Ca    8.9      06 Feb 2023 04:30  Phos  2.4     02-06  Mg     1.90     02-06    TPro  5.7<L>  /  Alb  2.7<L>  /  TBili  0.4  /  DBili  <0.2  /  AST  60<H>  /  ALT  11  /  AlkPhos  343<H>  02-06    PT/INR - ( 06 Feb 2023 04:30 )   PT: 16.6 sec;   INR: 1.43 ratio         PTT - ( 06 Feb 2023 04:30 )  PTT:32.8 sec  
Loc Tyson MD  [facility] Division of Hospital Medicine  Pager: [phone]  Available via MS Teams    SUBJECTIVE / OVERNIGHT EVENTS:    No new events   tired, but answering questions appropriately     MEDICATIONS  (STANDING):  aMIOdarone    Tablet 200 milliGRAM(s) Oral daily  apixaban 2.5 milliGRAM(s) Oral every 12 hours  ascorbic acid 500 milliGRAM(s) Oral daily  atorvastatin 80 milliGRAM(s) Oral at bedtime  carbidopa/levodopa  25/100 1.5 Tablet(s) Oral four times a day  clopidogrel Tablet 75 milliGRAM(s) Oral daily  dexAMETHasone     Tablet 6 milliGRAM(s) Oral daily  lactated ringers. 1000 milliLiter(s) (75 mL/Hr) IV Continuous <Continuous>  OLANZapine 2.5 milliGRAM(s) Oral at bedtime  pantoprazole   Suspension 40 milliGRAM(s) Oral daily  polyethylene glycol 3350 17 Gram(s) Oral daily  remdesivir  IVPB   IV Intermittent   remdesivir  IVPB 100 milliGRAM(s) IV Intermittent every 24 hours  senna 2 Tablet(s) Oral at bedtime    MEDICATIONS  (PRN):  acetaminophen     Tablet .. 650 milliGRAM(s) Oral every 6 hours PRN Temp greater or equal to 38C (100.4F), Mild Pain (1 - 3)  aluminum hydroxide/magnesium hydroxide/simethicone Suspension 30 milliLiter(s) Oral every 4 hours PRN Dyspepsia  melatonin 3 milliGRAM(s) Oral at bedtime PRN Insomnia  ondansetron Injectable 4 milliGRAM(s) IV Push every 8 hours PRN Nausea and/or Vomiting      I&O's Summary    03 Feb 2023 07:01  -  04 Feb 2023 07:00  --------------------------------------------------------  IN: 0 mL / OUT: 325 mL / NET: -325 mL    04 Feb 2023 07:01  -  04 Feb 2023 16:17  --------------------------------------------------------  IN: 0 mL / OUT: 500 mL / NET: -500 mL        PHYSICAL EXAM:  Vital Signs Last 24 Hrs  T(C): 36.6 (04 Feb 2023 12:26), Max: 36.6 (04 Feb 2023 12:26)  T(F): 97.8 (04 Feb 2023 12:26), Max: 97.8 (04 Feb 2023 12:26)  HR: 95 (04 Feb 2023 12:26) (60 - 95)  BP: 99/67 (04 Feb 2023 12:26) (99/67 - 112/49)  BP(mean): --  RR: 17 (04 Feb 2023 12:26) (15 - 17)  SpO2: 99% (04 Feb 2023 12:26) (99% - 99%)    Parameters below as of 04 Feb 2023 12:26  Patient On (Oxygen Delivery Method): nasal cannula  O2 Flow (L/min): 4    CONSTITUTIONAL: NAD, well-developed   EYES: conjunctiva and sclera clear  ENMT: Moist oral mucosa   NECK: Supple   RESPIRATORY: Normal respiratory effort; lungs are clear to auscultation bilaterally  CARDIOVASCULAR: Regular rate and rhythm, normal S1 and S2, no murmur/rub/gallop; Peripheral pulses are 2+ bilaterally  ABDOMEN: Nontender to palpation, normoactive bowel sounds, no rebound/guarding   MUSCULOSKELETAL: No lower extremity edema   PSYCH: A+O to person, place, and time; affect appropriate  NEUROLOGY: no gross sensory or motor deficits   SKIN: No rashes    LABS:                        8.7    13.40 )-----------( 341      ( 04 Feb 2023 05:10 )             28.9     02-04    139  |  105  |  29<H>  ----------------------------<  109<H>  4.1   |  18<L>  |  1.60<H>    Ca    9.0      04 Feb 2023 05:10  Phos  2.5     02-04  Mg     1.90     02-04    TPro  6.2  /  Alb  2.8<L>  /  TBili  0.3  /  DBili  x   /  AST  37  /  ALT  8   /  AlkPhos  287<H>  02-04    PT/INR - ( 04 Feb 2023 05:10 )   PT: 18.6 sec;   INR: 1.60 ratio         PTT - ( 04 Feb 2023 05:10 )  PTT:37.4 sec          Culture - Urine (collected 02 Feb 2023 02:32)  Source: Clean Catch Clean Catch (Midstream)  Final Report (03 Feb 2023 08:25):    No growth    Culture - Blood (collected 02 Feb 2023 01:45)  Source: .Blood Blood-Peripheral  Preliminary Report (03 Feb 2023 06:01):    No growth to date.    Culture - Blood (collected 02 Feb 2023 01:09)  Source: .Blood Blood-Peripheral  Preliminary Report (03 Feb 2023 06:01):    No growth to date.      COVID-19 PCR: NotDetec (30 Jan 2023 01:43)  SARS-CoV-2: NotDetec (24 Jan 2023 16:10)  COVID-19 PCR: NotDetec (25 Oct 2022 05:48)  COVID-19 PCR: NotDetec (12 Oct 2022 11:08)  SARS-CoV-2: NotDetec (08 Oct 2022 11:57)  COVID-19 PCR: NotDetec (11 Sep 2022 07:18)      RADIOLOGY & ADDITIONAL TESTS:  Other Results Reviewed Today: BMP with stable Cr, CBC with stable Hg     COORDINATION OF CARE:  Communication: discussed plan of care with ACP 
Loc Tyson MD  [facility] Division of Hospital Medicine  Pager: [phone]  Available via MS Teams    SUBJECTIVE / OVERNIGHT EVENTS:    No new complaints   Lethargic   Not orientated     MEDICATIONS  (STANDING):  aMIOdarone    Tablet 200 milliGRAM(s) Oral daily  apixaban 2.5 milliGRAM(s) Oral every 12 hours  ascorbic acid 500 milliGRAM(s) Oral daily  atorvastatin 80 milliGRAM(s) Oral at bedtime  azithromycin   Tablet 500 milliGRAM(s) Oral daily  carbidopa/levodopa  25/100 1.5 Tablet(s) Oral four times a day  cefTRIAXone   IVPB 1000 milliGRAM(s) IV Intermittent every 24 hours  clopidogrel Tablet 75 milliGRAM(s) Oral daily  dexAMETHasone     Tablet 6 milliGRAM(s) Oral daily  lactated ringers. 1000 milliLiter(s) (75 mL/Hr) IV Continuous <Continuous>  OLANZapine 2.5 milliGRAM(s) Oral at bedtime  pantoprazole   Suspension 40 milliGRAM(s) Oral daily  polyethylene glycol 3350 17 Gram(s) Oral daily  remdesivir  IVPB   IV Intermittent   remdesivir  IVPB 100 milliGRAM(s) IV Intermittent every 24 hours  senna 2 Tablet(s) Oral at bedtime    MEDICATIONS  (PRN):  acetaminophen     Tablet .. 650 milliGRAM(s) Oral every 6 hours PRN Temp greater or equal to 38C (100.4F), Mild Pain (1 - 3)  aluminum hydroxide/magnesium hydroxide/simethicone Suspension 30 milliLiter(s) Oral every 4 hours PRN Dyspepsia  melatonin 3 milliGRAM(s) Oral at bedtime PRN Insomnia  ondansetron Injectable 4 milliGRAM(s) IV Push every 8 hours PRN Nausea and/or Vomiting      I&O's Summary    04 Feb 2023 07:01  -  05 Feb 2023 07:00  --------------------------------------------------------  IN: 0 mL / OUT: 700 mL / NET: -700 mL        PHYSICAL EXAM:  Vital Signs Last 24 Hrs  T(C): 36.3 (05 Feb 2023 13:20), Max: 36.4 (04 Feb 2023 22:42)  T(F): 97.4 (05 Feb 2023 13:20), Max: 97.5 (04 Feb 2023 22:42)  HR: 60 (05 Feb 2023 13:20) (60 - 75)  BP: 111/44 (05 Feb 2023 13:20) (104/65 - 118/56)  BP(mean): --  RR: 17 (05 Feb 2023 13:20) (16 - 17)  SpO2: 100% (05 Feb 2023 13:20) (99% - 100%)    Parameters below as of 05 Feb 2023 13:20  Patient On (Oxygen Delivery Method): nasal cannula  O2 Flow (L/min): 4    CONSTITUTIONAL: NAD, lethargic   EYES: conjunctiva and sclera clear  ENMT: Moist oral mucosa   NECK: Supple   RESPIRATORY: decreased resp effort, but lungs are clear to auscultation bilaterally  CARDIOVASCULAR: Regular rate and rhythm, normal S1 and S2, no murmur/rub/gallop; Peripheral pulses are 2+ bilaterally  ABDOMEN: Nontender to palpation, normoactive bowel sounds, no rebound/guarding   MUSCULOSKELETAL: No lower extremity edema   PSYCH: A+O x0  NEUROLOGY: no gross sensory or motor deficits   SKIN: No rashes    LABS:                        8.2    12.52 )-----------( 306      ( 05 Feb 2023 06:55 )             26.9     02-05    137  |  108<H>  |  29<H>  ----------------------------<  117<H>  4.1   |  22  |  1.41<H>    Ca    8.7      05 Feb 2023 06:55  Phos  2.3     02-05  Mg     1.90     02-05    TPro  5.3<L>  /  Alb  2.6<L>  /  TBili  0.4  /  DBili  x   /  AST  55<H>  /  ALT  6   /  AlkPhos  311<H>  02-05    PT/INR - ( 05 Feb 2023 06:55 )   PT: 21.2 sec;   INR: 1.82 ratio         PTT - ( 05 Feb 2023 06:55 )  PTT:34.7 sec          COVID-19 PCR: NotDetec (30 Jan 2023 01:43)  SARS-CoV-2: NotDetec (24 Jan 2023 16:10)  COVID-19 PCR: NotDetec (25 Oct 2022 05:48)  COVID-19 PCR: NotDetec (12 Oct 2022 11:08)  SARS-CoV-2: NotDetec (08 Oct 2022 11:57)  COVID-19 PCR: NotDetec (11 Sep 2022 07:18)      RADIOLOGY & ADDITIONAL TESTS:  Other Results Reviewed Today: BMP with stable Cr, CBC with stable Hg     COORDINATION OF CARE:  Communication: discussed plan of care with ACP

## 2023-02-08 NOTE — DISCHARGE NOTE PROVIDER - NSDCCPCAREPLAN_GEN_ALL_CORE_FT
PRINCIPAL DISCHARGE DIAGNOSIS  Diagnosis: Acute respiratory distress syndrome (ARDS) due to COVID-19 virus  Assessment and Plan of Treatment: You were found to have severe respiratory distress due to covid requiring high flow oxygen.  You were treated for covid with remdesivir and dexamethasone and you no longer require oxygen      SECONDARY DISCHARGE DIAGNOSES  Diagnosis: PAF (paroxysmal atrial fibrillation)  Assessment and Plan of Treatment: Continue your eliquis and amiodarone    Diagnosis: Acute metabolic encephalopathy  Assessment and Plan of Treatment: Your mental status is back to baseline    Diagnosis: Elevated LFTs  Assessment and Plan of Treatment: You liver tests were noted to be elevated.  This may be as a result of the medications we gave to treat your covid, possibly covid itself.  They are starting to trend down but will need to be follow up in the office in about 1-2 weeks.  You had a sonogram of your liver which only revealed some cysts which would not cause the liver lests to be elevated.    Diagnosis: Dementia  Assessment and Plan of Treatment:      PRINCIPAL DISCHARGE DIAGNOSIS  Diagnosis: Acute respiratory distress syndrome (ARDS) due to COVID-19 virus  Assessment and Plan of Treatment: You were found to have severe respiratory distress due to covid requiring high flow oxygen.  You were treated for covid with remdesivir and dexamethasone and you no longer require oxygen      SECONDARY DISCHARGE DIAGNOSES  Diagnosis: PAF (paroxysmal atrial fibrillation)  Assessment and Plan of Treatment: Continue your eliquis and amiodarone    Diagnosis: Acute metabolic encephalopathy  Assessment and Plan of Treatment: Your mental status is back to baseline    Diagnosis: Elevated LFTs  Assessment and Plan of Treatment: You liver tests were noted to be elevated.  This may be as a result of the medications we gave to treat your covid, possibly covid itself.  They are starting to trend down but will need to be follow up in the office in about 1-2 weeks.  You had a sonogram of your liver which only revealed some cysts which would not cause the liver lests to be elevated. Your crestor was held, please follow up with your primary doctor to resume this as this medication can cause elevated liver tests as well    Diagnosis: Dementia  Assessment and Plan of Treatment:      PRINCIPAL DISCHARGE DIAGNOSIS  Diagnosis: Acute respiratory distress syndrome (ARDS) due to COVID-19 virus  Assessment and Plan of Treatment: You were found to have severe respiratory distress due to covid requiring high flow oxygen.  You were treated for covid with remdesivir and dexamethasone and you no longer require oxygen      SECONDARY DISCHARGE DIAGNOSES  Diagnosis: Acute metabolic encephalopathy  Assessment and Plan of Treatment: Your mental status is back to baseline    Diagnosis: PAF (paroxysmal atrial fibrillation)  Assessment and Plan of Treatment: Continue your eliquis and amiodarone    Diagnosis: Elevated LFTs  Assessment and Plan of Treatment: You liver tests were noted to be elevated.  This may be as a result of the medications we gave to treat your covid, possibly covid itself.  They are starting to trend down but will need to be follow up in the office in about 1-2 weeks.  You had a sonogram of your liver which only revealed some cysts which would not cause the liver lests to be elevated. Your crestor was held, please follow up with your primary doctor to resume this as this medication can cause elevated liver tests as well

## 2023-02-08 NOTE — PROGRESS NOTE ADULT - PROBLEM SELECTOR PROBLEM 3
Acute respiratory distress syndrome (ARDS) due to COVID-19 virus

## 2023-02-08 NOTE — DISCHARGE NOTE PROVIDER - NSDCFUADDAPPT_GEN_ALL_CORE_FT
Podiatry Discharge Instructions:  Follow up: Please follow up with Dr. Noy Mckee within 1 week of discharge from the hospital, please call [phone] for appointment and discuss that you recently were seen in the hospital.  Wound Care: Please apply santyl to left heel wound daily followed by 4x4 gauze and kerlix.  Weight bearing: Pt is non-ambulatory.  Antibiotics: Please continue as instructed. Podiatry Discharge Instructions:  Follow up: Please follow up with Dr. Noy Mckee within 1 week of discharge from the hospital, please call [phone] for appointment and discuss that you recently were seen in the hospital.  Wound Care: Please apply santyl to left heel wound daily followed by 4x4 gauze and kerlix.  Weight bearing: Pt is non-ambulatory.  Antibiotics: Please continue as instructed.    Please follow up with your primary care provider in 1-2 weeks following discharge from the hospital for continued monitoring and management.

## 2023-02-08 NOTE — DISCHARGE NOTE PROVIDER - ATTENDING DISCHARGE PHYSICAL EXAMINATION:
pt seen and examined by me  offers no complaints  VSS  CHEST b/l AE  ABD +BS soft  a/w acute hypoxic resp failure due to covid req HFNC, now on RA  elevated LFTs, asymptomatic, sono with liver cyst (not new finding)  chronic heel wound  medically stable for dc

## 2023-02-08 NOTE — PROGRESS NOTE ADULT - PROBLEM SELECTOR PLAN 5
c/w home Amiodarone  s/p PPM   c/w apixaban

## 2023-02-08 NOTE — DISCHARGE NOTE PROVIDER - PROVIDER TOKENS
FREE:[LAST:[sciales],FIRST:[geo],PHONE:[phone]],FAX:[(   )    -],FOLLOWUP:[1 week]] PROVIDER:[TOKEN:[[id number]],FOLLOWUP:[1 week]] PROVIDER:[TOKEN:[[id number]],FOLLOWUP:[1 week]],PROVIDER:[TOKEN:[[id number]],SCHEDULEDAPPT:[02/13/2023],SCHEDULEDAPPTTIME:[08:00 AM]]

## 2023-02-08 NOTE — PROGRESS NOTE ADULT - PROBLEM SELECTOR PLAN 10
Patient is on apixaban   dc planning to home once medically stable
Patient is on apixaban   Prognosis is guarded   Palliative consult
Patient is on apixaban   Prognosis is guarded   Palliative consult
Patient is on apixaban   dc planning to home once medically stable
Patient is on apixaban   dc planning to home once medically stable
Patient is on apixaban   Prognosis is guarded   Palliative consult

## 2023-02-08 NOTE — CONSULT NOTE ADULT - ASSESSMENT
82M presents with left posterior heel wound to subq.  - Pt seen and evaluated.  - Afebrile, WBC 12.62, ESR/CRP pending.  - Left posterior heel wound to subq, no erythema, no excess warmth, no edema, no acute signs of infection. Right mild rubor, no open wounds, no acute signs of infection.  - Left Foot X-Rays: Pending.  - Ordered santyl.  - Daily nursing wound care ordered placed.  - Pod Plan: Local wound care pending left foot x-rays.  - Wound care instructions and follow-up information can be found in the discharge provider note.  - Discussed with attending. 82M presents with left posterior heel wound to subq.  - Pt seen and evaluated.  - Afebrile, WBC 12.62, ESR/CRP pending.  - Left posterior heel wound to subq, no erythema, no excess warmth, no edema, no acute signs of infection. Right mild rubor, no open wounds, no acute signs of infection.  - Left Foot X-Rays: Pending.  - Ordered santyl.  - Daily nursing wound care ordered placed.  - Recommend z-flows at all times while in bed.  - Pod Plan: Local wound care pending left foot x-rays.  - Wound care instructions and follow-up information can be found in the discharge provider note.  - Discussed with attending.

## 2023-02-08 NOTE — ADVANCED PRACTICE NURSE CONSULT - REASON FOR CONSULT
Patient seen on skin care rounds after wound care referral received for assessment of skin impairment and recommendations of topical management. Chart reviewed: (+)Covid, WBC 11.05, H/H 7.6/24.8, Platelets  274, INR 1.43,  Serum albumin 2.2, a1c 5.4%, Daren 12, BMI 23.0 wife and aide at bedside interviewed stating patient had a wound on his bottom from a few days before being admitted to the hospital. Foot wound noted about a month ago, homecare used iodine 3x/week. Patient H/O of Parkinson's, Pleural effusion s/p PleurX, a. fib on Eliquis, bradycardia w/ AICD, HTN who was BIBEMS for  sepsis sec to COVID pneumonia and hypoxic respiratory failure , lactic metabolic acidosis , metabolic encephalopathy. Pulmonology consulted on patient for hypoxic respiratory failure.    Patient seen on skin care rounds after wound care referral received for assessment of skin impairment and recommendations of topical management. Chart reviewed: (+)Covid, DNR, WBC 11.05, H/H 7.6/24.8, Platelets  274, INR 1.43,  Serum albumin 2.2, a1c 5.4%, Daren 12, BMI 23.0 wife and aide at bedside interviewed stating patient had a wound on his bottom from a few days before being admitted to the hospital. Foot wound noted about a month ago, homecare used iodine 3x/week. Patient H/O of Parkinson's, Pleural effusion s/p PleurX, a. fib on Eliquis, bradycardia w/ AICD, HTN who was BIBEMS for  sepsis sec to COVID pneumonia and hypoxic respiratory failure , lactic metabolic acidosis , metabolic encephalopathy. Pulmonology consulted on patient for hypoxic respiratory failure.

## 2023-02-08 NOTE — DISCHARGE NOTE PROVIDER - HOSPITAL COURSE
Pt is an 83 yo male with ho chronic benign pleural effusion with pleurX with TIW drainage, dementia, HTN, afib on eliquis recent admission for PNA  pt returns with acute hypoxic resp failure due to COVID req HFNC  s/p remdes, dexa, clinically improved, now on RA  subseq elevated LFTs poss medication related, covid related, asymptomatic  sono revealed known liver cysts, otherwise unremarkable; plts, bili stable wnl  chronic heel wound eval by skin team and podiatry Pt is an 81 yo male with ho chronic benign pleural effusion with pleurX with TIW drainage, dementia, HTN, afib on eliquis recent admission for PNA  pt returns with acute hypoxic resp failure due to COVID req HFNC  s/p remdes, dexa, clinically improved, now on RA  subseq elevated LFTs poss medication related, covid related, asymptomatic  sono revealed known liver cysts, otherwise unremarkable; plts, bili stable wnl  chronic heel wound eval by skin team and podiatry

## 2023-02-08 NOTE — PROGRESS NOTE ADULT - PROBLEM SELECTOR PROBLEM 1
Sepsis due to COVID-19

## 2023-02-09 LAB
ALBUMIN SERPL ELPH-MCNC: 2.7 G/DL — LOW (ref 3.3–5)
ALBUMIN SERPL ELPH-MCNC: 2.8 G/DL — LOW (ref 3.3–5)
ALP SERPL-CCNC: 307 U/L — HIGH (ref 40–120)
ALP SERPL-CCNC: 314 U/L — HIGH (ref 40–120)
ALT FLD-CCNC: 15 U/L — SIGNIFICANT CHANGE UP (ref 4–41)
ALT FLD-CCNC: 17 U/L — SIGNIFICANT CHANGE UP (ref 4–41)
ANION GAP SERPL CALC-SCNC: 13 MMOL/L — SIGNIFICANT CHANGE UP (ref 7–14)
AST SERPL-CCNC: 50 U/L — HIGH (ref 4–40)
AST SERPL-CCNC: 53 U/L — HIGH (ref 4–40)
BILIRUB DIRECT SERPL-MCNC: <0.2 MG/DL — SIGNIFICANT CHANGE UP (ref 0–0.3)
BILIRUB INDIRECT FLD-MCNC: >0.2 MG/DL — SIGNIFICANT CHANGE UP (ref 0–1)
BILIRUB SERPL-MCNC: 0.4 MG/DL — SIGNIFICANT CHANGE UP (ref 0.2–1.2)
BILIRUB SERPL-MCNC: 0.4 MG/DL — SIGNIFICANT CHANGE UP (ref 0.2–1.2)
BUN SERPL-MCNC: 27 MG/DL — HIGH (ref 7–23)
CALCIUM SERPL-MCNC: 8.7 MG/DL — SIGNIFICANT CHANGE UP (ref 8.4–10.5)
CHLORIDE SERPL-SCNC: 109 MMOL/L — HIGH (ref 98–107)
CO2 SERPL-SCNC: 20 MMOL/L — LOW (ref 22–31)
CREAT SERPL-MCNC: 1.35 MG/DL — HIGH (ref 0.5–1.3)
CREAT SERPL-MCNC: 1.37 MG/DL — HIGH (ref 0.5–1.3)
CRP SERPL-MCNC: 22.9 MG/L — HIGH
EGFR: 52 ML/MIN/1.73M2 — LOW
EGFR: 53 ML/MIN/1.73M2 — LOW
ERYTHROCYTE [SEDIMENTATION RATE] IN BLOOD: 28 MM/HR — HIGH (ref 1–15)
GLUCOSE SERPL-MCNC: 109 MG/DL — HIGH (ref 70–99)
HCT VFR BLD CALC: 29.8 % — LOW (ref 39–50)
HGB BLD-MCNC: 8.9 G/DL — LOW (ref 13–17)
MAGNESIUM SERPL-MCNC: 1.9 MG/DL — SIGNIFICANT CHANGE UP (ref 1.6–2.6)
MCHC RBC-ENTMCNC: 26.6 PG — LOW (ref 27–34)
MCHC RBC-ENTMCNC: 29.9 GM/DL — LOW (ref 32–36)
MCV RBC AUTO: 89 FL — SIGNIFICANT CHANGE UP (ref 80–100)
NRBC # BLD: 0 /100 WBCS — SIGNIFICANT CHANGE UP (ref 0–0)
NRBC # FLD: 0 K/UL — SIGNIFICANT CHANGE UP (ref 0–0)
PHOSPHATE SERPL-MCNC: 2.7 MG/DL — SIGNIFICANT CHANGE UP (ref 2.5–4.5)
PLATELET # BLD AUTO: 317 K/UL — SIGNIFICANT CHANGE UP (ref 150–400)
POTASSIUM SERPL-MCNC: 4.1 MMOL/L — SIGNIFICANT CHANGE UP (ref 3.5–5.3)
POTASSIUM SERPL-SCNC: 4.1 MMOL/L — SIGNIFICANT CHANGE UP (ref 3.5–5.3)
PROT SERPL-MCNC: 5.5 G/DL — LOW (ref 6–8.3)
PROT SERPL-MCNC: 5.7 G/DL — LOW (ref 6–8.3)
RBC # BLD: 3.35 M/UL — LOW (ref 4.2–5.8)
RBC # FLD: 21.2 % — HIGH (ref 10.3–14.5)
SODIUM SERPL-SCNC: 142 MMOL/L — SIGNIFICANT CHANGE UP (ref 135–145)
WBC # BLD: 10.25 K/UL — SIGNIFICANT CHANGE UP (ref 3.8–10.5)
WBC # FLD AUTO: 10.25 K/UL — SIGNIFICANT CHANGE UP (ref 3.8–10.5)

## 2023-02-09 PROCEDURE — 76705 ECHO EXAM OF ABDOMEN: CPT | Mod: 26

## 2023-02-09 PROCEDURE — 73630 X-RAY EXAM OF FOOT: CPT | Mod: 26,LT

## 2023-02-09 PROCEDURE — 99239 HOSP IP/OBS DSCHRG MGMT >30: CPT

## 2023-02-09 RX ORDER — ROSUVASTATIN CALCIUM 5 MG/1
1 TABLET ORAL
Qty: 0 | Refills: 0 | DISCHARGE

## 2023-02-09 RX ADMIN — SODIUM CHLORIDE 75 MILLILITER(S): 9 INJECTION, SOLUTION INTRAVENOUS at 06:33

## 2023-02-09 RX ADMIN — SENNA PLUS 2 TABLET(S): 8.6 TABLET ORAL at 23:10

## 2023-02-09 RX ADMIN — Medication 1 APPLICATION(S): at 12:21

## 2023-02-09 RX ADMIN — APIXABAN 2.5 MILLIGRAM(S): 2.5 TABLET, FILM COATED ORAL at 05:45

## 2023-02-09 RX ADMIN — PANTOPRAZOLE SODIUM 40 MILLIGRAM(S): 20 TABLET, DELAYED RELEASE ORAL at 12:20

## 2023-02-09 RX ADMIN — Medication 650 MILLIGRAM(S): at 23:17

## 2023-02-09 RX ADMIN — CARBIDOPA AND LEVODOPA 1.5 TABLET(S): 25; 100 TABLET ORAL at 17:26

## 2023-02-09 RX ADMIN — CLOPIDOGREL BISULFATE 75 MILLIGRAM(S): 75 TABLET, FILM COATED ORAL at 12:21

## 2023-02-09 RX ADMIN — CARBIDOPA AND LEVODOPA 1.5 TABLET(S): 25; 100 TABLET ORAL at 00:32

## 2023-02-09 RX ADMIN — Medication 500 MILLIGRAM(S): at 12:20

## 2023-02-09 RX ADMIN — APIXABAN 2.5 MILLIGRAM(S): 2.5 TABLET, FILM COATED ORAL at 17:26

## 2023-02-09 RX ADMIN — Medication 6 MILLIGRAM(S): at 05:45

## 2023-02-09 RX ADMIN — Medication 650 MILLIGRAM(S): at 23:47

## 2023-02-09 RX ADMIN — CARBIDOPA AND LEVODOPA 1.5 TABLET(S): 25; 100 TABLET ORAL at 23:09

## 2023-02-09 RX ADMIN — CARBIDOPA AND LEVODOPA 1.5 TABLET(S): 25; 100 TABLET ORAL at 12:19

## 2023-02-09 RX ADMIN — CARBIDOPA AND LEVODOPA 1.5 TABLET(S): 25; 100 TABLET ORAL at 05:45

## 2023-02-09 RX ADMIN — OLANZAPINE 2.5 MILLIGRAM(S): 15 TABLET, FILM COATED ORAL at 23:10

## 2023-02-09 RX ADMIN — AMIODARONE HYDROCHLORIDE 200 MILLIGRAM(S): 400 TABLET ORAL at 05:45

## 2023-02-09 RX ADMIN — POLYETHYLENE GLYCOL 3350 17 GRAM(S): 17 POWDER, FOR SOLUTION ORAL at 12:20

## 2023-02-10 ENCOUNTER — TRANSCRIPTION ENCOUNTER (OUTPATIENT)
Age: 83
End: 2023-02-10

## 2023-02-10 VITALS
DIASTOLIC BLOOD PRESSURE: 57 MMHG | SYSTOLIC BLOOD PRESSURE: 119 MMHG | HEART RATE: 68 BPM | TEMPERATURE: 98 F | OXYGEN SATURATION: 100 % | RESPIRATION RATE: 18 BRPM

## 2023-02-10 PROCEDURE — 99231 SBSQ HOSP IP/OBS SF/LOW 25: CPT

## 2023-02-10 RX ORDER — TADALAFIL 10 MG/1
1 TABLET, FILM COATED ORAL
Qty: 0 | Refills: 0 | DISCHARGE

## 2023-02-10 RX ORDER — COLLAGENASE CLOSTRIDIUM HIST. 250 UNIT/G
1 OINTMENT (GRAM) TOPICAL
Qty: 1 | Refills: 0
Start: 2023-02-10 | End: 2023-03-11

## 2023-02-10 RX ORDER — MIRTAZAPINE 45 MG/1
1 TABLET, ORALLY DISINTEGRATING ORAL
Qty: 0 | Refills: 0 | DISCHARGE

## 2023-02-10 RX ORDER — MULTIVIT-MIN/FERROUS GLUCONATE 9 MG/15 ML
1 LIQUID (ML) ORAL
Qty: 0 | Refills: 0 | DISCHARGE

## 2023-02-10 RX ORDER — SOLIFENACIN SUCCINATE 10 MG/1
1 TABLET ORAL
Qty: 0 | Refills: 0 | DISCHARGE

## 2023-02-10 RX ORDER — COLLAGENASE CLOSTRIDIUM HIST. 250 UNIT/G
1 OINTMENT (GRAM) TOPICAL
Qty: 0 | Refills: 0 | DISCHARGE
Start: 2023-02-10

## 2023-02-10 RX ORDER — TAFAMIDIS 61 MG/1
1 CAPSULE, LIQUID FILLED ORAL
Qty: 0 | Refills: 0 | DISCHARGE

## 2023-02-10 RX ADMIN — CARBIDOPA AND LEVODOPA 1.5 TABLET(S): 25; 100 TABLET ORAL at 06:23

## 2023-02-10 RX ADMIN — AMIODARONE HYDROCHLORIDE 200 MILLIGRAM(S): 400 TABLET ORAL at 06:23

## 2023-02-10 RX ADMIN — APIXABAN 2.5 MILLIGRAM(S): 2.5 TABLET, FILM COATED ORAL at 06:23

## 2023-02-10 RX ADMIN — SODIUM CHLORIDE 75 MILLILITER(S): 9 INJECTION, SOLUTION INTRAVENOUS at 02:34

## 2023-02-10 RX ADMIN — Medication 6 MILLIGRAM(S): at 06:23

## 2023-02-10 NOTE — DISCHARGE NOTE NURSING/CASE MANAGEMENT/SOCIAL WORK - NSDCPEFALRISK_GEN_ALL_CORE
For information on Fall & Injury Prevention, visit: [email or web address] OR  [email or web address] OR  https://www.cdc.gov/steadi/patient.html

## 2023-02-10 NOTE — CHART NOTE - NSCHARTNOTEFT_GEN_A_CORE
pt seen and examined by me  planned for dc yesterday but easier for AM dc for transportation with family  wife at bedside  VSS  CHEST non labored  SKIN R pleurX  a/w acute hypoxic resp failure req HFNC due to COVID  s/p rem/dex  elevated LFTs, slowly trending down, asymptomatic, sono benign, PLT, bili wnl  now medically stable for dc  transport  at 13

## 2023-02-10 NOTE — DISCHARGE NOTE NURSING/CASE MANAGEMENT/SOCIAL WORK - PATIENT PORTAL LINK FT
You can access the FollowMyHealth Patient Portal offered by [facility] by registering at the following website: [email or web address]. By joining [facility]’s FollowMyHealth portal, you will also be able to view your health information using other applications (apps) compatible with our system.

## 2023-02-10 NOTE — DISCHARGE NOTE NURSING/CASE MANAGEMENT/SOCIAL WORK - NURSING SECTION COMPLETE
Discharge instructions given and explained, questions answered  All belongings accounted for by patient and S O  before leaving  Patient escorted off unit with S O  caring infant by Robert Madden RN  Patient/Caregiver provided printed discharge information.

## 2023-02-10 NOTE — DISCHARGE NOTE NURSING/CASE MANAGEMENT/SOCIAL WORK - NSDCFUADDAPPT_GEN_ALL_CORE_FT
Podiatry Discharge Instructions:  Follow up: Please follow up with Dr. Noy Mckee within 1 week of discharge from the hospital, please call [phone] for appointment and discuss that you recently were seen in the hospital.  Wound Care: Please apply santyl to left heel wound daily followed by 4x4 gauze and kerlix.  Weight bearing: Pt is non-ambulatory.  Antibiotics: Please continue as instructed.    Please follow up with your primary care provider in 1-2 weeks following discharge from the hospital for continued monitoring and management.

## 2023-02-11 ENCOUNTER — TRANSCRIPTION ENCOUNTER (OUTPATIENT)
Age: 83
End: 2023-02-11

## 2023-02-13 ENCOUNTER — TRANSCRIPTION ENCOUNTER (OUTPATIENT)
Age: 83
End: 2023-02-13

## 2023-02-13 ENCOUNTER — APPOINTMENT (OUTPATIENT)
Dept: PULMONOLOGY | Facility: CLINIC | Age: 83
End: 2023-02-13

## 2023-02-15 ENCOUNTER — TRANSCRIPTION ENCOUNTER (OUTPATIENT)
Age: 83
End: 2023-02-15

## 2023-02-16 ENCOUNTER — APPOINTMENT (OUTPATIENT)
Dept: CARDIOLOGY | Facility: CLINIC | Age: 83
End: 2023-02-16
Payer: MEDICARE

## 2023-02-16 PROCEDURE — 99215 OFFICE O/P EST HI 40 MIN: CPT | Mod: 95

## 2023-02-17 ENCOUNTER — TRANSCRIPTION ENCOUNTER (OUTPATIENT)
Age: 83
End: 2023-02-17

## 2023-02-19 NOTE — ED PROVIDER NOTE - RESPIRATORY NEGATIVE STATEMENT, MLM
Initial SW/CM Assessment/Plan of Care Note     Baseline Assessment  87 year old admitted 2/19/2023 as Inpatient with a diagnosis of subarachnoid bleed .   Prior to admission patient was living with Adult children, Family members and residing at House    .  Patient’s Primary Care Provider is Galilea Mcduffie MD.     Progress Note  CM called son Darcie Guerrier answered the phone. Patient from home with son/family . Patient has a walker- she is not using. Patientt was assisted with all adl's by DIL - her HM 35 hours per week. Patient has a pcp and insurance. She fills her prescriptions at [facility]. Patient is able to afford her medications. CM will continue to follow for discharge needs.     Plan  Patient/Family Discharge Goal:          SW/CM - Recommendations for Discharge:     PT - Recommendations for Discharge:      OT - Recommendations for Discharge:      SLP - Recommendations for Discharge:      Barriers to Discharge  Identified Barriers to Discharge/Transition Planning:          Anticipate patient will need post-hospital services. Necessary services are available.  Anticipate patient cannot return to the environment from which patient entered the hospital.   Anticipate patient cannot provide self-care at discharge.    Refer to SW/CM Flowsheet for objective data.     Medical History  Past Medical History:   Diagnosis Date   • A-fib (CMS/HCC)    • Essential (primary) hypertension    • High cholesterol    • Stroke (CMS/HCC)    • Uncomplicated senile dementia (CMS/HCC)        Prior to Admission Status  Functional Status  Ambulation: Independent/Self  Bathing: Family  Dressing: Family  Toileting: Family  Meal Preparation: Family  Shopping: Family  Medication Preparation: Family  Medication Administration: Family  Housekeeping: Family  Laundry: Family  Transportation: Family    Agency/Support  Type of Services Prior to Hospitalization: Homemaker (DIL is HM- 35 hours per week)  Support Systems: Children, Family members    Home Devices/Equipment: None  Mobility Assist Devices: Standard walker (has walker- not using)  Sensory Support Devices: None      Current Status  PT Ambulation Tips:    PT Transfer Tips:     OT Bathing Tips:    OT Dressing Tips:    OT Toileting Tips:    OT Feeding Tips:    SLP Swallow/Feeding Tips:    SLP Comm/Cog Tips:    Current Mental Status:    Stressors:      Insurance  Primary: MEDICARE  Secondary: [facility]    Disposition Recommendations:  SW/CM recommendation for discharge:            no chest pain, no cough, and no shortness of breath.

## 2023-02-27 ENCOUNTER — NON-APPOINTMENT (OUTPATIENT)
Age: 83
End: 2023-02-27

## 2023-03-10 ENCOUNTER — APPOINTMENT (OUTPATIENT)
Dept: PULMONOLOGY | Facility: CLINIC | Age: 83
End: 2023-03-10
Payer: MEDICARE

## 2023-03-10 PROCEDURE — 99214 OFFICE O/P EST MOD 30 MIN: CPT | Mod: 95

## 2023-03-10 NOTE — ADDENDUM
[FreeTextEntry1] : Documented by MONIE Richardson acting as a scribe for Dr. Pavan Jamil on 03/10/2023 .\par \par All medical record entries made by the Scribe were at my, Dr. Pavan Jamil's, direction and personally dictated by me on 03/10/2023. I have reviewed the chart and agree that the record accurately reflects my personal performance of the history, physical exam, assessment and plan. I have also personally directed, reviewed, and agree with the discharge instructions.

## 2023-03-10 NOTE — HISTORY OF PRESENT ILLNESS
[Home] : at home, [unfilled] , at the time of the visit. [Medical Office: ([facility])___] : at the medical office located in  [Family Member] : family member [Formal Caregiver] : formal caregiver [Verbal consent obtained from patient] : the patient, [unfilled] [TextBox_4] : Mr. RIVAS is a 82 year male with a history of Parkinson's (Dx 2016), Depression, BPH, CAD, stent placement, HLD who now comes in via video call for a follow-up pulmonary evaluation for Abnormal CT with pleural effusions, SOB. His chief complaint is\par \par -he notes he has improved since hospitalization for COVID-19\par -he notes feeling recovered from COVID-19\par -he notes appetite is improved\par -he notes sleep is improved\par -he notes he is out of bed and active\par -he notes bowels are regular \par -he notes his breathing is stable \par -he notes memory and concentration can be improved\par -he notes he wishes to be able to stand and be more active\par -s/p COVID-19 vaccine x3 \par \par -he denies any headaches, nausea, emesis, fever, chills, sweats, chest pain, chest pressure, coughing, wheezing, palpitations, constipation, diarrhea, vertigo, dysphagia, heartburn, reflux, itchy eyes, itchy ears, leg swelling, leg pain, or sour taste in the mouth.

## 2023-03-10 NOTE — ASSESSMENT
[FreeTextEntry1] : Mr. RIVAS is a 82 year male with a history of Parkinson's (Dx 2016), Depression, BPH, CAD, stent placement, HLD who now comes in via video call for a follow-up pulmonary evaluation for Abnormal CT chest c/w with b/l pleural effusions, SOB, ?chronic "CHF", drug effect, amyloid, LIOR - improved s/p right VATS PleurX cath placement- improved overall- s/p structural heart evaluation (4/12)- likely amyloidosis (Vyndamax)- s/p hospitalization 2/2023- improved/stable - 500cc/ drainage\par  \par \par The patient's SOB is felt to be multifactorial:\par -poor mechanics of breathing (poor balance) \par -out of shape/overweight\par -Pulmonary\par     -Bilateral pleural effusion - c/w CHF\par -Cardiac (Billy Keli) \par    -CAD\par    -(+)Amyloid\par \par Abnormal CT c/w B/l, right significantly larger than left  Pleural Effusion DDX:\par -Cardiac #1\par -Drug\par -PE\par -CA such as Lymphoma \par \par Problem 1: Pleural Effusion (right significantly larger than left) s/p PleurX catheter\par -s/p various chemistries, cultures (all negative)\par -Recommended follow up Evaluation with Dr. Rufus Oneal - drainage 3x/week \par \par \par Problem 2: Abnormal PFTs c/w restrictive dysfunction ;likely due to pleural effusion \par -s/p D-Dimer WNL\par -s/p Ffull PFTs with KULWINDER improved\par \par Problem 3: + r/o LIOR (RF: Elevated Mallampati class, neck size 17, Parkinson's) (mild)\par -recommended "Excite" \par -complete home sleep study- DD unable \par -Sleep apnea is associated with adverse clinical consequences which can affect most organ systems. Cardiovascular disease risk includes arrhythmias, atrial fibrillation, hypertension, coronary artery disease, and stroke. Metabolic disorders include diabetes type 2, non-alcoholic fatty liver disease. Mood disorder especially depression; and cognitive decline especially in the elderly. Associations with chronic reflux/Delacruz’s esophagus some but not all inclusive. \par -Reasons include arousal consistent with hypopnea; respiratory events most prominent in REM sleep or supine position; therefore sleep staging and body position are important for accurate diagnosis and estimation of AHI. 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 Sanchez and Dr Randolph)\par \par Problem 5:overweight/out of shape\par - Weight loss, exercise and diet control were discussed and are highly encouraged. Treatment options were given such as aqua therapy, and contacting a nutritionist. Recommended to use the elliptical, stationary bike, less use of treadmill. Mindful eating was explained to the patient. Obesity is associated with worsening asthma, SOB, and potential for cardiac disease, diabetes, and other underlying medical conditions.\par \par Problem 6: Poor mechanics of breathing (Poor Balance) \par -Recommended Wim Hof and Buteyko breathing techniques \par - Proper breathing techniques were reviewed with an emphasis on exhalation. Patient instructed to breath in for 1 second and out for four seconds. Patient was encouraged not to talk while walking.\par \par Problem 7: Health Maintenance\par -s/p flu shot 2022\par -s/p COVID-19 vaccine x3 \par -recommended strep pneumonia vaccines: Prevnar-13 vaccine, follow by Pneumo vaccine 23 one year following\par -recommended early intervention for URIs\par -recommended regular osteoporosis evaluations\par -recommended early dermatological evaluations\par -recommended after the age of 50 to the age of 70, colonoscopy every 5 years\par \par f/u in 6-8 weeks\par pt is encouraged to call or fax the office with any questions or concerns.\par

## 2023-03-10 NOTE — REASON FOR VISIT
[Follow-Up] : a follow-up visit [Spouse] : spouse [TextBox_44] : via video call - Abnormal CT chest c/w with b/l pleural effusions, SOB, ?chronic "CHF", drug effect, amyloid, LIOR

## 2023-03-22 ENCOUNTER — APPOINTMENT (OUTPATIENT)
Dept: NEUROLOGY | Facility: CLINIC | Age: 83
End: 2023-03-22
Payer: MEDICARE

## 2023-03-22 PROCEDURE — 99214 OFFICE O/P EST MOD 30 MIN: CPT | Mod: 95

## 2023-03-22 NOTE — PHYSICAL EXAM
[FreeTextEntry1] : There is no aphasia.  Speech is fluent.  Thought process is tangential and speaks about events that are not well-known.  There is 2+ masking. Speech is 1+. Tremor is absent. laying in bed. Moves all extremities. Gait could not be assessed.\par  \par

## 2023-03-22 NOTE — DISCUSSION/SUMMARY
[FreeTextEntry1] : PD with neurobehavioral dysregulation with increased immobility 2/2 recent infections/hospitalization.  He has more of a parkinsonian dementia at this stage.\par \par Patient was counseled on the following recommendations:\par \par - cont sinemet  to 1.5 tabsat 9-12-3 and leave 6p at 1 tab\par - f/u with psychiatry next week to discuss further adjustments on mirtazepine and zyprexa\par restart PT when insurance approves additional services\par \par f/u 3months\par \par

## 2023-03-22 NOTE — HISTORY OF PRESENT ILLNESS
[FreeTextEntry1] : Patient was hospitalized in Jan for PNA and had COVID. He returned home on Feb 10th and since then he has been slowing improving. He has more confusion but overall better since the hospitalization.  His daughter says that he cannot discriminate between his dreams and reality at times.\par He completed course of home PT and is not eligible for additional due to his pleural effusion.\par \par Nonmotor:\par + constipation - fiber, miralax/senna.\par sleep is ok but has night where he gets agitated. \par - dysphagia\par \par PMH:  Parkinsons, bradycardia s/p PPM 10/6/2021, hypercholesterolemia, Mod to Sev AI; Pleueral Drain CAD s/p PCI 8/31/2021\par \par Meds\par sinemet 25/100 1.5 pills am a 12p,3pm, 1 tab at 6pm\par Olanzapine 2.5mg qhs\par desvenlafaxine 75mg qd\par spironolactone 25mg qd\par torsemide 20mg qd\par tadalafil \par \par \par prior\par exelon\par remeron - stopped while hospitalzed

## 2023-03-30 ENCOUNTER — NON-APPOINTMENT (OUTPATIENT)
Age: 83
End: 2023-03-30

## 2023-04-24 NOTE — PROGRESS NOTE ADULT - REASON FOR ADMISSION
OLIVER PERRY Calcipotriene Counseling:  I discussed with the patient the risks of calcipotriene including but not limited to erythema, scaling, itching, and irritation.

## 2023-04-25 ENCOUNTER — APPOINTMENT (OUTPATIENT)
Dept: CARDIOLOGY | Facility: CLINIC | Age: 83
End: 2023-04-25
Payer: MEDICARE

## 2023-04-25 PROCEDURE — 99215 OFFICE O/P EST HI 40 MIN: CPT | Mod: 95

## 2023-04-25 NOTE — ED ADULT NURSE NOTE - NSFALLRSKHARMRISK_ED_ALL_ED
[facility] [address] [place], [place] [address]  [address] [address]  [facility] [place] [address]  Dept: [phone]  Dept Fax: [phone]    Aisha Parker is a 48 y.o. female who presents today for her medical conditions/complaints as noted below. Aisha Parker is c/o of   Chief Complaint   Patient presents with    Pharyngitis     Sore throat started yesterday, dizzy spells, feels like and ear infection is starting        HPI:     Here today for a sore throat. Pharyngitis  This is a new problem. The current episode started yesterday. The problem has been rapidly worsening. Associated symptoms include congestion, coughing (a lot of post nasal drip, productive), fatigue, headaches, nausea and a sore throat. Pertinent negatives include no abdominal pain, change in bowel habit, chest pain, chills, fever, myalgias, rash or vomiting. Associated symptoms comments: dizziness. The symptoms are aggravated by eating. She has tried NSAIDs for the symptoms. The treatment provided mild relief. Here today for a sore throat. 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 Social History     Tobacco Use    Smoking status: Every Day     Packs/day: 0.75     Years: 30.00     Pack years: 22.50     Types: Cigarettes     Start date: 11/19/1989    Smokeless tobacco: Never   Substance Use Topics    Alcohol use: Never     No current outpatient medications on file. No current facility-administered medications for this visit. No Known Allergies    Subjective:     Review of Systems   Constitutional:  Positive for fatigue. Negative for activity change, appetite change, chills and fever. HENT:  Positive for congestion, postnasal drip, sinus pressure and sore throat. Negative for ear pain, sneezing and trouble swallowing.     Eyes:  Negative for visual
no

## 2023-05-23 ENCOUNTER — APPOINTMENT (OUTPATIENT)
Dept: NEUROLOGY | Facility: CLINIC | Age: 83
End: 2023-05-23

## 2023-05-23 NOTE — HISTORY OF PRESENT ILLNESS
[FreeTextEntry1] : STudy visit\par \par He reports that he has made progress since his last visit. His wife says that patient;s memory has improved. He is now able to sit and walk short distances with a walker. He is eating well and sleep is good. \par Hallucinations are well controlled and abilify was increased to 5mg hs by psychiatry\par he is not aware of LD kinetics. \par He needs help with most ADLs, but now able to feed himself\par GARCIA occurs after walking a few yards and unable to do PT due to pleural effusion\par \par Nonmotor:\par + constipation - fiber, miralax/senna.\par sleep is ok but has night where he gets agitated. 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 \par - increase sinemet could exacerbate hallucinations and since he is showing motor improvement from his last visit, we will leave his Levodopa regimen the same. \par f/u cards/pulm\par f/u psych\par I will see him back in 4months

## 2023-05-23 NOTE — REASON FOR VISIT
[Home] : at home, [unfilled] , at the time of the visit. [Medical Office: ([facility])___] : at the medical office located in

## 2023-05-31 ENCOUNTER — RX RENEWAL (OUTPATIENT)
Age: 83
End: 2023-05-31

## 2023-05-31 RX ORDER — CARBIDOPA AND LEVODOPA 25; 100 MG/1; MG/1
25-100 TABLET ORAL
Qty: 495 | Refills: 3 | Status: ACTIVE | COMMUNITY
Start: 2022-04-15 | End: 1900-01-01

## 2023-06-06 ENCOUNTER — NON-APPOINTMENT (OUTPATIENT)
Age: 83
End: 2023-06-06

## 2023-06-06 ENCOUNTER — APPOINTMENT (OUTPATIENT)
Dept: CARDIOLOGY | Facility: CLINIC | Age: 83
End: 2023-06-06
Payer: MEDICARE

## 2023-06-06 VITALS
HEIGHT: 68.5 IN | SYSTOLIC BLOOD PRESSURE: 110 MMHG | HEART RATE: 60 BPM | OXYGEN SATURATION: 100 % | DIASTOLIC BLOOD PRESSURE: 58 MMHG

## 2023-06-06 PROCEDURE — 93000 ELECTROCARDIOGRAM COMPLETE: CPT

## 2023-06-06 PROCEDURE — 99215 OFFICE O/P EST HI 40 MIN: CPT

## 2023-06-06 NOTE — H&P PST ADULT - HISTORY OF PRESENT ILLNESS
no 80y/o male presents for preop eval for scheduled right VAT, pleural biopsy, drainage of right pleural effusion, right Pleurx catheter placement.   Pt states incidental finding during imaging study for cardiac related issues.  Preop dx pleural effusion not elsewhere classified.  Pt with H/o afib and pacemaker placement in October 2021 - for bradycardia and in August 2021 had 1 coronary stent inserted.  C/o mild GARCIA.

## 2023-06-09 ENCOUNTER — RX RENEWAL (OUTPATIENT)
Age: 83
End: 2023-06-09

## 2023-06-19 ENCOUNTER — APPOINTMENT (OUTPATIENT)
Dept: THORACIC SURGERY | Facility: CLINIC | Age: 83
End: 2023-06-19
Payer: MEDICARE

## 2023-06-19 ENCOUNTER — OUTPATIENT (OUTPATIENT)
Dept: OUTPATIENT SERVICES | Facility: HOSPITAL | Age: 83
LOS: 1 days | End: 2023-06-19
Payer: MEDICARE

## 2023-06-19 ENCOUNTER — APPOINTMENT (OUTPATIENT)
Dept: RADIOLOGY | Facility: HOSPITAL | Age: 83
End: 2023-06-19

## 2023-06-19 VITALS
HEART RATE: 60 BPM | SYSTOLIC BLOOD PRESSURE: 109 MMHG | OXYGEN SATURATION: 99 % | HEIGHT: 68 IN | WEIGHT: 155 LBS | BODY MASS INDEX: 23.49 KG/M2 | DIASTOLIC BLOOD PRESSURE: 55 MMHG

## 2023-06-19 DIAGNOSIS — Z98.61 CORONARY ANGIOPLASTY STATUS: Chronic | ICD-10-CM

## 2023-06-19 DIAGNOSIS — Z96.649 PRESENCE OF UNSPECIFIED ARTIFICIAL HIP JOINT: Chronic | ICD-10-CM

## 2023-06-19 DIAGNOSIS — J90 PLEURAL EFFUSION, NOT ELSEWHERE CLASSIFIED: ICD-10-CM

## 2023-06-19 DIAGNOSIS — Z87.09 PERSONAL HISTORY OF OTHER DISEASES OF THE RESPIRATORY SYSTEM: ICD-10-CM

## 2023-06-19 DIAGNOSIS — Z95.0 PRESENCE OF CARDIAC PACEMAKER: Chronic | ICD-10-CM

## 2023-06-19 PROCEDURE — 71046 X-RAY EXAM CHEST 2 VIEWS: CPT | Mod: 26

## 2023-06-19 PROCEDURE — 99214 OFFICE O/P EST MOD 30 MIN: CPT

## 2023-06-30 NOTE — CONSULT LETTER
[FreeTextEntry2] : Dr. Pavan Jamil (Pulm/Ref)\par Santhosh Avila (PCP)\par Dr. Clinton Dickey (Card)\par Dr. Randolph (Card re: cardiac amyloid) [FreeTextEntry3] : Rufus Oneal MD \par Attending Surgeon \par Division of Thoracic Surgery \par , Cardiovascular and Thoracic Surgery \par [facility] [employer] [facility]\par \par

## 2023-06-30 NOTE — HISTORY OF PRESENT ILLNESS
[FreeTextEntry1] : Mr. 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 \par \par Patient is s/p Right VATS, pleural bx and placement of Pleurx catheter on 03/17/2022. Path of right pleura bx revealed fragments of pleura with chronic inflammation and reactive changes. Fragments of skeletal muscle. Right pleural fluid negative for malignant cells. \par \par Patient went to ED on 04/29/2022 for hallucinations, likely related to Parkinsons dementia. He was discharged on 5/12/22 to a rehab facility.  \par \par Of note, pt had recent SPECT scan demonstrating cardiac amyloid and was referred by his cardiologist Dr. Dickey to see Dr. Randolph. \par \par CT chest on 08/17/2022:\par -  There is a trace right pleural effusion with Pleurx catheter in place, markedly decreased in size compared to the 2/6/2022 chest CT. A small left pleural effusion has also decreased compared to the 2/6/2022 scan, now trace.\par \par Patient went to ED on 09/08/2022 c/o weakness, founded to have electrolyte disorders which were corrected but patient's symptoms did not improve. felt that presentation was most consistent with  progression of Parkinson's disease.\par \par CXR on 11/30/22: Bilateral, Hazy groundglass opacities likely secondary to pneumonia and/or underlying layering pleural effusion. \par \par Patient was seen on 01/10/2023 with clogged PleurX catheter. PleurX catheter was unclogged using three-way stop cock and flushes. Pt was drained about 700cc output, tolerated well. \par \par Patient is currently drained three times a week as follows:  500-700 cc on average per drainage. \par Today drained in office- 625 cc.\par \par CXR today: reviewed\par \par Patient is followed today. Overall, he reports to be feeling well. Denies any chest pain, shortness of breath, cough, or hemoptysis.

## 2023-06-30 NOTE — ASSESSMENT
[FreeTextEntry1] : Mr. 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 \par \par Patient is s/p Right VATS, pleural bx and placement of Pleurx catheter on 03/17/2022. Path of right pleura bx revealed fragments of pleura with chronic inflammation and reactive changes. Fragments of skeletal muscle. Right pleural fluid negative for malignant cells. \par \par I have reviewed the patient's medical records and diagnostic images at time of this office consultation and have made the following recommendation:\par 1. CXR reviewed, drained 625 cc in office today. I recommend he returns to clinic in 12 weeks via tele visit for follow up. \par 2. Continue drainage 3x/weekly. \par \par Recommendations reviewed with patient during this office visit, and all questions answered; Patient instructed on the importance of follow up and verbalizes understanding. \par \par \par I, PARK Gomez, personally performed the evaluation and management (E/M) services for this established patient. That E/M includes conducting the examination, assessing all new/exacerbated conditions, and establishing a new plan of care. Today, my ACP, Sal Luciano NP, was here to observe my evaluation and management services for this new problem/exacerbated condition to be followed going forward.\par \par

## 2023-07-10 ENCOUNTER — RX RENEWAL (OUTPATIENT)
Age: 83
End: 2023-07-10

## 2023-07-11 NOTE — PROVIDER CONTACT NOTE (CRITICAL VALUE NOTIFICATION) - NAME OF MD/NP/PA/DO NOTIFIED:
[facility] RHEUMATOLOGY FOLLOW UP NOTE       Date Of Service: 7/11/2023  Provider: ESTEFANIA Gill - CNP    Name: Vanessa Kim   MRN: [id number]    Chief Complaint(s)     Chief Complaint   Patient presents with    Follow-up     2 mo f/u, H/O mixed connective tissue disease, Polyarthralgia    Pt stated pain 10/10 - all over        History of Present Illness (HPI)     Vanessa Kim  is a(n)63 y.o. female with a hx of adrenal abnormalities, arthritis, asthma, CVA, headaches, hyperlipidemia, h/o myositis, bipolar disorder, dry eye, fatty liver here for the f/u evaluation of h/o MCTD    Interval hx:    - had thyroid US today for thyroid enlargement- following with Dr. Sagar Perez   - seeing pain management for compression fractures. Planning on repeat MRI and possible kyphoplasty    pain affecting the hands, elbows, left shoulder, ribcage, hips, knees, feet, legs, back  Pain on a scale 0-10: 10/10  Type of pain: constant bone/muscle pain  Timing: intermittent  Aggravating factors: weather changes, back: getting up from seated position, prolonged standing  Alleviating factors: tylenol hands    Associated symptoms:  + swelling/  Redness/ warmth (left thumb, knees), + AM stiffness lasting ~ all day      REVIEW OF SYSTEMS: (ROS)    Review of Systems   Constitutional:  Positive for fatigue. Negative for fever and unexpected weight change. HENT:  Negative for congestion and trouble swallowing. Eyes:  Negative for pain and itching. Respiratory:  Positive for shortness of breath. Negative for cough. Cardiovascular:  Negative for chest pain and leg swelling. Gastrointestinal:  Positive for constipation. Negative for abdominal pain, diarrhea and nausea. Endocrine: Negative for cold intolerance and heat intolerance. Genitourinary:  Negative for difficulty urinating, frequency and urgency. Musculoskeletal:  Positive for arthralgias and joint swelling. Negative for back pain. Skin:  Negative for rash.
Ruchi Gurrola MD
Olivia Valencia

## 2023-07-14 ENCOUNTER — APPOINTMENT (OUTPATIENT)
Dept: PULMONOLOGY | Facility: CLINIC | Age: 83
End: 2023-07-14
Payer: MEDICARE

## 2023-07-14 VITALS
HEART RATE: 60 BPM | HEIGHT: 68 IN | DIASTOLIC BLOOD PRESSURE: 72 MMHG | RESPIRATION RATE: 14 BRPM | TEMPERATURE: 97.9 F | SYSTOLIC BLOOD PRESSURE: 108 MMHG | WEIGHT: 155 LBS | BODY MASS INDEX: 23.49 KG/M2 | OXYGEN SATURATION: 98 %

## 2023-07-14 DIAGNOSIS — R94.2 ABNORMAL RESULTS OF PULMONARY FUNCTION STUDIES: ICD-10-CM

## 2023-07-14 PROCEDURE — ZZZZZ: CPT

## 2023-07-14 PROCEDURE — 94727 GAS DIL/WSHOT DETER LNG VOL: CPT

## 2023-07-14 PROCEDURE — 99214 OFFICE O/P EST MOD 30 MIN: CPT | Mod: 25

## 2023-07-14 PROCEDURE — 94729 DIFFUSING CAPACITY: CPT

## 2023-07-14 PROCEDURE — 94010 BREATHING CAPACITY TEST: CPT

## 2023-07-14 PROCEDURE — 95012 NITRIC OXIDE EXP GAS DETER: CPT

## 2023-07-15 NOTE — PHYSICAL EXAM
[No Acute Distress] : no acute distress [Normal Oropharynx] : normal oropharynx [Normal Appearance] : normal appearance [No Neck Mass] : no neck mass [Normal Rate/Rhythm] : normal rate/rhythm [Normal S1, S2] : normal s1, s2 [No Murmurs] : no murmurs [No Resp Distress] : no resp distress [Clear to Auscultation Bilaterally] : clear to auscultation bilaterally [Benign] : benign [Normal Gait] : normal gait [No Clubbing] : no clubbing [No Cyanosis] : no cyanosis [FROM] : FROM [Normal Color/ Pigmentation] : normal color/ pigmentation [No Focal Deficits] : no focal deficits [Oriented x3] : oriented x3 [Normal Affect] : normal affect [III] : Mallampati Class: III [Kyphosis] : kyphosis [TextBox_54] : 2/6 systolic murmur  [TextBox_68] : I:E ratio 1:3; expiratory wheeze [TextBox_105] : trace edema of hands and feet

## 2023-07-15 NOTE — ASSESSMENT
[FreeTextEntry1] : Mr. 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 (Vyndamax)- s/p hospitalization 2/2023- improved/stable - 500cc/ drainage (still) - untreated OSAS \par  \par \par The patient's SOB is felt to be multifactorial:\par -poor mechanics of breathing (poor balance) \par -out of shape/overweight\par -Pulmonary\par     -Bilateral pleural effusion - c/w CHF\par -Cardiac (Billy Keli) \par    -CAD\par    -(+)Amyloid\par \par Abnormal CT c/w B/l, right significantly larger than left  Pleural Effusion DDX:\par -Cardiac #1\par -Drug\par -PE\par -CA such as Lymphoma \par \par Problem 1: Pleural Effusion (right significantly larger than left) s/p PleurX catheter\par -s/p various chemistries, cultures (all negative)\par -Recommended follow up Evaluation with Dr. Rufus Oneal - drainage 3x/week \par \par \par Problem 2: Abnormal PFTs c/w restrictive dysfunction ;likely due to pleural effusion \par -s/p D-Dimer WNL\par -s/p full PFTs with KULWINDER improved\par \par Problem 3: + r/o LIOR (RF: Elevated Mallampati class, neck size 17, Parkinson's) (mild)- untreated \par -recommended "Excite" \par -complete home sleep study- DD unable \par -Sleep apnea is associated with adverse clinical consequences which can affect most organ systems. Cardiovascular disease risk includes arrhythmias, atrial fibrillation, hypertension, coronary artery disease, and stroke. Metabolic disorders include diabetes type 2, non-alcoholic fatty liver disease. Mood disorder especially depression; and cognitive decline especially in the elderly. Associations with chronic reflux/Delacruz’s esophagus some but not all inclusive. \par -Reasons include arousal consistent with hypopnea; respiratory events most prominent in REM sleep or supine position; therefore sleep staging and body position are important for accurate diagnosis and estimation of AHI. 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 Sanchez and Dr Randolph)\par \par Problem 5:overweight/out of shape\par - Weight loss, exercise and diet control were discussed and are highly encouraged. Treatment options were given such as aqua therapy, and contacting a nutritionist. Recommended to use the elliptical, stationary bike, less use of treadmill. Mindful eating was explained to the patient. Obesity is associated with worsening asthma, SOB, and potential for cardiac disease, diabetes, and other underlying medical conditions.\par \par Problem 6: Poor mechanics of breathing (Poor Balance) \par -Recommended Wim Hof and Buteyko breathing techniques \par - Proper breathing techniques were reviewed with an emphasis on exhalation. Patient instructed to breath in for 1 second and out for four seconds. Patient was encouraged not to talk while walking.\par \par Problem 7: Health Maintenance\par -s/p flu shot 2022\par -s/p COVID-19 vaccine x3 \par -recommended strep pneumonia vaccines: Prevnar-13 vaccine, follow by Pneumo vaccine 23 one year following (completed) \par -recommended early intervention for URIs\par -recommended regular osteoporosis evaluations\par -recommended early dermatological evaluations\par -recommended after the age of 50 to the age of 70, colonoscopy every 5 years\par \par f/u in 4-6 months \par pt is encouraged to call or fax the office with any questions or concerns.\par

## 2023-07-15 NOTE — HISTORY OF PRESENT ILLNESS
[TextBox_4] : Mr. RIVAS is a 82 year male with a history of Parkinson's (Dx 2016), Depression, BPH, CAD, stent placement, HLD who now comes in  for a follow-up pulmonary evaluation for Abnormal CT with pleural effusions, SOB. His chief complaint is\par \par - he notes cough ?due to Rx\par - he notes having poor memory \par -he notes energy levels are good (7/10)\par -he notes sleeping for 5-6 hours\par -he notes feeling generally well \par -he notes bowels are regular \par - he notes mild ankle swelling at the end of the day due to sitting \par - he notes able to walk with walker \par - he notes GARCIA with walking \par -he notes balance is poor \par -he notes vision is stable \par -he notes diet is good \par -he notes appetite is stable \par -he notes weight is stable \par - he notes snoring \par - he notes chest tube drainage \par \par \par \par -he denies any headaches, nausea, emesis, fever, chills, sweats, chest pain, chest pressure, wheezing, palpitations, diarrhea, constipation, dysphagia, vertigo, arthralgias, myalgias, leg swelling, itchy eyes, itchy ears, heartburn, reflux, or sour taste in the mouth.

## 2023-07-15 NOTE — PROCEDURE
[FreeTextEntry1] : Full PFT reveals mild restrictive and obstructive dysfunction; FEV1 was  1.76L which is 66% of predicted; unable lung volumes; low- normal diffusion at 12.7, which is 78% of predicted; normal flow volume loop.\par PFTs were performed to evaluate for SOB\par \par KULWINDER test revealed severely reduced PI max of 19, which is 19% of predicted, and  severely reduced PE max of 39, which is 21% of predicted. \par \par FENO was 19; a normal value being less than 25\par Fractional exhaled nitric oxide (FENO) is regarded as a simple, noninvasive method for assessing eosinophilic airway inflammation. Produced by a variety of cells within the lung, nitric oxide (NO) concentrations are generally low in healthy individuals. However, high concentrations of NO appear to be involved in nonspecific host defense mechanisms and chronic inflammatory diseases such as asthma. The American Thoracic Society (ATS) therefore has recommended using FENO to aid in the diagnosis and monitoring of eosinophilic airway inflammation and asthma, and for identifying steroid responsive individuals whose chronic respiratory symptoms may be caused by airway inflammation.

## 2023-07-15 NOTE — ADDENDUM
[FreeTextEntry1] : Documented by Mar Hayes acting as a scribe for Dr. Pavan Jamil on 07/14/2023. All medical record entries made by the Scribe were at my, Dr. Pavan Jamil's, direction and personally dictated by me on 07/14/2023. I have reviewed the chart and agree that the record accurately reflects my personal performance of the history, physical exam, assessment and plan. I have also personally directed, reviewed, and agree with the discharge instructions.\par \par

## 2023-07-20 ENCOUNTER — NON-APPOINTMENT (OUTPATIENT)
Age: 83
End: 2023-07-20

## 2023-08-08 VITALS
HEART RATE: 66 BPM | SYSTOLIC BLOOD PRESSURE: 107 MMHG | DIASTOLIC BLOOD PRESSURE: 41 MMHG | RESPIRATION RATE: 24 BRPM | OXYGEN SATURATION: 100 %

## 2023-08-08 PROCEDURE — 99291 CRITICAL CARE FIRST HOUR: CPT | Mod: GC

## 2023-08-08 NOTE — ED PROVIDER NOTE - PHYSICAL EXAMINATION
On exam general non-toxic appearing, `lungs rhonchorous bilaterally, no wheezing.  Abdomen distended, soft, nontender.  Bilateral lower extremity edema 1+.  Skin warm and well-perfused, no rashes.  Eyes PERRL, EOMI.  Neuro moving all extremities. Cardiac RRR, no murmurs.

## 2023-08-08 NOTE — ED PROVIDER NOTE - CLINICAL SUMMARY MEDICAL DECISION MAKING FREE TEXT BOX
Patient is an 82-year-old male history of Parkinson's, CHF, pleural effusion, pacemaker, presenting with difficulty breathing from home.  Placed patient on BiPAP, patient arrived with increased work of breathing, blood pressure 100/40, EKG with paced rhythm 70 bpm approximately.  Concern for possible aspiration pneumonia versus pulmonary congestion.  Will get chest x-ray, labs, EKG, Trope, BNP.  Continue with frequent reassessments. Likely TBA. PCP ISRAEL.

## 2023-08-08 NOTE — ED PROVIDER NOTE - OBJECTIVE STATEMENT
Patient is an 82-year-old male history of Parkinson's, CHF, pleural effusion, pacemaker, presenting with difficulty breathing from home.  Patient brought in by EMS on CPAP, wife states patient has been having more coughing at nighttime than usual.  Difficulty swallowing and coughing when he lays down.  Tonight he had more difficulty breathing.  Patient denies chest pain, abdominal pain, fevers. PCP with [facility].

## 2023-08-08 NOTE — ED PROVIDER NOTE - ATTENDING CONTRIBUTION TO CARE
Patient was critically ill with a high probability of imminent or life threatening deterioration.    I have performed direct patient care (not related to procedure), additional history taking, interpretation of diagnostic studies, documentation, consultation with other physicians, telephone consultation with the patient's family    I have personally and independently provided the amount of critical care time documented below excluding time spent on separate procedures:  38 mins.    83 yo male with ho chronic benign pleural effusion with pleurX with TIW drainage, dementia, HTN, afib on eliquis, a/w hypoxic respiratory failure requiring CPAP on arrival.  As per EMS, patient was having acute respiratory distress, noted to have worsened throughout the day, saturating 85% on RA, improved to 99% on CPAP 10.  Pt noted to not take ACE this evening 2/2 hypotension.  Pt noted to have last drainage yesterday. Seen by cardiologist today with no issues.    DNR and DNI    Gen: ill-appearing, tachypneic, tachycardic, minimally responsive  CARD -s1s2, RRR, no M,G,R;   PULM - R pleurex catheter in place, Bilateral crackles.   ABD -  +BS, ND, NT, soft, no guarding, no rebound, no masses;   BACK - no CVA tenderness, Normal  spine;   EXT - symmetric pulses, 2+ dp, capillary refill < 2 seconds, no cyanosis, 3+ edema;   NEURO - no focal neuro deficits, no slurred speech Patient was critically ill with a high probability of imminent or life threatening deterioration.    I have performed direct patient care (not related to procedure), additional history taking, interpretation of diagnostic studies, documentation, consultation with other physicians, telephone consultation with the patient's family    I have personally and independently provided the amount of critical care time documented below excluding time spent on separate procedures:  38 mins.    83 yo male with HF, chronic benign pleural effusion with pleurX with TIW drainage, dementia, HTN, afib on eliquis, a/w hypoxic respiratory failure requiring CPAP on arrival.  As per EMS, patient was having acute respiratory distress, noted to have worsened throughout the day, saturating 85% on RA, improved to 99% on CPAP 10.  Pt noted to not take ACE this evening 2/2 hypotension.  Pt noted to have last drainage yesterday. Seen by cardiologist today with no issues.    DNR and DNI    Gen: ill-appearing, tachypneic, tachycardic,   CARD -s1s2, RRR, no M,G,R;   PULM - R pleurex catheter in place, Bilateral crackles.   ABD -  +BS, ND, NT, soft, no guarding, no rebound, no masses;   BACK - no CVA tenderness, Normal  spine;   EXT - symmetric pulses, 2+ dp, capillary refill < 2 seconds, no cyanosis, 3+ edema;   NEURO - no focal neuro deficits, no slurred speech    Acute hypoxic resp failure requiring CPAP, transitioned to BIPAP 10/5 for increased work of breathing.  BP noted to be 100/40.  Concern for possible aspiration pneumonia versus pulmonary congestion.  Will get chest x-ray, labs, EKG, Trope, BNP. Will require admission and BIPAP.

## 2023-08-08 NOTE — ED PROVIDER NOTE - PROGRESS NOTE DETAILS
Suzy Peter MD PGY3: Elevated K to 6.4. EKG paced rhythm. Patient states SOB is improving on the BiPap. Ordered Calcium and Insulin/D50 for K. Nurse aware. CXR with concerning multifocal findings, will get CT chest to further evaluate. Concern for multifocal PNA v malignancy. Suzy Peter MD PGY3: MICU evaluated patient for new bipap. MICU recs patient for floor admission. Most recent VBG improving. CT completed. MICU states they will contact pulm to drain patient later today. Suzy Peter MD PGY3: MICU evaluated patient for new bipap. MICU recs patient for floor admission. Most recent VBG improving. CT completed. MICU states they will contact pulm to drain patient later today. Concern for possible PNA on CT, awaiting read. WIll give abx.

## 2023-08-08 NOTE — ED ADULT NURSE NOTE - NSFALLRISKINTERV_ED_ALL_ED

## 2023-08-08 NOTE — ED ADULT NURSE NOTE - CHIEF COMPLAINT QUOTE
Pt brought in by EMS on CPAP coming in for respiratory distress, as per EMS sating 80's on room air, sating 96% on CPAP. charge RN aware pt taken directly to trauma B

## 2023-08-08 NOTE — ED ADULT NURSE NOTE - OBJECTIVE STATEMENT
Michael RN: Pt arrives to [facility] as notification. Pt brought in by EMS on CPAP with c/o respiratory distress. Family at bedside as historian. Pt O2 sat 96% on CPAP. Respiratory at bedside. Pt placed on BiPAP, O2 sat 100%. Audible rhonchi noted on assessment. Pt tachypneic, chest rise equal b/l. Paced rhythm noted on cardiac monitor. Abdomen soft, nontender, and distended. Family states abdomen at baseline for pt. EKG obtained. 18g IVL placed in LAC. Labs collected and sent. 20g IVL placed in right forearm. Safety maintained throughout. Report given to primary GARTH García and GARTH Jaramillo. Michael RN: Pt arrives to [facility] as notification. Pt brought in by EMS on CPAP with c/o respiratory distress. Family at bedside as historian. Pt O2 sat 96% on CPAP. Respiratory at bedside. Pt placed on BiPAP, O2 sat 100%. Audible rhonchi noted on assessment. Pt tachypneic, chest rise equal b/l. Paced rhythm noted on cardiac monitor. Abdomen soft, nontender, and distended. Family states abdomen at baseline for pt. EKG obtained. Pt arrives with hematoma on left forearm from EMS attempt to obtain IV access prior to arrival. Pt on multiple blood thinners as per family. 18g IVL placed in LAC. Labs collected and sent. 20g IVL placed in right forearm. Safety maintained throughout. Report given to primary RN Raquel and GARTH Jaramillo.

## 2023-08-09 ENCOUNTER — INPATIENT (INPATIENT)
Facility: HOSPITAL | Age: 83
LOS: 8 days | Discharge: HOME | End: 2023-08-18
Attending: HOSPITALIST | Admitting: HOSPITALIST
Payer: MEDICARE

## 2023-08-09 ENCOUNTER — TRANSCRIPTION ENCOUNTER (OUTPATIENT)
Age: 83
End: 2023-08-09

## 2023-08-09 DIAGNOSIS — J90 PLEURAL EFFUSION, NOT ELSEWHERE CLASSIFIED: ICD-10-CM

## 2023-08-09 DIAGNOSIS — N40.0 BENIGN PROSTATIC HYPERPLASIA WITHOUT LOWER URINARY TRACT SYMPTOMS: ICD-10-CM

## 2023-08-09 DIAGNOSIS — Z95.0 PRESENCE OF CARDIAC PACEMAKER: Chronic | ICD-10-CM

## 2023-08-09 DIAGNOSIS — Z71.89 OTHER SPECIFIED COUNSELING: ICD-10-CM

## 2023-08-09 DIAGNOSIS — G20 PARKINSON'S DISEASE: ICD-10-CM

## 2023-08-09 DIAGNOSIS — Z96.649 PRESENCE OF UNSPECIFIED ARTIFICIAL HIP JOINT: Chronic | ICD-10-CM

## 2023-08-09 DIAGNOSIS — E87.5 HYPERKALEMIA: ICD-10-CM

## 2023-08-09 DIAGNOSIS — I25.10 ATHEROSCLEROTIC HEART DISEASE OF NATIVE CORONARY ARTERY WITHOUT ANGINA PECTORIS: ICD-10-CM

## 2023-08-09 DIAGNOSIS — I48.91 UNSPECIFIED ATRIAL FIBRILLATION: ICD-10-CM

## 2023-08-09 DIAGNOSIS — N17.9 ACUTE KIDNEY FAILURE, UNSPECIFIED: ICD-10-CM

## 2023-08-09 DIAGNOSIS — I10 ESSENTIAL (PRIMARY) HYPERTENSION: ICD-10-CM

## 2023-08-09 DIAGNOSIS — E87.20 ACIDOSIS, UNSPECIFIED: ICD-10-CM

## 2023-08-09 DIAGNOSIS — R06.89 OTHER ABNORMALITIES OF BREATHING: ICD-10-CM

## 2023-08-09 DIAGNOSIS — Z98.49 CATARACT EXTRACTION STATUS, UNSPECIFIED EYE: Chronic | ICD-10-CM

## 2023-08-09 DIAGNOSIS — Z98.61 CORONARY ANGIOPLASTY STATUS: Chronic | ICD-10-CM

## 2023-08-09 LAB
ALBUMIN SERPL ELPH-MCNC: 3.6 G/DL — SIGNIFICANT CHANGE UP (ref 3.3–5)
ALBUMIN SERPL ELPH-MCNC: 3.9 G/DL — SIGNIFICANT CHANGE UP (ref 3.3–5)
ALBUMIN SERPL ELPH-MCNC: 4 G/DL — SIGNIFICANT CHANGE UP (ref 3.3–5)
ALP SERPL-CCNC: 57 U/L — SIGNIFICANT CHANGE UP (ref 40–120)
ALP SERPL-CCNC: 61 U/L — SIGNIFICANT CHANGE UP (ref 40–120)
ALP SERPL-CCNC: 65 U/L — SIGNIFICANT CHANGE UP (ref 40–120)
ALT FLD-CCNC: 12 U/L — SIGNIFICANT CHANGE UP (ref 4–41)
ALT FLD-CCNC: 6 U/L — SIGNIFICANT CHANGE UP (ref 4–41)
ALT FLD-CCNC: <5 U/L — SIGNIFICANT CHANGE UP (ref 4–41)
ANION GAP SERPL CALC-SCNC: 14 MMOL/L — SIGNIFICANT CHANGE UP (ref 7–14)
ANION GAP SERPL CALC-SCNC: 14 MMOL/L — SIGNIFICANT CHANGE UP (ref 7–14)
ANION GAP SERPL CALC-SCNC: 15 MMOL/L — HIGH (ref 7–14)
ANION GAP SERPL CALC-SCNC: 17 MMOL/L — HIGH (ref 7–14)
ANION GAP SERPL CALC-SCNC: 19 MMOL/L — HIGH (ref 7–14)
APPEARANCE UR: ABNORMAL
APTT BLD: 35 SEC — SIGNIFICANT CHANGE UP (ref 24.5–35.6)
AST SERPL-CCNC: 9 U/L — SIGNIFICANT CHANGE UP (ref 4–40)
AST SERPL-CCNC: <5 U/L — SIGNIFICANT CHANGE UP (ref 4–40)
AST SERPL-CCNC: <5 U/L — SIGNIFICANT CHANGE UP (ref 4–40)
B PERT DNA SPEC QL NAA+PROBE: SIGNIFICANT CHANGE UP
B PERT+PARAPERT DNA PNL SPEC NAA+PROBE: SIGNIFICANT CHANGE UP
BACTERIA # UR AUTO: NEGATIVE /HPF — SIGNIFICANT CHANGE UP
BASE EXCESS BLDV CALC-SCNC: -11 MMOL/L — LOW (ref -2–3)
BASE EXCESS BLDV CALC-SCNC: -11.8 MMOL/L — LOW (ref -2–3)
BASE EXCESS BLDV CALC-SCNC: -13.1 MMOL/L — LOW (ref -2–3)
BASE EXCESS BLDV CALC-SCNC: -13.7 MMOL/L — LOW (ref -2–3)
BASE EXCESS BLDV CALC-SCNC: -13.8 MMOL/L — LOW (ref -2–3)
BASE EXCESS BLDV CALC-SCNC: -5.7 MMOL/L — LOW (ref -2–3)
BASOPHILS # BLD AUTO: 0.01 K/UL — SIGNIFICANT CHANGE UP (ref 0–0.2)
BASOPHILS # BLD AUTO: 0.01 K/UL — SIGNIFICANT CHANGE UP (ref 0–0.2)
BASOPHILS # BLD AUTO: 0.02 K/UL — SIGNIFICANT CHANGE UP (ref 0–0.2)
BASOPHILS NFR BLD AUTO: 0.1 % — SIGNIFICANT CHANGE UP (ref 0–2)
BASOPHILS NFR BLD AUTO: 0.1 % — SIGNIFICANT CHANGE UP (ref 0–2)
BASOPHILS NFR BLD AUTO: 0.3 % — SIGNIFICANT CHANGE UP (ref 0–2)
BILIRUB SERPL-MCNC: 0.2 MG/DL — SIGNIFICANT CHANGE UP (ref 0.2–1.2)
BILIRUB SERPL-MCNC: <0.2 MG/DL — SIGNIFICANT CHANGE UP (ref 0.2–1.2)
BILIRUB SERPL-MCNC: <0.2 MG/DL — SIGNIFICANT CHANGE UP (ref 0.2–1.2)
BILIRUB UR-MCNC: NEGATIVE — SIGNIFICANT CHANGE UP
BLOOD GAS VENOUS COMPREHENSIVE RESULT: SIGNIFICANT CHANGE UP
BORDETELLA PARAPERTUSSIS (RAPRVP): SIGNIFICANT CHANGE UP
BUN SERPL-MCNC: 57 MG/DL — HIGH (ref 7–23)
BUN SERPL-MCNC: 86 MG/DL — HIGH (ref 7–23)
BUN SERPL-MCNC: 86 MG/DL — HIGH (ref 7–23)
BUN SERPL-MCNC: 87 MG/DL — HIGH (ref 7–23)
BUN SERPL-MCNC: 87 MG/DL — HIGH (ref 7–23)
C PNEUM DNA SPEC QL NAA+PROBE: SIGNIFICANT CHANGE UP
CA-I SERPL-SCNC: 1.3 MMOL/L — SIGNIFICANT CHANGE UP (ref 1.15–1.33)
CALCIUM SERPL-MCNC: 8.7 MG/DL — SIGNIFICANT CHANGE UP (ref 8.4–10.5)
CALCIUM SERPL-MCNC: 9.3 MG/DL — SIGNIFICANT CHANGE UP (ref 8.4–10.5)
CALCIUM SERPL-MCNC: 9.6 MG/DL — SIGNIFICANT CHANGE UP (ref 8.4–10.5)
CALCIUM SERPL-MCNC: 9.7 MG/DL — SIGNIFICANT CHANGE UP (ref 8.4–10.5)
CALCIUM SERPL-MCNC: 9.7 MG/DL — SIGNIFICANT CHANGE UP (ref 8.4–10.5)
CAST: 4 /LPF — SIGNIFICANT CHANGE UP (ref 0–4)
CHLORIDE BLDV-SCNC: 103 MMOL/L — SIGNIFICANT CHANGE UP (ref 96–108)
CHLORIDE BLDV-SCNC: 105 MMOL/L — SIGNIFICANT CHANGE UP (ref 96–108)
CHLORIDE BLDV-SCNC: 106 MMOL/L — SIGNIFICANT CHANGE UP (ref 96–108)
CHLORIDE BLDV-SCNC: 98 MMOL/L — SIGNIFICANT CHANGE UP (ref 96–108)
CHLORIDE SERPL-SCNC: 102 MMOL/L — SIGNIFICANT CHANGE UP (ref 98–107)
CHLORIDE SERPL-SCNC: 103 MMOL/L — SIGNIFICANT CHANGE UP (ref 98–107)
CHLORIDE SERPL-SCNC: 104 MMOL/L — SIGNIFICANT CHANGE UP (ref 98–107)
CHLORIDE SERPL-SCNC: 104 MMOL/L — SIGNIFICANT CHANGE UP (ref 98–107)
CHLORIDE SERPL-SCNC: 95 MMOL/L — LOW (ref 98–107)
CO2 BLDV-SCNC: 14.5 MMOL/L — LOW (ref 22–26)
CO2 BLDV-SCNC: 15.6 MMOL/L — LOW (ref 22–26)
CO2 BLDV-SCNC: 16.4 MMOL/L — LOW (ref 22–26)
CO2 BLDV-SCNC: 16.4 MMOL/L — LOW (ref 22–26)
CO2 BLDV-SCNC: 16.7 MMOL/L — LOW (ref 22–26)
CO2 BLDV-SCNC: 20.2 MMOL/L — LOW (ref 22–26)
CO2 SERPL-SCNC: 13 MMOL/L — LOW (ref 22–31)
CO2 SERPL-SCNC: 14 MMOL/L — LOW (ref 22–31)
CO2 SERPL-SCNC: 14 MMOL/L — LOW (ref 22–31)
CO2 SERPL-SCNC: 16 MMOL/L — LOW (ref 22–31)
CO2 SERPL-SCNC: 19 MMOL/L — LOW (ref 22–31)
COLOR SPEC: YELLOW — SIGNIFICANT CHANGE UP
CREAT ?TM UR-MCNC: 90 MG/DL — SIGNIFICANT CHANGE UP
CREAT SERPL-MCNC: 3.56 MG/DL — HIGH (ref 0.5–1.3)
CREAT SERPL-MCNC: 4.5 MG/DL — HIGH (ref 0.5–1.3)
CREAT SERPL-MCNC: 4.54 MG/DL — HIGH (ref 0.5–1.3)
CREAT SERPL-MCNC: 4.79 MG/DL — HIGH (ref 0.5–1.3)
CREAT SERPL-MCNC: 4.87 MG/DL — HIGH (ref 0.5–1.3)
DIALYSIS INSTRUMENT RESULT - HEPATITIS B SURFACE ANTIGEN: NEGATIVE — SIGNIFICANT CHANGE UP
DIFF PNL FLD: ABNORMAL
EGFR: 11 ML/MIN/1.73M2 — LOW
EGFR: 12 ML/MIN/1.73M2 — LOW
EGFR: 12 ML/MIN/1.73M2 — LOW
EGFR: 13 ML/MIN/1.73M2 — LOW
EGFR: 17 ML/MIN/1.73M2 — LOW
EOSINOPHIL # BLD AUTO: 0 K/UL — SIGNIFICANT CHANGE UP (ref 0–0.5)
EOSINOPHIL # BLD AUTO: 0 K/UL — SIGNIFICANT CHANGE UP (ref 0–0.5)
EOSINOPHIL # BLD AUTO: 0.03 K/UL — SIGNIFICANT CHANGE UP (ref 0–0.5)
EOSINOPHIL NFR BLD AUTO: 0 % — SIGNIFICANT CHANGE UP (ref 0–6)
EOSINOPHIL NFR BLD AUTO: 0 % — SIGNIFICANT CHANGE UP (ref 0–6)
EOSINOPHIL NFR BLD AUTO: 0.5 % — SIGNIFICANT CHANGE UP (ref 0–6)
FLUAV SUBTYP SPEC NAA+PROBE: SIGNIFICANT CHANGE UP
FLUBV RNA SPEC QL NAA+PROBE: SIGNIFICANT CHANGE UP
GAS PNL BLDV: 130 MMOL/L — LOW (ref 136–145)
GAS PNL BLDV: 131 MMOL/L — LOW (ref 136–145)
GAS PNL BLDV: 131 MMOL/L — LOW (ref 136–145)
GAS PNL BLDV: 132 MMOL/L — LOW (ref 136–145)
GAS PNL BLDV: SIGNIFICANT CHANGE UP
GLUCOSE BLDV-MCNC: 115 MG/DL — HIGH (ref 70–99)
GLUCOSE BLDV-MCNC: 118 MG/DL — HIGH (ref 70–99)
GLUCOSE BLDV-MCNC: 121 MG/DL — HIGH (ref 70–99)
GLUCOSE BLDV-MCNC: 139 MG/DL — HIGH (ref 70–99)
GLUCOSE BLDV-MCNC: 153 MG/DL — HIGH (ref 70–99)
GLUCOSE BLDV-MCNC: 381 MG/DL — HIGH (ref 70–99)
GLUCOSE SERPL-MCNC: 112 MG/DL — HIGH (ref 70–99)
GLUCOSE SERPL-MCNC: 126 MG/DL — HIGH (ref 70–99)
GLUCOSE SERPL-MCNC: 140 MG/DL — HIGH (ref 70–99)
GLUCOSE SERPL-MCNC: 144 MG/DL — HIGH (ref 70–99)
GLUCOSE SERPL-MCNC: 354 MG/DL — HIGH (ref 70–99)
GLUCOSE UR QL: NEGATIVE MG/DL — SIGNIFICANT CHANGE UP
HADV DNA SPEC QL NAA+PROBE: SIGNIFICANT CHANGE UP
HCO3 BLDV-SCNC: 13 MMOL/L — LOW (ref 22–29)
HCO3 BLDV-SCNC: 14 MMOL/L — LOW (ref 22–29)
HCO3 BLDV-SCNC: 15 MMOL/L — LOW (ref 22–29)
HCO3 BLDV-SCNC: 19 MMOL/L — LOW (ref 22–29)
HCOV 229E RNA SPEC QL NAA+PROBE: SIGNIFICANT CHANGE UP
HCOV HKU1 RNA SPEC QL NAA+PROBE: SIGNIFICANT CHANGE UP
HCOV NL63 RNA SPEC QL NAA+PROBE: SIGNIFICANT CHANGE UP
HCOV OC43 RNA SPEC QL NAA+PROBE: SIGNIFICANT CHANGE UP
HCT VFR BLD CALC: 23.8 % — LOW (ref 39–50)
HCT VFR BLD CALC: 26.3 % — LOW (ref 39–50)
HCT VFR BLD CALC: 28.4 % — LOW (ref 39–50)
HCT VFR BLDA CALC: 22 % — LOW (ref 39–51)
HCT VFR BLDA CALC: 23 % — LOW (ref 39–51)
HCT VFR BLDA CALC: 23 % — LOW (ref 39–51)
HCT VFR BLDA CALC: 25 % — LOW (ref 39–51)
HCT VFR BLDA CALC: 26 % — LOW (ref 39–51)
HCT VFR BLDA CALC: 26 % — LOW (ref 39–51)
HGB BLD CALC-MCNC: 7.3 G/DL — LOW (ref 12.6–17.4)
HGB BLD CALC-MCNC: 7.5 G/DL — LOW (ref 12.6–17.4)
HGB BLD CALC-MCNC: 7.5 G/DL — LOW (ref 12.6–17.4)
HGB BLD CALC-MCNC: 8.2 G/DL — LOW (ref 12.6–17.4)
HGB BLD CALC-MCNC: 8.5 G/DL — LOW (ref 12.6–17.4)
HGB BLD CALC-MCNC: 8.8 G/DL — LOW (ref 12.6–17.4)
HGB BLD-MCNC: 7.3 G/DL — LOW (ref 13–17)
HGB BLD-MCNC: 8.1 G/DL — LOW (ref 13–17)
HGB BLD-MCNC: 8.6 G/DL — LOW (ref 13–17)
HMPV RNA SPEC QL NAA+PROBE: SIGNIFICANT CHANGE UP
HPIV1 RNA SPEC QL NAA+PROBE: SIGNIFICANT CHANGE UP
HPIV2 RNA SPEC QL NAA+PROBE: SIGNIFICANT CHANGE UP
HPIV3 RNA SPEC QL NAA+PROBE: SIGNIFICANT CHANGE UP
HPIV4 RNA SPEC QL NAA+PROBE: SIGNIFICANT CHANGE UP
IANC: 11.25 K/UL — HIGH (ref 1.8–7.4)
IANC: 5.16 K/UL — SIGNIFICANT CHANGE UP (ref 1.8–7.4)
IANC: 7.43 K/UL — HIGH (ref 1.8–7.4)
IMM GRANULOCYTES NFR BLD AUTO: 0.3 % — SIGNIFICANT CHANGE UP (ref 0–0.9)
IMM GRANULOCYTES NFR BLD AUTO: 0.4 % — SIGNIFICANT CHANGE UP (ref 0–0.9)
IMM GRANULOCYTES NFR BLD AUTO: 0.6 % — SIGNIFICANT CHANGE UP (ref 0–0.9)
INR BLD: 1.38 RATIO — HIGH (ref 0.85–1.18)
INR BLD: 1.52 RATIO — HIGH (ref 0.85–1.18)
KETONES UR-MCNC: ABNORMAL MG/DL
LACTATE BLDV-MCNC: 1.1 MMOL/L — SIGNIFICANT CHANGE UP (ref 0.5–2)
LACTATE BLDV-MCNC: 1.7 MMOL/L — SIGNIFICANT CHANGE UP (ref 0.5–2)
LACTATE BLDV-MCNC: 1.7 MMOL/L — SIGNIFICANT CHANGE UP (ref 0.5–2)
LACTATE BLDV-MCNC: 1.9 MMOL/L — SIGNIFICANT CHANGE UP (ref 0.5–2)
LACTATE BLDV-MCNC: 2 MMOL/L — SIGNIFICANT CHANGE UP (ref 0.5–2)
LACTATE BLDV-MCNC: 2.1 MMOL/L — HIGH (ref 0.5–2)
LEUKOCYTE ESTERASE UR-ACNC: ABNORMAL
LYMPHOCYTES # BLD AUTO: 0.24 K/UL — LOW (ref 1–3.3)
LYMPHOCYTES # BLD AUTO: 0.41 K/UL — LOW (ref 1–3.3)
LYMPHOCYTES # BLD AUTO: 0.71 K/UL — LOW (ref 1–3.3)
LYMPHOCYTES # BLD AUTO: 11.2 % — LOW (ref 13–44)
LYMPHOCYTES # BLD AUTO: 2 % — LOW (ref 13–44)
LYMPHOCYTES # BLD AUTO: 5 % — LOW (ref 13–44)
M PNEUMO DNA SPEC QL NAA+PROBE: SIGNIFICANT CHANGE UP
MAGNESIUM SERPL-MCNC: 2.5 MG/DL — SIGNIFICANT CHANGE UP (ref 1.6–2.6)
MAGNESIUM SERPL-MCNC: 3.1 MG/DL — HIGH (ref 1.6–2.6)
MAGNESIUM SERPL-MCNC: 3.3 MG/DL — HIGH (ref 1.6–2.6)
MCHC RBC-ENTMCNC: 25.3 PG — LOW (ref 27–34)
MCHC RBC-ENTMCNC: 25.3 PG — LOW (ref 27–34)
MCHC RBC-ENTMCNC: 25.4 PG — LOW (ref 27–34)
MCHC RBC-ENTMCNC: 30.3 GM/DL — LOW (ref 32–36)
MCHC RBC-ENTMCNC: 30.7 GM/DL — LOW (ref 32–36)
MCHC RBC-ENTMCNC: 30.8 GM/DL — LOW (ref 32–36)
MCV RBC AUTO: 82.4 FL — SIGNIFICANT CHANGE UP (ref 80–100)
MCV RBC AUTO: 82.4 FL — SIGNIFICANT CHANGE UP (ref 80–100)
MCV RBC AUTO: 83.5 FL — SIGNIFICANT CHANGE UP (ref 80–100)
MONOCYTES # BLD AUTO: 0.33 K/UL — SIGNIFICANT CHANGE UP (ref 0–0.9)
MONOCYTES # BLD AUTO: 0.41 K/UL — SIGNIFICANT CHANGE UP (ref 0–0.9)
MONOCYTES # BLD AUTO: 0.69 K/UL — SIGNIFICANT CHANGE UP (ref 0–0.9)
MONOCYTES NFR BLD AUTO: 4 % — SIGNIFICANT CHANGE UP (ref 2–14)
MONOCYTES NFR BLD AUTO: 5.6 % — SIGNIFICANT CHANGE UP (ref 2–14)
MONOCYTES NFR BLD AUTO: 6.5 % — SIGNIFICANT CHANGE UP (ref 2–14)
NEUTROPHILS # BLD AUTO: 11.25 K/UL — HIGH (ref 1.8–7.4)
NEUTROPHILS # BLD AUTO: 5.16 K/UL — SIGNIFICANT CHANGE UP (ref 1.8–7.4)
NEUTROPHILS # BLD AUTO: 7.43 K/UL — HIGH (ref 1.8–7.4)
NEUTROPHILS NFR BLD AUTO: 81.2 % — HIGH (ref 43–77)
NEUTROPHILS NFR BLD AUTO: 90.3 % — HIGH (ref 43–77)
NEUTROPHILS NFR BLD AUTO: 91.9 % — HIGH (ref 43–77)
NITRITE UR-MCNC: NEGATIVE — SIGNIFICANT CHANGE UP
NRBC # BLD: 0 /100 WBCS — SIGNIFICANT CHANGE UP (ref 0–0)
NRBC # FLD: 0 K/UL — SIGNIFICANT CHANGE UP (ref 0–0)
NT-PROBNP SERPL-SCNC: 9736 PG/ML — HIGH
OSMOLALITY UR: 369 MOSM/KG — SIGNIFICANT CHANGE UP (ref 50–1200)
PCO2 BLDV: 34 MMHG — LOW (ref 42–55)
PCO2 BLDV: 35 MMHG — LOW (ref 42–55)
PCO2 BLDV: 35 MMHG — LOW (ref 42–55)
PCO2 BLDV: 38 MMHG — LOW (ref 42–55)
PCO2 BLDV: 43 MMHG — SIGNIFICANT CHANGE UP (ref 42–55)
PCO2 BLDV: 46 MMHG — SIGNIFICANT CHANGE UP (ref 42–55)
PH BLDV: 7.13 — LOW (ref 7.32–7.43)
PH BLDV: 7.13 — LOW (ref 7.32–7.43)
PH BLDV: 7.19 — LOW (ref 7.32–7.43)
PH BLDV: 7.21 — LOW (ref 7.32–7.43)
PH BLDV: 7.25 — LOW (ref 7.32–7.43)
PH BLDV: 7.36 — SIGNIFICANT CHANGE UP (ref 7.32–7.43)
PH UR: 5 — SIGNIFICANT CHANGE UP (ref 5–8)
PHOSPHATE SERPL-MCNC: 5 MG/DL — HIGH (ref 2.5–4.5)
PHOSPHATE SERPL-MCNC: 5.8 MG/DL — HIGH (ref 2.5–4.5)
PLATELET # BLD AUTO: 170 K/UL — SIGNIFICANT CHANGE UP (ref 150–400)
PLATELET # BLD AUTO: 195 K/UL — SIGNIFICANT CHANGE UP (ref 150–400)
PLATELET # BLD AUTO: 231 K/UL — SIGNIFICANT CHANGE UP (ref 150–400)
PO2 BLDV: 23 MMHG — LOW (ref 25–45)
PO2 BLDV: 28 MMHG — SIGNIFICANT CHANGE UP (ref 25–45)
PO2 BLDV: 28 MMHG — SIGNIFICANT CHANGE UP (ref 25–45)
PO2 BLDV: 32 MMHG — SIGNIFICANT CHANGE UP (ref 25–45)
PO2 BLDV: 36 MMHG — SIGNIFICANT CHANGE UP (ref 25–45)
PO2 BLDV: 37 MMHG — SIGNIFICANT CHANGE UP (ref 25–45)
POTASSIUM BLDV-SCNC: 4.9 MMOL/L — SIGNIFICANT CHANGE UP (ref 3.5–5.1)
POTASSIUM BLDV-SCNC: 6.4 MMOL/L — CRITICAL HIGH (ref 3.5–5.1)
POTASSIUM BLDV-SCNC: 6.5 MMOL/L — CRITICAL HIGH (ref 3.5–5.1)
POTASSIUM BLDV-SCNC: 6.6 MMOL/L — CRITICAL HIGH (ref 3.5–5.1)
POTASSIUM BLDV-SCNC: 6.6 MMOL/L — CRITICAL HIGH (ref 3.5–5.1)
POTASSIUM BLDV-SCNC: 6.8 MMOL/L — CRITICAL HIGH (ref 3.5–5.1)
POTASSIUM SERPL-MCNC: 4.7 MMOL/L — SIGNIFICANT CHANGE UP (ref 3.5–5.3)
POTASSIUM SERPL-MCNC: 6.2 MMOL/L — CRITICAL HIGH (ref 3.5–5.3)
POTASSIUM SERPL-MCNC: 6.4 MMOL/L — CRITICAL HIGH (ref 3.5–5.3)
POTASSIUM SERPL-MCNC: 6.5 MMOL/L — CRITICAL HIGH (ref 3.5–5.3)
POTASSIUM SERPL-MCNC: 6.5 MMOL/L — CRITICAL HIGH (ref 3.5–5.3)
POTASSIUM SERPL-SCNC: 4.7 MMOL/L — SIGNIFICANT CHANGE UP (ref 3.5–5.3)
POTASSIUM SERPL-SCNC: 6.2 MMOL/L — CRITICAL HIGH (ref 3.5–5.3)
POTASSIUM SERPL-SCNC: 6.4 MMOL/L — CRITICAL HIGH (ref 3.5–5.3)
POTASSIUM SERPL-SCNC: 6.5 MMOL/L — CRITICAL HIGH (ref 3.5–5.3)
POTASSIUM SERPL-SCNC: 6.5 MMOL/L — CRITICAL HIGH (ref 3.5–5.3)
POTASSIUM UR-SCNC: 85.2 MMOL/L — SIGNIFICANT CHANGE UP
PROT ?TM UR-MCNC: 164 MG/DL — SIGNIFICANT CHANGE UP
PROT SERPL-MCNC: 6.6 G/DL — SIGNIFICANT CHANGE UP (ref 6–8.3)
PROT SERPL-MCNC: 6.6 G/DL — SIGNIFICANT CHANGE UP (ref 6–8.3)
PROT SERPL-MCNC: 6.9 G/DL — SIGNIFICANT CHANGE UP (ref 6–8.3)
PROT UR-MCNC: 300 MG/DL
PROT/CREAT UR-RTO: 1.8 RATIO — HIGH (ref 0–0.2)
PROTHROM AB SERPL-ACNC: 15.5 SEC — HIGH (ref 9.5–13)
PROTHROM AB SERPL-ACNC: 17 SEC — HIGH (ref 9.5–13)
RAPID RVP RESULT: SIGNIFICANT CHANGE UP
RBC # BLD: 2.89 M/UL — LOW (ref 4.2–5.8)
RBC # BLD: 3.19 M/UL — LOW (ref 4.2–5.8)
RBC # BLD: 3.4 M/UL — LOW (ref 4.2–5.8)
RBC # FLD: 21.5 % — HIGH (ref 10.3–14.5)
RBC # FLD: 21.5 % — HIGH (ref 10.3–14.5)
RBC # FLD: 21.9 % — HIGH (ref 10.3–14.5)
RBC CASTS # UR COMP ASSIST: 228 /HPF — HIGH (ref 0–4)
REVIEW: SIGNIFICANT CHANGE UP
RSV RNA SPEC QL NAA+PROBE: SIGNIFICANT CHANGE UP
RV+EV RNA SPEC QL NAA+PROBE: SIGNIFICANT CHANGE UP
SAO2 % BLDV: 32.2 % — LOW (ref 67–88)
SAO2 % BLDV: 32.7 % — LOW (ref 67–88)
SAO2 % BLDV: 39.3 % — LOW (ref 67–88)
SAO2 % BLDV: 45.3 % — LOW (ref 67–88)
SAO2 % BLDV: 54 % — LOW (ref 67–88)
SAO2 % BLDV: 59.5 % — LOW (ref 67–88)
SARS-COV-2 RNA SPEC QL NAA+PROBE: SIGNIFICANT CHANGE UP
SODIUM SERPL-SCNC: 132 MMOL/L — LOW (ref 135–145)
SODIUM SERPL-SCNC: 133 MMOL/L — LOW (ref 135–145)
SODIUM SERPL-SCNC: 134 MMOL/L — LOW (ref 135–145)
SODIUM UR-SCNC: 22 MMOL/L — SIGNIFICANT CHANGE UP
SP GR SPEC: 1.02 — SIGNIFICANT CHANGE UP (ref 1–1.03)
SQUAMOUS # UR AUTO: 1 /HPF — SIGNIFICANT CHANGE UP (ref 0–5)
TROPONIN T, HIGH SENSITIVITY RESULT: 108 NG/L — CRITICAL HIGH
TROPONIN T, HIGH SENSITIVITY RESULT: 110 NG/L — CRITICAL HIGH
TROPONIN T, HIGH SENSITIVITY RESULT: 111 NG/L — CRITICAL HIGH
UROBILINOGEN FLD QL: 0.2 MG/DL — SIGNIFICANT CHANGE UP (ref 0.2–1)
UUN UR-MCNC: 329 MG/DL — SIGNIFICANT CHANGE UP
WBC # BLD: 12.24 K/UL — HIGH (ref 3.8–10.5)
WBC # BLD: 6.35 K/UL — SIGNIFICANT CHANGE UP (ref 3.8–10.5)
WBC # BLD: 8.23 K/UL — SIGNIFICANT CHANGE UP (ref 3.8–10.5)
WBC # FLD AUTO: 12.24 K/UL — HIGH (ref 3.8–10.5)
WBC # FLD AUTO: 6.35 K/UL — SIGNIFICANT CHANGE UP (ref 3.8–10.5)
WBC # FLD AUTO: 8.23 K/UL — SIGNIFICANT CHANGE UP (ref 3.8–10.5)
WBC UR QL: 19 /HPF — HIGH (ref 0–5)

## 2023-08-09 PROCEDURE — 93281 PM DEVICE PROGR EVAL MULTI: CPT | Mod: 26

## 2023-08-09 PROCEDURE — 76775 US EXAM ABDO BACK WALL LIM: CPT | Mod: 26,GC

## 2023-08-09 PROCEDURE — 36800 INSERTION OF CANNULA: CPT | Mod: GC,59

## 2023-08-09 PROCEDURE — 76604 US EXAM CHEST: CPT | Mod: 26,GC

## 2023-08-09 PROCEDURE — 71045 X-RAY EXAM CHEST 1 VIEW: CPT | Mod: 26,76

## 2023-08-09 PROCEDURE — 99291 CRITICAL CARE FIRST HOUR: CPT | Mod: FS,25

## 2023-08-09 PROCEDURE — 93308 TTE F-UP OR LMTD: CPT | Mod: 26,GC

## 2023-08-09 PROCEDURE — 99223 1ST HOSP IP/OBS HIGH 75: CPT

## 2023-08-09 PROCEDURE — 71250 CT THORAX DX C-: CPT | Mod: 26,MA

## 2023-08-09 PROCEDURE — 99223 1ST HOSP IP/OBS HIGH 75: CPT | Mod: GC

## 2023-08-09 RX ORDER — INSULIN HUMAN 100 [IU]/ML
5 INJECTION, SOLUTION SUBCUTANEOUS ONCE
Refills: 0 | Status: COMPLETED | OUTPATIENT
Start: 2023-08-09 | End: 2023-08-09

## 2023-08-09 RX ORDER — SODIUM CHLORIDE 9 MG/ML
500 INJECTION INTRAMUSCULAR; INTRAVENOUS; SUBCUTANEOUS ONCE
Refills: 0 | Status: COMPLETED | OUTPATIENT
Start: 2023-08-09 | End: 2023-08-09

## 2023-08-09 RX ORDER — MIDODRINE HYDROCHLORIDE 2.5 MG/1
5 TABLET ORAL THREE TIMES A DAY
Refills: 0 | Status: DISCONTINUED | OUTPATIENT
Start: 2023-08-09 | End: 2023-08-09

## 2023-08-09 RX ORDER — PROTHROMBIN COMPLEX CONCENTRATE (HUMAN) 25.5; 16.5; 24; 22; 22; 26 [IU]/ML; [IU]/ML; [IU]/ML; [IU]/ML; [IU]/ML; [IU]/ML
2000 POWDER, FOR SOLUTION INTRAVENOUS ONCE
Refills: 0 | Status: COMPLETED | OUTPATIENT
Start: 2023-08-09 | End: 2023-08-09

## 2023-08-09 RX ORDER — CALCIUM GLUCONATE 100 MG/ML
2 VIAL (ML) INTRAVENOUS ONCE
Refills: 0 | Status: COMPLETED | OUTPATIENT
Start: 2023-08-09 | End: 2023-08-09

## 2023-08-09 RX ORDER — DEXTROSE 50 % IN WATER 50 %
25 SYRINGE (ML) INTRAVENOUS ONCE
Refills: 0 | Status: DISCONTINUED | OUTPATIENT
Start: 2023-08-09 | End: 2023-08-18

## 2023-08-09 RX ORDER — SODIUM CHLORIDE 9 MG/ML
1000 INJECTION, SOLUTION INTRAVENOUS
Refills: 0 | Status: DISCONTINUED | OUTPATIENT
Start: 2023-08-09 | End: 2023-08-18

## 2023-08-09 RX ORDER — CHLORHEXIDINE GLUCONATE 213 G/1000ML
1 SOLUTION TOPICAL DAILY
Refills: 0 | Status: DISCONTINUED | OUTPATIENT
Start: 2023-08-09 | End: 2023-08-11

## 2023-08-09 RX ORDER — APIXABAN 2.5 MG/1
2.5 TABLET, FILM COATED ORAL
Refills: 0 | Status: DISCONTINUED | OUTPATIENT
Start: 2023-08-09 | End: 2023-08-18

## 2023-08-09 RX ORDER — PIPERACILLIN AND TAZOBACTAM 4; .5 G/20ML; G/20ML
3.38 INJECTION, POWDER, LYOPHILIZED, FOR SOLUTION INTRAVENOUS ONCE
Refills: 0 | Status: COMPLETED | OUTPATIENT
Start: 2023-08-09 | End: 2023-08-09

## 2023-08-09 RX ORDER — GLUCAGON INJECTION, SOLUTION 0.5 MG/.1ML
1 INJECTION, SOLUTION SUBCUTANEOUS ONCE
Refills: 0 | Status: DISCONTINUED | OUTPATIENT
Start: 2023-08-09 | End: 2023-08-18

## 2023-08-09 RX ORDER — SODIUM BICARBONATE 1 MEQ/ML
50 SYRINGE (ML) INTRAVENOUS ONCE
Refills: 0 | Status: COMPLETED | OUTPATIENT
Start: 2023-08-09 | End: 2023-08-09

## 2023-08-09 RX ORDER — CHLORHEXIDINE GLUCONATE 213 G/1000ML
1 SOLUTION TOPICAL DAILY
Refills: 0 | Status: DISCONTINUED | OUTPATIENT
Start: 2023-08-09 | End: 2023-08-18

## 2023-08-09 RX ORDER — SODIUM ZIRCONIUM CYCLOSILICATE 10 G/10G
10 POWDER, FOR SUSPENSION ORAL ONCE
Refills: 0 | Status: COMPLETED | OUTPATIENT
Start: 2023-08-09 | End: 2023-08-09

## 2023-08-09 RX ORDER — DEXTROSE 50 % IN WATER 50 %
50 SYRINGE (ML) INTRAVENOUS ONCE
Refills: 0 | Status: COMPLETED | OUTPATIENT
Start: 2023-08-09 | End: 2023-08-09

## 2023-08-09 RX ORDER — CARBIDOPA AND LEVODOPA 25; 100 MG/1; MG/1
1.5 TABLET ORAL
Refills: 0 | Status: DISCONTINUED | OUTPATIENT
Start: 2023-08-09 | End: 2023-08-18

## 2023-08-09 RX ORDER — NOREPINEPHRINE BITARTRATE/D5W 8 MG/250ML
0.05 PLASTIC BAG, INJECTION (ML) INTRAVENOUS
Qty: 8 | Refills: 0 | Status: DISCONTINUED | OUTPATIENT
Start: 2023-08-09 | End: 2023-08-09

## 2023-08-09 RX ORDER — PIPERACILLIN AND TAZOBACTAM 4; .5 G/20ML; G/20ML
3.38 INJECTION, POWDER, LYOPHILIZED, FOR SOLUTION INTRAVENOUS EVERY 12 HOURS
Refills: 0 | Status: COMPLETED | OUTPATIENT
Start: 2023-08-10 | End: 2023-08-13

## 2023-08-09 RX ORDER — INSULIN LISPRO 100/ML
VIAL (ML) SUBCUTANEOUS EVERY 6 HOURS
Refills: 0 | Status: DISCONTINUED | OUTPATIENT
Start: 2023-08-09 | End: 2023-08-10

## 2023-08-09 RX ORDER — CEFTRIAXONE 500 MG/1
1000 INJECTION, POWDER, FOR SOLUTION INTRAMUSCULAR; INTRAVENOUS ONCE
Refills: 0 | Status: COMPLETED | OUTPATIENT
Start: 2023-08-09 | End: 2023-08-09

## 2023-08-09 RX ORDER — MIDODRINE HYDROCHLORIDE 2.5 MG/1
10 TABLET ORAL THREE TIMES A DAY
Refills: 0 | Status: DISCONTINUED | OUTPATIENT
Start: 2023-08-09 | End: 2023-08-13

## 2023-08-09 RX ORDER — ACETAMINOPHEN 500 MG
650 TABLET ORAL EVERY 6 HOURS
Refills: 0 | Status: DISCONTINUED | OUTPATIENT
Start: 2023-08-09 | End: 2023-08-18

## 2023-08-09 RX ORDER — SODIUM CHLORIDE 9 MG/ML
100 INJECTION INTRAMUSCULAR; INTRAVENOUS; SUBCUTANEOUS
Refills: 0 | Status: DISCONTINUED | OUTPATIENT
Start: 2023-08-09 | End: 2023-08-18

## 2023-08-09 RX ORDER — DEXTROSE 50 % IN WATER 50 %
12.5 SYRINGE (ML) INTRAVENOUS ONCE
Refills: 0 | Status: DISCONTINUED | OUTPATIENT
Start: 2023-08-09 | End: 2023-08-18

## 2023-08-09 RX ORDER — CHLORHEXIDINE GLUCONATE 213 G/1000ML
1 SOLUTION TOPICAL
Refills: 0 | Status: DISCONTINUED | OUTPATIENT
Start: 2023-08-09 | End: 2023-08-09

## 2023-08-09 RX ORDER — DEXTROSE 50 % IN WATER 50 %
15 SYRINGE (ML) INTRAVENOUS ONCE
Refills: 0 | Status: DISCONTINUED | OUTPATIENT
Start: 2023-08-09 | End: 2023-08-18

## 2023-08-09 RX ORDER — ONDANSETRON 8 MG/1
4 TABLET, FILM COATED ORAL EVERY 8 HOURS
Refills: 0 | Status: DISCONTINUED | OUTPATIENT
Start: 2023-08-09 | End: 2023-08-18

## 2023-08-09 RX ORDER — NOREPINEPHRINE BITARTRATE/D5W 8 MG/250ML
0.05 PLASTIC BAG, INJECTION (ML) INTRAVENOUS
Qty: 8 | Refills: 0 | Status: DISCONTINUED | OUTPATIENT
Start: 2023-08-09 | End: 2023-08-14

## 2023-08-09 RX ORDER — LANOLIN ALCOHOL/MO/W.PET/CERES
3 CREAM (GRAM) TOPICAL AT BEDTIME
Refills: 0 | Status: DISCONTINUED | OUTPATIENT
Start: 2023-08-09 | End: 2023-08-18

## 2023-08-09 RX ORDER — AZITHROMYCIN 500 MG/1
500 TABLET, FILM COATED ORAL ONCE
Refills: 0 | Status: COMPLETED | OUTPATIENT
Start: 2023-08-09 | End: 2023-08-09

## 2023-08-09 RX ADMIN — INSULIN HUMAN 5 UNIT(S): 100 INJECTION, SOLUTION SUBCUTANEOUS at 07:34

## 2023-08-09 RX ADMIN — Medication 8.23 MICROGRAM(S)/KG/MIN: at 19:46

## 2023-08-09 RX ADMIN — AZITHROMYCIN 255 MILLIGRAM(S): 500 TABLET, FILM COATED ORAL at 09:25

## 2023-08-09 RX ADMIN — CARBIDOPA AND LEVODOPA 1.5 TABLET(S): 25; 100 TABLET ORAL at 18:40

## 2023-08-09 RX ADMIN — MIDODRINE HYDROCHLORIDE 5 MILLIGRAM(S): 2.5 TABLET ORAL at 09:05

## 2023-08-09 RX ADMIN — SODIUM ZIRCONIUM CYCLOSILICATE 10 GRAM(S): 10 POWDER, FOR SUSPENSION ORAL at 09:05

## 2023-08-09 RX ADMIN — Medication 200 GRAM(S): at 07:33

## 2023-08-09 RX ADMIN — Medication 200 GRAM(S): at 02:17

## 2023-08-09 RX ADMIN — Medication 50 MILLIEQUIVALENT(S): at 02:50

## 2023-08-09 RX ADMIN — PIPERACILLIN AND TAZOBACTAM 200 GRAM(S): 4; .5 INJECTION, POWDER, LYOPHILIZED, FOR SOLUTION INTRAVENOUS at 17:27

## 2023-08-09 RX ADMIN — Medication 50 MILLIEQUIVALENT(S): at 07:33

## 2023-08-09 RX ADMIN — MIDODRINE HYDROCHLORIDE 10 MILLIGRAM(S): 2.5 TABLET ORAL at 21:02

## 2023-08-09 RX ADMIN — Medication 50 MILLILITER(S): at 07:33

## 2023-08-09 RX ADMIN — SODIUM CHLORIDE 500 MILLILITER(S): 9 INJECTION INTRAMUSCULAR; INTRAVENOUS; SUBCUTANEOUS at 07:13

## 2023-08-09 RX ADMIN — MIDODRINE HYDROCHLORIDE 5 MILLIGRAM(S): 2.5 TABLET ORAL at 12:03

## 2023-08-09 RX ADMIN — SODIUM CHLORIDE 1200 MILLILITER(S): 9 INJECTION INTRAMUSCULAR; INTRAVENOUS; SUBCUTANEOUS at 15:00

## 2023-08-09 RX ADMIN — Medication 50 MILLILITER(S): at 02:17

## 2023-08-09 RX ADMIN — INSULIN HUMAN 5 UNIT(S): 100 INJECTION, SOLUTION SUBCUTANEOUS at 02:17

## 2023-08-09 RX ADMIN — PROTHROMBIN COMPLEX CONCENTRATE (HUMAN) 400 INTERNATIONAL UNIT(S): 25.5; 16.5; 24; 22; 22; 26 POWDER, FOR SOLUTION INTRAVENOUS at 12:30

## 2023-08-09 RX ADMIN — PIPERACILLIN AND TAZOBACTAM 25 GRAM(S): 4; .5 INJECTION, POWDER, LYOPHILIZED, FOR SOLUTION INTRAVENOUS at 21:06

## 2023-08-09 RX ADMIN — CEFTRIAXONE 100 MILLIGRAM(S): 500 INJECTION, POWDER, FOR SOLUTION INTRAMUSCULAR; INTRAVENOUS at 07:13

## 2023-08-09 RX ADMIN — SODIUM CHLORIDE 500 MILLILITER(S): 9 INJECTION INTRAMUSCULAR; INTRAVENOUS; SUBCUTANEOUS at 02:50

## 2023-08-09 NOTE — DISCHARGE NOTE NURSING/CASE MANAGEMENT/SOCIAL WORK - ADDITIONAL RESOURCE NAME
Pt provided with 3 Pleur-X catheter kits by primary Rn for safe d/c home- pt wife aware/ Pt family states pt has some kits at home already

## 2023-08-09 NOTE — ED ADULT NURSE REASSESSMENT NOTE - NS ED NURSE REASSESS COMMENT FT1
Mobile Critical Care RN: patient is 82/M PMHx dementia, HTN, A fib on Eliquis, bradycardia s/p ppm, CHF, pleural effusions s/p R pleurX, Parkinson's presenting with shortness of breath x 1 day. patient admitted to medicine, now accepted to MICU for urgent HD. remains in [facility], daughter at bedside, opens eyes, oriented to name and . afebrile, denies pain. on bipap 12/5 30%, breathing comfortably. paced, L Ant chest PPM present. borderline hypotensive and receiving PO meds. +2 to +3 edema noted in all four extremities, PIV x2. NPO except meds. 14 F means. blanchable redness noted to bilateral heels. patient now to be transferred to CCU as MICU boarder.

## 2023-08-09 NOTE — H&P ADULT - NSHPLABSRESULTS_GEN_ALL_CORE
.  LABS:                         8.1    12.24 )-----------( 195      ( 09 Aug 2023 07:00 )             26.3     08-09    132<L>  |  102  |  86<H>  ----------------------------<  126<H>  6.5<HH>   |  13<L>  |  4.79<H>    Ca    9.7      09 Aug 2023 07:00  Phos  5.8     08-09  Mg     3.30     08-09    TPro  6.6  /  Alb  3.9  /  TBili  <0.2  /  DBili  x   /  AST  <5  /  ALT  6   /  AlkPhos  61  08-09      Urinalysis Basic - ( 09 Aug 2023 07:00 )    Color: x / Appearance: x / SG: x / pH: x  Gluc: 126 mg/dL / Ketone: x  / Bili: x / Urobili: x   Blood: x / Protein: x / Nitrite: x   Leuk Esterase: x / RBC: x / WBC x   Sq Epi: x / Non Sq Epi: x / Bacteria: x      EKG reviewed personally: paced rhythm 60 bpm          RADIOLOGY, EKG & ADDITIONAL TESTS: Reviewed.

## 2023-08-09 NOTE — CONSULT NOTE ADULT - SUBJECTIVE AND OBJECTIVE BOX
83 yo M with HTN, A fib on Eliquis, CKD (baseline 1.2-1.4), bradycardia s/p ppm, CHFpEF, pleural effusions s/p R pleurX, Parkinson's presenting with shortness of breath x 1 day.  Pt poor historian due to dementia. Wife at bedside providing history. Overnight pt had been complaining of shortness of breath, coughing. Denies any chest pain, palpitations, no fevers or chills. No recent illness, No GI or urinary symptoms.    In ED pt was given sodium bicarb, Ca gluconate and insulin, 1L NS  VS:  90/51  61  97.8  19  100% on BIPAP (09 Aug 2023 09:40)    Pulm consulted for new BIPAP requirement and new left sided pleural effusions. Per wife, patient with acute decline in last 24hours with worsening SOB and cough. SHe also reports he was being treated for UTI with bactrim for past week.     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      Parkinsons disease      CAD (coronary artery disease)  s/p PCI       Pleural effusion, not elsewhere classified      COVID-19 vaccine series completed  w booster      History of hip replacement, total  R hip  & L hip      Status post cataract extraction  right eye cataract extraction with IOL 2015      Presence of cardiac pacemaker  10/2021      H/O coronary angioplasty  2021 1 stent inserted          FAMILY HISTORY:  Family history of lung cancer (Mother)    Family history of esophageal cancer (Father)    FH: myocardial infarction (Grandparent)        SOCIAL HISTORY:  Smoking: [X ] Never Smoked [ ] Former Smoker (__ packs x ___ years) [ ] Current Smoker  (__ packs x ___ years)  Substance Use: [ ] Never Used [ ] Used ____  EtOH Use:  Marital Status: [ ] Single [ X]  [ ]  [ ]   Sexual History:   Occupation:  Recent Travel:  Country of Birth:  Advance Directives:    Allergies    No Known Allergies    Intolerances        HOME MEDICATIONS:    REVIEW OF SYSTEMS:  Constitutional: [X ] negative [ ] fevers [ ] chills [ ] weight loss [ ] weight gain  HEENT: [X] negative [ ] dry eyes [ ] eye irritation [ ] postnasal drip [ ] nasal congestion  CV: [X ] negative  [ ] chest pain [ ] orthopnea [ ] palpitations [ ] murmur  Resp: [ ] negative [ ] cough [X ] shortness of breath [ ] dyspnea [ ] wheezing [ ] sputum [ ] hemoptysis  GI: [X ] negative [ ] nausea [ ] vomiting [ ] diarrhea [ ] constipation [ ] abd pain [ ] dysphagia   : [X ] negative [ ] dysuria [ ] nocturia [ ] hematuria [ ] increased urinary frequency  Musculoskeletal: [ ] negative [ ] back pain [ ] myalgias [ ] arthralgias [ ] fracture  Skin: [ ] negative [ ] rash [ ] itch  Neurological: [ ] negative [ ] headache [ ] dizziness [ ] syncope [ ] weakness [ ] numbness  Psychiatric: [ ] negative [ ] anxiety [ ] depression  Endocrine: [ ] negative [ ] diabetes [ ] thyroid problem  Hematologic/Lymphatic: [ ] negative [ ] anemia [ ] bleeding problem  Allergic/Immunologic: [ ] negative [ ] itchy eyes [ ] nasal discharge [ ] hives [ ] angioedema  [ ] All other systems negative  [ ] Unable to assess ROS because ________    OBJECTIVE:  ICU Vital Signs Last 24 Hrs  T(C): 36.5 (09 Aug 2023 16:50), Max: 36.9 (09 Aug 2023 11:30)  T(F): 97.7 (09 Aug 2023 16:50), Max: 98.4 (09 Aug 2023 11:30)  HR: 64 (09 Aug 2023 18:00) (52 - 76)  BP: 104/45 (09 Aug 2023 18:00) (78/46 - 151/129)  BP(mean): 63 (09 Aug 2023 18:00) (62 - 123)  ABP: --  ABP(mean): --  RR: 12 (09 Aug 2023 18:00) (10 - 24)  SpO2: 100% (09 Aug 2023 18:00) (86% - 100%)    O2 Parameters below as of 09 Aug 2023 16:50  Patient On (Oxygen Delivery Method): BiPAP/CPAP               @ 07:01  -   @ 18:36  --------------------------------------------------------  IN: 687.8 mL / OUT: 664 mL / NET: 23.8 mL      CAPILLARY BLOOD GLUCOSE      POCT Blood Glucose.: 146 mg/dL (09 Aug 2023 07:33)      PHYSICAL EXAM:  General: ill appearing, NAD, on BiPAP  HEENT: no icterus  Neck: supple  Respiratory: decreased breath sounds at bilateral bases  Cardiovascular: s1/s2, bradycardic to 50s  Abdomen: soft, nontender  Extremities: 3+ pitting edema  Skin: no rashes  Neurological: AxoX2  Psychiatry: normal mood and affect    HOSPITAL MEDICATIONS:    piperacillin/tazobactam IVPB.- 3.375 Gram(s) IV Intermittent once    midodrine. 5 milliGRAM(s) Oral three times a day  norepinephrine Infusion 0.05 MICROgram(s)/kG/Min IV Continuous <Continuous>    dextrose 50% Injectable 25 Gram(s) IV Push once  dextrose 50% Injectable 25 Gram(s) IV Push once  dextrose 50% Injectable 12.5 Gram(s) IV Push once  dextrose Oral Gel 15 Gram(s) Oral once PRN  glucagon  Injectable 1 milliGRAM(s) IntraMuscular once  insulin lispro (ADMELOG) corrective regimen sliding scale   SubCutaneous every 6 hours      acetaminophen     Tablet .. 650 milliGRAM(s) Oral every 6 hours PRN  carbidopa/levodopa  25/100 1.5 Tablet(s) Oral four times a day  melatonin 3 milliGRAM(s) Oral at bedtime PRN  ondansetron Injectable 4 milliGRAM(s) IV Push every 8 hours PRN    aluminum hydroxide/magnesium hydroxide/simethicone Suspension 30 milliLiter(s) Oral every 4 hours PRN        dextrose 5%. 1000 milliLiter(s) IV Continuous <Continuous>  dextrose 5%. 1000 milliLiter(s) IV Continuous <Continuous>  sodium chloride 0.9% Bolus. 100 milliLiter(s) IV Bolus every 5 minutes PRN      chlorhexidine 2% Cloths 1 Application(s) Topical daily        LABS:                        7.3    8.23  )-----------( 170      ( 09 Aug 2023 17:50 )             23.8     Hgb Trend: 7.3<--, 8.1<--, 8.6<--  08-09    134<L>  |  104  |  87<H>  ----------------------------<  144<H>  6.5<HH>   |  16<L>  |  4.87<H>    Ca    9.7      09 Aug 2023 11:19  Phos  5.0       Mg     3.10         TPro  6.6  /  Alb  3.9  /  TBili  <0.2  /  DBili  x   /  AST  <5  /  ALT  6   /  AlkPhos  61      Creatinine Trend: 4.87<--, 4.79<--, 4.50<--, 4.54<--  PT/INR - ( 09 Aug 2023 17:50 )   PT: 15.5 sec;   INR: 1.38 ratio         PTT - ( 09 Aug 2023 11:19 )  PTT:35.0 sec  Urinalysis Basic - ( 09 Aug 2023 14:24 )    Color: Yellow / Appearance: Turbid / S.022 / pH: x  Gluc: x / Ketone: Trace mg/dL  / Bili: Negative / Urobili: 0.2 mg/dL   Blood: x / Protein: 300 mg/dL / Nitrite: Negative   Leuk Esterase: Small / RBC: 228 /HPF / WBC 19 /HPF   Sq Epi: x / Non Sq Epi: 1 /HPF / Bacteria: Negative /HPF        Venous Blood Gas:   @ 17:50  7.36/34/28/19/39.3  VBG Lactate: 1.9  Venous Blood Gas:   @ 13:00  7.25/35/37/15/59.5  VBG Lactate: 1.1  Venous Blood Gas:   @ 07:00  7.19/35/23/13/32.2  VBG Lactate: 2.0  Venous Blood Gas:   @ 05:15  7.21/38/32/15/45.3  VBG Lactate: 1.7  Venous Blood Gas:   @ 02:40  7.13/43/36/14/54.0  VBG Lactate: 1.7  Venous Blood Gas:   @ 23:40  7.13/46/28/15/32.7  VBG Lactate: 2.1      MICROBIOLOGY:     RADIOLOGY:  [X ] Reviewed and interpreted by me

## 2023-08-09 NOTE — CONSULT NOTE ADULT - PROBLEM SELECTOR RECOMMENDATION 3
plan as above     Final recommendations pending attending signature.    If you have any questions, please feel free to contact me  Torres Olivarez  Nephrology Fellow  [facility]/Page [phone]  (After 5pm or on weekends please page the on-call fellow)

## 2023-08-09 NOTE — H&P ADULT - PROBLEM SELECTOR PLAN 2
- s/p R pleurX, moderate layering L effusion. Chronic  - Pt on BIPAP for resp distress   - Continue BIPAP for now  - Repeat VBG in PM

## 2023-08-09 NOTE — H&P ADULT - PROBLEM SELECTOR PLAN 5
- Elevated troponin in setting of ALAYNA  - Pt denies any chest pain, low suspicion for ACS   - Continue ASA and Plavix

## 2023-08-09 NOTE — DISCHARGE NOTE NURSING/CASE MANAGEMENT/SOCIAL WORK - NSSCNAMETXT_GEN_ALL_CORE
[facility]    RN to call schedule home visit within q24hrs after d/c Soc 8/19/ Family aware and in agreement- Cm requested Rn Nilsa for Hc- they will try to get rn if possible

## 2023-08-09 NOTE — CHART NOTE - NSCHARTNOTEFT_GEN_A_CORE
: Dr. Yuan, Dr. Woodward    INDICATION: Shock, respiratory Failure, renal failure    PROCEDURE:  [x] LIMITED ECHO  [x] LIMITED CHEST  [x] LIMITED RETROPERITONEAL  [] LIMITED ABDOMINAL  [ ] LIMITED DVT  [ ] NEEDLE GUIDANCE VASCULAR  [ ] NEEDLE GUIDANCE THORACENTESIS  [ ] NEEDLE GUIDANCE PARACENTESIS  [ ] NEEDLE GUIDANCE PERICARDIOCENTESIS  [ ] OTHER    FINDINGS:  focal scattered b lines with irregular pleura on the right, no right pleural effusion, a lines anterior left side, moderate left sided pleural effusion simple appearing with atelectatic lung. Heart not well visualized, LV with mild reduced function, RV< LV, IVC indeterminate, no overt hydronephrosis of the bilateral kidneys, bladder not distended around means catheter, hepatic cysts    INTERPRETATION:  likely post VATS changes in the right, simple appearing left pleural effusion without tappable window, unlikely hypovolemic or cardiogenic shock, not fluid overloaded. non obstructive renal failure.       Images uploaded to [employer] : Dr. Yuan, Dr. Woodward    INDICATION: Shock, respiratory Failure, renal failure    PROCEDURE:  [x] LIMITED ECHO  [x] LIMITED CHEST  [x] LIMITED RETROPERITONEAL  [] LIMITED ABDOMINAL  [ ] LIMITED DVT  [ ] NEEDLE GUIDANCE VASCULAR  [ ] NEEDLE GUIDANCE THORACENTESIS  [ ] NEEDLE GUIDANCE PARACENTESIS  [ ] NEEDLE GUIDANCE PERICARDIOCENTESIS  [ ] OTHER    FINDINGS:  focal scattered b lines with irregular pleura on the right, no right pleural effusion, a lines anterior left side, moderate left sided pleural effusion simple appearing with atelectatic lung. Heart not well visualized, LV with mild reduced function, RV< LV, IVC indeterminate, no overt hydronephrosis of the bilateral kidneys, bladder not distended around means catheter, hepatic cysts    INTERPRETATION:  ,1.simple appearing left pleural effusion   2. Mildly reduced LV systolic function. No pericardial effusion.  3.No evidence of hydronephrosis bilaterally  4. Means catheter in place . Minimal urine in bladder,     Images uploaded to [employer]    I have assisted and supervised entire procedure.    Jeff Santiago MD

## 2023-08-09 NOTE — ED ADULT NURSE REASSESSMENT NOTE - NS ED NURSE REASSESS COMMENT FT1
Pt laying in bed, paced rhythm on cardiac monitor, remains on Bipap. VS in flow sheet, pt mentating well, Alphonse ROBERSON at bedside aware of BP. Pt medicated per MD orders. Critical lab value of potassium 6.5 received, Alphonse ROBERSON made aware.

## 2023-08-09 NOTE — H&P ADULT - PROBLEM SELECTOR PLAN 1
- complicated by hyperkalemia and metabolic acidosis  - s/p Nabicarb and insulin in ED with no improvement in K  - Will give Lokelma then repeat BMP   - Nephrology consulted- f/u recs  - Monitor on tele  - Monitor I/Os - Complicated by hyperkalemia and metabolic acidosis. No EKG changes   - s/p Calcium gluconate,  Nabicarb and insulin in ED with no improvement in K  - Will give Lokelma then repeat BMP   - Nephrology consulted- f/u recs  - Monitor on tele  - Monitor I/Os

## 2023-08-09 NOTE — ED ADULT NURSE REASSESSMENT NOTE - NSFALLRISKINTERV_ED_ALL_ED

## 2023-08-09 NOTE — CONSULT NOTE ADULT - ASSESSMENT
82M with HTN, A fib on Eliquis, CKD (baseline 1.2-1.4), bradycardia s/p ppm, CHFpEF, pleural effusions s/p R VATS and pleurX 3/2022, Parkinson's presenting with shortness of breath found to have ALAYNA on CKD c/b fluid overload and hyperkalemia, admitted to MICU for acute renal failure requiring urgent HD in the setting of hypotension. Pulmonary consulted prior to MICU acceptance for new BiPAP and pleural effusion.    #Increased work of breathing:  - patient tachypneic with increased WOB likely secondary to lactic acidosis (as compensatory mechanism)  - CT chest reviewed with bilateral pleural effusions; left increased from prior (also noted to have ?mass, but upon further review likely loculated fluid in the fissure)  - patient with tree-in-bud opacities suggestive of PNA    Recommendations:  - c/w bipap for work of breathing for compensation of lactic acidosis  - patient will be going to MICU for dialysis which should improve acidosis  - tx with broad spectrum abx  - hold off on thoracentesis at this time as effusion is simple appearing, and likely not contributing to patient's current state  - discussion with wife and daughter in ER earlier where they had reported code status of DNR/DNI with trial of HD  - further GOC with primary MICU team
ALAYNA and hyperkalemia

## 2023-08-09 NOTE — CONSULT NOTE ADULT - SUBJECTIVE AND OBJECTIVE BOX
[facility] DIVISION OF KIDNEY DISEASES AND HYPERTENSION -- [phone]  -- INITIAL CONSULT NOTE  --------------------------------------------------------------------------------  HPI: 81yo Male w/ PMH HTN, Atrial fibrillation on Eliquis, bradycardia s/p ppm, CHF, pleural effusions s/p R pleurX, Parkinson's presenting with shortness of breath. Nephrology consulted for hyperkalemia in the setting of ALAYNA. Patient seen and examined in the ED, with wife and daughter present. Pt. appears lethargic, unable to provide history. Per pts wife since sunday pt. has not been himself, needing more assistance from HHA. Was seen by his PCP Dr. Santhosh Avila on Tuesday 8/8. Low BP was noted therefore entresto was discontinued. Last dose of Entresto was 8/8 in the morning. Of note patient has been on 1wk of TMP/SMX for UTI, still taking it.       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  Parkinsons disease  CAD (coronary artery disease)  s/p PCI 08/21  Pleural effusion, not elsewhere classified  COVID-19 vaccine series completed  w booster  History of hip replacement, total  R hip 2008 & L hip  Status post cataract extraction  right eye cataract extraction with IOL 6/26/2015  Presence of cardiac pacemaker  10/2021  H/O coronary angioplasty  8/2021 1 stent inserted    FAMILY HISTORY:  Family history of lung cancer (Mother)  Family history of esophageal cancer (Father)  FH: myocardial infarction (Grandparent)    PAST SOCIAL HISTORY:  ALLERGIES & MEDICATIONS  --------------------------------------------------------------------------------  Allergies  No Known Allergies  Intolerances    Standing Inpatient Medications  carbidopa/levodopa  25/100 1.5 Tablet(s) Oral four times a day  chlorhexidine 2% Cloths 1 Application(s) Topical daily  midodrine. 5 milliGRAM(s) Oral three times a day  norepinephrine Infusion 0.05 MICROgram(s)/kG/Min IV Continuous <Continuous>  piperacillin/tazobactam IVPB. 3.375 Gram(s) IV Intermittent once  piperacillin/tazobactam IVPB.- 3.375 Gram(s) IV Intermittent once  PRN Inpatient Medications  acetaminophen     Tablet .. 650 milliGRAM(s) Oral every 6 hours PRN  aluminum hydroxide/magnesium hydroxide/simethicone Suspension 30 milliLiter(s) Oral every 4 hours PRN  melatonin 3 milliGRAM(s) Oral at bedtime PRN  ondansetron Injectable 4 milliGRAM(s) IV Push every 8 hours PRN  sodium chloride 0.9% Bolus. 100 milliLiter(s) IV Bolus every 5 minutes PRN    REVIEW OF SYSTEMS  --------------------------------------------------------------------------------  ROS limited due to present mental status, see HPI    VITALS/PHYSICAL EXAM  --------------------------------------------------------------------------------  T(C): 36.5 (08-09-23 @ 12:21), Max: 36.9 (08-09-23 @ 11:30)  HR: 60 (08-09-23 @ 14:00) (52 - 72)  BP: 102/47 (08-09-23 @ 14:00) (78/46 - 107/47)  RR: 12 (08-09-23 @ 14:00) (11 - 24)  SpO2: 100% (08-09-23 @ 14:00) (97% - 100%)  Wt(kg): --  Height (cm): 172.7 (08-09-23 @ 12:21)  Weight (kg): 87.8 (08-09-23 @ 12:21)  BMI (kg/m2): 29.4 (08-09-23 @ 12:21)  BSA (m2): 2.02 (08-09-23 @ 12:21)      08-09-23 @ 07:01  -  08-09-23 @ 14:47  --------------------------------------------------------  IN: 50 mL / OUT: 0 mL / NET: 50 mL      Physical Exam:  Gen: respiratory distress on BIPAP  HEENT: on BIPAP  Pulm: right sided pleurex catheter with dressing CDI  CV: S1S2  Abd: Soft, +BS   Ext: +LE edema B/L  Neuro: lethargic   Vascular access: peripheral IV    LABS/STUDIES  --------------------------------------------------------------------------------              8.1    12.24 >-----------<  195      [08-09-23 @ 07:00]              26.3     134  |  104  |  87  ----------------------------<  144      [08-09-23 @ 11:19]  6.5   |  16  |  4.87        Ca     9.7     [08-09-23 @ 11:19]      Mg     3.10     [08-09-23 @ 11:19]      Phos  5.0     [08-09-23 @ 11:19]    TPro  6.6  /  Alb  3.9  /  TBili  <0.2  /  DBili  x   /  AST  <5  /  ALT  6   /  AlkPhos  61  [08-09-23 @ 07:00]    PT/INR: PT 17.0 , INR 1.52       [08-09-23 @ 11:19]  PTT: 35.0       [08-09-23 @ 11:19]      Creatinine Trend:  SCr 4.87 [08-09 @ 11:19]  SCr 4.79 [08-09 @ 07:00]  SCr 4.50 [08-09 @ 05:15]  SCr 4.54 [08-08 @ 23:40]    Urinalysis - [08-09-23 @ 14:24]      Color Yellow / Appearance Turbid / SG 1.022 / pH 5.0      Gluc Negative / Ketone Trace  / Bili Negative / Urobili 0.2       Blood Large / Protein 300 / Leuk Est Small / Nitrite Negative       / WBC 19 / Hyaline  / Gran  / Sq Epi  / Non Sq Epi 1 / Bacteria Negative      Iron 19, TIBC 224, %sat 8      [01-25-23 @ 07:40]  Ferritin 91      [02-04-23 @ 05:10]  TSH 2.12      [10-22-22 @ 06:51]  Lipid: chol 93, TG 45, HDL 46, LDL --      [02-03-23 @ 05:23]      Syphilis Screen (Treponema Pallidum Ab) Negative      [12-31-21 @ 09:38]   [facility] DIVISION OF KIDNEY DISEASES AND HYPERTENSION -- [phone]  -- INITIAL CONSULT NOTE  --------------------------------------------------------------------------------  HPI: 81yo Male w/ PMH HTN, Atrial fibrillation on Eliquis, bradycardia s/p ppm, CHF, pleural effusions s/p R pleurX, Parkinson's presenting with shortness of breath. Nephrology consulted for hyperkalemia in the setting of ALAYNA. Patient seen and examined in the ED, with wife and daughter present. Pt. appears lethargic, unable to provide history. Per pts wife since sunday pt. has not been himself, needing more assistance from HHA. Was seen by his PCP Dr. Santhosh Avila on Tuesday 8/8. Low BP was noted therefore entresto was discontinued. Last dose of Entresto was 8/8 in the morning. Of note patient has been on 1wk of TMP/SMX for UTI, still taking it.     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  Parkinsons disease  CAD (coronary artery disease)  s/p PCI 08/21  Pleural effusion, not elsewhere classified  COVID-19 vaccine series completed  w booster  History of hip replacement, total  R hip 2008 & L hip  Status post cataract extraction  right eye cataract extraction with IOL 6/26/2015  Presence of cardiac pacemaker  10/2021  H/O coronary angioplasty  8/2021 1 stent inserted    FAMILY HISTORY:  Family history of lung cancer (Mother)  Family history of esophageal cancer (Father)  FH: myocardial infarction (Grandparent)    PAST SOCIAL HISTORY:  ALLERGIES & MEDICATIONS  --------------------------------------------------------------------------------  Allergies  No Known Allergies  Intolerances    Standing Inpatient Medications  carbidopa/levodopa  25/100 1.5 Tablet(s) Oral four times a day  chlorhexidine 2% Cloths 1 Application(s) Topical daily  midodrine. 5 milliGRAM(s) Oral three times a day  norepinephrine Infusion 0.05 MICROgram(s)/kG/Min IV Continuous <Continuous>  piperacillin/tazobactam IVPB. 3.375 Gram(s) IV Intermittent once  piperacillin/tazobactam IVPB.- 3.375 Gram(s) IV Intermittent once  PRN Inpatient Medications  acetaminophen     Tablet .. 650 milliGRAM(s) Oral every 6 hours PRN  aluminum hydroxide/magnesium hydroxide/simethicone Suspension 30 milliLiter(s) Oral every 4 hours PRN  melatonin 3 milliGRAM(s) Oral at bedtime PRN  ondansetron Injectable 4 milliGRAM(s) IV Push every 8 hours PRN  sodium chloride 0.9% Bolus. 100 milliLiter(s) IV Bolus every 5 minutes PRN    REVIEW OF SYSTEMS  --------------------------------------------------------------------------------  ROS limited due to present mental status, see HPI    VITALS/PHYSICAL EXAM  --------------------------------------------------------------------------------  T(C): 36.5 (08-09-23 @ 12:21), Max: 36.9 (08-09-23 @ 11:30)  HR: 60 (08-09-23 @ 14:00) (52 - 72)  BP: 102/47 (08-09-23 @ 14:00) (78/46 - 107/47)  RR: 12 (08-09-23 @ 14:00) (11 - 24)  SpO2: 100% (08-09-23 @ 14:00) (97% - 100%)  Wt(kg): --  Height (cm): 172.7 (08-09-23 @ 12:21)  Weight (kg): 87.8 (08-09-23 @ 12:21)  BMI (kg/m2): 29.4 (08-09-23 @ 12:21)  BSA (m2): 2.02 (08-09-23 @ 12:21)      08-09-23 @ 07:01  -  08-09-23 @ 14:47  --------------------------------------------------------  IN: 50 mL / OUT: 0 mL / NET: 50 mL      Physical Exam:  Gen: respiratory distress on BIPAP  HEENT: on BIPAP  Pulm: right sided pleurex catheter with dressing CDI  CV: S1S2  Abd: Soft, +BS   Ext: +LE edema B/L  Neuro: lethargic   Vascular access: peripheral IV    LABS/STUDIES  --------------------------------------------------------------------------------              8.1    12.24 >-----------<  195      [08-09-23 @ 07:00]              26.3     134  |  104  |  87  ----------------------------<  144      [08-09-23 @ 11:19]  6.5   |  16  |  4.87        Ca     9.7     [08-09-23 @ 11:19]      Mg     3.10     [08-09-23 @ 11:19]      Phos  5.0     [08-09-23 @ 11:19]    TPro  6.6  /  Alb  3.9  /  TBili  <0.2  /  DBili  x   /  AST  <5  /  ALT  6   /  AlkPhos  61  [08-09-23 @ 07:00]    PT/INR: PT 17.0 , INR 1.52       [08-09-23 @ 11:19]  PTT: 35.0       [08-09-23 @ 11:19]      Creatinine Trend:  SCr 4.87 [08-09 @ 11:19]  SCr 4.79 [08-09 @ 07:00]  SCr 4.50 [08-09 @ 05:15]  SCr 4.54 [08-08 @ 23:40]    Urinalysis - [08-09-23 @ 14:24]      Color Yellow / Appearance Turbid / SG 1.022 / pH 5.0      Gluc Negative / Ketone Trace  / Bili Negative / Urobili 0.2       Blood Large / Protein 300 / Leuk Est Small / Nitrite Negative       / WBC 19 / Hyaline  / Gran  / Sq Epi  / Non Sq Epi 1 / Bacteria Negative      Iron 19, TIBC 224, %sat 8      [01-25-23 @ 07:40]  Ferritin 91      [02-04-23 @ 05:10]  TSH 2.12      [10-22-22 @ 06:51]  Lipid: chol 93, TG 45, HDL 46, LDL --      [02-03-23 @ 05:23]      Syphilis Screen (Treponema Pallidum Ab) Negative      [12-31-21 @ 09:38]

## 2023-08-09 NOTE — CONSULT NOTE ADULT - PROBLEM SELECTOR RECOMMENDATION 9
Please transfer pt to MICU for urgent HD. Pt. w/ ALAYNA likely hemodynamic mediated and medication SE (TMP/SMX and Entresto). On review of sunrise/HIE patient likely has CKD stage 3A with baseline Scr 1.3-1.6. Last outpt labs reviewed on HIE showing Scr 1.64 on 4/20/23. Renal US 1/25/23 negative. On admission Scr 4.54, K 6.4, and SCO2 14. Pt. at present hemodynamically unstable. s/p multiple attempts to medically manage hyperkalemia. Recommend transfer to MICU for urgent HD iso hemodynamic instability. Dialysis alert called. MICU and Dialysis unit aware. HD consent signed by patients wife and placed in chart. Obtain kidney and bladder US, ECHO. Monitor labs, VS and urine output. Avoid nephrotoxins. Dose medications as per eGFR. Pt. w/ ALAYNA likely hemodynamic mediated and medication SE (TMP/SMX and Entresto). On review of sunrise/HIE patient likely has CKD stage 3A with baseline Scr 1.3-1.6. Last outpt labs reviewed on HIE showing Scr 1.64 on 4/20/23. Renal US 1/25/23 negative. On admission Scr 4.54, K 6.4, and SCO2 14. Pt. at present hemodynamically unstable. s/p multiple attempts to medically manage hyperkalemia. Recommend transfer to MICU for urgent HD iso hemodynamic instability. Dialysis alert called. MICU and Dialysis unit aware. HD consent signed by patients wife and placed in chart. Obtain kidney and bladder US, ECHO. Hold Entresto and TMP/SMX. Monitor labs, VS and urine output. Avoid nephrotoxins. Dose medications as per eGFR.

## 2023-08-09 NOTE — H&P ADULT - HISTORY OF PRESENT ILLNESS
83 yo M with HTN, A fib on Eliquis, bradycardia s/p ppm, CHF, pleural effusions s/p R pleurX, Parkinson's presenting with shortness of breath x 1 day.  Pt poor historian due to dementia. Wife at bedside providing history. Overnight pt had been complaining of shortness of breath, coughing. Denies any chest pain, palpitations, no fevers or chills. No recent illness, No GI or urinary symptoms.    In ED pt was given Nabicarb, Ca gluconate and insulin, 1L NS  VS:  90/51  61  97.8  19  100% on BIPAP

## 2023-08-09 NOTE — CONSULT NOTE ADULT - PROBLEM SELECTOR RECOMMENDATION 2
Hyperkalemia in the setting of ALAYNA 2/2 medication SE (TMP/SMX and Entresto). On admission K 6.4, s/p multiple attempts to medically manage hyperkalemia. Recommend urgent HD as above. Monitor labs

## 2023-08-09 NOTE — DISCHARGE NOTE NURSING/CASE MANAGEMENT/SOCIAL WORK - NSDCPEFALRISK_GEN_ALL_CORE
For information on Fall & Injury Prevention, visit: [email or web address] OR  [email or web address] OR  https://www.cdc.gov/steadi/patient.html

## 2023-08-09 NOTE — H&P ADULT - ASSESSMENT
81 yo M with HTN, A fib on Eliquis, bradycardia s/p ppm, CHF, pleural effusions s/p R pleurX, Parkinson's presenting with shortness of breath. Found to have ALAYNA complicated by hyperkalemia, acidosis

## 2023-08-09 NOTE — CHART NOTE - NSCHARTNOTEFT_GEN_A_CORE
MICU ACCEPT NOTE    CHIEF COMPLAINT: Patient is a 82y old  Male who presents with a chief complaint of ALAYNA, Hyperkalemia (09 Aug 2023 10:31)      HPI:  83 yo M with HTN, A fib on Eliquis, CKD (baseline 1.2-1.4), bradycardia s/p ppm, CHFpEF, pleural effusions s/p R pleurX, Parkinson's presenting with shortness of breath x 1 day.  Pt poor historian due to dementia. Wife at bedside providing history. Overnight pt had been complaining of shortness of breath, coughing. Denies any chest pain, palpitations, no fevers or chills. No recent illness, No GI or urinary symptoms.    In ED pt was given Nabicarb, Ca gluconate and insulin, 1L NS  VS:  90/51  61  97.8  19  100% on BIPAP (09 Aug 2023 09:40)    MICU consulted for new BiPAP requirement and hypotension, with need for HD. At bedside, patient on BiPAP 12/5 50% and responding to commands. Answers some questions, but dementia at baseline. Reports last dose of eliquis at 6pm last night per wife.       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      Parkinsons disease      CAD (coronary artery disease)  s/p PCI 08/21      Pleural effusion, not elsewhere classified      COVID-19 vaccine series completed  w booster      History of hip replacement, total  R hip 2008 & L hip      Status post cataract extraction  right eye cataract extraction with IOL 6/26/2015      Presence of cardiac pacemaker  10/2021      H/O coronary angioplasty  8/2021 1 stent inserted          FAMILY HISTORY:  Family history of lung cancer (Mother)    Family history of esophageal cancer (Father)    FH: myocardial infarction (Grandparent)        SOCIAL HISTORY:  Smoking:   Substance Use:   EtOH Use:   Advance Directives:     MEDICATIONS  (STANDING):  midodrine. 5 milliGRAM(s) Oral three times a day    MEDICATIONS  (PRN):  acetaminophen     Tablet .. 650 milliGRAM(s) Oral every 6 hours PRN Temp greater or equal to 38C (100.4F), Mild Pain (1 - 3)  aluminum hydroxide/magnesium hydroxide/simethicone Suspension 30 milliLiter(s) Oral every 4 hours PRN Dyspepsia  melatonin 3 milliGRAM(s) Oral at bedtime PRN Insomnia  ondansetron Injectable 4 milliGRAM(s) IV Push every 8 hours PRN Nausea and/or Vomiting  sodium chloride 0.9% Bolus. 100 milliLiter(s) IV Bolus every 5 minutes PRN SBP LESS THAN or EQUAL to 90 mmHg      Allergies    No Known Allergies    Intolerances        REVIEW OF SYSTEMS:  Could not be obtained     OBJECTIVE:  ICU Vital Signs Last 24 Hrs  T(C): 36.6 (09 Aug 2023 02:30), Max: 36.6 (09 Aug 2023 02:30)  T(F): 97.8 (09 Aug 2023 02:30), Max: 97.8 (09 Aug 2023 02:30)  HR: 60 (09 Aug 2023 10:00) (52 - 72)  BP: 78/46 (09 Aug 2023 10:00) (78/46 - 107/47)  BP(mean): 62 (08 Aug 2023 23:39) (62 - 62)  ABP: --  ABP(mean): --  RR: 16 (09 Aug 2023 10:00) (13 - 24)  SpO2: 100% (09 Aug 2023 10:00) (97% - 100%)    O2 Parameters below as of 09 Aug 2023 10:00  Patient On (Oxygen Delivery Method): BiPAP/CPAP    O2 Concentration (%): 50          CAPILLARY BLOOD GLUCOSE      POCT Blood Glucose.: 146 mg/dL (09 Aug 2023 07:33)      PHYSICAL EXAM:  GENERAL: NAD, lying in bed, on BiPAP   EYES: EOMI, PERRLA  ENT: Moist mucous membranes  HEART: S1, S2, regular rate and rhythm, no murmurs, rubs, or gallops  LUNGS: On BiPAP, decreased breath sounds in the left base, no crackles or wheezing appreciated   ABDOMEN: Soft, nontender  EXTREMITIES: 2+ peripheral pulses bilaterally. 2+ pitting edema to the knee     LABS:                        8.1    12.24 )-----------( 195      ( 09 Aug 2023 07:00 )             26.3     Hgb Trend: 8.1<--, 8.6<--  08-09    132<L>  |  102  |  86<H>  ----------------------------<  126<H>  6.5<HH>   |  13<L>  |  4.79<H>    Ca    9.7      09 Aug 2023 07:00  Phos  5.8     08-09  Mg     3.30     08-09    TPro  6.6  /  Alb  3.9  /  TBili  <0.2  /  DBili  x   /  AST  <5  /  ALT  6   /  AlkPhos  61  08-09    Creatinine Trend: 4.79<--, 4.50<--, 4.54<--    Urinalysis Basic - ( 09 Aug 2023 07:00 )    Color: x / Appearance: x / SG: x / pH: x  Gluc: 126 mg/dL / Ketone: x  / Bili: x / Urobili: x   Blood: x / Protein: x / Nitrite: x   Leuk Esterase: x / RBC: x / WBC x   Sq Epi: x / Non Sq Epi: x / Bacteria: x        Venous Blood Gas:  08-09 @ 07:00  7.19/35/23/13/32.2  VBG Lactate: 2.0  Venous Blood Gas:  08-09 @ 05:15  7.21/38/32/15/45.3  VBG Lactate: 1.7  Venous Blood Gas:  08-09 @ 02:40  7.13/43/36/14/54.0  VBG Lactate: 1.7  Venous Blood Gas:  08-08 @ 23:40  7.13/46/28/15/32.7  VBG Lactate: 2.1      MICROBIOLOGY:     RADIOLOGY & ADDITIONAL TESTS:    ====================ASSESSMENT======================  83 yo M with HTN, A fib on Eliquis, CKD (baseline 1.2-1.4), bradycardia s/p ppm, CHFpEF, pleural effusions s/p R pleurX, Parkinson's presenting with shortness of breath found to have ALAYNA on CKD c/b fluid overload and hyperkalemia. Admitted to MICU for urgent HD i/s/o hypotension.     Plan:  ====================NEUROLOGY=======================  #Dementia  At baseline mental status...     #Parkinsons disease   - c/w carbidopa-levidopa     ====================RESPIRATORY======================  #Pleural effusion     ====================CARDIOVASCULAR==================  #Bradycardia s/p PPM   #CHFpEF   TTE 1/2023 EF 73%, diastolic dysfunction and mod pHTN         ====================/RENAL========================  #ALAYNA on CKD  Baseline Cr 1.2-1.4     ====================GI/NUTRITION=====================    ====================SKIN=============================    ====================INFECTIOUS DISEASE================    ====================ENDOCRINE=======================    ====================HEMATOLOGIC/DVT PPx=============    ====================ETHICS=========================== MICU ACCEPT NOTE    CHIEF COMPLAINT: Patient is a 82y old  Male who presents with a chief complaint of ALAYNA, Hyperkalemia (09 Aug 2023 10:31)      HPI:  83 yo M with HTN, A fib on Eliquis, CKD (baseline 1.2-1.4), bradycardia s/p ppm, CHFpEF, pleural effusions s/p R pleurX, Parkinson's presenting with shortness of breath x 1 day.  Pt poor historian due to dementia. Wife at bedside providing history. Overnight pt had been complaining of shortness of breath, coughing. Denies any chest pain, palpitations, no fevers or chills. No recent illness, No GI or urinary symptoms.    In ED pt was given Nabicarb, Ca gluconate and insulin, 1L NS  VS:  90/51  61  97.8  19  100% on BIPAP (09 Aug 2023 09:40)    MICU consulted for new BiPAP requirement and hypotension, with need for HD. At bedside, patient on BiPAP 12/5 50% and responding to commands. Answers some questions, but dementia at baseline. Reports last dose of eliquis at 6pm last night per wife.       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      Parkinsons disease      CAD (coronary artery disease)  s/p PCI 08/21      Pleural effusion, not elsewhere classified      COVID-19 vaccine series completed  w booster      History of hip replacement, total  R hip 2008 & L hip      Status post cataract extraction  right eye cataract extraction with IOL 6/26/2015      Presence of cardiac pacemaker  10/2021      H/O coronary angioplasty  8/2021 1 stent inserted          FAMILY HISTORY:  Family history of lung cancer (Mother)    Family history of esophageal cancer (Father)    FH: myocardial infarction (Grandparent)        SOCIAL HISTORY:  Smoking:   Substance Use:   EtOH Use:   Advance Directives:     MEDICATIONS  (STANDING):  midodrine. 5 milliGRAM(s) Oral three times a day    MEDICATIONS  (PRN):  acetaminophen     Tablet .. 650 milliGRAM(s) Oral every 6 hours PRN Temp greater or equal to 38C (100.4F), Mild Pain (1 - 3)  aluminum hydroxide/magnesium hydroxide/simethicone Suspension 30 milliLiter(s) Oral every 4 hours PRN Dyspepsia  melatonin 3 milliGRAM(s) Oral at bedtime PRN Insomnia  ondansetron Injectable 4 milliGRAM(s) IV Push every 8 hours PRN Nausea and/or Vomiting  sodium chloride 0.9% Bolus. 100 milliLiter(s) IV Bolus every 5 minutes PRN SBP LESS THAN or EQUAL to 90 mmHg      Allergies    No Known Allergies    Intolerances        REVIEW OF SYSTEMS:  Could not be obtained     OBJECTIVE:  ICU Vital Signs Last 24 Hrs  T(C): 36.6 (09 Aug 2023 02:30), Max: 36.6 (09 Aug 2023 02:30)  T(F): 97.8 (09 Aug 2023 02:30), Max: 97.8 (09 Aug 2023 02:30)  HR: 60 (09 Aug 2023 10:00) (52 - 72)  BP: 78/46 (09 Aug 2023 10:00) (78/46 - 107/47)  BP(mean): 62 (08 Aug 2023 23:39) (62 - 62)  ABP: --  ABP(mean): --  RR: 16 (09 Aug 2023 10:00) (13 - 24)  SpO2: 100% (09 Aug 2023 10:00) (97% - 100%)    O2 Parameters below as of 09 Aug 2023 10:00  Patient On (Oxygen Delivery Method): BiPAP/CPAP    O2 Concentration (%): 50          CAPILLARY BLOOD GLUCOSE      POCT Blood Glucose.: 146 mg/dL (09 Aug 2023 07:33)      PHYSICAL EXAM:  GENERAL: NAD, lying in bed, on BiPAP   EYES: EOMI, PERRLA  ENT: Moist mucous membranes  HEART: S1, S2, regular rate and rhythm, no murmurs, rubs, or gallops  LUNGS: On BiPAP, decreased breath sounds in the left base, no crackles or wheezing appreciated   ABDOMEN: Soft, nontender  EXTREMITIES: 2+ peripheral pulses bilaterally. 2+ pitting edema to the knee     LABS:                        8.1    12.24 )-----------( 195      ( 09 Aug 2023 07:00 )             26.3     Hgb Trend: 8.1<--, 8.6<--  08-09    132<L>  |  102  |  86<H>  ----------------------------<  126<H>  6.5<HH>   |  13<L>  |  4.79<H>    Ca    9.7      09 Aug 2023 07:00  Phos  5.8     08-09  Mg     3.30     08-09    TPro  6.6  /  Alb  3.9  /  TBili  <0.2  /  DBili  x   /  AST  <5  /  ALT  6   /  AlkPhos  61  08-09    Creatinine Trend: 4.79<--, 4.50<--, 4.54<--    Urinalysis Basic - ( 09 Aug 2023 07:00 )    Color: x / Appearance: x / SG: x / pH: x  Gluc: 126 mg/dL / Ketone: x  / Bili: x / Urobili: x   Blood: x / Protein: x / Nitrite: x   Leuk Esterase: x / RBC: x / WBC x   Sq Epi: x / Non Sq Epi: x / Bacteria: x        Venous Blood Gas:  08-09 @ 07:00  7.19/35/23/13/32.2  VBG Lactate: 2.0  Venous Blood Gas:  08-09 @ 05:15  7.21/38/32/15/45.3  VBG Lactate: 1.7  Venous Blood Gas:  08-09 @ 02:40  7.13/43/36/14/54.0  VBG Lactate: 1.7  Venous Blood Gas:  08-08 @ 23:40  7.13/46/28/15/32.7  VBG Lactate: 2.1      MICROBIOLOGY:     RADIOLOGY & ADDITIONAL TESTS:    ====================ASSESSMENT======================  83 yo M with HTN, A fib on Eliquis, CKD (baseline 1.2-1.4), bradycardia s/p ppm, CHFpEF, pleural effusions s/p R pleurX, Parkinson's presenting with shortness of breath found to have ALAYNA on CKD c/b fluid overload and hyperkalemia. Admitted to MICU for urgent HD i/s/o hypotension.     Plan:  ====================NEUROLOGY=======================  #Dementia  AOx1 at baseline  Following commands and responding to questions, at baseline MS     #Parkinsons disease   - c/w carbidopa-levidopa     ====================RESPIRATORY======================  #Pleural effusion   PleurX catheter placed ...  CT chest showed new 2.3 cm right middle lobe nodule, possible malignancy? and moderate left pleural effusion with complete left lowerlobe compressive   atelectasis Pleurx catheter present.      ====================CARDIOVASCULAR==================  #Bradycardia s/p PPM   #CHFpEF   TTE 1/2023 EF 73%, diastolic dysfunction and mod pHTN   EKG showing paced ventricular rhythm, no T wave abnormalities, given Ca gluconate in ED       ====================/RENAL========================  #ALAYNA on CKD  Baseline Cr 1.2-1.4     ====================GI/NUTRITION=====================    ====================SKIN=============================    ====================INFECTIOUS DISEASE================    ====================ENDOCRINE=======================    ====================HEMATOLOGIC/DVT PPx=============    ====================ETHICS=========================== MICU ACCEPT NOTE    CHIEF COMPLAINT: Patient is a 82y old  Male who presents with a chief complaint of ALAYNA, Hyperkalemia (09 Aug 2023 10:31)      HPI:  81 yo M with HTN, A fib on Eliquis, CKD (baseline 1.2-1.4), bradycardia s/p ppm, CHFpEF, pleural effusions s/p R pleurX, Parkinson's presenting with shortness of breath x 1 day.  Pt poor historian due to dementia. Wife at bedside providing history. Overnight pt had been complaining of shortness of breath, coughing. Denies any chest pain, palpitations, no fevers or chills. No recent illness, No GI or urinary symptoms.    In ED pt was given Nabicarb, Ca gluconate and insulin, 1L NS  VS:  90/51  61  97.8  19  100% on BIPAP (09 Aug 2023 09:40)    MICU consulted for new BiPAP requirement and hypotension, with need for HD. At bedside, patient on BiPAP 12/5 50% and responding to commands. Answers some questions, but dementia at baseline. Reports last dose of eliquis at 6pm last night per wife.       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      Parkinsons disease      CAD (coronary artery disease)  s/p PCI 08/21      Pleural effusion, not elsewhere classified      COVID-19 vaccine series completed  w booster      History of hip replacement, total  R hip 2008 & L hip      Status post cataract extraction  right eye cataract extraction with IOL 6/26/2015      Presence of cardiac pacemaker  10/2021      H/O coronary angioplasty  8/2021 1 stent inserted          FAMILY HISTORY:  Family history of lung cancer (Mother)    Family history of esophageal cancer (Father)    FH: myocardial infarction (Grandparent)        SOCIAL HISTORY:  Smoking:   Substance Use:   EtOH Use:   Advance Directives:     MEDICATIONS  (STANDING):  midodrine. 5 milliGRAM(s) Oral three times a day    MEDICATIONS  (PRN):  acetaminophen     Tablet .. 650 milliGRAM(s) Oral every 6 hours PRN Temp greater or equal to 38C (100.4F), Mild Pain (1 - 3)  aluminum hydroxide/magnesium hydroxide/simethicone Suspension 30 milliLiter(s) Oral every 4 hours PRN Dyspepsia  melatonin 3 milliGRAM(s) Oral at bedtime PRN Insomnia  ondansetron Injectable 4 milliGRAM(s) IV Push every 8 hours PRN Nausea and/or Vomiting  sodium chloride 0.9% Bolus. 100 milliLiter(s) IV Bolus every 5 minutes PRN SBP LESS THAN or EQUAL to 90 mmHg      Allergies    No Known Allergies    Intolerances        REVIEW OF SYSTEMS:  Could not be obtained     OBJECTIVE:  ICU Vital Signs Last 24 Hrs  T(C): 36.6 (09 Aug 2023 02:30), Max: 36.6 (09 Aug 2023 02:30)  T(F): 97.8 (09 Aug 2023 02:30), Max: 97.8 (09 Aug 2023 02:30)  HR: 60 (09 Aug 2023 10:00) (52 - 72)  BP: 78/46 (09 Aug 2023 10:00) (78/46 - 107/47)  BP(mean): 62 (08 Aug 2023 23:39) (62 - 62)  ABP: --  ABP(mean): --  RR: 16 (09 Aug 2023 10:00) (13 - 24)  SpO2: 100% (09 Aug 2023 10:00) (97% - 100%)    O2 Parameters below as of 09 Aug 2023 10:00  Patient On (Oxygen Delivery Method): BiPAP/CPAP    O2 Concentration (%): 50          CAPILLARY BLOOD GLUCOSE      POCT Blood Glucose.: 146 mg/dL (09 Aug 2023 07:33)      PHYSICAL EXAM:  GENERAL: NAD, lying in bed, on BiPAP   EYES: EOMI, PERRLA  ENT: Moist mucous membranes  HEART: S1, S2, regular rate and rhythm, no murmurs, rubs, or gallops  LUNGS: On BiPAP, decreased breath sounds in the left base, no crackles or wheezing appreciated   ABDOMEN: Soft, nontender  EXTREMITIES: 2+ peripheral pulses bilaterally. 2+ pitting edema to the knee     LABS:                        8.1    12.24 )-----------( 195      ( 09 Aug 2023 07:00 )             26.3     Hgb Trend: 8.1<--, 8.6<--  08-09    132<L>  |  102  |  86<H>  ----------------------------<  126<H>  6.5<HH>   |  13<L>  |  4.79<H>    Ca    9.7      09 Aug 2023 07:00  Phos  5.8     08-09  Mg     3.30     08-09    TPro  6.6  /  Alb  3.9  /  TBili  <0.2  /  DBili  x   /  AST  <5  /  ALT  6   /  AlkPhos  61  08-09    Creatinine Trend: 4.79<--, 4.50<--, 4.54<--    Urinalysis Basic - ( 09 Aug 2023 07:00 )    Color: x / Appearance: x / SG: x / pH: x  Gluc: 126 mg/dL / Ketone: x  / Bili: x / Urobili: x   Blood: x / Protein: x / Nitrite: x   Leuk Esterase: x / RBC: x / WBC x   Sq Epi: x / Non Sq Epi: x / Bacteria: x        Venous Blood Gas:  08-09 @ 07:00  7.19/35/23/13/32.2  VBG Lactate: 2.0  Venous Blood Gas:  08-09 @ 05:15  7.21/38/32/15/45.3  VBG Lactate: 1.7  Venous Blood Gas:  08-09 @ 02:40  7.13/43/36/14/54.0  VBG Lactate: 1.7  Venous Blood Gas:  08-08 @ 23:40  7.13/46/28/15/32.7  VBG Lactate: 2.1      MICROBIOLOGY:     RADIOLOGY & ADDITIONAL TESTS:    ====================ASSESSMENT======================  81 yo M with HTN, A fib on Eliquis, CKD (baseline 1.2-1.4), bradycardia s/p ppm, CHFpEF, pleural effusions s/p R VATS and pleurX 3/2022, Parkinson's presenting with shortness of breath found to have ALAYNA on CKD c/b fluid overload and hyperkalemia. Admitted to MICU for urgent HD i/s/o hypotension.     Plan:  ====================NEUROLOGY=======================  #Dementia  AOx1 at baseline  Following commands and responding to questions, at baseline MS     #Parkinsons disease   - c/w carbidopa-levidopa     ====================RESPIRATORY======================  #Pleural effusions    Likely in the setting of CHF vs renal failure vs less likely malignancy (negative cytology outpt)  PleurX catheter placed 3/2022, drainage 3x a week   CT chest showed new 2.3 cm right middle lobe nodule, possible malignancy? and NEW moderate left pleural effusion with complete left lowerlobe compressive atelectasis  Respiratory acidosis on admission (VBG 46)   - c/w BiPAP 12/5  - c/w Pleurx drainage   - urgent HD for fluid overload   ====================CARDIOVASCULAR==================  #Bradycardia s/p PPM   #CHFpEF   #Afib   SPECT scan with amyloidosis outpatient, may be contributing to CHFpEF   TTE 1/2023 EF 73%, diastolic dysfunction and mod pHTN   EKG showing paced ventricular rhythm, no T wave abnormalities, given Ca gluconate x2 in ED   - repeat TTE     #Hypotension   Likely cardiogenic 2/2 fluid overload   - HD     ====================/RENAL========================  #ALAYNA on CKD  #metabolic acidosis with AG, concomittant resp acidosis   I/S/o cardiorenal vs pre-renal vs obstructive   Baseline Cr 1.2-1.4   S/p NaHCO3, Ca gluconate and insulin  Nephrology consulted, plans for urgent HD pending shiley placement   - f/u ultrasound kidney/bladder for obstruction   - f/u urine lytes   - strict I/Os     ====================GI/NUTRITION=====================  Diet: NPO while on BiPAP     ====================INFECTIOUS DISEASE================  No fever or leukocytosis  Previous pleural fluid culture negative     ====================ENDOCRINE=======================  BG levels well controlled  - maintain -200     ====================HEMATOLOGIC/DVT PPx=============  On home eliquis 5 BID, last taken 8/8 at 6pm - holding   Start DVT ppx post procedure  - given PCC for eliquis reversal pending shiley     ====================ETHICS===========================  GOC: patient with prior MOLST DNR/DNI. Upon discussion with wife and daughter, decision was made to be FULL CODE for shiley placement. Pending further discussions MICU ACCEPT NOTE    CHIEF COMPLAINT: Patient is a 82y old  Male who presents with a chief complaint of ALAYNA, Hyperkalemia (09 Aug 2023 10:31)      HPI:  81 yo M with HTN, A fib on Eliquis, CKD (baseline 1.2-1.4), bradycardia s/p ppm, CHFpEF, pleural effusions s/p R pleurX, Parkinson's presenting with shortness of breath x 1 day.  Pt poor historian due to dementia. Wife at bedside providing history. Overnight pt had been complaining of shortness of breath, coughing. Denies any chest pain, palpitations, no fevers or chills. No recent illness, No GI or urinary symptoms.    In ED pt was given Nabicarb, Ca gluconate and insulin, 1L NS  VS:  90/51  61  97.8  19  100% on BIPAP (09 Aug 2023 09:40)    MICU consulted for new BiPAP requirement and hypotension, with need for HD. At bedside, patient on BiPAP 12/5 50% and responding to commands. Answers some questions, but dementia at baseline. Reports last dose of eliquis at 6pm last night per wife.       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      Parkinsons disease      CAD (coronary artery disease)  s/p PCI 08/21      Pleural effusion, not elsewhere classified      COVID-19 vaccine series completed  w booster      History of hip replacement, total  R hip 2008 & L hip      Status post cataract extraction  right eye cataract extraction with IOL 6/26/2015      Presence of cardiac pacemaker  10/2021      H/O coronary angioplasty  8/2021 1 stent inserted          FAMILY HISTORY:  Family history of lung cancer (Mother)    Family history of esophageal cancer (Father)    FH: myocardial infarction (Grandparent)        SOCIAL HISTORY:  Smoking:   Substance Use:   EtOH Use:   Advance Directives:     MEDICATIONS  (STANDING):  midodrine. 5 milliGRAM(s) Oral three times a day    MEDICATIONS  (PRN):  acetaminophen     Tablet .. 650 milliGRAM(s) Oral every 6 hours PRN Temp greater or equal to 38C (100.4F), Mild Pain (1 - 3)  aluminum hydroxide/magnesium hydroxide/simethicone Suspension 30 milliLiter(s) Oral every 4 hours PRN Dyspepsia  melatonin 3 milliGRAM(s) Oral at bedtime PRN Insomnia  ondansetron Injectable 4 milliGRAM(s) IV Push every 8 hours PRN Nausea and/or Vomiting  sodium chloride 0.9% Bolus. 100 milliLiter(s) IV Bolus every 5 minutes PRN SBP LESS THAN or EQUAL to 90 mmHg      Allergies    No Known Allergies    Intolerances        REVIEW OF SYSTEMS:  Could not be obtained     OBJECTIVE:  ICU Vital Signs Last 24 Hrs  T(C): 36.6 (09 Aug 2023 02:30), Max: 36.6 (09 Aug 2023 02:30)  T(F): 97.8 (09 Aug 2023 02:30), Max: 97.8 (09 Aug 2023 02:30)  HR: 60 (09 Aug 2023 10:00) (52 - 72)  BP: 78/46 (09 Aug 2023 10:00) (78/46 - 107/47)  BP(mean): 62 (08 Aug 2023 23:39) (62 - 62)  ABP: --  ABP(mean): --  RR: 16 (09 Aug 2023 10:00) (13 - 24)  SpO2: 100% (09 Aug 2023 10:00) (97% - 100%)    O2 Parameters below as of 09 Aug 2023 10:00  Patient On (Oxygen Delivery Method): BiPAP/CPAP    O2 Concentration (%): 50          CAPILLARY BLOOD GLUCOSE      POCT Blood Glucose.: 146 mg/dL (09 Aug 2023 07:33)      PHYSICAL EXAM:  GENERAL: NAD, lying in bed, on BiPAP   EYES: EOMI, PERRLA  ENT: Moist mucous membranes  HEART: S1, S2, regular rate and rhythm, no murmurs, rubs, or gallops  LUNGS: On BiPAP, decreased breath sounds in the left base, no crackles or wheezing appreciated   ABDOMEN: Soft, nontender  EXTREMITIES: 2+ peripheral pulses bilaterally. 2+ pitting edema to the knee     LABS:                        8.1    12.24 )-----------( 195      ( 09 Aug 2023 07:00 )             26.3     Hgb Trend: 8.1<--, 8.6<--  08-09    132<L>  |  102  |  86<H>  ----------------------------<  126<H>  6.5<HH>   |  13<L>  |  4.79<H>    Ca    9.7      09 Aug 2023 07:00  Phos  5.8     08-09  Mg     3.30     08-09    TPro  6.6  /  Alb  3.9  /  TBili  <0.2  /  DBili  x   /  AST  <5  /  ALT  6   /  AlkPhos  61  08-09    Creatinine Trend: 4.79<--, 4.50<--, 4.54<--    Urinalysis Basic - ( 09 Aug 2023 07:00 )    Color: x / Appearance: x / SG: x / pH: x  Gluc: 126 mg/dL / Ketone: x  / Bili: x / Urobili: x   Blood: x / Protein: x / Nitrite: x   Leuk Esterase: x / RBC: x / WBC x   Sq Epi: x / Non Sq Epi: x / Bacteria: x        Venous Blood Gas:  08-09 @ 07:00  7.19/35/23/13/32.2  VBG Lactate: 2.0  Venous Blood Gas:  08-09 @ 05:15  7.21/38/32/15/45.3  VBG Lactate: 1.7  Venous Blood Gas:  08-09 @ 02:40  7.13/43/36/14/54.0  VBG Lactate: 1.7  Venous Blood Gas:  08-08 @ 23:40  7.13/46/28/15/32.7  VBG Lactate: 2.1      MICROBIOLOGY:     RADIOLOGY & ADDITIONAL TESTS:    ====================ASSESSMENT======================  81 yo M with HTN, A fib on Eliquis, CKD (baseline 1.2-1.4), bradycardia s/p ppm, CHFpEF, pleural effusions s/p R VATS and pleurX 3/2022, Parkinson's presenting with shortness of breath found to have ALAYNA on CKD c/b fluid overload and hyperkalemia. Admitted to MICU for urgent HD i/s/o hypotension.     Plan:  ====================NEUROLOGY=======================  #Dementia  AOx1 at baseline  Following commands and responding to questions, at baseline MS     #Parkinsons disease   - c/w carbidopa-levidopa     ====================RESPIRATORY======================  #Pleural effusions    Likely in the setting of CHF vs renal failure vs less likely malignancy (negative cytology outpt)  PleurX catheter placed 3/2022, drainage 3x a week   CT chest showed new 2.3 cm right middle lobe nodule, possible malignancy? and NEW moderate left pleural effusion with complete left lowerlobe compressive atelectasis  Respiratory acidosis on admission (VBG 46)   - c/w BiPAP 12/5  - c/w Pleurx drainage   - urgent HD for fluid overload   ====================CARDIOVASCULAR==================  #Bradycardia s/p PPM   #CHFpEF   #Afib   SPECT scan with amyloidosis outpatient, may be contributing to CHFpEF   TTE 1/2023 EF 73%, diastolic dysfunction and mod pHTN   EKG showing paced ventricular rhythm, no T wave abnormalities, given Ca gluconate x2 in ED   - repeat TTE     #Hypotension   Possibly cardiogenic 2/2 fluid overload   - HD     ====================/RENAL========================  #ALAYNA on CKD  #metabolic acidosis with AG, concomittant resp acidosis   I/S/o cardiorenal vs pre-renal vs obstructive   Baseline Cr 1.2-1.4   S/p NaHCO3, Ca gluconate and insulin  Nephrology consulted, plans for urgent HD pending shiley placement   - f/u ultrasound kidney/bladder for obstruction   - f/u urine lytes   - strict I/Os     ====================GI/NUTRITION=====================  Diet: NPO while on BiPAP     ====================INFECTIOUS DISEASE================  No fever, increase leukocytosis may be   Previous pleural fluid culture negative     ====================ENDOCRINE=======================  BG levels well controlled  - maintain -200     ====================HEMATOLOGIC/DVT PPx=============  On home eliquis 5 BID, last taken 8/8 at 6pm - holding   Start DVT ppx post procedure  - given PCC for eliquis reversal pending shiley     ====================ETHICS===========================  GOC: patient with prior MOLST DNR/DNI. Upon discussion with wife and daughter, decision was made to be FULL CODE for shiley placement. Pending further discussions MICU ACCEPT NOTE    CHIEF COMPLAINT: Patient is a 82y old  Male who presents with a chief complaint of ALAYNA, Hyperkalemia (09 Aug 2023 10:31)      HPI:  81 yo M with HTN, A fib on Eliquis, CKD (baseline 1.2-1.4), bradycardia s/p ppm, CHFpEF, pleural effusions s/p R pleurX, Parkinson's presenting with shortness of breath x 1 day.  Pt poor historian due to dementia. Wife at bedside providing history. Overnight pt had been complaining of shortness of breath, coughing. Denies any chest pain, palpitations, no fevers or chills. No recent illness, No GI or urinary symptoms.    In ED pt was given Nabicarb, Ca gluconate and insulin, 1L NS  VS:  90/51  61  97.8  19  100% on BIPAP (09 Aug 2023 09:40)    MICU consulted for new BiPAP requirement and hypotension, with need for HD. At bedside, patient on BiPAP 12/5 50% and responding to commands. Answers some questions, but dementia at baseline. Reports last dose of eliquis at 6pm last night per wife.       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      Parkinsons disease      CAD (coronary artery disease)  s/p PCI 08/21      Pleural effusion, not elsewhere classified      COVID-19 vaccine series completed  w booster      History of hip replacement, total  R hip 2008 & L hip      Status post cataract extraction  right eye cataract extraction with IOL 6/26/2015      Presence of cardiac pacemaker  10/2021      H/O coronary angioplasty  8/2021 1 stent inserted          FAMILY HISTORY:  Family history of lung cancer (Mother)    Family history of esophageal cancer (Father)    FH: myocardial infarction (Grandparent)        SOCIAL HISTORY:  Smoking:   Substance Use:   EtOH Use:   Advance Directives:     MEDICATIONS  (STANDING):  midodrine. 5 milliGRAM(s) Oral three times a day    MEDICATIONS  (PRN):  acetaminophen     Tablet .. 650 milliGRAM(s) Oral every 6 hours PRN Temp greater or equal to 38C (100.4F), Mild Pain (1 - 3)  aluminum hydroxide/magnesium hydroxide/simethicone Suspension 30 milliLiter(s) Oral every 4 hours PRN Dyspepsia  melatonin 3 milliGRAM(s) Oral at bedtime PRN Insomnia  ondansetron Injectable 4 milliGRAM(s) IV Push every 8 hours PRN Nausea and/or Vomiting  sodium chloride 0.9% Bolus. 100 milliLiter(s) IV Bolus every 5 minutes PRN SBP LESS THAN or EQUAL to 90 mmHg      Allergies    No Known Allergies    Intolerances        REVIEW OF SYSTEMS:  Could not be obtained     OBJECTIVE:  ICU Vital Signs Last 24 Hrs  T(C): 36.6 (09 Aug 2023 02:30), Max: 36.6 (09 Aug 2023 02:30)  T(F): 97.8 (09 Aug 2023 02:30), Max: 97.8 (09 Aug 2023 02:30)  HR: 60 (09 Aug 2023 10:00) (52 - 72)  BP: 78/46 (09 Aug 2023 10:00) (78/46 - 107/47)  BP(mean): 62 (08 Aug 2023 23:39) (62 - 62)  ABP: --  ABP(mean): --  RR: 16 (09 Aug 2023 10:00) (13 - 24)  SpO2: 100% (09 Aug 2023 10:00) (97% - 100%)    O2 Parameters below as of 09 Aug 2023 10:00  Patient On (Oxygen Delivery Method): BiPAP/CPAP    O2 Concentration (%): 50          CAPILLARY BLOOD GLUCOSE      POCT Blood Glucose.: 146 mg/dL (09 Aug 2023 07:33)      PHYSICAL EXAM:  GENERAL: NAD, lying in bed, on BiPAP   EYES: EOMI, PERRLA  ENT: Moist mucous membranes  HEART: S1, S2, regular rate and rhythm, no murmurs, rubs, or gallops  LUNGS: On BiPAP, decreased breath sounds in the left base, no crackles or wheezing appreciated   ABDOMEN: Soft, nontender  EXTREMITIES: 2+ peripheral pulses bilaterally. 2+ pitting edema to the knee     LABS:                        8.1    12.24 )-----------( 195      ( 09 Aug 2023 07:00 )             26.3     Hgb Trend: 8.1<--, 8.6<--  08-09    132<L>  |  102  |  86<H>  ----------------------------<  126<H>  6.5<HH>   |  13<L>  |  4.79<H>    Ca    9.7      09 Aug 2023 07:00  Phos  5.8     08-09  Mg     3.30     08-09    TPro  6.6  /  Alb  3.9  /  TBili  <0.2  /  DBili  x   /  AST  <5  /  ALT  6   /  AlkPhos  61  08-09    Creatinine Trend: 4.79<--, 4.50<--, 4.54<--    Urinalysis Basic - ( 09 Aug 2023 07:00 )    Color: x / Appearance: x / SG: x / pH: x  Gluc: 126 mg/dL / Ketone: x  / Bili: x / Urobili: x   Blood: x / Protein: x / Nitrite: x   Leuk Esterase: x / RBC: x / WBC x   Sq Epi: x / Non Sq Epi: x / Bacteria: x        Venous Blood Gas:  08-09 @ 07:00  7.19/35/23/13/32.2  VBG Lactate: 2.0  Venous Blood Gas:  08-09 @ 05:15  7.21/38/32/15/45.3  VBG Lactate: 1.7  Venous Blood Gas:  08-09 @ 02:40  7.13/43/36/14/54.0  VBG Lactate: 1.7  Venous Blood Gas:  08-08 @ 23:40  7.13/46/28/15/32.7  VBG Lactate: 2.1      MICROBIOLOGY:     RADIOLOGY & ADDITIONAL TESTS:    ====================ASSESSMENT======================  81 yo M with HTN, A fib on Eliquis, CKD (baseline 1.2-1.4), bradycardia s/p ppm, CHFpEF, pleural effusions s/p R VATS and pleurX 3/2022, Parkinson's presenting with shortness of breath found to have ALAYNA on CKD c/b fluid overload and hyperkalemia. Admitted to MICU for urgent HD i/s/o hypotension.     Plan:  ====================NEUROLOGY=======================  #Dementia  AOx1 at baseline  Following commands and responding to questions, at baseline MS     #Parkinsons disease   - c/w carbidopa-levidopa     ====================RESPIRATORY======================  #Pleural effusions    Likely in the setting of CHF vs renal failure vs less likely malignancy (negative cytology outpt)  PleurX catheter placed 3/2022, drainage 3x a week   CT chest showed new 2.3 cm right middle lobe nodule, possible malignancy? and NEW moderate left pleural effusion with complete left lowerlobe compressive atelectasis  Respiratory acidosis on admission (VBG 46)   - c/w BiPAP 12/5  - c/w Pleurx drainage   - urgent HD for fluid overload   ====================CARDIOVASCULAR==================  #Bradycardia s/p PPM   #CHFpEF   #Afib   SPECT scan with amyloidosis outpatient, may be contributing to CHFpEF   TTE 1/2023 EF 73%, diastolic dysfunction and mod pHTN   EKG showing paced ventricular rhythm, no T wave abnormalities, given Ca gluconate x2 in ED   - repeat TTE     #Hypotension   Possibly cardiogenic 2/2 fluid overload   - HD     ====================/RENAL========================  #ALAYNA on CKD  #metabolic acidosis with AG, concomittant resp acidosis   I/S/o cardiorenal vs pre-renal vs obstructive   Baseline Cr 1.2-1.4   S/p NaHCO3, Ca gluconate and insulin  Nephrology consulted, plans for urgent HD pending shiley placement   - f/u ultrasound kidney/bladder for obstruction   - f/u urine lytes and P/C  - strict I/Os     ====================GI/NUTRITION=====================  Diet: NPO while on BiPAP     ====================INFECTIOUS DISEASE================  No fever, increase leukocytosis may be stress related vs infection. CT chest with GGO  Previous pleural fluid culture negative   - started empiric zosyn   - f/u urine and blood culture   - MRSA PCR     ====================ENDOCRINE=======================  BG levels well controlled  - maintain -200     ====================HEMATOLOGIC/DVT PPx=============  On home eliquis 5 BID, last taken 8/8 at 6pm - holding   Start DVT ppx post procedure  - given PCC for eliquis reversal pending shiley     ====================ETHICS===========================  GOC: patient with prior MOLST DNR/DNI. Upon discussion with wife and daughter, decision was made to be FULL CODE for shiley placement. Pending further discussions MICU ACCEPT NOTE    CHIEF COMPLAINT: Patient is a 82y old  Male who presents with a chief complaint of ALAYNA, Hyperkalemia (09 Aug 2023 10:31)      HPI:  81 yo M with HTN, A fib on Eliquis, CKD (baseline 1.2-1.4), bradycardia s/p ppm, CHFpEF, pleural effusions s/p R pleurX, Parkinson's presenting with shortness of breath x 1 day.  Pt poor historian due to dementia. Wife at bedside providing history. Overnight pt had been complaining of shortness of breath, coughing. Denies any chest pain, palpitations, no fevers or chills. No recent illness, No GI or urinary symptoms.    In ED pt was given Nabicarb, Ca gluconate and insulin, 1L NS  VS:  90/51  61  97.8  19  100% on BIPAP (09 Aug 2023 09:40)    MICU consulted for new BiPAP requirement and hypotension, with need for HD. At bedside, patient on BiPAP 12/5 50% and responding to commands. Answers some questions, but dementia at baseline. Reports last dose of eliquis at 6pm last night per wife.       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      Parkinsons disease      CAD (coronary artery disease)  s/p PCI 08/21      Pleural effusion, not elsewhere classified      COVID-19 vaccine series completed  w booster      History of hip replacement, total  R hip 2008 & L hip      Status post cataract extraction  right eye cataract extraction with IOL 6/26/2015      Presence of cardiac pacemaker  10/2021      H/O coronary angioplasty  8/2021 1 stent inserted          FAMILY HISTORY:  Family history of lung cancer (Mother)    Family history of esophageal cancer (Father)    FH: myocardial infarction (Grandparent)        SOCIAL HISTORY:  Smoking:   Substance Use:   EtOH Use:   Advance Directives:     MEDICATIONS  (STANDING):  midodrine. 5 milliGRAM(s) Oral three times a day    MEDICATIONS  (PRN):  acetaminophen     Tablet .. 650 milliGRAM(s) Oral every 6 hours PRN Temp greater or equal to 38C (100.4F), Mild Pain (1 - 3)  aluminum hydroxide/magnesium hydroxide/simethicone Suspension 30 milliLiter(s) Oral every 4 hours PRN Dyspepsia  melatonin 3 milliGRAM(s) Oral at bedtime PRN Insomnia  ondansetron Injectable 4 milliGRAM(s) IV Push every 8 hours PRN Nausea and/or Vomiting  sodium chloride 0.9% Bolus. 100 milliLiter(s) IV Bolus every 5 minutes PRN SBP LESS THAN or EQUAL to 90 mmHg      Allergies    No Known Allergies    Intolerances        REVIEW OF SYSTEMS:  Could not be obtained     OBJECTIVE:  ICU Vital Signs Last 24 Hrs  T(C): 36.6 (09 Aug 2023 02:30), Max: 36.6 (09 Aug 2023 02:30)  T(F): 97.8 (09 Aug 2023 02:30), Max: 97.8 (09 Aug 2023 02:30)  HR: 60 (09 Aug 2023 10:00) (52 - 72)  BP: 78/46 (09 Aug 2023 10:00) (78/46 - 107/47)  BP(mean): 62 (08 Aug 2023 23:39) (62 - 62)  ABP: --  ABP(mean): --  RR: 16 (09 Aug 2023 10:00) (13 - 24)  SpO2: 100% (09 Aug 2023 10:00) (97% - 100%)    O2 Parameters below as of 09 Aug 2023 10:00  Patient On (Oxygen Delivery Method): BiPAP/CPAP    O2 Concentration (%): 50          CAPILLARY BLOOD GLUCOSE      POCT Blood Glucose.: 146 mg/dL (09 Aug 2023 07:33)      PHYSICAL EXAM:  GENERAL: NAD, lying in bed, on BiPAP   EYES: EOMI, PERRLA  ENT: Moist mucous membranes  HEART: S1, S2, regular rate and rhythm, no murmurs, rubs, or gallops  LUNGS: On BiPAP, decreased breath sounds in the left base, no crackles or wheezing appreciated   ABDOMEN: Soft, nontender  EXTREMITIES: 2+ peripheral pulses bilaterally. 2+ pitting edema to the knee     LABS:                        8.1    12.24 )-----------( 195      ( 09 Aug 2023 07:00 )             26.3     Hgb Trend: 8.1<--, 8.6<--  08-09    132<L>  |  102  |  86<H>  ----------------------------<  126<H>  6.5<HH>   |  13<L>  |  4.79<H>    Ca    9.7      09 Aug 2023 07:00  Phos  5.8     08-09  Mg     3.30     08-09    TPro  6.6  /  Alb  3.9  /  TBili  <0.2  /  DBili  x   /  AST  <5  /  ALT  6   /  AlkPhos  61  08-09    Creatinine Trend: 4.79<--, 4.50<--, 4.54<--    Urinalysis Basic - ( 09 Aug 2023 07:00 )    Color: x / Appearance: x / SG: x / pH: x  Gluc: 126 mg/dL / Ketone: x  / Bili: x / Urobili: x   Blood: x / Protein: x / Nitrite: x   Leuk Esterase: x / RBC: x / WBC x   Sq Epi: x / Non Sq Epi: x / Bacteria: x        Venous Blood Gas:  08-09 @ 07:00  7.19/35/23/13/32.2  VBG Lactate: 2.0  Venous Blood Gas:  08-09 @ 05:15  7.21/38/32/15/45.3  VBG Lactate: 1.7  Venous Blood Gas:  08-09 @ 02:40  7.13/43/36/14/54.0  VBG Lactate: 1.7  Venous Blood Gas:  08-08 @ 23:40  7.13/46/28/15/32.7  VBG Lactate: 2.1      MICROBIOLOGY:     RADIOLOGY & ADDITIONAL TESTS:    ====================ASSESSMENT======================  81 yo M with HTN, A fib on Eliquis, CKD (baseline 1.2-1.4), bradycardia s/p ppm, CHFpEF, pleural effusions s/p R VATS and pleurX 3/2022, Parkinson's presenting with shortness of breath found to have ALAYNA on CKD c/b fluid overload and hyperkalemia. Admitted to MICU for urgent HD i/s/o hypotension.     Plan:  ====================NEUROLOGY=======================  #Dementia  AOx1 at baseline  Following commands and responding to questions, at baseline MS     #Parkinsons disease   - c/w carbidopa-levidopa     ====================RESPIRATORY======================  #Pleural effusions    Likely in the setting of CHF vs renal failure vs less likely malignancy (negative cytology outpt)  PleurX catheter placed 3/2022, drainage 3x a week   CT chest showed new 2.3 cm right middle lobe nodule, possible malignancy? and NEW moderate left pleural effusion with complete left lowerlobe compressive atelectasis  Respiratory acidosis on admission (VBG 46)   - c/w BiPAP 12/5  - c/w Pleurx drainage   - urgent HD for fluid overload   ====================CARDIOVASCULAR==================  #Bradycardia s/p PPM   #CHFpEF   #Afib   SPECT scan with amyloidosis outpatient, may be contributing to CHFpEF   TTE 1/2023 EF 73%, diastolic dysfunction and mod pHTN   EKG showing paced ventricular rhythm, no T wave abnormalities, given Ca gluconate x2 in ED   - repeat TTE     #Hypotension   Possibly cardiogenic 2/2 fluid overload   - HD     ====================/RENAL========================  #ALAYNA on CKD  #metabolic acidosis with AG, concomittant resp acidosis   I/S/o cardiorenal vs pre-renal vs obstructive   Baseline Cr 1.2-1.4   S/p NaHCO3, Ca gluconate and insulin  Nephrology consulted, plans for urgent HD pending shiley placement   - f/u ultrasound kidney/bladder for obstruction   - f/u urine lytes and P/C  - strict I/Os     ====================GI/NUTRITION=====================  Diet: NPO while on BiPAP     ====================INFECTIOUS DISEASE================  No fever, increase leukocytosis may be stress related vs infection. CT chest with GGO  Previous pleural fluid culture negative   - started empiric zosyn   - f/u urine and blood culture   - MRSA PCR     ====================ENDOCRINE=======================  BG levels well controlled  - maintain -200     ====================HEMATOLOGIC/DVT PPx=============  On home eliquis 5 BID, last taken 8/8 at 6pm - holding   Start DVT ppx post procedure  - given PCC for eliquis reversal pending shiley     ====================ETHICS===========================  GOC: patient with prior MOLST DNR/DNI. Upon discussion with wife and daughter, decision was made to be FULL CODE for shiley placement. Pending further discussions    Attending Attestation:    Patient seen and examined with resident/fellow.  Agree with above except as noted.         This patient is critically ill and required frequent bedside visits with therapy change.  Total critical care time spent was __ minutes.  81 yo male with Parkinson's disease, Dementia, chronic atrial fibrillation on Eliquis , CKD brought in to ED b wife for increasing SOB and cough. No fever chills or hemoptysis/  Pt also with h/o HFpEF with  Pleurx in fr R pleural effusion.   In ED pt found to be lethargic( Baseline A&O x 1) with hyperkalemia and acute renal failure. Baseline creatinine 1.2=1,4. Today > 5. Despite medical management for hyperkalemia K+ still elevated.  Pt found to have metabolic acidosis with ph 7.12 on VBG.  Pt was DNR/DNI but have revoked DNR/DNI and agreed to HD.     A/P  1. Acute renal failure on top of CKD. Unclear etiology. Send urine lytes. Family has agreed to HD. Will place HD catheter and then dialyze in ICU.  2. Acute hypoxemic respiratory failure requiring BiPAP 12/5 40% FIO2. PH improved to > 7.2 on BiPAP.  3. CT chest with moderate  R pleural  effusion, GGO with air bronchograms RUL and mass,? pseudo mass on minor fissure. For HD.    4. ID: Will start empiric Zosyn for GGO RUL . Will stop abx when cx negative.  5. Parkinson's disease. Restart carbidopa once off BiPAP.  6.Chronic diastolic heart failure. Chronic atrial fib. Hold Eliquis with renal failure. Alex monitor pt clinically and decide when to start AC  7.DVT prophylaxis heparin  8. GOC: Full code  This patient is critically ill and required frequent bedside visits with therapy change.  Total critical care time spent was 45minutes.Not including procedures or teaching.    Jeff Santiago MD

## 2023-08-09 NOTE — CHART NOTE - NSCHARTNOTEFT_GEN_A_CORE
Called by RN due to patient having lab results of potassium 6.2 post treatment for hyperkalemia. RN states SBP has gone down as low as 82 and MAP 50's. Now BP is 90/51. Called [facility] and he recommended calling MICU for evaluation ( called). Patient given hyperkalemia treatment - Dextrose and regular insulin, Calcium gluconate and Sodium bicarbonate. NS bolus were given as well. Spoke to wife in the presence of RN and all questions were answered. Called by RN due to patient having lab results of potassium 6.2 post treatment for hyperkalemia. RN states SBP has gone down as low as 82 and MAP 50's. Now BP is 90/51. Called [facility] and he recommended calling MICU for evaluation ( called). Repeat BMP with potassium of 6.5, AG 17, creat 4.7. Patient given hyperkalemia treatment - Dextrose and regular insulin, Calcium gluconate and Sodium bicarbonate. NS bolus were given as well. Spoke to wife in the presence of RN and all questions were answered.

## 2023-08-09 NOTE — DISCHARGE NOTE NURSING/CASE MANAGEMENT/SOCIAL WORK - PATIENT PORTAL LINK FT
You can access the FollowMyHealth Patient Portal offered by [facility] by registering at the following website: [email or web address]. By joining [facility]’s FollowMyHealth portal, you will also be able to view your health information using other applications (apps) compatible with our system.

## 2023-08-09 NOTE — ED PROCEDURE NOTE - ATTENDING CONTRIBUTION TO CARE
Purnima: I have seen and examined the patient face to face, have reviewed and addended the above procedure note.

## 2023-08-09 NOTE — CONSULT NOTE ADULT - ATTENDING COMMENTS
Tachypnea is more likely due to patient's acidosis in the setting of his ALAYNA, uremia, hyperkalemia, and lactic acidosis. He has a history of R VATS and PleurX in 3/2022, now with new L pleural effusion that is small to moderate in size. Also with new right sided opacities, worrisome for aspiration pneumonia. There appears to be fluid in the right oblique fissure, which was reported as a lung nodule on report, but upon review with radiology more likely represents loculated fluid in the fissure.    Recommend:  - Hold off on thoracentesis at this time  - MICU evaluation for dialysis  - Continue BiPAP for work of breathing and compensation for metabolic acidosis  - He is DNR/DNI, but ok with trial of dialysis per discussion with patient and daughter at bedside
Seen and evaluated.  ALAYNA with hyperkalemia in setting of bactrim and entresto use.  Hyperkalemia refractory to medical management and acidosis.  emergency HD indicated.  Discussed with daughter.  High risk given hypotension.

## 2023-08-09 NOTE — ED ADULT NURSE REASSESSMENT NOTE - NS ED NURSE REASSESS COMMENT FT1
Pt A+Ox2.  Pt with Bipap in place.  Pt noted with hypotension.  MICU consult at bedside evaluating pt.  mild rhonchi heard in all lung fields, abdomen soft, skin intact.  Pts wife and aide at bedside.  Fall precautions maintained.  Will continue to monitor. Pt A+Ox2.  Pt with Bipap in place.  Pt noted with hypotension.  MICU consult at bedside evaluating pt.  mild rhonchi heard in all lung fields, abdomen soft, skin intact.  Pt noted with pleurex cath dressing to right chest wall.  Pts wife and aide at bedside.  Fall precautions maintained.  Will continue to monitor.

## 2023-08-09 NOTE — PROCEDURE NOTE - NSINFORMCONSENT_GEN_A_CORE
Benefits, risks, and possible complications of procedure explained to patient/caregiver who verbalized understanding and gave written consent.
This was an emergent procedure.
Benefits, risks, and possible complications of procedure explained to patient/caregiver who verbalized understanding and gave verbal consent.

## 2023-08-10 LAB
A1C WITH ESTIMATED AVERAGE GLUCOSE RESULT: 5.4 % — SIGNIFICANT CHANGE UP (ref 4–5.6)
ALBUMIN SERPL ELPH-MCNC: 3.8 G/DL — SIGNIFICANT CHANGE UP (ref 3.3–5)
ALP SERPL-CCNC: 57 U/L — SIGNIFICANT CHANGE UP (ref 40–120)
ALT FLD-CCNC: 11 U/L — SIGNIFICANT CHANGE UP (ref 4–41)
ANION GAP SERPL CALC-SCNC: 17 MMOL/L — HIGH (ref 7–14)
APTT BLD: 36.4 SEC — HIGH (ref 24.5–35.6)
AST SERPL-CCNC: 14 U/L — SIGNIFICANT CHANGE UP (ref 4–40)
BASE EXCESS BLDV CALC-SCNC: -8.1 MMOL/L — LOW (ref -2–3)
BASOPHILS # BLD AUTO: 0.03 K/UL — SIGNIFICANT CHANGE UP (ref 0–0.2)
BASOPHILS NFR BLD AUTO: 0.3 % — SIGNIFICANT CHANGE UP (ref 0–2)
BILIRUB SERPL-MCNC: 0.2 MG/DL — SIGNIFICANT CHANGE UP (ref 0.2–1.2)
BLD GP AB SCN SERPL QL: NEGATIVE — SIGNIFICANT CHANGE UP
BLOOD GAS VENOUS COMPREHENSIVE RESULT: SIGNIFICANT CHANGE UP
BUN SERPL-MCNC: 67 MG/DL — HIGH (ref 7–23)
CALCIUM SERPL-MCNC: 9.4 MG/DL — SIGNIFICANT CHANGE UP (ref 8.4–10.5)
CHLORIDE BLDV-SCNC: 103 MMOL/L — SIGNIFICANT CHANGE UP (ref 96–108)
CHLORIDE SERPL-SCNC: 101 MMOL/L — SIGNIFICANT CHANGE UP (ref 98–107)
CO2 BLDV-SCNC: 18.9 MMOL/L — LOW (ref 22–26)
CO2 SERPL-SCNC: 17 MMOL/L — LOW (ref 22–31)
CREAT SERPL-MCNC: 4.17 MG/DL — HIGH (ref 0.5–1.3)
EGFR: 14 ML/MIN/1.73M2 — LOW
EOSINOPHIL # BLD AUTO: 0 K/UL — SIGNIFICANT CHANGE UP (ref 0–0.5)
EOSINOPHIL NFR BLD AUTO: 0 % — SIGNIFICANT CHANGE UP (ref 0–6)
ESTIMATED AVERAGE GLUCOSE: 108 — SIGNIFICANT CHANGE UP
GAS PNL BLDV: 133 MMOL/L — LOW (ref 136–145)
GLUCOSE BLDV-MCNC: 125 MG/DL — HIGH (ref 70–99)
GLUCOSE SERPL-MCNC: 127 MG/DL — HIGH (ref 70–99)
HBV SURFACE AB SER-ACNC: <3 MIU/ML — LOW
HCO3 BLDV-SCNC: 18 MMOL/L — LOW (ref 22–29)
HCT VFR BLD CALC: 24 % — LOW (ref 39–50)
HCT VFR BLDA CALC: 24 % — LOW (ref 39–51)
HGB BLD CALC-MCNC: 7.9 G/DL — LOW (ref 12.6–17.4)
HGB BLD-MCNC: 7.5 G/DL — LOW (ref 13–17)
IANC: 7.88 K/UL — HIGH (ref 1.8–7.4)
IMM GRANULOCYTES NFR BLD AUTO: 0.3 % — SIGNIFICANT CHANGE UP (ref 0–0.9)
INR BLD: 1.47 RATIO — HIGH (ref 0.85–1.18)
LACTATE BLDV-MCNC: 3.1 MMOL/L — HIGH (ref 0.5–2)
LYMPHOCYTES # BLD AUTO: 0.61 K/UL — LOW (ref 1–3.3)
LYMPHOCYTES # BLD AUTO: 6.6 % — LOW (ref 13–44)
MAGNESIUM SERPL-MCNC: 2.7 MG/DL — HIGH (ref 1.6–2.6)
MCHC RBC-ENTMCNC: 25.3 PG — LOW (ref 27–34)
MCHC RBC-ENTMCNC: 31.3 GM/DL — LOW (ref 32–36)
MCV RBC AUTO: 80.8 FL — SIGNIFICANT CHANGE UP (ref 80–100)
MONOCYTES # BLD AUTO: 0.65 K/UL — SIGNIFICANT CHANGE UP (ref 0–0.9)
MONOCYTES NFR BLD AUTO: 7.1 % — SIGNIFICANT CHANGE UP (ref 2–14)
MRSA PCR RESULT.: SIGNIFICANT CHANGE UP
NEUTROPHILS # BLD AUTO: 7.88 K/UL — HIGH (ref 1.8–7.4)
NEUTROPHILS NFR BLD AUTO: 85.7 % — HIGH (ref 43–77)
NRBC # BLD: 0 /100 WBCS — SIGNIFICANT CHANGE UP (ref 0–0)
NRBC # FLD: 0 K/UL — SIGNIFICANT CHANGE UP (ref 0–0)
PCO2 BLDV: 37 MMHG — LOW (ref 42–55)
PH BLDV: 7.29 — LOW (ref 7.32–7.43)
PHOSPHATE SERPL-MCNC: 4.7 MG/DL — HIGH (ref 2.5–4.5)
PLATELET # BLD AUTO: 227 K/UL — SIGNIFICANT CHANGE UP (ref 150–400)
PO2 BLDV: 49 MMHG — HIGH (ref 25–45)
POTASSIUM BLDV-SCNC: 5.1 MMOL/L — SIGNIFICANT CHANGE UP (ref 3.5–5.1)
POTASSIUM SERPL-MCNC: 5 MMOL/L — SIGNIFICANT CHANGE UP (ref 3.5–5.3)
POTASSIUM SERPL-SCNC: 5 MMOL/L — SIGNIFICANT CHANGE UP (ref 3.5–5.3)
PROT SERPL-MCNC: 6.5 G/DL — SIGNIFICANT CHANGE UP (ref 6–8.3)
PROTHROM AB SERPL-ACNC: 16.4 SEC — HIGH (ref 9.5–13)
RBC # BLD: 2.97 M/UL — LOW (ref 4.2–5.8)
RBC # FLD: 21.3 % — HIGH (ref 10.3–14.5)
RH IG SCN BLD-IMP: NEGATIVE — SIGNIFICANT CHANGE UP
S AUREUS DNA NOSE QL NAA+PROBE: SIGNIFICANT CHANGE UP
SAO2 % BLDV: 76.7 % — SIGNIFICANT CHANGE UP (ref 67–88)
SODIUM SERPL-SCNC: 135 MMOL/L — SIGNIFICANT CHANGE UP (ref 135–145)
WBC # BLD: 9.2 K/UL — SIGNIFICANT CHANGE UP (ref 3.8–10.5)
WBC # FLD AUTO: 9.2 K/UL — SIGNIFICANT CHANGE UP (ref 3.8–10.5)

## 2023-08-10 PROCEDURE — 99291 CRITICAL CARE FIRST HOUR: CPT | Mod: FS

## 2023-08-10 PROCEDURE — 99233 SBSQ HOSP IP/OBS HIGH 50: CPT

## 2023-08-10 PROCEDURE — 93306 TTE W/DOPPLER COMPLETE: CPT | Mod: 26

## 2023-08-10 RX ORDER — LANOLIN ALCOHOL/MO/W.PET/CERES
3 CREAM (GRAM) TOPICAL AT BEDTIME
Refills: 0 | Status: DISCONTINUED | OUTPATIENT
Start: 2023-08-10 | End: 2023-08-18

## 2023-08-10 RX ORDER — OLANZAPINE 15 MG/1
2.5 TABLET, FILM COATED ORAL AT BEDTIME
Refills: 0 | Status: DISCONTINUED | OUTPATIENT
Start: 2023-08-10 | End: 2023-08-16

## 2023-08-10 RX ORDER — ALBUMIN HUMAN 25 %
250 VIAL (ML) INTRAVENOUS ONCE
Refills: 0 | Status: COMPLETED | OUTPATIENT
Start: 2023-08-10 | End: 2023-08-10

## 2023-08-10 RX ADMIN — APIXABAN 2.5 MILLIGRAM(S): 2.5 TABLET, FILM COATED ORAL at 17:25

## 2023-08-10 RX ADMIN — CARBIDOPA AND LEVODOPA 1.5 TABLET(S): 25; 100 TABLET ORAL at 17:24

## 2023-08-10 RX ADMIN — CARBIDOPA AND LEVODOPA 1.5 TABLET(S): 25; 100 TABLET ORAL at 23:14

## 2023-08-10 RX ADMIN — CHLORHEXIDINE GLUCONATE 1 APPLICATION(S): 213 SOLUTION TOPICAL at 11:25

## 2023-08-10 RX ADMIN — CARBIDOPA AND LEVODOPA 1.5 TABLET(S): 25; 100 TABLET ORAL at 08:55

## 2023-08-10 RX ADMIN — MIDODRINE HYDROCHLORIDE 10 MILLIGRAM(S): 2.5 TABLET ORAL at 11:26

## 2023-08-10 RX ADMIN — CHLORHEXIDINE GLUCONATE 1 APPLICATION(S): 213 SOLUTION TOPICAL at 17:23

## 2023-08-10 RX ADMIN — APIXABAN 2.5 MILLIGRAM(S): 2.5 TABLET, FILM COATED ORAL at 06:07

## 2023-08-10 RX ADMIN — CARBIDOPA AND LEVODOPA 1.5 TABLET(S): 25; 100 TABLET ORAL at 11:25

## 2023-08-10 RX ADMIN — Medication 125 MILLILITER(S): at 17:24

## 2023-08-10 RX ADMIN — MIDODRINE HYDROCHLORIDE 10 MILLIGRAM(S): 2.5 TABLET ORAL at 06:07

## 2023-08-10 RX ADMIN — PIPERACILLIN AND TAZOBACTAM 25 GRAM(S): 4; .5 INJECTION, POWDER, LYOPHILIZED, FOR SOLUTION INTRAVENOUS at 17:25

## 2023-08-10 RX ADMIN — Medication 8.23 MICROGRAM(S)/KG/MIN: at 21:22

## 2023-08-10 RX ADMIN — MIDODRINE HYDROCHLORIDE 10 MILLIGRAM(S): 2.5 TABLET ORAL at 17:25

## 2023-08-10 RX ADMIN — PIPERACILLIN AND TAZOBACTAM 25 GRAM(S): 4; .5 INJECTION, POWDER, LYOPHILIZED, FOR SOLUTION INTRAVENOUS at 06:05

## 2023-08-10 RX ADMIN — OLANZAPINE 2.5 MILLIGRAM(S): 15 TABLET, FILM COATED ORAL at 21:22

## 2023-08-10 RX ADMIN — CARBIDOPA AND LEVODOPA 1.5 TABLET(S): 25; 100 TABLET ORAL at 03:30

## 2023-08-10 RX ADMIN — Medication 3 MILLIGRAM(S): at 21:25

## 2023-08-10 NOTE — PROGRESS NOTE ADULT - SUBJECTIVE AND OBJECTIVE BOX
INTERVAL HPI/OVERNIGHT EVENTS: remained on low dose levophed overnight. Eliquis was restarted this morning.     SUBJECTIVE: Patient seen and examined at bedside.       VITAL SIGNS:  ICU Vital Signs Last 24 Hrs  T(C): 37.2 (10 Aug 2023 04:00), Max: 37.2 (09 Aug 2023 18:30)  T(F): 98.9 (10 Aug 2023 04:00), Max: 99 (09 Aug 2023 18:30)  HR: 68 (10 Aug 2023 06:00) (52 - 77)  BP: 117/45 (10 Aug 2023 06:00) (75/32 - 151/129)  BP(mean): 62 (10 Aug 2023 06:00) (42 - 123)  ABP: --  ABP(mean): --  RR: 13 (10 Aug 2023 06:00) (10 - 23)  SpO2: 99% (10 Aug 2023 06:00) (86% - 100%)    O2 Parameters below as of 10 Aug 2023 06:00  Patient On (Oxygen Delivery Method): room air        Plateau pressure:   P/F ratio:     08-09 @ 07:01  -  08-10 @ 06:37  --------------------------------------------------------  IN: 993.2 mL / OUT: 829 mL / NET: 164.2 mL      CAPILLARY BLOOD GLUCOSE      POCT Blood Glucose.: 148 mg/dL (10 Aug 2023 06:05)      PHYSICAL EXAM:    General:   GENERAL: NAD, lying in bed, on BiPAP   EYES: EOMI, PERRLA  ENT: Moist mucous membranes  HEART: S1, S2, regular rate and rhythm, no murmurs, rubs, or gallops  LUNGS: On BiPAP, decreased breath sounds in the left base, no crackles or wheezing appreciated   ABDOMEN: Soft, nontender  EXTREMITIES: 2+ peripheral pulses bilaterally. 2+ pitting edema to the knee       MEDICATIONS:  MEDICATIONS  (STANDING):  apixaban 2.5 milliGRAM(s) Oral two times a day  carbidopa/levodopa  25/100 1.5 Tablet(s) Oral four times a day  chlorhexidine 2% Cloths 1 Application(s) Topical daily  chlorhexidine 2% Cloths 1 Application(s) Topical daily  dextrose 5%. 1000 milliLiter(s) (50 mL/Hr) IV Continuous <Continuous>  dextrose 5%. 1000 milliLiter(s) (100 mL/Hr) IV Continuous <Continuous>  dextrose 50% Injectable 25 Gram(s) IV Push once  dextrose 50% Injectable 25 Gram(s) IV Push once  dextrose 50% Injectable 12.5 Gram(s) IV Push once  glucagon  Injectable 1 milliGRAM(s) IntraMuscular once  insulin lispro (ADMELOG) corrective regimen sliding scale   SubCutaneous every 6 hours  midodrine. 10 milliGRAM(s) Oral three times a day  norepinephrine Infusion 0.05 MICROgram(s)/kG/Min (8.23 mL/Hr) IV Continuous <Continuous>  piperacillin/tazobactam IVPB.. 3.375 Gram(s) IV Intermittent every 12 hours    MEDICATIONS  (PRN):  acetaminophen     Tablet .. 650 milliGRAM(s) Oral every 6 hours PRN Temp greater or equal to 38C (100.4F), Mild Pain (1 - 3)  aluminum hydroxide/magnesium hydroxide/simethicone Suspension 30 milliLiter(s) Oral every 4 hours PRN Dyspepsia  dextrose Oral Gel 15 Gram(s) Oral once PRN Blood Glucose LESS THAN 70 milliGRAM(s)/deciliter  melatonin 3 milliGRAM(s) Oral at bedtime PRN Insomnia  ondansetron Injectable 4 milliGRAM(s) IV Push every 8 hours PRN Nausea and/or Vomiting  sodium chloride 0.9% Bolus. 100 milliLiter(s) IV Bolus every 5 minutes PRN SBP LESS THAN or EQUAL to 90 mmHg      ALLERGIES:  Allergies    No Known Allergies    Intolerances        LABS:                        7.5    9.20  )-----------( 227      ( 10 Aug 2023 01:10 )             24.0     08-10    135  |  101  |  67<H>  ----------------------------<  127<H>  5.0   |  17<L>  |  4.17<H>    Ca    9.4      10 Aug 2023 01:10  Phos  4.7     08-10  Mg     2.70     08-10    TPro  6.5  /  Alb  3.8  /  TBili  0.2  /  DBili  x   /  AST  14  /  ALT  11  /  AlkPhos  57  08-10    PT/INR - ( 10 Aug 2023 01:10 )   PT: 16.4 sec;   INR: 1.47 ratio         PTT - ( 10 Aug 2023 01:10 )  PTT:36.4 sec  Urinalysis Basic - ( 10 Aug 2023 01:10 )    Color: x / Appearance: x / SG: x / pH: x  Gluc: 127 mg/dL / Ketone: x  / Bili: x / Urobili: x   Blood: x / Protein: x / Nitrite: x   Leuk Esterase: x / RBC: x / WBC x   Sq Epi: x / Non Sq Epi: x / Bacteria: x        RADIOLOGY & ADDITIONAL TESTS: Reviewed.   INTERVAL HPI/OVERNIGHT EVENTS: remained on low dose levophed overnight. Eliquis was restarted this morning. SpO2 stable on RA since 2000 yesterday.    SUBJECTIVE: Patient seen and examined at bedside.       VITAL SIGNS:  ICU Vital Signs Last 24 Hrs  T(C): 37.2 (10 Aug 2023 04:00), Max: 37.2 (09 Aug 2023 18:30)  T(F): 98.9 (10 Aug 2023 04:00), Max: 99 (09 Aug 2023 18:30)  HR: 68 (10 Aug 2023 06:00) (52 - 77)  BP: 117/45 (10 Aug 2023 06:00) (75/32 - 151/129)  BP(mean): 62 (10 Aug 2023 06:00) (42 - 123)  ABP: --  ABP(mean): --  RR: 13 (10 Aug 2023 06:00) (10 - 23)  SpO2: 99% (10 Aug 2023 06:00) (86% - 100%)    O2 Parameters below as of 10 Aug 2023 06:00  Patient On (Oxygen Delivery Method): room air        Plateau pressure:   P/F ratio:     08-09 @ 07:01  -  08-10 @ 06:37  --------------------------------------------------------  IN: 993.2 mL / OUT: 829 mL / NET: 164.2 mL      CAPILLARY BLOOD GLUCOSE      POCT Blood Glucose.: 148 mg/dL (10 Aug 2023 06:05)      PHYSICAL EXAM:    General:   GENERAL: NAD, lying in bed, on BiPAP   EYES: EOMI, PERRLA  ENT: Moist mucous membranes  HEART: S1, S2, regular rate and rhythm, no murmurs, rubs, or gallops  LUNGS: On BiPAP, decreased breath sounds in the left base, no crackles or wheezing appreciated   ABDOMEN: Soft, nontender  EXTREMITIES: 2+ peripheral pulses bilaterally. 2+ pitting edema to the knee       MEDICATIONS:  MEDICATIONS  (STANDING):  apixaban 2.5 milliGRAM(s) Oral two times a day  carbidopa/levodopa  25/100 1.5 Tablet(s) Oral four times a day  chlorhexidine 2% Cloths 1 Application(s) Topical daily  chlorhexidine 2% Cloths 1 Application(s) Topical daily  dextrose 5%. 1000 milliLiter(s) (50 mL/Hr) IV Continuous <Continuous>  dextrose 5%. 1000 milliLiter(s) (100 mL/Hr) IV Continuous <Continuous>  dextrose 50% Injectable 25 Gram(s) IV Push once  dextrose 50% Injectable 25 Gram(s) IV Push once  dextrose 50% Injectable 12.5 Gram(s) IV Push once  glucagon  Injectable 1 milliGRAM(s) IntraMuscular once  insulin lispro (ADMELOG) corrective regimen sliding scale   SubCutaneous every 6 hours  midodrine. 10 milliGRAM(s) Oral three times a day  norepinephrine Infusion 0.05 MICROgram(s)/kG/Min (8.23 mL/Hr) IV Continuous <Continuous>  piperacillin/tazobactam IVPB.. 3.375 Gram(s) IV Intermittent every 12 hours    MEDICATIONS  (PRN):  acetaminophen     Tablet .. 650 milliGRAM(s) Oral every 6 hours PRN Temp greater or equal to 38C (100.4F), Mild Pain (1 - 3)  aluminum hydroxide/magnesium hydroxide/simethicone Suspension 30 milliLiter(s) Oral every 4 hours PRN Dyspepsia  dextrose Oral Gel 15 Gram(s) Oral once PRN Blood Glucose LESS THAN 70 milliGRAM(s)/deciliter  melatonin 3 milliGRAM(s) Oral at bedtime PRN Insomnia  ondansetron Injectable 4 milliGRAM(s) IV Push every 8 hours PRN Nausea and/or Vomiting  sodium chloride 0.9% Bolus. 100 milliLiter(s) IV Bolus every 5 minutes PRN SBP LESS THAN or EQUAL to 90 mmHg      ALLERGIES:  Allergies    No Known Allergies    Intolerances        LABS:                        7.5    9.20  )-----------( 227      ( 10 Aug 2023 01:10 )             24.0     08-10    135  |  101  |  67<H>  ----------------------------<  127<H>  5.0   |  17<L>  |  4.17<H>    Ca    9.4      10 Aug 2023 01:10  Phos  4.7     08-10  Mg     2.70     08-10    TPro  6.5  /  Alb  3.8  /  TBili  0.2  /  DBili  x   /  AST  14  /  ALT  11  /  AlkPhos  57  08-10    PT/INR - ( 10 Aug 2023 01:10 )   PT: 16.4 sec;   INR: 1.47 ratio         PTT - ( 10 Aug 2023 01:10 )  PTT:36.4 sec  Urinalysis Basic - ( 10 Aug 2023 01:10 )    Color: x / Appearance: x / SG: x / pH: x  Gluc: 127 mg/dL / Ketone: x  / Bili: x / Urobili: x   Blood: x / Protein: x / Nitrite: x   Leuk Esterase: x / RBC: x / WBC x   Sq Epi: x / Non Sq Epi: x / Bacteria: x        RADIOLOGY & ADDITIONAL TESTS: Reviewed.   INTERVAL HPI/ OVERNIGHT EVENTS: remained on low dose levophed overnight. Eliquis was restarted this morning. SpO2 stable on RA since 2000 yesterday.     SUBJECTIVE: Patient seen and examined at bedside.       VITAL SIGNS:  ICU Vital Signs Last 24 Hrs  T(C): 37.2 (10 Aug 2023 04:00), Max: 37.2 (09 Aug 2023 18:30)  T(F): 98.9 (10 Aug 2023 04:00), Max: 99 (09 Aug 2023 18:30)  HR: 68 (10 Aug 2023 06:00) (52 - 77)  BP: 117/45 (10 Aug 2023 06:00) (75/32 - 151/129)  BP(mean): 62 (10 Aug 2023 06:00) (42 - 123)  ABP: --  ABP(mean): --  RR: 13 (10 Aug 2023 06:00) (10 - 23)  SpO2: 99% (10 Aug 2023 06:00) (86% - 100%)    O2 Parameters below as of 10 Aug 2023 06:00  Patient On (Oxygen Delivery Method): room air        Plateau pressure:   P/F ratio:     08-09 @ 07:01  -  08-10 @ 06:37  --------------------------------------------------------  IN: 993.2 mL / OUT: 829 mL / NET: 164.2 mL      CAPILLARY BLOOD GLUCOSE      POCT Blood Glucose.: 148 mg/dL (10 Aug 2023 06:05)      PHYSICAL EXAM:    General:   GENERAL: NAD, lying in bed, on BiPAP   EYES: EOMI, PERRLA  ENT: Moist mucous membranes  HEART: S1, S2, regular rate and rhythm, no murmurs, rubs, or gallops  LUNGS: On BiPAP, decreased breath sounds in the left base, no crackles or wheezing appreciated   ABDOMEN: Soft, nontender  EXTREMITIES: 2+ peripheral pulses bilaterally. 2+ pitting edema to the knee       MEDICATIONS:  MEDICATIONS  (STANDING):  apixaban 2.5 milliGRAM(s) Oral two times a day  carbidopa/levodopa  25/100 1.5 Tablet(s) Oral four times a day  chlorhexidine 2% Cloths 1 Application(s) Topical daily  chlorhexidine 2% Cloths 1 Application(s) Topical daily  dextrose 5%. 1000 milliLiter(s) (50 mL/Hr) IV Continuous <Continuous>  dextrose 5%. 1000 milliLiter(s) (100 mL/Hr) IV Continuous <Continuous>  dextrose 50% Injectable 25 Gram(s) IV Push once  dextrose 50% Injectable 25 Gram(s) IV Push once  dextrose 50% Injectable 12.5 Gram(s) IV Push once  glucagon  Injectable 1 milliGRAM(s) IntraMuscular once  insulin lispro (ADMELOG) corrective regimen sliding scale   SubCutaneous every 6 hours  midodrine. 10 milliGRAM(s) Oral three times a day  norepinephrine Infusion 0.05 MICROgram(s)/kG/Min (8.23 mL/Hr) IV Continuous <Continuous>  piperacillin/tazobactam IVPB.. 3.375 Gram(s) IV Intermittent every 12 hours    MEDICATIONS  (PRN):  acetaminophen     Tablet .. 650 milliGRAM(s) Oral every 6 hours PRN Temp greater or equal to 38C (100.4F), Mild Pain (1 - 3)  aluminum hydroxide/magnesium hydroxide/simethicone Suspension 30 milliLiter(s) Oral every 4 hours PRN Dyspepsia  dextrose Oral Gel 15 Gram(s) Oral once PRN Blood Glucose LESS THAN 70 milliGRAM(s)/deciliter  melatonin 3 milliGRAM(s) Oral at bedtime PRN Insomnia  ondansetron Injectable 4 milliGRAM(s) IV Push every 8 hours PRN Nausea and/or Vomiting  sodium chloride 0.9% Bolus. 100 milliLiter(s) IV Bolus every 5 minutes PRN SBP LESS THAN or EQUAL to 90 mmHg      ALLERGIES:  Allergies    No Known Allergies    Intolerances        LABS:                        7.5    9.20  )-----------( 227      ( 10 Aug 2023 01:10 )             24.0     08-10    135  |  101  |  67<H>  ----------------------------<  127<H>  5.0   |  17<L>  |  4.17<H>    Ca    9.4      10 Aug 2023 01:10  Phos  4.7     08-10  Mg     2.70     08-10    TPro  6.5  /  Alb  3.8  /  TBili  0.2  /  DBili  x   /  AST  14  /  ALT  11  /  AlkPhos  57  08-10    PT/INR - ( 10 Aug 2023 01:10 )   PT: 16.4 sec;   INR: 1.47 ratio         PTT - ( 10 Aug 2023 01:10 )  PTT:36.4 sec  Urinalysis Basic - ( 10 Aug 2023 01:10 )    Color: x / Appearance: x / SG: x / pH: x  Gluc: 127 mg/dL / Ketone: x  / Bili: x / Urobili: x   Blood: x / Protein: x / Nitrite: x   Leuk Esterase: x / RBC: x / WBC x   Sq Epi: x / Non Sq Epi: x / Bacteria: x        RADIOLOGY & ADDITIONAL TESTS: Reviewed.   INTERVAL HPI/ OVERNIGHT EVENTS: remained on low dose levophed overnight. Eliquis was restarted this morning. SpO2 stable on RA since 2000 yesterday.     SUBJECTIVE: Patient seen and examined at bedside.  Offers no complaints. States he feels improved since yesterday. Pleasantly confused at times , states he thinks he "drank too much gin" however aid at bedside states he does not drink alcohol.       VITAL SIGNS:  ICU Vital Signs Last 24 Hrs  T(C): 37.2 (10 Aug 2023 04:00), Max: 37.2 (09 Aug 2023 18:30)  T(F): 98.9 (10 Aug 2023 04:00), Max: 99 (09 Aug 2023 18:30)  HR: 68 (10 Aug 2023 06:00) (52 - 77)  BP: 117/45 (10 Aug 2023 06:00) (75/32 - 151/129)  BP(mean): 62 (10 Aug 2023 06:00) (42 - 123)  ABP: --  ABP(mean): --  RR: 13 (10 Aug 2023 06:00) (10 - 23)  SpO2: 99% (10 Aug 2023 06:00) (86% - 100%)    O2 Parameters below as of 10 Aug 2023 06:00  Patient On (Oxygen Delivery Method): room air        Plateau pressure:   P/F ratio:     08-09 @ 07:01  -  08-10 @ 06:37  --------------------------------------------------------  IN: 993.2 mL / OUT: 829 mL / NET: 164.2 mL      CAPILLARY BLOOD GLUCOSE      POCT Blood Glucose.: 148 mg/dL (10 Aug 2023 06:05)      PHYSICAL EXAM:    General:   GENERAL: NAD, lying in bed  EYES: EOMI, PERRLA  ENT: Moist mucous membranes  HEART: S1, S2, regular rate and rhythm, no murmurs, rubs, or gallops  LUNGS: On BiPAP, decreased breath sounds in the left base, no crackles or wheezing appreciated   ABDOMEN: Soft, nontender  EXTREMITIES: 2+ peripheral pulses bilaterally. 2+ pitting edema to the knee   NEURO: A&Ox1, pleasantly confused at times. Follows commands, moves extremities spontaneously.       MEDICATIONS:  MEDICATIONS  (STANDING):  apixaban 2.5 milliGRAM(s) Oral two times a day  carbidopa/levodopa  25/100 1.5 Tablet(s) Oral four times a day  chlorhexidine 2% Cloths 1 Application(s) Topical daily  chlorhexidine 2% Cloths 1 Application(s) Topical daily  dextrose 5%. 1000 milliLiter(s) (50 mL/Hr) IV Continuous <Continuous>  dextrose 5%. 1000 milliLiter(s) (100 mL/Hr) IV Continuous <Continuous>  dextrose 50% Injectable 25 Gram(s) IV Push once  dextrose 50% Injectable 25 Gram(s) IV Push once  dextrose 50% Injectable 12.5 Gram(s) IV Push once  glucagon  Injectable 1 milliGRAM(s) IntraMuscular once  insulin lispro (ADMELOG) corrective regimen sliding scale   SubCutaneous every 6 hours  midodrine. 10 milliGRAM(s) Oral three times a day  norepinephrine Infusion 0.05 MICROgram(s)/kG/Min (8.23 mL/Hr) IV Continuous <Continuous>  piperacillin/tazobactam IVPB.. 3.375 Gram(s) IV Intermittent every 12 hours    MEDICATIONS  (PRN):  acetaminophen     Tablet .. 650 milliGRAM(s) Oral every 6 hours PRN Temp greater or equal to 38C (100.4F), Mild Pain (1 - 3)  aluminum hydroxide/magnesium hydroxide/simethicone Suspension 30 milliLiter(s) Oral every 4 hours PRN Dyspepsia  dextrose Oral Gel 15 Gram(s) Oral once PRN Blood Glucose LESS THAN 70 milliGRAM(s)/deciliter  melatonin 3 milliGRAM(s) Oral at bedtime PRN Insomnia  ondansetron Injectable 4 milliGRAM(s) IV Push every 8 hours PRN Nausea and/or Vomiting  sodium chloride 0.9% Bolus. 100 milliLiter(s) IV Bolus every 5 minutes PRN SBP LESS THAN or EQUAL to 90 mmHg      ALLERGIES:  Allergies    No Known Allergies    Intolerances        LABS:                        7.5    9.20  )-----------( 227      ( 10 Aug 2023 01:10 )             24.0     08-10    135  |  101  |  67<H>  ----------------------------<  127<H>  5.0   |  17<L>  |  4.17<H>    Ca    9.4      10 Aug 2023 01:10  Phos  4.7     08-10  Mg     2.70     08-10    TPro  6.5  /  Alb  3.8  /  TBili  0.2  /  DBili  x   /  AST  14  /  ALT  11  /  AlkPhos  57  08-10    PT/INR - ( 10 Aug 2023 01:10 )   PT: 16.4 sec;   INR: 1.47 ratio         PTT - ( 10 Aug 2023 01:10 )  PTT:36.4 sec  Urinalysis Basic - ( 10 Aug 2023 01:10 )    Color: x / Appearance: x / SG: x / pH: x  Gluc: 127 mg/dL / Ketone: x  / Bili: x / Urobili: x   Blood: x / Protein: x / Nitrite: x   Leuk Esterase: x / RBC: x / WBC x   Sq Epi: x / Non Sq Epi: x / Bacteria: x        RADIOLOGY & ADDITIONAL TESTS: Reviewed.

## 2023-08-10 NOTE — PROGRESS NOTE ADULT - ASSESSMENT
====================ASSESSMENT======================  83 yo M with HTN, A fib on Eliquis, CKD (baseline 1.2-1.4), bradycardia s/p ppm, CHFpEF, pleural effusions s/p R VATS and pleurX 3/2022, Parkinson's presenting with shortness of breath found to have ALAYNA on CKD c/b fluid overload and hyperkalemia. Admitted to MICU for urgent HD i/s/o hypotension.     Plan:  ====================NEUROLOGY=======================  #Dementia  AOx1 at baseline  Following commands and responding to questions, at baseline MS     #Parkinsons disease   - c/w carbidopa-levidopa     ====================RESPIRATORY======================  #Pleural effusions    Likely in the setting of CHF vs renal failure vs less likely malignancy (negative cytology outpt)  PleurX catheter placed 3/2022, drainage 3x a week   CT chest showed new 2.3 cm right middle lobe nodule, possible malignancy? and NEW moderate left pleural effusion with complete left lowerlobe compressive atelectasis  Respiratory acidosis on admission (VBG 46)   - c/w BiPAP 12/5  - c/w Pleurx drainage   - urgent HD for fluid overload   ====================CARDIOVASCULAR==================  #Bradycardia s/p PPM   #CHFpEF   #Afib   SPECT scan with amyloidosis outpatient, may be contributing to CHFpEF   TTE 1/2023 EF 73%, diastolic dysfunction and mod pHTN   EKG showing paced ventricular rhythm, no T wave abnormalities, given Ca gluconate x2 in ED   - repeat TTE     #Hypotension   Possibly cardiogenic 2/2 fluid overload   - HD     ====================/RENAL========================  #ALAYNA on CKD  #metabolic acidosis with AG, concomittant resp acidosis   I/S/o cardiorenal vs pre-renal vs obstructive   Baseline Cr 1.2-1.4   S/p NaHCO3, Ca gluconate and insulin  Nephrology consulted, plans for urgent HD pending shiley placement   - f/u ultrasound kidney/bladder for obstruction   - f/u urine lytes and P/C  - strict I/Os     ====================GI/NUTRITION=====================  Diet: NPO while on BiPAP     ====================INFECTIOUS DISEASE================  No fever, increase leukocytosis may be stress related vs infection. CT chest with GGO  Previous pleural fluid culture negative   - started empiric zosyn   - f/u urine and blood culture   - MRSA PCR     ====================ENDOCRINE=======================  BG levels well controlled  - maintain -200     ====================HEMATOLOGIC/DVT PPx=============  On home eliquis 5 BID, last taken 8/8 at 6pm - holding   Start DVT ppx post procedure  - given PCC for eliquis reversal pending shiley     ====================ETHICS===========================  GOC: patient with prior MOLST DNR/DNI. Upon discussion with wife and daughter, decision was   ====================ASSESSMENT======================  81 yo M with HTN, A fib on Eliquis, CKD (baseline 1.2-1.4), bradycardia s/p ppm, CHFpEF, pleural effusions s/p R VATS and pleurX 3/2022, Parkinson's presenting with shortness of breath found to have ALAYNA on CKD c/b fluid overload and hyperkalemia. Admitted to MICU for urgent HD i/s/o hypotension.     Plan:  ====================NEUROLOGY=======================  #Dementia  AOx1 at baseline  - Following commands and responding to questions, at baseline MS    #Parkinson's disease   - c/w carbidopa-levidopa      ====================RESPIRATORY======================  #Pleural effusions    Likely in the setting of CHF vs renal failure vs less likely malignancy (negative cytology outpt)  PleurX catheter placed 3/2022, drainage 3x a week   CT chest showed new 2.3 cm right middle lobe nodule, possible malignancy? and NEW moderate left pleural effusion with complete left lowerlobe compressive atelectasis  Respiratory acidosis on admission (VBG 46)   - s/p BIPAP, now tolerating RA  - c/w Pleurx drainage - 500 output in 24hrs  - urgent HD for fluid overload    ====================CARDIOVASCULAR==================  #Bradycardia s/p PPM   #CHFpEF   #Afib   SPECT scan with amyloidosis outpatient, may be contributing to CHFpEF   TTE 1/2023 EF 73%, diastolic dysfunction and mod pHTN   EKG showing paced ventricular rhythm, no T wave abnormalities, given Ca gluconate x2 in ED   - repeat TTE pending    #Hypotension   Possibly cardiogenic 2/2 fluid overload   - remains on low dose levophed, MAP goal >65, wean as tolerated  - HD     ====================/RENAL========================  #ALAYNA on CKD  #metabolic acidosis with AG, concomittant resp acidosis   I/S/o cardiorenal vs pre-renal vs obstructive   Baseline Cr 1.2-1.4   S/p NaHCO3, Ca gluconate and insulin  Nephrology consulted, plans for urgent HD pending shiley placement   - s/p 1st session of HD 8/9 w/ 1.5L fluid removal. Per nephro plan for repeat session today.   - f/u ultrasound kidney/bladder for obstruction   - f/u urine lytes and P/C  - strict I/Os     ====================GI/NUTRITION=====================  Diet: Diet Soft and Bite Sized ordered    ====================INFECTIOUS DISEASE================  No fever, increase leukocytosis may be stress related vs infection. CT chest with GGO  Previous pleural fluid culture negative   - started empiric zosyn   - f/u urine and blood culture , MRSA PCR     ====================ENDOCRINE=======================  A1C 5.4%  - BG 140s     ====================HEMATOLOGIC/DVT PPx=============  On home eliquis 5 BID, last taken 8/8 at 6pm - held for shiley placement, now resulted 8/10  - given PCC for eliquis reversal prior to shiley    ====================ETHICS===========================  GOC: patient with prior MOLST DNR/DNI. Upon discussion with wife and daughter, decision was   ====================ASSESSMENT======================  83 yo M with HTN, A fib on Eliquis, CKD (baseline 1.2-1.4), bradycardia s/p ppm, CHFpEF, pleural effusions s/p R VATS and pleurX 3/2022, Parkinson's presenting with shortness of breath found to have ALAYNA on CKD c/b fluid overload and hyperkalemia. Admitted to MICU for urgent HD i/s/o hypotension.     Plan:  ====================NEUROLOGY=======================  #Dementia  AOx1 at baseline  - Following commands and responding to questions, at baseline MS    #Parkinson's disease   - c/w carbidopa-levidopa      ====================RESPIRATORY======================  #Pleural effusions    Likely in the setting of CHF vs renal failure vs less likely malignancy (negative cytology outpt)  PleurX catheter placed 3/2022, drainage 3x a week   CT chest showed new 2.3 cm right middle lobe nodule, possible malignancy? and NEW moderate left pleural effusion with complete left lowerlobe compressive atelectasis  Respiratory acidosis on admission (VBG 46)   - s/p BIPAP, now tolerating RA  - c/w Pleurx drainage - 500 output in 24hrs  - urgent HD for fluid overload    ====================CARDIOVASCULAR==================  #Bradycardia s/p PPM   #CHFpEF   #Afib   SPECT scan with amyloidosis outpatient, may be contributing to CHFpEF   TTE 1/2023 EF 73%, diastolic dysfunction and mod pHTN   EKG showing paced ventricular rhythm, no T wave abnormalities, given Ca gluconate x2 in ED   - TTE 8/10: Minimal mitral regurgitation. Moderate aortic regurgitation. Moderate concentric left ventricular hypertrophy. Endocardium not well visualized; grossly normal left ventricular systolic function. Increased E/e is consistent with elevated left ventricular filling pressure. Normal right ventricular size with reduced function. Adevice wire is noted in the right ventricle. Consistent with mild pulmonary hypertension. Left pleural effusion.  #Hypotension   Possibly cardiogenic 2/2 fluid overload   - remains on low dose levophed, MAP goal >65, wean as tolerated  - HD     ====================/RENAL========================  #ALAYNA on CKD  #metabolic acidosis with AG, concomittant resp acidosis   I/S/o cardiorenal vs pre-renal vs obstructive   Baseline Cr 1.2-1.4   S/p NaHCO3, Ca gluconate and insulin  Nephrology consulted, plans for urgent HD pending shiley placement   - s/p 1st session of HD 8/9 w/ 1.5L fluid removal. Per nephro plan for repeat session today and likely repeat session tomorrow.   - f/u ultrasound kidney/bladder for obstruction   - f/u urine lytes and P/C  - strict I/Os     ====================GI/NUTRITION=====================  Diet: Diet Soft and Bite Sized ordered    ====================INFECTIOUS DISEASE================  No fever, increase leukocytosis may be stress related vs infection. CT chest with GGO  Previous pleural fluid culture negative   - started empiric zosyn   - f/u urine and blood culture , MRSA PCR     ====================ENDOCRINE=======================  A1C 5.4%  - BG 140s     ====================HEMATOLOGIC/DVT PPx=============  On home eliquis 5 BID, last taken 8/8 at 6pm - held for shiley placement, now resulted 8/10  - given PCC for eliquis reversal prior to shiley    ====================ETHICS===========================  GOC: patient with prior MOLST DNR/DNI. Upon discussion with wife and daughter, decision was

## 2023-08-10 NOTE — PROGRESS NOTE ADULT - SUBJECTIVE AND OBJECTIVE BOX
[facility] Division of Kidney Diseases & Hypertension  FOLLOW UP NOTE  [phone]--------------------------------------------------------------------------------  Chief Complaint: Other abnormality of breathing  83yo Male w/ PMH HTN, Atrial fibrillation on Eliquis, bradycardia s/p ppm, CHF, pleural effusions s/p R pleurX, Parkinson's presenting with shortness of breath. Nephrology consulted for hyperkalemia in the setting of ALAYNA.     24 hour events/subjective: Patient seen and examined at bedside. More alert today. Reports feeling great. Not sure what happened, but believes she started feeling unlike himself ever since her was diagnosed with UTI. Currently without any complaints. Denies fevers, chills, nausea, vomiting, abdominal pain, SOB, CP.     PAST HISTORY  --------------------------------------------------------------------------------  No significant changes to PMH, PSH, FHx, SHx, unless otherwise noted    ALLERGIES & MEDICATIONS  --------------------------------------------------------------------------------  Allergies  No Known Allergies  Intolerances    Standing Inpatient Medications  apixaban 2.5 milliGRAM(s) Oral two times a day  carbidopa/levodopa  25/100 1.5 Tablet(s) Oral four times a day  chlorhexidine 2% Cloths 1 Application(s) Topical daily  chlorhexidine 2% Cloths 1 Application(s) Topical daily  dextrose 5%. 1000 milliLiter(s) IV Continuous <Continuous>  dextrose 5%. 1000 milliLiter(s) IV Continuous <Continuous>  dextrose 50% Injectable 25 Gram(s) IV Push once  dextrose 50% Injectable 25 Gram(s) IV Push once  dextrose 50% Injectable 12.5 Gram(s) IV Push once  glucagon  Injectable 1 milliGRAM(s) IntraMuscular once  insulin lispro (ADMELOG) corrective regimen sliding scale   SubCutaneous every 6 hours  midodrine. 10 milliGRAM(s) Oral three times a day  norepinephrine Infusion 0.05 MICROgram(s)/kG/Min IV Continuous <Continuous>  piperacillin/tazobactam IVPB.. 3.375 Gram(s) IV Intermittent every 12 hours  PRN Inpatient Medications  acetaminophen     Tablet .. 650 milliGRAM(s) Oral every 6 hours PRN  aluminum hydroxide/magnesium hydroxide/simethicone Suspension 30 milliLiter(s) Oral every 4 hours PRN  dextrose Oral Gel 15 Gram(s) Oral once PRN  melatonin 3 milliGRAM(s) Oral at bedtime PRN  ondansetron Injectable 4 milliGRAM(s) IV Push every 8 hours PRN  sodium chloride 0.9% Bolus. 100 milliLiter(s) IV Bolus every 5 minutes PRN    REVIEW OF SYSTEMS  --------------------------------------------------------------------------------  see HPI  All other systems were reviewed and are negative, except as noted.    VITALS/PHYSICAL EXAM  --------------------------------------------------------------------------------  T(C): 37.1 (08-10-23 @ 08:00), Max: 37.2 (08-09-23 @ 18:30)  HR: 68 (08-10-23 @ 09:00) (55 - 77)  BP: 124/48 (08-10-23 @ 09:00) (75/32 - 154/105)  RR: 11 (08-10-23 @ 09:00) (10 - 23)  SpO2: 98% (08-10-23 @ 09:00) (86% - 100%)  Wt(kg): --  Height (cm): 172.7 (08-09-23 @ 12:21)  Weight (kg): 87.8 (08-09-23 @ 12:21)  BMI (kg/m2): 29.4 (08-09-23 @ 12:21)  BSA (m2): 2.02 (08-09-23 @ 12:21)    08-09-23 @ 07:01  -  08-10-23 @ 07:00  --------------------------------------------------------  IN: 1119.6 mL / OUT: 854 mL / NET: 265.6 mL    08-10-23 @ 07:01  -  08-10-23 @ 09:30  --------------------------------------------------------  IN: 19.6 mL / OUT: 60 mL / NET: -40.4 mL      Physical Exam:  Gen: NAD, well-appearing  HEENT: PERRL  Pulm: CTA B/L  CV: RRR, S1S2;  Abd: +BS, soft, nontender/nondistended  : No suprapubic tenderness, +means  Extremities: +bilateral LE edema noted.   Neuro: No focal deficits, Awake   Skin: Warm, without rashes  Vascular access: RIJ non tunneled HD     LABS/STUDIES  --------------------------------------------------------------------------------              7.5    9.20  >-----------<  227      [08-10-23 @ 01:10]              24.0     135  |  101  |  67  ----------------------------<  127      [08-10-23 @ 01:10]  5.0   |  17  |  4.17        Ca     9.4     [08-10-23 @ 01:10]      Mg     2.70     [08-10-23 @ 01:10]      Phos  4.7     [08-10-23 @ 01:10]    TPro  6.5  /  Alb  3.8  /  TBili  0.2  /  DBili  x   /  AST  14  /  ALT  11  /  AlkPhos  57  [08-10-23 @ 01:10]    PT/INR: PT 16.4 , INR 1.47       [08-10-23 @ 01:10]  PTT: 36.4       [08-10-23 @ 01:10]    Creatinine Trend:  SCr 4.17 [08-10 @ 01:10]  SCr 3.56 [08-09 @ 17:50]  SCr 4.87 [08-09 @ 11:19]  SCr 4.79 [08-09 @ 07:00]  SCr 4.50 [08-09 @ 05:15]    Urine Creatinine 90      [08-09-23 @ 14:24]  Urine Protein 164      [08-09-23 @ 14:24]  Urine Sodium 22      [08-09-23 @ 14:24]  Urine Urea Nitrogen 329.0      [08-09-23 @ 14:24]  Urine Potassium 85.2      [08-09-23 @ 14:24]  Urine Osmolality 369      [08-09-23 @ 14:24]    HBsAb <3.0      [08-09-23 @ 13:53]     [facility] Division of Kidney Diseases & Hypertension  FOLLOW UP NOTE  [phone]--------------------------------------------------------------------------------    Chief Complaint: Other abnormality of breathing    83yo Male w/ PMH HTN, Atrial fibrillation on Eliquis, bradycardia s/p ppm, CHF, pleural effusions s/p R pleurX, Parkinson's presenting with shortness of breath. Nephrology consulted for hyperkalemia in the setting of ALAYNA.     24 hour events/subjective: Patient seen and examined at bedside. More alert today. Reports feeling great. Not sure what happened, but believes she started feeling unlike himself ever since her was diagnosed with UTI. Currently without any complaints. Denies fevers, chills, nausea, vomiting, abdominal pain, SOB, CP.     PAST HISTORY  --------------------------------------------------------------------------------  No significant changes to PMH, PSH, FHx, SHx, unless otherwise noted    ALLERGIES & MEDICATIONS  --------------------------------------------------------------------------------  Allergies  No Known Allergies  Intolerances    Standing Inpatient Medications  apixaban 2.5 milliGRAM(s) Oral two times a day  carbidopa/levodopa  25/100 1.5 Tablet(s) Oral four times a day  chlorhexidine 2% Cloths 1 Application(s) Topical daily  chlorhexidine 2% Cloths 1 Application(s) Topical daily  dextrose 5%. 1000 milliLiter(s) IV Continuous <Continuous>  dextrose 5%. 1000 milliLiter(s) IV Continuous <Continuous>  dextrose 50% Injectable 25 Gram(s) IV Push once  dextrose 50% Injectable 25 Gram(s) IV Push once  dextrose 50% Injectable 12.5 Gram(s) IV Push once  glucagon  Injectable 1 milliGRAM(s) IntraMuscular once  insulin lispro (ADMELOG) corrective regimen sliding scale   SubCutaneous every 6 hours  midodrine. 10 milliGRAM(s) Oral three times a day  norepinephrine Infusion 0.05 MICROgram(s)/kG/Min IV Continuous <Continuous>  piperacillin/tazobactam IVPB.. 3.375 Gram(s) IV Intermittent every 12 hours  PRN Inpatient Medications  acetaminophen     Tablet .. 650 milliGRAM(s) Oral every 6 hours PRN  aluminum hydroxide/magnesium hydroxide/simethicone Suspension 30 milliLiter(s) Oral every 4 hours PRN  dextrose Oral Gel 15 Gram(s) Oral once PRN  melatonin 3 milliGRAM(s) Oral at bedtime PRN  ondansetron Injectable 4 milliGRAM(s) IV Push every 8 hours PRN  sodium chloride 0.9% Bolus. 100 milliLiter(s) IV Bolus every 5 minutes PRN    REVIEW OF SYSTEMS  --------------------------------------------------------------------------------  see HPI  All other systems were reviewed and are negative, except as noted.    VITALS/PHYSICAL EXAM  --------------------------------------------------------------------------------  T(C): 37.1 (08-10-23 @ 08:00), Max: 37.2 (08-09-23 @ 18:30)  HR: 68 (08-10-23 @ 09:00) (55 - 77)  BP: 124/48 (08-10-23 @ 09:00) (75/32 - 154/105)  RR: 11 (08-10-23 @ 09:00) (10 - 23)  SpO2: 98% (08-10-23 @ 09:00) (86% - 100%)  Wt(kg): --  Height (cm): 172.7 (08-09-23 @ 12:21)  Weight (kg): 87.8 (08-09-23 @ 12:21)  BMI (kg/m2): 29.4 (08-09-23 @ 12:21)  BSA (m2): 2.02 (08-09-23 @ 12:21)    08-09-23 @ 07:01  -  08-10-23 @ 07:00  --------------------------------------------------------  IN: 1119.6 mL / OUT: 854 mL / NET: 265.6 mL    08-10-23 @ 07:01  -  08-10-23 @ 09:30  --------------------------------------------------------  IN: 19.6 mL / OUT: 60 mL / NET: -40.4 mL    Physical Exam:  Gen: NAD, well-appearing  HEENT: anicteric  Pulm: CTA B/L  CV: RRR, S1S2;  Abd: +BS, soft, nontender/nondistended  : No suprapubic tenderness, +means  Extremities: +bilateral LE edema noted.   Neuro: No focal deficits, Awake   Skin: Warm, without rashes  Vascular access: RIJ non tunneled HD     LABS/STUDIES  --------------------------------------------------------------------------------              7.5    9.20  >-----------<  227      [08-10-23 @ 01:10]              24.0     135  |  101  |  67  ----------------------------<  127      [08-10-23 @ 01:10]  5.0   |  17  |  4.17        Ca     9.4     [08-10-23 @ 01:10]      Mg     2.70     [08-10-23 @ 01:10]      Phos  4.7     [08-10-23 @ 01:10]    TPro  6.5  /  Alb  3.8  /  TBili  0.2  /  DBili  x   /  AST  14  /  ALT  11  /  AlkPhos  57  [08-10-23 @ 01:10]    PT/INR: PT 16.4 , INR 1.47       [08-10-23 @ 01:10]  PTT: 36.4       [08-10-23 @ 01:10]    Creatinine Trend:  SCr 4.17 [08-10 @ 01:10]  SCr 3.56 [08-09 @ 17:50]  SCr 4.87 [08-09 @ 11:19]  SCr 4.79 [08-09 @ 07:00]  SCr 4.50 [08-09 @ 05:15]    Urine Creatinine 90      [08-09-23 @ 14:24]  Urine Protein 164      [08-09-23 @ 14:24]  Urine Sodium 22      [08-09-23 @ 14:24]  Urine Urea Nitrogen 329.0      [08-09-23 @ 14:24]  Urine Potassium 85.2      [08-09-23 @ 14:24]  Urine Osmolality 369      [08-09-23 @ 14:24]    HBsAb <3.0      [08-09-23 @ 13:53]

## 2023-08-10 NOTE — PROGRESS NOTE ADULT - PROBLEM SELECTOR PLAN 1
Pt. w/ ALAYNA likely hemodynamic mediated and medication SE (TMP/SMX and Entresto). On review of sunrise/HIE patient likely has CKD stage 3A with baseline Scr 1.3-1.6. Last outpt labs reviewed on HIE showing Scr 1.64 on 4/20/23. Renal US 1/25/23 negative. On admission Scr 4.54, K 6.4, and SCO2 14. Pt received IHD 8/9. Today labs reviewed, Scr 4.17 elevated, K 5.0, and SCO2 17. 24hr UOP 275cc. Pt. remains on vasopressors, but off BIPAP. Saturating well on RA. Plan for IHD today. HD consent in chart. Obtain kidney and bladder US, ECHO. Continue to hold Entresto and TMP/SMX. Monitor labs, VS and urine output. Avoid nephrotoxins. Dose medications as per eGFR. Pt. w/ ALAYNA likely hemodynamic mediated and medication SE (TMP/SMX and Entresto). On review of sunrise/HIE patient likely has CKD stage 3A with baseline Scr 1.3-1.6. Last outpt labs reviewed on HIE showing Scr 1.64 on 4/20/23. Renal US 1/25/23 negative. On admission Scr 4.54, K 6.4, and SCO2 14. Pt received IHD 8/9. Today labs reviewed, Scr 4.17 elevated, K 5.0, and SCO2 17. 24hr UOP 275cc. Pt. remains on vasopressors, but off BIPAP. Saturating well on RA. Plan for IHD today. HD consent in chart. Obtain kidney and bladder US. Continue to hold Entresto and TMP/SMX. Monitor labs, VS and urine output. Avoid nephrotoxins. Dose medications as per eGFR.

## 2023-08-10 NOTE — PROGRESS NOTE ADULT - CRITICAL CARE ATTENDING COMMENT
Agree with above  82M PMH cardiac amyloid (?ATTR) with HFpEF (59%) a/w chronic pleural effusions (s/p Pleur-X placement on R), bradycardia s/p PPM now p/w ALAYNA (hyperkalemia and acidosis) a/w hypotension requiring pressor support during IHD.  Now s/p HD x 1, getting again today (x2)    ### BUN / Creatinine:  08-10 @ 01:10: 67 mg/dL / 4.17 mg/dL  08-09 @ 17:50: 57 mg/dL / 3.56 mg/dL  08-09 @ 11:19: 87 mg/dL / 4.87 mg/dL  08-08 @ 23:40: 86 mg/dL / 4.54 mg/dL    Making small amounts of urine (~30cc/hr)  Awake alert responsive  Remains on pressors  POCU/S: underfilled LV, VTI 20; moderate pleural effusion on L, trace effusion on R with subjacent consolidation B/L  - Further HD per nephro  - c/w midodrine 10mg TID  - will give one dose of albumin 5% in order to attempt to get off pressors, given preserved EF and underfilled LV  - empiric Zosyn, would not continue for more than 3 days in the absence of infectious signs (patient has relatively chronic non-productive cough, afebrile no leukocytosis no positive cultures)  - Patient will remain in ICU while in undifferentiated shock state requiring pressors

## 2023-08-11 LAB
ALBUMIN SERPL ELPH-MCNC: 3.6 G/DL — SIGNIFICANT CHANGE UP (ref 3.3–5)
ALP SERPL-CCNC: 51 U/L — SIGNIFICANT CHANGE UP (ref 40–120)
ALT FLD-CCNC: 7 U/L — SIGNIFICANT CHANGE UP (ref 4–41)
ANION GAP SERPL CALC-SCNC: 14 MMOL/L — SIGNIFICANT CHANGE UP (ref 7–14)
APTT BLD: 34.3 SEC — SIGNIFICANT CHANGE UP (ref 24.5–35.6)
AST SERPL-CCNC: 22 U/L — SIGNIFICANT CHANGE UP (ref 4–40)
BASE EXCESS BLDV CALC-SCNC: -2.1 MMOL/L — LOW (ref -2–3)
BASOPHILS # BLD AUTO: 0.06 K/UL — SIGNIFICANT CHANGE UP (ref 0–0.2)
BASOPHILS NFR BLD AUTO: 0.7 % — SIGNIFICANT CHANGE UP (ref 0–2)
BILIRUB SERPL-MCNC: 0.3 MG/DL — SIGNIFICANT CHANGE UP (ref 0.2–1.2)
BLOOD GAS VENOUS COMPREHENSIVE RESULT: SIGNIFICANT CHANGE UP
BUN SERPL-MCNC: 50 MG/DL — HIGH (ref 7–23)
CALCIUM SERPL-MCNC: 8.9 MG/DL — SIGNIFICANT CHANGE UP (ref 8.4–10.5)
CHLORIDE BLDV-SCNC: 104 MMOL/L — SIGNIFICANT CHANGE UP (ref 96–108)
CHLORIDE SERPL-SCNC: 102 MMOL/L — SIGNIFICANT CHANGE UP (ref 98–107)
CO2 BLDV-SCNC: 24.5 MMOL/L — SIGNIFICANT CHANGE UP (ref 22–26)
CO2 SERPL-SCNC: 22 MMOL/L — SIGNIFICANT CHANGE UP (ref 22–31)
CREAT SERPL-MCNC: 3.28 MG/DL — HIGH (ref 0.5–1.3)
CULTURE RESULTS: NO GROWTH — SIGNIFICANT CHANGE UP
EGFR: 18 ML/MIN/1.73M2 — LOW
EOSINOPHIL # BLD AUTO: 0 K/UL — SIGNIFICANT CHANGE UP (ref 0–0.5)
EOSINOPHIL NFR BLD AUTO: 0 % — SIGNIFICANT CHANGE UP (ref 0–6)
GAS PNL BLDV: 136 MMOL/L — SIGNIFICANT CHANGE UP (ref 136–145)
GLUCOSE BLDV-MCNC: 114 MG/DL — HIGH (ref 70–99)
GLUCOSE SERPL-MCNC: 113 MG/DL — HIGH (ref 70–99)
HCO3 BLDV-SCNC: 23 MMOL/L — SIGNIFICANT CHANGE UP (ref 22–29)
HCT VFR BLD CALC: 23.6 % — LOW (ref 39–50)
HCT VFR BLDA CALC: 23 % — LOW (ref 39–51)
HGB BLD CALC-MCNC: 7.5 G/DL — LOW (ref 12.6–17.4)
HGB BLD-MCNC: 7.3 G/DL — LOW (ref 13–17)
IANC: 6.05 K/UL — SIGNIFICANT CHANGE UP (ref 1.8–7.4)
IMM GRANULOCYTES NFR BLD AUTO: 0.4 % — SIGNIFICANT CHANGE UP (ref 0–0.9)
INR BLD: 1.71 RATIO — HIGH (ref 0.85–1.18)
LACTATE BLDV-MCNC: 1.5 MMOL/L — SIGNIFICANT CHANGE UP (ref 0.5–2)
LYMPHOCYTES # BLD AUTO: 1.13 K/UL — SIGNIFICANT CHANGE UP (ref 1–3.3)
LYMPHOCYTES # BLD AUTO: 13.7 % — SIGNIFICANT CHANGE UP (ref 13–44)
MAGNESIUM SERPL-MCNC: 2.5 MG/DL — SIGNIFICANT CHANGE UP (ref 1.6–2.6)
MCHC RBC-ENTMCNC: 25.4 PG — LOW (ref 27–34)
MCHC RBC-ENTMCNC: 30.9 GM/DL — LOW (ref 32–36)
MCV RBC AUTO: 82.2 FL — SIGNIFICANT CHANGE UP (ref 80–100)
MONOCYTES # BLD AUTO: 0.98 K/UL — HIGH (ref 0–0.9)
MONOCYTES NFR BLD AUTO: 11.9 % — SIGNIFICANT CHANGE UP (ref 2–14)
NEUTROPHILS # BLD AUTO: 6.05 K/UL — SIGNIFICANT CHANGE UP (ref 1.8–7.4)
NEUTROPHILS NFR BLD AUTO: 73.3 % — SIGNIFICANT CHANGE UP (ref 43–77)
NRBC # BLD: 0 /100 WBCS — SIGNIFICANT CHANGE UP (ref 0–0)
NRBC # FLD: 0 K/UL — SIGNIFICANT CHANGE UP (ref 0–0)
PCO2 BLDV: 41 MMHG — LOW (ref 42–55)
PH BLDV: 7.36 — SIGNIFICANT CHANGE UP (ref 7.32–7.43)
PHOSPHATE SERPL-MCNC: 3.7 MG/DL — SIGNIFICANT CHANGE UP (ref 2.5–4.5)
PLATELET # BLD AUTO: 196 K/UL — SIGNIFICANT CHANGE UP (ref 150–400)
PO2 BLDV: 28 MMHG — SIGNIFICANT CHANGE UP (ref 25–45)
POTASSIUM BLDV-SCNC: 3.8 MMOL/L — SIGNIFICANT CHANGE UP (ref 3.5–5.1)
POTASSIUM SERPL-MCNC: 4 MMOL/L — SIGNIFICANT CHANGE UP (ref 3.5–5.3)
POTASSIUM SERPL-SCNC: 4 MMOL/L — SIGNIFICANT CHANGE UP (ref 3.5–5.3)
PROT SERPL-MCNC: 6.1 G/DL — SIGNIFICANT CHANGE UP (ref 6–8.3)
PROTHROM AB SERPL-ACNC: 19 SEC — HIGH (ref 9.5–13)
RBC # BLD: 2.87 M/UL — LOW (ref 4.2–5.8)
RBC # FLD: 21.1 % — HIGH (ref 10.3–14.5)
SAO2 % BLDV: 44.1 % — LOW (ref 67–88)
SODIUM SERPL-SCNC: 138 MMOL/L — SIGNIFICANT CHANGE UP (ref 135–145)
SPECIMEN SOURCE: SIGNIFICANT CHANGE UP
WBC # BLD: 8.25 K/UL — SIGNIFICANT CHANGE UP (ref 3.8–10.5)
WBC # FLD AUTO: 8.25 K/UL — SIGNIFICANT CHANGE UP (ref 3.8–10.5)

## 2023-08-11 PROCEDURE — 99233 SBSQ HOSP IP/OBS HIGH 50: CPT

## 2023-08-11 PROCEDURE — 99291 CRITICAL CARE FIRST HOUR: CPT

## 2023-08-11 PROCEDURE — 76770 US EXAM ABDO BACK WALL COMP: CPT | Mod: 26

## 2023-08-11 RX ORDER — ALBUMIN HUMAN 25 %
250 VIAL (ML) INTRAVENOUS ONCE
Refills: 0 | Status: COMPLETED | OUTPATIENT
Start: 2023-08-11 | End: 2023-08-11

## 2023-08-11 RX ADMIN — APIXABAN 2.5 MILLIGRAM(S): 2.5 TABLET, FILM COATED ORAL at 17:35

## 2023-08-11 RX ADMIN — CARBIDOPA AND LEVODOPA 1.5 TABLET(S): 25; 100 TABLET ORAL at 06:53

## 2023-08-11 RX ADMIN — CARBIDOPA AND LEVODOPA 1.5 TABLET(S): 25; 100 TABLET ORAL at 11:10

## 2023-08-11 RX ADMIN — MIDODRINE HYDROCHLORIDE 10 MILLIGRAM(S): 2.5 TABLET ORAL at 06:50

## 2023-08-11 RX ADMIN — PIPERACILLIN AND TAZOBACTAM 25 GRAM(S): 4; .5 INJECTION, POWDER, LYOPHILIZED, FOR SOLUTION INTRAVENOUS at 06:32

## 2023-08-11 RX ADMIN — MIDODRINE HYDROCHLORIDE 10 MILLIGRAM(S): 2.5 TABLET ORAL at 17:37

## 2023-08-11 RX ADMIN — CHLORHEXIDINE GLUCONATE 1 APPLICATION(S): 213 SOLUTION TOPICAL at 11:08

## 2023-08-11 RX ADMIN — Medication 3 MILLIGRAM(S): at 21:11

## 2023-08-11 RX ADMIN — CARBIDOPA AND LEVODOPA 1.5 TABLET(S): 25; 100 TABLET ORAL at 23:05

## 2023-08-11 RX ADMIN — PIPERACILLIN AND TAZOBACTAM 25 GRAM(S): 4; .5 INJECTION, POWDER, LYOPHILIZED, FOR SOLUTION INTRAVENOUS at 17:34

## 2023-08-11 RX ADMIN — CARBIDOPA AND LEVODOPA 1.5 TABLET(S): 25; 100 TABLET ORAL at 17:35

## 2023-08-11 RX ADMIN — MIDODRINE HYDROCHLORIDE 10 MILLIGRAM(S): 2.5 TABLET ORAL at 11:10

## 2023-08-11 RX ADMIN — Medication 500 MILLILITER(S): at 11:43

## 2023-08-11 RX ADMIN — Medication 8.23 MICROGRAM(S)/KG/MIN: at 07:38

## 2023-08-11 RX ADMIN — OLANZAPINE 2.5 MILLIGRAM(S): 15 TABLET, FILM COATED ORAL at 21:11

## 2023-08-11 RX ADMIN — Medication 8.23 MICROGRAM(S)/KG/MIN: at 20:11

## 2023-08-11 RX ADMIN — Medication 500 MILLILITER(S): at 11:09

## 2023-08-11 RX ADMIN — APIXABAN 2.5 MILLIGRAM(S): 2.5 TABLET, FILM COATED ORAL at 06:55

## 2023-08-11 NOTE — PROGRESS NOTE ADULT - SUBJECTIVE AND OBJECTIVE BOX
[facility] Division of Kidney Diseases & Hypertension  FOLLOW UP NOTE  [phone]--------------------------------------------------------------------------------  Chief Complaint:Other abnormality of breathing    81yo Male w/ PMH HTN, Atrial fibrillation on Eliquis, bradycardia s/p ppm, CHF, pleural effusions s/p R pleurX, Parkinson's presenting with shortness of breath. Nephrology consulted for hyperkalemia in the setting of ALAYNA.     24 hour events/subjective: Patient seen and examined at bedside. Awake and responding to questions. Keeps perseverating over his wife though he understands that he is at the hospital. Denies fevers, chills, nausea, vomiting, abdominal pain, SOB, CP.     PAST HISTORY  --------------------------------------------------------------------------------  No significant changes to PMH, PSH, FHx, SHx, unless otherwise noted    ALLERGIES & MEDICATIONS  --------------------------------------------------------------------------------  Allergies  No Known Allergies  Intolerances    Standing Inpatient Medications  apixaban 2.5 milliGRAM(s) Oral two times a day  carbidopa/levodopa  25/100 1.5 Tablet(s) Oral four times a day  chlorhexidine 2% Cloths 1 Application(s) Topical daily  chlorhexidine 2% Cloths 1 Application(s) Topical daily  dextrose 5%. 1000 milliLiter(s) IV Continuous <Continuous>  dextrose 5%. 1000 milliLiter(s) IV Continuous <Continuous>  dextrose 50% Injectable 25 Gram(s) IV Push once  dextrose 50% Injectable 25 Gram(s) IV Push once  dextrose 50% Injectable 12.5 Gram(s) IV Push once  glucagon  Injectable 1 milliGRAM(s) IntraMuscular once  melatonin 3 milliGRAM(s) Oral at bedtime  midodrine. 10 milliGRAM(s) Oral three times a day  norepinephrine Infusion 0.05 MICROgram(s)/kG/Min IV Continuous <Continuous>  OLANZapine 2.5 milliGRAM(s) Oral at bedtime  piperacillin/tazobactam IVPB.. 3.375 Gram(s) IV Intermittent every 12 hours  PRN Inpatient Medications  acetaminophen     Tablet .. 650 milliGRAM(s) Oral every 6 hours PRN  aluminum hydroxide/magnesium hydroxide/simethicone Suspension 30 milliLiter(s) Oral every 4 hours PRN  dextrose Oral Gel 15 Gram(s) Oral once PRN  melatonin 3 milliGRAM(s) Oral at bedtime PRN  ondansetron Injectable 4 milliGRAM(s) IV Push every 8 hours PRN  sodium chloride 0.9% Bolus. 100 milliLiter(s) IV Bolus every 5 minutes PRN    REVIEW OF SYSTEMS  --------------------------------------------------------------------------------  see HPI  All other systems were reviewed and are negative, except as noted.    VITALS/PHYSICAL EXAM  --------------------------------------------------------------------------------  T(C): 35.8 (08-11-23 @ 08:00), Max: 36.9 (08-11-23 @ 04:00)  HR: 60 (08-11-23 @ 09:00) (60 - 80)  BP: 133/44 (08-11-23 @ 09:00) (85/35 - 133/44)  RR: 14 (08-11-23 @ 09:00) (11 - 18)  SpO2: 96% (08-11-23 @ 09:00) (96% - 100%)  Wt(kg): --  Height (cm): 172.7 (08-09-23 @ 12:21)  Weight (kg): 87.8 (08-09-23 @ 12:21)  BMI (kg/m2): 29.4 (08-09-23 @ 12:21)  BSA (m2): 2.02 (08-09-23 @ 12:21)    08-10-23 @ 07:01  -  08-11-23 @ 07:00  --------------------------------------------------------  IN: 1344.8 mL / OUT: 1540 mL / NET: -195.2 mL    08-11-23 @ 07:01  -  08-11-23 @ 09:46  --------------------------------------------------------  IN: 8.2 mL / OUT: 15 mL / NET: -6.8 mL    Physical Exam:  Gen: NAD, well-appearing  HEENT: anicteric  Pulm: CTA B/L  CV: RRR, S1S2;  Abd: +BS, soft, nontender/nondistended  : No suprapubic tenderness, +means  Extremities: +bilateral LE edema noted.   Neuro: No focal deficits, Awake   Skin: Warm, without rashes  Vascular access: RIJ non tunneled HD     LABS/STUDIES  --------------------------------------------------------------------------------              7.3    8.25  >-----------<  196      [08-11-23 @ 03:00]              23.6     138  |  102  |  50  ----------------------------<  113      [08-11-23 @ 03:00]  4.0   |  22  |  3.28        Ca     8.9     [08-11-23 @ 03:00]      Mg     2.50     [08-11-23 @ 03:00]      Phos  3.7     [08-11-23 @ 03:00]    TPro  6.1  /  Alb  3.6  /  TBili  0.3  /  DBili  x   /  AST  22  /  ALT  7   /  AlkPhos  51  [08-11-23 @ 03:00]    PT/INR: PT 19.0 , INR 1.71       [08-11-23 @ 03:00]  PTT: 34.3       [08-11-23 @ 03:00]    Creatinine Trend:  SCr 3.28 [08-11 @ 03:00]  SCr 4.17 [08-10 @ 01:10]  SCr 3.56 [08-09 @ 17:50]  SCr 4.87 [08-09 @ 11:19]  SCr 4.79 [08-09 @ 07:00]    Urine Creatinine 90      [08-09-23 @ 14:24]  Urine Protein 164      [08-09-23 @ 14:24]  Urine Sodium 22      [08-09-23 @ 14:24]  Urine Urea Nitrogen 329.0      [08-09-23 @ 14:24]  Urine Potassium 85.2      [08-09-23 @ 14:24]  Urine Osmolality 369      [08-09-23 @ 14:24]      HBsAb <3.0      [08-09-23 @ 13:53]     [facility] Division of Kidney Diseases & Hypertension  FOLLOW UP NOTE  [phone]--------------------------------------------------------------------------------  Chief Complaint: Other abnormality of breathing    81yo Male w/ PMH HTN, Atrial fibrillation on Eliquis, bradycardia s/p ppm, CHF, pleural effusions s/p R pleurX, Parkinson's presenting with shortness of breath. Nephrology consulted for hyperkalemia in the setting of ALAYNA.     24 hour events/subjective: Patient seen and examined at bedside. Awake and responding to questions. Keeps perseverating over his wife though he understands that he is at the hospital. Denies fevers, chills, nausea, vomiting, abdominal pain, SOB, CP.     PAST HISTORY  --------------------------------------------------------------------------------  No significant changes to PMH, PSH, FHx, SHx, unless otherwise noted    ALLERGIES & MEDICATIONS  --------------------------------------------------------------------------------  Allergies  No Known Allergies  Intolerances    Standing Inpatient Medications  apixaban 2.5 milliGRAM(s) Oral two times a day  carbidopa/levodopa  25/100 1.5 Tablet(s) Oral four times a day  chlorhexidine 2% Cloths 1 Application(s) Topical daily  chlorhexidine 2% Cloths 1 Application(s) Topical daily  dextrose 5%. 1000 milliLiter(s) IV Continuous <Continuous>  dextrose 5%. 1000 milliLiter(s) IV Continuous <Continuous>  dextrose 50% Injectable 25 Gram(s) IV Push once  dextrose 50% Injectable 25 Gram(s) IV Push once  dextrose 50% Injectable 12.5 Gram(s) IV Push once  glucagon  Injectable 1 milliGRAM(s) IntraMuscular once  melatonin 3 milliGRAM(s) Oral at bedtime  midodrine. 10 milliGRAM(s) Oral three times a day  norepinephrine Infusion 0.05 MICROgram(s)/kG/Min IV Continuous <Continuous>  OLANZapine 2.5 milliGRAM(s) Oral at bedtime  piperacillin/tazobactam IVPB.. 3.375 Gram(s) IV Intermittent every 12 hours  PRN Inpatient Medications  acetaminophen     Tablet .. 650 milliGRAM(s) Oral every 6 hours PRN  aluminum hydroxide/magnesium hydroxide/simethicone Suspension 30 milliLiter(s) Oral every 4 hours PRN  dextrose Oral Gel 15 Gram(s) Oral once PRN  melatonin 3 milliGRAM(s) Oral at bedtime PRN  ondansetron Injectable 4 milliGRAM(s) IV Push every 8 hours PRN  sodium chloride 0.9% Bolus. 100 milliLiter(s) IV Bolus every 5 minutes PRN    REVIEW OF SYSTEMS  --------------------------------------------------------------------------------  see HPI  All other systems were reviewed and are negative, except as noted.    VITALS/PHYSICAL EXAM  --------------------------------------------------------------------------------  T(C): 35.8 (08-11-23 @ 08:00), Max: 36.9 (08-11-23 @ 04:00)  HR: 60 (08-11-23 @ 09:00) (60 - 80)  BP: 133/44 (08-11-23 @ 09:00) (85/35 - 133/44)  RR: 14 (08-11-23 @ 09:00) (11 - 18)  SpO2: 96% (08-11-23 @ 09:00) (96% - 100%)  Wt(kg): --  Height (cm): 172.7 (08-09-23 @ 12:21)  Weight (kg): 87.8 (08-09-23 @ 12:21)  BMI (kg/m2): 29.4 (08-09-23 @ 12:21)  BSA (m2): 2.02 (08-09-23 @ 12:21)    08-10-23 @ 07:01  -  08-11-23 @ 07:00  --------------------------------------------------------  IN: 1344.8 mL / OUT: 1540 mL / NET: -195.2 mL    08-11-23 @ 07:01  -  08-11-23 @ 09:46  --------------------------------------------------------  IN: 8.2 mL / OUT: 15 mL / NET: -6.8 mL    Physical Exam:  Gen: NAD, well-appearing  HEENT: anicteric  Pulm: CTA B/L  CV: RRR, S1S2;  Abd: +BS, soft, nontender/nondistended  : No suprapubic tenderness, +means  Extremities: +bilateral LE edema noted.   Neuro: No focal deficits, Awake   Skin: Warm, without rashes  Vascular access: RIJ non tunneled HD     LABS/STUDIES  --------------------------------------------------------------------------------              7.3    8.25  >-----------<  196      [08-11-23 @ 03:00]              23.6     138  |  102  |  50  ----------------------------<  113      [08-11-23 @ 03:00]  4.0   |  22  |  3.28        Ca     8.9     [08-11-23 @ 03:00]      Mg     2.50     [08-11-23 @ 03:00]      Phos  3.7     [08-11-23 @ 03:00]    TPro  6.1  /  Alb  3.6  /  TBili  0.3  /  DBili  x   /  AST  22  /  ALT  7   /  AlkPhos  51  [08-11-23 @ 03:00]    PT/INR: PT 19.0 , INR 1.71       [08-11-23 @ 03:00]  PTT: 34.3       [08-11-23 @ 03:00]    Creatinine Trend:  SCr 3.28 [08-11 @ 03:00]  SCr 4.17 [08-10 @ 01:10]  SCr 3.56 [08-09 @ 17:50]  SCr 4.87 [08-09 @ 11:19]  SCr 4.79 [08-09 @ 07:00]    Urine Creatinine 90      [08-09-23 @ 14:24]  Urine Protein 164      [08-09-23 @ 14:24]  Urine Sodium 22      [08-09-23 @ 14:24]  Urine Urea Nitrogen 329.0      [08-09-23 @ 14:24]  Urine Potassium 85.2      [08-09-23 @ 14:24]  Urine Osmolality 369      [08-09-23 @ 14:24]      HBsAb <3.0      [08-09-23 @ 13:53]

## 2023-08-11 NOTE — PROGRESS NOTE ADULT - ASSESSMENT
====================ASSESSMENT======================  83 yo M with HTN, A fib on Eliquis, CKD (baseline 1.2-1.4), bradycardia s/p ppm, CHFpEF, pleural effusions s/p R VATS and pleurX 3/2022, Parkinson's presenting with shortness of breath found to have ALAYNA on CKD c/b fluid overload and hyperkalemia. Admitted to MICU for urgent HD i/s/o hypotension.     Plan:  ====================NEUROLOGY=======================  #Dementia  AOx1 at baseline  - Following commands and responding to questions, at baseline MS  - per wife pt take zyprexa, melatonin, and ativan at bedtime. started zyprexa 5mg and melatonin 3mg at bedtime.     #Parkinson's disease   - c/w carbidopa-levidopa      ====================RESPIRATORY======================  #Pleural effusions    Likely in the setting of CHF vs renal failure vs less likely malignancy (negative cytology outpt)  PleurX catheter placed 3/2022, drainage 3x a week   CT chest showed new 2.3 cm right middle lobe nodule, possible malignancy? and NEW moderate left pleural effusion with complete left lowerlobe compressive atelectasis  Respiratory acidosis on admission (VBG 46)   - s/p BIPAP, now tolerating RA  - c/w Pleurx drainage - 500 output in 24hrs  - urgent HD for fluid overload    ====================CARDIOVASCULAR==================  #Bradycardia s/p PPM   #CHFpEF   #Afib   SPECT scan with amyloidosis outpatient, may be contributing to CHFpEF   TTE 1/2023 EF 73%, diastolic dysfunction and mod pHTN   EKG showing paced ventricular rhythm, no T wave abnormalities, given Ca gluconate x2 in ED   - TTE 8/10: Minimal mitral regurgitation. Moderate aortic regurgitation. Moderate concentric left ventricular hypertrophy. Endocardium not well visualized; grossly normal left ventricular systolic function. Increased E/e is consistent with elevated left ventricular filling pressure. Normal right ventricular size with reduced function. Device wire is noted in the right ventricle. Consistent with mild pulmonary hypertension. Left pleural effusion.  #Hypotension   Possibly cardiogenic 2/2 fluid overload   - remains on low dose levophed, MAP goal >65, wean as tolerated  - midodrine 10mg TID    ====================/RENAL========================  #ALAYNA on CKD  #metabolic acidosis with AG, concomitant resp acidosis   I/S/o cardiorenal vs pre-renal , FeNa 0.9% - likely pre renal,   Baseline Cr 1.2-1.4, likely has CKD stage 3A with baseline Scr 1.3-1.6.  S/p NaHCO3, Ca gluconate and insulin  Nephrology consulted, now s/p R IJ shiley placement and HD.  - per renal ALAYNA likely hemodynamic mediated and medication SE (TMP/SMX and Entresto).   - s/p session of HD 8/9 w/ 1.5L fluid removal, 1L fluid removal 8/10. Per nephro plan for repeat session today   - f/u ultrasound kidney/bladder for obstruction   - f/u urine lytes and P/C  - strict I/Os     ====================GI/NUTRITION=====================  Diet: Diet Soft and Bite Sized ordered    ====================INFECTIOUS DISEASE================  No fever, increase leukocytosis may be stress related vs infection. CT chest with GGO  Previous pleural fluid culture negative   - started empiric zosyn   - f/u urine and blood culture , MRSA PCR     ====================ENDOCRINE=======================  A1C 5.4%  - BG 140s     ====================HEMATOLOGIC/DVT PPx=============  On home eliquis 5 BID, last taken 8/8 at 6pm - held for shiley placement, now resulted 8/10  - given PCC for eliquis reversal prior to shiley    ====================ETHICS===========================  GOC: patient with prior MOLST DNR/DNI. Upon discussion with wife and daughter, decision was   ====================ASSESSMENT======================  83 yo M with HTN, A fib on Eliquis, CKD (baseline 1.2-1.4), bradycardia s/p ppm, CHFpEF, pleural effusions s/p R VATS and pleurX 3/2022, Parkinson's presenting with shortness of breath found to have ALAYNA on CKD c/b fluid overload and hyperkalemia. Admitted to MICU for urgent HD i/s/o hypotension.     Plan:  ====================NEUROLOGY=======================  #Dementia  AOx1 at baseline  - Following commands and responding to questions, at baseline MS  - per wife pt take zyprexa, melatonin, and ativan at bedtime. started zyprexa 5mg and melatonin 3mg at bedtime.     #Parkinson's disease   - c/w carbidopa-levidopa      ====================RESPIRATORY======================  #Pleural effusions    Likely in the setting of CHF vs renal failure vs less likely malignancy (negative cytology outpt)  PleurX catheter placed 3/2022, drainage 3x a week   CT chest showed new 2.3 cm right middle lobe nodule, possible malignancy? and NEW moderate left pleural effusion with complete left lowerlobe compressive atelectasis  Respiratory acidosis on admission (VBG 46)   - s/p BIPAP, now tolerating RA  - c/w Pleurx drainage - 500 output in 24hrs  - urgent HD for fluid overload    ====================CARDIOVASCULAR==================  #Bradycardia s/p PPM   #CHFpEF   #Afib   SPECT scan with amyloidosis outpatient, may be contributing to CHFpEF   TTE 1/2023 EF 73%, diastolic dysfunction and mod pHTN   EKG showing paced ventricular rhythm, no T wave abnormalities, given Ca gluconate x2 in ED   - TTE 8/10: Minimal mitral regurgitation. Moderate aortic regurgitation. Moderate concentric left ventricular hypertrophy. Endocardium not well visualized; grossly normal left ventricular systolic function. Increased E/e is consistent with elevated left ventricular filling pressure. Normal right ventricular size with reduced function. Device wire is noted in the right ventricle. Consistent with mild pulmonary hypertension. Left pleural effusion.  #Hypotension   Possibly cardiogenic 2/2 fluid overload   - remains on low dose levophed, MAP goal >65, wean as tolerated  - midodrine 10mg TID    ====================/RENAL========================  #ALAYNA on CKD  #metabolic acidosis with AG, concomitant resp acidosis   I/S/o cardiorenal vs pre-renal , FeNa 0.9% - likely pre renal,   Baseline Cr 1.2-1.4, likely has CKD stage 3A with baseline Scr 1.3-1.6.  S/p NaHCO3, Ca gluconate and insulin  Nephrology consulted, now s/p R IJ shiley placement and HD.  - per renal ALAYNA likely hemodynamic mediated and medication SE (TMP/SMX and Entresto).   - s/p session of HD 8/9 w/ 1.5L fluid removal, 1L fluid removal 8/10. Per nephro plan for repeat session today   - f/u ultrasound kidney/bladder for obstruction   - f/u urine lytes and P/C  - strict I/Os     ====================GI/NUTRITION=====================  Diet: Diet Soft and Bite Sized ordered    ====================INFECTIOUS DISEASE================  No fever, increase leukocytosis may be stress related vs infection. CT chest with GGO  Previous pleural fluid culture negative   - started empiric zosyn   - urine and blood culture - NTD  - MRSA PCR neg    ====================ENDOCRINE=======================  A1C 5.4%  - BG 140s     ====================HEMATOLOGIC/DVT PPx=============  On home eliquis 5 BID, last taken 8/8 at 6pm - held for shiley placement, now resulted 8/10  - given PCC for eliquis reversal prior to shiley    ====================ETHICS===========================  GOC: patient with prior MOLST DNR/DNI. Upon discussion with wife and daughter, decision was   ====================ASSESSMENT======================  81 yo M with HTN, A fib on Eliquis, CKD (baseline 1.2-1.4), bradycardia s/p ppm, CHFpEF, pleural effusions s/p R VATS and pleurX 3/2022, Parkinson's presenting with shortness of breath found to have ALAYNA on CKD c/b fluid overload and hyperkalemia. Admitted to MICU for urgent HD i/s/o hypotension.     Plan:  ====================NEUROLOGY=======================  #Dementia  AOx1 at baseline  - Following commands and responding to questions, at baseline MS  - per wife pt take zyprexa, melatonin, and ativan at bedtime. started zyprexa 5mg and melatonin 3mg at bedtime.     #Parkinson's disease   - c/w carbidopa-levidopa      ====================RESPIRATORY======================  #Pleural effusions    Likely in the setting of CHF vs renal failure vs less likely malignancy (negative cytology outpt)  PleurX catheter placed 3/2022, drainage 3x a week   CT chest showed new 2.3 cm right middle lobe nodule, possible malignancy? and NEW moderate left pleural effusion with complete left lowerlobe compressive atelectasis  Respiratory acidosis on admission (VBG 46)   - s/p BIPAP, now tolerating RA  - c/w Pleurx drainage - 500 output in 24hrs  - urgent HD for fluid overload    ====================CARDIOVASCULAR==================  #Bradycardia s/p PPM   #CHFpEF   #Afib   #Hypotension   SPECT scan with amyloidosis outpatient, may be contributing to CHFpEF   TTE 1/2023 EF 73%, diastolic dysfunction and mod pHTN   EKG showing paced ventricular rhythm, no T wave abnormalities, given Ca gluconate x2 in ED   - TTE 8/10: Minimal mitral regurgitation. Moderate aortic regurgitation. Moderate concentric left ventricular hypertrophy. Endocardium not well visualized; grossly normal left ventricular systolic function. Increased E/e is consistent with elevated left ventricular filling pressure. Normal right ventricular size with reduced function. Device wire is noted in the right ventricle. Consistent with mild pulmonary hypertension. Left pleural effusion.  - remains on low dose levophed, MAP goal >65, wean as tolerated.   - midodrine 10mg TID  - POCUS showed preserved EF and underfilled LV. will give albumen today.      ====================/RENAL========================  #ALAYNA on CKD  #metabolic acidosis with AG, concomitant resp acidosis   I/S/o cardiorenal vs pre-renal , FeNa 0.9% - likely pre renal,   Baseline Cr 1.2-1.4, likely has CKD stage 3A with baseline Scr 1.3-1.6.  S/p NaHCO3, Ca gluconate and insulin  Nephrology consulted, now s/p R IJ shiley placement and HD.  - per renal ALAYNA likely hemodynamic mediated and medication SE (TMP/SMX and Entresto).   - s/p session of HD 8/9 w/ 1.5L fluid removal, 1L fluid removal 8/10. Per nephro plan for repeat session today   - US kidney/bladder- Increased bilateral renal echogenicity, suggestive of medical renal disease. No hydronephrosis.  - strict I/Os     ====================GI/NUTRITION=====================  Diet: Diet Soft and Bite Sized ordered    ====================INFECTIOUS DISEASE================  No fever, increase leukocytosis may be stress related vs infection. CT chest with GGO  Previous pleural fluid culture negative   - started empiric zosyn   - urine and blood culture - NTD  - MRSA PCR neg    ====================ENDOCRINE=======================  A1C 5.4%  - BG 140s     ====================HEMATOLOGIC/DVT PPx=============  On home eliquis 5 BID, last taken 8/8 at 6pm - held for shiley placement, and restarte 8/10  - given PCC for eliquis reversal prior to shiley  - H/H stable in 7s    ====================ETHICS===========================  GOC: patient with prior MOLST DNR/DNI. Upon discussion with wife and daughter, decision was

## 2023-08-11 NOTE — PROGRESS NOTE ADULT - SUBJECTIVE AND OBJECTIVE BOX
INTERVAL HPI/ OVERNIGHT EVENTS: LA 3.1>1.5. Urine culture and blood culture negative thus far.    SUBJECTIVE: Patient seen and examined at bedside.  Offers no complaints.     VITAL SIGNS:  ICU Vital Signs Last 24 Hrs  T(C): 37.2 (10 Aug 2023 04:00), Max: 37.2 (09 Aug 2023 18:30)  T(F): 98.9 (10 Aug 2023 04:00), Max: 99 (09 Aug 2023 18:30)  HR: 68 (10 Aug 2023 06:00) (52 - 77)  BP: 117/45 (10 Aug 2023 06:00) (75/32 - 151/129)  BP(mean): 62 (10 Aug 2023 06:00) (42 - 123)  ABP: --  ABP(mean): --  RR: 13 (10 Aug 2023 06:00) (10 - 23)  SpO2: 99% (10 Aug 2023 06:00) (86% - 100%)    O2 Parameters below as of 10 Aug 2023 06:00  Patient On (Oxygen Delivery Method): room air        Plateau pressure:   P/F ratio:     08-09 @ 07:01  -  08-10 @ 06:37  --------------------------------------------------------  IN: 993.2 mL / OUT: 829 mL / NET: 164.2 mL      CAPILLARY BLOOD GLUCOSE      POCT Blood Glucose.: 148 mg/dL (10 Aug 2023 06:05)      PHYSICAL EXAM:    General:   GENERAL: NAD, lying in bed  EYES: EOMI, PERRLA  ENT: Moist mucous membranes  HEART: S1, S2, regular rate and rhythm, no murmurs, rubs, or gallops  LUNGS: On BiPAP, decreased breath sounds in the left base, no crackles or wheezing appreciated   ABDOMEN: Soft, nontender  EXTREMITIES: 2+ peripheral pulses bilaterally. 2+ pitting edema to the knee   NEURO: A&Ox1, pleasantly confused at times. Follows commands, moves extremities spontaneously.       MEDICATIONS:  MEDICATIONS  (STANDING):  apixaban 2.5 milliGRAM(s) Oral two times a day  carbidopa/levodopa  25/100 1.5 Tablet(s) Oral four times a day  chlorhexidine 2% Cloths 1 Application(s) Topical daily  chlorhexidine 2% Cloths 1 Application(s) Topical daily  dextrose 5%. 1000 milliLiter(s) (50 mL/Hr) IV Continuous <Continuous>  dextrose 5%. 1000 milliLiter(s) (100 mL/Hr) IV Continuous <Continuous>  dextrose 50% Injectable 25 Gram(s) IV Push once  dextrose 50% Injectable 25 Gram(s) IV Push once  dextrose 50% Injectable 12.5 Gram(s) IV Push once  glucagon  Injectable 1 milliGRAM(s) IntraMuscular once  insulin lispro (ADMELOG) corrective regimen sliding scale   SubCutaneous every 6 hours  midodrine. 10 milliGRAM(s) Oral three times a day  norepinephrine Infusion 0.05 MICROgram(s)/kG/Min (8.23 mL/Hr) IV Continuous <Continuous>  piperacillin/tazobactam IVPB.. 3.375 Gram(s) IV Intermittent every 12 hours    MEDICATIONS  (PRN):  acetaminophen     Tablet .. 650 milliGRAM(s) Oral every 6 hours PRN Temp greater or equal to 38C (100.4F), Mild Pain (1 - 3)  aluminum hydroxide/magnesium hydroxide/simethicone Suspension 30 milliLiter(s) Oral every 4 hours PRN Dyspepsia  dextrose Oral Gel 15 Gram(s) Oral once PRN Blood Glucose LESS THAN 70 milliGRAM(s)/deciliter  melatonin 3 milliGRAM(s) Oral at bedtime PRN Insomnia  ondansetron Injectable 4 milliGRAM(s) IV Push every 8 hours PRN Nausea and/or Vomiting  sodium chloride 0.9% Bolus. 100 milliLiter(s) IV Bolus every 5 minutes PRN SBP LESS THAN or EQUAL to 90 mmHg      ALLERGIES:  Allergies    No Known Allergies    Intolerances        LABS:                        7.5    9.20  )-----------( 227      ( 10 Aug 2023 01:10 )             24.0     08-10    135  |  101  |  67<H>  ----------------------------<  127<H>  5.0   |  17<L>  |  4.17<H>    Ca    9.4      10 Aug 2023 01:10  Phos  4.7     08-10  Mg     2.70     08-10    TPro  6.5  /  Alb  3.8  /  TBili  0.2  /  DBili  x   /  AST  14  /  ALT  11  /  AlkPhos  57  08-10    PT/INR - ( 10 Aug 2023 01:10 )   PT: 16.4 sec;   INR: 1.47 ratio         PTT - ( 10 Aug 2023 01:10 )  PTT:36.4 sec  Urinalysis Basic - ( 10 Aug 2023 01:10 )    Color: x / Appearance: x / SG: x / pH: x  Gluc: 127 mg/dL / Ketone: x  / Bili: x / Urobili: x   Blood: x / Protein: x / Nitrite: x   Leuk Esterase: x / RBC: x / WBC x   Sq Epi: x / Non Sq Epi: x / Bacteria: x        RADIOLOGY & ADDITIONAL TESTS: Reviewed.

## 2023-08-11 NOTE — PROGRESS NOTE ADULT - CRITICAL CARE ATTENDING COMMENT
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  Now s/p HD x 2, scheduled for third session today.  Continues to make small amounts of urine (~30cc/hr)  Awake alert responsive, occasional episodes of confusion / agitation, received Zyprexa overnight  Currently off pressors, but BP low-normal (MAP 62). Given POCU/S findings yesterday with additional fluid removed, will give albumin 5% x 2, and then can give additional doses of 25% if remains hypotensive.  - Further HD per nephro. Will likely get break off HD after today to evaluate renal recovery  - c/w midodrine 10mg TID  - empiric Zosyn, can likely D/C after today or tomorrow given the absence of infectious signs (patient has relatively chronic non-productive cough, afebrile no leukocytosis no positive cultures)  - Patient will remain in ICU while in undifferentiated shock state requiring close hemodynamic monitoring and possible pressors.

## 2023-08-11 NOTE — PROGRESS NOTE ADULT - PROBLEM SELECTOR PLAN 3
plan as above     Final recommendations pending attending signature.    If you have any questions, please feel free to contact me  Torres Olivarez  Nephrology Fellow  [employer]/Page [phone]  (After 5pm or on weekends please page the on-call fellow).
plan as above     Final recommendations pending attending signature.    If you have any questions, please feel free to contact me  Torres Olivarez  Nephrology Fellow  [employer]/Page [phone]  (After 5pm or on weekends please page the on-call fellow).

## 2023-08-11 NOTE — PROGRESS NOTE ADULT - PROBLEM SELECTOR PLAN 2
Hyperkalemia in the setting of ALAYNA 2/2 medication SE (TMP/SMX and Entresto). On admission K 6.4, refractory to medical management. Pt received IHD 8/9, 8/10. Now K 4.0. Monitor labs.
Hyperkalemia in the setting of ALAYNA 2/2 medication SE (TMP/SMX and Entresto). On admission K 6.4, refractory to medical management. Required urgent HD 8/9. Now K 5.0. Monitor labs.

## 2023-08-12 LAB
ALBUMIN SERPL ELPH-MCNC: 3.3 G/DL — SIGNIFICANT CHANGE UP (ref 3.3–5)
ALBUMIN SERPL ELPH-MCNC: 3.5 G/DL — SIGNIFICANT CHANGE UP (ref 3.3–5)
ALP SERPL-CCNC: 45 U/L — SIGNIFICANT CHANGE UP (ref 40–120)
ALP SERPL-CCNC: 48 U/L — SIGNIFICANT CHANGE UP (ref 40–120)
ALT FLD-CCNC: 8 U/L — SIGNIFICANT CHANGE UP (ref 4–41)
ALT FLD-CCNC: 9 U/L — SIGNIFICANT CHANGE UP (ref 4–41)
ANION GAP SERPL CALC-SCNC: 10 MMOL/L — SIGNIFICANT CHANGE UP (ref 7–14)
ANION GAP SERPL CALC-SCNC: 13 MMOL/L — SIGNIFICANT CHANGE UP (ref 7–14)
APTT BLD: 35.1 SEC — SIGNIFICANT CHANGE UP (ref 24.5–35.6)
AST SERPL-CCNC: 20 U/L — SIGNIFICANT CHANGE UP (ref 4–40)
AST SERPL-CCNC: 20 U/L — SIGNIFICANT CHANGE UP (ref 4–40)
BASE EXCESS BLDV CALC-SCNC: 1.8 MMOL/L — SIGNIFICANT CHANGE UP (ref -2–3)
BASOPHILS # BLD AUTO: 0.04 K/UL — SIGNIFICANT CHANGE UP (ref 0–0.2)
BASOPHILS # BLD AUTO: 0.04 K/UL — SIGNIFICANT CHANGE UP (ref 0–0.2)
BASOPHILS NFR BLD AUTO: 0.6 % — SIGNIFICANT CHANGE UP (ref 0–2)
BASOPHILS NFR BLD AUTO: 0.7 % — SIGNIFICANT CHANGE UP (ref 0–2)
BILIRUB SERPL-MCNC: 0.3 MG/DL — SIGNIFICANT CHANGE UP (ref 0.2–1.2)
BILIRUB SERPL-MCNC: 0.4 MG/DL — SIGNIFICANT CHANGE UP (ref 0.2–1.2)
BLD GP AB SCN SERPL QL: NEGATIVE — SIGNIFICANT CHANGE UP
BLOOD GAS VENOUS COMPREHENSIVE RESULT: SIGNIFICANT CHANGE UP
BUN SERPL-MCNC: 35 MG/DL — HIGH (ref 7–23)
BUN SERPL-MCNC: 35 MG/DL — HIGH (ref 7–23)
CALCIUM SERPL-MCNC: 8.6 MG/DL — SIGNIFICANT CHANGE UP (ref 8.4–10.5)
CALCIUM SERPL-MCNC: 8.7 MG/DL — SIGNIFICANT CHANGE UP (ref 8.4–10.5)
CHLORIDE BLDV-SCNC: 103 MMOL/L — SIGNIFICANT CHANGE UP (ref 96–108)
CHLORIDE SERPL-SCNC: 101 MMOL/L — SIGNIFICANT CHANGE UP (ref 98–107)
CHLORIDE SERPL-SCNC: 102 MMOL/L — SIGNIFICANT CHANGE UP (ref 98–107)
CO2 BLDV-SCNC: 27.9 MMOL/L — HIGH (ref 22–26)
CO2 SERPL-SCNC: 24 MMOL/L — SIGNIFICANT CHANGE UP (ref 22–31)
CO2 SERPL-SCNC: 25 MMOL/L — SIGNIFICANT CHANGE UP (ref 22–31)
CREAT SERPL-MCNC: 2.58 MG/DL — HIGH (ref 0.5–1.3)
CREAT SERPL-MCNC: 2.66 MG/DL — HIGH (ref 0.5–1.3)
EGFR: 24 ML/MIN/1.73M2 — LOW
EGFR: 24 ML/MIN/1.73M2 — LOW
EOSINOPHIL # BLD AUTO: 0.02 K/UL — SIGNIFICANT CHANGE UP (ref 0–0.5)
EOSINOPHIL # BLD AUTO: 0.05 K/UL — SIGNIFICANT CHANGE UP (ref 0–0.5)
EOSINOPHIL NFR BLD AUTO: 0.3 % — SIGNIFICANT CHANGE UP (ref 0–6)
EOSINOPHIL NFR BLD AUTO: 0.8 % — SIGNIFICANT CHANGE UP (ref 0–6)
GAS PNL BLDV: 136 MMOL/L — SIGNIFICANT CHANGE UP (ref 136–145)
GLUCOSE BLDV-MCNC: 109 MG/DL — HIGH (ref 70–99)
GLUCOSE SERPL-MCNC: 109 MG/DL — HIGH (ref 70–99)
GLUCOSE SERPL-MCNC: 110 MG/DL — HIGH (ref 70–99)
HCO3 BLDV-SCNC: 27 MMOL/L — SIGNIFICANT CHANGE UP (ref 22–29)
HCT VFR BLD CALC: 21.2 % — LOW (ref 39–50)
HCT VFR BLD CALC: 23.7 % — LOW (ref 39–50)
HCT VFR BLDA CALC: 21 % — CRITICAL LOW (ref 39–51)
HGB BLD CALC-MCNC: 6.9 G/DL — CRITICAL LOW (ref 12.6–17.4)
HGB BLD-MCNC: 6.6 G/DL — CRITICAL LOW (ref 13–17)
HGB BLD-MCNC: 7.4 G/DL — LOW (ref 13–17)
IANC: 4.23 K/UL — SIGNIFICANT CHANGE UP (ref 1.8–7.4)
IANC: 5.07 K/UL — SIGNIFICANT CHANGE UP (ref 1.8–7.4)
IMM GRANULOCYTES NFR BLD AUTO: 0.2 % — SIGNIFICANT CHANGE UP (ref 0–0.9)
IMM GRANULOCYTES NFR BLD AUTO: 0.3 % — SIGNIFICANT CHANGE UP (ref 0–0.9)
INR BLD: 1.92 RATIO — HIGH (ref 0.85–1.18)
LACTATE BLDV-MCNC: 1.2 MMOL/L — SIGNIFICANT CHANGE UP (ref 0.5–2)
LYMPHOCYTES # BLD AUTO: 0.99 K/UL — LOW (ref 1–3.3)
LYMPHOCYTES # BLD AUTO: 1.01 K/UL — SIGNIFICANT CHANGE UP (ref 1–3.3)
LYMPHOCYTES # BLD AUTO: 14.4 % — SIGNIFICANT CHANGE UP (ref 13–44)
LYMPHOCYTES # BLD AUTO: 16.9 % — SIGNIFICANT CHANGE UP (ref 13–44)
MAGNESIUM SERPL-MCNC: 2.3 MG/DL — SIGNIFICANT CHANGE UP (ref 1.6–2.6)
MAGNESIUM SERPL-MCNC: 2.4 MG/DL — SIGNIFICANT CHANGE UP (ref 1.6–2.6)
MCHC RBC-ENTMCNC: 25.2 PG — LOW (ref 27–34)
MCHC RBC-ENTMCNC: 25.6 PG — LOW (ref 27–34)
MCHC RBC-ENTMCNC: 31.1 GM/DL — LOW (ref 32–36)
MCHC RBC-ENTMCNC: 31.2 GM/DL — LOW (ref 32–36)
MCV RBC AUTO: 80.9 FL — SIGNIFICANT CHANGE UP (ref 80–100)
MCV RBC AUTO: 82 FL — SIGNIFICANT CHANGE UP (ref 80–100)
MONOCYTES # BLD AUTO: 0.64 K/UL — SIGNIFICANT CHANGE UP (ref 0–0.9)
MONOCYTES # BLD AUTO: 0.73 K/UL — SIGNIFICANT CHANGE UP (ref 0–0.9)
MONOCYTES NFR BLD AUTO: 10.6 % — SIGNIFICANT CHANGE UP (ref 2–14)
MONOCYTES NFR BLD AUTO: 10.7 % — SIGNIFICANT CHANGE UP (ref 2–14)
NEUTROPHILS # BLD AUTO: 4.23 K/UL — SIGNIFICANT CHANGE UP (ref 1.8–7.4)
NEUTROPHILS # BLD AUTO: 5.07 K/UL — SIGNIFICANT CHANGE UP (ref 1.8–7.4)
NEUTROPHILS NFR BLD AUTO: 70.7 % — SIGNIFICANT CHANGE UP (ref 43–77)
NEUTROPHILS NFR BLD AUTO: 73.8 % — SIGNIFICANT CHANGE UP (ref 43–77)
NRBC # BLD: 0 /100 WBCS — SIGNIFICANT CHANGE UP (ref 0–0)
NRBC # BLD: 0 /100 WBCS — SIGNIFICANT CHANGE UP (ref 0–0)
NRBC # FLD: 0 K/UL — SIGNIFICANT CHANGE UP (ref 0–0)
NRBC # FLD: 0 K/UL — SIGNIFICANT CHANGE UP (ref 0–0)
PCO2 BLDV: 42 MMHG — SIGNIFICANT CHANGE UP (ref 42–55)
PH BLDV: 7.41 — SIGNIFICANT CHANGE UP (ref 7.32–7.43)
PHOSPHATE SERPL-MCNC: 2.7 MG/DL — SIGNIFICANT CHANGE UP (ref 2.5–4.5)
PHOSPHATE SERPL-MCNC: 2.8 MG/DL — SIGNIFICANT CHANGE UP (ref 2.5–4.5)
PLATELET # BLD AUTO: 177 K/UL — SIGNIFICANT CHANGE UP (ref 150–400)
PLATELET # BLD AUTO: 188 K/UL — SIGNIFICANT CHANGE UP (ref 150–400)
PO2 BLDV: 49 MMHG — HIGH (ref 25–45)
POTASSIUM BLDV-SCNC: 3.2 MMOL/L — LOW (ref 3.5–5.1)
POTASSIUM SERPL-MCNC: 3.3 MMOL/L — LOW (ref 3.5–5.3)
POTASSIUM SERPL-MCNC: 3.4 MMOL/L — LOW (ref 3.5–5.3)
POTASSIUM SERPL-SCNC: 3.3 MMOL/L — LOW (ref 3.5–5.3)
POTASSIUM SERPL-SCNC: 3.4 MMOL/L — LOW (ref 3.5–5.3)
PROT SERPL-MCNC: 5.6 G/DL — LOW (ref 6–8.3)
PROT SERPL-MCNC: 6 G/DL — SIGNIFICANT CHANGE UP (ref 6–8.3)
PROTHROM AB SERPL-ACNC: 21.3 SEC — HIGH (ref 9.5–13)
RBC # BLD: 2.62 M/UL — LOW (ref 4.2–5.8)
RBC # BLD: 2.89 M/UL — LOW (ref 4.2–5.8)
RBC # FLD: 20.9 % — HIGH (ref 10.3–14.5)
RBC # FLD: 21.3 % — HIGH (ref 10.3–14.5)
RH IG SCN BLD-IMP: NEGATIVE — SIGNIFICANT CHANGE UP
SAO2 % BLDV: 78.2 % — SIGNIFICANT CHANGE UP (ref 67–88)
SODIUM SERPL-SCNC: 136 MMOL/L — SIGNIFICANT CHANGE UP (ref 135–145)
SODIUM SERPL-SCNC: 139 MMOL/L — SIGNIFICANT CHANGE UP (ref 135–145)
WBC # BLD: 5.98 K/UL — SIGNIFICANT CHANGE UP (ref 3.8–10.5)
WBC # BLD: 6.87 K/UL — SIGNIFICANT CHANGE UP (ref 3.8–10.5)
WBC # FLD AUTO: 5.98 K/UL — SIGNIFICANT CHANGE UP (ref 3.8–10.5)
WBC # FLD AUTO: 6.87 K/UL — SIGNIFICANT CHANGE UP (ref 3.8–10.5)

## 2023-08-12 PROCEDURE — 99233 SBSQ HOSP IP/OBS HIGH 50: CPT

## 2023-08-12 PROCEDURE — 99291 CRITICAL CARE FIRST HOUR: CPT | Mod: FS

## 2023-08-12 RX ORDER — FLUDROCORTISONE ACETATE 0.1 MG/1
0.1 TABLET ORAL DAILY
Refills: 0 | Status: DISCONTINUED | OUTPATIENT
Start: 2023-08-12 | End: 2023-08-12

## 2023-08-12 RX ORDER — FLUDROCORTISONE ACETATE 0.1 MG/1
0.1 TABLET ORAL DAILY
Refills: 0 | Status: DISCONTINUED | OUTPATIENT
Start: 2023-08-12 | End: 2023-08-18

## 2023-08-12 RX ORDER — FUROSEMIDE 40 MG
80 TABLET ORAL ONCE
Refills: 0 | Status: COMPLETED | OUTPATIENT
Start: 2023-08-12 | End: 2023-08-12

## 2023-08-12 RX ADMIN — OLANZAPINE 2.5 MILLIGRAM(S): 15 TABLET, FILM COATED ORAL at 22:58

## 2023-08-12 RX ADMIN — APIXABAN 2.5 MILLIGRAM(S): 2.5 TABLET, FILM COATED ORAL at 17:01

## 2023-08-12 RX ADMIN — Medication 80 MILLIGRAM(S): at 14:20

## 2023-08-12 RX ADMIN — Medication 3 MILLIGRAM(S): at 22:59

## 2023-08-12 RX ADMIN — PIPERACILLIN AND TAZOBACTAM 25 GRAM(S): 4; .5 INJECTION, POWDER, LYOPHILIZED, FOR SOLUTION INTRAVENOUS at 05:09

## 2023-08-12 RX ADMIN — MIDODRINE HYDROCHLORIDE 10 MILLIGRAM(S): 2.5 TABLET ORAL at 11:32

## 2023-08-12 RX ADMIN — CARBIDOPA AND LEVODOPA 1.5 TABLET(S): 25; 100 TABLET ORAL at 17:02

## 2023-08-12 RX ADMIN — CARBIDOPA AND LEVODOPA 1.5 TABLET(S): 25; 100 TABLET ORAL at 11:32

## 2023-08-12 RX ADMIN — Medication 8.23 MICROGRAM(S)/KG/MIN: at 20:59

## 2023-08-12 RX ADMIN — CARBIDOPA AND LEVODOPA 1.5 TABLET(S): 25; 100 TABLET ORAL at 23:03

## 2023-08-12 RX ADMIN — CHLORHEXIDINE GLUCONATE 1 APPLICATION(S): 213 SOLUTION TOPICAL at 11:32

## 2023-08-12 RX ADMIN — CARBIDOPA AND LEVODOPA 1.5 TABLET(S): 25; 100 TABLET ORAL at 05:08

## 2023-08-12 RX ADMIN — PIPERACILLIN AND TAZOBACTAM 25 GRAM(S): 4; .5 INJECTION, POWDER, LYOPHILIZED, FOR SOLUTION INTRAVENOUS at 17:01

## 2023-08-12 RX ADMIN — MIDODRINE HYDROCHLORIDE 10 MILLIGRAM(S): 2.5 TABLET ORAL at 17:01

## 2023-08-12 RX ADMIN — MIDODRINE HYDROCHLORIDE 10 MILLIGRAM(S): 2.5 TABLET ORAL at 05:08

## 2023-08-12 RX ADMIN — FLUDROCORTISONE ACETATE 0.1 MILLIGRAM(S): 0.1 TABLET ORAL at 13:49

## 2023-08-12 RX ADMIN — APIXABAN 2.5 MILLIGRAM(S): 2.5 TABLET, FILM COATED ORAL at 05:08

## 2023-08-12 NOTE — PROGRESS NOTE ADULT - SUBJECTIVE AND OBJECTIVE BOX
[facility] Division of Kidney Diseases & Hypertension  FOLLOW UP NOTE  [phone]--------------------------------------------------------------------------------    Chief Complaint: Other abnormality of breathing    81yo Male w/ PMH HTN, Atrial fibrillation on Eliquis, bradycardia s/p ppm, CHF, pleural effusions s/p R pleurX, Parkinson's presenting with shortness of breath. Nephrology consulted for hyperkalemia in the setting of ALAYNA.     24 hour events/subjective: Patient seen and examined at bedside. Awake and responding to questions. Pt appeared a bit confused. Denies fevers, chills, nausea, vomiting, abdominal pain, SOB, CP.     PAST HISTORY  --------------------------------------------------------------------------------  No significant changes to PMH, PSH, FHx, SHx, unless otherwise noted    ALLERGIES & MEDICATIONS  --------------------------------------------------------------------------------  Allergies    No Known Allergies    Intolerances      Standing Inpatient Medications  apixaban 2.5 milliGRAM(s) Oral two times a day  carbidopa/levodopa  25/100 1.5 Tablet(s) Oral four times a day  chlorhexidine 2% Cloths 1 Application(s) Topical daily  dextrose 5%. 1000 milliLiter(s) IV Continuous <Continuous>  dextrose 5%. 1000 milliLiter(s) IV Continuous <Continuous>  dextrose 50% Injectable 25 Gram(s) IV Push once  dextrose 50% Injectable 25 Gram(s) IV Push once  dextrose 50% Injectable 12.5 Gram(s) IV Push once  glucagon  Injectable 1 milliGRAM(s) IntraMuscular once  melatonin 3 milliGRAM(s) Oral at bedtime  midodrine. 10 milliGRAM(s) Oral three times a day  norepinephrine Infusion 0.05 MICROgram(s)/kG/Min IV Continuous <Continuous>  OLANZapine 2.5 milliGRAM(s) Oral at bedtime  piperacillin/tazobactam IVPB.. 3.375 Gram(s) IV Intermittent every 12 hours    PRN Inpatient Medications  acetaminophen     Tablet .. 650 milliGRAM(s) Oral every 6 hours PRN  aluminum hydroxide/magnesium hydroxide/simethicone Suspension 30 milliLiter(s) Oral every 4 hours PRN  dextrose Oral Gel 15 Gram(s) Oral once PRN  melatonin 3 milliGRAM(s) Oral at bedtime PRN  ondansetron Injectable 4 milliGRAM(s) IV Push every 8 hours PRN  sodium chloride 0.9% Bolus. 100 milliLiter(s) IV Bolus every 5 minutes PRN      REVIEW OF SYSTEMS  --------------------------------------------------------------------------------  per above    VITALS/PHYSICAL EXAM  --------------------------------------------------------------------------------  T(C): 35.8 (08-12-23 @ 08:00), Max: 37 (08-11-23 @ 16:10)  HR: 60 (08-12-23 @ 09:00) (60 - 66)  BP: 109/43 (08-12-23 @ 09:00) (85/51 - 142/50)  RR: 12 (08-12-23 @ 09:00) (10 - 17)  SpO2: 100% (08-12-23 @ 09:00) (97% - 100%)  Wt(kg): --        08-11-23 @ 07:01  -  08-12-23 @ 07:00  --------------------------------------------------------  IN: 1237.2 mL / OUT: 1870 mL / NET: -632.8 mL    08-12-23 @ 07:01  -  08-12-23 @ 10:01  --------------------------------------------------------  IN: 0 mL / OUT: 110 mL / NET: -110 mL      Physical Exam:  Gen: NAD, well-appearing  HEENT: anicteric  Pulm: CTA B/L  CV: RRR, S1S2;  Abd: +BS, soft, nontender/nondistended  : No suprapubic tenderness, +means  Extremities: +bilateral LE edema noted.   Neuro: No focal deficits, Awake   Skin: Warm, without rashes  Vascular access: RIJ non tunneled HD     LABS/STUDIES  --------------------------------------------------------------------------------              7.4    6.87  >-----------<  188      [08-12-23 @ 00:40]              23.7     139  |  102  |  35  ----------------------------<  109      [08-12-23 @ 00:40]  3.4   |  24  |  2.66        Ca     8.7     [08-12-23 @ 00:40]      Mg     2.30     [08-12-23 @ 00:40]      Phos  2.8     [08-12-23 @ 00:40]    TPro  6.0  /  Alb  3.5  /  TBili  0.4  /  DBili  x   /  AST  20  /  ALT  9   /  AlkPhos  48  [08-12-23 @ 00:40]    PT/INR: PT 21.3 , INR 1.92       [08-12-23 @ 00:25]  PTT: 35.1       [08-12-23 @ 00:25]      Creatinine Trend:  SCr 2.66 [08-12 @ 00:40]  SCr 2.58 [08-12 @ 00:25]  SCr 3.28 [08-11 @ 03:00]  SCr 4.17 [08-10 @ 01:10]  SCr 3.56 [08-09 @ 17:50]    Urinalysis - [08-12-23 @ 00:40]      Color  / Appearance  / SG  / pH       Gluc 109 / Ketone   / Bili  / Urobili        Blood  / Protein  / Leuk Est  / Nitrite       RBC  / WBC  / Hyaline  / Gran  / Sq Epi  / Non Sq Epi  / Bacteria     Urine Creatinine 90      [08-09-23 @ 14:24]  Urine Protein 164      [08-09-23 @ 14:24]  Urine Sodium 22      [08-09-23 @ 14:24]  Urine Urea Nitrogen 329.0      [08-09-23 @ 14:24]  Urine Potassium 85.2      [08-09-23 @ 14:24]  Urine Osmolality 369      [08-09-23 @ 14:24]      HBsAb <3.0      [08-09-23 @ 13:53]     [facility] Division of Kidney Diseases & Hypertension  FOLLOW UP NOTE  [phone]--------------------------------------------------------------------------------    Chief Complaint: Other abnormality of breathing    83yo Male w/ PMH HTN, Atrial fibrillation on Eliquis, bradycardia s/p ppm, CHF, pleural effusions s/p R pleurX, Parkinson's presenting with shortness of breath. Nephrology consulted for hyperkalemia in the setting of ALAYNA.     24 hour events/subjective: Patient seen and examined at bedside. Awake and responding to questions. Pt appeared a bit confused. Denies fevers, chills, nausea, vomiting, abdominal pain, SOB, CP.     PAST HISTORY  --------------------------------------------------------------------------------  No significant changes to PMH, PSH, FHx, SHx, unless otherwise noted    ALLERGIES & MEDICATIONS  --------------------------------------------------------------------------------  Allergies    No Known Allergies    Intolerances      Standing Inpatient Medications  apixaban 2.5 milliGRAM(s) Oral two times a day  carbidopa/levodopa  25/100 1.5 Tablet(s) Oral four times a day  chlorhexidine 2% Cloths 1 Application(s) Topical daily  dextrose 5%. 1000 milliLiter(s) IV Continuous <Continuous>  dextrose 5%. 1000 milliLiter(s) IV Continuous <Continuous>  dextrose 50% Injectable 25 Gram(s) IV Push once  dextrose 50% Injectable 25 Gram(s) IV Push once  dextrose 50% Injectable 12.5 Gram(s) IV Push once  glucagon  Injectable 1 milliGRAM(s) IntraMuscular once  melatonin 3 milliGRAM(s) Oral at bedtime  midodrine. 10 milliGRAM(s) Oral three times a day  norepinephrine Infusion 0.05 MICROgram(s)/kG/Min IV Continuous <Continuous>  OLANZapine 2.5 milliGRAM(s) Oral at bedtime  piperacillin/tazobactam IVPB.. 3.375 Gram(s) IV Intermittent every 12 hours    PRN Inpatient Medications  acetaminophen     Tablet .. 650 milliGRAM(s) Oral every 6 hours PRN  aluminum hydroxide/magnesium hydroxide/simethicone Suspension 30 milliLiter(s) Oral every 4 hours PRN  dextrose Oral Gel 15 Gram(s) Oral once PRN  melatonin 3 milliGRAM(s) Oral at bedtime PRN  ondansetron Injectable 4 milliGRAM(s) IV Push every 8 hours PRN  sodium chloride 0.9% Bolus. 100 milliLiter(s) IV Bolus every 5 minutes PRN      REVIEW OF SYSTEMS  --------------------------------------------------------------------------------  per above    VITALS/PHYSICAL EXAM  --------------------------------------------------------------------------------  T(C): 35.8 (08-12-23 @ 08:00), Max: 37 (08-11-23 @ 16:10)  HR: 60 (08-12-23 @ 09:00) (60 - 66)  BP: 109/43 (08-12-23 @ 09:00) (85/51 - 142/50)  RR: 12 (08-12-23 @ 09:00) (10 - 17)  SpO2: 100% (08-12-23 @ 09:00) (97% - 100%)  Wt(kg): --        08-11-23 @ 07:01  -  08-12-23 @ 07:00  --------------------------------------------------------  IN: 1237.2 mL / OUT: 1870 mL / NET: -632.8 mL    08-12-23 @ 07:01  -  08-12-23 @ 10:01  --------------------------------------------------------  IN: 0 mL / OUT: 110 mL / NET: -110 mL      Physical Exam:  Gen: NAD, well-appearing  HEENT: anicteric  Pulm: CTA B/L  CV: RRR, S1S2;  Abd: +BS, soft, nontender/nondistended  : No suprapubic tenderness, +means  Extremities: +bilateral LE edema noted.   Neuro: No focal deficits, Awake restring tremor noted  Skin: Warm, without rashes  Vascular access: RIJ non tunneled HD     LABS/STUDIES  --------------------------------------------------------------------------------              7.4    6.87  >-----------<  188      [08-12-23 @ 00:40]              23.7     139  |  102  |  35  ----------------------------<  109      [08-12-23 @ 00:40]  3.4   |  24  |  2.66        Ca     8.7     [08-12-23 @ 00:40]      Mg     2.30     [08-12-23 @ 00:40]      Phos  2.8     [08-12-23 @ 00:40]    TPro  6.0  /  Alb  3.5  /  TBili  0.4  /  DBili  x   /  AST  20  /  ALT  9   /  AlkPhos  48  [08-12-23 @ 00:40]    PT/INR: PT 21.3 , INR 1.92       [08-12-23 @ 00:25]  PTT: 35.1       [08-12-23 @ 00:25]      Creatinine Trend:  SCr 2.66 [08-12 @ 00:40]  SCr 2.58 [08-12 @ 00:25]  SCr 3.28 [08-11 @ 03:00]  SCr 4.17 [08-10 @ 01:10]  SCr 3.56 [08-09 @ 17:50]    Urinalysis - [08-12-23 @ 00:40]      Color  / Appearance  / SG  / pH       Gluc 109 / Ketone   / Bili  / Urobili        Blood  / Protein  / Leuk Est  / Nitrite       RBC  / WBC  / Hyaline  / Gran  / Sq Epi  / Non Sq Epi  / Bacteria     Urine Creatinine 90      [08-09-23 @ 14:24]  Urine Protein 164      [08-09-23 @ 14:24]  Urine Sodium 22      [08-09-23 @ 14:24]  Urine Urea Nitrogen 329.0      [08-09-23 @ 14:24]  Urine Potassium 85.2      [08-09-23 @ 14:24]  Urine Osmolality 369      [08-09-23 @ 14:24]      HBsAb <3.0      [08-09-23 @ 13:53]

## 2023-08-12 NOTE — PROGRESS NOTE ADULT - ASSESSMENT
====================ASSESSMENT======================  81 yo M with HTN, A fib on Eliquis, CKD (baseline 1.2-1.4), bradycardia s/p ppm, CHFpEF, pleural effusions s/p R VATS and pleurX 3/2022, Parkinson's presenting with shortness of breath found to have ALAYNA on CKD c/b fluid overload and hyperkalemia. Admitted to MICU for urgent HD i/s/o hypotension.     Plan:  ====================NEUROLOGY=======================  #Dementia  AOx1 at baseline  - Following commands and responding to questions, at baseline MS  - per wife pt take zyprexa, melatonin, and ativan at bedtime. started zyprexa 5mg and melatonin 3mg at bedtime.     #Parkinson's disease   - c/w carbidopa-levidopa      ====================RESPIRATORY======================  #Pleural effusions    Likely in the setting of CHF vs renal failure vs less likely malignancy (negative cytology outpt)  PleurX catheter placed 3/2022, drainage 3x a week   CT chest showed new 2.3 cm right middle lobe nodule, possible malignancy? and NEW moderate left pleural effusion with complete left lowerlobe compressive atelectasis  Respiratory acidosis on admission (VBG 46)   - s/p BIPAP, now tolerating RA  - c/w Pleurx drainage - 500 output in 24hrs  - urgent HD for fluid overload    ====================CARDIOVASCULAR==================  #Bradycardia s/p PPM   #CHFpEF   #Afib   #Hypotension   SPECT scan with amyloidosis outpatient, may be contributing to CHFpEF   TTE 1/2023 EF 73%, diastolic dysfunction and mod pHTN   EKG showing paced ventricular rhythm, no T wave abnormalities, given Ca gluconate x2 in ED   - TTE 8/10: Minimal mitral regurgitation. Moderate aortic regurgitation. Moderate concentric left ventricular hypertrophy. Endocardium not well visualized; grossly normal left ventricular systolic function. Increased E/e is consistent with elevated left ventricular filling pressure. Normal right ventricular size with reduced function. Device wire is noted in the right ventricle. Consistent with mild pulmonary hypertension. Left pleural effusion.  - remains on low dose levophed, MAP goal >65, wean as tolerated.   - midodrine 10mg TID  - POCUS showed preserved EF and underfilled LV. will give albumen today.      ====================/RENAL========================  #ALAYNA on CKD  #metabolic acidosis with AG, concomitant resp acidosis   I/S/o cardiorenal vs pre-renal , FeNa 0.9% - likely pre renal,   Baseline Cr 1.2-1.4, likely has CKD stage 3A with baseline Scr 1.3-1.6.  S/p NaHCO3, Ca gluconate and insulin  Nephrology consulted, now s/p R IJ shiley placement and HD.  - per renal ALAYNA likely hemodynamic mediated and medication SE (TMP/SMX and Entresto).   - s/p session of HD 8/9 w/ 1.5L fluid removal, 1L fluid removal 8/10. Per nephro plan for repeat session today   - US kidney/bladder- Increased bilateral renal echogenicity, suggestive of medical renal disease. No hydronephrosis.  - strict I/Os     ====================GI/NUTRITION=====================  Diet: Diet Soft and Bite Sized ordered    ====================INFECTIOUS DISEASE================  No fever, increase leukocytosis may be stress related vs infection. CT chest with GGO  Previous pleural fluid culture negative   - started empiric zosyn   - urine and blood culture - NTD  - MRSA PCR neg    ====================ENDOCRINE=======================  A1C 5.4%  - BG 140s     ====================HEMATOLOGIC/DVT PPx=============  On home eliquis 5 BID, last taken 8/8 at 6pm - held for shiley placement, and restarte 8/10  - given PCC for eliquis reversal prior to shiley  - H/H stable in 7s    ====================ETHICS===========================  GOC: patient with prior MOLST DNR/DNI. Upon discussion with wife and daughter, decision was       ====================ASSESSMENT======================  81 yo M with HTN, A fib on Eliquis, CKD (baseline 1.2-1.4), bradycardia s/p ppm, CHFpEF, pleural effusions s/p R VATS and pleurX 3/2022, Parkinson's presenting with shortness of breath found to have ALAYNA on CKD c/b fluid overload and hyperkalemia. Admitted to MICU for urgent HD i/s/o hypotension.     Plan:  ====================NEUROLOGY=======================  #Dementia  Home: Zyrexa, melatonin, and ativan at bedtime  AOx1 at baseline  - Following commands and responding to questions, at baseline MS  - c/w zyprexa 5mg and melatonin 3mg at bedtime.     #Parkinson's disease   - c/w carbidopa-levidopa      ====================RESPIRATORY======================  #Pleural effusions    Likely in the setting of CHF vs renal failure vs less likely malignancy (negative cytology outpt)  PleurX catheter placed 3/2022, drainage 3x a week   CT chest showed new 2.3 cm right middle lobe nodule, possible malignancy? and NEW moderate left pleural effusion with complete left lower lobe compressive atelectasis  Respiratory acidosis on admission (VBG 46)   - s/p BIPAP, now tolerating RA  - c/w Pleurx drainage - 500 output in 24hrs  - urgent HD for fluid overload    ====================CARDIOVASCULAR==================  #Bradycardia s/p PPM   #CHFpEF   #Afib   #Hypotension   SPECT scan with amyloidosis outpatient, may be contributing to CHFpEF   TTE 1/2023 EF 73%, diastolic dysfunction and mod pHTN   EKG showing paced ventricular rhythm, no T wave abnormalities, given Ca gluconate x2 in ED   - TTE 8/10: Minimal mitral regurgitation. Moderate aortic regurgitation. Moderate concentric left ventricular hypertrophy. Endocardium not well visualized; grossly normal left ventricular systolic function. Increased E/e is consistent with elevated left ventricular filling pressure. Normal right ventricular size with reduced function. Device wire is noted in the right ventricle. Consistent with mild pulmonary hypertension. Left pleural effusion.  - remains on low dose levophed, MAP goal >65, wean as tolerated.   - midodrine 10mg TID  - POCUS showed preserved EF and underfilled LV. will give albumen today.      ====================/RENAL========================  #ALAYNA on CKD  #metabolic acidosis with AG, concomitant resp acidosis   I/S/o cardiorenal vs pre-renal , FeNa 0.9% - likely pre renal,   Baseline Cr 1.2-1.4, likely has CKD stage 3A with baseline Scr 1.3-1.6.  S/p NaHCO3, Ca gluconate and insulin  Nephrology consulted, now s/p R IJ shiley placement and HD.  - per renal ALAYNA likely hemodynamic mediated and medication SE (TMP/SMX and Entresto).   - s/p session of HD 8/9 w/ 1.5L fluid removal, 1L fluid removal 8/10. Per nephro plan for repeat session today   - US kidney/bladder- Increased bilateral renal echogenicity, suggestive of medical renal disease. No hydronephrosis.  - strict I/Os     ====================GI/NUTRITION=====================  Diet: Diet Soft and Bite Sized ordered    ====================INFECTIOUS DISEASE================  No fever, increase leukocytosis may be stress related vs infection. CT chest with GGO  Previous pleural fluid culture negative   - started empiric zosyn   - urine and blood culture - NTD  - MRSA PCR neg    ====================ENDOCRINE=======================  A1C 5.4%  - BG 140s     ====================HEMATOLOGIC/DVT PPx=============  On home eliquis 5 BID, last taken 8/8 at 6pm - held for shiley placement, and restarte 8/10  - given PCC for eliquis reversal prior to shiley  - H/H stable in 7s    ====================ETHICS===========================  GOC: patient with prior MOLST DNR/DNI. Upon discussion with wife and daughter, decision was       ====================ASSESSMENT======================  83 yo M with HTN, A fib on Eliquis, CKD (baseline 1.2-1.4), bradycardia s/p ppm, CHFpEF, pleural effusions s/p R VATS and pleurX 3/2022, Parkinson's presenting with shortness of breath found to have ALAYNA on CKD c/b fluid overload and hyperkalemia. Admitted to MICU for urgent HD i/s/o hypotension.     Plan:  ====================NEUROLOGY=======================  #Dementia  Home: Zyrexa, melatonin, and ativan at bedtime  AOx1 at baseline  - Following commands and responding to questions, at baseline MS  - c/w zyprexa 5mg and melatonin 3mg at bedtime.     #Parkinson's disease   - c/w carbidopa-levidopa      ====================RESPIRATORY======================  #Pleural effusions    Likely in the setting of CHF vs renal failure vs less likely malignancy (negative cytology outpt)  PleurX catheter placed 3/2022, drainage 3x a week   CT chest showed new 2.3 cm right middle lobe nodule, possible malignancy? and NEW moderate left pleural effusion with complete left lower lobe compressive atelectasis  Respiratory acidosis on admission (VBG 46)   - s/p BIPAP, now tolerating RA  - c/w Pleurx drainage - 500 output in 24hrs  - urgent HD for fluid overload    ====================CARDIOVASCULAR==================  #Bradycardia s/p PPM   #CHFpEF   #Afib   #Hypotension   SPECT scan with amyloidosis outpatient, may be contributing to CHFpEF   TTE 1/2023 EF 73%, diastolic dysfunction and mod pHTN   EKG showing paced ventricular rhythm, no T wave abnormalities, given Ca gluconate x2 in ED   - TTE 8/10: Minimal mitral regurgitation. Moderate aortic regurgitation. Moderate concentric left ventricular hypertrophy. Endocardium not well visualized; grossly normal left ventricular systolic function. Increased E/e is consistent with elevated left ventricular filling pressure. Normal right ventricular size with reduced function. Device wire is noted in the right ventricle. Consistent with mild pulmonary hypertension. Left pleural effusion.  - remains on low dose levophed, MAP goal >65, wean as tolerated.   - midodrine 10mg TID  - POCUS showed preserved EF and underfilled LV. will give albumen today.      ====================/RENAL========================  #ALAYNA on CKD  #metabolic acidosis with AG, concomitant resp acidosis   I/S/o cardiorenal vs pre-renal , FeNa 0.9% - likely pre renal,   Baseline Cr 1.2-1.4, likely has CKD stage 3A with baseline Scr 1.3-1.6.  S/p NaHCO3, Ca gluconate and insulin  Nephrology consulted, now s/p R IJ shiley placement and HD.  - per renal ALAYNA likely hemodynamic mediated and medication SE (TMP/SMX and Entresto).   - s/p session of HD 8/9 w/ 1.5L fluid removal, 1L fluid removal 8/10. Per nephro plan for repeat session today   - US kidney/bladder- Increased bilateral renal echogenicity, suggestive of medical renal disease. No hydronephrosis.  - strict I/Os     ====================GI/NUTRITION=====================  Diet: Diet Soft and Bite Sized ordered    ====================INFECTIOUS DISEASE================  No fever, increase leukocytosis may be stress related vs infection. CT chest with GGO  Previous pleural fluid culture negative   - started empiric zosyn   - urine and blood culture - NTD  - MRSA PCR neg    ====================ENDOCRINE=======================  A1C 5.4%  - BG 140s     ====================HEMATOLOGIC/DVT PPx=============  On home eliquis 5 BID, last taken 8/8 at 6pm - held for shiley placement, and restarted 8/10  - given PCC for eliquis reversal prior to shiley  - H/H stable in 7s    ====================ETHICS===========================  GOC: patient with prior MOLST DNR/DNI. 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 Admitted to MICU for urgent HD i/so hypotension.        Plan:  ====================NEUROLOGY=======================  #Dementia  Home: Zyrexa, melatonin, and ativan at bedtime  AOx1 at baseline  - Following commands and responding to questions, at baseline MS  - c/w zyprexa 5mg and melatonin 3mg at bedtime.     #Parkinson's disease   - c/w carbidopa-levidopa      ====================RESPIRATORY======================  #Pleural effusions    Likely in the setting of CHF vs renal failure vs less likely malignancy (negative cytology outpt)  PleurX catheter placed 3/2022, drainage 3x a week   CT chest showed new 2.3 cm right middle lobe nodule, possible malignancy? and NEW moderate left pleural effusion with complete left lower lobe compressive atelectasis  Respiratory acidosis on admission (VBG 46)   - urgent HD for fluid overload  - s/p BIPAP, now tolerating RA  - c/w Pleurx drainage, wife reports drained 3x week, last removal 8/12 max 500ml      ====================CARDIOVASCULAR==================  #Bradycardia s/p PPM   #CHFpEF   #Afib   #Hypotension   Home Meds: Eliquis, Enteresto, Olmasartan, Plavix?, Rosuvastatin   SPECT scan with amyloidosis outpatient, may be contributing to CHFpEF   TTE 1/2023 EF 73%, diastolic dysfunction and mod pHTN   EKG showing paced ventricular rhythm, no T wave abnormalities, given Ca gluconate x2 in ED   - TTE 8/10: Minimal MR, Moderate AR. Moderate concentric LV hypertrophy.  grossly LVSfx  Normal RV size w/ reduced function, mild pulmonary hypertension. Left pleural effusion.  - remains on low dose levophed, MAP goal >65, wean as tolerated.   - midodrine 10mg TID and florinef 0.1mg daily to assist with pressor wean  - POCUS 8/12 showed preserved EF and underfilled LV, given albumin x2      ====================/RENAL========================  #ALAYNA on CKD  Admit Scr 4.54  - Baseline Cr 1.2-1.4, likely has CKD stage 3A with baseline Scr 1.3-1.6  - US kidney/bladder- Increased bilateral renal echogenicity, suggestive of medical renal disease. No hydronephrosis.  - Nephrology consulted- - per renal ALAYNA likely hemodynamic mediated and medication SE (TMP/SMX and Entresto)  - FeNa 0.9%   - s/p NAILA davila (8/9)  #Metabolic acidosis with AG, concomitant resp acidosis   -S/p NaHCO3, Ca gluconate and insulin  - s/p session of HD 8/9 w/ 1.5L fluid removal, 1L fluid removal 8/10.   - indwelling catheter in place, makes some urine, 5-30ml/hr  - will give lasix challenge with 80mg IVPx1, will continue monitor   - strict I/Os     ====================GI/NUTRITION=====================  Diet: Diet Soft and Bite Sized ordered  - c/w bowel regimen    ====================INFECTIOUS DISEASE================  No fever, increase leukocytosis may be stress related vs infection. CT chest with GGO  - Previous pleural fluid culture negative   - recently treated outpt w/ bactrim for UTI  - s/p Azithro (8/9)  and CTX (8/9) in ED  - currently w/ empiric zosyn (8/10-  ), can d/c tomorrow given no leukocytosis, negative cultures  - urine and blood culture - NTD  - MRSA PCR neg    ====================ENDOCRINE=======================  A1C 5.4%  home med: farxiga  - BG 140s     ====================HEMATOLOGIC/DVT PPx=============  On home eliquis 5 BID, last taken 8/8 at 6pm - held for shiley placement, and restarted 8/10  - given PCC for eliquis reversal prior to shiley  - H/H stable in 7s    ====================ETHICS===========================  GOC: patient with prior MOLST DNR/DNI. Upon discussion with wife and daughter, decision was

## 2023-08-12 NOTE — PROGRESS NOTE ADULT - CRITICAL CARE ATTENDING COMMENT
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 Nephro following, recs appreciated.  Will discuss further sessions, no plans today.  Continues to make small amounts of urine.  Awake alert responsive, occasional episodes of confusion / agitation, received Zyprexa overnight.  Cont Midodrine with MAP>65.  Cont abx for 5 days.  Cultures with NGTD. Patient examined and case reviewed in detail on bedside rounds. Frequent bedside visits with therapy change today.  Prognosis guarded.

## 2023-08-12 NOTE — PROGRESS NOTE ADULT - SUBJECTIVE AND OBJECTIVE BOX
CHIEF COMPLAINT:    Interval Events:    HPI:  81 yo M with HTN, A fib on Eliquis, bradycardia s/p ppm, CHF, pleural effusions s/p R pleurX, Parkinson's presenting with shortness of breath x 1 day.  Pt poor historian due to dementia. Wife at bedside providing history. Overnight pt had been complaining of shortness of breath, coughing. Denies any chest pain, palpitations, no fevers or chills. No recent illness, No GI or urinary symptoms.    In ED pt was given Nabicarb, Ca gluconate and insulin, 1L NS  VS:  90/51  61  97.8  19  100% on BIPAP (09 Aug 2023 09:40)      REVIEW OF SYSTEMS:  Constitutional: [ ] negative [ ] fevers [ ] chills [ ] weight loss [ ] weight gain  HEENT: [ ] negative [ ] dry eyes [ ] eye irritation [ ] postnasal drip [ ] nasal congestion  CV: [ ] negative  [ ] chest pain [ ] orthopnea [ ] palpitations [ ] murmur  Resp: [ ] negative [ ] cough [ ] shortness of breath [ ] dyspnea [ ] wheezing [ ] sputum [ ] hemoptysis  GI: [ ] negative [ ] nausea [ ] vomiting [ ] diarrhea [ ] constipation [ ] abd pain [ ] dysphagia   : [ ] negative [ ] dysuria [ ] nocturia [ ] hematuria [ ] increased urinary frequency  Musculoskeletal: [ ] negative [ ] back pain [ ] myalgias [ ] arthralgias [ ] fracture  Skin: [ ] negative [ ] rash [ ] itch  Neurological: [ ] negative [ ] headache [ ] dizziness [ ] syncope [ ] weakness [ ] numbness  Psychiatric: [ ] negative [ ] anxiety [ ] depression  Endocrine: [ ] negative [ ] diabetes [ ] thyroid problem  Hematologic/Lymphatic: [ ] negative [ ] anemia [ ] bleeding problem  Allergic/Immunologic: [ ] negative [ ] itchy eyes [ ] nasal discharge [ ] hives [ ] angioedema  [ ] All other systems negative  [ ] Unable to assess ROS because ________    OBJECTIVE:  ICU Vital Signs Last 24 Hrs  T(C): 36.8 (12 Aug 2023 04:00), Max: 37 (11 Aug 2023 16:10)  T(F): 98.2 (12 Aug 2023 04:00), Max: 98.6 (11 Aug 2023 16:10)  HR: 60 (12 Aug 2023 06:00) (60 - 66)  BP: 101/39 (12 Aug 2023 06:15) (85/51 - 142/50)  BP(mean): 54 (12 Aug 2023 06:15) (41 - 81)  ABP: --  ABP(mean): --  RR: 12 (12 Aug 2023 06:00) (10 - 17)  SpO2: 100% (12 Aug 2023 06:00) (96% - 100%)    O2 Parameters below as of 12 Aug 2023 06:00  Patient On (Oxygen Delivery Method): room air              08-10 @ 07:01  -  08-11 @ 07:00  --------------------------------------------------------  IN: 1344.8 mL / OUT: 1540 mL / NET: -195.2 mL    08-11 @ 07:01 - 08-12 @ 06:42  --------------------------------------------------------  IN: 1234 mL / OUT: 1870 mL / NET: -636 mL      CAPILLARY BLOOD GLUCOSE          Physical Exam:   Constitutional: ill appearing, no acute distress   HEENT: + PERRLA, EOMI, no drainage or redness  Neck: supple,  No JVD  Respiratory: Ventilator assisted breath Sounds equal & clear bilaterally to auscultation, no accessory muscle use noted  Cardiovascular: Regular rate, regular rhythm, normal S1, S2; no murmurs or rub  Gastrointestinal: Soft, non-tender, non distended, no hepatosplenomegaly, normal bowel sounds  Extremities: + 2 peripheral edema, no cyanosis, no clubbing   Vascular: Equal and normal pulses: 2+ peripheral pulses throughout  Neurological: sedated/intubated  Psychiatric: calm, normal mood, normal affect  Musculoskeletal: No joint swelling or deformity; no limitation of movement  Skin: warm, dry, well perfused, no rashes    HOSPITAL MEDICATIONS:  Standing Meds:  apixaban 2.5 milliGRAM(s) Oral two times a day  carbidopa/levodopa  25/100 1.5 Tablet(s) Oral four times a day  chlorhexidine 2% Cloths 1 Application(s) Topical daily  dextrose 5%. 1000 milliLiter(s) IV Continuous <Continuous>  dextrose 5%. 1000 milliLiter(s) IV Continuous <Continuous>  dextrose 50% Injectable 25 Gram(s) IV Push once  dextrose 50% Injectable 25 Gram(s) IV Push once  dextrose 50% Injectable 12.5 Gram(s) IV Push once  glucagon  Injectable 1 milliGRAM(s) IntraMuscular once  melatonin 3 milliGRAM(s) Oral at bedtime  midodrine. 10 milliGRAM(s) Oral three times a day  norepinephrine Infusion 0.05 MICROgram(s)/kG/Min IV Continuous <Continuous>  OLANZapine 2.5 milliGRAM(s) Oral at bedtime  piperacillin/tazobactam IVPB.. 3.375 Gram(s) IV Intermittent every 12 hours      PRN Meds:  acetaminophen     Tablet .. 650 milliGRAM(s) Oral every 6 hours PRN  aluminum hydroxide/magnesium hydroxide/simethicone Suspension 30 milliLiter(s) Oral every 4 hours PRN  dextrose Oral Gel 15 Gram(s) Oral once PRN  melatonin 3 milliGRAM(s) Oral at bedtime PRN  ondansetron Injectable 4 milliGRAM(s) IV Push every 8 hours PRN  sodium chloride 0.9% Bolus. 100 milliLiter(s) IV Bolus every 5 minutes PRN      LABS:                        7.4    6.87  )-----------( 188      ( 12 Aug 2023 00:40 )             23.7     Hgb Trend: 7.4<--, 6.6<--, 7.3<--, 7.5<--, 7.3<--  08-12    139  |  102  |  35<H>  ----------------------------<  109<H>  3.4<L>   |  24  |  2.66<H>    Ca    8.7      12 Aug 2023 00:40  Phos  2.8     08-12  Mg     2.30     08-12    TPro  6.0  /  Alb  3.5  /  TBili  0.4  /  DBili  x   /  AST  20  /  ALT  9   /  AlkPhos  48  08-12    Creatinine Trend: 2.66<--, 2.58<--, 3.28<--, 4.17<--, 3.56<--, 4.87<--  PT/INR - ( 12 Aug 2023 00:25 )   PT: 21.3 sec;   INR: 1.92 ratio         PTT - ( 12 Aug 2023 00:25 )  PTT:35.1 sec  Urinalysis Basic - ( 12 Aug 2023 00:40 )    Color: x / Appearance: x / SG: x / pH: x  Gluc: 109 mg/dL / Ketone: x  / Bili: x / Urobili: x   Blood: x / Protein: x / Nitrite: x   Leuk Esterase: x / RBC: x / WBC x   Sq Epi: x / Non Sq Epi: x / Bacteria: x        Venous Blood Gas:  08-12 @ 00:25  7.41/42/49/27/78.2  VBG Lactate: 1.2  Venous Blood Gas:  08-11 @ 03:00  7.36/41/28/23/44.1  VBG Lactate: 1.5      MICROBIOLOGY:     Culture - Urine (collected 10 Aug 2023 09:28)  Source: Clean Catch Clean Catch (Midstream)  Final Report (11 Aug 2023 06:47):    No growth    Culture - Blood (collected 10 Aug 2023 04:00)  Source: .Blood Blood  Preliminary Report (11 Aug 2023 09:03):    No growth at 24 hours    Culture - Blood (collected 09 Aug 2023 14:20)  Source: .Blood Blood-Peripheral  Preliminary Report (11 Aug 2023 19:01):    No growth at 48 Hours      RADIOLOGY:  [ ] Reviewed and interpreted by me           Interval Events: Levophed lowered down to 0.01 mcg/kg/min overnight    HPI:  81 yo M with HTN, A fib on Eliquis, bradycardia s/p ppm, CHF, pleural effusions s/p R pleurX, Parkinson's presenting with shortness of breath x 1 day.  Pt poor historian due to dementia. Wife at bedside providing history. Overnight pt had been complaining of shortness of breath, coughing. Denies any chest pain, palpitations, no fevers or chills. No recent illness, No GI or urinary symptoms.    In ED pt was given Nabicarb, Ca gluconate and insulin, 1L NS  VS:  90/51  61  97.8  19  100% on BIPAP (09 Aug 2023 09:40)      SUBJECTIVE: Patient seen and examined at bedside.  Offers no complaints.       OBJECTIVE:  ICU Vital Signs Last 24 Hrs  T(C): 36.8 (12 Aug 2023 04:00), Max: 37 (11 Aug 2023 16:10)  T(F): 98.2 (12 Aug 2023 04:00), Max: 98.6 (11 Aug 2023 16:10)  HR: 60 (12 Aug 2023 06:00) (60 - 66)  BP: 101/39 (12 Aug 2023 06:15) (85/51 - 142/50)  BP(mean): 54 (12 Aug 2023 06:15) (41 - 81)  ABP: --  ABP(mean): --  RR: 12 (12 Aug 2023 06:00) (10 - 17)  SpO2: 100% (12 Aug 2023 06:00) (96% - 100%)    O2 Parameters below as of 12 Aug 2023 06:00  Patient On (Oxygen Delivery Method): room air              08-10 @ 07:01  -  08-11 @ 07:00  --------------------------------------------------------  IN: 1344.8 mL / OUT: 1540 mL / NET: -195.2 mL    08-11 @ 07:01  -  08-12 @ 06:42  --------------------------------------------------------  IN: 1234 mL / OUT: 1870 mL / NET: -636 mL      CAPILLARY BLOOD GLUCOSE          Physical Exam:   GENERAL: NAD, lying in bed  EYES: EOMI, PERRLA  ENT: Moist mucous membranes  HEART: S1, S2, regular rate and rhythm, no murmurs, rubs, or gallops  LUNGS: On BiPAP, decreased breath sounds in the left base, no crackles or wheezing appreciated   ABDOMEN: Soft, nontender  EXTREMITIES: 2+ peripheral pulses bilaterally. 2+ pitting edema to the knee   NEURO: A&Ox1, pleasantly confused at times. Follows commands, moves extremities spontaneously.       HOSPITAL MEDICATIONS:  Standing Meds:  apixaban 2.5 milliGRAM(s) Oral two times a day  carbidopa/levodopa  25/100 1.5 Tablet(s) Oral four times a day  chlorhexidine 2% Cloths 1 Application(s) Topical daily  dextrose 5%. 1000 milliLiter(s) IV Continuous <Continuous>  dextrose 5%. 1000 milliLiter(s) IV Continuous <Continuous>  dextrose 50% Injectable 25 Gram(s) IV Push once  dextrose 50% Injectable 25 Gram(s) IV Push once  dextrose 50% Injectable 12.5 Gram(s) IV Push once  glucagon  Injectable 1 milliGRAM(s) IntraMuscular once  melatonin 3 milliGRAM(s) Oral at bedtime  midodrine. 10 milliGRAM(s) Oral three times a day  norepinephrine Infusion 0.05 MICROgram(s)/kG/Min IV Continuous <Continuous>  OLANZapine 2.5 milliGRAM(s) Oral at bedtime  piperacillin/tazobactam IVPB.. 3.375 Gram(s) IV Intermittent every 12 hours      PRN Meds:  acetaminophen     Tablet .. 650 milliGRAM(s) Oral every 6 hours PRN  aluminum hydroxide/magnesium hydroxide/simethicone Suspension 30 milliLiter(s) Oral every 4 hours PRN  dextrose Oral Gel 15 Gram(s) Oral once PRN  melatonin 3 milliGRAM(s) Oral at bedtime PRN  ondansetron Injectable 4 milliGRAM(s) IV Push every 8 hours PRN  sodium chloride 0.9% Bolus. 100 milliLiter(s) IV Bolus every 5 minutes PRN      LABS:                        7.4    6.87  )-----------( 188      ( 12 Aug 2023 00:40 )             23.7     Hgb Trend: 7.4<--, 6.6<--, 7.3<--, 7.5<--, 7.3<--  08-12    139  |  102  |  35<H>  ----------------------------<  109<H>  3.4<L>   |  24  |  2.66<H>    Ca    8.7      12 Aug 2023 00:40  Phos  2.8     08-12  Mg     2.30     08-12    TPro  6.0  /  Alb  3.5  /  TBili  0.4  /  DBili  x   /  AST  20  /  ALT  9   /  AlkPhos  48  08-12    Creatinine Trend: 2.66<--, 2.58<--, 3.28<--, 4.17<--, 3.56<--, 4.87<--  PT/INR - ( 12 Aug 2023 00:25 )   PT: 21.3 sec;   INR: 1.92 ratio         PTT - ( 12 Aug 2023 00:25 )  PTT:35.1 sec  Urinalysis Basic - ( 12 Aug 2023 00:40 )    Color: x / Appearance: x / SG: x / pH: x  Gluc: 109 mg/dL / Ketone: x  / Bili: x / Urobili: x   Blood: x / Protein: x / Nitrite: x   Leuk Esterase: x / RBC: x / WBC x   Sq Epi: x / Non Sq Epi: x / Bacteria: x        Venous Blood Gas:  08-12 @ 00:25  7.41/42/49/27/78.2  VBG Lactate: 1.2  Venous Blood Gas:  08-11 @ 03:00  7.36/41/28/23/44.1  VBG Lactate: 1.5      MICROBIOLOGY:     Culture - Urine (collected 10 Aug 2023 09:28)  Source: Clean Catch Clean Catch (Midstream)  Final Report (11 Aug 2023 06:47):    No growth    Culture - Blood (collected 10 Aug 2023 04:00)  Source: .Blood Blood  Preliminary Report (11 Aug 2023 09:03):    No growth at 24 hours    Culture - Blood (collected 09 Aug 2023 14:20)  Source: .Blood Blood-Peripheral  Preliminary Report (11 Aug 2023 19:01):    No growth at 48 Hours      RADIOLOGY:  [ ] Reviewed and interpreted by me           Interval Events: Levophed lowered down to 0.01 mcg/kg/min overnight    HPI:  81 yo M with HTN, A fib on Eliquis, bradycardia s/p ppm, CHF, pleural effusions s/p R pleurX, Parkinson's presenting with shortness of breath x 1 day.  Pt poor historian due to dementia. Wife at bedside providing history. Overnight pt had been complaining of shortness of breath, coughing. Denies any chest pain, palpitations, no fevers or chills. No recent illness, No GI or urinary symptoms.    In ED pt was given Nabicarb, Ca gluconate and insulin, 1L NS  VS:  90/51  61  97.8  19  100% on BIPAP (09 Aug 2023 09:40)      SUBJECTIVE: Patient seen and examined at bedside.  Offers no complaints.       OBJECTIVE:  ICU Vital Signs Last 24 Hrs  T(C): 36.8 (12 Aug 2023 04:00), Max: 37 (11 Aug 2023 16:10)  T(F): 98.2 (12 Aug 2023 04:00), Max: 98.6 (11 Aug 2023 16:10)  HR: 60 (12 Aug 2023 06:00) (60 - 66)  BP: 101/39 (12 Aug 2023 06:15) (85/51 - 142/50)  BP(mean): 54 (12 Aug 2023 06:15) (41 - 81)  ABP: --  ABP(mean): --  RR: 12 (12 Aug 2023 06:00) (10 - 17)  SpO2: 100% (12 Aug 2023 06:00) (96% - 100%)    O2 Parameters below as of 12 Aug 2023 06:00  Patient On (Oxygen Delivery Method): room air          08-10 @ 07:01  -  08-11 @ 07:00  --------------------------------------------------------  IN: 1344.8 mL / OUT: 1540 mL / NET: -195.2 mL    08-11 @ 07:01  -  08-12 @ 06:42  --------------------------------------------------------  IN: 1234 mL / OUT: 1870 mL / NET: -636 mL      CAPILLARY BLOOD GLUCOSE          Physical Exam:   GENERAL: NAD, lying in bed  EYES: EOMI, PERRLA  ENT: Moist mucous membranes  HEART: S1, S2, regular rate and rhythm, no murmurs, rubs, or gallops  LUNGS: On BiPAP, decreased breath sounds in the left base, no crackles or wheezing appreciated   ABDOMEN: Soft, nontender  EXTREMITIES: 2+ peripheral pulses bilaterally. 2+ pitting edema to the knee   NEURO: A&Ox1, pleasantly confused at times. Follows commands, moves extremities spontaneously.       HOSPITAL MEDICATIONS:  Standing Meds:  apixaban 2.5 milliGRAM(s) Oral two times a day  carbidopa/levodopa  25/100 1.5 Tablet(s) Oral four times a day  chlorhexidine 2% Cloths 1 Application(s) Topical daily  dextrose 5%. 1000 milliLiter(s) IV Continuous <Continuous>  dextrose 5%. 1000 milliLiter(s) IV Continuous <Continuous>  dextrose 50% Injectable 25 Gram(s) IV Push once  dextrose 50% Injectable 25 Gram(s) IV Push once  dextrose 50% Injectable 12.5 Gram(s) IV Push once  glucagon  Injectable 1 milliGRAM(s) IntraMuscular once  melatonin 3 milliGRAM(s) Oral at bedtime  midodrine. 10 milliGRAM(s) Oral three times a day  norepinephrine Infusion 0.05 MICROgram(s)/kG/Min IV Continuous <Continuous>  OLANZapine 2.5 milliGRAM(s) Oral at bedtime  piperacillin/tazobactam IVPB.. 3.375 Gram(s) IV Intermittent every 12 hours      PRN Meds:  acetaminophen     Tablet .. 650 milliGRAM(s) Oral every 6 hours PRN  aluminum hydroxide/magnesium hydroxide/simethicone Suspension 30 milliLiter(s) Oral every 4 hours PRN  dextrose Oral Gel 15 Gram(s) Oral once PRN  melatonin 3 milliGRAM(s) Oral at bedtime PRN  ondansetron Injectable 4 milliGRAM(s) IV Push every 8 hours PRN  sodium chloride 0.9% Bolus. 100 milliLiter(s) IV Bolus every 5 minutes PRN      LABS:                        7.4    6.87  )-----------( 188      ( 12 Aug 2023 00:40 )             23.7     Hgb Trend: 7.4<--, 6.6<--, 7.3<--, 7.5<--, 7.3<--  08-12    139  |  102  |  35<H>  ----------------------------<  109<H>  3.4<L>   |  24  |  2.66<H>    Ca    8.7      12 Aug 2023 00:40  Phos  2.8     08-12  Mg     2.30     08-12    TPro  6.0  /  Alb  3.5  /  TBili  0.4  /  DBili  x   /  AST  20  /  ALT  9   /  AlkPhos  48  08-12    Creatinine Trend: 2.66<--, 2.58<--, 3.28<--, 4.17<--, 3.56<--, 4.87<--  PT/INR - ( 12 Aug 2023 00:25 )   PT: 21.3 sec;   INR: 1.92 ratio         PTT - ( 12 Aug 2023 00:25 )  PTT:35.1 sec  Urinalysis Basic - ( 12 Aug 2023 00:40 )    Color: x / Appearance: x / SG: x / pH: x  Gluc: 109 mg/dL / Ketone: x  / Bili: x / Urobili: x   Blood: x / Protein: x / Nitrite: x   Leuk Esterase: x / RBC: x / WBC x   Sq Epi: x / Non Sq Epi: x / Bacteria: x        Venous Blood Gas:  08-12 @ 00:25  7.41/42/49/27/78.2  VBG Lactate: 1.2  Venous Blood Gas:  08-11 @ 03:00  7.36/41/28/23/44.1  VBG Lactate: 1.5      MICROBIOLOGY:     Culture - Urine (collected 10 Aug 2023 09:28)  Source: Clean Catch Clean Catch (Midstream)  Final Report (11 Aug 2023 06:47):    No growth    Culture - Blood (collected 10 Aug 2023 04:00)  Source: .Blood Blood  Preliminary Report (11 Aug 2023 09:03):    No growth at 24 hours    Culture - Blood (collected 09 Aug 2023 14:20)  Source: .Blood Blood-Peripheral  Preliminary Report (11 Aug 2023 19:01):    No growth at 48 Hours      RADIOLOGY:  [ ] Reviewed and interpreted by me

## 2023-08-12 NOTE — PROGRESS NOTE ADULT - PROBLEM SELECTOR PLAN 1
Pt. w/ ALAYNA likely hemodynamic mediated and medication SE (TMP/SMX and Entresto). On review of sunrise/HIE patient likely has CKD stage 3A with baseline Scr 1.3-1.6. Last outpt labs reviewed on HIE showing Scr 1.64 on 4/20/23. On admission Scr 4.54, K 6.4, and SCO2 14. HD consent done and placed in chart. Kidney US with no hydronephrosis 8/11. Pt received IHD 8/9, 8/10, 8/11. Labs reviewed and 24hr UOP 470cc. Pt. remains on low dose vasopressors, but off BIPAP. No plan for HD today. Continue to hold Entresto and TMP/SMX. Monitor labs, VS and urine output. Avoid nephrotoxins. Dose medications as per eGFR.    If you have any questions, please feel free to contact me  Teo Rogel  Nephrology Fellow  [phone]; Prefer [employer] TEAMS  (After 5pm or on weekends please page the on-call fellow).

## 2023-08-13 LAB
ALBUMIN SERPL ELPH-MCNC: 3.1 G/DL — LOW (ref 3.3–5)
ALP SERPL-CCNC: 44 U/L — SIGNIFICANT CHANGE UP (ref 40–120)
ALT FLD-CCNC: 9 U/L — SIGNIFICANT CHANGE UP (ref 4–41)
ANION GAP SERPL CALC-SCNC: 15 MMOL/L — HIGH (ref 7–14)
APTT BLD: 35.6 SEC — SIGNIFICANT CHANGE UP (ref 24.5–35.6)
AST SERPL-CCNC: 19 U/L — SIGNIFICANT CHANGE UP (ref 4–40)
BASOPHILS # BLD AUTO: 0.04 K/UL — SIGNIFICANT CHANGE UP (ref 0–0.2)
BASOPHILS NFR BLD AUTO: 0.6 % — SIGNIFICANT CHANGE UP (ref 0–2)
BILIRUB SERPL-MCNC: 0.3 MG/DL — SIGNIFICANT CHANGE UP (ref 0.2–1.2)
BUN SERPL-MCNC: 37 MG/DL — HIGH (ref 7–23)
CALCIUM SERPL-MCNC: 8.4 MG/DL — SIGNIFICANT CHANGE UP (ref 8.4–10.5)
CHLORIDE SERPL-SCNC: 102 MMOL/L — SIGNIFICANT CHANGE UP (ref 98–107)
CO2 SERPL-SCNC: 22 MMOL/L — SIGNIFICANT CHANGE UP (ref 22–31)
CREAT SERPL-MCNC: 2.82 MG/DL — HIGH (ref 0.5–1.3)
EGFR: 22 ML/MIN/1.73M2 — LOW
EOSINOPHIL # BLD AUTO: 0.09 K/UL — SIGNIFICANT CHANGE UP (ref 0–0.5)
EOSINOPHIL NFR BLD AUTO: 1.4 % — SIGNIFICANT CHANGE UP (ref 0–6)
GLUCOSE SERPL-MCNC: 115 MG/DL — HIGH (ref 70–99)
HCT VFR BLD CALC: 26 % — LOW (ref 39–50)
HGB BLD-MCNC: 8 G/DL — LOW (ref 13–17)
IANC: 4.62 K/UL — SIGNIFICANT CHANGE UP (ref 1.8–7.4)
IMM GRANULOCYTES NFR BLD AUTO: 0.2 % — SIGNIFICANT CHANGE UP (ref 0–0.9)
INR BLD: 1.66 RATIO — HIGH (ref 0.85–1.18)
LYMPHOCYTES # BLD AUTO: 1.12 K/UL — SIGNIFICANT CHANGE UP (ref 1–3.3)
LYMPHOCYTES # BLD AUTO: 17.5 % — SIGNIFICANT CHANGE UP (ref 13–44)
MAGNESIUM SERPL-MCNC: 2.3 MG/DL — SIGNIFICANT CHANGE UP (ref 1.6–2.6)
MCHC RBC-ENTMCNC: 25.6 PG — LOW (ref 27–34)
MCHC RBC-ENTMCNC: 30.8 GM/DL — LOW (ref 32–36)
MCV RBC AUTO: 83.1 FL — SIGNIFICANT CHANGE UP (ref 80–100)
MONOCYTES # BLD AUTO: 0.51 K/UL — SIGNIFICANT CHANGE UP (ref 0–0.9)
MONOCYTES NFR BLD AUTO: 8 % — SIGNIFICANT CHANGE UP (ref 2–14)
NEUTROPHILS # BLD AUTO: 4.62 K/UL — SIGNIFICANT CHANGE UP (ref 1.8–7.4)
NEUTROPHILS NFR BLD AUTO: 72.3 % — SIGNIFICANT CHANGE UP (ref 43–77)
NRBC # BLD: 0 /100 WBCS — SIGNIFICANT CHANGE UP (ref 0–0)
NRBC # FLD: 0 K/UL — SIGNIFICANT CHANGE UP (ref 0–0)
PHOSPHATE SERPL-MCNC: 3.3 MG/DL — SIGNIFICANT CHANGE UP (ref 2.5–4.5)
PLATELET # BLD AUTO: 251 K/UL — SIGNIFICANT CHANGE UP (ref 150–400)
POTASSIUM SERPL-MCNC: 3.5 MMOL/L — SIGNIFICANT CHANGE UP (ref 3.5–5.3)
POTASSIUM SERPL-SCNC: 3.5 MMOL/L — SIGNIFICANT CHANGE UP (ref 3.5–5.3)
PROT SERPL-MCNC: 5.6 G/DL — LOW (ref 6–8.3)
PROTHROM AB SERPL-ACNC: 18.3 SEC — HIGH (ref 9.5–13)
RBC # BLD: 3.13 M/UL — LOW (ref 4.2–5.8)
RBC # FLD: 21 % — HIGH (ref 10.3–14.5)
SODIUM SERPL-SCNC: 139 MMOL/L — SIGNIFICANT CHANGE UP (ref 135–145)
WBC # BLD: 6.39 K/UL — SIGNIFICANT CHANGE UP (ref 3.8–10.5)
WBC # FLD AUTO: 6.39 K/UL — SIGNIFICANT CHANGE UP (ref 3.8–10.5)

## 2023-08-13 PROCEDURE — 99232 SBSQ HOSP IP/OBS MODERATE 35: CPT | Mod: GC

## 2023-08-13 PROCEDURE — 71045 X-RAY EXAM CHEST 1 VIEW: CPT | Mod: 26

## 2023-08-13 PROCEDURE — 99291 CRITICAL CARE FIRST HOUR: CPT | Mod: FS

## 2023-08-13 RX ORDER — MIDODRINE HYDROCHLORIDE 2.5 MG/1
15 TABLET ORAL THREE TIMES A DAY
Refills: 0 | Status: DISCONTINUED | OUTPATIENT
Start: 2023-08-13 | End: 2023-08-14

## 2023-08-13 RX ORDER — MIDODRINE HYDROCHLORIDE 2.5 MG/1
5 TABLET ORAL ONCE
Refills: 0 | Status: COMPLETED | OUTPATIENT
Start: 2023-08-13 | End: 2023-08-13

## 2023-08-13 RX ORDER — FUROSEMIDE 40 MG
80 TABLET ORAL
Refills: 0 | Status: COMPLETED | OUTPATIENT
Start: 2023-08-13 | End: 2023-08-14

## 2023-08-13 RX ORDER — POTASSIUM CHLORIDE 20 MEQ
20 PACKET (EA) ORAL ONCE
Refills: 0 | Status: COMPLETED | OUTPATIENT
Start: 2023-08-13 | End: 2023-08-13

## 2023-08-13 RX ADMIN — CHLORHEXIDINE GLUCONATE 1 APPLICATION(S): 213 SOLUTION TOPICAL at 11:32

## 2023-08-13 RX ADMIN — APIXABAN 2.5 MILLIGRAM(S): 2.5 TABLET, FILM COATED ORAL at 17:17

## 2023-08-13 RX ADMIN — Medication 8.23 MICROGRAM(S)/KG/MIN: at 19:40

## 2023-08-13 RX ADMIN — Medication 20 MILLIEQUIVALENT(S): at 08:18

## 2023-08-13 RX ADMIN — MIDODRINE HYDROCHLORIDE 10 MILLIGRAM(S): 2.5 TABLET ORAL at 05:04

## 2023-08-13 RX ADMIN — OLANZAPINE 2.5 MILLIGRAM(S): 15 TABLET, FILM COATED ORAL at 21:34

## 2023-08-13 RX ADMIN — APIXABAN 2.5 MILLIGRAM(S): 2.5 TABLET, FILM COATED ORAL at 05:04

## 2023-08-13 RX ADMIN — FLUDROCORTISONE ACETATE 0.1 MILLIGRAM(S): 0.1 TABLET ORAL at 05:04

## 2023-08-13 RX ADMIN — CARBIDOPA AND LEVODOPA 1.5 TABLET(S): 25; 100 TABLET ORAL at 05:04

## 2023-08-13 RX ADMIN — MIDODRINE HYDROCHLORIDE 10 MILLIGRAM(S): 2.5 TABLET ORAL at 11:34

## 2023-08-13 RX ADMIN — CARBIDOPA AND LEVODOPA 1.5 TABLET(S): 25; 100 TABLET ORAL at 23:10

## 2023-08-13 RX ADMIN — Medication 80 MILLIGRAM(S): at 11:33

## 2023-08-13 RX ADMIN — PIPERACILLIN AND TAZOBACTAM 25 GRAM(S): 4; .5 INJECTION, POWDER, LYOPHILIZED, FOR SOLUTION INTRAVENOUS at 05:04

## 2023-08-13 RX ADMIN — MIDODRINE HYDROCHLORIDE 15 MILLIGRAM(S): 2.5 TABLET ORAL at 17:16

## 2023-08-13 RX ADMIN — CARBIDOPA AND LEVODOPA 1.5 TABLET(S): 25; 100 TABLET ORAL at 17:17

## 2023-08-13 RX ADMIN — CARBIDOPA AND LEVODOPA 1.5 TABLET(S): 25; 100 TABLET ORAL at 11:34

## 2023-08-13 RX ADMIN — Medication 3 MILLIGRAM(S): at 21:35

## 2023-08-13 RX ADMIN — MIDODRINE HYDROCHLORIDE 5 MILLIGRAM(S): 2.5 TABLET ORAL at 12:14

## 2023-08-13 NOTE — PROGRESS NOTE ADULT - SUBJECTIVE AND OBJECTIVE BOX
Significant recent/past 24 hr events:    Subjective:    Review of Systems         [ ] A ten-point review of systems was otherwise negative except as noted.  [ ] Due to altered mental status/intubation, subjective information were not able to be obtained from the patient. History was obtained, to the extent possible, from review of the chart and collateral sources of information.      Patient is a 82y old  Male who presents with a chief complaint of ALAYNA, Hyperkalemia (12 Aug 2023 10:01)    HPI:  81 yo M with HTN, A fib on Eliquis, bradycardia s/p ppm, CHF, pleural effusions s/p R pleurX, Parkinson's presenting with shortness of breath x 1 day.  Pt poor historian due to dementia. Wife at bedside providing history. Overnight pt had been complaining of shortness of breath, coughing. Denies any chest pain, palpitations, no fevers or chills. No recent illness, No GI or urinary symptoms.    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      Parkinsons disease      CAD (coronary artery disease)  s/p PCI 08/21      Pleural effusion, not elsewhere classified      COVID-19 vaccine series completed  w booster      History of hip replacement, total  R hip 2008 & L hip      Status post cataract extraction  right eye cataract extraction with IOL 6/26/2015      Presence of cardiac pacemaker  10/2021      H/O coronary angioplasty  8/2021 1 stent inserted        FAMILY HISTORY:  Family history of lung cancer (Mother)    Family history of esophageal cancer (Father)    FH: myocardial infarction (Grandparent)        Vitals   ICU Vital Signs Last 24 Hrs  T(C): 36.6 (13 Aug 2023 04:00), Max: 36.7 (13 Aug 2023 00:00)  T(F): 97.9 (13 Aug 2023 04:00), Max: 98.1 (13 Aug 2023 00:00)  HR: 60 (13 Aug 2023 06:00) (60 - 64)  BP: 116/46 (13 Aug 2023 06:00) (83/33 - 124/45)  BP(mean): 62 (13 Aug 2023 06:00) (42 - 73)  ABP: --  ABP(mean): --  RR: 13 (13 Aug 2023 06:00) (10 - 16)  SpO2: 100% (13 Aug 2023 06:00) (97% - 100%)    O2 Parameters below as of 13 Aug 2023 06:00  Patient On (Oxygen Delivery Method): room air            Physical Exam:   Constitutional: NAD, well-groomed, well-developed  HEENT: PERRLA, EOMI, no drainage or redness  Neck: supple,  No JVD, Trachea midline  Back: Normal spine flexure, No CVA tenderness, No deformity or limitation of movement  Respiratory: Breath Sounds equal & clear bilaterally to auscultation, no accessory muscle use noted  Cardiovascular: Regular rate, regular rhythm, normal S1, S2; no murmurs or rub  Gastrointestinal: Soft, non-tender, non distended, no hepatosplenomegaly, normal bowel sounds  Extremities: GUILLORY x 4, no peripheral edema, no cyanosis, no clubbing   Vascular: Equal and normal pulses: 2+ peripheral pulses throughout  Neurological: A+O x 3; speech clear and intact; no sensory, motor  deficits, normal reflexes  Psychiatric: calm, normal mood, normal affect  Musculoskeletal: No joint swelling or deformity; no limitation of movement  Skin: warm, dry, well perfused, no rashes    VENT SETTINGS         I&O's Detail    12 Aug 2023 07:01  -  13 Aug 2023 07:00  --------------------------------------------------------  IN:    IV PiggyBack: 200 mL    Norepinephrine: 64.1 mL  Total IN: 264.1 mL    OUT:    Drain (mL): 500 mL    Indwelling Catheter - Urethral (mL): 1220 mL  Total OUT: 1720 mL    Total NET: -1455.9 mL          LABS                        8.0    6.39  )-----------( 251      ( 13 Aug 2023 00:25 )             26.0     08-13    139  |  102  |  37<H>  ----------------------------<  115<H>  3.5   |  22  |  2.82<H>    Ca    8.4      13 Aug 2023 00:25  Phos  3.3     08-13  Mg     2.30     08-13    TPro  5.6<L>  /  Alb  3.1<L>  /  TBili  0.3  /  DBili  x   /  AST  19  /  ALT  9   /  AlkPhos  44  08-13    LIVER FUNCTIONS - ( 13 Aug 2023 00:25 )  Alb: 3.1 g/dL / Pro: 5.6 g/dL / ALK PHOS: 44 U/L / ALT: 9 U/L / AST: 19 U/L / GGT: x           PT/INR - ( 13 Aug 2023 00:25 )   PT: 18.3 sec;   INR: 1.66 ratio         PTT - ( 13 Aug 2023 00:25 )  PTT:35.6 sec        Urinalysis Basic - ( 13 Aug 2023 00:25 )    Color: x / Appearance: x / SG: x / pH: x  Gluc: 115 mg/dL / Ketone: x  / Bili: x / Urobili: x   Blood: x / Protein: x / Nitrite: x   Leuk Esterase: x / RBC: x / WBC x   Sq Epi: x / Non Sq Epi: x / Bacteria: x            MEDICATIONS  (STANDING):  apixaban 2.5 milliGRAM(s) Oral two times a day  carbidopa/levodopa  25/100 1.5 Tablet(s) Oral four times a day  chlorhexidine 2% Cloths 1 Application(s) Topical daily  dextrose 5%. 1000 milliLiter(s) (50 mL/Hr) IV Continuous <Continuous>  dextrose 5%. 1000 milliLiter(s) (100 mL/Hr) IV Continuous <Continuous>  dextrose 50% Injectable 25 Gram(s) IV Push once  dextrose 50% Injectable 25 Gram(s) IV Push once  dextrose 50% Injectable 12.5 Gram(s) IV Push once  fludroCORTISONE 0.1 milliGRAM(s) Oral daily  glucagon  Injectable 1 milliGRAM(s) IntraMuscular once  melatonin 3 milliGRAM(s) Oral at bedtime  midodrine. 10 milliGRAM(s) Oral three times a day  norepinephrine Infusion 0.05 MICROgram(s)/kG/Min (8.23 mL/Hr) IV Continuous <Continuous>  OLANZapine 2.5 milliGRAM(s) Oral at bedtime    MEDICATIONS  (PRN):  acetaminophen     Tablet .. 650 milliGRAM(s) Oral every 6 hours PRN Temp greater or equal to 38C (100.4F), Mild Pain (1 - 3)  aluminum hydroxide/magnesium hydroxide/simethicone Suspension 30 milliLiter(s) Oral every 4 hours PRN Dyspepsia  dextrose Oral Gel 15 Gram(s) Oral once PRN Blood Glucose LESS THAN 70 milliGRAM(s)/deciliter  melatonin 3 milliGRAM(s) Oral at bedtime PRN Insomnia  ondansetron Injectable 4 milliGRAM(s) IV Push every 8 hours PRN Nausea and/or Vomiting  sodium chloride 0.9% Bolus. 100 milliLiter(s) IV Bolus every 5 minutes PRN SBP LESS THAN or EQUAL to 90 mmHg      Allergies:  No Known Allergies        CRITICAL CARE TIME SPENT:  minutes of critical care time spent providing medical care for patient's acute illness/conditions that impairs at least one vital organ system and/or poses a high risk of imminent or life threatening deterioration in the patient's condition. It includes time spent evaluating and treating the patient's acute illness as well as time spent reviewing labs, radiology, discussing goals of care with patient and/or patient's family, and discussing the case with a multidisciplinary team, in an effort to prevent further life threatening deterioration or end organ damage. This time is independent of any procedures performed.         Significant recent/past 24 hr events: no significant events     Subjective: pt denies any complaints     Review of Systems         [ ] A ten-point review of systems was otherwise negative except as noted.  [ ] Due to altered mental status/intubation, subjective information were not able to be obtained from the patient. History was obtained, to the extent possible, from review of the chart and collateral sources of information.      Patient is a 82y old  Male who presents with a chief complaint of ALAYNA, Hyperkalemia (12 Aug 2023 10:01)    HPI:  83 yo M with HTN, A fib on Eliquis, bradycardia s/p ppm, CHF, pleural effusions s/p R pleurX, Parkinson's presenting with shortness of breath x 1 day.  Pt poor historian due to dementia. Wife at bedside providing history. Overnight pt had been complaining of shortness of breath, coughing. Denies any chest pain, palpitations, no fevers or chills. No recent illness, No GI or urinary symptoms.    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      Parkinsons disease      CAD (coronary artery disease)  s/p PCI 08/21      Pleural effusion, not elsewhere classified      COVID-19 vaccine series completed  w booster      History of hip replacement, total  R hip 2008 & L hip      Status post cataract extraction  right eye cataract extraction with IOL 6/26/2015      Presence of cardiac pacemaker  10/2021      H/O coronary angioplasty  8/2021 1 stent inserted        FAMILY HISTORY:  Family history of lung cancer (Mother)    Family history of esophageal cancer (Father)    FH: myocardial infarction (Grandparent)        Vitals   ICU Vital Signs Last 24 Hrs  T(C): 36.6 (13 Aug 2023 04:00), Max: 36.7 (13 Aug 2023 00:00)  T(F): 97.9 (13 Aug 2023 04:00), Max: 98.1 (13 Aug 2023 00:00)  HR: 60 (13 Aug 2023 06:00) (60 - 64)  BP: 116/46 (13 Aug 2023 06:00) (83/33 - 124/45)  BP(mean): 62 (13 Aug 2023 06:00) (42 - 73)  ABP: --  ABP(mean): --  RR: 13 (13 Aug 2023 06:00) (10 - 16)  SpO2: 100% (13 Aug 2023 06:00) (97% - 100%)    O2 Parameters below as of 13 Aug 2023 06:00  Patient On (Oxygen Delivery Method): room air            Physical Exam:   Constitutional: NAD, well-groomed, well-developed  HEENT: PERRLA, EOMI, no drainage or redness  Neck: supple,  No JVD, Trachea midline  Back: Normal spine flexure, No CVA tenderness, No deformity or limitation of movement  Respiratory: Breath Sounds equal & clear bilaterally to auscultation, no accessory muscle use noted  Cardiovascular: Regular rate, regular rhythm, normal S1, S2; no murmurs or rub  Gastrointestinal: Soft, non-tender, non distended, no hepatosplenomegaly, normal bowel sounds  Extremities: GUILLORY x 4, 1 + peripheral edema, no cyanosis, no clubbing   Vascular: Equal and normal pulses: 2+ peripheral pulses throughout  Neurological: A+O x 3; speech clear and intact; no sensory, motor  deficits, normal reflexes  Psychiatric: calm, normal mood, normal affect  Musculoskeletal: No joint swelling or deformity; no limitation of movement  Skin: warm, dry, well perfused, no rashes    VENT SETTINGS         I&O's Detail    12 Aug 2023 07:01  -  13 Aug 2023 07:00  --------------------------------------------------------  IN:    IV PiggyBack: 200 mL    Norepinephrine: 64.1 mL  Total IN: 264.1 mL    OUT:    Drain (mL): 500 mL    Indwelling Catheter - Urethral (mL): 1220 mL  Total OUT: 1720 mL    Total NET: -1455.9 mL          LABS                        8.0    6.39  )-----------( 251      ( 13 Aug 2023 00:25 )             26.0     08-13    139  |  102  |  37<H>  ----------------------------<  115<H>  3.5   |  22  |  2.82<H>    Ca    8.4      13 Aug 2023 00:25  Phos  3.3     08-13  Mg     2.30     08-13    TPro  5.6<L>  /  Alb  3.1<L>  /  TBili  0.3  /  DBili  x   /  AST  19  /  ALT  9   /  AlkPhos  44  08-13    LIVER FUNCTIONS - ( 13 Aug 2023 00:25 )  Alb: 3.1 g/dL / Pro: 5.6 g/dL / ALK PHOS: 44 U/L / ALT: 9 U/L / AST: 19 U/L / GGT: x           PT/INR - ( 13 Aug 2023 00:25 )   PT: 18.3 sec;   INR: 1.66 ratio         PTT - ( 13 Aug 2023 00:25 )  PTT:35.6 sec        Urinalysis Basic - ( 13 Aug 2023 00:25 )    Color: x / Appearance: x / SG: x / pH: x  Gluc: 115 mg/dL / Ketone: x  / Bili: x / Urobili: x   Blood: x / Protein: x / Nitrite: x   Leuk Esterase: x / RBC: x / WBC x   Sq Epi: x / Non Sq Epi: x / Bacteria: x            MEDICATIONS  (STANDING):  apixaban 2.5 milliGRAM(s) Oral two times a day  carbidopa/levodopa  25/100 1.5 Tablet(s) Oral four times a day  chlorhexidine 2% Cloths 1 Application(s) Topical daily  dextrose 5%. 1000 milliLiter(s) (50 mL/Hr) IV Continuous <Continuous>  dextrose 5%. 1000 milliLiter(s) (100 mL/Hr) IV Continuous <Continuous>  dextrose 50% Injectable 25 Gram(s) IV Push once  dextrose 50% Injectable 25 Gram(s) IV Push once  dextrose 50% Injectable 12.5 Gram(s) IV Push once  fludroCORTISONE 0.1 milliGRAM(s) Oral daily  glucagon  Injectable 1 milliGRAM(s) IntraMuscular once  melatonin 3 milliGRAM(s) Oral at bedtime  midodrine. 10 milliGRAM(s) Oral three times a day  norepinephrine Infusion 0.05 MICROgram(s)/kG/Min (8.23 mL/Hr) IV Continuous <Continuous>  OLANZapine 2.5 milliGRAM(s) Oral at bedtime    MEDICATIONS  (PRN):  acetaminophen     Tablet .. 650 milliGRAM(s) Oral every 6 hours PRN Temp greater or equal to 38C (100.4F), Mild Pain (1 - 3)  aluminum hydroxide/magnesium hydroxide/simethicone Suspension 30 milliLiter(s) Oral every 4 hours PRN Dyspepsia  dextrose Oral Gel 15 Gram(s) Oral once PRN Blood Glucose LESS THAN 70 milliGRAM(s)/deciliter  melatonin 3 milliGRAM(s) Oral at bedtime PRN Insomnia  ondansetron Injectable 4 milliGRAM(s) IV Push every 8 hours PRN Nausea and/or Vomiting  sodium chloride 0.9% Bolus. 100 milliLiter(s) IV Bolus every 5 minutes PRN SBP LESS THAN or EQUAL to 90 mmHg      Allergies:  No Known Allergies             Significant recent/past 24 hr events: no significant events     Subjective: pt denies any complaints     Review of Systems         [ ] A ten-point review of systems was otherwise negative except as noted.  [ ] Due to altered mental status/intubation, subjective information were not able to be obtained from the patient. History was obtained, to the extent possible, from review of the chart and collateral sources of information.      Patient is a 82y old  Male who presents with a chief complaint of ALAYNA, Hyperkalemia (12 Aug 2023 10:01)    HPI:  81 yo M with HTN, A fib on Eliquis, bradycardia s/p ppm, CHF, pleural effusions s/p R pleurX, Parkinson's presenting with shortness of breath x 1 day.  Pt poor historian due to dementia. Wife at bedside providing history. Overnight pt had been complaining of shortness of breath, coughing. Denies any chest pain, palpitations, no fevers or chills. No recent illness, No GI or urinary symptoms.    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      Parkinsons disease      CAD (coronary artery disease)  s/p PCI 08/21      Pleural effusion, not elsewhere classified      COVID-19 vaccine series completed  w booster      History of hip replacement, total  R hip 2008 & L hip      Status post cataract extraction  right eye cataract extraction with IOL 6/26/2015      Presence of cardiac pacemaker  10/2021      H/O coronary angioplasty  8/2021 1 stent inserted        FAMILY HISTORY:  Family history of lung cancer (Mother)    Family history of esophageal cancer (Father)    FH: myocardial infarction (Grandparent)        Vitals   ICU Vital Signs Last 24 Hrs  T(C): 36.6 (13 Aug 2023 04:00), Max: 36.7 (13 Aug 2023 00:00)  T(F): 97.9 (13 Aug 2023 04:00), Max: 98.1 (13 Aug 2023 00:00)  HR: 60 (13 Aug 2023 06:00) (60 - 64)  BP: 116/46 (13 Aug 2023 06:00) (83/33 - 124/45)  BP(mean): 62 (13 Aug 2023 06:00) (42 - 73)  ABP: --  ABP(mean): --  RR: 13 (13 Aug 2023 06:00) (10 - 16)  SpO2: 100% (13 Aug 2023 06:00) (97% - 100%)    O2 Parameters below as of 13 Aug 2023 06:00  Patient On (Oxygen Delivery Method): room air            Physical Exam:   Constitutional: NAD, well-groomed, well-developed  HEENT: PERRLA, EOMI, no drainage or redness  Neck: supple,  No JVD, Trachea midline  Back: Normal spine flexure, No CVA tenderness, No deformity or limitation of movement  Respiratory: Breath Sounds diminished bilaterally to auscultation, no accessory muscle use noted  Cardiovascular: Regular rate, regular rhythm, normal S1, S2; no murmurs or rub  Gastrointestinal: Soft, non-tender, non distended, no hepatosplenomegaly, normal bowel sounds  Extremities: GUILLORY x 4, 1 + peripheral edema, no cyanosis, no clubbing   Vascular: Equal and normal pulses: 2+ peripheral pulses throughout  Neurological: A+O x 3; speech clear and intact; no sensory, motor  deficits, normal reflexes  Psychiatric: calm, normal mood, normal affect  Musculoskeletal: No joint swelling or deformity; no limitation of movement  Skin: warm, dry, well perfused, no rashes    VENT SETTINGS         I&O's Detail    12 Aug 2023 07:01  -  13 Aug 2023 07:00  --------------------------------------------------------  IN:    IV PiggyBack: 200 mL    Norepinephrine: 64.1 mL  Total IN: 264.1 mL    OUT:    Drain (mL): 500 mL    Indwelling Catheter - Urethral (mL): 1220 mL  Total OUT: 1720 mL    Total NET: -1455.9 mL          LABS                        8.0    6.39  )-----------( 251      ( 13 Aug 2023 00:25 )             26.0     08-13    139  |  102  |  37<H>  ----------------------------<  115<H>  3.5   |  22  |  2.82<H>    Ca    8.4      13 Aug 2023 00:25  Phos  3.3     08-13  Mg     2.30     08-13    TPro  5.6<L>  /  Alb  3.1<L>  /  TBili  0.3  /  DBili  x   /  AST  19  /  ALT  9   /  AlkPhos  44  08-13    LIVER FUNCTIONS - ( 13 Aug 2023 00:25 )  Alb: 3.1 g/dL / Pro: 5.6 g/dL / ALK PHOS: 44 U/L / ALT: 9 U/L / AST: 19 U/L / GGT: x           PT/INR - ( 13 Aug 2023 00:25 )   PT: 18.3 sec;   INR: 1.66 ratio         PTT - ( 13 Aug 2023 00:25 )  PTT:35.6 sec        Urinalysis Basic - ( 13 Aug 2023 00:25 )    Color: x / Appearance: x / SG: x / pH: x  Gluc: 115 mg/dL / Ketone: x  / Bili: x / Urobili: x   Blood: x / Protein: x / Nitrite: x   Leuk Esterase: x / RBC: x / WBC x   Sq Epi: x / Non Sq Epi: x / Bacteria: x            MEDICATIONS  (STANDING):  apixaban 2.5 milliGRAM(s) Oral two times a day  carbidopa/levodopa  25/100 1.5 Tablet(s) Oral four times a day  chlorhexidine 2% Cloths 1 Application(s) Topical daily  dextrose 5%. 1000 milliLiter(s) (50 mL/Hr) IV Continuous <Continuous>  dextrose 5%. 1000 milliLiter(s) (100 mL/Hr) IV Continuous <Continuous>  dextrose 50% Injectable 25 Gram(s) IV Push once  dextrose 50% Injectable 25 Gram(s) IV Push once  dextrose 50% Injectable 12.5 Gram(s) IV Push once  fludroCORTISONE 0.1 milliGRAM(s) Oral daily  glucagon  Injectable 1 milliGRAM(s) IntraMuscular once  melatonin 3 milliGRAM(s) Oral at bedtime  midodrine. 10 milliGRAM(s) Oral three times a day  norepinephrine Infusion 0.05 MICROgram(s)/kG/Min (8.23 mL/Hr) IV Continuous <Continuous>  OLANZapine 2.5 milliGRAM(s) Oral at bedtime    MEDICATIONS  (PRN):  acetaminophen     Tablet .. 650 milliGRAM(s) Oral every 6 hours PRN Temp greater or equal to 38C (100.4F), Mild Pain (1 - 3)  aluminum hydroxide/magnesium hydroxide/simethicone Suspension 30 milliLiter(s) Oral every 4 hours PRN Dyspepsia  dextrose Oral Gel 15 Gram(s) Oral once PRN Blood Glucose LESS THAN 70 milliGRAM(s)/deciliter  melatonin 3 milliGRAM(s) Oral at bedtime PRN Insomnia  ondansetron Injectable 4 milliGRAM(s) IV Push every 8 hours PRN Nausea and/or Vomiting  sodium chloride 0.9% Bolus. 100 milliLiter(s) IV Bolus every 5 minutes PRN SBP LESS THAN or EQUAL to 90 mmHg      Allergies:  No Known Allergies

## 2023-08-13 NOTE — PHYSICAL THERAPY INITIAL EVALUATION ADULT - PERTINENT HX OF CURRENT PROBLEM, REHAB EVAL
82 year old Male presenting with shortness of breath. Found to have ALAYNA complicated by hyperkalemia, acidosis

## 2023-08-13 NOTE — PHYSICAL THERAPY INITIAL EVALUATION ADULT - ADDITIONAL COMMENTS
Patient lives with wife in private home, + ramp to enter. Patient has 24/7 home health aides. Patient has a hospital bed and is able to ambulate short distances and transfer into wheelchair with assist. Patient was dependent in ADL.

## 2023-08-13 NOTE — CHART NOTE - NSCHARTNOTEFT_GEN_A_CORE
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 Pt was transferred to ICU for pressors support.     MICU COURSE: Pt remains off vasopressors since 2 hrs ago.  Pt required Bipap support on admission, and has been on RA.  Pt was dialyzed on 8/9, 8/10 and 8/11. Pt was diuresed with Lasix 80 mg yesterday, pt is making good UOP and continued to be diuresed today with Lasix 80 mg BID, Pt was treated empirically for possible PNA with Zosyn x 5 days. Pt remains on RA. Blood and urine cxs remains negative, and pt is afebrile. Creat and BUN have trended down and pt is making good UOP. Hemoglobin dropped to 6.6 on 8/12. 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      Parkinsons disease      CAD (coronary artery disease)  s/p PCI 08/21      Pleural effusion, not elsewhere classified      COVID-19 vaccine series completed  w booster      History of hip replacement, total  R hip 2008 & L hip      Status post cataract extraction  right eye cataract extraction with IOL 6/26/2015      Presence of cardiac pacemaker  10/2021      H/O coronary angioplasty  8/2021 1 stent inserted          Vital Signs Last 24 Hrs  T(C): 36.7 (13 Aug 2023 12:00), Max: 36.7 (13 Aug 2023 00:00)  T(F): 98 (13 Aug 2023 12:00), Max: 98.1 (13 Aug 2023 00:00)  HR: 60 (13 Aug 2023 12:00) (60 - 64)  BP: 85/39 (13 Aug 2023 12:30) (83/33 - 124/45)  BP(mean): 50 (13 Aug 2023 12:30) (45 - 73)  RR: 14 (13 Aug 2023 12:00) (10 - 16)  SpO2: 100% (13 Aug 2023 12:00) (100% - 100%)    Parameters below as of 13 Aug 2023 12:00  Patient On (Oxygen Delivery Method): room air      I&O's Summary    12 Aug 2023 07:01  -  13 Aug 2023 07:00  --------------------------------------------------------  IN: 264.1 mL / OUT: 1785 mL / NET: -1520.9 mL    13 Aug 2023 07:01  -  13 Aug 2023 13:00  --------------------------------------------------------  IN: 6.4 mL / OUT: 245 mL / NET: -238.6 mL      Allergies    No Known Allergies    Intolerances      MEDICATIONS  (STANDING):  apixaban 2.5 milliGRAM(s) Oral two times a day  carbidopa/levodopa  25/100 1.5 Tablet(s) Oral four times a day  chlorhexidine 2% Cloths 1 Application(s) Topical daily  dextrose 5%. 1000 milliLiter(s) (100 mL/Hr) IV Continuous <Continuous>  dextrose 5%. 1000 milliLiter(s) (50 mL/Hr) IV Continuous <Continuous>  dextrose 50% Injectable 25 Gram(s) IV Push once  dextrose 50% Injectable 25 Gram(s) IV Push once  dextrose 50% Injectable 12.5 Gram(s) IV Push once  fludroCORTISONE 0.1 milliGRAM(s) Oral daily  furosemide   Injectable 80 milliGRAM(s) IV Push two times a day  glucagon  Injectable 1 milliGRAM(s) IntraMuscular once  melatonin 3 milliGRAM(s) Oral at bedtime  midodrine. 15 milliGRAM(s) Oral three times a day  norepinephrine Infusion 0.05 MICROgram(s)/kG/Min (8.23 mL/Hr) IV Continuous <Continuous>  OLANZapine 2.5 milliGRAM(s) Oral at bedtime    MEDICATIONS  (PRN):  acetaminophen     Tablet .. 650 milliGRAM(s) Oral every 6 hours PRN Temp greater or equal to 38C (100.4F), Mild Pain (1 - 3)  aluminum hydroxide/magnesium hydroxide/simethicone Suspension 30 milliLiter(s) Oral every 4 hours PRN Dyspepsia  dextrose Oral Gel 15 Gram(s) Oral once PRN Blood Glucose LESS THAN 70 milliGRAM(s)/deciliter  melatonin 3 milliGRAM(s) Oral at bedtime PRN Insomnia  ondansetron Injectable 4 milliGRAM(s) IV Push every 8 hours PRN Nausea and/or Vomiting  sodium chloride 0.9% Bolus. 100 milliLiter(s) IV Bolus every 5 minutes PRN SBP LESS THAN or EQUAL to 90 mmHg                          8.0    6.39  )-----------( 251      ( 13 Aug 2023 00:25 )             26.0     08-13    139  |  102  |  37<H>  ----------------------------<  115<H>  3.5   |  22  |  2.82<H>    Ca    8.4      13 Aug 2023 00:25  Phos  3.3     08-13  Mg     2.30     08-13    TPro  5.6<L>  /  Alb  3.1<L>  /  TBili  0.3  /  DBili  x   /  AST  19  /  ALT  9   /  AlkPhos  44  08-13    PT/INR - ( 13 Aug 2023 00:25 )   PT: 18.3 sec;   INR: 1.66 ratio         PTT - ( 13 Aug 2023 00:25 )  PTT:35.6 sec    Lactate: 1.2    Ekg: AV paced 62  Telemetry: AV paced in 60's     FOR FOLLOW UP:  [] monitor renal function daily and continue strict intake and output ( pt has been diuresed with Lasix)   [] nephrology follow up, and Shiley will need to be removed, if no further candidate for HD  [] PleurX to be drained three times a week, last time was drained on 8/12,   [] remove indwelling means when indicated     Esther Carter PA-C MICU Transfer Note    Transfer from: MICU    Transfer to: ( x ) Medicine    (  ) Telemetry     (   ) RCU        (    ) Palliative         (   ) Stroke Unit       (  ) MICU     Accepting Physician:      Signout given to:     HPI/MICU COURSE:     82M PMH 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 Pt was started on BIPAP and transferred to ICU for further management. Nephrology was consulted, shiley was placed and pt underwent iHD x3 sessions. Pt Pt was diuresed w/ lasix w/ good UOP and shiley was removed 8/15, Pt was also treated empirically for possible PNA with Zosyn x 5 days. SpO2 now stable on RA.     ICU Vital Signs Last 24 Hrs  T(C): 37.1 (16 Aug 2023 12:00), Max: 37.3 (16 Aug 2023 00:00)  T(F): 98.7 (16 Aug 2023 12:00), Max: 99.1 (16 Aug 2023 00:00)  HR: 60 (16 Aug 2023 12:00) (60 - 60)  BP: 112/77 (16 Aug 2023 12:00) (110/46 - 129/51)  BP(mean): 84 (16 Aug 2023 12:00) (62 - 92)  ABP: --  ABP(mean): --  RR: 11 (16 Aug 2023 12:00) (10 - 19)  SpO2: 99% (16 Aug 2023 12:00) (99% - 100%)    O2 Parameters below as of 16 Aug 2023 12:00  Patient On (Oxygen Delivery Method): room air      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    Parkinsons disease    CAD (coronary artery disease)  s/p PCI 08/21    Pleural effusion, not elsewhere classified    COVID-19 vaccine series completed  w booster    History of hip replacement, total  R hip 2008 & L hip    Status post cataract extraction  right eye cataract extraction with IOL 6/26/2015      Presence of cardiac pacemaker  10/2021    H/O coronary angioplasty  8/2021 1 stent inserted      I&O's Summary    12 Aug 2023 07:01  -  13 Aug 2023 07:00  --------------------------------------------------------  IN: 264.1 mL / OUT: 1785 mL / NET: -1520.9 mL    13 Aug 2023 07:01  -  13 Aug 2023 13:00  --------------------------------------------------------  IN: 6.4 mL / OUT: 245 mL / NET: -238.6 mL      Allergies    No Known Allergies    Intolerances      MEDICATIONS  (STANDING):  apixaban 2.5 milliGRAM(s) Oral two times a day  carbidopa/levodopa  25/100 1.5 Tablet(s) Oral four times a day  chlorhexidine 2% Cloths 1 Application(s) Topical daily  dextrose 5%. 1000 milliLiter(s) (100 mL/Hr) IV Continuous <Continuous>  dextrose 5%. 1000 milliLiter(s) (50 mL/Hr) IV Continuous <Continuous>  dextrose 50% Injectable 25 Gram(s) IV Push once  dextrose 50% Injectable 25 Gram(s) IV Push once  dextrose 50% Injectable 12.5 Gram(s) IV Push once  fludroCORTISONE 0.1 milliGRAM(s) Oral daily  furosemide   Injectable 80 milliGRAM(s) IV Push two times a day  glucagon  Injectable 1 milliGRAM(s) IntraMuscular once  melatonin 3 milliGRAM(s) Oral at bedtime  midodrine. 15 milliGRAM(s) Oral three times a day  norepinephrine Infusion 0.05 MICROgram(s)/kG/Min (8.23 mL/Hr) IV Continuous <Continuous>  OLANZapine 2.5 milliGRAM(s) Oral at bedtime    MEDICATIONS  (PRN):  acetaminophen     Tablet .. 650 milliGRAM(s) Oral every 6 hours PRN Temp greater or equal to 38C (100.4F), Mild Pain (1 - 3)  aluminum hydroxide/magnesium hydroxide/simethicone Suspension 30 milliLiter(s) Oral every 4 hours PRN Dyspepsia  dextrose Oral Gel 15 Gram(s) Oral once PRN Blood Glucose LESS THAN 70 milliGRAM(s)/deciliter  melatonin 3 milliGRAM(s) Oral at bedtime PRN Insomnia  ondansetron Injectable 4 milliGRAM(s) IV Push every 8 hours PRN Nausea and/or Vomiting  sodium chloride 0.9% Bolus. 100 milliLiter(s) IV Bolus every 5 minutes PRN SBP LESS THAN or EQUAL to 90 mmHg                          8.0    6.39  )-----------( 251      ( 13 Aug 2023 00:25 )             26.0     08-13    139  |  102  |  37<H>  ----------------------------<  115<H>  3.5   |  22  |  2.82<H>    Ca    8.4      13 Aug 2023 00:25  Phos  3.3     08-13  Mg     2.30     08-13    TPro  5.6<L>  /  Alb  3.1<L>  /  TBili  0.3  /  DBili  x   /  AST  19  /  ALT  9   /  AlkPhos  44  08-13    PT/INR - ( 13 Aug 2023 00:25 )   PT: 18.3 sec;   INR: 1.66 ratio         PTT - ( 13 Aug 2023 00:25 )  PTT:35.6 sec    Lactate: 1.2    Ekg: AV paced 62  Telemetry: AV paced in 60's     ====================ASSESSMENT======================  82 yo M w/ past med hx of hypertension, hyperlipidemia, CHFpEF, atrial fibrillation (Eliquis), CAD (s/p PCI 8/31/21), bradycardia (s/p PPM 10/6/21), Parkinson disease, BPH (s/p laser enucleation of prostate with morcellation 9/29/20), CKD (baseline 1.2 -1.4), chronic pleural effusions s/p R VATS and pleurX 3/2022, Parkinson's, p/w shortness of breath, found to have ALAYNA on CKD c/b fluid overload and hyperkalemic. Admitted to MICU for urgent HD i/so hypotension.      Plan:  ====================NEUROLOGY=======================  #Dementia, early, no acute issues   Home: Zyprexa, and melatonin at bedtime  AOx1 at baseline  - Following commands and responding to questions, at baseline MS  - c/w Zyprexa 5mg and melatonin 3mg at bedtime.   - restarted Mirtazapine 7.5 qhs, pt takes medication at home for MDD    #Parkinson's disease   - c/w Carbidopa-Levodopa    - PT and OT working with pt. Anticipate home with no skilled PT needs, patient is at his baseline.    ====================RESPIRATORY======================  #Pleural effusions    Likely in the setting of CHF vs renal failure vs less likely malignancy (negative cytology outpt)  R PleurX catheter placed 3/2022, drainage 3x a week as per spouse, last  time drained on 8/12 with 500 cc drained    6/9 - CT chest showed new 2.3 cm right middle lobe nodule, possible malignancy? and NEW moderate left pleural effusion with complete left lower lobe compressive atelectasis  - pulmonary outpt follow up for pulmonary nodule, pt has h/o PLEFF w/ R pleurx catheter  - s/p urgent HD for fluid overload   - s/p BIPAP, now tolerating RA    ====================CARDIOVASCULAR==================  #Bradycardia s/p PPM   #CHFpEF   #Afib   #Hypotension   Home Meds: Eliquis, Entresto Olmesartan, Plavix?, Rosuvastatin   SPECT scan with amyloidosis outpatient, may be contributing to CHFpEF   TTE 1/2023 EF 73%, diastolic dysfunction and mod pHTN   EKG showing paced ventricular rhythm, no T wave abnormalities, given Ca gluconate x2 in ED   - TTE 8/10: Minimal MR, Moderate AR. Moderate concentric LV hypertrophy, grossly normal LVSfx  Normal RV size w/ reduced function, mild pulmonary hypertension. Left pleural effusion.  - off levophed since 8/14, SBP goal > 95, as per pt's spouse, pt's baseline SBP is 's at home on a good day,    - 8/15 decreased Midodrine to 10 mg TID and continue Florinef 0.1mg daily   - POCUS 8/12 showed preserved EF and underfilled LV, s/p albumin x 2  - s/p Lasix 80 mg IVP x 1 on 8/12, 8/13, 8/14, 8/15, cont strict intake and output, see renal     ====================/RENAL========================    #ALAYNA on CKD  #Metabolic acidosis with AG, concomitant resp acidosis-  resolved   Admit Scr 4.54>2.82. FeNa 0.9%   - likely has CKD stage 3A with baseline Scr 1.3-1.6. Cr now ~2.2s.  - hy[erkalemic to 6.4, S/p NaHCO3, Ca gluconate and insulin  - US kidney/bladder - Increased bilateral renal echogenicity, suggestive of medical renal disease. No hydronephrosis.  - Nephrology consulted - per renal ALAYNA likely hemodynamic mediated and medication SE's (TMP/SMX and Entresto)  - s/p RIJ shiley placement 8/9, removed 8/16  - s/p 3 sessions of HD 8/9- 8/11.   - s/p Lasix 80 mg IVP once daily 8/12-8/15, pt making urine  - -455mL in 24hrs, -5L LOS, voided 275mL yesterday  - passed TOV  - continue to monitor strict I/Os       ====================GI/NUTRITION=====================  Diet: renal regular, changed to DASH diet in s/o normal lytes and improving renal fx   - BMs+, cont bowel regimen    ====================INFECTIOUS DISEASE================  No fever, leukocytosis may be stress related vs infection, resolved. CT chest with GGO  - Previous pleural fluid culture negative   - s/p recently treated outpt w/ bactrim for UTI  - s/p Azithro (8/9)  and CTX (8/9) in ED  - s/p tx empirically with zosyn (8/09- 8/13 )  - UA - small leuks, WBC 19, urine and blood culture - NGTD, LA 0.9   - MRSA PCR neg    ====================ENDOCRINE=======================  A1C 5.4%  home med: farxiga  - BG 140s   - TSH 1.0    ====================HEMATOLOGIC/DVT PPx=============  On home ELIQUIS 5 BID, last taken 8/8 at 6pm - held for Shiley placement, and restarted 8/10  - given Kcentra on 8/9 for Eliquis reversal prior to shiley  - H/H stable in 7s  - no active bleeding     ====================ETHICS===========================  GOC: patient with prior MOLST DNR/DNI. As per discussion with pt, wife and daughter, and pt's and family's wishes, pt is DNR/DNI         FOR FOLLOW UP:  [ ] monitor renal function daily and continue strict intake and output   [ ] PleurX to be drained three times a week MICU Transfer Note    Transfer from: MICU    Transfer to: ( x ) Medicine    (  ) Telemetry     (   ) RCU        (    ) Palliative         (   ) Stroke Unit       (  ) MICU     Accepting Physician: Dr. 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 Pt was started on BIPAP and transferred to ICU for further management. Nephrology was consulted, shiley was placed and pt underwent iHD x3 sessions. Pt Pt was diuresed w/ lasix w/ good UOP and shiley was removed 8/15, Pt was also treated empirically for possible PNA with Zosyn x 5 days. SpO2 now stable on RA.     ICU Vital Signs Last 24 Hrs  T(C): 37.1 (16 Aug 2023 12:00), Max: 37.3 (16 Aug 2023 00:00)  T(F): 98.7 (16 Aug 2023 12:00), Max: 99.1 (16 Aug 2023 00:00)  HR: 60 (16 Aug 2023 12:00) (60 - 60)  BP: 112/77 (16 Aug 2023 12:00) (110/46 - 129/51)  BP(mean): 84 (16 Aug 2023 12:00) (62 - 92)  ABP: --  ABP(mean): --  RR: 11 (16 Aug 2023 12:00) (10 - 19)  SpO2: 99% (16 Aug 2023 12:00) (99% - 100%)    O2 Parameters below as of 16 Aug 2023 12:00  Patient On (Oxygen Delivery Method): room air      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    Parkinsons disease    CAD (coronary artery disease)  s/p PCI 08/21    Pleural effusion, not elsewhere classified    COVID-19 vaccine series completed  w booster    History of hip replacement, total  R hip 2008 & L hip    Status post cataract extraction  right eye cataract extraction with IOL 6/26/2015      Presence of cardiac pacemaker  10/2021    H/O coronary angioplasty  8/2021 1 stent inserted      I&O's Summary    12 Aug 2023 07:01  -  13 Aug 2023 07:00  --------------------------------------------------------  IN: 264.1 mL / OUT: 1785 mL / NET: -1520.9 mL    13 Aug 2023 07:01  -  13 Aug 2023 13:00  --------------------------------------------------------  IN: 6.4 mL / OUT: 245 mL / NET: -238.6 mL      Allergies    No Known Allergies    Intolerances      MEDICATIONS  (STANDING):  apixaban 2.5 milliGRAM(s) Oral two times a day  carbidopa/levodopa  25/100 1.5 Tablet(s) Oral four times a day  chlorhexidine 2% Cloths 1 Application(s) Topical daily  dextrose 5%. 1000 milliLiter(s) (100 mL/Hr) IV Continuous <Continuous>  dextrose 5%. 1000 milliLiter(s) (50 mL/Hr) IV Continuous <Continuous>  dextrose 50% Injectable 25 Gram(s) IV Push once  dextrose 50% Injectable 25 Gram(s) IV Push once  dextrose 50% Injectable 12.5 Gram(s) IV Push once  fludroCORTISONE 0.1 milliGRAM(s) Oral daily  furosemide   Injectable 80 milliGRAM(s) IV Push two times a day  glucagon  Injectable 1 milliGRAM(s) IntraMuscular once  melatonin 3 milliGRAM(s) Oral at bedtime  midodrine. 15 milliGRAM(s) Oral three times a day  norepinephrine Infusion 0.05 MICROgram(s)/kG/Min (8.23 mL/Hr) IV Continuous <Continuous>  OLANZapine 2.5 milliGRAM(s) Oral at bedtime    MEDICATIONS  (PRN):  acetaminophen     Tablet .. 650 milliGRAM(s) Oral every 6 hours PRN Temp greater or equal to 38C (100.4F), Mild Pain (1 - 3)  aluminum hydroxide/magnesium hydroxide/simethicone Suspension 30 milliLiter(s) Oral every 4 hours PRN Dyspepsia  dextrose Oral Gel 15 Gram(s) Oral once PRN Blood Glucose LESS THAN 70 milliGRAM(s)/deciliter  melatonin 3 milliGRAM(s) Oral at bedtime PRN Insomnia  ondansetron Injectable 4 milliGRAM(s) IV Push every 8 hours PRN Nausea and/or Vomiting  sodium chloride 0.9% Bolus. 100 milliLiter(s) IV Bolus every 5 minutes PRN SBP LESS THAN or EQUAL to 90 mmHg                          8.0    6.39  )-----------( 251      ( 13 Aug 2023 00:25 )             26.0     08-13    139  |  102  |  37<H>  ----------------------------<  115<H>  3.5   |  22  |  2.82<H>    Ca    8.4      13 Aug 2023 00:25  Phos  3.3     08-13  Mg     2.30     08-13    TPro  5.6<L>  /  Alb  3.1<L>  /  TBili  0.3  /  DBili  x   /  AST  19  /  ALT  9   /  AlkPhos  44  08-13    PT/INR - ( 13 Aug 2023 00:25 )   PT: 18.3 sec;   INR: 1.66 ratio         PTT - ( 13 Aug 2023 00:25 )  PTT:35.6 sec    Lactate: 1.2    Ekg: AV paced 62  Telemetry: AV paced in 60's     ====================ASSESSMENT======================  82 yo M w/ past med hx of hypertension, hyperlipidemia, CHFpEF, atrial fibrillation (Eliquis), CAD (s/p PCI 8/31/21), bradycardia (s/p PPM 10/6/21), Parkinson disease, BPH (s/p laser enucleation of prostate with morcellation 9/29/20), CKD (baseline 1.2 -1.4), chronic pleural effusions s/p R VATS and pleurX 3/2022, Parkinson's, p/w shortness of breath, found to have ALAYNA on CKD c/b fluid overload and hyperkalemic. Admitted to MICU for urgent HD i/so hypotension.      Plan:  ====================NEUROLOGY=======================  #Dementia, early, no acute issues   Home: Zyprexa, and melatonin at bedtime  AOx1 at baseline  - Following commands and responding to questions, at baseline MS  - c/w Zyprexa 5mg and melatonin 3mg at bedtime.   - restarted Mirtazapine 7.5 qhs, pt takes medication at home for MDD    #Parkinson's disease   - c/w Carbidopa-Levodopa    - PT and OT working with pt. Anticipate home with no skilled PT needs, patient is at his baseline.    ====================RESPIRATORY======================  #Pleural effusions    Likely in the setting of CHF vs renal failure vs less likely malignancy (negative cytology outpt)  R PleurX catheter placed 3/2022, drainage 3x a week as per spouse, last  time drained on 8/12 with 500 cc drained    6/9 - CT chest showed new 2.3 cm right middle lobe nodule, possible malignancy? and NEW moderate left pleural effusion with complete left lower lobe compressive atelectasis  - pulmonary outpt follow up for pulmonary nodule, pt has h/o PLEFF w/ R pleurx catheter  - s/p urgent HD for fluid overload   - s/p BIPAP, now tolerating RA    ====================CARDIOVASCULAR==================  #Bradycardia s/p PPM   #CHFpEF   #Afib   #Hypotension   Home Meds: Eliquis, Entresto Olmesartan, Plavix?, Rosuvastatin   SPECT scan with amyloidosis outpatient, may be contributing to CHFpEF   TTE 1/2023 EF 73%, diastolic dysfunction and mod pHTN   EKG showing paced ventricular rhythm, no T wave abnormalities, given Ca gluconate x2 in ED   - TTE 8/10: Minimal MR, Moderate AR. Moderate concentric LV hypertrophy, grossly normal LVSfx  Normal RV size w/ reduced function, mild pulmonary hypertension. Left pleural effusion.  - off levophed since 8/14, SBP goal > 95, as per pt's spouse, pt's baseline SBP is 's at home on a good day,    - 8/15 decreased Midodrine to 10 mg TID and continue Florinef 0.1mg daily   - POCUS 8/12 showed preserved EF and underfilled LV, s/p albumin x 2  - s/p Lasix 80 mg IVP x 1 on 8/12, 8/13, 8/14, 8/15, cont strict intake and output, see renal     ====================/RENAL========================    #ALAYNA on CKD  #Metabolic acidosis with AG, concomitant resp acidosis-  resolved   Admit Scr 4.54>2.82. FeNa 0.9%   - likely has CKD stage 3A with baseline Scr 1.3-1.6. Cr now ~2.2s.  - hy[erkalemic to 6.4, S/p NaHCO3, Ca gluconate and insulin  - US kidney/bladder - Increased bilateral renal echogenicity, suggestive of medical renal disease. No hydronephrosis.  - Nephrology consulted - per renal ALAYNA likely hemodynamic mediated and medication SE's (TMP/SMX and Entresto)  - s/p RIJ shiley placement 8/9, removed 8/16  - s/p 3 sessions of HD 8/9- 8/11.   - s/p Lasix 80 mg IVP once daily 8/12-8/15, pt making urine  - -455mL in 24hrs, -5L LOS, voided 275mL yesterday  - passed TOV  - continue to monitor strict I/Os       ====================GI/NUTRITION=====================  Diet: renal regular, changed to DASH diet in s/o normal lytes and improving renal fx   - BMs+, cont bowel regimen    ====================INFECTIOUS DISEASE================  No fever, leukocytosis may be stress related vs infection, resolved. CT chest with GGO  - Previous pleural fluid culture negative   - s/p recently treated outpt w/ bactrim for UTI  - s/p Azithro (8/9)  and CTX (8/9) in ED  - s/p tx empirically with zosyn (8/09- 8/13 )  - UA - small leuks, WBC 19, urine and blood culture - NGTD, LA 0.9   - MRSA PCR neg    ====================ENDOCRINE=======================  A1C 5.4%  home med: farxiga  - BG 140s   - TSH 1.0    ====================HEMATOLOGIC/DVT PPx=============  On home ELIQUIS 5 BID, last taken 8/8 at 6pm - held for Shiley placement, and restarted 8/10  - given Kcentra on 8/9 for Eliquis reversal prior to shiley  - H/H stable in 7s  - no active bleeding     ====================ETHICS===========================  GOC: patient with prior MOLST DNR/DNI. As per discussion with pt, wife and daughter, and pt's and family's wishes, pt is DNR/DNI         FOR FOLLOW UP:  [ ] monitor renal function daily and continue strict intake and output   [ ] PleurX to be drained three times a week MICU Transfer Note    Transfer from: MICU    Transfer to: ( x ) Medicine    (  ) Telemetry     (   ) RCU        (    ) Palliative         (   ) Stroke Unit       (  ) MICU     Accepting Physician: Dr. 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 Pt was started on BIPAP and transferred to ICU for further management. Nephrology was consulted, shiley was placed and pt underwent iHD x3 sessions. Pt Pt was diuresed w/ lasix w/ good UOP and shiley was removed 8/15, Pt was also treated empirically for possible PNA with Zosyn x 5 days. SpO2 now stable on RA.     ICU Vital Signs Last 24 Hrs  T(C): 37.1 (16 Aug 2023 12:00), Max: 37.3 (16 Aug 2023 00:00)  T(F): 98.7 (16 Aug 2023 12:00), Max: 99.1 (16 Aug 2023 00:00)  HR: 60 (16 Aug 2023 12:00) (60 - 60)  BP: 112/77 (16 Aug 2023 12:00) (110/46 - 129/51)  BP(mean): 84 (16 Aug 2023 12:00) (62 - 92)  ABP: --  ABP(mean): --  RR: 11 (16 Aug 2023 12:00) (10 - 19)  SpO2: 99% (16 Aug 2023 12:00) (99% - 100%)    O2 Parameters below as of 16 Aug 2023 12:00  Patient On (Oxygen Delivery Method): room air      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    Parkinsons disease    CAD (coronary artery disease)  s/p PCI 08/21    Pleural effusion, not elsewhere classified    COVID-19 vaccine series completed  w booster    History of hip replacement, total  R hip 2008 & L hip    Status post cataract extraction  right eye cataract extraction with IOL 6/26/2015      Presence of cardiac pacemaker  10/2021    H/O coronary angioplasty  8/2021 1 stent inserted      I&O's Summary    12 Aug 2023 07:01  -  13 Aug 2023 07:00  --------------------------------------------------------  IN: 264.1 mL / OUT: 1785 mL / NET: -1520.9 mL    13 Aug 2023 07:01  -  13 Aug 2023 13:00  --------------------------------------------------------  IN: 6.4 mL / OUT: 245 mL / NET: -238.6 mL      Allergies    No Known Allergies    Intolerances      MEDICATIONS  (STANDING):  apixaban 2.5 milliGRAM(s) Oral two times a day  carbidopa/levodopa  25/100 1.5 Tablet(s) Oral four times a day  chlorhexidine 2% Cloths 1 Application(s) Topical daily  dextrose 5%. 1000 milliLiter(s) (100 mL/Hr) IV Continuous <Continuous>  dextrose 5%. 1000 milliLiter(s) (50 mL/Hr) IV Continuous <Continuous>  dextrose 50% Injectable 25 Gram(s) IV Push once  dextrose 50% Injectable 25 Gram(s) IV Push once  dextrose 50% Injectable 12.5 Gram(s) IV Push once  fludroCORTISONE 0.1 milliGRAM(s) Oral daily  furosemide   Injectable 80 milliGRAM(s) IV Push two times a day  glucagon  Injectable 1 milliGRAM(s) IntraMuscular once  melatonin 3 milliGRAM(s) Oral at bedtime  midodrine. 15 milliGRAM(s) Oral three times a day  norepinephrine Infusion 0.05 MICROgram(s)/kG/Min (8.23 mL/Hr) IV Continuous <Continuous>  OLANZapine 2.5 milliGRAM(s) Oral at bedtime    MEDICATIONS  (PRN):  acetaminophen     Tablet .. 650 milliGRAM(s) Oral every 6 hours PRN Temp greater or equal to 38C (100.4F), Mild Pain (1 - 3)  aluminum hydroxide/magnesium hydroxide/simethicone Suspension 30 milliLiter(s) Oral every 4 hours PRN Dyspepsia  dextrose Oral Gel 15 Gram(s) Oral once PRN Blood Glucose LESS THAN 70 milliGRAM(s)/deciliter  melatonin 3 milliGRAM(s) Oral at bedtime PRN Insomnia  ondansetron Injectable 4 milliGRAM(s) IV Push every 8 hours PRN Nausea and/or Vomiting  sodium chloride 0.9% Bolus. 100 milliLiter(s) IV Bolus every 5 minutes PRN SBP LESS THAN or EQUAL to 90 mmHg                          8.0    6.39  )-----------( 251      ( 13 Aug 2023 00:25 )             26.0     08-13    139  |  102  |  37<H>  ----------------------------<  115<H>  3.5   |  22  |  2.82<H>    Ca    8.4      13 Aug 2023 00:25  Phos  3.3     08-13  Mg     2.30     08-13    TPro  5.6<L>  /  Alb  3.1<L>  /  TBili  0.3  /  DBili  x   /  AST  19  /  ALT  9   /  AlkPhos  44  08-13    PT/INR - ( 13 Aug 2023 00:25 )   PT: 18.3 sec;   INR: 1.66 ratio         PTT - ( 13 Aug 2023 00:25 )  PTT:35.6 sec    Lactate: 1.2    Ekg: AV paced 62  Telemetry: AV paced in 60's     ====================ASSESSMENT======================  80 yo M w/ past med hx of hypertension, hyperlipidemia, CHFpEF, atrial fibrillation (Eliquis), CAD (s/p PCI 8/31/21), bradycardia (s/p PPM 10/6/21), Parkinson disease, BPH (s/p laser enucleation of prostate with morcellation 9/29/20), CKD (baseline 1.2 -1.4), chronic pleural effusions s/p R VATS and pleurX 3/2022, Parkinson's, p/w shortness of breath, found to have ALAYNA on CKD c/b fluid overload and hyperkalemic. Admitted to MICU for urgent HD i/so hypotension.      Plan:  ====================NEUROLOGY=======================  #Dementia, early, no acute issues   Home: Zyprexa, and melatonin at bedtime  AOx1 at baseline  - Following commands and responding to questions, at baseline MS  - c/w Zyprexa 5mg and melatonin 3mg at bedtime.   - restarted Mirtazapine 7.5 qhs, pt takes medication at home for MDD    #Parkinson's disease   - c/w Carbidopa-Levodopa    - PT and OT working with pt. Anticipate home with no skilled PT needs, patient is at his baseline.    ====================RESPIRATORY======================  #Pleural effusions    Likely in the setting of CHF vs renal failure vs less likely malignancy (negative cytology outpt)  R PleurX catheter placed 3/2022, drainage 3x a week as per spouse, last  time drained on 8/12 with 500 cc drained . will need to be drained today.   6/9 - CT chest showed new 2.3 cm right middle lobe nodule, possible malignancy? and NEW moderate left pleural effusion with complete left lower lobe compressive atelectasis  - pulmonary outpt follow up for pulmonary nodule, pt has h/o PLEFF w/ R pleurx catheter  - s/p urgent HD for fluid overload   - s/p BIPAP, now tolerating RA    ====================CARDIOVASCULAR==================  #Bradycardia s/p PPM   #CHFpEF   #Afib   #Hypotension   Home Meds: Eliquis, Entresto Olmesartan, Plavix?, Rosuvastatin   SPECT scan with amyloidosis outpatient, may be contributing to CHFpEF   TTE 1/2023 EF 73%, diastolic dysfunction and mod pHTN   EKG showing paced ventricular rhythm, no T wave abnormalities, given Ca gluconate x2 in ED   - TTE 8/10: Minimal MR, Moderate AR. Moderate concentric LV hypertrophy, grossly normal LVSfx  Normal RV size w/ reduced function, mild pulmonary hypertension. Left pleural effusion.  - off levophed since 8/14, SBP goal > 95, as per pt's spouse, pt's baseline SBP is 's at home  - started on midodrine to aid in weaning off levophed, Dc;d 8/16  - cortisol level 5.7, continue Florinef 0.1mg daily   - POCUS 8/12 showed preserved EF and underfilled LV, s/p albumin x 2  - s/p Lasix 80 mg IVP x 1 on 8/12, 8/13, 8/14, 8/15, cont strict intake and output, see renal     ====================/RENAL========================    #ALAYNA on CKD  #Metabolic acidosis with AG, concomitant resp acidosis-  resolved   Admit Scr 4.54>2.82. FeNa 0.9%   - likely has CKD stage 3A with baseline Scr 1.3-1.6. Cr now ~2.2s.  - hy[erkalemic to 6.4, S/p NaHCO3, Ca gluconate and insulin  - US kidney/bladder - Increased bilateral renal echogenicity, suggestive of medical renal disease. No hydronephrosis.  - Nephrology consulted - per renal ALAYNA likely hemodynamic mediated and medication SE's (TMP/SMX and Entresto)  - s/p RIJ shiley placement 8/9, removed 8/16  - s/p 3 sessions of HD 8/9- 8/11.   - s/p Lasix 80 mg IVP once daily 8/12-8/15, pt making urine  - -455mL in 24hrs, -5L LOS, voided 275mL yesterday  - passed TOV  - continue to monitor strict I/Os       ====================GI/NUTRITION=====================  Diet: renal regular, changed to DASH diet in s/o normal lytes and improving renal fx   - BMs+, cont bowel regimen    ====================INFECTIOUS DISEASE================  No fever, leukocytosis may be stress related vs infection, resolved. CT chest with GGO  - Previous pleural fluid culture negative   - s/p recently treated outpt w/ bactrim for UTI  - s/p Azithro (8/9)  and CTX (8/9) in ED  - s/p tx empirically with zosyn (8/09- 8/13 )  - UA - small leuks, WBC 19, urine and blood culture - NGTD, LA 0.9   - MRSA PCR neg    ====================ENDOCRINE=======================  A1C 5.4%  home med: farxiga  - BG 140s   - TSH 1.0    ====================HEMATOLOGIC/DVT PPx=============  On home ELIQUIS 5 BID, last taken 8/8 at 6pm - held for Shiley placement, and restarted 8/10  - given Kcentra on 8/9 for Eliquis reversal prior to shiley  - H/H stable in 7s  - no active bleeding     ====================ETHICS===========================  GOC: patient with prior MOLST DNR/DNI. As per discussion with pt, wife and daughter, and pt's and family's wishes, pt is DNR/DNI         FOR FOLLOW UP:  [ ] monitor renal function daily and continue strict intake and output   [ ] PleurX to be drained three times a week MICU Transfer Note    Transfer from: MICU    Transfer to: ( x ) Medicine    (  ) Telemetry     (   ) RCU        (    ) Palliative         (   ) Stroke Unit       (  ) MICU     Accepting Physician: Dr. 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 Pt was started on BIPAP and transferred to ICU for further management. Nephrology was consulted, shiley was placed and pt underwent iHD x3 sessions. After HD Pt was chalanged with diuretics with good response, and subsequently shiley was removed 8/15, Pt was also treated empirically for possible PNA with Zosyn x 5 days. (pan cultures and RVP were negative. Pt is hemodynamically stable,  saturation 100-98% on RA. Pt also has Pleurex catheter Rt side, last time it was drained on 08/16 total of 500cc    ICU Vital Signs Last 24 Hrs  T(C): 37.1 (16 Aug 2023 12:00), Max: 37.3 (16 Aug 2023 00:00)  T(F): 98.7 (16 Aug 2023 12:00), Max: 99.1 (16 Aug 2023 00:00)  HR: 60 (16 Aug 2023 12:00) (60 - 60)  BP: 112/77 (16 Aug 2023 12:00) (110/46 - 129/51)  BP(mean): 84 (16 Aug 2023 12:00) (62 - 92)  ABP: --  ABP(mean): --  RR: 11 (16 Aug 2023 12:00) (10 - 19)  SpO2: 99% (16 Aug 2023 12:00) (99% - 100%)    O2 Parameters below as of 16 Aug 2023 12:00  Patient On (Oxygen Delivery Method): room air      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    Parkinsons disease    CAD (coronary artery disease)  s/p PCI 08/21    Pleural effusion, not elsewhere classified    COVID-19 vaccine series completed  w booster    History of hip replacement, total  R hip 2008 & L hip    Status post cataract extraction  right eye cataract extraction with IOL 6/26/2015      Presence of cardiac pacemaker  10/2021    H/O coronary angioplasty  8/2021 1 stent inserted      I&O's Summary    12 Aug 2023 07:01  -  13 Aug 2023 07:00  --------------------------------------------------------  IN: 264.1 mL / OUT: 1785 mL / NET: -1520.9 mL    13 Aug 2023 07:01  -  13 Aug 2023 13:00  --------------------------------------------------------  IN: 6.4 mL / OUT: 245 mL / NET: -238.6 mL      Allergies    No Known Allergies    Intolerances      MEDICATIONS  (STANDING):  apixaban 2.5 milliGRAM(s) Oral two times a day  carbidopa/levodopa  25/100 1.5 Tablet(s) Oral four times a day  chlorhexidine 2% Cloths 1 Application(s) Topical daily  dextrose 5%. 1000 milliLiter(s) (100 mL/Hr) IV Continuous <Continuous>  dextrose 5%. 1000 milliLiter(s) (50 mL/Hr) IV Continuous <Continuous>  dextrose 50% Injectable 25 Gram(s) IV Push once  dextrose 50% Injectable 25 Gram(s) IV Push once  dextrose 50% Injectable 12.5 Gram(s) IV Push once  fludroCORTISONE 0.1 milliGRAM(s) Oral daily  furosemide   Injectable 80 milliGRAM(s) IV Push two times a day  glucagon  Injectable 1 milliGRAM(s) IntraMuscular once  melatonin 3 milliGRAM(s) Oral at bedtime  midodrine. 15 milliGRAM(s) Oral three times a day  norepinephrine Infusion 0.05 MICROgram(s)/kG/Min (8.23 mL/Hr) IV Continuous <Continuous>  OLANZapine 2.5 milliGRAM(s) Oral at bedtime    MEDICATIONS  (PRN):  acetaminophen     Tablet .. 650 milliGRAM(s) Oral every 6 hours PRN Temp greater or equal to 38C (100.4F), Mild Pain (1 - 3)  aluminum hydroxide/magnesium hydroxide/simethicone Suspension 30 milliLiter(s) Oral every 4 hours PRN Dyspepsia  dextrose Oral Gel 15 Gram(s) Oral once PRN Blood Glucose LESS THAN 70 milliGRAM(s)/deciliter  melatonin 3 milliGRAM(s) Oral at bedtime PRN Insomnia  ondansetron Injectable 4 milliGRAM(s) IV Push every 8 hours PRN Nausea and/or Vomiting  sodium chloride 0.9% Bolus. 100 milliLiter(s) IV Bolus every 5 minutes PRN SBP LESS THAN or EQUAL to 90 mmHg                          8.0    6.39  )-----------( 251      ( 13 Aug 2023 00:25 )             26.0     08-13    139  |  102  |  37<H>  ----------------------------<  115<H>  3.5   |  22  |  2.82<H>    Ca    8.4      13 Aug 2023 00:25  Phos  3.3     08-13  Mg     2.30     08-13    TPro  5.6<L>  /  Alb  3.1<L>  /  TBili  0.3  /  DBili  x   /  AST  19  /  ALT  9   /  AlkPhos  44  08-13    PT/INR - ( 13 Aug 2023 00:25 )   PT: 18.3 sec;   INR: 1.66 ratio         PTT - ( 13 Aug 2023 00:25 )  PTT:35.6 sec    Lactate: 1.2    Ekg: AV paced 62  Telemetry: AV paced in 60's     ====================ASSESSMENT======================  80 yo M w/ past med hx of hypertension, hyperlipidemia, CHFpEF, atrial fibrillation (Eliquis), CAD (s/p PCI 8/31/21), bradycardia (s/p PPM 10/6/21), Parkinson disease, BPH (s/p laser enucleation of prostate with morcellation 9/29/20), CKD (baseline 1.2 -1.4), chronic pleural effusions s/p R VATS and pleurX 3/2022, Parkinson's, p/w shortness of breath, found to have ALAYNA on CKD c/b fluid overload and hyperkalemic. Admitted to MICU for urgent HD i/so hypotension.      Plan:  ====================NEUROLOGY=======================  #Dementia, early, no acute issues   Home: Zyprexa, and melatonin at bedtime  AOx1 at baseline  - Following commands and responding to questions, at baseline MS  - c/w Zyprexa 5mg and melatonin 3mg at bedtime.   - restarted Mirtazapine 7.5 qhs, pt takes medication at home for MDD    #Parkinson's disease   - c/w Carbidopa-Levodopa    - PT and OT working with pt. Anticipate home with no skilled PT needs, patient is at his baseline.    ====================RESPIRATORY======================  #Pleural effusions    Likely in the setting of CHF vs renal failure vs less likely malignancy (negative cytology outpt)  R PleurX catheter placed 3/2022, drainage 3x a week as per spouse, last  time drained on 8/12 with 500 cc drained . will need to be drained today.   6/9 - CT chest showed new 2.3 cm right middle lobe nodule, possible malignancy? and NEW moderate left pleural effusion with complete left lower lobe compressive atelectasis  - pulmonary outpt follow up for pulmonary nodule, pt has h/o PLEFF w/ R pleurx catheter  - s/p urgent HD for fluid overload   - s/p BIPAP, now tolerating RA    ====================CARDIOVASCULAR==================  #Bradycardia s/p PPM   #CHFpEF   #Afib   #Hypotension   Home Meds: Eliquis, Entresto Olmesartan, Plavix?, Rosuvastatin   SPECT scan with amyloidosis outpatient, may be contributing to CHFpEF   TTE 1/2023 EF 73%, diastolic dysfunction and mod pHTN   EKG showing paced ventricular rhythm, no T wave abnormalities, given Ca gluconate x2 in ED   - TTE 8/10: Minimal MR, Moderate AR. Moderate concentric LV hypertrophy, grossly normal LVSfx  Normal RV size w/ reduced function, mild pulmonary hypertension. Left pleural effusion.  - off levophed since 8/14, SBP goal > 95, as per pt's spouse, pt's baseline SBP is 's at home  - started on midodrine to aid in weaning off levophed, Dc;d 8/16  - cortisol level 5.7, continue Florinef 0.1mg daily   - POCUS 8/12 showed preserved EF and underfilled LV, s/p albumin x 2  - s/p Lasix 80 mg IVP x 1 on 8/12, 8/13, 8/14, 8/15, cont strict intake and output, see renal     ====================/RENAL========================    #ALAYNA on CKD  #Metabolic acidosis with AG, concomitant resp acidosis-  resolved   Admit Scr 4.54>2.82. FeNa 0.9%   - likely has CKD stage 3A with baseline Scr 1.3-1.6. Cr now ~2.2s.  - hy[erkalemic to 6.4, S/p NaHCO3, Ca gluconate and insulin  - US kidney/bladder - Increased bilateral renal echogenicity, suggestive of medical renal disease. No hydronephrosis.  - Nephrology consulted - per renal ALAYNA likely hemodynamic mediated and medication SE's (TMP/SMX and Entresto)  - s/p RIJ shiley placement 8/9, removed 8/16  - s/p 3 sessions of HD 8/9- 8/11.   - s/p Lasix 80 mg IVP once daily 8/12-8/15, pt making urine  - -455mL in 24hrs, -5L LOS, voided 275mL yesterday  - passed TOV  - continue to monitor strict I/Os       ====================GI/NUTRITION=====================  Diet: renal regular, changed to DASH diet in s/o normal lytes and improving renal fx   - BMs+, cont bowel regimen    ====================INFECTIOUS DISEASE================  No fever, leukocytosis may be stress related vs infection, resolved. CT chest with GGO  - Previous pleural fluid culture negative   - s/p recently treated outpt w/ bactrim for UTI  - s/p Azithro (8/9)  and CTX (8/9) in ED  - s/p tx empirically with zosyn (8/09- 8/13 )  - UA - small leuks, WBC 19, urine and blood culture - NGTD, LA 0.9   - MRSA PCR neg    ====================ENDOCRINE=======================  A1C 5.4%  home med: farxiga  - BG 140s   - TSH 1.0    ====================HEMATOLOGIC/DVT PPx=============  On home ELIQUIS 5 BID, last taken 8/8 at 6pm - held for Shiley placement, and restarted 8/10  - given Kcentra on 8/9 for Eliquis reversal prior to shiley  - H/H stable in 7s  - no active bleeding     ====================ETHICS===========================  GOC: patient with prior MOLST DNR/DNI. As per discussion with pt, wife and daughter, and pt's and family's wishes, pt is DNR/DNI         FOR FOLLOW UP:  [ ] monitor renal function daily and continue strict intake and output   [ ] PleurX to be drained three times a week

## 2023-08-13 NOTE — PROGRESS NOTE ADULT - CRITICAL CARE ATTENDING COMMENT
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 Nephro following, recs appreciated. Responded to diuretic challenge, no further plans for iHD today.  Awake alert responsive, occasional episodes of confusion / agitation, received Zyprexa overnight.  Cont Midodrine with MAP>65.  Cont abx for 5 days.  Cultures with NGTD. Patient examined and case reviewed in detail on bedside rounds. Frequent bedside visits with therapy change today.  Prognosis guarded.

## 2023-08-13 NOTE — PROGRESS NOTE ADULT - SUBJECTIVE AND OBJECTIVE BOX
[facility] Division of Kidney Diseases & Hypertension  FOLLOW UP NOTE  [phone]--------------------------------------------------------------------------------    Chief Complaint: Other abnormality of breathing    81yo Male w/ PMH HTN, Atrial fibrillation on Eliquis, bradycardia s/p ppm, CHF, pleural effusions s/p R pleurX, Parkinson's presenting with shortness of breath. Nephrology consulted for hyperkalemia in the setting of ALAYNA.     24 hour events/subjective: Patient seen and examined at bedside. Awake and responding to questions. Pt appeared a bit confused. Denies fevers, chills, nausea, vomiting, abdominal pain, SOB, CP.       PAST HISTORY  --------------------------------------------------------------------------------  No significant changes to PMH, PSH, FHx, SHx, unless otherwise noted    ALLERGIES & MEDICATIONS  --------------------------------------------------------------------------------  Allergies    No Known Allergies    Intolerances      Standing Inpatient Medications  apixaban 2.5 milliGRAM(s) Oral two times a day  carbidopa/levodopa  25/100 1.5 Tablet(s) Oral four times a day  chlorhexidine 2% Cloths 1 Application(s) Topical daily  dextrose 5%. 1000 milliLiter(s) IV Continuous <Continuous>  dextrose 5%. 1000 milliLiter(s) IV Continuous <Continuous>  dextrose 50% Injectable 25 Gram(s) IV Push once  dextrose 50% Injectable 25 Gram(s) IV Push once  dextrose 50% Injectable 12.5 Gram(s) IV Push once  fludroCORTISONE 0.1 milliGRAM(s) Oral daily  glucagon  Injectable 1 milliGRAM(s) IntraMuscular once  melatonin 3 milliGRAM(s) Oral at bedtime  midodrine. 10 milliGRAM(s) Oral three times a day  norepinephrine Infusion 0.05 MICROgram(s)/kG/Min IV Continuous <Continuous>  OLANZapine 2.5 milliGRAM(s) Oral at bedtime  potassium chloride    Tablet ER 20 milliEquivalent(s) Oral once    PRN Inpatient Medications  acetaminophen     Tablet .. 650 milliGRAM(s) Oral every 6 hours PRN  aluminum hydroxide/magnesium hydroxide/simethicone Suspension 30 milliLiter(s) Oral every 4 hours PRN  dextrose Oral Gel 15 Gram(s) Oral once PRN  melatonin 3 milliGRAM(s) Oral at bedtime PRN  ondansetron Injectable 4 milliGRAM(s) IV Push every 8 hours PRN  sodium chloride 0.9% Bolus. 100 milliLiter(s) IV Bolus every 5 minutes PRN      REVIEW OF SYSTEMS  --------------------------------------------------------------------------------  per above     VITALS/PHYSICAL EXAM  --------------------------------------------------------------------------------  T(C): 36.6 (08-13-23 @ 04:00), Max: 36.7 (08-13-23 @ 00:00)  HR: 60 (08-13-23 @ 06:00) (60 - 64)  BP: 116/46 (08-13-23 @ 06:00) (83/33 - 124/45)  RR: 13 (08-13-23 @ 06:00) (10 - 16)  SpO2: 100% (08-13-23 @ 06:00) (97% - 100%)  Wt(kg): --        08-12-23 @ 07:01  -  08-13-23 @ 07:00  --------------------------------------------------------  IN: 264.1 mL / OUT: 1720 mL / NET: -1455.9 mL      Physical Exam:  Gen: NAD, well-appearing  HEENT: anicteric  Pulm: CTA B/L  CV: RRR, S1S2;  Abd: +BS, soft, nontender/nondistended  : No suprapubic tenderness, +means  Extremities: +bilateral LE edema noted.   Neuro: No focal deficits, Awake restring tremor noted  Skin: Warm, without rashes  Vascular access: RI non tunneled HD     LABS/STUDIES  --------------------------------------------------------------------------------              8.0    6.39  >-----------<  251      [08-13-23 @ 00:25]              26.0     139  |  102  |  37  ----------------------------<  115      [08-13-23 @ 00:25]  3.5   |  22  |  2.82        Ca     8.4     [08-13-23 @ 00:25]      Mg     2.30     [08-13-23 @ 00:25]      Phos  3.3     [08-13-23 @ 00:25]    TPro  5.6  /  Alb  3.1  /  TBili  0.3  /  DBili  x   /  AST  19  /  ALT  9   /  AlkPhos  44  [08-13-23 @ 00:25]    PT/INR: PT 18.3 , INR 1.66       [08-13-23 @ 00:25]  PTT: 35.6       [08-13-23 @ 00:25]      Creatinine Trend:  SCr 2.82 [08-13 @ 00:25]  SCr 2.66 [08-12 @ 00:40]  SCr 2.58 [08-12 @ 00:25]  SCr 3.28 [08-11 @ 03:00]  SCr 4.17 [08-10 @ 01:10]    Urinalysis - [08-13-23 @ 00:25]      Color  / Appearance  / SG  / pH       Gluc 115 / Ketone   / Bili  / Urobili        Blood  / Protein  / Leuk Est  / Nitrite       RBC  / WBC  / Hyaline  / Gran  / Sq Epi  / Non Sq Epi  / Bacteria     Urine Creatinine 90      [08-09-23 @ 14:24]  Urine Protein 164      [08-09-23 @ 14:24]  Urine Sodium 22      [08-09-23 @ 14:24]  Urine Urea Nitrogen 329.0      [08-09-23 @ 14:24]  Urine Potassium 85.2      [08-09-23 @ 14:24]  Urine Osmolality 369      [08-09-23 @ 14:24]      HBsAb <3.0      [08-09-23 @ 13:53]     [facility] Division of Kidney Diseases & Hypertension  FOLLOW UP NOTE  [phone]--------------------------------------------------------------------------------    Chief Complaint: Other abnormality of breathing    81yo Male w/ PMH HTN, Atrial fibrillation on Eliquis, bradycardia s/p ppm, CHF, pleural effusions s/p R pleurX, Parkinson's presenting with shortness of breath. Nephrology consulted for hyperkalemia in the setting of ALAYNA.     24 hour events/subjective: Patient seen and examined at bedside. Awake and responding to questions. Pt appeared confused. Denies fevers, chills, nausea, vomiting, abdominal pain, SOB, CP.       PAST HISTORY  --------------------------------------------------------------------------------  No significant changes to PMH, PSH, FHx, SHx, unless otherwise noted    ALLERGIES & MEDICATIONS  --------------------------------------------------------------------------------  Allergies    No Known Allergies    Intolerances      Standing Inpatient Medications  apixaban 2.5 milliGRAM(s) Oral two times a day  carbidopa/levodopa  25/100 1.5 Tablet(s) Oral four times a day  chlorhexidine 2% Cloths 1 Application(s) Topical daily  dextrose 5%. 1000 milliLiter(s) IV Continuous <Continuous>  dextrose 5%. 1000 milliLiter(s) IV Continuous <Continuous>  dextrose 50% Injectable 25 Gram(s) IV Push once  dextrose 50% Injectable 25 Gram(s) IV Push once  dextrose 50% Injectable 12.5 Gram(s) IV Push once  fludroCORTISONE 0.1 milliGRAM(s) Oral daily  glucagon  Injectable 1 milliGRAM(s) IntraMuscular once  melatonin 3 milliGRAM(s) Oral at bedtime  midodrine. 10 milliGRAM(s) Oral three times a day  norepinephrine Infusion 0.05 MICROgram(s)/kG/Min IV Continuous <Continuous>  OLANZapine 2.5 milliGRAM(s) Oral at bedtime  potassium chloride    Tablet ER 20 milliEquivalent(s) Oral once    PRN Inpatient Medications  acetaminophen     Tablet .. 650 milliGRAM(s) Oral every 6 hours PRN  aluminum hydroxide/magnesium hydroxide/simethicone Suspension 30 milliLiter(s) Oral every 4 hours PRN  dextrose Oral Gel 15 Gram(s) Oral once PRN  melatonin 3 milliGRAM(s) Oral at bedtime PRN  ondansetron Injectable 4 milliGRAM(s) IV Push every 8 hours PRN  sodium chloride 0.9% Bolus. 100 milliLiter(s) IV Bolus every 5 minutes PRN      REVIEW OF SYSTEMS  --------------------------------------------------------------------------------  per above     VITALS/PHYSICAL EXAM  --------------------------------------------------------------------------------  T(C): 36.6 (08-13-23 @ 04:00), Max: 36.7 (08-13-23 @ 00:00)  HR: 60 (08-13-23 @ 06:00) (60 - 64)  BP: 116/46 (08-13-23 @ 06:00) (83/33 - 124/45)  RR: 13 (08-13-23 @ 06:00) (10 - 16)  SpO2: 100% (08-13-23 @ 06:00) (97% - 100%)  Wt(kg): --        08-12-23 @ 07:01  -  08-13-23 @ 07:00  --------------------------------------------------------  IN: 264.1 mL / OUT: 1720 mL / NET: -1455.9 mL      Physical Exam:  Gen: NAD, well-appearing  HEENT: anicteric  Pulm: CTA B/L  CV: RRR, S1S2;  Abd: +BS, soft, nontender/nondistended  : No suprapubic tenderness, +means  Extremities: +bilateral LE edema noted.   Neuro: No focal deficits, Awake restring tremor noted  Skin: Warm, without rashes  Vascular access: RIJ non tunneled HD     LABS/STUDIES  --------------------------------------------------------------------------------              8.0    6.39  >-----------<  251      [08-13-23 @ 00:25]              26.0     139  |  102  |  37  ----------------------------<  115      [08-13-23 @ 00:25]  3.5   |  22  |  2.82        Ca     8.4     [08-13-23 @ 00:25]      Mg     2.30     [08-13-23 @ 00:25]      Phos  3.3     [08-13-23 @ 00:25]    TPro  5.6  /  Alb  3.1  /  TBili  0.3  /  DBili  x   /  AST  19  /  ALT  9   /  AlkPhos  44  [08-13-23 @ 00:25]    PT/INR: PT 18.3 , INR 1.66       [08-13-23 @ 00:25]  PTT: 35.6       [08-13-23 @ 00:25]      Creatinine Trend:  SCr 2.82 [08-13 @ 00:25]  SCr 2.66 [08-12 @ 00:40]  SCr 2.58 [08-12 @ 00:25]  SCr 3.28 [08-11 @ 03:00]  SCr 4.17 [08-10 @ 01:10]    Urinalysis - [08-13-23 @ 00:25]      Color  / Appearance  / SG  / pH       Gluc 115 / Ketone   / Bili  / Urobili        Blood  / Protein  / Leuk Est  / Nitrite       RBC  / WBC  / Hyaline  / Gran  / Sq Epi  / Non Sq Epi  / Bacteria     Urine Creatinine 90      [08-09-23 @ 14:24]  Urine Protein 164      [08-09-23 @ 14:24]  Urine Sodium 22      [08-09-23 @ 14:24]  Urine Urea Nitrogen 329.0      [08-09-23 @ 14:24]  Urine Potassium 85.2      [08-09-23 @ 14:24]  Urine Osmolality 369      [08-09-23 @ 14:24]      HBsAb <3.0      [08-09-23 @ 13:53]

## 2023-08-13 NOTE — PROGRESS NOTE ADULT - PROBLEM SELECTOR PLAN 1
Pt. w/ ALAYNA likely hemodynamic mediated and medication SE (TMP/SMX and Entresto). On review of sunrise/HIE patient likely has CKD stage 3A with baseline Scr 1.3-1.6. Last outpt labs reviewed on HIE showing Scr 1.64 on 4/20/23. On admission Scr 4.54, K 6.4, and SCO2 14. HD consent done and placed in chart. Kidney US with no hydronephrosis 8/11. Pt received IHD 8/9, 8/10, 8/11. Pt. remains on low dose vasopressors, but off BIPAP. Labs reviewed and 24hr UOP 1.2L. Can give additional 80 IV Lasix today. No plan for HD today. Continue to hold Entresto and TMP/SMX. Monitor labs, VS and urine output. Avoid nephrotoxins. Dose medications as per eGFR.    INCOMPLETE NOTE    If you have any questions, please feel free to contact me  Teo Rogel  Nephrology Fellow  [phone]; Prefer [employer] TEAMS  (After 5pm or on weekends please page the on-call fellow). Pt. w/ ALAYNA likely hemodynamic mediated and medication SE (TMP/SMX and Entresto). On review of sunrise/HIE patient likely has CKD stage 3A with baseline Scr 1.3-1.6. Last outpt labs reviewed on HIE showing Scr 1.64 on 4/20/23. On admission Scr 4.54, K 6.4, and SCO2 14. HD consent done and placed in chart. Kidney US with no hydronephrosis 8/11. Pt received IHD 8/9, 8/10, 8/11. Pt. remains on low dose vasopressors, but off BIPAP. Labs reviewed and 24hr UOP 1.2L.     Continue with lasix 80 IV BID today.  No plan for HD today. Continue to hold Entresto and TMP/SMX. Monitor labs, VS and urine output. Avoid nephrotoxins. Dose medications as per eGFR.    If you have any questions, please feel free to contact me  Teo Rogel  Nephrology Fellow  [phone]; Prefer [employer] TEAMS  (After 5pm or on weekends please page the on-call fellow).

## 2023-08-14 LAB
ALBUMIN SERPL ELPH-MCNC: 3.1 G/DL — LOW (ref 3.3–5)
ALBUMIN SERPL ELPH-MCNC: 3.1 G/DL — LOW (ref 3.3–5)
ALP SERPL-CCNC: 46 U/L — SIGNIFICANT CHANGE UP (ref 40–120)
ALP SERPL-CCNC: 47 U/L — SIGNIFICANT CHANGE UP (ref 40–120)
ALT FLD-CCNC: 7 U/L — SIGNIFICANT CHANGE UP (ref 4–41)
ALT FLD-CCNC: 8 U/L — SIGNIFICANT CHANGE UP (ref 4–41)
ANION GAP SERPL CALC-SCNC: 11 MMOL/L — SIGNIFICANT CHANGE UP (ref 7–14)
ANION GAP SERPL CALC-SCNC: 13 MMOL/L — SIGNIFICANT CHANGE UP (ref 7–14)
AST SERPL-CCNC: 12 U/L — SIGNIFICANT CHANGE UP (ref 4–40)
AST SERPL-CCNC: 15 U/L — SIGNIFICANT CHANGE UP (ref 4–40)
BASE EXCESS BLDV CALC-SCNC: 2.9 MMOL/L — SIGNIFICANT CHANGE UP (ref -2–3)
BASOPHILS # BLD AUTO: 0.04 K/UL — SIGNIFICANT CHANGE UP (ref 0–0.2)
BASOPHILS NFR BLD AUTO: 0.6 % — SIGNIFICANT CHANGE UP (ref 0–2)
BILIRUB SERPL-MCNC: 0.3 MG/DL — SIGNIFICANT CHANGE UP (ref 0.2–1.2)
BILIRUB SERPL-MCNC: 0.3 MG/DL — SIGNIFICANT CHANGE UP (ref 0.2–1.2)
BLOOD GAS VENOUS COMPREHENSIVE RESULT: SIGNIFICANT CHANGE UP
BUN SERPL-MCNC: 33 MG/DL — HIGH (ref 7–23)
BUN SERPL-MCNC: 34 MG/DL — HIGH (ref 7–23)
CALCIUM SERPL-MCNC: 8.4 MG/DL — SIGNIFICANT CHANGE UP (ref 8.4–10.5)
CALCIUM SERPL-MCNC: 8.5 MG/DL — SIGNIFICANT CHANGE UP (ref 8.4–10.5)
CHLORIDE BLDV-SCNC: 104 MMOL/L — SIGNIFICANT CHANGE UP (ref 96–108)
CHLORIDE SERPL-SCNC: 103 MMOL/L — SIGNIFICANT CHANGE UP (ref 98–107)
CHLORIDE SERPL-SCNC: 103 MMOL/L — SIGNIFICANT CHANGE UP (ref 98–107)
CO2 BLDV-SCNC: 29.3 MMOL/L — HIGH (ref 22–26)
CO2 SERPL-SCNC: 25 MMOL/L — SIGNIFICANT CHANGE UP (ref 22–31)
CO2 SERPL-SCNC: 25 MMOL/L — SIGNIFICANT CHANGE UP (ref 22–31)
CORTIS AM PEAK SERPL-MCNC: 5.7 UG/DL — LOW (ref 6–18.4)
CREAT SERPL-MCNC: 3.28 MG/DL — HIGH (ref 0.5–1.3)
CREAT SERPL-MCNC: 3.3 MG/DL — HIGH (ref 0.5–1.3)
CULTURE RESULTS: SIGNIFICANT CHANGE UP
EGFR: 18 ML/MIN/1.73M2 — LOW
EGFR: 18 ML/MIN/1.73M2 — LOW
EOSINOPHIL # BLD AUTO: 0.11 K/UL — SIGNIFICANT CHANGE UP (ref 0–0.5)
EOSINOPHIL NFR BLD AUTO: 1.6 % — SIGNIFICANT CHANGE UP (ref 0–6)
GAS PNL BLDV: 137 MMOL/L — SIGNIFICANT CHANGE UP (ref 136–145)
GAS PNL BLDV: SIGNIFICANT CHANGE UP
GLUCOSE BLDV-MCNC: 118 MG/DL — HIGH (ref 70–99)
GLUCOSE SERPL-MCNC: 113 MG/DL — HIGH (ref 70–99)
GLUCOSE SERPL-MCNC: 116 MG/DL — HIGH (ref 70–99)
HCO3 BLDV-SCNC: 28 MMOL/L — SIGNIFICANT CHANGE UP (ref 22–29)
HCT VFR BLD CALC: 25.5 % — LOW (ref 39–50)
HCT VFR BLDA CALC: 24 % — LOW (ref 39–51)
HGB BLD CALC-MCNC: 8 G/DL — LOW (ref 12.6–17.4)
HGB BLD-MCNC: 7.8 G/DL — LOW (ref 13–17)
IANC: 4.9 K/UL — SIGNIFICANT CHANGE UP (ref 1.8–7.4)
IMM GRANULOCYTES NFR BLD AUTO: 0.3 % — SIGNIFICANT CHANGE UP (ref 0–0.9)
LACTATE BLDV-MCNC: 0.9 MMOL/L — SIGNIFICANT CHANGE UP (ref 0.5–2)
LYMPHOCYTES # BLD AUTO: 1.33 K/UL — SIGNIFICANT CHANGE UP (ref 1–3.3)
LYMPHOCYTES # BLD AUTO: 18.9 % — SIGNIFICANT CHANGE UP (ref 13–44)
MAGNESIUM SERPL-MCNC: 2.2 MG/DL — SIGNIFICANT CHANGE UP (ref 1.6–2.6)
MCHC RBC-ENTMCNC: 25.4 PG — LOW (ref 27–34)
MCHC RBC-ENTMCNC: 30.6 GM/DL — LOW (ref 32–36)
MCV RBC AUTO: 83.1 FL — SIGNIFICANT CHANGE UP (ref 80–100)
MONOCYTES # BLD AUTO: 0.64 K/UL — SIGNIFICANT CHANGE UP (ref 0–0.9)
MONOCYTES NFR BLD AUTO: 9.1 % — SIGNIFICANT CHANGE UP (ref 2–14)
NEUTROPHILS # BLD AUTO: 4.9 K/UL — SIGNIFICANT CHANGE UP (ref 1.8–7.4)
NEUTROPHILS NFR BLD AUTO: 69.5 % — SIGNIFICANT CHANGE UP (ref 43–77)
NRBC # BLD: 0 /100 WBCS — SIGNIFICANT CHANGE UP (ref 0–0)
NRBC # FLD: 0 K/UL — SIGNIFICANT CHANGE UP (ref 0–0)
PCO2 BLDV: 44 MMHG — SIGNIFICANT CHANGE UP (ref 42–55)
PH BLDV: 7.41 — SIGNIFICANT CHANGE UP (ref 7.32–7.43)
PHOSPHATE SERPL-MCNC: 3.5 MG/DL — SIGNIFICANT CHANGE UP (ref 2.5–4.5)
PLATELET # BLD AUTO: 253 K/UL — SIGNIFICANT CHANGE UP (ref 150–400)
PO2 BLDV: 36 MMHG — SIGNIFICANT CHANGE UP (ref 25–45)
POTASSIUM BLDV-SCNC: 3.1 MMOL/L — LOW (ref 3.5–5.1)
POTASSIUM SERPL-MCNC: 3.2 MMOL/L — LOW (ref 3.5–5.3)
POTASSIUM SERPL-MCNC: 3.4 MMOL/L — LOW (ref 3.5–5.3)
POTASSIUM SERPL-SCNC: 3.2 MMOL/L — LOW (ref 3.5–5.3)
POTASSIUM SERPL-SCNC: 3.4 MMOL/L — LOW (ref 3.5–5.3)
PROT SERPL-MCNC: 5.4 G/DL — LOW (ref 6–8.3)
PROT SERPL-MCNC: 5.5 G/DL — LOW (ref 6–8.3)
RBC # BLD: 3.07 M/UL — LOW (ref 4.2–5.8)
RBC # FLD: 20.6 % — HIGH (ref 10.3–14.5)
SAO2 % BLDV: 60.1 % — LOW (ref 67–88)
SODIUM SERPL-SCNC: 139 MMOL/L — SIGNIFICANT CHANGE UP (ref 135–145)
SODIUM SERPL-SCNC: 141 MMOL/L — SIGNIFICANT CHANGE UP (ref 135–145)
SPECIMEN SOURCE: SIGNIFICANT CHANGE UP
WBC # BLD: 7.04 K/UL — SIGNIFICANT CHANGE UP (ref 3.8–10.5)
WBC # FLD AUTO: 7.04 K/UL — SIGNIFICANT CHANGE UP (ref 3.8–10.5)

## 2023-08-14 PROCEDURE — 99232 SBSQ HOSP IP/OBS MODERATE 35: CPT | Mod: GC

## 2023-08-14 PROCEDURE — 99291 CRITICAL CARE FIRST HOUR: CPT

## 2023-08-14 RX ORDER — POTASSIUM CHLORIDE 20 MEQ
40 PACKET (EA) ORAL ONCE
Refills: 0 | Status: COMPLETED | OUTPATIENT
Start: 2023-08-14 | End: 2023-08-14

## 2023-08-14 RX ORDER — POTASSIUM CHLORIDE 20 MEQ
20 PACKET (EA) ORAL ONCE
Refills: 0 | Status: COMPLETED | OUTPATIENT
Start: 2023-08-14 | End: 2023-08-14

## 2023-08-14 RX ORDER — MIDODRINE HYDROCHLORIDE 2.5 MG/1
20 TABLET ORAL THREE TIMES A DAY
Refills: 0 | Status: DISCONTINUED | OUTPATIENT
Start: 2023-08-14 | End: 2023-08-15

## 2023-08-14 RX ORDER — NOREPINEPHRINE BITARTRATE/D5W 8 MG/250ML
0.05 PLASTIC BAG, INJECTION (ML) INTRAVENOUS
Qty: 8 | Refills: 0 | Status: DISCONTINUED | OUTPATIENT
Start: 2023-08-14 | End: 2023-08-15

## 2023-08-14 RX ORDER — NOREPINEPHRINE BITARTRATE/D5W 8 MG/250ML
0.05 PLASTIC BAG, INJECTION (ML) INTRAVENOUS
Qty: 8 | Refills: 0 | Status: DISCONTINUED | OUTPATIENT
Start: 2023-08-14 | End: 2023-08-14

## 2023-08-14 RX ORDER — MIRTAZAPINE 45 MG/1
7.5 TABLET, ORALLY DISINTEGRATING ORAL AT BEDTIME
Refills: 0 | Status: DISCONTINUED | OUTPATIENT
Start: 2023-08-14 | End: 2023-08-18

## 2023-08-14 RX ADMIN — FLUDROCORTISONE ACETATE 0.1 MILLIGRAM(S): 0.1 TABLET ORAL at 05:29

## 2023-08-14 RX ADMIN — MIRTAZAPINE 7.5 MILLIGRAM(S): 45 TABLET, ORALLY DISINTEGRATING ORAL at 23:00

## 2023-08-14 RX ADMIN — MIDODRINE HYDROCHLORIDE 20 MILLIGRAM(S): 2.5 TABLET ORAL at 12:24

## 2023-08-14 RX ADMIN — APIXABAN 2.5 MILLIGRAM(S): 2.5 TABLET, FILM COATED ORAL at 05:29

## 2023-08-14 RX ADMIN — Medication 40 MILLIEQUIVALENT(S): at 17:21

## 2023-08-14 RX ADMIN — CARBIDOPA AND LEVODOPA 1.5 TABLET(S): 25; 100 TABLET ORAL at 05:29

## 2023-08-14 RX ADMIN — CHLORHEXIDINE GLUCONATE 1 APPLICATION(S): 213 SOLUTION TOPICAL at 12:34

## 2023-08-14 RX ADMIN — APIXABAN 2.5 MILLIGRAM(S): 2.5 TABLET, FILM COATED ORAL at 17:24

## 2023-08-14 RX ADMIN — MIDODRINE HYDROCHLORIDE 20 MILLIGRAM(S): 2.5 TABLET ORAL at 17:20

## 2023-08-14 RX ADMIN — MIDODRINE HYDROCHLORIDE 20 MILLIGRAM(S): 2.5 TABLET ORAL at 06:38

## 2023-08-14 RX ADMIN — Medication 40 MILLIEQUIVALENT(S): at 05:33

## 2023-08-14 RX ADMIN — CARBIDOPA AND LEVODOPA 1.5 TABLET(S): 25; 100 TABLET ORAL at 17:20

## 2023-08-14 RX ADMIN — OLANZAPINE 2.5 MILLIGRAM(S): 15 TABLET, FILM COATED ORAL at 23:01

## 2023-08-14 RX ADMIN — Medication 20 MILLIEQUIVALENT(S): at 23:01

## 2023-08-14 RX ADMIN — CARBIDOPA AND LEVODOPA 1.5 TABLET(S): 25; 100 TABLET ORAL at 12:24

## 2023-08-14 RX ADMIN — Medication 80 MILLIGRAM(S): at 05:29

## 2023-08-14 RX ADMIN — Medication 3 MILLIGRAM(S): at 23:02

## 2023-08-14 RX ADMIN — CARBIDOPA AND LEVODOPA 1.5 TABLET(S): 25; 100 TABLET ORAL at 23:01

## 2023-08-14 NOTE — DIETITIAN INITIAL EVALUATION ADULT - REASON FOR ADMISSION
81 yo M w Hx of dementia presenting w difficulty breathing.  Found to have ALAYNA on CKD c/b fluid overload and hyperkalemic. Admitted to MICU for urgent HD i/so hypotension.    
No

## 2023-08-14 NOTE — DIETITIAN INITIAL EVALUATION ADULT - ORAL NUTRITION SUPPLEMENTS
--- Magic Cup ice cream supplement PO 2x daily.  Will arrange to have provided with meal/tray service.

## 2023-08-14 NOTE — PROGRESS NOTE ADULT - SUBJECTIVE AND OBJECTIVE BOX
[facility] Division of Kidney Diseases & Hypertension  FOLLOW UP NOTE  [phone]--------------------------------------------------------------------------------  Chief Complaint:Other abnormality of breathing    83yo Male w/ PMH HTN, Atrial fibrillation on Eliquis, bradycardia s/p ppm, CHF, pleural effusions s/p R pleurX, Parkinson's presenting with shortness of breath. Nephrology consulted for hyperkalemia in the setting of ALAYNA.     24 hour events/subjective: Patient seen and examined at bedside. Awake and responding to questions. Denies fevers, chills, nausea, vomiting, abdominal pain, SOB, CP.     PAST HISTORY  --------------------------------------------------------------------------------  No significant changes to PMH, PSH, FHx, SHx, unless otherwise noted    ALLERGIES & MEDICATIONS  --------------------------------------------------------------------------------  Allergies  No Known Allergies  Intolerances    Standing Inpatient Medications  apixaban 2.5 milliGRAM(s) Oral two times a day  carbidopa/levodopa  25/100 1.5 Tablet(s) Oral four times a day  chlorhexidine 2% Cloths 1 Application(s) Topical daily  dextrose 5%. 1000 milliLiter(s) IV Continuous <Continuous>  dextrose 5%. 1000 milliLiter(s) IV Continuous <Continuous>  dextrose 50% Injectable 25 Gram(s) IV Push once  dextrose 50% Injectable 25 Gram(s) IV Push once  dextrose 50% Injectable 12.5 Gram(s) IV Push once  fludroCORTISONE 0.1 milliGRAM(s) Oral daily  glucagon  Injectable 1 milliGRAM(s) IntraMuscular once  melatonin 3 milliGRAM(s) Oral at bedtime  midodrine. 20 milliGRAM(s) Oral three times a day  norepinephrine Infusion 0.05 MICROgram(s)/kG/Min IV Continuous <Continuous>  OLANZapine 2.5 milliGRAM(s) Oral at bedtime  PRN Inpatient Medications  acetaminophen     Tablet .. 650 milliGRAM(s) Oral every 6 hours PRN  aluminum hydroxide/magnesium hydroxide/simethicone Suspension 30 milliLiter(s) Oral every 4 hours PRN  dextrose Oral Gel 15 Gram(s) Oral once PRN  melatonin 3 milliGRAM(s) Oral at bedtime PRN  ondansetron Injectable 4 milliGRAM(s) IV Push every 8 hours PRN  sodium chloride 0.9% Bolus. 100 milliLiter(s) IV Bolus every 5 minutes PRN      REVIEW OF SYSTEMS  --------------------------------------------------------------------------------  per above    VITALS/PHYSICAL EXAM  --------------------------------------------------------------------------------  T(C): 35.9 (08-14-23 @ 08:00), Max: 36.8 (08-14-23 @ 04:00)  HR: 63 (08-14-23 @ 09:00) (60 - 63)  BP: 116/40 (08-14-23 @ 09:10) (85/39 - 129/49)  RR: 10 (08-14-23 @ 09:00) (10 - 14)  SpO2: 99% (08-14-23 @ 09:00) (98% - 100%)  Wt(kg): --    08-13-23 @ 07:01  -  08-14-23 @ 07:00  --------------------------------------------------------  IN: 190.6 mL / OUT: 1700 mL / NET: -1509.4 mL    08-14-23 @ 07:01  -  08-14-23 @ 09:25  --------------------------------------------------------  IN: 6.6 mL / OUT: 550 mL / NET: -543.4 mL      Physical Exam:  Gen: NAD, well-appearing  HEENT: anicteric  Pulm: CTA B/L  CV: RRR, S1S2;  Abd: +BS, soft, NTND  : No suprapubic tenderness, +means  Extremities: +bilateral LE edema noted.   Neuro: Awake restring tremor noted  Vascular access: RIJ non tunneled HD     LABS/STUDIES  --------------------------------------------------------------------------------              7.8    7.04  >-----------<  253      [08-14-23 @ 00:50]              25.5     141  |  103  |  34  ----------------------------<  116      [08-14-23 @ 00:50]  3.4   |  25  |  3.30        Ca     8.5     [08-14-23 @ 00:50]      Mg     2.20     [08-14-23 @ 00:50]      Phos  3.5     [08-14-23 @ 00:50]    TPro  5.4  /  Alb  3.1  /  TBili  0.3  /  DBili  x   /  AST  15  /  ALT  7   /  AlkPhos  46  [08-14-23 @ 00:50]    PT/INR: PT 18.3 , INR 1.66       [08-13-23 @ 00:25]  PTT: 35.6       [08-13-23 @ 00:25]    Creatinine Trend:  SCr 3.30 [08-14 @ 00:50]  SCr 2.82 [08-13 @ 00:25]  SCr 2.66 [08-12 @ 00:40]  SCr 2.58 [08-12 @ 00:25]  SCr 3.28 [08-11 @ 03:00]    Urine Creatinine 90      [08-09-23 @ 14:24]  Urine Protein 164      [08-09-23 @ 14:24]  Urine Sodium 22      [08-09-23 @ 14:24]  Urine Urea Nitrogen 329.0      [08-09-23 @ 14:24]  Urine Potassium 85.2      [08-09-23 @ 14:24]  Urine Osmolality 369      [08-09-23 @ 14:24]    TSH 1.00      [08-13-23 @ 00:25]    HBsAb <3.0      [08-09-23 @ 13:53]

## 2023-08-14 NOTE — PROGRESS NOTE ADULT - CRITICAL CARE ATTENDING COMMENT
Patient is an 81 yo M w/ cardiac amyloid, HFpEF, chronic pleural effusions (w/ R PleurX), HTN, Afib (Eliquis), bradycardia s/p ppm, Parkinson's who was admitted on 8/9 after p/w SOB. Admitted for shock and ALAYNA requiring HD.    #ALAYNA - Likely 2/2 hypotension and recent outpatient Bactrim use as well as chronic Entresto use. Last HD session 8/11. Currently making good UOP with Lasix, but creatinine uptrending  #Metabolic acidosis  #Hyperkalemia  - Will dose Lasix as needed for goal net 1L today, still significant edema in UE and LE  - Strict I/Os  - Monitor BMP and creatinine  - f/u renal recs  - Avoid nephrotoxic agents    #Shock - Septic vs cardiogenic/ADHF  - c/w vasopressors to maintain SBP > 95, which is usual BP as per wife and aide at bedside  - c/w Midodrine/Fludrocort for weaning  - Completed empiric course of Zosyn    #Pleural effusion  - c/w PleurX drainage    Rufus Childs MD  Pulmonary & Critical Care

## 2023-08-14 NOTE — PROGRESS NOTE ADULT - ASSESSMENT
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 Admitted to MICU for urgent HD i/so hypotension.        Plan:  ====================NEUROLOGY=======================  #Dementia  Home: Zyprexa, melatonin, and ativan at bedtime  AOx1 at baseline  - Following commands and responding to questions, at baseline MS  - c/w Zyprexa 2.5mg and melatonin 3mg at bedtime.     #Parkinson's disease   - c/w Carbidopa-Levodopa    -- PT and OT working with pt __ Anticipate home with no skilled PT needs, patient is at his baseline.    ====================RESPIRATORY======================  #Pleural effusions    Likely in the setting of CHF vs renal failure vs less likely malignancy (negative cytology outpt)  PleurX catheter placed 3/2022, drainage 3x a week as per spouse, last  time drained on 8/12 with 500 cc drained    6/9 -- CT chest showed new 2.3 cm right middle lobe nodule, possible malignancy? and NEW moderate left pleural effusion with complete left lower lobe compressive atelectasis, repeat CXR today to follow up on PLEFFs  -- pulmonary outpt follow up for pulmonary nodule   Respiratory acidosis on admission (VBG 7.13/46), resolved   - s/p urgent HD for fluid overload  - s/p BIPAP,  tolerating RA      ====================CARDIOVASCULAR==================  #Bradycardia s/p PPM   #CHFpEF   #Afib   #Hypotension   Home Meds: Eliquis, Entresto Olmasartan, Plavix?, Rosuvastatin   SPECT scan with amyloidosis outpatient, may be contributing to CHFpEF   TTE 1/2023 EF 73%, diastolic dysfunction and mod pHTN   EKG showing paced ventricular rhythm, no T wave abnormalities, given Ca gluconate x2 in ED   - TTE 8/10: Minimal MR, Moderate AR. Moderate concentric LV hypertrophy.  grossly normal LVSfx  Normal RV size w/ reduced function, mild pulmonary hypertension. Left pleural effusion.  - off levophed today since 1 hrs ago, MAP goal >65, wean as tolerated.   - increased Midodrine to 15 mg TID and Florinef 0.1mg daily to assist with pressor wean  - POCUS 8/12 showed preserved EF and underfilled LV, given albumin x 2  -- s/p Lasix 80 mg IVP x 1 yesterday and today is 80 mg BID, strict intake and output,       ====================/RENAL========================    #ALAYNA on CKD  Admit Scr 4.54>2.82  - Baseline Cr 1.2-1.4, likely has CKD stage 3A with baseline Scr 1.3-1.6  - US kidney/bladder- Increased bilateral renal echogenicity, suggestive of medical renal disease. No hydronephrosis.  - Nephrology consulted- - per renal ALAYNA likely hemodynamic mediated and medication SE (TMP/SMX and Entresto)  - FeNa 0.9%   - s/p RIJ shiley placement (8/9)  #Metabolic acidosis with AG, concomitant resp acidosis   -S/p NaHCO3, Ca gluconate and insulin  - s/p session of HD 8/9 w/ 1.5L fluid removal, 1L fluid removal 8/10, and 8/11.   - indwelling catheter in place, UOP is neg 2 L for LOS and neg 1.45 L for 24 hrs, makes 50 to 25 ml/hr  - s/p will give Lasix 80mg IVPx1 on 8/12, additional 80 mg of Lasix IVP twice a day today, continue monitoring  - strict I/Os     ====================GI/NUTRITION=====================  Diet: renal regular   -- tolerating well   -- + normal BM overnight   - started bowel regimen    ====================INFECTIOUS DISEASE================  No fever, increase leukocytosis may be stress related vs infection, resolved. CT chest with GGO  - Previous pleural fluid culture negative   - recently treated outpt w/ bactrim for UTI  - s/p Azithro (8/9)  and CTX (8/9) in ED  - s/p currently w/ empiric zosyn (8/09- 8/13 )  - UA -- small leuks, WBC 19, urine and blood culture - NGTD  - MRSA PCR neg    ====================ENDOCRINE=======================  A1C 5.4%  home med: farxiga  - BG 140s   -- TSH pending     ====================HEMATOLOGIC/DVT PPx=============  On home ELIQUIS 5 BID, last taken 8/8 at 6pm - held for Shiley placement, and restarted 8/10  - given Kcentra on 8/9 for Eliquis reversal prior to shiley  - H/H stable in 7s, Hgb 6.6 on 8/12, repeat 7.4>8.0   -- no active bleeding     ====================ETHICS===========================  GOC: patient with prior MOLST DNR/DNI. 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 Admitted to MICU for urgent HD i/so hypotension.        Plan:  ====================NEUROLOGY=======================  #Dementia, early, no acute issues   Home: Zyprexa, melatonin, and ativan at bedtime  AOx1 at baseline  - Following commands and responding to questions, at baseline MS  -- as per   - c/w Zyprexa 2.5mg and melatonin 3mg at bedtime.     #Parkinson's disease   - c/w Carbidopa-Levodopa    -- PT and OT working with pt __ Anticipate home with no skilled PT needs, patient is at his baseline.    ====================RESPIRATORY======================  #Pleural effusions    Likely in the setting of CHF vs renal failure vs less likely malignancy (negative cytology outpt)  PleurX catheter placed 3/2022, drainage 3x a week as per spouse, last  time drained on 8/12 with 500 cc drained    6/9 -- CT chest showed new 2.3 cm right middle lobe nodule, possible malignancy? and NEW moderate left pleural effusion with complete left lower lobe compressive atelectasis, repeat CXR today to follow up on PLEFFs  -- pulmonary outpt follow up for pulmonary nodule   Respiratory acidosis on admission (VBG 7.13/46), resolved   - s/p urgent HD for fluid overload  - s/p BIPAP,  tolerating RA      ====================CARDIOVASCULAR==================  #Bradycardia s/p PPM   #CHFpEF   #Afib   #Hypotension   Home Meds: Eliquis, Entresto Olmasartan, Plavix?, Rosuvastatin   SPECT scan with amyloidosis outpatient, may be contributing to CHFpEF   TTE 1/2023 EF 73%, diastolic dysfunction and mod pHTN   EKG showing paced ventricular rhythm, no T wave abnormalities, given Ca gluconate x2 in ED   - TTE 8/10: Minimal MR, Moderate AR. Moderate concentric LV hypertrophy.  grossly normal LVSfx  Normal RV size w/ reduced function, mild pulmonary hypertension. Left pleural effusion.  - off levophed today since 1 hrs ago, MAP goal >65, wean as tolerated.   - increased Midodrine to 15 mg TID and Florinef 0.1mg daily to assist with pressor wean  - POCUS 8/12 showed preserved EF and underfilled LV, given albumin x 2  -- s/p Lasix 80 mg IVP x 1 yesterday and today is 80 mg BID, strict intake and output,       ====================/RENAL========================    #ALAYNA on CKD  Admit Scr 4.54>2.82  - Baseline Cr 1.2-1.4, likely has CKD stage 3A with baseline Scr 1.3-1.6  - US kidney/bladder- Increased bilateral renal echogenicity, suggestive of medical renal disease. No hydronephrosis.  - Nephrology consulted- - per renal ALAYNA likely hemodynamic mediated and medication SE (TMP/SMX and Entresto)  - FeNa 0.9%   - s/p RIJ shiley placement (8/9)  #Metabolic acidosis with AG, concomitant resp acidosis   -S/p NaHCO3, Ca gluconate and insulin  - s/p session of HD 8/9 w/ 1.5L fluid removal, 1L fluid removal 8/10, and 8/11.   - indwelling catheter in place, UOP is neg 2 L for LOS and neg 1.45 L for 24 hrs, makes 50 to 25 ml/hr  - s/p will give Lasix 80mg IVPx1 on 8/12, additional 80 mg of Lasix IVP twice a day today, continue monitoring  - strict I/Os     ====================GI/NUTRITION=====================  Diet: renal regular   -- tolerating well   -- + normal BM overnight   - started bowel regimen    ====================INFECTIOUS DISEASE================  No fever, increase leukocytosis may be stress related vs infection, resolved. CT chest with GGO  - Previous pleural fluid culture negative   - recently treated outpt w/ bactrim for UTI  - s/p Azithro (8/9)  and CTX (8/9) in ED  - s/p currently w/ empiric zosyn (8/09- 8/13 )  - UA -- small leuks, WBC 19, urine and blood culture - NGTD  - MRSA PCR neg    ====================ENDOCRINE=======================  A1C 5.4%  home med: farxiga  - BG 140s   -- TSH pending     ====================HEMATOLOGIC/DVT PPx=============  On home ELIQUIS 5 BID, last taken 8/8 at 6pm - held for Shiley placement, and restarted 8/10  - given Kcentra on 8/9 for Eliquis reversal prior to shiley  - H/H stable in 7s, Hgb 6.6 on 8/12, repeat 7.4>8.0   -- no active bleeding     ====================ETHICS===========================  GOC: patient with prior MOLST DNR/DNI. 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 Admitted to MICU for urgent HD i/so hypotension.        Plan:  ====================NEUROLOGY=======================  #Dementia, early, no acute issues   Home: Zyprexa, and melatonin at bedtime  AOx1 at baseline  - Following commands and responding to questions, at baseline MS  -- as per   - c/w Zyprexa 2.5mg and melatonin 3mg at bedtime.   -- restarted Mirtazapine 7.5 qhs, pt takes medication at home for MDD     #Parkinson's disease   - c/w Carbidopa-Levodopa    -- PT and OT working with pt __ Anticipate home with no skilled PT needs, patient is at his baseline.    ====================RESPIRATORY======================  #Pleural effusions    Likely in the setting of CHF vs renal failure vs less likely malignancy (negative cytology outpt)  PleurX catheter placed 3/2022, drainage 3x a week as per spouse, last  time drained on 8/12 with 500 cc drained    6/9 -- CT chest showed new 2.3 cm right middle lobe nodule, possible malignancy? and NEW moderate left pleural effusion with complete left lower lobe compressive atelectasis, repeat CXR today to follow up on PLEFFs  -- pulmonary outpt follow up for pulmonary nodule   Respiratory acidosis on admission (VBG 7.13/46), resolved   - s/p urgent HD for fluid overload  - s/p BIPAP,  tolerating RA, no active issues       ====================CARDIOVASCULAR==================  #Bradycardia s/p PPM   #CHFpEF   #Afib   #Hypotension   Home Meds: Eliquis, Entresto Olmasartan, Plavix?, Rosuvastatin   SPECT scan with amyloidosis outpatient, may be contributing to CHFpEF   TTE 1/2023 EF 73%, diastolic dysfunction and mod pHTN   EKG showing paced ventricular rhythm, no T wave abnormalities, given Ca gluconate x2 in ED   - TTE 8/10: Minimal MR, Moderate AR. Moderate concentric LV hypertrophy, grossly normal LVSfx  Normal RV size w/ reduced function, mild pulmonary hypertension. Left pleural effusion.  - off levophed today since 1 hrs ago, SBP goal > 95, as per pt's spouse, pt;s SBP-- 's at home on a good day,    - increased Midodrine to 20 mg TID and continue Florinef 0.1mg daily to assist with pressor wean  - POCUS 8/12 showed preserved EF and underfilled LV, given albumin x 2  -- s/p Lasix 80 mg IVP x 1 on 8/12/ 8/13, and today, cont strict intake and output, __ see in renal       ====================/RENAL========================    #ALAYNA on CKD  Admit Scr 4.54>2.82  - Baseline Cr 1.2-1.4, likely has CKD stage 3A with baseline Scr 1.3-1.6  - US kidney/bladder- Increased bilateral renal echogenicity, suggestive of medical renal disease. No hydronephrosis.  - Nephrology consulted- - per renal ALAYNA likely hemodynamic mediated and medication SE (TMP/SMX and Entresto)  - FeNa 0.9%   - s/p RIJ shiley placement (8/9)  #Metabolic acidosis with AG, concomitant resp acidosis   -S/p NaHCO3, Ca gluconate and insulin  - s/p session of HD 8/9 w/ 1.5L fluid removal, 1L fluid removal 8/10, and HD on 8/11.   - indwelling catheter in place, UOP is neg 2 L for LOS and neg 1.45 L for 24 hrs, makes 50 to 25 ml/hr  - s/p will give Lasix 80mg IVP x1 on 8/12, additional 80 mg of Lasix IVP twice a day today, continue monitoring  - strict I/Os     ====================GI/NUTRITION=====================  Diet: renal regular, changed to DASH diet in s/o normal lytes and improving renal fx   -- tolerating well   -- + normal BM overnight   - cont bowel regimen    ====================INFECTIOUS DISEASE================  No fever, leukocytosis may be stress related vs infection, resolved. CT chest with GGO  - Previous pleural fluid culture negative   - s/p recently treated outpt w/ bactrim for UTI  - s/p Azithro (8/9)  and CTX (8/9) in ED  - s/p tx empirically with zosyn (8/09- 8/13 )  - UA -- small leuks, WBC 19, urine and blood culture - NGTD, LA 0.9   - MRSA PCR neg    ====================ENDOCRINE=======================  A1C 5.4%  home med: farxiga  - BG 140s   -- TSH 1.0    ====================HEMATOLOGIC/DVT PPx=============  On home ELIQUIS 5 BID, last taken 8/8 at 6pm - held for Shiley placement, and restarted 8/10  - given Kcentra on 8/9 for Eliquis reversal prior to shiley  - H/H stable in 7s, Hgb 6.6 on 8/12, repeat 7.4>8.0 > 7.8   -- no active bleeding     ====================ETHICS===========================  GOC: patient with prior MOLST DNR/DNI. As per discussion with pt, wife and daughter, and pt's and family's wishes, pt is DNR/DNI     Dispo: MICU, possibly will be eligible for transfer later today if remains off vasopressors   LINES: PIV's

## 2023-08-14 NOTE — DIETITIAN INITIAL EVALUATION ADULT - EDUCATION DIETARY MODIFICATIONS
unable to verbalize/demonstrate/(0) unable to meet; needs instruction teach back/(2) meets goals/outcomes/verbalization

## 2023-08-14 NOTE — PROGRESS NOTE ADULT - SUBJECTIVE AND OBJECTIVE BOX
Significant recent/past 24 hr events:    Subjective:    Review of Systems         [ ] A ten-point review of systems was otherwise negative except as noted.  [ ] Due to altered mental status/intubation, subjective information were not able to be obtained from the patient. History was obtained, to the extent possible, from review of the chart and collateral sources of information.      Patient is a 82y old  Male who presents with a chief complaint of ALAYNA, Hyperkalemia (13 Aug 2023 07:27)    HPI:  83 yo M with HTN, A fib on Eliquis, bradycardia s/p ppm, CHF, pleural effusions s/p R pleurX, Parkinson's presenting with shortness of breath x 1 day.  Pt poor historian due to dementia. Wife at bedside providing history. Overnight pt had been complaining of shortness of breath, coughing. Denies any chest pain, palpitations, no fevers or chills. No recent illness, No GI or urinary symptoms.    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      Parkinsons disease      CAD (coronary artery disease)  s/p PCI 08/21      Pleural effusion, not elsewhere classified      COVID-19 vaccine series completed  w booster      History of hip replacement, total  R hip 2008 & L hip      Status post cataract extraction  right eye cataract extraction with IOL 6/26/2015      Presence of cardiac pacemaker  10/2021      H/O coronary angioplasty  8/2021 1 stent inserted        FAMILY HISTORY:  Family history of lung cancer (Mother)    Family history of esophageal cancer (Father)    FH: myocardial infarction (Grandparent)        Vitals   ICU Vital Signs Last 24 Hrs  T(C): 36.8 (14 Aug 2023 04:00), Max: 36.8 (14 Aug 2023 04:00)  T(F): 98.2 (14 Aug 2023 04:00), Max: 98.2 (14 Aug 2023 04:00)  HR: 60 (14 Aug 2023 06:00) (60 - 62)  BP: 102/50 (14 Aug 2023 06:00) (85/39 - 120/45)  BP(mean): 61 (14 Aug 2023 06:00) (47 - 67)  ABP: --  ABP(mean): --  RR: 11 (14 Aug 2023 06:00) (11 - 14)  SpO2: 100% (14 Aug 2023 06:00) (98% - 100%)    O2 Parameters below as of 14 Aug 2023 06:00  Patient On (Oxygen Delivery Method): room air            Physical Exam:   Constitutional: NAD, well-groomed, well-developed  HEENT: PERRLA, EOMI, no drainage or redness  Neck: supple,  No JVD, Trachea midline  Back: Normal spine flexure, No CVA tenderness, No deformity or limitation of movement  Respiratory: Breath Sounds equal & clear bilaterally to auscultation, no accessory muscle use noted  Cardiovascular: Regular rate, regular rhythm, normal S1, S2; no murmurs or rub  Gastrointestinal: Soft, non-tender, non distended, no hepatosplenomegaly, normal bowel sounds  Extremities: GUILLORY x 4, no peripheral edema, no cyanosis, no clubbing   Vascular: Equal and normal pulses: 2+ peripheral pulses throughout  Neurological: A+O x 3; speech clear and intact; no sensory, motor  deficits, normal reflexes  Psychiatric: calm, normal mood, normal affect  Musculoskeletal: No joint swelling or deformity; no limitation of movement  Skin: warm, dry, well perfused, no rashes    VENT SETTINGS         I&O's Detail    12 Aug 2023 07:01  -  13 Aug 2023 07:00  --------------------------------------------------------  IN:    IV PiggyBack: 200 mL    Norepinephrine: 64.1 mL  Total IN: 264.1 mL    OUT:    Drain (mL): 500 mL    Indwelling Catheter - Urethral (mL): 1285 mL  Total OUT: 1785 mL    Total NET: -1520.9 mL      13 Aug 2023 07:01  -  14 Aug 2023 06:30  --------------------------------------------------------  IN:    Norepinephrine: 70.6 mL    Oral Fluid: 120 mL  Total IN: 190.6 mL    OUT:    Indwelling Catheter - Urethral (mL): 1440 mL  Total OUT: 1440 mL    Total NET: -1249.4 mL          LABS                        7.8    7.04  )-----------( 253      ( 14 Aug 2023 00:50 )             25.5     08-14    141  |  103  |  34<H>  ----------------------------<  116<H>  3.4<L>   |  25  |  3.30<H>    Ca    8.5      14 Aug 2023 00:50  Phos  3.5     08-14  Mg     2.20     08-14    TPro  5.4<L>  /  Alb  3.1<L>  /  TBili  0.3  /  DBili  x   /  AST  15  /  ALT  7   /  AlkPhos  46  08-14    LIVER FUNCTIONS - ( 14 Aug 2023 00:50 )  Alb: 3.1 g/dL / Pro: 5.4 g/dL / ALK PHOS: 46 U/L / ALT: 7 U/L / AST: 15 U/L / GGT: x           PT/INR - ( 13 Aug 2023 00:25 )   PT: 18.3 sec;   INR: 1.66 ratio         PTT - ( 13 Aug 2023 00:25 )  PTT:35.6 sec        Urinalysis Basic - ( 14 Aug 2023 00:50 )    Color: x / Appearance: x / SG: x / pH: x  Gluc: 116 mg/dL / Ketone: x  / Bili: x / Urobili: x   Blood: x / Protein: x / Nitrite: x   Leuk Esterase: x / RBC: x / WBC x   Sq Epi: x / Non Sq Epi: x / Bacteria: x            MEDICATIONS  (STANDING):  apixaban 2.5 milliGRAM(s) Oral two times a day  carbidopa/levodopa  25/100 1.5 Tablet(s) Oral four times a day  chlorhexidine 2% Cloths 1 Application(s) Topical daily  dextrose 5%. 1000 milliLiter(s) (50 mL/Hr) IV Continuous <Continuous>  dextrose 5%. 1000 milliLiter(s) (100 mL/Hr) IV Continuous <Continuous>  dextrose 50% Injectable 25 Gram(s) IV Push once  dextrose 50% Injectable 25 Gram(s) IV Push once  dextrose 50% Injectable 12.5 Gram(s) IV Push once  fludroCORTISONE 0.1 milliGRAM(s) Oral daily  glucagon  Injectable 1 milliGRAM(s) IntraMuscular once  melatonin 3 milliGRAM(s) Oral at bedtime  midodrine. 20 milliGRAM(s) Oral three times a day  norepinephrine Infusion 0.05 MICROgram(s)/kG/Min (8.23 mL/Hr) IV Continuous <Continuous>  OLANZapine 2.5 milliGRAM(s) Oral at bedtime    MEDICATIONS  (PRN):  acetaminophen     Tablet .. 650 milliGRAM(s) Oral every 6 hours PRN Temp greater or equal to 38C (100.4F), Mild Pain (1 - 3)  aluminum hydroxide/magnesium hydroxide/simethicone Suspension 30 milliLiter(s) Oral every 4 hours PRN Dyspepsia  dextrose Oral Gel 15 Gram(s) Oral once PRN Blood Glucose LESS THAN 70 milliGRAM(s)/deciliter  melatonin 3 milliGRAM(s) Oral at bedtime PRN Insomnia  ondansetron Injectable 4 milliGRAM(s) IV Push every 8 hours PRN Nausea and/or Vomiting  sodium chloride 0.9% Bolus. 100 milliLiter(s) IV Bolus every 5 minutes PRN SBP LESS THAN or EQUAL to 90 mmHg      Allergies:  No Known Allergies        CRITICAL CARE TIME SPENT:  minutes of critical care time spent providing medical care for patient's acute illness/conditions that impairs at least one vital organ system and/or poses a high risk of imminent or life threatening deterioration in the patient's condition. It includes time spent evaluating and treating the patient's acute illness as well as time spent reviewing labs, radiology, discussing goals of care with patient and/or patient's family, and discussing the case with a multidisciplinary team, in an effort to prevent further life threatening deterioration or end organ damage. This time is independent of any procedures performed.         Significant recent/past 24 hr events: still remains on Levophed at 0.02 mcg/kg/min    Subjective: pt denies any complaints     Review of Systems         [ ] A ten-point review of systems was otherwise negative except as noted.  [ ] Due to altered mental status/intubation, subjective information were not able to be obtained from the patient. History was obtained, to the extent possible, from review of the chart and collateral sources of information.      Patient is a 82y old  Male who presents with a chief complaint of ALAYNA, Hyperkalemia (13 Aug 2023 07:27)    HPI:  81 yo M with HTN, A fib on Eliquis, bradycardia s/p ppm, CHF, pleural effusions s/p R pleurX, Parkinson's presenting with shortness of breath x 1 day.  Pt poor historian due to dementia. Wife at bedside providing history. Overnight pt had been complaining of shortness of breath, coughing. Denies any chest pain, palpitations, no fevers or chills. No recent illness, No GI or urinary symptoms.    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      Parkinsons disease      CAD (coronary artery disease)  s/p PCI 08/21      Pleural effusion, not elsewhere classified      COVID-19 vaccine series completed  w booster      History of hip replacement, total  R hip 2008 & L hip      Status post cataract extraction  right eye cataract extraction with IOL 6/26/2015      Presence of cardiac pacemaker  10/2021      H/O coronary angioplasty  8/2021 1 stent inserted        FAMILY HISTORY:  Family history of lung cancer (Mother)    Family history of esophageal cancer (Father)    FH: myocardial infarction (Grandparent)        Vitals   ICU Vital Signs Last 24 Hrs  T(C): 36.8 (14 Aug 2023 04:00), Max: 36.8 (14 Aug 2023 04:00)  T(F): 98.2 (14 Aug 2023 04:00), Max: 98.2 (14 Aug 2023 04:00)  HR: 60 (14 Aug 2023 06:00) (60 - 62)  BP: 102/50 (14 Aug 2023 06:00) (85/39 - 120/45)  BP(mean): 61 (14 Aug 2023 06:00) (47 - 67)  ABP: --  ABP(mean): --  RR: 11 (14 Aug 2023 06:00) (11 - 14)  SpO2: 100% (14 Aug 2023 06:00) (98% - 100%)    O2 Parameters below as of 14 Aug 2023 06:00  Patient On (Oxygen Delivery Method): room air            Physical Exam:   Constitutional: NAD, well-groomed, well-developed  HEENT: PERRLA, EOMI, no drainage or redness  Neck: supple,  No JVD, Trachea midline  Back: Normal spine flexure, No CVA tenderness, No deformity or limitation of movement  Respiratory: Breath Sounds equal & clear bilaterally to auscultation, no accessory muscle use noted  Cardiovascular: Regular rate, regular rhythm, normal S1, S2; no murmurs or rub  Gastrointestinal: Soft, non-tender, non distended, no hepatosplenomegaly, normal bowel sounds  Extremities: GUILLORY x 4, no peripheral edema, no cyanosis, no clubbing   Vascular: Equal and normal pulses: 2+ peripheral pulses throughout  Neurological: A+O x 3; speech clear and intact; no sensory, motor  deficits, normal reflexes  Psychiatric: calm, normal mood, normal affect  Musculoskeletal: No joint swelling or deformity; no limitation of movement  Skin: warm, dry, well perfused, no rashes    VENT SETTINGS         I&O's Detail    12 Aug 2023 07:01  -  13 Aug 2023 07:00  --------------------------------------------------------  IN:    IV PiggyBack: 200 mL    Norepinephrine: 64.1 mL  Total IN: 264.1 mL    OUT:    Drain (mL): 500 mL    Indwelling Catheter - Urethral (mL): 1285 mL  Total OUT: 1785 mL    Total NET: -1520.9 mL      13 Aug 2023 07:01  -  14 Aug 2023 06:30  --------------------------------------------------------  IN:    Norepinephrine: 70.6 mL    Oral Fluid: 120 mL  Total IN: 190.6 mL    OUT:    Indwelling Catheter - Urethral (mL): 1440 mL  Total OUT: 1440 mL    Total NET: -1249.4 mL          LABS                        7.8    7.04  )-----------( 253      ( 14 Aug 2023 00:50 )             25.5     08-14    141  |  103  |  34<H>  ----------------------------<  116<H>  3.4<L>   |  25  |  3.30<H>    Ca    8.5      14 Aug 2023 00:50  Phos  3.5     08-14  Mg     2.20     08-14    TPro  5.4<L>  /  Alb  3.1<L>  /  TBili  0.3  /  DBili  x   /  AST  15  /  ALT  7   /  AlkPhos  46  08-14    LIVER FUNCTIONS - ( 14 Aug 2023 00:50 )  Alb: 3.1 g/dL / Pro: 5.4 g/dL / ALK PHOS: 46 U/L / ALT: 7 U/L / AST: 15 U/L / GGT: x           PT/INR - ( 13 Aug 2023 00:25 )   PT: 18.3 sec;   INR: 1.66 ratio         PTT - ( 13 Aug 2023 00:25 )  PTT:35.6 sec        Urinalysis Basic - ( 14 Aug 2023 00:50 )    Color: x / Appearance: x / SG: x / pH: x  Gluc: 116 mg/dL / Ketone: x  / Bili: x / Urobili: x   Blood: x / Protein: x / Nitrite: x   Leuk Esterase: x / RBC: x / WBC x   Sq Epi: x / Non Sq Epi: x / Bacteria: x            MEDICATIONS  (STANDING):  apixaban 2.5 milliGRAM(s) Oral two times a day  carbidopa/levodopa  25/100 1.5 Tablet(s) Oral four times a day  chlorhexidine 2% Cloths 1 Application(s) Topical daily  dextrose 5%. 1000 milliLiter(s) (50 mL/Hr) IV Continuous <Continuous>  dextrose 5%. 1000 milliLiter(s) (100 mL/Hr) IV Continuous <Continuous>  dextrose 50% Injectable 25 Gram(s) IV Push once  dextrose 50% Injectable 25 Gram(s) IV Push once  dextrose 50% Injectable 12.5 Gram(s) IV Push once  fludroCORTISONE 0.1 milliGRAM(s) Oral daily  glucagon  Injectable 1 milliGRAM(s) IntraMuscular once  melatonin 3 milliGRAM(s) Oral at bedtime  midodrine. 20 milliGRAM(s) Oral three times a day  norepinephrine Infusion 0.05 MICROgram(s)/kG/Min (8.23 mL/Hr) IV Continuous <Continuous>  OLANZapine 2.5 milliGRAM(s) Oral at bedtime    MEDICATIONS  (PRN):  acetaminophen     Tablet .. 650 milliGRAM(s) Oral every 6 hours PRN Temp greater or equal to 38C (100.4F), Mild Pain (1 - 3)  aluminum hydroxide/magnesium hydroxide/simethicone Suspension 30 milliLiter(s) Oral every 4 hours PRN Dyspepsia  dextrose Oral Gel 15 Gram(s) Oral once PRN Blood Glucose LESS THAN 70 milliGRAM(s)/deciliter  melatonin 3 milliGRAM(s) Oral at bedtime PRN Insomnia  ondansetron Injectable 4 milliGRAM(s) IV Push every 8 hours PRN Nausea and/or Vomiting  sodium chloride 0.9% Bolus. 100 milliLiter(s) IV Bolus every 5 minutes PRN SBP LESS THAN or EQUAL to 90 mmHg      Allergies:  No Known Allergies                 Significant recent/past 24 hr events: still remains on Levophed at 0.01 mcg/kg/min    Subjective: pt feels sad today. Pt denies any complaints.     Review of Systems         [ ] A ten-point review of systems was otherwise negative except as noted.  [ ] Due to altered mental status/intubation, subjective information were not able to be obtained from the patient. History was obtained, to the extent possible, from review of the chart and collateral sources of information.      Patient is a 82y old  Male who presents with a chief complaint of ALAYNA, Hyperkalemia (13 Aug 2023 07:27)    HPI:  81 yo M with HTN, A fib on Eliquis, bradycardia s/p ppm, CHF, pleural effusions s/p R pleurX, Parkinson's presenting with shortness of breath x 1 day.  Pt poor historian due to dementia. Wife at bedside providing history. Overnight pt had been complaining of shortness of breath, coughing. Denies any chest pain, palpitations, no fevers or chills. No recent illness, No GI or urinary symptoms.    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      Parkinsons disease      CAD (coronary artery disease)  s/p PCI 08/21      Pleural effusion, not elsewhere classified      COVID-19 vaccine series completed  w booster      History of hip replacement, total  R hip 2008 & L hip      Status post cataract extraction  right eye cataract extraction with IOL 6/26/2015      Presence of cardiac pacemaker  10/2021      H/O coronary angioplasty  8/2021 1 stent inserted        FAMILY HISTORY:  Family history of lung cancer (Mother)    Family history of esophageal cancer (Father)    FH: myocardial infarction (Grandparent)        Vitals   ICU Vital Signs Last 24 Hrs  T(C): 36.8 (14 Aug 2023 04:00), Max: 36.8 (14 Aug 2023 04:00)  T(F): 98.2 (14 Aug 2023 04:00), Max: 98.2 (14 Aug 2023 04:00)  HR: 60 (14 Aug 2023 06:00) (60 - 62)  BP: 102/50 (14 Aug 2023 06:00) (85/39 - 120/45)  BP(mean): 61 (14 Aug 2023 06:00) (47 - 67)  ABP: --  ABP(mean): --  RR: 11 (14 Aug 2023 06:00) (11 - 14)  SpO2: 100% (14 Aug 2023 06:00) (98% - 100%)    O2 Parameters below as of 14 Aug 2023 06:00  Patient On (Oxygen Delivery Method): room air            Physical Exam:   Constitutional: NAD, well-groomed, well-developed  HEENT: PERRLA, EOMI, no drainage or redness  Neck: supple,  No JVD, Trachea midline  Back: Normal spine flexure, No CVA tenderness, No deformity or limitation of movement  Respiratory: Breath Sounds equal & clear bilaterally to auscultation, no accessory muscle use noted  Cardiovascular: Regular rate, regular rhythm, normal S1, S2; no murmurs or rub  Gastrointestinal: Soft, non-tender, non distended, no hepatosplenomegaly, normal bowel sounds  Extremities: GUILLORY x 4, nonpitting peripheral edema, no cyanosis, no clubbing   Vascular: Equal and normal pulses: 2+ peripheral pulses throughout  Neurological: A+O x 3; speech clear and intact; no sensory, motor  deficits, normal reflexes  Psychiatric: calm, normal mood, normal affect  Musculoskeletal: No joint swelling or deformity; no limitation of movement  Skin: warm, dry, well perfused, no rashes    VENT SETTINGS         I&O's Detail    12 Aug 2023 07:01  -  13 Aug 2023 07:00  --------------------------------------------------------  IN:    IV PiggyBack: 200 mL    Norepinephrine: 64.1 mL  Total IN: 264.1 mL    OUT:    Drain (mL): 500 mL    Indwelling Catheter - Urethral (mL): 1285 mL  Total OUT: 1785 mL    Total NET: -1520.9 mL      13 Aug 2023 07:01  -  14 Aug 2023 06:30  --------------------------------------------------------  IN:    Norepinephrine: 70.6 mL    Oral Fluid: 120 mL  Total IN: 190.6 mL    OUT:    Indwelling Catheter - Urethral (mL): 1440 mL  Total OUT: 1440 mL    Total NET: -1249.4 mL          LABS                        7.8    7.04  )-----------( 253      ( 14 Aug 2023 00:50 )             25.5     08-14    141  |  103  |  34<H>  ----------------------------<  116<H>  3.4<L>   |  25  |  3.30<H>    Ca    8.5      14 Aug 2023 00:50  Phos  3.5     08-14  Mg     2.20     08-14    TPro  5.4<L>  /  Alb  3.1<L>  /  TBili  0.3  /  DBili  x   /  AST  15  /  ALT  7   /  AlkPhos  46  08-14    LIVER FUNCTIONS - ( 14 Aug 2023 00:50 )  Alb: 3.1 g/dL / Pro: 5.4 g/dL / ALK PHOS: 46 U/L / ALT: 7 U/L / AST: 15 U/L / GGT: x           PT/INR - ( 13 Aug 2023 00:25 )   PT: 18.3 sec;   INR: 1.66 ratio         PTT - ( 13 Aug 2023 00:25 )  PTT:35.6 sec        Urinalysis Basic - ( 14 Aug 2023 00:50 )    Color: x / Appearance: x / SG: x / pH: x  Gluc: 116 mg/dL / Ketone: x  / Bili: x / Urobili: x   Blood: x / Protein: x / Nitrite: x   Leuk Esterase: x / RBC: x / WBC x   Sq Epi: x / Non Sq Epi: x / Bacteria: x            MEDICATIONS  (STANDING):  apixaban 2.5 milliGRAM(s) Oral two times a day  carbidopa/levodopa  25/100 1.5 Tablet(s) Oral four times a day  chlorhexidine 2% Cloths 1 Application(s) Topical daily  dextrose 5%. 1000 milliLiter(s) (50 mL/Hr) IV Continuous <Continuous>  dextrose 5%. 1000 milliLiter(s) (100 mL/Hr) IV Continuous <Continuous>  dextrose 50% Injectable 25 Gram(s) IV Push once  dextrose 50% Injectable 25 Gram(s) IV Push once  dextrose 50% Injectable 12.5 Gram(s) IV Push once  fludroCORTISONE 0.1 milliGRAM(s) Oral daily  glucagon  Injectable 1 milliGRAM(s) IntraMuscular once  melatonin 3 milliGRAM(s) Oral at bedtime  midodrine. 20 milliGRAM(s) Oral three times a day  norepinephrine Infusion 0.05 MICROgram(s)/kG/Min (8.23 mL/Hr) IV Continuous <Continuous>  OLANZapine 2.5 milliGRAM(s) Oral at bedtime    MEDICATIONS  (PRN):  acetaminophen     Tablet .. 650 milliGRAM(s) Oral every 6 hours PRN Temp greater or equal to 38C (100.4F), Mild Pain (1 - 3)  aluminum hydroxide/magnesium hydroxide/simethicone Suspension 30 milliLiter(s) Oral every 4 hours PRN Dyspepsia  dextrose Oral Gel 15 Gram(s) Oral once PRN Blood Glucose LESS THAN 70 milliGRAM(s)/deciliter  melatonin 3 milliGRAM(s) Oral at bedtime PRN Insomnia  ondansetron Injectable 4 milliGRAM(s) IV Push every 8 hours PRN Nausea and/or Vomiting  sodium chloride 0.9% Bolus. 100 milliLiter(s) IV Bolus every 5 minutes PRN SBP LESS THAN or EQUAL to 90 mmHg      Allergies:  No Known Allergies                 Significant recent/past 24 hr events: still remains on Levophed at 0.01 mcg/kg/min    Subjective: pt states that he feels sad today. Pt denies any complaints.     Review of Systems         [ ] A ten-point review of systems was otherwise negative except as noted.  [ ] Due to altered mental status/intubation, subjective information were not able to be obtained from the patient. History was obtained, to the extent possible, from review of the chart and collateral sources of information.      Patient is a 82y old  Male who presents with a chief complaint of ALAYNA, Hyperkalemia (13 Aug 2023 07:27)    HPI:  83 yo M with HTN, A fib on Eliquis, bradycardia s/p ppm, CHF, pleural effusions s/p R pleurX, Parkinson's presenting with shortness of breath x 1 day.  Pt poor historian due to dementia. Wife at bedside providing history. Overnight pt had been complaining of shortness of breath, coughing. Denies any chest pain, palpitations, no fevers or chills. No recent illness, No GI or urinary symptoms.    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      Parkinsons disease      CAD (coronary artery disease)  s/p PCI 08/21      Pleural effusion, not elsewhere classified      COVID-19 vaccine series completed  w booster      History of hip replacement, total  R hip 2008 & L hip      Status post cataract extraction  right eye cataract extraction with IOL 6/26/2015      Presence of cardiac pacemaker  10/2021      H/O coronary angioplasty  8/2021 1 stent inserted        FAMILY HISTORY:  Family history of lung cancer (Mother)    Family history of esophageal cancer (Father)    FH: myocardial infarction (Grandparent)        Vitals   ICU Vital Signs Last 24 Hrs  T(C): 36.8 (14 Aug 2023 04:00), Max: 36.8 (14 Aug 2023 04:00)  T(F): 98.2 (14 Aug 2023 04:00), Max: 98.2 (14 Aug 2023 04:00)  HR: 60 (14 Aug 2023 06:00) (60 - 62)  BP: 102/50 (14 Aug 2023 06:00) (85/39 - 120/45)  BP(mean): 61 (14 Aug 2023 06:00) (47 - 67)  ABP: --  ABP(mean): --  RR: 11 (14 Aug 2023 06:00) (11 - 14)  SpO2: 100% (14 Aug 2023 06:00) (98% - 100%)    O2 Parameters below as of 14 Aug 2023 06:00  Patient On (Oxygen Delivery Method): room air            Physical Exam:   Constitutional: NAD, well-groomed, well-developed  HEENT: PERRLA, EOMI, no drainage or redness  Neck: supple,  No JVD, Trachea midline  Back: Normal spine flexure, No CVA tenderness, No deformity or limitation of movement  Respiratory: Breath Sounds equal & clear bilaterally to auscultation, no accessory muscle use noted  Cardiovascular: Regular rate, regular rhythm, normal S1, S2; no murmurs or rub  Gastrointestinal: Soft, non-tender, non distended, no hepatosplenomegaly, normal bowel sounds  Extremities: GUILLORY x 4, nonpitting peripheral edema, no cyanosis, no clubbing   Vascular: Equal and normal pulses: 2+ peripheral pulses throughout  Neurological: A+O; speech clear and intact; no sensory, motor  deficits, normal reflexes  Psychiatric: calm, normal mood, normal affect  Musculoskeletal: No joint swelling or deformity; no limitation of movement  Skin: warm, dry, well perfused, no rashes    VENT SETTINGS         I&O's Detail    12 Aug 2023 07:01  -  13 Aug 2023 07:00  --------------------------------------------------------  IN:    IV PiggyBack: 200 mL    Norepinephrine: 64.1 mL  Total IN: 264.1 mL    OUT:    Drain (mL): 500 mL    Indwelling Catheter - Urethral (mL): 1285 mL  Total OUT: 1785 mL    Total NET: -1520.9 mL      13 Aug 2023 07:01  -  14 Aug 2023 06:30  --------------------------------------------------------  IN:    Norepinephrine: 70.6 mL    Oral Fluid: 120 mL  Total IN: 190.6 mL    OUT:    Indwelling Catheter - Urethral (mL): 1440 mL  Total OUT: 1440 mL    Total NET: -1249.4 mL          LABS                        7.8    7.04  )-----------( 253      ( 14 Aug 2023 00:50 )             25.5     08-14    141  |  103  |  34<H>  ----------------------------<  116<H>  3.4<L>   |  25  |  3.30<H>    Ca    8.5      14 Aug 2023 00:50  Phos  3.5     08-14  Mg     2.20     08-14    TPro  5.4<L>  /  Alb  3.1<L>  /  TBili  0.3  /  DBili  x   /  AST  15  /  ALT  7   /  AlkPhos  46  08-14    LIVER FUNCTIONS - ( 14 Aug 2023 00:50 )  Alb: 3.1 g/dL / Pro: 5.4 g/dL / ALK PHOS: 46 U/L / ALT: 7 U/L / AST: 15 U/L / GGT: x           PT/INR - ( 13 Aug 2023 00:25 )   PT: 18.3 sec;   INR: 1.66 ratio         PTT - ( 13 Aug 2023 00:25 )  PTT:35.6 sec        Urinalysis Basic - ( 14 Aug 2023 00:50 )    Color: x / Appearance: x / SG: x / pH: x  Gluc: 116 mg/dL / Ketone: x  / Bili: x / Urobili: x   Blood: x / Protein: x / Nitrite: x   Leuk Esterase: x / RBC: x / WBC x   Sq Epi: x / Non Sq Epi: x / Bacteria: x            MEDICATIONS  (STANDING):  apixaban 2.5 milliGRAM(s) Oral two times a day  carbidopa/levodopa  25/100 1.5 Tablet(s) Oral four times a day  chlorhexidine 2% Cloths 1 Application(s) Topical daily  dextrose 5%. 1000 milliLiter(s) (50 mL/Hr) IV Continuous <Continuous>  dextrose 5%. 1000 milliLiter(s) (100 mL/Hr) IV Continuous <Continuous>  dextrose 50% Injectable 25 Gram(s) IV Push once  dextrose 50% Injectable 25 Gram(s) IV Push once  dextrose 50% Injectable 12.5 Gram(s) IV Push once  fludroCORTISONE 0.1 milliGRAM(s) Oral daily  glucagon  Injectable 1 milliGRAM(s) IntraMuscular once  melatonin 3 milliGRAM(s) Oral at bedtime  midodrine. 20 milliGRAM(s) Oral three times a day  norepinephrine Infusion 0.05 MICROgram(s)/kG/Min (8.23 mL/Hr) IV Continuous <Continuous>  OLANZapine 2.5 milliGRAM(s) Oral at bedtime    MEDICATIONS  (PRN):  acetaminophen     Tablet .. 650 milliGRAM(s) Oral every 6 hours PRN Temp greater or equal to 38C (100.4F), Mild Pain (1 - 3)  aluminum hydroxide/magnesium hydroxide/simethicone Suspension 30 milliLiter(s) Oral every 4 hours PRN Dyspepsia  dextrose Oral Gel 15 Gram(s) Oral once PRN Blood Glucose LESS THAN 70 milliGRAM(s)/deciliter  melatonin 3 milliGRAM(s) Oral at bedtime PRN Insomnia  ondansetron Injectable 4 milliGRAM(s) IV Push every 8 hours PRN Nausea and/or Vomiting  sodium chloride 0.9% Bolus. 100 milliLiter(s) IV Bolus every 5 minutes PRN SBP LESS THAN or EQUAL to 90 mmHg      Allergies:  No Known Allergies

## 2023-08-14 NOTE — DIETITIAN INITIAL EVALUATION ADULT - OTHER INFO
Spoke w Pt. and private aide @ bedside.  Fair-good PO intake reported.    Endorses decrease in PO since hospitalization as due to "different foods".  Obtained food preferences.  Pt. declines Ensure.    Pt. observed w ~50% PO intake of breakfast.  Per aide, Pt. usually eats small amount of breakfast as he prefers lunch and dinner.    Denies any GI distress or issues chewing/swallowing.  Stated usual body weight 185lbs.  Denies weight changes PTA, however upon review of documented weight Hx, this does not correlate.  Will verbalize recommendation to PA.      Discussed prescribed diet.  Advised avoidance of additional salt as it relates to Hx of CHF as well as fluid overload requiring HD on admission.  Informed of potassium dense foods.  Discouraged intake of concentrated sweetened beverages and suggested flavored seltzers.    Suggest renal diet restriction be d/c'd considering electrolytes currently WALs, occasionally even low. Discussed w PA.

## 2023-08-14 NOTE — DIETITIAN INITIAL EVALUATION ADULT - PERTINENT LABORATORY DATA
08-14    141  |  103  |  34<H>  ----------------------------<  116<H>  3.4<L>   |  25  |  3.30<H>    Ca    8.5      14 Aug 2023 00:50  Phos  3.5     08-14  Mg     2.20     08-14    TPro  5.4<L>  /  Alb  3.1<L>  /  TBili  0.3  /  DBili  x   /  AST  15  /  ALT  7   /  AlkPhos  46  08-14      A1C with Estimated Average Glucose Result: 5.4 % (08-10-23 @ 04:10)  A1C with Estimated Average Glucose Result: 5.6 % (08-09-23 @ 17:50)  A1C with Estimated Average Glucose Result: 5.4 % (02-03-23 @ 05:23)

## 2023-08-14 NOTE — DIETITIAN INITIAL EVALUATION ADULT - ORAL INTAKE PTA/DIET HISTORY
Typically good appetite/PO intake without any reported changes in appetite PTA.  Eats 3 meals/day.... either home prepared by wife or take out food.  Adds salt to food.  Occasionally drinks Ensure supplement, but Pt. states he dislikes it.  Drinks glass of orange juice in morning with medication and drinks soda (reg and/or diet) throughout day.  Dislikes water.     Typically good appetite/PO intake without any reported changes in appetite PTA.  Eats 3 meals/day.... either home prepared by wife or take out food.  Adds salt to food.  Occasionally drinks Ensure supplement, but Pt. states he dislikes it.  Drinks glass of orange juice in morning with medication and drinks soda (reg and/or diet) throughout day.  Dislikes water.      Reports being told by outpatient physician to "increase salt intake" due to hypotension PTA.

## 2023-08-14 NOTE — DIETITIAN INITIAL EVALUATION ADULT - PROBLEM SELECTOR PLAN 1
- Complicated by hyperkalemia and metabolic acidosis. No EKG changes   - s/p Calcium gluconate,  Nabicarb and insulin in ED with no improvement in K  - Will give Lokelma then repeat BMP   - Nephrology consulted- f/u recs  - Monitor on tele  - Monitor I/Os

## 2023-08-14 NOTE — DIETITIAN INITIAL EVALUATION ADULT - PERTINENT MEDS FT
MEDICATIONS  (STANDING):  apixaban 2.5 milliGRAM(s) Oral two times a day  carbidopa/levodopa  25/100 1.5 Tablet(s) Oral four times a day  chlorhexidine 2% Cloths 1 Application(s) Topical daily  dextrose 5%. 1000 milliLiter(s) (100 mL/Hr) IV Continuous <Continuous>  dextrose 5%. 1000 milliLiter(s) (50 mL/Hr) IV Continuous <Continuous>  dextrose 50% Injectable 12.5 Gram(s) IV Push once  dextrose 50% Injectable 25 Gram(s) IV Push once  dextrose 50% Injectable 25 Gram(s) IV Push once  fludroCORTISONE 0.1 milliGRAM(s) Oral daily  glucagon  Injectable 1 milliGRAM(s) IntraMuscular once  melatonin 3 milliGRAM(s) Oral at bedtime  midodrine. 20 milliGRAM(s) Oral three times a day  norepinephrine Infusion 0.05 MICROgram(s)/kG/Min (8.23 mL/Hr) IV Continuous <Continuous>  OLANZapine 2.5 milliGRAM(s) Oral at bedtime    MEDICATIONS  (PRN):  acetaminophen     Tablet .. 650 milliGRAM(s) Oral every 6 hours PRN Temp greater or equal to 38C (100.4F), Mild Pain (1 - 3)  aluminum hydroxide/magnesium hydroxide/simethicone Suspension 30 milliLiter(s) Oral every 4 hours PRN Dyspepsia  dextrose Oral Gel 15 Gram(s) Oral once PRN Blood Glucose LESS THAN 70 milliGRAM(s)/deciliter  melatonin 3 milliGRAM(s) Oral at bedtime PRN Insomnia  ondansetron Injectable 4 milliGRAM(s) IV Push every 8 hours PRN Nausea and/or Vomiting  sodium chloride 0.9% Bolus. 100 milliLiter(s) IV Bolus every 5 minutes PRN SBP LESS THAN or EQUAL to 90 mmHg

## 2023-08-14 NOTE — DIETITIAN INITIAL EVALUATION ADULT - NS FNS DIET ORDER
Diet, DASH/TLC:   Sodium & Cholesterol Restricted  For patients receiving Renal Replacement - No Protein Restr, No Conc K, No Conc Phos, Low Sodium (RENAL) (08-10-23 @ 14:42) [Active]

## 2023-08-14 NOTE — DIETITIAN INITIAL EVALUATION ADULT - ETIOLOGY
Lack of previous diet/nutrition education as it relates to Hx/Dx of cardiac & renal disease (CHF, ALAYNA/CKD requiring urgent HD, fluid overload).

## 2023-08-14 NOTE — PROGRESS NOTE ADULT - PROBLEM SELECTOR PLAN 1
Pt. w/ ALAYNA likely hemodynamic mediated and medication SE (TMP/SMX and Entresto). On review of sunrise/HIE patient likely has CKD stage 3A with baseline Scr 1.3-1.6. Last outpt labs reviewed on HIE showing Scr 1.64 on 4/20/23. On admission Scr 4.54, K 6.4, and SCO2 14. HD consent done and placed in chart. Kidney US with no hydronephrosis 8/11. Pt received IHD 8/9, 8/10, 8/11. Pt. remains on low dose vasopressors, but off BIPAP. Labs reviewed, Scr 3.3 today and 24hr UOP 1.7L. Continue with lasix 80 IV BID today.  No plan for HD today. Continue to hold Entresto and TMP/SMX. Monitor labs, VS and urine output. Avoid nephrotoxins. Dose medications as per eGFR.    Final recommendations pending attending signature.    If you have any questions, please feel free to contact me  Torres Olivarez  Nephrology Fellow  [facility]/Page [phone]  (After 5pm or on weekends please page the on-call fellow)

## 2023-08-15 LAB
ALBUMIN SERPL ELPH-MCNC: 2.9 G/DL — LOW (ref 3.3–5)
ALP SERPL-CCNC: 45 U/L — SIGNIFICANT CHANGE UP (ref 40–120)
ALT FLD-CCNC: 8 U/L — SIGNIFICANT CHANGE UP (ref 4–41)
ANION GAP SERPL CALC-SCNC: 11 MMOL/L — SIGNIFICANT CHANGE UP (ref 7–14)
APTT BLD: 36.5 SEC — HIGH (ref 24.5–35.6)
AST SERPL-CCNC: 16 U/L — SIGNIFICANT CHANGE UP (ref 4–40)
BASE EXCESS BLDV CALC-SCNC: 2 MMOL/L — SIGNIFICANT CHANGE UP (ref -2–3)
BASOPHILS # BLD AUTO: 0.05 K/UL — SIGNIFICANT CHANGE UP (ref 0–0.2)
BASOPHILS NFR BLD AUTO: 0.7 % — SIGNIFICANT CHANGE UP (ref 0–2)
BILIRUB SERPL-MCNC: 0.3 MG/DL — SIGNIFICANT CHANGE UP (ref 0.2–1.2)
BLOOD GAS VENOUS COMPREHENSIVE RESULT: SIGNIFICANT CHANGE UP
BUN SERPL-MCNC: 34 MG/DL — HIGH (ref 7–23)
CALCIUM SERPL-MCNC: 8.6 MG/DL — SIGNIFICANT CHANGE UP (ref 8.4–10.5)
CHLORIDE BLDV-SCNC: 104 MMOL/L — SIGNIFICANT CHANGE UP (ref 96–108)
CHLORIDE SERPL-SCNC: 103 MMOL/L — SIGNIFICANT CHANGE UP (ref 98–107)
CO2 BLDV-SCNC: 27.7 MMOL/L — HIGH (ref 22–26)
CO2 SERPL-SCNC: 24 MMOL/L — SIGNIFICANT CHANGE UP (ref 22–31)
CREAT SERPL-MCNC: 3.21 MG/DL — HIGH (ref 0.5–1.3)
CULTURE RESULTS: SIGNIFICANT CHANGE UP
EGFR: 19 ML/MIN/1.73M2 — LOW
EOSINOPHIL # BLD AUTO: 0.06 K/UL — SIGNIFICANT CHANGE UP (ref 0–0.5)
EOSINOPHIL NFR BLD AUTO: 0.9 % — SIGNIFICANT CHANGE UP (ref 0–6)
GAS PNL BLDV: 136 MMOL/L — SIGNIFICANT CHANGE UP (ref 136–145)
GLUCOSE BLDV-MCNC: 101 MG/DL — HIGH (ref 70–99)
GLUCOSE SERPL-MCNC: 106 MG/DL — HIGH (ref 70–99)
HCO3 BLDV-SCNC: 26 MMOL/L — SIGNIFICANT CHANGE UP (ref 22–29)
HCT VFR BLD CALC: 24.6 % — LOW (ref 39–50)
HCT VFR BLDA CALC: 23 % — LOW (ref 39–51)
HGB BLD CALC-MCNC: 7.7 G/DL — LOW (ref 12.6–17.4)
HGB BLD-MCNC: 7.4 G/DL — LOW (ref 13–17)
IANC: 5.05 K/UL — SIGNIFICANT CHANGE UP (ref 1.8–7.4)
IMM GRANULOCYTES NFR BLD AUTO: 0.3 % — SIGNIFICANT CHANGE UP (ref 0–0.9)
INR BLD: 1.63 RATIO — HIGH (ref 0.85–1.18)
LACTATE BLDV-MCNC: 1.2 MMOL/L — SIGNIFICANT CHANGE UP (ref 0.5–2)
LYMPHOCYTES # BLD AUTO: 1.23 K/UL — SIGNIFICANT CHANGE UP (ref 1–3.3)
LYMPHOCYTES # BLD AUTO: 17.5 % — SIGNIFICANT CHANGE UP (ref 13–44)
MAGNESIUM SERPL-MCNC: 2 MG/DL — SIGNIFICANT CHANGE UP (ref 1.6–2.6)
MCHC RBC-ENTMCNC: 25.3 PG — LOW (ref 27–34)
MCHC RBC-ENTMCNC: 30.1 GM/DL — LOW (ref 32–36)
MCV RBC AUTO: 84.2 FL — SIGNIFICANT CHANGE UP (ref 80–100)
MONOCYTES # BLD AUTO: 0.61 K/UL — SIGNIFICANT CHANGE UP (ref 0–0.9)
MONOCYTES NFR BLD AUTO: 8.7 % — SIGNIFICANT CHANGE UP (ref 2–14)
NEUTROPHILS # BLD AUTO: 5.05 K/UL — SIGNIFICANT CHANGE UP (ref 1.8–7.4)
NEUTROPHILS NFR BLD AUTO: 71.9 % — SIGNIFICANT CHANGE UP (ref 43–77)
NRBC # BLD: 0 /100 WBCS — SIGNIFICANT CHANGE UP (ref 0–0)
NRBC # FLD: 0 K/UL — SIGNIFICANT CHANGE UP (ref 0–0)
PCO2 BLDV: 40 MMHG — LOW (ref 42–55)
PH BLDV: 7.43 — SIGNIFICANT CHANGE UP (ref 7.32–7.43)
PHOSPHATE SERPL-MCNC: 3.9 MG/DL — SIGNIFICANT CHANGE UP (ref 2.5–4.5)
PLATELET # BLD AUTO: 243 K/UL — SIGNIFICANT CHANGE UP (ref 150–400)
PO2 BLDV: 45 MMHG — SIGNIFICANT CHANGE UP (ref 25–45)
POTASSIUM BLDV-SCNC: 3.4 MMOL/L — LOW (ref 3.5–5.1)
POTASSIUM SERPL-MCNC: 3.5 MMOL/L — SIGNIFICANT CHANGE UP (ref 3.5–5.3)
POTASSIUM SERPL-SCNC: 3.5 MMOL/L — SIGNIFICANT CHANGE UP (ref 3.5–5.3)
PROT SERPL-MCNC: 5.4 G/DL — LOW (ref 6–8.3)
PROTHROM AB SERPL-ACNC: 18 SEC — HIGH (ref 9.5–13)
RBC # BLD: 2.92 M/UL — LOW (ref 4.2–5.8)
RBC # FLD: 20.6 % — HIGH (ref 10.3–14.5)
SAO2 % BLDV: 76.1 % — SIGNIFICANT CHANGE UP (ref 67–88)
SODIUM SERPL-SCNC: 138 MMOL/L — SIGNIFICANT CHANGE UP (ref 135–145)
SPECIMEN SOURCE: SIGNIFICANT CHANGE UP
WBC # BLD: 7.02 K/UL — SIGNIFICANT CHANGE UP (ref 3.8–10.5)
WBC # FLD AUTO: 7.02 K/UL — SIGNIFICANT CHANGE UP (ref 3.8–10.5)

## 2023-08-15 PROCEDURE — 99291 CRITICAL CARE FIRST HOUR: CPT

## 2023-08-15 PROCEDURE — 99232 SBSQ HOSP IP/OBS MODERATE 35: CPT

## 2023-08-15 PROCEDURE — 73120 X-RAY EXAM OF HAND: CPT | Mod: 26,RT

## 2023-08-15 RX ORDER — MIDODRINE HYDROCHLORIDE 2.5 MG/1
10 TABLET ORAL THREE TIMES A DAY
Refills: 0 | Status: DISCONTINUED | OUTPATIENT
Start: 2023-08-15 | End: 2023-08-16

## 2023-08-15 RX ORDER — POTASSIUM CHLORIDE 20 MEQ
40 PACKET (EA) ORAL ONCE
Refills: 0 | Status: COMPLETED | OUTPATIENT
Start: 2023-08-15 | End: 2023-08-15

## 2023-08-15 RX ORDER — FUROSEMIDE 40 MG
80 TABLET ORAL ONCE
Refills: 0 | Status: COMPLETED | OUTPATIENT
Start: 2023-08-15 | End: 2023-08-15

## 2023-08-15 RX ADMIN — MIRTAZAPINE 7.5 MILLIGRAM(S): 45 TABLET, ORALLY DISINTEGRATING ORAL at 22:32

## 2023-08-15 RX ADMIN — CARBIDOPA AND LEVODOPA 1.5 TABLET(S): 25; 100 TABLET ORAL at 11:58

## 2023-08-15 RX ADMIN — FLUDROCORTISONE ACETATE 0.1 MILLIGRAM(S): 0.1 TABLET ORAL at 05:26

## 2023-08-15 RX ADMIN — APIXABAN 2.5 MILLIGRAM(S): 2.5 TABLET, FILM COATED ORAL at 17:45

## 2023-08-15 RX ADMIN — MIDODRINE HYDROCHLORIDE 20 MILLIGRAM(S): 2.5 TABLET ORAL at 05:26

## 2023-08-15 RX ADMIN — CARBIDOPA AND LEVODOPA 1.5 TABLET(S): 25; 100 TABLET ORAL at 05:53

## 2023-08-15 RX ADMIN — Medication 3 MILLIGRAM(S): at 22:33

## 2023-08-15 RX ADMIN — Medication 40 MILLIEQUIVALENT(S): at 05:25

## 2023-08-15 RX ADMIN — CHLORHEXIDINE GLUCONATE 1 APPLICATION(S): 213 SOLUTION TOPICAL at 12:02

## 2023-08-15 RX ADMIN — Medication 650 MILLIGRAM(S): at 20:17

## 2023-08-15 RX ADMIN — Medication 3 MILLIGRAM(S): at 21:45

## 2023-08-15 RX ADMIN — MIDODRINE HYDROCHLORIDE 10 MILLIGRAM(S): 2.5 TABLET ORAL at 17:46

## 2023-08-15 RX ADMIN — Medication 80 MILLIGRAM(S): at 08:59

## 2023-08-15 RX ADMIN — CARBIDOPA AND LEVODOPA 1.5 TABLET(S): 25; 100 TABLET ORAL at 17:45

## 2023-08-15 RX ADMIN — CARBIDOPA AND LEVODOPA 1.5 TABLET(S): 25; 100 TABLET ORAL at 23:43

## 2023-08-15 RX ADMIN — Medication 650 MILLIGRAM(S): at 21:00

## 2023-08-15 RX ADMIN — OLANZAPINE 2.5 MILLIGRAM(S): 15 TABLET, FILM COATED ORAL at 22:32

## 2023-08-15 RX ADMIN — APIXABAN 2.5 MILLIGRAM(S): 2.5 TABLET, FILM COATED ORAL at 05:26

## 2023-08-15 NOTE — OCCUPATIONAL THERAPY INITIAL EVALUATION ADULT - ADDITIONAL COMMENTS
Prior to hospitalization, pt.'s wife bedside reports pt. benefited from assistance with all ADL's except independent after set-up with feeding tasks. Pt.'s wife explains pt transferred hospital bed <>wheelchair with 1-2 person assistance as needed, +ambulated short distances with rolling walker.

## 2023-08-15 NOTE — PROGRESS NOTE ADULT - PROBLEM SELECTOR PLAN 1
Pt. w/ LAAYNA likely hemodynamic mediated and medication SE (TMP/SMX and Entresto). On review of sunrise/HIE patient likely has CKD stage 3A with baseline Scr 1.3-1.6. Last outpt labs reviewed on HIE showing Scr 1.64 on 4/20/23. On admission Scr 4.54, K 6.4, and SCO2 14. HD consent done and placed in chart. Kidney US with no hydronephrosis 8/11. Pt received IHD 8/9, 8/10, 8/11. Pt. remains on low dose vasopressors, but off BIPAP. Labs reviewed, Scr 3.21 (stable) today and 24hr UOP 1.6L. Discontinue lasix today. No further plan for HD, can removed HD catheter. Continue to hold Entresto and TMP/SMX. Monitor labs, VS and urine output. Avoid nephrotoxins. Dose medications as per eGFR.    Final recommendations pending attending signature.    If you have any questions, please feel free to contact me  Torres Olivarez  Nephrology Fellow  [facility]/Page [phone]  (After 5pm or on weekends please page the on-call fellow)

## 2023-08-15 NOTE — OCCUPATIONAL THERAPY INITIAL EVALUATION ADULT - MD ORDER
Occupational Therapy (OT) to evaluate and treat. Occupational Therapy (OT) to evaluate and treat. Increase as Tolerated. Per GARTH Maurer, pt is okay to participate in OT evaluation and perform activity as tolerated.

## 2023-08-15 NOTE — OCCUPATIONAL THERAPY INITIAL EVALUATION ADULT - LEVEL OF INDEPENDENCE: SUPINE/SIT, REHAB EVAL
Not appropraite to assess secondary to pt with decreased ability to follow commands. Will continue to progress with pt as tolerated.

## 2023-08-15 NOTE — OCCUPATIONAL THERAPY INITIAL EVALUATION ADULT - DIAGNOSIS, OT EVAL
ALAYNA; Hyperkalemia; Opacities throughout the right lung, suspicious for pulmonary malignancy: AAO x1; Decreased ability to follow commands; Decreased functional mobility; Decreased ADL management

## 2023-08-15 NOTE — OCCUPATIONAL THERAPY INITIAL EVALUATION ADULT - LIVES WITH, PROFILE
Pt.'s wife bedside reports pt. lives with herself in a house with ramp available to enter. Once inside, pt.'s wife reports bedroom and bathroom are located on the main level.

## 2023-08-15 NOTE — PROGRESS NOTE ADULT - SUBJECTIVE AND OBJECTIVE BOX
[facility] Division of Kidney Diseases & Hypertension  FOLLOW UP NOTE  [phone]--------------------------------------------------------------------------------  Chief Complaint:Other abnormality of breathing    81yo Male w/ PMH HTN, Atrial fibrillation on Eliquis, bradycardia s/p ppm, CHF, pleural effusions s/p R pleurX, Parkinson's presenting with shortness of breath. Nephrology consulted for hyperkalemia in the setting of ALAYNA.     24 hour events/subjective: Patient seen and examined at bedside. Awake and responding to questions. Denies fevers, chills, nausea, vomiting, abdominal pain, SOB, CP.     PAST HISTORY  --------------------------------------------------------------------------------  No significant changes to PMH, PSH, FHx, SHx, unless otherwise noted    ALLERGIES & MEDICATIONS  --------------------------------------------------------------------------------  Allergies  No Known Allergies  Intolerances    Standing Inpatient Medications  apixaban 2.5 milliGRAM(s) Oral two times a day  carbidopa/levodopa  25/100 1.5 Tablet(s) Oral four times a day  chlorhexidine 2% Cloths 1 Application(s) Topical daily  dextrose 5%. 1000 milliLiter(s) IV Continuous <Continuous>  dextrose 5%. 1000 milliLiter(s) IV Continuous <Continuous>  dextrose 50% Injectable 25 Gram(s) IV Push once  dextrose 50% Injectable 12.5 Gram(s) IV Push once  dextrose 50% Injectable 25 Gram(s) IV Push once  fludroCORTISONE 0.1 milliGRAM(s) Oral daily  glucagon  Injectable 1 milliGRAM(s) IntraMuscular once  melatonin 3 milliGRAM(s) Oral at bedtime  midodrine. 10 milliGRAM(s) Oral three times a day  mirtazapine 7.5 milliGRAM(s) Oral at bedtime  OLANZapine 2.5 milliGRAM(s) Oral at bedtime  PRN Inpatient Medications  acetaminophen     Tablet .. 650 milliGRAM(s) Oral every 6 hours PRN  aluminum hydroxide/magnesium hydroxide/simethicone Suspension 30 milliLiter(s) Oral every 4 hours PRN  dextrose Oral Gel 15 Gram(s) Oral once PRN  melatonin 3 milliGRAM(s) Oral at bedtime PRN  ondansetron Injectable 4 milliGRAM(s) IV Push every 8 hours PRN  sodium chloride 0.9% Bolus. 100 milliLiter(s) IV Bolus every 5 minutes PRN    REVIEW OF SYSTEMS  --------------------------------------------------------------------------------  as above     VITALS/PHYSICAL EXAM  --------------------------------------------------------------------------------  T(C): 37.2 (08-15-23 @ 04:00), Max: 37.2 (08-15-23 @ 04:00)  HR: 60 (08-15-23 @ 08:00) (60 - 62)  BP: 142/47 (08-15-23 @ 08:00) (93/38 - 152/47)  RR: 11 (08-15-23 @ 08:00) (10 - 18)  SpO2: 100% (08-15-23 @ 08:00) (95% - 100%)  Wt(kg): --    08-14-23 @ 07:01  -  08-15-23 @ 07:00  --------------------------------------------------------  IN: 6.6 mL / OUT: 2100 mL / NET: -2093.4 mL    08-15-23 @ 07:01  -  08-15-23 @ 10:22  --------------------------------------------------------  IN: 0 mL / OUT: 75 mL / NET: -75 mL    Physical Exam:  Gen: NAD, well-appearing  HEENT: anicteric  Pulm: CTA B/L  CV: RRR, S1S2;  Abd: +BS, soft, NTND  : No suprapubic tenderness, +means  Extremities: trace LE edema noted.   Neuro: Awake restring tremor noted  Vascular access: RIJ non tunneled HD     LABS/STUDIES  --------------------------------------------------------------------------------              7.4    7.02  >-----------<  243      [08-15-23 @ 00:34]              24.6     138  |  103  |  34  ----------------------------<  106      [08-15-23 @ 00:34]  3.5   |  24  |  3.21        Ca     8.6     [08-15-23 @ 00:34]      Mg     2.00     [08-15-23 @ 00:34]      Phos  3.9     [08-15-23 @ 00:34]    TPro  5.4  /  Alb  2.9  /  TBili  0.3  /  DBili  x   /  AST  16  /  ALT  8   /  AlkPhos  45  [08-15-23 @ 00:34]    PT/INR: PT 18.0 , INR 1.63       [08-15-23 @ 00:34]  PTT: 36.5       [08-15-23 @ 00:34]    Creatinine Trend:  SCr 3.21 [08-15 @ 00:34]  SCr 3.28 [08-14 @ 14:15]  SCr 3.30 [08-14 @ 00:50]  SCr 2.82 [08-13 @ 00:25]  SCr 2.66 [08-12 @ 00:40]    Urine Creatinine 90      [08-09-23 @ 14:24]  Urine Protein 164      [08-09-23 @ 14:24]  Urine Sodium 22      [08-09-23 @ 14:24]  Urine Urea Nitrogen 329.0      [08-09-23 @ 14:24]  Urine Potassium 85.2      [08-09-23 @ 14:24]  Urine Osmolality 369      [08-09-23 @ 14:24]    TSH 1.00      [08-13-23 @ 00:25]    HBsAb <3.0      [08-09-23 @ 13:53]   [facility] Division of Kidney Diseases & Hypertension  FOLLOW UP NOTE  [phone]--------------------------------------------------------------------------------    Chief Complaint: Other abnormality of breathing    83yo Male w/ PMH HTN, Atrial fibrillation on Eliquis, bradycardia s/p ppm, CHF, pleural effusions s/p R pleurX, Parkinson's presenting with shortness of breath. Nephrology consulted for hyperkalemia in the setting of ALAYNA.     24 hour events/subjective: Patient seen and examined at bedside. Awake and responding to questions. Denies fevers, chills, nausea, vomiting, abdominal pain, SOB, CP.     PAST HISTORY  --------------------------------------------------------------------------------  No significant changes to PMH, PSH, FHx, SHx, unless otherwise noted    ALLERGIES & MEDICATIONS  --------------------------------------------------------------------------------  Allergies  No Known Allergies  Intolerances    Standing Inpatient Medications  apixaban 2.5 milliGRAM(s) Oral two times a day  carbidopa/levodopa  25/100 1.5 Tablet(s) Oral four times a day  chlorhexidine 2% Cloths 1 Application(s) Topical daily  dextrose 5%. 1000 milliLiter(s) IV Continuous <Continuous>  dextrose 5%. 1000 milliLiter(s) IV Continuous <Continuous>  dextrose 50% Injectable 25 Gram(s) IV Push once  dextrose 50% Injectable 12.5 Gram(s) IV Push once  dextrose 50% Injectable 25 Gram(s) IV Push once  fludroCORTISONE 0.1 milliGRAM(s) Oral daily  glucagon  Injectable 1 milliGRAM(s) IntraMuscular once  melatonin 3 milliGRAM(s) Oral at bedtime  midodrine. 10 milliGRAM(s) Oral three times a day  mirtazapine 7.5 milliGRAM(s) Oral at bedtime  OLANZapine 2.5 milliGRAM(s) Oral at bedtime  PRN Inpatient Medications  acetaminophen     Tablet .. 650 milliGRAM(s) Oral every 6 hours PRN  aluminum hydroxide/magnesium hydroxide/simethicone Suspension 30 milliLiter(s) Oral every 4 hours PRN  dextrose Oral Gel 15 Gram(s) Oral once PRN  melatonin 3 milliGRAM(s) Oral at bedtime PRN  ondansetron Injectable 4 milliGRAM(s) IV Push every 8 hours PRN  sodium chloride 0.9% Bolus. 100 milliLiter(s) IV Bolus every 5 minutes PRN    REVIEW OF SYSTEMS  --------------------------------------------------------------------------------  as above     VITALS/PHYSICAL EXAM  --------------------------------------------------------------------------------  T(C): 37.2 (08-15-23 @ 04:00), Max: 37.2 (08-15-23 @ 04:00)  HR: 60 (08-15-23 @ 08:00) (60 - 62)  BP: 142/47 (08-15-23 @ 08:00) (93/38 - 152/47)  RR: 11 (08-15-23 @ 08:00) (10 - 18)  SpO2: 100% (08-15-23 @ 08:00) (95% - 100%)  Wt(kg): --    08-14-23 @ 07:01  -  08-15-23 @ 07:00  --------------------------------------------------------  IN: 6.6 mL / OUT: 2100 mL / NET: -2093.4 mL    08-15-23 @ 07:01  -  08-15-23 @ 10:22  --------------------------------------------------------  IN: 0 mL / OUT: 75 mL / NET: -75 mL    Physical Exam:  Gen: NAD, well-appearing  HEENT: anicteric  Pulm: CTA B/L  CV: RRR, S1S2;  Abd: +BS, soft, NTND  : No suprapubic tenderness, +means  Extremities: trace LE edema noted.   Neuro: Awake restring tremor noted  Vascular access: RIJ non tunneled HD     LABS/STUDIES  --------------------------------------------------------------------------------              7.4    7.02  >-----------<  243      [08-15-23 @ 00:34]              24.6     138  |  103  |  34  ----------------------------<  106      [08-15-23 @ 00:34]  3.5   |  24  |  3.21        Ca     8.6     [08-15-23 @ 00:34]      Mg     2.00     [08-15-23 @ 00:34]      Phos  3.9     [08-15-23 @ 00:34]    TPro  5.4  /  Alb  2.9  /  TBili  0.3  /  DBili  x   /  AST  16  /  ALT  8   /  AlkPhos  45  [08-15-23 @ 00:34]    PT/INR: PT 18.0 , INR 1.63       [08-15-23 @ 00:34]  PTT: 36.5       [08-15-23 @ 00:34]    Creatinine Trend:  SCr 3.21 [08-15 @ 00:34]  SCr 3.28 [08-14 @ 14:15]  SCr 3.30 [08-14 @ 00:50]  SCr 2.82 [08-13 @ 00:25]  SCr 2.66 [08-12 @ 00:40]    Urine Creatinine 90      [08-09-23 @ 14:24]  Urine Protein 164      [08-09-23 @ 14:24]  Urine Sodium 22      [08-09-23 @ 14:24]  Urine Urea Nitrogen 329.0      [08-09-23 @ 14:24]  Urine Potassium 85.2      [08-09-23 @ 14:24]  Urine Osmolality 369      [08-09-23 @ 14:24]    TSH 1.00      [08-13-23 @ 00:25]    HBsAb <3.0      [08-09-23 @ 13:53]

## 2023-08-15 NOTE — OCCUPATIONAL THERAPY INITIAL EVALUATION ADULT - PERTINENT HX OF CURRENT PROBLEM, REHAB EVAL
Pt is an 82 year old male with hx of HTN, AFib, Bradycardia s/p ppm, CHF, pleural effusions s/p Right pleurX, & Parkinson's, who presented to [facility] on 8/9/23 with SOB. Pt found to have ALAYNA on CKD c/b fluid overload and hyperkalemic. Admitted to MICU for urgent HD in setting of hypotension. CT Chest No Contrast on 8/9/23 displayed a new 2.3 cm right middle lobe nodule with additional diffuse multifocal branching nodular opacities throughout the right lung, suspicious for pulmonary malignancy. Moderate left pleural effusion with complete left lower lobe compressive atelectasis. Small right pleural effusion with adjacent atelectasis and Pleurx catheter present. Pt is an 82 year old male with hx of HTN, AFib, Bradycardia s/p ppm, CHF, pleural effusions s/p Right pleurX, & Parkinson's, who presented to [facility] on 8/9/23 with SOB. Pt found to have ALAYNA on CKD complicated by fluid overload and hyperkalemic. Admitted to MICU for urgent HD in setting of hypotension. CT Chest No Contrast on 8/9/23 displayed a new 2.3 cm right middle lobe nodule with additional diffuse multifocal branching nodular opacities throughout the right lung, suspicious for pulmonary malignancy. Moderate left pleural effusion with complete left lower lobe compressive atelectasis. Small right pleural effusion with adjacent atelectasis and Pleurx catheter present.

## 2023-08-15 NOTE — OCCUPATIONAL THERAPY INITIAL EVALUATION ADULT - RANGE OF MOTION EXAMINATION, UPPER EXTREMITY
except Right Wrist Flexion/Extension not assessed secondary to pt pending Xray secondary to swelling. RN aware./bilateral UE Active Assistive ROM was WFL  (within functional limits)

## 2023-08-15 NOTE — OCCUPATIONAL THERAPY INITIAL EVALUATION ADULT - GENERAL OBSERVATIONS, REHAB EVAL
Pt. received semisupine in bed. No acute distress. Patient agreed to evaluation from Occupational Therapist. BP assessed: 139/52 mmHg, HR 60 bpm, O2 95%. +IV, +Bilateral Heel protectors. Wife and private HHA bedside.

## 2023-08-15 NOTE — PROGRESS NOTE ADULT - ASSESSMENT
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 Admitted to MICU for urgent HD i/so hypotension.        Plan:  ====================NEUROLOGY=======================  #Dementia, early, no acute issues   Home: Zyprexa, and melatonin at bedtime  AOx1 at baseline  - Following commands and responding to questions, at baseline MS  -- as per   - c/w Zyprexa 2.5mg and melatonin 3mg at bedtime.   -- restarted Mirtazapine 7.5 qhs, pt takes medication at home for MDD     #Parkinson's disease   - c/w Carbidopa-Levodopa    -- PT and OT working with pt __ Anticipate home with no skilled PT needs, patient is at his baseline.    ====================RESPIRATORY======================  #Pleural effusions    Likely in the setting of CHF vs renal failure vs less likely malignancy (negative cytology outpt)  PleurX catheter placed 3/2022, drainage 3x a week as per spouse, last  time drained on 8/12 with 500 cc drained    6/9 -- CT chest showed new 2.3 cm right middle lobe nodule, possible malignancy? and NEW moderate left pleural effusion with complete left lower lobe compressive atelectasis, repeat CXR today to follow up on PLEFFs  -- pulmonary outpt follow up for pulmonary nodule   Respiratory acidosis on admission (VBG 7.13/46), resolved   - s/p urgent HD for fluid overload  - s/p BIPAP,  tolerating RA, no active issues       ====================CARDIOVASCULAR==================  #Bradycardia s/p PPM   #CHFpEF   #Afib   #Hypotension   Home Meds: Eliquis, Entresto Olmasartan, Plavix?, Rosuvastatin   SPECT scan with amyloidosis outpatient, may be contributing to CHFpEF   TTE 1/2023 EF 73%, diastolic dysfunction and mod pHTN   EKG showing paced ventricular rhythm, no T wave abnormalities, given Ca gluconate x2 in ED   - TTE 8/10: Minimal MR, Moderate AR. Moderate concentric LV hypertrophy, grossly normal LVSfx  Normal RV size w/ reduced function, mild pulmonary hypertension. Left pleural effusion.  - off levophed today since 1 hrs ago, SBP goal > 95, as per pt's spouse, pt;s SBP-- 's at home on a good day,    - increased Midodrine to 20 mg TID and continue Florinef 0.1mg daily to assist with pressor wean  - POCUS 8/12 showed preserved EF and underfilled LV, given albumin x 2  -- s/p Lasix 80 mg IVP x 1 on 8/12/ 8/13, and today, cont strict intake and output, __ see in renal       ====================/RENAL========================    #ALAYNA on CKD  Admit Scr 4.54>2.82  - Baseline Cr 1.2-1.4, likely has CKD stage 3A with baseline Scr 1.3-1.6  - US kidney/bladder- Increased bilateral renal echogenicity, suggestive of medical renal disease. No hydronephrosis.  - Nephrology consulted- - per renal ALAYNA likely hemodynamic mediated and medication SE (TMP/SMX and Entresto)  - FeNa 0.9%   - s/p RIJ shiley placement (8/9)  #Metabolic acidosis with AG, concomitant resp acidosis   -S/p NaHCO3, Ca gluconate and insulin  - s/p session of HD 8/9 w/ 1.5L fluid removal, 1L fluid removal 8/10, and HD on 8/11.   - indwelling catheter in place, UOP is neg 2 L for LOS and neg 1.45 L for 24 hrs, makes 50 to 25 ml/hr  - s/p will give Lasix 80mg IVP x1 on 8/12, additional 80 mg of Lasix IVP twice a day today, continue monitoring  - strict I/Os     ====================GI/NUTRITION=====================  Diet: renal regular, changed to DASH diet in s/o normal lytes and improving renal fx   -- tolerating well   -- + normal BM overnight   - cont bowel regimen    ====================INFECTIOUS DISEASE================  No fever, leukocytosis may be stress related vs infection, resolved. CT chest with GGO  - Previous pleural fluid culture negative   - s/p recently treated outpt w/ bactrim for UTI  - s/p Azithro (8/9)  and CTX (8/9) in ED  - s/p tx empirically with zosyn (8/09- 8/13 )  - UA -- small leuks, WBC 19, urine and blood culture - NGTD, LA 0.9   - MRSA PCR neg    ====================ENDOCRINE=======================  A1C 5.4%  home med: farxiga  - BG 140s   -- TSH 1.0    ====================HEMATOLOGIC/DVT PPx=============  On home ELIQUIS 5 BID, last taken 8/8 at 6pm - held for Shiley placement, and restarted 8/10  - given Kcentra on 8/9 for Eliquis reversal prior to shiley  - H/H stable in 7s, Hgb 6.6 on 8/12, repeat 7.4>8.0 > 7.8   -- no active bleeding     ====================ETHICS===========================  GOC: patient with prior MOLST DNR/DNI. 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 Admitted to MICU for urgent HD i/so hypotension.        Plan:  ====================NEUROLOGY=======================  #Dementia, early, no acute issues   Home: Zyprexa, and melatonin at bedtime  AOx1 at baseline  - Following commands and responding to questions, at baseline MS  -- c/w Zyprexa 2.5mg and melatonin 3mg at bedtime.   -- restarted Mirtazapine 7.5 qhs, pt takes medication at home for MDD, today pt is happier than yesterday     #Parkinson's disease   - c/w Carbidopa-Levodopa    -- PT and OT working with pt __ Anticipate home with no skilled PT needs, patient is at his baseline.    ====================RESPIRATORY======================  #Pleural effusions    Likely in the setting of CHF vs renal failure vs less likely malignancy (negative cytology outpt)  PleurX catheter placed 3/2022, drainage 3x a week as per spouse, last  time drained on 8/12 with 500 cc drained    6/9 -- CT chest showed new 2.3 cm right middle lobe nodule, possible malignancy? and NEW moderate left pleural effusion with complete left lower lobe compressive atelectasis, repeat CXR today to follow up on PLEFFs  -- pulmonary outpt follow up for pulmonary nodule, pt has h/o L PLEFF,   Respiratory acidosis on admission (VBG 7.13/46), resolved   - s/p urgent HD for fluid overload   - s/p BIPAP,  tolerating RA, no active issues       ====================CARDIOVASCULAR==================  #Bradycardia s/p PPM   #CHFpEF   #Afib   #Hypotension   Home Meds: Eliquis, Entresto Olmesartan, Plavix?, Rosuvastatin   SPECT scan with amyloidosis outpatient, may be contributing to CHFpEF   TTE 1/2023 EF 73%, diastolic dysfunction and mod pHTN   EKG showing paced ventricular rhythm, no T wave abnormalities, given Ca gluconate x2 in ED   - TTE 8/10: Minimal MR, Moderate AR. Moderate concentric LV hypertrophy, grossly normal LVSfx  Normal RV size w/ reduced function, mild pulmonary hypertension. Left pleural effusion.  - off levophed today since 11:30 am yesterday, SBP goal > 95, as per pt's spouse, pt's SBP-- 's at home on a good day,    - SBP in 120-140's today __ decreased Midodrine to 10 mg TID and continue Florinef 0.1mg daily to assist with pressor wean and possible autonomic abnl in pt with Parkinson   - POCUS 8/12 showed preserved EF and underfilled LV, given albumin x 2  -- s/p Lasix 80 mg IVP x 1 on 8/12, 8/13, 8/14 and Lasix 80 mg IVP x 1 today, cont strict intake and output, __ see in renal       ====================/RENAL========================    #ALAYNA on CKD  Admit Scr 4.54>2.82  - Baseline Cr 1.2-1.4, likely has CKD stage 3A with baseline Scr 1.3-1.6  - US kidney/bladder- Increased bilateral renal echogenicity, suggestive of medical renal disease. No hydronephrosis.  - Nephrology consulted- - per renal ALAYNA likely hemodynamic mediated and medication SE's (TMP/SMX and Entresto)  - FeNa 0.9%   - s/p RIJ shiley placement (8/9), to be removed today,   #Metabolic acidosis with AG, concomitant resp acidosis, resolved   -S/p NaHCO3, Ca gluconate and insulin  - s/p session of HD 8/9 w/ 1.5L fluid removal, 1L fluid removal 8/10, and HD on 8/11.   - indwelling catheter in place, to be removed today, do TOV,   -- UOP is neg 5.7 L for LOS and neg 2 L for 24 hrs, making 30 to 50 ml/hr, overnight UOP decreased, pt with 25-50 cc voiding per hour,    -- s/p Lasix 80 mg IVP x 1 on 8/12, 8/13, 8/14 and Lasix 80 mg IVP x 1 today, continue monitoring  - strict I/Os     ====================GI/NUTRITION=====================  Diet: renal regular, changed to DASH diet in s/o normal lytes and improving renal fx   -- tolerating well   -- normal BM every day   - cont bowel regimen    ====================INFECTIOUS DISEASE================  No fever, leukocytosis may be stress related vs infection, resolved. CT chest with GGO  - Previous pleural fluid culture negative   - s/p recently treated outpt w/ bactrim for UTI  - s/p Azithro (8/9)  and CTX (8/9) in ED  - s/p tx empirically with zosyn (8/09- 8/13 )  - UA -- small leuks, WBC 19, urine and blood culture - NGTD, LA 0.9   - MRSA PCR neg    ====================ENDOCRINE=======================  A1C 5.4%  home med: farxiga  - BG 140s   -- TSH 1.0    ====================HEMATOLOGIC/DVT PPx=============  On home ELIQUIS 5 BID, last taken 8/8 at 6pm - held for Shiley placement, and restarted 8/10  - given Kcentra on 8/9 for Eliquis reversal prior to shiley  - H/H stable in 7s, Hgb 6.6 on 8/12, repeat 7.4>8.0 > 7.8   -- no active bleeding     ====================ETHICS===========================  GOC: patient with prior MOLST DNR/DNI. As per discussion with pt, wife and daughter, and pt's and family's wishes, pt is DNR/DNI     Dispo: Medicine    LINES: PIV's, shiley will be removed

## 2023-08-15 NOTE — PROGRESS NOTE ADULT - CRITICAL CARE ATTENDING COMMENT
Patient is an 81 yo M w/ cardiac amyloid, HFpEF, chronic pleural effusions (w/ R PleurX), HTN, Afib (Eliquis), bradycardia s/p ppm, Parkinson's who was admitted on 8/9 after p/w SOB. Admitted for shock and ALAYNA requiring HD.    #ALAYNA - Likely 2/2 hypotension and recent outpatient Bactrim use as well as chronic Entresto use. Last HD session 8/11. Currently making good UOP with Lasix, creatinine plateaued. Less volume overloaded on exam today.  #Metabolic acidosis  #Hyperkalemia  - Will hold diuretics for now  - Strict I/Os  - Monitor BMP and creatinine  - D/C shiley  - f/u renal recs  - Avoid nephrotoxic agents  - TOV    #Shock - Septic vs cardiogenic/ADHF  - c/w PO vasopressors to maintain SBP > 95, which is usual BP as per wife and aide at bedside. Wean as tolerated  - Completed empiric course of Zosyn    #Pleural effusion  - c/w PleurX drainage    #R wrist pain  - Xrays    Rufus Childs MD  Pulmonary & Critical Care

## 2023-08-15 NOTE — PROGRESS NOTE ADULT - SUBJECTIVE AND OBJECTIVE BOX
Significant recent/past 24 hr events:    Subjective:    Review of Systems         [ ] A ten-point review of systems was otherwise negative except as noted.  [ ] Due to altered mental status/intubation, subjective information were not able to be obtained from the patient. History was obtained, to the extent possible, from review of the chart and collateral sources of information.      Patient is a 82y old  Male who presents with a chief complaint of ALAYNA, Hyperkalemia (14 Aug 2023 09:25)    HPI:  81 yo M with HTN, A fib on Eliquis, bradycardia s/p ppm, CHF, pleural effusions s/p R pleurX, Parkinson's presenting with shortness of breath x 1 day.  Pt poor historian due to dementia. Wife at bedside providing history. Overnight pt had been complaining of shortness of breath, coughing. Denies any chest pain, palpitations, no fevers or chills. No recent illness, No GI or urinary symptoms.    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      Parkinsons disease      CAD (coronary artery disease)  s/p PCI 08/21      Pleural effusion, not elsewhere classified      COVID-19 vaccine series completed  w booster      History of hip replacement, total  R hip 2008 & L hip      Status post cataract extraction  right eye cataract extraction with IOL 6/26/2015      Presence of cardiac pacemaker  10/2021      H/O coronary angioplasty  8/2021 1 stent inserted        FAMILY HISTORY:  Family history of lung cancer (Mother)    Family history of esophageal cancer (Father)    FH: myocardial infarction (Grandparent)        Vitals   ICU Vital Signs Last 24 Hrs  T(C): 37.2 (15 Aug 2023 04:00), Max: 37.2 (15 Aug 2023 04:00)  T(F): 98.9 (15 Aug 2023 04:00), Max: 98.9 (15 Aug 2023 04:00)  HR: 60 (15 Aug 2023 07:00) (60 - 63)  BP: 141/45 (15 Aug 2023 07:00) (93/38 - 152/47)  BP(mean): 67 (15 Aug 2023 07:00) (45 - 71)  ABP: --  ABP(mean): --  RR: 12 (15 Aug 2023 07:00) (10 - 18)  SpO2: 100% (15 Aug 2023 07:00) (95% - 100%)    O2 Parameters below as of 15 Aug 2023 07:00  Patient On (Oxygen Delivery Method): room air            Physical Exam:   Constitutional: NAD, well-groomed, well-developed  HEENT: PERRLA, EOMI, no drainage or redness  Neck: supple,  No JVD, Trachea midline  Back: Normal spine flexure, No CVA tenderness, No deformity or limitation of movement  Respiratory: Breath Sounds equal & clear bilaterally to auscultation, no accessory muscle use noted  Cardiovascular: Regular rate, regular rhythm, normal S1, S2; no murmurs or rub  Gastrointestinal: Soft, non-tender, non distended, no hepatosplenomegaly, normal bowel sounds  Extremities: GUILLORY x 4, no peripheral edema, no cyanosis, no clubbing   Vascular: Equal and normal pulses: 2+ peripheral pulses throughout  Neurological: A+O x 3; speech clear and intact; no sensory, motor  deficits, normal reflexes  Psychiatric: calm, normal mood, normal affect  Musculoskeletal: No joint swelling or deformity; no limitation of movement  Skin: warm, dry, well perfused, no rashes    VENT SETTINGS         I&O's Detail    14 Aug 2023 07:01  -  15 Aug 2023 07:00  --------------------------------------------------------  IN:    Norepinephrine: 6.6 mL  Total IN: 6.6 mL    OUT:    Drain (mL): 450 mL    Indwelling Catheter - Urethral (mL): 1650 mL  Total OUT: 2100 mL    Total NET: -2093.4 mL          LABS                        7.4    7.02  )-----------( 243      ( 15 Aug 2023 00:34 )             24.6     08-15    138  |  103  |  34<H>  ----------------------------<  106<H>  3.5   |  24  |  3.21<H>    Ca    8.6      15 Aug 2023 00:34  Phos  3.9     08-15  Mg     2.00     08-15    TPro  5.4<L>  /  Alb  2.9<L>  /  TBili  0.3  /  DBili  x   /  AST  16  /  ALT  8   /  AlkPhos  45  08-15    LIVER FUNCTIONS - ( 15 Aug 2023 00:34 )  Alb: 2.9 g/dL / Pro: 5.4 g/dL / ALK PHOS: 45 U/L / ALT: 8 U/L / AST: 16 U/L / GGT: x           PT/INR - ( 15 Aug 2023 00:34 )   PT: 18.0 sec;   INR: 1.63 ratio         PTT - ( 15 Aug 2023 00:34 )  PTT:36.5 sec        Urinalysis Basic - ( 15 Aug 2023 00:34 )    Color: x / Appearance: x / SG: x / pH: x  Gluc: 106 mg/dL / Ketone: x  / Bili: x / Urobili: x   Blood: x / Protein: x / Nitrite: x   Leuk Esterase: x / RBC: x / WBC x   Sq Epi: x / Non Sq Epi: x / Bacteria: x            MEDICATIONS  (STANDING):  apixaban 2.5 milliGRAM(s) Oral two times a day  carbidopa/levodopa  25/100 1.5 Tablet(s) Oral four times a day  chlorhexidine 2% Cloths 1 Application(s) Topical daily  dextrose 5%. 1000 milliLiter(s) (50 mL/Hr) IV Continuous <Continuous>  dextrose 5%. 1000 milliLiter(s) (100 mL/Hr) IV Continuous <Continuous>  dextrose 50% Injectable 25 Gram(s) IV Push once  dextrose 50% Injectable 12.5 Gram(s) IV Push once  dextrose 50% Injectable 25 Gram(s) IV Push once  fludroCORTISONE 0.1 milliGRAM(s) Oral daily  glucagon  Injectable 1 milliGRAM(s) IntraMuscular once  melatonin 3 milliGRAM(s) Oral at bedtime  midodrine. 20 milliGRAM(s) Oral three times a day  mirtazapine 7.5 milliGRAM(s) Oral at bedtime  OLANZapine 2.5 milliGRAM(s) Oral at bedtime    MEDICATIONS  (PRN):  acetaminophen     Tablet .. 650 milliGRAM(s) Oral every 6 hours PRN Temp greater or equal to 38C (100.4F), Mild Pain (1 - 3)  aluminum hydroxide/magnesium hydroxide/simethicone Suspension 30 milliLiter(s) Oral every 4 hours PRN Dyspepsia  dextrose Oral Gel 15 Gram(s) Oral once PRN Blood Glucose LESS THAN 70 milliGRAM(s)/deciliter  melatonin 3 milliGRAM(s) Oral at bedtime PRN Insomnia  ondansetron Injectable 4 milliGRAM(s) IV Push every 8 hours PRN Nausea and/or Vomiting  sodium chloride 0.9% Bolus. 100 milliLiter(s) IV Bolus every 5 minutes PRN SBP LESS THAN or EQUAL to 90 mmHg      Allergies:  No Known Allergies        CRITICAL CARE TIME SPENT:  minutes of critical care time spent providing medical care for patient's acute illness/conditions that impairs at least one vital organ system and/or poses a high risk of imminent or life threatening deterioration in the patient's condition. It includes time spent evaluating and treating the patient's acute illness as well as time spent reviewing labs, radiology, discussing goals of care with patient and/or patient's family, and discussing the case with a multidisciplinary team, in an effort to prevent further life threatening deterioration or end organ damage. This time is independent of any procedures performed.         Significant recent/past 24 hr events: no significant events     Subjective: pt denies any complaints     Review of Systems         [ ] A ten-point review of systems was otherwise negative except as noted.  [ ] Due to altered mental status/intubation, subjective information were not able to be obtained from the patient. History was obtained, to the extent possible, from review of the chart and collateral sources of information.      Patient is a 82y old  Male who presents with a chief complaint of ALAYNA, Hyperkalemia (14 Aug 2023 09:25)    HPI:  81 yo M with HTN, A fib on Eliquis, bradycardia s/p ppm, CHF, pleural effusions s/p R pleurX, Parkinson's presenting with shortness of breath x 1 day.  Pt poor historian due to dementia. Wife at bedside providing history. Overnight pt had been complaining of shortness of breath, coughing. Denies any chest pain, palpitations, no fevers or chills. No recent illness, No GI or urinary symptoms.    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      Parkinsons disease      CAD (coronary artery disease)  s/p PCI 08/21      Pleural effusion, not elsewhere classified      COVID-19 vaccine series completed  w booster      History of hip replacement, total  R hip 2008 & L hip      Status post cataract extraction  right eye cataract extraction with IOL 6/26/2015      Presence of cardiac pacemaker  10/2021      H/O coronary angioplasty  8/2021 1 stent inserted        FAMILY HISTORY:  Family history of lung cancer (Mother)    Family history of esophageal cancer (Father)    FH: myocardial infarction (Grandparent)        Vitals   ICU Vital Signs Last 24 Hrs  T(C): 37.2 (15 Aug 2023 04:00), Max: 37.2 (15 Aug 2023 04:00)  T(F): 98.9 (15 Aug 2023 04:00), Max: 98.9 (15 Aug 2023 04:00)  HR: 60 (15 Aug 2023 07:00) (60 - 63)  BP: 141/45 (15 Aug 2023 07:00) (93/38 - 152/47)  BP(mean): 67 (15 Aug 2023 07:00) (45 - 71)  ABP: --  ABP(mean): --  RR: 12 (15 Aug 2023 07:00) (10 - 18)  SpO2: 100% (15 Aug 2023 07:00) (95% - 100%)    O2 Parameters below as of 15 Aug 2023 07:00  Patient On (Oxygen Delivery Method): room air            Physical Exam:   Constitutional: NAD, well-groomed, well-developed  HEENT: PERRLA, EOMI, no drainage or redness  Neck: supple,  No JVD, Trachea midline  Back: Normal spine flexure, No CVA tenderness, No deformity or limitation of movement  Respiratory: Breath Sounds equal & clear bilaterally to auscultation, no accessory muscle use noted  Cardiovascular: Regular rate, regular rhythm, normal S1, S2; no murmurs or rub  Gastrointestinal: Soft, non-tender, non distended, no hepatosplenomegaly, normal bowel sounds  Extremities: GUILLORY x 4, non pitting improved peripheral edema, no cyanosis, no clubbing   Vascular: Equal and normal pulses: 2+ peripheral pulses throughout  Neurological: A+O, speech clear and intact; no sensory, motor  deficits, normal reflexes  Psychiatric: calm, normal mood, normal affect  Musculoskeletal: No joint swelling or deformity; no limitation of movement  Skin: warm, dry, well perfused, no rashes    VENT SETTINGS         I&O's Detail    14 Aug 2023 07:01  -  15 Aug 2023 07:00  --------------------------------------------------------  IN:    Norepinephrine: 6.6 mL  Total IN: 6.6 mL    OUT:    Drain (mL): 450 mL    Indwelling Catheter - Urethral (mL): 1650 mL  Total OUT: 2100 mL    Total NET: -2093.4 mL          LABS                        7.4    7.02  )-----------( 243      ( 15 Aug 2023 00:34 )             24.6     08-15    138  |  103  |  34<H>  ----------------------------<  106<H>  3.5   |  24  |  3.21<H>    Ca    8.6      15 Aug 2023 00:34  Phos  3.9     08-15  Mg     2.00     08-15    TPro  5.4<L>  /  Alb  2.9<L>  /  TBili  0.3  /  DBili  x   /  AST  16  /  ALT  8   /  AlkPhos  45  08-15    LIVER FUNCTIONS - ( 15 Aug 2023 00:34 )  Alb: 2.9 g/dL / Pro: 5.4 g/dL / ALK PHOS: 45 U/L / ALT: 8 U/L / AST: 16 U/L / GGT: x           PT/INR - ( 15 Aug 2023 00:34 )   PT: 18.0 sec;   INR: 1.63 ratio         PTT - ( 15 Aug 2023 00:34 )  PTT:36.5 sec        Urinalysis Basic - ( 15 Aug 2023 00:34 )    Color: x / Appearance: x / SG: x / pH: x  Gluc: 106 mg/dL / Ketone: x  / Bili: x / Urobili: x   Blood: x / Protein: x / Nitrite: x   Leuk Esterase: x / RBC: x / WBC x   Sq Epi: x / Non Sq Epi: x / Bacteria: x            MEDICATIONS  (STANDING):  apixaban 2.5 milliGRAM(s) Oral two times a day  carbidopa/levodopa  25/100 1.5 Tablet(s) Oral four times a day  chlorhexidine 2% Cloths 1 Application(s) Topical daily  dextrose 5%. 1000 milliLiter(s) (50 mL/Hr) IV Continuous <Continuous>  dextrose 5%. 1000 milliLiter(s) (100 mL/Hr) IV Continuous <Continuous>  dextrose 50% Injectable 25 Gram(s) IV Push once  dextrose 50% Injectable 12.5 Gram(s) IV Push once  dextrose 50% Injectable 25 Gram(s) IV Push once  fludroCORTISONE 0.1 milliGRAM(s) Oral daily  glucagon  Injectable 1 milliGRAM(s) IntraMuscular once  melatonin 3 milliGRAM(s) Oral at bedtime  midodrine. 20 milliGRAM(s) Oral three times a day  mirtazapine 7.5 milliGRAM(s) Oral at bedtime  OLANZapine 2.5 milliGRAM(s) Oral at bedtime    MEDICATIONS  (PRN):  acetaminophen     Tablet .. 650 milliGRAM(s) Oral every 6 hours PRN Temp greater or equal to 38C (100.4F), Mild Pain (1 - 3)  aluminum hydroxide/magnesium hydroxide/simethicone Suspension 30 milliLiter(s) Oral every 4 hours PRN Dyspepsia  dextrose Oral Gel 15 Gram(s) Oral once PRN Blood Glucose LESS THAN 70 milliGRAM(s)/deciliter  melatonin 3 milliGRAM(s) Oral at bedtime PRN Insomnia  ondansetron Injectable 4 milliGRAM(s) IV Push every 8 hours PRN Nausea and/or Vomiting  sodium chloride 0.9% Bolus. 100 milliLiter(s) IV Bolus every 5 minutes PRN SBP LESS THAN or EQUAL to 90 mmHg      Allergies:  No Known Allergies

## 2023-08-16 LAB
ALBUMIN SERPL ELPH-MCNC: 3.7 G/DL — SIGNIFICANT CHANGE UP (ref 3.3–5)
ALP SERPL-CCNC: 53 U/L — SIGNIFICANT CHANGE UP (ref 40–120)
ALT FLD-CCNC: 7 U/L — SIGNIFICANT CHANGE UP (ref 4–41)
ANION GAP SERPL CALC-SCNC: 15 MMOL/L — HIGH (ref 7–14)
APTT BLD: 137.8 SEC — CRITICAL HIGH (ref 24.5–35.6)
APTT BLD: 40.3 SEC — HIGH (ref 24.5–35.6)
AST SERPL-CCNC: 17 U/L — SIGNIFICANT CHANGE UP (ref 4–40)
BASE EXCESS BLDV CALC-SCNC: 0 MMOL/L — SIGNIFICANT CHANGE UP (ref -2–3)
BASOPHILS # BLD AUTO: 0.04 K/UL — SIGNIFICANT CHANGE UP (ref 0–0.2)
BASOPHILS NFR BLD AUTO: 0.5 % — SIGNIFICANT CHANGE UP (ref 0–2)
BILIRUB SERPL-MCNC: 0.4 MG/DL — SIGNIFICANT CHANGE UP (ref 0.2–1.2)
BLOOD GAS VENOUS COMPREHENSIVE RESULT: SIGNIFICANT CHANGE UP
BUN SERPL-MCNC: 34 MG/DL — HIGH (ref 7–23)
CALCIUM SERPL-MCNC: 9.4 MG/DL — SIGNIFICANT CHANGE UP (ref 8.4–10.5)
CHLORIDE BLDV-SCNC: 103 MMOL/L — SIGNIFICANT CHANGE UP (ref 96–108)
CHLORIDE SERPL-SCNC: 102 MMOL/L — SIGNIFICANT CHANGE UP (ref 98–107)
CO2 BLDV-SCNC: 26 MMOL/L — SIGNIFICANT CHANGE UP (ref 22–26)
CO2 SERPL-SCNC: 21 MMOL/L — LOW (ref 22–31)
CREAT SERPL-MCNC: 3.24 MG/DL — HIGH (ref 0.5–1.3)
EGFR: 19 ML/MIN/1.73M2 — LOW
EOSINOPHIL # BLD AUTO: 0.07 K/UL — SIGNIFICANT CHANGE UP (ref 0–0.5)
EOSINOPHIL NFR BLD AUTO: 0.8 % — SIGNIFICANT CHANGE UP (ref 0–6)
GAS PNL BLDV: 139 MMOL/L — SIGNIFICANT CHANGE UP (ref 136–145)
GLUCOSE BLDV-MCNC: 92 MG/DL — SIGNIFICANT CHANGE UP (ref 70–99)
GLUCOSE SERPL-MCNC: 98 MG/DL — SIGNIFICANT CHANGE UP (ref 70–99)
HCO3 BLDV-SCNC: 25 MMOL/L — SIGNIFICANT CHANGE UP (ref 22–29)
HCT VFR BLD CALC: 26.8 % — LOW (ref 39–50)
HCT VFR BLDA CALC: 26 % — LOW (ref 39–51)
HGB BLD CALC-MCNC: 8.5 G/DL — LOW (ref 12.6–17.4)
HGB BLD-MCNC: 8 G/DL — LOW (ref 13–17)
IANC: 6.57 K/UL — SIGNIFICANT CHANGE UP (ref 1.8–7.4)
IMM GRANULOCYTES NFR BLD AUTO: 0.2 % — SIGNIFICANT CHANGE UP (ref 0–0.9)
INR BLD: 1.54 RATIO — HIGH (ref 0.85–1.18)
LACTATE BLDV-MCNC: 1.5 MMOL/L — SIGNIFICANT CHANGE UP (ref 0.5–2)
LYMPHOCYTES # BLD AUTO: 1.33 K/UL — SIGNIFICANT CHANGE UP (ref 1–3.3)
LYMPHOCYTES # BLD AUTO: 15.3 % — SIGNIFICANT CHANGE UP (ref 13–44)
MAGNESIUM SERPL-MCNC: 2.1 MG/DL — SIGNIFICANT CHANGE UP (ref 1.6–2.6)
MCHC RBC-ENTMCNC: 25.4 PG — LOW (ref 27–34)
MCHC RBC-ENTMCNC: 29.9 GM/DL — LOW (ref 32–36)
MCV RBC AUTO: 85.1 FL — SIGNIFICANT CHANGE UP (ref 80–100)
MONOCYTES # BLD AUTO: 0.65 K/UL — SIGNIFICANT CHANGE UP (ref 0–0.9)
MONOCYTES NFR BLD AUTO: 7.5 % — SIGNIFICANT CHANGE UP (ref 2–14)
NEUTROPHILS # BLD AUTO: 6.57 K/UL — SIGNIFICANT CHANGE UP (ref 1.8–7.4)
NEUTROPHILS NFR BLD AUTO: 75.7 % — SIGNIFICANT CHANGE UP (ref 43–77)
NRBC # BLD: 0 /100 WBCS — SIGNIFICANT CHANGE UP (ref 0–0)
NRBC # FLD: 0 K/UL — SIGNIFICANT CHANGE UP (ref 0–0)
PCO2 BLDV: 40 MMHG — LOW (ref 42–55)
PH BLDV: 7.4 — SIGNIFICANT CHANGE UP (ref 7.32–7.43)
PHOSPHATE SERPL-MCNC: 3.8 MG/DL — SIGNIFICANT CHANGE UP (ref 2.5–4.5)
PLATELET # BLD AUTO: 218 K/UL — SIGNIFICANT CHANGE UP (ref 150–400)
PO2 BLDV: 45 MMHG — SIGNIFICANT CHANGE UP (ref 25–45)
POTASSIUM BLDV-SCNC: 3.9 MMOL/L — SIGNIFICANT CHANGE UP (ref 3.5–5.1)
POTASSIUM SERPL-MCNC: 3.8 MMOL/L — SIGNIFICANT CHANGE UP (ref 3.5–5.3)
POTASSIUM SERPL-SCNC: 3.8 MMOL/L — SIGNIFICANT CHANGE UP (ref 3.5–5.3)
PROT SERPL-MCNC: 6.4 G/DL — SIGNIFICANT CHANGE UP (ref 6–8.3)
PROTHROM AB SERPL-ACNC: 17 SEC — HIGH (ref 9.5–13)
RBC # BLD: 3.15 M/UL — LOW (ref 4.2–5.8)
RBC # FLD: 20.5 % — HIGH (ref 10.3–14.5)
SAO2 % BLDV: 73.6 % — SIGNIFICANT CHANGE UP (ref 67–88)
SODIUM SERPL-SCNC: 138 MMOL/L — SIGNIFICANT CHANGE UP (ref 135–145)
WBC # BLD: 8.68 K/UL — SIGNIFICANT CHANGE UP (ref 3.8–10.5)
WBC # FLD AUTO: 8.68 K/UL — SIGNIFICANT CHANGE UP (ref 3.8–10.5)

## 2023-08-16 PROCEDURE — 99291 CRITICAL CARE FIRST HOUR: CPT

## 2023-08-16 PROCEDURE — 99232 SBSQ HOSP IP/OBS MODERATE 35: CPT | Mod: GC

## 2023-08-16 RX ORDER — POTASSIUM CHLORIDE 20 MEQ
20 PACKET (EA) ORAL ONCE
Refills: 0 | Status: COMPLETED | OUTPATIENT
Start: 2023-08-16 | End: 2023-08-16

## 2023-08-16 RX ORDER — OLANZAPINE 15 MG/1
5 TABLET, FILM COATED ORAL AT BEDTIME
Refills: 0 | Status: DISCONTINUED | OUTPATIENT
Start: 2023-08-16 | End: 2023-08-18

## 2023-08-16 RX ADMIN — CARBIDOPA AND LEVODOPA 1.5 TABLET(S): 25; 100 TABLET ORAL at 17:30

## 2023-08-16 RX ADMIN — APIXABAN 2.5 MILLIGRAM(S): 2.5 TABLET, FILM COATED ORAL at 17:30

## 2023-08-16 RX ADMIN — Medication 650 MILLIGRAM(S): at 06:30

## 2023-08-16 RX ADMIN — CARBIDOPA AND LEVODOPA 1.5 TABLET(S): 25; 100 TABLET ORAL at 05:20

## 2023-08-16 RX ADMIN — APIXABAN 2.5 MILLIGRAM(S): 2.5 TABLET, FILM COATED ORAL at 05:21

## 2023-08-16 RX ADMIN — OLANZAPINE 5 MILLIGRAM(S): 15 TABLET, FILM COATED ORAL at 21:13

## 2023-08-16 RX ADMIN — MIRTAZAPINE 7.5 MILLIGRAM(S): 45 TABLET, ORALLY DISINTEGRATING ORAL at 21:13

## 2023-08-16 RX ADMIN — Medication 650 MILLIGRAM(S): at 06:00

## 2023-08-16 RX ADMIN — Medication 3 MILLIGRAM(S): at 21:18

## 2023-08-16 RX ADMIN — CHLORHEXIDINE GLUCONATE 1 APPLICATION(S): 213 SOLUTION TOPICAL at 11:23

## 2023-08-16 RX ADMIN — FLUDROCORTISONE ACETATE 0.1 MILLIGRAM(S): 0.1 TABLET ORAL at 05:20

## 2023-08-16 RX ADMIN — CARBIDOPA AND LEVODOPA 1.5 TABLET(S): 25; 100 TABLET ORAL at 11:24

## 2023-08-16 RX ADMIN — Medication 20 MILLIEQUIVALENT(S): at 05:20

## 2023-08-16 RX ADMIN — MIDODRINE HYDROCHLORIDE 10 MILLIGRAM(S): 2.5 TABLET ORAL at 05:20

## 2023-08-16 NOTE — PROGRESS NOTE ADULT - SUBJECTIVE AND OBJECTIVE BOX
[facility] Division of Kidney Diseases & Hypertension  FOLLOW UP NOTE  [phone]--------------------------------------------------------------------------------  Chief Complaint:Other abnormality of breathing    83yo Male w/ PMH HTN, Atrial fibrillation on Eliquis, bradycardia s/p ppm, CHF, pleural effusions s/p R pleurX, Parkinson's presenting with shortness of breath. Nephrology consulted for hyperkalemia in the setting of ALAYNA.     24 hour events/subjective: Patient seen and examined at bedside. Awake and responding to questions. Endorses generalized fatigue. Otherwise denies fevers, chills, nausea, vomiting, abdominal pain, SOB, CP.       PAST HISTORY  --------------------------------------------------------------------------------  No significant changes to PMH, PSH, FHx, SHx, unless otherwise noted    ALLERGIES & MEDICATIONS  --------------------------------------------------------------------------------  Allergies    No Known Allergies    Intolerances      Standing Inpatient Medications  apixaban 2.5 milliGRAM(s) Oral two times a day  carbidopa/levodopa  25/100 1.5 Tablet(s) Oral four times a day  chlorhexidine 2% Cloths 1 Application(s) Topical daily  dextrose 5%. 1000 milliLiter(s) IV Continuous <Continuous>  dextrose 5%. 1000 milliLiter(s) IV Continuous <Continuous>  dextrose 50% Injectable 25 Gram(s) IV Push once  dextrose 50% Injectable 12.5 Gram(s) IV Push once  dextrose 50% Injectable 25 Gram(s) IV Push once  fludroCORTISONE 0.1 milliGRAM(s) Oral daily  glucagon  Injectable 1 milliGRAM(s) IntraMuscular once  melatonin 3 milliGRAM(s) Oral at bedtime  midodrine. 10 milliGRAM(s) Oral three times a day  mirtazapine 7.5 milliGRAM(s) Oral at bedtime  OLANZapine 2.5 milliGRAM(s) Oral at bedtime    PRN Inpatient Medications  acetaminophen     Tablet .. 650 milliGRAM(s) Oral every 6 hours PRN  aluminum hydroxide/magnesium hydroxide/simethicone Suspension 30 milliLiter(s) Oral every 4 hours PRN  dextrose Oral Gel 15 Gram(s) Oral once PRN  melatonin 3 milliGRAM(s) Oral at bedtime PRN  ondansetron Injectable 4 milliGRAM(s) IV Push every 8 hours PRN  sodium chloride 0.9% Bolus. 100 milliLiter(s) IV Bolus every 5 minutes PRN      REVIEW OF SYSTEMS  --------------------------------------------------------------------------------  as above    VITALS/PHYSICAL EXAM  --------------------------------------------------------------------------------  T(C): 36.7 (08-16-23 @ 04:00), Max: 37.3 (08-16-23 @ 00:00)  HR: 60 (08-16-23 @ 04:00) (60 - 60)  BP: 126/45 (08-16-23 @ 04:00) (114/47 - 142/47)  RR: 13 (08-16-23 @ 04:00) (10 - 19)  SpO2: 100% (08-16-23 @ 04:00) (99% - 100%)  Wt(kg): --        08-15-23 @ 07:01  -  08-16-23 @ 07:00  --------------------------------------------------------  IN: 720 mL / OUT: 1175 mL / NET: -455 mL      Physical Exam:  Gen: NAD  HEENT: anicteric  Pulm: CTA B/L  CV: RRR, S1S2;  Abd: +BS, soft, NTND  : No suprapubic tenderness, +means  Extremities: trace LE edema noted.   Neuro: Awake restring tremor noted  Vascular access: none     LABS/STUDIES  --------------------------------------------------------------------------------              8.0    8.68  >-----------<  218      [08-16-23 @ 03:10]              26.8     138  |  102  |  34  ----------------------------<  98      [08-16-23 @ 03:10]  3.8   |  21  |  3.24        Ca     9.4     [08-16-23 @ 03:10]      Mg     2.10     [08-16-23 @ 03:10]      Phos  3.8     [08-16-23 @ 03:10]    TPro  6.4  /  Alb  3.7  /  TBili  0.4  /  DBili  x   /  AST  17  /  ALT  7   /  AlkPhos  53  [08-16-23 @ 03:10]    PT/INR: PT 17.0 , INR 1.54       [08-16-23 @ 04:25]  PTT: 40.3       [08-16-23 @ 04:25]      Creatinine Trend:  SCr 3.24 [08-16 @ 03:10]  SCr 3.21 [08-15 @ 00:34]  SCr 3.28 [08-14 @ 14:15]  SCr 3.30 [08-14 @ 00:50]  SCr 2.82 [08-13 @ 00:25]    Urinalysis - [08-16-23 @ 03:10]      Color  / Appearance  / SG  / pH       Gluc 98 / Ketone   / Bili  / Urobili        Blood  / Protein  / Leuk Est  / Nitrite       RBC  / WBC  / Hyaline  / Gran  / Sq Epi  / Non Sq Epi  / Bacteria     Urine Creatinine 90      [08-09-23 @ 14:24]  Urine Protein 164      [08-09-23 @ 14:24]  Urine Sodium 22      [08-09-23 @ 14:24]  Urine Urea Nitrogen 329.0      [08-09-23 @ 14:24]  Urine Potassium 85.2      [08-09-23 @ 14:24]  Urine Osmolality 369      [08-09-23 @ 14:24]    TSH 1.00      [08-13-23 @ 00:25]    HBsAb <3.0      [08-09-23 @ 13:53]

## 2023-08-16 NOTE — PROGRESS NOTE ADULT - SUBJECTIVE AND OBJECTIVE BOX
Interval Events: passed TOV overnight.    HPI:  81 yo M with HTN, A fib on Eliquis, bradycardia s/p ppm, CHF, pleural effusions s/p R pleurX, Parkinson's presenting with shortness of breath x 1 day.  Pt poor historian due to dementia. Wife at bedside providing history. Overnight pt had been complaining of shortness of breath, coughing. Denies any chest pain, palpitations, no fevers or chills. No recent illness, No GI or urinary symptoms.    In ED pt was given Nabicarb, Ca gluconate and insulin, 1L NS  VS:  90/51  61  97.8  19  100% on BIPAP (09 Aug 2023 09:40)      SUBJECTIVE: Patient seen and examined at bedside.  Offers no complaints.       OBJECTIVE:  ICU Vital Signs Last 24 Hrs  T(C): 36.8 (12 Aug 2023 04:00), Max: 37 (11 Aug 2023 16:10)  T(F): 98.2 (12 Aug 2023 04:00), Max: 98.6 (11 Aug 2023 16:10)  HR: 60 (12 Aug 2023 06:00) (60 - 66)  BP: 101/39 (12 Aug 2023 06:15) (85/51 - 142/50)  BP(mean): 54 (12 Aug 2023 06:15) (41 - 81)  ABP: --  ABP(mean): --  RR: 12 (12 Aug 2023 06:00) (10 - 17)  SpO2: 100% (12 Aug 2023 06:00) (96% - 100%)    O2 Parameters below as of 12 Aug 2023 06:00  Patient On (Oxygen Delivery Method): room air          08-10 @ 07:01  -  08-11 @ 07:00  --------------------------------------------------------  IN: 1344.8 mL / OUT: 1540 mL / NET: -195.2 mL    08-11 @ 07:01  -  08-12 @ 06:42  --------------------------------------------------------  IN: 1234 mL / OUT: 1870 mL / NET: -636 mL      CAPILLARY BLOOD GLUCOSE          Physical Exam:   GENERAL: NAD, lying in bed  EYES: EOMI, PERRLA  ENT: Moist mucous membranes  HEART: S1, S2, regular rate and rhythm, no murmurs, rubs, or gallops  LUNGS: On BiPAP, decreased breath sounds in the left base, no crackles or wheezing appreciated   ABDOMEN: Soft, nontender  EXTREMITIES: 2+ peripheral pulses bilaterally. 2+ pitting edema to the knee   NEURO: A&Ox1, pleasantly confused at times. Follows commands, moves extremities spontaneously.       HOSPITAL MEDICATIONS:  Standing Meds:  apixaban 2.5 milliGRAM(s) Oral two times a day  carbidopa/levodopa  25/100 1.5 Tablet(s) Oral four times a day  chlorhexidine 2% Cloths 1 Application(s) Topical daily  dextrose 5%. 1000 milliLiter(s) IV Continuous <Continuous>  dextrose 5%. 1000 milliLiter(s) IV Continuous <Continuous>  dextrose 50% Injectable 25 Gram(s) IV Push once  dextrose 50% Injectable 25 Gram(s) IV Push once  dextrose 50% Injectable 12.5 Gram(s) IV Push once  glucagon  Injectable 1 milliGRAM(s) IntraMuscular once  melatonin 3 milliGRAM(s) Oral at bedtime  midodrine. 10 milliGRAM(s) Oral three times a day  norepinephrine Infusion 0.05 MICROgram(s)/kG/Min IV Continuous <Continuous>  OLANZapine 2.5 milliGRAM(s) Oral at bedtime  piperacillin/tazobactam IVPB.. 3.375 Gram(s) IV Intermittent every 12 hours      PRN Meds:  acetaminophen     Tablet .. 650 milliGRAM(s) Oral every 6 hours PRN  aluminum hydroxide/magnesium hydroxide/simethicone Suspension 30 milliLiter(s) Oral every 4 hours PRN  dextrose Oral Gel 15 Gram(s) Oral once PRN  melatonin 3 milliGRAM(s) Oral at bedtime PRN  ondansetron Injectable 4 milliGRAM(s) IV Push every 8 hours PRN  sodium chloride 0.9% Bolus. 100 milliLiter(s) IV Bolus every 5 minutes PRN      LABS:                        7.4    6.87  )-----------( 188      ( 12 Aug 2023 00:40 )             23.7     Hgb Trend: 7.4<--, 6.6<--, 7.3<--, 7.5<--, 7.3<--  08-12    139  |  102  |  35<H>  ----------------------------<  109<H>  3.4<L>   |  24  |  2.66<H>    Ca    8.7      12 Aug 2023 00:40  Phos  2.8     08-12  Mg     2.30     08-12    TPro  6.0  /  Alb  3.5  /  TBili  0.4  /  DBili  x   /  AST  20  /  ALT  9   /  AlkPhos  48  08-12    Creatinine Trend: 2.66<--, 2.58<--, 3.28<--, 4.17<--, 3.56<--, 4.87<--  PT/INR - ( 12 Aug 2023 00:25 )   PT: 21.3 sec;   INR: 1.92 ratio         PTT - ( 12 Aug 2023 00:25 )  PTT:35.1 sec  Urinalysis Basic - ( 12 Aug 2023 00:40 )    Color: x / Appearance: x / SG: x / pH: x  Gluc: 109 mg/dL / Ketone: x  / Bili: x / Urobili: x   Blood: x / Protein: x / Nitrite: x   Leuk Esterase: x / RBC: x / WBC x   Sq Epi: x / Non Sq Epi: x / Bacteria: x        Venous Blood Gas:  08-12 @ 00:25  7.41/42/49/27/78.2  VBG Lactate: 1.2  Venous Blood Gas:  08-11 @ 03:00  7.36/41/28/23/44.1  VBG Lactate: 1.5      MICROBIOLOGY:     Culture - Urine (collected 10 Aug 2023 09:28)  Source: Clean Catch Clean Catch (Midstream)  Final Report (11 Aug 2023 06:47):    No growth    Culture - Blood (collected 10 Aug 2023 04:00)  Source: .Blood Blood  Preliminary Report (11 Aug 2023 09:03):    No growth at 24 hours    Culture - Blood (collected 09 Aug 2023 14:20)  Source: .Blood Blood-Peripheral  Preliminary Report (11 Aug 2023 19:01):    No growth at 48 Hours      RADIOLOGY:  [ ] Reviewed and interpreted by me

## 2023-08-16 NOTE — PROGRESS NOTE ADULT - ASSESSMENT
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 Admitted to MICU for urgent HD i/so hypotension.        Plan:  ====================NEUROLOGY=======================  #Dementia, early, no acute issues   Home: Zyprexa, and melatonin at bedtime  AOx1 at baseline  - Following commands and responding to questions, at baseline MS  -- c/w Zyprexa 2.5mg and melatonin 3mg at bedtime.   -- restarted Mirtazapine 7.5 qhs, pt takes medication at home for MDD, today pt is happier than yesterday     #Parkinson's disease   - c/w Carbidopa-Levodopa    -- PT and OT working with pt __ Anticipate home with no skilled PT needs, patient is at his baseline.    ====================RESPIRATORY======================  #Pleural effusions    Likely in the setting of CHF vs renal failure vs less likely malignancy (negative cytology outpt)  PleurX catheter placed 3/2022, drainage 3x a week as per spouse, last  time drained on 8/12 with 500 cc drained    6/9 -- CT chest showed new 2.3 cm right middle lobe nodule, possible malignancy? and NEW moderate left pleural effusion with complete left lower lobe compressive atelectasis, repeat CXR today to follow up on PLEFFs  -- pulmonary outpt follow up for pulmonary nodule, pt has h/o L PLEFF,   Respiratory acidosis on admission (VBG 7.13/46), resolved   - s/p urgent HD for fluid overload   - s/p BIPAP,  tolerating RA, no active issues       ====================CARDIOVASCULAR==================  #Bradycardia s/p PPM   #CHFpEF   #Afib   #Hypotension   Home Meds: Eliquis, Entresto Olmesartan, Plavix?, Rosuvastatin   SPECT scan with amyloidosis outpatient, may be contributing to CHFpEF   TTE 1/2023 EF 73%, diastolic dysfunction and mod pHTN   EKG showing paced ventricular rhythm, no T wave abnormalities, given Ca gluconate x2 in ED   - TTE 8/10: Minimal MR, Moderate AR. Moderate concentric LV hypertrophy, grossly normal LVSfx  Normal RV size w/ reduced function, mild pulmonary hypertension. Left pleural effusion.  - off levophed today since 11:30 am yesterday, SBP goal > 95, as per pt's spouse, pt's SBP-- 's at home on a good day,    - SBP in 120-140's today __ decreased Midodrine to 10 mg TID and continue Florinef 0.1mg daily to assist with pressor wean and possible autonomic abnl in pt with Parkinson   - POCUS 8/12 showed preserved EF and underfilled LV, given albumin x 2  -- s/p Lasix 80 mg IVP x 1 on 8/12, 8/13, 8/14 and Lasix 80 mg IVP x 1 today, cont strict intake and output, __ see in renal       ====================/RENAL========================    #ALAYNA on CKD  Admit Scr 4.54>2.82  - Baseline Cr 1.2-1.4, likely has CKD stage 3A with baseline Scr 1.3-1.6  - US kidney/bladder- Increased bilateral renal echogenicity, suggestive of medical renal disease. No hydronephrosis.  - Nephrology consulted- - per renal ALAYNA likely hemodynamic mediated and medication SE's (TMP/SMX and Entresto)  - FeNa 0.9%   - s/p RIJ shiley placement (8/9), to be removed today,   #Metabolic acidosis with AG, concomitant resp acidosis, resolved   -S/p NaHCO3, Ca gluconate and insulin  - s/p session of HD 8/9 w/ 1.5L fluid removal, 1L fluid removal 8/10, and HD on 8/11.   - indwelling catheter in place, to be removed today, do TOV,   -- UOP is neg 5.7 L for LOS and neg 2 L for 24 hrs, making 30 to 50 ml/hr, overnight UOP decreased, pt with 25-50 cc voiding per hour,    -- s/p Lasix 80 mg IVP x 1 on 8/12, 8/13, 8/14 and Lasix 80 mg IVP x 1 today, continue monitoring  - strict I/Os     ====================GI/NUTRITION=====================  Diet: renal regular, changed to DASH diet in s/o normal lytes and improving renal fx   -- tolerating well   -- normal BM every day   - cont bowel regimen    ====================INFECTIOUS DISEASE================  No fever, leukocytosis may be stress related vs infection, resolved. CT chest with GGO  - Previous pleural fluid culture negative   - s/p recently treated outpt w/ bactrim for UTI  - s/p Azithro (8/9)  and CTX (8/9) in ED  - s/p tx empirically with zosyn (8/09- 8/13 )  - UA -- small leuks, WBC 19, urine and blood culture - NGTD, LA 0.9   - MRSA PCR neg    ====================ENDOCRINE=======================  A1C 5.4%  home med: farxiga  - BG 140s   -- TSH 1.0    ====================HEMATOLOGIC/DVT PPx=============  On home ELIQUIS 5 BID, last taken 8/8 at 6pm - held for Shiley placement, and restarted 8/10  - given Kcentra on 8/9 for Eliquis reversal prior to shiley  - H/H stable in 7s, Hgb 6.6 on 8/12, repeat 7.4>8.0 > 7.8   -- no active bleeding     ====================ETHICS===========================  GOC: patient with prior MOLST DNR/DNI. 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 Admitted to MICU for urgent HD i/so hypotension.      Plan:  ====================NEUROLOGY=======================  #Dementia, early, no acute issues   Home: Zyprexa, and melatonin at bedtime  AOx1 at baseline  - Following commands and responding to questions, at baseline MS  - c/w Zyprexa 2.5mg and melatonin 3mg at bedtime.   - restarted Mirtazapine 7.5 qhs, pt takes medication at home for MDD    #Parkinson's disease   - c/w Carbidopa-Levodopa    - PT and OT working with pt. Anticipate home with no skilled PT needs, patient is at his baseline.    ====================RESPIRATORY======================  #Pleural effusions    Likely in the setting of CHF vs renal failure vs less likely malignancy (negative cytology outpt)  R PleurX catheter placed 3/2022, drainage 3x a week as per spouse, last  time drained on 8/12 with 500 cc drained    6/9 - CT chest showed new 2.3 cm right middle lobe nodule, possible malignancy? and NEW moderate left pleural effusion with complete left lower lobe compressive atelectasis  - pulmonary outpt follow up for pulmonary nodule, pt has h/o PLEFF w/ R pleurx catheter  - s/p urgent HD for fluid overload   - s/p BIPAP, now tolerating RA    ====================CARDIOVASCULAR==================  #Bradycardia s/p PPM   #CHFpEF   #Afib   #Hypotension   Home Meds: Eliquis, Entresto Olmesartan, Plavix?, Rosuvastatin   SPECT scan with amyloidosis outpatient, may be contributing to CHFpEF   TTE 1/2023 EF 73%, diastolic dysfunction and mod pHTN   EKG showing paced ventricular rhythm, no T wave abnormalities, given Ca gluconate x2 in ED   - TTE 8/10: Minimal MR, Moderate AR. Moderate concentric LV hypertrophy, grossly normal LVSfx  Normal RV size w/ reduced function, mild pulmonary hypertension. Left pleural effusion.  - off levophed since 8/14, SBP goal > 95, as per pt's spouse, pt's SBP-- 's at home on a good day,    - 8/15 decreased Midodrine to 10 mg TID and continue Florinef 0.1mg daily   - POCUS 8/12 showed preserved EF and underfilled LV, given albumin x 2  - s/p Lasix 80 mg IVP x 1 on 8/12, 8/13, 8/14 and Lasix 80 mg IVP x 1 today, cont strict intake and output, __ see in renal       ====================/RENAL========================    #ALAYNA on CKD  Admit Scr 4.54>2.82  - Baseline Cr 1.2-1.4, likely has CKD stage 3A with baseline Scr 1.3-1.6  - US kidney/bladder- Increased bilateral renal echogenicity, suggestive of medical renal disease. No hydronephrosis.  - Nephrology consulted- - per renal ALAYNA likely hemodynamic mediated and medication SE's (TMP/SMX and Entresto)  - FeNa 0.9%   - s/p RIJ shiley placement (8/9), to be removed today,   #Metabolic acidosis with AG, concomitant resp acidosis, resolved   -S/p NaHCO3, Ca gluconate and insulin  - s/p session of HD 8/9 w/ 1.5L fluid removal, 1L fluid removal 8/10, and HD on 8/11.   - indwelling catheter in place, to be removed today, do TOV,   -- UOP is neg 5.7 L for LOS and neg 2 L for 24 hrs, making 30 to 50 ml/hr, overnight UOP decreased, pt with 25-50 cc voiding per hour,    -- s/p Lasix 80 mg IVP x 1 on 8/12, 8/13, 8/14 and Lasix 80 mg IVP x 1 today, continue monitoring  - strict I/Os     ====================GI/NUTRITION=====================  Diet: renal regular, changed to DASH diet in s/o normal lytes and improving renal fx   -- tolerating well   -- normal BM every day   - cont bowel regimen    ====================INFECTIOUS DISEASE================  No fever, leukocytosis may be stress related vs infection, resolved. CT chest with GGO  - Previous pleural fluid culture negative   - s/p recently treated outpt w/ bactrim for UTI  - s/p Azithro (8/9)  and CTX (8/9) in ED  - s/p tx empirically with zosyn (8/09- 8/13 )  - UA -- small leuks, WBC 19, urine and blood culture - NGTD, LA 0.9   - MRSA PCR neg    ====================ENDOCRINE=======================  A1C 5.4%  home med: farxiga  - BG 140s   -- TSH 1.0    ====================HEMATOLOGIC/DVT PPx=============  On home ELIQUIS 5 BID, last taken 8/8 at 6pm - held for Shiley placement, and restarted 8/10  - given Kcentra on 8/9 for Eliquis reversal prior to shiley  - H/H stable in 7s, Hgb 6.6 on 8/12, repeat 7.4>8.0 > 7.8   -- no active bleeding     ====================ETHICS===========================  GOC: patient with prior MOLST DNR/DNI. 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 Admitted to MICU for urgent HD i/so hypotension.      Plan:  ====================NEUROLOGY=======================  #Dementia, early, no acute issues   Home: Zyprexa, and melatonin at bedtime  AOx1 at baseline  - Following commands and responding to questions, at baseline MS  - c/w Zyprexa 5mg and melatonin 3mg at bedtime.   - restarted Mirtazapine 7.5 qhs, pt takes medication at home for MDD    #Parkinson's disease   - c/w Carbidopa-Levodopa    - PT and OT working with pt. Anticipate home with no skilled PT needs, patient is at his baseline.    ====================RESPIRATORY======================  #Pleural effusions    Likely in the setting of CHF vs renal failure vs less likely malignancy (negative cytology outpt)  R PleurX catheter placed 3/2022, drainage 3x a week as per spouse, last  time drained on 8/12 with 500 cc drained    6/9 - CT chest showed new 2.3 cm right middle lobe nodule, possible malignancy? and NEW moderate left pleural effusion with complete left lower lobe compressive atelectasis  - pulmonary outpt follow up for pulmonary nodule, pt has h/o PLEFF w/ R pleurx catheter  - s/p urgent HD for fluid overload   - s/p BIPAP, now tolerating RA    ====================CARDIOVASCULAR==================  #Bradycardia s/p PPM   #CHFpEF   #Afib   #Hypotension   Home Meds: Eliquis, Entresto Olmesartan, Plavix?, Rosuvastatin   SPECT scan with amyloidosis outpatient, may be contributing to CHFpEF   TTE 1/2023 EF 73%, diastolic dysfunction and mod pHTN   EKG showing paced ventricular rhythm, no T wave abnormalities, given Ca gluconate x2 in ED   - TTE 8/10: Minimal MR, Moderate AR. Moderate concentric LV hypertrophy, grossly normal LVSfx  Normal RV size w/ reduced function, mild pulmonary hypertension. Left pleural effusion.  - off levophed since 8/14, SBP goal > 95, as per pt's spouse, pt's baseline SBP is 's at home on a good day,    - 8/15 decreased Midodrine to 10 mg TID and continue Florinef 0.1mg daily   - POCUS 8/12 showed preserved EF and underfilled LV, s/p albumin x 2  - s/p Lasix 80 mg IVP x 1 on 8/12, 8/13, 8/14, 8/15, cont strict intake and output, see renal     ====================/RENAL========================    #ALAYNA on CKD  #Metabolic acidosis with AG, concomitant resp acidosis-  resolved   Admit Scr 4.54>2.82. FeNa 0.9%   - likely has CKD stage 3A with baseline Scr 1.3-1.6. Cr now ~2.2s.  - hy[erkalemic to 6.4, S/p NaHCO3, Ca gluconate and insulin  - US kidney/bladder - Increased bilateral renal echogenicity, suggestive of medical renal disease. No hydronephrosis.  - Nephrology consulted - per renal ALAYNA likely hemodynamic mediated and medication SE's (TMP/SMX and Entresto)  - s/p RIJ shiley placement 8/9, removed 8/16  - s/p 3 sessions of HD 8/9- 8/11.   - s/p Lasix 80 mg IVP once daily 8/12-8/15, pt making urine  - -455mL in 24hrs, -5L LOS, voided 275mL yesterday  - passed TOV  - continue to monitor strict I/Os       ====================GI/NUTRITION=====================  Diet: renal regular, changed to DASH diet in s/o normal lytes and improving renal fx   - BMs+, cont bowel regimen    ====================INFECTIOUS DISEASE================  No fever, leukocytosis may be stress related vs infection, resolved. CT chest with GGO  - Previous pleural fluid culture negative   - s/p recently treated outpt w/ bactrim for UTI  - s/p Azithro (8/9)  and CTX (8/9) in ED  - s/p tx empirically with zosyn (8/09- 8/13 )  - UA - small leuks, WBC 19, urine and blood culture - NGTD, LA 0.9   - MRSA PCR neg    ====================ENDOCRINE=======================  A1C 5.4%  home med: farxiga  - BG 140s   - TSH 1.0    ====================HEMATOLOGIC/DVT PPx=============  On home ELIQUIS 5 BID, last taken 8/8 at 6pm - held for Shiley placement, and restarted 8/10  - given Kcentra on 8/9 for Eliquis reversal prior to shiley  - H/H stable in 7s  - no active bleeding     ====================ETHICS===========================  GOC: patient with prior MOLST DNR/DNI. 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 Admitted to MICU for urgent HD i/so hypotension.      Plan:  ====================NEUROLOGY=======================  #Dementia, early, no acute issues   Home: Zyprexa, and melatonin at bedtime  AOx1 at baseline  - Following commands and responding to questions, at baseline MS  - c/w Zyprexa 5mg and melatonin 3mg at bedtime.   - restarted Mirtazapine 7.5 qhs, pt takes medication at home for MDD    #Parkinson's disease   - c/w Carbidopa-Levodopa    - PT and OT working with pt. Anticipate home with no skilled PT needs, patient is at his baseline.    ====================RESPIRATORY======================  #Pleural effusions    Likely in the setting of CHF vs renal failure vs less likely malignancy (negative cytology outpt)  R PleurX catheter placed 3/2022, drainage 3x a week as per spouse, last  time drained on 8/12 with 500 cc drained . will need to be drained today.   6/9 - CT chest showed new 2.3 cm right middle lobe nodule, possible malignancy? and NEW moderate left pleural effusion with complete left lower lobe compressive atelectasis  - pulmonary outpt follow up for pulmonary nodule, pt has h/o PLEFF w/ R pleurx catheter  - s/p urgent HD for fluid overload   - s/p BIPAP, now tolerating RA    ====================CARDIOVASCULAR==================  #Bradycardia s/p PPM   #CHFpEF   #Afib   #Hypotension   Home Meds: Eliquis, Entresto Olmesartan, Plavix?, Rosuvastatin   SPECT scan with amyloidosis outpatient, may be contributing to CHFpEF   TTE 1/2023 EF 73%, diastolic dysfunction and mod pHTN   EKG showing paced ventricular rhythm, no T wave abnormalities, given Ca gluconate x2 in ED   - TTE 8/10: Minimal MR, Moderate AR. Moderate concentric LV hypertrophy, grossly normal LVSfx  Normal RV size w/ reduced function, mild pulmonary hypertension. Left pleural effusion.  - off levophed since 8/14, SBP goal > 95, as per pt's spouse, pt's baseline SBP is 's at home  - started on midodrine to aid in weaning off levophed, Dc;d 8/16  - cortisol level 5.7, continue Florinef 0.1mg daily   - POCUS 8/12 showed preserved EF and underfilled LV, s/p albumin x 2  - s/p Lasix 80 mg IVP x 1 on 8/12, 8/13, 8/14, 8/15, cont strict intake and output, see renal     ====================/RENAL========================    #ALAYNA on CKD  #Metabolic acidosis with AG, concomitant resp acidosis-  resolved   Admit Scr 4.54>2.82. FeNa 0.9%   - likely has CKD stage 3A with baseline Scr 1.3-1.6. Cr now ~2.2s.  - hy[erkalemic to 6.4, S/p NaHCO3, Ca gluconate and insulin  - US kidney/bladder - Increased bilateral renal echogenicity, suggestive of medical renal disease. No hydronephrosis.  - Nephrology consulted - per renal ALAYNA likely hemodynamic mediated and medication SE's (TMP/SMX and Entresto)  - s/p RIJ shiley placement 8/9, removed 8/16  - s/p 3 sessions of HD 8/9- 8/11.   - s/p Lasix 80 mg IVP once daily 8/12-8/15, pt making urine  - -455mL in 24hrs, -5L LOS, voided 275mL yesterday  - passed TOV  - continue to monitor strict I/Os       ====================GI/NUTRITION=====================  Diet: renal regular, changed to DASH diet in s/o normal lytes and improving renal fx   - BMs+, cont bowel regimen    ====================INFECTIOUS DISEASE================  No fever, leukocytosis may be stress related vs infection, resolved. CT chest with GGO  - Previous pleural fluid culture negative   - s/p recently treated outpt w/ bactrim for UTI  - s/p Azithro (8/9)  and CTX (8/9) in ED  - s/p tx empirically with zosyn (8/09- 8/13 )  - UA - small leuks, WBC 19, urine and blood culture - NGTD, LA 0.9   - MRSA PCR neg    ====================ENDOCRINE=======================  A1C 5.4%  home med: farxiga  - BG 140s   - TSH 1.0    ====================HEMATOLOGIC/DVT PPx=============  On home ELIQUIS 5 BID, last taken 8/8 at 6pm - held for Shiley placement, and restarted 8/10  - given Kcentra on 8/9 for Eliquis reversal prior to shiley  - H/H stable in 7s  - no active bleeding     ====================ETHICS===========================  GOC: patient with prior MOLST DNR/DNI. As per discussion with pt, wife and daughter, and pt's and family's wishes, pt is DNR/DNI     Dispo: Medicine    LINES: PIV's, julio will be removed

## 2023-08-16 NOTE — PROGRESS NOTE ADULT - PROBLEM SELECTOR PLAN 1
Pt. w/ ALAYNA likely hemodynamic mediated and medication SE (TMP/SMX and Entresto). On review of sunrise/HIE patient likely has CKD stage 3A with baseline Scr 1.3-1.6. Last outpt labs reviewed on HIE showing Scr 1.64 on 4/20/23. On admission Scr 4.54, K 6.4, and SCO2 14. HD consent done and placed in chart. Kidney US with no hydronephrosis 8/11. Pt received IHD 8/9, 8/10, 8/11. Lasix IV 8/13-8/15. Pt. now off vasopressors and BIPAP. Labs reviewed, Scr 3.24 (stable) today and 24hr UOP 1.1L. No further plan for HD. Continue to hold Entresto and TMP/SMX. Monitor labs, VS and urine output. Avoid nephrotoxins. Dose medications as per eGFR.    Final recommendations pending attending signature.    If you have any questions, please feel free to contact me  Torres Olivarez  Nephrology Fellow  [employer]/Page [phone]  (After 5pm or on weekends please page the on-call fellow)

## 2023-08-16 NOTE — PROGRESS NOTE ADULT - CRITICAL CARE ATTENDING COMMENT
Patient is an 83 yo M w/ cardiac amyloid, HFpEF, chronic pleural effusions (w/ R PleurX), HTN, Afib (Eliquis), bradycardia s/p ppm, Parkinson's who was admitted on 8/9 after p/w SOB. Admitted for shock and ALAYNA requiring HD.    #ALAYNA - Likely 2/2 hypotension and recent outpatient Bactrim use as well as chronic Entresto use. Last HD session 8/11. Currently making good UOP with Lasix, creatinine plateaued and stable. Less volume overloaded on exam today.  #Metabolic acidosis  #Hyperkalemia  - Will hold diuretics for now  - Strict I/Os  - Monitor BMP and creatinine  - f/u renal recs  - Avoid nephrotoxic agents    #Shock - Septic vs cardiogenic/ADHF  - c/w PO vasopressors to maintain SBP > 95, which is usual BP as per wife and aide at bedside. Wean as tolerated  - Completed empiric course of Zosyn    #Pleural effusion  - c/w PleurX drainage    Rufus Childs MD  Pulmonary & Critical Care Patient is an 81 yo M w/ cardiac amyloid, HFpEF, chronic pleural effusions (w/ R PleurX), HTN, Afib (Eliquis), bradycardia s/p ppm, Parkinson's who was admitted on 8/9 after p/w SOB. Admitted for shock and ALAYNA requiring HD.    #ALAYNA - Likely 2/2 hypotension and recent outpatient Bactrim use as well as chronic Entresto use. Last HD session 8/11. Currently making good UOP with Lasix, creatinine plateaued and stable. Less volume overloaded on exam today.  #Metabolic acidosis  #Hyperkalemia  - Will hold diuretics for now  - Strict I/Os  - Monitor BMP and creatinine  - f/u renal recs  - Avoid nephrotoxic agents    #Shock - Septic vs cardiogenic/ADHF  - c/w PO vasopressors to maintain SBP > 95, which is usual BP as per wife and aide at bedside. Wean as tolerated  - Completed empiric course of Zosyn    #Pleural effusion  - c/w PleurX drainage    #Delirium  - Will increase Zyprexa to home dose  - Will encourage frequent reorientation during daytime    Rufus Childs MD  Pulmonary & Critical Care

## 2023-08-17 ENCOUNTER — TRANSCRIPTION ENCOUNTER (OUTPATIENT)
Age: 83
End: 2023-08-17

## 2023-08-17 LAB
ALBUMIN SERPL ELPH-MCNC: 3.7 G/DL — SIGNIFICANT CHANGE UP (ref 3.3–5)
ALP SERPL-CCNC: 56 U/L — SIGNIFICANT CHANGE UP (ref 40–120)
ALT FLD-CCNC: 7 U/L — SIGNIFICANT CHANGE UP (ref 4–41)
ANION GAP SERPL CALC-SCNC: 13 MMOL/L — SIGNIFICANT CHANGE UP (ref 7–14)
APTT BLD: 40.6 SEC — HIGH (ref 24.5–35.6)
AST SERPL-CCNC: 17 U/L — SIGNIFICANT CHANGE UP (ref 4–40)
BASE EXCESS BLDV CALC-SCNC: 1.9 MMOL/L — SIGNIFICANT CHANGE UP (ref -2–3)
BASOPHILS # BLD AUTO: 0.05 K/UL — SIGNIFICANT CHANGE UP (ref 0–0.2)
BASOPHILS NFR BLD AUTO: 0.6 % — SIGNIFICANT CHANGE UP (ref 0–2)
BILIRUB SERPL-MCNC: 0.3 MG/DL — SIGNIFICANT CHANGE UP (ref 0.2–1.2)
BLOOD GAS VENOUS COMPREHENSIVE RESULT: SIGNIFICANT CHANGE UP
BUN SERPL-MCNC: 38 MG/DL — HIGH (ref 7–23)
CALCIUM SERPL-MCNC: 9.7 MG/DL — SIGNIFICANT CHANGE UP (ref 8.4–10.5)
CHLORIDE BLDV-SCNC: 105 MMOL/L — SIGNIFICANT CHANGE UP (ref 96–108)
CHLORIDE SERPL-SCNC: 103 MMOL/L — SIGNIFICANT CHANGE UP (ref 98–107)
CO2 BLDV-SCNC: 28.6 MMOL/L — HIGH (ref 22–26)
CO2 SERPL-SCNC: 23 MMOL/L — SIGNIFICANT CHANGE UP (ref 22–31)
CREAT SERPL-MCNC: 3.05 MG/DL — HIGH (ref 0.5–1.3)
EGFR: 20 ML/MIN/1.73M2 — LOW
EOSINOPHIL # BLD AUTO: 0.06 K/UL — SIGNIFICANT CHANGE UP (ref 0–0.5)
EOSINOPHIL NFR BLD AUTO: 0.7 % — SIGNIFICANT CHANGE UP (ref 0–6)
GAS PNL BLDV: 136 MMOL/L — SIGNIFICANT CHANGE UP (ref 136–145)
GLUCOSE BLDV-MCNC: 112 MG/DL — HIGH (ref 70–99)
GLUCOSE SERPL-MCNC: 109 MG/DL — HIGH (ref 70–99)
HCO3 BLDV-SCNC: 27 MMOL/L — SIGNIFICANT CHANGE UP (ref 22–29)
HCT VFR BLD CALC: 27.6 % — LOW (ref 39–50)
HCT VFR BLDA CALC: 26 % — LOW (ref 39–51)
HGB BLD CALC-MCNC: 8.5 G/DL — LOW (ref 12.6–17.4)
HGB BLD-MCNC: 8.4 G/DL — LOW (ref 13–17)
IANC: 7.03 K/UL — SIGNIFICANT CHANGE UP (ref 1.8–7.4)
IMM GRANULOCYTES NFR BLD AUTO: 0.4 % — SIGNIFICANT CHANGE UP (ref 0–0.9)
INR BLD: 1.46 RATIO — HIGH (ref 0.85–1.18)
LACTATE BLDV-MCNC: 2 MMOL/L — SIGNIFICANT CHANGE UP (ref 0.5–2)
LYMPHOCYTES # BLD AUTO: 1.2 K/UL — SIGNIFICANT CHANGE UP (ref 1–3.3)
LYMPHOCYTES # BLD AUTO: 13.2 % — SIGNIFICANT CHANGE UP (ref 13–44)
MAGNESIUM SERPL-MCNC: 2 MG/DL — SIGNIFICANT CHANGE UP (ref 1.6–2.6)
MCHC RBC-ENTMCNC: 25.9 PG — LOW (ref 27–34)
MCHC RBC-ENTMCNC: 30.4 GM/DL — LOW (ref 32–36)
MCV RBC AUTO: 85.2 FL — SIGNIFICANT CHANGE UP (ref 80–100)
MONOCYTES # BLD AUTO: 0.69 K/UL — SIGNIFICANT CHANGE UP (ref 0–0.9)
MONOCYTES NFR BLD AUTO: 7.6 % — SIGNIFICANT CHANGE UP (ref 2–14)
NEUTROPHILS # BLD AUTO: 7.03 K/UL — SIGNIFICANT CHANGE UP (ref 1.8–7.4)
NEUTROPHILS NFR BLD AUTO: 77.5 % — HIGH (ref 43–77)
NRBC # BLD: 0 /100 WBCS — SIGNIFICANT CHANGE UP (ref 0–0)
NRBC # FLD: 0 K/UL — SIGNIFICANT CHANGE UP (ref 0–0)
PCO2 BLDV: 45 MMHG — SIGNIFICANT CHANGE UP (ref 42–55)
PH BLDV: 7.39 — SIGNIFICANT CHANGE UP (ref 7.32–7.43)
PHOSPHATE SERPL-MCNC: 4.1 MG/DL — SIGNIFICANT CHANGE UP (ref 2.5–4.5)
PLATELET # BLD AUTO: 213 K/UL — SIGNIFICANT CHANGE UP (ref 150–400)
PO2 BLDV: 37 MMHG — SIGNIFICANT CHANGE UP (ref 25–45)
POTASSIUM BLDV-SCNC: 4 MMOL/L — SIGNIFICANT CHANGE UP (ref 3.5–5.1)
POTASSIUM SERPL-MCNC: 4.2 MMOL/L — SIGNIFICANT CHANGE UP (ref 3.5–5.3)
POTASSIUM SERPL-SCNC: 4.2 MMOL/L — SIGNIFICANT CHANGE UP (ref 3.5–5.3)
PROT SERPL-MCNC: 6.5 G/DL — SIGNIFICANT CHANGE UP (ref 6–8.3)
PROTHROM AB SERPL-ACNC: 16.3 SEC — HIGH (ref 9.5–13)
RBC # BLD: 3.24 M/UL — LOW (ref 4.2–5.8)
RBC # FLD: 20.2 % — HIGH (ref 10.3–14.5)
SAO2 % BLDV: 51.2 % — LOW (ref 67–88)
SODIUM SERPL-SCNC: 139 MMOL/L — SIGNIFICANT CHANGE UP (ref 135–145)
WBC # BLD: 9.07 K/UL — SIGNIFICANT CHANGE UP (ref 3.8–10.5)
WBC # FLD AUTO: 9.07 K/UL — SIGNIFICANT CHANGE UP (ref 3.8–10.5)

## 2023-08-17 PROCEDURE — 99232 SBSQ HOSP IP/OBS MODERATE 35: CPT | Mod: GC

## 2023-08-17 PROCEDURE — 99233 SBSQ HOSP IP/OBS HIGH 50: CPT

## 2023-08-17 RX ORDER — FLUDROCORTISONE ACETATE 0.1 MG/1
1 TABLET ORAL
Qty: 30 | Refills: 0
Start: 2023-08-17 | End: 2023-09-15

## 2023-08-17 RX ORDER — MIRTAZAPINE 45 MG/1
1 TABLET, ORALLY DISINTEGRATING ORAL
Qty: 0 | Refills: 0 | DISCHARGE
Start: 2023-08-17

## 2023-08-17 RX ORDER — AMIODARONE HYDROCHLORIDE 400 MG/1
1 TABLET ORAL
Qty: 0 | Refills: 0 | DISCHARGE

## 2023-08-17 RX ORDER — FUROSEMIDE 40 MG
1 TABLET ORAL
Qty: 13 | Refills: 0
Start: 2023-08-17 | End: 2023-09-15

## 2023-08-17 RX ADMIN — Medication 3 MILLIGRAM(S): at 21:26

## 2023-08-17 RX ADMIN — CARBIDOPA AND LEVODOPA 1.5 TABLET(S): 25; 100 TABLET ORAL at 11:00

## 2023-08-17 RX ADMIN — APIXABAN 2.5 MILLIGRAM(S): 2.5 TABLET, FILM COATED ORAL at 05:07

## 2023-08-17 RX ADMIN — CARBIDOPA AND LEVODOPA 1.5 TABLET(S): 25; 100 TABLET ORAL at 00:10

## 2023-08-17 RX ADMIN — CHLORHEXIDINE GLUCONATE 1 APPLICATION(S): 213 SOLUTION TOPICAL at 11:00

## 2023-08-17 RX ADMIN — OLANZAPINE 5 MILLIGRAM(S): 15 TABLET, FILM COATED ORAL at 21:25

## 2023-08-17 RX ADMIN — CARBIDOPA AND LEVODOPA 1.5 TABLET(S): 25; 100 TABLET ORAL at 23:48

## 2023-08-17 RX ADMIN — APIXABAN 2.5 MILLIGRAM(S): 2.5 TABLET, FILM COATED ORAL at 17:04

## 2023-08-17 RX ADMIN — CARBIDOPA AND LEVODOPA 1.5 TABLET(S): 25; 100 TABLET ORAL at 17:04

## 2023-08-17 RX ADMIN — CARBIDOPA AND LEVODOPA 1.5 TABLET(S): 25; 100 TABLET ORAL at 05:07

## 2023-08-17 RX ADMIN — FLUDROCORTISONE ACETATE 0.1 MILLIGRAM(S): 0.1 TABLET ORAL at 05:07

## 2023-08-17 RX ADMIN — MIRTAZAPINE 7.5 MILLIGRAM(S): 45 TABLET, ORALLY DISINTEGRATING ORAL at 21:25

## 2023-08-17 NOTE — PROGRESS NOTE ADULT - SUBJECTIVE AND OBJECTIVE BOX
Interval Events: no significant overnight events.    HPI:  83 yo M with HTN, A fib on Eliquis, bradycardia s/p ppm, CHF, pleural effusions s/p R pleurX, Parkinson's presenting with shortness of breath x 1 day.  Pt poor historian due to dementia. Wife at bedside providing history. Overnight pt had been complaining of shortness of breath, coughing. Denies any chest pain, palpitations, no fevers or chills. No recent illness, No GI or urinary symptoms.    In ED pt was given Nabicarb, Ca gluconate and insulin, 1L NS  VS:  90/51  61  97.8  19  100% on BIPAP (09 Aug 2023 09:40)      SUBJECTIVE: Patient seen and examined at bedside.  Offers no complaints.       OBJECTIVE:  ICU Vital Signs Last 24 Hrs  T(C): 36.8 (12 Aug 2023 04:00), Max: 37 (11 Aug 2023 16:10)  T(F): 98.2 (12 Aug 2023 04:00), Max: 98.6 (11 Aug 2023 16:10)  HR: 60 (12 Aug 2023 06:00) (60 - 66)  BP: 101/39 (12 Aug 2023 06:15) (85/51 - 142/50)  BP(mean): 54 (12 Aug 2023 06:15) (41 - 81)  ABP: --  ABP(mean): --  RR: 12 (12 Aug 2023 06:00) (10 - 17)  SpO2: 100% (12 Aug 2023 06:00) (96% - 100%)    O2 Parameters below as of 12 Aug 2023 06:00  Patient On (Oxygen Delivery Method): room air          08-10 @ 07:01  -  08-11 @ 07:00  --------------------------------------------------------  IN: 1344.8 mL / OUT: 1540 mL / NET: -195.2 mL    08-11 @ 07:01  -  08-12 @ 06:42  --------------------------------------------------------  IN: 1234 mL / OUT: 1870 mL / NET: -636 mL      CAPILLARY BLOOD GLUCOSE          Physical Exam:   GENERAL: NAD, lying in bed  EYES: EOMI, PERRLA  ENT: Moist mucous membranes  HEART: S1, S2, regular rate and rhythm, no murmurs, rubs, or gallops  LUNGS: On BiPAP, decreased breath sounds in the left base, no crackles or wheezing appreciated   ABDOMEN: Soft, nontender  EXTREMITIES: 2+ peripheral pulses bilaterally. 2+ pitting edema to the knee   NEURO: A&Ox1, pleasantly confused at times. Follows commands, moves extremities spontaneously.       HOSPITAL MEDICATIONS:  Standing Meds:  apixaban 2.5 milliGRAM(s) Oral two times a day  carbidopa/levodopa  25/100 1.5 Tablet(s) Oral four times a day  chlorhexidine 2% Cloths 1 Application(s) Topical daily  dextrose 5%. 1000 milliLiter(s) IV Continuous <Continuous>  dextrose 5%. 1000 milliLiter(s) IV Continuous <Continuous>  dextrose 50% Injectable 25 Gram(s) IV Push once  dextrose 50% Injectable 25 Gram(s) IV Push once  dextrose 50% Injectable 12.5 Gram(s) IV Push once  glucagon  Injectable 1 milliGRAM(s) IntraMuscular once  melatonin 3 milliGRAM(s) Oral at bedtime  midodrine. 10 milliGRAM(s) Oral three times a day  norepinephrine Infusion 0.05 MICROgram(s)/kG/Min IV Continuous <Continuous>  OLANZapine 2.5 milliGRAM(s) Oral at bedtime  piperacillin/tazobactam IVPB.. 3.375 Gram(s) IV Intermittent every 12 hours      PRN Meds:  acetaminophen     Tablet .. 650 milliGRAM(s) Oral every 6 hours PRN  aluminum hydroxide/magnesium hydroxide/simethicone Suspension 30 milliLiter(s) Oral every 4 hours PRN  dextrose Oral Gel 15 Gram(s) Oral once PRN  melatonin 3 milliGRAM(s) Oral at bedtime PRN  ondansetron Injectable 4 milliGRAM(s) IV Push every 8 hours PRN  sodium chloride 0.9% Bolus. 100 milliLiter(s) IV Bolus every 5 minutes PRN      LABS:                        7.4    6.87  )-----------( 188      ( 12 Aug 2023 00:40 )             23.7     Hgb Trend: 7.4<--, 6.6<--, 7.3<--, 7.5<--, 7.3<--  08-12    139  |  102  |  35<H>  ----------------------------<  109<H>  3.4<L>   |  24  |  2.66<H>    Ca    8.7      12 Aug 2023 00:40  Phos  2.8     08-12  Mg     2.30     08-12    TPro  6.0  /  Alb  3.5  /  TBili  0.4  /  DBili  x   /  AST  20  /  ALT  9   /  AlkPhos  48  08-12    Creatinine Trend: 2.66<--, 2.58<--, 3.28<--, 4.17<--, 3.56<--, 4.87<--  PT/INR - ( 12 Aug 2023 00:25 )   PT: 21.3 sec;   INR: 1.92 ratio         PTT - ( 12 Aug 2023 00:25 )  PTT:35.1 sec  Urinalysis Basic - ( 12 Aug 2023 00:40 )    Color: x / Appearance: x / SG: x / pH: x  Gluc: 109 mg/dL / Ketone: x  / Bili: x / Urobili: x   Blood: x / Protein: x / Nitrite: x   Leuk Esterase: x / RBC: x / WBC x   Sq Epi: x / Non Sq Epi: x / Bacteria: x        Venous Blood Gas:  08-12 @ 00:25  7.41/42/49/27/78.2  VBG Lactate: 1.2  Venous Blood Gas:  08-11 @ 03:00  7.36/41/28/23/44.1  VBG Lactate: 1.5      MICROBIOLOGY:     Culture - Urine (collected 10 Aug 2023 09:28)  Source: Clean Catch Clean Catch (Midstream)  Final Report (11 Aug 2023 06:47):    No growth    Culture - Blood (collected 10 Aug 2023 04:00)  Source: .Blood Blood  Preliminary Report (11 Aug 2023 09:03):    No growth at 24 hours    Culture - Blood (collected 09 Aug 2023 14:20)  Source: .Blood Blood-Peripheral  Preliminary Report (11 Aug 2023 19:01):    No growth at 48 Hours      RADIOLOGY:  [ ] Reviewed and interpreted by me           Interval Events: no significant overnight events.    HPI:  83 yo M with HTN, A fib on Eliquis, bradycardia s/p ppm, CHF, pleural effusions s/p R pleurX, Parkinson's presenting with shortness of breath x 1 day.  Pt poor historian due to dementia. Wife at bedside providing history. Overnight pt had been complaining of shortness of breath, coughing. Denies any chest pain, palpitations, no fevers or chills. No recent illness, No GI or urinary symptoms.    In ED pt was given Nabicarb, Ca gluconate and insulin, 1L NS  VS:  90/51  61  97.8  19  100% on BIPAP (09 Aug 2023 09:40)      SUBJECTIVE: Patient seen and examined at bedside. Offers no complaints.       OBJECTIVE:  ICU Vital Signs Last 24 Hrs  T(C): 36.8 (12 Aug 2023 04:00), Max: 37 (11 Aug 2023 16:10)  T(F): 98.2 (12 Aug 2023 04:00), Max: 98.6 (11 Aug 2023 16:10)  HR: 60 (12 Aug 2023 06:00) (60 - 66)  BP: 101/39 (12 Aug 2023 06:15) (85/51 - 142/50)  BP(mean): 54 (12 Aug 2023 06:15) (41 - 81)  ABP: --  ABP(mean): --  RR: 12 (12 Aug 2023 06:00) (10 - 17)  SpO2: 100% (12 Aug 2023 06:00) (96% - 100%)    O2 Parameters below as of 12 Aug 2023 06:00  Patient On (Oxygen Delivery Method): room air          08-10 @ 07:01  -  08-11 @ 07:00  --------------------------------------------------------  IN: 1344.8 mL / OUT: 1540 mL / NET: -195.2 mL    08-11 @ 07:01  -  08-12 @ 06:42  --------------------------------------------------------  IN: 1234 mL / OUT: 1870 mL / NET: -636 mL      CAPILLARY BLOOD GLUCOSE          Physical Exam:   GENERAL: NAD, lying in bed  EYES: EOMI, PERRLA  ENT: Moist mucous membranes  HEART: S1, S2, regular rate and rhythm, no murmurs, rubs, or gallops  LUNGS: On BiPAP, decreased breath sounds in the left base, no crackles or wheezing appreciated   ABDOMEN: Soft, nontender  EXTREMITIES: 2+ peripheral pulses bilaterally. 2+ pitting edema to the knee   NEURO: A&Ox1, pleasantly confused at times. Follows commands, moves extremities spontaneously.       HOSPITAL MEDICATIONS:  Standing Meds:  apixaban 2.5 milliGRAM(s) Oral two times a day  carbidopa/levodopa  25/100 1.5 Tablet(s) Oral four times a day  chlorhexidine 2% Cloths 1 Application(s) Topical daily  dextrose 5%. 1000 milliLiter(s) IV Continuous <Continuous>  dextrose 5%. 1000 milliLiter(s) IV Continuous <Continuous>  dextrose 50% Injectable 25 Gram(s) IV Push once  dextrose 50% Injectable 25 Gram(s) IV Push once  dextrose 50% Injectable 12.5 Gram(s) IV Push once  glucagon  Injectable 1 milliGRAM(s) IntraMuscular once  melatonin 3 milliGRAM(s) Oral at bedtime  midodrine. 10 milliGRAM(s) Oral three times a day  norepinephrine Infusion 0.05 MICROgram(s)/kG/Min IV Continuous <Continuous>  OLANZapine 2.5 milliGRAM(s) Oral at bedtime  piperacillin/tazobactam IVPB.. 3.375 Gram(s) IV Intermittent every 12 hours      PRN Meds:  acetaminophen     Tablet .. 650 milliGRAM(s) Oral every 6 hours PRN  aluminum hydroxide/magnesium hydroxide/simethicone Suspension 30 milliLiter(s) Oral every 4 hours PRN  dextrose Oral Gel 15 Gram(s) Oral once PRN  melatonin 3 milliGRAM(s) Oral at bedtime PRN  ondansetron Injectable 4 milliGRAM(s) IV Push every 8 hours PRN  sodium chloride 0.9% Bolus. 100 milliLiter(s) IV Bolus every 5 minutes PRN      LABS:                        7.4    6.87  )-----------( 188      ( 12 Aug 2023 00:40 )             23.7     Hgb Trend: 7.4<--, 6.6<--, 7.3<--, 7.5<--, 7.3<--  08-12    139  |  102  |  35<H>  ----------------------------<  109<H>  3.4<L>   |  24  |  2.66<H>    Ca    8.7      12 Aug 2023 00:40  Phos  2.8     08-12  Mg     2.30     08-12    TPro  6.0  /  Alb  3.5  /  TBili  0.4  /  DBili  x   /  AST  20  /  ALT  9   /  AlkPhos  48  08-12    Creatinine Trend: 2.66<--, 2.58<--, 3.28<--, 4.17<--, 3.56<--, 4.87<--  PT/INR - ( 12 Aug 2023 00:25 )   PT: 21.3 sec;   INR: 1.92 ratio         PTT - ( 12 Aug 2023 00:25 )  PTT:35.1 sec  Urinalysis Basic - ( 12 Aug 2023 00:40 )    Color: x / Appearance: x / SG: x / pH: x  Gluc: 109 mg/dL / Ketone: x  / Bili: x / Urobili: x   Blood: x / Protein: x / Nitrite: x   Leuk Esterase: x / RBC: x / WBC x   Sq Epi: x / Non Sq Epi: x / Bacteria: x        Venous Blood Gas:  08-12 @ 00:25  7.41/42/49/27/78.2  VBG Lactate: 1.2  Venous Blood Gas:  08-11 @ 03:00  7.36/41/28/23/44.1  VBG Lactate: 1.5      MICROBIOLOGY:     Culture - Urine (collected 10 Aug 2023 09:28)  Source: Clean Catch Clean Catch (Midstream)  Final Report (11 Aug 2023 06:47):    No growth    Culture - Blood (collected 10 Aug 2023 04:00)  Source: .Blood Blood  Preliminary Report (11 Aug 2023 09:03):    No growth at 24 hours    Culture - Blood (collected 09 Aug 2023 14:20)  Source: .Blood Blood-Peripheral  Preliminary Report (11 Aug 2023 19:01):    No growth at 48 Hours      RADIOLOGY:  [ ] Reviewed and interpreted by me

## 2023-08-17 NOTE — DISCHARGE NOTE PROVIDER - NSDCCPCAREPLAN_GEN_ALL_CORE_FT
PRINCIPAL DISCHARGE DIAGNOSIS  Diagnosis: ALAYNA (acute kidney injury)  Assessment and Plan of Treatment: You were found to have acute kidney failure and were seen by a nephrologist. you were treated with hemodialysis. Your kidney function improved and hemodialysis was discontinued. Please make sure to follow up with a nephrologist outpatient. Seek immediate medical care if your symptoms return.     PRINCIPAL DISCHARGE DIAGNOSIS  Diagnosis: ALAYNA (acute kidney injury)  Assessment and Plan of Treatment: You were found to have acute kidney failure and were seen by a nephrologist. you were treated with hemodialysis. Your kidney function improved and hemodialysis was discontinued. Please make sure to follow up with a nephrologist outpatient. Please take lasix 40mg Monday, Wednesday, Friday as reccomded by nephrolog. Seek immediate medical care if your symptoms return.

## 2023-08-17 NOTE — PROGRESS NOTE ADULT - PROBLEM SELECTOR PLAN 1
Pt. w/ ALAYNA likely hemodynamic mediated and medication SE (TMP/SMX and Entresto). On review of sunrise/HIE patient likely has CKD stage 3A with baseline Scr 1.3-1.6. Last outpt labs reviewed on HIE showing Scr 1.64 on 4/20/23. On admission Scr 4.54, K 6.4, and SCO2 14. HD consent done and placed in chart. Kidney US with no hydronephrosis 8/11. Pt received IHD 8/9, 8/10, 8/11. Lasix IV 8/13-8/15. Pt. now off vasopressors and BIPAP. Labs reviewed, Scr 3.05 (stable) today and 24hr UOP 995cc. No further plan for HD. Continue to hold Entresto and TMP/SMX. Monitor labs, VS and urine output. Avoid nephrotoxins. Dose medications as per eGFR.    Final recommendations pending attending signature.    If you have any questions, please feel free to contact me  Torres Olivarez  Nephrology Fellow  [employer]/Page [phone]  (After 5pm or on weekends please page the on-call fellow)

## 2023-08-17 NOTE — DISCHARGE NOTE PROVIDER - NSDCFUSCHEDAPPT_GEN_ALL_CORE_FT
Rufus Oneal  [facility]  [place] [id number]  Scheduled Appointment: 09/18/2023    [facility] [address] [facility]  Scheduled Appointment: 11/03/2023    Pavan Jamil  [facility] [address]  Scheduled Appointment: 11/03/2023

## 2023-08-17 NOTE — PROGRESS NOTE ADULT - ASSESSMENT
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 Admitted to MICU for urgent HD i/so hypotension.      Plan:  ====================NEUROLOGY=======================  #Dementia, early, no acute issues   Home: Zyprexa, and melatonin at bedtime  AOx1 at baseline  - Following commands and responding to questions, at baseline MS  - c/w Zyprexa 5mg and melatonin 3mg at bedtime.   - restarted Mirtazapine 7.5 qhs, pt takes medication at home for MDD    #Parkinson's disease   - c/w Carbidopa-Levodopa    - PT and OT working with pt. Anticipate home with no skilled PT needs, patient is at his baseline.    ====================RESPIRATORY======================  #Pleural effusions    Likely in the setting of CHF vs renal failure vs less likely malignancy (negative cytology outpt)  R PleurX catheter placed 3/2022, drainage 3x a week as per spouse, last  time drained on 8/12 with 500 cc drained . will need to be drained today.   6/9 - CT chest showed new 2.3 cm right middle lobe nodule, possible malignancy? and NEW moderate left pleural effusion with complete left lower lobe compressive atelectasis  - pulmonary outpt follow up for pulmonary nodule, pt has h/o PLEFF w/ R pleurx catheter  - s/p urgent HD for fluid overload   - s/p BIPAP, now tolerating RA    ====================CARDIOVASCULAR==================  #Bradycardia s/p PPM   #CHFpEF   #Afib   #Hypotension   Home Meds: Eliquis, Entresto Olmesartan, Plavix?, Rosuvastatin   SPECT scan with amyloidosis outpatient, may be contributing to CHFpEF   TTE 1/2023 EF 73%, diastolic dysfunction and mod pHTN   EKG showing paced ventricular rhythm, no T wave abnormalities, given Ca gluconate x2 in ED   - TTE 8/10: Minimal MR, Moderate AR. Moderate concentric LV hypertrophy, grossly normal LVSfx  Normal RV size w/ reduced function, mild pulmonary hypertension. Left pleural effusion.  - off levophed since 8/14, SBP goal > 95, as per pt's spouse, pt's baseline SBP is 's at home  - started on midodrine to aid in weaning off levophed, Dc;d 8/16  - cortisol level 5.7, continue Florinef 0.1mg daily   - POCUS 8/12 showed preserved EF and underfilled LV, s/p albumin x 2  - s/p Lasix 80 mg IVP x 1 on 8/12, 8/13, 8/14, 8/15, cont strict intake and output, see renal     ====================/RENAL========================    #ALAYNA on CKD  #Metabolic acidosis with AG, concomitant resp acidosis-  resolved   Admit Scr 4.54>2.82. FeNa 0.9%   - likely has CKD stage 3A with baseline Scr 1.3-1.6. Cr now ~2.2s.  - hy[erkalemic to 6.4, S/p NaHCO3, Ca gluconate and insulin  - US kidney/bladder - Increased bilateral renal echogenicity, suggestive of medical renal disease. No hydronephrosis.  - Nephrology consulted - per renal ALAYNA likely hemodynamic mediated and medication SE's (TMP/SMX and Entresto)  - s/p RIJ shiley placement 8/9, removed 8/16  - s/p 3 sessions of HD 8/9- 8/11.   - s/p Lasix 80 mg IVP once daily 8/12-8/15, pt making urine  - -455mL in 24hrs, -5L LOS, voided 275mL yesterday  - passed TOV  - continue to monitor strict I/Os       ====================GI/NUTRITION=====================  Diet: renal regular, changed to DASH diet in s/o normal lytes and improving renal fx   - BMs+, cont bowel regimen    ====================INFECTIOUS DISEASE================  No fever, leukocytosis may be stress related vs infection, resolved. CT chest with GGO  - Previous pleural fluid culture negative   - s/p recently treated outpt w/ bactrim for UTI  - s/p Azithro (8/9)  and CTX (8/9) in ED  - s/p tx empirically with zosyn (8/09- 8/13 )  - UA - small leuks, WBC 19, urine and blood culture - NGTD, LA 0.9   - MRSA PCR neg    ====================ENDOCRINE=======================  A1C 5.4%  home med: farxiga  - BG 140s   - TSH 1.0    ====================HEMATOLOGIC/DVT PPx=============  On home ELIQUIS 5 BID, last taken 8/8 at 6pm - held for Shiley placement, and restarted 8/10  - given Kcentra on 8/9 for Eliquis reversal prior to shiley  - H/H stable in 7s  - no active bleeding     ====================ETHICS===========================  GOC: patient with prior MOLST DNR/DNI. 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 Admitted to MICU for urgent HD i/so hypotension.      Plan:  ====================NEUROLOGY=======================  #Dementia, early, no acute issues   Home: Zyprexa, and melatonin at bedtime  AOx1 at baseline  - Following commands and responding to questions, at baseline MS  - c/w Zyprexa 5mg and melatonin 3mg at bedtime.   - restarted Mirtazapine 7.5 qhs, pt takes medication at home for MDD    #Parkinson's disease   - c/w Carbidopa-Levodopa    - PT and OT working with pt. Anticipate home with no skilled PT needs, patient is at his baseline.    ====================RESPIRATORY======================  #Pleural effusions    Likely in the setting of CHF vs renal failure vs less likely malignancy (negative cytology outpt)  R PleurX catheter placed 3/2022, drainage 3x a week as per spouse, last  time drained on 8/12 with 500 cc drained . will need to be drained today.   6/9 - CT chest showed new 2.3 cm right middle lobe nodule, possible malignancy? and NEW moderate left pleural effusion with complete left lower lobe compressive atelectasis  - pulmonary outpt follow up for pulmonary nodule, pt has h/o PLEF w/ R pleurx catheter  - s/p urgent HD for fluid overload   - s/p BIPAP, now tolerating RA    ====================CARDIOVASCULAR==================  #Bradycardia s/p PPM   #CHFpEF   #Afib   #Hypotension   Home Meds: Eliquis, Entresto Olmesartan, Plavix?, Rosuvastatin   SPECT scan with amyloidosis outpatient, may be contributing to CHFpEF   TTE 1/2023 EF 73%, diastolic dysfunction and mod pHTN   EKG showing paced ventricular rhythm, no T wave abnormalities, given Ca gluconate x2 in ED   - TTE 8/10: Minimal MR, Moderate AR. Moderate concentric LV hypertrophy, grossly normal LVSfx  Normal RV size w/ reduced function, mild pulmonary hypertension. Left pleural effusion.  - off levophed since 8/14, SBP goal > 95, as per pt's spouse, pt's baseline SBP is 's at home  - started on midodrine to aid in weaning off levophed, Dc;d 8/16  - cortisol level 5.7, continue Florinef 0.1mg daily   - POCUS 8/12 showed preserved EF and underfilled LV, s/p albumin x 2  - s/p Lasix 80 mg IVP 8/12-8/15, now holding off given adequate urine output, cont strict intake and output, see renal     ====================/RENAL========================    #ALAYNA on CKD  #Metabolic acidosis with AG, concomitant resp acidosis-  resolved   Admit Scr 4.54>2.82. FeNa 0.9%   - likely has CKD stage 3A with baseline Scr 1.3-1.6. Cr now downtrending - 3.24>3.05  - hyperkalemic to 6.4, S/p NaHCO3, Ca gluconate and insulin  - US kidney/bladder - Increased bilateral renal echogenicity, suggestive of medical renal disease. No hydronephrosis.  - Nephrology consulted - per renal ALAYNA likely hemodynamic mediated and medication SE's (TMP/SMX and Entresto)  - s/p RIJ shiley placement 8/9, removed 8/16  - s/p 3 sessions of HD 8/9- 8/11. no more HD per renal.  - s/p Lasix 80 mg IVP 8/12-8/15, holding off further diuresis for now. pt making urine  - net neg 1L in 24hrs, -6.2L LOS, voided 275mL yesterday  - means removed, passed TOV  - continue to monitor strict I/Os     ====================GI/NUTRITION=====================  Diet: renal regular, changed to DASH diet in s/o normal lytes and improving renal fx   - BMs+, continue bowel regimen    ====================INFECTIOUS DISEASE================  No fever, leukocytosis may be stress related vs infection, resolved. CT chest with GGO  - Previous pleural fluid culture negative   - recently treated outpt w/ bactrim for UTI  - s/p Azithro (8/9)  and CTX (8/9) in ED  - s/p tx empirically with zosyn (8/09- 8/13 )  - UA - small leuks, WBC 19, urine and blood culture - NGTD, LA 0.9   - MRSA PCR neg    ====================ENDOCRINE=======================  A1C 5.4%  home med: farxiga  - BG 140s   - TSH 1.0    ====================HEMATOLOGIC/DVT PPx=============  On home ELIQUIS 5 BID, last taken 8/8 at 6pm - held for Shiley placement, and restarted 8/10  - given Kcentra on 8/9 for Eliquis reversal prior to shiley  - H/H stable in 7s  - no active bleeding     ====================ETHICS===========================  GOC: patient with prior MOLST DNR/DNI. 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 Admitted to MICU for urgent HD i/so hypotension.       Plan:  ====================NEUROLOGY=======================  #Dementia, early, no acute issues   Home: Zyprexa, and melatonin at bedtime  AOx1 at baseline  - Following commands and responding to questions, at baseline MS  - c/w Zyprexa 5mg and melatonin 3mg at bedtime.   - restarted Mirtazapine 7.5 qhs, pt takes medication at home for MDD    #Parkinson's disease   - c/w Carbidopa-Levodopa    - PT and OT working with pt. Anticipate home with no skilled PT needs, patient is at his baseline.    ====================RESPIRATORY======================  #Pleural effusions    Likely in the setting of CHF vs renal failure vs less likely malignancy (negative cytology outpt)  R PleurX catheter placed 3/2022, drainage 3x a week as per spouse, last  time drained on 8/12 with 500 cc drained . will need to be drained today.   6/9 - CT chest showed new 2.3 cm right middle lobe nodule, possible malignancy? and NEW moderate left pleural effusion with complete left lower lobe compressive atelectasis  - pulmonary outpt follow up for pulmonary nodule, pt has h/o PLEF w/ R pleurx catheter  - s/p urgent HD for fluid overload   - s/p BIPAP, now tolerating RA    ====================CARDIOVASCULAR==================  #Bradycardia s/p PPM   #CHFpEF   #Afib   #Hypotension   Home Meds: Eliquis, Entresto Olmesartan, Plavix?, Rosuvastatin   SPECT scan with amyloidosis outpatient, may be contributing to CHFpEF   TTE 1/2023 EF 73%, diastolic dysfunction and mod pHTN   EKG showing paced ventricular rhythm, no T wave abnormalities, given Ca gluconate x2 in ED   - TTE 8/10: Minimal MR, Moderate AR. Moderate concentric LV hypertrophy, grossly normal LVSfx  Normal RV size w/ reduced function, mild pulmonary hypertension. Left pleural effusion.  - off levophed since 8/14, SBP goal > 95, as per pt's spouse, pt's baseline SBP is 's at home  - started on midodrine to aid in weaning off levophed, Dc;d 8/16  - cortisol level 5.7, continue Florinef 0.1mg daily   - POCUS 8/12 showed preserved EF and underfilled LV, s/p albumin x 2  - s/p Lasix 80 mg IVP 8/12-8/15, now holding off given adequate urine output, cont strict intake and output, see renal     ====================/RENAL========================    #ALAYNA on CKD  #Metabolic acidosis with AG, concomitant resp acidosis-  resolved   Admit Scr 4.54>2.82. FeNa 0.9%   - likely has CKD stage 3A with baseline Scr 1.3-1.6. Cr now downtrending - 3.24>3.05  - hyperkalemic to 6.4, S/p NaHCO3, Ca gluconate and insulin  - US kidney/bladder - Increased bilateral renal echogenicity, suggestive of medical renal disease. No hydronephrosis.  - Nephrology consulted - per renal ALAYNA likely hemodynamic mediated and medication SE's (TMP/SMX and Entresto)  - s/p RIJ shiley placement 8/9, removed 8/16  - s/p 3 sessions of HD 8/9- 8/11. no more HD per renal.  - s/p Lasix 80 mg IVP 8/12-8/15, holding off further diuresis for now. pt making urine  - net neg 1L in 24hrs, -6.2L LOS, voided 275mL yesterday  - means removed, passed TOV  - continue to monitor strict I/Os     ====================GI/NUTRITION=====================  Diet: renal regular, changed to DASH diet in s/o normal lytes and improving renal fx   - BMs+, continue bowel regimen    ====================INFECTIOUS DISEASE================  No fever, leukocytosis may be stress related vs infection, resolved. CT chest with GGO  - Previous pleural fluid culture negative   - recently treated outpt w/ bactrim for UTI  - s/p Azithro (8/9)  and CTX (8/9) in ED  - s/p tx empirically with zosyn (8/09- 8/13 )  - UA - small leuks, WBC 19, urine and blood culture - NGTD, LA 0.9   - MRSA PCR neg    ====================ENDOCRINE=======================  A1C 5.4%  home med: farxiga  - BG 140s   - TSH 1.0    ====================HEMATOLOGIC/DVT PPx=============  On home ELIQUIS 5 BID, last taken 8/8 at 6pm - held for Shiley placement, and restarted 8/10  - given Kcentra on 8/9 for Eliquis reversal prior to shiley  - H/H stable in 7s  - no active bleeding     ====================ETHICS===========================  GOC: patient with prior MOLST DNR/DNI. As per discussion with pt, wife and daughter, and pt's and family's wishes, pt is DNR/DNI     Dispo: Medicine    LINES: PIV's, julio removed 8/15    Social worked consulted for possible discharge homw.          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 Admitted to MICU for urgent HD i/so hypotension.       Plan:  ====================NEUROLOGY=======================  #Dementia, early, no acute issues   Home: Zyprexa, and melatonin at bedtime  AOx1 at baseline  - Following commands and responding to questions, at baseline MS  - c/w Zyprexa 5mg and melatonin 3mg at bedtime.   - restarted Mirtazapine 7.5 qhs, pt takes medication at home for MDD    #Parkinson's disease   - c/w Carbidopa-Levodopa    - PT and OT working with pt. Anticipate home with no skilled PT needs, patient is at his baseline.    ====================RESPIRATORY======================  #Pleural effusions    Likely in the setting of CHF vs renal failure vs less likely malignancy (negative cytology outpt)  R PleurX catheter placed 3/2022, drainage 3x a week as per spouse, last  time drained on 8/12 with 500 cc drained . will need to be drained today.   6/9 - CT chest showed new 2.3 cm right middle lobe nodule, possible malignancy? and NEW moderate left pleural effusion with complete left lower lobe compressive atelectasis  - pulmonary outpt follow up for pulmonary nodule, pt has h/o PLEF w/ R pleurx catheter  - s/p urgent HD for fluid overload   - s/p BIPAP, now tolerating RA    ====================CARDIOVASCULAR==================  #Bradycardia s/p PPM   #CHFpEF   #Afib   #Hypotension   Home Meds: Eliquis, Entresto Olmesartan, Plavix?, Rosuvastatin   SPECT scan with amyloidosis outpatient, may be contributing to CHFpEF   TTE 1/2023 EF 73%, diastolic dysfunction and mod pHTN   EKG showing paced ventricular rhythm, no T wave abnormalities, given Ca gluconate x2 in ED   - TTE 8/10: Minimal MR, Moderate AR. Moderate concentric LV hypertrophy, grossly normal LVSfx  Normal RV size w/ reduced function, mild pulmonary hypertension. Left pleural effusion.  - off levophed since 8/14, SBP goal > 95, as per pt's spouse, pt's baseline SBP is 's at home  - started on midodrine to aid in weaning off levophed, Dc;d 8/16  - cortisol level 5.7, continue Florinef 0.1mg daily   - POCUS 8/12 showed preserved EF and underfilled LV, s/p albumin x 2  - s/p Lasix 80 mg IVP 8/12-8/15, now holding off given adequate urine output, cont strict intake and output, see renal     ====================/RENAL========================    #ALAYNA on CKD  #Metabolic acidosis with AG, concomitant resp acidosis-  resolved   Admit Scr 4.54>2.82. FeNa 0.9%   - likely has CKD stage 3A with baseline Scr 1.3-1.6. Cr now downtrending - 3.24>3.05  - hyperkalemic to 6.4, S/p NaHCO3, Ca gluconate and insulin  - US kidney/bladder - Increased bilateral renal echogenicity, suggestive of medical renal disease. No hydronephrosis.  - Nephrology consulted - per renal ALAYNA likely hemodynamic mediated and medication SE's (TMP/SMX and Entresto)  - s/p RIJ shiley placement 8/9, removed 8/16  - s/p 3 sessions of HD 8/9- 8/11. no more HD per renal.  - s/p Lasix 80 mg IVP 8/12-8/15, holding off further diuresis for now. pt making urine  - net neg 1L in 24hrs, -6.2L LOS, voided 275mL yesterday  - means removed, passed TOV  - continue to monitor strict I/Os     ====================GI/NUTRITION=====================  Diet: renal regular, changed to DASH diet in s/o normal lytes and improving renal fx   - BMs+, continue bowel regimen    ====================INFECTIOUS DISEASE================  No fever, leukocytosis may be stress related vs infection, resolved. CT chest with GGO  - Previous pleural fluid culture negative   - recently treated outpt w/ bactrim for UTI  - s/p Azithro (8/9)  and CTX (8/9) in ED  - s/p tx empirically with zosyn (8/09- 8/13 )  - UA - small leuks, WBC 19, urine and blood culture - NGTD, LA 0.9   - MRSA PCR neg    ====================ENDOCRINE=======================  A1C 5.4%  home med: farxiga  - BG 140s   - TSH 1.0    ====================HEMATOLOGIC/DVT PPx=============  On home ELIQUIS 5 BID, last taken 8/8 at 6pm - held for Shiley placement, and restarted 8/10  - given Kcentra on 8/9 for Eliquis reversal prior to shiley  - H/H stable in 7s  - no active bleeding     ====================ETHICS===========================  GOC: patient with prior MOLST DNR/DNI. 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 Admitted to MICU for urgent HD i/so hypotension.       Plan:  ====================NEUROLOGY=======================  #Dementia, early, no acute issues   Home: Zyprexa, and melatonin at bedtime  AOx1 at baseline  - Following commands and responding to questions, at baseline MS  - c/w Zyprexa 5mg and melatonin 3mg at bedtime.   - restarted Mirtazapine 7.5 qhs, pt takes medication at home for MDD    #Parkinson's disease   - c/w Carbidopa-Levodopa    - PT and OT working with pt. Anticipate home with no skilled PT needs, patient is at his baseline.    ====================RESPIRATORY======================  #Pleural effusions    Likely in the setting of CHF vs renal failure vs less likely malignancy (negative cytology outpt)  R PleurX catheter placed 3/2022, drainage 3x a week as per spouse, last  time drained on 8/12 with 500 cc drained . will need to be drained today.   6/9 - CT chest showed new 2.3 cm right middle lobe nodule, possible malignancy? and NEW moderate left pleural effusion with complete left lower lobe compressive atelectasis  - pulmonary outpt follow up for pulmonary nodule, pt has h/o PLEF w/ R pleurx catheter  - s/p urgent HD for fluid overload   - s/p BIPAP, now tolerating RA    ====================CARDIOVASCULAR==================  #Bradycardia s/p PPM   #CHFpEF   #Afib   #Hypotension   Home Meds: Eliquis, Entresto Olmesartan, Plavix?, Rosuvastatin   SPECT scan with amyloidosis outpatient, may be contributing to CHFpEF   TTE 1/2023 EF 73%, diastolic dysfunction and mod pHTN   EKG showing paced ventricular rhythm, no T wave abnormalities, given Ca gluconate x2 in ED   - TTE 8/10: Minimal MR, Moderate AR. Moderate concentric LV hypertrophy, grossly normal LVSfx  Normal RV size w/ reduced function, mild pulmonary hypertension. Left pleural effusion.  - off levophed since 8/14, SBP goal > 95, as per pt's spouse, pt's baseline SBP is 's at home  - started on midodrine to aid in weaning off levophed, Dc;d 8/16  - cortisol level 5.7, continue Florinef 0.1mg daily   - POCUS 8/12 showed preserved EF and underfilled LV, s/p albumin x 2  - s/p Lasix 80 mg IVP 8/12-8/15, now holding off given adequate urine output, cont strict intake and output, see renal     ====================/RENAL========================    #ALAYNA on CKD  #Metabolic acidosis with AG, concomitant resp acidosis-  resolved   Admit Scr 4.54>2.82. FeNa 0.9%   - likely has CKD stage 3A with baseline Scr 1.3-1.6. Cr now downtrending - 3.24>3.05  - hyperkalemic to 6.4, S/p NaHCO3, Ca gluconate and insulin  - US kidney/bladder - Increased bilateral renal echogenicity, suggestive of medical renal disease. No hydronephrosis.  - Nephrology consulted - per renal ALAYNA likely hemodynamic mediated and medication SE's (TMP/SMX and Entresto)  - s/p RIJ shiley placement 8/9, removed 8/16  - s/p 3 sessions of HD 8/9- 8/11. no more HD per renal.  - s/p Lasix 80 mg IVP 8/12-8/15, holding off further diuresis for now. pt making urine  - renal recommending d/c on lasix 40mg M/W/F  - net neg 1L in 24hrs, -6.2L LOS, voided 275mL yesterday  - means removed, passed TOV  - continue to monitor strict I/Os     ====================GI/NUTRITION=====================  Diet: renal regular, changed to DASH diet in s/o normal lytes and improving renal fx   - BMs+, continue bowel regimen    ====================INFECTIOUS DISEASE================  No fever, leukocytosis may be stress related vs infection, resolved. CT chest with GGO  - Previous pleural fluid culture negative   - recently treated outpt w/ bactrim for UTI  - s/p Azithro (8/9)  and CTX (8/9) in ED  - s/p tx empirically with zosyn (8/09- 8/13 )  - UA - small leuks, WBC 19, urine and blood culture - NGTD, LA 0.9   - MRSA PCR neg    ====================ENDOCRINE=======================  A1C 5.4%  home med: farxiga  - BG 140s   - TSH 1.0    ====================HEMATOLOGIC/DVT PPx=============  On home ELIQUIS 5 BID, last taken 8/8 at 6pm - held for Shiley placement, and restarted 8/10  - given Kcentra on 8/9 for Eliquis reversal prior to shiley  - H/H stable in 7s  - no active bleeding     ====================ETHICS===========================  GOC: patient with prior MOLST DNR/DNI. As per discussion with pt, wife and daughter, and pt's and family's wishes, pt is DNR/DNI     Dispo: Medicine    LINES: DAY's, julio removed 8/15    Social worked consulted for possible discharge home

## 2023-08-17 NOTE — DISCHARGE NOTE PROVIDER - NSDCMRMEDTOKEN_GEN_ALL_CORE_FT
ascorbic acid 500 mg oral tablet: 1 tab(s) orally once a day  carbidopa-levodopa 25 mg-100 mg oral tablet: 1.5 tab(s) orally 4 times a day  collagenase 250 units/g topical ointment: 1 application topically once a day  wound 3cmx1.5cmx0.2cm   Eliquis 2.5 mg oral tablet: 1 tab(s) orally 2 times a day. hosp /home  resume on isa 10/23   fludrocortisone 0.1 mg oral tablet: 1 tab(s) orally once a day  Melatonin 1 mg oral tablet: 4 tab(s) orally once a day (at bedtime)  mirtazapine 7.5 mg oral tablet: 1 tab(s) orally once a day (at bedtime)  OLANZapine 2.5 mg oral tablet: 1 tab(s) orally once a day (at bedtime)  Pleur-X catheter Drain: 1 application orally 3 times a week, max drainage 500ml per session.  polyethylene glycol 3350 oral powder for reconstitution: 17 gram(s) orally once a day hosp  senna leaf extract oral tablet: 2 tab(s) orally once a day (at bedtime) hosp   ascorbic acid 500 mg oral tablet: 1 tab(s) orally once a day  carbidopa-levodopa 25 mg-100 mg oral tablet: 1.5 tab(s) orally 4 times a day  collagenase 250 units/g topical ointment: 1 application topically once a day  wound 3cmx1.5cmx0.2cm   Eliquis 2.5 mg oral tablet: 1 tab(s) orally 2 times a day. hosp /home  resume on isa 10/23   fludrocortisone 0.1 mg oral tablet: 1 tab(s) orally once a day  furosemide 40 mg oral tablet: 1 tab(s) orally Monday, Wednesday, and Friday  Melatonin 1 mg oral tablet: 4 tab(s) orally once a day (at bedtime)  mirtazapine 7.5 mg oral tablet: 1 tab(s) orally once a day (at bedtime)  OLANZapine 2.5 mg oral tablet: 1 tab(s) orally once a day (at bedtime)  Pleur-X catheter Drain: 1 application orally 3 times a week, max drainage 500ml per session.  polyethylene glycol 3350 oral powder for reconstitution: 17 gram(s) orally once a day hosp  senna leaf extract oral tablet: 2 tab(s) orally once a day (at bedtime) hosp   ascorbic acid 500 mg oral tablet: 1 tab(s) orally once a day  carbidopa-levodopa 25 mg-100 mg oral tablet: 1.5 tab(s) orally 4 times a day  collagenase 250 units/g topical ointment: 1 application topically once a day  wound 3cmx1.5cmx0.2cm   Eliquis 2.5 mg oral tablet: 1 tab(s) orally 2 times a day. hosp /home  resume on isa 10/23   fludrocortisone 0.1 mg oral tablet: 1 tab(s) orally once a day  fludrocortisone 0.1 mg oral tablet: 1 tab(s) orally once a day  furosemide 40 mg oral tablet: 1 tab(s) orally Monday, Wednesday, and Friday  Melatonin 1 mg oral tablet: 4 tab(s) orally once a day (at bedtime)  mirtazapine 7.5 mg oral tablet: 1 tab(s) orally once a day (at bedtime)  OLANZapine 2.5 mg oral tablet: 1 tab(s) orally once a day (at bedtime)  Plavix 75 mg oral tablet: 75 milligram(s) orally once a day  Pleur-X catheter Drain: 1 application orally 3 times a week, max drainage 500ml per session.  polyethylene glycol 3350 oral powder for reconstitution: 17 gram(s) orally once a day hosp  senna leaf extract oral tablet: 2 tab(s) orally once a day (at bedtime) hosp   ascorbic acid 500 mg oral tablet: 1 tab(s) orally once a day  carbidopa-levodopa 25 mg-100 mg oral tablet: 1.5 tab(s) orally 4 times a day  collagenase 250 units/g topical ointment: 1 application topically once a day  wound 3cmx1.5cmx0.2cm   Eliquis 2.5 mg oral tablet: 1 tab(s) orally 2 times a day. hosp /home  resume on isa 10/23   fludrocortisone 0.1 mg oral tablet: 1 tab(s) orally once a day  fludrocortisone 0.1 mg oral tablet: 1 tab(s) orally once a day  furosemide 40 mg oral tablet: 1 tab(s) orally Monday, Wednesday, and Friday  Melatonin 1 mg oral tablet: 4 tab(s) orally once a day (at bedtime)  mirtazapine 7.5 mg oral tablet: 1 tab(s) orally once a day (at bedtime)  OLANZapine 2.5 mg oral tablet: 1 tab(s) orally once a day (at bedtime)  Plavix 75 mg oral tablet: 75 milligram(s) orally once a day  Pleur-X catheter Drain: 1 application orally 3 times a week, max drainage 500ml per session.  polyethylene glycol 3350 oral powder for reconstitution: 17 gram(s) orally once a day hosp  senna leaf extract oral tablet: 2 tab(s) orally once a day (at bedtime) hosp  Vyndamax 61 mg oral capsule: 1 orally once a day   ascorbic acid 500 mg oral tablet: 1 tab(s) orally once a day  carbidopa-levodopa 25 mg-100 mg oral tablet: 1.5 tab(s) orally 4 times a day  collagenase 250 units/g topical ointment: 1 application topically once a day  wound 3cmx1.5cmx0.2cm   Eliquis 2.5 mg oral tablet: 1 tab(s) orally 2 times a day. hosp /home  resume on isa 10/23   fludrocortisone 0.1 mg oral tablet: 1 tab(s) orally once a day  fludrocortisone 0.1 mg oral tablet: 1 tab(s) orally once a day  fludrocortisone 0.1 mg oral tablet: 1 tab(s) orally once a day  furosemide 40 mg oral tablet: 1 tab(s) orally Monday, Wednesday, and Friday  Melatonin 1 mg oral tablet: 4 tab(s) orally once a day (at bedtime)  mirtazapine 7.5 mg oral tablet: 1 tab(s) orally once a day (at bedtime)  OLANZapine 2.5 mg oral tablet: 1 tab(s) orally once a day (at bedtime)  Plavix 75 mg oral tablet: 75 milligram(s) orally once a day  Pleur-X catheter Drain: 1 application orally 3 times a week, max drainage 500ml per session.  polyethylene glycol 3350 oral powder for reconstitution: 17 gram(s) orally once a day hosp  senna leaf extract oral tablet: 2 tab(s) orally once a day (at bedtime) hosp  Vyndamax 61 mg oral capsule: 1 orally once a day

## 2023-08-17 NOTE — DISCHARGE NOTE PROVIDER - NSFOLLOWUPCLINICS_GEN_ALL_ED_FT
[facility]  Nephrology  [address] [place] [address], [address] [place] [address]  Phone: [phone]  Fax:

## 2023-08-18 VITALS
HEART RATE: 60 BPM | OXYGEN SATURATION: 100 % | TEMPERATURE: 96 F | RESPIRATION RATE: 12 BRPM | SYSTOLIC BLOOD PRESSURE: 96 MMHG | DIASTOLIC BLOOD PRESSURE: 43 MMHG

## 2023-08-18 LAB
ALBUMIN SERPL ELPH-MCNC: 3.5 G/DL — SIGNIFICANT CHANGE UP (ref 3.3–5)
ALP SERPL-CCNC: 52 U/L — SIGNIFICANT CHANGE UP (ref 40–120)
ALT FLD-CCNC: 7 U/L — SIGNIFICANT CHANGE UP (ref 4–41)
ANION GAP SERPL CALC-SCNC: 15 MMOL/L — HIGH (ref 7–14)
APTT BLD: 39.6 SEC — HIGH (ref 24.5–35.6)
AST SERPL-CCNC: 16 U/L — SIGNIFICANT CHANGE UP (ref 4–40)
BASOPHILS # BLD AUTO: 0.03 K/UL — SIGNIFICANT CHANGE UP (ref 0–0.2)
BASOPHILS NFR BLD AUTO: 0.4 % — SIGNIFICANT CHANGE UP (ref 0–2)
BILIRUB SERPL-MCNC: 0.4 MG/DL — SIGNIFICANT CHANGE UP (ref 0.2–1.2)
BUN SERPL-MCNC: 38 MG/DL — HIGH (ref 7–23)
CALCIUM SERPL-MCNC: 9.2 MG/DL — SIGNIFICANT CHANGE UP (ref 8.4–10.5)
CHLORIDE SERPL-SCNC: 102 MMOL/L — SIGNIFICANT CHANGE UP (ref 98–107)
CO2 SERPL-SCNC: 21 MMOL/L — LOW (ref 22–31)
CREAT SERPL-MCNC: 2.69 MG/DL — HIGH (ref 0.5–1.3)
EGFR: 23 ML/MIN/1.73M2 — LOW
EOSINOPHIL # BLD AUTO: 0.04 K/UL — SIGNIFICANT CHANGE UP (ref 0–0.5)
EOSINOPHIL NFR BLD AUTO: 0.5 % — SIGNIFICANT CHANGE UP (ref 0–6)
GLUCOSE SERPL-MCNC: 106 MG/DL — HIGH (ref 70–99)
HCT VFR BLD CALC: 24.9 % — LOW (ref 39–50)
HGB BLD-MCNC: 7.5 G/DL — LOW (ref 13–17)
IANC: 6.09 K/UL — SIGNIFICANT CHANGE UP (ref 1.8–7.4)
IMM GRANULOCYTES NFR BLD AUTO: 0.3 % — SIGNIFICANT CHANGE UP (ref 0–0.9)
LYMPHOCYTES # BLD AUTO: 1 K/UL — SIGNIFICANT CHANGE UP (ref 1–3.3)
LYMPHOCYTES # BLD AUTO: 12.6 % — LOW (ref 13–44)
MAGNESIUM SERPL-MCNC: 1.9 MG/DL — SIGNIFICANT CHANGE UP (ref 1.6–2.6)
MCHC RBC-ENTMCNC: 25.3 PG — LOW (ref 27–34)
MCHC RBC-ENTMCNC: 30.1 GM/DL — LOW (ref 32–36)
MCV RBC AUTO: 83.8 FL — SIGNIFICANT CHANGE UP (ref 80–100)
MONOCYTES # BLD AUTO: 0.73 K/UL — SIGNIFICANT CHANGE UP (ref 0–0.9)
MONOCYTES NFR BLD AUTO: 9.2 % — SIGNIFICANT CHANGE UP (ref 2–14)
NEUTROPHILS # BLD AUTO: 6.09 K/UL — SIGNIFICANT CHANGE UP (ref 1.8–7.4)
NEUTROPHILS NFR BLD AUTO: 77 % — SIGNIFICANT CHANGE UP (ref 43–77)
NRBC # BLD: 0 /100 WBCS — SIGNIFICANT CHANGE UP (ref 0–0)
NRBC # FLD: 0 K/UL — SIGNIFICANT CHANGE UP (ref 0–0)
PHOSPHATE SERPL-MCNC: 4 MG/DL — SIGNIFICANT CHANGE UP (ref 2.5–4.5)
PLATELET # BLD AUTO: 192 K/UL — SIGNIFICANT CHANGE UP (ref 150–400)
POTASSIUM SERPL-MCNC: 4 MMOL/L — SIGNIFICANT CHANGE UP (ref 3.5–5.3)
POTASSIUM SERPL-SCNC: 4 MMOL/L — SIGNIFICANT CHANGE UP (ref 3.5–5.3)
PROT SERPL-MCNC: 6.2 G/DL — SIGNIFICANT CHANGE UP (ref 6–8.3)
RBC # BLD: 2.97 M/UL — LOW (ref 4.2–5.8)
RBC # FLD: 20 % — HIGH (ref 10.3–14.5)
SODIUM SERPL-SCNC: 138 MMOL/L — SIGNIFICANT CHANGE UP (ref 135–145)
WBC # BLD: 7.91 K/UL — SIGNIFICANT CHANGE UP (ref 3.8–10.5)
WBC # FLD AUTO: 7.91 K/UL — SIGNIFICANT CHANGE UP (ref 3.8–10.5)

## 2023-08-18 PROCEDURE — 99233 SBSQ HOSP IP/OBS HIGH 50: CPT

## 2023-08-18 PROCEDURE — 99233 SBSQ HOSP IP/OBS HIGH 50: CPT | Mod: GC

## 2023-08-18 RX ORDER — FLUDROCORTISONE ACETATE 0.1 MG/1
1 TABLET ORAL
Qty: 30 | Refills: 0
Start: 2023-08-18 | End: 2023-09-16

## 2023-08-18 RX ORDER — MAGNESIUM SULFATE 500 MG/ML
1 VIAL (ML) INJECTION ONCE
Refills: 0 | Status: COMPLETED | OUTPATIENT
Start: 2023-08-18 | End: 2023-08-18

## 2023-08-18 RX ORDER — CLOPIDOGREL BISULFATE 75 MG/1
75 TABLET, FILM COATED ORAL
Qty: 0 | Refills: 0 | DISCHARGE
Start: 2023-08-18

## 2023-08-18 RX ORDER — FUROSEMIDE 40 MG
80 TABLET ORAL ONCE
Refills: 0 | Status: DISCONTINUED | OUTPATIENT
Start: 2023-08-18 | End: 2023-08-18

## 2023-08-18 RX ORDER — FUROSEMIDE 40 MG
1 TABLET ORAL
Qty: 13 | Refills: 0
Start: 2023-08-18 | End: 2023-09-16

## 2023-08-18 RX ADMIN — APIXABAN 2.5 MILLIGRAM(S): 2.5 TABLET, FILM COATED ORAL at 05:36

## 2023-08-18 RX ADMIN — CARBIDOPA AND LEVODOPA 1.5 TABLET(S): 25; 100 TABLET ORAL at 12:02

## 2023-08-18 RX ADMIN — Medication 100 GRAM(S): at 01:54

## 2023-08-18 RX ADMIN — CHLORHEXIDINE GLUCONATE 1 APPLICATION(S): 213 SOLUTION TOPICAL at 12:03

## 2023-08-18 RX ADMIN — CARBIDOPA AND LEVODOPA 1.5 TABLET(S): 25; 100 TABLET ORAL at 05:36

## 2023-08-18 RX ADMIN — FLUDROCORTISONE ACETATE 0.1 MILLIGRAM(S): 0.1 TABLET ORAL at 05:36

## 2023-08-18 NOTE — PROGRESS NOTE ADULT - PROBLEM SELECTOR PROBLEM 1
ALAYNA (acute kidney injury)

## 2023-08-18 NOTE — PROGRESS NOTE ADULT - SUBJECTIVE AND OBJECTIVE BOX
[facility] Division of Kidney Diseases & Hypertension  FOLLOW UP NOTE  [phone]--------------------------------------------------------------------------------  Chief Complaint:Other abnormality of breathing    81yo Male w/ PMH HTN, Atrial fibrillation on Eliquis, bradycardia s/p ppm, CHF, pleural effusions s/p R pleurX, Parkinson's presenting with shortness of breath. Nephrology consulted for hyperkalemia in the setting of ALAYNA.     24 hour events/subjective: Patient seen and examined at bedside. Awake and responding to questions. Reports feeling well. denies fevers, chills, nausea, vomiting, abdominal pain, SOB, CP.     PAST HISTORY  --------------------------------------------------------------------------------  No significant changes to PMH, PSH, FHx, SHx, unless otherwise noted    ALLERGIES & MEDICATIONS  --------------------------------------------------------------------------------  Allergies  No Known Allergies  Intolerances    Standing Inpatient Medications  apixaban 2.5 milliGRAM(s) Oral two times a day  carbidopa/levodopa  25/100 1.5 Tablet(s) Oral four times a day  chlorhexidine 2% Cloths 1 Application(s) Topical daily  dextrose 5%. 1000 milliLiter(s) IV Continuous <Continuous>  dextrose 5%. 1000 milliLiter(s) IV Continuous <Continuous>  dextrose 50% Injectable 25 Gram(s) IV Push once  dextrose 50% Injectable 12.5 Gram(s) IV Push once  dextrose 50% Injectable 25 Gram(s) IV Push once  fludroCORTISONE 0.1 milliGRAM(s) Oral daily  glucagon  Injectable 1 milliGRAM(s) IntraMuscular once  melatonin 3 milliGRAM(s) Oral at bedtime  mirtazapine 7.5 milliGRAM(s) Oral at bedtime  OLANZapine 5 milliGRAM(s) Oral at bedtime  PRN Inpatient Medications  acetaminophen     Tablet .. 650 milliGRAM(s) Oral every 6 hours PRN  aluminum hydroxide/magnesium hydroxide/simethicone Suspension 30 milliLiter(s) Oral every 4 hours PRN  dextrose Oral Gel 15 Gram(s) Oral once PRN  melatonin 3 milliGRAM(s) Oral at bedtime PRN  ondansetron Injectable 4 milliGRAM(s) IV Push every 8 hours PRN  sodium chloride 0.9% Bolus. 100 milliLiter(s) IV Bolus every 5 minutes PRN      REVIEW OF SYSTEMS  --------------------------------------------------------------------------------  as above    VITALS/PHYSICAL EXAM  --------------------------------------------------------------------------------  T(C): 36.9 (08-18-23 @ 08:00), Max: 37.4 (08-17-23 @ 16:00)  HR: 60 (08-18-23 @ 08:00) (55 - 62)  BP: 110/43 (08-18-23 @ 08:00) (91/37 - 110/52)  RR: 10 (08-18-23 @ 08:00) (10 - 15)  SpO2: 100% (08-18-23 @ 08:00) (95% - 100%)  Wt(kg): --    08-17-23 @ 07:01  -  08-18-23 @ 07:00  --------------------------------------------------------  IN: 560 mL / OUT: 750 mL / NET: -190 mL    Physical Exam:  Gen: NAD  HEENT: anicteric  Pulm: CTA B/L  CV: RRR, S1S2;  Abd: +BS, soft, NTND  : No suprapubic tenderness, +means  Extremities: no LE edema noted.   Neuro: Awake restring tremor noted  Vascular access: none     LABS/STUDIES  --------------------------------------------------------------------------------              7.5    7.91  >-----------<  192      [08-18-23 @ 00:45]              24.9     138  |  102  |  38  ----------------------------<  106      [08-18-23 @ 00:45]  4.0   |  21  |  2.69        Ca     9.2     [08-18-23 @ 00:45]      Mg     1.90     [08-18-23 @ 00:45]      Phos  4.0     [08-18-23 @ 00:45]    TPro  6.2  /  Alb  3.5  /  TBili  0.4  /  DBili  x   /  AST  16  /  ALT  7   /  AlkPhos  52  [08-18-23 @ 00:45]    PT/INR: PT 16.3 , INR 1.46       [08-17-23 @ 02:00]  PTT: 39.6       [08-18-23 @ 00:45]    Creatinine Trend:  SCr 2.69 [08-18 @ 00:45]  SCr 3.05 [08-17 @ 02:00]  SCr 3.24 [08-16 @ 03:10]  SCr 3.21 [08-15 @ 00:34]  SCr 3.28 [08-14 @ 14:15]    TSH 1.00      [08-13-23 @ 00:25]

## 2023-08-18 NOTE — PROGRESS NOTE ADULT - SUBJECTIVE AND OBJECTIVE BOX
Interval Events: no significant overnight events.    HPI:  83 yo M with HTN, A fib on Eliquis, bradycardia s/p ppm, CHF, pleural effusions s/p R pleurX, Parkinson's presenting with shortness of breath x 1 day.  Pt poor historian due to dementia. Wife at bedside providing history. Overnight pt had been complaining of shortness of breath, coughing. Denies any chest pain, palpitations, no fevers or chills. No recent illness, No GI or urinary symptoms.    In ED pt was given Nabicarb, Ca gluconate and insulin, 1L NS  VS:  90/51  61  97.8  19  100% on BIPAP (09 Aug 2023 09:40)      SUBJECTIVE: Patient seen and examined at bedside. Offers no complaints.       OBJECTIVE:  ICU Vital Signs Last 24 Hrs  T(C): 36.8 (12 Aug 2023 04:00), Max: 37 (11 Aug 2023 16:10)  T(F): 98.2 (12 Aug 2023 04:00), Max: 98.6 (11 Aug 2023 16:10)  HR: 60 (12 Aug 2023 06:00) (60 - 66)  BP: 101/39 (12 Aug 2023 06:15) (85/51 - 142/50)  BP(mean): 54 (12 Aug 2023 06:15) (41 - 81)  ABP: --  ABP(mean): --  RR: 12 (12 Aug 2023 06:00) (10 - 17)  SpO2: 100% (12 Aug 2023 06:00) (96% - 100%)    O2 Parameters below as of 12 Aug 2023 06:00  Patient On (Oxygen Delivery Method): room air          08-10 @ 07:01  -  08-11 @ 07:00  --------------------------------------------------------  IN: 1344.8 mL / OUT: 1540 mL / NET: -195.2 mL    08-11 @ 07:01  -  08-12 @ 06:42  --------------------------------------------------------  IN: 1234 mL / OUT: 1870 mL / NET: -636 mL      CAPILLARY BLOOD GLUCOSE          Physical Exam:   GENERAL: NAD, lying in bed  EYES: EOMI, PERRLA  ENT: Moist mucous membranes  HEART: S1, S2, regular rate and rhythm, no murmurs, rubs, or gallops  LUNGS: On BiPAP, decreased breath sounds in the left base, no crackles or wheezing appreciated   ABDOMEN: Soft, nontender  EXTREMITIES: 2+ peripheral pulses bilaterally. 2+ pitting edema to the knee   NEURO: A&Ox1, pleasantly confused at times. Follows commands, moves extremities spontaneously.       HOSPITAL MEDICATIONS:  Standing Meds:  apixaban 2.5 milliGRAM(s) Oral two times a day  carbidopa/levodopa  25/100 1.5 Tablet(s) Oral four times a day  chlorhexidine 2% Cloths 1 Application(s) Topical daily  dextrose 5%. 1000 milliLiter(s) IV Continuous <Continuous>  dextrose 5%. 1000 milliLiter(s) IV Continuous <Continuous>  dextrose 50% Injectable 25 Gram(s) IV Push once  dextrose 50% Injectable 25 Gram(s) IV Push once  dextrose 50% Injectable 12.5 Gram(s) IV Push once  glucagon  Injectable 1 milliGRAM(s) IntraMuscular once  melatonin 3 milliGRAM(s) Oral at bedtime  midodrine. 10 milliGRAM(s) Oral three times a day  norepinephrine Infusion 0.05 MICROgram(s)/kG/Min IV Continuous <Continuous>  OLANZapine 2.5 milliGRAM(s) Oral at bedtime  piperacillin/tazobactam IVPB.. 3.375 Gram(s) IV Intermittent every 12 hours      PRN Meds:  acetaminophen     Tablet .. 650 milliGRAM(s) Oral every 6 hours PRN  aluminum hydroxide/magnesium hydroxide/simethicone Suspension 30 milliLiter(s) Oral every 4 hours PRN  dextrose Oral Gel 15 Gram(s) Oral once PRN  melatonin 3 milliGRAM(s) Oral at bedtime PRN  ondansetron Injectable 4 milliGRAM(s) IV Push every 8 hours PRN  sodium chloride 0.9% Bolus. 100 milliLiter(s) IV Bolus every 5 minutes PRN      LABS:                        7.4    6.87  )-----------( 188      ( 12 Aug 2023 00:40 )             23.7     Hgb Trend: 7.4<--, 6.6<--, 7.3<--, 7.5<--, 7.3<--  08-12    139  |  102  |  35<H>  ----------------------------<  109<H>  3.4<L>   |  24  |  2.66<H>    Ca    8.7      12 Aug 2023 00:40  Phos  2.8     08-12  Mg     2.30     08-12    TPro  6.0  /  Alb  3.5  /  TBili  0.4  /  DBili  x   /  AST  20  /  ALT  9   /  AlkPhos  48  08-12    Creatinine Trend: 2.66<--, 2.58<--, 3.28<--, 4.17<--, 3.56<--, 4.87<--  PT/INR - ( 12 Aug 2023 00:25 )   PT: 21.3 sec;   INR: 1.92 ratio         PTT - ( 12 Aug 2023 00:25 )  PTT:35.1 sec  Urinalysis Basic - ( 12 Aug 2023 00:40 )    Color: x / Appearance: x / SG: x / pH: x  Gluc: 109 mg/dL / Ketone: x  / Bili: x / Urobili: x   Blood: x / Protein: x / Nitrite: x   Leuk Esterase: x / RBC: x / WBC x   Sq Epi: x / Non Sq Epi: x / Bacteria: x        Venous Blood Gas:  08-12 @ 00:25  7.41/42/49/27/78.2  VBG Lactate: 1.2  Venous Blood Gas:  08-11 @ 03:00  7.36/41/28/23/44.1  VBG Lactate: 1.5      MICROBIOLOGY:     Culture - Urine (collected 10 Aug 2023 09:28)  Source: Clean Catch Clean Catch (Midstream)  Final Report (11 Aug 2023 06:47):    No growth    Culture - Blood (collected 10 Aug 2023 04:00)  Source: .Blood Blood  Preliminary Report (11 Aug 2023 09:03):    No growth at 24 hours    Culture - Blood (collected 09 Aug 2023 14:20)  Source: .Blood Blood-Peripheral  Preliminary Report (11 Aug 2023 19:01):    No growth at 48 Hours      RADIOLOGY:  [ ] Reviewed and interpreted by me

## 2023-08-18 NOTE — PROGRESS NOTE ADULT - ASSESSMENT
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 Admitted to MICU for urgent HD i/so hypotension.       Plan:  ====================NEUROLOGY=======================  #Dementia, early, no acute issues   Home: Zyprexa, and melatonin at bedtime  AOx1 at baseline  - Following commands and responding to questions, at baseline MS  - c/w Zyprexa 5mg and melatonin 3mg at bedtime.   - restarted Mirtazapine 7.5 qhs, pt takes medication at home for MDD    #Parkinson's disease   - c/w Carbidopa-Levodopa    - PT and OT working with pt. Anticipate home with no skilled PT needs, patient is at his baseline.    ====================RESPIRATORY======================  #Pleural effusions    Likely in the setting of CHF vs renal failure vs less likely malignancy (negative cytology outpt)  R PleurX catheter placed 3/2022, drainage 3x a week as per spouse, last  time drained on 8/12 with 500 cc drained . will need to be drained today.   6/9 - CT chest showed new 2.3 cm right middle lobe nodule, possible malignancy? and NEW moderate left pleural effusion with complete left lower lobe compressive atelectasis  - pulmonary outpt follow up for pulmonary nodule, pt has h/o PLEF w/ R pleurx catheter  - s/p urgent HD for fluid overload   - s/p BIPAP, now tolerating RA    ====================CARDIOVASCULAR==================  #Bradycardia s/p PPM   #CHFpEF   #Afib   #Hypotension   Home Meds: Eliquis, Entresto Olmesartan, Plavix?, Rosuvastatin   SPECT scan with amyloidosis outpatient, may be contributing to CHFpEF   TTE 1/2023 EF 73%, diastolic dysfunction and mod pHTN   EKG showing paced ventricular rhythm, no T wave abnormalities, given Ca gluconate x2 in ED   - TTE 8/10: Minimal MR, Moderate AR. Moderate concentric LV hypertrophy, grossly normal LVSfx  Normal RV size w/ reduced function, mild pulmonary hypertension. Left pleural effusion.  - off levophed since 8/14, SBP goal > 95, as per pt's spouse, pt's baseline SBP is 's at home  - started on midodrine to aid in weaning off levophed, Dc;d 8/16  - cortisol level 5.7, continue Florinef 0.1mg daily   - POCUS 8/12 showed preserved EF and underfilled LV, s/p albumin x 2  - s/p Lasix 80 mg IVP 8/12-8/15, now holding off given adequate urine output, cont strict intake and output, see renal     ====================/RENAL========================    #ALAYNA on CKD  #Metabolic acidosis with AG, concomitant resp acidosis-  resolved   Admit Scr 4.54>2.82. FeNa 0.9%   - likely has CKD stage 3A with baseline Scr 1.3-1.6. Cr now downtrending - 3.24>3.05  - hyperkalemic to 6.4, S/p NaHCO3, Ca gluconate and insulin  - US kidney/bladder - Increased bilateral renal echogenicity, suggestive of medical renal disease. No hydronephrosis.  - Nephrology consulted - per renal ALAYNA likely hemodynamic mediated and medication SE's (TMP/SMX and Entresto)  - s/p RIJ shiley placement 8/9, removed 8/16  - s/p 3 sessions of HD 8/9- 8/11. no more HD per renal.  - s/p Lasix 80 mg IVP 8/12-8/15, holding off further diuresis for now. pt making urine  - renal recommending d/c on lasix 40mg M/W/F  - net neg 1L in 24hrs, -6.2L LOS, voided 275mL yesterday  - means removed, passed TOV  - continue to monitor strict I/Os     ====================GI/NUTRITION=====================  Diet: renal regular, changed to DASH diet in s/o normal lytes and improving renal fx   - BMs+, continue bowel regimen    ====================INFECTIOUS DISEASE================  No fever, leukocytosis may be stress related vs infection, resolved. CT chest with GGO  - Previous pleural fluid culture negative   - recently treated outpt w/ bactrim for UTI  - s/p Azithro (8/9)  and CTX (8/9) in ED  - s/p tx empirically with zosyn (8/09- 8/13 )  - UA - small leuks, WBC 19, urine and blood culture - NGTD, LA 0.9   - MRSA PCR neg    ====================ENDOCRINE=======================  A1C 5.4%  home med: farxiga  - BG 140s   - TSH 1.0    ====================HEMATOLOGIC/DVT PPx=============  On home ELIQUIS 5 BID, last taken 8/8 at 6pm - held for Shiley placement, and restarted 8/10  - given Kcentra on 8/9 for Eliquis reversal prior to shiley  - H/H stable in 7s  - no active bleeding     ====================ETHICS===========================  GOC: patient with prior MOLST DNR/DNI. 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 Admitted to MICU for urgent HD i/so hypotension.       Plan:  ====================NEUROLOGY=======================  #Dementia, early, no acute issues   Home: Zyprexa, and melatonin at bedtime  AOx1 at baseline  - Following commands and responding to questions, at baseline MS  - c/w Zyprexa 5mg and melatonin 3mg at bedtime.   - restarted Mirtazapine 7.5 qhs, pt takes medication at home for MDD    #Parkinson's disease   - c/w Carbidopa-Levodopa    - PT and OT working with pt. Anticipate home with no skilled PT needs, patient is at his baseline.    ====================RESPIRATORY======================  #Pleural effusions    Likely in the setting of CHF vs renal failure vs less likely malignancy (negative cytology outpt)  R PleurX catheter placed 3/2022, drainage 3x a week as per spouse, last  time drained on 8/12 with 500 cc drained . will need to be drained today.   6/9 - CT chest showed new 2.3 cm right middle lobe nodule, possible malignancy? and NEW moderate left pleural effusion with complete left lower lobe compressive atelectasis  - pulmonary outpt follow up for pulmonary nodule, pt has h/o PLEF w/ R pleurx catheter  - s/p urgent HD for fluid overload   - s/p BIPAP, now tolerating RA    ====================CARDIOVASCULAR==================  #Bradycardia s/p PPM   #CHFpEF   #Afib   #Hypotension   Home Meds: Eliquis, Entresto Olmesartan, Plavix?, Rosuvastatin   SPECT scan with amyloidosis outpatient, may be contributing to CHFpEF   TTE 1/2023 EF 73%, diastolic dysfunction and mod pHTN   EKG showing paced ventricular rhythm, no T wave abnormalities, given Ca gluconate x2 in ED   - TTE 8/10: Minimal MR, Moderate AR. Moderate concentric LV hypertrophy, grossly normal LVSfx  Normal RV size w/ reduced function, mild pulmonary hypertension. Left pleural effusion.  - off levophed since 8/14, SBP goal > 95, as per pt's spouse, pt's baseline SBP is 's at home  - started on midodrine to aid in weaning off levophed, Dc;d 8/16  - cortisol level 5.7, continue Florinef 0.1mg daily   - POCUS 8/12 showed preserved EF and underfilled LV, s/p albumin x 2  - s/p Lasix 80 mg IVP 8/12-8/15. per renal, will dc on Lasix 40mg M/W/F.    ====================/RENAL========================    #ALAYNA on CKD  #Metabolic acidosis with AG, concomitant resp acidosis-  resolved   Admit Scr 4.54>2.82. FeNa 0.9%   - likely has CKD stage 3A with baseline Scr 1.3-1.6.  - Cr now downtrending - 3.24>3.05>2.69  - hyperkalemic to 6.4, S/p NaHCO3, Ca gluconate and insulin  - US kidney/bladder - Increased bilateral renal echogenicity, suggestive of medical renal disease. No hydronephrosis.  - Nephrology consulted - per renal ALAYNA likely hemodynamic mediated and medication SE's (TMP/SMX and Entresto)  - s/p RIJ shiley placement 8/9, removed 8/16  - s/p 3 sessions of HD 8/9- 8/11. no more HD per renal.  - s/p Lasix 80 mg IVP 8/12-8/15, holding off further diuresis for now. pt making urine  - renal recommending to d/c on lasix 40mg M/W/F w/ f/up in 2 weeks  - net neg 160mL in 24hrs, -6.4L LOS, voided 600mL yesterday  - means removed, passed TOV  - continue to monitor strict I/Os     ====================GI/NUTRITION=====================  Diet: renal regular, changed to DASH diet in s/o normal lytes and improving renal fx   - BMs+, continue bowel regimen    ====================INFECTIOUS DISEASE================  No fever, leukocytosis may be stress related vs infection, resolved. CT chest with GGO  - Previous pleural fluid culture negative  - recently treated outpt w/ bactrim for UTI  - s/p Azithro (8/9) and CTX (8/9) in ED  - s/p tx empirically with zosyn (8/09- 8/13 )  - UA - small leuks, WBC 19, urine and blood culture - NGTD, LA 0.9   - MRSA PCR neg    ====================ENDOCRINE=======================  A1C 5.4%  home med: farxiga - continue to hold per renal.  - BG 140s   - TSH 1.0    ====================HEMATOLOGIC/DVT PPx=============  On home ELIQUIS 5 BID, last taken 8/8 at 6pm - held for Shiley placement, and restarted 8/10  - given Kcentra on 8/9 for Eliquis reversal prior to julio  - H/H stable in 7s  - no active bleeding     ====================ETHICS===========================  GOC: patient with prior MOLST DNR/DNI. As per discussion with pt, wife and daughter, and pt's and family's wishes, pt is DNR/DNI     Dispo: Medicine    LINES: DAY's, julio removed 8/15    Social worked consulted for possible discharge home

## 2023-08-18 NOTE — PROGRESS NOTE ADULT - REASON FOR ADMISSION
ALAYNA, Hyperkalemia

## 2023-08-18 NOTE — PROGRESS NOTE ADULT - TIME BILLING
Medical management as above, reviewing chart and coordinating care with primary team/staff, as well as reviewing vitals, radiology, medication list, recent labs, and prior records.    Does not include teaching time.
Medical management as above, reviewing chart and coordinating care with primary team/staff, as well as reviewing vitals, radiology, medication list, recent labs, and prior records.    Does not include teaching time.

## 2023-08-18 NOTE — PROGRESS NOTE ADULT - PROBLEM SELECTOR PLAN 1
Pt. w/ ALAYNA likely hemodynamic mediated and medication SE (TMP/SMX and Entresto). On review of sunrise/HIE patient likely has CKD stage 3A with baseline Scr 1.3-1.6. Last outpt labs reviewed on HIE showing Scr 1.64 on 4/20/23. On admission Scr 4.54, K 6.4, and SCO2 14. HD consent done and placed in chart. Kidney US with no hydronephrosis 8/11. Pt received IHD 8/9, 8/10, 8/11. Lasix IV 8/13-8/15. Pt. now off vasopressors and BIPAP. Labs reviewed, Scr 2.69 (stable) today and 24hr UOP 750cc. No further plan for HD. Continue to hold Entresto and TMP/SMX. Monitor labs, VS and urine output. Avoid nephrotoxins. Dose medications as per eGFR.    keep off Farxiga and ARB. can be discharged on lasix. follow up with Dr. Kade oconnro in 2wks.     Final recommendations pending attending signature.    If you have any questions, please feel free to contact me  Torres Olivarez  Nephrology Fellow  [place]/Page [phone]  (After 5pm or on weekends please page the on-call fellow)

## 2023-08-18 NOTE — PROGRESS NOTE ADULT - PROVIDER SPECIALTY LIST ADULT
Critical Care
MICU
Critical Care
Critical Care
MICU
Nephrology
MICU
Nephrology

## 2023-08-18 NOTE — PROGRESS NOTE ADULT - ATTENDING COMMENTS
ALAYNA in the setting of hypotension, Bactrim, and Entresto  needed HD now off   confused this am. wife at bedside   assess needs for diuretics in am   avoid contrast studies, ACE-I, ARB, or NSAIDs   Further detailed plan of management and recommendation as outlined above.
ALAYNA with hyperkalemia in setting of bactrim, entresto, UTI.  patient ALAYNA complicated by hyperkalemia and volume overload.  no plan for HD but high risk for requring HD again will need to monitor still on IV vaso pressor.
agree with above.  ALAYNA complicated by hypervolemia.  creatinine worsening today but urine output improving.  c/w lasix for now.  can remove dialysis catheter.
ALAYNA in the setting of hypotension, Bactrim, and Entresto  needed HD now off   wife at bedside   keep iff Farxiga and ARB  can be discharged on lasix 40mg TIW.  discussed with opt and ICU team at bedside  avoid contrast studies, ACE-I, ARB, or NSAIDs   Further detailed plan of management and recommendation as outlined above.  follow with me in two weeks. my contact info given. they can do Telehealth
ALAYNA in the setting of hypotension, Bactrim, and Entresto  needed HD now off   wife at bedside   keep off Farxiga and ARB  discussed with opt and ICU team at bedside  avoid contrast studies, ACE-I, ARB, or NSAIDs   Further detailed plan of management and recommendation as outlined above.  can be discharged on lasix 40mg TIW.  follow with me in two weeks. my contact info given. they can do Telehealth
seen and evaluated this morning,.  BP improved, urine output improved, volume status improved.  no further plans for HD would remove catheter.  hold diuretics for now.  monitor BMP.
ALAYNA on CKD   v/ol  responded well to diurtics  increase to 80mg iv BID
Patient seen and evaluated this morning ALAYNA complicated by hyperkalemia and volume overload.  requiring dialysis.  plan for HD again today and tentatively again tomorrow monitor for kidney function recovery.
seen and evalauted critically ill kellee complicated by hyperkalemia and volume overload.  minimal urine output.  plan for HD today.

## 2023-08-19 ENCOUNTER — TRANSCRIPTION ENCOUNTER (OUTPATIENT)
Age: 83
End: 2023-08-19

## 2023-08-21 ENCOUNTER — TRANSCRIPTION ENCOUNTER (OUTPATIENT)
Age: 83
End: 2023-08-21

## 2023-08-21 NOTE — ED PROVIDER NOTE - WR ORDER NAME 1
From: Cynthia GRANDE  To: Padmini Spear  Sent: 8/21/2023 11:29 AM CDT  Subject: Medications    Hi Dr Mark Washington has prescribed medications for you as follows:  Metformin 500 mg twice a day before meals   Atorvastatin 10 mg at bedtime   They have been sent to your [facility] pharmacy.    Take Cynthia Carter RN   
Xray Chest 1 View- PORTABLE-Urgent

## 2023-08-23 ENCOUNTER — TRANSCRIPTION ENCOUNTER (OUTPATIENT)
Age: 83
End: 2023-08-23

## 2023-08-23 ENCOUNTER — APPOINTMENT (OUTPATIENT)
Dept: NEPHROLOGY | Facility: CLINIC | Age: 83
End: 2023-08-23
Payer: MEDICARE

## 2023-08-23 DIAGNOSIS — N17.9 ACUTE KIDNEY FAILURE, UNSPECIFIED: ICD-10-CM

## 2023-08-23 PROCEDURE — 99214 OFFICE O/P EST MOD 30 MIN: CPT | Mod: 95

## 2023-08-23 RX ORDER — BUMETANIDE 2 MG/1
TABLET ORAL
Refills: 0 | Status: DISCONTINUED | COMMUNITY
End: 2023-08-23

## 2023-08-23 RX ORDER — SOLIFENACIN SUCCINATE 10 MG/1
10 TABLET ORAL DAILY
Qty: 90 | Refills: 3 | Status: DISCONTINUED | COMMUNITY
Start: 2021-01-29 | End: 2023-08-23

## 2023-08-24 NOTE — HISTORY OF PRESENT ILLNESS
[Home] : at home, [unfilled] , at the time of the visit. [Medical Office: ([facility])___] : at the medical office located in  [Spouse] : spouse [Verbal consent obtained from patient] : the patient, [unfilled] [FreeTextEntry1] :  ALBINO RIVAS is a 82 year male with a history HTN, Afib on Eliqis, bradycardia s/p PPM, CHF, pleural effusions s/p right pleurX, Parkinson's disease. Pt presented o [facility] on 8/9 with change in mental status. On admission Scr 4.54, K 6.4, and SCO2 14., hemodynamically unstable, refractory to medically management of hyperkalemia. He was transferred to MICU for urgent HD iso hemodynamic instability and received treatments on 8/9, 8/10 & 8/11. IV Lasix 8/13-8/15. Of note, he had been placed on Bactrim 1.5 weeks prior for a UTI. Bactrim, Entresto and Spironolactone on hold since hospitalization.  Pt answers simple questions but most of history provided by wife.  Today wife reports pt was discharged on Furosemide 40mg MWF.  /72 at home.  Unable to weigh at home. Hosp weights approx. 160s.  + BLLE edema wife states slightly worse than discharge.  Homecare RN comes 3 x week for drainage of pleurx tube.   Creatinine Trend: SCr 2.69 [08-18 @ 00:45] SCr 3.05 [08-17 @ 02:00] SCr 3.24 [08-16 @ 03:10] SCr 3.21 [08-15 @ 00:34] SCr 3.28 [08-14 @ 14:15]

## 2023-08-24 NOTE — ASSESSMENT
[FreeTextEntry1] : ALAYNA on CKD  : Pt. w/ ALAYNA likely hemodynamic mediated and medication SE (TMP/SMX and Entresto). On review of sunrise/HIE patient likely has CKD stage 3A with baseline Scr 1.3-1.6.  Last outpt labs reviewed on HIE showing Scr 1.64 on 4/20/23. Kidney US with no hydronephrosis 8/11. Pt received IHD 8/9, 8/10, 8/11. Lasix IV 8/13-8/15. BP improved. Remain off Entresto, Spironolactone for now Volume status: monitor on Furosemide 40mg MWF Proteinuria : P/CR 1.8 while at [facility]. will recheck The patient was educated to avoid NSAIDs.  Lifestyle modifications encouraged including low Na diet and moderate protein intake. Labs to be done via [facility] on 8/24. Will discuss with Dr. Braun and follow up as needed once labs received.

## 2023-08-24 NOTE — REVIEW OF SYSTEMS
[Lower Ext Edema] : lower extremity edema [Shortness Of Breath] : no shortness of breath [SOB on Exertion] : no shortness of breath during exertion [Negative] : Heme/Lymph [FreeTextEntry8] : BPH [de-identified] : Yeny Parkinsons [de-identified] : Hx depression

## 2023-08-25 ENCOUNTER — LABORATORY RESULT (OUTPATIENT)
Age: 83
End: 2023-08-25

## 2023-08-29 DIAGNOSIS — D64.9 ANEMIA, UNSPECIFIED: ICD-10-CM

## 2023-08-29 RX ORDER — FUROSEMIDE 40 MG/1
40 TABLET ORAL
Refills: 0 | Status: DISCONTINUED | COMMUNITY
Start: 2023-08-23 | End: 2023-08-29

## 2023-09-07 ENCOUNTER — LABORATORY RESULT (OUTPATIENT)
Age: 83
End: 2023-09-07

## 2023-09-08 ENCOUNTER — NON-APPOINTMENT (OUTPATIENT)
Age: 83
End: 2023-09-08

## 2023-09-08 DIAGNOSIS — D50.8 OTHER IRON DEFICIENCY ANEMIAS: ICD-10-CM

## 2023-09-11 ENCOUNTER — NON-APPOINTMENT (OUTPATIENT)
Age: 83
End: 2023-09-11

## 2023-09-15 ENCOUNTER — LABORATORY RESULT (OUTPATIENT)
Age: 83
End: 2023-09-15

## 2023-09-18 ENCOUNTER — APPOINTMENT (OUTPATIENT)
Dept: THORACIC SURGERY | Facility: CLINIC | Age: 83
End: 2023-09-18
Payer: MEDICARE

## 2023-09-18 ENCOUNTER — APPOINTMENT (OUTPATIENT)
Dept: NEPHROLOGY | Facility: CLINIC | Age: 83
End: 2023-09-18
Payer: MEDICARE

## 2023-09-18 VITALS — HEART RATE: 60 BPM | DIASTOLIC BLOOD PRESSURE: 50 MMHG | SYSTOLIC BLOOD PRESSURE: 110 MMHG

## 2023-09-18 PROCEDURE — 99214 OFFICE O/P EST MOD 30 MIN: CPT | Mod: 95

## 2023-09-18 PROCEDURE — 99443: CPT | Mod: 95

## 2023-09-18 RX ORDER — DAPAGLIFLOZIN 5 MG/1
5 TABLET, FILM COATED ORAL
Refills: 0 | Status: DISCONTINUED | COMMUNITY
End: 2023-09-18

## 2023-09-18 RX ORDER — SPIRONOLACTONE 25 MG/1
25 TABLET ORAL
Refills: 0 | Status: DISCONTINUED | COMMUNITY
End: 2023-09-18

## 2023-09-18 RX ORDER — AMIODARONE HYDROCHLORIDE 200 MG/1
200 TABLET ORAL
Qty: 180 | Refills: 3 | Status: DISCONTINUED | COMMUNITY
Start: 2022-08-22 | End: 2023-09-18

## 2023-09-19 DIAGNOSIS — R39.9 UNSPECIFIED SYMPTOMS AND SIGNS INVOLVING THE GENITOURINARY SYSTEM: ICD-10-CM

## 2023-09-21 ENCOUNTER — NON-APPOINTMENT (OUTPATIENT)
Age: 83
End: 2023-09-21

## 2023-09-22 LAB
APPEARANCE: ABNORMAL
BACTERIA UR CULT: NORMAL
BACTERIA: NEGATIVE /HPF
BILIRUBIN URINE: NEGATIVE
BLOOD URINE: ABNORMAL
CALCIUM OXALATE CRYSTALS: PRESENT
CAST: NORMAL /LPF
COLOR: ABNORMAL
EPITHELIAL CELLS: 1 /HPF
GLUCOSE QUALITATIVE U: NEGATIVE MG/DL
GRANULAR CASTS: NORMAL
HYALINE CASTS: NORMAL
KETONES URINE: NEGATIVE MG/DL
LEUKOCYTE ESTERASE URINE: ABNORMAL
MICROSCOPIC-UA: NORMAL
NITRITE URINE: NEGATIVE
PH URINE: 6
PROTEIN URINE: 100 MG/DL
RED BLOOD CELLS URINE: >1900 /HPF
REVIEW: NORMAL
SPECIFIC GRAVITY URINE: 1.01
UROBILINOGEN URINE: 1 MG/DL
WHITE BLOOD CELLS URINE: 18 /HPF

## 2023-09-22 RX ORDER — SACUBITRIL AND VALSARTAN 24; 26 MG/1; MG/1
24-26 TABLET, FILM COATED ORAL
Refills: 0 | Status: DISCONTINUED | COMMUNITY
End: 2023-09-22

## 2023-10-04 NOTE — PROGRESS NOTE ADULT - PROBLEM SELECTOR PLAN 12
Please return with any fevers, vomiting, worsening symptoms, or additional concerns. Follow-up with your primary care doctor and with the urologist as planned. DVT ppx: Eliquis  Diet: Soft diet, S&S swallow  Dispo: Pending intervention, PT consulted    Updated wife Mo, DNR/DNI s/p VATS, pleural biopsy, pleur-x placement  Follows outpatient w/ Dr. Oneal  - f/u CT surg recs for Pleur-X management  - continue drainage 3x/week Dispo: HOme  Diet: Regular  CODE: DNR/DNI, family would want pressors

## 2023-10-06 ENCOUNTER — LABORATORY RESULT (OUTPATIENT)
Age: 83
End: 2023-10-06

## 2023-10-25 ENCOUNTER — NON-APPOINTMENT (OUTPATIENT)
Age: 83
End: 2023-10-25

## 2023-10-26 DIAGNOSIS — E87.20 ACIDOSIS, UNSPECIFIED: ICD-10-CM

## 2023-10-30 ENCOUNTER — LABORATORY RESULT (OUTPATIENT)
Age: 83
End: 2023-10-30

## 2023-11-03 ENCOUNTER — APPOINTMENT (OUTPATIENT)
Dept: PULMONOLOGY | Facility: CLINIC | Age: 83
End: 2023-11-03

## 2023-11-04 NOTE — PATIENT PROFILE ADULT - NSPRESCRALCFREQ_GEN_A_NUR
FAMILY HISTORY:  FH: diabetes mellitus    Mother  Still living? Unknown  FH: lung cancer, Age at diagnosis: Age Unknown    
Never

## 2023-11-06 ENCOUNTER — NON-APPOINTMENT (OUTPATIENT)
Age: 83
End: 2023-11-06

## 2023-11-06 PROCEDURE — 99443: CPT | Mod: 95

## 2023-11-11 ENCOUNTER — INPATIENT (INPATIENT)
Facility: HOSPITAL | Age: 83
LOS: 5 days | Discharge: HOME HEALTH | End: 2023-11-17
Attending: INTERNAL MEDICINE | Admitting: INTERNAL MEDICINE
Payer: MEDICARE

## 2023-11-11 VITALS
OXYGEN SATURATION: 96 % | RESPIRATION RATE: 20 BRPM | DIASTOLIC BLOOD PRESSURE: 61 MMHG | TEMPERATURE: 99 F | HEART RATE: 67 BPM | SYSTOLIC BLOOD PRESSURE: 98 MMHG

## 2023-11-11 DIAGNOSIS — Z95.0 PRESENCE OF CARDIAC PACEMAKER: ICD-10-CM

## 2023-11-11 DIAGNOSIS — I25.10 ATHEROSCLEROTIC HEART DISEASE OF NATIVE CORONARY ARTERY WITHOUT ANGINA PECTORIS: ICD-10-CM

## 2023-11-11 DIAGNOSIS — B34.1 ENTEROVIRUS INFECTION, UNSPECIFIED: ICD-10-CM

## 2023-11-11 DIAGNOSIS — J90 PLEURAL EFFUSION, NOT ELSEWHERE CLASSIFIED: ICD-10-CM

## 2023-11-11 DIAGNOSIS — R79.89 OTHER SPECIFIED ABNORMAL FINDINGS OF BLOOD CHEMISTRY: ICD-10-CM

## 2023-11-11 DIAGNOSIS — F22 DELUSIONAL DISORDERS: ICD-10-CM

## 2023-11-11 DIAGNOSIS — N18.9 CHRONIC KIDNEY DISEASE, UNSPECIFIED: ICD-10-CM

## 2023-11-11 DIAGNOSIS — Z95.0 PRESENCE OF CARDIAC PACEMAKER: Chronic | ICD-10-CM

## 2023-11-11 DIAGNOSIS — R05.9 COUGH, UNSPECIFIED: ICD-10-CM

## 2023-11-11 DIAGNOSIS — Z29.9 ENCOUNTER FOR PROPHYLACTIC MEASURES, UNSPECIFIED: ICD-10-CM

## 2023-11-11 DIAGNOSIS — Z98.49 CATARACT EXTRACTION STATUS, UNSPECIFIED EYE: Chronic | ICD-10-CM

## 2023-11-11 DIAGNOSIS — K59.09 OTHER CONSTIPATION: ICD-10-CM

## 2023-11-11 DIAGNOSIS — Z98.61 CORONARY ANGIOPLASTY STATUS: Chronic | ICD-10-CM

## 2023-11-11 DIAGNOSIS — F32.9 MAJOR DEPRESSIVE DISORDER, SINGLE EPISODE, UNSPECIFIED: ICD-10-CM

## 2023-11-11 DIAGNOSIS — I48.91 UNSPECIFIED ATRIAL FIBRILLATION: ICD-10-CM

## 2023-11-11 DIAGNOSIS — Z96.649 PRESENCE OF UNSPECIFIED ARTIFICIAL HIP JOINT: Chronic | ICD-10-CM

## 2023-11-11 DIAGNOSIS — I95.9 HYPOTENSION, UNSPECIFIED: ICD-10-CM

## 2023-11-11 LAB
ALBUMIN SERPL ELPH-MCNC: 3.7 G/DL — SIGNIFICANT CHANGE UP (ref 3.3–5)
ALBUMIN SERPL ELPH-MCNC: 3.7 G/DL — SIGNIFICANT CHANGE UP (ref 3.3–5)
ALP SERPL-CCNC: 77 U/L — SIGNIFICANT CHANGE UP (ref 40–120)
ALP SERPL-CCNC: 77 U/L — SIGNIFICANT CHANGE UP (ref 40–120)
ALT FLD-CCNC: 9 U/L — SIGNIFICANT CHANGE UP (ref 4–41)
ALT FLD-CCNC: 9 U/L — SIGNIFICANT CHANGE UP (ref 4–41)
ANION GAP SERPL CALC-SCNC: 13 MMOL/L — SIGNIFICANT CHANGE UP (ref 7–14)
ANION GAP SERPL CALC-SCNC: 13 MMOL/L — SIGNIFICANT CHANGE UP (ref 7–14)
APTT BLD: 42.8 SEC — HIGH (ref 24.5–35.6)
APTT BLD: 42.8 SEC — HIGH (ref 24.5–35.6)
AST SERPL-CCNC: 9 U/L — SIGNIFICANT CHANGE UP (ref 4–40)
AST SERPL-CCNC: 9 U/L — SIGNIFICANT CHANGE UP (ref 4–40)
B PERT DNA SPEC QL NAA+PROBE: SIGNIFICANT CHANGE UP
B PERT DNA SPEC QL NAA+PROBE: SIGNIFICANT CHANGE UP
B PERT+PARAPERT DNA PNL SPEC NAA+PROBE: SIGNIFICANT CHANGE UP
B PERT+PARAPERT DNA PNL SPEC NAA+PROBE: SIGNIFICANT CHANGE UP
BASE EXCESS BLDV CALC-SCNC: -1.8 MMOL/L — SIGNIFICANT CHANGE UP (ref -2–3)
BASE EXCESS BLDV CALC-SCNC: -1.8 MMOL/L — SIGNIFICANT CHANGE UP (ref -2–3)
BASE EXCESS BLDV CALC-SCNC: -2.1 MMOL/L — LOW (ref -2–3)
BASE EXCESS BLDV CALC-SCNC: -2.1 MMOL/L — LOW (ref -2–3)
BASOPHILS # BLD AUTO: 0.04 K/UL — SIGNIFICANT CHANGE UP (ref 0–0.2)
BASOPHILS # BLD AUTO: 0.04 K/UL — SIGNIFICANT CHANGE UP (ref 0–0.2)
BASOPHILS NFR BLD AUTO: 0.4 % — SIGNIFICANT CHANGE UP (ref 0–2)
BASOPHILS NFR BLD AUTO: 0.4 % — SIGNIFICANT CHANGE UP (ref 0–2)
BILIRUB SERPL-MCNC: 0.4 MG/DL — SIGNIFICANT CHANGE UP (ref 0.2–1.2)
BILIRUB SERPL-MCNC: 0.4 MG/DL — SIGNIFICANT CHANGE UP (ref 0.2–1.2)
BLOOD GAS VENOUS COMPREHENSIVE RESULT: SIGNIFICANT CHANGE UP
BORDETELLA PARAPERTUSSIS (RAPRVP): SIGNIFICANT CHANGE UP
BORDETELLA PARAPERTUSSIS (RAPRVP): SIGNIFICANT CHANGE UP
BUN SERPL-MCNC: 43 MG/DL — HIGH (ref 7–23)
BUN SERPL-MCNC: 43 MG/DL — HIGH (ref 7–23)
C PNEUM DNA SPEC QL NAA+PROBE: SIGNIFICANT CHANGE UP
C PNEUM DNA SPEC QL NAA+PROBE: SIGNIFICANT CHANGE UP
CALCIUM SERPL-MCNC: 9.6 MG/DL — SIGNIFICANT CHANGE UP (ref 8.4–10.5)
CALCIUM SERPL-MCNC: 9.6 MG/DL — SIGNIFICANT CHANGE UP (ref 8.4–10.5)
CHLORIDE BLDV-SCNC: 101 MMOL/L — SIGNIFICANT CHANGE UP (ref 96–108)
CHLORIDE SERPL-SCNC: 102 MMOL/L — SIGNIFICANT CHANGE UP (ref 98–107)
CHLORIDE SERPL-SCNC: 102 MMOL/L — SIGNIFICANT CHANGE UP (ref 98–107)
CK MB BLD-MCNC: 4.3 % — HIGH (ref 0–2.5)
CK MB BLD-MCNC: 4.3 % — HIGH (ref 0–2.5)
CK MB CFR SERPL CALC: 4.1 NG/ML — SIGNIFICANT CHANGE UP
CK MB CFR SERPL CALC: 4.1 NG/ML — SIGNIFICANT CHANGE UP
CK SERPL-CCNC: 96 U/L — SIGNIFICANT CHANGE UP (ref 30–200)
CK SERPL-CCNC: 96 U/L — SIGNIFICANT CHANGE UP (ref 30–200)
CO2 BLDV-SCNC: 25.1 MMOL/L — SIGNIFICANT CHANGE UP (ref 22–26)
CO2 BLDV-SCNC: 25.1 MMOL/L — SIGNIFICANT CHANGE UP (ref 22–26)
CO2 BLDV-SCNC: 26.7 MMOL/L — HIGH (ref 22–26)
CO2 BLDV-SCNC: 26.7 MMOL/L — HIGH (ref 22–26)
CO2 SERPL-SCNC: 23 MMOL/L — SIGNIFICANT CHANGE UP (ref 22–31)
CO2 SERPL-SCNC: 23 MMOL/L — SIGNIFICANT CHANGE UP (ref 22–31)
CREAT SERPL-MCNC: 2.58 MG/DL — HIGH (ref 0.5–1.3)
CREAT SERPL-MCNC: 2.58 MG/DL — HIGH (ref 0.5–1.3)
EGFR: 24 ML/MIN/1.73M2 — LOW
EGFR: 24 ML/MIN/1.73M2 — LOW
EOSINOPHIL # BLD AUTO: 0.03 K/UL — SIGNIFICANT CHANGE UP (ref 0–0.5)
EOSINOPHIL # BLD AUTO: 0.03 K/UL — SIGNIFICANT CHANGE UP (ref 0–0.5)
EOSINOPHIL NFR BLD AUTO: 0.3 % — SIGNIFICANT CHANGE UP (ref 0–6)
EOSINOPHIL NFR BLD AUTO: 0.3 % — SIGNIFICANT CHANGE UP (ref 0–6)
FLUAV SUBTYP SPEC NAA+PROBE: SIGNIFICANT CHANGE UP
FLUAV SUBTYP SPEC NAA+PROBE: SIGNIFICANT CHANGE UP
FLUBV RNA SPEC QL NAA+PROBE: SIGNIFICANT CHANGE UP
FLUBV RNA SPEC QL NAA+PROBE: SIGNIFICANT CHANGE UP
GAS PNL BLDV: 133 MMOL/L — LOW (ref 136–145)
GAS PNL BLDV: 133 MMOL/L — LOW (ref 136–145)
GAS PNL BLDV: 134 MMOL/L — LOW (ref 136–145)
GAS PNL BLDV: 134 MMOL/L — LOW (ref 136–145)
GLUCOSE BLDC GLUCOMTR-MCNC: 178 MG/DL — HIGH (ref 70–99)
GLUCOSE BLDC GLUCOMTR-MCNC: 178 MG/DL — HIGH (ref 70–99)
GLUCOSE BLDV-MCNC: 102 MG/DL — HIGH (ref 70–99)
GLUCOSE BLDV-MCNC: 102 MG/DL — HIGH (ref 70–99)
GLUCOSE BLDV-MCNC: 149 MG/DL — HIGH (ref 70–99)
GLUCOSE BLDV-MCNC: 149 MG/DL — HIGH (ref 70–99)
GLUCOSE SERPL-MCNC: 105 MG/DL — HIGH (ref 70–99)
GLUCOSE SERPL-MCNC: 105 MG/DL — HIGH (ref 70–99)
HADV DNA SPEC QL NAA+PROBE: SIGNIFICANT CHANGE UP
HADV DNA SPEC QL NAA+PROBE: SIGNIFICANT CHANGE UP
HCO3 BLDV-SCNC: 24 MMOL/L — SIGNIFICANT CHANGE UP (ref 22–29)
HCO3 BLDV-SCNC: 24 MMOL/L — SIGNIFICANT CHANGE UP (ref 22–29)
HCO3 BLDV-SCNC: 25 MMOL/L — SIGNIFICANT CHANGE UP (ref 22–29)
HCO3 BLDV-SCNC: 25 MMOL/L — SIGNIFICANT CHANGE UP (ref 22–29)
HCOV 229E RNA SPEC QL NAA+PROBE: SIGNIFICANT CHANGE UP
HCOV 229E RNA SPEC QL NAA+PROBE: SIGNIFICANT CHANGE UP
HCOV HKU1 RNA SPEC QL NAA+PROBE: SIGNIFICANT CHANGE UP
HCOV HKU1 RNA SPEC QL NAA+PROBE: SIGNIFICANT CHANGE UP
HCOV NL63 RNA SPEC QL NAA+PROBE: SIGNIFICANT CHANGE UP
HCOV NL63 RNA SPEC QL NAA+PROBE: SIGNIFICANT CHANGE UP
HCOV OC43 RNA SPEC QL NAA+PROBE: SIGNIFICANT CHANGE UP
HCOV OC43 RNA SPEC QL NAA+PROBE: SIGNIFICANT CHANGE UP
HCT VFR BLD CALC: 39.3 % — SIGNIFICANT CHANGE UP (ref 39–50)
HCT VFR BLD CALC: 39.3 % — SIGNIFICANT CHANGE UP (ref 39–50)
HCT VFR BLDA CALC: 33 % — LOW (ref 39–51)
HCT VFR BLDA CALC: 33 % — LOW (ref 39–51)
HCT VFR BLDA CALC: 37 % — LOW (ref 39–51)
HCT VFR BLDA CALC: 37 % — LOW (ref 39–51)
HGB BLD CALC-MCNC: 11 G/DL — LOW (ref 12.6–17.4)
HGB BLD CALC-MCNC: 11 G/DL — LOW (ref 12.6–17.4)
HGB BLD CALC-MCNC: 12.3 G/DL — LOW (ref 12.6–17.4)
HGB BLD CALC-MCNC: 12.3 G/DL — LOW (ref 12.6–17.4)
HGB BLD-MCNC: 12 G/DL — LOW (ref 13–17)
HGB BLD-MCNC: 12 G/DL — LOW (ref 13–17)
HMPV RNA SPEC QL NAA+PROBE: SIGNIFICANT CHANGE UP
HMPV RNA SPEC QL NAA+PROBE: SIGNIFICANT CHANGE UP
HPIV1 RNA SPEC QL NAA+PROBE: SIGNIFICANT CHANGE UP
HPIV1 RNA SPEC QL NAA+PROBE: SIGNIFICANT CHANGE UP
HPIV2 RNA SPEC QL NAA+PROBE: SIGNIFICANT CHANGE UP
HPIV2 RNA SPEC QL NAA+PROBE: SIGNIFICANT CHANGE UP
HPIV3 RNA SPEC QL NAA+PROBE: SIGNIFICANT CHANGE UP
HPIV3 RNA SPEC QL NAA+PROBE: SIGNIFICANT CHANGE UP
HPIV4 RNA SPEC QL NAA+PROBE: SIGNIFICANT CHANGE UP
HPIV4 RNA SPEC QL NAA+PROBE: SIGNIFICANT CHANGE UP
IANC: 9.82 K/UL — HIGH (ref 1.8–7.4)
IANC: 9.82 K/UL — HIGH (ref 1.8–7.4)
IMM GRANULOCYTES NFR BLD AUTO: 0.6 % — SIGNIFICANT CHANGE UP (ref 0–0.9)
IMM GRANULOCYTES NFR BLD AUTO: 0.6 % — SIGNIFICANT CHANGE UP (ref 0–0.9)
INR BLD: 1.4 RATIO — HIGH (ref 0.85–1.18)
INR BLD: 1.4 RATIO — HIGH (ref 0.85–1.18)
LACTATE BLDV-MCNC: 1.7 MMOL/L — SIGNIFICANT CHANGE UP (ref 0.5–2)
LACTATE BLDV-MCNC: 1.7 MMOL/L — SIGNIFICANT CHANGE UP (ref 0.5–2)
LACTATE BLDV-MCNC: 2.9 MMOL/L — HIGH (ref 0.5–2)
LYMPHOCYTES # BLD AUTO: 0.38 K/UL — LOW (ref 1–3.3)
LYMPHOCYTES # BLD AUTO: 0.38 K/UL — LOW (ref 1–3.3)
LYMPHOCYTES # BLD AUTO: 3.4 % — LOW (ref 13–44)
LYMPHOCYTES # BLD AUTO: 3.4 % — LOW (ref 13–44)
M PNEUMO DNA SPEC QL NAA+PROBE: SIGNIFICANT CHANGE UP
M PNEUMO DNA SPEC QL NAA+PROBE: SIGNIFICANT CHANGE UP
MCHC RBC-ENTMCNC: 29.1 PG — SIGNIFICANT CHANGE UP (ref 27–34)
MCHC RBC-ENTMCNC: 29.1 PG — SIGNIFICANT CHANGE UP (ref 27–34)
MCHC RBC-ENTMCNC: 30.5 GM/DL — LOW (ref 32–36)
MCHC RBC-ENTMCNC: 30.5 GM/DL — LOW (ref 32–36)
MCV RBC AUTO: 95.4 FL — SIGNIFICANT CHANGE UP (ref 80–100)
MCV RBC AUTO: 95.4 FL — SIGNIFICANT CHANGE UP (ref 80–100)
MONOCYTES # BLD AUTO: 0.72 K/UL — SIGNIFICANT CHANGE UP (ref 0–0.9)
MONOCYTES # BLD AUTO: 0.72 K/UL — SIGNIFICANT CHANGE UP (ref 0–0.9)
MONOCYTES NFR BLD AUTO: 6.5 % — SIGNIFICANT CHANGE UP (ref 2–14)
MONOCYTES NFR BLD AUTO: 6.5 % — SIGNIFICANT CHANGE UP (ref 2–14)
NEUTROPHILS # BLD AUTO: 9.82 K/UL — HIGH (ref 1.8–7.4)
NEUTROPHILS # BLD AUTO: 9.82 K/UL — HIGH (ref 1.8–7.4)
NEUTROPHILS NFR BLD AUTO: 88.8 % — HIGH (ref 43–77)
NEUTROPHILS NFR BLD AUTO: 88.8 % — HIGH (ref 43–77)
NRBC # BLD: 0 /100 WBCS — SIGNIFICANT CHANGE UP (ref 0–0)
NRBC # BLD: 0 /100 WBCS — SIGNIFICANT CHANGE UP (ref 0–0)
NRBC # FLD: 0 K/UL — SIGNIFICANT CHANGE UP (ref 0–0)
NRBC # FLD: 0 K/UL — SIGNIFICANT CHANGE UP (ref 0–0)
NT-PROBNP SERPL-SCNC: HIGH PG/ML
NT-PROBNP SERPL-SCNC: HIGH PG/ML
PCO2 BLDV: 44 MMHG — SIGNIFICANT CHANGE UP (ref 42–55)
PCO2 BLDV: 44 MMHG — SIGNIFICANT CHANGE UP (ref 42–55)
PCO2 BLDV: 51 MMHG — SIGNIFICANT CHANGE UP (ref 42–55)
PCO2 BLDV: 51 MMHG — SIGNIFICANT CHANGE UP (ref 42–55)
PH BLDV: 7.3 — LOW (ref 7.32–7.43)
PH BLDV: 7.3 — LOW (ref 7.32–7.43)
PH BLDV: 7.34 — SIGNIFICANT CHANGE UP (ref 7.32–7.43)
PH BLDV: 7.34 — SIGNIFICANT CHANGE UP (ref 7.32–7.43)
PLATELET # BLD AUTO: 158 K/UL — SIGNIFICANT CHANGE UP (ref 150–400)
PLATELET # BLD AUTO: 158 K/UL — SIGNIFICANT CHANGE UP (ref 150–400)
PO2 BLDV: 28 MMHG — SIGNIFICANT CHANGE UP (ref 25–45)
PO2 BLDV: 28 MMHG — SIGNIFICANT CHANGE UP (ref 25–45)
PO2 BLDV: 34 MMHG — SIGNIFICANT CHANGE UP (ref 25–45)
PO2 BLDV: 34 MMHG — SIGNIFICANT CHANGE UP (ref 25–45)
POTASSIUM BLDV-SCNC: 4.2 MMOL/L — SIGNIFICANT CHANGE UP (ref 3.5–5.1)
POTASSIUM BLDV-SCNC: 4.2 MMOL/L — SIGNIFICANT CHANGE UP (ref 3.5–5.1)
POTASSIUM BLDV-SCNC: 4.5 MMOL/L — SIGNIFICANT CHANGE UP (ref 3.5–5.1)
POTASSIUM BLDV-SCNC: 4.5 MMOL/L — SIGNIFICANT CHANGE UP (ref 3.5–5.1)
POTASSIUM SERPL-MCNC: 4.3 MMOL/L — SIGNIFICANT CHANGE UP (ref 3.5–5.3)
POTASSIUM SERPL-MCNC: 4.3 MMOL/L — SIGNIFICANT CHANGE UP (ref 3.5–5.3)
POTASSIUM SERPL-SCNC: 4.3 MMOL/L — SIGNIFICANT CHANGE UP (ref 3.5–5.3)
POTASSIUM SERPL-SCNC: 4.3 MMOL/L — SIGNIFICANT CHANGE UP (ref 3.5–5.3)
PROCALCITONIN SERPL-MCNC: 0.94 NG/ML — HIGH (ref 0.02–0.1)
PROCALCITONIN SERPL-MCNC: 0.94 NG/ML — HIGH (ref 0.02–0.1)
PROT SERPL-MCNC: 6.5 G/DL — SIGNIFICANT CHANGE UP (ref 6–8.3)
PROT SERPL-MCNC: 6.5 G/DL — SIGNIFICANT CHANGE UP (ref 6–8.3)
PROTHROM AB SERPL-ACNC: 15.7 SEC — HIGH (ref 9.5–13)
PROTHROM AB SERPL-ACNC: 15.7 SEC — HIGH (ref 9.5–13)
RAPID RVP RESULT: DETECTED
RAPID RVP RESULT: DETECTED
RBC # BLD: 4.12 M/UL — LOW (ref 4.2–5.8)
RBC # BLD: 4.12 M/UL — LOW (ref 4.2–5.8)
RBC # FLD: 20.5 % — HIGH (ref 10.3–14.5)
RBC # FLD: 20.5 % — HIGH (ref 10.3–14.5)
RSV RNA SPEC QL NAA+PROBE: SIGNIFICANT CHANGE UP
RSV RNA SPEC QL NAA+PROBE: SIGNIFICANT CHANGE UP
RV+EV RNA SPEC QL NAA+PROBE: DETECTED
RV+EV RNA SPEC QL NAA+PROBE: DETECTED
SAO2 % BLDV: 28.1 % — LOW (ref 67–88)
SAO2 % BLDV: 28.1 % — LOW (ref 67–88)
SAO2 % BLDV: 45.6 % — LOW (ref 67–88)
SAO2 % BLDV: 45.6 % — LOW (ref 67–88)
SARS-COV-2 RNA SPEC QL NAA+PROBE: SIGNIFICANT CHANGE UP
SARS-COV-2 RNA SPEC QL NAA+PROBE: SIGNIFICANT CHANGE UP
SODIUM SERPL-SCNC: 138 MMOL/L — SIGNIFICANT CHANGE UP (ref 135–145)
SODIUM SERPL-SCNC: 138 MMOL/L — SIGNIFICANT CHANGE UP (ref 135–145)
TROPONIN T, HIGH SENSITIVITY RESULT: 152 NG/L — CRITICAL HIGH
TROPONIN T, HIGH SENSITIVITY RESULT: 152 NG/L — CRITICAL HIGH
TROPONIN T, HIGH SENSITIVITY RESULT: 188 NG/L — CRITICAL HIGH
TROPONIN T, HIGH SENSITIVITY RESULT: 188 NG/L — CRITICAL HIGH
TROPONIN T, HIGH SENSITIVITY RESULT: 207 NG/L — CRITICAL HIGH
TROPONIN T, HIGH SENSITIVITY RESULT: 207 NG/L — CRITICAL HIGH
WBC # BLD: 11.06 K/UL — HIGH (ref 3.8–10.5)
WBC # BLD: 11.06 K/UL — HIGH (ref 3.8–10.5)
WBC # FLD AUTO: 11.06 K/UL — HIGH (ref 3.8–10.5)
WBC # FLD AUTO: 11.06 K/UL — HIGH (ref 3.8–10.5)

## 2023-11-11 PROCEDURE — 71045 X-RAY EXAM CHEST 1 VIEW: CPT | Mod: 26

## 2023-11-11 PROCEDURE — 99223 1ST HOSP IP/OBS HIGH 75: CPT

## 2023-11-11 PROCEDURE — 99283 EMERGENCY DEPT VISIT LOW MDM: CPT | Mod: GC

## 2023-11-11 PROCEDURE — 99291 CRITICAL CARE FIRST HOUR: CPT

## 2023-11-11 RX ORDER — HEPARIN SODIUM 5000 [USP'U]/ML
INJECTION INTRAVENOUS; SUBCUTANEOUS
Qty: 25000 | Refills: 0 | Status: DISCONTINUED | OUTPATIENT
Start: 2023-11-11 | End: 2023-11-14

## 2023-11-11 RX ORDER — CARBIDOPA AND LEVODOPA 25; 100 MG/1; MG/1
1.5 TABLET ORAL
Refills: 0 | Status: DISCONTINUED | OUTPATIENT
Start: 2023-11-11 | End: 2023-11-12

## 2023-11-11 RX ORDER — CEFTRIAXONE 500 MG/1
1000 INJECTION, POWDER, FOR SOLUTION INTRAMUSCULAR; INTRAVENOUS ONCE
Refills: 0 | Status: COMPLETED | OUTPATIENT
Start: 2023-11-11 | End: 2023-11-11

## 2023-11-11 RX ORDER — LANOLIN ALCOHOL/MO/W.PET/CERES
4 CREAM (GRAM) TOPICAL
Qty: 0 | Refills: 0 | DISCHARGE

## 2023-11-11 RX ORDER — CEFEPIME 1 G/1
1000 INJECTION, POWDER, FOR SOLUTION INTRAMUSCULAR; INTRAVENOUS ONCE
Refills: 0 | Status: COMPLETED | OUTPATIENT
Start: 2023-11-11 | End: 2023-11-11

## 2023-11-11 RX ORDER — ACETAMINOPHEN 500 MG
975 TABLET ORAL ONCE
Refills: 0 | Status: COMPLETED | OUTPATIENT
Start: 2023-11-11 | End: 2023-11-11

## 2023-11-11 RX ORDER — POLYETHYLENE GLYCOL 3350 17 G/17G
17 POWDER, FOR SOLUTION ORAL DAILY
Refills: 0 | Status: DISCONTINUED | OUTPATIENT
Start: 2023-11-11 | End: 2023-11-12

## 2023-11-11 RX ORDER — IPRATROPIUM/ALBUTEROL SULFATE 18-103MCG
3 AEROSOL WITH ADAPTER (GRAM) INHALATION ONCE
Refills: 0 | Status: COMPLETED | OUTPATIENT
Start: 2023-11-11 | End: 2023-11-11

## 2023-11-11 RX ORDER — VANCOMYCIN HCL 1 G
VIAL (EA) INTRAVENOUS
Refills: 0 | Status: DISCONTINUED | OUTPATIENT
Start: 2023-11-11 | End: 2023-11-11

## 2023-11-11 RX ORDER — AZITHROMYCIN 500 MG/1
500 TABLET, FILM COATED ORAL ONCE
Refills: 0 | Status: COMPLETED | OUTPATIENT
Start: 2023-11-11 | End: 2023-11-11

## 2023-11-11 RX ORDER — CLOPIDOGREL BISULFATE 75 MG/1
75 TABLET, FILM COATED ORAL DAILY
Refills: 0 | Status: DISCONTINUED | OUTPATIENT
Start: 2023-11-11 | End: 2023-11-12

## 2023-11-11 RX ORDER — ASCORBIC ACID 60 MG
500 TABLET,CHEWABLE ORAL DAILY
Refills: 0 | Status: DISCONTINUED | OUTPATIENT
Start: 2023-11-11 | End: 2023-11-12

## 2023-11-11 RX ORDER — OLANZAPINE 15 MG/1
2.5 TABLET, FILM COATED ORAL AT BEDTIME
Refills: 0 | Status: DISCONTINUED | OUTPATIENT
Start: 2023-11-11 | End: 2023-11-12

## 2023-11-11 RX ORDER — VANCOMYCIN HCL 1 G
1000 VIAL (EA) INTRAVENOUS ONCE
Refills: 0 | Status: DISCONTINUED | OUTPATIENT
Start: 2023-11-11 | End: 2023-11-12

## 2023-11-11 RX ORDER — ATORVASTATIN CALCIUM 80 MG/1
80 TABLET, FILM COATED ORAL AT BEDTIME
Refills: 0 | Status: DISCONTINUED | OUTPATIENT
Start: 2023-11-11 | End: 2023-11-12

## 2023-11-11 RX ORDER — MIRTAZAPINE 45 MG/1
7.5 TABLET, ORALLY DISINTEGRATING ORAL DAILY
Refills: 0 | Status: DISCONTINUED | OUTPATIENT
Start: 2023-11-11 | End: 2023-11-12

## 2023-11-11 RX ORDER — ACETAMINOPHEN 500 MG
650 TABLET ORAL EVERY 6 HOURS
Refills: 0 | Status: DISCONTINUED | OUTPATIENT
Start: 2023-11-11 | End: 2023-11-12

## 2023-11-11 RX ORDER — CHOLECALCIFEROL (VITAMIN D3) 125 MCG
1000 CAPSULE ORAL DAILY
Refills: 0 | Status: DISCONTINUED | OUTPATIENT
Start: 2023-11-11 | End: 2023-11-12

## 2023-11-11 RX ADMIN — CEFTRIAXONE 1000 MILLIGRAM(S): 500 INJECTION, POWDER, FOR SOLUTION INTRAMUSCULAR; INTRAVENOUS at 13:14

## 2023-11-11 RX ADMIN — OLANZAPINE 2.5 MILLIGRAM(S): 15 TABLET, FILM COATED ORAL at 23:19

## 2023-11-11 RX ADMIN — HEPARIN SODIUM 1700 UNIT(S)/HR: 5000 INJECTION INTRAVENOUS; SUBCUTANEOUS at 23:42

## 2023-11-11 RX ADMIN — ATORVASTATIN CALCIUM 80 MILLIGRAM(S): 80 TABLET, FILM COATED ORAL at 23:20

## 2023-11-11 RX ADMIN — CEFEPIME 100 MILLIGRAM(S): 1 INJECTION, POWDER, FOR SOLUTION INTRAMUSCULAR; INTRAVENOUS at 23:20

## 2023-11-11 RX ADMIN — AZITHROMYCIN 500 MILLIGRAM(S): 500 TABLET, FILM COATED ORAL at 18:14

## 2023-11-11 RX ADMIN — Medication 3 MILLILITER(S): at 10:46

## 2023-11-11 RX ADMIN — Medication 975 MILLIGRAM(S): at 13:57

## 2023-11-11 RX ADMIN — Medication 975 MILLIGRAM(S): at 10:45

## 2023-11-11 RX ADMIN — AZITHROMYCIN 255 MILLIGRAM(S): 500 TABLET, FILM COATED ORAL at 13:06

## 2023-11-11 RX ADMIN — CARBIDOPA AND LEVODOPA 1.5 TABLET(S): 25; 100 TABLET ORAL at 23:23

## 2023-11-11 RX ADMIN — CEFTRIAXONE 100 MILLIGRAM(S): 500 INJECTION, POWDER, FOR SOLUTION INTRAMUSCULAR; INTRAVENOUS at 12:46

## 2023-11-11 NOTE — H&P ADULT - PROBLEM SELECTOR PLAN 4
troponin 152->188, trend troponin q6h to peak  EKG atrial sensed ventricular paced rhythm  cardiology consulted, f/u recommendations

## 2023-11-11 NOTE — CONSULT NOTE ADULT - NS ATTEND AMEND GEN_ALL_CORE FT
Likely type 2 MI in setting of hypotension. No need for further cardiac testing. Please call us back with questions.

## 2023-11-11 NOTE — ED PROVIDER NOTE - PHYSICAL EXAMINATION
GENERAL: Not in acute distress, non-toxic appearing  HEAD: normocephalic, atraumatic  HEENT: EOMI, normal conjunctiva, oral mucosa moist, neck supple  CARDIAC: regular rate and rhythm, normal S1 and S2,  no appreciable murmurs  PULM: +diffuse rhonchi bilat, normal work of breathing  GI: abdomen nondistended, soft, nontender, no guarding or rebound tenderness  : no suprapubic tenderness  NEURO: alert and oriented x 3, normal speech, no gross neurologic deficit  MSK: No visible deformities, no peripheral edema, calf tenderness/redness/swelling  SKIN: No visible rashes, dry, well-perfused  PSYCH: appropriate mood and affect GENERAL: Not in acute distress, non-toxic appearing  HEAD: normocephalic, atraumatic  HEENT: EOMI, normal conjunctiva, oral mucosa moist, neck supple  CARDIAC: regular rate and rhythm, normal S1 and S2,  no appreciable murmurs  PULM: +diffuse rhonchi bilat, normal work of breathing  GI: abdomen nondistended, soft, nontender, no guarding or rebound tenderness  : no suprapubic tenderness  NEURO: alert and oriented x 3, normal speech, no gross neurologic deficit  MSK: No visible deformities, + 1 bilat lower extremity edema, calf tenderness/redness/swelling  SKIN: No visible rashes, dry, well-perfused  PSYCH: appropriate mood and affect

## 2023-11-11 NOTE — ED ADULT NURSE REASSESSMENT NOTE - NS ED NURSE REASSESS COMMENT FT1
MD Storey notified of BP @ 1340 via teams. Pending MD order. Pt remains lethargic, but arousable at this time. Breathing even, unlabored. Crackles audible. NSR on cardiac monitor.

## 2023-11-11 NOTE — ED ADULT NURSE REASSESSMENT NOTE - NS ED NURSE REASSESS COMMENT FT1
pt suctioned several times orally  told to cough, wet thick mucus noted after suctioning/ pt awake alert orientation ? / family at bedside most of day/  cm  afib at times with paced beats. pt inc of urine  clean  diaper in place awaits tele bed/  20 g  to rt and lt arms both patent

## 2023-11-11 NOTE — ED ADULT NURSE REASSESSMENT NOTE - NS ED NURSE REASSESS COMMENT FT1
Pt received from day RN AM. Pt A&O x3. bedbound. Pt c/o cough since yesterday. Pt has a PMHx of Pleural effusion, HTN, HLD. Gurgling noted upon auscultation. Yankhauer used for suction. Respiurations even and unlabored. NSR on cardiac monitor. Pt denies any pain. Comfort measures provided. Labs drawn and sent. Pt received from day RN AM. Pt A&O x3. bedbound. Pt c/o cough since yesterday. Pt has a PMHx of Pleural effusion, HTN, HLD. Gurgling noted upon auscultation. Yankauer used for suction. Respirations even and unlabored. V Paced on cardiac monitor. Pt denies any pain. Pt has stage 2 sacral pressure. Comfort measures provided. Labs drawn and sent.

## 2023-11-11 NOTE — RAPID RESPONSE TEAM SUMMARY - NSSITUATIONBACKGROUNDRRT_GEN_ALL_CORE
81 yo M PMH Afib on eliquis, bradycardia s/p PPM 2021 with exchange last year, pleural effusion s/p pleurX catheter, CKD, Parkinson's disease admitted for PNA/fluid overload. Rapid response called for hypotension to 70 systolic and concern for depressed mental status.  Upon arrival blood pressure 70 systolic, heart rate 60s (V-paced), saturating high 90s on 4 L nasal cannula, fingerstick within normal limits and patient mouth breathing with gurgling from secretions in upper airway, patient minimally somnolent but easily arousable and AO x3.  500 cc bolus of fluid initiated, albumin ordered as well however upon further evaluation of chart patient at baseline blood pressure from previous admission and consistently  80s to low 100s systolic while in ED.  Patient deep suctioned twice with thick mucus aspirated and lungs with improved sounds after suctioning.  Decision made to end rapid given patient at baseline blood pressure, mentating well, afebrile.  Relayed to primary team to clarify goals of care and recommended chest PT while on floor.  Emergency department ACP at bedside and verbalized understanding of recommendations. 83 yo M PMH Afib on eliquis, bradycardia s/p PPM 2021 with exchange last year, pleural effusion s/p pleurX catheter, CKD, Parkinson's disease admitted for PNA/fluid overload. Rapid response called for hypotension to 70 systolic and concern for depressed mental status.  Upon arrival blood pressure 70 systolic, heart rate 60s (V-paced), saturating high 90s on 4 L nasal cannula, fingerstick within normal limits and patient mouth breathing with gurgling from secretions in upper airway, patient minimally somnolent but easily arousable and AO x3.  500 cc bolus of fluid initiated, albumin ordered as well and administered with improvement in systolics to 90s, however upon further evaluation of chart patient at baseline blood pressure from previous admission and consistently  80s to low 100s systolic while in ED.  Patient deep suctioned twice with thick mucus aspirated and lungs with improved sounds after suctioning.  Decision made to end rapid given patient at baseline blood pressure, mentating well, afebrile.  Relayed to primary team to clarify goals of care and recommended chest PT while on floor.  Emergency department ACP at bedside and verbalized understanding of recommendations.

## 2023-11-11 NOTE — ED ADULT NURSE REASSESSMENT NOTE - NS ED NURSE REASSESS COMMENT FT1
Break RN: pt remains lethargic, but arousable after rapid response interventions. BP stable. NSR on cardiac monitor. Breathing even, unlabored. Safety maintained.

## 2023-11-11 NOTE — H&P ADULT - PROBLEM SELECTOR PLAN 8
Continue home mirtazapine  Home desvenlafaxine not on formulary PPM placed in 2021, exchanged last year  Follows with cardiologist Dr. Collazo

## 2023-11-11 NOTE — H&P ADULT - NSHPREVIEWOFSYSTEMS_GEN_ALL_CORE
Review of Systems:   CONSTITUTIONAL: No fever, weight loss  EYES: No eye pain, visual disturbances, or discharge  ENMT:  No difficulty hearing, tinnitus, vertigo; No sinus or throat pain  RESPIRATORY: No SOB. +cough, no wheezing, chills or hemoptysis  CARDIOVASCULAR: No chest pain, palpitations, dizziness, or leg swelling  GASTROINTESTINAL: No abdominal or epigastric pain. No nausea, vomiting, or hematemesis; No diarrhea or constipation. No melena or hematochezia.  GENITOURINARY: No dysuria, frequency, hematuria, or incontinence  NEUROLOGICAL: No headaches, memory loss, loss of strength, numbness, or tremors  SKIN: No itching, burning, rashes, or lesions   LYMPH NODES: No enlarged glands  ENDOCRINE: No heat or cold intolerance; No hair loss  MUSCULOSKELETAL: No joint pain or swelling; No muscle, back pain  PSYCHIATRIC: No depression, anxiety, mood swings, or difficulty sleeping  HEME/LYMPH: No easy bruising, or bleeding gums

## 2023-11-11 NOTE — H&P ADULT - PROBLEM SELECTOR PLAN 10
Continue home miralax Continue home olanzapine, wife states patient takes for delusions, denies hx schizophrenia

## 2023-11-11 NOTE — CHART NOTE - NSCHARTNOTEFT_GEN_A_CORE
Requested weight from nurse in person in order to start heparin gtt and renally dose antibiotics. Asked ACP team to follow up on weight overnight and input orders for heparin gtt and renally dosed cefepime and vancomycin.

## 2023-11-11 NOTE — H&P ADULT - NSHPLABSRESULTS_GEN_ALL_CORE
LABS:                        12.0   11.06 )-----------( 158      ( 11 Nov 2023 10:31 )             39.3     11-11    138  |  102  |  43<H>  ----------------------------<  105<H>  4.3   |  23  |  2.58<H>    Ca    9.6      11 Nov 2023 10:31    TPro  6.5  /  Alb  3.7  /  TBili  0.4  /  DBili  x   /  AST  9   /  ALT  9   /  AlkPhos  77  11-11    PT/INR - ( 11 Nov 2023 10:31 )   PT: 15.7 sec;   INR: 1.40 ratio         PTT - ( 11 Nov 2023 10:31 )  PTT:42.8 sec      Urinalysis Basic - ( 11 Nov 2023 10:31 )    Color: x / Appearance: x / SG: x / pH: x  Gluc: 105 mg/dL / Ketone: x  / Bili: x / Urobili: x   Blood: x / Protein: x / Nitrite: x   Leuk Esterase: x / RBC: x / WBC x   Sq Epi: x / Non Sq Epi: x / Bacteria: x        SARS-CoV-2: NotDetec (11 Nov 2023 10:31)  SARS-CoV-2: NotDetec (09 Aug 2023 01:58)

## 2023-11-11 NOTE — H&P ADULT - PROBLEM SELECTOR PLAN 11
DVT ppx: heparin gtt  Diet: DASH  Code: FULL code, had GOC discussion at bedside, wife will revisit the conversation at a later point Continue home miralax

## 2023-11-11 NOTE — CONSULT NOTE ADULT - SUBJECTIVE AND OBJECTIVE BOX
HISTORY OF PRESENT ILLNESS:  This is an 82 year old man with past medical history of CAD s/p PCI, Afib on eliquis, Bradycardia s/p PPM/ICD, chronic pleural effusions with right side pleurX catheter (patient reports last drained in 8/2023), CKD (with three sessions of HD earlier this year via [facility], no longer on HD), Amyloidosis, Parkinson's disease presented with cough and fever at home to 100.3 x 1-2 days seen to have elevated troponins.  Cardiology called on consult for elevated troponins.  In ED patient with high fevers and low BP requiring fluid bolus with normalizing of BP.  Patient positive for enterovirus and started on ABX. On interview patient states he sees Dr. Dickey for Cardiology and last saw him 1 month ago.  He denies chest pain.  He reports 2 days of SOB with productive cough of yellowish sputum he has trouble clearing due to Parkinsonism. Labs significant for WBC 11.0, Cr 2.58, respiratory multiplex +enterovirus. On assessment patient A+OX3 in no acute distress with copious secretions.  V-paced 80s with /70, RR16 Sating 100% on 2L O2, lung sounds rhonchorous with right side pleurx catheter to dressing, temp 98.0, no pedal or sacral edema, no appreciable JVD. Labs show leukocytosis with WBC 11.06, elevated creatinin 2.58, elevated troponin 152->188. EKG atrial sensed ventricular paced 67 bpm. Chest x-ray showed moderate L sided pleural effusion with pleurX catheter.       Allergies    No Known Allergies    Intolerances    	    MEDICATIONS:  clopidogrel Tablet 75 milliGRAM(s) Oral daily        acetaminophen     Tablet .. 650 milliGRAM(s) Oral every 6 hours PRN  carbidopa/levodopa  25/100 1.5 Tablet(s) Oral four times a day  mirtazapine 7.5 milliGRAM(s) Oral daily  OLANZapine 2.5 milliGRAM(s) Oral at bedtime    polyethylene glycol 3350 17 Gram(s) Oral daily    atorvastatin 80 milliGRAM(s) Oral at bedtime    ascorbic acid 500 milliGRAM(s) Oral daily  cholecalciferol 1000 Unit(s) Oral daily      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      Parkinsons disease      CAD (coronary artery disease)  s/p PCI 08/21      Pleural effusion, not elsewhere classified      COVID-19 vaccine series completed  w booster      History of hip replacement, total  R hip 2008 & L hip      Status post cataract extraction  right eye cataract extraction with IOL 6/26/2015      Presence of cardiac pacemaker  10/2021      H/O coronary angioplasty  8/2021 1 stent inserted          FAMILY HISTORY:  Family history of lung cancer (Mother)    Family history of esophageal cancer (Father)    FH: myocardial infarction (Grandparent)        SOCIAL HISTORY:    [ ] Non-smoker  [ ] Smoker  [ ] Alcohol    REVIEW OF SYSTEMS:  See HPI, otherwise complete 10 point review of systems negative    PHYSICAL EXAM:  T(C): 36.7 (11-11-23 @ 19:17), Max: 37.9 (11-11-23 @ 09:57)  HR: 69 (11-11-23 @ 19:17) (61 - 78)  BP: 106/52 (11-11-23 @ 20:30) (76/39 - 106/52)  RR: 18 (11-11-23 @ 19:17) (16 - 20)  SpO2: 99% (11-11-23 @ 19:17) (96% - 100%)  Wt(kg): --  I&O's Summary      Appearance: No Acute Distress	  HEENT:  Normal oral mucosa, PERRL, EOMI	  Cardiovascular: Normal S1 S2, No JVD, No murmurs/rubs/gallops  Respiratory: Lungs clear to auscultation bilaterally  Gastrointestinal:  Soft, Non-tender, + BS	  Skin: No rashes, No ecchymoses, No cyanosis	  Neurologic: Non-focal  Extremities: No clubbing, cyanosis or edema  Vascular: Peripheral pulses palpable 2+ bilaterally  Psychiatry: A & O x 3, Mood & affect appropriate    LABS:	 	    CBC Full  -  ( 11 Nov 2023 10:31 )  WBC Count : 11.06 K/uL  Hemoglobin : 12.0 g/dL  Hematocrit : 39.3 %  Platelet Count - Automated : 158 K/uL  Mean Cell Volume : 95.4 fL  Mean Cell Hemoglobin : 29.1 pg  Mean Cell Hemoglobin Concentration : 30.5 gm/dL  Auto Neutrophil # : 9.82 K/uL  Auto Lymphocyte # : 0.38 K/uL  Auto Monocyte # : 0.72 K/uL  Auto Eosinophil # : 0.03 K/uL  Auto Basophil # : 0.04 K/uL  Auto Neutrophil % : 88.8 %  Auto Lymphocyte % : 3.4 %  Auto Monocyte % : 6.5 %  Auto Eosinophil % : 0.3 %  Auto Basophil % : 0.4 %    11-11    138  |  102  |  43<H>  ----------------------------<  105<H>  4.3   |  23  |  2.58<H>    Ca    9.6      11 Nov 2023 10:31    TPro  6.5  /  Alb  3.7  /  TBili  0.4  /  DBili  x   /  AST  9   /  ALT  9   /  AlkPhos  77  11-11      proBNP:   Lipid Profile:   HgA1c:   TSH:       CARDIAC MARKERS:            TELEMETRY: 	    ECG:  	  RADIOLOGY:  OTHER: 	    PREVIOUS DIAGNOSTIC TESTING:    [ ] Echocardiogram:  < from: Transthoracic Echocardiogram (08.10.23 @ 09:39) >    Patient name: ALBINO RIVAS  YOB: 1940   Age: 82 (M)   MR#: [id number]  Study Date: 8/10/2023  Location: [facility]onographer: MARIA LUISA Villanueva  Study quality: Technically Fair  Referring Physician: Terry Olivarez MD  Blood Pressure:129/53 mmHg  Height: 173 cm  Weight: 88 kg  BSA: 2 m2  ------------------------------------------------------------------------  PROCEDURE: Transthoracic echocardiogram with 2-D, M-Mode  and complete spectral and color flow Doppler.  INDICATION: Cardiogenic shock (R57.0)  ------------------------------------------------------------------------  DIMENSIONS:  Dimensions:     Normal Values:  LA:     4.2 cm    2.0 - 4.0 cm  Ao:     3.1 cm    2.0 - 3.8 cm  SEPTUM: 1.2 cm    0.6 - 1.2 cm  PWT:    1.4 cm    0.6 - 1.1 cm  LVIDd:  4.7 cm    3.0 - 5.6 cm  LVIDs:    ---     1.8 - 4.0 cm  Derived Variables:  LVMI: 118 g/m2  RWT: 0.59  Ejection Fraction (Modified Cote Rule): 59 %  Peak Velocity (m/sec): AoV=1.8  ------------------------------------------------------------------------  OBSERVATIONS:  Mitral Valve: Mitral annular calcification, otherwise  normal mitral valve. Minimal mitral regurgitation.  Aortic Root: Normal aortic root.  Aortic Valve: Calcified trileaflet aortic valve with normal  opening. Moderate aortic regurgitation.  Left Atrium: Normal left atrium.  LA volume index = 25  cc/m2.  Left Ventricle: Endocardium not well visualized; grossly  normal left ventricular systolic function.  Moderate  concentric left ventricular hypertrophy. Increased E/e'  is  consistent with elevated left ventricular filling pressure.  Right Heart: Normal right atrium. Normal right ventricular  size with reduced function. A device wire is noted in the  right ventricle. Mild tricuspid regurgitation. Pulmonic  valve not well visualized. Minimal pulmonic regurgitation.  Pericardium/PleuraNormal pericardium with no pericardial  effusion. Left pleural effusion.  ------------------------------------------------------------------------  CONCLUSIONS:  1. Mitral annular calcification, otherwise normal mitral  valve. Minimal mitral regurgitation.  2. Calcified trileaflet aortic valve with normal opening.  Moderate aortic regurgitation.  3. Moderate concentric left ventricular hypertrophy.  4. Endocardium not well visualized; grossly normal left  ventricular systolic function.  5. Increased E/e'  is consistent with elevated left  ventricular filling pressure.  6. Normal right ventricular size with reduced function. A  device wire is noted in theright ventricle.  7. Estimated pulmonary artery systolic pressure equals 42  mm Hg, assuming right atrial pressure equals 10  mm Hg,  consistent with mild pulmonary hypertension.  8. Left pleural effusion.  ------------------------------------------------------------------------  Confirmed on  8/10/2023 - 15:58:24 by Ward Croft M.D. [facility]  ------------------------------------------------------------------------    < end of copied text >    [ ] Catheterization:    [ ] Stress Test:

## 2023-11-11 NOTE — ED ADULT NURSE NOTE - OBJECTIVE STATEMENT
pt c/o cough and fever since yesterday  pt on cm nsr on 2 l nc  sat ting 100/  iv placed in rt ac  20 g  labs sent off/ pt cultured and swabbed

## 2023-11-11 NOTE — ED PROVIDER NOTE - PROGRESS NOTE DETAILS
Suzy Peter MD PGY3: Patient +enterhino, possible focal PNA on CXR will give Abx, BNP 12K (increased from prior), Will admit. Repeat lactate and troponin ordered.

## 2023-11-11 NOTE — H&P ADULT - PROBLEM SELECTOR PLAN 2
XR showing moderate L sided pleural effusion with pleurX catheter  f/u pulm input  Will hold off diuresis at this time in setting of hypotension, reevaluate in AM

## 2023-11-11 NOTE — H&P ADULT - PROBLEM SELECTOR PLAN 1
In the ED patient hypotensive to systolic 76/39, improved to 92/38 after 500cc NS  f/u MICU evaluation  f/u BCx, UCx, XR showing moderate L sided pleural effusion with pleurX catheter  f/u procalcitonin, respiratory multiplex  s/p azithromycin and CTX in the ED  will empirically give renally dosed vancomycin and cefepime, please follow cultures and deescalate as appropriate  Maintain tele monitoring  Monitor BP In the ED patient hypotensive to systolic 76/39, improved to 92/38 after 500cc NS  f/u MICU evaluation  f/u BCx, UCx, XR showing moderate L sided pleural effusion with pleurX catheter  f/u procalcitonin, respiratory multiplex  s/p azithromycin and CTX in the ED  will empirically give renally dosed vancomycin and cefepime please f/u weight, please follow cultures and deescalate as appropriate  Maintain tele monitoring  Monitor BP

## 2023-11-11 NOTE — CONSULT NOTE ADULT - ASSESSMENT
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 MICU consulted for hypotension, improved w/ IVF. Reviewed previous records, dc'd August 2023 w/ SBP 90-100s, appears to be at his baseline.     Recommendations:   - aspiration precautions - patient w/ copious secretions w/ minimal cough  - frequent chest PT  - admission to continuous pulse ox floor  - ongoing GOC; patient unsure if he would want to be intubated, advised him to continue thinking about this  - can sample pleural fluid via Pleur-X  - agree w/ treatment for CAP    Patient is not a MICU candidate; pls reconsult if needed  Case discussed w/ Dr. Monteiro, ED team.

## 2023-11-11 NOTE — H&P ADULT - NSHPPHYSICALEXAM_GEN_ALL_CORE
Vital Signs Last 24 Hrs  T(C): 36.6 (11 Nov 2023 15:19), Max: 37.9 (11 Nov 2023 09:57)  T(F): 97.9 (11 Nov 2023 15:19), Max: 100.3 (11 Nov 2023 09:57)  HR: 63 (11 Nov 2023 16:31) (61 - 78)  BP: 92/38 (11 Nov 2023 16:31) (76/39 - 98/61)  BP(mean): --  RR: 18 (11 Nov 2023 16:31) (16 - 20)  SpO2: 98% (11 Nov 2023 16:31) (96% - 100%)    Parameters below as of 11 Nov 2023 16:31  Patient On (Oxygen Delivery Method): nasal cannula  O2 Flow (L/min): 2      CONSTITUTIONAL: Well-groomed, in no apparent distress  EYES: No conjunctival or scleral injection, non-icteric;   ENMT: No external nasal lesions; MMM  NECK: Trachea midline without palpable neck mass; thyroid not enlarged and non-tender  RESPIRATORY: crackles appreciated b/l, no wheezing, rhonchi, or rales, pleurx catheter intact  CARDIOVASCULAR: +S1S2, RRR, no M/G/R; pedal pulses full and symmetric; no lower extremity edema  GASTROINTESTINAL: No palpable masses or tenderness, +BS throughout, no rebound/guarding; no hepatosplenomegaly; no hernia palpated  LYMPHATIC: No cervical LAD or tenderness  SKIN: No rashes or ulcers noted  NEUROLOGIC: CN II-XII intact; sensation intact in LEs b/l to light touch  PSYCHIATRIC: A+O x 3; mood and affect appropriate; appropriate insight and judgment

## 2023-11-11 NOTE — H&P ADULT - PROBLEM SELECTOR PLAN 9
Continue home olanzapine, wife states patient takes for delusions, denies hx schizophrenia Continue home mirtazapine  Home desvenlafaxine not on formulary

## 2023-11-11 NOTE — H&P ADULT - TIME BILLING
90 minutes of total time dedicated to this patient visit today including preparing to see the patient (eg. review of tests), obtaining and/or reviewing separately obtained history, obtaining/reviewing vitals, performing a medically appropriate examination and/or evaluation, counseling and educating the patient/family/caregiver, reviewing previous notes and test results, and procedures, communicating with other health professionals (when not separately reported), and documenting clinical information in the electronic health record.

## 2023-11-11 NOTE — ED ADULT NURSE NOTE - ED STAT RN HANDOFF DETAILS
Pt received from day RN AM. Pt A&O x3. bedbound. Pt c/o cough since yesterday. Pt has a PMHx of Pleural effusion, HTN, HLD. Gurgling noted upon auscultation. Yankauer used for suction. Respirations even and unlabored. V Paced on cardiac monitor. Pt denies any pain. Pt has stage 2 sacral pressure. Comfort measures provided. Labs drawn and sent.

## 2023-11-11 NOTE — H&P ADULT - ASSESSMENT
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 Patient also hypotensive to 76/39 responded to IVF BP improved to systolic 90s. Given reported fever, hypotension, and leukocytosis unclear if superimposed bacterial process however XR without consolidation. Patient also with troponemia 152->188 EKG atrial sensed ventricular paced 67 bpm must r/o ACS. 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 Patient also hypotensive to 76/39 responded to IVF BP improved to systolic 90s. Given reported fever, hypotension, and leukocytosis unclear if superimposed bacterial process. Patient also with troponemia 152->188 EKG atrial sensed ventricular paced 67 bpm must r/o ACS.

## 2023-11-11 NOTE — ED PROVIDER NOTE - OBJECTIVE STATEMENT
82 year old male with hx of HTN, HLD, parkinsons, CAD, AFib, pacemaker on Eliquis and plavix, comes into the ED for eval of cough, fever for the past 1-2 days. Pt's wife states that he started coughing yesterday and it was worse when he laid flat. He has had pneumonia in the past. He denies any chest pain, SOB, abd pain, palpitations. He denies any lower extremity swelling or pain. He has been compliant with all of his medications including his diuretic. 82 year old male with hx of HTN, HLD, parkinsons, CAD, AFib, pacemaker on Eliquis and plavix, comes into the ED for eval of cough, fever for the past 1-2 days. Pt's wife states that he started coughing yesterday and it was worse when he laid flat. He has had pneumonia in the past. He denies any chest pain, SOB, abd pain, palpitations. He denies any lower extremity swelling or pain. He has been compliant with all of his medications including his diuretic.    Attendin-year-old male presents with cough and chest congestion for 1 or 2 days.  Had a low-grade fever last night.  Patient states he feels like when he had pneumonia in the past.

## 2023-11-11 NOTE — H&P ADULT - PROBLEM SELECTOR PLAN 7
PPM placed in 2021, exchanged last year  Follows with cardiologist Dr. Collazo Cr 2.58, stable from 2.69 8/2023, worsened from 1.29 2/2023  Patient is s/p 3 sessions of dialysis via [facility] since discontinued, patient follows with nephrologist, would obtain records  RBUS 8/2023 with b/l renal echogenicity no hydronephrosis  Monitor BMP, urine output

## 2023-11-11 NOTE — H&P ADULT - HISTORY OF PRESENT ILLNESS
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 The cough is productive with white-yellow phlegm nonbloody. Patient denies SOB however describes dyspnea on exertion - cannot walk more than a few steps without feeling winded. In the ED patient was found to be hypotensive to 76/39 s/p 500 cc NS BP improved to systolic 90-100s. Patient also found to have elevated troponin 152->188 EKG atrial sensed ventricular paced 67 bpm. XR showed moderate L sided pleural effusion with pleurX catheter. Labs significant for WBC 11.0, Cr 2.58. 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 The cough is productive with white-yellow phlegm nonbloody. Patient denies SOB however describes dyspnea on exertion - cannot walk more than a few steps without feeling winded. Patient has been compliant with home bumex 2mg daily. In the ED patient was found to be hypotensive to 76/39 s/p 500 cc NS BP improved to systolic 90-100s. Patient also found to have elevated troponin 152->188 EKG atrial sensed ventricular paced 67 bpm. XR showed moderate L sided pleural effusion with pleurX catheter. Labs significant for WBC 11.0, Cr 2.58, respiratory multiplex +enterovirus. The ED treated the patient with IV azithromycin, and ceftriaxone.

## 2023-11-11 NOTE — H&P ADULT - PROBLEM SELECTOR PLAN 6
s/p PCI 2021  Continue plavix, home crestor not on formulary ordered lipitor  Patient is not on ASA at home

## 2023-11-11 NOTE — CONSULT NOTE ADULT - SUBJECTIVE AND OBJECTIVE BOX
MICU Progress Note    HPI:      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      Parkinsons disease      CAD (coronary artery disease)  s/p PCI 08/21      Pleural effusion, not elsewhere classified      COVID-19 vaccine series completed  w booster      History of hip replacement, total  R hip 2008 & L hip      Status post cataract extraction  right eye cataract extraction with IOL 6/26/2015      Presence of cardiac pacemaker  10/2021      H/O coronary angioplasty  8/2021 1 stent inserted          FAMILY HISTORY:  Family history of lung cancer (Mother)    Family history of esophageal cancer (Father)    FH: myocardial infarction (Grandparent)        SOCIAL HISTORY:  Smoking: __ packs x ___ years  EtOH Use:  Marital Status:  Occupation:  Recent Travel:  Country of Birth:  Advance Directives:    Allergies    No Known Allergies    Intolerances        HOME MEDICATIONS:    REVIEW OF SYSTEMS:  Constitutional:   Eyes:  ENT:  CV:  Resp:  GI:  :  MSK:  Integumentary:  Neurological:  Psychiatric:  Endocrine:  Hematologic/Lymphatic:  Allergic/Immunologic:  [ ] All other systems negative  [ ] Unable to assess ROS because ________    OBJECTIVE:  ICU Vital Signs Last 24 Hrs  T(C): 36.6 (11 Nov 2023 15:19), Max: 37.9 (11 Nov 2023 09:57)  T(F): 97.9 (11 Nov 2023 15:19), Max: 100.3 (11 Nov 2023 09:57)  HR: 63 (11 Nov 2023 16:31) (61 - 78)  BP: 92/38 (11 Nov 2023 16:31) (76/39 - 98/61)  BP(mean): --  ABP: --  ABP(mean): --  RR: 18 (11 Nov 2023 16:31) (16 - 20)  SpO2: 98% (11 Nov 2023 16:31) (96% - 100%)    O2 Parameters below as of 11 Nov 2023 16:31  Patient On (Oxygen Delivery Method): nasal cannula  O2 Flow (L/min): 2            CAPILLARY BLOOD GLUCOSE      POCT Blood Glucose.: 178 mg/dL (11 Nov 2023 16:04)      PHYSICAL EXAM:  General:   HEENT:   Lymph Nodes:  Neck:   Respiratory:   Cardiovascular:   Abdomen:   Extremities:   Skin:   Neurological:  Psychiatry:    HOSPITAL MEDICATIONS:  MEDICATIONS  (STANDING):    MEDICATIONS  (PRN):  acetaminophen     Tablet .. 650 milliGRAM(s) Oral every 6 hours PRN Temp greater or equal to 38C (100.4F), Mild Pain (1 - 3)      LABS:                        12.0   11.06 )-----------( 158      ( 11 Nov 2023 10:31 )             39.3     11-11    138  |  102  |  43<H>  ----------------------------<  105<H>  4.3   |  23  |  2.58<H>    Ca    9.6      11 Nov 2023 10:31    TPro  6.5  /  Alb  3.7  /  TBili  0.4  /  DBili  x   /  AST  9   /  ALT  9   /  AlkPhos  77  11-11    PT/INR - ( 11 Nov 2023 10:31 )   PT: 15.7 sec;   INR: 1.40 ratio         PTT - ( 11 Nov 2023 10:31 )  PTT:42.8 sec  Urinalysis Basic - ( 11 Nov 2023 10:31 )    Color: x / Appearance: x / SG: x / pH: x  Gluc: 105 mg/dL / Ketone: x  / Bili: x / Urobili: x   Blood: x / Protein: x / Nitrite: x   Leuk Esterase: x / RBC: x / WBC x   Sq Epi: x / Non Sq Epi: x / Bacteria: x        Venous Blood Gas:  11-11 @ 16:08  7.34/44/28/24/45.6  VBG Lactate: 1.7  Venous Blood Gas:  11-11 @ 13:41  --/--/--/--/--  VBG Lactate: 2.9  Venous Blood Gas:  11-11 @ 10:31  7.30/51/34/25/28.1  VBG Lactate: 2.9      MICROBIOLOGY:     RADIOLOGY:  [ ] Reviewed and interpreted by me    EKG: MICU Progress Note    HPI:  83 yo M PMH Afib on eliquis, 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 The cough is productive with white-yellow phlegm nonbloody. Patient denies SOB however describes dyspnea on exertion - cannot walk more than a few steps without feeling winded. Patient has been compliant with home bumex 2mg daily. In the ED patient was found to be hypotensive to 76/39 s/p 500 cc NS BP improved to systolic 90-100s. Patient also found to have elevated troponin 152->188 EKG atrial sensed ventricular paced 67 bpm. XR showed moderate L sided pleural effusion with pleurX catheter. Labs significant for WBC 11.0, Cr 2.58, respiratory multiplex +enterovirus. The ED treated the patient with IV azithromycin, and ceftriaxone.    Subjective:  Patient seen and examined at bedside. W/ copious secretions and patient is unable to cough. Reports general discomfort is what brought him to ED, overall feels better, denies lightheadedness and dizziness. Discussed code status w/ patient - he is not sure if he would want to be intubated.     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      Parkinsons disease      CAD (coronary artery disease)  s/p PCI 08/21      Pleural effusion, not elsewhere classified      COVID-19 vaccine series completed  w booster      History of hip replacement, total  R hip 2008 & L hip      Status post cataract extraction  right eye cataract extraction with IOL 6/26/2015      Presence of cardiac pacemaker  10/2021      H/O coronary angioplasty  8/2021 1 stent inserted          FAMILY HISTORY:  Family history of lung cancer (Mother)    Family history of esophageal cancer (Father)    FH: myocardial infarction (Grandparent)      Allergies    No Known Allergies    Intolerances        HOME MEDICATIONS:    REVIEW OF SYSTEMS:  Constitutional:   Eyes:  ENT:  CV:  Resp:  GI:  :  MSK:  Integumentary:  Neurological:  Psychiatric:  Endocrine:  Hematologic/Lymphatic:  Allergic/Immunologic:  [x ] All other systems negative  [ ] Unable to assess ROS because ________    OBJECTIVE:  ICU Vital Signs Last 24 Hrs  T(C): 36.6 (11 Nov 2023 15:19), Max: 37.9 (11 Nov 2023 09:57)  T(F): 97.9 (11 Nov 2023 15:19), Max: 100.3 (11 Nov 2023 09:57)  HR: 63 (11 Nov 2023 16:31) (61 - 78)  BP: 92/38 (11 Nov 2023 16:31) (76/39 - 98/61)  BP(mean): --  ABP: --  ABP(mean): --  RR: 18 (11 Nov 2023 16:31) (16 - 20)  SpO2: 98% (11 Nov 2023 16:31) (96% - 100%)    O2 Parameters below as of 11 Nov 2023 16:31  Patient On (Oxygen Delivery Method): nasal cannula  O2 Flow (L/min): 2            CAPILLARY BLOOD GLUCOSE      POCT Blood Glucose.: 178 mg/dL (11 Nov 2023 16:04)      PHYSICAL EXAM:  General: resting comfortably, no supplemental O2  HEENT: moist mucous membranes, copious secretions w/ difficulty clearing, weak cough  Neck: supple  Respiratory: coarse breath sounds b/l   Cardiovascular: normal RR, no m/g/r  Abdomen: distended, non-tender  Extremities: no LE edema  Skin: no rashes/lesions  Neurological: AOx2, answering questions appropriately  Psychiatry: normal affect    HOSPITAL MEDICATIONS:  MEDICATIONS  (STANDING):    MEDICATIONS  (PRN):  acetaminophen     Tablet .. 650 milliGRAM(s) Oral every 6 hours PRN Temp greater or equal to 38C (100.4F), Mild Pain (1 - 3)      LABS:                        12.0   11.06 )-----------( 158      ( 11 Nov 2023 10:31 )             39.3     11-11    138  |  102  |  43<H>  ----------------------------<  105<H>  4.3   |  23  |  2.58<H>    Ca    9.6      11 Nov 2023 10:31    TPro  6.5  /  Alb  3.7  /  TBili  0.4  /  DBili  x   /  AST  9   /  ALT  9   /  AlkPhos  77  11-11    PT/INR - ( 11 Nov 2023 10:31 )   PT: 15.7 sec;   INR: 1.40 ratio         PTT - ( 11 Nov 2023 10:31 )  PTT:42.8 sec  Urinalysis Basic - ( 11 Nov 2023 10:31 )    Color: x / Appearance: x / SG: x / pH: x  Gluc: 105 mg/dL / Ketone: x  / Bili: x / Urobili: x   Blood: x / Protein: x / Nitrite: x   Leuk Esterase: x / RBC: x / WBC x   Sq Epi: x / Non Sq Epi: x / Bacteria: x        Venous Blood Gas:  11-11 @ 16:08  7.34/44/28/24/45.6  VBG Lactate: 1.7  Venous Blood Gas:  11-11 @ 13:41  --/--/--/--/--  VBG Lactate: 2.9  Venous Blood Gas:  11-11 @ 10:31  7.30/51/34/25/28.1  VBG Lactate: 2.9

## 2023-11-11 NOTE — ED PROVIDER NOTE - CLINICAL SUMMARY MEDICAL DECISION MAKING FREE TEXT BOX
82 year old male with hx of HTN, HLD, parkinsons, CAD, AFib, pacemaker on Eliquis and plavix, comes into the ED for eval of cough, fever for the past 1-2 days. Pt's wife states that he started coughing yesterday and it was worse when he laid flat. He has had pneumonia in the past. On exam, Pt has significant rhonchi bilat with no lower extremity swelling or tenderness. Ddx includes bacterial vs viral pneumonia, CHF, bronchitis. Clinical presentation does not appear consistent with PE. Will checks labs including trop, proBNP, ECG, chest xray. Dispo pending work up and reeval.

## 2023-11-11 NOTE — CONSULT NOTE ADULT - ATTENDING COMMENTS
81 yo male with parkinson's, afib on eliquis, pleurex catheter, CKD, presenting with cough and fever and GARCIA, no SOB at rest.  + enterorhinovirus, MICU called for hypotension.  Received 500cc ivf  He is awake, alert, oriented in NARD on % O2 sat; respirations are unlabored RR 18.  Audible secretions in upper airway; pt tries to cough but unable to which he attributes to his parkinson's.  BP 96/43 On last hosp admission he was d/c'd with this BP as his baseline  He does not require MICU level of care at this time.  His lack of effective cough is concerning.  D/W MAR who will communicate to admitting team the need for chest PT.  Possibly sitting him up in bed more will help.  Difficult to do on ED stretcher.    Recommend admit to continuous pulse ox even though he currently has minimal/no supplemental O2 requirement.    Continued GOC conversation recommended

## 2023-11-11 NOTE — ED ADULT NURSE REASSESSMENT NOTE - NS ED NURSE REASSESS COMMENT FT1
Break RN: pt a&ox3, arousable. Pt states "Im tired." Pt found to be hypotensive, BP 87/67 on assessment. Pt denies chest pain, sob, n+v, headache, dizziness. Breathing even, unlabored. MD Storey paged at #[phone] to notify of BP. Safety maintained. Pt remains on cardiac monitor.

## 2023-11-11 NOTE — CONSULT NOTE ADULT - PROBLEM SELECTOR RECOMMENDATION 9
likely in setting of elevated creatinine and chronic pleural effusions less likely ACS   No need for urgent antiplatelet therapy at this time  Continue to trend troponin with CK and CKMB, serial ECG  Continue to treated for URI  Cardiology to follow

## 2023-11-11 NOTE — ED ADULT TRIAGE NOTE - CHIEF COMPLAINT QUOTE
Pt arrives via EMS from home c/o productive cough since last night. Audible rhonchi on arrival. Pt receives home care for chronic pleural effusion. Denies CP or SOB. 2L NC applied. PMHx: ppm, CAD, pleural effusion, afib, bradycardia, afib (Eliquis & Plavix), HTN.

## 2023-11-11 NOTE — H&P ADULT - PROBLEM SELECTOR PLAN 5
Patient on eliquis 2.5 BID at home, will order heparin gtt as inpatient  HR 60s, paced rhythm  Monitor HR Patient on eliquis 2.5 BID at home, will order heparin gtt as inpatient f/u weight  HR 60s, paced rhythm  Monitor HR

## 2023-11-11 NOTE — CHART NOTE - NSCHARTNOTEFT_GEN_A_CORE
RRT called for hypotension of 76/39-  MAR with RRT at bedside.   Patient given 500 cc NS bolus with aggressive nasal / oral suction, oxygen and BP increased to 92/38.

## 2023-11-11 NOTE — ED ADULT NURSE REASSESSMENT NOTE - NS ED NURSE REASSESS COMMENT FT1
Break RN: pts BP continued dropping and not rising over systolic of 70s. Medical rapid response called at 1555. Medical team at bedside with float GARTH Rojas.

## 2023-11-11 NOTE — H&P ADULT - PROBLEM SELECTOR PLAN 12
DVT ppx: heparin gtt  Diet: DASH  Code: FULL code, had GOC discussion at bedside, wife will revisit the conversation at a later point

## 2023-11-11 NOTE — CONSULT NOTE ADULT - ASSESSMENT
This is an 82 year old man with past medical history of CAD s/p PCI, Afib on eliquis, Bradycardia s/p PPM/ICD, chronic pleural effusions with right side pleurX catheter (patient reports last drained in 8/2023), CKD (with three sessions of HD earlier this year via [facility], no longer on HD), Amyloidosis, Parkinson's disease presented with cough and fever at home to 100.3 x 1-2 days seen to have elevated troponins likely in setting of elevated creatinine and chronic pleural effusions versus ACS (patient chest pain free with no ischemic changes on ECG)  8/2023 TTE with preserved EF

## 2023-11-12 LAB
ALBUMIN SERPL ELPH-MCNC: 2.9 G/DL — LOW (ref 3.3–5)
ALBUMIN SERPL ELPH-MCNC: 2.9 G/DL — LOW (ref 3.3–5)
ALP SERPL-CCNC: 56 U/L — SIGNIFICANT CHANGE UP (ref 40–120)
ALP SERPL-CCNC: 56 U/L — SIGNIFICANT CHANGE UP (ref 40–120)
ALT FLD-CCNC: 5 U/L — SIGNIFICANT CHANGE UP (ref 4–41)
ALT FLD-CCNC: 5 U/L — SIGNIFICANT CHANGE UP (ref 4–41)
ANION GAP SERPL CALC-SCNC: 12 MMOL/L — SIGNIFICANT CHANGE UP (ref 7–14)
ANION GAP SERPL CALC-SCNC: 12 MMOL/L — SIGNIFICANT CHANGE UP (ref 7–14)
APTT BLD: 132.2 SEC — CRITICAL HIGH (ref 24.5–35.6)
APTT BLD: 132.2 SEC — CRITICAL HIGH (ref 24.5–35.6)
APTT BLD: 165.8 SEC — CRITICAL HIGH (ref 24.5–35.6)
APTT BLD: 165.8 SEC — CRITICAL HIGH (ref 24.5–35.6)
AST SERPL-CCNC: 6 U/L — SIGNIFICANT CHANGE UP (ref 4–40)
AST SERPL-CCNC: 6 U/L — SIGNIFICANT CHANGE UP (ref 4–40)
BASE EXCESS BLDV CALC-SCNC: -3.1 MMOL/L — LOW (ref -2–3)
BASE EXCESS BLDV CALC-SCNC: -3.1 MMOL/L — LOW (ref -2–3)
BASE EXCESS BLDV CALC-SCNC: -4.8 MMOL/L — LOW (ref -2–3)
BASE EXCESS BLDV CALC-SCNC: -4.8 MMOL/L — LOW (ref -2–3)
BILIRUB SERPL-MCNC: 0.3 MG/DL — SIGNIFICANT CHANGE UP (ref 0.2–1.2)
BILIRUB SERPL-MCNC: 0.3 MG/DL — SIGNIFICANT CHANGE UP (ref 0.2–1.2)
BUN SERPL-MCNC: 57 MG/DL — HIGH (ref 7–23)
BUN SERPL-MCNC: 57 MG/DL — HIGH (ref 7–23)
CA-I SERPL-SCNC: 1.26 MMOL/L — SIGNIFICANT CHANGE UP (ref 1.15–1.33)
CA-I SERPL-SCNC: 1.26 MMOL/L — SIGNIFICANT CHANGE UP (ref 1.15–1.33)
CALCIUM SERPL-MCNC: 8.9 MG/DL — SIGNIFICANT CHANGE UP (ref 8.4–10.5)
CALCIUM SERPL-MCNC: 8.9 MG/DL — SIGNIFICANT CHANGE UP (ref 8.4–10.5)
CHLORIDE BLDV-SCNC: 103 MMOL/L — SIGNIFICANT CHANGE UP (ref 96–108)
CHLORIDE BLDV-SCNC: 103 MMOL/L — SIGNIFICANT CHANGE UP (ref 96–108)
CHLORIDE SERPL-SCNC: 104 MMOL/L — SIGNIFICANT CHANGE UP (ref 98–107)
CHLORIDE SERPL-SCNC: 104 MMOL/L — SIGNIFICANT CHANGE UP (ref 98–107)
CK MB BLD-MCNC: 2.8 % — HIGH (ref 0–2.5)
CK MB BLD-MCNC: 2.8 % — HIGH (ref 0–2.5)
CK MB BLD-MCNC: 3.6 % — HIGH (ref 0–2.5)
CK MB BLD-MCNC: 3.6 % — HIGH (ref 0–2.5)
CK MB CFR SERPL CALC: 5 NG/ML — SIGNIFICANT CHANGE UP
CK MB CFR SERPL CALC: 5 NG/ML — SIGNIFICANT CHANGE UP
CK MB CFR SERPL CALC: 5.5 NG/ML — SIGNIFICANT CHANGE UP
CK MB CFR SERPL CALC: 5.5 NG/ML — SIGNIFICANT CHANGE UP
CK SERPL-CCNC: 153 U/L — SIGNIFICANT CHANGE UP (ref 30–200)
CK SERPL-CCNC: 153 U/L — SIGNIFICANT CHANGE UP (ref 30–200)
CK SERPL-CCNC: 179 U/L — SIGNIFICANT CHANGE UP (ref 30–200)
CK SERPL-CCNC: 179 U/L — SIGNIFICANT CHANGE UP (ref 30–200)
CO2 BLDV-SCNC: 20.7 MMOL/L — LOW (ref 22–26)
CO2 BLDV-SCNC: 20.7 MMOL/L — LOW (ref 22–26)
CO2 BLDV-SCNC: 25.1 MMOL/L — SIGNIFICANT CHANGE UP (ref 22–26)
CO2 BLDV-SCNC: 25.1 MMOL/L — SIGNIFICANT CHANGE UP (ref 22–26)
CO2 SERPL-SCNC: 22 MMOL/L — SIGNIFICANT CHANGE UP (ref 22–31)
CO2 SERPL-SCNC: 22 MMOL/L — SIGNIFICANT CHANGE UP (ref 22–31)
CREAT SERPL-MCNC: 3.41 MG/DL — HIGH (ref 0.5–1.3)
CREAT SERPL-MCNC: 3.41 MG/DL — HIGH (ref 0.5–1.3)
EGFR: 17 ML/MIN/1.73M2 — LOW
EGFR: 17 ML/MIN/1.73M2 — LOW
GAS PNL BLDA: SIGNIFICANT CHANGE UP
GAS PNL BLDA: SIGNIFICANT CHANGE UP
GAS PNL BLDV: 134 MMOL/L — LOW (ref 136–145)
GAS PNL BLDV: 134 MMOL/L — LOW (ref 136–145)
GAS PNL BLDV: SIGNIFICANT CHANGE UP
GLUCOSE BLDC GLUCOMTR-MCNC: 106 MG/DL — HIGH (ref 70–99)
GLUCOSE BLDC GLUCOMTR-MCNC: 106 MG/DL — HIGH (ref 70–99)
GLUCOSE BLDC GLUCOMTR-MCNC: 92 MG/DL — SIGNIFICANT CHANGE UP (ref 70–99)
GLUCOSE BLDC GLUCOMTR-MCNC: 92 MG/DL — SIGNIFICANT CHANGE UP (ref 70–99)
GLUCOSE BLDV-MCNC: 79 MG/DL — SIGNIFICANT CHANGE UP (ref 70–99)
GLUCOSE BLDV-MCNC: 79 MG/DL — SIGNIFICANT CHANGE UP (ref 70–99)
GLUCOSE SERPL-MCNC: 77 MG/DL — SIGNIFICANT CHANGE UP (ref 70–99)
GLUCOSE SERPL-MCNC: 77 MG/DL — SIGNIFICANT CHANGE UP (ref 70–99)
HCO3 BLDV-SCNC: 20 MMOL/L — LOW (ref 22–29)
HCO3 BLDV-SCNC: 20 MMOL/L — LOW (ref 22–29)
HCO3 BLDV-SCNC: 24 MMOL/L — SIGNIFICANT CHANGE UP (ref 22–29)
HCO3 BLDV-SCNC: 24 MMOL/L — SIGNIFICANT CHANGE UP (ref 22–29)
HCT VFR BLD CALC: 33.1 % — LOW (ref 39–50)
HCT VFR BLD CALC: 33.1 % — LOW (ref 39–50)
HCT VFR BLDA CALC: 31 % — LOW (ref 39–51)
HCT VFR BLDA CALC: 31 % — LOW (ref 39–51)
HGB BLD CALC-MCNC: 10.2 G/DL — LOW (ref 12.6–17.4)
HGB BLD CALC-MCNC: 10.2 G/DL — LOW (ref 12.6–17.4)
HGB BLD-MCNC: 10.1 G/DL — LOW (ref 13–17)
HGB BLD-MCNC: 10.1 G/DL — LOW (ref 13–17)
LACTATE BLDV-MCNC: 1.5 MMOL/L — SIGNIFICANT CHANGE UP (ref 0.5–2)
LACTATE BLDV-MCNC: 1.5 MMOL/L — SIGNIFICANT CHANGE UP (ref 0.5–2)
MCHC RBC-ENTMCNC: 28.7 PG — SIGNIFICANT CHANGE UP (ref 27–34)
MCHC RBC-ENTMCNC: 28.7 PG — SIGNIFICANT CHANGE UP (ref 27–34)
MCHC RBC-ENTMCNC: 30.5 GM/DL — LOW (ref 32–36)
MCHC RBC-ENTMCNC: 30.5 GM/DL — LOW (ref 32–36)
MCV RBC AUTO: 94 FL — SIGNIFICANT CHANGE UP (ref 80–100)
MCV RBC AUTO: 94 FL — SIGNIFICANT CHANGE UP (ref 80–100)
MRSA PCR RESULT.: SIGNIFICANT CHANGE UP
MRSA PCR RESULT.: SIGNIFICANT CHANGE UP
NRBC # BLD: 0 /100 WBCS — SIGNIFICANT CHANGE UP (ref 0–0)
NRBC # BLD: 0 /100 WBCS — SIGNIFICANT CHANGE UP (ref 0–0)
NRBC # FLD: 0 K/UL — SIGNIFICANT CHANGE UP (ref 0–0)
NRBC # FLD: 0 K/UL — SIGNIFICANT CHANGE UP (ref 0–0)
PCO2 BLDV: 34 MMHG — LOW (ref 42–55)
PCO2 BLDV: 34 MMHG — LOW (ref 42–55)
PCO2 BLDV: 49 MMHG — SIGNIFICANT CHANGE UP (ref 42–55)
PCO2 BLDV: 49 MMHG — SIGNIFICANT CHANGE UP (ref 42–55)
PH BLDV: 7.29 — LOW (ref 7.32–7.43)
PH BLDV: 7.29 — LOW (ref 7.32–7.43)
PH BLDV: 7.37 — SIGNIFICANT CHANGE UP (ref 7.32–7.43)
PH BLDV: 7.37 — SIGNIFICANT CHANGE UP (ref 7.32–7.43)
PLATELET # BLD AUTO: 145 K/UL — LOW (ref 150–400)
PLATELET # BLD AUTO: 145 K/UL — LOW (ref 150–400)
PO2 BLDV: 41 MMHG — SIGNIFICANT CHANGE UP (ref 25–45)
PO2 BLDV: 41 MMHG — SIGNIFICANT CHANGE UP (ref 25–45)
PO2 BLDV: 65 MMHG — HIGH (ref 25–45)
PO2 BLDV: 65 MMHG — HIGH (ref 25–45)
POTASSIUM BLDV-SCNC: 4.3 MMOL/L — SIGNIFICANT CHANGE UP (ref 3.5–5.1)
POTASSIUM BLDV-SCNC: 4.3 MMOL/L — SIGNIFICANT CHANGE UP (ref 3.5–5.1)
POTASSIUM SERPL-MCNC: 4.3 MMOL/L — SIGNIFICANT CHANGE UP (ref 3.5–5.3)
POTASSIUM SERPL-MCNC: 4.3 MMOL/L — SIGNIFICANT CHANGE UP (ref 3.5–5.3)
POTASSIUM SERPL-SCNC: 4.3 MMOL/L — SIGNIFICANT CHANGE UP (ref 3.5–5.3)
POTASSIUM SERPL-SCNC: 4.3 MMOL/L — SIGNIFICANT CHANGE UP (ref 3.5–5.3)
PROT SERPL-MCNC: 5.7 G/DL — LOW (ref 6–8.3)
PROT SERPL-MCNC: 5.7 G/DL — LOW (ref 6–8.3)
RBC # BLD: 3.52 M/UL — LOW (ref 4.2–5.8)
RBC # BLD: 3.52 M/UL — LOW (ref 4.2–5.8)
RBC # FLD: 20.9 % — HIGH (ref 10.3–14.5)
RBC # FLD: 20.9 % — HIGH (ref 10.3–14.5)
S AUREUS DNA NOSE QL NAA+PROBE: SIGNIFICANT CHANGE UP
S AUREUS DNA NOSE QL NAA+PROBE: SIGNIFICANT CHANGE UP
SAO2 % BLDV: 48.2 % — LOW (ref 67–88)
SAO2 % BLDV: 48.2 % — LOW (ref 67–88)
SAO2 % BLDV: 88.2 % — HIGH (ref 67–88)
SAO2 % BLDV: 88.2 % — HIGH (ref 67–88)
SODIUM SERPL-SCNC: 138 MMOL/L — SIGNIFICANT CHANGE UP (ref 135–145)
SODIUM SERPL-SCNC: 138 MMOL/L — SIGNIFICANT CHANGE UP (ref 135–145)
TROPONIN T, HIGH SENSITIVITY RESULT: 281 NG/L — CRITICAL HIGH
TROPONIN T, HIGH SENSITIVITY RESULT: 281 NG/L — CRITICAL HIGH
TROPONIN T, HIGH SENSITIVITY RESULT: 293 NG/L — CRITICAL HIGH
TROPONIN T, HIGH SENSITIVITY RESULT: 293 NG/L — CRITICAL HIGH
VANCOMYCIN FLD-MCNC: <4 UG/ML — SIGNIFICANT CHANGE UP
VANCOMYCIN FLD-MCNC: <4 UG/ML — SIGNIFICANT CHANGE UP
WBC # BLD: 6 K/UL — SIGNIFICANT CHANGE UP (ref 3.8–10.5)
WBC # BLD: 6 K/UL — SIGNIFICANT CHANGE UP (ref 3.8–10.5)
WBC # FLD AUTO: 6 K/UL — SIGNIFICANT CHANGE UP (ref 3.8–10.5)
WBC # FLD AUTO: 6 K/UL — SIGNIFICANT CHANGE UP (ref 3.8–10.5)

## 2023-11-12 PROCEDURE — 99233 SBSQ HOSP IP/OBS HIGH 50: CPT

## 2023-11-12 PROCEDURE — 93010 ELECTROCARDIOGRAM REPORT: CPT

## 2023-11-12 PROCEDURE — 99291 CRITICAL CARE FIRST HOUR: CPT

## 2023-11-12 RX ORDER — VANCOMYCIN HCL 1 G
1000 VIAL (EA) INTRAVENOUS ONCE
Refills: 0 | Status: COMPLETED | OUTPATIENT
Start: 2023-11-12 | End: 2023-11-12

## 2023-11-12 RX ORDER — DROXIDOPA 100 MG/1
100 CAPSULE ORAL THREE TIMES A DAY
Refills: 0 | Status: DISCONTINUED | OUTPATIENT
Start: 2023-11-12 | End: 2023-11-15

## 2023-11-12 RX ORDER — SACUBITRIL AND VALSARTAN 24; 26 MG/1; MG/1
1 TABLET, FILM COATED ORAL
Refills: 0 | Status: DISCONTINUED | OUTPATIENT
Start: 2023-11-12 | End: 2023-11-12

## 2023-11-12 RX ORDER — SPIRONOLACTONE 25 MG/1
25 TABLET, FILM COATED ORAL DAILY
Refills: 0 | Status: DISCONTINUED | OUTPATIENT
Start: 2023-11-12 | End: 2023-11-12

## 2023-11-12 RX ORDER — CEFEPIME 1 G/1
2000 INJECTION, POWDER, FOR SOLUTION INTRAMUSCULAR; INTRAVENOUS
Refills: 0 | Status: DISCONTINUED | OUTPATIENT
Start: 2023-11-12 | End: 2023-11-17

## 2023-11-12 RX ORDER — CARBIDOPA AND LEVODOPA 25; 100 MG/1; MG/1
1.5 TABLET ORAL
Refills: 0 | Status: DISCONTINUED | OUTPATIENT
Start: 2023-11-12 | End: 2023-11-12

## 2023-11-12 RX ORDER — VANCOMYCIN HCL 1 G
VIAL (EA) INTRAVENOUS
Refills: 0 | Status: DISCONTINUED | OUTPATIENT
Start: 2023-11-12 | End: 2023-11-14

## 2023-11-12 RX ORDER — MIDODRINE HYDROCHLORIDE 2.5 MG/1
5 TABLET ORAL THREE TIMES A DAY
Refills: 0 | Status: DISCONTINUED | OUTPATIENT
Start: 2023-11-12 | End: 2023-11-12

## 2023-11-12 RX ORDER — SODIUM CHLORIDE 9 MG/ML
1000 INJECTION INTRAMUSCULAR; INTRAVENOUS; SUBCUTANEOUS
Refills: 0 | Status: DISCONTINUED | OUTPATIENT
Start: 2023-11-12 | End: 2023-11-12

## 2023-11-12 RX ORDER — ALBUMIN HUMAN 25 %
100 VIAL (ML) INTRAVENOUS ONCE
Refills: 0 | Status: COMPLETED | OUTPATIENT
Start: 2023-11-12 | End: 2023-11-12

## 2023-11-12 RX ORDER — ASPIRIN/CALCIUM CARB/MAGNESIUM 324 MG
162 TABLET ORAL ONCE
Refills: 0 | Status: COMPLETED | OUTPATIENT
Start: 2023-11-12 | End: 2023-11-12

## 2023-11-12 RX ORDER — IPRATROPIUM/ALBUTEROL SULFATE 18-103MCG
3 AEROSOL WITH ADAPTER (GRAM) INHALATION EVERY 6 HOURS
Refills: 0 | Status: DISCONTINUED | OUTPATIENT
Start: 2023-11-12 | End: 2023-11-15

## 2023-11-12 RX ORDER — OLANZAPINE 15 MG/1
10 TABLET, FILM COATED ORAL AT BEDTIME
Refills: 0 | Status: DISCONTINUED | OUTPATIENT
Start: 2023-11-12 | End: 2023-11-12

## 2023-11-12 RX ORDER — MIRTAZAPINE 45 MG/1
15 TABLET, ORALLY DISINTEGRATING ORAL AT BEDTIME
Refills: 0 | Status: DISCONTINUED | OUTPATIENT
Start: 2023-11-12 | End: 2023-11-12

## 2023-11-12 RX ORDER — VANCOMYCIN HCL 1 G
VIAL (EA) INTRAVENOUS
Refills: 0 | Status: DISCONTINUED | OUTPATIENT
Start: 2023-11-12 | End: 2023-11-12

## 2023-11-12 RX ORDER — ASPIRIN/CALCIUM CARB/MAGNESIUM 324 MG
300 TABLET ORAL DAILY
Refills: 0 | Status: DISCONTINUED | OUTPATIENT
Start: 2023-11-13 | End: 2023-11-13

## 2023-11-12 RX ORDER — VANCOMYCIN HCL 1 G
1000 VIAL (EA) INTRAVENOUS EVERY 24 HOURS
Refills: 0 | Status: DISCONTINUED | OUTPATIENT
Start: 2023-11-13 | End: 2023-11-14

## 2023-11-12 RX ORDER — CARBIDOPA AND LEVODOPA 25; 100 MG/1; MG/1
1.5 TABLET ORAL
Refills: 0 | Status: DISCONTINUED | OUTPATIENT
Start: 2023-11-12 | End: 2023-11-17

## 2023-11-12 RX ADMIN — Medication 1000 UNIT(S): at 11:56

## 2023-11-12 RX ADMIN — SODIUM CHLORIDE 250 MILLILITER(S): 9 INJECTION INTRAMUSCULAR; INTRAVENOUS; SUBCUTANEOUS at 15:10

## 2023-11-12 RX ADMIN — CARBIDOPA AND LEVODOPA 1.5 TABLET(S): 25; 100 TABLET ORAL at 05:12

## 2023-11-12 RX ADMIN — HEPARIN SODIUM 1100 UNIT(S)/HR: 5000 INJECTION INTRAVENOUS; SUBCUTANEOUS at 19:43

## 2023-11-12 RX ADMIN — CARBIDOPA AND LEVODOPA 1.5 TABLET(S): 25; 100 TABLET ORAL at 11:57

## 2023-11-12 RX ADMIN — HEPARIN SODIUM 1100 UNIT(S)/HR: 5000 INJECTION INTRAVENOUS; SUBCUTANEOUS at 16:54

## 2023-11-12 RX ADMIN — Medication 50 MILLILITER(S): at 23:06

## 2023-11-12 RX ADMIN — CEFEPIME 100 MILLIGRAM(S): 1 INJECTION, POWDER, FOR SOLUTION INTRAMUSCULAR; INTRAVENOUS at 21:51

## 2023-11-12 RX ADMIN — DROXIDOPA 100 MILLIGRAM(S): 100 CAPSULE ORAL at 20:34

## 2023-11-12 RX ADMIN — HEPARIN SODIUM 1400 UNIT(S)/HR: 5000 INJECTION INTRAVENOUS; SUBCUTANEOUS at 08:08

## 2023-11-12 RX ADMIN — HEPARIN SODIUM 1100 UNIT(S)/HR: 5000 INJECTION INTRAVENOUS; SUBCUTANEOUS at 21:51

## 2023-11-12 RX ADMIN — Medication 250 MILLIGRAM(S): at 08:27

## 2023-11-12 RX ADMIN — POLYETHYLENE GLYCOL 3350 17 GRAM(S): 17 POWDER, FOR SOLUTION ORAL at 11:56

## 2023-11-12 RX ADMIN — HEPARIN SODIUM 0 UNIT(S)/HR: 5000 INJECTION INTRAVENOUS; SUBCUTANEOUS at 07:05

## 2023-11-12 RX ADMIN — HEPARIN SODIUM 0 UNIT(S)/HR: 5000 INJECTION INTRAVENOUS; SUBCUTANEOUS at 15:40

## 2023-11-12 RX ADMIN — CLOPIDOGREL BISULFATE 75 MILLIGRAM(S): 75 TABLET, FILM COATED ORAL at 11:57

## 2023-11-12 RX ADMIN — Medication 500 MILLIGRAM(S): at 11:56

## 2023-11-12 RX ADMIN — HEPARIN SODIUM 0 UNIT(S)/HR: 5000 INJECTION INTRAVENOUS; SUBCUTANEOUS at 07:08

## 2023-11-12 RX ADMIN — SODIUM CHLORIDE 250 MILLILITER(S): 9 INJECTION INTRAMUSCULAR; INTRAVENOUS; SUBCUTANEOUS at 10:23

## 2023-11-12 RX ADMIN — CARBIDOPA AND LEVODOPA 1.5 TABLET(S): 25; 100 TABLET ORAL at 15:59

## 2023-11-12 RX ADMIN — Medication 162 MILLIGRAM(S): at 11:55

## 2023-11-12 NOTE — CHART NOTE - NSCHARTNOTEFT_GEN_A_CORE
Pulmonary consulted for PleurX catheter management. PleurX placed by Thoracic Sx/Dr Oneal on 3/17/2022. Please consult thoracic surgery service for assistance and recommendations with PleurX Catheter. Please call or Teams with any questions.    D/w Dr De La Cruz.    Jesus Can MD  Fellow, Pulmonary-Critical Care

## 2023-11-12 NOTE — PATIENT PROFILE ADULT - FALL HARM RISK - HARM RISK INTERVENTIONS

## 2023-11-12 NOTE — PROGRESS NOTE ADULT - PROBLEM SELECTOR PLAN 12
DVT ppx: heparin gtt  Diet: DASH  Code: FULL code, had GOC discussion at bedside, wife and daughter present during discussion.

## 2023-11-12 NOTE — PHYSICAL THERAPY INITIAL EVALUATION ADULT - GENERAL OBSERVATIONS, REHAB EVAL
Pt received semi-supine in bed, +Storm, +nasal cannula, +IV, +telemetry, all lines intact, in NAD. Wife and Daughter at bedside agreeable to PT evaluation.

## 2023-11-12 NOTE — PROGRESS NOTE ADULT - PROBLEM SELECTOR PLAN 4
troponin 152->188, trend troponin q6h to peak  EKG atrial sensed ventricular paced rhythm  cardiology consulted, no need for urgent DAPT   F/u with repeat EKG and CE

## 2023-11-12 NOTE — PROGRESS NOTE ADULT - PROBLEM SELECTOR PLAN 1
In the ED patient hypotensive to systolic 76/39, improved to 92/38 after 500cc NS  f/u MICU evaluation  f/u BCx, UCx, XR showing moderate L sided pleural effusion with pleurX catheter  f/u procalcitonin, respiratory multiplex  s/p azithromycin and CTX in the ED  will empirically give renally dosed vancomycin and cefepime, will follow cultures and deescalate as appropriate  Maintain tele monitoring  Monitor BP  Hold all antihypertensive meds   Family reports BP is usually low, runs in the low 100s. Reconsult MICU if BP is persistently low with MAP <65

## 2023-11-12 NOTE — RAPID RESPONSE TEAM SUMMARY - NSSITUATIONBACKGROUNDRRT_GEN_ALL_CORE
83 yo M PMH Afib on eliquis, bradycardia s/p PPM 2021 with exchange last year, pleural effusion s/p pleurX catheter, CKD (with three sessions of HD earlier this year via shiley, no longer on HD), Parkinson's disease. Rapid response called for hypotension to 70 systolic and concern for depressed mental status.  Upon arrival blood pressure 80 systolic, heart rate 60s (V-paced), saturating high 90s on 4 L nasal cannula, fingerstick within normal limits and patient mouth breathing. In comparison to rapid yesterday patient similarly somnolent, easily arousable, and mouth breathing.  Physical exam without any focal findings, patient not complaining of any focal pain. 250cc bolus given, VBG collected to evaluate for hypercapnia and patient started on CPAP.  Patient's blood pressures remain similar to previous days and previous admission so no acute interventions besides fluid bolus given.  Rapid ended, patient pending MICU evaluation.

## 2023-11-12 NOTE — CONSULT NOTE ADULT - SUBJECTIVE AND OBJECTIVE BOX
CHIEF COMPLAINT: SOB    HPI:  82M with past medical history of CAD s/p PCI, Afib on eliquis, Bradycardia s/p PPM/ICD, chronic pleural effusions with right side pleurX catheter (patient reports last drained in 8/2023), CKD (with three sessions of HD earlier this year via shiley, no longer on HD), Amyloidosis, Parkinson's disease     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      Parkinsons disease      CAD (coronary artery disease)  s/p PCI 08/21      Pleural effusion, not elsewhere classified      COVID-19 vaccine series completed  w booster      History of hip replacement, total  R hip 2008 & L hip      Status post cataract extraction  right eye cataract extraction with IOL 6/26/2015      Presence of cardiac pacemaker  10/2021      H/O coronary angioplasty  8/2021 1 stent inserted          FAMILY HISTORY:  Family history of lung cancer (Mother)    Family history of esophageal cancer (Father)    FH: myocardial infarction (Grandparent)        SOCIAL HISTORY:  Smoking: [ ] Never Smoked [ ] Former Smoker (__ packs x ___ years) [ ] Current Smoker  (__ packs x ___ years)  Substance Use: [ ] Never Used [ ] Used ____  EtOH Use:  Occupation:  Recent Travel:  Country of Birth:  Advance Directives:    Allergies    No Known Allergies    Intolerances        HOME MEDICATIONS:    REVIEW OF SYSTEMS:  Constitutional: [x] negative [ ] fevers [ ] chills [ ] weight loss [ ] weight gain  HEENT: [x] negative [ ] dry eyes [ ] eye irritation [ ] postnasal drip [ ] nasal congestion  CV: [x] negative  [ ] chest pain [ ] orthopnea [ ] palpitations [ ] murmur  Resp: [x] negative [ ] cough [ ] shortness of breath [ ] dyspnea [ ] wheezing [ ] sputum [ ] hemoptysis  GI: [x] negative [ ] nausea [ ] vomiting [ ] diarrhea [ ] constipation [ ] abd pain [ ] dysphagia   : [x] negative [ ] dysuria [ ] nocturia [ ] hematuria [ ] increased urinary frequency  Musculoskeletal: [x] negative [ ] back pain [ ] myalgias [ ] arthralgias [ ] fracture  Skin: [x] negative [ ] rash [ ] itch  Neurological: [x] negative [ ] headache [ ] dizziness [ ] syncope [ ] weakness [ ] numbness  Psychiatric: [x] negative [ ] anxiety [ ] depression  Endocrine: [x] negative [ ] diabetes [ ] thyroid problem  Hematologic/Lymphatic: [x] negative [ ] anemia [ ] bleeding problem  Allergic/Immunologic: [x] negative [ ] itchy eyes [ ] nasal discharge [ ] hives [ ] angioedema  [x] All other systems negative  [ ] Unable to assess ROS because ________    OBJECTIVE:  ICU Vital Signs Last 24 Hrs  T(C): 37.1 (12 Nov 2023 09:30), Max: 37.1 (11 Nov 2023 21:36)  T(F): 98.7 (12 Nov 2023 09:30), Max: 98.7 (11 Nov 2023 21:36)  HR: 60 (12 Nov 2023 09:30) (60 - 78)  BP: 86/39 (12 Nov 2023 09:30) (76/39 - 113/84)  BP(mean): --  ABP: --  ABP(mean): --  RR: 18 (12 Nov 2023 09:30) (16 - 22)  SpO2: 100% (12 Nov 2023 09:30) (97% - 100%)    O2 Parameters below as of 12 Nov 2023 09:30  Patient On (Oxygen Delivery Method): room air              CAPILLARY BLOOD GLUCOSE      POCT Blood Glucose.: 178 mg/dL (11 Nov 2023 16:04)      PHYSICAL EXAM:  General:   HEENT: MMM, PERRLA, no oropharyngeal or perioral lesions  Neck: supple, no LAD  Respiratory:   Cardiovascular: RRR, no MRG  Abdomen: NTND  Extremities: no LE edema, warm  Neurological: AOx3, no gross deficits    HOSPITAL MEDICATIONS:  clopidogrel Tablet 75 milliGRAM(s) Oral daily  heparin  Infusion.  Unit(s)/Hr IV Continuous <Continuous>    cefepime   IVPB 2000 milliGRAM(s) IV Intermittent <User Schedule>  vancomycin  IVPB          atorvastatin 80 milliGRAM(s) Oral at bedtime    albuterol/ipratropium for Nebulization 3 milliLiter(s) Nebulizer every 6 hours PRN    acetaminophen     Tablet .. 650 milliGRAM(s) Oral every 6 hours PRN  carbidopa/levodopa  25/100 1.5 Tablet(s) Oral four times a day  mirtazapine 7.5 milliGRAM(s) Oral daily  OLANZapine 2.5 milliGRAM(s) Oral at bedtime    polyethylene glycol 3350 17 Gram(s) Oral daily        ascorbic acid 500 milliGRAM(s) Oral daily  cholecalciferol 1000 Unit(s) Oral daily            LABS:                        10.1   6.00  )-----------( 145      ( 12 Nov 2023 06:08 )             33.1     Hgb Trend: 10.1<--, 12.0<--  11-11    138  |  102  |  43<H>  ----------------------------<  105<H>  4.3   |  23  |  2.58<H>    Ca    9.6      11 Nov 2023 10:31    TPro  6.5  /  Alb  3.7  /  TBili  0.4  /  DBili  x   /  AST  9   /  ALT  9   /  AlkPhos  77  11-11    Creatinine Trend: 2.58<--  PT/INR - ( 11 Nov 2023 10:31 )   PT: 15.7 sec;   INR: 1.40 ratio         PTT - ( 12 Nov 2023 06:08 )  PTT:165.8 sec  Urinalysis Basic - ( 11 Nov 2023 10:31 )    Color: x / Appearance: x / SG: x / pH: x  Gluc: 105 mg/dL / Ketone: x  / Bili: x / Urobili: x   Blood: x / Protein: x / Nitrite: x   Leuk Esterase: x / RBC: x / WBC x   Sq Epi: x / Non Sq Epi: x / Bacteria: x        Venous Blood Gas:  11-11 @ 16:08  7.34/44/28/24/45.6  VBG Lactate: 1.7  Venous Blood Gas:  11-11 @ 13:41  --/--/--/--/--  VBG Lactate: 2.9  Venous Blood Gas:  11-11 @ 10:31  7.30/51/34/25/28.1  VBG Lactate: 2.9      MICROBIOLOGY:     RADIOLOGY:  [x] Reviewed and interpreted by me    PULMONARY FUNCTION TESTS:    EKG:

## 2023-11-12 NOTE — CHART NOTE - NSCHARTNOTEFT_GEN_A_CORE
Vital Signs Last 24 Hrs  T(C): 36.3 (12 Nov 2023 13:30), Max: 37.1 (11 Nov 2023 21:36)  T(F): 97.4 (12 Nov 2023 13:30), Max: 98.7 (11 Nov 2023 21:36)  HR: 63 (12 Nov 2023 19:15) (60 - 89)  BP: 91/58 (12 Nov 2023 19:15) (71/39 - 113/84)  BP(mean): --  RR: 18 (12 Nov 2023 19:15) (18 - 22)  SpO2: 94% (12 Nov 2023 19:15) (94% - 100%)    Parameters below as of 12 Nov 2023 19:15  Patient On (Oxygen Delivery Method): CPAP    RRT called overhead. RRT team seen at bedside with patient's spouse and daughter stating patient remains full code. RRT called for hypotension to 71/39 likely d/t septic shock, with labile blood pressures and decreased MAP. Patient obtunded, and bp remains low despite 250 cc IVF NS boluses with minimal response/improvement. Patient remains arousable; protecting airway. MICU fellow made aware of events and reconsulted. VBG and ABG w/o signs of hypercarbia. Patient placed on CPAP continuously given lethargy and hx of LIOR. Will follow up repeat cardiac enzymes at 22:00 per Dr. Magdaleno whom was made aware of events. Will make patient strictly NPO pending further MICU recs. Patient remains on floor pending MICU re-evaluation. Will continue to monitor patient closely.

## 2023-11-12 NOTE — PROGRESS NOTE ADULT - PROBLEM SELECTOR PLAN 3
Respiratory multiplex +enterovirus  Likely responsible for fever and cough at home  C/w duonebs   Abx as above for possible superimposed PNA

## 2023-11-12 NOTE — PROGRESS NOTE ADULT - PROBLEM SELECTOR PLAN 7
Cr 2.58, stable from 2.69 8/2023, worsened from 1.29 2/2023  Patient is s/p 3 sessions of dialysis via shiley since discontinued, patient follows with nephrologist, would obtain records  RBUS 8/2023 with b/l renal echogenicity no hydronephrosis  Monitor BMP, urine output

## 2023-11-12 NOTE — PHYSICAL THERAPY INITIAL EVALUATION ADULT - PASSIVE RANGE OF MOTION EXAMINATION, REHAB EVAL
normal (ped)... In no apparent distress, appears well developed and well nourished. bilateral upper extremity Passive ROM was WFL (within functional limits)/bilateral lower extremity Passive ROM was WFL (within functional limits)

## 2023-11-12 NOTE — CONSULT NOTE ADULT - ATTENDING COMMENTS
83 yo M PMH Afib on eliquis, bradycardia s/p PPM, pleural effusion s/p pleurX catheter, CKD, Parkinson's disease presents with fever and cough, positive for enterovirus, possible aspiration pna. MICU consulted for hypotension., now improving.  would consider Albumin   possible NGT for Droxidopa 100mg TID; can increase by 100mg each day (Max 600mg TID), would hold off on midodrine given bradycardia   continue Abx for possible aspiration pna   aspiration precautions  if unable to tolerate PO, would pit NGT for sinemet  serial blood gas on bipap, would wean off to HFNC if tolerated   reconsult as needed 83 yo M PMH Afib on eliquis, bradycardia s/p PPM, pleural effusion s/p pleurX catheter, CKD, Parkinson's disease presents with fever and cough, positive for enterovirus, possible aspiration pna. MICU consulted for hypotension., now improving.  would consider Albumin   possible NGT for Droxidopa 100mg TID; can increase by 100mg each day (Max 600mg TID), would hold off on midodrine given bradycardia   continue Abx for possible aspiration pna   aspiration precautions  if unable to tolerate PO, would pit NGT for sinemet  serial blood gas on bipap, would wean off to HFNC if tolerated   ongoing GOC   reconsult as needed

## 2023-11-12 NOTE — CONSULT NOTE ADULT - ASSESSMENT
81 yo M PMH Afib on eliquis, 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 MICU consulted for hypotension. Examined patient at bedside found lethargic, responsive to noxious stimuli, opened his eyes, followed simple commands, protecting his airway. Repeat BP was 77/46. Patient ordered for 250cc NS. Lungs were clear to auscultation, no crackles or rhonchi.     #Hypotension  #CAP  - pt afebrile w/o leukocytosis; on Vanc by level and cefepime; hypotensive due to sepsis?  - Enterovirus +  - received 500cc fluids; hypotensive to 70s/50s  - lethargic but arousable; protecting airway    Recommendations   IN PROGRESS 81 yo M PMH Afib on eliquis, 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 MICU consulted for hypotension. Examined patient at bedside found lethargic, responsive to noxious stimuli, opened his eyes, followed simple commands, protecting his airway. Repeat BP was 77/46. Patient ordered for 250cc NS. Lungs were clear to auscultation, no crackles or rhonchi.     #Hypotension  #CAP  - pt afebrile w/o leukocytosis; on Vanc by level and cefepime; hypotensive due to sepsis?  - Enterovirus +  - received 500cc fluids; hypotensive to 70s/50s; repeat 90s/70s  - lethargic but arousable; protecting airway    Recommendations  - Start Droxidopa 100mg TID; can increase by 100mg each day (Max 600mg TID)  - Albumin 25% for fluid resuscitation  - follow up on infectious work up  - aspiration precautions  - Pt does not use CPAP at home, would trial him off NIV and repeat abg 1-2 hours after taking it off  - c/w chest PT, suction as needed, abx    At the moment, pt does not require MICU level of care. Please reconsult as needed.  Discussed w/ MICU attending Dr. Vo.   Discussed recommendations w/ Primary team.

## 2023-11-12 NOTE — CONSULT NOTE ADULT - SUBJECTIVE AND OBJECTIVE BOX
In progress.  CHIEF COMPLAINT: Congestion and cough    HPI:  83 yo M PMH Afib on eliquis, 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 The cough is productive with white-yellow phlegm nonbloody. Patient denies SOB however describes dyspnea on exertion - cannot walk more than a few steps without feeling winded. Patient has been compliant with home bumex 2mg daily. In the ED patient was found to be hypotensive to 76/39 s/p 500 cc NS BP improved to systolic 90-100s. Patient also found to have elevated troponin 152->188 EKG atrial sensed ventricular paced 67 bpm. XR showed moderate L sided pleural effusion with pleurX catheter. Labs significant for WBC 11.0, Cr 2.58, respiratory multiplex +enterovirus. The ED treated the patient with IV azithromycin, and ceftriaxone. (11 Nov 2023 18:35)      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      Parkinsons disease      CAD (coronary artery disease)  s/p PCI 08/21      Pleural effusion, not elsewhere classified      COVID-19 vaccine series completed  w booster      History of hip replacement, total  R hip 2008 & L hip      Status post cataract extraction  right eye cataract extraction with IOL 6/26/2015      Presence of cardiac pacemaker  10/2021      H/O coronary angioplasty  8/2021 1 stent inserted          FAMILY HISTORY:  Family history of lung cancer (Mother)    Family history of esophageal cancer (Father)    FH: myocardial infarction (Grandparent)        Social History:  Lives with wife has HHA (11 Nov 2023 18:35)      Allergies    No Known Allergies    Intolerances        HOME MEDICATIONS:    REVIEW OF SYSTEMS:  Constitutional:   Eyes:  ENT:  CV:  Resp:  GI:  :  MSK:  Integumentary:  Neurological:  Psychiatric:  Endocrine:  Hematologic/Lymphatic:  Allergic/Immunologic:  [ ] All other systems negative  [x ] Unable to assess ROS because lethargy    OBJECTIVE:  ICU Vital Signs Last 24 Hrs  T(C): 36.3 (12 Nov 2023 13:30), Max: 37.1 (11 Nov 2023 21:36)  T(F): 97.4 (12 Nov 2023 13:30), Max: 98.7 (11 Nov 2023 21:36)  HR: 60 (12 Nov 2023 14:44) (60 - 74)  BP: 71/39 (12 Nov 2023 14:44) (71/39 - 113/84)  BP(mean): --  ABP: --  ABP(mean): --  RR: 18 (12 Nov 2023 14:44) (18 - 22)  SpO2: 98% (12 Nov 2023 14:44) (98% - 100%)    O2 Parameters below as of 12 Nov 2023 14:44  Patient On (Oxygen Delivery Method): nasal cannula  O2 Flow (L/min): 2            CAPILLARY BLOOD GLUCOSE      POCT Blood Glucose.: 92 mg/dL (12 Nov 2023 14:52)      PHYSICAL EXAM  GENERAL: NAD, lying comfortably in bed   HEAD:  Atraumatic, Normocephalic  EYES: PERRLA b/l, conjunctiva and sclera clear  NECK: Supple, No LAD   CHEST/LUNG: Clear to auscultation bilaterally; No wheeze or rhonchi  HEART: Regular rate and rhythm; S1 and S2 present, No murmurs, rubs, or gallops  ABDOMEN: Soft, Nontender, Nondistended; Bowel sounds present  EXTREMITIES:  2+ Peripheral Pulses, No clubbing, cyanosis, or edema  NEURO: AAOx1, limited exam, patient lethargic, responsive to noxious stimuli  SKIN: No rashes or lesions      HOSPITAL MEDICATIONS:  MEDICATIONS  (STANDING):  cefepime   IVPB 2000 milliGRAM(s) IV Intermittent <User Schedule>  heparin  Infusion.  Unit(s)/Hr (17 mL/Hr) IV Continuous <Continuous>  vancomycin  IVPB        MEDICATIONS  (PRN):  albuterol/ipratropium for Nebulization 3 milliLiter(s) Nebulizer every 6 hours PRN Respiratory Distress      LABS:                        10.1   6.00  )-----------( 145      ( 12 Nov 2023 06:08 )             33.1     11-12    138  |  104  |  57<H>  ----------------------------<  77  4.3   |  22  |  3.41<H>    Ca    8.9      12 Nov 2023 15:39    TPro  5.7<L>  /  Alb  2.9<L>  /  TBili  0.3  /  DBili  x   /  AST  6   /  ALT  5   /  AlkPhos  56  11-12    PT/INR - ( 11 Nov 2023 10:31 )   PT: 15.7 sec;   INR: 1.40 ratio         PTT - ( 12 Nov 2023 14:15 )  PTT:132.2 sec  Urinalysis Basic - ( 12 Nov 2023 15:39 )    Color: x / Appearance: x / SG: x / pH: x  Gluc: 77 mg/dL / Ketone: x  / Bili: x / Urobili: x   Blood: x / Protein: x / Nitrite: x   Leuk Esterase: x / RBC: x / WBC x   Sq Epi: x / Non Sq Epi: x / Bacteria: x      Arterial Blood Gas:  11-12 @ 17:19  7.36/36/71/20/91.1/-4.5  ABG lactate: --    Venous Blood Gas:  11-12 @ 15:30  7.29/49/41/24/48.2  VBG Lactate: 1.5  Venous Blood Gas:  11-11 @ 16:08  7.34/44/28/24/45.6  VBG Lactate: 1.7  Venous Blood Gas:  11-11 @ 13:41  --/--/--/--/--  VBG Lactate: 2.9  Venous Blood Gas:  11-11 @ 10:31  7.30/51/34/25/28.1  VBG Lactate: 2.9      MICROBIOLOGY:     RADIOLOGY:  [ ] Reviewed and interpreted by me    EKG:

## 2023-11-13 LAB
24R-OH-CALCIDIOL SERPL-MCNC: 32 NG/ML — SIGNIFICANT CHANGE UP (ref 30–80)
24R-OH-CALCIDIOL SERPL-MCNC: 32 NG/ML — SIGNIFICANT CHANGE UP (ref 30–80)
ANION GAP SERPL CALC-SCNC: 16 MMOL/L — HIGH (ref 7–14)
ANION GAP SERPL CALC-SCNC: 16 MMOL/L — HIGH (ref 7–14)
APTT BLD: 74.1 SEC — HIGH (ref 24.5–35.6)
APTT BLD: 74.1 SEC — HIGH (ref 24.5–35.6)
APTT BLD: 82 SEC — HIGH (ref 24.5–35.6)
APTT BLD: 82 SEC — HIGH (ref 24.5–35.6)
BUN SERPL-MCNC: 59 MG/DL — HIGH (ref 7–23)
BUN SERPL-MCNC: 59 MG/DL — HIGH (ref 7–23)
CALCIUM SERPL-MCNC: 9.3 MG/DL — SIGNIFICANT CHANGE UP (ref 8.4–10.5)
CALCIUM SERPL-MCNC: 9.3 MG/DL — SIGNIFICANT CHANGE UP (ref 8.4–10.5)
CHLORIDE SERPL-SCNC: 101 MMOL/L — SIGNIFICANT CHANGE UP (ref 98–107)
CHLORIDE SERPL-SCNC: 101 MMOL/L — SIGNIFICANT CHANGE UP (ref 98–107)
CK MB BLD-MCNC: 4 % — HIGH (ref 0–2.5)
CK MB BLD-MCNC: 4 % — HIGH (ref 0–2.5)
CK MB CFR SERPL CALC: 5.3 NG/ML — SIGNIFICANT CHANGE UP
CK MB CFR SERPL CALC: 5.3 NG/ML — SIGNIFICANT CHANGE UP
CK SERPL-CCNC: 132 U/L — SIGNIFICANT CHANGE UP (ref 30–200)
CK SERPL-CCNC: 132 U/L — SIGNIFICANT CHANGE UP (ref 30–200)
CO2 SERPL-SCNC: 20 MMOL/L — LOW (ref 22–31)
CO2 SERPL-SCNC: 20 MMOL/L — LOW (ref 22–31)
CREAT SERPL-MCNC: 3.38 MG/DL — HIGH (ref 0.5–1.3)
CREAT SERPL-MCNC: 3.38 MG/DL — HIGH (ref 0.5–1.3)
EGFR: 18 ML/MIN/1.73M2 — LOW
EGFR: 18 ML/MIN/1.73M2 — LOW
FERRITIN SERPL-MCNC: 563 NG/ML — HIGH (ref 30–400)
FERRITIN SERPL-MCNC: 563 NG/ML — HIGH (ref 30–400)
FOLATE SERPL-MCNC: >20 NG/ML — HIGH (ref 3.1–17.5)
FOLATE SERPL-MCNC: >20 NG/ML — HIGH (ref 3.1–17.5)
GLUCOSE BLDC GLUCOMTR-MCNC: 92 MG/DL — SIGNIFICANT CHANGE UP (ref 70–99)
GLUCOSE BLDC GLUCOMTR-MCNC: 92 MG/DL — SIGNIFICANT CHANGE UP (ref 70–99)
GLUCOSE SERPL-MCNC: 76 MG/DL — SIGNIFICANT CHANGE UP (ref 70–99)
GLUCOSE SERPL-MCNC: 76 MG/DL — SIGNIFICANT CHANGE UP (ref 70–99)
HCT VFR BLD CALC: 34.3 % — LOW (ref 39–50)
HCT VFR BLD CALC: 34.3 % — LOW (ref 39–50)
HGB BLD-MCNC: 10.4 G/DL — LOW (ref 13–17)
HGB BLD-MCNC: 10.4 G/DL — LOW (ref 13–17)
IRON SATN MFR SERPL: 13 % — LOW (ref 14–50)
IRON SATN MFR SERPL: 13 % — LOW (ref 14–50)
IRON SATN MFR SERPL: 27 UG/DL — LOW (ref 45–165)
IRON SATN MFR SERPL: 27 UG/DL — LOW (ref 45–165)
MAGNESIUM SERPL-MCNC: 2.8 MG/DL — HIGH (ref 1.6–2.6)
MAGNESIUM SERPL-MCNC: 2.8 MG/DL — HIGH (ref 1.6–2.6)
MCHC RBC-ENTMCNC: 29.5 PG — SIGNIFICANT CHANGE UP (ref 27–34)
MCHC RBC-ENTMCNC: 29.5 PG — SIGNIFICANT CHANGE UP (ref 27–34)
MCHC RBC-ENTMCNC: 30.3 GM/DL — LOW (ref 32–36)
MCHC RBC-ENTMCNC: 30.3 GM/DL — LOW (ref 32–36)
MCV RBC AUTO: 97.2 FL — SIGNIFICANT CHANGE UP (ref 80–100)
MCV RBC AUTO: 97.2 FL — SIGNIFICANT CHANGE UP (ref 80–100)
NRBC # BLD: 0 /100 WBCS — SIGNIFICANT CHANGE UP (ref 0–0)
NRBC # BLD: 0 /100 WBCS — SIGNIFICANT CHANGE UP (ref 0–0)
NRBC # FLD: 0 K/UL — SIGNIFICANT CHANGE UP (ref 0–0)
NRBC # FLD: 0 K/UL — SIGNIFICANT CHANGE UP (ref 0–0)
PHOSPHATE SERPL-MCNC: 4.4 MG/DL — SIGNIFICANT CHANGE UP (ref 2.5–4.5)
PHOSPHATE SERPL-MCNC: 4.4 MG/DL — SIGNIFICANT CHANGE UP (ref 2.5–4.5)
PLATELET # BLD AUTO: 126 K/UL — LOW (ref 150–400)
PLATELET # BLD AUTO: 126 K/UL — LOW (ref 150–400)
POTASSIUM SERPL-MCNC: 4.8 MMOL/L — SIGNIFICANT CHANGE UP (ref 3.5–5.3)
POTASSIUM SERPL-MCNC: 4.8 MMOL/L — SIGNIFICANT CHANGE UP (ref 3.5–5.3)
POTASSIUM SERPL-SCNC: 4.8 MMOL/L — SIGNIFICANT CHANGE UP (ref 3.5–5.3)
POTASSIUM SERPL-SCNC: 4.8 MMOL/L — SIGNIFICANT CHANGE UP (ref 3.5–5.3)
RBC # BLD: 3.53 M/UL — LOW (ref 4.2–5.8)
RBC # BLD: 3.53 M/UL — LOW (ref 4.2–5.8)
RBC # FLD: 20.5 % — HIGH (ref 10.3–14.5)
RBC # FLD: 20.5 % — HIGH (ref 10.3–14.5)
SODIUM SERPL-SCNC: 137 MMOL/L — SIGNIFICANT CHANGE UP (ref 135–145)
SODIUM SERPL-SCNC: 137 MMOL/L — SIGNIFICANT CHANGE UP (ref 135–145)
TIBC SERPL-MCNC: 208 UG/DL — LOW (ref 220–430)
TIBC SERPL-MCNC: 208 UG/DL — LOW (ref 220–430)
TSH SERPL-MCNC: 1.3 UIU/ML — SIGNIFICANT CHANGE UP (ref 0.27–4.2)
TSH SERPL-MCNC: 1.3 UIU/ML — SIGNIFICANT CHANGE UP (ref 0.27–4.2)
UIBC SERPL-MCNC: 181 UG/DL — SIGNIFICANT CHANGE UP (ref 110–370)
UIBC SERPL-MCNC: 181 UG/DL — SIGNIFICANT CHANGE UP (ref 110–370)
VANCOMYCIN FLD-MCNC: 7.4 UG/ML — SIGNIFICANT CHANGE UP
VANCOMYCIN FLD-MCNC: 7.4 UG/ML — SIGNIFICANT CHANGE UP
VIT B12 SERPL-MCNC: 857 PG/ML — SIGNIFICANT CHANGE UP (ref 200–900)
VIT B12 SERPL-MCNC: 857 PG/ML — SIGNIFICANT CHANGE UP (ref 200–900)
WBC # BLD: 4.17 K/UL — SIGNIFICANT CHANGE UP (ref 3.8–10.5)
WBC # BLD: 4.17 K/UL — SIGNIFICANT CHANGE UP (ref 3.8–10.5)
WBC # FLD AUTO: 4.17 K/UL — SIGNIFICANT CHANGE UP (ref 3.8–10.5)
WBC # FLD AUTO: 4.17 K/UL — SIGNIFICANT CHANGE UP (ref 3.8–10.5)

## 2023-11-13 PROCEDURE — 71250 CT THORAX DX C-: CPT | Mod: 26

## 2023-11-13 PROCEDURE — 93010 ELECTROCARDIOGRAM REPORT: CPT

## 2023-11-13 PROCEDURE — 99232 SBSQ HOSP IP/OBS MODERATE 35: CPT

## 2023-11-13 RX ORDER — CLOPIDOGREL BISULFATE 75 MG/1
75 TABLET, FILM COATED ORAL DAILY
Refills: 0 | Status: DISCONTINUED | OUTPATIENT
Start: 2023-11-13 | End: 2023-11-17

## 2023-11-13 RX ORDER — CHOLECALCIFEROL (VITAMIN D3) 125 MCG
1000 CAPSULE ORAL DAILY
Refills: 0 | Status: DISCONTINUED | OUTPATIENT
Start: 2023-11-13 | End: 2023-11-17

## 2023-11-13 RX ORDER — ACETAMINOPHEN 500 MG
650 TABLET ORAL EVERY 6 HOURS
Refills: 0 | Status: DISCONTINUED | OUTPATIENT
Start: 2023-11-13 | End: 2023-11-17

## 2023-11-13 RX ORDER — ASCORBIC ACID 60 MG
500 TABLET,CHEWABLE ORAL DAILY
Refills: 0 | Status: DISCONTINUED | OUTPATIENT
Start: 2023-11-13 | End: 2023-11-17

## 2023-11-13 RX ORDER — ASPIRIN/CALCIUM CARB/MAGNESIUM 324 MG
81 TABLET ORAL DAILY
Refills: 0 | Status: DISCONTINUED | OUTPATIENT
Start: 2023-11-13 | End: 2023-11-17

## 2023-11-13 RX ORDER — ATORVASTATIN CALCIUM 80 MG/1
80 TABLET, FILM COATED ORAL AT BEDTIME
Refills: 0 | Status: DISCONTINUED | OUTPATIENT
Start: 2023-11-13 | End: 2023-11-17

## 2023-11-13 RX ADMIN — DROXIDOPA 100 MILLIGRAM(S): 100 CAPSULE ORAL at 11:55

## 2023-11-13 RX ADMIN — ATORVASTATIN CALCIUM 80 MILLIGRAM(S): 80 TABLET, FILM COATED ORAL at 21:46

## 2023-11-13 RX ADMIN — Medication 1000 UNIT(S): at 11:59

## 2023-11-13 RX ADMIN — DROXIDOPA 100 MILLIGRAM(S): 100 CAPSULE ORAL at 18:09

## 2023-11-13 RX ADMIN — HEPARIN SODIUM 1100 UNIT(S)/HR: 5000 INJECTION INTRAVENOUS; SUBCUTANEOUS at 07:32

## 2023-11-13 RX ADMIN — CARBIDOPA AND LEVODOPA 1.5 TABLET(S): 25; 100 TABLET ORAL at 14:24

## 2023-11-13 RX ADMIN — CARBIDOPA AND LEVODOPA 1.5 TABLET(S): 25; 100 TABLET ORAL at 09:11

## 2023-11-13 RX ADMIN — HEPARIN SODIUM 1100 UNIT(S)/HR: 5000 INJECTION INTRAVENOUS; SUBCUTANEOUS at 20:35

## 2023-11-13 RX ADMIN — CARBIDOPA AND LEVODOPA 1.5 TABLET(S): 25; 100 TABLET ORAL at 18:08

## 2023-11-13 RX ADMIN — HEPARIN SODIUM 1100 UNIT(S)/HR: 5000 INJECTION INTRAVENOUS; SUBCUTANEOUS at 15:23

## 2023-11-13 RX ADMIN — HEPARIN SODIUM 1100 UNIT(S)/HR: 5000 INJECTION INTRAVENOUS; SUBCUTANEOUS at 20:33

## 2023-11-13 RX ADMIN — Medication 81 MILLIGRAM(S): at 11:59

## 2023-11-13 RX ADMIN — CARBIDOPA AND LEVODOPA 1.5 TABLET(S): 25; 100 TABLET ORAL at 11:55

## 2023-11-13 RX ADMIN — Medication 250 MILLIGRAM(S): at 09:08

## 2023-11-13 RX ADMIN — DROXIDOPA 100 MILLIGRAM(S): 100 CAPSULE ORAL at 05:39

## 2023-11-13 RX ADMIN — HEPARIN SODIUM 1100 UNIT(S)/HR: 5000 INJECTION INTRAVENOUS; SUBCUTANEOUS at 07:29

## 2023-11-13 RX ADMIN — Medication 500 MILLIGRAM(S): at 12:00

## 2023-11-13 RX ADMIN — CEFEPIME 100 MILLIGRAM(S): 1 INJECTION, POWDER, FOR SOLUTION INTRAMUSCULAR; INTRAVENOUS at 21:45

## 2023-11-13 RX ADMIN — CLOPIDOGREL BISULFATE 75 MILLIGRAM(S): 75 TABLET, FILM COATED ORAL at 11:59

## 2023-11-13 NOTE — CHART NOTE - NSCHARTNOTEFT_GEN_A_CORE
Followed up and assessed the patient at bedside earlier at the shift  by 2100 . Patient is wide awake , responding appropriately to questions. Able to take oral meds. Droxidropa ordered as per MICU recommendation. CPAP discontinued as patient is not retaining CO2. Repeat BP noted to be 85/45 mm hg. Albumin ordered . Repeat BP noted to be 93/61 mm hg. Patient's troponin is noted to be 293, trending high. EKG ordered and without changes. Patient is on heparin drip for NSTEMI.   At 0400:  Reassessed the patient . Patient is on and off lethargic, easily arousable. Followed up and assessed the patient at bedside earlier at the shift  by 2100 . Patient is wide awake , responding appropriately to questions. Able to take oral meds. Droxidropa ordered as per MICU recommendation. CPAP discontinued as patient is not retaining CO2. Repeat BP noted to be 85/45 mm hg. Albumin ordered . Repeat BP noted to be 93/61 mm hg. Patient's troponin is noted to be 293, trending high. EKG ordered and without changes. Denies chest pain/SOB. Patient is on heparin drip for NSTEMI.   At 0400:  Reassessed the patient . Patient is on and off lethargic, easily arousable.  Axo3, Refused NGT at this time at 430am. Agreed to take po meds  Reattempt if more lethargic later

## 2023-11-13 NOTE — PROGRESS NOTE ADULT - PROBLEM SELECTOR PLAN 3
Respiratory multiplex +enterovirus  Likely responsible for fever and cough at home  C/w duonebs   Abx as above for possible superimposed PNA RVP with +enterovirus  Likely responsible for fever and cough at home  C/w duonebs   Abx as above for possible superimposed PNA

## 2023-11-13 NOTE — PROGRESS NOTE ADULT - PROBLEM SELECTOR PLAN 7
Cr 2.58, stable from 2.69 8/2023, worsened from 1.29 2/2023  Patient is s/p 3 sessions of dialysis via [facility] since discontinued, patient follows with nephrologist, would obtain records  RBUS 8/2023 with b/l renal echogenicity no hydronephrosis  Monitor BMP, urine output sCr slightly worsened from several days ago; in seting of hypotension this is concerning for ALAYNA 2/2 ATN vs pre-renal azotemia  Patient is s/p 3 sessions of dialysis via [facility] since discontinued, patient follows with nephrologist  RB 8/2023 with b/l renal echogenicity no hydronephrosis  Monitor BMP, urine output

## 2023-11-13 NOTE — PROGRESS NOTE ADULT - PROBLEM SELECTOR PLAN 12
DVT ppx: heparin gtt  Diet: DASH  Code: FULL code, had GOC discussion at bedside, wife and daughter present during discussion. DVT ppx: heparin gtt  Diet: NPO pending S&S eval; coughed with taking medications with a sip of water earlier today  Code: FULL code, prior hospitalist had GOC discussion at bedside, wife and daughter present during discussion.

## 2023-11-13 NOTE — PROGRESS NOTE ADULT - PROBLEM SELECTOR PLAN 1
Hypotensive on admission and on Nov 12  Appreciate ICU consultation  BP improving after starting droxidopa  Continuing broad-spectrum antibiotics given concern for septic shock; unclear cause of sepsis at this time    ----  f/u BCx, UCx, XR showing moderate L sided pleural effusion with pleurX catheter  f/u procalcitonin, respiratory multiplex  s/p azithromycin and CTX in the ED  will empirically give renally dosed vancomycin and cefepime, will follow cultures and deescalate as appropriate  Maintain tele monitoring  Monitor BP  Hold all antihypertensive meds   Family reports BP is usually low, runs in the low 100s. Reconsult MICU if BP is persistently low with MAP <65 Hypotensive on admission and on Nov 12  Appreciate ICU consultation  BP improving after starting droxidopa  Continuing broad-spectrum antibiotics given concern for septic shock; possibly 2/2 enterovirus and superimposed bacterial PNA  Blood cultures NGTD, will f/u  CT chest with stable pleural effusions, low concern as source of infection  c/w vanco, cefepime for now

## 2023-11-13 NOTE — PROGRESS NOTE ADULT - PROBLEM SELECTOR PLAN 4
troponin 152->188, trend troponin q6h to peak  EKG atrial sensed ventricular paced rhythm  cardiology consulted, no need for urgent DAPT   F/u with repeat EKG and CE troponin 152->188, trend troponin q6h to peak  EKG atrial sensed ventricular paced rhythm  cardiology consulted, no need for urgent DAPT

## 2023-11-14 LAB
ANION GAP SERPL CALC-SCNC: 18 MMOL/L — HIGH (ref 7–14)
ANION GAP SERPL CALC-SCNC: 18 MMOL/L — HIGH (ref 7–14)
APTT BLD: 68.5 SEC — HIGH (ref 24.5–35.6)
APTT BLD: 68.5 SEC — HIGH (ref 24.5–35.6)
BUN SERPL-MCNC: 61 MG/DL — HIGH (ref 7–23)
BUN SERPL-MCNC: 61 MG/DL — HIGH (ref 7–23)
CALCIUM SERPL-MCNC: 9.6 MG/DL — SIGNIFICANT CHANGE UP (ref 8.4–10.5)
CALCIUM SERPL-MCNC: 9.6 MG/DL — SIGNIFICANT CHANGE UP (ref 8.4–10.5)
CHLORIDE SERPL-SCNC: 102 MMOL/L — SIGNIFICANT CHANGE UP (ref 98–107)
CHLORIDE SERPL-SCNC: 102 MMOL/L — SIGNIFICANT CHANGE UP (ref 98–107)
CO2 SERPL-SCNC: 19 MMOL/L — LOW (ref 22–31)
CO2 SERPL-SCNC: 19 MMOL/L — LOW (ref 22–31)
CREAT SERPL-MCNC: 3.01 MG/DL — HIGH (ref 0.5–1.3)
CREAT SERPL-MCNC: 3.01 MG/DL — HIGH (ref 0.5–1.3)
EGFR: 20 ML/MIN/1.73M2 — LOW
EGFR: 20 ML/MIN/1.73M2 — LOW
GLUCOSE SERPL-MCNC: 70 MG/DL — SIGNIFICANT CHANGE UP (ref 70–99)
GLUCOSE SERPL-MCNC: 70 MG/DL — SIGNIFICANT CHANGE UP (ref 70–99)
HCT VFR BLD CALC: 34 % — LOW (ref 39–50)
HCT VFR BLD CALC: 34 % — LOW (ref 39–50)
HGB BLD-MCNC: 10.5 G/DL — LOW (ref 13–17)
HGB BLD-MCNC: 10.5 G/DL — LOW (ref 13–17)
MAGNESIUM SERPL-MCNC: 2.9 MG/DL — HIGH (ref 1.6–2.6)
MAGNESIUM SERPL-MCNC: 2.9 MG/DL — HIGH (ref 1.6–2.6)
MCHC RBC-ENTMCNC: 28.9 PG — SIGNIFICANT CHANGE UP (ref 27–34)
MCHC RBC-ENTMCNC: 28.9 PG — SIGNIFICANT CHANGE UP (ref 27–34)
MCHC RBC-ENTMCNC: 30.9 GM/DL — LOW (ref 32–36)
MCHC RBC-ENTMCNC: 30.9 GM/DL — LOW (ref 32–36)
MCV RBC AUTO: 93.7 FL — SIGNIFICANT CHANGE UP (ref 80–100)
MCV RBC AUTO: 93.7 FL — SIGNIFICANT CHANGE UP (ref 80–100)
NRBC # BLD: 0 /100 WBCS — SIGNIFICANT CHANGE UP (ref 0–0)
NRBC # BLD: 0 /100 WBCS — SIGNIFICANT CHANGE UP (ref 0–0)
NRBC # FLD: 0 K/UL — SIGNIFICANT CHANGE UP (ref 0–0)
NRBC # FLD: 0 K/UL — SIGNIFICANT CHANGE UP (ref 0–0)
PHOSPHATE SERPL-MCNC: 4.2 MG/DL — SIGNIFICANT CHANGE UP (ref 2.5–4.5)
PHOSPHATE SERPL-MCNC: 4.2 MG/DL — SIGNIFICANT CHANGE UP (ref 2.5–4.5)
PLATELET # BLD AUTO: 142 K/UL — LOW (ref 150–400)
PLATELET # BLD AUTO: 142 K/UL — LOW (ref 150–400)
POTASSIUM SERPL-MCNC: 4.1 MMOL/L — SIGNIFICANT CHANGE UP (ref 3.5–5.3)
POTASSIUM SERPL-MCNC: 4.1 MMOL/L — SIGNIFICANT CHANGE UP (ref 3.5–5.3)
POTASSIUM SERPL-SCNC: 4.1 MMOL/L — SIGNIFICANT CHANGE UP (ref 3.5–5.3)
POTASSIUM SERPL-SCNC: 4.1 MMOL/L — SIGNIFICANT CHANGE UP (ref 3.5–5.3)
RBC # BLD: 3.63 M/UL — LOW (ref 4.2–5.8)
RBC # BLD: 3.63 M/UL — LOW (ref 4.2–5.8)
RBC # FLD: 20 % — HIGH (ref 10.3–14.5)
RBC # FLD: 20 % — HIGH (ref 10.3–14.5)
SODIUM SERPL-SCNC: 139 MMOL/L — SIGNIFICANT CHANGE UP (ref 135–145)
SODIUM SERPL-SCNC: 139 MMOL/L — SIGNIFICANT CHANGE UP (ref 135–145)
TROPONIN T, HIGH SENSITIVITY RESULT: 305 NG/L — CRITICAL HIGH
TROPONIN T, HIGH SENSITIVITY RESULT: 305 NG/L — CRITICAL HIGH
VANCOMYCIN TROUGH SERPL-MCNC: 12.6 UG/ML — SIGNIFICANT CHANGE UP (ref 10–20)
VANCOMYCIN TROUGH SERPL-MCNC: 12.6 UG/ML — SIGNIFICANT CHANGE UP (ref 10–20)
VANCOMYCIN TROUGH SERPL-MCNC: 13 UG/ML — SIGNIFICANT CHANGE UP (ref 10–20)
VANCOMYCIN TROUGH SERPL-MCNC: 13 UG/ML — SIGNIFICANT CHANGE UP (ref 10–20)
WBC # BLD: 4.46 K/UL — SIGNIFICANT CHANGE UP (ref 3.8–10.5)
WBC # BLD: 4.46 K/UL — SIGNIFICANT CHANGE UP (ref 3.8–10.5)
WBC # FLD AUTO: 4.46 K/UL — SIGNIFICANT CHANGE UP (ref 3.8–10.5)
WBC # FLD AUTO: 4.46 K/UL — SIGNIFICANT CHANGE UP (ref 3.8–10.5)

## 2023-11-14 PROCEDURE — 93306 TTE W/DOPPLER COMPLETE: CPT | Mod: 26

## 2023-11-14 PROCEDURE — 99232 SBSQ HOSP IP/OBS MODERATE 35: CPT

## 2023-11-14 RX ORDER — APIXABAN 2.5 MG/1
2.5 TABLET, FILM COATED ORAL EVERY 12 HOURS
Refills: 0 | Status: DISCONTINUED | OUTPATIENT
Start: 2023-11-14 | End: 2023-11-17

## 2023-11-14 RX ORDER — POLYETHYLENE GLYCOL 3350 17 G/17G
17 POWDER, FOR SOLUTION ORAL DAILY
Refills: 0 | Status: DISCONTINUED | OUTPATIENT
Start: 2023-11-14 | End: 2023-11-17

## 2023-11-14 RX ORDER — SODIUM CHLORIDE 0.65 %
1 AEROSOL, SPRAY (ML) NASAL
Refills: 0 | Status: DISCONTINUED | OUTPATIENT
Start: 2023-11-14 | End: 2023-11-17

## 2023-11-14 RX ORDER — OLANZAPINE 15 MG/1
10 TABLET, FILM COATED ORAL AT BEDTIME
Refills: 0 | Status: DISCONTINUED | OUTPATIENT
Start: 2023-11-14 | End: 2023-11-17

## 2023-11-14 RX ADMIN — Medication 1000 UNIT(S): at 12:52

## 2023-11-14 RX ADMIN — CARBIDOPA AND LEVODOPA 1.5 TABLET(S): 25; 100 TABLET ORAL at 09:23

## 2023-11-14 RX ADMIN — OLANZAPINE 10 MILLIGRAM(S): 15 TABLET, FILM COATED ORAL at 22:57

## 2023-11-14 RX ADMIN — APIXABAN 2.5 MILLIGRAM(S): 2.5 TABLET, FILM COATED ORAL at 17:35

## 2023-11-14 RX ADMIN — HEPARIN SODIUM 1100 UNIT(S)/HR: 5000 INJECTION INTRAVENOUS; SUBCUTANEOUS at 07:36

## 2023-11-14 RX ADMIN — CEFEPIME 100 MILLIGRAM(S): 1 INJECTION, POWDER, FOR SOLUTION INTRAMUSCULAR; INTRAVENOUS at 22:09

## 2023-11-14 RX ADMIN — Medication 1 SPRAY(S): at 17:24

## 2023-11-14 RX ADMIN — DROXIDOPA 100 MILLIGRAM(S): 100 CAPSULE ORAL at 05:38

## 2023-11-14 RX ADMIN — HEPARIN SODIUM 1100 UNIT(S)/HR: 5000 INJECTION INTRAVENOUS; SUBCUTANEOUS at 05:37

## 2023-11-14 RX ADMIN — Medication 500 MILLIGRAM(S): at 12:52

## 2023-11-14 RX ADMIN — Medication 81 MILLIGRAM(S): at 12:52

## 2023-11-14 RX ADMIN — Medication 250 MILLIGRAM(S): at 10:47

## 2023-11-14 RX ADMIN — DROXIDOPA 100 MILLIGRAM(S): 100 CAPSULE ORAL at 17:26

## 2023-11-14 RX ADMIN — CLOPIDOGREL BISULFATE 75 MILLIGRAM(S): 75 TABLET, FILM COATED ORAL at 12:54

## 2023-11-14 RX ADMIN — CARBIDOPA AND LEVODOPA 1.5 TABLET(S): 25; 100 TABLET ORAL at 15:44

## 2023-11-14 RX ADMIN — CARBIDOPA AND LEVODOPA 1.5 TABLET(S): 25; 100 TABLET ORAL at 12:53

## 2023-11-14 RX ADMIN — DROXIDOPA 100 MILLIGRAM(S): 100 CAPSULE ORAL at 12:52

## 2023-11-14 RX ADMIN — POLYETHYLENE GLYCOL 3350 17 GRAM(S): 17 POWDER, FOR SOLUTION ORAL at 17:24

## 2023-11-14 RX ADMIN — ATORVASTATIN CALCIUM 80 MILLIGRAM(S): 80 TABLET, FILM COATED ORAL at 22:57

## 2023-11-14 RX ADMIN — CARBIDOPA AND LEVODOPA 1.5 TABLET(S): 25; 100 TABLET ORAL at 17:35

## 2023-11-14 NOTE — SWALLOW BEDSIDE ASSESSMENT ADULT - COMMENTS
Hospitalist note 11/14 "83 yo M PMH Afib on eliquis, bradycardia s/p PPM 2021 with exchange last year, pleural effusion s/p pleurX catheter, CAD s/p PCI, CKD (with three sessions of HD earlier this year), Parkinson's disease presents with cough and fever at home to 100.3 x 1-2 days likely in the setting of enterovirus. Patient also hypotensive to 76/39 responded to IVF BP improved to systolic 90s. Given reported fever, hypotension, and leukocytosis unclear if superimposed bacterial process. Patient also with troponemia 152->188 EKG atrial sensed ventricular paced 67 bpm must r/o ACS."    CT Chest 11/13 "Persistent, decreased diffuse right lung tree-in-bud nodules and groundglass opacities since August 2023. However, extent of bronchial wall thickening and impaction has overall increased. Stable small right and small-moderate left pleural effusions with right Pleurx catheter in place. Unchanged complete left lower lobe collapse."    Of Note: Patient is known to this service as patient was seen during previous admissions on  1/26 with recommendations fo regular solids with thin liquids and 2/2 at which time patient unable to maintain adequate alertness for assessment (Please see notes for details).    Patient seen at bedside this afternoon for an initial assessment of the swallow function at which time patient was alert. Patient's spouse and daughter present at bedside. Patient's spouse reporting patient eats regular solids with thin liquids and has no difficulty eating/drinking. patient's spouse further reporting patient had pneumonia back in January. Patient is able to follow directives and verbalizes wants/needs. Patient with baseline wet cough prior to initiation of PO trials.

## 2023-11-14 NOTE — PROGRESS NOTE ADULT - PROBLEM SELECTOR PLAN 3
RVP with +enterovirus  Likely responsible for fever and cough at home  C/w duonebs   Abx as above for possible superimposed PNA

## 2023-11-14 NOTE — PROGRESS NOTE ADULT - PROBLEM SELECTOR PLAN 4
troponin 152->188, trend troponin q6h to peak  EKG atrial sensed ventricular paced rhythm  cardiology consulted, no need for urgent DAPT

## 2023-11-14 NOTE — SWALLOW BEDSIDE ASSESSMENT ADULT - SWALLOW EVAL: DIAGNOSIS
1. Functional oral stage for puree, regular solids, moderately thick liquids, mildly thick liquids and thin liquids marked by adequate oral acceptance collection, chewing for solids and transfer. 2. Functional pharyngeal phase for the aforementioned consistencies marked by a present pharyngeal swallow trigger with hyolaryngeal elevation noted upon digital palpation without evidence of impaired airway protection. Of note: Patient with baseline cough prior to initiation of PO trials.

## 2023-11-14 NOTE — SWALLOW BEDSIDE ASSESSMENT ADULT - ASR SWALLOW RECOMMEND DIAG
Cinesophagram to objectively assess the swallow function given history of Parkinson's disease and to rule out if cough is dysphagia related.

## 2023-11-14 NOTE — PROGRESS NOTE ADULT - PROBLEM SELECTOR PLAN 1
Hypotensive on admission and on Nov 12  Appreciate ICU consultation  BP improving after starting droxidopa  Continuing broad-spectrum antibiotics given concern for septic shock; possibly 2/2 enterovirus and superimposed bacterial PNA; will d/c vanco as MRSA PCR neg  Blood cultures NGTD, will f/u  CT chest with stable pleural effusions, low concern as source of infection  c/w cefepime

## 2023-11-14 NOTE — PROGRESS NOTE ADULT - PROBLEM SELECTOR PLAN 7
sCr slightly worsened but improving; in seting of hypotension this is concerning for ALAYNA 2/2 ATN vs pre-renal azotemia  Patient is s/p 3 sessions of dialysis via [facility] since discontinued, patient follows with nephrologist  RB 8/2023 with b/l renal echogenicity no hydronephrosis  Monitor BMP, urine output

## 2023-11-14 NOTE — PROGRESS NOTE ADULT - CONVERSATION DETAILS
Diagnosis and code status discussed w/patient and wife. Patient and wife reaffirm desire to remain full code.

## 2023-11-14 NOTE — PROGRESS NOTE ADULT - PROBLEM SELECTOR PLAN 12
DVT ppx: DOAC  Diet: pending S&S eval; tolerated pills w/applesauce, will prescribe puree diet w/thickened liquids until S&S evaluates  Code: FULL code

## 2023-11-15 LAB
ANION GAP SERPL CALC-SCNC: 13 MMOL/L — SIGNIFICANT CHANGE UP (ref 7–14)
ANION GAP SERPL CALC-SCNC: 13 MMOL/L — SIGNIFICANT CHANGE UP (ref 7–14)
APTT BLD: 35 SEC — SIGNIFICANT CHANGE UP (ref 24.5–35.6)
APTT BLD: 35 SEC — SIGNIFICANT CHANGE UP (ref 24.5–35.6)
BUN SERPL-MCNC: 58 MG/DL — HIGH (ref 7–23)
BUN SERPL-MCNC: 58 MG/DL — HIGH (ref 7–23)
CALCIUM SERPL-MCNC: 9.6 MG/DL — SIGNIFICANT CHANGE UP (ref 8.4–10.5)
CALCIUM SERPL-MCNC: 9.6 MG/DL — SIGNIFICANT CHANGE UP (ref 8.4–10.5)
CHLORIDE SERPL-SCNC: 107 MMOL/L — SIGNIFICANT CHANGE UP (ref 98–107)
CHLORIDE SERPL-SCNC: 107 MMOL/L — SIGNIFICANT CHANGE UP (ref 98–107)
CO2 SERPL-SCNC: 21 MMOL/L — LOW (ref 22–31)
CO2 SERPL-SCNC: 21 MMOL/L — LOW (ref 22–31)
CREAT SERPL-MCNC: 2.69 MG/DL — HIGH (ref 0.5–1.3)
CREAT SERPL-MCNC: 2.69 MG/DL — HIGH (ref 0.5–1.3)
EGFR: 23 ML/MIN/1.73M2 — LOW
EGFR: 23 ML/MIN/1.73M2 — LOW
GLUCOSE SERPL-MCNC: 103 MG/DL — HIGH (ref 70–99)
GLUCOSE SERPL-MCNC: 103 MG/DL — HIGH (ref 70–99)
HCT VFR BLD CALC: 34.9 % — LOW (ref 39–50)
HCT VFR BLD CALC: 34.9 % — LOW (ref 39–50)
HGB BLD-MCNC: 10.7 G/DL — LOW (ref 13–17)
HGB BLD-MCNC: 10.7 G/DL — LOW (ref 13–17)
MAGNESIUM SERPL-MCNC: 2.8 MG/DL — HIGH (ref 1.6–2.6)
MAGNESIUM SERPL-MCNC: 2.8 MG/DL — HIGH (ref 1.6–2.6)
MCHC RBC-ENTMCNC: 29.3 PG — SIGNIFICANT CHANGE UP (ref 27–34)
MCHC RBC-ENTMCNC: 29.3 PG — SIGNIFICANT CHANGE UP (ref 27–34)
MCHC RBC-ENTMCNC: 30.7 GM/DL — LOW (ref 32–36)
MCHC RBC-ENTMCNC: 30.7 GM/DL — LOW (ref 32–36)
MCV RBC AUTO: 95.6 FL — SIGNIFICANT CHANGE UP (ref 80–100)
MCV RBC AUTO: 95.6 FL — SIGNIFICANT CHANGE UP (ref 80–100)
NRBC # BLD: 0 /100 WBCS — SIGNIFICANT CHANGE UP (ref 0–0)
NRBC # BLD: 0 /100 WBCS — SIGNIFICANT CHANGE UP (ref 0–0)
NRBC # FLD: 0 K/UL — SIGNIFICANT CHANGE UP (ref 0–0)
NRBC # FLD: 0 K/UL — SIGNIFICANT CHANGE UP (ref 0–0)
PHOSPHATE SERPL-MCNC: 3.7 MG/DL — SIGNIFICANT CHANGE UP (ref 2.5–4.5)
PHOSPHATE SERPL-MCNC: 3.7 MG/DL — SIGNIFICANT CHANGE UP (ref 2.5–4.5)
PLATELET # BLD AUTO: 156 K/UL — SIGNIFICANT CHANGE UP (ref 150–400)
PLATELET # BLD AUTO: 156 K/UL — SIGNIFICANT CHANGE UP (ref 150–400)
POTASSIUM SERPL-MCNC: 4 MMOL/L — SIGNIFICANT CHANGE UP (ref 3.5–5.3)
POTASSIUM SERPL-MCNC: 4 MMOL/L — SIGNIFICANT CHANGE UP (ref 3.5–5.3)
POTASSIUM SERPL-SCNC: 4 MMOL/L — SIGNIFICANT CHANGE UP (ref 3.5–5.3)
POTASSIUM SERPL-SCNC: 4 MMOL/L — SIGNIFICANT CHANGE UP (ref 3.5–5.3)
RBC # BLD: 3.65 M/UL — LOW (ref 4.2–5.8)
RBC # BLD: 3.65 M/UL — LOW (ref 4.2–5.8)
RBC # FLD: 19.7 % — HIGH (ref 10.3–14.5)
RBC # FLD: 19.7 % — HIGH (ref 10.3–14.5)
SODIUM SERPL-SCNC: 141 MMOL/L — SIGNIFICANT CHANGE UP (ref 135–145)
SODIUM SERPL-SCNC: 141 MMOL/L — SIGNIFICANT CHANGE UP (ref 135–145)
WBC # BLD: 4.79 K/UL — SIGNIFICANT CHANGE UP (ref 3.8–10.5)
WBC # BLD: 4.79 K/UL — SIGNIFICANT CHANGE UP (ref 3.8–10.5)
WBC # FLD AUTO: 4.79 K/UL — SIGNIFICANT CHANGE UP (ref 3.8–10.5)
WBC # FLD AUTO: 4.79 K/UL — SIGNIFICANT CHANGE UP (ref 3.8–10.5)

## 2023-11-15 PROCEDURE — 74230 X-RAY XM SWLNG FUNCJ C+: CPT | Mod: 26

## 2023-11-15 PROCEDURE — 99232 SBSQ HOSP IP/OBS MODERATE 35: CPT

## 2023-11-15 RX ORDER — IPRATROPIUM/ALBUTEROL SULFATE 18-103MCG
3 AEROSOL WITH ADAPTER (GRAM) INHALATION EVERY 6 HOURS
Refills: 0 | Status: DISCONTINUED | OUTPATIENT
Start: 2023-11-15 | End: 2023-11-17

## 2023-11-15 RX ORDER — MIRTAZAPINE 45 MG/1
15 TABLET, ORALLY DISINTEGRATING ORAL AT BEDTIME
Refills: 0 | Status: DISCONTINUED | OUTPATIENT
Start: 2023-11-15 | End: 2023-11-17

## 2023-11-15 RX ORDER — LANOLIN ALCOHOL/MO/W.PET/CERES
3 CREAM (GRAM) TOPICAL AT BEDTIME
Refills: 0 | Status: DISCONTINUED | OUTPATIENT
Start: 2023-11-15 | End: 2023-11-17

## 2023-11-15 RX ADMIN — Medication 1000 UNIT(S): at 12:54

## 2023-11-15 RX ADMIN — MIRTAZAPINE 15 MILLIGRAM(S): 45 TABLET, ORALLY DISINTEGRATING ORAL at 21:40

## 2023-11-15 RX ADMIN — CARBIDOPA AND LEVODOPA 1.5 TABLET(S): 25; 100 TABLET ORAL at 14:23

## 2023-11-15 RX ADMIN — CLOPIDOGREL BISULFATE 75 MILLIGRAM(S): 75 TABLET, FILM COATED ORAL at 12:54

## 2023-11-15 RX ADMIN — Medication 3 MILLILITER(S): at 15:12

## 2023-11-15 RX ADMIN — Medication 500 MILLIGRAM(S): at 12:53

## 2023-11-15 RX ADMIN — DROXIDOPA 100 MILLIGRAM(S): 100 CAPSULE ORAL at 05:32

## 2023-11-15 RX ADMIN — POLYETHYLENE GLYCOL 3350 17 GRAM(S): 17 POWDER, FOR SOLUTION ORAL at 12:54

## 2023-11-15 RX ADMIN — Medication 3 MILLILITER(S): at 19:11

## 2023-11-15 RX ADMIN — APIXABAN 2.5 MILLIGRAM(S): 2.5 TABLET, FILM COATED ORAL at 05:33

## 2023-11-15 RX ADMIN — APIXABAN 2.5 MILLIGRAM(S): 2.5 TABLET, FILM COATED ORAL at 17:53

## 2023-11-15 RX ADMIN — Medication 3 MILLIGRAM(S): at 21:40

## 2023-11-15 RX ADMIN — CARBIDOPA AND LEVODOPA 1.5 TABLET(S): 25; 100 TABLET ORAL at 12:52

## 2023-11-15 RX ADMIN — ATORVASTATIN CALCIUM 80 MILLIGRAM(S): 80 TABLET, FILM COATED ORAL at 21:40

## 2023-11-15 RX ADMIN — CARBIDOPA AND LEVODOPA 1.5 TABLET(S): 25; 100 TABLET ORAL at 17:51

## 2023-11-15 RX ADMIN — CARBIDOPA AND LEVODOPA 1.5 TABLET(S): 25; 100 TABLET ORAL at 10:40

## 2023-11-15 RX ADMIN — CEFEPIME 100 MILLIGRAM(S): 1 INJECTION, POWDER, FOR SOLUTION INTRAMUSCULAR; INTRAVENOUS at 21:40

## 2023-11-15 RX ADMIN — OLANZAPINE 10 MILLIGRAM(S): 15 TABLET, FILM COATED ORAL at 21:40

## 2023-11-15 RX ADMIN — Medication 81 MILLIGRAM(S): at 12:54

## 2023-11-15 NOTE — SWALLOW VFSS/MBS ASSESSMENT ADULT - COMMENTS
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 Patient also hypotensive to 76/39 responded to IVF BP improved to systolic 90s. Given reported fever, hypotension, and leukocytosis unclear if superimposed bacterial process. Patient also with troponemia 152->188 EKG atrial sensed ventricular paced 67 bpm must r/o ACS."     CT Chest 11/ 13: IMPRESSION: Persistent, decreased diffuse right lung tree-in-bud nodules and groundglass opacities since August 2023. However, extent of bronchial wall thickening and impaction has overall increased. Stable small right and small-moderate left pleural effusions with right Pleurx catheter in place. Unchanged complete left lower lobe collapse.    Of Note: Patient known to this service seen for a bedside swallow evaluation on 11/14 with recommendations for Regular Solids with Thin Liquid and a cinesophagram (see report for details).    Patient received in Radiology Suite this AM for a cinesophagram. Patient was transferred into specialized seating unit with lateral projection. Patient noted with baseline wet cough and intermittent eye opening/ closure; however, was orally receptive to PO trials and was able to verbally make basic wants/ needs known and follow simple directives.

## 2023-11-15 NOTE — PROGRESS NOTE ADULT - PROBLEM SELECTOR PLAN 1
Hypotensive on admission and on Nov 12  Appreciate ICU consultation  BP improving after starting droxidopa, will discontinue and monitor BP  Continuing broad-spectrum antibiotics given concern for septic shock; possibly 2/2 enterovirus and superimposed bacterial PNA; d/c'd vanco as MRSA PCR neg  Blood cultures NGTD  CT chest with stable pleural effusions, low concern as source of infection  c/w cefepime - to complete 5 day course

## 2023-11-15 NOTE — SWALLOW VFSS/MBS ASSESSMENT ADULT - RECOMMENDED CONSISTENCY
1. Easy to Chew Solids with Thin Liquids 2. Feeding and Swallowing Guidelines: small bites, small single cup sips, slow rate of intake, allow for swallow between intakes, upright with PO and at least 30 minutes post 3. Aspiration precautions 4. Oral Hygiene 5. Reflux precautions 1. Easy to Chew Solids with Thin Liquids 2. Feeding and Swallowing Guidelines: small bites, small single cup sips, slow rate of intake, allow for swallow between intakes, upright with PO and at least 30 minutes post, feed ONLY when Awake/ Alert 3. Aspiration precautions 4. Oral Hygiene 5. Reflux precautions

## 2023-11-15 NOTE — SWALLOW VFSS/MBS ASSESSMENT ADULT - ORAL PHASE
Reduced anterior - posterior transport Reduced anterior - posterior transport/Residue in oral cavity/Incomplete tongue to palate contact/Uncontrolled bolus / spillover in hypopharynx Reduced anterior - posterior transport/Incomplete tongue to palate contact/Uncontrolled bolus / spillover in hypopharynx

## 2023-11-15 NOTE — SWALLOW VFSS/MBS ASSESSMENT ADULT - DELAYED INITIATION OF THE PHARYNGEAL SWALLOW (IN SECONDS)
delayed initiation (bolus head a the level of the vallecula) delayed initiation (bolus head at the level of the vallecula)

## 2023-11-15 NOTE — SWALLOW VFSS/MBS ASSESSMENT ADULT - ADDITIONAL RECOMMENDATIONS
1. This service to follow for diet tolerance as schedule permits. 2. Medical team advised to reconsult to this service if patient is with a change in medical status or change in tolerance of recommended PO. 3. Patient may benefit from swallowing therapy pending discharge plans (i.e., Rehab Center vs. Homecare vs. Outpatient at [facility] [phone])

## 2023-11-15 NOTE — SWALLOW VFSS/MBS ASSESSMENT ADULT - DIAGNOSTIC IMPRESSIONS
1. Mild-Moderate oral dysphagia for puree, soft and bite-sized, regular solids, moderately thick liquids, mildly thick liquids and thin liquids characterized by inconsistent trace-mild anterior loss from the oral cavity of thin liquids via cup sip presentation due to inconsistent reduced labial seal to cup, slow prolonged mastication of regular solids> soft and bite-sized solids, slow anterior to posterior transport with premature spillage varying to the vallecula and pyriforms for liquid consistencies due to reduced tongue to palate seal. There was mild oral residue noted post swallow with mildly thick liquids and thin liquids which migrated into the hypopharynx. A spontaneous reswallow cleared oral residue. 2. Mild pharyngeal dysphagia for puree, soft and bite-sized, regular solids, moderately thick liquids, mildly thick liquids and thin liquids characterized by delayed initiation of the pharyngeal swallow (bolus head at the level of the vallecula) for liquid consistencies, adequate base of tongue retraction, adequate epiglottic deflection, adequate hyolaryngeal excursion/ adequate laryngeal vestibular closure and adequate pharyngeal contractility. There was adequate pharyngeal clearance post primary swallow; however, oral residue was noted to migrate to the hypopharynx which was cleared on spontaneous reswallows. There was Trace Laryngeal Penetration during the swallow with retrieval and airway protection maintained for Thin Liquids. There was No Aspiration before, during or after the swallow for puree, soft and bite-sized, regular solids, moderately thick liquids, mildly thick liquids and thin liquids.    Of Note: Patient noted with wet baseline cough prior to PO trials and during study in absence of contrast in airway/ trachea.

## 2023-11-16 ENCOUNTER — TRANSCRIPTION ENCOUNTER (OUTPATIENT)
Age: 83
End: 2023-11-16

## 2023-11-16 LAB
ANION GAP SERPL CALC-SCNC: 15 MMOL/L — HIGH (ref 7–14)
ANION GAP SERPL CALC-SCNC: 15 MMOL/L — HIGH (ref 7–14)
BUN SERPL-MCNC: 48 MG/DL — HIGH (ref 7–23)
BUN SERPL-MCNC: 48 MG/DL — HIGH (ref 7–23)
CALCIUM SERPL-MCNC: 9.7 MG/DL — SIGNIFICANT CHANGE UP (ref 8.4–10.5)
CALCIUM SERPL-MCNC: 9.7 MG/DL — SIGNIFICANT CHANGE UP (ref 8.4–10.5)
CHLORIDE SERPL-SCNC: 108 MMOL/L — HIGH (ref 98–107)
CHLORIDE SERPL-SCNC: 108 MMOL/L — HIGH (ref 98–107)
CO2 SERPL-SCNC: 21 MMOL/L — LOW (ref 22–31)
CO2 SERPL-SCNC: 21 MMOL/L — LOW (ref 22–31)
CREAT SERPL-MCNC: 2.44 MG/DL — HIGH (ref 0.5–1.3)
CREAT SERPL-MCNC: 2.44 MG/DL — HIGH (ref 0.5–1.3)
CULTURE RESULTS: SIGNIFICANT CHANGE UP
EGFR: 26 ML/MIN/1.73M2 — LOW
EGFR: 26 ML/MIN/1.73M2 — LOW
GLUCOSE BLDC GLUCOMTR-MCNC: 117 MG/DL — HIGH (ref 70–99)
GLUCOSE BLDC GLUCOMTR-MCNC: 117 MG/DL — HIGH (ref 70–99)
GLUCOSE SERPL-MCNC: 108 MG/DL — HIGH (ref 70–99)
GLUCOSE SERPL-MCNC: 108 MG/DL — HIGH (ref 70–99)
HCT VFR BLD CALC: 36 % — LOW (ref 39–50)
HCT VFR BLD CALC: 36 % — LOW (ref 39–50)
HGB BLD-MCNC: 10.9 G/DL — LOW (ref 13–17)
HGB BLD-MCNC: 10.9 G/DL — LOW (ref 13–17)
MAGNESIUM SERPL-MCNC: 2.7 MG/DL — HIGH (ref 1.6–2.6)
MAGNESIUM SERPL-MCNC: 2.7 MG/DL — HIGH (ref 1.6–2.6)
MCHC RBC-ENTMCNC: 29.1 PG — SIGNIFICANT CHANGE UP (ref 27–34)
MCHC RBC-ENTMCNC: 29.1 PG — SIGNIFICANT CHANGE UP (ref 27–34)
MCHC RBC-ENTMCNC: 30.3 GM/DL — LOW (ref 32–36)
MCHC RBC-ENTMCNC: 30.3 GM/DL — LOW (ref 32–36)
MCV RBC AUTO: 96 FL — SIGNIFICANT CHANGE UP (ref 80–100)
MCV RBC AUTO: 96 FL — SIGNIFICANT CHANGE UP (ref 80–100)
NRBC # BLD: 0 /100 WBCS — SIGNIFICANT CHANGE UP (ref 0–0)
NRBC # BLD: 0 /100 WBCS — SIGNIFICANT CHANGE UP (ref 0–0)
NRBC # FLD: 0 K/UL — SIGNIFICANT CHANGE UP (ref 0–0)
NRBC # FLD: 0 K/UL — SIGNIFICANT CHANGE UP (ref 0–0)
PHOSPHATE SERPL-MCNC: 3.6 MG/DL — SIGNIFICANT CHANGE UP (ref 2.5–4.5)
PHOSPHATE SERPL-MCNC: 3.6 MG/DL — SIGNIFICANT CHANGE UP (ref 2.5–4.5)
PLATELET # BLD AUTO: 144 K/UL — LOW (ref 150–400)
PLATELET # BLD AUTO: 144 K/UL — LOW (ref 150–400)
POTASSIUM SERPL-MCNC: 4.1 MMOL/L — SIGNIFICANT CHANGE UP (ref 3.5–5.3)
POTASSIUM SERPL-MCNC: 4.1 MMOL/L — SIGNIFICANT CHANGE UP (ref 3.5–5.3)
POTASSIUM SERPL-SCNC: 4.1 MMOL/L — SIGNIFICANT CHANGE UP (ref 3.5–5.3)
POTASSIUM SERPL-SCNC: 4.1 MMOL/L — SIGNIFICANT CHANGE UP (ref 3.5–5.3)
RBC # BLD: 3.75 M/UL — LOW (ref 4.2–5.8)
RBC # BLD: 3.75 M/UL — LOW (ref 4.2–5.8)
RBC # FLD: 19.8 % — HIGH (ref 10.3–14.5)
RBC # FLD: 19.8 % — HIGH (ref 10.3–14.5)
SODIUM SERPL-SCNC: 144 MMOL/L — SIGNIFICANT CHANGE UP (ref 135–145)
SODIUM SERPL-SCNC: 144 MMOL/L — SIGNIFICANT CHANGE UP (ref 135–145)
SPECIMEN SOURCE: SIGNIFICANT CHANGE UP
WBC # BLD: 5.37 K/UL — SIGNIFICANT CHANGE UP (ref 3.8–10.5)
WBC # BLD: 5.37 K/UL — SIGNIFICANT CHANGE UP (ref 3.8–10.5)
WBC # FLD AUTO: 5.37 K/UL — SIGNIFICANT CHANGE UP (ref 3.8–10.5)
WBC # FLD AUTO: 5.37 K/UL — SIGNIFICANT CHANGE UP (ref 3.8–10.5)

## 2023-11-16 PROCEDURE — 70450 CT HEAD/BRAIN W/O DYE: CPT | Mod: 26

## 2023-11-16 PROCEDURE — 99232 SBSQ HOSP IP/OBS MODERATE 35: CPT

## 2023-11-16 RX ADMIN — CEFEPIME 100 MILLIGRAM(S): 1 INJECTION, POWDER, FOR SOLUTION INTRAMUSCULAR; INTRAVENOUS at 22:48

## 2023-11-16 RX ADMIN — Medication 1000 UNIT(S): at 12:41

## 2023-11-16 RX ADMIN — APIXABAN 2.5 MILLIGRAM(S): 2.5 TABLET, FILM COATED ORAL at 17:11

## 2023-11-16 RX ADMIN — CARBIDOPA AND LEVODOPA 1.5 TABLET(S): 25; 100 TABLET ORAL at 09:05

## 2023-11-16 RX ADMIN — MIRTAZAPINE 15 MILLIGRAM(S): 45 TABLET, ORALLY DISINTEGRATING ORAL at 22:49

## 2023-11-16 RX ADMIN — Medication 500 MILLIGRAM(S): at 12:41

## 2023-11-16 RX ADMIN — CARBIDOPA AND LEVODOPA 1.5 TABLET(S): 25; 100 TABLET ORAL at 15:42

## 2023-11-16 RX ADMIN — CLOPIDOGREL BISULFATE 75 MILLIGRAM(S): 75 TABLET, FILM COATED ORAL at 12:41

## 2023-11-16 RX ADMIN — Medication 3 MILLILITER(S): at 11:01

## 2023-11-16 RX ADMIN — Medication 3 MILLIGRAM(S): at 22:49

## 2023-11-16 RX ADMIN — OLANZAPINE 10 MILLIGRAM(S): 15 TABLET, FILM COATED ORAL at 22:49

## 2023-11-16 RX ADMIN — CARBIDOPA AND LEVODOPA 1.5 TABLET(S): 25; 100 TABLET ORAL at 17:10

## 2023-11-16 RX ADMIN — Medication 3 MILLILITER(S): at 20:55

## 2023-11-16 RX ADMIN — Medication 3 MILLILITER(S): at 16:58

## 2023-11-16 RX ADMIN — POLYETHYLENE GLYCOL 3350 17 GRAM(S): 17 POWDER, FOR SOLUTION ORAL at 12:41

## 2023-11-16 RX ADMIN — CARBIDOPA AND LEVODOPA 1.5 TABLET(S): 25; 100 TABLET ORAL at 12:41

## 2023-11-16 RX ADMIN — Medication 81 MILLIGRAM(S): at 12:41

## 2023-11-16 RX ADMIN — APIXABAN 2.5 MILLIGRAM(S): 2.5 TABLET, FILM COATED ORAL at 05:26

## 2023-11-16 RX ADMIN — ATORVASTATIN CALCIUM 80 MILLIGRAM(S): 80 TABLET, FILM COATED ORAL at 22:49

## 2023-11-16 NOTE — PHYSICAL THERAPY INITIAL EVALUATION ADULT - CRITERIA FOR SKILLED THERAPEUTIC INTERVENTIONS
impairments found/anticipated discharge recommendation
impairments found/functional limitations in following categories/risk reduction/prevention/rehab potential/therapy frequency/predicted duration of therapy intervention/anticipated discharge recommendation

## 2023-11-16 NOTE — DISCHARGE NOTE PROVIDER - CARE PROVIDERS DIRECT ADDRESSES
[email or web address]

## 2023-11-16 NOTE — DISCHARGE NOTE PROVIDER - NSDCCPCAREPLAN_GEN_ALL_CORE_FT
PRINCIPAL DISCHARGE DIAGNOSIS  Diagnosis: Enterovirus infection  Assessment and Plan of Treatment: You were admitted to the hospital with a viral infection which likely led to a bacterial pneumonia. You received antibiotics. You do not need to take any further antibiotics. Please continue to see your primary doctor after leaving the hospital.      SECONDARY DISCHARGE DIAGNOSES  Diagnosis: Pleural effusion  Assessment and Plan of Treatment: Please continue to see Dr. Oneal for management of your PleurX catheter.

## 2023-11-16 NOTE — PROGRESS NOTE ADULT - PROBLEM SELECTOR PLAN 10
Continue home olanzapine, wife states patient takes for delusions, denies hx schizophrenia

## 2023-11-16 NOTE — DISCHARGE NOTE PROVIDER - NSDCFUADDAPPT_GEN_ALL_CORE_FT
Pulmonary appt arranged for 11/20 as noted.   PMD - Dr. Santhosh Avila follow up in 1 week - inquire if/when to resume antihypertensives and diuretic.   Cards - Dr. Vergara, Dr. Billy Boucher, Dr. Randolph  - Follow up with cardiologist within 1 week and discuss when to resume Bumex.

## 2023-11-16 NOTE — PROGRESS NOTE ADULT - PROBLEM SELECTOR PLAN 1
Hypotensive on admission and on Nov 12  Appreciate ICU consultation, was on droxidopa, since discontinued with stable BP  Continuing cefepime given concern for septic shock, will complete 5 day course tomorrow morning  MRSA PCR neg  Blood cultures NGTD  CT chest with stable pleural effusions, low concern as source of infection

## 2023-11-16 NOTE — PHYSICAL THERAPY INITIAL EVALUATION ADULT - THERAPY FREQUENCY, PT EVAL
2-3x/week
Trial of 1-2x/week to determine appropriateness of receiving skilled PT while in the hospital. Will continue to closely monitor mental status/ability to participate in PT sessions.

## 2023-11-16 NOTE — PROGRESS NOTE ADULT - PROBLEM SELECTOR PROBLEM 11
Chronic constipation

## 2023-11-16 NOTE — PROGRESS NOTE ADULT - PROBLEM SELECTOR PLAN 5
Resumed Eliquis  Monitor HR
Patient on eliquis 2.5 BID at home  Will hold eliquis, c/w heparin gtt for now   HR 60s, paced rhythm  Monitor HR
Resume Eliquis, d/c heparin gtt  HR 60s, paced rhythm  Monitor HR
Patient on eliquis 2.5 BID at home  Will hold eliquis, c/w heparin gtt for now   HR 60s, paced rhythm  Monitor HR
Resumed Eliquis  Monitor HR

## 2023-11-16 NOTE — PROGRESS NOTE ADULT - PROBLEM SELECTOR PLAN 9
Continue home mirtazapine  Home desvenlafaxine not on formulary

## 2023-11-16 NOTE — PROGRESS NOTE ADULT - SUBJECTIVE AND OBJECTIVE BOX
Patient is a 82y old  Male who presents with a chief complaint of hypotension (12 Nov 2023 15:36)    SUBJECTIVE / OVERNIGHT EVENTS:    No events overnight. This AM, patient without n/v/d/cp/sob.  Wife at bedside. Patient is alert, interactive.    MEDICATIONS  (STANDING):  ascorbic acid 500 milliGRAM(s) Oral daily  aspirin enteric coated 81 milliGRAM(s) Oral daily  atorvastatin 80 milliGRAM(s) Oral at bedtime  carbidopa/levodopa  25/100 1.5 Tablet(s) Oral <User Schedule>  cefepime   IVPB 2000 milliGRAM(s) IV Intermittent <User Schedule>  cholecalciferol 1000 Unit(s) Oral daily  clopidogrel Tablet 75 milliGRAM(s) Oral daily  droxidopa 100 milliGRAM(s) Oral three times a day  heparin  Infusion.  Unit(s)/Hr (17 mL/Hr) IV Continuous <Continuous>  vancomycin  IVPB 1000 milliGRAM(s) IV Intermittent every 24 hours  vancomycin  IVPB        MEDICATIONS  (PRN):  acetaminophen     Tablet .. 650 milliGRAM(s) Oral every 6 hours PRN Temp greater or equal to 38C (100.4F), Mild Pain (1 - 3)  albuterol/ipratropium for Nebulization 3 milliLiter(s) Nebulizer every 6 hours PRN Respiratory Distress      PHYSICAL EXAM:  T(C): 36.3 (11-13-23 @ 13:30), Max: 36.6 (11-13-23 @ 05:30)  HR: 60 (11-13-23 @ 13:30) (60 - 90)  BP: 91/57 (11-13-23 @ 13:30) (85/45 - 110/55)  RR: 17 (11-13-23 @ 13:30) (17 - 18)  SpO2: 100% (11-13-23 @ 13:30) (94% - 100%)  I&O's Summary    GENERAL: NAD, lying in bed, wife at bedside  HEAD:  Atraumatic, Normocephalic, MMM  CHEST/LUNG: No use of accessory muscles, CTAB, breathing non-labored  COR: RR, no mrcg  ABD: Soft, ND/NT, +BS  PSYCH: AAOx3  NEUROLOGY: CN II-XII grossly intact, moving all extremities  SKIN: No rashes or lesions  EXT: wwp, no cce    LABS:  CAPILLARY BLOOD GLUCOSE      POCT Blood Glucose.: 92 mg/dL (13 Nov 2023 07:43)  POCT Blood Glucose.: 106 mg/dL (12 Nov 2023 22:24)                          10.4   4.17  )-----------( 126      ( 13 Nov 2023 04:50 )             34.3     11-13    137  |  101  |  59<H>  ----------------------------<  76  4.8   |  20<L>  |  3.38<H>    Ca    9.3      13 Nov 2023 04:50  Phos  4.4     11-13  Mg     2.80     11-13    TPro  5.7<L>  /  Alb  2.9<L>  /  TBili  0.3  /  DBili  x   /  AST  6   /  ALT  5   /  AlkPhos  56  11-12    PTT - ( 13 Nov 2023 13:25 )  PTT:82.0 sec  CARDIAC MARKERS ( 13 Nov 2023 04:50 )  x     / x     / 132 U/L / x     / 5.3 ng/mL  CARDIAC MARKERS ( 12 Nov 2023 22:45 )  x     / x     / 153 U/L / x     / 5.5 ng/mL  CARDIAC MARKERS ( 12 Nov 2023 15:39 )  x     / x     / 179 U/L / x     / 5.0 ng/mL  CARDIAC MARKERS ( 11 Nov 2023 21:15 )  x     / x     / 96 U/L / x     / 4.1 ng/mL      Urinalysis Basic - ( 13 Nov 2023 04:50 )    Color: x / Appearance: x / SG: x / pH: x  Gluc: 76 mg/dL / Ketone: x  / Bili: x / Urobili: x   Blood: x / Protein: x / Nitrite: x   Leuk Esterase: x / RBC: x / WBC x   Sq Epi: x / Non Sq Epi: x / Bacteria: x        Culture - Blood (collected 11 Nov 2023 10:00)  Source: .Blood Blood-Peripheral  Preliminary Report (12 Nov 2023 17:02):    No growth at 24 hours    Culture - Blood (collected 11 Nov 2023 09:50)  Source: .Blood Blood-Peripheral  Preliminary Report (12 Nov 2023 17:02):    No growth at 24 hours        RADIOLOGY & ADDITIONAL TESTS:    Telemetry Personally Reviewed -     Imaging Personally Reviewed -     Imaging Reviewed -     Consultant(s) Notes Reviewed -       Care Discussed with Consultants/Other Providers - 
Patient is a 82y old  Male who presents with a chief complaint of hypotension (12 Nov 2023 10:08)      SUBJECTIVE / OVERNIGHT EVENTS:    Overnight pt had one episode of low BP. This morning, his BP decreased to 80s. He was given 250 cc of bolus. Currently, he is AAOx2 at baseline. Reports to have SOB.       MEDICATIONS  (STANDING):  ascorbic acid 500 milliGRAM(s) Oral daily  atorvastatin 80 milliGRAM(s) Oral at bedtime  carbidopa/levodopa  25/100 1.5 Tablet(s) Oral <User Schedule>  cefepime   IVPB 2000 milliGRAM(s) IV Intermittent <User Schedule>  cholecalciferol 1000 Unit(s) Oral daily  clopidogrel Tablet 75 milliGRAM(s) Oral daily  heparin  Infusion.  Unit(s)/Hr (17 mL/Hr) IV Continuous <Continuous>  mirtazapine 15 milliGRAM(s) Oral at bedtime  OLANZapine 10 milliGRAM(s) Oral at bedtime  polyethylene glycol 3350 17 Gram(s) Oral daily  sodium chloride 0.9%. 1000 milliLiter(s) (250 mL/Hr) IV Continuous <Continuous>  vancomycin  IVPB        MEDICATIONS  (PRN):  acetaminophen     Tablet .. 650 milliGRAM(s) Oral every 6 hours PRN Temp greater or equal to 38C (100.4F), Mild Pain (1 - 3)  albuterol/ipratropium for Nebulization 3 milliLiter(s) Nebulizer every 6 hours PRN Respiratory Distress      Vital Signs Last 24 Hrs  T(C): 37.1 (12 Nov 2023 09:30), Max: 37.1 (11 Nov 2023 21:36)  T(F): 98.7 (12 Nov 2023 09:30), Max: 98.7 (11 Nov 2023 21:36)  HR: 60 (12 Nov 2023 09:30) (60 - 78)  BP: 86/39 (12 Nov 2023 09:30) (76/39 - 113/84)  BP(mean): --  RR: 18 (12 Nov 2023 09:30) (16 - 22)  SpO2: 100% (12 Nov 2023 09:30) (98% - 100%)    Parameters below as of 12 Nov 2023 09:30  Patient On (Oxygen Delivery Method): room air          PHYSICAL EXAM  GENERAL: in respiratory distress, belly breathing intermittently, mildly toxic appearing   HEAD:  Atraumatic, Normocephalic  EYES: EOMI, PERRLA, conjunctiva and sclera clear  NECK: Supple, No JVD  CHEST/LUNG: scattered rhonchi, coarse breath sounds; No wheeze  HEART: irregular rate and rhythm; No murmurs, rubs, or gallops  ABDOMEN: Soft, Nontender, Nondistended; Bowel sounds present  EXTREMITIES:  2+ Peripheral Pulses, No clubbing, cyanosis, or edema  PSYCH: AAOx2  SKIN: No rashes or lesions    CAPILLARY BLOOD GLUCOSE      POCT Blood Glucose.: 178 mg/dL (11 Nov 2023 16:04)    I&O's Summary      LABS:                        10.1   6.00  )-----------( 145      ( 12 Nov 2023 06:08 )             33.1     11-11    138  |  102  |  43<H>  ----------------------------<  105<H>  4.3   |  23  |  2.58<H>    Ca    9.6      11 Nov 2023 10:31    TPro  6.5  /  Alb  3.7  /  TBili  0.4  /  DBili  x   /  AST  9   /  ALT  9   /  AlkPhos  77  11-11    PT/INR - ( 11 Nov 2023 10:31 )   PT: 15.7 sec;   INR: 1.40 ratio         PTT - ( 12 Nov 2023 06:08 )  PTT:165.8 sec  CARDIAC MARKERS ( 11 Nov 2023 21:15 )  x     / x     / 96 U/L / x     / 4.1 ng/mL      Urinalysis Basic - ( 11 Nov 2023 10:31 )    Color: x / Appearance: x / SG: x / pH: x  Gluc: 105 mg/dL / Ketone: x  / Bili: x / Urobili: x   Blood: x / Protein: x / Nitrite: x   Leuk Esterase: x / RBC: x / WBC x   Sq Epi: x / Non Sq Epi: x / Bacteria: x  
Patient is a 82y old  Male who presents with a chief complaint of hypotension (14 Nov 2023 14:24)    SUBJECTIVE / OVERNIGHT EVENTS:    No events overnight. This AM, patient without n/v/d/cp/sob.     MEDICATIONS  (STANDING):  albuterol/ipratropium for Nebulization 3 milliLiter(s) Nebulizer every 6 hours  apixaban 2.5 milliGRAM(s) Oral every 12 hours  ascorbic acid 500 milliGRAM(s) Oral daily  aspirin enteric coated 81 milliGRAM(s) Oral daily  atorvastatin 80 milliGRAM(s) Oral at bedtime  carbidopa/levodopa  25/100 1.5 Tablet(s) Oral <User Schedule>  cefepime   IVPB 2000 milliGRAM(s) IV Intermittent <User Schedule>  cholecalciferol 1000 Unit(s) Oral daily  clopidogrel Tablet 75 milliGRAM(s) Oral daily  melatonin 3 milliGRAM(s) Oral at bedtime  mirtazapine 15 milliGRAM(s) Oral at bedtime  OLANZapine 10 milliGRAM(s) Oral at bedtime  polyethylene glycol 3350 17 Gram(s) Oral daily    MEDICATIONS  (PRN):  acetaminophen     Tablet .. 650 milliGRAM(s) Oral every 6 hours PRN Temp greater or equal to 38C (100.4F), Mild Pain (1 - 3)  sodium chloride 0.65% Nasal 1 Spray(s) Both Nostrils two times a day PRN Nasal Congestion      PHYSICAL EXAM:  T(C): 36.4 (11-15-23 @ 05:30), Max: 36.4 (11-14-23 @ 22:05)  HR: 61 (11-15-23 @ 05:30) (60 - 61)  BP: 116/54 (11-15-23 @ 05:30) (111/60 - 116/54)  RR: 18 (11-15-23 @ 05:30) (17 - 18)  SpO2: 97% (11-15-23 @ 05:30) (96% - 97%)  I&O's Summary    14 Nov 2023 07:01  -  15 Nov 2023 07:00  --------------------------------------------------------  IN: 360 mL / OUT: 1800 mL / NET: -1440 mL      GENERAL: NAD, lying in bed, wife at bedside  HEAD:  Atraumatic, Normocephalic, MMM  CHEST/LUNG: No use of accessory muscles, CTAB, breathing non-labored  COR: RR, no mrcg  ABD: Soft, ND/NT, +BS  PSYCH: AAOxperson, place  NEUROLOGY: CN II-XII grossly intact, moving all extremities  SKIN: No rashes or lesions  EXT: wwp, no cce    LABS:  CAPILLARY BLOOD GLUCOSE                              10.7   4.79  )-----------( 156      ( 15 Nov 2023 06:25 )             34.9     11-15    141  |  107  |  58<H>  ----------------------------<  103<H>  4.0   |  21<L>  |  2.69<H>    Ca    9.6      15 Nov 2023 06:25  Phos  3.7     11-15  Mg     2.80     11-15      PTT - ( 15 Nov 2023 06:25 )  PTT:35.0 sec      Urinalysis Basic - ( 15 Nov 2023 06:25 )    Color: x / Appearance: x / SG: x / pH: x  Gluc: 103 mg/dL / Ketone: x  / Bili: x / Urobili: x   Blood: x / Protein: x / Nitrite: x   Leuk Esterase: x / RBC: x / WBC x   Sq Epi: x / Non Sq Epi: x / Bacteria: x          RADIOLOGY & ADDITIONAL TESTS:    Telemetry Personally Reviewed -     Imaging Personally Reviewed -     Imaging Reviewed -     Consultant(s) Notes Reviewed -       Care Discussed with Consultants/Other Providers - 
Patient is a 82y old  Male who presents with a chief complaint of hypotension (13 Nov 2023 16:07)      SUBJECTIVE / OVERNIGHT EVENTS:    No events overnight. This AM, patient without n/v/d/cp/sob. Expressing hunger. Able to tolerate pills w/applesauce this AM. Still pending formal S&S eval - contacted SLP team to expedite.    MEDICATIONS  (STANDING):  apixaban 2.5 milliGRAM(s) Oral every 12 hours  ascorbic acid 500 milliGRAM(s) Oral daily  aspirin enteric coated 81 milliGRAM(s) Oral daily  atorvastatin 80 milliGRAM(s) Oral at bedtime  carbidopa/levodopa  25/100 1.5 Tablet(s) Oral <User Schedule>  cefepime   IVPB 2000 milliGRAM(s) IV Intermittent <User Schedule>  cholecalciferol 1000 Unit(s) Oral daily  clopidogrel Tablet 75 milliGRAM(s) Oral daily  droxidopa 100 milliGRAM(s) Oral three times a day  OLANZapine 10 milliGRAM(s) Oral at bedtime  polyethylene glycol 3350 17 Gram(s) Oral daily    MEDICATIONS  (PRN):  acetaminophen     Tablet .. 650 milliGRAM(s) Oral every 6 hours PRN Temp greater or equal to 38C (100.4F), Mild Pain (1 - 3)  albuterol/ipratropium for Nebulization 3 milliLiter(s) Nebulizer every 6 hours PRN Respiratory Distress  sodium chloride 0.65% Nasal 1 Spray(s) Both Nostrils two times a day PRN Nasal Congestion      PHYSICAL EXAM:  T(C): 36.4 (11-14-23 @ 13:16), Max: 36.7 (11-13-23 @ 21:03)  HR: 59 (11-14-23 @ 13:16) (59 - 63)  BP: 115/54 (11-14-23 @ 13:16) (98/59 - 119/58)  RR: 18 (11-14-23 @ 13:16) (17 - 19)  SpO2: 99% (11-14-23 @ 13:16) (95% - 100%)  I&O's Summary    13 Nov 2023 07:01  -  14 Nov 2023 07:00  --------------------------------------------------------  IN: 200 mL / OUT: 0 mL / NET: 200 mL    GENERAL: NAD, lying in bed, wife at bedside  HEAD:  Atraumatic, Normocephalic, MMM  CHEST/LUNG: No use of accessory muscles, CTAB, breathing non-labored  COR: RR, no mrcg  ABD: Soft, ND/NT, +BS  PSYCH: AAOx3  NEUROLOGY: CN II-XII grossly intact, moving all extremities  SKIN: No rashes or lesions  EXT: wwp, no cce    LABS:  CAPILLARY BLOOD GLUCOSE                              10.5   4.46  )-----------( 142      ( 14 Nov 2023 04:16 )             34.0     11-14    139  |  102  |  61<H>  ----------------------------<  70  4.1   |  19<L>  |  3.01<H>    Ca    9.6      14 Nov 2023 04:16  Phos  4.2     11-14  Mg     2.90     11-14    TPro  5.7<L>  /  Alb  2.9<L>  /  TBili  0.3  /  DBili  x   /  AST  6   /  ALT  5   /  AlkPhos  56  11-12    PTT - ( 14 Nov 2023 04:16 )  PTT:68.5 sec  CARDIAC MARKERS ( 13 Nov 2023 04:50 )  x     / x     / 132 U/L / x     / 5.3 ng/mL  CARDIAC MARKERS ( 12 Nov 2023 22:45 )  x     / x     / 153 U/L / x     / 5.5 ng/mL  CARDIAC MARKERS ( 12 Nov 2023 15:39 )  x     / x     / 179 U/L / x     / 5.0 ng/mL      Urinalysis Basic - ( 14 Nov 2023 04:16 )    Color: x / Appearance: x / SG: x / pH: x  Gluc: 70 mg/dL / Ketone: x  / Bili: x / Urobili: x   Blood: x / Protein: x / Nitrite: x   Leuk Esterase: x / RBC: x / WBC x   Sq Epi: x / Non Sq Epi: x / Bacteria: x          RADIOLOGY & ADDITIONAL TESTS:    Telemetry Personally Reviewed -     Imaging Personally Reviewed -     Imaging Reviewed -     Consultant(s) Notes Reviewed -       Care Discussed with Consultants/Other Providers - 
Patient is a 82y old  Male who presents with a chief complaint of hypotension (15 Nov 2023 13:56)      SUBJECTIVE / OVERNIGHT EVENTS:    No events overnight. This AM, patient without n/v/d/cp/sob.      MEDICATIONS  (STANDING):  albuterol/ipratropium for Nebulization 3 milliLiter(s) Nebulizer every 6 hours  apixaban 2.5 milliGRAM(s) Oral every 12 hours  ascorbic acid 500 milliGRAM(s) Oral daily  aspirin enteric coated 81 milliGRAM(s) Oral daily  atorvastatin 80 milliGRAM(s) Oral at bedtime  carbidopa/levodopa  25/100 1.5 Tablet(s) Oral <User Schedule>  cefepime   IVPB 2000 milliGRAM(s) IV Intermittent <User Schedule>  cholecalciferol 1000 Unit(s) Oral daily  clopidogrel Tablet 75 milliGRAM(s) Oral daily  melatonin 3 milliGRAM(s) Oral at bedtime  mirtazapine 15 milliGRAM(s) Oral at bedtime  OLANZapine 10 milliGRAM(s) Oral at bedtime  polyethylene glycol 3350 17 Gram(s) Oral daily    MEDICATIONS  (PRN):  acetaminophen     Tablet .. 650 milliGRAM(s) Oral every 6 hours PRN Temp greater or equal to 38C (100.4F), Mild Pain (1 - 3)  sodium chloride 0.65% Nasal 1 Spray(s) Both Nostrils two times a day PRN Nasal Congestion      PHYSICAL EXAM:  T(C): 37 (11-16-23 @ 05:20), Max: 37.1 (11-15-23 @ 22:11)  HR: 60 (11-16-23 @ 11:01) (60 - 80)  BP: 113/53 (11-16-23 @ 05:20) (101/54 - 113/53)  RR: 18 (11-16-23 @ 05:20) (18 - 18)  SpO2: 97% (11-16-23 @ 05:20) (95% - 98%)  I&O's Summary    15 Nov 2023 07:01  -  16 Nov 2023 07:00  --------------------------------------------------------  IN: 0 mL / OUT: 2850 mL / NET: -2850 mL      GENERAL: NAD, lying in bed  HEAD:  Atraumatic, Normocephalic, MMM  CHEST/LUNG: No use of accessory muscles, CTAB, breathing non-labored  COR: RR, no mrcg  ABD: Soft, ND/NT, +BS  PSYCH: AAOxperson, and place  NEUROLOGY: CN II-XII grossly intact, moving all extremities  SKIN: No rashes or lesions  EXT: wwp, no cce    LABS:  CAPILLARY BLOOD GLUCOSE                              10.9   5.37  )-----------( 144      ( 16 Nov 2023 05:00 )             36.0     11-16    144  |  108<H>  |  48<H>  ----------------------------<  108<H>  4.1   |  21<L>  |  2.44<H>    Ca    9.7      16 Nov 2023 05:00  Phos  3.6     11-16  Mg     2.70     11-16      PTT - ( 15 Nov 2023 06:25 )  PTT:35.0 sec      Urinalysis Basic - ( 16 Nov 2023 05:00 )    Color: x / Appearance: x / SG: x / pH: x  Gluc: 108 mg/dL / Ketone: x  / Bili: x / Urobili: x   Blood: x / Protein: x / Nitrite: x   Leuk Esterase: x / RBC: x / WBC x   Sq Epi: x / Non Sq Epi: x / Bacteria: x          RADIOLOGY & ADDITIONAL TESTS:    Telemetry Personally Reviewed -     Imaging Personally Reviewed -     Imaging Reviewed -     Consultant(s) Notes Reviewed -       Care Discussed with Consultants/Other Providers -

## 2023-11-16 NOTE — PHYSICAL THERAPY INITIAL EVALUATION ADULT - LEVEL OF INDEPENDENCE: STAND/SIT, REHAB EVAL
maximum assist (25% patients effort)
to be assessed when patient is more alert and able to participate in PT/unable to perform

## 2023-11-16 NOTE — DISCHARGE NOTE PROVIDER - NSFTFHOMEHTHYNRD_GEN_ALL_CORE
SUPPLEMENTS RECOMMENDED FOR CHILDREN:    Children's Multivitamin once daily ( with iron if not eating red meat 2-3 times weekly)  This will provide essential Vitamin D due [place] sun exposure.  Pacific Complete is a very balanced and complete vitamin.  Take 1 tablet daily.     Vitamin C 125-250 mg twice daily  (essential for collagen formation - tendons, ligaments, bones, muscle and blood vessel health and as an antioxidant for immune system function)  Vitafusion Power C for adults is 125 mg per gummy and safe for children.  We do not store nor make vitamin C as humans, so it is important to consume it multiple times per day for it to have its best effect.    Omega 3 Fatty Acid  Supplement  There are lots of choices but they all differ in taste, find one that your child likes, Examples would include Garden of Life Omega 3 Soft gel, Vitacost Omega-3 Gummies for Kids, [employer] Bears (Hero Nutritional Products) Yummi Bears DHA,  Gazzang Childrens DHA [place]) For brain and cardiovascular health.   If you consume fatty fish like [place], Tuna, Mackerel or Sardines 2 times per week, you do not need an Omega 3 supplement.    Calcium - Aim for 24 oz of milk per day to get 1000 mg of Calcium for bone and tooth development.  8 oz of Cow or Soy milk = 8 oz of Calcium fortified orange juice = 8 oz of yogurt = 4 oz of cheese = 3 cups of leafy greens.     ..... As an additional Fiber supplement:  Benefiber Powder (generic will list wheat or corn Dextran as active ingredient)  - Use 1-2 tsp mixed into 6-8 oz of liquid 1-2 times daily to aid in the treatment of Constipation  Or  You may add 7 tbsp of Benefiber to each gallon or milk or 4 tbsp to each half gallon or milk or milk substitute  For hard stools, use Miralax at a dose of 1 capful into 8 oz of fluid and repeat daily until stools are soft and daily.     ..... 2nd dose of Hepatitis A vaccine given today.     ..... For Sleep  disturbance (delayed initiation): Take Melatonin 1/2 to 2  mg about 1 hour prior to ideal bedtime.  Start with 1/2 mg taken 45 minutes prior to bedtime.  Increase by 1/2 to 1 mg every 3 days until she is sleeping well or you have reached the 2 mg dose.    If this is helpful, you may continue this daily.  Turn off all electronics 1 hour prior to bedtime.  Liquid forms are usually available at health food stores    ..... If there are deficiencies noted on Genveieve's blood work today, the following supplements are recommended.   For Iron - Novaferrum Iron 125 mg /5 ml    Take 1 ml twice daily, take with Orange juice to improve absorption.  Eggs, Milk/dairy products and leafy greens like spinach and kale can reduce the absorption of iron.  Try to avoid serving them within 60 minutes of a dose of Iron.   Iron can cause constipation.  If infrequent hard stools are present, then may use Senna liquid at dose of 5 ml at bedtime for several days in a row or every other day chronically to prevent constipation.     For Vitamin D - Take Vitamin D3 at a dose of 7755-3019 IU once daily with the fattiest meal of the day.         Your Child's Health  [age]-Year-Old Visit      Genevieve Mcintosh  August 23, 2015    Visit Vitals  Temp 98.2 °F (36.8 °C) (Temporal Artery)   Ht 2' 10.25\" (0.87 m)   Wt 10.9 kg   HC 47 cm (18.5\")   BMI 14.38 kg/m²     Weight: 24 lbs      NUTRITION:  Emphasize variety of foods over quantity. Many children at this age resist meats and vegetables. Ask relatives and friends for tips. Parent magazines often have useful suggestions. A multivitamin with 400 U vitamin D should be given to all children who drink less than 32 oz of milk per day.    Obesity and being overweight are serious problems in the [place]. You can help your child avoid these problems by following these guidelines:  · Limit TV and video total time to 2 hours per day or less.  · Don't encourage your children to eat if they tell you they are full.  Healthy children will not starve themselves.  · Don't reward your children for cleaning their plates. If they always leave food, reduce the size of the portions and tell them to ask for more if they are still hungry.   · Don't allow children to dish out their own portions. They will imitate adults or imitate what they have seen on TV; in both cases, the amount will be too large. This results in increased calories and waste. At least for children above 5 years of age and for adults, people eat more when given larger portions.    DENTAL CARE:  Most dentists will recommend a first visit at 5 years of age.  Most will also recommend brushing teeth without toothpaste to get children used to the technique and feel of brushing without spending most of the time sucking the toothpaste off the toothbrush.  If you do use Toothpaste, use the fluoride free variety as excessive fluoride can permanently stain developing teeth that have not yet erupted.     DEVELOPMENT:  A two-year-old child can most often wash and dry his or her hands, help with simple tasks, and put on some clothes. A rapidly increasing vocabulary generally exists along with only a 5-10 minute attention span. The short attention span allows you to distract Genevieve out of impending tantrums. Two-year-old children have a tendency to walk into things, to run too fast, and to have no fear of heights or danger.    Listen to Genevieve and read to her. Children who have been read to have higher grades all the way through school.    INDEPENDENCE:  The more independent a child can become, the more self-esteem will result.  Never do for a child what the child is capable of doing alone. It may take you longer to do a job with \"help\" from Genevieve, but otherwise she may grow up not knowing how to cook or clean. You might think that Genevieve would be grateful for your cooking and cleaning, but if you do everything for her, she will come to regard you as a servant and lose respect for you.  Help Genevieve learn to dress and play and do simple household tasks.  At this age, she may actually enjoy doing these things.    TELEVISION:  Limit television and video to wholesome programs, and permit no more than two hours of viewing per day. Excess television viewing is related to overweight problems in children.  Expect Genevieve to be upset if the children in the TV show she is watching are upset. Children cannot distinguish commercials from the program's content until they are 6-8 years old. They are very susceptible to advertising, and TV will increase their demands on you for toys and junk food.    TOILET TRAINING:  Please keep in mind the following factors:  · Most children are physically ready to begin toilet training at 18 months.  · The average age for girls in the [place] to be trained is almost 3 years; boys are generally        trained when they are a little older.  · Toilet training should be a positive experience if possible. If Genevieve is resistant at the first attempt, it is best to forget about if for a few months and then try again. A child too small for the toilet seat may need a potty chair or may need to grow a little before beginning on the adult toilet.    DISCIPLINE:  Be as consistent as possible with discipline. Think of it as teaching where repetition is necessary for learning.  If disrespectful language and hitting are difficult now, think of how hard it will be if you allow Genevieve to go untrained until the teens. Genevieve craves your attention, so give her more attention for good behavior and less attention (for example, time-outs) for bad behavior. You can give a time-out to a two-year-old by simply not looking at or talking to the child for 2-5 minutes, although a \"naughty chair\" also works very well for some children.    ACCIDENT PREVENTION:  · Car Safety:  An approved car seat in the back seat of the car is required by [place] law until Genevieve is four years old and weighs at least 40 pounds. Please  consider using one of the many free programs that check to make sure the seat is properly installed.  · Street and Garage Safety:  Children at this age can undo door latches. There have been some tragic accidents involving children who were found out in the snow or who ran out the door and ended up under the wheel of a car backing out of the driveway.  · Pets:  Two-year-old children don't know how to treat pets. This is the most common age for dog bites. Eventually, children need to be taught not to hand-feed dogs and not to play with them when they are eating or sleeping.    SMOKING:  Children exposed to tobacco smoke have more ear infections and pneumonia.  Cold symptoms last longer. If you smoke, please quit. If you cannot quit, smoke outside. Do not smoke near Genevieve, and do not let others smoke near him.    LEAD EXPOSURE:  If there is lead in the house, your child may be vulnerable. Let us know if any of the following apply:  · Your home was built before 1960 and has peeling or chipping or chalking paint. Homes built in older cities at the turn of the last century may have lead pipes. Check to see if any of the above applies to Genevieve's sitter's home, , , or any other house where she spends time.  · You may have other sources of lead in the home, including: (a) herbal remedies like paulino, melisa, ghasard, kandu, azarcon, pay-loo-ah, or balagoli; (b) antique toys, especially those made of lead or pewter; (c) imported mini blinds; and (d) imported canned goods, especially acid foods like tomato and citrus. Do not cook or store foods in utensils made of crystal, pewter, or imported pottery.  · You have another child or housemate who is being followed or treated for an elevated lead level.  · Genevieve lives with an adult whose job or hobby involves lead exposure (soldering, battery manufacture, recycling, casting, stained glass, etc.).  · You live near an active lead smelter, a battery recycling plant, or a  plant that processes hot metal.    TUBERCULOSIS:  There is such a low rate of tuberculosis in our area that frequent skin tests are no longer recommended.  However, certain situations do increase Genevieve's risk, including contact with AIDS patients, nursing home residents, prisoners, immigrants, or homeless persons.  Please let us know if Genevieve has contacts with such persons, or if she has contact with anyone known to have or suspected of having tuberculosis.    SUN EXPOSURE:  Sun screen is important to protect young skin from sun burns and early skin damage.  Most sun screens are appropriate for use with children.  SPF over 50 however may give you a false sense of security and puts a very high concentration of these chemicals on the skin which can be absorbed systemically.    Reapply the sun screen frequently as sweating and swimming with reduce their effectiveness.           MEDICATION FOR FEVER OR PAIN:     Ibuprofen (Motrin/Advil)  Infant or Children's Suspension (100 mg/5mL).   Give 1  tsp or 5 mL by mouth every 6-8 hours as needed for fever/pain   or   Acetaminophen (Tylenol) Infant or Children's suspension (160 mg/5mL).   Give 1  tsp or 5  mL  by mouth every 4 hours as needed for fever/pain.  These are maximum doses.  Half these doses may be used for mild fever or pain.  Do not exceed the maximum doses.      Most Recent Immunizations   Administered Date(s) Administered   • DTaP/HIB/IPV 11/16/2016   • HEPATITIS A CHILD 11/16/2016   • Hepatitis B Child 05/11/2016   • Influenza Quadrivalent Preservative Free 02/22/2017   • MMR 08/17/2016   • Pneumococcal Conjugate 13 Valent 08/17/2016   • Rotavirus - Pentavalent 01/29/2016   • Varicella 08/17/2016   Pended Date(s) Pended   • HEPATITIS A CHILD 08/23/2017       If Genevieve develops any of the following reactions within 72 hours after an immunization, notify your pediatrician by calling the pediatric phone nurse:  · A temperature of 105 degrees or above.  · More than 3  hours of continuous crying.  · A shrill, high-pitched cry.  · A pale, limp spell.  · A seizure or fainting spell.  In this case, you should call 911 or go immediately to the emergency room.    NEXT VISIT:  5 YEARS OF AGE  Thank you for entrusting your care to [facility].   Yes

## 2023-11-16 NOTE — PHYSICAL THERAPY INITIAL EVALUATION ADULT - GENERAL OBSERVATIONS, REHAB EVAL
Pt received semi-supine in bed, with all lines intact, NAD, all precautions maintained. Wife and daughter by bedside.

## 2023-11-16 NOTE — PROGRESS NOTE ADULT - PROBLEM SELECTOR PLAN 7
sCr slightly worsened but improving; in setting of hypotension this is concerning for ALAYNA 2/2 ATN vs pre-renal azotemia  Patient is s/p 3 sessions of dialysis via [facility] since discontinued, patient follows with nephrologist  ANTONIETA 8/2023 with b/l renal echogenicity no hydronephrosis  Monitor BMP, urine output

## 2023-11-16 NOTE — DISCHARGE NOTE PROVIDER - ATTENDING DISCHARGE PHYSICAL EXAMINATION:
T(C): 36.4 (11-17-23 @ 05:25), Max: 36.7 (11-16-23 @ 18:00)  HR: 84 (11-17-23 @ 05:25) (59 - 84)  BP: 102/52 (11-17-23 @ 05:25) (102/50 - 108/50)  RR: 17 (11-17-23 @ 05:25) (17 - 18)  SpO2: 96% (11-17-23 @ 05:25) (95% - 98%)    PHYSICAL EXAM:  GENERAL: NAD, lying in bed, wife and aide at bedside  HEAD:  Atraumatic, Normocephalic  EYES: EOMI, PERRLA, conjunctiva and sclera clear  NECK: Supple, No elevated JVD  CHEST/LUNG: Clear to auscultation bilaterally; No wheeze; +PleurX catheter  HEART: Regular rate and rhythm; No murmurs, rubs, or gallops  ABDOMEN: Soft, Nontender, Nondistended; Bowel sounds present  EXTREMITIES:  2+ Peripheral Pulses, No clubbing, cyanosis, or edema  PSYCH: AAOxperson  NEUROLOGY: CN II-XII grossly intact, moving all extremities  SKIN: No rashes or lesions

## 2023-11-16 NOTE — PHYSICAL THERAPY INITIAL EVALUATION ADULT - LEVEL OF INDEPENDENCE: GAIT, REHAB EVAL
to be assessed when patient is more alert and able to participate in PT/unable to perform
unable to perform

## 2023-11-16 NOTE — PHYSICAL THERAPY INITIAL EVALUATION ADULT - PERTINENT HX OF CURRENT PROBLEM, REHAB EVAL
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 Patient also hypotensive to 76/39 responded to IVF BP improved to systolic 90s. Given reported fever, hypotension, and leukocytosis unclear if superimposed bacterial process. Patient also with troponemia 152->188 EKG atrial sensed ventricular paced 67 bpm must r/o ACS.
82 year old Male with PMH stated below, presents with cough and fever at home to 100.3 x 1-2 days likely in the setting of enterovirus. Patient also hypotensive to 76/39 responded to IVF BP improved to systolic 90s. Given reported fever, hypotension, and leukocytosis unclear if superimposed bacterial process. Patient also with troponemia 152->188 EKG atrial sensed ventricular paced 67 bpm must r/o ACS.

## 2023-11-16 NOTE — PHYSICAL THERAPY INITIAL EVALUATION ADULT - ADDITIONAL COMMENTS
Pt family states pt lives in a house with his wife and has 24/7 home health aide. Pt has a ramp and a wheelchair and used a rolling walker inside the house with some assistance.   Post PT evaluation, pt left semi-supine, alarm on, call bell and remote within reach, all precautions maintained, NAD. RN aware.
Pt unable to provide history at this time secondary to current cognitive status. History obtained at bedside from Daughter and Wife. Per family, pt lives in a private house with wife, utilizes ramp to access house via wheelchair, once inside pt ambulated using a rolling walker with some assistance. Pt received home health care services from his aide, 24/7 to assist with ADL performance.     Pt left semi-supine in bed, all lines intact, all needs in reach, bed alarm set, in NAD. GARTH Sheehan aware. Heart rate 70 beats per minute.

## 2023-11-16 NOTE — DISCHARGE NOTE PROVIDER - HOSPITAL COURSE
82M chronic atrial fibrillation, hypercoagulable state 2/2 chronic atrial fibrillation, bradycardia s/p PPM, pleural effusion s/p pleurX catheter, CAD, CKD, Parkinson's disease presents with sepsis due to enterovirus and likely super-imposed bacterial pneumonia. Course was complicated by severe sepsis and hypotension due to enterovirus and bacterial pneumonia. Patient briefly required droxidopa. He was treated with a 5-day course of cefepime. MRSA PCR was negative.    CT demonstrated stable pleural effusion compared to prior imaging.     82M chronic atrial fibrillation, hypercoagulable state 2/2 chronic atrial fibrillation, bradycardia s/p PPM, pleural effusion s/p pleurX catheter, CAD, CKD, Parkinson's disease presents with sepsis due to enterovirus and likely super-imposed bacterial pneumonia. Course was complicated by severe sepsis and hypotension due to enterovirus and bacterial pneumonia. Patient briefly required droxidopa. He was treated with a 5-day course of cefepime. MRSA PCR was negative.    CT demonstrated stable pleural effusion compared to prior imaging. Patient and wife instructed to f/u with Dr Oneal as outpatient.    Course c/b episode of unresponsiveness after receiving Zyprexa and mirtazapine. Patient subsequently awoke and became active/paranoid. This was likely hospital delirium vs medication effect. Patient was at baseline mental status at time of discharge.     Wife worried about taking patient to [facility] as he has had delirium there previously; prefers home with PT.      82M chronic atrial fibrillation, hypercoagulable state 2/2 chronic atrial fibrillation, bradycardia s/p PPM, pleural effusion s/p pleurX catheter, CAD, CKD, Parkinson's disease presents with sepsis due to enterovirus and likely super-imposed bacterial pneumonia. Course was complicated by severe sepsis and hypotension due to enterovirus and bacterial pneumonia. Patient briefly required droxidopa. He was treated with a 5-day course of cefepime. MRSA PCR was negative.    CT demonstrated stable pleural effusion compared to prior imaging. Patient and wife instructed to f/u with Dr Oneal as outpatient.    Course c/b episode of unresponsiveness after receiving Zyprexa and mirtazapine. Patient subsequently awoke and became active/paranoid. This was likely hospital delirium vs medication effect. Patient was at baseline mental status at time of discharge. Per attd, continue to hold Zyprexa/Mirtazapine on DC, outpt follow up regarding when to resume.     Wife worried about taking patient to [facility] as he has had delirium there previously; prefers home with PT.      82M chronic atrial fibrillation, hypercoagulable state 2/2 chronic atrial fibrillation, bradycardia s/p PPM, pleural effusion s/p pleurX catheter, CAD, CKD, Parkinson's disease presents with sepsis due to enterovirus and likely super-imposed bacterial pneumonia. Course was complicated by severe sepsis and hypotension due to enterovirus and bacterial pneumonia. Patient briefly required droxidopa. He was treated with a 5-day course of cefepime. MRSA PCR was negative.    CT demonstrated stable pleural effusion compared to prior imaging. Patient and wife instructed to f/u with Dr Oneal as outpatient.    Course c/b episode of unresponsiveness after receiving Zyprexa and mirtazapine. Patient subsequently awoke and became active/paranoid. This was likely hospital delirium vs medication effect. Patient was at baseline mental status at time of discharge. Per attd, continue to hold Zyprexa/Mirtazapine on DC, outpt follow up regarding when to resume.     Wife worried about taking patient to [facility] as he has had delirium there previously; prefers home with PT.     Case discussed with Dr. Taylor, labs/vitals/medications reviewed, hold tadalafil, bumex due to hypotension, continue to remeron, zyprexa as well, Pt medically cleared for discharge to home with home care. 82M chronic atrial fibrillation, hypercoagulable state 2/2 chronic atrial fibrillation, bradycardia s/p PPM, pleural effusion s/p pleurX catheter, CAD, CKD, Parkinson's disease presents with sepsis due to enterovirus and likely super-imposed bacterial pneumonia. Course was complicated by severe sepsis and hypotension due to enterovirus and bacterial pneumonia. Patient briefly required droxidopa. He was treated with a 5-day course of cefepime. MRSA PCR was negative.    CT demonstrated stable pleural effusion compared to prior imaging. Patient and wife instructed to f/u with Dr Oneal as outpatient.    Course c/b episode of unresponsiveness after receiving Zyprexa and Mirtazapine. Patient subsequently awoke and became active/paranoid. This was likely hospital delirium vs medication effect. Patient was at baseline mental status at time of discharge. CTH negative. Per attd, continue to hold Zyprexa/Mirtazapine on DC, outpt follow up regarding when to resume.     Wife worried about taking patient to [facility] as he has had delirium there previously; prefers home with PT.     Case discussed with Dr. Taylor, labs/vitals/medications reviewed, hold tadalafil, bumex due to hypotension, continue to remeron, zyprexa as well, Pt medically cleared for discharge to home with home care.

## 2023-11-16 NOTE — PHYSICAL THERAPY INITIAL EVALUATION ADULT - LEVEL OF INDEPENDENCE: SIT/STAND, REHAB EVAL
to be assessed when patient is more alert and able to participate in PT/unable to perform
pt states he feels "unsteady" upon standing/maximum assist (25% patients effort)

## 2023-11-16 NOTE — DISCHARGE NOTE PROVIDER - NSDCFUSCHEDAPPT_GEN_ALL_CORE_FT
[facility]ardoFormerly [facility]  [facility] [address] [place] [address]  Scheduled Appointment: 11/20/2023     negative...

## 2023-11-16 NOTE — PROGRESS NOTE ADULT - PROBLEM/PLAN-2
DISPLAY PLAN FREE TEXT
No
DISPLAY PLAN FREE TEXT
DISPLAY PLAN FREE TEXT

## 2023-11-16 NOTE — PHYSICAL THERAPY INITIAL EVALUATION ADULT - LEVEL OF INDEPENDENCE: SIT/SUPINE, REHAB EVAL
to be assessed when patient is more alert and able to participate in PT/unable to perform
maximum assist (25% patients effort)

## 2023-11-16 NOTE — PROGRESS NOTE ADULT - PROBLEM SELECTOR PLAN 8
PPM placed in 2021, exchanged last year  Follows with cardiologist Dr. Collazo

## 2023-11-16 NOTE — PROGRESS NOTE ADULT - ASSESSMENT
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 Patient also hypotensive to 76/39 responded to IVF BP improved to systolic 90s. Given reported fever, hypotension, and leukocytosis unclear if superimposed bacterial process. Patient also with troponemia 152->188 EKG atrial sensed ventricular paced 67 bpm must r/o ACS.
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 Patient also hypotensive to 76/39 responded to IVF BP improved to systolic 90s. Given reported fever, hypotension, and leukocytosis unclear if superimposed bacterial process. Patient also with troponemia 152->188 EKG atrial sensed ventricular paced 67 bpm must r/o ACS.
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 Patient also hypotensive to 76/39 responded to IVF BP improved to systolic 90s. Given reported fever, hypotension, and leukocytosis unclear if superimposed bacterial process. Patient also with troponemia 152->188 EKG atrial sensed ventricular paced 67 bpm must r/o ACS.

## 2023-11-16 NOTE — DISCHARGE NOTE PROVIDER - CARE PROVIDER_API CALL
Rufus Oneal  Thoracic Surgery  [address] [place] [address], [place] [address]  Phone: [phone]  Fax: [phone]  Follow Up Time:     Napoleon Pardo NP in [address], [address] [place] [id number]  Phone: [phone]  Fax: [phone]  Follow Up Time:

## 2023-11-16 NOTE — PHYSICAL THERAPY INITIAL EVALUATION ADULT - LEVEL OF INDEPENDENCE: SUPINE/SIT, REHAB EVAL
to be assessed when patient is more alert and able to participate in PT/unable to perform
Pt noted to be leaning towards left side; pt required verbal and tactile cues to move toward right to center his trunk/maximum assist (25% patients effort)

## 2023-11-16 NOTE — CHART NOTE - NSCHARTNOTEFT_GEN_A_CORE
As per physical therapy, pt seems to have L sided weakness>R side. Pt seen and examined at bedside, no focal deficits noted, pt noted with overall generalized weakness in setting of deconditioning and history of Parkinsons. Pt's wife and aid at bedside state it is not unusual for pt to be weaker on L side, this is consistent with his baseline. Wife and pt express interest in home PT for strengthening and conditioning. As per physical therapy, pt seems to have L sided weakness>R side. Pt seen and examined at bedside, no focal deficits noted, pt noted with overall generalized weakness in setting of deconditioning and history of Parkinsons. Pt's wife and aid at bedside state it is not unusual for pt to be weaker on L side, this is consistent with his baseline. However wife also notes that at time weakness occasionally alternates to Right side. Discussed with Dr. Taylor, will perform CT head although low suspicion for acute event, CT head was not done on admission. Wife and pt express interest in home PT for strengthening and conditioning.

## 2023-11-16 NOTE — PROGRESS NOTE ADULT - PROBLEM SELECTOR PLAN 11
Continue home miralax

## 2023-11-16 NOTE — PHYSICAL THERAPY INITIAL EVALUATION ADULT - GROSSLY INTACT, SENSORY
unable to assess; pt is unable to respond to verbal stimuli or answer questions at this time despite repeated physical stimulation
Left UE/Right UE/Left LE/Right LE/Grossly Intact

## 2023-11-17 ENCOUNTER — TRANSCRIPTION ENCOUNTER (OUTPATIENT)
Age: 83
End: 2023-11-17

## 2023-11-17 VITALS — OXYGEN SATURATION: 96 %

## 2023-11-17 LAB
ANION GAP SERPL CALC-SCNC: 14 MMOL/L — SIGNIFICANT CHANGE UP (ref 7–14)
ANION GAP SERPL CALC-SCNC: 14 MMOL/L — SIGNIFICANT CHANGE UP (ref 7–14)
BUN SERPL-MCNC: 40 MG/DL — HIGH (ref 7–23)
BUN SERPL-MCNC: 40 MG/DL — HIGH (ref 7–23)
CALCIUM SERPL-MCNC: 9.6 MG/DL — SIGNIFICANT CHANGE UP (ref 8.4–10.5)
CALCIUM SERPL-MCNC: 9.6 MG/DL — SIGNIFICANT CHANGE UP (ref 8.4–10.5)
CHLORIDE SERPL-SCNC: 108 MMOL/L — HIGH (ref 98–107)
CHLORIDE SERPL-SCNC: 108 MMOL/L — HIGH (ref 98–107)
CO2 SERPL-SCNC: 23 MMOL/L — SIGNIFICANT CHANGE UP (ref 22–31)
CO2 SERPL-SCNC: 23 MMOL/L — SIGNIFICANT CHANGE UP (ref 22–31)
CREAT SERPL-MCNC: 2.18 MG/DL — HIGH (ref 0.5–1.3)
CREAT SERPL-MCNC: 2.18 MG/DL — HIGH (ref 0.5–1.3)
EGFR: 30 ML/MIN/1.73M2 — LOW
EGFR: 30 ML/MIN/1.73M2 — LOW
GLUCOSE SERPL-MCNC: 98 MG/DL — SIGNIFICANT CHANGE UP (ref 70–99)
GLUCOSE SERPL-MCNC: 98 MG/DL — SIGNIFICANT CHANGE UP (ref 70–99)
HCT VFR BLD CALC: 34 % — LOW (ref 39–50)
HCT VFR BLD CALC: 34 % — LOW (ref 39–50)
HGB BLD-MCNC: 10.5 G/DL — LOW (ref 13–17)
HGB BLD-MCNC: 10.5 G/DL — LOW (ref 13–17)
MAGNESIUM SERPL-MCNC: 2.5 MG/DL — SIGNIFICANT CHANGE UP (ref 1.6–2.6)
MAGNESIUM SERPL-MCNC: 2.5 MG/DL — SIGNIFICANT CHANGE UP (ref 1.6–2.6)
MCHC RBC-ENTMCNC: 29.2 PG — SIGNIFICANT CHANGE UP (ref 27–34)
MCHC RBC-ENTMCNC: 29.2 PG — SIGNIFICANT CHANGE UP (ref 27–34)
MCHC RBC-ENTMCNC: 30.9 GM/DL — LOW (ref 32–36)
MCHC RBC-ENTMCNC: 30.9 GM/DL — LOW (ref 32–36)
MCV RBC AUTO: 94.4 FL — SIGNIFICANT CHANGE UP (ref 80–100)
MCV RBC AUTO: 94.4 FL — SIGNIFICANT CHANGE UP (ref 80–100)
NRBC # BLD: 0 /100 WBCS — SIGNIFICANT CHANGE UP (ref 0–0)
NRBC # BLD: 0 /100 WBCS — SIGNIFICANT CHANGE UP (ref 0–0)
NRBC # FLD: 0 K/UL — SIGNIFICANT CHANGE UP (ref 0–0)
NRBC # FLD: 0 K/UL — SIGNIFICANT CHANGE UP (ref 0–0)
PHOSPHATE SERPL-MCNC: 3.3 MG/DL — SIGNIFICANT CHANGE UP (ref 2.5–4.5)
PHOSPHATE SERPL-MCNC: 3.3 MG/DL — SIGNIFICANT CHANGE UP (ref 2.5–4.5)
PLATELET # BLD AUTO: 162 K/UL — SIGNIFICANT CHANGE UP (ref 150–400)
PLATELET # BLD AUTO: 162 K/UL — SIGNIFICANT CHANGE UP (ref 150–400)
POTASSIUM SERPL-MCNC: 3.9 MMOL/L — SIGNIFICANT CHANGE UP (ref 3.5–5.3)
POTASSIUM SERPL-MCNC: 3.9 MMOL/L — SIGNIFICANT CHANGE UP (ref 3.5–5.3)
POTASSIUM SERPL-SCNC: 3.9 MMOL/L — SIGNIFICANT CHANGE UP (ref 3.5–5.3)
POTASSIUM SERPL-SCNC: 3.9 MMOL/L — SIGNIFICANT CHANGE UP (ref 3.5–5.3)
RBC # BLD: 3.6 M/UL — LOW (ref 4.2–5.8)
RBC # BLD: 3.6 M/UL — LOW (ref 4.2–5.8)
RBC # FLD: 19.8 % — HIGH (ref 10.3–14.5)
RBC # FLD: 19.8 % — HIGH (ref 10.3–14.5)
SODIUM SERPL-SCNC: 145 MMOL/L — SIGNIFICANT CHANGE UP (ref 135–145)
SODIUM SERPL-SCNC: 145 MMOL/L — SIGNIFICANT CHANGE UP (ref 135–145)
WBC # BLD: 6.39 K/UL — SIGNIFICANT CHANGE UP (ref 3.8–10.5)
WBC # BLD: 6.39 K/UL — SIGNIFICANT CHANGE UP (ref 3.8–10.5)
WBC # FLD AUTO: 6.39 K/UL — SIGNIFICANT CHANGE UP (ref 3.8–10.5)
WBC # FLD AUTO: 6.39 K/UL — SIGNIFICANT CHANGE UP (ref 3.8–10.5)

## 2023-11-17 PROCEDURE — 99239 HOSP IP/OBS DSCHRG MGMT >30: CPT

## 2023-11-17 RX ORDER — BUMETANIDE 0.25 MG/ML
1 INJECTION INTRAMUSCULAR; INTRAVENOUS
Refills: 0 | DISCHARGE

## 2023-11-17 RX ORDER — ACETAMINOPHEN 500 MG
2 TABLET ORAL
Qty: 0 | Refills: 0 | DISCHARGE
Start: 2023-11-17

## 2023-11-17 RX ORDER — SODIUM CHLORIDE 0.65 %
2 AEROSOL, SPRAY (ML) NASAL
Qty: 1 | Refills: 0
Start: 2023-11-17

## 2023-11-17 RX ORDER — TADALAFIL 10 MG/1
2 TABLET, FILM COATED ORAL
Refills: 0 | DISCHARGE

## 2023-11-17 RX ORDER — IPRATROPIUM/ALBUTEROL SULFATE 18-103MCG
3 AEROSOL WITH ADAPTER (GRAM) INHALATION
Qty: 120 | Refills: 0
Start: 2023-11-17 | End: 2023-12-16

## 2023-11-17 RX ORDER — ASPIRIN/CALCIUM CARB/MAGNESIUM 324 MG
1 TABLET ORAL
Qty: 0 | Refills: 0 | DISCHARGE
Start: 2023-11-17

## 2023-11-17 RX ORDER — POLYETHYLENE GLYCOL 3350 17 G/17G
17 POWDER, FOR SOLUTION ORAL
Qty: 0 | Refills: 0 | DISCHARGE
Start: 2023-11-17

## 2023-11-17 RX ORDER — ALBUTEROL 90 UG/1
2 AEROSOL, METERED ORAL
Qty: 1 | Refills: 0
Start: 2023-11-17

## 2023-11-17 RX ADMIN — Medication 3 MILLILITER(S): at 15:21

## 2023-11-17 RX ADMIN — Medication 1 DROP(S): at 11:34

## 2023-11-17 RX ADMIN — Medication 3 MILLILITER(S): at 09:51

## 2023-11-17 RX ADMIN — Medication 500 MILLIGRAM(S): at 11:31

## 2023-11-17 RX ADMIN — POLYETHYLENE GLYCOL 3350 17 GRAM(S): 17 POWDER, FOR SOLUTION ORAL at 11:31

## 2023-11-17 RX ADMIN — CARBIDOPA AND LEVODOPA 1.5 TABLET(S): 25; 100 TABLET ORAL at 20:54

## 2023-11-17 RX ADMIN — APIXABAN 2.5 MILLIGRAM(S): 2.5 TABLET, FILM COATED ORAL at 05:26

## 2023-11-17 RX ADMIN — Medication 1000 UNIT(S): at 11:30

## 2023-11-17 RX ADMIN — Medication 81 MILLIGRAM(S): at 11:30

## 2023-11-17 RX ADMIN — CARBIDOPA AND LEVODOPA 1.5 TABLET(S): 25; 100 TABLET ORAL at 16:52

## 2023-11-17 RX ADMIN — CLOPIDOGREL BISULFATE 75 MILLIGRAM(S): 75 TABLET, FILM COATED ORAL at 11:29

## 2023-11-17 RX ADMIN — APIXABAN 2.5 MILLIGRAM(S): 2.5 TABLET, FILM COATED ORAL at 17:39

## 2023-11-17 RX ADMIN — CARBIDOPA AND LEVODOPA 1.5 TABLET(S): 25; 100 TABLET ORAL at 08:09

## 2023-11-17 RX ADMIN — CARBIDOPA AND LEVODOPA 1.5 TABLET(S): 25; 100 TABLET ORAL at 11:30

## 2023-11-17 RX ADMIN — Medication 3 MILLILITER(S): at 04:53

## 2023-11-17 NOTE — PROVIDER CONTACT NOTE (OTHER) - SITUATION
physical therapy states patient has left sided weakness
Pt BP is low at 107/47. HR 63
PTT label was not generated. Provider notified regarding PTT order. Reordered PTT lab and bill and sent.
as per family pt only drains 500ml with home nurse 3x weekly.
serosanguineous drainage from pleurx cath
Pt admitted to 7N from ED. Pt BP low at 90/60. HR 71
Rapid response called for BP 71/39
BP 86/39

## 2023-11-17 NOTE — PROVIDER CONTACT NOTE (OTHER) - NAME OF MD/NP/PA/DO NOTIFIED:
Onel Kearney
Yamileth Andino ACP
Gucci Chnad
Mervin Stephens
Yamileth Andino ACP
hermila macdonald
Onel Kearney
acp mragoth arellano

## 2023-11-17 NOTE — PROVIDER CONTACT NOTE (OTHER) - DATE AND TIME:
13-Nov-2023 06:06
16-Nov-2023 16:50
12-Nov-2023 09:48
11-Nov-2023 23:02
12-Nov-2023 05:05
15-Nov-2023 17:00
12-Nov-2023 14:45
17-Nov-2023 07:57

## 2023-11-17 NOTE — CHART NOTE - NSCHARTNOTEFT_GEN_A_CORE
RRT activated for AMS. Per RN, patient took his night meds and went to sleep. he then started to scream and move around in his sleep and would not wake up. As per aide at bedside, patient with hx of acting out his dreams, but this episode is not like that and never lasts this long. Patient appears to be in deep sleep, and swinging his arms around. When staff attempts to wake him up, he would not wake up. He is protecting his airway. Vitals stable. He also appears to move all extremities. During previous shift, patient evaluated by PT and noted to have L sided weakness > R side. Patient had CTH done this evening. Radiology contacted for prelim and stated no acute hemorrhage. Follow official CTH. It was also noted that patient's remeron and zyprexa were restarted on 11/15 and 11/14 respectively. RRT activated for AMS. Per RN, patient took his night meds and went to sleep. He then started to scream and move around in his sleep and would not wake up about 30 minutes-1 hour later. As per aide at bedside, patient with hx of acting out his dreams, but this episode is not like that and never lasts this long. Patient appears to be in deep sleep, and swinging his arms around. When staff attempts to wake him up, he would not wake up.  He is protecting his airway. Vitals stable. He also appears to move all extremities. No gross focal deficits noted. During previous shift, patient evaluated by PT and noted to have L sided weakness > R side. Patient had CTH done this evening. Radiology contacted for prelim and stated no acute hemorrhage. Follow official CTH. It was also noted that patient's Remeron and Zyprexa were restarted on 11/15 and 11/14 respectively, but at higher doses from home regimen. Patient tolerated the doses the previous nights without events. RRT ended without further intervention and that his symptoms most likely secondary to mirtazapine and Zyprexa. Patient then woke up and was back to baseline few minutes after RRT ended. patient able to state his name and SOB. He is moving all extremities, but would not participate for neuro exam. No gross focal deficits noted. Will hold zyprexa and mirtazpine for now. Discussed with Dr. Ocampo. Will clarify with home doses with family in AM. RRT activated for AMS. Per RN, patient took his night meds and went to sleep. He then started to scream and move around in his sleep and would not wake up about 30 minutes-1 hour later. As per aide at bedside, patient with hx of acting out his dreams, but this episode is not like that and never lasts this long. Patient appears to be in deep sleep, and swinging his arms around. When staff attempts to wake him up, he would not wake up.  He is protecting his airway. Vitals stable. He also appears to move all extremities. No gross focal deficits noted. During previous shift, patient evaluated by PT and noted to have L sided weakness > R side. Patient had CTH done this evening. Radiology contacted for prelim and stated no acute hemorrhage. Follow official CTH. It was also noted that patient's Remeron and Zyprexa were restarted on 11/15 and  respectively, but at higher doses from home regimen. Patient tolerated the doses the previous nights without events. RRT ended without further intervention and that his symptoms most likely secondary to mirtazapine and Zyprexa. Patient then woke up and was back to baseline few minutes after RRT ended. patient able to state his name and . He is moving all extremities, but would not participate for neuro exam. No gross focal deficits noted. Patient also appears comfortable and no complaints. Will hold zyprexa and mirtazpine for now. Discussed with Dr. Ocampo. Will clarify with home doses with family in AM.

## 2023-11-17 NOTE — RAPID RESPONSE TEAM SUMMARY - NSREASONFORCALLINGRRT_GEN_ALL_CORE
Acute mental status changes
Acute mental status changes/Hypotension
Acute mental status changes/Hypotension

## 2023-11-17 NOTE — PROVIDER CONTACT NOTE (OTHER) - RECOMMENDATIONS
Provider aware
provider made aware
Provider made aware. No new orders at this time
provider notified

## 2023-11-17 NOTE — PROVIDER CONTACT NOTE (OTHER) - REASON
pt had serosanguineous drainage from pleurx cath. drained 450cc. aide at bedside it's usually tan/yellow in color.
Low BP
Low BP
PTT
Rapid response called for BP 71/39
daughter at bedside states patient has left sided weakness
BP 86/39
plurex drain 500

## 2023-11-17 NOTE — RAPID RESPONSE TEAM SUMMARY - NSSITUATIONBACKGROUNDRRT_GEN_ALL_CORE
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 Called for AMS. Pt was somnolent, but actively dreaming out and intermittently aware. Pt bp 130s systolic, afebrile, 100% RA, HR 60-70. On exam, lungs clear, rectal afebrile, and respond to painful stimuli. Sepsis is less likely. Review of medication revealed zyprexa increased from 2.5 to 10 mg and mirtazepine from 7.5 to 15 mg. This AMS and somnolent state 2/2 medication induced. Pt started to waken up, and rapid ended. No medications, monitor continuous pulse ox.

## 2023-11-17 NOTE — DISCHARGE NOTE NURSING/CASE MANAGEMENT/SOCIAL WORK - PATIENT PORTAL LINK FT
You can access the FollowMyHealth Patient Portal offered by [facility] by registering at the following website: [email or web address]. By joining [facility]’s FollowMyHealth portal, you will also be able to view your health information using other applications (apps) compatible with our system.

## 2023-11-17 NOTE — DISCHARGE NOTE NURSING/CASE MANAGEMENT/SOCIAL WORK - NSDCPEFALRISK_GEN_ALL_CORE
For information on Fall & Injury Prevention, visit: [email or web address] OR  [email or web address] OR  https://www.cdc.gov/steadi/patient.html

## 2023-11-17 NOTE — PROVIDER CONTACT NOTE (OTHER) - ACTION/TREATMENT ORDERED:
provider made aware.
ACP made aware- no new orders at this time
PTT label was not generated. Provider notified regarding PTT order and awaiting response. Manually reordered PTT lab and bill and sent blood work
ACP made aware- no new orders
provider notified
Provider made aware. No new orders at this time
provider aware

## 2023-11-17 NOTE — PROVIDER CONTACT NOTE (OTHER) - ASSESSMENT
Patient is asymptomatic and resting in bed with no complaints of chest pain, SOB or any signs of distress
A&O2 lethargic
A&O 2 asymptomatic
as per family pt only drains 500ml with home nurse 3x weekly.  500 drained. see flowsheet.
A&Ox1. pt had serosanguineous drainage from pleurx cath. drained 450cc. aide at bedside it's usually tan/yellow in color. BP: 101/54. HR60. T:98.9 oral. O2: 100%. Not in distress
Pt is asymptomatic resting in bed with no complaints of pain, SOB or chest pain.

## 2023-11-18 ENCOUNTER — TRANSCRIPTION ENCOUNTER (OUTPATIENT)
Age: 83
End: 2023-11-18

## 2023-11-20 ENCOUNTER — APPOINTMENT (OUTPATIENT)
Dept: PULMONOLOGY | Facility: CLINIC | Age: 83
End: 2023-11-20
Payer: MEDICARE

## 2023-11-20 PROCEDURE — 99213 OFFICE O/P EST LOW 20 MIN: CPT | Mod: 95

## 2023-11-21 ENCOUNTER — APPOINTMENT (OUTPATIENT)
Dept: CARDIOLOGY | Facility: CLINIC | Age: 83
End: 2023-11-21
Payer: MEDICARE

## 2023-11-21 PROCEDURE — 99215 OFFICE O/P EST HI 40 MIN: CPT | Mod: 95

## 2023-11-22 ENCOUNTER — TRANSCRIPTION ENCOUNTER (OUTPATIENT)
Age: 83
End: 2023-11-22

## 2023-11-24 ENCOUNTER — NON-APPOINTMENT (OUTPATIENT)
Age: 83
End: 2023-11-24

## 2023-11-24 ENCOUNTER — EMERGENCY (EMERGENCY)
Facility: HOSPITAL | Age: 83
LOS: 1 days | Discharge: HOME | End: 2023-11-24
Attending: EMERGENCY MEDICINE | Admitting: EMERGENCY MEDICINE
Payer: MEDICARE

## 2023-11-24 VITALS
SYSTOLIC BLOOD PRESSURE: 96 MMHG | OXYGEN SATURATION: 100 % | HEART RATE: 62 BPM | RESPIRATION RATE: 17 BRPM | DIASTOLIC BLOOD PRESSURE: 51 MMHG

## 2023-11-24 VITALS
OXYGEN SATURATION: 100 % | TEMPERATURE: 98 F | HEART RATE: 62 BPM | DIASTOLIC BLOOD PRESSURE: 38 MMHG | SYSTOLIC BLOOD PRESSURE: 90 MMHG | RESPIRATION RATE: 16 BRPM | HEIGHT: 68 IN

## 2023-11-24 DIAGNOSIS — Z98.61 CORONARY ANGIOPLASTY STATUS: Chronic | ICD-10-CM

## 2023-11-24 DIAGNOSIS — Z96.649 PRESENCE OF UNSPECIFIED ARTIFICIAL HIP JOINT: Chronic | ICD-10-CM

## 2023-11-24 DIAGNOSIS — Z95.0 PRESENCE OF CARDIAC PACEMAKER: Chronic | ICD-10-CM

## 2023-11-24 DIAGNOSIS — Z98.49 CATARACT EXTRACTION STATUS, UNSPECIFIED EYE: Chronic | ICD-10-CM

## 2023-11-24 LAB
ALBUMIN SERPL ELPH-MCNC: 2.8 G/DL — LOW (ref 3.3–5)
ALBUMIN SERPL ELPH-MCNC: 2.8 G/DL — LOW (ref 3.3–5)
ALP SERPL-CCNC: 67 U/L — SIGNIFICANT CHANGE UP (ref 40–120)
ALP SERPL-CCNC: 67 U/L — SIGNIFICANT CHANGE UP (ref 40–120)
ALT FLD-CCNC: 9 U/L — SIGNIFICANT CHANGE UP (ref 4–41)
ALT FLD-CCNC: 9 U/L — SIGNIFICANT CHANGE UP (ref 4–41)
ANION GAP SERPL CALC-SCNC: 9 MMOL/L — SIGNIFICANT CHANGE UP (ref 7–14)
ANION GAP SERPL CALC-SCNC: 9 MMOL/L — SIGNIFICANT CHANGE UP (ref 7–14)
APTT BLD: 39.1 SEC — HIGH (ref 24.5–35.6)
APTT BLD: 39.1 SEC — HIGH (ref 24.5–35.6)
AST SERPL-CCNC: 23 U/L — SIGNIFICANT CHANGE UP (ref 4–40)
AST SERPL-CCNC: 23 U/L — SIGNIFICANT CHANGE UP (ref 4–40)
BASE EXCESS BLDV CALC-SCNC: -1 MMOL/L — SIGNIFICANT CHANGE UP (ref -2–3)
BASE EXCESS BLDV CALC-SCNC: -1 MMOL/L — SIGNIFICANT CHANGE UP (ref -2–3)
BASOPHILS # BLD AUTO: 0.04 K/UL — SIGNIFICANT CHANGE UP (ref 0–0.2)
BASOPHILS # BLD AUTO: 0.04 K/UL — SIGNIFICANT CHANGE UP (ref 0–0.2)
BASOPHILS NFR BLD AUTO: 0.4 % — SIGNIFICANT CHANGE UP (ref 0–2)
BASOPHILS NFR BLD AUTO: 0.4 % — SIGNIFICANT CHANGE UP (ref 0–2)
BILIRUB SERPL-MCNC: 0.2 MG/DL — SIGNIFICANT CHANGE UP (ref 0.2–1.2)
BILIRUB SERPL-MCNC: 0.2 MG/DL — SIGNIFICANT CHANGE UP (ref 0.2–1.2)
BLD GP AB SCN SERPL QL: NEGATIVE — SIGNIFICANT CHANGE UP
BLD GP AB SCN SERPL QL: NEGATIVE — SIGNIFICANT CHANGE UP
BLOOD GAS VENOUS COMPREHENSIVE RESULT: SIGNIFICANT CHANGE UP
BLOOD GAS VENOUS COMPREHENSIVE RESULT: SIGNIFICANT CHANGE UP
BUN SERPL-MCNC: 38 MG/DL — HIGH (ref 7–23)
BUN SERPL-MCNC: 38 MG/DL — HIGH (ref 7–23)
CALCIUM SERPL-MCNC: 8.8 MG/DL — SIGNIFICANT CHANGE UP (ref 8.4–10.5)
CALCIUM SERPL-MCNC: 8.8 MG/DL — SIGNIFICANT CHANGE UP (ref 8.4–10.5)
CHLORIDE BLDV-SCNC: 108 MMOL/L — SIGNIFICANT CHANGE UP (ref 96–108)
CHLORIDE BLDV-SCNC: 108 MMOL/L — SIGNIFICANT CHANGE UP (ref 96–108)
CHLORIDE SERPL-SCNC: 108 MMOL/L — HIGH (ref 98–107)
CHLORIDE SERPL-SCNC: 108 MMOL/L — HIGH (ref 98–107)
CO2 BLDV-SCNC: 27.3 MMOL/L — HIGH (ref 22–26)
CO2 BLDV-SCNC: 27.3 MMOL/L — HIGH (ref 22–26)
CO2 SERPL-SCNC: 24 MMOL/L — SIGNIFICANT CHANGE UP (ref 22–31)
CO2 SERPL-SCNC: 24 MMOL/L — SIGNIFICANT CHANGE UP (ref 22–31)
CREAT SERPL-MCNC: 2.25 MG/DL — HIGH (ref 0.5–1.3)
CREAT SERPL-MCNC: 2.25 MG/DL — HIGH (ref 0.5–1.3)
EGFR: 29 ML/MIN/1.73M2 — LOW
EGFR: 29 ML/MIN/1.73M2 — LOW
EOSINOPHIL # BLD AUTO: 0.1 K/UL — SIGNIFICANT CHANGE UP (ref 0–0.5)
EOSINOPHIL # BLD AUTO: 0.1 K/UL — SIGNIFICANT CHANGE UP (ref 0–0.5)
EOSINOPHIL NFR BLD AUTO: 1 % — SIGNIFICANT CHANGE UP (ref 0–6)
EOSINOPHIL NFR BLD AUTO: 1 % — SIGNIFICANT CHANGE UP (ref 0–6)
GAS PNL BLDV: 135 MMOL/L — LOW (ref 136–145)
GAS PNL BLDV: 135 MMOL/L — LOW (ref 136–145)
GLUCOSE BLDV-MCNC: 92 MG/DL — SIGNIFICANT CHANGE UP (ref 70–99)
GLUCOSE BLDV-MCNC: 92 MG/DL — SIGNIFICANT CHANGE UP (ref 70–99)
GLUCOSE SERPL-MCNC: 94 MG/DL — SIGNIFICANT CHANGE UP (ref 70–99)
GLUCOSE SERPL-MCNC: 94 MG/DL — SIGNIFICANT CHANGE UP (ref 70–99)
HCO3 BLDV-SCNC: 26 MMOL/L — SIGNIFICANT CHANGE UP (ref 22–29)
HCO3 BLDV-SCNC: 26 MMOL/L — SIGNIFICANT CHANGE UP (ref 22–29)
HCT VFR BLD CALC: 35.1 % — LOW (ref 39–50)
HCT VFR BLD CALC: 35.1 % — LOW (ref 39–50)
HCT VFR BLDA CALC: 32 % — LOW (ref 39–51)
HCT VFR BLDA CALC: 32 % — LOW (ref 39–51)
HGB BLD CALC-MCNC: 10.6 G/DL — LOW (ref 12.6–17.4)
HGB BLD CALC-MCNC: 10.6 G/DL — LOW (ref 12.6–17.4)
HGB BLD-MCNC: 10.6 G/DL — LOW (ref 13–17)
HGB BLD-MCNC: 10.6 G/DL — LOW (ref 13–17)
IANC: 7.89 K/UL — HIGH (ref 1.8–7.4)
IANC: 7.89 K/UL — HIGH (ref 1.8–7.4)
IMM GRANULOCYTES NFR BLD AUTO: 0.5 % — SIGNIFICANT CHANGE UP (ref 0–0.9)
IMM GRANULOCYTES NFR BLD AUTO: 0.5 % — SIGNIFICANT CHANGE UP (ref 0–0.9)
INR BLD: 1.37 RATIO — HIGH (ref 0.85–1.18)
INR BLD: 1.37 RATIO — HIGH (ref 0.85–1.18)
LACTATE BLDV-MCNC: 1.8 MMOL/L — SIGNIFICANT CHANGE UP (ref 0.5–2)
LACTATE BLDV-MCNC: 1.8 MMOL/L — SIGNIFICANT CHANGE UP (ref 0.5–2)
LYMPHOCYTES # BLD AUTO: 0.94 K/UL — LOW (ref 1–3.3)
LYMPHOCYTES # BLD AUTO: 0.94 K/UL — LOW (ref 1–3.3)
LYMPHOCYTES # BLD AUTO: 9.4 % — LOW (ref 13–44)
LYMPHOCYTES # BLD AUTO: 9.4 % — LOW (ref 13–44)
MCHC RBC-ENTMCNC: 29.4 PG — SIGNIFICANT CHANGE UP (ref 27–34)
MCHC RBC-ENTMCNC: 29.4 PG — SIGNIFICANT CHANGE UP (ref 27–34)
MCHC RBC-ENTMCNC: 30.2 GM/DL — LOW (ref 32–36)
MCHC RBC-ENTMCNC: 30.2 GM/DL — LOW (ref 32–36)
MCV RBC AUTO: 97.2 FL — SIGNIFICANT CHANGE UP (ref 80–100)
MCV RBC AUTO: 97.2 FL — SIGNIFICANT CHANGE UP (ref 80–100)
MONOCYTES # BLD AUTO: 0.96 K/UL — HIGH (ref 0–0.9)
MONOCYTES # BLD AUTO: 0.96 K/UL — HIGH (ref 0–0.9)
MONOCYTES NFR BLD AUTO: 9.6 % — SIGNIFICANT CHANGE UP (ref 2–14)
MONOCYTES NFR BLD AUTO: 9.6 % — SIGNIFICANT CHANGE UP (ref 2–14)
NEUTROPHILS # BLD AUTO: 7.89 K/UL — HIGH (ref 1.8–7.4)
NEUTROPHILS # BLD AUTO: 7.89 K/UL — HIGH (ref 1.8–7.4)
NEUTROPHILS NFR BLD AUTO: 79.1 % — HIGH (ref 43–77)
NEUTROPHILS NFR BLD AUTO: 79.1 % — HIGH (ref 43–77)
NRBC # BLD: 0 /100 WBCS — SIGNIFICANT CHANGE UP (ref 0–0)
NRBC # BLD: 0 /100 WBCS — SIGNIFICANT CHANGE UP (ref 0–0)
NRBC # FLD: 0 K/UL — SIGNIFICANT CHANGE UP (ref 0–0)
NRBC # FLD: 0 K/UL — SIGNIFICANT CHANGE UP (ref 0–0)
PCO2 BLDV: 51 MMHG — SIGNIFICANT CHANGE UP (ref 42–55)
PCO2 BLDV: 51 MMHG — SIGNIFICANT CHANGE UP (ref 42–55)
PH BLDV: 7.31 — LOW (ref 7.32–7.43)
PH BLDV: 7.31 — LOW (ref 7.32–7.43)
PLATELET # BLD AUTO: 216 K/UL — SIGNIFICANT CHANGE UP (ref 150–400)
PLATELET # BLD AUTO: 216 K/UL — SIGNIFICANT CHANGE UP (ref 150–400)
PO2 BLDV: 41 MMHG — SIGNIFICANT CHANGE UP (ref 25–45)
PO2 BLDV: 41 MMHG — SIGNIFICANT CHANGE UP (ref 25–45)
POTASSIUM BLDV-SCNC: 5.8 MMOL/L — HIGH (ref 3.5–5.1)
POTASSIUM BLDV-SCNC: 5.8 MMOL/L — HIGH (ref 3.5–5.1)
POTASSIUM SERPL-MCNC: 5.2 MMOL/L — SIGNIFICANT CHANGE UP (ref 3.5–5.3)
POTASSIUM SERPL-MCNC: 5.2 MMOL/L — SIGNIFICANT CHANGE UP (ref 3.5–5.3)
POTASSIUM SERPL-SCNC: 5.2 MMOL/L — SIGNIFICANT CHANGE UP (ref 3.5–5.3)
POTASSIUM SERPL-SCNC: 5.2 MMOL/L — SIGNIFICANT CHANGE UP (ref 3.5–5.3)
PROT SERPL-MCNC: 5.6 G/DL — LOW (ref 6–8.3)
PROT SERPL-MCNC: 5.6 G/DL — LOW (ref 6–8.3)
PROTHROM AB SERPL-ACNC: 15.3 SEC — HIGH (ref 9.5–13)
PROTHROM AB SERPL-ACNC: 15.3 SEC — HIGH (ref 9.5–13)
RBC # BLD: 3.61 M/UL — LOW (ref 4.2–5.8)
RBC # BLD: 3.61 M/UL — LOW (ref 4.2–5.8)
RBC # FLD: 19.8 % — HIGH (ref 10.3–14.5)
RBC # FLD: 19.8 % — HIGH (ref 10.3–14.5)
RH IG SCN BLD-IMP: NEGATIVE — SIGNIFICANT CHANGE UP
RH IG SCN BLD-IMP: NEGATIVE — SIGNIFICANT CHANGE UP
SAO2 % BLDV: 68.6 % — SIGNIFICANT CHANGE UP (ref 67–88)
SAO2 % BLDV: 68.6 % — SIGNIFICANT CHANGE UP (ref 67–88)
SODIUM SERPL-SCNC: 141 MMOL/L — SIGNIFICANT CHANGE UP (ref 135–145)
SODIUM SERPL-SCNC: 141 MMOL/L — SIGNIFICANT CHANGE UP (ref 135–145)
TROPONIN T, HIGH SENSITIVITY RESULT: 135 NG/L — CRITICAL HIGH
TROPONIN T, HIGH SENSITIVITY RESULT: 135 NG/L — CRITICAL HIGH
TROPONIN T, HIGH SENSITIVITY RESULT: 138 NG/L — CRITICAL HIGH
TROPONIN T, HIGH SENSITIVITY RESULT: 138 NG/L — CRITICAL HIGH
WBC # BLD: 9.98 K/UL — SIGNIFICANT CHANGE UP (ref 3.8–10.5)
WBC # BLD: 9.98 K/UL — SIGNIFICANT CHANGE UP (ref 3.8–10.5)
WBC # FLD AUTO: 9.98 K/UL — SIGNIFICANT CHANGE UP (ref 3.8–10.5)
WBC # FLD AUTO: 9.98 K/UL — SIGNIFICANT CHANGE UP (ref 3.8–10.5)

## 2023-11-24 PROCEDURE — 99284 EMERGENCY DEPT VISIT MOD MDM: CPT

## 2023-11-24 PROCEDURE — 71045 X-RAY EXAM CHEST 1 VIEW: CPT | Mod: 26

## 2023-11-24 PROCEDURE — 71250 CT THORAX DX C-: CPT | Mod: 26,MA

## 2023-11-24 PROCEDURE — 99284 EMERGENCY DEPT VISIT MOD MDM: CPT | Mod: GC

## 2023-11-24 NOTE — ED PROVIDER NOTE - ATTENDING CONTRIBUTION TO CARE
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  On this tensive chart review it is difficult to understand why exactly he has had a Pleurx for the last year and a half.  At best it seems like it may be secondary to heart failure.  The family is unclear as to why he also has a Pleurx.  They are describing that he has had over the last 3 days decreasing fluid from the Pleurx resulting with today there being no output at all.  Has a history of Parkinson's disease.    Patient awake and alert and able to communicate well.  He is afebrile.  Abdomen soft no rebound no guarding.    Concern is for clogged tube versus perhaps decreased fluid to be drained.  X-ray will be done.  Most likely CT surgery will also be involved.  Dr. Oneal has dealt with this in the past.    I performed a history and physical exam of the patient and discussed their management with the resident and /or advanced care provider. I reviewed the resident and /or ACP's note and agree with the documented findings and plan of care. My medical decison making and observations are found above.

## 2023-11-24 NOTE — ED ADULT TRIAGE NOTE - CHIEF COMPLAINT QUOTE
Pt AOX3-4, Hx of some Parkinson's dementia, alert and oriented at this time; sent by his pulmonologist for eval of Right chest tube draining a pleural effusion, family states it is clogged, pt denies pain at this time, breathing regular and unlabored, drainage has been decreasing x 3 days, today nothing  Hx of Parkinson's. hypotension

## 2023-11-24 NOTE — CONSULT NOTE ADULT - NS ATTEND AMEND GEN_ALL_CORE FT
Patient seen and examined agree with above note as modified, where appropriate, by me. pt with pleurX for right effusion. PleurX no longer draining. Pt saturation 100% on room air without distress. Ct chest shows small right effusion and moderate left effusion. Given pt asymptomatic and currently on Eliquis and Plavix will hold TPA for now. Pt to follow up with me office Monday with a repeat CXR. Patient seen and examined agree with above note as modified, where appropriate, by me. pt with PleurX for right effusion. PleurX no longer draining. Pt saturation 100% on room air without distress. Ct chest shows small right effusion and moderate left effusion. Given pt asymptomatic and currently on Eliquis and Plavix will hold TPA for now. Pt to follow up with me office Monday with a repeat CXR.

## 2023-11-24 NOTE — ED PROVIDER NOTE - NSFOLLOWUPINSTRUCTIONS_ED_ALL_ED_FT
You were seen in the ER today for reduced output from your chest tube.    You were seen by Dr. Oneal's team who recommend follow-up in clinic on Monday to be reevaluated.  Please hold your Plavix (clopidogrel) and Eliquis until that time.    If you develop new or worsening symptoms in the interim including chest pain, trouble breathing, fevers, or you are otherwise concerned, come back to the ER right away to be reevaluated.

## 2023-11-24 NOTE — ED PROVIDER NOTE - CLINICAL SUMMARY MEDICAL DECISION MAKING FREE TEXT BOX
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  On this tensive chart review it is difficult to understand why exactly he has had a Pleurx for the last year and a half.  At best it seems like it may be secondary to heart failure.  The family is unclear as to why he also has a Pleurx.  They are describing that he has had over the last 3 days decreasing fluid from the Pleurx resulting with today there being no output at all.  Has a history of Parkinson's disease.    Patient awake and alert and able to communicate well.  He is afebrile.  Abdomen soft no rebound no guarding.    Concern is for clogged tube versus perhaps decreased fluid to be drained.  X-ray will be done.  Most likely CT surgery will also be involved.  Dr. Oneal has dealt with this in the past.

## 2023-11-24 NOTE — ED PROVIDER NOTE - OBJECTIVE STATEMENT
82Y PMH Parkinson's disease, chronic R pleural effusion s/p pleurex presenting with concern for clogged chest tube. 82Y PMH Parkinson's disease, HTN, CAD, AFib, chronic R pleural effusion s/p pleurex presenting with concern for clogged chest tube. 82Y PMH Parkinson's disease, HTN, CAD, AFib, chronic R pleural effusion s/p pleurex presenting with concern for clogged chest tube. Wife and home health aide at bedside assisting with history. They report patient typically has ~500cc output from his chest tube daily. Over the last 2-3 days output has reduced and developed a pink tinge. Yesterday ~150cc drained, today no drainage. No fevers, chills, cough, chest pain, SOB reported.

## 2023-11-24 NOTE — CONSULT NOTE ADULT - SUBJECTIVE AND OBJECTIVE BOX
HPI: 82 M PMH Atrial fibrillation (on Eliquis CAD s/p PCI (on Plavix), bradycardia s/p PPM 2021, chronic R pleural effusion with PleurX catheter placed 2021, recent hospitalization 11/11/23-11/17/23 for pneumonia presents with no drainage from R Pleurx catheter. Pt states ~500 cc/day serous fluid drains from Pleurx, yesterday 150 cc, and today no drainage. Also fluid appears more "pink" tinged.  In the ED, pt resting comfortably on RA at 100% Spo2. CXR showing "small R pleural effusion slightly increased since 11/11/2023. Persistent moderate left pleural effusion." CT chest pending. Pt last took Eliquis and Plavix this AM. Denies chest pain, shortness of breath, nausea, vomiting, difficulty breathing.    PAST MEDICAL & SURGICAL HISTORY:  Hypertension  Hyperlipidemia  BPH (benign prostatic hyperplasia)  s/p laser nucleation 09/20  Atrial fibrillation  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  Parkinsons disease  CAD (coronary artery disease)  s/p PCI 08/21  Pleural effusion, not elsewhere classified  COVID-19 vaccine series completed  w booster  History of hip replacement, total  R hip 2008 & L hip  Status post cataract extraction  right eye cataract extraction with IOL 6/26/2015  Presence of cardiac pacemaker  10/2021  H/O coronary angioplasty  8/2021 1 stent inserted    REVIEW OF SYSTEMS  above    Allergic/Immunologic:	 No Anaphylaxis/ Intolerance/ Recent illnesses    MEDICATIONS  (STANDING):    MEDICATIONS  (PRN):    Allergies    No Known Allergies    Intolerances    SOCIAL HISTORY:    FAMILY HISTORY:  Family history of lung cancer (Mother)    Family history of esophageal cancer (Father)    FH: myocardial infarction (Grandparent)  unless noted, no significant family hx with Mother, Father, Siblings    Vital Signs Last 24 Hrs  T(C): 36.8 (24 Nov 2023 10:15), Max: 36.8 (24 Nov 2023 10:15)  T(F): 98.3 (24 Nov 2023 10:15), Max: 98.3 (24 Nov 2023 10:15)  HR: 62 (24 Nov 2023 11:06) (62 - 62)  BP: 96/51 (24 Nov 2023 11:06) (90/38 - 96/51)  BP(mean): --  RR: 17 (24 Nov 2023 11:06) (16 - 17)  SpO2: 100% (24 Nov 2023 11:06) (100% - 100%)    Parameters below as of 24 Nov 2023 11:06  Patient On (Oxygen Delivery Method): room air    General: NAD  Neurology: AO3,   Respiratory: Non-labored breathing on RA  R Pleurx catheter    LABS: pending    RADIOLOGY & ADDITIONAL STUDIES:    ACC: [id number] EXAM:  XR CHEST PORTABLE URGENT 1V   ORDERED BY: PAULINE GALINDO     PROCEDURE DATE:  11/24/2023      INTERPRETATION:  EXAMINATION: XR CHEST URGENT    CLINICAL INDICATION: clogged chest tube, eval effusion    TECHNIQUE: Single frontal, portable view of the chest was obtained.    COMPARISON: Chest x-ray 11/11/2023.    FINDINGS:  LINES/TUBES/SUPPORT DEVICES: Left chest wall pacemaker. Right basilar   Pleurx catheter.    LUNGS/PLEURA: Small right pleural effusion, slightly increased since   11/11/2023. Persistent moderate left pleural effusion. No pneumothorax.    HEART AND MEDIASTINUM: Cardiomegaly.    BONES: No acute bony abnormality.    IMPRESSION:  Small right pleural effusion, slightly increased since 11/11/2023.   Persistent moderate left pleural effusion.    --- End of Report ---    TIERRA LEE MD; Resident Radiologist  This document has been electronically signed.  BERHANE KEBEDE MD; Attending Radiologist  This document has been electronically signed. Nov 24 2023 12:24PM      ASSESSMENT:   82 M PMH Atrial fibrillation (on Eliquis CAD s/p PCI (on Plavix), bradycardia s/p PPM 2021, chronic R pleural effusion with PleurX catheter placed 2021, recent hospitalization 11/11/23-11/17/23 for pneumonia presents with no drainage from R Pleurx catheter. Pt last took Eliquis and Plavix on 11/24 in the AM.     PLAN:  f/u CT chest pending  Above discussed with Dr. Oneal  HPI: 82 M PMH Atrial fibrillation (on Eliquis CAD s/p PCI (on Plavix), bradycardia s/p PPM 2021, chronic R pleural effusion with PleurX catheter placed 2021, recent hospitalization 11/11/23-11/17/23 for pneumonia presents with no drainage from R Pleurx catheter. Pt states ~500 cc/day serous fluid drains from Pleurx, yesterday 150 cc, and today no drainage. Also fluid appears more "pink" tinged.  In the ED, pt resting comfortably on RA at 100% Spo2. CXR showing "small R pleural effusion slightly increased since 11/11/2023. Persistent moderate left pleural effusion." CT chest pending. Pt last took Eliquis and Plavix this AM. Denies chest pain, shortness of breath, nausea, vomiting, difficulty breathing.    PAST MEDICAL & SURGICAL HISTORY:  Hypertension  Hyperlipidemia  BPH (benign prostatic hyperplasia)  s/p laser nucleation 09/20  Atrial fibrillation  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  Parkinsons disease  CAD (coronary artery disease)  s/p PCI 08/21  Pleural effusion, not elsewhere classified  COVID-19 vaccine series completed  w booster  History of hip replacement, total  R hip 2008 & L hip  Status post cataract extraction  right eye cataract extraction with IOL 6/26/2015  Presence of cardiac pacemaker  10/2021  H/O coronary angioplasty  8/2021 1 stent inserted    REVIEW OF SYSTEMS  above    Allergic/Immunologic:	 No Anaphylaxis/ Intolerance/ Recent illnesses    MEDICATIONS  (STANDING):    MEDICATIONS  (PRN):    Allergies    No Known Allergies    Intolerances    SOCIAL HISTORY:    FAMILY HISTORY:  Family history of lung cancer (Mother)    Family history of esophageal cancer (Father)    FH: myocardial infarction (Grandparent)  unless noted, no significant family hx with Mother, Father, Siblings    Vital Signs Last 24 Hrs  T(C): 36.8 (24 Nov 2023 10:15), Max: 36.8 (24 Nov 2023 10:15)  T(F): 98.3 (24 Nov 2023 10:15), Max: 98.3 (24 Nov 2023 10:15)  HR: 62 (24 Nov 2023 11:06) (62 - 62)  BP: 96/51 (24 Nov 2023 11:06) (90/38 - 96/51)  BP(mean): --  RR: 17 (24 Nov 2023 11:06) (16 - 17)  SpO2: 100% (24 Nov 2023 11:06) (100% - 100%)    Parameters below as of 24 Nov 2023 11:06  Patient On (Oxygen Delivery Method): room air    General: NAD  Neurology: AO3,   Respiratory: Non-labored breathing on RA  R Pleurx catheter    LABS: pending    RADIOLOGY & ADDITIONAL STUDIES:    ACC: [id number] EXAM:  XR CHEST PORTABLE URGENT 1V   ORDERED BY: PAULINE GALINDO     PROCEDURE DATE:  11/24/2023      INTERPRETATION:  EXAMINATION: XR CHEST URGENT    CLINICAL INDICATION: clogged chest tube, eval effusion    TECHNIQUE: Single frontal, portable view of the chest was obtained.    COMPARISON: Chest x-ray 11/11/2023.    FINDINGS:  LINES/TUBES/SUPPORT DEVICES: Left chest wall pacemaker. Right basilar   Pleurx catheter.    LUNGS/PLEURA: Small right pleural effusion, slightly increased since   11/11/2023. Persistent moderate left pleural effusion. No pneumothorax.    HEART AND MEDIASTINUM: Cardiomegaly.    BONES: No acute bony abnormality.    IMPRESSION:  Small right pleural effusion, slightly increased since 11/11/2023.   Persistent moderate left pleural effusion.    --- End of Report ---    TIERRA LEE MD; Resident Radiologist  This document has been electronically signed.  BERHANE KEBEDE MD; Attending Radiologist  This document has been electronically signed. Nov 24 2023 12:24PM      ASSESSMENT:   82 M PMH Atrial fibrillation (on Eliquis CAD s/p PCI (on Plavix), bradycardia s/p PPM 2021, chronic R pleural effusion with PleurX catheter placed 2021, recent hospitalization 11/11/23-11/17/23 for pneumonia presents with no drainage from R Pleurx catheter. Pt last took Eliquis and Plavix on 11/24 in the AM.     PLAN:  -CT chest reviewed by Dr. Oneal  -Hold Eliquis this weekend, continue Plavix  -Please call Dr. Oneal's office to schedule a follow-up appointment for this Monday 11/27 for CXR and possible intervention  -Pt seen and above discussed with Dr. Oneal

## 2023-11-24 NOTE — ED PROVIDER NOTE - PROGRESS NOTE DETAILS
Evie Pro MD PGY-2: CT surgery consulted and will see patient in ED Evie Pro MD PGY-2: CT surgery evaluated patient, recommend dc with clinic follow up on Monday. Patient is to hold AC until that time. Patient, family and aide expressed understanding of dc instructions and f/u plan. He was discharged home in stable condition.

## 2023-11-24 NOTE — ED PROVIDER NOTE - PHYSICAL EXAMINATION
GENERAL: Awake, alert, NAD, appears chronically ill  HEAD: NC/AT, neck supple, moist mucous membranes  EYES: PERRL, EOM grossly intact, sclera anicteric  LUNGS: normal WOB on RA, reduced breath sounds bilaterally  CARDIAC: RRR, no m/r/g  ABDOMEN: Soft, non tender, non distended, no rebound, no guarding  EXT: No edema, no calf tenderness, no deformities.  NEURO: A&Ox3. Moving all extremities.  SKIN: Warm and dry. No rash.  PSYCH: Normal affect. GENERAL: Awake, alert, NAD, appears chronically ill  HEAD: NC/AT, neck supple, moist mucous membranes  EYES: PERRL, EOM grossly intact, sclera anicteric  LUNGS: normal WOB on RA, reduced breath sounds bilaterally  L chest tube present, site well appearing, noted to have small amount of blood in catheter  CARDIAC: RRR, no m/r/g  ABDOMEN: Soft, non tender, non distended, no rebound, no guarding  EXT: No edema, no calf tenderness, no deformities.  NEURO: A&Ox3. Moving all extremities.  SKIN: Warm and dry. No rash.  PSYCH: Normal affect.

## 2023-11-24 NOTE — ED ADULT NURSE NOTE - OBJECTIVE STATEMENT
Received patient in room 11 c/o clogged right chest tube, sent by pulmonologist. PMHX Parkinson's dementia. Patient denies SOB, chest pain, fever. Patient is on cardiac monitor paced rhythm w/O2 sat 100% on a room air, BP 96/51 Dr. Wolf and Dr. Pro made aware and at bedside for evaluation. Patient is A&OX3, airway patent, breathing unlabored and even, radial pulses palpable, abdomen soft, nontender, pacemaker on left chest. Side rails up and safety maintained. Fall precaution in place. Call bells within reach. Family at bedside. Received patient in room 11 c/o clogged right chest tube, sent by pulmonologist. PMHX Parkinson's Disease, Chronic Rt. Pleural effusion s/p pleurex. Patient denies SOB, chest pain, fever. Patient is on cardiac monitor paced rhythm w/O2 sat 100% on a room air, BP 96/51 Dr. Wolf and Dr. Pro made aware and at bedside for evaluation. Patient is A&OX3, airway patent, breathing unlabored and even, radial pulses palpable, abdomen soft, nontender, pacemaker on left chest. Side rails up and safety maintained. Fall precaution in place. Call bells within reach. Family at bedside. Received patient in room 11 c/o clogged right chest tube, sent by pulmonologist. PMHX Parkinson's Disease, Chronic Rt. Pleural effusion s/p pleurex. Patient denies SOB, chest pain, fever. Patient is on cardiac monitor paced rhythm w/O2 sat 100% on a room air, BP 96/51 Dr. Wolf and Dr. Pro made aware and at bedside for evaluation. Patient is A&OX3, airway patent, breathing unlabored and even, radial pulses palpable, abdomen soft, nontender, pacemaker on left chest. Labs obtained, 20G IV placed on left wrist. Side rails up and safety maintained. Fall precaution in place. Call bells within reach. Family at bedside.

## 2023-11-24 NOTE — ED ADULT NURSE NOTE - NSFALLHARMRISKINTERV_ED_ALL_ED

## 2023-11-24 NOTE — ED PROVIDER NOTE - PATIENT PORTAL LINK FT
You can access the FollowMyHealth Patient Portal offered by [facility] by registering at the following website: [email or web address]. By joining [employer]’s FollowMyHealth portal, you will also be able to view your health information using other applications (apps) compatible with our system.

## 2023-11-27 ENCOUNTER — APPOINTMENT (OUTPATIENT)
Dept: THORACIC SURGERY | Facility: CLINIC | Age: 83
End: 2023-11-27
Payer: MEDICARE

## 2023-11-27 ENCOUNTER — OUTPATIENT (OUTPATIENT)
Dept: OUTPATIENT SERVICES | Facility: HOSPITAL | Age: 83
LOS: 1 days | End: 2023-11-27

## 2023-11-27 ENCOUNTER — APPOINTMENT (OUTPATIENT)
Dept: RADIOLOGY | Facility: HOSPITAL | Age: 83
End: 2023-11-27
Payer: MEDICARE

## 2023-11-27 VITALS
BODY MASS INDEX: 28.04 KG/M2 | DIASTOLIC BLOOD PRESSURE: 60 MMHG | OXYGEN SATURATION: 94 % | HEART RATE: 61 BPM | SYSTOLIC BLOOD PRESSURE: 102 MMHG | WEIGHT: 185 LBS | HEIGHT: 68 IN | RESPIRATION RATE: 14 BRPM

## 2023-11-27 DIAGNOSIS — Z98.49 CATARACT EXTRACTION STATUS, UNSPECIFIED EYE: Chronic | ICD-10-CM

## 2023-11-27 DIAGNOSIS — Z95.0 PRESENCE OF CARDIAC PACEMAKER: Chronic | ICD-10-CM

## 2023-11-27 DIAGNOSIS — Z98.61 CORONARY ANGIOPLASTY STATUS: Chronic | ICD-10-CM

## 2023-11-27 DIAGNOSIS — J90 PLEURAL EFFUSION, NOT ELSEWHERE CLASSIFIED: ICD-10-CM

## 2023-11-27 PROCEDURE — 71046 X-RAY EXAM CHEST 2 VIEWS: CPT | Mod: 26

## 2023-11-27 PROCEDURE — 99214 OFFICE O/P EST MOD 30 MIN: CPT

## 2023-11-28 ENCOUNTER — INPATIENT (INPATIENT)
Facility: HOSPITAL | Age: 83
LOS: 1 days | Discharge: HOME HEALTH | End: 2023-11-30
Attending: THORACIC SURGERY (CARDIOTHORACIC VASCULAR SURGERY) | Admitting: THORACIC SURGERY (CARDIOTHORACIC VASCULAR SURGERY)
Payer: MEDICARE

## 2023-11-28 VITALS
HEART RATE: 66 BPM | RESPIRATION RATE: 17 BRPM | TEMPERATURE: 98 F | DIASTOLIC BLOOD PRESSURE: 51 MMHG | OXYGEN SATURATION: 98 % | SYSTOLIC BLOOD PRESSURE: 117 MMHG

## 2023-11-28 DIAGNOSIS — Z95.0 PRESENCE OF CARDIAC PACEMAKER: Chronic | ICD-10-CM

## 2023-11-28 DIAGNOSIS — Z98.61 CORONARY ANGIOPLASTY STATUS: Chronic | ICD-10-CM

## 2023-11-28 DIAGNOSIS — J90 PLEURAL EFFUSION, NOT ELSEWHERE CLASSIFIED: ICD-10-CM

## 2023-11-28 DIAGNOSIS — Z98.49 CATARACT EXTRACTION STATUS, UNSPECIFIED EYE: Chronic | ICD-10-CM

## 2023-11-28 DIAGNOSIS — Z96.649 PRESENCE OF UNSPECIFIED ARTIFICIAL HIP JOINT: Chronic | ICD-10-CM

## 2023-11-28 PROCEDURE — 71045 X-RAY EXAM CHEST 1 VIEW: CPT | Mod: 26

## 2023-11-28 RX ORDER — OLANZAPINE 15 MG/1
10 TABLET, FILM COATED ORAL AT BEDTIME
Refills: 0 | Status: DISCONTINUED | OUTPATIENT
Start: 2023-11-28 | End: 2023-11-30

## 2023-11-28 RX ORDER — ASPIRIN/CALCIUM CARB/MAGNESIUM 324 MG
81 TABLET ORAL DAILY
Refills: 0 | Status: DISCONTINUED | OUTPATIENT
Start: 2023-11-28 | End: 2023-11-29

## 2023-11-28 RX ORDER — POLYETHYLENE GLYCOL 3350 17 G/17G
17 POWDER, FOR SOLUTION ORAL DAILY
Refills: 0 | Status: DISCONTINUED | OUTPATIENT
Start: 2023-11-28 | End: 2023-11-30

## 2023-11-28 RX ORDER — MIRTAZAPINE 45 MG/1
15 TABLET, ORALLY DISINTEGRATING ORAL AT BEDTIME
Refills: 0 | Status: DISCONTINUED | OUTPATIENT
Start: 2023-11-28 | End: 2023-11-30

## 2023-11-28 RX ORDER — HEPARIN SODIUM 5000 [USP'U]/ML
5000 INJECTION INTRAVENOUS; SUBCUTANEOUS EVERY 8 HOURS
Refills: 0 | Status: DISCONTINUED | OUTPATIENT
Start: 2023-11-28 | End: 2023-11-30

## 2023-11-28 RX ORDER — DESVENLAFAXINE 50 MG/1
50 TABLET, EXTENDED RELEASE ORAL DAILY
Refills: 0 | Status: DISCONTINUED | OUTPATIENT
Start: 2023-11-28 | End: 2023-11-30

## 2023-11-28 RX ORDER — ATORVASTATIN CALCIUM 80 MG/1
80 TABLET, FILM COATED ORAL AT BEDTIME
Refills: 0 | Status: DISCONTINUED | OUTPATIENT
Start: 2023-11-28 | End: 2023-11-30

## 2023-11-28 RX ORDER — CARBIDOPA AND LEVODOPA 25; 100 MG/1; MG/1
1.5 TABLET ORAL
Refills: 0 | Status: DISCONTINUED | OUTPATIENT
Start: 2023-11-28 | End: 2023-11-30

## 2023-11-28 RX ORDER — ALBUTEROL 90 UG/1
2 AEROSOL, METERED ORAL EVERY 6 HOURS
Refills: 0 | Status: DISCONTINUED | OUTPATIENT
Start: 2023-11-28 | End: 2023-11-30

## 2023-11-28 RX ORDER — LANOLIN ALCOHOL/MO/W.PET/CERES
6 CREAM (GRAM) TOPICAL AT BEDTIME
Refills: 0 | Status: DISCONTINUED | OUTPATIENT
Start: 2023-11-28 | End: 2023-11-30

## 2023-11-28 RX ORDER — ASCORBIC ACID 60 MG
500 TABLET,CHEWABLE ORAL DAILY
Refills: 0 | Status: DISCONTINUED | OUTPATIENT
Start: 2023-11-28 | End: 2023-11-30

## 2023-11-28 RX ADMIN — OLANZAPINE 10 MILLIGRAM(S): 15 TABLET, FILM COATED ORAL at 21:11

## 2023-11-28 RX ADMIN — Medication 6 MILLIGRAM(S): at 22:05

## 2023-11-28 RX ADMIN — Medication 100 MILLIGRAM(S): at 21:14

## 2023-11-28 RX ADMIN — HEPARIN SODIUM 5000 UNIT(S): 5000 INJECTION INTRAVENOUS; SUBCUTANEOUS at 22:05

## 2023-11-28 RX ADMIN — CARBIDOPA AND LEVODOPA 1.5 TABLET(S): 25; 100 TABLET ORAL at 21:11

## 2023-11-28 RX ADMIN — ATORVASTATIN CALCIUM 80 MILLIGRAM(S): 80 TABLET, FILM COATED ORAL at 21:21

## 2023-11-28 RX ADMIN — MIRTAZAPINE 15 MILLIGRAM(S): 45 TABLET, ORALLY DISINTEGRATING ORAL at 21:11

## 2023-11-28 NOTE — H&P ADULT - HISTORY OF PRESENT ILLNESS
Pt is an 82M h/o HTN, HLD, CAD (stent), Afib w/ pace maker, BPH, Parkinson's, chronic pleural effusion s/p RVATS, insertion of pleurX on 3/2022. Pt is complaining of decreased output was went to the ED on 11/24/23, and had a CT which showed Bilateral pleural effusions, left greater than right. Pt was discharged with office follow up. Pt was seen in the office on Monday 11/27/23 with a clogged pleurX and is a direct admit today. Last dose of eliquis and Plavix on 11/25/23. Pt denies any SOB, cough, chest pain, fevers, chills.

## 2023-11-28 NOTE — PATIENT PROFILE ADULT - FALL HARM RISK - HARM RISK INTERVENTIONS

## 2023-11-28 NOTE — H&P ADULT - NSHPPHYSICALEXAM_GEN_ALL_CORE
Appearance: Normal, NAD  Eyes: PERRL, EOMI  HENT: Normal oral mus[place], NC/AT  Cardiovascular:  S1/S2, RRR, No edema, JVP, Murmur  Respiratory: Clear to auscultation bilaterally, decreased at bases  Gastrointestinal:  Soft, Non-tender, Non-distended, BS+  Musculoskeletal:  No clubbing [ ] No joint deformity   Neurologic: Non-focal  Lymphatic: No lymphadenopathy  Psychiatry: AAOx3, Mood & affect appropriate  Skin: No rashes, No ecchymoses, No cyanosis

## 2023-11-28 NOTE — PATIENT PROFILE ADULT - ARE SIGNIFICANT INDICATORS COMPLETE.
[de-identified] :   I reviewed the x-ray findings with the patient.  Please send authorization for an MRI of the right knee to evaluate for a medial meniscus tear.  She has a slight effusion on exam, medial joint tenderness to palpation, positive Kacey's with x-rays show well-preserved mediolateral compartments.  She will start home therapy exercises for the right knee, she was given a printout from the [facility] for a knee conditioning program.  She will continue taking Celebrex and Tylenol on a as needed basis.  She will call me 2 days after the MRI is performed so we can discuss results, I will see her in 2 months for further evaluation.
Yes

## 2023-11-28 NOTE — H&P ADULT - ASSESSMENT
Pt is an 82M h/o HTN, HLD, CAD (stent), Afib w/ pace maker, BPH, Parkinson's, chronic pleural effusion s/p PleurX placement on March 2022 p/w clogged pleurX.    - Will connect pleurX to PleuroVac   - Will flush pleurX tonight  - Plan for MIST trial tomorrow AM  - Case d/w Dr. Jm Fuentes PAC

## 2023-11-28 NOTE — H&P ADULT - NSHPLABSRESULTS_GEN_ALL_CORE
< from: CT Chest No Cont (11.24.23 @ 14:28) >    Bilateral pleural effusions, left greater than right, slightly increased   on the right and slightly decreased on the left with associatedareas of   compressive atelectasis as described above. Right basilar Pleurx catheter   is in place.    Interval decrease/resolution of previously seen diffuse tree-in-bud   nodularities and perihilar bronchovascular groundglass opacities as   compared with November 13, 2023.    Focal marked narrowing of the right middle lobe lobar bronchus without   significant interval change since August 9, 2023 as described above. An   additional 3-6 month follow-up noncontrast chest CT is recommended to   ensure stability.      < end of copied text >

## 2023-11-28 NOTE — H&P ADULT - NS ATTEND AMEND GEN_ALL_CORE FT
Patient seen and examined agree with above note as modified, where appropriate, by me. Clogged pleurX, s/p flush now draining. Hold TPA for now. continue drainage

## 2023-11-29 LAB
ANION GAP SERPL CALC-SCNC: 14 MMOL/L — SIGNIFICANT CHANGE UP (ref 7–14)
ANION GAP SERPL CALC-SCNC: 14 MMOL/L — SIGNIFICANT CHANGE UP (ref 7–14)
BASOPHILS # BLD AUTO: 0.06 K/UL — SIGNIFICANT CHANGE UP (ref 0–0.2)
BASOPHILS # BLD AUTO: 0.06 K/UL — SIGNIFICANT CHANGE UP (ref 0–0.2)
BASOPHILS NFR BLD AUTO: 0.8 % — SIGNIFICANT CHANGE UP (ref 0–2)
BASOPHILS NFR BLD AUTO: 0.8 % — SIGNIFICANT CHANGE UP (ref 0–2)
BUN SERPL-MCNC: 42 MG/DL — HIGH (ref 7–23)
BUN SERPL-MCNC: 42 MG/DL — HIGH (ref 7–23)
CALCIUM SERPL-MCNC: 9.1 MG/DL — SIGNIFICANT CHANGE UP (ref 8.4–10.5)
CALCIUM SERPL-MCNC: 9.1 MG/DL — SIGNIFICANT CHANGE UP (ref 8.4–10.5)
CHLORIDE SERPL-SCNC: 101 MMOL/L — SIGNIFICANT CHANGE UP (ref 98–107)
CHLORIDE SERPL-SCNC: 101 MMOL/L — SIGNIFICANT CHANGE UP (ref 98–107)
CO2 SERPL-SCNC: 24 MMOL/L — SIGNIFICANT CHANGE UP (ref 22–31)
CO2 SERPL-SCNC: 24 MMOL/L — SIGNIFICANT CHANGE UP (ref 22–31)
CREAT SERPL-MCNC: 2.37 MG/DL — HIGH (ref 0.5–1.3)
CREAT SERPL-MCNC: 2.37 MG/DL — HIGH (ref 0.5–1.3)
EGFR: 27 ML/MIN/1.73M2 — LOW
EGFR: 27 ML/MIN/1.73M2 — LOW
EOSINOPHIL # BLD AUTO: 0.09 K/UL — SIGNIFICANT CHANGE UP (ref 0–0.5)
EOSINOPHIL # BLD AUTO: 0.09 K/UL — SIGNIFICANT CHANGE UP (ref 0–0.5)
EOSINOPHIL NFR BLD AUTO: 1.2 % — SIGNIFICANT CHANGE UP (ref 0–6)
EOSINOPHIL NFR BLD AUTO: 1.2 % — SIGNIFICANT CHANGE UP (ref 0–6)
GLUCOSE SERPL-MCNC: 99 MG/DL — SIGNIFICANT CHANGE UP (ref 70–99)
GLUCOSE SERPL-MCNC: 99 MG/DL — SIGNIFICANT CHANGE UP (ref 70–99)
HCT VFR BLD CALC: 37 % — LOW (ref 39–50)
HCT VFR BLD CALC: 37 % — LOW (ref 39–50)
HGB BLD-MCNC: 11.4 G/DL — LOW (ref 13–17)
HGB BLD-MCNC: 11.4 G/DL — LOW (ref 13–17)
IANC: 5.32 K/UL — SIGNIFICANT CHANGE UP (ref 1.8–7.4)
IANC: 5.32 K/UL — SIGNIFICANT CHANGE UP (ref 1.8–7.4)
IMM GRANULOCYTES NFR BLD AUTO: 0.3 % — SIGNIFICANT CHANGE UP (ref 0–0.9)
IMM GRANULOCYTES NFR BLD AUTO: 0.3 % — SIGNIFICANT CHANGE UP (ref 0–0.9)
LYMPHOCYTES # BLD AUTO: 1.03 K/UL — SIGNIFICANT CHANGE UP (ref 1–3.3)
LYMPHOCYTES # BLD AUTO: 1.03 K/UL — SIGNIFICANT CHANGE UP (ref 1–3.3)
LYMPHOCYTES # BLD AUTO: 14 % — SIGNIFICANT CHANGE UP (ref 13–44)
LYMPHOCYTES # BLD AUTO: 14 % — SIGNIFICANT CHANGE UP (ref 13–44)
MCHC RBC-ENTMCNC: 29.6 PG — SIGNIFICANT CHANGE UP (ref 27–34)
MCHC RBC-ENTMCNC: 29.6 PG — SIGNIFICANT CHANGE UP (ref 27–34)
MCHC RBC-ENTMCNC: 30.8 GM/DL — LOW (ref 32–36)
MCHC RBC-ENTMCNC: 30.8 GM/DL — LOW (ref 32–36)
MCV RBC AUTO: 96.1 FL — SIGNIFICANT CHANGE UP (ref 80–100)
MCV RBC AUTO: 96.1 FL — SIGNIFICANT CHANGE UP (ref 80–100)
MONOCYTES # BLD AUTO: 0.82 K/UL — SIGNIFICANT CHANGE UP (ref 0–0.9)
MONOCYTES # BLD AUTO: 0.82 K/UL — SIGNIFICANT CHANGE UP (ref 0–0.9)
MONOCYTES NFR BLD AUTO: 11.2 % — SIGNIFICANT CHANGE UP (ref 2–14)
MONOCYTES NFR BLD AUTO: 11.2 % — SIGNIFICANT CHANGE UP (ref 2–14)
NEUTROPHILS # BLD AUTO: 5.32 K/UL — SIGNIFICANT CHANGE UP (ref 1.8–7.4)
NEUTROPHILS # BLD AUTO: 5.32 K/UL — SIGNIFICANT CHANGE UP (ref 1.8–7.4)
NEUTROPHILS NFR BLD AUTO: 72.5 % — SIGNIFICANT CHANGE UP (ref 43–77)
NEUTROPHILS NFR BLD AUTO: 72.5 % — SIGNIFICANT CHANGE UP (ref 43–77)
NRBC # BLD: 0 /100 WBCS — SIGNIFICANT CHANGE UP (ref 0–0)
NRBC # BLD: 0 /100 WBCS — SIGNIFICANT CHANGE UP (ref 0–0)
NRBC # FLD: 0 K/UL — SIGNIFICANT CHANGE UP (ref 0–0)
NRBC # FLD: 0 K/UL — SIGNIFICANT CHANGE UP (ref 0–0)
PLATELET # BLD AUTO: 243 K/UL — SIGNIFICANT CHANGE UP (ref 150–400)
PLATELET # BLD AUTO: 243 K/UL — SIGNIFICANT CHANGE UP (ref 150–400)
POTASSIUM SERPL-MCNC: 4.4 MMOL/L — SIGNIFICANT CHANGE UP (ref 3.5–5.3)
POTASSIUM SERPL-MCNC: 4.4 MMOL/L — SIGNIFICANT CHANGE UP (ref 3.5–5.3)
POTASSIUM SERPL-SCNC: 4.4 MMOL/L — SIGNIFICANT CHANGE UP (ref 3.5–5.3)
POTASSIUM SERPL-SCNC: 4.4 MMOL/L — SIGNIFICANT CHANGE UP (ref 3.5–5.3)
RBC # BLD: 3.85 M/UL — LOW (ref 4.2–5.8)
RBC # BLD: 3.85 M/UL — LOW (ref 4.2–5.8)
RBC # FLD: 18.9 % — HIGH (ref 10.3–14.5)
RBC # FLD: 18.9 % — HIGH (ref 10.3–14.5)
SODIUM SERPL-SCNC: 139 MMOL/L — SIGNIFICANT CHANGE UP (ref 135–145)
SODIUM SERPL-SCNC: 139 MMOL/L — SIGNIFICANT CHANGE UP (ref 135–145)
WBC # BLD: 7.34 K/UL — SIGNIFICANT CHANGE UP (ref 3.8–10.5)
WBC # BLD: 7.34 K/UL — SIGNIFICANT CHANGE UP (ref 3.8–10.5)
WBC # FLD AUTO: 7.34 K/UL — SIGNIFICANT CHANGE UP (ref 3.8–10.5)
WBC # FLD AUTO: 7.34 K/UL — SIGNIFICANT CHANGE UP (ref 3.8–10.5)

## 2023-11-29 PROCEDURE — 99223 1ST HOSP IP/OBS HIGH 75: CPT

## 2023-11-29 PROCEDURE — 71045 X-RAY EXAM CHEST 1 VIEW: CPT | Mod: 26

## 2023-11-29 RX ORDER — ALBUMIN HUMAN 25 %
250 VIAL (ML) INTRAVENOUS ONCE
Refills: 0 | Status: COMPLETED | OUTPATIENT
Start: 2023-11-29 | End: 2023-11-29

## 2023-11-29 RX ORDER — SODIUM CHLORIDE 9 MG/ML
250 INJECTION, SOLUTION INTRAVENOUS ONCE
Refills: 0 | Status: COMPLETED | OUTPATIENT
Start: 2023-11-29 | End: 2023-11-29

## 2023-11-29 RX ADMIN — CARBIDOPA AND LEVODOPA 1.5 TABLET(S): 25; 100 TABLET ORAL at 17:21

## 2023-11-29 RX ADMIN — CARBIDOPA AND LEVODOPA 1.5 TABLET(S): 25; 100 TABLET ORAL at 12:20

## 2023-11-29 RX ADMIN — Medication 125 MILLILITER(S): at 12:43

## 2023-11-29 RX ADMIN — HEPARIN SODIUM 5000 UNIT(S): 5000 INJECTION INTRAVENOUS; SUBCUTANEOUS at 21:05

## 2023-11-29 RX ADMIN — MIRTAZAPINE 15 MILLIGRAM(S): 45 TABLET, ORALLY DISINTEGRATING ORAL at 21:05

## 2023-11-29 RX ADMIN — CARBIDOPA AND LEVODOPA 1.5 TABLET(S): 25; 100 TABLET ORAL at 23:20

## 2023-11-29 RX ADMIN — OLANZAPINE 10 MILLIGRAM(S): 15 TABLET, FILM COATED ORAL at 21:05

## 2023-11-29 RX ADMIN — SODIUM CHLORIDE 500 MILLILITER(S): 9 INJECTION, SOLUTION INTRAVENOUS at 09:25

## 2023-11-29 RX ADMIN — Medication 100 MILLIGRAM(S): at 23:20

## 2023-11-29 RX ADMIN — DESVENLAFAXINE 50 MILLIGRAM(S): 50 TABLET, EXTENDED RELEASE ORAL at 12:19

## 2023-11-29 RX ADMIN — Medication 250 MILLILITER(S): at 09:26

## 2023-11-29 RX ADMIN — HEPARIN SODIUM 5000 UNIT(S): 5000 INJECTION INTRAVENOUS; SUBCUTANEOUS at 05:58

## 2023-11-29 RX ADMIN — ATORVASTATIN CALCIUM 80 MILLIGRAM(S): 80 TABLET, FILM COATED ORAL at 21:05

## 2023-11-29 RX ADMIN — CARBIDOPA AND LEVODOPA 1.5 TABLET(S): 25; 100 TABLET ORAL at 05:53

## 2023-11-29 RX ADMIN — Medication 500 MILLIGRAM(S): at 12:19

## 2023-11-29 RX ADMIN — HEPARIN SODIUM 5000 UNIT(S): 5000 INJECTION INTRAVENOUS; SUBCUTANEOUS at 12:34

## 2023-11-29 RX ADMIN — Medication 6 MILLIGRAM(S): at 21:05

## 2023-11-29 NOTE — PROGRESS NOTE ADULT - SUBJECTIVE AND OBJECTIVE BOX
Subjective:  Pt is resting on bed, with no acute complaints, on room air.   States he feels relieved after drainage from right Pleurx last night.  Denies chest pain, SOB, nausea, vomiting, abdominal pain.     Objective  Vital Signs:  Vital Signs Last 24 Hrs  T(C): 36.8 (11-29-23 @ 05:36), Max: 36.8 (11-29-23 @ 05:36)  T(F): 98.3 (11-29-23 @ 05:36), Max: 98.3 (11-29-23 @ 05:36)  HR: 61 (11-29-23 @ 05:36) (60 - 75)  BP: 103/42 (11-29-23 @ 05:36) (103/42 - 117/51)  RR: 18 (11-29-23 @ 05:36) (17 - 18)  SpO2: 96% (11-29-23 @ 05:36) (96% - 98%) on (O2)    PE  General: awake and alert NAD  Neurology: A&Ox3, nonfocal, GUILLORY x 4  Respiratory: CTA B/L  CV: RRR, S1S2, no murmurs, rubs or gallops  Abdominal: Soft, NT, ND +BS  Extremities: mild edema noted on both lower extremities, + peripheral pulses  Incisions: sites c/d/i  Tubes: Right Pleurx on WS, output 1900 ml from last night.   Relevant labs, radiology and Medications reviewed                        11.4   7.34  )-----------( 243      ( 29 Nov 2023 06:17 )             37.0     11-29    139  |  101  |  42<H>  ----------------------------<  99  4.4   |  24  |  2.37<H>    Ca    9.1      29 Nov 2023 06:17    MEDICATIONS  (STANDING):  albumin human  5% IVPB 250 milliLiter(s) IV Intermittent once  ascorbic acid 500 milliGRAM(s) Oral daily  aspirin enteric coated 81 milliGRAM(s) Oral daily  atorvastatin 80 milliGRAM(s) Oral at bedtime  carbidopa/levodopa  25/100 1.5 Tablet(s) Oral four times a day  desvenlafaxine ER 50 milliGRAM(s) Oral daily  heparin   Injectable 5000 Unit(s) SubCutaneous every 8 hours  lactated ringers Bolus 250 milliLiter(s) IV Bolus once  melatonin 6 milliGRAM(s) Oral at bedtime  mirtazapine 15 milliGRAM(s) Oral at bedtime  OLANZapine 10 milliGRAM(s) Oral at bedtime  polyethylene glycol 3350 17 Gram(s) Oral daily    MEDICATIONS  (PRN):  albuterol    90 MICROgram(s) HFA Inhaler 2 Puff(s) Inhalation every 6 hours PRN for shortness of breath and/or wheezing  artificial tears (preservative free) Ophthalmic Solution 1 Drop(s) Both EYES every 6 hours PRN Dry Eyes  guaiFENesin Oral Liquid (Sugar-Free) 100 milliGRAM(s) Oral every 6 hours PRN Cough    Pertinent Physical Exam  I&O's Summary    28 Nov 2023 07:01  -  29 Nov 2023 07:00  --------------------------------------------------------  IN: 950 mL / OUT: 2050 mL / NET: -1100 mL    29 Nov 2023 07:01  -  29 Nov 2023 09:05  --------------------------------------------------------  IN: 240 mL / OUT: 250 mL / NET: -10 mL    Social History:  · Substance use	No  · Social History (marital status, living situation, occupation, and sexual history)	 w/ 3 kids  Never smoker  Social Drinker    Assessment  Pt is an 82M h/o HTN, HLD, CAD (stent), Afib w/ pace maker, BPH, Parkinson's, chronic pleural effusion s/p RVATS, insertion of pleurX on 3/2022. Went to the ED on 11/24/23, and had a CT which showed Bilateral pleural effusions, left greater than right. Pt was discharged with office follow up. 11/27/23 - PT presented to office with a clogged pleurX and was admitted directly to . Fluid drained from Pleurx last night. Last dose of eliquis and Plavix on 11/25/23.    PLAN  -Right Pleurx on WS, continue to monitor output.   -Will follow up with CXR and labs  -monitor urine output, supplement electrolytes as needed  -Encourage coughing, deep breathing and use of incentive spirometry.   -Monitor telemetry/alarms  -Heparin SC/SCDs for DVT prophylaxis, eliquis on hold  - Plan discussed with Cardiothoracic Team at AM rounds.  -Plan above d/w Dr. Oneal

## 2023-11-30 ENCOUNTER — TRANSCRIPTION ENCOUNTER (OUTPATIENT)
Age: 83
End: 2023-11-30

## 2023-11-30 VITALS
DIASTOLIC BLOOD PRESSURE: 49 MMHG | SYSTOLIC BLOOD PRESSURE: 90 MMHG | HEART RATE: 60 BPM | TEMPERATURE: 98 F | OXYGEN SATURATION: 97 % | RESPIRATION RATE: 18 BRPM

## 2023-11-30 LAB
ANION GAP SERPL CALC-SCNC: 11 MMOL/L — SIGNIFICANT CHANGE UP (ref 7–14)
ANION GAP SERPL CALC-SCNC: 11 MMOL/L — SIGNIFICANT CHANGE UP (ref 7–14)
BUN SERPL-MCNC: 44 MG/DL — HIGH (ref 7–23)
BUN SERPL-MCNC: 44 MG/DL — HIGH (ref 7–23)
CALCIUM SERPL-MCNC: 9.2 MG/DL — SIGNIFICANT CHANGE UP (ref 8.4–10.5)
CALCIUM SERPL-MCNC: 9.2 MG/DL — SIGNIFICANT CHANGE UP (ref 8.4–10.5)
CHLORIDE SERPL-SCNC: 103 MMOL/L — SIGNIFICANT CHANGE UP (ref 98–107)
CHLORIDE SERPL-SCNC: 103 MMOL/L — SIGNIFICANT CHANGE UP (ref 98–107)
CO2 SERPL-SCNC: 26 MMOL/L — SIGNIFICANT CHANGE UP (ref 22–31)
CO2 SERPL-SCNC: 26 MMOL/L — SIGNIFICANT CHANGE UP (ref 22–31)
CREAT SERPL-MCNC: 2.58 MG/DL — HIGH (ref 0.5–1.3)
CREAT SERPL-MCNC: 2.58 MG/DL — HIGH (ref 0.5–1.3)
EGFR: 24 ML/MIN/1.73M2 — LOW
EGFR: 24 ML/MIN/1.73M2 — LOW
GLUCOSE SERPL-MCNC: 84 MG/DL — SIGNIFICANT CHANGE UP (ref 70–99)
GLUCOSE SERPL-MCNC: 84 MG/DL — SIGNIFICANT CHANGE UP (ref 70–99)
HCT VFR BLD CALC: 33 % — LOW (ref 39–50)
HCT VFR BLD CALC: 33 % — LOW (ref 39–50)
HGB BLD-MCNC: 10.4 G/DL — LOW (ref 13–17)
HGB BLD-MCNC: 10.4 G/DL — LOW (ref 13–17)
MAGNESIUM SERPL-MCNC: 2.3 MG/DL — SIGNIFICANT CHANGE UP (ref 1.6–2.6)
MAGNESIUM SERPL-MCNC: 2.3 MG/DL — SIGNIFICANT CHANGE UP (ref 1.6–2.6)
MCHC RBC-ENTMCNC: 29.9 PG — SIGNIFICANT CHANGE UP (ref 27–34)
MCHC RBC-ENTMCNC: 29.9 PG — SIGNIFICANT CHANGE UP (ref 27–34)
MCHC RBC-ENTMCNC: 31.5 GM/DL — LOW (ref 32–36)
MCHC RBC-ENTMCNC: 31.5 GM/DL — LOW (ref 32–36)
MCV RBC AUTO: 94.8 FL — SIGNIFICANT CHANGE UP (ref 80–100)
MCV RBC AUTO: 94.8 FL — SIGNIFICANT CHANGE UP (ref 80–100)
NRBC # BLD: 0 /100 WBCS — SIGNIFICANT CHANGE UP (ref 0–0)
NRBC # BLD: 0 /100 WBCS — SIGNIFICANT CHANGE UP (ref 0–0)
NRBC # FLD: 0 K/UL — SIGNIFICANT CHANGE UP (ref 0–0)
NRBC # FLD: 0 K/UL — SIGNIFICANT CHANGE UP (ref 0–0)
PHOSPHATE SERPL-MCNC: 4.1 MG/DL — SIGNIFICANT CHANGE UP (ref 2.5–4.5)
PHOSPHATE SERPL-MCNC: 4.1 MG/DL — SIGNIFICANT CHANGE UP (ref 2.5–4.5)
PLATELET # BLD AUTO: 212 K/UL — SIGNIFICANT CHANGE UP (ref 150–400)
PLATELET # BLD AUTO: 212 K/UL — SIGNIFICANT CHANGE UP (ref 150–400)
POTASSIUM SERPL-MCNC: 4 MMOL/L — SIGNIFICANT CHANGE UP (ref 3.5–5.3)
POTASSIUM SERPL-MCNC: 4 MMOL/L — SIGNIFICANT CHANGE UP (ref 3.5–5.3)
POTASSIUM SERPL-SCNC: 4 MMOL/L — SIGNIFICANT CHANGE UP (ref 3.5–5.3)
POTASSIUM SERPL-SCNC: 4 MMOL/L — SIGNIFICANT CHANGE UP (ref 3.5–5.3)
RBC # BLD: 3.48 M/UL — LOW (ref 4.2–5.8)
RBC # BLD: 3.48 M/UL — LOW (ref 4.2–5.8)
RBC # FLD: 18.7 % — HIGH (ref 10.3–14.5)
RBC # FLD: 18.7 % — HIGH (ref 10.3–14.5)
SODIUM SERPL-SCNC: 140 MMOL/L — SIGNIFICANT CHANGE UP (ref 135–145)
SODIUM SERPL-SCNC: 140 MMOL/L — SIGNIFICANT CHANGE UP (ref 135–145)
WBC # BLD: 5.68 K/UL — SIGNIFICANT CHANGE UP (ref 3.8–10.5)
WBC # BLD: 5.68 K/UL — SIGNIFICANT CHANGE UP (ref 3.8–10.5)
WBC # FLD AUTO: 5.68 K/UL — SIGNIFICANT CHANGE UP (ref 3.8–10.5)
WBC # FLD AUTO: 5.68 K/UL — SIGNIFICANT CHANGE UP (ref 3.8–10.5)

## 2023-11-30 PROCEDURE — 99238 HOSP IP/OBS DSCHRG MGMT 30/<: CPT

## 2023-11-30 PROCEDURE — 71045 X-RAY EXAM CHEST 1 VIEW: CPT | Mod: 26

## 2023-11-30 RX ADMIN — DESVENLAFAXINE 50 MILLIGRAM(S): 50 TABLET, EXTENDED RELEASE ORAL at 12:10

## 2023-11-30 RX ADMIN — Medication 500 MILLIGRAM(S): at 12:10

## 2023-11-30 RX ADMIN — HEPARIN SODIUM 5000 UNIT(S): 5000 INJECTION INTRAVENOUS; SUBCUTANEOUS at 05:45

## 2023-11-30 RX ADMIN — CARBIDOPA AND LEVODOPA 1.5 TABLET(S): 25; 100 TABLET ORAL at 12:10

## 2023-11-30 RX ADMIN — CARBIDOPA AND LEVODOPA 1.5 TABLET(S): 25; 100 TABLET ORAL at 05:44

## 2023-11-30 NOTE — DISCHARGE NOTE PROVIDER - NSDCFUSCHEDAPPT_GEN_ALL_CORE_FT
Donte Langley  [facility]  NEUROLOGY [address]  Scheduled Appointment: 01/04/2024

## 2023-11-30 NOTE — PHYSICAL THERAPY INITIAL EVALUATION ADULT - NSPTDISCHREC_GEN_A_CORE
Home PT to address functional limitations associated with Parkinsons Disease.  Pt operating at functional baseline.

## 2023-11-30 NOTE — DISCHARGE NOTE PROVIDER - CARE PROVIDER_API CALL
Rufus Oneal  Thoracic Surgery  [address] [place] [address] [place] [address]  Phone: [phone]  Fax: [phone]  Established Patient  Follow Up Time:

## 2023-11-30 NOTE — DISCHARGE NOTE NURSING/CASE MANAGEMENT/SOCIAL WORK - PATIENT PORTAL LINK FT
You can access the FollowMyHealth Patient Portal offered by [facility] by registering at the following website: [email or web address]. By joining [employer]’s FollowMyHealth portal, you will also be able to view your health information using other applications (apps) compatible with our system.

## 2023-11-30 NOTE — PHYSICAL THERAPY INITIAL EVALUATION ADULT - ADDITIONAL COMMENTS
Pt wife reporting pt lives in a home, with wife, 24/7 aide, no history of falls, requires assistance with transfers, does not ambulate often, stays in recliner and w/c for the majority of the day.     Patients Current SpO2: 99%

## 2023-11-30 NOTE — DISCHARGE NOTE NURSING/CASE MANAGEMENT/SOCIAL WORK - NSSCNAMETXT_GEN_ALL_CORE
[facility] [phone] initial visit will be day after discharge home. A nurse will call prior to the home visit.

## 2023-11-30 NOTE — DISCHARGE NOTE NURSING/CASE MANAGEMENT/SOCIAL WORK - NSDCPNINST_GEN_ALL_CORE
Take medication as ordered, follow up with MD as advised, eat a heart healthy diet, gradually increase activity

## 2023-11-30 NOTE — DISCHARGE NOTE PROVIDER - NSDCFUADDAPPT_GEN_ALL_CORE_FT
Make an appointment with Dr. Oneal in 2 weeks by calling to [phone].   See your PCP as needed Follow up with Dr. Oneal only as needed. Call office with any questions. Otherwise, resume usual home Pleurx draining.   See your PCP in 1-2 weeks.

## 2023-11-30 NOTE — DISCHARGE NOTE PROVIDER - HOSPITAL COURSE
Pt is an 82M h/o HTN, HLD, CAD (stent), Afib w/ pace maker, BPH, Parkinson's, chronic pleural effusion s/p RVATS, insertion of pleurX on 3/2022. Pt was complaining of decreased output, went to the ED on 11/24/23, and had a CT which showed Bilateral pleural effusions, left greater than right. Pt was discharged with office follow up. Pt was seen in the office on Monday 11/27/23 with a clogged pleurX and was a direct admit. 11/28/23- Pleurx was flushed and 1900 ml of fluid was drained during the night. PLeurx was connected to Pleurovac. Today, right Pleurx was capped and detached from PleuroVac. Pt was seen and examined today, with no acute complaints, lying on bed, on room air. Denies SOB, chest pain, abdominal pain, nausea, vomiting. Pt is in good condition to be discharge today. Last dose of eliquis and Plavix on 11/25/23.     Vital Signs Last 24 Hrs  T(C): 36.7 (30 Nov 2023 09:05), Max: 36.9 (29 Nov 2023 12:21)  T(F): 98.1 (30 Nov 2023 09:05), Max: 98.5 (29 Nov 2023 12:21)  HR: 60 (30 Nov 2023 09:05) (59 - 65)  BP: 92/40 (30 Nov 2023 09:05) (92/40 - 110/49)  BP(mean): --  RR: 18 (30 Nov 2023 09:05) (16 - 18)  SpO2: 96% (30 Nov 2023 09:05) (96% - 100%)    Parameters below as of 30 Nov 2023 09:05  Patient On (Oxygen Delivery Method): room air    PHYSICAL EXAM:  Gen: NAD  Resp: Respirations unlabored. Bilateral chest rise. Clear to auscultation bilaterally  Card: RRR.   GI: Soft. Nontender. Nondistended.   Skin: Warm, well perfused, no masses or organomegaly  EXT: No clubbing or cyanosis.  Tubes: Right Pleurx capped, dressing gauze and foam applied on site. Site c/d/i.

## 2023-11-30 NOTE — DISCHARGE NOTE PROVIDER - NSDCCPCAREPLAN_GEN_ALL_CORE_FT
PRINCIPAL DISCHARGE DIAGNOSIS  Diagnosis: Pleural effusion, right  Assessment and Plan of Treatment:

## 2023-11-30 NOTE — DISCHARGE NOTE NURSING/CASE MANAGEMENT/SOCIAL WORK - NSDPLANG ASIS_GEN_ALL_CORE
-nephro c/s in the ED. Plan for urgent dialysis. No -nephro c/s in the ED. Currently undergoing dialysis

## 2023-11-30 NOTE — DISCHARGE NOTE PROVIDER - NSDCMRMEDTOKEN_GEN_ALL_CORE_FT
acetaminophen 325 mg oral tablet: 2 tab(s) orally every 6 hours As needed Temp greater or equal to 38C (100.4F), Mild Pain (1 - 3)  Albuterol (Eqv-ProAir HFA) 90 mcg/inh inhalation aerosol: 2 puff(s) inhaled every 6 hours as needed for  shortness of breath and/or wheezing  ascorbic acid 500 mg oral tablet: 1 tab(s) orally once a day  aspirin 81 mg oral delayed release tablet: 1 tab(s) orally once a day  bumetanide 2 mg oral tablet: 1 tab(s) orally once a day  carbidopa-levodopa 25 mg-100 mg oral tablet: 1.5 tab(s) orally 4 times a day  Crestor 20 mg oral tablet: 1 tab(s) orally once a day  cyanocobalamin 100 mcg oral tablet: 1 tab(s) orally  D3 25 mcg (1000 intl units) oral tablet: 1 tab(s) orally once a day  desvenlafaxine (as base) 50 mg oral tablet, extended release: 50 milligram(s) orally once a day  Eliquis 2.5 mg oral tablet: 1 tab(s) orally 2 times a day  Entresto 24 mg-26 mg oral tablet: 1 tab(s) orally once a day  mirtazapine 15 mg oral tablet: 1 tab(s) orally once a day (at bedtime)  ocular lubricant ophthalmic solution: 1 drop(s) to each affected eye every 6 hours As needed Dry Eyes  OLANZapine 10 mg oral tablet: 1 tab(s) orally once a day (at bedtime)  Plavix 75 mg oral tablet: 75 milligram(s) orally once a day  polyethylene glycol 3350 oral powder for reconstitution: 17 gram(s) orally once a day  sodium chloride 0.65% nasal spray: 2 spray(s) nasal 2 times a day as needed for  congestion  Vyndamax 61 mg oral capsule: 1 orally once a day

## 2024-01-03 ENCOUNTER — RX RENEWAL (OUTPATIENT)
Age: 84
End: 2024-01-03

## 2024-01-04 ENCOUNTER — APPOINTMENT (OUTPATIENT)
Dept: NEUROLOGY | Facility: CLINIC | Age: 84
End: 2024-01-04
Payer: MEDICARE

## 2024-01-04 PROCEDURE — 99213 OFFICE O/P EST LOW 20 MIN: CPT

## 2024-01-04 NOTE — HISTORY OF PRESENT ILLNESS
[FreeTextEntry1] :  Increased sleep fragmentation. Feels that he is moving slower however his wife does not agree. He is able to walk with a walker in the house.  + overnight stiffness in his legs Completed PT/OT and does home exercises Gets pleural drain TIW Hallucinations are better controlled Has less confusional episodes and can converse on the phone  + constipation - fiber, miralax/senna. 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

## 2024-01-04 NOTE — H&P ADULT - ENDOCRINE
Name: Gonzales Peters  YOB: 1938  Gender: male  MRN: [id number]    PROCEDURE: Left  L4, L5 and S1 medial branch nerve/facet joint radiofrequency denervation     Precautions:  Gonzales Peters denies prior sensitivity to steroid, local anesthetic, iodine, or shellfish.     The procedure was discussed at length with the patient and informed consent was signed and placed in the chart.  He had great relief of his usual low back pain from previous L4-L5 and L5-S1 bilateral  facet joint injections.    The patient was placed in a prone position on the fluoroscopy table and the skin was prepped and draped in a routine sterile fashion with Hibaclens.  The area where the medial branch nerve lies for Left L4 was identified under fluoroscopy, and the area to be injected was anesthetized with 1 mL of 1% Lidocaine.  A 20 gauge 10 cm Neurotherm radiofrequency needle was carefully advanced under fluoroscopic guidance to area where the medial branch nerve for Left L4 lies.  Bony contact was made.  Testing for motor and sensory was negative into the lower extremities and groin to 3 volts.  Motor onset was 0.4 volts. Aspiration was negative.      An injectate of 1 mL of 0.25% marcaine was injected.  Lesioning was carried out for 60 seconds at 80 degrees celsius. The probe was then rotated 180 degrees, and lesioning was repeated. The above procedure was repeated at the levels of the Left  L5 and Left S1 medial branch nerves.      At the level of Left  L5 medial branch nerve, motor onset was 0.5 volts. Testing for motor and sensory was negative into the lower extremities and groin to 3.0 volts at Left L5 medial branch nerve.    At the level of Left S1 medial branch nerve, motor onset was 0.5 volts. Testing for motor and sensory was negative into the lower extremities and groin to 3.0 volts at Left  S1 medial branch nerve.    Following lesioning of all 3 nerves, 0.25 mL of kenalog 40 mg/mL was injected at each level.  He  Expected Date Of Service: 08/17/2021 Performing Laboratory: 0 Billing Type: Third-Party Bill Bill For Surgical Tray: no details…

## 2024-01-04 NOTE — PHYSICAL EXAM
[FreeTextEntry1] : There is 2+ masking. Speech is 1+. Tremor is absent. laying in bed. Etta 2+ b/l. Gait could not be assessed.

## 2024-01-04 NOTE — DISCUSSION/SUMMARY
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  overnight akinesia sleep fragmentation  Patient was counseled on the following recommendations: Switch 6pm sinemet to inemet ER  1 tab  leave rest of the regimen the same f/u with psych for management of sleep regimen continue regular home exercises  f/u 3-4months

## 2024-01-25 ENCOUNTER — LABORATORY RESULT (OUTPATIENT)
Age: 84
End: 2024-01-25

## 2024-01-25 ENCOUNTER — NON-APPOINTMENT (OUTPATIENT)
Age: 84
End: 2024-01-25

## 2024-01-25 ENCOUNTER — APPOINTMENT (OUTPATIENT)
Dept: GERIATRICS | Facility: CLINIC | Age: 84
End: 2024-01-25
Payer: MEDICARE

## 2024-01-25 VITALS
TEMPERATURE: 98 F | BODY MASS INDEX: 30.01 KG/M2 | WEIGHT: 198 LBS | SYSTOLIC BLOOD PRESSURE: 109 MMHG | DIASTOLIC BLOOD PRESSURE: 64 MMHG | HEART RATE: 60 BPM | OXYGEN SATURATION: 95 % | HEIGHT: 68 IN | RESPIRATION RATE: 16 BRPM

## 2024-01-25 DIAGNOSIS — Z87.440 PERSONAL HISTORY OF URINARY (TRACT) INFECTIONS: ICD-10-CM

## 2024-01-25 DIAGNOSIS — Z71.89 OTHER SPECIFIED COUNSELING: ICD-10-CM

## 2024-01-25 DIAGNOSIS — Z23 ENCOUNTER FOR IMMUNIZATION: ICD-10-CM

## 2024-01-25 PROCEDURE — 93000 ELECTROCARDIOGRAM COMPLETE: CPT

## 2024-01-25 PROCEDURE — 99497 ADVNCD CARE PLAN 30 MIN: CPT

## 2024-01-25 PROCEDURE — 99205 OFFICE O/P NEW HI 60 MIN: CPT

## 2024-01-25 RX ORDER — SENNOSIDES 8.6 MG/1
8.6 TABLET, COATED ORAL
Qty: 30 | Refills: 0 | Status: COMPLETED | COMMUNITY
Start: 2022-04-02 | End: 2024-01-25

## 2024-01-25 RX ORDER — TADALAFIL 5 MG/1
5 TABLET ORAL
Qty: 90 | Refills: 3 | Status: ACTIVE | COMMUNITY
Start: 2021-08-03 | End: 1900-01-01

## 2024-01-25 RX ORDER — MULTIVITAMIN
TABLET ORAL DAILY
Qty: 30 | Refills: 0 | Status: ACTIVE | COMMUNITY
Start: 2024-01-25 | End: 1900-01-01

## 2024-01-25 RX ORDER — MULTIVIT-MIN/IRON/FOLIC ACID/K 18-600-40
500 CAPSULE ORAL
Qty: 90 | Refills: 0 | Status: ACTIVE | COMMUNITY

## 2024-01-25 RX ORDER — CHOLECALCIFEROL (VITAMIN D3) 25 MCG
25 MCG TABLET ORAL DAILY
Qty: 90 | Refills: 1 | Status: ACTIVE | COMMUNITY
Start: 2024-01-25

## 2024-01-25 RX ORDER — CYANOCOBALAMIN (VITAMIN B-12) 1000 MCG
1000 TABLET ORAL DAILY
Refills: 0 | Status: ACTIVE | COMMUNITY
Start: 2024-01-25

## 2024-01-25 RX ORDER — BIOTIN 10 MG
3000 TABLET ORAL
Refills: 0 | Status: COMPLETED | COMMUNITY
End: 2024-01-25

## 2024-01-27 LAB
APPEARANCE: CLEAR
BILIRUBIN URINE: NEGATIVE
BLOOD URINE: ABNORMAL
COLOR: YELLOW
GLUCOSE QUALITATIVE U: NEGATIVE MG/DL
KETONES URINE: NEGATIVE MG/DL
LEUKOCYTE ESTERASE URINE: ABNORMAL
NITRITE URINE: NEGATIVE
PH URINE: 6
PROTEIN URINE: NORMAL MG/DL
SPECIFIC GRAVITY URINE: 1.01
UROBILINOGEN URINE: 0.2 MG/DL

## 2024-02-02 ENCOUNTER — NON-APPOINTMENT (OUTPATIENT)
Age: 84
End: 2024-02-02

## 2024-02-02 PROBLEM — Z71.89 ADVANCE CARE PLANNING: Status: ACTIVE | Noted: 2023-02-02

## 2024-02-02 PROBLEM — Z87.440 HISTORY OF RECURRENT UTIS: Status: ACTIVE | Noted: 2024-01-25

## 2024-02-02 NOTE — HISTORY OF PRESENT ILLNESS
[FreeTextEntry1] : 84 yo male with PD dxed 2016 needs help with all ADLS feeds self. Pt brought in by  wife.  His biggest problem he states is Sleep.  Pt here with wife his caregiver. Pt wants to be albe to dance again. He uses a walker at times, but often in wheelchair.  He is sad bc of loss of socialization, loss of travel,  He has gained weight, feeds self .  Has a good appetite.  200# baseline, good appetite, and now up again  pleural efusion in past kidney issues, heart issues    Fell a year and half ago, summer 2022 - broke nose and face went to ED.  he does have hallucinations and very, very real dreams.  Sleeps during day some sleeping less at night  No pain Bowels good. Has 24 hour care at home. [0] : 2) Feeling down, depressed, or hopeless: Not at all (0) [PHQ-2 Negative - No further assessment needed] : PHQ-2 Negative - No further assessment needed [BLA1Azpww] : 0

## 2024-02-02 NOTE — GOALS
[Time Spent: ___ minutes] : I, personally, spent [unfilled] minutes on advance care planning services with the patient.  This time is separate and distinct from any other care management services provided on this date [FreeTextEntry1] : Opal Jeffrey [FreeTextEntry2] : wife [FreeTextEntry3] : [phone] [FreeTextEntry7] : Has MOLST order at home - pink form. Pt is DNR and DNI. Copy to be provided to us. Wife is health care proxy, copy ot be provided to us.  [FreeTextEntry8] : Opal Jeffrey [FreeTextEntry9] : wife [de-identified] : [phone] [LastACPVerDate] : 01/25/2024

## 2024-02-02 NOTE — GOALS
[Time Spent: ___ minutes] : I, personally, spent [unfilled] minutes on advance care planning services with the patient.  This time is separate and distinct from any other care management services provided on this date [FreeTextEntry1] : Opal Jeffrey [FreeTextEntry2] : wife [FreeTextEntry3] : [phone] [FreeTextEntry7] : Has MOLST order at home - pink form. Pt is DNR and DNI. Copy to be provided to us. Wife is health care proxy, copy ot be provided to us.  [FreeTextEntry8] : Opal Jeffrey [FreeTextEntry9] : wife [de-identified] : [phone] [LastACPVerDate] : 01/25/2024

## 2024-02-02 NOTE — HISTORY OF PRESENT ILLNESS
[FreeTextEntry1] : 82 yo male with PD dxed 2016 needs help with all ADLS feeds self. Pt brought in by  wife.  His biggest problem he states is Sleep.  Pt here with wife his caregiver. Pt wants to be albe to dance again. He uses a walker at times, but often in wheelchair.  He is sad bc of loss of socialization, loss of travel,  He has gained weight, feeds self .  Has a good appetite.  200# baseline, good appetite, and now up again  pleural efusion in past kidney issues, heart issues    Fell a year and half ago, summer 2022 - broke nose and face went to ED.  he does have hallucinations and very, very real dreams.  Sleeps during day some sleeping less at night  No pain Bowels good. Has 24 hour care at home. [0] : 2) Feeling down, depressed, or hopeless: Not at all (0) [PHQ-2 Negative - No further assessment needed] : PHQ-2 Negative - No further assessment needed [QGH2Hzhbj] : 0

## 2024-02-02 NOTE — ASSESSMENT
[FreeTextEntry1] : plavix - stent in 2020 - stop? Pt falls Afib? Eliquis Neuro - klonopin? pt feeling anxious at night and stiff

## 2024-02-03 NOTE — DISCHARGE NOTE PROVIDER - PROVIDER RX CONTACT NUMBER
[phone] 62 yo some degree of psychomotor agitation and high stress surrounding her presenting symptoms of SOB, headache, and dizziness. tearful during exam likely secondary to chronicity to these issues. hx asthma HLD here for 6 years of insomnia, 3 months of unilateral headache and presyncope feeling, dizziness, but no falls.   neuro exam normal, symptoms wax and wane w great stress low concern for intracranial pathology but will get CT to evaluate. also has chest tightness and SOB x4 days, w no relief from inhaler.  lungs clear to auscultation bilaterally, sufficient chest excursion. , r/o ACS, asthma exacerbation-will trial nebs. pending workup will refer to appropriate subspecialists.

## 2024-02-06 RX ORDER — CARBIDOPA AND LEVODOPA 25; 100 MG/1; MG/1
25-100 TABLET ORAL
Qty: 405 | Refills: 3 | Status: COMPLETED | COMMUNITY
Start: 1900-01-01 | End: 2024-02-06

## 2024-02-12 ENCOUNTER — TRANSCRIPTION ENCOUNTER (OUTPATIENT)
Age: 84
End: 2024-02-12

## 2024-02-12 ENCOUNTER — EMERGENCY (EMERGENCY)
Facility: HOSPITAL | Age: 84
LOS: 1 days | Discharge: HOME | End: 2024-02-12
Attending: EMERGENCY MEDICINE | Admitting: EMERGENCY MEDICINE
Payer: MEDICARE

## 2024-02-12 VITALS
SYSTOLIC BLOOD PRESSURE: 116 MMHG | DIASTOLIC BLOOD PRESSURE: 61 MMHG | TEMPERATURE: 97 F | OXYGEN SATURATION: 100 % | HEART RATE: 60 BPM | RESPIRATION RATE: 14 BRPM

## 2024-02-12 VITALS
DIASTOLIC BLOOD PRESSURE: 67 MMHG | OXYGEN SATURATION: 99 % | HEART RATE: 61 BPM | RESPIRATION RATE: 20 BRPM | SYSTOLIC BLOOD PRESSURE: 103 MMHG | TEMPERATURE: 98 F

## 2024-02-12 DIAGNOSIS — Z98.49 CATARACT EXTRACTION STATUS, UNSPECIFIED EYE: Chronic | ICD-10-CM

## 2024-02-12 DIAGNOSIS — Z96.649 PRESENCE OF UNSPECIFIED ARTIFICIAL HIP JOINT: Chronic | ICD-10-CM

## 2024-02-12 DIAGNOSIS — Z98.61 CORONARY ANGIOPLASTY STATUS: Chronic | ICD-10-CM

## 2024-02-12 DIAGNOSIS — Z95.0 PRESENCE OF CARDIAC PACEMAKER: Chronic | ICD-10-CM

## 2024-02-12 LAB
ALBUMIN SERPL ELPH-MCNC: 3.9 G/DL — SIGNIFICANT CHANGE UP (ref 3.3–5)
ALP SERPL-CCNC: 84 U/L — SIGNIFICANT CHANGE UP (ref 40–120)
ALT FLD-CCNC: 5 U/L — SIGNIFICANT CHANGE UP (ref 4–41)
ANION GAP SERPL CALC-SCNC: 12 MMOL/L — SIGNIFICANT CHANGE UP (ref 7–14)
APTT BLD: 40.2 SEC — HIGH (ref 24.5–35.6)
AST SERPL-CCNC: 8 U/L — SIGNIFICANT CHANGE UP (ref 4–40)
BASE EXCESS BLDV CALC-SCNC: 1 MMOL/L — SIGNIFICANT CHANGE UP (ref -2–3)
BASOPHILS # BLD AUTO: 0.03 K/UL — SIGNIFICANT CHANGE UP (ref 0–0.2)
BASOPHILS NFR BLD AUTO: 0.4 % — SIGNIFICANT CHANGE UP (ref 0–2)
BILIRUB SERPL-MCNC: 0.3 MG/DL — SIGNIFICANT CHANGE UP (ref 0.2–1.2)
BLD GP AB SCN SERPL QL: NEGATIVE — SIGNIFICANT CHANGE UP
BLOOD GAS VENOUS COMPREHENSIVE RESULT: SIGNIFICANT CHANGE UP
BUN SERPL-MCNC: 48 MG/DL — HIGH (ref 7–23)
CALCIUM SERPL-MCNC: 9.3 MG/DL — SIGNIFICANT CHANGE UP (ref 8.4–10.5)
CHLORIDE BLDV-SCNC: 107 MMOL/L — SIGNIFICANT CHANGE UP (ref 96–108)
CHLORIDE SERPL-SCNC: 105 MMOL/L — SIGNIFICANT CHANGE UP (ref 98–107)
CO2 BLDV-SCNC: 29.9 MMOL/L — HIGH (ref 22–26)
CO2 SERPL-SCNC: 25 MMOL/L — SIGNIFICANT CHANGE UP (ref 22–31)
CREAT SERPL-MCNC: 2.42 MG/DL — HIGH (ref 0.5–1.3)
EGFR: 26 ML/MIN/1.73M2 — LOW
EOSINOPHIL # BLD AUTO: 0.08 K/UL — SIGNIFICANT CHANGE UP (ref 0–0.5)
EOSINOPHIL NFR BLD AUTO: 1.1 % — SIGNIFICANT CHANGE UP (ref 0–6)
GAS PNL BLDV: 138 MMOL/L — SIGNIFICANT CHANGE UP (ref 136–145)
GLUCOSE BLDV-MCNC: 95 MG/DL — SIGNIFICANT CHANGE UP (ref 70–99)
GLUCOSE SERPL-MCNC: 93 MG/DL — SIGNIFICANT CHANGE UP (ref 70–99)
HCO3 BLDV-SCNC: 28 MMOL/L — SIGNIFICANT CHANGE UP (ref 22–29)
HCT VFR BLD CALC: 36.6 % — LOW (ref 39–50)
HCT VFR BLDA CALC: 36 % — LOW (ref 39–51)
HGB BLD CALC-MCNC: 11.9 G/DL — LOW (ref 12.6–17.4)
HGB BLD-MCNC: 11.7 G/DL — LOW (ref 13–17)
IANC: 5.7 K/UL — SIGNIFICANT CHANGE UP (ref 1.8–7.4)
IMM GRANULOCYTES NFR BLD AUTO: 0.3 % — SIGNIFICANT CHANGE UP (ref 0–0.9)
INR BLD: 1.23 RATIO — HIGH (ref 0.85–1.18)
LACTATE BLDV-MCNC: 1.2 MMOL/L — SIGNIFICANT CHANGE UP (ref 0.5–2)
LYMPHOCYTES # BLD AUTO: 0.86 K/UL — LOW (ref 1–3.3)
LYMPHOCYTES # BLD AUTO: 11.7 % — LOW (ref 13–44)
MCHC RBC-ENTMCNC: 31.2 PG — SIGNIFICANT CHANGE UP (ref 27–34)
MCHC RBC-ENTMCNC: 32 GM/DL — SIGNIFICANT CHANGE UP (ref 32–36)
MCV RBC AUTO: 97.6 FL — SIGNIFICANT CHANGE UP (ref 80–100)
MONOCYTES # BLD AUTO: 0.67 K/UL — SIGNIFICANT CHANGE UP (ref 0–0.9)
MONOCYTES NFR BLD AUTO: 9.1 % — SIGNIFICANT CHANGE UP (ref 2–14)
NEUTROPHILS # BLD AUTO: 5.7 K/UL — SIGNIFICANT CHANGE UP (ref 1.8–7.4)
NEUTROPHILS NFR BLD AUTO: 77.4 % — HIGH (ref 43–77)
NRBC # BLD: 0 /100 WBCS — SIGNIFICANT CHANGE UP (ref 0–0)
NRBC # FLD: 0 K/UL — SIGNIFICANT CHANGE UP (ref 0–0)
PCO2 BLDV: 56 MMHG — HIGH (ref 42–55)
PH BLDV: 7.31 — LOW (ref 7.32–7.43)
PLATELET # BLD AUTO: 173 K/UL — SIGNIFICANT CHANGE UP (ref 150–400)
PO2 BLDV: 23 MMHG — LOW (ref 25–45)
POTASSIUM BLDV-SCNC: 4.7 MMOL/L — SIGNIFICANT CHANGE UP (ref 3.5–5.1)
POTASSIUM SERPL-MCNC: 4.3 MMOL/L — SIGNIFICANT CHANGE UP (ref 3.5–5.3)
POTASSIUM SERPL-SCNC: 4.3 MMOL/L — SIGNIFICANT CHANGE UP (ref 3.5–5.3)
PROT SERPL-MCNC: 6.9 G/DL — SIGNIFICANT CHANGE UP (ref 6–8.3)
PROTHROM AB SERPL-ACNC: 13.7 SEC — HIGH (ref 9.5–13)
RBC # BLD: 3.75 M/UL — LOW (ref 4.2–5.8)
RBC # FLD: 13.5 % — SIGNIFICANT CHANGE UP (ref 10.3–14.5)
RH IG SCN BLD-IMP: NEGATIVE — SIGNIFICANT CHANGE UP
SAO2 % BLDV: 30.2 % — LOW (ref 67–88)
SODIUM SERPL-SCNC: 142 MMOL/L — SIGNIFICANT CHANGE UP (ref 135–145)
WBC # BLD: 7.36 K/UL — SIGNIFICANT CHANGE UP (ref 3.8–10.5)
WBC # FLD AUTO: 7.36 K/UL — SIGNIFICANT CHANGE UP (ref 3.8–10.5)

## 2024-02-12 PROCEDURE — 71250 CT THORAX DX C-: CPT | Mod: 26,MA

## 2024-02-12 PROCEDURE — 99285 EMERGENCY DEPT VISIT HI MDM: CPT | Mod: FS,GC

## 2024-02-12 PROCEDURE — 71045 X-RAY EXAM CHEST 1 VIEW: CPT | Mod: 26

## 2024-02-12 NOTE — ED PROVIDER NOTE - ATTENDING CONTRIBUTION TO CARE
DR. BLOCH, ATTENDING MD-  I performed a face to face bedside interview with patient regarding history of present illness, review of symptoms and past medical history. I completed an independent physical exam.  I have discussed patient's plan of care with the resident.  Patient examined alert, able to give history and joke around.  HEENT flat facies but otherwise unremarkable 2 through 12 intact conjunctiva pink, neck supple no JVD, heart sounds S1-S2, lungs decreased breath sounds on the left base otherwise no rales or rhonchi heard.  Abdomen mildly distended but soft nontender no organomegaly noted.  Extremities with edema with decreased pulses and decreased cap refill 3 seconds sensation grossly intact.  Motor 5/5.

## 2024-02-12 NOTE — CONSULT NOTE ADULT - SUBJECTIVE AND OBJECTIVE BOX
HPI:  83-year-old male past medical history of hypertension, hypercholesterolemia, ischemic heart disease/atrial fibrillation on Xarelto, Parkinsons disease, history of chronic pleural effusions status post right-sided VATS (drains 500 ml of fluid 3 x/wk), now presenting with concerns of bleeding from right chest drainage tube.   Patient reports having persistent bleeding episodes through the drain tube. Denies lightheadedness, chest pains, shortness of breath, bleeding from gums nose, skin bruising/joint swellings, melena, hematuria.  Thoracic surgery seen and examined the patient. He is not in acute distress, lying on bed, on room air. Denies SOB, chest pain, dizziness, nausea, vomiting, abdominal pain.       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    Parkinsons disease    CAD (coronary artery disease)  s/p PCI 08/21    Pleural effusion, not elsewhere classified    COVID-19 vaccine series completed  w booster    History of hip replacement, total  R hip 2008 & L hip    Status post cataract extraction  right eye cataract extraction with IOL 6/26/2015    Presence of cardiac pacemaker  10/2021    H/O coronary angioplasty  8/2021 1 stent inserted    REVIEW OF SYSTEMS    General: No Weight change/ Fatigue/ HA/Dizzy	    Skin/Breast: No Rashes/ Lesions/ Masses  	  Ophthalmologic: No Blurry vision/ Glaucoma/ Blindness  	  ENMT: No Hearing loss/ Drainage/ Lesions	    Respiratory and Thorax: No Cough/ Wheezing/ SOB/ Hemoptysis/ Sputum production  	  Cardiovascular: No Chest pain/ Palpitations/ Diaphoresis	    Gastrointestinal: No Nausea/ Vomiting/ Constipation/ Appetite Change	    Genitourinary: No Heamturia/ Dysuria/ Frequency change/ Impotence	    Musculoskeletal: No Pain/ Weakness/ Claudication	    Neurological: No Seizures/ TIA/CVA/ Parastesias	    Psychiatric: No Dementia/ Depression/ SI/HI	    Hematology/Lymphatics: No hx of bleeding/ Edema	    Endocrine:	No Hyperglycemia/ Hypoglycemia    Allergic/Immunologic:	 No Anaphylaxis/ Intolerance/ Recent illnesses    MEDICATIONS  (STANDING):    MEDICATIONS  (PRN):    Allergies    No Known Allergies    Intolerances    SOCIAL HISTORY:  Occupation:  Smoking Hx: denies  Etoh Hx: denies  IVDA Hx: denies    FAMILY HISTORY:  Family history of lung cancer (Mother)    Family history of esophageal cancer (Father)    FH: myocardial infarction (Grandparent)  unless noted, no significant family hx with Mother, Father, Siblings    Vital Signs Last 24 Hrs  T(C): 36.3 (12 Feb 2024 17:27), Max: 36.4 (12 Feb 2024 13:07)  T(F): 97.4 (12 Feb 2024 17:27), Max: 97.5 (12 Feb 2024 13:07)  HR: 60 (12 Feb 2024 17:27) (60 - 61)  BP: 116/61 (12 Feb 2024 17:27) (103/67 - 116/61)  BP(mean): --  RR: 14 (12 Feb 2024 17:27) (14 - 20)  SpO2: 100% (12 Feb 2024 17:27) (99% - 100%)    Parameters below as of 12 Feb 2024 17:27  Patient On (Oxygen Delivery Method): room air    PE  General: NAD  Neurology: Awake, nonfocal, GUILLORY x 4  HEAD: Normocephalic  Eyes: Scleras clear, PERRLA/ EOMI, Gross vision intact  ENT: Gross hearing intact, grossly patent pharynx, no stridor  Neck: Neck supple, No JVD,   Respiratory: Decreased breath sounds R>L, No wheezing, rales, rhonchi  CV: RRR, +S1/S2; No m/r/g  Abdominal: Distended, soft, non-tender; BS audible   Extremities: BL LE pitting pedal edema (baseline)  Skin: No Rashes, Hematoma, Ecchymosis  Incisions: site c/d/i  Tubes: Right Pleurx catheter capped and covered with gauze    LABS:                        11.7   7.36  )-----------( 173      ( 12 Feb 2024 15:40 )             36.6     02-12    142  |  105  |  48<H>  ----------------------------<  93  4.3   |  25  |  2.42<H>    Ca    9.3      12 Feb 2024 15:40    TPro  6.9  /  Alb  3.9  /  TBili  0.3  /  DBili  x   /  AST  8   /  ALT  5   /  AlkPhos  84  02-12    PT/INR - ( 12 Feb 2024 15:40 )   PT: 13.7 sec;   INR: 1.23 ratio         PTT - ( 12 Feb 2024 15:40 )  PTT:40.2 sec  Urinalysis Basic - ( 12 Feb 2024 15:40 )    Color: x / Appearance: x / SG: x / pH: x  Gluc: 93 mg/dL / Ketone: x  / Bili: x / Urobili: x   Blood: x / Protein: x / Nitrite: x   Leuk Esterase: x / RBC: x / WBC x   Sq Epi: x / Non Sq Epi: x / Bacteria: x      RADIOLOGY & ADDITIONAL STUDIES:  < from: Xray Chest 1 View- PORTABLE-Urgent (Xray Chest 1 View- PORTABLE-Urgent .) (02.12.24 @ 17:05) >    ******PRELIMINARY REPORT******      ******PRELIMINARY REPORT******     ACC: [id number] EXAM:  XR CHEST PORTABLE URGENT 1V   ORDERED BY: HELEN BLOCH     PROCEDURE DATE:  02/12/2024    ******PRELIMINARY REPORT******      ******PRELIMINARY REPORT******         INTERPRETATION:  EXAMINATION: XR CHEST URGENT    CLINICAL INDICATION: pleural effusion    TECHNIQUE: Single frontal, portable view of the chest was obtained.    COMPARISON: Chest radiograph 11/30/2023.    FINDINGS:  Cardiomegaly. Left chest wall ICD with leads in the right atrium, right   ventricle and coronary sinus.  Left greater than right small bilateral pleural effusions.  No pneumothorax.    IMPRESSION:  Left greater than right small bilateral pleural effusions.      ******PRELIMINARY REPORT******      ******PRELIMINARY REPORT******       PÉREZ HURTADO MD; Resident Radiology  This document is a PRELIMINARY interpretation and is pending final   attending approval. Feb 12 2024  5:43PM    < end of copied text >    ASSESSMENT:   83-year-old male past medical history of hypertension, hypercholesterolemia, ischemic heart disease/atrial fibrillation on Xarelto, Parkinsons disease, history of chronic pleural effusions status post right-sided VATS (drains 500 ml of fluid 3 x/wk), now presenting with concerns of bleeding from right chest drainage tube. Thoracic surgery consulted to evaluate drainage from Pleurx catheter,    PLAN  - Case discussed with Dr. Oneal  - Right Pleurx catheter connected to PleuroVac, on WS  - Hold anticoagulants for now   - Will follow with further recommendations

## 2024-02-12 NOTE — ED ADULT NURSE NOTE - NSFALLHARMRISKINTERV_ED_ALL_ED

## 2024-02-12 NOTE — ED ADULT NURSE NOTE - OBJECTIVE STATEMENT
83 year old male, received to room 15. A&Ox4, ambulatory with a walker or uses a wheelchair. Respirations equal and unlabored. Past medical history of Parkinson's, pleural effusion, CAD, BPH, HLD, HTN, a fib.  Pt states he has a right chest Pleurx drain for a pleural effusion that he has been getting drained over the last 2 years, and states that he has been having increased blood in the drainage x3 weeks.  Pt on anticoagulants. Pleurx site with gauze over it, dry and intact, no drainage noted.  Pt advised to come to ED for further work up. Pt denies chest pain, SOB, palpitations, dizziness, blurry vision, headache, numbness, tingling, fevers, chills, urinary symptoms, any other complaints. 20G IV placed to L antecubital space. Labs obtained. Pending XR. No acute distress noted. Safety maintained, bed in lowest position, side rails raised, call bell in reach.

## 2024-02-12 NOTE — ED PROVIDER NOTE - PHYSICAL EXAMINATION
PHYSICAL EXAM:    GENERAL: Lying in bed comfortably  HEAD: Normocephalic  EYES: EOMI, PERRLA, conjunctiva and sclera clear  ENT: No erythema/pallor/petechiae/lesions  NECK: Supple  LUNG: R-sided reduced air entry, right chest drain in place with overlying bandage/dressing.   HEART: RRR, +S1/S2; No m/r/g  ABDOMEN: Distended, soft, non-tender; BS audible   EXTREMITIES:  BL LE pitting pedal edema (baseline)  NERVOUS SYSTEM:  AAOx3, speech clear. No sensory/motor deficits   SKIN: No rashes or lesions

## 2024-02-12 NOTE — ED PROVIDER NOTE - CLINICAL SUMMARY MEDICAL DECISION MAKING FREE TEXT BOX
83-year-old male past medical history of hypertension, hypercholesterolemia, ischemic heart disease/atrial fibrillation on Xarelto, Parkinsons disease, history of chronic pleural effusions status post right-sided VATS (drains 500 ml of fluid 3 x/wk), now presenting with concerns of bleeding from right chest drainage tube. HR 90, BP 94/45 MAP 61 (nrml BP remains low to 100/60) RR 18, Sats 98%, on exam reduced air entry to R-side. Will check H/H, Coags, Type and X-match, CXR. Should consider CT chest. Will reach out to CTS.

## 2024-02-12 NOTE — ED PROVIDER NOTE - PROGRESS NOTE DETAILS
Zakia ESTRADA: Called CARDIOLOGIST Dr. Boucher, [facility] who is not available. DA Stanton: Received signout on the pt, pt would like to go home, throacic surgery has DA Stanton: Received signout on the pt, pt would like to go home, throacic surgery has evaluated pt and is agreeable, also aware of trace pneumothorax, plan to have pt follow up with Dr. Oneal. Will D/C with return precautions. Thoracic surgery states pt can resume a/c.

## 2024-02-12 NOTE — ED PROVIDER NOTE - OBJECTIVE STATEMENT
83-year-old male past medical history of hypertension, hypercholesterolemia, ischemic heart disease/atrial fibrillation on Xarelto, Parkinsons disease, history of chronic pleural effusions status post right-sided VATS (drains 500 ml of fluid 3 x/wk), now presenting with concerns of bleeding from right chest drainage tube.   Patient reports having persistent bleeding episodes through the drain tube. Denies lightheadedness, chest pains, shortness of breath, bleeding from gums nose, skin bruising/joint swellings, melena, hematuria.  Sent to the ED by Dr. Oneal to investigate source of bleed.

## 2024-02-12 NOTE — ED ADULT TRIAGE NOTE - CHIEF COMPLAINT QUOTE
Pt c/o blood in R pleural effusion tube x a few weeks. Sent in by thoracic surgeon Dr. Oneal for further eval. Pt on 2 different blood thinners. Denies difficulty breathing, fevers.

## 2024-02-12 NOTE — ED PROVIDER NOTE - NS ED ROS FT
CONSTITUTIONAL: No weakness, fevers  EYES/ENT: No visual changes;  No throat pain   NECK: No pain  RESPIRATORY: No cough, shortness of breath  CARDIOVASCULAR: No chest pain or palpitations  GASTROINTESTINAL: No abdominal or epigastric pain. No nausea, vomiting, or hematemesis; No diarrhea or constipation. No melena or hematochezia.  GENITOURINARY: No dysuria, frequency or hematuria  NEUROLOGICAL: No numbness or weakness  SKIN: No rashes, or lesions     All other review of systems is negative unless indicated above.

## 2024-02-12 NOTE — ED PROVIDER NOTE - PATIENT PORTAL LINK FT
You can access the FollowMyHealth Patient Portal offered by [facility] by registering at the following website: [email or web address]. By joining [facility]’s FollowMyHealth portal, you will also be able to view your health information using other applications (apps) compatible with our system.

## 2024-02-12 NOTE — CONSULT NOTE ADULT - NS PANP COMMENT GEN_ALL_CORE FT
Patient seen and examined agree with above note as modified, where appropriate, by me. Pt discharged by ED prior to face to face evaluation by me.

## 2024-02-12 NOTE — ED PROVIDER NOTE - NSFOLLOWUPINSTRUCTIONS_ED_ALL_ED_FT
Your CT scan shows a decrease in size of your pleural effusion and a very small pneumothorax (collapsed lung), they recommend resuming your blood thinner and following up with Dr. Causey in the office. Return to the ER for chest pain, shortness of breath, fever of ro any other symptoms that concern you. Advance activity as tolerated.  Continue all previously prescribed medications as directed.  Follow up with your primary care physician in 48-72 hours- bring copies of your results.  Return to the ER for worsening or persistent symptoms, and/or ANY NEW OR CONCERNING SYMPTOMS. If you have issues obtaining follow up, please call: [phone] to obtain a doctor or specialist who takes your insurance in your area.  You may call [phone] to make an appointment with the internal medicine clinic.
[facility]

## 2024-02-21 ENCOUNTER — APPOINTMENT (OUTPATIENT)
Dept: CARDIOLOGY | Facility: CLINIC | Age: 84
End: 2024-02-21
Payer: MEDICARE

## 2024-02-21 ENCOUNTER — NON-APPOINTMENT (OUTPATIENT)
Age: 84
End: 2024-02-21

## 2024-02-21 VITALS
HEART RATE: 60 BPM | HEIGHT: 68 IN | WEIGHT: 200 LBS | DIASTOLIC BLOOD PRESSURE: 64 MMHG | RESPIRATION RATE: 16 BRPM | BODY MASS INDEX: 30.31 KG/M2 | SYSTOLIC BLOOD PRESSURE: 110 MMHG | OXYGEN SATURATION: 99 %

## 2024-02-21 PROCEDURE — G2211 COMPLEX E/M VISIT ADD ON: CPT

## 2024-02-21 PROCEDURE — 93000 ELECTROCARDIOGRAM COMPLETE: CPT

## 2024-02-21 PROCEDURE — 99215 OFFICE O/P EST HI 40 MIN: CPT

## 2024-02-23 DIAGNOSIS — D50.9 IRON DEFICIENCY ANEMIA, UNSPECIFIED: ICD-10-CM

## 2024-02-28 ENCOUNTER — APPOINTMENT (OUTPATIENT)
Dept: CARDIOLOGY | Facility: CLINIC | Age: 84
End: 2024-02-28
Payer: MEDICARE

## 2024-02-28 VITALS
WEIGHT: 198 LBS | DIASTOLIC BLOOD PRESSURE: 52 MMHG | HEART RATE: 60 BPM | OXYGEN SATURATION: 100 % | SYSTOLIC BLOOD PRESSURE: 87 MMHG | BODY MASS INDEX: 30.11 KG/M2

## 2024-02-28 DIAGNOSIS — Z95.0 PRESENCE OF CARDIAC PACEMAKER: ICD-10-CM

## 2024-02-28 DIAGNOSIS — R00.1 BRADYCARDIA, UNSPECIFIED: ICD-10-CM

## 2024-02-28 DIAGNOSIS — I51.7 CARDIOMEGALY: ICD-10-CM

## 2024-02-28 DIAGNOSIS — N18.32 CHRONIC KIDNEY DISEASE, STAGE 3B: ICD-10-CM

## 2024-02-28 DIAGNOSIS — Z95.5 PRESENCE OF CORONARY ANGIOPLASTY IMPLANT AND GRAFT: ICD-10-CM

## 2024-02-28 PROCEDURE — G2211 COMPLEX E/M VISIT ADD ON: CPT

## 2024-02-28 PROCEDURE — 99215 OFFICE O/P EST HI 40 MIN: CPT

## 2024-02-28 PROCEDURE — 93306 TTE W/DOPPLER COMPLETE: CPT

## 2024-03-20 ENCOUNTER — APPOINTMENT (OUTPATIENT)
Dept: CT IMAGING | Facility: IMAGING CENTER | Age: 84
End: 2024-03-20
Payer: MEDICARE

## 2024-03-20 ENCOUNTER — OUTPATIENT (OUTPATIENT)
Dept: OUTPATIENT SERVICES | Facility: HOSPITAL | Age: 84
LOS: 1 days | End: 2024-03-20
Payer: MEDICARE

## 2024-03-20 DIAGNOSIS — Z98.61 CORONARY ANGIOPLASTY STATUS: Chronic | ICD-10-CM

## 2024-03-20 DIAGNOSIS — Z98.49 CATARACT EXTRACTION STATUS, UNSPECIFIED EYE: Chronic | ICD-10-CM

## 2024-03-20 DIAGNOSIS — Z96.649 PRESENCE OF UNSPECIFIED ARTIFICIAL HIP JOINT: Chronic | ICD-10-CM

## 2024-03-20 DIAGNOSIS — J90 PLEURAL EFFUSION, NOT ELSEWHERE CLASSIFIED: ICD-10-CM

## 2024-03-20 DIAGNOSIS — Z95.0 PRESENCE OF CARDIAC PACEMAKER: Chronic | ICD-10-CM

## 2024-03-20 PROCEDURE — 71250 CT THORAX DX C-: CPT | Mod: 26,MH

## 2024-03-20 PROCEDURE — 71250 CT THORAX DX C-: CPT

## 2024-03-25 ENCOUNTER — NON-APPOINTMENT (OUTPATIENT)
Age: 84
End: 2024-03-25

## 2024-03-25 ENCOUNTER — APPOINTMENT (OUTPATIENT)
Dept: THORACIC SURGERY | Facility: CLINIC | Age: 84
End: 2024-03-25
Payer: MEDICARE

## 2024-03-25 VITALS
SYSTOLIC BLOOD PRESSURE: 113 MMHG | WEIGHT: 200 LBS | OXYGEN SATURATION: 96 % | HEART RATE: 63 BPM | RESPIRATION RATE: 16 BRPM | BODY MASS INDEX: 30.31 KG/M2 | HEIGHT: 68 IN | DIASTOLIC BLOOD PRESSURE: 50 MMHG

## 2024-03-25 PROCEDURE — 99214 OFFICE O/P EST MOD 30 MIN: CPT

## 2024-03-25 NOTE — HISTORY OF PRESENT ILLNESS
[FreeTextEntry1] : Mr. 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   Patient is s/p Right VATS, pleural bx and placement of Pleurx catheter on 03/17/2022. Path of right pleura bx revealed fragments of pleura with chronic inflammation and reactive changes. Fragments of skeletal muscle. Right pleural fluid negative for malignant cells.   Patient went to ED on 04/29/2022 for hallucinations, likely related to Parkinsons dementia. He was discharged on 5/12/22 to a rehab facility.    Of note, pt had recent SPECT scan demonstrating cardiac amyloid and was referred by his cardiologist Dr. Dickey to see Dr. Randolph.   CT chest on 08/17/2022: -  There is a trace right pleural effusion with Pleurx catheter in place, markedly decreased in size compared to the 2/6/2022 chest CT. A small left pleural effusion has also decreased compared to the 2/6/2022 scan, now trace.  Patient went to ED on 09/08/2022 c/o weakness, founded to have electrolyte disorders which were corrected but patient's symptoms did not improve. felt that presentation was most consistent with progression of Parkinson's disease.  Patient was seen on 01/10/2023 with clogged PleurX catheter. PleurX catheter was unclogged using three-way stop cock and flushes. Pt was drained about 700cc output, tolerated well.   Patient had bloody drainage, went to ED a few times.  Patient was drained three times a week with ~ 500 ml each drainage. On 03/0/6/2024, drainage was minimal.  CXR on 03/07/2024 reviewed and explained to patient, stable CXR. Patient denies SOB, cough or fever. No intervention needed at current time. Will decrease drainage to once a week for 2 weeks and RTC in 2 weeks with CT chest w/o contrast.   Patient is currently drained once a week for the past 2 wks: PleurX OUTPUT: 3/14 Thurs 5ml, 3/20 Wed few drops.  CT chest on 03/20/2024: - Right middle lobe bronchus narrowing secondary to nodularity is similar to 2023.   - Small to moderate partially loculated bilateral pleural effusions, unchanged on the left and enlarged on the right compared to the prior study. Right-sided chest tube. Partial atelectasis both lower lobes.  Patient is here today for a follow up. Reports GARCIA, denies any cough or CP.

## 2024-03-25 NOTE — ASSESSMENT
[FreeTextEntry1] : Mr. 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   Patient is s/p Right VATS, pleural bx and placement of Pleurx catheter on 03/17/2022. Path of right pleura bx revealed fragments of pleura with chronic inflammation and reactive changes. Fragments of skeletal muscle. Right pleural fluid negative for malignant cells.   Patient went to ED on 04/29/2022 for hallucinations, likely related to Parkinsons dementia. He was discharged on 5/12/22 to a rehab facility.    Of note, pt had recent SPECT scan demonstrating cardiac amyloid and was referred by his cardiologist Dr. Dickey to see Dr. Randolph.   Patient went to ED on 09/08/2022 c/o weakness, founded to have electrolyte disorders which were corrected but patient's symptoms did not improve. felt that presentation was most consistent with  progression of Parkinson's disease.  Patient was seen on 01/10/2023 with clogged PleurX catheter. PleurX catheter was unclogged using three-way stop cock and flushes. Pt was drained about 700cc output.   Patient was drained three times a week with ~ 500 ml each drainage. On 03/0/6/2024, drainage was minimal.  CXR on 03/07/2024 reviewed and explained to patient, stable CXR. Patient denies SOB, cough or fever. No intervention needed at current time. Will decrease drainage to once a week for 2 weeks and RTC in 2 weeks with CT chest w/o contrast.   Patient is currently drained once a week for the past 2 wks: PleurX OUTPUT: 3/14 Thurs 5ml, 3/20 Wed few drops.  CT chest on 03/20/2024: - Right middle lobe bronchus narrowing secondary to nodularity is similar to 2023.   - Small to moderate partially loculated bilateral pleural effusions, unchanged on the left and enlarged on the right compared to the prior study. Right-sided chest tube. Partial atelectasis both lower lobes.  I have reviewed the patient's medical records and diagnostic images at time of this office consultation and have made the following recommendation: 1. CT imaging reviewed and discussed with patient, small to moderate partially loculated bilateral pleural effusions, unchanged on the left and enlarged on the right compared to the prior study. Recommended to be admitted on 4/02/2024 to unclog the PleurX catheter via TPA. Patient needs to hold Plavix 7days and Eliquis 2 days prior to the inpatient procedure. Patient verbalized understanding. 2. Continue draining PleurX once a week for now.   I, PARK Gomez, personally performed the evaluation and management (E/M) services for this established patient who follow up today with an existing condition.  That E/M includes conducting the examination, assessing all new/exacerbated/existing conditions, and establishing a plan of care. Today, my ACP, CLEMENT Freeman/Kris GARCIA, was here to observe my evaluation and management services for this existing condition to be followed going forward.

## 2024-03-25 NOTE — CONSULT LETTER
[FreeTextEntry2] : Dr. Pavan Jamil (Pulm/Ref)\par  Santhosh Avila (PCP)\par  Dr. Clinton Dickey (Card)\par  Dr. Randolph (Card re: cardiac amyloid) [FreeTextEntry3] : Rufus Oneal MD \par  Attending Surgeon \par  Division of Thoracic Surgery \par  , Cardiovascular and Thoracic Surgery \par  [facility] [employer] [facility]\par  \par

## 2024-03-28 ENCOUNTER — APPOINTMENT (OUTPATIENT)
Dept: GERIATRICS | Facility: CLINIC | Age: 84
End: 2024-03-28
Payer: MEDICARE

## 2024-03-28 ENCOUNTER — APPOINTMENT (OUTPATIENT)
Dept: GERIATRICS | Facility: CLINIC | Age: 84
End: 2024-03-28

## 2024-03-28 PROCEDURE — 99443: CPT | Mod: 93

## 2024-03-28 RX ORDER — CARBIDOPA AND LEVODOPA 25; 100 MG/1; MG/1
25-100 TABLET, EXTENDED RELEASE ORAL
Qty: 180 | Refills: 3 | Status: COMPLETED | COMMUNITY
Start: 2024-01-25 | End: 2024-03-28

## 2024-03-28 RX ORDER — OLANZAPINE 2.5 MG/1
2.5 TABLET, FILM COATED ORAL
Qty: 90 | Refills: 1 | Status: COMPLETED | COMMUNITY
Start: 2022-05-29 | End: 2024-03-28

## 2024-03-28 RX ORDER — CEPHRADINE 500 MG
250 MCG CAPSULE ORAL
Refills: 0 | Status: COMPLETED | COMMUNITY
End: 2024-03-28

## 2024-03-28 RX ORDER — CHLORHEXIDINE GLUCONATE 4 %
325 (65 FE) LIQUID (ML) TOPICAL TWICE DAILY
Qty: 180 | Refills: 3 | Status: ACTIVE | COMMUNITY
Start: 2023-09-08 | End: 1900-01-01

## 2024-03-28 RX ORDER — CARBIDOPA AND LEVODOPA 25; 100 MG/1; MG/1
25-100 TABLET, EXTENDED RELEASE ORAL
Qty: 30 | Refills: 5 | Status: COMPLETED | COMMUNITY
Start: 2024-01-04 | End: 2024-03-28

## 2024-03-28 RX ORDER — OLANZAPINE 10 MG/1
10 TABLET, FILM COATED ORAL
Refills: 0 | Status: ACTIVE | COMMUNITY
Start: 2024-01-25

## 2024-03-29 NOTE — BH CONSULTATION LIAISON PROGRESS NOTE - NSBHADMITIPOBS_PSY_A_CORE
Insurance coverage review with   Planned life change; senior care   10/2024.  Patient plans to   Review his senior care insurance benefits available to him with his employer [employer] within   Next week.     Patient called follow up from 2/26/2024 conversation : Group Insurance   [employer].   His current plan is October 2024 senior care.   He plans to review with his employer his benefit package and continuation of   Insurance after senior care. He also has medical  Benefits.   We have had several discussions current Transplant Authorizations are with   [employer] / Group insurance through employer.  If insurance changes and or if patient decides to use his [facility] [employer], New Authorization would be required.  He verbalized understanding.   Patient plans to discuss with his Human Resource Department within next week on   His senior care benefit package, he will update Heart/Kidney team if any changes   With his insurance.   
Routine observation

## 2024-04-02 ENCOUNTER — INPATIENT (INPATIENT)
Facility: HOSPITAL | Age: 84
LOS: 2 days | Discharge: HOME HEALTH | End: 2024-04-05
Attending: THORACIC SURGERY (CARDIOTHORACIC VASCULAR SURGERY) | Admitting: THORACIC SURGERY (CARDIOTHORACIC VASCULAR SURGERY)
Payer: MEDICARE

## 2024-04-02 VITALS
HEART RATE: 64 BPM | HEIGHT: 68 IN | DIASTOLIC BLOOD PRESSURE: 54 MMHG | SYSTOLIC BLOOD PRESSURE: 111 MMHG | WEIGHT: 201.94 LBS | OXYGEN SATURATION: 99 % | TEMPERATURE: 98 F | RESPIRATION RATE: 18 BRPM

## 2024-04-02 DIAGNOSIS — Z98.61 CORONARY ANGIOPLASTY STATUS: Chronic | ICD-10-CM

## 2024-04-02 DIAGNOSIS — J90 PLEURAL EFFUSION, NOT ELSEWHERE CLASSIFIED: ICD-10-CM

## 2024-04-02 DIAGNOSIS — Z96.649 PRESENCE OF UNSPECIFIED ARTIFICIAL HIP JOINT: Chronic | ICD-10-CM

## 2024-04-02 DIAGNOSIS — Z98.49 CATARACT EXTRACTION STATUS, UNSPECIFIED EYE: Chronic | ICD-10-CM

## 2024-04-02 DIAGNOSIS — Z95.0 PRESENCE OF CARDIAC PACEMAKER: Chronic | ICD-10-CM

## 2024-04-02 LAB
24R-OH-CALCIDIOL SERPL-MCNC: 38.1 NG/ML — SIGNIFICANT CHANGE UP (ref 30–80)
ALBUMIN SERPL ELPH-MCNC: 3.4 G/DL — SIGNIFICANT CHANGE UP (ref 3.3–5)
ALP SERPL-CCNC: 78 U/L — SIGNIFICANT CHANGE UP (ref 40–120)
ALT FLD-CCNC: 9 U/L — SIGNIFICANT CHANGE UP (ref 4–41)
ANION GAP SERPL CALC-SCNC: 12 MMOL/L — SIGNIFICANT CHANGE UP (ref 7–14)
APPEARANCE UR: CLEAR — SIGNIFICANT CHANGE UP
APTT BLD: 36.2 SEC — HIGH (ref 24.5–35.6)
AST SERPL-CCNC: 25 U/L — SIGNIFICANT CHANGE UP (ref 4–40)
BACTERIA # UR AUTO: NEGATIVE /HPF — SIGNIFICANT CHANGE UP
BILIRUB SERPL-MCNC: 0.2 MG/DL — SIGNIFICANT CHANGE UP (ref 0.2–1.2)
BILIRUB UR-MCNC: NEGATIVE — SIGNIFICANT CHANGE UP
BLD GP AB SCN SERPL QL: NEGATIVE — SIGNIFICANT CHANGE UP
BUN SERPL-MCNC: 58 MG/DL — HIGH (ref 7–23)
CALCIUM SERPL-MCNC: 9 MG/DL — SIGNIFICANT CHANGE UP (ref 8.4–10.5)
CAST: 3 /LPF — SIGNIFICANT CHANGE UP (ref 0–4)
CHLORIDE SERPL-SCNC: 105 MMOL/L — SIGNIFICANT CHANGE UP (ref 98–107)
CO2 SERPL-SCNC: 23 MMOL/L — SIGNIFICANT CHANGE UP (ref 22–31)
COLOR SPEC: YELLOW — SIGNIFICANT CHANGE UP
CREAT SERPL-MCNC: 2.94 MG/DL — HIGH (ref 0.5–1.3)
DIFF PNL FLD: ABNORMAL
EGFR: 21 ML/MIN/1.73M2 — LOW
GLUCOSE SERPL-MCNC: 105 MG/DL — HIGH (ref 70–99)
GLUCOSE UR QL: NEGATIVE MG/DL — SIGNIFICANT CHANGE UP
HCT VFR BLD CALC: 32 % — LOW (ref 39–50)
HGB BLD-MCNC: 10.2 G/DL — LOW (ref 13–17)
INR BLD: 1.16 RATIO — SIGNIFICANT CHANGE UP (ref 0.85–1.18)
KETONES UR-MCNC: NEGATIVE MG/DL — SIGNIFICANT CHANGE UP
LEUKOCYTE ESTERASE UR-ACNC: ABNORMAL
MAGNESIUM SERPL-MCNC: 2.6 MG/DL — SIGNIFICANT CHANGE UP (ref 1.6–2.6)
MCHC RBC-ENTMCNC: 30.5 PG — SIGNIFICANT CHANGE UP (ref 27–34)
MCHC RBC-ENTMCNC: 31.9 GM/DL — LOW (ref 32–36)
MCV RBC AUTO: 95.8 FL — SIGNIFICANT CHANGE UP (ref 80–100)
NITRITE UR-MCNC: NEGATIVE — SIGNIFICANT CHANGE UP
NRBC # BLD: 0 /100 WBCS — SIGNIFICANT CHANGE UP (ref 0–0)
NRBC # FLD: 0 K/UL — SIGNIFICANT CHANGE UP (ref 0–0)
PH UR: 6 — SIGNIFICANT CHANGE UP (ref 5–8)
PHOSPHATE SERPL-MCNC: 4.7 MG/DL — HIGH (ref 2.5–4.5)
PLATELET # BLD AUTO: 220 K/UL — SIGNIFICANT CHANGE UP (ref 150–400)
POTASSIUM SERPL-MCNC: 5.4 MMOL/L — HIGH (ref 3.5–5.3)
POTASSIUM SERPL-SCNC: 5.4 MMOL/L — HIGH (ref 3.5–5.3)
PROT SERPL-MCNC: 7 G/DL — SIGNIFICANT CHANGE UP (ref 6–8.3)
PROT UR-MCNC: 30 MG/DL
PROTHROM AB SERPL-ACNC: 13.1 SEC — HIGH (ref 9.5–13)
RBC # BLD: 3.34 M/UL — LOW (ref 4.2–5.8)
RBC # FLD: 13.4 % — SIGNIFICANT CHANGE UP (ref 10.3–14.5)
RBC CASTS # UR COMP ASSIST: 1 /HPF — SIGNIFICANT CHANGE UP (ref 0–4)
RH IG SCN BLD-IMP: NEGATIVE — SIGNIFICANT CHANGE UP
SODIUM SERPL-SCNC: 140 MMOL/L — SIGNIFICANT CHANGE UP (ref 135–145)
SP GR SPEC: 1.01 — SIGNIFICANT CHANGE UP (ref 1–1.03)
SQUAMOUS # UR AUTO: 1 /HPF — SIGNIFICANT CHANGE UP (ref 0–5)
UROBILINOGEN FLD QL: 0.2 MG/DL — SIGNIFICANT CHANGE UP (ref 0.2–1)
WBC # BLD: 7.45 K/UL — SIGNIFICANT CHANGE UP (ref 3.8–10.5)
WBC # FLD AUTO: 7.45 K/UL — SIGNIFICANT CHANGE UP (ref 3.8–10.5)
WBC UR QL: 5 /HPF — SIGNIFICANT CHANGE UP (ref 0–5)

## 2024-04-02 PROCEDURE — 76770 US EXAM ABDO BACK WALL COMP: CPT | Mod: 26

## 2024-04-02 PROCEDURE — 32561 LYSE CHEST FIBRIN INIT DAY: CPT | Mod: AS

## 2024-04-02 PROCEDURE — 93010 ELECTROCARDIOGRAM REPORT: CPT

## 2024-04-02 RX ORDER — SODIUM ZIRCONIUM CYCLOSILICATE 10 G/10G
10 POWDER, FOR SUSPENSION ORAL ONCE
Refills: 0 | Status: DISCONTINUED | OUTPATIENT
Start: 2024-04-02 | End: 2024-04-02

## 2024-04-02 RX ORDER — SACUBITRIL AND VALSARTAN 24; 26 MG/1; MG/1
1 TABLET, FILM COATED ORAL
Refills: 0 | Status: DISCONTINUED | OUTPATIENT
Start: 2024-04-02 | End: 2024-04-02

## 2024-04-02 RX ORDER — CARBIDOPA AND LEVODOPA 25; 100 MG/1; MG/1
1 TABLET ORAL
Refills: 0 | Status: DISCONTINUED | OUTPATIENT
Start: 2024-04-02 | End: 2024-04-05

## 2024-04-02 RX ORDER — HEPARIN SODIUM 5000 [USP'U]/ML
5000 INJECTION INTRAVENOUS; SUBCUTANEOUS EVERY 8 HOURS
Refills: 0 | Status: DISCONTINUED | OUTPATIENT
Start: 2024-04-02 | End: 2024-04-05

## 2024-04-02 RX ORDER — BUMETANIDE 0.25 MG/ML
2 INJECTION INTRAMUSCULAR; INTRAVENOUS EVERY 24 HOURS
Refills: 0 | Status: DISCONTINUED | OUTPATIENT
Start: 2024-04-02 | End: 2024-04-02

## 2024-04-02 RX ORDER — ATORVASTATIN CALCIUM 80 MG/1
80 TABLET, FILM COATED ORAL AT BEDTIME
Refills: 0 | Status: DISCONTINUED | OUTPATIENT
Start: 2024-04-02 | End: 2024-04-05

## 2024-04-02 RX ORDER — SACUBITRIL AND VALSARTAN 24; 26 MG/1; MG/1
1 TABLET, FILM COATED ORAL
Refills: 0 | Status: DISCONTINUED | OUTPATIENT
Start: 2024-04-02 | End: 2024-04-05

## 2024-04-02 RX ORDER — INFLUENZA VIRUS VACCINE 15; 15; 15; 15 UG/.5ML; UG/.5ML; UG/.5ML; UG/.5ML
0.7 SUSPENSION INTRAMUSCULAR ONCE
Refills: 0 | Status: DISCONTINUED | OUTPATIENT
Start: 2024-04-02 | End: 2024-04-05

## 2024-04-02 RX ORDER — OLANZAPINE 15 MG/1
10 TABLET, FILM COATED ORAL AT BEDTIME
Refills: 0 | Status: DISCONTINUED | OUTPATIENT
Start: 2024-04-02 | End: 2024-04-05

## 2024-04-02 RX ORDER — ALTEPLASE 100 MG
10 KIT INTRAVENOUS EVERY 12 HOURS
Refills: 0 | Status: DISCONTINUED | OUTPATIENT
Start: 2024-04-02 | End: 2024-04-03

## 2024-04-02 RX ORDER — ALBUTEROL 90 UG/1
2 AEROSOL, METERED ORAL EVERY 6 HOURS
Refills: 0 | Status: DISCONTINUED | OUTPATIENT
Start: 2024-04-02 | End: 2024-04-05

## 2024-04-02 RX ORDER — MIRTAZAPINE 45 MG/1
15 TABLET, ORALLY DISINTEGRATING ORAL AT BEDTIME
Refills: 0 | Status: DISCONTINUED | OUTPATIENT
Start: 2024-04-02 | End: 2024-04-05

## 2024-04-02 RX ORDER — DORNASE ALFA 1 MG/ML
5 SOLUTION RESPIRATORY (INHALATION) EVERY 12 HOURS
Refills: 0 | Status: DISCONTINUED | OUTPATIENT
Start: 2024-04-02 | End: 2024-04-03

## 2024-04-02 RX ORDER — POLYETHYLENE GLYCOL 3350 17 G/17G
17 POWDER, FOR SOLUTION ORAL DAILY
Refills: 0 | Status: DISCONTINUED | OUTPATIENT
Start: 2024-04-02 | End: 2024-04-05

## 2024-04-02 RX ORDER — DESVENLAFAXINE 50 MG/1
50 TABLET, EXTENDED RELEASE ORAL DAILY
Refills: 0 | Status: DISCONTINUED | OUTPATIENT
Start: 2024-04-02 | End: 2024-04-05

## 2024-04-02 RX ORDER — BUMETANIDE 0.25 MG/ML
2 INJECTION INTRAMUSCULAR; INTRAVENOUS DAILY
Refills: 0 | Status: DISCONTINUED | OUTPATIENT
Start: 2024-04-02 | End: 2024-04-03

## 2024-04-02 RX ORDER — SODIUM CHLORIDE 9 MG/ML
60 INJECTION INTRAMUSCULAR; INTRAVENOUS; SUBCUTANEOUS EVERY 12 HOURS
Refills: 0 | Status: DISCONTINUED | OUTPATIENT
Start: 2024-04-02 | End: 2024-04-03

## 2024-04-02 RX ORDER — VENLAFAXINE HCL 75 MG
50 CAPSULE, EXT RELEASE 24 HR ORAL DAILY
Refills: 0 | Status: DISCONTINUED | OUTPATIENT
Start: 2024-04-02 | End: 2024-04-02

## 2024-04-02 RX ADMIN — ATORVASTATIN CALCIUM 80 MILLIGRAM(S): 80 TABLET, FILM COATED ORAL at 22:04

## 2024-04-02 RX ADMIN — OLANZAPINE 10 MILLIGRAM(S): 15 TABLET, FILM COATED ORAL at 22:04

## 2024-04-02 RX ADMIN — MIRTAZAPINE 15 MILLIGRAM(S): 45 TABLET, ORALLY DISINTEGRATING ORAL at 22:05

## 2024-04-02 RX ADMIN — BUMETANIDE 2 MILLIGRAM(S): 0.25 INJECTION INTRAMUSCULAR; INTRAVENOUS at 18:03

## 2024-04-02 RX ADMIN — CARBIDOPA AND LEVODOPA 1 TABLET(S): 25; 100 TABLET ORAL at 18:02

## 2024-04-02 RX ADMIN — DESVENLAFAXINE 50 MILLIGRAM(S): 50 TABLET, EXTENDED RELEASE ORAL at 22:05

## 2024-04-02 RX ADMIN — HEPARIN SODIUM 5000 UNIT(S): 5000 INJECTION INTRAVENOUS; SUBCUTANEOUS at 14:02

## 2024-04-02 RX ADMIN — HEPARIN SODIUM 5000 UNIT(S): 5000 INJECTION INTRAVENOUS; SUBCUTANEOUS at 22:05

## 2024-04-02 RX ADMIN — SACUBITRIL AND VALSARTAN 1 TABLET(S): 24; 26 TABLET, FILM COATED ORAL at 18:02

## 2024-04-02 NOTE — CONSULT NOTE ADULT - SUBJECTIVE AND OBJECTIVE BOX
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 Admitted to thoracic for TPA procedure of the catheter. Ptn has had same in the past.   Wife is at bedside and states ptn has been more edematous in his hands and feet, has difficulty emptying his bladder when he voids and has constant sensation like he may have a UTI. He is eating well, sleeping well, he remembered me . i established house calls care with him in 12/2023.   No fever, no chills    ROS: as above  PMH: as above  ALLERGIES: NKDA  Social:  , non smoker, ambulates with a walker, has a full time HHA    MEDICATIONS  (STANDING):  alteplase  Injectable for Pleural Effusion 10 milliGRAM(s) IntraPleural. every 12 hours  atorvastatin 80 milliGRAM(s) Oral at bedtime  buMETAnide 2 milliGRAM(s) Oral every 24 hours  carbidopa/levodopa  25/100 1 Tablet(s) Oral four times a day  desvenlafaxine ER 50 milliGRAM(s) Oral daily  dornase linda Solution for Pleural Effusion 5 milliGRAM(s) IntraPleural. every 12 hours  heparin   Injectable 5000 Unit(s) SubCutaneous every 8 hours  influenza  Vaccine (HIGH DOSE) 0.7 milliLiter(s) IntraMuscular once  mirtazapine 15 milliGRAM(s) Oral at bedtime  OLANZapine 10 milliGRAM(s) Oral at bedtime  polyethylene glycol 3350 17 Gram(s) Oral daily  sacubitril 24 mG/valsartan 26 mG 1 Tablet(s) Oral two times a day  sodium chloride 0.9% Solution for Pleural Effusion 60 milliLiter(s) IntraPleural. every 12 hours    MEDICATIONS  (PRN):  albuterol    90 MICROgram(s) HFA Inhaler 2 Puff(s) Inhalation every 6 hours PRN for shortness of breath and/or wheezing      Vital Signs Last 24 Hrs  T(F): 98.3 (04-02-24 @ 11:43), Max: 98.3 (04-02-24 @ 11:43)  HR: 64 (04-02-24 @ 11:43) (64 - 64)  BP: 111/54 (04-02-24 @ 11:43) (111/54 - 111/54)  RR: 18 (04-02-24 @ 11:43) (18 - 18)  SpO2: 99% (04-02-24 @ 11:43) (99% - 99%)  Telemetry:   CAPILLARY BLOOD GLUCOSE        I&O's Summary    02 Apr 2024 07:01  -  02 Apr 2024 15:44  --------------------------------------------------------  IN: 0 mL / OUT: 0 mL / NET: 0 mL        PHYSICAL EXAM:  GENERAL: NAD, well-developed  HEAD:  Atraumatic, Normocephalic  EYES: EOMI, PERRLA, conjunctiva and sclera clear  NECK: Supple, No JVD  CHEST/LUNG: Clear to auscultation bilaterally; No wheeze  HEART: Regular rate and rhythm; No murmurs, rubs, or gallops  ABDOMEN: Soft, Nontender, Nondistended; Bowel sounds present  EXTREMITIES:  2+ Peripheral Pulses, No clubbing, cyanosis, or edema  PSYCH: AAOx3  NEUROLOGY: non-focal  SKIN: No rashes or lesions    LABS:                        10.2   7.45  )-----------( 220      ( 02 Apr 2024 14:54 )             32.0     04-02    140  |  105  |  58<H>  ----------------------------<  105<H>  5.4<H>   |  23  |  2.94<H>    Ca    9.0      02 Apr 2024 14:54  Phos  4.7     04-02  Mg     2.60     04-02    TPro  7.0  /  Alb  3.4  /  TBili  0.2  /  DBili  x   /  AST  25  /  ALT  9   /  AlkPhos  78  04-02    PT/INR - ( 02 Apr 2024 14:54 )   PT: 13.1 sec;   INR: 1.16 ratio         PTT - ( 02 Apr 2024 14:54 )  PTT:36.2 sec

## 2024-04-02 NOTE — PATIENT PROFILE ADULT - FALL HARM RISK - HARM RISK INTERVENTIONS

## 2024-04-02 NOTE — H&P ADULT - HISTORY OF PRESENT ILLNESS
83M PMHx HTN, HLD, ICD/ afib on Eliquis (last dose 3/30), PLavix (last dose 3/26), Parkinson's disease, history of chronic pleural effusions, status post R sided VATS Pleurx cathter placement, presents with clogged pleurx catheter.

## 2024-04-02 NOTE — CONSULT NOTE ADULT - SUBJECTIVE AND OBJECTIVE BOX
CARDIOLOGY CONSULT NOTE - DR. SOLIS        Date of Service: 04-02-24 @ 20:51      HPI:  83M PMHx HTN, HLD, ICD/ afib on Eliquis (last dose 3/30), PLavix (last dose 3/26), Parkinson's disease, history of chronic pleural effusions, status post R sided VATS Pleurx cathter placement, presents with clogged pleurx catheter.  (02 Apr 2024 13:06)    no chest pain  less dyspnea      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      Parkinsons disease      CAD (coronary artery disease)  s/p PCI 08/21      Pleural effusion, not elsewhere classified      COVID-19 vaccine series completed  w booster      History of hip replacement, total  R hip 2008 & L hip      Status post cataract extraction  right eye cataract extraction with IOL 6/26/2015      Presence of cardiac pacemaker  10/2021      H/O coronary angioplasty  8/2021 1 stent inserted            PREVIOUS DIAGNOSTIC TESTING:    [ ] Echocardiogram:  [ ]  Catheterization:  [ ] Stress Test:  	    MEDICATIONS:    Home Medications:  ascorbic acid 500 mg oral tablet: 1 tab(s) orally once a day (02 Apr 2024 12:55)  aspirin 81 mg oral delayed release tablet: 1 tab(s) orally once a day (02 Apr 2024 12:55)  bumetanide 2 mg oral tablet: 1 tab(s) orally once a day (02 Apr 2024 12:55)  carbidopa-levodopa 25 mg-100 mg oral tablet: 1.5 tab(s) orally 4 times a day (02 Apr 2024 12:55)  Crestor 20 mg oral tablet: 1 tab(s) orally once a day (02 Apr 2024 12:55)  cyanocobalamin 100 mcg oral tablet: 1 tab(s) orally (02 Apr 2024 12:55)  D3 25 mcg (1000 intl units) oral tablet: 1 tab(s) orally once a day (02 Apr 2024 12:55)  desvenlafaxine (as base) 50 mg oral tablet, extended release: 50 milligram(s) orally once a day (02 Apr 2024 12:55)  Eliquis 2.5 mg oral tablet: 1 tab(s) orally 2 times a day (02 Apr 2024 12:55)  Entresto 24 mg-26 mg oral tablet: 1 tab(s) orally once a day (02 Apr 2024 12:55)  mirtazapine 15 mg oral tablet: 1 tab(s) orally once a day (at bedtime) (02 Apr 2024 12:55)  OLANZapine 10 mg oral tablet: 1 tab(s) orally once a day (at bedtime) (02 Apr 2024 12:55)  Plavix 75 mg oral tablet: 75 milligram(s) orally once a day (02 Apr 2024 12:55)  polyethylene glycol 3350 oral powder for reconstitution: 17 gram(s) orally once a day (02 Apr 2024 12:55)  Vyndamax 61 mg oral capsule: 1 orally once a day (02 Apr 2024 12:55)      MEDICATIONS  (STANDING):  alteplase  Injectable for Pleural Effusion 10 milliGRAM(s) IntraPleural. every 12 hours  atorvastatin 80 milliGRAM(s) Oral at bedtime  buMETAnide Injectable 2 milliGRAM(s) IV Push daily  carbidopa/levodopa  25/100 1 Tablet(s) Oral four times a day  desvenlafaxine ER 50 milliGRAM(s) Oral daily  dornase linda Solution for Pleural Effusion 5 milliGRAM(s) IntraPleural. every 12 hours  heparin   Injectable 5000 Unit(s) SubCutaneous every 8 hours  influenza  Vaccine (HIGH DOSE) 0.7 milliLiter(s) IntraMuscular once  mirtazapine 15 milliGRAM(s) Oral at bedtime  OLANZapine 10 milliGRAM(s) Oral at bedtime  polyethylene glycol 3350 17 Gram(s) Oral daily  sacubitril 24 mG/valsartan 26 mG 1 Tablet(s) Oral two times a day  sodium chloride 0.9% Solution for Pleural Effusion 60 milliLiter(s) IntraPleural. every 12 hours      FAMILY HISTORY:  Family history of lung cancer (Mother)    Family history of esophageal cancer (Father)    FH: myocardial infarction (Grandparent)        SOCIAL HISTORY:    [x ] Non-smoker  [ ] Smoker  [ ] Alcohol    Allergies    No Known Allergies    Intolerances    	    REVIEW OF SYSTEMS:  CONSTITUTIONAL: No fever, weight loss, or fatigue  EYES: No eye pain, visual disturbances, or discharge  ENMT:  No difficulty hearing, tinnitus, vertigo; No sinus or throat pain  NECK: No pain or stiffness  RESPIRATORY: No cough, wheezing, chills or hemoptysis; +Shortness of Breath  CARDIOVASCULAR: as HPI  GASTROINTESTINAL: No abdominal or epigastric pain. No nausea, vomiting, or hematemesis; No diarrhea or constipation. No melena or hematochezia.  GENITOURINARY: No dysuria, frequency, hematuria, or incontinence  NEUROLOGICAL: No headaches, memory loss, loss of strength, numbness, or tremors  SKIN: No itching, burning, rashes, or lesions   	  [ ] All others negative	  [ ] Unable to obtain    PHYSICAL EXAM:    T(C): 36.2 (04-02-24 @ 20:34), Max: 36.8 (04-02-24 @ 11:43)  HR: 69 (04-02-24 @ 20:34) (62 - 69)  BP: 143/96 (04-02-24 @ 20:34) (106/58 - 143/96)  RR: 18 (04-02-24 @ 20:34) (18 - 18)  SpO2: 98% (04-02-24 @ 20:34) (97% - 99%)  Wt(kg): --  I&O's Summary    02 Apr 2024 07:01  -  02 Apr 2024 20:51  --------------------------------------------------------  IN: 360 mL / OUT: 630 mL / NET: -270 mL      Daily Height in cm: 172.72 (02 Apr 2024 11:43)    Daily     Appearance: Normal	  Psychiatry: A & O x 3, Mood & affect appropriate  HEENT:   Normal oral mucosa, PERRL, EOMI	  Lymphatic: No lymphadenopathy  Cardiovascular: Normal S1 S2,RRR, No JVD, No murmurs  Respiratory: Lungs clear to auscultation	dec  bs bases b/l  Gastrointestinal:  Soft, Non-tender, + BS	  Skin: No rashes, No ecchymoses, No cyanosis	  Neurologic: Non-focal  Extremities: Normal range of motion, b/l edema 2+    TELEMETRY: 	    ECG:  af, v paced   RADIOLOGY:  OTHER: 	  	  LABS:	 	    CARDIAC MARKERS:        proBNP:     Lipid Profile:   HgA1c:   TSH:                           10.2   7.45  )-----------( 220      ( 02 Apr 2024 14:54 )             32.0     04-02    140  |  105  |  58<H>  ----------------------------<  105<H>  5.4<H>   |  23  |  2.94<H>    Ca    9.0      02 Apr 2024 14:54  Phos  4.7     04-02  Mg     2.60     04-02    TPro  7.0  /  Alb  3.4  /  TBili  0.2  /  DBili  x   /  AST  25  /  ALT  9   /  AlkPhos  78  04-02    PT/INR - ( 02 Apr 2024 14:54 )   PT: 13.1 sec;   INR: 1.16 ratio         PTT - ( 02 Apr 2024 14:54 )  PTT:36.2 sec    Creatinine: 2.94 mg/dL (04-02-24 @ 14:54)        ASSESSMENT/PLAN:

## 2024-04-02 NOTE — PATIENT PROFILE ADULT - PATIENT'S GENDER IDENTITY
----- Message from Jung Giles LPN sent at 9/48/8689  8:16 AM EDT -----  Regarding: FW: Uti  Contact: [phone]    ----- Message -----  From: Fabio Santillan \"Pat\"  Sent: 7/16/2023   1:41 PM EDT  To: , #  Subject: Luellesteve Serve                                              Dr Alejandra Tim can give me loose stools. On June Dr Norma Draper at [facility] prescribed Kflex,  Under the Generic name, for 5 days  I believe you can see that visit on my shared chart  Since my visit to you I have been having symptoms i.e. low pelvic pain and frequent voiding. The closest [address] [place] [address] Avenue to where  we are housed is [facility] one not [facility] as I had previously told you.
Male

## 2024-04-02 NOTE — CONSULT NOTE ADULT - SUBJECTIVE AND OBJECTIVE BOX
HPI: Mr. Banerjee is an 83 year-old man with history of multiple medical issues including hypertension, atrial fibrillation, coronary artery disease, chronic kidney disease, and pleural effusions s/p R VATS/Pleurx placement. He presented today to the [facility] ER with a clogged Pleurx catheter. He was noted on admission to have azotemia with a creatinine of 2.9mg/dL, and potassium of 5.4meq/L; in light of this, a renal consultation was requested.    I see that he underwent fibrinolysis of the pleurx today.    PAST MEDICAL & SURGICAL HISTORY:  Hypertension  Hyperlipidemia  CKD  BPH - laser nucleation 2020  CAD - stent 2021  AFib  Bradycardia - PPM  Parkinsons disease  THR b/l   R Cataract extraction     Allergies  No Known Allergies    SOCIAL HISTORY:  Denies ETOh,Smoking,     FAMILY HISTORY:  Family history of lung cancer (Mother)  Family history of esophageal cancer (Father)  FH: myocardial infarction (Grandparent)    REVIEW OF SYSTEMS:  CONSTITUTIONAL: No weakness, fevers or chills  EYES/ENT: No visual changes;  No vertigo or throat pain   NECK: No pain or stiffness  RESPIRATORY: No cough, wheezing, hemoptysis; No shortness of breath  CARDIOVASCULAR: No chest pain or palpitations  GASTROINTESTINAL: No abdominal or epigastric pain. No nausea, vomiting, or hematemesis; No diarrhea or constipation. No melena or hematochezia.  GENITOURINARY: No dysuria, frequency or hematuria  NEUROLOGICAL: No numbness or weakness  SKIN: No itching, burning, rashes, or lesions   All other review of systems is negative unless indicated above.    VITAL:  T(C): , Max: 36.8 (24 @ 11:43)  T(F): , Max: 98.3 (24 @ 11:43)  HR: 62 (24 @ 16:27)  BP: 106/58 (24 @ 16:27)  BP(mean): --  RR: 18 (24 @ 16:27)  SpO2: 97% (24 @ 16:27)  Wt(kg): --    PHYSICAL EXAM:  Constitutional: NAD, Alert  HEENT: NCAT, MMM  Neck: Supple, No JVD  Respiratory: decreased BS at bases; (+)pleurx  Cardiovascular: RRR s1s2, no m/r/g  Gastrointestinal: BS+, soft, NT/ND  Extremities: No peripheral edema b/l  Neurological: no focal deficits; strength grossly intact  Back: no CVAT b/l  Skin: No rashes, no nevi    LABS:                        10.2   7.45  )-----------( 220      ( 2024 14:54 )             32.0     Na(140)/K(5.4)/Cl(105)/HCO3(23)/BUN(58)/Cr(2.94)Glu(105)/Ca(9.0)/Mg(2.60)/PO4(4.7)     @ 14:54    (24) - BUN 48, Cr 2.42, K 4.3      Urinalysis Basic - ( 2024 16:04 )  Color: Yellow / Appearance: Clear / S.009 / pH: x  Gluc: x / Ketone: Negative mg/dL  / Bili: Negative / Urobili: 0.2 mg/dL   Blood: x / Protein: 30 mg/dL / Nitrite: Negative   Leuk Esterase: Trace / RBC: 1 /HPF / WBC 5 /HPF   Sq Epi: x / Non Sq Epi: 1 /HPF / Bacteria: Negative /HPF      IMAGING:  < from: US Kidney and Bladder (24 @ 17:29) >  Right kidney: 11.6 cm. No renal mass, hydronephrosis or calculi.  Left kidney: 12.4 cm. No renal mass, hydronephrosis or calculi.  Urinary bladder: Underdistended.  IMPRESSION:  No obstructive uropathy    < from: CT Chest No Cont (24 @ 15:07) >  Right middle lobe bronchus narrowing secondary to nodularity is similar   to .  Small to moderate partially loculated bilateral pleural effusions,   unchanged on the left and enlarged on the right compared to the prior   study. Right-sided chest tube. Partial atelectasis both lower lobes.        ASSESSMENT:  (1)Renal - nonproteinuric CKD4  (2)Hyperkalemia - spurious in setting of severely hemolyzed specimen.   (3)Pulm- clogged pleurx - s/p fibrinolysis    RECOMMEND:  (1)Can forgo Lokelma  (2)No renal dietary restriction  (3)No objection to Entresto and Bumex as ordered      Thank you for involving [facility] in this patient's care.    With warm regards,    Clifton Cool MD   [facility]  Office: [phone]  Cell: [phone]               HPI: Mr. Banerjee is an 83 year-old man with history of multiple medical issues including hypertension, atrial fibrillation, coronary artery disease, chronic kidney disease, and pleural effusions s/p R VATS/Pleurx placement. He presented today to the [facility] ER with a clogged Pleurx catheter. He was noted on admission to have azotemia with a creatinine of 2.9mg/dL, and potassium of 5.4meq/L; in light of this, a renal consultation was requested.    I see that he underwent fibrinolysis of the pleurx today.    Mr. Banerjee, his wife Opal, and his caretaker provide history. They share that he does not regularly follow with a nephrologist on the outside. Opal brings up that his legs have been getting progressively more swollen over the past several days. They have no other complaints at present.    PAST MEDICAL & SURGICAL HISTORY:  Hypertension  Hyperlipidemia  CKD  BPH - laser nucleation 2020  CAD - stent 2021  AFib  Bradycardia - PPM  Parkinsons disease  THR b/l   R Cataract extraction     Allergies  No Known Allergies    SOCIAL HISTORY:  Denies ETOh,Smoking,     FAMILY HISTORY:  Family history of lung cancer (Mother)  Family history of esophageal cancer (Father)  FH: myocardial infarction (Grandparent)    REVIEW OF SYSTEMS:  CONSTITUTIONAL: No weakness, fevers or chills  EYES/ENT: No visual changes;  No vertigo or throat pain   NECK: No pain or stiffness  RESPIRATORY: No cough, wheezing, hemoptysis; No shortness of breath  CARDIOVASCULAR: No chest pain or palpitations; (+)b/l LE swelling  GASTROINTESTINAL: No abdominal or epigastric pain. No nausea, vomiting, or hematemesis; No diarrhea or constipation. No melena or hematochezia.  GENITOURINARY: No dysuria, frequency or hematuria  NEUROLOGICAL: No numbness or weakness  SKIN: No itching, burning, rashes, or lesions   All other review of systems is negative unless indicated above.    VITAL:  T(C): , Max: 36.8 (24 @ 11:43)  T(F): , Max: 98.3 (24 @ 11:43)  HR: 62 (24 @ 16:27)  BP: 106/58 (24 @ 16:27)  BP(mean): --  RR: 18 (24 @ 16:27)  SpO2: 97% (24 @ 16:27)  Wt(kg): --    PHYSICAL EXAM:  Constitutional: NAD, Alert  HEENT: NCAT, MMM  Neck: Supple, No JVD  Respiratory: decreased BS at bases; (+)pleurx  Cardiovascular: RRR s1s2, no m/r/g  Gastrointestinal: BS+, soft, NT/ND  : (+)condom cath  Extremities: 3+ b/l LE edema  Neurological: reduced generalized strength  Back: no CVAT b/l  Skin: No rashes, no nevi    LABS:                        10.2   7.45  )-----------( 220      ( 2024 14:54 )             32.0     Na(140)/K(5.4)/Cl(105)/HCO3(23)/BUN(58)/Cr(2.94)Glu(105)/Ca(9.0)/Mg(2.60)/PO4(4.7)     @ 14:54    (24) - BUN 48, Cr 2.42, K 4.3      Urinalysis Basic - ( 2024 16:04 )  Color: Yellow / Appearance: Clear / S.009 / pH: x  Gluc: x / Ketone: Negative mg/dL  / Bili: Negative / Urobili: 0.2 mg/dL   Blood: x / Protein: 30 mg/dL / Nitrite: Negative   Leuk Esterase: Trace / RBC: 1 /HPF / WBC 5 /HPF   Sq Epi: x / Non Sq Epi: 1 /HPF / Bacteria: Negative /HPF      IMAGING:  < from: US Kidney and Bladder (24 @ 17:29) >  Right kidney: 11.6 cm. No renal mass, hydronephrosis or calculi.  Left kidney: 12.4 cm. No renal mass, hydronephrosis or calculi.  Urinary bladder: Underdistended.  IMPRESSION:  No obstructive uropathy    < from: CT Chest No Cont (24 @ 15:07) >  Right middle lobe bronchus narrowing secondary to nodularity is similar   to .  Small to moderate partially loculated bilateral pleural effusions,   unchanged on the left and enlarged on the right compared to the prior   study. Right-sided chest tube. Partial atelectasis both lower lobes.        ASSESSMENT:  (1)Renal - nonproteinuric CKD4  (2)Hyperkalemia - spurious in setting of severely hemolyzed specimen.   (3)Pulm- clogged pleurx - s/p fibrinolysis  (4)CV - hypervolemic - warranting high-dose IV Bumex for now    RECOMMEND:  (1)Bumex as ordered - trend I/Os  (2)Can forgo Lokelma  (3)No renal dietary restriction  (4)No objection to Entresto and Bumex as ordered  (5)BMP+Mg+PO4 daily  (6)Dose new meds for GFR 15-20ml/min    Thank you for involving [facility] in this patient's care.    With warm regards,    Clifton Cool MD   [facility]  Office: [phone]  Cell: [phone]

## 2024-04-03 ENCOUNTER — RESULT REVIEW (OUTPATIENT)
Age: 84
End: 2024-04-03

## 2024-04-03 LAB
A1C WITH ESTIMATED AVERAGE GLUCOSE RESULT: 5.5 % — SIGNIFICANT CHANGE UP (ref 4–5.6)
ALBUMIN SERPL ELPH-MCNC: 3 G/DL — LOW (ref 3.3–5)
ALP SERPL-CCNC: 71 U/L — SIGNIFICANT CHANGE UP (ref 40–120)
ALT FLD-CCNC: 5 U/L — SIGNIFICANT CHANGE UP (ref 4–41)
ANION GAP SERPL CALC-SCNC: 13 MMOL/L — SIGNIFICANT CHANGE UP (ref 7–14)
APTT BLD: 35.5 SEC — SIGNIFICANT CHANGE UP (ref 24.5–35.6)
AST SERPL-CCNC: 6 U/L — SIGNIFICANT CHANGE UP (ref 4–40)
BASOPHILS # BLD AUTO: 0.04 K/UL — SIGNIFICANT CHANGE UP (ref 0–0.2)
BASOPHILS NFR BLD AUTO: 0.6 % — SIGNIFICANT CHANGE UP (ref 0–2)
BILIRUB SERPL-MCNC: 0.2 MG/DL — SIGNIFICANT CHANGE UP (ref 0.2–1.2)
BUN SERPL-MCNC: 57 MG/DL — HIGH (ref 7–23)
CALCIUM SERPL-MCNC: 8.7 MG/DL — SIGNIFICANT CHANGE UP (ref 8.4–10.5)
CHLORIDE SERPL-SCNC: 105 MMOL/L — SIGNIFICANT CHANGE UP (ref 98–107)
CO2 SERPL-SCNC: 23 MMOL/L — SIGNIFICANT CHANGE UP (ref 22–31)
CREAT SERPL-MCNC: 3.05 MG/DL — HIGH (ref 0.5–1.3)
EGFR: 20 ML/MIN/1.73M2 — LOW
EOSINOPHIL # BLD AUTO: 0.1 K/UL — SIGNIFICANT CHANGE UP (ref 0–0.5)
EOSINOPHIL NFR BLD AUTO: 1.4 % — SIGNIFICANT CHANGE UP (ref 0–6)
ESTIMATED AVERAGE GLUCOSE: 111 — SIGNIFICANT CHANGE UP
GLUCOSE SERPL-MCNC: 101 MG/DL — HIGH (ref 70–99)
HCT VFR BLD CALC: 29.6 % — LOW (ref 39–50)
HGB BLD-MCNC: 9.5 G/DL — LOW (ref 13–17)
IANC: 5.32 K/UL — SIGNIFICANT CHANGE UP (ref 1.8–7.4)
IMM GRANULOCYTES NFR BLD AUTO: 0.4 % — SIGNIFICANT CHANGE UP (ref 0–0.9)
INR BLD: 1.25 RATIO — HIGH (ref 0.85–1.18)
IRON SATN MFR SERPL: 20 UG/DL — LOW (ref 45–165)
LYMPHOCYTES # BLD AUTO: 0.87 K/UL — LOW (ref 1–3.3)
LYMPHOCYTES # BLD AUTO: 12.4 % — LOW (ref 13–44)
MAGNESIUM SERPL-MCNC: 2.5 MG/DL — SIGNIFICANT CHANGE UP (ref 1.6–2.6)
MCHC RBC-ENTMCNC: 30.5 PG — SIGNIFICANT CHANGE UP (ref 27–34)
MCHC RBC-ENTMCNC: 32.1 GM/DL — SIGNIFICANT CHANGE UP (ref 32–36)
MCV RBC AUTO: 95.2 FL — SIGNIFICANT CHANGE UP (ref 80–100)
MONOCYTES # BLD AUTO: 0.65 K/UL — SIGNIFICANT CHANGE UP (ref 0–0.9)
MONOCYTES NFR BLD AUTO: 9.3 % — SIGNIFICANT CHANGE UP (ref 2–14)
NEUTROPHILS # BLD AUTO: 5.32 K/UL — SIGNIFICANT CHANGE UP (ref 1.8–7.4)
NEUTROPHILS NFR BLD AUTO: 75.9 % — SIGNIFICANT CHANGE UP (ref 43–77)
NRBC # BLD: 0 /100 WBCS — SIGNIFICANT CHANGE UP (ref 0–0)
NRBC # FLD: 0 K/UL — SIGNIFICANT CHANGE UP (ref 0–0)
PHOSPHATE SERPL-MCNC: 3.9 MG/DL — SIGNIFICANT CHANGE UP (ref 2.5–4.5)
PLATELET # BLD AUTO: 214 K/UL — SIGNIFICANT CHANGE UP (ref 150–400)
POTASSIUM SERPL-MCNC: 4 MMOL/L — SIGNIFICANT CHANGE UP (ref 3.5–5.3)
POTASSIUM SERPL-SCNC: 4 MMOL/L — SIGNIFICANT CHANGE UP (ref 3.5–5.3)
PROT SERPL-MCNC: 6.1 G/DL — SIGNIFICANT CHANGE UP (ref 6–8.3)
PROTHROM AB SERPL-ACNC: 13.9 SEC — HIGH (ref 9.5–13)
RBC # BLD: 3.11 M/UL — LOW (ref 4.2–5.8)
RBC # FLD: 13.3 % — SIGNIFICANT CHANGE UP (ref 10.3–14.5)
SODIUM SERPL-SCNC: 141 MMOL/L — SIGNIFICANT CHANGE UP (ref 135–145)
T3 SERPL-MCNC: 94 NG/DL — SIGNIFICANT CHANGE UP (ref 80–200)
T4 AB SER-ACNC: 7.3 UG/DL — SIGNIFICANT CHANGE UP (ref 5.1–13)
TSH SERPL-MCNC: 1.69 UIU/ML — SIGNIFICANT CHANGE UP (ref 0.27–4.2)
WBC # BLD: 7.01 K/UL — SIGNIFICANT CHANGE UP (ref 3.8–10.5)
WBC # FLD AUTO: 7.01 K/UL — SIGNIFICANT CHANGE UP (ref 3.8–10.5)

## 2024-04-03 PROCEDURE — 93306 TTE W/DOPPLER COMPLETE: CPT | Mod: 26

## 2024-04-03 PROCEDURE — 99232 SBSQ HOSP IP/OBS MODERATE 35: CPT

## 2024-04-03 PROCEDURE — 71250 CT THORAX DX C-: CPT | Mod: 26

## 2024-04-03 PROCEDURE — 71045 X-RAY EXAM CHEST 1 VIEW: CPT | Mod: 26

## 2024-04-03 RX ORDER — LANOLIN ALCOHOL/MO/W.PET/CERES
6 CREAM (GRAM) TOPICAL AT BEDTIME
Refills: 0 | Status: DISCONTINUED | OUTPATIENT
Start: 2024-04-03 | End: 2024-04-05

## 2024-04-03 RX ORDER — SODIUM CHLORIDE 9 MG/ML
40 INJECTION INTRAMUSCULAR; INTRAVENOUS; SUBCUTANEOUS EVERY 12 HOURS
Refills: 0 | Status: DISCONTINUED | OUTPATIENT
Start: 2024-04-03 | End: 2024-04-05

## 2024-04-03 RX ORDER — DORNASE ALFA 1 MG/ML
5 SOLUTION RESPIRATORY (INHALATION) EVERY 12 HOURS
Refills: 0 | Status: DISCONTINUED | OUTPATIENT
Start: 2024-04-03 | End: 2024-04-05

## 2024-04-03 RX ORDER — ALTEPLASE 100 MG
10 KIT INTRAVENOUS EVERY 12 HOURS
Refills: 0 | Status: DISCONTINUED | OUTPATIENT
Start: 2024-04-03 | End: 2024-04-05

## 2024-04-03 RX ORDER — BUMETANIDE 0.25 MG/ML
2 INJECTION INTRAMUSCULAR; INTRAVENOUS
Refills: 0 | Status: DISCONTINUED | OUTPATIENT
Start: 2024-04-03 | End: 2024-04-05

## 2024-04-03 RX ORDER — SENNA PLUS 8.6 MG/1
2 TABLET ORAL AT BEDTIME
Refills: 0 | Status: DISCONTINUED | OUTPATIENT
Start: 2024-04-03 | End: 2024-04-05

## 2024-04-03 RX ADMIN — DESVENLAFAXINE 50 MILLIGRAM(S): 50 TABLET, EXTENDED RELEASE ORAL at 12:31

## 2024-04-03 RX ADMIN — POLYETHYLENE GLYCOL 3350 17 GRAM(S): 17 POWDER, FOR SOLUTION ORAL at 12:31

## 2024-04-03 RX ADMIN — CARBIDOPA AND LEVODOPA 1 TABLET(S): 25; 100 TABLET ORAL at 00:27

## 2024-04-03 RX ADMIN — Medication 6 MILLIGRAM(S): at 23:26

## 2024-04-03 RX ADMIN — HEPARIN SODIUM 5000 UNIT(S): 5000 INJECTION INTRAVENOUS; SUBCUTANEOUS at 05:33

## 2024-04-03 RX ADMIN — CARBIDOPA AND LEVODOPA 1 TABLET(S): 25; 100 TABLET ORAL at 05:30

## 2024-04-03 RX ADMIN — CARBIDOPA AND LEVODOPA 1 TABLET(S): 25; 100 TABLET ORAL at 23:26

## 2024-04-03 RX ADMIN — OLANZAPINE 10 MILLIGRAM(S): 15 TABLET, FILM COATED ORAL at 23:25

## 2024-04-03 RX ADMIN — SACUBITRIL AND VALSARTAN 1 TABLET(S): 24; 26 TABLET, FILM COATED ORAL at 05:31

## 2024-04-03 RX ADMIN — BUMETANIDE 2 MILLIGRAM(S): 0.25 INJECTION INTRAMUSCULAR; INTRAVENOUS at 18:39

## 2024-04-03 RX ADMIN — CARBIDOPA AND LEVODOPA 1 TABLET(S): 25; 100 TABLET ORAL at 18:38

## 2024-04-03 RX ADMIN — MIRTAZAPINE 15 MILLIGRAM(S): 45 TABLET, ORALLY DISINTEGRATING ORAL at 23:25

## 2024-04-03 RX ADMIN — ATORVASTATIN CALCIUM 80 MILLIGRAM(S): 80 TABLET, FILM COATED ORAL at 23:25

## 2024-04-03 RX ADMIN — CARBIDOPA AND LEVODOPA 1 TABLET(S): 25; 100 TABLET ORAL at 12:31

## 2024-04-03 RX ADMIN — SENNA PLUS 2 TABLET(S): 8.6 TABLET ORAL at 23:26

## 2024-04-03 RX ADMIN — HEPARIN SODIUM 5000 UNIT(S): 5000 INJECTION INTRAVENOUS; SUBCUTANEOUS at 12:31

## 2024-04-03 RX ADMIN — HEPARIN SODIUM 5000 UNIT(S): 5000 INJECTION INTRAVENOUS; SUBCUTANEOUS at 23:32

## 2024-04-03 RX ADMIN — BUMETANIDE 2 MILLIGRAM(S): 0.25 INJECTION INTRAMUSCULAR; INTRAVENOUS at 06:29

## 2024-04-03 NOTE — PHYSICAL THERAPY INITIAL EVALUATION ADULT - MANUAL MUSCLE TESTING RESULTS, REHAB EVAL
Grossly 3/5 throughout, except left lower extremity 2+/5 Grossly 3/5 throughout Grossly 2+/5 throughout

## 2024-04-03 NOTE — PROGRESS NOTE ADULT - SUBJECTIVE AND OBJECTIVE BOX
Subjective & objective:  PT was seen and examined by thoracic surgery. PT is sitting on chair, on nasal cannula, not in acute distress. Denies SOB, chest pain, nausea, vomiting, diarrhea, dizziness.    Vital Signs:  Vital Signs Last 24 Hrs  T(C): 36.4 (04-03-24 @ 16:08), Max: 37.4 (04-03-24 @ 06:03)  T(F): 97.6 (04-03-24 @ 16:08), Max: 99.3 (04-03-24 @ 06:03)  HR: 63 (04-03-24 @ 16:08) (61 - 71)  BP: 108/67 (04-03-24 @ 16:08) (103/43 - 143/96)  RR: 18 (04-03-24 @ 16:08) (18 - 18)  SpO2: 95% (04-03-24 @ 16:08) (95% - 98%) on (O2)    PE:  General: awake and alert NAD  Neurology: A&Ox3, nonfocal, GUILLORY x 4  Respiratory: CTA B/L  CV: RRR, S1S2, no murmurs, rubs or gallops  Abdominal: Soft, NT, ND +BS, Last BM  Extremities: No edema, + peripheral pulses  Tubes: Right PLeurx catheter to suction, output:420 ml/24 hours, no AL  Relevant labs, radiology and Medications reviewed                        9.5    7.01  )-----------( 214      ( 03 Apr 2024 06:41 )             29.6     04-03    141  |  105  |  57<H>  ----------------------------<  101<H>  4.0   |  23  |  3.05<H>    Ca    8.7      03 Apr 2024 06:41  Phos  3.9     04-03  Mg     2.50     04-03    TPro  6.1  /  Alb  3.0<L>  /  TBili  0.2  /  DBili  x   /  AST  6   /  ALT  5   /  AlkPhos  71  04-03    PT/INR - ( 03 Apr 2024 06:41 )   PT: 13.9 sec;   INR: 1.25 ratio         PTT - ( 03 Apr 2024 06:41 )  PTT:35.5 sec  MEDICATIONS  (STANDING):  alteplase  Injectable for Pleural Effusion 10 milliGRAM(s) IntraPleural. every 12 hours  atorvastatin 80 milliGRAM(s) Oral at bedtime  buMETAnide Injectable 2 milliGRAM(s) IV Push two times a day  carbidopa/levodopa  25/100 1 Tablet(s) Oral four times a day  desvenlafaxine ER 50 milliGRAM(s) Oral daily  dornase linda Solution for Pleural Effusion 5 milliGRAM(s) IntraPleural. every 12 hours  heparin   Injectable 5000 Unit(s) SubCutaneous every 8 hours  influenza  Vaccine (HIGH DOSE) 0.7 milliLiter(s) IntraMuscular once  mirtazapine 15 milliGRAM(s) Oral at bedtime  OLANZapine 10 milliGRAM(s) Oral at bedtime  polyethylene glycol 3350 17 Gram(s) Oral daily  sacubitril 24 mG/valsartan 26 mG 1 Tablet(s) Oral two times a day  sodium chloride 0.9% Solution for Pleural Effusion 40 milliLiter(s) IntraPleural. every 12 hours    MEDICATIONS  (PRN):  albuterol    90 MICROgram(s) HFA Inhaler 2 Puff(s) Inhalation every 6 hours PRN for shortness of breath and/or wheezing    Pertinent Physical Exam  I&O's Summary    02 Apr 2024 07:01  -  03 Apr 2024 07:00  --------------------------------------------------------  IN: 600 mL / OUT: 1440 mL / NET: -840 mL    03 Apr 2024 07:01  -  03 Apr 2024 17:19  --------------------------------------------------------  IN: 0 mL / OUT: 300 mL / NET: -300 mL    Marital Status:  (   )    (   ) Single    (   )    (  )   Lives with: (  ) alone  (  ) children   (  ) spouse   (  ) parents  (  ) other  Recent Travel: No recent travel  Occupation:    Substance Use (street drugs): ( x ) never used  (  ) other:  Tobacco Usage:  ( x  ) never smoked   (   ) former smoker   (   ) current smoker  (     ) pack year  Alcohol Usage: None       Assessment  82 y/o male with PMH HTN, HLD, CAD (stent), Afib w/ pace maker, Ischemic Heart Dz, BPH, Parkinson's bedbound/stand pivot/walker, has HHA, chronic pleural effusion, admitted for Pleurx catheter clogged. 4/2 First dose of MIST therapy performed.    PLAN  Neuro: Pain management as needed  Pulm: Encourage coughing, deep breathing and use of incentive spirometry.  Daily CXR.   Cardio: Monitor telemetry/alarms  GI: Tolerating diet. Continue stool softeners.  Renal: monitor urine output, supplement electrolytes as needed  Vasc: Heparin SC/SCDs for DVT prophylaxis  Heme: Stable H/H. .   ID: Off antibiotics. Stable.  Therapy: OOB/ambulate  Tubes: Monitor Chest tube output. Keep Pleurx catheter to suction  Disposition: Aim to D/C to home once medically stable  Discussed with Cardiothoracic Team at AM rounds.

## 2024-04-03 NOTE — CONSULT NOTE ADULT - SUBJECTIVE AND OBJECTIVE BOX
Neurology Consult    Reason for Consult: Patient is a 83y old  Male who presents with a chief complaint of Clogged Pleurx (03 Apr 2024 08:34)      HPI:  83M PMHx HTN, HLD, ICD/ afib on Eliquis (last dose 3/30), PLavix (last dose 3/26), Parkinson's disease, history of chronic pleural effusions, status post R sided VATS Pleurx cathter placement, presents with clogged pleurx catheter.  (02 Apr 2024 13:06)       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      Parkinsons disease      CAD (coronary artery disease)  s/p PCI 08/21      Pleural effusion, not elsewhere classified      COVID-19 vaccine series completed  w booster      History of hip replacement, total  R hip 2008 & L hip      Status post cataract extraction  right eye cataract extraction with IOL 6/26/2015      Presence of cardiac pacemaker  10/2021      H/O coronary angioplasty  8/2021 1 stent inserted          Allergies: Allergies    No Known Allergies    Intolerances        Social History: Denies toxic habits including tobacco, ETOH or illicit drugs.    Family History: FAMILY HISTORY:  Family history of lung cancer (Mother)    Family history of esophageal cancer (Father)    FH: myocardial infarction (Grandparent)    . No family history of strokes    Medications: MEDICATIONS  (STANDING):  alteplase  Injectable for Pleural Effusion 10 milliGRAM(s) IntraPleural. every 12 hours  atorvastatin 80 milliGRAM(s) Oral at bedtime  buMETAnide Injectable 2 milliGRAM(s) IV Push daily  carbidopa/levodopa  25/100 1 Tablet(s) Oral four times a day  desvenlafaxine ER 50 milliGRAM(s) Oral daily  dornase linda Solution for Pleural Effusion 5 milliGRAM(s) IntraPleural. every 12 hours  heparin   Injectable 5000 Unit(s) SubCutaneous every 8 hours  influenza  Vaccine (HIGH DOSE) 0.7 milliLiter(s) IntraMuscular once  mirtazapine 15 milliGRAM(s) Oral at bedtime  OLANZapine 10 milliGRAM(s) Oral at bedtime  polyethylene glycol 3350 17 Gram(s) Oral daily  sacubitril 24 mG/valsartan 26 mG 1 Tablet(s) Oral two times a day  sodium chloride 0.9% Solution for Pleural Effusion 60 milliLiter(s) IntraPleural. every 12 hours    MEDICATIONS  (PRN):  albuterol    90 MICROgram(s) HFA Inhaler 2 Puff(s) Inhalation every 6 hours PRN for shortness of breath and/or wheezing      Review of Systems:  CONSTITUTIONAL:  No weight loss, fever, chills, weakness or fatigue.  HEENT:  Eyes:  No visual loss, blurred vision, double vision or yellow sclera. Ears, Nose, Throat:  No hearing loss, sneezing, congestion, runny nose or sore throat.  SKIN:  No rash or itching.  CARDIOVASCULAR:  No chest pain, chest pressure or chest discomfort. No palpitations or edema.  RESPIRATORY:  No shortness of breath, cough or sputum.  GASTROINTESTINAL:  No anorexia, nausea, vomiting or diarrhea. No abdominal pain or blood.  GENITOURINARY:  No burning on urination or incontinence   NEUROLOGICAL:  No headache, dizziness, syncope, paralysis, ataxia, numbness or tingling in the extremities. No change in bowel or bladder control. no limb weakness. no vision changes.   MUSCULOSKELETAL:  No muscle, back pain, joint pain or stiffness.  HEMATOLOGIC:  No anemia, bleeding or bruising.  LYMPHATICS:  No enlarged nodes. No history of splenectomy.  PSYCHIATRIC:  No history of depression or anxiety.  ENDOCRINOLOGIC:  No reports of sweating, cold or heat intolerance. No polyuria or polydipsia.      Vitals:  Vital Signs Last 24 Hrs  T(C): 36.8 (03 Apr 2024 12:23), Max: 37.4 (03 Apr 2024 06:03)  T(F): 98.2 (03 Apr 2024 12:23), Max: 99.3 (03 Apr 2024 06:03)  HR: 61 (03 Apr 2024 12:23) (61 - 71)  BP: 103/43 (03 Apr 2024 12:23) (103/43 - 143/96)  BP(mean): --  RR: 18 (03 Apr 2024 12:23) (18 - 18)  SpO2: 96% (03 Apr 2024 12:23) (96% - 98%)    Parameters below as of 03 Apr 2024 12:23  Patient On (Oxygen Delivery Method): room air        General Exam:   General Appearance: Appropriately dressed and in no acute distress       Head: Normocephalic, atraumatic and no dysmorphic features  Ear, Nose, and Throat: Moist mucous membranes  CVS: S1S2+  Resp: No SOB, no wheeze or rhonchi  GI: soft NT/ND  Extremities: No edema or cyanosis  Skin: No bruises or rashes     Neurological Exam:  Mental Status: Awake, alert and oriented x 3.  Able to follow simple and complex verbal commands. Able to name and repeat. fluent speech. No obvious aphasia or dysarthria noted.   Cranial Nerves: PERRL, EOMI, VFFC, sensation V1-V3 intact,  no obvious facial asymmetry, equal elevation of palate, scm/trap 5/5, tongue is midline on protrusion. no obvious papilledema on fundoscopic exam. hearing is grossly intact.   Motor: Normal bulk, tone and strength throughout. Fine finger movements were intact and symmetric. no tremors or drift noted.    Sensation: Intact to light touch and pinprick throughout. no right/left confusion. no extinction to tactile on DSS. Romberg was negative.   Reflexes: 1+ throughout at biceps, brachioradialis, triceps, patellars and ankles bilaterally and equal. No clonus. R toe and L toe were both downgoing.  Coordination: No dysmetria on FNF or HKS  Gait: Narrow based and steady. Able to tandem. no limitations in gait.     Data/Labs/Imaging which I personally reviewed.     Labs:     CBC Full  -  ( 03 Apr 2024 06:41 )  WBC Count : 7.01 K/uL  RBC Count : 3.11 M/uL  Hemoglobin : 9.5 g/dL  Hematocrit : 29.6 %  Platelet Count - Automated : 214 K/uL  Mean Cell Volume : 95.2 fL  Mean Cell Hemoglobin : 30.5 pg  Mean Cell Hemoglobin Concentration : 32.1 gm/dL  Auto Neutrophil # : 5.32 K/uL  Auto Lymphocyte # : 0.87 K/uL  Auto Monocyte # : 0.65 K/uL  Auto Eosinophil # : 0.10 K/uL  Auto Basophil # : 0.04 K/uL  Auto Neutrophil % : 75.9 %  Auto Lymphocyte % : 12.4 %  Auto Monocyte % : 9.3 %  Auto Eosinophil % : 1.4 %  Auto Basophil % : 0.6 %    04-03    141  |  105  |  57<H>  ----------------------------<  101<H>  4.0   |  23  |  3.05<H>    Ca    8.7      03 Apr 2024 06:41  Phos  3.9     04-03  Mg     2.50     04-03    TPro  6.1  /  Alb  3.0<L>  /  TBili  0.2  /  DBili  x   /  AST  6   /  ALT  5   /  AlkPhos  71  04-03    LIVER FUNCTIONS - ( 03 Apr 2024 06:41 )  Alb: 3.0 g/dL / Pro: 6.1 g/dL / ALK PHOS: 71 U/L / ALT: 5 U/L / AST: 6 U/L / GGT: x           PT/INR - ( 03 Apr 2024 06:41 )   PT: 13.9 sec;   INR: 1.25 ratio         PTT - ( 03 Apr 2024 06:41 )  PTT:35.5 sec  Urinalysis Basic - ( 03 Apr 2024 06:41 )    Color: x / Appearance: x / SG: x / pH: x  Gluc: 101 mg/dL / Ketone: x  / Bili: x / Urobili: x   Blood: x / Protein: x / Nitrite: x   Leuk Esterase: x / RBC: x / WBC x   Sq Epi: x / Non Sq Epi: x / Bacteria: x          < from: CT Head No Cont (11.16.23 @ 19:43) >    ACC: [id number] EXAM:  CT BRAIN   ORDERED BY: SCHUYLER HARVEY     PROCEDURE DATE:  11/16/2023          INTERPRETATION:  CLINICAL INFORMATION: L side weakness. LCT. Admitting   Dxs: R05.9 R05.9.    TECHNIQUE: Sequential axial images were obtained from the vertex to the   skull base without intravenous contrast. Coronal and sagittal   reformations were obtained.    COMPARISON: Prior CT dated 2/4/23.    FINDINGS:    There is no acute intracranial hemorrhage or mass effect. There are areas   of hypodensity in the bilateral hemispheric white matter suggesting white   matter microvascular ischemic change. There is cerebral volume loss.    There is no extraaxial fluid collection.    There is no displaced calvarial fracture. Status post bilateral   intraocular lens implants. Small right posterior ethmoid sinus osteoma.   The mastoid air cells are well aerated.      IMPRESSION:    < end of copied text >   Neurology Consult    Reason for Consult: Patient is a 83y old  Male who presents with a chief complaint of Clogged Pleurx (03 Apr 2024 08:34)      HPI:  83M PMHx HTN, HLD, ICD/ afib on Eliquis (last dose 3/30), PLavix (last dose 3/26), Parkinson's disease, history of chronic pleural effusions, status post R sided VATS Pleurx cathter placement, presents with clogged pleurx catheter.  (02 Apr 2024 13:06)       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      Parkinsons disease      CAD (coronary artery disease)  s/p PCI 08/21      Pleural effusion, not elsewhere classified      COVID-19 vaccine series completed  w booster      History of hip replacement, total  R hip 2008 & L hip      Status post cataract extraction  right eye cataract extraction with IOL 6/26/2015      Presence of cardiac pacemaker  10/2021      H/O coronary angioplasty  8/2021 1 stent inserted          Allergies: Allergies    No Known Allergies    Intolerances        Social History: Denies toxic habits including tobacco, ETOH or illicit drugs.    Family History: FAMILY HISTORY:  Family history of lung cancer (Mother)    Family history of esophageal cancer (Father)    FH: myocardial infarction (Grandparent)    . No family history of strokes    Medications: MEDICATIONS  (STANDING):  alteplase  Injectable for Pleural Effusion 10 milliGRAM(s) IntraPleural. every 12 hours  atorvastatin 80 milliGRAM(s) Oral at bedtime  buMETAnide Injectable 2 milliGRAM(s) IV Push daily  carbidopa/levodopa  25/100 1 Tablet(s) Oral four times a day  desvenlafaxine ER 50 milliGRAM(s) Oral daily  dornase linda Solution for Pleural Effusion 5 milliGRAM(s) IntraPleural. every 12 hours  heparin   Injectable 5000 Unit(s) SubCutaneous every 8 hours  influenza  Vaccine (HIGH DOSE) 0.7 milliLiter(s) IntraMuscular once  mirtazapine 15 milliGRAM(s) Oral at bedtime  OLANZapine 10 milliGRAM(s) Oral at bedtime  polyethylene glycol 3350 17 Gram(s) Oral daily  sacubitril 24 mG/valsartan 26 mG 1 Tablet(s) Oral two times a day  sodium chloride 0.9% Solution for Pleural Effusion 60 milliLiter(s) IntraPleural. every 12 hours    MEDICATIONS  (PRN):  albuterol    90 MICROgram(s) HFA Inhaler 2 Puff(s) Inhalation every 6 hours PRN for shortness of breath and/or wheezing      Review of Systems:  CONSTITUTIONAL:  No weight loss, fever, chills, weakness or fatigue.  HEENT:  Eyes:  No visual loss, blurred vision, double vision or yellow sclera. Ears, Nose, Throat:  No hearing loss, sneezing, congestion, runny nose or sore throat.  SKIN:  No rash or itching.  CARDIOVASCULAR:  No chest pain, chest pressure or chest discomfort. No palpitations or edema.  RESPIRATORY:  No shortness of breath, cough or sputum.  GASTROINTESTINAL:  No anorexia, nausea, vomiting or diarrhea. No abdominal pain or blood.  GENITOURINARY:  No burning on urination or incontinence   NEUROLOGICAL:  No headache, dizziness, syncope, paralysis, ataxia, numbness or tingling in the extremities. No change in bowel or bladder control. no limb weakness. no vision changes.   MUSCULOSKELETAL:  No muscle, back pain, joint pain or stiffness.  HEMATOLOGIC:  No anemia, bleeding or bruising.  LYMPHATICS:  No enlarged nodes. No history of splenectomy.  PSYCHIATRIC:  No history of depression or anxiety.  ENDOCRINOLOGIC:  No reports of sweating, cold or heat intolerance. No polyuria or polydipsia.      Vitals:  Vital Signs Last 24 Hrs  T(C): 36.8 (03 Apr 2024 12:23), Max: 37.4 (03 Apr 2024 06:03)  T(F): 98.2 (03 Apr 2024 12:23), Max: 99.3 (03 Apr 2024 06:03)  HR: 61 (03 Apr 2024 12:23) (61 - 71)  BP: 103/43 (03 Apr 2024 12:23) (103/43 - 143/96)  BP(mean): --  RR: 18 (03 Apr 2024 12:23) (18 - 18)  SpO2: 96% (03 Apr 2024 12:23) (96% - 98%)    Parameters below as of 03 Apr 2024 12:23  Patient On (Oxygen Delivery Method): room air        General Exam:   General Appearance: Appropriately dressed and in no acute distress       Head: Normocephalic, atraumatic and no dysmorphic features  Ear, Nose, and Throat: Moist mucous membranes  CVS: S1S2+  Resp: No SOB, no wheeze or rhonchi, + pleurx catheter  GI: soft NT/ND  Extremities: No edema or cyanosis  Skin: No bruises or rashes     Neurological Exam:  Mental Status: Awake, alert and oriented x 3.  Able to follow simple commands. Able to name and repeat. fluent speech. No obvious aphasia, mild dysarthria. bradykinesia    Cranial Nerves: PERRL, EOMI, VFFC, sensation V1-V3 intact,  no obvious facial asymmetry, hypomimia, equal elevation of palate, scm/trap 5/5, tongue is midline on protrusion.  hearing is grossly intact.   Motor: slight inc tone throughout uppers > lowers. Slight resting tremor b/l, + cogwheeling  Sensation: Intact to light touch and pinprick throughout.   Reflexes: 1+ throughout at biceps, brachioradialis, triceps, patellars and ankles bilaterally and equal. No clonus. R toe and L toe were both downgoing.  Coordination: No dysmetria on FNF   Gait: deferred    Data/Labs/Imaging which I personally reviewed.     Labs:     CBC Full  -  ( 03 Apr 2024 06:41 )  WBC Count : 7.01 K/uL  RBC Count : 3.11 M/uL  Hemoglobin : 9.5 g/dL  Hematocrit : 29.6 %  Platelet Count - Automated : 214 K/uL  Mean Cell Volume : 95.2 fL  Mean Cell Hemoglobin : 30.5 pg  Mean Cell Hemoglobin Concentration : 32.1 gm/dL  Auto Neutrophil # : 5.32 K/uL  Auto Lymphocyte # : 0.87 K/uL  Auto Monocyte # : 0.65 K/uL  Auto Eosinophil # : 0.10 K/uL  Auto Basophil # : 0.04 K/uL  Auto Neutrophil % : 75.9 %  Auto Lymphocyte % : 12.4 %  Auto Monocyte % : 9.3 %  Auto Eosinophil % : 1.4 %  Auto Basophil % : 0.6 %    04-03    141  |  105  |  57<H>  ----------------------------<  101<H>  4.0   |  23  |  3.05<H>    Ca    8.7      03 Apr 2024 06:41  Phos  3.9     04-03  Mg     2.50     04-03    TPro  6.1  /  Alb  3.0<L>  /  TBili  0.2  /  DBili  x   /  AST  6   /  ALT  5   /  AlkPhos  71  04-03    LIVER FUNCTIONS - ( 03 Apr 2024 06:41 )  Alb: 3.0 g/dL / Pro: 6.1 g/dL / ALK PHOS: 71 U/L / ALT: 5 U/L / AST: 6 U/L / GGT: x           PT/INR - ( 03 Apr 2024 06:41 )   PT: 13.9 sec;   INR: 1.25 ratio         PTT - ( 03 Apr 2024 06:41 )  PTT:35.5 sec  Urinalysis Basic - ( 03 Apr 2024 06:41 )    Color: x / Appearance: x / SG: x / pH: x  Gluc: 101 mg/dL / Ketone: x  / Bili: x / Urobili: x   Blood: x / Protein: x / Nitrite: x   Leuk Esterase: x / RBC: x / WBC x   Sq Epi: x / Non Sq Epi: x / Bacteria: x          < from: CT Head No Cont (11.16.23 @ 19:43) >    ACC: [id number] EXAM:  CT BRAIN   ORDERED BY: SCHUYLER HARVEY     PROCEDURE DATE:  11/16/2023          INTERPRETATION:  CLINICAL INFORMATION: L side weakness. LCT. Admitting   Dxs: R05.9 R05.9.    TECHNIQUE: Sequential axial images were obtained from the vertex to the   skull base without intravenous contrast. Coronal and sagittal   reformations were obtained.    COMPARISON: Prior CT dated 2/4/23.    FINDINGS:    There is no acute intracranial hemorrhage or mass effect. There are areas   of hypodensity in the bilateral hemispheric white matter suggesting white   matter microvascular ischemic change. There is cerebral volume loss.    There is no extraaxial fluid collection.    There is no displaced calvarial fracture. Status post bilateral   intraocular lens implants. Small right posterior ethmoid sinus osteoma.   The mastoid air cells are well aerated.      IMPRESSION:    < end of copied text >

## 2024-04-03 NOTE — PHYSICAL THERAPY INITIAL EVALUATION ADULT - ADDITIONAL COMMENTS
Patient reports living in a house with 2 sisters. 2-3 stairs to enter. Reports using a walker sometimes. Independent with ADLs prior to admission. Patient reports living in a house with his wife. ramp to enter. Reports using a walker. Assistance with ADLs from home health aide 24 hrs/day 7x/week prior to admission.

## 2024-04-03 NOTE — PHYSICAL THERAPY INITIAL EVALUATION ADULT - LEVEL OF INDEPENDENCE: SIT/STAND, REHAB EVAL
minimum assist (75% patients effort) Due to weakness/unable to perform attempted but unable due to LE weakness/unable to perform

## 2024-04-03 NOTE — PROGRESS NOTE ADULT - SUBJECTIVE AND OBJECTIVE BOX
Patient is a 83y old  Male who presents with a chief complaint of Clogged Pleurx (03 Apr 2024 17:18)      SUBJECTIVE / OVERNIGHT EVENTS: ongoing edema, though improving. still hypervolemic and edematous. renal raised Bumex to 2 mg IV bid    MEDICATIONS  (STANDING):  alteplase  Injectable for Pleural Effusion 10 milliGRAM(s) IntraPleural. every 12 hours  atorvastatin 80 milliGRAM(s) Oral at bedtime  buMETAnide Injectable 2 milliGRAM(s) IV Push two times a day  carbidopa/levodopa  25/100 1 Tablet(s) Oral four times a day  desvenlafaxine ER 50 milliGRAM(s) Oral daily  dornase linda Solution for Pleural Effusion 5 milliGRAM(s) IntraPleural. every 12 hours  heparin   Injectable 5000 Unit(s) SubCutaneous every 8 hours  influenza  Vaccine (HIGH DOSE) 0.7 milliLiter(s) IntraMuscular once  melatonin 6 milliGRAM(s) Oral at bedtime  mirtazapine 15 milliGRAM(s) Oral at bedtime  OLANZapine 10 milliGRAM(s) Oral at bedtime  polyethylene glycol 3350 17 Gram(s) Oral daily  sacubitril 24 mG/valsartan 26 mG 1 Tablet(s) Oral two times a day  senna 2 Tablet(s) Oral at bedtime  sodium chloride 0.9% Solution for Pleural Effusion 40 milliLiter(s) IntraPleural. every 12 hours    MEDICATIONS  (PRN):  albuterol    90 MICROgram(s) HFA Inhaler 2 Puff(s) Inhalation every 6 hours PRN for shortness of breath and/or wheezing      Vital Signs Last 24 Hrs  T(F): 98.4 (04-03-24 @ 19:47), Max: 99.3 (04-03-24 @ 06:03)  HR: 66 (04-03-24 @ 19:47) (61 - 71)  BP: 105/50 (04-03-24 @ 19:47) (103/43 - 119/48)  RR: 18 (04-03-24 @ 19:47) (18 - 18)  SpO2: 96% (04-03-24 @ 19:47) (95% - 97%)  Telemetry:   CAPILLARY BLOOD GLUCOSE        I&O's Summary    02 Apr 2024 07:01  -  03 Apr 2024 07:00  --------------------------------------------------------  IN: 600 mL / OUT: 1440 mL / NET: -840 mL    03 Apr 2024 07:01  -  03 Apr 2024 22:30  --------------------------------------------------------  IN: 0 mL / OUT: 300 mL / NET: -300 mL        PHYSICAL EXAM:  GENERAL: NAD, well-developed  HEAD:  Atraumatic, Normocephalic  EYES: EOMI, PERRLA, conjunctiva and sclera clear  NECK: Supple, No JVD  CHEST/LUNG: Clear to auscultation bilaterally; No wheeze  HEART: Regular rate and rhythm; No murmurs, rubs, or gallops  ABDOMEN: Soft, Nontender, Nondistended; Bowel sounds present  EXTREMITIES:  2+ Peripheral Pulses, No clubbing, cyanosis, or edema  PSYCH: AAOx3  NEUROLOGY: non-focal  SKIN: No rashes or lesions    LABS:                        9.5    7.01  )-----------( 214      ( 03 Apr 2024 06:41 )             29.6     04-03    141  |  105  |  57<H>  ----------------------------<  101<H>  4.0   |  23  |  3.05<H>    Ca    8.7      03 Apr 2024 06:41  Phos  3.9     04-03  Mg     2.50     04-03    TPro  6.1  /  Alb  3.0<L>  /  TBili  0.2  /  DBili  x   /  AST  6   /  ALT  5   /  AlkPhos  71  04-03    PT/INR - ( 03 Apr 2024 06:41 )   PT: 13.9 sec;   INR: 1.25 ratio         PTT - ( 03 Apr 2024 06:41 )  PTT:35.5 sec      Urinalysis Basic - ( 03 Apr 2024 06:41 )    Color: x / Appearance: x / SG: x / pH: x  Gluc: 101 mg/dL / Ketone: x  / Bili: x / Urobili: x   Blood: x / Protein: x / Nitrite: x   Leuk Esterase: x / RBC: x / WBC x   Sq Epi: x / Non Sq Epi: x / Bacteria: x        RADIOLOGY & ADDITIONAL TESTS:    Imaging Personally Reviewed:    Consultant(s) Notes Reviewed:      Care Discussed with Consultants/Other Providers:

## 2024-04-03 NOTE — PROGRESS NOTE ADULT - SUBJECTIVE AND OBJECTIVE BOX
[facility] [phone]        Patient seen and examined resting on room air, pleurx draining. Wife reports edema is improving.         MEDICATIONS  (STANDING):  alteplase  Injectable for Pleural Effusion 10 milliGRAM(s) IntraPleural. every 12 hours  atorvastatin 80 milliGRAM(s) Oral at bedtime  buMETAnide Injectable 2 milliGRAM(s) IV Push daily  carbidopa/levodopa  25/100 1 Tablet(s) Oral four times a day  desvenlafaxine ER 50 milliGRAM(s) Oral daily  dornase linda Solution for Pleural Effusion 5 milliGRAM(s) IntraPleural. every 12 hours  heparin   Injectable 5000 Unit(s) SubCutaneous every 8 hours  influenza  Vaccine (HIGH DOSE) 0.7 milliLiter(s) IntraMuscular once  mirtazapine 15 milliGRAM(s) Oral at bedtime  OLANZapine 10 milliGRAM(s) Oral at bedtime  polyethylene glycol 3350 17 Gram(s) Oral daily  sacubitril 24 mG/valsartan 26 mG 1 Tablet(s) Oral two times a day  sodium chloride 0.9% Solution for Pleural Effusion 60 milliLiter(s) IntraPleural. every 12 hours      VITAL:  T(C): , Max: 37.4 (04-03-24 @ 06:03)  T(F): , Max: 99.3 (04-03-24 @ 06:03)  HR: 61 (04-03-24 @ 12:23)  BP: 103/43 (04-03-24 @ 12:23)  BP(mean): --  RR: 18 (04-03-24 @ 12:23)  SpO2: 96% (04-03-24 @ 12:23)  Wt(kg): --    I and O's:    04-02 @ 07:01  -  04-03 @ 07:00  --------------------------------------------------------  IN: 600 mL / OUT: 1440 mL / NET: -840 mL    04-03 @ 07:01  -  04-03 @ 14:20  --------------------------------------------------------  IN: 0 mL / OUT: 300 mL / NET: -300 mL          PHYSICAL EXAM:    Constitutional: NAD  Chest: R pleurx  Respiratory: diminished   Cardiovascular: S1 and S2  Gastrointestinal: BS+, soft, NT/ND  Extremities: ++ peripheral edema  Neurological: A/O x 3, no focal deficits  Psychiatric: Normal mood, normal affect  : + external catheter   Skin: No rashes  Access: Not applicable    LABS:                        9.5    7.01  )-----------( 214      ( 03 Apr 2024 06:41 )             29.6     04-03    141  |  105  |  57<H>  ----------------------------<  101<H>  4.0   |  23  |  3.05<H>    Ca    8.7      03 Apr 2024 06:41  Phos  3.9     04-03  Mg     2.50     04-03    TPro  6.1  /  Alb  3.0<L>  /  TBili  0.2  /  DBili  x   /  AST  6   /  ALT  5   /  AlkPhos  71  04-03          Urine Studies:  Urinalysis Basic - ( 03 Apr 2024 06:41 )    Color: x / Appearance: x / SG: x / pH: x  Gluc: 101 mg/dL / Ketone: x  / Bili: x / Urobili: x   Blood: x / Protein: x / Nitrite: x   Leuk Esterase: x / RBC: x / WBC x   Sq Epi: x / Non Sq Epi: x / Bacteria: x            RADIOLOGY & ADDITIONAL STUDIES:    < from: Xray Chest 1 View- PORTABLE-Routine (Xray Chest 1 View- PORTABLE-Routine in AM.) (04.03.24 @ 08:07) >  FINDINGS:    Cardiomegaly with left-sided pacemaker and leads intact.  Bilateral small effusions appears mildly increased on the right side.  Right-sided chest tube is present.  Visible upper lung fields are clear.  No pneumothorax.        COMPARISON: February 12, 2024        IMPRESSION: Bilateral small effusions increased on the right.    --- End of Report ---        < end of copied text >      ASSESSMENT: 83 year-old man with pmhx of HTN, afib, CAD, CKD 4 and pleural effusions s/p R VATS/Pleurx placement. He presented with a clogged Pleurx catheter.   (1)Renal - nonproteinuric CKD4  (2)Hyperkalemia - spurious in setting of severely hemolyzed specimen.   (3)Pulm- clogged pleurx - s/p fibrinolysis, pleurx draining   (4)CV - hypervolemic - warranting high-dose IV Bumex for now    RECOMMEND:  (1)Bumex as ordered - trend I/Os  (2)No objection to Entresto as ordered   (3)BMP+Mg+PO4 daily  (4)Dose new meds for GFR 15-20ml/min      Ivis Rodney NP  [facility]  Office: [phone]   [facility] [phone]        Patient seen and examined resting on room air, pleurx draining. Wife reports edema is improving.         MEDICATIONS  (STANDING):  alteplase  Injectable for Pleural Effusion 10 milliGRAM(s) IntraPleural. every 12 hours  atorvastatin 80 milliGRAM(s) Oral at bedtime  buMETAnide Injectable 2 milliGRAM(s) IV Push daily  carbidopa/levodopa  25/100 1 Tablet(s) Oral four times a day  desvenlafaxine ER 50 milliGRAM(s) Oral daily  dornase linda Solution for Pleural Effusion 5 milliGRAM(s) IntraPleural. every 12 hours  heparin   Injectable 5000 Unit(s) SubCutaneous every 8 hours  influenza  Vaccine (HIGH DOSE) 0.7 milliLiter(s) IntraMuscular once  mirtazapine 15 milliGRAM(s) Oral at bedtime  OLANZapine 10 milliGRAM(s) Oral at bedtime  polyethylene glycol 3350 17 Gram(s) Oral daily  sacubitril 24 mG/valsartan 26 mG 1 Tablet(s) Oral two times a day  sodium chloride 0.9% Solution for Pleural Effusion 60 milliLiter(s) IntraPleural. every 12 hours      VITAL:  T(C): , Max: 37.4 (04-03-24 @ 06:03)  T(F): , Max: 99.3 (04-03-24 @ 06:03)  HR: 61 (04-03-24 @ 12:23)  BP: 103/43 (04-03-24 @ 12:23)  BP(mean): --  RR: 18 (04-03-24 @ 12:23)  SpO2: 96% (04-03-24 @ 12:23)  Wt(kg): --    I and O's:    04-02 @ 07:01  -  04-03 @ 07:00  --------------------------------------------------------  IN: 600 mL / OUT: 1440 mL / NET: -840 mL    04-03 @ 07:01  -  04-03 @ 14:20  --------------------------------------------------------  IN: 0 mL / OUT: 300 mL / NET: -300 mL          PHYSICAL EXAM:    Constitutional: NAD  Chest: R pleurx  Respiratory: diminished   Cardiovascular: S1 and S2  Gastrointestinal: BS+, soft, NT/ND  Extremities: ++ peripheral edema  Neurological: A/O x 3, no focal deficits  Psychiatric: Normal mood, normal affect  : + external catheter   Skin: No rashes  Access: Not applicable    LABS:                        9.5    7.01  )-----------( 214      ( 03 Apr 2024 06:41 )             29.6     04-03    141  |  105  |  57<H>  ----------------------------<  101<H>  4.0   |  23  |  3.05<H>    Ca    8.7      03 Apr 2024 06:41  Phos  3.9     04-03  Mg     2.50     04-03    TPro  6.1  /  Alb  3.0<L>  /  TBili  0.2  /  DBili  x   /  AST  6   /  ALT  5   /  AlkPhos  71  04-03          Urine Studies:  Urinalysis Basic - ( 03 Apr 2024 06:41 )    Color: x / Appearance: x / SG: x / pH: x  Gluc: 101 mg/dL / Ketone: x  / Bili: x / Urobili: x   Blood: x / Protein: x / Nitrite: x   Leuk Esterase: x / RBC: x / WBC x   Sq Epi: x / Non Sq Epi: x / Bacteria: x            RADIOLOGY & ADDITIONAL STUDIES:    < from: Xray Chest 1 View- PORTABLE-Routine (Xray Chest 1 View- PORTABLE-Routine in AM.) (04.03.24 @ 08:07) >  FINDINGS:    Cardiomegaly with left-sided pacemaker and leads intact.  Bilateral small effusions appears mildly increased on the right side.  Right-sided chest tube is present.  Visible upper lung fields are clear.  No pneumothorax.        COMPARISON: February 12, 2024        IMPRESSION: Bilateral small effusions increased on the right.    --- End of Report ---        < end of copied text >      ASSESSMENT: 83 year-old man with pmhx of HTN, afib, CAD, CKD 4 and pleural effusions s/p R VATS/Pleurx placement. He presented with a clogged Pleurx catheter.   (1)Renal - nonproteinuric CKD4  (2)Hyperkalemia - spurious in setting of severely hemolyzed specimen.   (3)Pulm- clogged pleurx - s/p fibrinolysis, pleurx draining   (4)CV - hypervolemic - warranting high-dose IV Bumex for now    RECOMMEND:  (1)Bumex as ordered - trend I/Os  (2)No objection to Entresto as ordered   (3)BMP+Mg+PO4 daily  (4)Dose new meds for GFR 15-20ml/min      Ivis Rodney NP  [facility]  Office: [phone]        RENAL ATTENDING NOTE  Patient seen and examined with NP. Agree with assessment and plan as above.    Clifton Cool MD  [facility]  [phone]   [facility] [phone]        Patient seen and examined resting on room air, pleurx draining. Wife reports edema is improving.         MEDICATIONS  (STANDING):  alteplase  Injectable for Pleural Effusion 10 milliGRAM(s) IntraPleural. every 12 hours  atorvastatin 80 milliGRAM(s) Oral at bedtime  buMETAnide Injectable 2 milliGRAM(s) IV Push daily  carbidopa/levodopa  25/100 1 Tablet(s) Oral four times a day  desvenlafaxine ER 50 milliGRAM(s) Oral daily  dornase linda Solution for Pleural Effusion 5 milliGRAM(s) IntraPleural. every 12 hours  heparin   Injectable 5000 Unit(s) SubCutaneous every 8 hours  influenza  Vaccine (HIGH DOSE) 0.7 milliLiter(s) IntraMuscular once  mirtazapine 15 milliGRAM(s) Oral at bedtime  OLANZapine 10 milliGRAM(s) Oral at bedtime  polyethylene glycol 3350 17 Gram(s) Oral daily  sacubitril 24 mG/valsartan 26 mG 1 Tablet(s) Oral two times a day  sodium chloride 0.9% Solution for Pleural Effusion 60 milliLiter(s) IntraPleural. every 12 hours      VITAL:  T(C): , Max: 37.4 (04-03-24 @ 06:03)  T(F): , Max: 99.3 (04-03-24 @ 06:03)  HR: 61 (04-03-24 @ 12:23)  BP: 103/43 (04-03-24 @ 12:23)  BP(mean): --  RR: 18 (04-03-24 @ 12:23)  SpO2: 96% (04-03-24 @ 12:23)  Wt(kg): --    I and O's:    04-02 @ 07:01  -  04-03 @ 07:00  --------------------------------------------------------  IN: 600 mL / OUT: 1440 mL / NET: -840 mL    04-03 @ 07:01  -  04-03 @ 14:20  --------------------------------------------------------  IN: 0 mL / OUT: 300 mL / NET: -300 mL          PHYSICAL EXAM:    Constitutional: NAD  Chest: R pleurx  Respiratory: diminished   Cardiovascular: S1 and S2  Gastrointestinal: BS+, soft, NT/ND  Extremities: ++ peripheral edema  Neurological: A/O x 3, no focal deficits  Psychiatric: Normal mood, normal affect  : + external catheter   Skin: No rashes  Access: Not applicable    LABS:                        9.5    7.01  )-----------( 214      ( 03 Apr 2024 06:41 )             29.6     04-03    141  |  105  |  57<H>  ----------------------------<  101<H>  4.0   |  23  |  3.05<H>    Ca    8.7      03 Apr 2024 06:41  Phos  3.9     04-03  Mg     2.50     04-03    TPro  6.1  /  Alb  3.0<L>  /  TBili  0.2  /  DBili  x   /  AST  6   /  ALT  5   /  AlkPhos  71  04-03          Urine Studies:  Urinalysis Basic - ( 03 Apr 2024 06:41 )    Color: x / Appearance: x / SG: x / pH: x  Gluc: 101 mg/dL / Ketone: x  / Bili: x / Urobili: x   Blood: x / Protein: x / Nitrite: x   Leuk Esterase: x / RBC: x / WBC x   Sq Epi: x / Non Sq Epi: x / Bacteria: x            RADIOLOGY & ADDITIONAL STUDIES:    < from: Xray Chest 1 View- PORTABLE-Routine (Xray Chest 1 View- PORTABLE-Routine in AM.) (04.03.24 @ 08:07) >  FINDINGS:    Cardiomegaly with left-sided pacemaker and leads intact.  Bilateral small effusions appears mildly increased on the right side.  Right-sided chest tube is present.  Visible upper lung fields are clear.  No pneumothorax.        COMPARISON: February 12, 2024        IMPRESSION: Bilateral small effusions increased on the right.    --- End of Report ---        < end of copied text >      ASSESSMENT: 83 year-old man with pmhx of HTN, afib, CAD, CKD 4 and pleural effusions s/p R VATS/Pleurx placement. He presented with a clogged Pleurx catheter.   (1)Renal - nonproteinuric CKD4  (2)Hyperkalemia - spurious in setting of severely hemolyzed specimen.   (3)Pulm- clogged pleurx - s/p fibrinolysis, pleurx draining   (4)CV - hypervolemic - warranting high-dose IV Bumex for now    RECOMMEND:  (1)Bumex as ordered - trend I/Os  (2)No objection to Entresto as ordered   (3)BMP+Mg+PO4 daily  (4)Dose new meds for GFR 15-20ml/min      Ivis Rodney NP  [facility]  Office: [phone]        RENAL ATTENDING NOTE  Patient seen and examined with NP.   Seemingly limited response to Bumex 2mg IV QD - we can increase to BID; may need to tolerate further rise in creatinine to allow for improved volume status    Clifton Cool MD  [facility]  [phone]

## 2024-04-03 NOTE — PROGRESS NOTE ADULT - SUBJECTIVE AND OBJECTIVE BOX
CARDIOLOGY FOLLOW UP - Dr. Mann  DATE OF SERVICE: 4/3/24    CC  No CV complaints    REVIEW OF SYSTEMS:  CONSTITUTIONAL: No fever, weight loss, or fatigue  RESPIRATORY: No cough, wheezing, chills or hemoptysis; No Shortness of Breath  CARDIOVASCULAR: No chest pain, palpitations, passing out, dizziness, or leg swelling  GASTROINTESTINAL: No abdominal or epigastric pain. No nausea, vomiting, or hematemesis; No diarrhea or constipation. No melena or hematochezia.  VASCULAR: No edema     PHYSICAL EXAM:  T(C): 37.4 (04-03-24 @ 06:03), Max: 37.4 (04-03-24 @ 06:03)  HR: 70 (04-03-24 @ 06:03) (62 - 71)  BP: 119/48 (04-03-24 @ 06:03) (106/58 - 143/96)  RR: 18 (04-03-24 @ 06:03) (18 - 18)  SpO2: 97% (04-03-24 @ 06:03) (97% - 99%)  Wt(kg): --  I&O's Summary    02 Apr 2024 07:01  -  03 Apr 2024 07:00  --------------------------------------------------------  IN: 600 mL / OUT: 1440 mL / NET: -840 mL        Appearance: Normal	  Cardiovascular: Normal S1 S2,RRR, No JVD, No murmurs  Respiratory: Diminished R side, +R pleurx  Gastrointestinal:  Soft, Non-tender, + BS	  Extremities: Normal range of motion, No clubbing, cyanosis. +b/l UE edema       Home Medications:  ascorbic acid 500 mg oral tablet: 1 tab(s) orally once a day (02 Apr 2024 12:55)  aspirin 81 mg oral delayed release tablet: 1 tab(s) orally once a day (02 Apr 2024 12:55)  bumetanide 2 mg oral tablet: 1 tab(s) orally once a day (02 Apr 2024 12:55)  carbidopa-levodopa 25 mg-100 mg oral tablet: 1.5 tab(s) orally 4 times a day (02 Apr 2024 12:55)  Crestor 20 mg oral tablet: 1 tab(s) orally once a day (02 Apr 2024 12:55)  cyanocobalamin 100 mcg oral tablet: 1 tab(s) orally (02 Apr 2024 12:55)  D3 25 mcg (1000 intl units) oral tablet: 1 tab(s) orally once a day (02 Apr 2024 12:55)  desvenlafaxine (as base) 50 mg oral tablet, extended release: 50 milligram(s) orally once a day (02 Apr 2024 12:55)  Eliquis 2.5 mg oral tablet: 1 tab(s) orally 2 times a day (02 Apr 2024 12:55)  Entresto 24 mg-26 mg oral tablet: 1 tab(s) orally once a day (02 Apr 2024 12:55)  mirtazapine 15 mg oral tablet: 1 tab(s) orally once a day (at bedtime) (02 Apr 2024 12:55)  OLANZapine 10 mg oral tablet: 1 tab(s) orally once a day (at bedtime) (02 Apr 2024 12:55)  Plavix 75 mg oral tablet: 75 milligram(s) orally once a day (02 Apr 2024 12:55)  polyethylene glycol 3350 oral powder for reconstitution: 17 gram(s) orally once a day (02 Apr 2024 12:55)  Vyndamax 61 mg oral capsule: 1 orally once a day (02 Apr 2024 12:55)      MEDICATIONS  (STANDING):  alteplase  Injectable for Pleural Effusion 10 milliGRAM(s) IntraPleural. every 12 hours  atorvastatin 80 milliGRAM(s) Oral at bedtime  buMETAnide Injectable 2 milliGRAM(s) IV Push daily  carbidopa/levodopa  25/100 1 Tablet(s) Oral four times a day  desvenlafaxine ER 50 milliGRAM(s) Oral daily  dornase linda Solution for Pleural Effusion 5 milliGRAM(s) IntraPleural. every 12 hours  heparin   Injectable 5000 Unit(s) SubCutaneous every 8 hours  influenza  Vaccine (HIGH DOSE) 0.7 milliLiter(s) IntraMuscular once  mirtazapine 15 milliGRAM(s) Oral at bedtime  OLANZapine 10 milliGRAM(s) Oral at bedtime  polyethylene glycol 3350 17 Gram(s) Oral daily  sacubitril 24 mG/valsartan 26 mG 1 Tablet(s) Oral two times a day  sodium chloride 0.9% Solution for Pleural Effusion 60 milliLiter(s) IntraPleural. every 12 hours      TELEMETRY: 	    ECG:  	  RADIOLOGY:   DIAGNOSTIC TESTING:  [ ] Echocardiogram:  [ ]  Catheterization:  [ ] Stress Test:    OTHER: 	    LABS:	 	                            9.5    7.01  )-----------( 214      ( 03 Apr 2024 06:41 )             29.6     04-02    140  |  105  |  58<H>  ----------------------------<  105<H>  5.4<H>   |  23  |  2.94<H>    Ca    9.0      02 Apr 2024 14:54  Phos  4.7     04-02  Mg     2.60     04-02    TPro  7.0  /  Alb  3.4  /  TBili  0.2  /  DBili  x   /  AST  25  /  ALT  9   /  AlkPhos  78  04-02    PT/INR - ( 03 Apr 2024 06:41 )   PT: 13.9 sec;   INR: 1.25 ratio         PTT - ( 03 Apr 2024 06:41 )  PTT:35.5 sec

## 2024-04-03 NOTE — PHYSICAL THERAPY INITIAL EVALUATION ADULT - DIAGNOSIS, PT EVAL
Good morning Stephanie Montes De Oca is all set for her scheduled surgical procedure with you on 09/29, Thank you,  babar Decreased functional mobility

## 2024-04-03 NOTE — PHYSICAL THERAPY INITIAL EVALUATION ADULT - NSPTDISCHREC_GEN_A_CORE
discharge to rehab facility to address current functional limitation to optimize safety to allow pt. to reach their optimal level of function.

## 2024-04-04 LAB
ANION GAP SERPL CALC-SCNC: 11 MMOL/L — SIGNIFICANT CHANGE UP (ref 7–14)
BUN SERPL-MCNC: 55 MG/DL — HIGH (ref 7–23)
CALCIUM SERPL-MCNC: 8.5 MG/DL — SIGNIFICANT CHANGE UP (ref 8.4–10.5)
CHLORIDE SERPL-SCNC: 109 MMOL/L — HIGH (ref 98–107)
CO2 SERPL-SCNC: 24 MMOL/L — SIGNIFICANT CHANGE UP (ref 22–31)
CREAT SERPL-MCNC: 3.02 MG/DL — HIGH (ref 0.5–1.3)
EGFR: 20 ML/MIN/1.73M2 — LOW
GLUCOSE SERPL-MCNC: 89 MG/DL — SIGNIFICANT CHANGE UP (ref 70–99)
HCT VFR BLD CALC: 27.1 % — LOW (ref 39–50)
HGB BLD-MCNC: 8.7 G/DL — LOW (ref 13–17)
MAGNESIUM SERPL-MCNC: 2.4 MG/DL — SIGNIFICANT CHANGE UP (ref 1.6–2.6)
MCHC RBC-ENTMCNC: 30.7 PG — SIGNIFICANT CHANGE UP (ref 27–34)
MCHC RBC-ENTMCNC: 32.1 GM/DL — SIGNIFICANT CHANGE UP (ref 32–36)
MCV RBC AUTO: 95.8 FL — SIGNIFICANT CHANGE UP (ref 80–100)
NRBC # BLD: 0 /100 WBCS — SIGNIFICANT CHANGE UP (ref 0–0)
NRBC # FLD: 0 K/UL — SIGNIFICANT CHANGE UP (ref 0–0)
PHOSPHATE SERPL-MCNC: 4.1 MG/DL — SIGNIFICANT CHANGE UP (ref 2.5–4.5)
PLATELET # BLD AUTO: 212 K/UL — SIGNIFICANT CHANGE UP (ref 150–400)
POTASSIUM SERPL-MCNC: 3.9 MMOL/L — SIGNIFICANT CHANGE UP (ref 3.5–5.3)
POTASSIUM SERPL-SCNC: 3.9 MMOL/L — SIGNIFICANT CHANGE UP (ref 3.5–5.3)
RBC # BLD: 2.83 M/UL — LOW (ref 4.2–5.8)
RBC # FLD: 13.2 % — SIGNIFICANT CHANGE UP (ref 10.3–14.5)
SODIUM SERPL-SCNC: 144 MMOL/L — SIGNIFICANT CHANGE UP (ref 135–145)
WBC # BLD: 7.36 K/UL — SIGNIFICANT CHANGE UP (ref 3.8–10.5)
WBC # FLD AUTO: 7.36 K/UL — SIGNIFICANT CHANGE UP (ref 3.8–10.5)

## 2024-04-04 PROCEDURE — 99231 SBSQ HOSP IP/OBS SF/LOW 25: CPT

## 2024-04-04 PROCEDURE — 71045 X-RAY EXAM CHEST 1 VIEW: CPT | Mod: 26

## 2024-04-04 RX ADMIN — MIRTAZAPINE 15 MILLIGRAM(S): 45 TABLET, ORALLY DISINTEGRATING ORAL at 23:12

## 2024-04-04 RX ADMIN — DORNASE ALFA 5 MILLIGRAM(S): 1 SOLUTION RESPIRATORY (INHALATION) at 13:15

## 2024-04-04 RX ADMIN — POLYETHYLENE GLYCOL 3350 17 GRAM(S): 17 POWDER, FOR SOLUTION ORAL at 17:50

## 2024-04-04 RX ADMIN — HEPARIN SODIUM 5000 UNIT(S): 5000 INJECTION INTRAVENOUS; SUBCUTANEOUS at 13:47

## 2024-04-04 RX ADMIN — BUMETANIDE 2 MILLIGRAM(S): 0.25 INJECTION INTRAMUSCULAR; INTRAVENOUS at 13:46

## 2024-04-04 RX ADMIN — OLANZAPINE 10 MILLIGRAM(S): 15 TABLET, FILM COATED ORAL at 23:12

## 2024-04-04 RX ADMIN — ATORVASTATIN CALCIUM 80 MILLIGRAM(S): 80 TABLET, FILM COATED ORAL at 23:11

## 2024-04-04 RX ADMIN — ALTEPLASE 10 MILLIGRAM(S): KIT at 13:15

## 2024-04-04 RX ADMIN — HEPARIN SODIUM 5000 UNIT(S): 5000 INJECTION INTRAVENOUS; SUBCUTANEOUS at 23:10

## 2024-04-04 RX ADMIN — CARBIDOPA AND LEVODOPA 1 TABLET(S): 25; 100 TABLET ORAL at 05:47

## 2024-04-04 RX ADMIN — HEPARIN SODIUM 5000 UNIT(S): 5000 INJECTION INTRAVENOUS; SUBCUTANEOUS at 05:48

## 2024-04-04 RX ADMIN — CARBIDOPA AND LEVODOPA 1 TABLET(S): 25; 100 TABLET ORAL at 13:44

## 2024-04-04 RX ADMIN — Medication 6 MILLIGRAM(S): at 23:11

## 2024-04-04 RX ADMIN — DESVENLAFAXINE 50 MILLIGRAM(S): 50 TABLET, EXTENDED RELEASE ORAL at 13:45

## 2024-04-04 RX ADMIN — CARBIDOPA AND LEVODOPA 1 TABLET(S): 25; 100 TABLET ORAL at 17:49

## 2024-04-04 NOTE — PROGRESS NOTE ADULT - SUBJECTIVE AND OBJECTIVE BOX
Neurology Progress Note    S: Patient seen and examined. Doing ok    Medication:  albuterol    90 MICROgram(s) HFA Inhaler 2 Puff(s) Inhalation every 6 hours PRN  alteplase  Injectable for Pleural Effusion 10 milliGRAM(s) IntraPleural. every 12 hours  atorvastatin 80 milliGRAM(s) Oral at bedtime  buMETAnide Injectable 2 milliGRAM(s) IV Push two times a day  carbidopa/levodopa  25/100 1 Tablet(s) Oral four times a day  desvenlafaxine ER 50 milliGRAM(s) Oral daily  dornase linda Solution for Pleural Effusion 5 milliGRAM(s) IntraPleural. every 12 hours  heparin   Injectable 5000 Unit(s) SubCutaneous every 8 hours  influenza  Vaccine (HIGH DOSE) 0.7 milliLiter(s) IntraMuscular once  melatonin 6 milliGRAM(s) Oral at bedtime  mirtazapine 15 milliGRAM(s) Oral at bedtime  OLANZapine 10 milliGRAM(s) Oral at bedtime  polyethylene glycol 3350 17 Gram(s) Oral daily  sacubitril 24 mG/valsartan 26 mG 1 Tablet(s) Oral two times a day  senna 2 Tablet(s) Oral at bedtime  sodium chloride 0.9% Solution for Pleural Effusion 40 milliLiter(s) IntraPleural. every 12 hours      Vitals:  Vital Signs Last 24 Hrs  T(C): 36.8 (04 Apr 2024 12:23), Max: 37.1 (04 Apr 2024 04:55)  T(F): 98.2 (04 Apr 2024 12:23), Max: 98.7 (04 Apr 2024 04:55)  HR: 60 (04 Apr 2024 12:23) (60 - 66)  BP: 116/54 (04 Apr 2024 12:23) (94/46 - 116/54)  BP(mean): --  RR: 18 (04 Apr 2024 12:23) (18 - 18)  SpO2: 98% (04 Apr 2024 12:23) (95% - 98%)    Parameters below as of 04 Apr 2024 12:23  Patient On (Oxygen Delivery Method): room air        General Exam:   General Appearance: Appropriately dressed and in no acute distress       Head: Normocephalic, atraumatic and no dysmorphic features  Ear, Nose, and Throat: Moist mucous membranes  CVS: S1S2+  Resp: No SOB, no wheeze or rhonchi  Abd: soft NTND  Extremities: No edema, no cyanosis  Skin: No bruises, no rashes        Neurological Exam:  Mental Status: Awake, alert and oriented x 3.  Able to follow simple commands. Able to name and repeat. fluent speech. No obvious aphasia, mild dysarthria. bradykinesia    Cranial Nerves: PERRL, EOMI, VFFC, sensation V1-V3 intact,  no obvious facial asymmetry, hypomimia, equal elevation of palate, scm/trap 5/5, tongue is midline on protrusion.  hearing is grossly intact.   Motor: slight inc tone throughout uppers > lowers. Slight resting tremor b/l, + cogwheeling  Sensation: Intact to light touch and pinprick throughout.   Reflexes: 1+ throughout at biceps, brachioradialis, triceps, patellars and ankles bilaterally and equal. No clonus. R toe and L toe were both downgoing.  Coordination: No dysmetria on FNF   Gait: deferred    I personally reviewed the below data/images/labs:      CBC Full  -  ( 04 Apr 2024 05:47 )  WBC Count : 7.36 K/uL  RBC Count : 2.83 M/uL  Hemoglobin : 8.7 g/dL  Hematocrit : 27.1 %  Platelet Count - Automated : 212 K/uL  Mean Cell Volume : 95.8 fL  Mean Cell Hemoglobin : 30.7 pg  Mean Cell Hemoglobin Concentration : 32.1 gm/dL  Auto Neutrophil # : x  Auto Lymphocyte # : x  Auto Monocyte # : x  Auto Eosinophil # : x  Auto Basophil # : x  Auto Neutrophil % : x  Auto Lymphocyte % : x  Auto Monocyte % : x  Auto Eosinophil % : x  Auto Basophil % : x    04-04    144  |  109<H>  |  55<H>  ----------------------------<  89  3.9   |  24  |  3.02<H>    Ca    8.5      04 Apr 2024 05:47  Phos  4.1     04-04  Mg     2.40     04-04    TPro  6.1  /  Alb  3.0<L>  /  TBili  0.2  /  DBili  x   /  AST  6   /  ALT  5   /  AlkPhos  71  04-03    LIVER FUNCTIONS - ( 03 Apr 2024 06:41 )  Alb: 3.0 g/dL / Pro: 6.1 g/dL / ALK PHOS: 71 U/L / ALT: 5 U/L / AST: 6 U/L / GGT: x           PT/INR - ( 03 Apr 2024 06:41 )   PT: 13.9 sec;   INR: 1.25 ratio         PTT - ( 03 Apr 2024 06:41 )  PTT:35.5 sec  Urinalysis Basic - ( 04 Apr 2024 05:47 )    Color: x / Appearance: x / SG: x / pH: x  Gluc: 89 mg/dL / Ketone: x  / Bili: x / Urobili: x   Blood: x / Protein: x / Nitrite: x   Leuk Esterase: x / RBC: x / WBC x   Sq Epi: x / Non Sq Epi: x / Bacteria: x    < from: CT Head No Cont (11.16.23 @ 19:43) >    ACC: [id number] EXAM:  CT BRAIN   ORDERED BY: SCHUYLER HARVEY     PROCEDURE DATE:  11/16/2023          INTERPRETATION:  CLINICAL INFORMATION: L side weakness. LCT. Admitting   Dxs: R05.9 R05.9.    TECHNIQUE: Sequential axial images were obtained from the vertex to the   skull base without intravenous contrast. Coronal and sagittal   reformations were obtained.    COMPARISON: Prior CT dated 2/4/23.    FINDINGS:    There is no acute intracranial hemorrhage or mass effect. There are areas   of hypodensity in the bilateral hemispheric white matter suggesting white   matter microvascular ischemic change. There is cerebral volume loss.    There is no extraaxial fluid collection.    There is no displaced calvarial fracture. Status post bilateral   intraocular lens implants. Small right posterior ethmoid sinus osteoma.   The mastoid air cells are well aerated.      IMPRESSION:    < end of copied text >

## 2024-04-04 NOTE — PROGRESS NOTE ADULT - SUBJECTIVE AND OBJECTIVE BOX
Patient is a 83y old  Male who presents with a chief complaint of Clogged Pleurx (04 Apr 2024 15:38)      SUBJECTIVE / OVERNIGHT EVENTS: still hypervolemic, cont IV Bumex 2 mg q12H, on ENTRESTO, renal and card in agreement. R Pleurx catheter  functioning well post fibrinolysis.     MEDICATIONS  (STANDING):  alteplase  Injectable for Pleural Effusion 10 milliGRAM(s) IntraPleural. every 12 hours  atorvastatin 80 milliGRAM(s) Oral at bedtime  buMETAnide Injectable 2 milliGRAM(s) IV Push two times a day  carbidopa/levodopa  25/100 1 Tablet(s) Oral four times a day  desvenlafaxine ER 50 milliGRAM(s) Oral daily  dornase linda Solution for Pleural Effusion 5 milliGRAM(s) IntraPleural. every 12 hours  heparin   Injectable 5000 Unit(s) SubCutaneous every 8 hours  influenza  Vaccine (HIGH DOSE) 0.7 milliLiter(s) IntraMuscular once  melatonin 6 milliGRAM(s) Oral at bedtime  mirtazapine 15 milliGRAM(s) Oral at bedtime  OLANZapine 10 milliGRAM(s) Oral at bedtime  polyethylene glycol 3350 17 Gram(s) Oral daily  sacubitril 24 mG/valsartan 26 mG 1 Tablet(s) Oral two times a day  senna 2 Tablet(s) Oral at bedtime  sodium chloride 0.9% Solution for Pleural Effusion 40 milliLiter(s) IntraPleural. every 12 hours    MEDICATIONS  (PRN):  albuterol    90 MICROgram(s) HFA Inhaler 2 Puff(s) Inhalation every 6 hours PRN for shortness of breath and/or wheezing      Vital Signs Last 24 Hrs  T(F): 97.5 (04-04-24 @ 16:52), Max: 98.7 (04-04-24 @ 04:55)  HR: 62 (04-04-24 @ 16:52) (60 - 66)  BP: 110/45 (04-04-24 @ 16:52) (94/46 - 116/54)  RR: 18 (04-04-24 @ 16:52) (18 - 18)  SpO2: 98% (04-04-24 @ 16:52) (95% - 98%)  Telemetry:   CAPILLARY BLOOD GLUCOSE        I&O's Summary    03 Apr 2024 07:01 - 04 Apr 2024 07:00  --------------------------------------------------------  IN: 0 mL / OUT: 864 mL / NET: -864 mL    04 Apr 2024 07:01  -  04 Apr 2024 17:27  --------------------------------------------------------  IN: 0 mL / OUT: 1300 mL / NET: -1300 mL        PHYSICAL EXAM:  GENERAL: NAD, well-developed  HEAD:  Atraumatic, Normocephalic  EYES: EOMI, PERRLA, conjunctiva and sclera clear  NECK: Supple, No JVD  CHEST/LUNG: Clear to auscultation bilaterally; No wheeze  HEART: Regular rate and rhythm; No murmurs, rubs, or gallops  ABDOMEN: Soft, Nontender, Nondistended; Bowel sounds present  EXTREMITIES:  2+ Peripheral Pulses, No clubbing, cyanosis, or edema  PSYCH: AAOx3  NEUROLOGY: non-focal  SKIN: No rashes or lesions    LABS:                        8.7    7.36  )-----------( 212      ( 04 Apr 2024 05:47 )             27.1     04-04    144  |  109<H>  |  55<H>  ----------------------------<  89  3.9   |  24  |  3.02<H>    Ca    8.5      04 Apr 2024 05:47  Phos  4.1     04-04  Mg     2.40     04-04    TPro  6.1  /  Alb  3.0<L>  /  TBili  0.2  /  DBili  x   /  AST  6   /  ALT  5   /  AlkPhos  71  04-03    PT/INR - ( 03 Apr 2024 06:41 )   PT: 13.9 sec;   INR: 1.25 ratio         PTT - ( 03 Apr 2024 06:41 )  PTT:35.5 sec      Urinalysis Basic - ( 04 Apr 2024 05:47 )    Color: x / Appearance: x / SG: x / pH: x  Gluc: 89 mg/dL / Ketone: x  / Bili: x / Urobili: x   Blood: x / Protein: x / Nitrite: x   Leuk Esterase: x / RBC: x / WBC x   Sq Epi: x / Non Sq Epi: x / Bacteria: x        RADIOLOGY & ADDITIONAL TESTS:    Imaging Personally Reviewed:    Consultant(s) Notes Reviewed:      Care Discussed with Consultants/Other Providers:

## 2024-04-04 NOTE — PROGRESS NOTE ADULT - SUBJECTIVE AND OBJECTIVE BOX
CARDIOLOGY FOLLOW UP - Dr. Mann  DATE OF SERVICE: 4/4/24     CC no cp or sob       REVIEW OF SYSTEMS:  CONSTITUTIONAL: No fever, weight loss, or fatigue  RESPIRATORY: No cough, wheezing, chills or hemoptysis; No Shortness of Breath  CARDIOVASCULAR: No chest pain, palpitations, passing out, dizziness, or leg swelling  GASTROINTESTINAL: No abdominal or epigastric pain. No nausea, vomiting, or hematemesis; No diarrhea or constipation. No melena or hematochezia.  VASCULAR: No edema     PHYSICAL EXAM:  T(C): 36.7 (04-04-24 @ 08:45), Max: 37.1 (04-04-24 @ 04:55)  HR: 60 (04-04-24 @ 08:45) (60 - 66)  BP: 99/42 (04-04-24 @ 08:45) (94/46 - 108/67)  RR: 18 (04-04-24 @ 08:45) (18 - 18)  SpO2: 96% (04-04-24 @ 08:45) (95% - 96%)  Wt(kg): --  I&O's Summary    03 Apr 2024 07:01  -  04 Apr 2024 07:00  --------------------------------------------------------  IN: 0 mL / OUT: 864 mL / NET: -864 mL    04 Apr 2024 07:01  -  04 Apr 2024 12:02  --------------------------------------------------------  IN: 0 mL / OUT: 700 mL / NET: -700 mL        Appearance: Normal	  Cardiovascular: Normal S1 S2,RRR  Respiratory: Lungs clear to auscultation	  Gastrointestinal:  Soft, Non-tender, + BS	  Extremities:anasarca      Home Medications:  ascorbic acid 500 mg oral tablet: 1 tab(s) orally once a day (02 Apr 2024 12:55)  aspirin 81 mg oral delayed release tablet: 1 tab(s) orally once a day (02 Apr 2024 12:55)  bumetanide 2 mg oral tablet: 1 tab(s) orally once a day (02 Apr 2024 12:55)  carbidopa-levodopa 25 mg-100 mg oral tablet: 1.5 tab(s) orally 4 times a day (02 Apr 2024 12:55)  Crestor 20 mg oral tablet: 1 tab(s) orally once a day (02 Apr 2024 12:55)  cyanocobalamin 100 mcg oral tablet: 1 tab(s) orally (02 Apr 2024 12:55)  D3 25 mcg (1000 intl units) oral tablet: 1 tab(s) orally once a day (02 Apr 2024 12:55)  desvenlafaxine (as base) 50 mg oral tablet, extended release: 50 milligram(s) orally once a day (02 Apr 2024 12:55)  Eliquis 2.5 mg oral tablet: 1 tab(s) orally 2 times a day (02 Apr 2024 12:55)  Entresto 24 mg-26 mg oral tablet: 1 tab(s) orally once a day (02 Apr 2024 12:55)  mirtazapine 15 mg oral tablet: 1 tab(s) orally once a day (at bedtime) (02 Apr 2024 12:55)  OLANZapine 10 mg oral tablet: 1 tab(s) orally once a day (at bedtime) (02 Apr 2024 12:55)  Plavix 75 mg oral tablet: 75 milligram(s) orally once a day (02 Apr 2024 12:55)  polyethylene glycol 3350 oral powder for reconstitution: 17 gram(s) orally once a day (02 Apr 2024 12:55)  Vyndamax 61 mg oral capsule: 1 orally once a day (02 Apr 2024 12:55)      MEDICATIONS  (STANDING):  alteplase  Injectable for Pleural Effusion 10 milliGRAM(s) IntraPleural. every 12 hours  atorvastatin 80 milliGRAM(s) Oral at bedtime  buMETAnide Injectable 2 milliGRAM(s) IV Push two times a day  carbidopa/levodopa  25/100 1 Tablet(s) Oral four times a day  desvenlafaxine ER 50 milliGRAM(s) Oral daily  dornase linda Solution for Pleural Effusion 5 milliGRAM(s) IntraPleural. every 12 hours  heparin   Injectable 5000 Unit(s) SubCutaneous every 8 hours  influenza  Vaccine (HIGH DOSE) 0.7 milliLiter(s) IntraMuscular once  melatonin 6 milliGRAM(s) Oral at bedtime  mirtazapine 15 milliGRAM(s) Oral at bedtime  OLANZapine 10 milliGRAM(s) Oral at bedtime  polyethylene glycol 3350 17 Gram(s) Oral daily  sacubitril 24 mG/valsartan 26 mG 1 Tablet(s) Oral two times a day  senna 2 Tablet(s) Oral at bedtime  sodium chloride 0.9% Solution for Pleural Effusion 40 milliLiter(s) IntraPleural. every 12 hours      TELEMETRY: 	    ECG:  	  RADIOLOGY:   DIAGNOSTIC TESTING:  [ ] Echocardiogram:  [ ]  Catheterization:  [ ] Stress Test:    OTHER: 	    LABS:	 	                            8.7    7.36  )-----------( 212      ( 04 Apr 2024 05:47 )             27.1     04-04    144  |  109<H>  |  55<H>  ----------------------------<  89  3.9   |  24  |  3.02<H>    Ca    8.5      04 Apr 2024 05:47  Phos  4.1     04-04  Mg     2.40     04-04    TPro  6.1  /  Alb  3.0<L>  /  TBili  0.2  /  DBili  x   /  AST  6   /  ALT  5   /  AlkPhos  71  04-03    PT/INR - ( 03 Apr 2024 06:41 )   PT: 13.9 sec;   INR: 1.25 ratio         PTT - ( 03 Apr 2024 06:41 )  PTT:35.5 sec

## 2024-04-04 NOTE — PROGRESS NOTE ADULT - SUBJECTIVE AND OBJECTIVE BOX
Overnight events noted      VITAL:  T(C): , Max: 37.1 (04-04-24 @ 04:55)  T(F): , Max: 98.7 (04-04-24 @ 04:55)  HR: 60 (04-04-24 @ 12:23)  BP: 116/54 (04-04-24 @ 12:23)  BP(mean): --  RR: 18 (04-04-24 @ 12:23)  SpO2: 98% (04-04-24 @ 12:23)  Wt(kg): --      PHYSICAL EXAM:  Constitutional: NAD  Chest: R pleurx  Respiratory: diminished   Cardiovascular: S1 and S2  Gastrointestinal: BS+, soft, NT/ND  Extremities: ++ peripheral edema  Neurological: A/O x 3, no focal deficits  Psychiatric: Normal mood, normal affect  : + external catheter   Skin: No rashes    LABS:                        8.7    7.36  )-----------( 212      ( 04 Apr 2024 05:47 )             27.1     Na(144)/K(3.9)/Cl(109)/HCO3(24)/BUN(55)/Cr(3.02)Glu(89)/Ca(8.5)/Mg(2.40)/PO4(4.1)    04-04 @ 05:47  Na(141)/K(4.0)/Cl(105)/HCO3(23)/BUN(57)/Cr(3.05)Glu(101)/Ca(8.7)/Mg(2.50)/PO4(3.9)    04-03 @ 06:41  Na(140)/K(5.4)/Cl(105)/HCO3(23)/BUN(58)/Cr(2.94)Glu(105)/Ca(9.0)/Mg(2.60)/PO4(4.7)    04-02 @ 14:54    IMPRESSION: 83M w/ HTN, AFib, CAD, CKD4 and pleural effusions s/p R VATS/Pleurx placement, 4/2/24 p/w thrombosed Pleurx catheter and with hypervolemia     (1)Renal - nonproteinuric CKD4  (2)Hyperkalemia - spurious in setting of severely hemolyzed specimen.   (3)Pulm- clogged pleurx - s/p fibrinolysis, pleurx draining   (4)CV - hypervolemic - warranting high-dose IV Bumex for now    RECOMMEND:  (1)Bumex as ordered - trend I/Os  (2)No objection to Entresto as ordered   (3)BMP+Mg+PO4 daily  (4)Dose new meds for GFR 15-20ml/min          Clifton Cool MD  [facility]  Office/on call physician: [phone]  Cell (7a-7p): [phone]

## 2024-04-04 NOTE — PROGRESS NOTE ADULT - SUBJECTIVE AND OBJECTIVE BOX
Subjective & objective:  PT was seen and examined by thoracic surgery.   PT is sitting on chair, on nasal cannula, not in acute distress.   Denies SOB, chest pain, nausea, vomiting, diarrhea, dizziness.      Vital Signs:  Vital Signs Last 24 Hrs  T(C): 36.8 (04-04-24 @ 12:23), Max: 37.1 (04-04-24 @ 04:55)  T(F): 98.2 (04-04-24 @ 12:23), Max: 98.7 (04-04-24 @ 04:55)  HR: 60 (04-04-24 @ 12:23) (60 - 66)  BP: 116/54 (04-04-24 @ 12:23) (94/46 - 116/54)  RR: 18 (04-04-24 @ 12:23) (18 - 18)  SpO2: 98% (04-04-24 @ 12:23) (95% - 98%) on (O2)    PE:  General: awake and alert NAD  Neurology: A&Ox3, nonfocal, GUILLORY x 4  Respiratory: CTA B/L  CV: RRR, S1S2, no murmurs, rubs or gallops  Abdominal: Soft, NT, ND +BS, Last BM  Extremities: No edema, + peripheral pulses  Tubes: Right PLeurx catheter to suction, output: 64 ml/24 hours, no AL  Relevant labs, radiology and Medications reviewed                        8.7    7.36  )-----------( 212      ( 04 Apr 2024 05:47 )             27.1     04-04    144  |  109<H>  |  55<H>  ----------------------------<  89  3.9   |  24  |  3.02<H>    Ca    8.5      04 Apr 2024 05:47  Phos  4.1     04-04  Mg     2.40     04-04    TPro  6.1  /  Alb  3.0<L>  /  TBili  0.2  /  DBili  x   /  AST  6   /  ALT  5   /  AlkPhos  71  04-03    PT/INR - ( 03 Apr 2024 06:41 )   PT: 13.9 sec;   INR: 1.25 ratio         PTT - ( 03 Apr 2024 06:41 )  PTT:35.5 sec  MEDICATIONS  (STANDING):  alteplase  Injectable for Pleural Effusion 10 milliGRAM(s) IntraPleural. every 12 hours  atorvastatin 80 milliGRAM(s) Oral at bedtime  buMETAnide Injectable 2 milliGRAM(s) IV Push two times a day  carbidopa/levodopa  25/100 1 Tablet(s) Oral four times a day  desvenlafaxine ER 50 milliGRAM(s) Oral daily  dornase linda Solution for Pleural Effusion 5 milliGRAM(s) IntraPleural. every 12 hours  heparin   Injectable 5000 Unit(s) SubCutaneous every 8 hours  influenza  Vaccine (HIGH DOSE) 0.7 milliLiter(s) IntraMuscular once  melatonin 6 milliGRAM(s) Oral at bedtime  mirtazapine 15 milliGRAM(s) Oral at bedtime  OLANZapine 10 milliGRAM(s) Oral at bedtime  polyethylene glycol 3350 17 Gram(s) Oral daily  sacubitril 24 mG/valsartan 26 mG 1 Tablet(s) Oral two times a day  senna 2 Tablet(s) Oral at bedtime  sodium chloride 0.9% Solution for Pleural Effusion 40 milliLiter(s) IntraPleural. every 12 hours    MEDICATIONS  (PRN):  albuterol    90 MICROgram(s) HFA Inhaler 2 Puff(s) Inhalation every 6 hours PRN for shortness of breath and/or wheezing    Pertinent Physical Exam  I&O's Summary    03 Apr 2024 07:01  -  04 Apr 2024 07:00  --------------------------------------------------------  IN: 0 mL / OUT: 864 mL / NET: -864 mL    04 Apr 2024 07:01  -  04 Apr 2024 13:36  --------------------------------------------------------  IN: 0 mL / OUT: 700 mL / NET: -700 mL    < from: CT Chest No Cont (04.03.24 @ 21:43) >    ACC: [id number] EXAM:  CT CHEST   ORDERED BY: SUSSY MARUQEZ DATE:  04/03/2024          INTERPRETATION:  CLINICAL INFORMATION: Chronic bilateral pleural   effusions status post right VATS and tunneled chest tube placement with   malfunctioning chest tube, evaluate.    COMPARISON: CT chest 3/20/2024, 11/24/2023, 8/9/2023, CT abdomen pelvis   9/8/2022.    CONTRAST/COMPLICATIONS:  IV Contrast: None.  Oral Contrast: None.  Complications: None reported at time study completion.    PROCEDURE:  CT of the Chest was performed.  Sagittal and coronal reformats were performed.    FINDINGS:    LUNGS AND AIRWAYS: Compressed bilateral lower lobe segmental and   subsegmental airways. Similar previously described focal narrowing of the   right middle lobe bronchus, stable since 8/9/2023.  Bilateral calcified   nodules. Bilateral lower lobe partial areas of compressive and round   atelectasis.  PLEURA: Moderate partially loculated bilateral pleural effusions with a   tunneled right chest tube in place with its tip terminating at the   posterior right lower hemithorax.  MEDIASTINUM AND JARED: Prominent sub-1 cm mediastinal lymph nodes, stable.  VESSELS: Aortic and coronary artery calcifications.  HEART: Cardiomegaly. Coronary stent. Mitral annular calcifications. No   pericardial effusion. Left chest wall biventricular pacemaker with right   atrial right ventricular and coronary sinus leads.  CHEST WALL AND LOWER NECK: Bilateral gynecomastia. Lower back/chest wall   subcutaneous edema.  VISUALIZED UPPER ABDOMEN: Stable hepatic cysts and subcentimeter   hypodensities, too small to characterize. Ill-defined left renal   calcifications measuring up to 7 mm and within the partially imaged left   renal pelvis.  BONES: Degenerative changes.    IMPRESSION:  Moderate bilateral partially loculated pleural effusions with bilateral   lower lobe partial compressive and/or rounded areas of atelectasis and an   associated right lower hemithorax pleural catheter are grossly unchanged   from comparison study on 3/20/2024.    --- End of Report ---      TABBY KLEIN MD; Resident Radiologist  This document has been electronically signed.  ALBA GANN MD; Attending Radiologist  This document has been electronically signed. Apr 4 2024 12:18PM    < end of copied text >      Assessment  84 y/o male with PMH HTN, HLD, CAD (stent), Afib w/ pace maker, Ischemic Heart Dz, BPH, Parkinson's bedbound/stand pivot/walker, has HHA, chronic pleural effusion, admitted for Pleurx catheter clogged. 4/2 First dose of MIST therapy performed. 4/4 2nd dose of MIST therapy given today.     PLAN  Neuro: Pain management as needed  Pulm: Encourage coughing, deep breathing and use of incentive spirometry.  Daily CXR.   Cardio: Monitor telemetry/alarms  GI: Tolerating diet. Continue stool softeners.  Renal: monitor urine output, supplement electrolytes as needed  Vasc: Heparin SC/SCDs for DVT prophylaxis. Anticoagulation medications to be restarted tomorrow.   Heme: Stable H/H. .   ID: Off antibiotics. Stable.  Therapy: OOB/ambulate  Tubes: Monitor Chest tube output. Keep Pleurx catheter to suction  Disposition: Aim to D/C to home once medically stable  Discussed with Cardiothoracic Team at AM rounds.  Plan above discussed with Dr. Oneal

## 2024-04-05 ENCOUNTER — TRANSCRIPTION ENCOUNTER (OUTPATIENT)
Age: 84
End: 2024-04-05

## 2024-04-05 VITALS
TEMPERATURE: 98 F | OXYGEN SATURATION: 95 % | DIASTOLIC BLOOD PRESSURE: 65 MMHG | RESPIRATION RATE: 18 BRPM | SYSTOLIC BLOOD PRESSURE: 109 MMHG | HEART RATE: 60 BPM

## 2024-04-05 DIAGNOSIS — Z51.5 ENCOUNTER FOR PALLIATIVE CARE: ICD-10-CM

## 2024-04-05 DIAGNOSIS — I50.33 ACUTE ON CHRONIC DIASTOLIC (CONGESTIVE) HEART FAILURE: ICD-10-CM

## 2024-04-05 DIAGNOSIS — Z86.69 PERSONAL HISTORY OF OTHER DISEASES OF THE NERVOUS SYSTEM AND SENSE ORGANS: ICD-10-CM

## 2024-04-05 DIAGNOSIS — I50.23 ACUTE ON CHRONIC SYSTOLIC (CONGESTIVE) HEART FAILURE: ICD-10-CM

## 2024-04-05 DIAGNOSIS — R53.1 WEAKNESS: ICD-10-CM

## 2024-04-05 LAB
ANION GAP SERPL CALC-SCNC: 14 MMOL/L — SIGNIFICANT CHANGE UP (ref 7–14)
BUN SERPL-MCNC: 51 MG/DL — HIGH (ref 7–23)
CALCIUM SERPL-MCNC: 8.8 MG/DL — SIGNIFICANT CHANGE UP (ref 8.4–10.5)
CHLORIDE SERPL-SCNC: 108 MMOL/L — HIGH (ref 98–107)
CO2 SERPL-SCNC: 21 MMOL/L — LOW (ref 22–31)
CREAT SERPL-MCNC: 2.69 MG/DL — HIGH (ref 0.5–1.3)
EGFR: 23 ML/MIN/1.73M2 — LOW
GLUCOSE SERPL-MCNC: 91 MG/DL — SIGNIFICANT CHANGE UP (ref 70–99)
HCT VFR BLD CALC: 30.4 % — LOW (ref 39–50)
HGB BLD-MCNC: 9.6 G/DL — LOW (ref 13–17)
MAGNESIUM SERPL-MCNC: 2.5 MG/DL — SIGNIFICANT CHANGE UP (ref 1.6–2.6)
MCHC RBC-ENTMCNC: 30.3 PG — SIGNIFICANT CHANGE UP (ref 27–34)
MCHC RBC-ENTMCNC: 31.6 GM/DL — LOW (ref 32–36)
MCV RBC AUTO: 95.9 FL — SIGNIFICANT CHANGE UP (ref 80–100)
NRBC # BLD: 0 /100 WBCS — SIGNIFICANT CHANGE UP (ref 0–0)
NRBC # FLD: 0 K/UL — SIGNIFICANT CHANGE UP (ref 0–0)
PHOSPHATE SERPL-MCNC: 3.9 MG/DL — SIGNIFICANT CHANGE UP (ref 2.5–4.5)
PLATELET # BLD AUTO: 222 K/UL — SIGNIFICANT CHANGE UP (ref 150–400)
POTASSIUM SERPL-MCNC: 4.1 MMOL/L — SIGNIFICANT CHANGE UP (ref 3.5–5.3)
POTASSIUM SERPL-SCNC: 4.1 MMOL/L — SIGNIFICANT CHANGE UP (ref 3.5–5.3)
RBC # BLD: 3.17 M/UL — LOW (ref 4.2–5.8)
RBC # FLD: 13.4 % — SIGNIFICANT CHANGE UP (ref 10.3–14.5)
SODIUM SERPL-SCNC: 143 MMOL/L — SIGNIFICANT CHANGE UP (ref 135–145)
WBC # BLD: 6.79 K/UL — SIGNIFICANT CHANGE UP (ref 3.8–10.5)
WBC # FLD AUTO: 6.79 K/UL — SIGNIFICANT CHANGE UP (ref 3.8–10.5)

## 2024-04-05 PROCEDURE — 99497 ADVNCD CARE PLAN 30 MIN: CPT | Mod: 25

## 2024-04-05 PROCEDURE — 99223 1ST HOSP IP/OBS HIGH 75: CPT

## 2024-04-05 PROCEDURE — 71045 X-RAY EXAM CHEST 1 VIEW: CPT | Mod: 26

## 2024-04-05 RX ORDER — BUMETANIDE 0.25 MG/ML
1 INJECTION INTRAMUSCULAR; INTRAVENOUS
Qty: 60 | Refills: 0
Start: 2024-04-05 | End: 2024-05-04

## 2024-04-05 RX ORDER — BUMETANIDE 0.25 MG/ML
2 INJECTION INTRAMUSCULAR; INTRAVENOUS
Refills: 0 | Status: DISCONTINUED | OUTPATIENT
Start: 2024-04-05 | End: 2024-04-05

## 2024-04-05 RX ORDER — SENNA PLUS 8.6 MG/1
2 TABLET ORAL
Qty: 0 | Refills: 0 | DISCHARGE
Start: 2024-04-05

## 2024-04-05 RX ORDER — BUMETANIDE 0.25 MG/ML
1 INJECTION INTRAMUSCULAR; INTRAVENOUS
Refills: 0 | DISCHARGE

## 2024-04-05 RX ADMIN — HEPARIN SODIUM 5000 UNIT(S): 5000 INJECTION INTRAVENOUS; SUBCUTANEOUS at 06:18

## 2024-04-05 RX ADMIN — CARBIDOPA AND LEVODOPA 1 TABLET(S): 25; 100 TABLET ORAL at 06:18

## 2024-04-05 RX ADMIN — BUMETANIDE 2 MILLIGRAM(S): 0.25 INJECTION INTRAMUSCULAR; INTRAVENOUS at 06:19

## 2024-04-05 RX ADMIN — CARBIDOPA AND LEVODOPA 1 TABLET(S): 25; 100 TABLET ORAL at 12:56

## 2024-04-05 RX ADMIN — DESVENLAFAXINE 50 MILLIGRAM(S): 50 TABLET, EXTENDED RELEASE ORAL at 12:56

## 2024-04-05 RX ADMIN — HEPARIN SODIUM 5000 UNIT(S): 5000 INJECTION INTRAVENOUS; SUBCUTANEOUS at 12:55

## 2024-04-05 RX ADMIN — POLYETHYLENE GLYCOL 3350 17 GRAM(S): 17 POWDER, FOR SOLUTION ORAL at 12:55

## 2024-04-05 RX ADMIN — CARBIDOPA AND LEVODOPA 1 TABLET(S): 25; 100 TABLET ORAL at 00:22

## 2024-04-05 RX ADMIN — BUMETANIDE 2 MILLIGRAM(S): 0.25 INJECTION INTRAMUSCULAR; INTRAVENOUS at 12:55

## 2024-04-05 NOTE — DISCHARGE NOTE NURSING/CASE MANAGEMENT/SOCIAL WORK - NSSCNAMETXT_GEN_ALL_CORE
[facility] [phone] initial visit will be day after discharge home. A nurse will call prior to the home visit.

## 2024-04-05 NOTE — CONSULT NOTE ADULT - CONSULT REASON
vol overload
Azotemia
edema, hyperkalemia, ALAYNA
Parkinsons
Follow up in the setting of advanced illness

## 2024-04-05 NOTE — CONSULT NOTE ADULT - SUBJECTIVE AND OBJECTIVE BOX
Date of [id number] @ 14:35    HPI:  83M PMHx HTN, HLD, ICD/ afib on Eliquis (last dose 3/30), PLavix (last dose 3/26), Parkinson's disease, history of chronic pleural effusions, status post R sided VATS Pleurx cathter placement, presents with clogged pleurx catheter.  (02 Apr 2024 13:06)    PERTINENT PM/SXH:   Hypertension    Endogenous hyperlipemia    Benign prostatic hypertrophy    Depression    Hypertension    Hyperlipidemia    BPH (benign prostatic hyperplasia)    Atrial fibrillation    Bradycardia    Parkinsons disease    CAD (coronary artery disease)    Pleural effusion, not elsewhere classified    COVID-19 vaccine series completed    History of hip replacement, total    Status post cataract extraction    Presence of cardiac pacemaker    H/O coronary angioplasty    FAMILY HISTORY:  Family history of lung cancer (Mother)    Family history of esophageal cancer (Father)    FH: myocardial infarction (Grandparent)    Family Hx substance abuse [ ]yes [ ]no    ITEMS NOT CHECKED ARE NOT PRESENT    SOCIAL HISTORY:   Significant other/partner[x ]  Children[x ]  Pentecostalism/Spirituality:  Substance hx:  [ ]   Tobacco hx:  [ ]   Alcohol hx: [ ]   Home Opioid hx:  [ ] I-Stop Reference No:  Living Situation: [ x]Home  [ ]Long term care  [ ]Rehab [ ]Other    ADVANCE DIRECTIVES:    DNR/MOLST  [ ]  Living Will  [ ]   DECISION MAKER(s):  [x ] Health Care Proxy(s)  [ ] Surrogate(s)  [ ] Guardian           Name(s): Phone Number(s):  Wife - Opal Banerjee #[phone]    BASELINE (I)ADL(s) (prior to admission):  [place]: [ ]Total  [x ] Moderate [ ]Dependent    Allergies    No Known Allergies    Intolerances    MEDICATIONS  (STANDING):  alteplase  Injectable for Pleural Effusion 10 milliGRAM(s) IntraPleural. every 12 hours  atorvastatin 80 milliGRAM(s) Oral at bedtime  buMETAnide 2 milliGRAM(s) Oral two times a day  carbidopa/levodopa  25/100 1 Tablet(s) Oral four times a day  desvenlafaxine ER 50 milliGRAM(s) Oral daily  dornase linda Solution for Pleural Effusion 5 milliGRAM(s) IntraPleural. every 12 hours  heparin   Injectable 5000 Unit(s) SubCutaneous every 8 hours  influenza  Vaccine (HIGH DOSE) 0.7 milliLiter(s) IntraMuscular once  melatonin 6 milliGRAM(s) Oral at bedtime  mirtazapine 15 milliGRAM(s) Oral at bedtime  OLANZapine 10 milliGRAM(s) Oral at bedtime  polyethylene glycol 3350 17 Gram(s) Oral daily  sacubitril 24 mG/valsartan 26 mG 1 Tablet(s) Oral two times a day  senna 2 Tablet(s) Oral at bedtime  sodium chloride 0.9% Solution for Pleural Effusion 40 milliLiter(s) IntraPleural. every 12 hours    MEDICATIONS  (PRN):  albuterol    90 MICROgram(s) HFA Inhaler 2 Puff(s) Inhalation every 6 hours PRN for shortness of breath and/or wheezing    PRESENT SYMPTOMS: [x ]Unable to self-report  [ ] CPOT [x ] PAINADs [x ] RDOS  Source if other than patient:  [ ]Family   [ ]Team     Pain: [ ]yes [ ]no  QOL impact -   Location -                    Aggravating factors -  Quality -  Radiation -  Timing-  Severity (0-10 scale):  Minimal acceptable level/pain goal (0-10 scale):     CPOT:    https://www.[facility].org/getattachment/isc54p70-7d4o-9g0s-1h6x-4408t8025s7j/Critical-Care-Pain-Observation-Tool-(CPOT)    PAIN AD Score: 0  [email or web address]/painad.pdf (press ctrl +  left click to view)    Dyspnea:                           [ ]Mild [ ]Moderate [ ]Severe    RDOS: 0  0 to 2  minimal or no respiratory distress   3  mild distress  4 to 6 moderate distress  >7 severe distress  https://[email or web address]/handouts/hen/Respiratory_Distress_Observation_Scale.pdf (Ctrl +  left click to view)     Anxiety:                             [ ]Mild [ ]Moderate [ ]Severe  Fatigue:                             [ ]Mild [ ]Moderate [ ]Severe  Nausea:                             [ ]Mild [ ]Moderate [ ]Severe  Loss of appetite:              [ ]Mild [ ]Moderate [ ]Severe  Constipation:                    [ ]Mild [ ]Moderate [ ]Severe    PCSSQ[Palliative Care Spiritual Screening Question]   Severity (0-10): deferred  Score of 4 or > indicate consideration of Chaplaincy referral.  Chaplaincy Referral: [ ] yes [ ] refused [ ] following [ x] Deferred     Caregiver [place]? : [ ] yes [ ] no [ x] Deferred [ ] Declined             Social work referral [ ] Patient & Family Centered Care Referral [ ]     Anticipatory Grief present?:  [ ] yes [x ] no  [ ] Deferred                  Social work referral [ ] Chaplaincy Referral[ ]    Other Symptoms:  [ ]All other review of systems negative     Palliative Performance Status Version 2:  40  %    [email or web address]/files/[email or web address]_performance_scale_[email or web address].pdf    PHYSICAL EXAM:  Vital Signs Last 24 Hrs  T(C): 36.4 (05 Apr 2024 12:00), Max: 36.7 (04 Apr 2024 19:53)  T(F): 97.6 (05 Apr 2024 12:00), Max: 98 (04 Apr 2024 19:53)  HR: 60 (05 Apr 2024 12:00) (60 - 62)  BP: 100/53 (05 Apr 2024 12:00) (100/53 - 119/59)  BP(mean): --  RR: 18 (05 Apr 2024 12:00) (17 - 18)  SpO2: 98% (05 Apr 2024 12:00) (95% - 98%)    Parameters below as of 05 Apr 2024 12:00  Patient On (Oxygen Delivery Method): room air     I&O's Summary    04 Apr 2024 07:01  -  05 Apr 2024 07:00  --------------------------------------------------------  IN: 0 mL / OUT: 2610 mL / NET: -2610 mL    05 Apr 2024 07:01  -  05 Apr 2024 14:35  --------------------------------------------------------  IN: 0 mL / OUT: 300 mL / NET: -300 mL    GENERAL: [ ]Cachexia    [ ]Alert  [ ]Oriented x   [ ]Lethargic  [ ]Unarousable  [ ]Verbal  [ ]Non-Verbal  Behavioral:   [ ] Anxiety  [ ] Delirium [ ] Agitation [ ] Other  HEENT:  [ ]Normal   [ x]Dry mouth   [ ]ET Tube/Trach  [ ]Oral lesions  PULMONARY:   [ ]Clear [ ]Tachypnea  [ ]Audible excessive secretions   [x ]Rhonchi        [ ]Right [ ]Left [ ]Bilateral  [ ]Crackles        [ ]Right [ ]Left [ ]Bilateral  [ ]Wheezing     [ ]Right [ ]Left [ ]Bilateral  [ ]Diminished breath sounds [ ]right [ ]left [ ]bilateral  CARDIOVASCULAR:    [ ]Regular [ ]Irregular [ ]Tachy  [ ]Omkar [ ]Murmur [ ]Other  GASTROINTESTINAL:  [ x]Soft  [ ]Distended   [x ]+BS  [x ]Non tender [ ]Tender  [ ]Other [ ]PEG [ ]OGT/ NGT  Last BM:  GENITOURINARY: condom cath  [ ]Normal [ ] Incontinent   [ ]Oliguria/Anuria   [ ]Storm  MUSCULOSKELETAL:   [ ]Normal   [x ]Weakness  [ ]Bed/Wheelchair bound [ ]Edema  NEUROLOGIC:   [ ]No focal deficits  [ ]Cognitive impairment  [ ]Dysphagia [ ]Dysarthria [ ]Paresis [ ]Other   SKIN:   [x ]Normal  [ ]Rash  [ ]Other  [ ]Pressure ulcer(s)       Present on admission [ ]y [ ]n    CRITICAL CARE:  [ ] Shock Present  [ ]Septic [ ]Cardiogenic [ ]Neurologic [ ]Hypovolemic  [ ]  Vasopressors [ ]  Inotropes   [ ]Respiratory failure present [ ]Mechanical ventilation [ ]Non-invasive ventilatory support [ ]High flow    [ ]Acute  [ ]Chronic [ ]Hypoxic  [ ]Hypercarbic [ ]Other  [ ]Other organ failure     LABS:                        9.6    6.79  )-----------( 222      ( 05 Apr 2024 06:21 )             30.4   04-05    143  |  108<H>  |  51<H>  ----------------------------<  91  4.1   |  21<L>  |  2.69<H>    Ca    8.8      05 Apr 2024 06:21  Phos  3.9     04-05  Mg     2.50     04-05    Urinalysis Basic - ( 05 Apr 2024 06:21 )    Color: x / Appearance: x / SG: x / pH: x  Gluc: 91 mg/dL / Ketone: x  / Bili: x / Urobili: x   Blood: x / Protein: x / Nitrite: x   Leuk Esterase: x / RBC: x / WBC x   Sq Epi: x / Non Sq Epi: x / Bacteria: x    RADIOLOGY & ADDITIONAL STUDIES:  < from: CT Chest No Cont (04.03.24 @ 21:43) >  IMPRESSION:  Moderate bilateral partially loculated pleural effusions with bilateral   lower lobe partial compressive and/or rounded areas of atelectasis and an   associated right lower hemithorax pleural catheter are grossly unchanged   from comparison study on 3/20/2024.    PROTEIN CALORIE MALNUTRITION PRESENT: [ ]mild [ ]moderate [ ]severe [ ]underweight [ ]morbid obesity  https://www.andeal.org/vault/2440/web/files/ONC/Table_Clinical%20Characteristics%20to%20Document%20Malnutrition-White%20JV%20et%20al%202012.pdf    Height (cm): 172.7 (04-02-24 @ 11:43), 172.7 (11-29-23 @ 10:35), 172.7 (11-24-23 @ 10:15)  Weight (kg): 91.626 (04-02-24 @ 11:43), 88.2 (11-29-23 @ 10:35), 90.718 (11-11-23 @ 20:30)  BMI (kg/m2): 30.7 (04-02-24 @ 11:43), 29.6 (11-29-23 @ 10:35), 29.6 (11-29-23 @ 10:35)    [ ]PPSV2 < or = to 30% [ ]significant weight loss  [ ]poor nutritional intake  [ ]anasarca[ ]Artificial Nutrition      Other REFERRALS:  [ ]Hospice  [ ]Child Life  [ ]Social Work  [ ]Case management [ ]Holistic Therapy     Goals of Care Document:

## 2024-04-05 NOTE — CONSULT NOTE ADULT - CONVERSATION DETAILS
Met with wife- Opal at bedside, the visit and discussion of voluntary nature.  Addressed advanced directives in the event of cardiac arrest or respiratory failure. As pt with completed MOLST form in the past, options discussed in the setting of advanced illness. Including option for allowing a natural passing (DNR/DNI) and adding medications for comfort if any symptoms might arise.   Wife confirmed that pt would want to allow a natural passings (DNR'/DNI) with  no feeding tube. MOLST form revised and placed copy in the chart.   Questions answered. Support provided.

## 2024-04-05 NOTE — PROGRESS NOTE ADULT - REASON FOR ADMISSION
Clogged Pleurx

## 2024-04-05 NOTE — PROGRESS NOTE ADULT - ASSESSMENT
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 Admitted to thoracic for TPA procedure of the catheter. Neurology consulted for Parkinsons mgmt.  At home is on sinemet 1.5 tab TID and 1 tab at night.   Symptoms appear decently controlled on exam  Per last note from Dr. Langley - rec inc 6pm dose of sinemet to ER formulation however pt has not made that change as he was doing well.    PD - decently controlled  CAD  AFib    - cont home sinemet 25/100 1.5tab TID, 1 tab at bedtime, outpatient f/u with Dr. Langley on d/c  - cont Eliquis for AFIb  - PPM pending interrogation  - Cardioloy helping with HF   - Thoracic managing pleurex cath s/p tpa    spoke with wife at bedside    Velma Kaye DO  Vascular Neurology  Office [phone]  Available via Microsoft Teams       
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 Admitted to thoracic for TPA procedure of the catheter. Ptn has had same in the past.   Wife is at bedside and states ptn has been more edematous in his hands and feet, has difficulty emptying his bladder when he voids and has constant sensation like he may have a UTI. He is eating well, sleeping well, he remembered me ( i established house calls care with him in 12/2023). No fever, no chills    Clogged Pleurx: s/p TPA , now draining well  Acute on chronic Diastolic CHF , has h/o Cardiac amyloidosis and mod AI:   rpt TTE is unchanged  ALAYNA, CKD4, edema : ongoing edema, though improving. still hypervolemic and edematous. renal raised Bumex to 2 mg IV bid  Hyperkalemia: resolved  Dysuria: ptn is voiding well, PVR via bladder scan was 94 cc on 4/2  Parkinsons, cont outptn meds, neuro called, i believe the meds acan cont as outptn dosages, ptn is stable  PPM: would get EP to interrogate  Afib: stable, cont ELiquis  HTN, HLD    
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 Admitted to thoracic for TPA procedure of the catheter.    #Chronic HFpEF, Cardiac Amyloid   -volume overloaded, anasarca  -continue IV bumex  -continue entresto as ordered  -on Vyndamax 61 mg oral capsule daily as outpt  -entresto    #CAD, s/p PCI  -stable, no chest pain, ACS  -resume plavix per thoracic     #AF, s/p PPM  -stable off avn meds  -resume a/c per thoracic     #s/p pleurex cath with obstruction  -sp TPA  -rest of mgmt per thoracic    
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 Admitted to thoracic for TPA procedure of the catheter. Neurology consulted for Parkinsons mgmt.  At home is on sinemet 1.5 tab TID and 1 tab at night.   Symptoms appear decently controlled on exam  Per last note from Dr. Langley - rec inc 6pm dose of sinemet to ER formulation however pt has not made that change as he was doing well.    PD - decently controlled  CAD  AFib    - cont home sinemet 25/100 1.5tab TID, 1 tab at bedtime, outpatient f/u with Dr. Langley on d/c  - cont Eliquis for AFIb  - PPM pending interrogation  - Cardioloy helping with HF   - Thoracic managing pleurex cath s/p tpa  dc planning today    spoke with wife at bedside    Velma Kaye,   Vascular Neurology  Office [phone]  Available via Microsoft Teams       
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 Admitted to thoracic for TPA procedure of the catheter.    #Chronic HFpEF, Cardiac Amyloid   -volume overloaded, anasarca  -continue IV bumex  -continue entresto as ordered  -on Vyndamax 61 mg oral capsule daily as outpt  -entresto    #CAD, s/p PCI  -stable, no chest pain, ACS  -resume plavix per thoracic     #AF, s/p PPM  -stable off avn meds  -resume a/c per thoracic     #s/p pleurex cath with obstruction  -sp TPA  -rest of mgmt per thoracic    
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 Admitted to thoracic for TPA procedure of the catheter. Ptn has had same in the past.   Wife is at bedside and states ptn has been more edematous in his hands and feet, has difficulty emptying his bladder when he voids and has constant sensation like he may have a UTI. He is eating well, sleeping well, he remembered me ( i established house calls care with him in 12/2023). No fever, no chills    Clogged Pleurx: s/p TPA , now draining well  Acute on chronic Diastolic CHF , has h/o Cardiac amyloidosis and mod AI:   rpt TTE is unchanged  ALAYNA, CKD4, edema : ALAYNA improving, hypervolemia improved, BUMEX changed to 2 mg po bid. will need outptn renal and cardiology F/U  Hyperkalemia: resolved  Dysuria: ptn is voiding well, PVR via bladder scan was 94 cc on 4/2  Parkinsons, cont outptn meds, neuro eval reviewed, PD is stable  PPM: interrogation as per cardiology  Afib: stable, resume  ELiquis when cleared by Thoracic  HTN, HLD    
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 Admitted to thoracic for TPA procedure of the catheter.    #Chronic HFpEF, Cardiac Amyloid   -volume overloaded, anasarca  -continue IV bumex  -continue entresto as ordered  -on Vyndamax 61 mg oral capsule daily as outpt  -Check bmp today    #CAD, s/p PCI  -stable, no chest pain, ACS  -resume plavix per thoracic     #AF, s/p PPM  -stable off avn meds  -resume a/c per thoracic     #s/p pleurex cath with obstruction  -sp TPA  -rest of mgmt per thoracic    
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 Admitted to thoracic for TPA procedure of the catheter. Ptn has had same in the past.   Wife is at bedside and states ptn has been more edematous in his hands and feet, has difficulty emptying his bladder when he voids and has constant sensation like he may have a UTI. He is eating well, sleeping well, he remembered me ( i established house calls care with him in 12/2023). No fever, no chills    Clogged Pleurx: s/p TPA , now draining well  Acute on chronic Diastolic CHF , has h/o Cardiac amyloidosis and mod AI:   rpt TTE is unchanged  ALAYNA, CKD4, edema : ongoing edema, though improving. still hypervolemic and edematous. cont  Bumex  2 mg IV bid  Hyperkalemia: resolved  Dysuria: ptn is voiding well, PVR via bladder scan was 94 cc on 4/2  Parkinsons, cont outptn meds, neuro eval reviewed, PD is stable  PPM: interrogation as per cardiology  Afib: stable, resume  ELiquis when cleared by Thoracic  HTN, HLD

## 2024-04-05 NOTE — DISCHARGE NOTE PROVIDER - HOSPITAL COURSE
83M PMH HTN, HLD, ICD/ afib on Eliquis (last dose 3/30), Plavix (last dose 3/26), Parkinson's disease, history of chronic pleural effusions, R sided VATS Pleurx cathter placement, presents with a clogged pleurx catheter.  MIST was performed 2 times after a CT chest showed that the pleural effusion was loculated.  After the tube started draining again, the pt was discharged 83M PMH HTN, HLD, ICD/ afib on Eliquis (last dose 3/30), Plavix (last dose 3/26), Parkinson's disease, history of chronic pleural effusions, R sided VATS Pleurx cathter placement, presents with a clogged pleurx catheter.  MIST was performed 2 times after a CT chest showed that the pleural effusion was loculated.  After the tube started draining again, the pt was discharged. Pt seen and examined by Dr Oneal on day of discharge.    Vital Signs Last 24 Hrs  T(C): 36.4 (05 Apr 2024 12:00), Max: 36.7 (04 Apr 2024 19:53)  T(F): 97.6 (05 Apr 2024 12:00), Max: 98 (04 Apr 2024 19:53)  HR: 60 (05 Apr 2024 12:00) (60 - 62)  BP: 100/53 (05 Apr 2024 12:00) (100/53 - 119/59)  BP(mean): --  RR: 18 (05 Apr 2024 12:00) (17 - 18)  SpO2: 98% (05 Apr 2024 12:00) (95% - 98%)    Parameters below as of 05 Apr 2024 12:00  Patient On (Oxygen Delivery Method): room air    PHYSICAL EXAM:  Gen: NAD  Resp: Respirations unlabored. Bilateral chest rise.   Card: RRR.   GI: Soft. Nontender. Nondistended.   Skin: Warm, well perfused, no masses or organomegaly  EXT: No clubbing, cyanosis, or edema  Tubes: Right pleurX catheter capped  site c.,d.i.

## 2024-04-05 NOTE — PROGRESS NOTE ADULT - SUBJECTIVE AND OBJECTIVE BOX
CARDIOLOGY FOLLOW UP - Dr. Mann  DATE OF SERVICE: 4/5/24    CC  No cp, no sob     REVIEW OF SYSTEMS:  CONSTITUTIONAL: No fever, weight loss, or fatigue  RESPIRATORY: No cough, wheezing, chills or hemoptysis; No Shortness of Breath  CARDIOVASCULAR: No chest pain, palpitations, passing out, dizziness, or leg swelling  GASTROINTESTINAL: No abdominal or epigastric pain. No nausea, vomiting, or hematemesis; No diarrhea or constipation. No melena or hematochezia.  VASCULAR: No edema     PHYSICAL EXAM:  T(C): 36.7 (04-05-24 @ 05:41), Max: 36.8 (04-04-24 @ 12:23)  HR: 61 (04-05-24 @ 05:41) (60 - 62)  BP: 104/47 (04-05-24 @ 05:41) (99/42 - 119/59)  RR: 18 (04-05-24 @ 05:41) (18 - 18)  SpO2: 95% (04-05-24 @ 05:41) (95% - 98%)  Wt(kg): --  I&O's Summary    04 Apr 2024 07:01  -  05 Apr 2024 07:00  --------------------------------------------------------  IN: 0 mL / OUT: 2610 mL / NET: -2610 mL        Appearance: Elderly male 	  Cardiovascular: Normal S1 S2,RRR, No JVD, No murmurs  Respiratory: diminished R side, +R pleurx  Gastrointestinal:  Soft, Non-tender, + BS	  Extremities: Normal range of motion, No clubbing, cyanosis. +B/l ue edema       Home Medications:  ascorbic acid 500 mg oral tablet: 1 tab(s) orally once a day (02 Apr 2024 12:55)  aspirin 81 mg oral delayed release tablet: 1 tab(s) orally once a day (02 Apr 2024 12:55)  bumetanide 2 mg oral tablet: 1 tab(s) orally once a day (02 Apr 2024 12:55)  carbidopa-levodopa 25 mg-100 mg oral tablet: 1.5 tab(s) orally 4 times a day (02 Apr 2024 12:55)  Crestor 20 mg oral tablet: 1 tab(s) orally once a day (02 Apr 2024 12:55)  cyanocobalamin 100 mcg oral tablet: 1 tab(s) orally (02 Apr 2024 12:55)  D3 25 mcg (1000 intl units) oral tablet: 1 tab(s) orally once a day (02 Apr 2024 12:55)  desvenlafaxine (as base) 50 mg oral tablet, extended release: 50 milligram(s) orally once a day (02 Apr 2024 12:55)  Eliquis 2.5 mg oral tablet: 1 tab(s) orally 2 times a day (02 Apr 2024 12:55)  Entresto 24 mg-26 mg oral tablet: 1 tab(s) orally once a day (02 Apr 2024 12:55)  mirtazapine 15 mg oral tablet: 1 tab(s) orally once a day (at bedtime) (02 Apr 2024 12:55)  OLANZapine 10 mg oral tablet: 1 tab(s) orally once a day (at bedtime) (02 Apr 2024 12:55)  Plavix 75 mg oral tablet: 75 milligram(s) orally once a day (02 Apr 2024 12:55)  polyethylene glycol 3350 oral powder for reconstitution: 17 gram(s) orally once a day (02 Apr 2024 12:55)  Vyndamax 61 mg oral capsule: 1 orally once a day (02 Apr 2024 12:55)      MEDICATIONS  (STANDING):  alteplase  Injectable for Pleural Effusion 10 milliGRAM(s) IntraPleural. every 12 hours  atorvastatin 80 milliGRAM(s) Oral at bedtime  buMETAnide Injectable 2 milliGRAM(s) IV Push two times a day  carbidopa/levodopa  25/100 1 Tablet(s) Oral four times a day  desvenlafaxine ER 50 milliGRAM(s) Oral daily  dornase linda Solution for Pleural Effusion 5 milliGRAM(s) IntraPleural. every 12 hours  heparin   Injectable 5000 Unit(s) SubCutaneous every 8 hours  influenza  Vaccine (HIGH DOSE) 0.7 milliLiter(s) IntraMuscular once  melatonin 6 milliGRAM(s) Oral at bedtime  mirtazapine 15 milliGRAM(s) Oral at bedtime  OLANZapine 10 milliGRAM(s) Oral at bedtime  polyethylene glycol 3350 17 Gram(s) Oral daily  sacubitril 24 mG/valsartan 26 mG 1 Tablet(s) Oral two times a day  senna 2 Tablet(s) Oral at bedtime  sodium chloride 0.9% Solution for Pleural Effusion 40 milliLiter(s) IntraPleural. every 12 hours      TELEMETRY: AVpaced 60s	    ECG:  	  RADIOLOGY:   DIAGNOSTIC TESTING:  [ ] Echocardiogram:  [ ]  Catheterization:  [ ] Stress Test:    OTHER: 	    LABS:	 	                            9.6    6.79  )-----------( 222      ( 05 Apr 2024 06:21 )             30.4     04-05    143  |  108<H>  |  51<H>  ----------------------------<  91  4.1   |  21<L>  |  2.69<H>    Ca    8.8      05 Apr 2024 06:21  Phos  3.9     04-05  Mg     2.50     04-05

## 2024-04-05 NOTE — PROGRESS NOTE ADULT - TIME BILLING
Agree with above ACP note.  cv stable  clinically improved  continue iv diuresis   transition to oral on d/c  renal fxn stable  med/renal/thoracic f/u  resume antiplat, a/c per thoracic  tte with preserved EF
Agree with above ACP note.  cv stable  continue iv diuresis   trend renal fxn  med/renal/thoracic f/u  resume antiplat, a/c per thoracic  tte with preserved EF
Patient seen and examined.  Agree with above ACP note.  cv stable  continue iv diuresis   trend renal fxn  med/renal/thoracic f/u  resume antiplat, a/c per thoracic

## 2024-04-05 NOTE — DISCHARGE NOTE NURSING/CASE MANAGEMENT/SOCIAL WORK - NSDCPEELIQUISDIET_GEN_ALL_CORE
room air Eat healthy foods you enjoy. Apixaban/Eliquis DOES NOT have a special diet. Limit your alcohol intake.

## 2024-04-05 NOTE — CONSULT NOTE ADULT - PROBLEM SELECTOR RECOMMENDATION 2
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 Admitted to thoracic for TPA procedure of the catheter.  Had volume overloaded, anasarca  s/p IV bumex  Plan is to continue to follow outpatient

## 2024-04-05 NOTE — DISCHARGE NOTE PROVIDER - NSDCCPCAREPLAN_GEN_ALL_CORE_FT
PRINCIPAL DISCHARGE DIAGNOSIS  Diagnosis: Complication of chest tube, sequela  Assessment and Plan of Treatment:

## 2024-04-05 NOTE — DISCHARGE NOTE PROVIDER - CARE PROVIDER_API CALL
Rufus Oneal  Thoracic Surgery  [address] [place] [address] [place] [address]  Phone: [phone]  Fax: [phone]  Established Patient  Follow Up Time: 2 weeks

## 2024-04-05 NOTE — PROGRESS NOTE ADULT - PROVIDER SPECIALTY LIST ADULT
Cardiology
Nephrology
Nephrology
Thoracic Surgery
Internal Medicine
Nephrology
Internal Medicine
Thoracic Surgery
Cardiology
Cardiology
Internal Medicine
Neurology
Neurology

## 2024-04-05 NOTE — DISCHARGE NOTE NURSING/CASE MANAGEMENT/SOCIAL WORK - PATIENT PORTAL LINK FT
You can access the FollowMyHealth Patient Portal offered by [facility] by registering at the following website: [email or web address]. By joining [employer]’s FollowMyHealth portal, you will also be able to view your health information using other applications (apps) compatible with our system.

## 2024-04-05 NOTE — PROGRESS NOTE ADULT - SUBJECTIVE AND OBJECTIVE BOX
Patient is a 83y old  Male who presents with a chief complaint of Clogged Pleurx (05 Apr 2024 14:31)      SUBJECTIVE / OVERNIGHT EVENTS: ALAYNA improving, hypervolemia improved, BUMEX changed to 2 mg po bid. will need outptn renal and cardiology F/U    MEDICATIONS  (STANDING):  atorvastatin 80 milliGRAM(s) Oral at bedtime  buMETAnide 2 milliGRAM(s) Oral two times a day  carbidopa/levodopa  25/100 1 Tablet(s) Oral four times a day  desvenlafaxine ER 50 milliGRAM(s) Oral daily  heparin   Injectable 5000 Unit(s) SubCutaneous every 8 hours  influenza  Vaccine (HIGH DOSE) 0.7 milliLiter(s) IntraMuscular once  melatonin 6 milliGRAM(s) Oral at bedtime  mirtazapine 15 milliGRAM(s) Oral at bedtime  OLANZapine 10 milliGRAM(s) Oral at bedtime  polyethylene glycol 3350 17 Gram(s) Oral daily  sacubitril 24 mG/valsartan 26 mG 1 Tablet(s) Oral two times a day  senna 2 Tablet(s) Oral at bedtime  sodium chloride 0.9% Solution for Pleural Effusion 40 milliLiter(s) IntraPleural. every 12 hours    MEDICATIONS  (PRN):  albuterol    90 MICROgram(s) HFA Inhaler 2 Puff(s) Inhalation every 6 hours PRN for shortness of breath and/or wheezing      Vital Signs Last 24 Hrs  T(F): 97.6 (04-05-24 @ 12:00), Max: 98 (04-04-24 @ 19:53)  HR: 60 (04-05-24 @ 12:00) (60 - 62)  BP: 100/53 (04-05-24 @ 12:00) (100/53 - 119/59)  RR: 18 (04-05-24 @ 12:00) (17 - 18)  SpO2: 98% (04-05-24 @ 12:00) (95% - 98%)  Telemetry:   CAPILLARY BLOOD GLUCOSE        I&O's Summary    04 Apr 2024 07:01  -  05 Apr 2024 07:00  --------------------------------------------------------  IN: 0 mL / OUT: 2610 mL / NET: -2610 mL    05 Apr 2024 07:01  -  05 Apr 2024 15:29  --------------------------------------------------------  IN: 0 mL / OUT: 300 mL / NET: -300 mL        PHYSICAL EXAM:  GENERAL: NAD, well-developed  HEAD:  Atraumatic, Normocephalic  EYES: EOMI, PERRLA, conjunctiva and sclera clear  NECK: Supple, No JVD  CHEST/LUNG: Clear to auscultation bilaterally; No wheeze  HEART: Regular rate and rhythm; No murmurs, rubs, or gallops  ABDOMEN: Soft, Nontender, Nondistended; Bowel sounds present  EXTREMITIES:  2+ Peripheral Pulses, No clubbing, cyanosis, or edema  PSYCH: AAOx3  NEUROLOGY: non-focal  SKIN: No rashes or lesions    LABS:                        9.6    6.79  )-----------( 222      ( 05 Apr 2024 06:21 )             30.4     04-05    143  |  108<H>  |  51<H>  ----------------------------<  91  4.1   |  21<L>  |  2.69<H>    Ca    8.8      05 Apr 2024 06:21  Phos  3.9     04-05  Mg     2.50     04-05            Urinalysis Basic - ( 05 Apr 2024 06:21 )    Color: x / Appearance: x / SG: x / pH: x  Gluc: 91 mg/dL / Ketone: x  / Bili: x / Urobili: x   Blood: x / Protein: x / Nitrite: x   Leuk Esterase: x / RBC: x / WBC x   Sq Epi: x / Non Sq Epi: x / Bacteria: x        RADIOLOGY & ADDITIONAL TESTS:    Imaging Personally Reviewed:    Consultant(s) Notes Reviewed:      Care Discussed with Consultants/Other Providers:

## 2024-04-05 NOTE — DISCHARGE NOTE PROVIDER - NSDCFUADDAPPT_GEN_ALL_CORE_FT
See Dr Oneal in about a week.  Call for an appointment and bring a new chest X-ray with you.  See your PCP as well in about a week or two.   Telehealth visit with Dr Oneal in 1-2 weeks  call  for appointment    Neurology Dr. Langley   f/u as instructed    Pt to follow with Dr. Ramsey at Geriatrics and Palliative Care outpatient office.   Contact #[phone].   Located at [address] [place] [address], [address].

## 2024-04-05 NOTE — DISCHARGE NOTE PROVIDER - NSDCMRMEDTOKEN_GEN_ALL_CORE_FT
Albuterol (Eqv-ProAir HFA) 90 mcg/inh inhalation aerosol: 2 puff(s) inhaled every 6 hours as needed for  shortness of breath and/or wheezing  ascorbic acid 500 mg oral tablet: 1 tab(s) orally once a day  aspirin 81 mg oral delayed release tablet: 1 tab(s) orally once a day  bumetanide 2 mg oral tablet: 1 tab(s) orally once a day  carbidopa-levodopa 25 mg-100 mg oral tablet: 1.5 tab(s) orally 4 times a day  Crestor 20 mg oral tablet: 1 tab(s) orally once a day  cyanocobalamin 100 mcg oral tablet: 1 tab(s) orally  D3 25 mcg (1000 intl units) oral tablet: 1 tab(s) orally once a day  desvenlafaxine (as base) 50 mg oral tablet, extended release: 50 milligram(s) orally once a day  Eliquis 2.5 mg oral tablet: 1 tab(s) orally 2 times a day  Entresto 24 mg-26 mg oral tablet: 1 tab(s) orally once a day  mirtazapine 15 mg oral tablet: 1 tab(s) orally once a day (at bedtime)  OLANZapine 10 mg oral tablet: 1 tab(s) orally once a day (at bedtime)  Plavix 75 mg oral tablet: 75 milligram(s) orally once a day  polyethylene glycol 3350 oral powder for reconstitution: 17 gram(s) orally once a day  Vyndamax 61 mg oral capsule: 1 orally once a day   Albuterol (Eqv-ProAir HFA) 90 mcg/inh inhalation aerosol: 2 puff(s) inhaled every 6 hours as needed for  shortness of breath and/or wheezing  ascorbic acid 500 mg oral tablet: 1 tab(s) orally once a day  bumetanide 2 mg oral tablet: 1 tab(s) orally 2 times a day  carbidopa-levodopa 25 mg-100 mg oral tablet: 1.5 tab(s) orally 4 times a day  Crestor 20 mg oral tablet: 1 tab(s) orally once a day  cyanocobalamin 100 mcg oral tablet: 1 tab(s) orally  D3 25 mcg (1000 intl units) oral tablet: 1 tab(s) orally once a day  desvenlafaxine (as base) 50 mg oral tablet, extended release: 50 milligram(s) orally once a day  Eliquis 2.5 mg oral tablet: 1 tab(s) orally 2 times a day  Entresto 24 mg-26 mg oral tablet: 1 tab(s) orally once a day  mirtazapine 15 mg oral tablet: 1 tab(s) orally once a day (at bedtime)  OLANZapine 10 mg oral tablet: 1 tab(s) orally once a day (at bedtime)  Plavix 75 mg oral tablet: 1 tab(s) orally once a day  polyethylene glycol 3350 oral powder for reconstitution: 17 gram(s) orally once a day  senna leaf extract oral tablet: 2 tab(s) orally once a day (at bedtime)  Vyndamax 61 mg oral capsule: 1 orally once a day

## 2024-04-05 NOTE — CONSULT NOTE ADULT - PROBLEM SELECTOR RECOMMENDATION 9
Per wife at baseline  Plan is to continue with all medications  Follows with Dr. Langley in outpatient from neurology

## 2024-04-05 NOTE — PROGRESS NOTE ADULT - SUBJECTIVE AND OBJECTIVE BOX
NEPHROLOGY-[facility] [phone]        Patient seen and examined tolerating diet, edema in improving.         MEDICATIONS  (STANDING):  alteplase  Injectable for Pleural Effusion 10 milliGRAM(s) IntraPleural. every 12 hours  atorvastatin 80 milliGRAM(s) Oral at bedtime  buMETAnide 2 milliGRAM(s) Oral two times a day  carbidopa/levodopa  25/100 1 Tablet(s) Oral four times a day  desvenlafaxine ER 50 milliGRAM(s) Oral daily  dornase linda Solution for Pleural Effusion 5 milliGRAM(s) IntraPleural. every 12 hours  heparin   Injectable 5000 Unit(s) SubCutaneous every 8 hours  influenza  Vaccine (HIGH DOSE) 0.7 milliLiter(s) IntraMuscular once  melatonin 6 milliGRAM(s) Oral at bedtime  mirtazapine 15 milliGRAM(s) Oral at bedtime  OLANZapine 10 milliGRAM(s) Oral at bedtime  polyethylene glycol 3350 17 Gram(s) Oral daily  sacubitril 24 mG/valsartan 26 mG 1 Tablet(s) Oral two times a day  senna 2 Tablet(s) Oral at bedtime  sodium chloride 0.9% Solution for Pleural Effusion 40 milliLiter(s) IntraPleural. every 12 hours      VITAL:  T(C): , Max: 36.7 (04-04-24 @ 19:53)  T(F): , Max: 98 (04-04-24 @ 19:53)  HR: 60 (04-05-24 @ 12:00)  BP: 100/53 (04-05-24 @ 12:00)  BP(mean): --  RR: 18 (04-05-24 @ 12:00)  SpO2: 98% (04-05-24 @ 12:00)  Wt(kg): --    I and O's:    04-04 @ 07:01  -  04-05 @ 07:00  --------------------------------------------------------  IN: 0 mL / OUT: 2610 mL / NET: -2610 mL    04-05 @ 07:01  -  04-05 @ 13:26  --------------------------------------------------------  IN: 0 mL / OUT: 300 mL / NET: -300 mL          PHYSICAL EXAM:    Constitutional: NAD  Neck:  No JVD  Respiratory: CTAB/L  Cardiovascular: S1 and S2  Gastrointestinal: BS+, soft, NT/ND  Extremities: + peripheral edema  Neurological: A/O x 3, no focal deficits  Psychiatric: Normal mood, normal affect  : +external catheter   Skin: No rashes  Access: Not applicable    LABS:                        9.6    6.79  )-----------( 222      ( 05 Apr 2024 06:21 )             30.4     04-05    143  |  108<H>  |  51<H>  ----------------------------<  91  4.1   |  21<L>  |  2.69<H>    Ca    8.8      05 Apr 2024 06:21  Phos  3.9     04-05  Mg     2.50     04-05            Urine Studies:  Urinalysis Basic - ( 05 Apr 2024 06:21 )    Color: x / Appearance: x / SG: x / pH: x  Gluc: 91 mg/dL / Ketone: x  / Bili: x / Urobili: x   Blood: x / Protein: x / Nitrite: x   Leuk Esterase: x / RBC: x / WBC x   Sq Epi: x / Non Sq Epi: x / Bacteria: x            RADIOLOGY & ADDITIONAL STUDIES:    < from: Xray Chest 1 View- PORTABLE-Routine (Xray Chest 1 View- PORTABLE-Routine in AM.) (04.05.24 @ 08:18) >  FINDINGS:    Right-sided Pleurx catheter unchanged.  Hazy lung bases consistent with layering effusions. Visualized lungs are   clear.  Left-sided pacemaker with leads intact.  No pneumothorax.        COMPARISON: April 4        IMPRESSION: Pleurx catheter with small effusions.    --- End of Report ---    < end of copied text >    IMPRESSION: 83M w/ HTN, AFib, CAD, CKD4 and pleural effusions s/p R VATS/Pleurx placement, 4/2/24 p/w thrombosed Pleurx catheter and with hypervolemia     (1)Renal - nonproteinuric CKD4  (2)Hyperkalemia - spurious in setting of severely hemolyzed specimen.   (3)Pulm- clogged pleurx - s/p fibrinolysis, pleurx draining   (4)CV - hypervolemic - warranting high-dose IV Bumex, edema is improving     RECOMMEND:  (1)Start bumex 2 mg po bid   (2)No objection to Entresto as ordered   (3)BMP+Mg+PO4 daily  (4)Dose new meds for GFR 15-20ml/min        Ivis Rodney NP  [facility]  Office/on call physician: [phone]             NEPHROLOGY-[facility] [phone]        Patient seen and examined tolerating diet, edema in improving.         MEDICATIONS  (STANDING):  alteplase  Injectable for Pleural Effusion 10 milliGRAM(s) IntraPleural. every 12 hours  atorvastatin 80 milliGRAM(s) Oral at bedtime  buMETAnide 2 milliGRAM(s) Oral two times a day  carbidopa/levodopa  25/100 1 Tablet(s) Oral four times a day  desvenlafaxine ER 50 milliGRAM(s) Oral daily  dornase linda Solution for Pleural Effusion 5 milliGRAM(s) IntraPleural. every 12 hours  heparin   Injectable 5000 Unit(s) SubCutaneous every 8 hours  influenza  Vaccine (HIGH DOSE) 0.7 milliLiter(s) IntraMuscular once  melatonin 6 milliGRAM(s) Oral at bedtime  mirtazapine 15 milliGRAM(s) Oral at bedtime  OLANZapine 10 milliGRAM(s) Oral at bedtime  polyethylene glycol 3350 17 Gram(s) Oral daily  sacubitril 24 mG/valsartan 26 mG 1 Tablet(s) Oral two times a day  senna 2 Tablet(s) Oral at bedtime  sodium chloride 0.9% Solution for Pleural Effusion 40 milliLiter(s) IntraPleural. every 12 hours      VITAL:  T(C): , Max: 36.7 (04-04-24 @ 19:53)  T(F): , Max: 98 (04-04-24 @ 19:53)  HR: 60 (04-05-24 @ 12:00)  BP: 100/53 (04-05-24 @ 12:00)  BP(mean): --  RR: 18 (04-05-24 @ 12:00)  SpO2: 98% (04-05-24 @ 12:00)  Wt(kg): --    I and O's:    04-04 @ 07:01  -  04-05 @ 07:00  --------------------------------------------------------  IN: 0 mL / OUT: 2610 mL / NET: -2610 mL    04-05 @ 07:01  -  04-05 @ 13:26  --------------------------------------------------------  IN: 0 mL / OUT: 300 mL / NET: -300 mL          PHYSICAL EXAM:    Constitutional: NAD  Neck:  No JVD  Respiratory: CTAB/L  Cardiovascular: S1 and S2  Gastrointestinal: BS+, soft, NT/ND  Extremities: + peripheral edema  Neurological: A/O x 3, no focal deficits  Psychiatric: Normal mood, normal affect  : +external catheter   Skin: No rashes  Access: Not applicable    LABS:                        9.6    6.79  )-----------( 222      ( 05 Apr 2024 06:21 )             30.4     04-05    143  |  108<H>  |  51<H>  ----------------------------<  91  4.1   |  21<L>  |  2.69<H>    Ca    8.8      05 Apr 2024 06:21  Phos  3.9     04-05  Mg     2.50     04-05            Urine Studies:  Urinalysis Basic - ( 05 Apr 2024 06:21 )    Color: x / Appearance: x / SG: x / pH: x  Gluc: 91 mg/dL / Ketone: x  / Bili: x / Urobili: x   Blood: x / Protein: x / Nitrite: x   Leuk Esterase: x / RBC: x / WBC x   Sq Epi: x / Non Sq Epi: x / Bacteria: x            RADIOLOGY & ADDITIONAL STUDIES:    < from: Xray Chest 1 View- PORTABLE-Routine (Xray Chest 1 View- PORTABLE-Routine in AM.) (04.05.24 @ 08:18) >  FINDINGS:    Right-sided Pleurx catheter unchanged.  Hazy lung bases consistent with layering effusions. Visualized lungs are   clear.  Left-sided pacemaker with leads intact.  No pneumothorax.        COMPARISON: April 4        IMPRESSION: Pleurx catheter with small effusions.    --- End of Report ---    < end of copied text >    IMPRESSION: 83M w/ HTN, AFib, CAD, CKD4 and pleural effusions s/p R VATS/Pleurx placement, 4/2/24 p/w thrombosed Pleurx catheter and with hypervolemia     (1)Renal - nonproteinuric CKD4  (2)Hyperkalemia - spurious in setting of severely hemolyzed specimen.   (3)Pulm- clogged pleurx - s/p fibrinolysis, pleurx draining   (4)CV - hypervolemic - warranting high-dose IV Bumex, edema is improving     RECOMMEND:  (1)Start bumex 2 mg po bid   (2)No objection to Entresto as ordered   (3)BMP+Mg+PO4 daily  (4)Dose new meds for GFR 15-20ml/min        [facility] [facility] ANIKA  [facility]  Office/on call physician: [phone]      RENAL ATTENDING NOTE  Patient seen and examined with NP. Agree with assessment and plan as above.  No objection to discharge - could f/u at my office in 1-4 weeks    Clifton Cool MD  [facility]  [phone]

## 2024-04-05 NOTE — CONSULT NOTE ADULT - PROBLEM SELECTOR RECOMMENDATION 4
Pt is DNR/DNI - confirmed with wife, MOLST form copy in the chart  Pt to follow with Dr. Ramsey at Geriatrics and Palliative Care outpatient office. Contact #[phone].   Located at [address] [place] [address], [address].  Please include in d/c summary

## 2024-04-05 NOTE — PROGRESS NOTE ADULT - SUBJECTIVE AND OBJECTIVE BOX
Neurology Progress Note    S: Patient seen and examined. Doing ok    Medications: MEDICATIONS  (STANDING):  alteplase  Injectable for Pleural Effusion 10 milliGRAM(s) IntraPleural. every 12 hours  atorvastatin 80 milliGRAM(s) Oral at bedtime  buMETAnide Injectable 2 milliGRAM(s) IV Push two times a day  carbidopa/levodopa  25/100 1 Tablet(s) Oral four times a day  desvenlafaxine ER 50 milliGRAM(s) Oral daily  dornase linda Solution for Pleural Effusion 5 milliGRAM(s) IntraPleural. every 12 hours  heparin   Injectable 5000 Unit(s) SubCutaneous every 8 hours  influenza  Vaccine (HIGH DOSE) 0.7 milliLiter(s) IntraMuscular once  melatonin 6 milliGRAM(s) Oral at bedtime  mirtazapine 15 milliGRAM(s) Oral at bedtime  OLANZapine 10 milliGRAM(s) Oral at bedtime  polyethylene glycol 3350 17 Gram(s) Oral daily  sacubitril 24 mG/valsartan 26 mG 1 Tablet(s) Oral two times a day  senna 2 Tablet(s) Oral at bedtime  sodium chloride 0.9% Solution for Pleural Effusion 40 milliLiter(s) IntraPleural. every 12 hours    MEDICATIONS  (PRN):  albuterol    90 MICROgram(s) HFA Inhaler 2 Puff(s) Inhalation every 6 hours PRN for shortness of breath and/or wheezing       Vitals:  Vital Signs Last 24 Hrs  T(C): 36.4 (05 Apr 2024 12:00), Max: 36.7 (04 Apr 2024 19:53)  T(F): 97.6 (05 Apr 2024 12:00), Max: 98 (04 Apr 2024 19:53)  HR: 60 (05 Apr 2024 12:00) (60 - 62)  BP: 100/53 (05 Apr 2024 12:00) (100/53 - 119/59)  BP(mean): --  RR: 18 (05 Apr 2024 12:00) (17 - 18)  SpO2: 98% (05 Apr 2024 12:00) (95% - 98%)    Parameters below as of 05 Apr 2024 12:00  Patient On (Oxygen Delivery Method): room air          General Exam:   General Appearance: Appropriately dressed and in no acute distress       Head: Normocephalic, atraumatic and no dysmorphic features  Ear, Nose, and Throat: Moist mucous membranes  CVS: S1S2+  Resp: No SOB, no wheeze or rhonchi  Abd: soft NTND  Extremities: No edema, no cyanosis  Skin: No bruises, no rashes        Neurological Exam:  Mental Status: Awake, alert and oriented x 3.  Able to follow simple commands. Able to name and repeat. fluent speech. No obvious aphasia, mild dysarthria. bradykinesia    Cranial Nerves: PERRL, EOMI, VFFC, sensation V1-V3 intact,  no obvious facial asymmetry, hypomimia, equal elevation of palate, scm/trap 5/5, tongue is midline on protrusion.  hearing is grossly intact.   Motor: slight inc tone throughout uppers > lowers. Slight resting tremor b/l, + cogwheeling  Sensation: Intact to light touch and pinprick throughout.   Reflexes: 1+ throughout at biceps, brachioradialis, triceps, patellars and ankles bilaterally and equal. No clonus. R toe and L toe were both downgoing.  Coordination: No dysmetria on FNF   Gait: deferred    I personally reviewed the below data/images/labs:    LABS:                          9.6    6.79  )-----------( 222      ( 05 Apr 2024 06:21 )             30.4     04-05    143  |  108<H>  |  51<H>  ----------------------------<  91  4.1   |  21<L>  |  2.69<H>    Ca    8.8      05 Apr 2024 06:21  Phos  3.9     04-05  Mg     2.50     04-05          Urinalysis Basic - ( 05 Apr 2024 06:21 )    Color: x / Appearance: x / SG: x / pH: x  Gluc: 91 mg/dL / Ketone: x  / Bili: x / Urobili: x   Blood: x / Protein: x / Nitrite: x   Leuk Esterase: x / RBC: x / WBC x   Sq Epi: x / Non Sq Epi: x / Bacteria: x        < from: CT Head No Cont (11.16.23 @ 19:43) >    ACC: [id number] EXAM:  CT BRAIN   ORDERED BY: SCHUYLER HARVEY     PROCEDURE DATE:  11/16/2023          INTERPRETATION:  CLINICAL INFORMATION: L side weakness. LCT. Admitting   Dxs: R05.9 R05.9.    TECHNIQUE: Sequential axial images were obtained from the vertex to the   skull base without intravenous contrast. Coronal and sagittal   reformations were obtained.    COMPARISON: Prior CT dated 2/4/23.    FINDINGS:    There is no acute intracranial hemorrhage or mass effect. There are areas   of hypodensity in the bilateral hemispheric white matter suggesting white   matter microvascular ischemic change. There is cerebral volume loss.    There is no extraaxial fluid collection.    There is no displaced calvarial fracture. Status post bilateral   intraocular lens implants. Small right posterior ethmoid sinus osteoma.   The mastoid air cells are well aerated.      IMPRESSION:    < end of copied text >

## 2024-04-05 NOTE — CONSULT NOTE ADULT - ASSESSMENT
83M PMHx HTN, HLD, ICD/ afib on Eliquis (last dose 3/30), PLavix (last dose 3/26), Parkinson's disease, history of chronic pleural effusions, status post R sided VATS Pleurx cathter placement, presents with clogged pleurx catheter.  S/p tpa, plan is to d/c today. Palliative Care consulted by outpatient provider for follow up.    
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 Admitted to thoracic for TPA procedure of the catheter. Ptn has had same in the past.   Wife is at bedside and states ptn has been more edematous in his hands and feet, has difficulty emptying his bladder when he voids and has constant sensation like he may have a UTI. He is eating well, sleeping well, he remembered me ( i established house calls care with him in 12/2023). No fever, no chills    Clogged Pleurx: TPA management as per thoracic  Acute on chronic Diastolic CHF , has h/o Cardiac amyloidosis and mod AI:  get rpt TTE( ordered), cardiology called.  ALAYNA, CKD4: on Bumex, would switch to IVP, renal called, get renal sono and bladder scan ( ordered)  Hyperkalemia: Lokelma 10 gm x 1,  get an EKG  Dysuria: could be 2/2 chronic urinary retention, check UA and Cx, check bladder scan( ordered)  Parkinsons, cont outptn meds, neuro called, i believe the meds acan cont as outptn dosages, ptn is stable  PPM: would get EP to interrogate  Afib: stable, cont ELiquis  HTN, HLD    THank you for the courtesy of this consult , will follow the ptn along with you
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 Admitted to thoracic for TPA procedure of the catheter. Neurology consulted for Parkinsons mgmt.  At home is on sinemet 1.5 tab TID and 1 tab at night.   Symptoms appear decently controlled on exam  Per last note from Dr. Langley - rec inc 6pm dose of sinemet to ER formulation however pt has not made that change as he was doing well.    PD - decently controlled  CAD  AFib    - cont home sinemet 25/100 1.5tab TID, 1 tab at bedtime, outpatient f/u with Dr. Langley on d/c  - cont Eliquis for AFIb  - PPM pending interrogation  - Cardioloy helping with HF   - Thoracic managing pleurex cath s/p tpa    spoke with wife at bedside    Velma Kaye DO  Vascular Neurology  Office [phone]  Available via Microsoft Teams 
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 Admitted to thoracic for TPA procedure of the catheter.    #Chronic HFpEF, Cardiac Amyloid   -volume overloaded, anasarca  -continue IV bumex, inc as needed   -continue entresto as ordered  -on Vyndamax 61 mg oral capsule daily as outpt    #CAD, s/p PCI  -stable, no chest pain, ACS  -resume plavix per thoracic     #AF, s/p PPM  -stable off avn meds  -resume a/c per thoracic     #s/p pleurex cath with obstruction  -tx per thoracic             78 minutes spent on total encounter; more than 50% of the visit was spent counseling and/or coordinating care by the attending physician.    Wife is at bedside and states ptn has been more edematous in his hands and feet, has difficulty emptying his bladder when he voids and has constant sensation like he may have a UTI. He is eating well, sleeping well, he remembered me . i established house calls care with him in 12/2023.   No fever, no chills

## 2024-04-08 ENCOUNTER — OUTPATIENT (OUTPATIENT)
Dept: OUTPATIENT SERVICES | Facility: HOSPITAL | Age: 84
LOS: 1 days | End: 2024-04-08
Payer: MEDICARE

## 2024-04-08 ENCOUNTER — APPOINTMENT (OUTPATIENT)
Dept: THORACIC SURGERY | Facility: CLINIC | Age: 84
End: 2024-04-08
Payer: MEDICARE

## 2024-04-08 ENCOUNTER — APPOINTMENT (OUTPATIENT)
Dept: RADIOLOGY | Facility: HOSPITAL | Age: 84
End: 2024-04-08

## 2024-04-08 VITALS
BODY MASS INDEX: 29.55 KG/M2 | RESPIRATION RATE: 17 BRPM | SYSTOLIC BLOOD PRESSURE: 105 MMHG | HEART RATE: 61 BPM | WEIGHT: 195 LBS | HEIGHT: 68 IN | OXYGEN SATURATION: 96 % | DIASTOLIC BLOOD PRESSURE: 61 MMHG

## 2024-04-08 DIAGNOSIS — Z98.49 CATARACT EXTRACTION STATUS, UNSPECIFIED EYE: Chronic | ICD-10-CM

## 2024-04-08 DIAGNOSIS — J90 PLEURAL EFFUSION, NOT ELSEWHERE CLASSIFIED: ICD-10-CM

## 2024-04-08 DIAGNOSIS — Z95.0 PRESENCE OF CARDIAC PACEMAKER: Chronic | ICD-10-CM

## 2024-04-08 DIAGNOSIS — Z98.61 CORONARY ANGIOPLASTY STATUS: Chronic | ICD-10-CM

## 2024-04-08 PROCEDURE — 71046 X-RAY EXAM CHEST 2 VIEWS: CPT | Mod: 26

## 2024-04-08 PROCEDURE — 99214 OFFICE O/P EST MOD 30 MIN: CPT

## 2024-04-08 NOTE — HISTORY OF PRESENT ILLNESS
[FreeTextEntry1] : Mr. 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   Patient is s/p Right VATS, pleural bx and placement of Pleurx catheter on 03/17/2022. Path of right pleura bx revealed fragments of pleura with chronic inflammation and reactive changes. Fragments of skeletal muscle. Right pleural fluid negative for malignant cells.   Patient went to ED on 04/29/2022 for hallucinations, likely related to Parkinsons dementia. He was discharged on 5/12/22 to a rehab facility.    Of note, pt had recent SPECT scan demonstrating cardiac amyloid and was referred by his cardiologist Dr. Dickey to see Dr. Randolph.   CT chest on 08/17/2022: -  There is a trace right pleural effusion with Pleurx catheter in place, markedly decreased in size compared to the 2/6/2022 chest CT. A small left pleural effusion has also decreased compared to the 2/6/2022 scan, now trace.  Patient went to ED on 09/08/2022 c/o weakness, founded to have electrolyte disorders which were corrected but patient's symptoms did not improve. felt that presentation was most consistent with progression of Parkinson's disease.  Patient was seen on 01/10/2023 with clogged PleurX catheter. PleurX catheter was unclogged using three-way stop cock and flushes. Pt was drained about 700cc output, tolerated well.   Patient had bloody drainage, went to ED a few times.  Patient was drained three times a week with ~ 500 ml each drainage. On 03/0/6/2024, drainage was minimal.  CXR on 03/07/2024 reviewed and explained to patient, stable CXR. Patient denies SOB, cough or fever. No intervention needed at current time. Will decrease drainage to once a week for 2 weeks and RTC in 2 weeks with CT chest w/o contrast.   Patient is currently drained once a week for the past 2 wks: PleurX OUTPUT: 3/14 Thurs 5ml, 3/20 Wed few drops.  CT chest on 03/20/2024: - Right middle lobe bronchus narrowing secondary to nodularity is similar to 2023.   - Small to moderate partially loculated bilateral pleural effusions, unchanged on the left and enlarged on the right compared to the prior study. Right-sided chest tube. Partial atelectasis both lower lobes.  04/02-04/05/24: Admitted at [facility] with clogged pleurX catheter, s/p TPA.  CXR today: reviewed.   Patient is here today for a follow up. On Saturday, homecare RN noted pleurX Catheter has dislodged, was unable to drain catheter. Upon assessment today, catheter is dislodged and will need to be removed. Patient on Eliquis and Plavix, last dose taken yesterday.

## 2024-04-08 NOTE — ASSESSMENT
Physical Therapy Treatment    Patient Name:  Fareed Richard Jr.   MRN:  [id number]    Recommendations:     Discharge Recommendations:  (PT at next level of care)  Discharge Equipment Recommendations: none  Barriers to discharge:  Pt in ICU on 10L/40% trach collar with decreased SPO2 to 80% with minimal activity    Assessment:     Fareed Richard Jr. is a 76 y.o. male admitted with a medical diagnosis of Acute blood loss anemia.  He presents with the following impairments/functional limitations: weakness, impaired functional mobility, decreased safety awareness, impaired coordination, impaired cardiopulmonary response to activity, impaired endurance, gait instability, decreased coordination, impaired balance, impaired self care skills Increased O2 demand.    Rehab Prognosis: Fair; patient would benefit from acute skilled PT services to address these deficits and reach maximum level of function.    Recent Surgery: Procedure(s) (LRB):  Angioplasty-coronary (Left)  Aortogram, Aortic Arch  AORTOGRAM, WITH SERIALOGRAPHY (N/A)  Stent, Coronary, Multi Vessel 9 Days Post-Op    Plan:     During this hospitalization, patient to be seen  (2-3x/wk) to address the identified rehab impairments via gait training, therapeutic activities, therapeutic exercises, neuromuscular re-education, wheelchair management/training and progress toward the following goals:    Plan of Care Expires:  04/12/23    Subjective     Chief Complaint: weakness, fatigue, SOB  Patient/Family Comments/goals: Pt pointing at Chair across the room  Pain/Comfort:  Pain Rating 1: 0/10      Objective:     Communicated with nsg prior to session.  Patient found HOB elevated with peripheral IV, Tracheostomy (ICU monitoring, Trach collar) upon PT entry to room.     General Precautions: Standard, fall, respiratory  Orthopedic Precautions: N/A  Braces: N/A  Respiratory Status:  10L/40% trach collar     Functional Mobility:  Bed Mobility:     Rolling Right: contact guard  [FreeTextEntry1] : Mr. 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   Patient is s/p Right VATS, pleural bx and placement of Pleurx catheter on 03/17/2022. Path of right pleura bx revealed fragments of pleura with chronic inflammation and reactive changes. Fragments of skeletal muscle. Right pleural fluid negative for malignant cells.   Patient went to ED on 04/29/2022 for hallucinations, likely related to Parkinsons dementia. He was discharged on 5/12/22 to a rehab facility.    Of note, pt had recent SPECT scan demonstrating cardiac amyloid and was referred by his cardiologist Dr. Dickey to see Dr. Randolph.   Patient went to ED on 09/08/2022 c/o weakness, founded to have electrolyte disorders which were corrected but patient's symptoms did not improve. felt that presentation was most consistent with  progression of Parkinson's disease.  Patient was seen on 01/10/2023 with clogged PleurX catheter. PleurX catheter was unclogged using three-way stop cock and flushes. Pt was drained about 700cc output.   Patient was drained three times a week with ~ 500 ml each drainage. On 03/0/6/2024, drainage was minimal.  CXR on 03/07/2024 reviewed and explained to patient, stable CXR. Patient denies SOB, cough or fever. No intervention needed at current time. Will decrease drainage to once a week for 2 weeks and RTC in 2 weeks with CT chest w/o contrast.   Patient is currently drained once a week for the past 2 wks: PleurX OUTPUT: 3/14 Thurs 5ml, 3/20 Wed few drops.  CT chest on 03/20/2024: - Right middle lobe bronchus narrowing secondary to nodularity is similar to 2023.   - Small to moderate partially loculated bilateral pleural effusions, unchanged on the left and enlarged on the right compared to the prior study. Right-sided chest tube. Partial atelectasis both lower lobes.  04/02-04/05/24: Admitted at [facility] with clogged pleurX catheter, s/p TPA.  CXR today- reviewed.  I have reviewed the patient's medical records and diagnostic images at time of this office consultation and have made the following recommendation: 1. CXR today stable. Right PleurX Catheter appears dislodged, I would like him to return to clinic on Wednesday for Right pleurX Catheter removal. 2. Instructed to continue to hold Eliquis and Plavix, last dose yesterday.   Recommendations reviewed with patient during this office visit, and all questions answered; Patient instructed on the importance of follow up and verbalizes understanding.    I, PARK Gomez, personally performed the evaluation and management (E/M) services for this established patient. That E/M includes conducting the examination, assessing all new/exacerbated conditions, and establishing a new plan of care. Today, my ACP, Sal Luciano NP, was here to observe my evaluation and management services for this new problem/exacerbated condition to be followed going forward.  assistance  Scooting: contact guard assistance  Supine to Sit: contact guard assistance  Transfers:     Sit to Stand:  contact guard assistance with rolling walker  Gait: Pt ambulated with RW and CGA with VC for walker management/safety approx 6 small steps from bed to chair via step transfer  Balance: Fair+ sit, fair stand      AM-PAC 6 CLICK MOBILITY  Turning over in bed (including adjusting bedclothes, sheets and blankets)?: 3  Sitting down on and standing up from a chair with arms (e.g., wheelchair, bedside commode, etc.): 3  Moving from lying on back to sitting on the side of the bed?: 3  Moving to and from a bed to a chair (including a wheelchair)?: 3  Need to walk in hospital room?: 3  Climbing 3-5 steps with a railing?: 3  Basic Mobility Total Score: 18       Treatment & Education:  Pt required several rest breaks getting to EOB and sitting EOB.  Dangle protocol: Pt sat at EOB with SBA.  /58, HR 92, SPO2 86-88%.  Transfer training as above.  Sitting in chair, /59, HR 91, SPO2 79-80%.  Pt required increased time at rest for SPO2 to recover to 84% with nursing notified.  Educated pt to perform AP's, TKE's, and GS while up in chair throughout the day.  Pt shook his head yes    Patient left up in chair with all lines intact, call button in reach, and nsg notified..    GOALS:   Multidisciplinary Problems       Physical Therapy Goals          Problem: Physical Therapy    Goal Priority Disciplines Outcome Goal Variances Interventions   Physical Therapy Goal     PT, PT/OT Ongoing, Progressing     Description: Goals to be met by: 23     Patient will increase functional independence with mobility by performin. Supine to sit with Modified Perry  2. Sit to stand transfer with Supervision  3. Bed to chair transfer with Supervision   4. Gait  x 10-15 feet with Contact Guard Assistance using Rolling Walker.   5. Lower extremity exercise program x10 reps per handout, with supervision  6. W/C  propulsion x 25' with Supervision                         Time Tracking:     PT Received On: 04/05/23  PT Start Time: 1018     PT Stop Time: 1040  PT Total Time (min): 22 min     Billable Minutes: Re-eval 14 and Therapeutic Activity 8 Co-treat with OT    Treatment Type: Re-evaluation  PT/PTA: PT     Number of PTA visits since last PT visit: 0     04/05/2023

## 2024-04-08 NOTE — CONSULT LETTER
[Dear  ___] : Dear  [unfilled], [Courtesy Letter:] : I had the pleasure of seeing your patient, [unfilled], in my office today. [( Thank you for referring [unfilled] for consultation for _____ )] : Thank you for referring [unfilled] for consultation for [unfilled] [Please see my note below.] : Please see my note below. [Sincerely,] : Sincerely, [FreeTextEntry2] : Dr. Pavan Jamil (Pulm/Ref)\par  Santhosh Avila (PCP)\par  Dr. Clinton Dickey (Card)\par  Dr. Randolph (Card re: cardiac amyloid) [FreeTextEntry3] : Rufus Oneal MD \par  Attending Surgeon \par  Division of Thoracic Surgery \par  , Cardiovascular and Thoracic Surgery \par  [facility] [employer] [facility]\par  \par

## 2024-04-09 ENCOUNTER — NON-APPOINTMENT (OUTPATIENT)
Age: 84
End: 2024-04-09

## 2024-04-09 ENCOUNTER — INPATIENT (INPATIENT)
Facility: HOSPITAL | Age: 84
LOS: 8 days | Discharge: HOME | End: 2024-04-18
Attending: INTERNAL MEDICINE | Admitting: INTERNAL MEDICINE
Payer: MEDICARE

## 2024-04-09 VITALS
HEIGHT: 68 IN | SYSTOLIC BLOOD PRESSURE: 145 MMHG | RESPIRATION RATE: 16 BRPM | DIASTOLIC BLOOD PRESSURE: 70 MMHG | TEMPERATURE: 99 F

## 2024-04-09 DIAGNOSIS — Z95.0 PRESENCE OF CARDIAC PACEMAKER: Chronic | ICD-10-CM

## 2024-04-09 DIAGNOSIS — Z98.49 CATARACT EXTRACTION STATUS, UNSPECIFIED EYE: Chronic | ICD-10-CM

## 2024-04-09 DIAGNOSIS — Z96.649 PRESENCE OF UNSPECIFIED ARTIFICIAL HIP JOINT: Chronic | ICD-10-CM

## 2024-04-09 DIAGNOSIS — Z98.61 CORONARY ANGIOPLASTY STATUS: Chronic | ICD-10-CM

## 2024-04-09 LAB
ALBUMIN SERPL ELPH-MCNC: 3.9 G/DL — SIGNIFICANT CHANGE UP (ref 3.3–5)
ALP SERPL-CCNC: 88 U/L — SIGNIFICANT CHANGE UP (ref 40–120)
ALT FLD-CCNC: 10 U/L — SIGNIFICANT CHANGE UP (ref 4–41)
ANION GAP SERPL CALC-SCNC: 17 MMOL/L — HIGH (ref 7–14)
ANISOCYTOSIS BLD QL: SLIGHT — SIGNIFICANT CHANGE UP
APTT BLD: 34.6 SEC — SIGNIFICANT CHANGE UP (ref 24.5–35.6)
AST SERPL-CCNC: 14 U/L — SIGNIFICANT CHANGE UP (ref 4–40)
BASE EXCESS BLDV CALC-SCNC: 2.6 MMOL/L — SIGNIFICANT CHANGE UP (ref -2–3)
BASOPHILS # BLD AUTO: 0 K/UL — SIGNIFICANT CHANGE UP (ref 0–0.2)
BASOPHILS NFR BLD AUTO: 0 % — SIGNIFICANT CHANGE UP (ref 0–2)
BILIRUB SERPL-MCNC: 0.3 MG/DL — SIGNIFICANT CHANGE UP (ref 0.2–1.2)
BLOOD GAS VENOUS COMPREHENSIVE RESULT: SIGNIFICANT CHANGE UP
BUN SERPL-MCNC: 36 MG/DL — HIGH (ref 7–23)
CALCIUM SERPL-MCNC: 9.3 MG/DL — SIGNIFICANT CHANGE UP (ref 8.4–10.5)
CHLORIDE BLDV-SCNC: 100 MMOL/L — SIGNIFICANT CHANGE UP (ref 96–108)
CHLORIDE SERPL-SCNC: 99 MMOL/L — SIGNIFICANT CHANGE UP (ref 98–107)
CO2 BLDV-SCNC: 31.7 MMOL/L — HIGH (ref 22–26)
CO2 SERPL-SCNC: 26 MMOL/L — SIGNIFICANT CHANGE UP (ref 22–31)
CREAT SERPL-MCNC: 1.87 MG/DL — HIGH (ref 0.5–1.3)
EGFR: 36 ML/MIN/1.73M2 — LOW
EOSINOPHIL # BLD AUTO: 0 K/UL — SIGNIFICANT CHANGE UP (ref 0–0.5)
EOSINOPHIL NFR BLD AUTO: 0 % — SIGNIFICANT CHANGE UP (ref 0–6)
FLUAV AG NPH QL: SIGNIFICANT CHANGE UP
FLUBV AG NPH QL: SIGNIFICANT CHANGE UP
GAS PNL BLDV: 137 MMOL/L — SIGNIFICANT CHANGE UP (ref 136–145)
GLUCOSE BLDV-MCNC: 139 MG/DL — HIGH (ref 70–99)
GLUCOSE SERPL-MCNC: 145 MG/DL — HIGH (ref 70–99)
HCO3 BLDV-SCNC: 30 MMOL/L — HIGH (ref 22–29)
HCT VFR BLD CALC: 37.3 % — LOW (ref 39–50)
HCT VFR BLDA CALC: 35 % — LOW (ref 39–51)
HGB BLD CALC-MCNC: 11.8 G/DL — LOW (ref 12.6–17.4)
HGB BLD-MCNC: 11.6 G/DL — LOW (ref 13–17)
IANC: 27.57 K/UL — HIGH (ref 1.8–7.4)
INR BLD: 1.25 RATIO — HIGH (ref 0.85–1.18)
LACTATE BLDV-MCNC: 3.3 MMOL/L — HIGH (ref 0.5–2)
LYMPHOCYTES # BLD AUTO: 0.5 K/UL — LOW (ref 1–3.3)
LYMPHOCYTES # BLD AUTO: 1.7 % — LOW (ref 13–44)
MCHC RBC-ENTMCNC: 29.7 PG — SIGNIFICANT CHANGE UP (ref 27–34)
MCHC RBC-ENTMCNC: 31.1 GM/DL — LOW (ref 32–36)
MCV RBC AUTO: 95.6 FL — SIGNIFICANT CHANGE UP (ref 80–100)
MONOCYTES # BLD AUTO: 1.03 K/UL — HIGH (ref 0–0.9)
MONOCYTES NFR BLD AUTO: 3.5 % — SIGNIFICANT CHANGE UP (ref 2–14)
NEUTROPHILS # BLD AUTO: 27.85 K/UL — HIGH (ref 1.8–7.4)
NEUTROPHILS NFR BLD AUTO: 92.2 % — HIGH (ref 43–77)
NEUTS BAND # BLD: 2.6 % — SIGNIFICANT CHANGE UP (ref 0–6)
NT-PROBNP SERPL-SCNC: 6561 PG/ML — HIGH
OVALOCYTES BLD QL SMEAR: SLIGHT — SIGNIFICANT CHANGE UP
PCO2 BLDV: 58 MMHG — HIGH (ref 42–55)
PH BLDV: 7.32 — SIGNIFICANT CHANGE UP (ref 7.32–7.43)
PLAT MORPH BLD: NORMAL — SIGNIFICANT CHANGE UP
PLATELET # BLD AUTO: 302 K/UL — SIGNIFICANT CHANGE UP (ref 150–400)
PLATELET COUNT - ESTIMATE: NORMAL — SIGNIFICANT CHANGE UP
PO2 BLDV: 25 MMHG — SIGNIFICANT CHANGE UP (ref 25–45)
POIKILOCYTOSIS BLD QL AUTO: SLIGHT — SIGNIFICANT CHANGE UP
POLYCHROMASIA BLD QL SMEAR: SLIGHT — SIGNIFICANT CHANGE UP
POTASSIUM BLDV-SCNC: 3.7 MMOL/L — SIGNIFICANT CHANGE UP (ref 3.5–5.1)
POTASSIUM SERPL-MCNC: 3.7 MMOL/L — SIGNIFICANT CHANGE UP (ref 3.5–5.3)
POTASSIUM SERPL-SCNC: 3.7 MMOL/L — SIGNIFICANT CHANGE UP (ref 3.5–5.3)
PROT SERPL-MCNC: 7.4 G/DL — SIGNIFICANT CHANGE UP (ref 6–8.3)
PROTHROM AB SERPL-ACNC: 13.9 SEC — HIGH (ref 9.5–13)
RBC # BLD: 3.9 M/UL — LOW (ref 4.2–5.8)
RBC # FLD: 13.2 % — SIGNIFICANT CHANGE UP (ref 10.3–14.5)
RBC BLD AUTO: ABNORMAL
RSV RNA NPH QL NAA+NON-PROBE: SIGNIFICANT CHANGE UP
SAO2 % BLDV: 32.4 % — LOW (ref 67–88)
SARS-COV-2 RNA SPEC QL NAA+PROBE: SIGNIFICANT CHANGE UP
SODIUM SERPL-SCNC: 142 MMOL/L — SIGNIFICANT CHANGE UP (ref 135–145)
TROPONIN T, HIGH SENSITIVITY RESULT: 121 NG/L — CRITICAL HIGH
TROPONIN T, HIGH SENSITIVITY RESULT: 122 NG/L — CRITICAL HIGH
WBC # BLD: 29.38 K/UL — HIGH (ref 3.8–10.5)
WBC # FLD AUTO: 29.38 K/UL — HIGH (ref 3.8–10.5)

## 2024-04-09 PROCEDURE — 74177 CT ABD & PELVIS W/CONTRAST: CPT | Mod: 26,MC

## 2024-04-09 PROCEDURE — 71045 X-RAY EXAM CHEST 1 VIEW: CPT | Mod: 26

## 2024-04-09 PROCEDURE — 99291 CRITICAL CARE FIRST HOUR: CPT | Mod: GC

## 2024-04-09 PROCEDURE — 71275 CT ANGIOGRAPHY CHEST: CPT | Mod: 26,MC

## 2024-04-09 RX ORDER — PIPERACILLIN AND TAZOBACTAM 4; .5 G/20ML; G/20ML
3.38 INJECTION, POWDER, LYOPHILIZED, FOR SOLUTION INTRAVENOUS ONCE
Refills: 0 | Status: COMPLETED | OUTPATIENT
Start: 2024-04-09 | End: 2024-04-09

## 2024-04-09 RX ORDER — SODIUM CHLORIDE 9 MG/ML
500 INJECTION INTRAMUSCULAR; INTRAVENOUS; SUBCUTANEOUS ONCE
Refills: 0 | Status: COMPLETED | OUTPATIENT
Start: 2024-04-09 | End: 2024-04-09

## 2024-04-09 RX ORDER — NOREPINEPHRINE BITARTRATE/D5W 8 MG/250ML
0.05 PLASTIC BAG, INJECTION (ML) INTRAVENOUS
Qty: 8 | Refills: 0 | Status: DISCONTINUED | OUTPATIENT
Start: 2024-04-09 | End: 2024-04-12

## 2024-04-09 RX ORDER — ACETAMINOPHEN 500 MG
1000 TABLET ORAL ONCE
Refills: 0 | Status: COMPLETED | OUTPATIENT
Start: 2024-04-09 | End: 2024-04-09

## 2024-04-09 RX ORDER — VANCOMYCIN HCL 1 G
1000 VIAL (EA) INTRAVENOUS ONCE
Refills: 0 | Status: COMPLETED | OUTPATIENT
Start: 2024-04-09 | End: 2024-04-09

## 2024-04-09 RX ADMIN — PIPERACILLIN AND TAZOBACTAM 200 GRAM(S): 4; .5 INJECTION, POWDER, LYOPHILIZED, FOR SOLUTION INTRAVENOUS at 21:22

## 2024-04-09 RX ADMIN — Medication 8.06 MICROGRAM(S)/KG/MIN: at 22:24

## 2024-04-09 RX ADMIN — SODIUM CHLORIDE 500 MILLILITER(S): 9 INJECTION INTRAMUSCULAR; INTRAVENOUS; SUBCUTANEOUS at 21:45

## 2024-04-09 RX ADMIN — SODIUM CHLORIDE 500 MILLILITER(S): 9 INJECTION INTRAMUSCULAR; INTRAVENOUS; SUBCUTANEOUS at 20:21

## 2024-04-09 RX ADMIN — Medication 400 MILLIGRAM(S): at 20:21

## 2024-04-09 RX ADMIN — Medication 250 MILLIGRAM(S): at 21:45

## 2024-04-09 NOTE — CONSULT NOTE ADULT - SUBJECTIVE AND OBJECTIVE BOX
83M PMH HTN, HLD, ICD/ afib on Eliquis (last dose 3/30), Plavix (last dose 3/26), Parkinson's disease, history of chronic pleural effusions, R sided VATS Pleurx cathter placement, presents with a clogged pleurx catheter.  MIST was performed 2 times after a CT chest showed that the pleural effusion was loculated.  After the tube started draining again, the pt was discharged.  Patient was seen in the office on 4/8/24 by Dr. Oneal for dislodged Pleurx cath.  Patient took Eliquis and Plavix on 4/7/24, so patient was sent home to return back to office on Wednesday 4/10/24 for removal of catheter.  Patient tonight felt pain and shortness of breath so was brought to ER.  In ER, patient with rectal temp of 102.7.      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      Parkinsons disease      CAD (coronary artery disease)  s/p PCI 08/21      Pleural effusion, not elsewhere classified      COVID-19 vaccine series completed  w booster      History of hip replacement, total  R hip 2008 & L hip      Status post cataract extraction  right eye cataract extraction with IOL 6/26/2015      Presence of cardiac pacemaker  10/2021      H/O coronary angioplasty  8/2021 1 stent inserted          REVIEW OF SYSTEMS      General:No Weight change/ Fatigue/ HA/Dizzy	    Skin/Breast: No Rashes/ Lesions/ Masses  	  Ophthalmologic: No Blurry vision/ Glaucoma/ Blindness  	  ENMT: No Hearing loss/ Drainage/ Lesions	    Respiratory and Thorax:+ shortness of breath  	  Cardiovascular: +chest pain    Gastrointestinal: No Nausea/ Vomiting/ Constipation/ Appetite Change	    Genitourinary: No Heamturia/ Dysuria/ Frequency change/ Impotence	    Musculoskeletal: + Pain/ Weakness/ Claudication	    Neurological: No Seizures/ TIA/CVA/ Parastesias	    Psychiatric: No Dementia/ Depression/ SI/HI	    Hematology/Lymphatics: No hx of bleeding/ Edema	    Endocrine:	No Hyperglycemia/ Hypoglycemia    Allergic/Immunologic:	 No Anaphylaxis/ Intolerance/ Recent illnesses    MEDICATIONS  (STANDING):  piperacillin/tazobactam IVPB... 3.375 Gram(s) IV Intermittent once  vancomycin  IVPB. 1000 milliGRAM(s) IV Intermittent once    MEDICATIONS  (PRN):      Allergies    No Known Allergies    Intolerances        SOCIAL HISTORY:  Lives at home    FAMILY HISTORY:  Family history of lung cancer (Mother)    Family history of esophageal cancer (Father)    FH: myocardial infarction (Grandparent)        Vital Signs Last 24 Hrs  T(C): 39.3 (09 Apr 2024 20:04), Max: 39.3 (09 Apr 2024 20:04)  T(F): 102.7 (09 Apr 2024 20:04), Max: 102.7 (09 Apr 2024 20:04)  HR: --  BP: 145/70 (09 Apr 2024 19:16) (145/70 - 145/70)  BP(mean): --  RR: 20 (09 Apr 2024 20:30) (16 - 20)  SpO2: 95% (09 Apr 2024 20:30) (95% - 95%)    Parameters below as of 09 Apr 2024 20:30  Patient On (Oxygen Delivery Method): room air        General:  NAD  Neurology: Awake, nonfocal, GUILLORY x 4  Eyes: Scleras clear, PERRLA/ EOMI, Gross vision intact  ENT:Gross hearing intact, grossly patent pharynx, no stridor  Neck: Neck supple, trachea midline, No JVD,   Respiratory: +wheezing bilaterally   CV: RRR, S1S2, no murmurs, rubs or gallops  Abdominal: Soft, NT, ND +BS,   Extremities: No edema, + peripheral pulses  Skin: No Rashes, Hematoma, Ecchymosis  Lymphatic: No Neck, axilla, groin LAD  Psych: Oriented x 3, normal affect  Tubes: Right Pleurx in but dislodged, site without redness or drainage     LABS:                        11.6   29.38 )-----------( 302      ( 09 Apr 2024 20:38 )             37.3           PT/INR - ( 09 Apr 2024 20:38 )   PT: 13.9 sec;   INR: 1.25 ratio         PTT - ( 09 Apr 2024 20:38 )  PTT:34.6 sec      RADIOLOGY & ADDITIONAL STUDIES:  Pending CT chest     ASSESSMENT:   83yMale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      Parkinsons disease      CAD (coronary artery disease)  s/p PCI 08/21      Pleural effusion, not elsewhere classified      COVID-19 vaccine series completed  w booster      History of hip replacement, total  R hip 2008 & L hip      Status post cataract extraction  right eye cataract extraction with IOL 6/26/2015      Presence of cardiac pacemaker  10/2021      H/O coronary angioplasty  8/2021 1 stent inserted      HEALTH ISSUES - PROBLEM Dx:  Fever   Dislodged Pleurx cath     PLAN:  Admit to medicine for sepsis workup   Continue to hold Eliquis and Plavix  Plan to remove Pleurex cath tomorrow   Above discussed with Dr. Oneal

## 2024-04-09 NOTE — ED ADULT NURSE NOTE - CHIEF COMPLAINT QUOTE
Pt recently discharged from hospital about 4 days ago, reports has rt sided pleural effusion w/ rt pleurx catheter, today developed fever. Also endorsing mild SOB, placed on 2L NC. Denies chest pain. pmhx: pacemaker, Parkinson's, afib, unable to obtain HR/ekg/SPo2 in triage- pt tremulous, b/l hands edematous, mentating well. charge RN aware.

## 2024-04-09 NOTE — ED PROVIDER NOTE - OBJECTIVE STATEMENT
Aiden Gomes MD, PGY2  83-year-old male with past medical history of hypertension, hyperlipidemia, atrial fibrillation on Eliquis, Parkinson's, CAD with stent, ICD, recent admission for large pleural effusion with pleural drain placed presenting to emergency departments with shortness of breath, right-sided chest pain at site of Pleurx that began today around 5 PM.  Patient also felt warm to family however were unable to obtain a temperature.  Not given any Tylenol prior to coming to emergency department.  Patient had his Eliquis and Plavix stopped for pleural drain placement, last dose of both was Sunday 4/7.  Wife states she believes abdomen is slightly more distended than usual as well however patient denying any abdominal pain at this time.  Denies fever, headache, vision change, abdominal pain, nausea, vomiting, diarrhea, rash.    In the emergency department patient has rectal temperature of 102.7, blood pressure 145/70, heart rate in the 80s, saturating 99% on room air.  On exam patient does have minimal expiratory wheezing bilaterally.  Abdomen is diffusely distended without tenderness to palpation.  Extremities rigid with cogwheel rigidity.  Alert and oriented to person place and time.  Differential including but not limited to sepsis, pneumonia, viral illness, pulmonary embolism, ACS, urinary tract infection, SBO.  Plan to obtain septic workup including labs and cultures, antibiosis with Zosyn and vancomycin given recent hospitalization, start with 500 cc of fluid, Tylenol for fever.  Will obtain CTA chest with runoff to abdomen and pelvis to evaluate for PE and intra-abdominal pathology.  Will reassess.  Patient will likely require admission.

## 2024-04-09 NOTE — ED ADULT NURSE REASSESSMENT NOTE - NS ED NURSE REASSESS COMMENT FT1
Report received from Michael LIANG. Pt on levo at bedside. Pt is A and Ox4 and denies additional physical complaints at this time. Airway is patent, respirations are even and unlabored. See flow sheet for titration. Plan of care ongoing, safety maintained.

## 2024-04-09 NOTE — ED PROVIDER NOTE - PROGRESS NOTE DETAILS
CTasked to hold Eliquis for removal of the Pleurx tomorrow will pass it out to medicine when admitted.  Patient pending labs and CTs to be done. Patient had a decrease in blood pressure 79/44 getting his second IV fluid heart rate 73  repeat BP 80/46 Patient has a white count of 29 with a neutrophil count creatinine is 1.8 from 2.6 in the past potassium 3.7/troponin 122 pending repeat BNP 6561 with a lactate of 3.3.  Patient completed his second bolus blood pressure was 86/41 with a MAP of 55 will start Levophed being imaging to be done /60 pt in CT now. Patient pending repeat VBG.  Pending CT report.  Patient on Levophed blood pressure 118/51 MAP of 68 heart rate 94  Signed out to night team at end of shift pending report of radiology if nothing surgical consult MICU Charles, PGY-3, EM: Patient still on levo at 0.09, spoke with the MICU team member who will come evaluate the patient. Patient pending repeat VBG.  Pending CT report.  Patient on Levophed blood pressure 118/51 MAP of 68 heart rate 94  Signed out to night team at end of shift pending report of radiology if nothing surgical consult MICU  Endorse to Dr Dubin

## 2024-04-09 NOTE — ED ADULT TRIAGE NOTE - CHIEF COMPLAINT QUOTE
Pt recently discharged from hospital about 4 days ago, reports has rt sided pleural effusion w/ rt pleurx catheter, today developed fever. Also endorsing mild SOB, placed on 2L NC. Denies chest pain. pmhx: pacemaker, Parkinson's, afib Pt recently discharged from hospital about 4 days ago, reports has rt sided pleural effusion w/ rt pleurx catheter, today developed fever. Also endorsing mild SOB, placed on 2L NC. Denies chest pain. pmhx: pacemaker, Parkinson's, afib, unable to obtain HR/ekg/SPo2 in triage- pt tremulous, mentating well. charge RN aware. Pt recently discharged from hospital about 4 days ago, reports has rt sided pleural effusion w/ rt pleurx catheter, today developed fever. Also endorsing mild SOB, placed on 2L NC. Denies chest pain. pmhx: pacemaker, Parkinson's, afib, unable to obtain HR/ekg/SPo2 in triage- pt tremulous, b/l hands edematous, mentating well. charge RN aware.

## 2024-04-09 NOTE — ED PROVIDER NOTE - CLINICAL SUMMARY MEDICAL DECISION MAKING FREE TEXT BOX
plan EKG, cardiac monitor with continuous pulse ox, sepsis labs, gentle IV fluids, black coverage antibiotics, CT angio rule out PE, CT abdomen pelvis, consulted CT surgery, admission.  Plan discussed with patient and wife at bedside  EKG hr 87 paced

## 2024-04-09 NOTE — ED ADULT NURSE REASSESSMENT NOTE - NS ED NURSE REASSESS COMMENT FT1
ARVIND RN. Patient received in spot 3. pt remains at baseline mental status, RR even unlabored completing full sentences. pt resting in stretcher comfortably at this time, no new complaints offered. stretcher lowest position siderails up safety measures in place. Surgical NP at [facility] to evaluate site dressing. Medicated per MD orders. report given to primary RN.

## 2024-04-09 NOTE — ED ADULT NURSE NOTE - NSFALLHARMRISKINTERV_ED_ALL_ED

## 2024-04-09 NOTE — CONSULT NOTE ADULT - NS ATTEND AMEND GEN_ALL_CORE FT
Patient seen and examined agree with above note as modified, where appropriate, by me. Pt s/p removal of non functional pleurX. Small effusion, feel unlikely source of current infectious episode. Will follow.

## 2024-04-09 NOTE — ED ADULT NURSE NOTE - OBJECTIVE STATEMENT
Michael RN: patient ambulatory with walker at baseline, discharged from hospital about 4 days ago, reports has right sided pleural effusion with right pleurx catheter placed, today developed fever, shortness of breath, and chest pain with deep breaths. 18G IV placed in LAC, labs sent. past medical history of pacemaker, Parkinson's, afib. MD Castillo at bedside, patient on continuous cardiac monitor, appears to be in a paced rhythm at this time, EDT Evaristo at bedside performing EKG currently. report given to primary RN Blair JONES

## 2024-04-09 NOTE — ED ADULT NURSE REASSESSMENT NOTE - NS ED NURSE REASSESS COMMENT FT1
Report received from night shift RN Blair. Patient AOx4 and in no signs of acute distress. Family at bedside with patient. Patient on 2L respirations even and unlabored. Patient resting comfortably on stretcher. Comfort measures maintained. Safety maintained. Report received from night shift RN Blair. Patient AOx4 and in no signs of acute distress. Family at bedside with patient. Respirations even and unlabored. Patient resting comfortably on stretcher. Comfort measures maintained. Safety maintained. Report received from day shift RN Blair. Patient AOx4 and in no signs of acute distress. Family at bedside with patient. Respirations even and unlabored. Patient resting comfortably on stretcher. Comfort measures maintained. Safety maintained.

## 2024-04-09 NOTE — ED ADULT NURSE REASSESSMENT NOTE - NS ED NURSE REASSESS COMMENT FT1
Patient endorses SOB, 02 93%; patient placed on 2L NC, respirations even and unlabored. Patient on cardiac monitor.

## 2024-04-09 NOTE — ED PROVIDER NOTE - PHYSICAL EXAMINATION
Gen: NAD  HEENT: mucous membranes moist  CV: RRR, +S1/S2   Resp: minimal expiratory wheezing. no accessory muscle use, no increased work of breathing  GI: Abdomen soft non-distended, NTTP  MSK: +pleural drain in place over Rt midaxillary line without surrounding erythema or drainage. 1+pitting edema b/l lower extremities. cogwheel rigidity of extremities.   Neuro: A&Ox4, following commands, moving all four extremities spontaneously  Psych: appropriate mood

## 2024-04-09 NOTE — ED PROVIDER NOTE - ATTENDING CONTRIBUTION TO CARE
Attending Statement: I have personally seen and examined this patient. I have fully participated in the care of this patient. I have reviewed all pertinent clinical information, including history physical exam, plan and the Resident's note and agree except as noted  83-year-old male history of hypertension, high cholesterol, ICD, A-fib on Eliquis with last dose on 7 (2 days ago).  Parkinson disease, history of chronic pleural effusion status post right sided VATSPleurx presents from home with wife and aide chief complaint of fever.  Patient was recently discharged from [facility] he was admitted from April 2 to April 5 for a Clogged Pleurx, wife states that since he was discharged the tube has not been draining.  Home visiting nurse saw him over the weekend and they followed up Dr Oneal a day ago and was scheduled for hide removed this week but was holding the Eliquis.  Today he developed a fever at home with chills.  As well as right-sided pleuritic chest pain he still having some pleuritic pain.  He has been minimally ambulatory since going home as well is endorsing generalized weakness.  No vomiting no diarrhea no dysuria.  Vital signs noted patient febrile 102.7 heart rate 88 respiratory 5 blood pressure 145/70 ill-appearing male laying in bed ANO x 3.  Able to answer short sentences.  Audible wheezing noted.  Moderate work of breathing.  +pleurx on the right midaxillary line no overlaying erythema or pain at insertion site. Abdomen is distended but nontender with no rebound or guarding.  No inguinal hernia noted.  Patient has a diaper.  Mild pedal edema bilaterally with no calf tenderness appreciated.  Patient examined withDr Gomes  plan EKG, cardiac monitor with continuous pulse ox, sepsis labs, gentle IV fluids, black coverage antibiotics, CT angio rule out PE, CT abdomen pelvis, consulted CT surgery, admission.  Plan discussed with patient and wife at bedside  EKG hr 87 paced

## 2024-04-09 NOTE — HISTORY OF PRESENT ILLNESS
[FreeTextEntry1] :  CT tube right side, under arm in place for 2 years, now stopped but CT scan shows effusion still present, Going into LIJ injection to get fluid out... putting in med to drain further.  No falls  Cardiology  EF 45% Mod to Sev AI in 2022,  Feb 2024 EF 60-65%, mod- severe AR

## 2024-04-10 DIAGNOSIS — I95.9 HYPOTENSION, UNSPECIFIED: ICD-10-CM

## 2024-04-10 LAB
ADD ON TEST-SPECIMEN IN LAB: SIGNIFICANT CHANGE UP
ALBUMIN SERPL ELPH-MCNC: 3.1 G/DL — LOW (ref 3.3–5)
ALP SERPL-CCNC: 79 U/L — SIGNIFICANT CHANGE UP (ref 40–120)
ALT FLD-CCNC: 10 U/L — SIGNIFICANT CHANGE UP (ref 4–41)
ANION GAP SERPL CALC-SCNC: 16 MMOL/L — HIGH (ref 7–14)
APPEARANCE UR: CLEAR — SIGNIFICANT CHANGE UP
APTT BLD: 35.7 SEC — HIGH (ref 24.5–35.6)
AST SERPL-CCNC: 11 U/L — SIGNIFICANT CHANGE UP (ref 4–40)
BASE EXCESS BLDV CALC-SCNC: -0.1 MMOL/L — SIGNIFICANT CHANGE UP (ref -2–3)
BASE EXCESS BLDV CALC-SCNC: 2.1 MMOL/L — SIGNIFICANT CHANGE UP (ref -2–3)
BASOPHILS # BLD AUTO: 0.03 K/UL — SIGNIFICANT CHANGE UP (ref 0–0.2)
BASOPHILS NFR BLD AUTO: 0.1 % — SIGNIFICANT CHANGE UP (ref 0–2)
BILIRUB SERPL-MCNC: 0.4 MG/DL — SIGNIFICANT CHANGE UP (ref 0.2–1.2)
BILIRUB UR-MCNC: NEGATIVE — SIGNIFICANT CHANGE UP
BLOOD GAS VENOUS COMPREHENSIVE RESULT: SIGNIFICANT CHANGE UP
BLOOD GAS VENOUS COMPREHENSIVE RESULT: SIGNIFICANT CHANGE UP
BUN SERPL-MCNC: 34 MG/DL — HIGH (ref 7–23)
CALCIUM SERPL-MCNC: 8.6 MG/DL — SIGNIFICANT CHANGE UP (ref 8.4–10.5)
CHLORIDE BLDV-SCNC: 100 MMOL/L — SIGNIFICANT CHANGE UP (ref 96–108)
CHLORIDE BLDV-SCNC: 101 MMOL/L — SIGNIFICANT CHANGE UP (ref 96–108)
CHLORIDE SERPL-SCNC: 99 MMOL/L — SIGNIFICANT CHANGE UP (ref 98–107)
CO2 BLDV-SCNC: 28.3 MMOL/L — HIGH (ref 22–26)
CO2 BLDV-SCNC: 30.2 MMOL/L — HIGH (ref 22–26)
CO2 SERPL-SCNC: 24 MMOL/L — SIGNIFICANT CHANGE UP (ref 22–31)
COLOR SPEC: YELLOW — SIGNIFICANT CHANGE UP
CREAT SERPL-MCNC: 1.93 MG/DL — HIGH (ref 0.5–1.3)
DIFF PNL FLD: ABNORMAL
EGFR: 34 ML/MIN/1.73M2 — LOW
EOSINOPHIL # BLD AUTO: 0.06 K/UL — SIGNIFICANT CHANGE UP (ref 0–0.5)
EOSINOPHIL NFR BLD AUTO: 0.3 % — SIGNIFICANT CHANGE UP (ref 0–6)
GAS PNL BLDV: 136 MMOL/L — SIGNIFICANT CHANGE UP (ref 136–145)
GAS PNL BLDV: 137 MMOL/L — SIGNIFICANT CHANGE UP (ref 136–145)
GAS PNL BLDV: SIGNIFICANT CHANGE UP
GAS PNL BLDV: SIGNIFICANT CHANGE UP
GLUCOSE BLDC GLUCOMTR-MCNC: 112 MG/DL — HIGH (ref 70–99)
GLUCOSE BLDV-MCNC: 110 MG/DL — HIGH (ref 70–99)
GLUCOSE BLDV-MCNC: 126 MG/DL — HIGH (ref 70–99)
GLUCOSE SERPL-MCNC: 127 MG/DL — HIGH (ref 70–99)
GLUCOSE UR QL: NEGATIVE MG/DL — SIGNIFICANT CHANGE UP
HCO3 BLDV-SCNC: 27 MMOL/L — SIGNIFICANT CHANGE UP (ref 22–29)
HCO3 BLDV-SCNC: 29 MMOL/L — SIGNIFICANT CHANGE UP (ref 22–29)
HCT VFR BLD CALC: 32.6 % — LOW (ref 39–50)
HCT VFR BLDA CALC: 30 % — LOW (ref 39–51)
HCT VFR BLDA CALC: 30 % — LOW (ref 39–51)
HGB BLD CALC-MCNC: 10 G/DL — LOW (ref 12.6–17.4)
HGB BLD CALC-MCNC: 10.1 G/DL — LOW (ref 12.6–17.4)
HGB BLD-MCNC: 10 G/DL — LOW (ref 13–17)
IANC: 20.79 K/UL — HIGH (ref 1.8–7.4)
IMM GRANULOCYTES NFR BLD AUTO: 0.5 % — SIGNIFICANT CHANGE UP (ref 0–0.9)
INR BLD: 1.32 RATIO — HIGH (ref 0.85–1.18)
KETONES UR-MCNC: NEGATIVE MG/DL — SIGNIFICANT CHANGE UP
LACTATE BLDV-MCNC: 1.5 MMOL/L — SIGNIFICANT CHANGE UP (ref 0.5–2)
LACTATE BLDV-MCNC: 3.9 MMOL/L — HIGH (ref 0.5–2)
LDH SERPL L TO P-CCNC: 289 U/L — HIGH (ref 135–225)
LEGIONELLA AG UR QL: NEGATIVE — SIGNIFICANT CHANGE UP
LEUKOCYTE ESTERASE UR-ACNC: NEGATIVE — SIGNIFICANT CHANGE UP
LYMPHOCYTES # BLD AUTO: 0.51 K/UL — LOW (ref 1–3.3)
LYMPHOCYTES # BLD AUTO: 2.2 % — LOW (ref 13–44)
MAGNESIUM SERPL-MCNC: 1.9 MG/DL — SIGNIFICANT CHANGE UP (ref 1.6–2.6)
MCHC RBC-ENTMCNC: 29.6 PG — SIGNIFICANT CHANGE UP (ref 27–34)
MCHC RBC-ENTMCNC: 30.7 GM/DL — LOW (ref 32–36)
MCV RBC AUTO: 96.4 FL — SIGNIFICANT CHANGE UP (ref 80–100)
MONOCYTES # BLD AUTO: 1.49 K/UL — HIGH (ref 0–0.9)
MONOCYTES NFR BLD AUTO: 6.5 % — SIGNIFICANT CHANGE UP (ref 2–14)
MRSA PCR RESULT.: SIGNIFICANT CHANGE UP
NEUTROPHILS # BLD AUTO: 20.79 K/UL — HIGH (ref 1.8–7.4)
NEUTROPHILS NFR BLD AUTO: 90.4 % — HIGH (ref 43–77)
NITRITE UR-MCNC: NEGATIVE — SIGNIFICANT CHANGE UP
NRBC # BLD: 0 /100 WBCS — SIGNIFICANT CHANGE UP (ref 0–0)
NRBC # FLD: 0 K/UL — SIGNIFICANT CHANGE UP (ref 0–0)
PCO2 BLDV: 53 MMHG — SIGNIFICANT CHANGE UP (ref 42–55)
PCO2 BLDV: 53 MMHG — SIGNIFICANT CHANGE UP (ref 42–55)
PH BLDV: 7.31 — LOW (ref 7.32–7.43)
PH BLDV: 7.34 — SIGNIFICANT CHANGE UP (ref 7.32–7.43)
PH UR: 6 — SIGNIFICANT CHANGE UP (ref 5–8)
PHOSPHATE SERPL-MCNC: 3.3 MG/DL — SIGNIFICANT CHANGE UP (ref 2.5–4.5)
PLATELET # BLD AUTO: 239 K/UL — SIGNIFICANT CHANGE UP (ref 150–400)
PO2 BLDV: 31 MMHG — SIGNIFICANT CHANGE UP (ref 25–45)
PO2 BLDV: 39 MMHG — SIGNIFICANT CHANGE UP (ref 25–45)
POTASSIUM BLDV-SCNC: 3.2 MMOL/L — LOW (ref 3.5–5.1)
POTASSIUM BLDV-SCNC: 3.2 MMOL/L — LOW (ref 3.5–5.1)
POTASSIUM SERPL-MCNC: 3.5 MMOL/L — SIGNIFICANT CHANGE UP (ref 3.5–5.3)
POTASSIUM SERPL-SCNC: 3.5 MMOL/L — SIGNIFICANT CHANGE UP (ref 3.5–5.3)
PROCALCITONIN SERPL-MCNC: 0.54 NG/ML — HIGH (ref 0.02–0.1)
PROT SERPL-MCNC: 6.4 G/DL — SIGNIFICANT CHANGE UP (ref 6–8.3)
PROT UR-MCNC: 30 MG/DL
PROTHROM AB SERPL-ACNC: 14.8 SEC — HIGH (ref 9.5–13)
RBC # BLD: 3.38 M/UL — LOW (ref 4.2–5.8)
RBC # FLD: 13.2 % — SIGNIFICANT CHANGE UP (ref 10.3–14.5)
S AUREUS DNA NOSE QL NAA+PROBE: SIGNIFICANT CHANGE UP
S PNEUM AG UR QL: NEGATIVE — SIGNIFICANT CHANGE UP
SAO2 % BLDV: 47.8 % — LOW (ref 67–88)
SAO2 % BLDV: 59.2 % — LOW (ref 67–88)
SODIUM SERPL-SCNC: 139 MMOL/L — SIGNIFICANT CHANGE UP (ref 135–145)
SP GR SPEC: 1.03 — HIGH (ref 1–1.03)
TSH SERPL-MCNC: 1.37 UIU/ML — SIGNIFICANT CHANGE UP (ref 0.27–4.2)
UROBILINOGEN FLD QL: 0.2 MG/DL — SIGNIFICANT CHANGE UP (ref 0.2–1)
VANCOMYCIN FLD-MCNC: <4 UG/ML — SIGNIFICANT CHANGE UP
WBC # BLD: 23 K/UL — HIGH (ref 3.8–10.5)
WBC # FLD AUTO: 23 K/UL — HIGH (ref 3.8–10.5)

## 2024-04-10 PROCEDURE — 99291 CRITICAL CARE FIRST HOUR: CPT

## 2024-04-10 PROCEDURE — 99292 CRITICAL CARE ADDL 30 MIN: CPT

## 2024-04-10 PROCEDURE — 71045 X-RAY EXAM CHEST 1 VIEW: CPT | Mod: 26,77

## 2024-04-10 PROCEDURE — 93308 TTE F-UP OR LMTD: CPT | Mod: 26

## 2024-04-10 PROCEDURE — 71045 X-RAY EXAM CHEST 1 VIEW: CPT | Mod: 26

## 2024-04-10 PROCEDURE — 76604 US EXAM CHEST: CPT | Mod: 26

## 2024-04-10 PROCEDURE — 99223 1ST HOSP IP/OBS HIGH 75: CPT

## 2024-04-10 RX ORDER — CHOLECALCIFEROL (VITAMIN D3) 125 MCG
1 CAPSULE ORAL
Refills: 0 | DISCHARGE

## 2024-04-10 RX ORDER — BUMETANIDE 0.25 MG/ML
2 INJECTION INTRAMUSCULAR; INTRAVENOUS EVERY 12 HOURS
Refills: 0 | Status: DISCONTINUED | OUTPATIENT
Start: 2024-04-10 | End: 2024-04-12

## 2024-04-10 RX ORDER — CLOPIDOGREL BISULFATE 75 MG/1
1 TABLET, FILM COATED ORAL
Qty: 0 | Refills: 0 | DISCHARGE

## 2024-04-10 RX ORDER — PREGABALIN 225 MG/1
1000 CAPSULE ORAL DAILY
Refills: 0 | Status: DISCONTINUED | OUTPATIENT
Start: 2024-04-10 | End: 2024-04-18

## 2024-04-10 RX ORDER — ROSUVASTATIN CALCIUM 5 MG/1
1 TABLET ORAL
Refills: 0 | DISCHARGE

## 2024-04-10 RX ORDER — HEPARIN SODIUM 5000 [USP'U]/ML
5000 INJECTION INTRAVENOUS; SUBCUTANEOUS EVERY 8 HOURS
Refills: 0 | Status: DISCONTINUED | OUTPATIENT
Start: 2024-04-10 | End: 2024-04-12

## 2024-04-10 RX ORDER — CARBIDOPA AND LEVODOPA 25; 100 MG/1; MG/1
1 TABLET ORAL
Refills: 0 | DISCHARGE

## 2024-04-10 RX ORDER — CARBIDOPA AND LEVODOPA 25; 100 MG/1; MG/1
1.5 TABLET ORAL THREE TIMES A DAY
Refills: 0 | Status: DISCONTINUED | OUTPATIENT
Start: 2024-04-10 | End: 2024-04-18

## 2024-04-10 RX ORDER — MIRTAZAPINE 45 MG/1
15 TABLET, ORALLY DISINTEGRATING ORAL AT BEDTIME
Refills: 0 | Status: DISCONTINUED | OUTPATIENT
Start: 2024-04-10 | End: 2024-04-18

## 2024-04-10 RX ORDER — MIRTAZAPINE 45 MG/1
1 TABLET, ORALLY DISINTEGRATING ORAL
Refills: 0 | DISCHARGE

## 2024-04-10 RX ORDER — VANCOMYCIN HCL 1 G
1000 VIAL (EA) INTRAVENOUS EVERY 24 HOURS
Refills: 0 | Status: DISCONTINUED | OUTPATIENT
Start: 2024-04-11 | End: 2024-04-11

## 2024-04-10 RX ORDER — CHLORHEXIDINE GLUCONATE 213 G/1000ML
1 SOLUTION TOPICAL DAILY
Refills: 0 | Status: DISCONTINUED | OUTPATIENT
Start: 2024-04-10 | End: 2024-04-18

## 2024-04-10 RX ORDER — CHOLECALCIFEROL (VITAMIN D3) 125 MCG
1000 CAPSULE ORAL DAILY
Refills: 0 | Status: DISCONTINUED | OUTPATIENT
Start: 2024-04-10 | End: 2024-04-18

## 2024-04-10 RX ORDER — POTASSIUM CHLORIDE 20 MEQ
30 PACKET (EA) ORAL ONCE
Refills: 0 | Status: COMPLETED | OUTPATIENT
Start: 2024-04-10 | End: 2024-04-10

## 2024-04-10 RX ORDER — CARBIDOPA AND LEVODOPA 25; 100 MG/1; MG/1
1.5 TABLET ORAL
Refills: 0 | DISCHARGE

## 2024-04-10 RX ORDER — VANCOMYCIN HCL 1 G
VIAL (EA) INTRAVENOUS
Refills: 0 | Status: DISCONTINUED | OUTPATIENT
Start: 2024-04-10 | End: 2024-04-11

## 2024-04-10 RX ORDER — HEPARIN SODIUM 5000 [USP'U]/ML
5000 INJECTION INTRAVENOUS; SUBCUTANEOUS EVERY 8 HOURS
Refills: 0 | Status: DISCONTINUED | OUTPATIENT
Start: 2024-04-10 | End: 2024-04-10

## 2024-04-10 RX ORDER — TAFAMIDIS 61 MG/1
1 CAPSULE, LIQUID FILLED ORAL
Qty: 0 | Refills: 0 | DISCHARGE

## 2024-04-10 RX ORDER — DESVENLAFAXINE 50 MG/1
50 TABLET, EXTENDED RELEASE ORAL
Refills: 0 | DISCHARGE

## 2024-04-10 RX ORDER — FERROUS SULFATE 325(65) MG
1 TABLET ORAL
Refills: 0 | DISCHARGE

## 2024-04-10 RX ORDER — TADALAFIL 10 MG/1
1 TABLET, FILM COATED ORAL
Refills: 0 | DISCHARGE

## 2024-04-10 RX ORDER — ASCORBIC ACID 60 MG
500 TABLET,CHEWABLE ORAL DAILY
Refills: 0 | Status: DISCONTINUED | OUTPATIENT
Start: 2024-04-10 | End: 2024-04-18

## 2024-04-10 RX ORDER — OLANZAPINE 15 MG/1
1 TABLET, FILM COATED ORAL
Refills: 0 | DISCHARGE

## 2024-04-10 RX ORDER — ACETAMINOPHEN 500 MG
1000 TABLET ORAL ONCE
Refills: 0 | Status: COMPLETED | OUTPATIENT
Start: 2024-04-10 | End: 2024-04-10

## 2024-04-10 RX ORDER — APIXABAN 2.5 MG/1
1 TABLET, FILM COATED ORAL
Qty: 0 | Refills: 0 | DISCHARGE

## 2024-04-10 RX ORDER — SODIUM CHLORIDE 9 MG/ML
500 INJECTION INTRAMUSCULAR; INTRAVENOUS; SUBCUTANEOUS ONCE
Refills: 0 | Status: DISCONTINUED | OUTPATIENT
Start: 2024-04-10 | End: 2024-04-10

## 2024-04-10 RX ORDER — DESVENLAFAXINE 50 MG/1
50 TABLET, EXTENDED RELEASE ORAL DAILY
Refills: 0 | Status: DISCONTINUED | OUTPATIENT
Start: 2024-04-10 | End: 2024-04-18

## 2024-04-10 RX ORDER — ATORVASTATIN CALCIUM 80 MG/1
80 TABLET, FILM COATED ORAL AT BEDTIME
Refills: 0 | Status: DISCONTINUED | OUTPATIENT
Start: 2024-04-10 | End: 2024-04-18

## 2024-04-10 RX ORDER — INFLUENZA VIRUS VACCINE 15; 15; 15; 15 UG/.5ML; UG/.5ML; UG/.5ML; UG/.5ML
0.7 SUSPENSION INTRAMUSCULAR ONCE
Refills: 0 | Status: DISCONTINUED | OUTPATIENT
Start: 2024-04-10 | End: 2024-04-18

## 2024-04-10 RX ORDER — PIPERACILLIN AND TAZOBACTAM 4; .5 G/20ML; G/20ML
3.38 INJECTION, POWDER, LYOPHILIZED, FOR SOLUTION INTRAVENOUS EVERY 8 HOURS
Refills: 0 | Status: COMPLETED | OUTPATIENT
Start: 2024-04-10 | End: 2024-04-16

## 2024-04-10 RX ORDER — CARBIDOPA AND LEVODOPA 25; 100 MG/1; MG/1
1 TABLET ORAL AT BEDTIME
Refills: 0 | Status: DISCONTINUED | OUTPATIENT
Start: 2024-04-10 | End: 2024-04-18

## 2024-04-10 RX ORDER — DESVENLAFAXINE 50 MG/1
50 TABLET, EXTENDED RELEASE ORAL DAILY
Refills: 0 | Status: DISCONTINUED | OUTPATIENT
Start: 2024-04-10 | End: 2024-04-10

## 2024-04-10 RX ORDER — VANCOMYCIN HCL 1 G
1000 VIAL (EA) INTRAVENOUS ONCE
Refills: 0 | Status: COMPLETED | OUTPATIENT
Start: 2024-04-10 | End: 2024-04-10

## 2024-04-10 RX ORDER — METHENAMINE MANDELATE 1 G
1 TABLET ORAL
Refills: 0 | DISCHARGE

## 2024-04-10 RX ORDER — OLANZAPINE 15 MG/1
10 TABLET, FILM COATED ORAL AT BEDTIME
Refills: 0 | Status: DISCONTINUED | OUTPATIENT
Start: 2024-04-10 | End: 2024-04-18

## 2024-04-10 RX ADMIN — CARBIDOPA AND LEVODOPA 1.5 TABLET(S): 25; 100 TABLET ORAL at 07:33

## 2024-04-10 RX ADMIN — Medication 400 MILLIGRAM(S): at 06:00

## 2024-04-10 RX ADMIN — Medication 250 MILLIGRAM(S): at 21:06

## 2024-04-10 RX ADMIN — Medication 30 MILLIEQUIVALENT(S): at 17:46

## 2024-04-10 RX ADMIN — BUMETANIDE 2 MILLIGRAM(S): 0.25 INJECTION INTRAMUSCULAR; INTRAVENOUS at 23:48

## 2024-04-10 RX ADMIN — OLANZAPINE 10 MILLIGRAM(S): 15 TABLET, FILM COATED ORAL at 19:25

## 2024-04-10 RX ADMIN — ATORVASTATIN CALCIUM 80 MILLIGRAM(S): 80 TABLET, FILM COATED ORAL at 22:59

## 2024-04-10 RX ADMIN — PIPERACILLIN AND TAZOBACTAM 25 GRAM(S): 4; .5 INJECTION, POWDER, LYOPHILIZED, FOR SOLUTION INTRAVENOUS at 13:36

## 2024-04-10 RX ADMIN — PREGABALIN 1000 MICROGRAM(S): 225 CAPSULE ORAL at 11:55

## 2024-04-10 RX ADMIN — Medication 500 MILLIGRAM(S): at 11:55

## 2024-04-10 RX ADMIN — BUMETANIDE 2 MILLIGRAM(S): 0.25 INJECTION INTRAMUSCULAR; INTRAVENOUS at 11:55

## 2024-04-10 RX ADMIN — CARBIDOPA AND LEVODOPA 1.5 TABLET(S): 25; 100 TABLET ORAL at 22:59

## 2024-04-10 RX ADMIN — HEPARIN SODIUM 5000 UNIT(S): 5000 INJECTION INTRAVENOUS; SUBCUTANEOUS at 22:59

## 2024-04-10 RX ADMIN — PIPERACILLIN AND TAZOBACTAM 25 GRAM(S): 4; .5 INJECTION, POWDER, LYOPHILIZED, FOR SOLUTION INTRAVENOUS at 23:00

## 2024-04-10 RX ADMIN — DESVENLAFAXINE 50 MILLIGRAM(S): 50 TABLET, EXTENDED RELEASE ORAL at 13:57

## 2024-04-10 RX ADMIN — CARBIDOPA AND LEVODOPA 1 TABLET(S): 25; 100 TABLET ORAL at 22:59

## 2024-04-10 RX ADMIN — Medication 8.06 MICROGRAM(S)/KG/MIN: at 09:18

## 2024-04-10 RX ADMIN — Medication 8.06 MICROGRAM(S)/KG/MIN: at 17:48

## 2024-04-10 RX ADMIN — PIPERACILLIN AND TAZOBACTAM 25 GRAM(S): 4; .5 INJECTION, POWDER, LYOPHILIZED, FOR SOLUTION INTRAVENOUS at 06:00

## 2024-04-10 RX ADMIN — Medication 1000 UNIT(S): at 11:55

## 2024-04-10 RX ADMIN — HEPARIN SODIUM 5000 UNIT(S): 5000 INJECTION INTRAVENOUS; SUBCUTANEOUS at 17:46

## 2024-04-10 RX ADMIN — Medication 1000 MILLIGRAM(S): at 06:30

## 2024-04-10 RX ADMIN — CARBIDOPA AND LEVODOPA 1.5 TABLET(S): 25; 100 TABLET ORAL at 13:37

## 2024-04-10 RX ADMIN — CHLORHEXIDINE GLUCONATE 1 APPLICATION(S): 213 SOLUTION TOPICAL at 13:37

## 2024-04-10 RX ADMIN — MIRTAZAPINE 15 MILLIGRAM(S): 45 TABLET, ORALLY DISINTEGRATING ORAL at 19:31

## 2024-04-10 NOTE — PROGRESS NOTE ADULT - CRITICAL CARE ATTENDING COMMENT
82 y/o male with a pmh of  HTN, HLD, PPM/ afib on Eliquis (last dose 3/30), Plavix (last dose 3/26), Parkinson's disease, amyloidosis, history of chronic pleural effusions, R sided VATS Pleurx cathter placement, who is presents with fevers and malaise.     Patient has had issues in the past with PLERUX, non-functioning. Saw outpatient, possible dislodgement, was planned for removal but on full a/c and plavix. Was pending removal by CT surgery. Here with fevers, leukocytosis and requiring pressors.     # Shock on pressors  # HFpEF  # CKD  # pHTN  # Amyloidosis  # afib  # PD  # Chronic pleural effusions  - Likely infection given fevers, hypotension without clear etiology. loculated effusion on the right, some fluctuance under the insertion site. UA/urine culture pending. CT chest largely unchanged.  - F/U culture, c/w vanc/ zosyn for now. Check MRSA PCR. D/W CT surgery about possible fluctuance. Will f/u. May need thoracentesis, unable to draw any fluid from the pleurx.   - C/W  bumex, at home doses. Overloaded on exam. Monitor renal function  - CKD- hold nephrotoxic meds, trend. Near or better then baseline.  - pHTN, unclear why on sildenafil, also only on daily. 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 on hold for shock  - Wean pressors as tolerated, not volume responsive  - Afib, rate control on hold for shock. A/C on hold for pleurx removal.   - Amyloidosis, - will need home meds brought. Not available in house  - PD- c/w home PD meds  - DVT ppx- will restart once post pleurx removal  - Dispo- full code. Will have ongoing GOC 82 y/o male with a pmh of  HTN, HLD, PPM/ afib on Eliquis (last dose 3/30), Plavix (last dose 3/26), Parkinson's disease, amyloidosis, history of chronic pleural effusions, R sided VATS Pleurx cathter placement, who is presents with fevers and malaise.     Patient has had issues in the past with PLERUX, non-functioning. Saw outpatient, possible dislodgement, was planned for removal but on full a/c and plavix. Was pending removal by CT surgery. Here with fevers, leukocytosis and requiring pressors.     Has had chronic effusions, hx of loculation, previous MIST. Pleural biopsy with chronic inflammation.     # Shock on pressors  # HFpEF  # CKD  # pHTN  # Amyloidosis  # afib  # PD  # Chronic pleural effusions  - Likely infection given fevers, hypotension without clear etiology. loculated effusion on the right, some fluctuance under the insertion site. UA/urine culture pending. CT chest largely unchanged.  - F/U culture, c/w vanc/ zosyn for now. Check MRSA PCR. D/W CT surgery about possible fluctuance. Will f/u. May need thoracentesis, unable to draw any fluid from the pleurx.   - C/W  bumex, at home doses. Overloaded on exam. Monitor renal function  - CKD- hold nephrotoxic meds, trend. Near or better then baseline.  - pHTN, unclear why on sildenafil, also only on daily. 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 on hold for shock  - Wean pressors as tolerated, not volume responsive  - Afib, rate control on hold for shock. A/C on hold for pleurx removal.   - Amyloidosis, - will need home meds brought. Not available in house  - PD- c/w home PD meds  - DVT ppx- will restart once post pleurx removal  - Dispo- full code. Will have ongoing GOC

## 2024-04-10 NOTE — H&P ADULT - ATTENDING COMMENTS
pt is an 82 yo male with hx HTN< HLD< Afib, AICD , on plavix, eliquis,   Parkinsons , chronic pleural effusion , s/p vats and pleurex catheter  pt brought  in by wife for weakness.  Pt noted to be hypotensive  in the er with sbp 80's and temp 102.  he   was    placed on norepinephrine fo rbp support.  asked to evaluate.   PE bp 120/70 on norepinephrine 0.09 mcg/kg/min  rr 18  pulse 90   heent no jvd, lungs rales b/l right cw ppm,  heart s1s2 abdomen nontender  ext LE edema,  neuro lethargic    wbc 29 hgb 11 hct 37    bicarb 26 cr 1.87    cxr b/l vasc congestion ,      A/P  82 yo male with chf, chroni c right pleural effusion presents with  hypotension and fever, placed on norepipnephrine  -shock, septic, panculture, blood, urine, etiology pleural effusion likely  continue norepinephrine for bp support.  -check echo to evaluate lv function  -keep npo for now  -will start vanco, zosyn for broad abx coverage ,   -monitor urine output  critically ill with shock requiring pressors.

## 2024-04-10 NOTE — PROGRESS NOTE ADULT - ASSESSMENT
Patient is a 84 y/o male with a pmh of  HTN, HLD, ICD/ afib on Eliquis (last dose 3/30), Plavix (last dose 3/26), Parkinson's disease, history of chronic pleural effusions, R sided VATS Pleurx cathter placement, presents to the ED due to concerns of dyspnea and right sided chest pain around the Pleurx catheter site. Patient found to be in shock, likely due to sepsis and infection.     NEUROLOGY  -Patient mentating well-AOX4  -Appears a little tired   -Will keep NPO due to lethargy     #Parkinson's  -Continue home Carbidopa-Levodopa     #Insomnia  #Mood disorder  -Takes Mirtazapine, Olanzapine and Desvenlafaxine at home   -Will hold due to concerns of sedation     PULMONARY  #Bilateral Loculated Pleural effusions   -Believed to be secondary to CHF   -s/p Right-sided VATS and pleurx catheter placement   Plan  -Thoracic surgery on board-appreciate further recommendations   -Once BP improves, will consider diuresis with Bumex     Pulmonary HTN  -takes tadalafil at home- will hold due to shock       CARDIOVASCULAR  #Shock  -Likely septic shock-infectious source unclear  -On Abx  -On levophed- will wean as tolerated     #Atrial-Fibrillation   -Eliquis on hold for Pleurx removal   -Currently rate controlled     #Cardiac Amyloid   -Seen on NM study in 2022  -Takes Vyndamax at home- will need family to bring in    #HFpEF  -likely due to Amyloid   -Takes Entresto and Bumex at home- will hold due to shock   -Will consider giving doses of Bumex later in SBP improves to help with pleural effusions     #CAD   -Hx of PCI in 2021  -Plavix held for Pleurx removal       RENAL  #CKD   -Cr-currently 1.87  -Will renally dose medications     #Left Pelvic Stone  -Ct showing- Left renal pelvic stone with fullness and stranding, unchanged  -Will monitor for symptoms     GASTROINTESTINAL  #NPO due to lethargy  -Q6 fingersticks     INFECTIOUS DISEASE  #Sepsis   -patient arriving with T-max of 102.7 and hypotensive   -Infectious source unclear- may be pulmonary related to effusions  -S/P vanc and Zosyn  -Will continue Zosyn, will also continue Vanc and dose by level due to CKD   -MRSA pcr ordered   -Will f/u BC and UC     ENDOCRINE  #No acute issues     HEME  #DVT ppx  -Heparin 5k TID  Patient is a 84 y/o male with a pmh of  HTN, HLD, ICD/ afib on Eliquis (last dose 3/30), Plavix (last dose 3/26), Parkinson's disease, history of chronic pleural effusions, R sided VATS Pleurx cathter placement, presents to the ED due to concerns of dyspnea and right sided chest pain around the Pleurx catheter site. Patient found to be in shock, likely due to sepsis.    NEUROLOGY  -Patient mentating well-AOX4  -lethargy improved    #Parkinson's  -Continue home Carbidopa-Levodopa     #Insomnia  #Mood disorder  -held home Mirtazapine, Olanzapine iso lethargy, restart as mental status improves. continue home Desvenlafaxine     PULMONARY  #Bilateral Loculated Pleural effusions s/p R VATs and pleux catheter placement (2022)  -plefs believed to be secondary to CHF   -pt recently saw Dr. Oneal outpt, concern was for dislodged pleurx catheter which was planned to be removed today 4/10 however pt presented to ED prior  -CT chest: No pulmonary embolism. No significant interval change from the recent prior demonstrating bilateral moderate loculated pleural effusions with near total collapse of the bilateral lower lobes.  -attempted to drain pleurx this am however no output and fluid came out of insertion site when catheter was flushed w/ saline  -Thoracic surgery consulted,  PleurX removed at bedside 4/10  -cxr post removal stable  -continue diuresis w/ bumex 2mg IV bid    Pulmonary HTN  -unclear why on sildenafil, also only on daily. 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 on hold for shock      CARDIOVASCULAR  #Shock, likely septic shock -infectious source unclear  -On Abx as per ID plan below  -On levophed, will wean as tolerated     #Atrial-Fibrillation   -Eliquis on hold for Pleurx removal , last dose was 4/7. 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   -likely due to Amyloid seen on NM study in 2022  -continue home bumex 2mg IV bid  -hold home Entresto due to shock   -Takes Vyndamax at home- will need family to bring in      #CAD   -Hx of PCI in 2021  -Plavix held for Pleurx removal       RENAL  #CKD   -Cr-currently 1.87, SCr last admit was 2-3s  -Will renally dose medications   -voiding well via penis pouch, no means    #Left Pelvic Stone  -Ct showing- Left renal pelvic stone with fullness and stranding, unchanged - pt asymptomatic  -Ua neg for infx, f/up Ucx    GASTROINTESTINAL  -regular diet ordered    INFECTIOUS DISEASE  #Sepsis   -T-max on arrival was 102.7 and hypotensive SBP 80s MAP 50s, leukocytosis (WBC 29), LA 3.3>1.5, procal 0.54  -Infectious source unclear- may be pulmonary related to effusions  - flu/covid neg  -continue Zosyn (4/9- )and Vanco 1G qd (4/9- ) renally dosed; check vanc trough before 3rd dose tomorrow night  -MRSA pcr ordered   -f/up Bcx, Ucx  -urine legionella neg    ENDOCRINE  #No acute issues   -TSH wnls    HEME  #DVT ppx  -Heparin SC

## 2024-04-10 NOTE — H&P ADULT - NSHPPHYSICALEXAM_GEN_ALL_CORE
LOS:     VITALS:   T(C): 36.5 (04-09-24 @ 21:48), Max: 39.3 (04-09-24 @ 20:04)  HR: 90 (04-10-24 @ 02:40) (70 - 100)  BP: 116/57 (04-10-24 @ 02:40) (69/29 - 145/70)  RR: 18 (04-10-24 @ 02:40) (14 - 20)  SpO2: 100% (04-10-24 @ 02:40) (95% - 100%)    GENERAL: NAD  HEAD:  Atraumatic, Normocephalic  EYES: EOMI, PERRLA, conjunctiva and sclera clear  ENT: Moist mucous membranes  NECK: Supple, No elevated JVP.  CHEST/LUNG: right sided pleurx catheter   HEART: Regular rate and rhythm; No murmurs, rubs, or gallops  ABDOMEN: Bowel sounds present; Soft, nontender, nondistended  EXTREMITIES:  2+ Peripheral Pulses, brisk capillary refill. No clubbing, cyanosis, or edema  NERVOUS SYSTEM:  A&Ox3, no focal deficits   SKIN: No rashes or lesions

## 2024-04-10 NOTE — H&P ADULT - NSHPREVIEWOFSYSTEMS_GEN_ALL_CORE
REVIEW OF SYSTEMS:    CONSTITUTIONAL: Endorses feeling warm at home   EYES/ENT: No visual changes;  No vertigo or throat pain   NECK: No pain or stiffness  RESPIRATORY: No cough, wheezing, hemoptysis; Endorses dyspnea   CARDIOVASCULAR: No chest pain or palpitations  GASTROINTESTINAL: No abdominal or epigastric pain. No nausea, vomiting, or hematemesis; No diarrhea or constipation. No melena or hematochezia.  GENITOURINARY: No dysuria, frequency or hematuria  NEUROLOGICAL: No numbness or weakness  SKIN: No itching, rashes

## 2024-04-10 NOTE — H&P ADULT - NSHPLABSRESULTS_GEN_ALL_CORE
.  LABS:                         11.6   29.38 )-----------( 302      ( 09 Apr 2024 20:38 )             37.3     04-09    142  |  99  |  36<H>  ----------------------------<  145<H>  3.7   |  26  |  1.87<H>    Ca    9.3      09 Apr 2024 20:38    TPro  7.4  /  Alb  3.9  /  TBili  0.3  /  DBili  x   /  AST  14  /  ALT  10  /  AlkPhos  88  04-09    PT/INR - ( 09 Apr 2024 20:38 )   PT: 13.9 sec;   INR: 1.25 ratio         PTT - ( 09 Apr 2024 20:38 )  PTT:34.6 sec  Urinalysis Basic - ( 09 Apr 2024 20:38 )    Color: x / Appearance: x / SG: x / pH: x  Gluc: 145 mg/dL / Ketone: x  / Bili: x / Urobili: x   Blood: x / Protein: x / Nitrite: x   Leuk Esterase: x / RBC: x / WBC x   Sq Epi: x / Non Sq Epi: x / Bacteria: x        Lactate, Blood: 1.5 mmol/L (04-09 @ 22:33)      RADIOLOGY, EKG & ADDITIONAL TESTS: Reviewed.

## 2024-04-10 NOTE — PROGRESS NOTE ADULT - SUBJECTIVE AND OBJECTIVE BOX
INTERVAL HPI/OVERNIGHT EVENTS:    SUBJECTIVE: Patient seen and examined at bedside.       VITAL SIGNS:  ICU Vital Signs Last 24 Hrs  T(C): 37.3 (10 Apr 2024 04:00), Max: 39.3 (09 Apr 2024 20:04)  T(F): 99.1 (10 Apr 2024 04:00), Max: 102.7 (09 Apr 2024 20:04)  HR: 72 (10 Apr 2024 05:00) (70 - 100)  BP: 107/54 (10 Apr 2024 05:00) (69/29 - 145/70)  BP(mean): 72 (10 Apr 2024 05:00) (42 - 85)  ABP: --  ABP(mean): --  RR: 20 (10 Apr 2024 05:00) (14 - 22)  SpO2: 100% (10 Apr 2024 05:00) (95% - 100%)    O2 Parameters below as of 10 Apr 2024 05:00  Patient On (Oxygen Delivery Method): nasal cannula  O2 Flow (L/min): 2          Plateau pressure:   P/F ratio:     CAPILLARY BLOOD GLUCOSE      PHYSICAL EXAM:    GENERAL: NAD  HEAD:  Atraumatic, Normocephalic  EYES: EOMI, PERRLA, conjunctiva and sclera clear  ENT: Moist mucous membranes  NECK: Supple, No elevated JVP.  CHEST/LUNG: right sided pleurx catheter   HEART: Regular rate and rhythm; No murmurs, rubs, or gallops  ABDOMEN: Bowel sounds present; Soft, nontender, nondistended  EXTREMITIES:  2+ Peripheral Pulses, brisk capillary refill. No clubbing, cyanosis, or edema  NERVOUS SYSTEM:  A&Ox3, no focal deficits   SKIN: No rashes or lesions      MEDICATIONS:  MEDICATIONS  (STANDING):  acetaminophen   IVPB .. 1000 milliGRAM(s) IV Intermittent once  ascorbic acid 500 milliGRAM(s) Oral daily  atorvastatin 80 milliGRAM(s) Oral at bedtime  carbidopa/levodopa  25/100 1.5 Tablet(s) Oral three times a day  carbidopa/levodopa  25/100 1 Tablet(s) Oral at bedtime  cholecalciferol 1000 Unit(s) Oral daily  cyanocobalamin 1000 MICROGram(s) Oral daily  heparin   Injectable 5000 Unit(s) SubCutaneous every 8 hours  influenza  Vaccine (HIGH DOSE) 0.7 milliLiter(s) IntraMuscular once  norepinephrine Infusion 0.05 MICROgram(s)/kG/Min (8.06 mL/Hr) IV Continuous <Continuous>  piperacillin/tazobactam IVPB.. 3.375 Gram(s) IV Intermittent every 8 hours    MEDICATIONS  (PRN):      ALLERGIES:  Allergies    No Known Allergies    Intolerances        LABS:                        11.6   29.38 )-----------( 302      ( 09 Apr 2024 20:38 )             37.3     04-09    142  |  99  |  36<H>  ----------------------------<  145<H>  3.7   |  26  |  1.87<H>    Ca    9.3      09 Apr 2024 20:38    TPro  7.4  /  Alb  3.9  /  TBili  0.3  /  DBili  x   /  AST  14  /  ALT  10  /  AlkPhos  88  04-09    PT/INR - ( 09 Apr 2024 20:38 )   PT: 13.9 sec;   INR: 1.25 ratio         PTT - ( 09 Apr 2024 20:38 )  PTT:34.6 sec  Urinalysis Basic - ( 09 Apr 2024 20:38 )    Color: x / Appearance: x / SG: x / pH: x  Gluc: 145 mg/dL / Ketone: x  / Bili: x / Urobili: x   Blood: x / Protein: x / Nitrite: x   Leuk Esterase: x / RBC: x / WBC x   Sq Epi: x / Non Sq Epi: x / Bacteria: x        RADIOLOGY & ADDITIONAL TESTS: Reviewed.   INTERVAL HPI/OVERNIGHT EVENTS: started on levophed in ED and admitted to MICU overnight.    SUBJECTIVE: Patient seen and examined at bedside. offers no complaints.      VITAL SIGNS:  ICU Vital Signs Last 24 Hrs  T(C): 37.3 (10 Apr 2024 04:00), Max: 39.3 (09 Apr 2024 20:04)  T(F): 99.1 (10 Apr 2024 04:00), Max: 102.7 (09 Apr 2024 20:04)  HR: 72 (10 Apr 2024 05:00) (70 - 100)  BP: 107/54 (10 Apr 2024 05:00) (69/29 - 145/70)  BP(mean): 72 (10 Apr 2024 05:00) (42 - 85)  ABP: --  ABP(mean): --  RR: 20 (10 Apr 2024 05:00) (14 - 22)  SpO2: 100% (10 Apr 2024 05:00) (95% - 100%)    O2 Parameters below as of 10 Apr 2024 05:00  Patient On (Oxygen Delivery Method): nasal cannula  O2 Flow (L/min): 2          Plateau pressure:   P/F ratio:     CAPILLARY BLOOD GLUCOSE      PHYSICAL EXAM:    GENERAL: NAD  HEAD:  Atraumatic, Normocephalic  EYES: EOMI, PERRLA, conjunctiva and sclera clear  ENT: Moist mucous membranes  NECK: Supple, No elevated JVP.  CHEST/LUNG: right sided pleurx catheter   HEART: Regular rate and rhythm; No murmurs, rubs, or gallops  ABDOMEN: Bowel sounds present; Soft, nontender, nondistended  EXTREMITIES:  2+ Peripheral Pulses, brisk capillary refill. No clubbing, cyanosis, or edema  NERVOUS SYSTEM:  A&Ox3, no focal deficits   SKIN: No rashes or lesions      MEDICATIONS:  MEDICATIONS  (STANDING):  acetaminophen   IVPB .. 1000 milliGRAM(s) IV Intermittent once  ascorbic acid 500 milliGRAM(s) Oral daily  atorvastatin 80 milliGRAM(s) Oral at bedtime  carbidopa/levodopa  25/100 1.5 Tablet(s) Oral three times a day  carbidopa/levodopa  25/100 1 Tablet(s) Oral at bedtime  cholecalciferol 1000 Unit(s) Oral daily  cyanocobalamin 1000 MICROGram(s) Oral daily  heparin   Injectable 5000 Unit(s) SubCutaneous every 8 hours  influenza  Vaccine (HIGH DOSE) 0.7 milliLiter(s) IntraMuscular once  norepinephrine Infusion 0.05 MICROgram(s)/kG/Min (8.06 mL/Hr) IV Continuous <Continuous>  piperacillin/tazobactam IVPB.. 3.375 Gram(s) IV Intermittent every 8 hours    MEDICATIONS  (PRN):      ALLERGIES:  Allergies    No Known Allergies    Intolerances        LABS:                        11.6   29.38 )-----------( 302      ( 09 Apr 2024 20:38 )             37.3     04-09    142  |  99  |  36<H>  ----------------------------<  145<H>  3.7   |  26  |  1.87<H>    Ca    9.3      09 Apr 2024 20:38    TPro  7.4  /  Alb  3.9  /  TBili  0.3  /  DBili  x   /  AST  14  /  ALT  10  /  AlkPhos  88  04-09    PT/INR - ( 09 Apr 2024 20:38 )   PT: 13.9 sec;   INR: 1.25 ratio         PTT - ( 09 Apr 2024 20:38 )  PTT:34.6 sec  Urinalysis Basic - ( 09 Apr 2024 20:38 )    Color: x / Appearance: x / SG: x / pH: x  Gluc: 145 mg/dL / Ketone: x  / Bili: x / Urobili: x   Blood: x / Protein: x / Nitrite: x   Leuk Esterase: x / RBC: x / WBC x   Sq Epi: x / Non Sq Epi: x / Bacteria: x        RADIOLOGY & ADDITIONAL TESTS: Reviewed.

## 2024-04-10 NOTE — CHART NOTE - NSCHARTNOTEFT_GEN_A_CORE
S: Patient seen at beside. Wife and friend present at bedside. Patient awake and alert. Right PleurX under dressing. It was noted that PleurX was partially dislodge prior to today, attempted to drain PleurX with minimal to little output per MICU attending. PleurX removed at bedside, occlusive dressing applied.     24hrs: CT chest obtained : mod bl loculated effusion with near total collapse    ICU Vital Signs Last 24 Hrs  T(C): 37.2 (10 Apr 2024 12:00), Max: 39.3 (09 Apr 2024 20:04)  T(F): 99 (10 Apr 2024 12:00), Max: 102.7 (09 Apr 2024 20:04)  HR: 92 (10 Apr 2024 14:00) (70 - 100)  BP: 116/49 (10 Apr 2024 14:00) (69/29 - 145/70)  BP(mean): 69 (10 Apr 2024 14:00) (42 - 85)  ABP: --  ABP(mean): --  RR: 13 (10 Apr 2024 14:00) (12 - 22)  SpO2: 96% (10 Apr 2024 14:00) (95% - 100%)    O2 Parameters below as of 10 Apr 2024 14:00  Patient On (Oxygen Delivery Method): room air        Neuro: A+O  CV: regular rate, regular rhythm  Pulm/chest: CT chest as noted by read  Ext: Moves all extremities x 4  Skin: warm, well perfused, no rashes. Wound with no induration noted or redness.       A/P:   -Cont care per primary MICU Team  -PleurX removed at bedside, occlusive dressing in place  -Obtain repeat CXR to r/o ptx  -Infectious source unclear- may be pulmonary related to effusions - CT scan to be evaluated by Dr Oneal     Thoracic   [id number]

## 2024-04-10 NOTE — PATIENT PROFILE ADULT - FUNCTIONAL ASSESSMENT - BASIC MOBILITY 6.
2-calculated by average/Not able to assess (calculate score using [facility] averaging method)

## 2024-04-10 NOTE — ED ADULT NURSE REASSESSMENT NOTE - NS ED NURSE REASSESS COMMENT FT1
ARVIND RN. Patient in spot 3. pt remains at baseline mental status, RR even unlabored completing full sentences. pt resting in stretcher comfortably at this time, no new complaints offered. stretcher lowest position siderails up safety measures in place. Patient on levophed running at 0.09mcg. Report given to MICU RN lorri. Family at bedside. patient to be placed on zoll with ACLS meds for transport.

## 2024-04-10 NOTE — H&P ADULT - HISTORY OF PRESENT ILLNESS
Patient is a 84 y/o male with a pmh of  HTN, HLD, ICD/ afib on Eliquis (last dose 3/30), Plavix (last dose 3/26), Parkinson's disease, history of chronic pleural effusions, R sided VATS Pleurx cathter placement, presents to the ED due to concerns of dyspnea and right sided chest pain around the Pleurx catheter site. Patient saw thoracic surgery outpatient on 4/8/24 for a dislodged Pleurx cath. Due to the patient being on Eliquis and Plavix he was sent home and was supposed to return on 4/10 for removal. However, due to his current symptoms, he now presented to the ED. Patient denies headaches abdominal pain, nausea, vomiting, diarrhea or cough.       Upon arrival to the ED, patient was febrile(T-max-102.7) and was given Vanc and Zosyn. He was also given 1L IVF but due to worsening hypotension was eventually started on Levophed. Patient now admitted to the MICU for further monitoring and treatment.

## 2024-04-10 NOTE — CHART NOTE - NSCHARTNOTEFT_GEN_A_CORE
: Dae Hyeon Kim    INDICATION: SHock on pressors, pleural effusions    PROCEDURE:  [ x] LIMITED ECHO  [x ] LIMITED CHEST  [ ] LIMITED RETROPERITONEAL  [ ] LIMITED ABDOMINAL  [ ] LIMITED DVT  [ ] NEEDLE GUIDANCE VASCULAR  [ ] NEEDLE GUIDANCE THORACENTESIS  [ ] NEEDLE GUIDANCE PARACENTESIS  [ ] NEEDLE GUIDANCE PERICARDIOCENTESIS  [ ] OTHER    FINDINGS:  Alines anteriorly. Small simple effusion on the left. Small complex effusion on the right.   Limited views but grossly normal LVSF. RV smaller vs LV. No pericardial effusions. IVC dilated.     INTERPRETATION:  Lungs with known effusions. Right effusions appears loculated.   Normal LVSF. Shock not obstructive or cardiogenic.     Images uploaded on [employer]

## 2024-04-10 NOTE — PATIENT PROFILE ADULT - FALL HARM RISK - HARM RISK INTERVENTIONS

## 2024-04-10 NOTE — H&P ADULT - ASSESSMENT
Patient is a 84 y/o male with a pmh of  HTN, HLD, ICD/ afib on Eliquis (last dose 3/30), Plavix (last dose 3/26), Parkinson's disease, history of chronic pleural effusions, R sided VATS Pleurx cathter placement, presents to the ED due to concerns of dyspnea and right sided chest pain around the Pleurx catheter site. Patient found to be in shock, likely due to sepsis and infection.     NEUROLOGY  -Patient mentating well-AOX4  -Appears a little tired     #Parkinson's  -Continue home Carbidopa-Levodopa     #Insomnia  #Mood disorder  -Takes Mirtazapine, Olanzapine and Desvenlafaxine at home   -Will hold due to concerns of sedation     PULMONARY  #Bilateral Loculated Pleural effusions   -Believed to be secondary to CHF   -s/p Right-sided VATS and pleurx catheter placement   Plan  -Thoracic surgery on board-appreciate further recommendations   -Once BP improves, will consider diuresis with Bumex       CARDIOVASCULAR  #Atrial-Fibrillation   -Eliquis on hold for Pleurx removal   -Currently rate controlled     #Cardiac Amyloid   -Seen on NM study in 2022  -Takes Vyndamax at home- will need family to bring in    #HFpEF  -likely due to Amyloid   -Takes Entresto and Bumex at home- will hold due to shock   -Will consider giving doses of Bumex later in SBP improves to help with pleural effusions     #CAD   -Hx of PCI in 2021  -Plavix held for Pleurx removal       RENAL  #CKD   -Cr-currently 1.87  -Will renally dose medications     #Left Pelvic Stone  -Ct showing- Left renal pelvic stone with fullness and stranding, unchanged  -Will monitor for symptoms     GASTROINTESTINAL  #No acute issues     INFECTIOUS DISEASE  #Sepsis   -patient arriving with T-max of 102.7 and hypotensive   -Infectious source unclear- may be pulmonary related to effusions  -S/P vanc and Zosyn  -Will continue Zosyn, will also continue Vanc and dose by level due to CKD   -MRSA pcr ordered   -Will f/u BC and UC     ENDOCRINE  #No acute issues     HEME  #DVT ppx  -Heparin 5k TID    Patient is a 82 y/o male with a pmh of  HTN, HLD, ICD/ afib on Eliquis (last dose 3/30), Plavix (last dose 3/26), Parkinson's disease, history of chronic pleural effusions, R sided VATS Pleurx cathter placement, presents to the ED due to concerns of dyspnea and right sided chest pain around the Pleurx catheter site. Patient found to be in shock, likely due to sepsis and infection.     NEUROLOGY  -Patient mentating well-AOX4  -Appears a little tired   -Will keep NPO due to lethargy     #Parkinson's  -Continue home Carbidopa-Levodopa     #Insomnia  #Mood disorder  -Takes Mirtazapine, Olanzapine and Desvenlafaxine at home   -Will hold due to concerns of sedation     PULMONARY  #Bilateral Loculated Pleural effusions   -Believed to be secondary to CHF   -s/p Right-sided VATS and pleurx catheter placement   Plan  -Thoracic surgery on board-appreciate further recommendations   -Once BP improves, will consider diuresis with Bumex     Pulmonary HTN  -takes tadalafil at home- will hold due to shock       CARDIOVASCULAR  #Atrial-Fibrillation   -Eliquis on hold for Pleurx removal   -Currently rate controlled     #Cardiac Amyloid   -Seen on NM study in 2022  -Takes Vyndamax at home- will need family to bring in    #HFpEF  -likely due to Amyloid   -Takes Entresto and Bumex at home- will hold due to shock   -Will consider giving doses of Bumex later in SBP improves to help with pleural effusions     #CAD   -Hx of PCI in 2021  -Plavix held for Pleurx removal       RENAL  #CKD   -Cr-currently 1.87  -Will renally dose medications     #Left Pelvic Stone  -Ct showing- Left renal pelvic stone with fullness and stranding, unchanged  -Will monitor for symptoms     GASTROINTESTINAL  #NPO due to lethargy  -Q6 fingersticks     INFECTIOUS DISEASE  #Sepsis   -patient arriving with T-max of 102.7 and hypotensive   -Infectious source unclear- may be pulmonary related to effusions  -S/P vanc and Zosyn  -Will continue Zosyn, will also continue Vanc and dose by level due to CKD   -MRSA pcr ordered   -Will f/u BC and UC     ENDOCRINE  #No acute issues     HEME  #DVT ppx  -Heparin 5k TID    Patient is a 82 y/o male with a pmh of  HTN, HLD, ICD/ afib on Eliquis (last dose 3/30), Plavix (last dose 3/26), Parkinson's disease, history of chronic pleural effusions, R sided VATS Pleurx cathter placement, presents to the ED due to concerns of dyspnea and right sided chest pain around the Pleurx catheter site. Patient found to be in shock, likely due to sepsis and infection.     NEUROLOGY  -Patient mentating well-AOX4  -Appears a little tired   -Will keep NPO due to lethargy     #Parkinson's  -Continue home Carbidopa-Levodopa     #Insomnia  #Mood disorder  -Takes Mirtazapine, Olanzapine and Desvenlafaxine at home   -Will hold due to concerns of sedation     PULMONARY  #Bilateral Loculated Pleural effusions   -Believed to be secondary to CHF   -s/p Right-sided VATS and pleurx catheter placement   Plan  -Thoracic surgery on board-appreciate further recommendations   -Once BP improves, will consider diuresis with Bumex     Pulmonary HTN  -takes tadalafil at home- will hold due to shock       CARDIOVASCULAR  #Shock  -Likely septic shock-infectious source unclear  -On Abx  -On levophed- will wean as tolerated     #Atrial-Fibrillation   -Eliquis on hold for Pleurx removal   -Currently rate controlled     #Cardiac Amyloid   -Seen on NM study in 2022  -Takes Vyndamax at home- will need family to bring in    #HFpEF  -likely due to Amyloid   -Takes Entresto and Bumex at home- will hold due to shock   -Will consider giving doses of Bumex later in SBP improves to help with pleural effusions     #CAD   -Hx of PCI in 2021  -Plavix held for Pleurx removal       RENAL  #CKD   -Cr-currently 1.87  -Will renally dose medications     #Left Pelvic Stone  -Ct showing- Left renal pelvic stone with fullness and stranding, unchanged  -Will monitor for symptoms     GASTROINTESTINAL  #NPO due to lethargy  -Q6 fingersticks     INFECTIOUS DISEASE  #Sepsis   -patient arriving with T-max of 102.7 and hypotensive   -Infectious source unclear- may be pulmonary related to effusions  -S/P vanc and Zosyn  -Will continue Zosyn, will also continue Vanc and dose by level due to CKD   -MRSA pcr ordered   -Will f/u BC and UC     ENDOCRINE  #No acute issues     HEME  #DVT ppx  -Heparin 5k TID

## 2024-04-11 LAB
ADD ON TEST-SPECIMEN IN LAB: SIGNIFICANT CHANGE UP
ALBUMIN SERPL ELPH-MCNC: 3.3 G/DL — SIGNIFICANT CHANGE UP (ref 3.3–5)
ALP SERPL-CCNC: 78 U/L — SIGNIFICANT CHANGE UP (ref 40–120)
ALT FLD-CCNC: <5 U/L — SIGNIFICANT CHANGE UP (ref 4–41)
ANION GAP SERPL CALC-SCNC: 14 MMOL/L — SIGNIFICANT CHANGE UP (ref 7–14)
APTT BLD: 34.9 SEC — SIGNIFICANT CHANGE UP (ref 24.5–35.6)
AST SERPL-CCNC: 6 U/L — SIGNIFICANT CHANGE UP (ref 4–40)
BASE EXCESS BLDV CALC-SCNC: 1.5 MMOL/L — SIGNIFICANT CHANGE UP (ref -2–3)
BASOPHILS # BLD AUTO: 0.07 K/UL — SIGNIFICANT CHANGE UP (ref 0–0.2)
BASOPHILS NFR BLD AUTO: 0.4 % — SIGNIFICANT CHANGE UP (ref 0–2)
BILIRUB SERPL-MCNC: 0.6 MG/DL — SIGNIFICANT CHANGE UP (ref 0.2–1.2)
BLOOD GAS VENOUS COMPREHENSIVE RESULT: SIGNIFICANT CHANGE UP
BUN SERPL-MCNC: 30 MG/DL — HIGH (ref 7–23)
CALCIUM SERPL-MCNC: 9 MG/DL — SIGNIFICANT CHANGE UP (ref 8.4–10.5)
CHLORIDE BLDV-SCNC: 103 MMOL/L — SIGNIFICANT CHANGE UP (ref 96–108)
CHLORIDE SERPL-SCNC: 101 MMOL/L — SIGNIFICANT CHANGE UP (ref 98–107)
CO2 BLDV-SCNC: 27.9 MMOL/L — HIGH (ref 22–26)
CO2 SERPL-SCNC: 25 MMOL/L — SIGNIFICANT CHANGE UP (ref 22–31)
CORTIS AM PEAK SERPL-MCNC: 20 UG/DL — HIGH (ref 6–18.4)
CREAT SERPL-MCNC: 1.73 MG/DL — HIGH (ref 0.5–1.3)
CULTURE RESULTS: NO GROWTH — SIGNIFICANT CHANGE UP
EGFR: 39 ML/MIN/1.73M2 — LOW
EOSINOPHIL # BLD AUTO: 0.11 K/UL — SIGNIFICANT CHANGE UP (ref 0–0.5)
EOSINOPHIL NFR BLD AUTO: 0.6 % — SIGNIFICANT CHANGE UP (ref 0–6)
GAS PNL BLDV: 137 MMOL/L — SIGNIFICANT CHANGE UP (ref 136–145)
GLUCOSE BLDV-MCNC: 112 MG/DL — HIGH (ref 70–99)
GLUCOSE SERPL-MCNC: 125 MG/DL — HIGH (ref 70–99)
HCO3 BLDV-SCNC: 27 MMOL/L — SIGNIFICANT CHANGE UP (ref 22–29)
HCT VFR BLD CALC: 32.7 % — LOW (ref 39–50)
HCT VFR BLDA CALC: 32 % — LOW (ref 39–51)
HGB BLD CALC-MCNC: 10.5 G/DL — LOW (ref 12.6–17.4)
HGB BLD-MCNC: 10.2 G/DL — LOW (ref 13–17)
IANC: 14.4 K/UL — HIGH (ref 1.8–7.4)
IMM GRANULOCYTES NFR BLD AUTO: 0.6 % — SIGNIFICANT CHANGE UP (ref 0–0.9)
INR BLD: 1.35 RATIO — HIGH (ref 0.85–1.18)
LACTATE BLDV-MCNC: 1.3 MMOL/L — SIGNIFICANT CHANGE UP (ref 0.5–2)
LYMPHOCYTES # BLD AUTO: 0.94 K/UL — LOW (ref 1–3.3)
LYMPHOCYTES # BLD AUTO: 5.5 % — LOW (ref 13–44)
MAGNESIUM SERPL-MCNC: 1.9 MG/DL — SIGNIFICANT CHANGE UP (ref 1.6–2.6)
MCHC RBC-ENTMCNC: 29.7 PG — SIGNIFICANT CHANGE UP (ref 27–34)
MCHC RBC-ENTMCNC: 31.2 GM/DL — LOW (ref 32–36)
MCV RBC AUTO: 95.1 FL — SIGNIFICANT CHANGE UP (ref 80–100)
MONOCYTES # BLD AUTO: 1.54 K/UL — HIGH (ref 0–0.9)
MONOCYTES NFR BLD AUTO: 9 % — SIGNIFICANT CHANGE UP (ref 2–14)
NEUTROPHILS # BLD AUTO: 14.4 K/UL — HIGH (ref 1.8–7.4)
NEUTROPHILS NFR BLD AUTO: 83.9 % — HIGH (ref 43–77)
NRBC # BLD: 0 /100 WBCS — SIGNIFICANT CHANGE UP (ref 0–0)
NRBC # FLD: 0 K/UL — SIGNIFICANT CHANGE UP (ref 0–0)
PCO2 BLDV: 43 MMHG — SIGNIFICANT CHANGE UP (ref 42–55)
PH BLDV: 7.4 — SIGNIFICANT CHANGE UP (ref 7.32–7.43)
PHOSPHATE SERPL-MCNC: 2.7 MG/DL — SIGNIFICANT CHANGE UP (ref 2.5–4.5)
PLATELET # BLD AUTO: 259 K/UL — SIGNIFICANT CHANGE UP (ref 150–400)
PO2 BLDV: 47 MMHG — HIGH (ref 25–45)
POTASSIUM BLDV-SCNC: 3.6 MMOL/L — SIGNIFICANT CHANGE UP (ref 3.5–5.1)
POTASSIUM SERPL-MCNC: 3.2 MMOL/L — LOW (ref 3.5–5.3)
POTASSIUM SERPL-SCNC: 3.2 MMOL/L — LOW (ref 3.5–5.3)
PROCALCITONIN SERPL-MCNC: 0.6 NG/ML — HIGH (ref 0.02–0.1)
PROT SERPL-MCNC: 6.9 G/DL — SIGNIFICANT CHANGE UP (ref 6–8.3)
PROTHROM AB SERPL-ACNC: 15 SEC — HIGH (ref 9.5–13)
RBC # BLD: 3.44 M/UL — LOW (ref 4.2–5.8)
RBC # FLD: 13.3 % — SIGNIFICANT CHANGE UP (ref 10.3–14.5)
SAO2 % BLDV: 79.9 % — SIGNIFICANT CHANGE UP (ref 67–88)
SODIUM SERPL-SCNC: 140 MMOL/L — SIGNIFICANT CHANGE UP (ref 135–145)
SPECIMEN SOURCE: SIGNIFICANT CHANGE UP
TSH SERPL-MCNC: 1.5 UIU/ML — SIGNIFICANT CHANGE UP (ref 0.27–4.2)
VANCOMYCIN FLD-MCNC: 7.9 UG/ML — SIGNIFICANT CHANGE UP
WBC # BLD: 17.16 K/UL — HIGH (ref 3.8–10.5)
WBC # FLD AUTO: 17.16 K/UL — HIGH (ref 3.8–10.5)

## 2024-04-11 PROCEDURE — 99291 CRITICAL CARE FIRST HOUR: CPT

## 2024-04-11 RX ORDER — MIDODRINE HYDROCHLORIDE 2.5 MG/1
10 TABLET ORAL EVERY 8 HOURS
Refills: 0 | Status: DISCONTINUED | OUTPATIENT
Start: 2024-04-11 | End: 2024-04-11

## 2024-04-11 RX ORDER — POTASSIUM CHLORIDE 20 MEQ
40 PACKET (EA) ORAL ONCE
Refills: 0 | Status: COMPLETED | OUTPATIENT
Start: 2024-04-11 | End: 2024-04-11

## 2024-04-11 RX ORDER — MIDODRINE HYDROCHLORIDE 2.5 MG/1
20 TABLET ORAL EVERY 8 HOURS
Refills: 0 | Status: DISCONTINUED | OUTPATIENT
Start: 2024-04-11 | End: 2024-04-13

## 2024-04-11 RX ADMIN — MIDODRINE HYDROCHLORIDE 10 MILLIGRAM(S): 2.5 TABLET ORAL at 22:32

## 2024-04-11 RX ADMIN — BUMETANIDE 2 MILLIGRAM(S): 0.25 INJECTION INTRAMUSCULAR; INTRAVENOUS at 11:48

## 2024-04-11 RX ADMIN — PREGABALIN 1000 MICROGRAM(S): 225 CAPSULE ORAL at 11:46

## 2024-04-11 RX ADMIN — OLANZAPINE 10 MILLIGRAM(S): 15 TABLET, FILM COATED ORAL at 22:32

## 2024-04-11 RX ADMIN — DESVENLAFAXINE 50 MILLIGRAM(S): 50 TABLET, EXTENDED RELEASE ORAL at 13:11

## 2024-04-11 RX ADMIN — CARBIDOPA AND LEVODOPA 1 TABLET(S): 25; 100 TABLET ORAL at 23:24

## 2024-04-11 RX ADMIN — HEPARIN SODIUM 5000 UNIT(S): 5000 INJECTION INTRAVENOUS; SUBCUTANEOUS at 05:11

## 2024-04-11 RX ADMIN — Medication 8.06 MICROGRAM(S)/KG/MIN: at 08:52

## 2024-04-11 RX ADMIN — MIRTAZAPINE 15 MILLIGRAM(S): 45 TABLET, ORALLY DISINTEGRATING ORAL at 22:32

## 2024-04-11 RX ADMIN — Medication 500 MILLIGRAM(S): at 11:47

## 2024-04-11 RX ADMIN — Medication 1000 UNIT(S): at 13:16

## 2024-04-11 RX ADMIN — PIPERACILLIN AND TAZOBACTAM 25 GRAM(S): 4; .5 INJECTION, POWDER, LYOPHILIZED, FOR SOLUTION INTRAVENOUS at 22:33

## 2024-04-11 RX ADMIN — Medication 1 DROP(S): at 17:27

## 2024-04-11 RX ADMIN — HEPARIN SODIUM 5000 UNIT(S): 5000 INJECTION INTRAVENOUS; SUBCUTANEOUS at 22:32

## 2024-04-11 RX ADMIN — CARBIDOPA AND LEVODOPA 1.5 TABLET(S): 25; 100 TABLET ORAL at 05:11

## 2024-04-11 RX ADMIN — PIPERACILLIN AND TAZOBACTAM 25 GRAM(S): 4; .5 INJECTION, POWDER, LYOPHILIZED, FOR SOLUTION INTRAVENOUS at 13:15

## 2024-04-11 RX ADMIN — CARBIDOPA AND LEVODOPA 1.5 TABLET(S): 25; 100 TABLET ORAL at 13:16

## 2024-04-11 RX ADMIN — CARBIDOPA AND LEVODOPA 1.5 TABLET(S): 25; 100 TABLET ORAL at 22:33

## 2024-04-11 RX ADMIN — ATORVASTATIN CALCIUM 80 MILLIGRAM(S): 80 TABLET, FILM COATED ORAL at 22:32

## 2024-04-11 RX ADMIN — PIPERACILLIN AND TAZOBACTAM 25 GRAM(S): 4; .5 INJECTION, POWDER, LYOPHILIZED, FOR SOLUTION INTRAVENOUS at 05:11

## 2024-04-11 RX ADMIN — MIDODRINE HYDROCHLORIDE 10 MILLIGRAM(S): 2.5 TABLET ORAL at 13:07

## 2024-04-11 RX ADMIN — HEPARIN SODIUM 5000 UNIT(S): 5000 INJECTION INTRAVENOUS; SUBCUTANEOUS at 13:07

## 2024-04-11 RX ADMIN — Medication 40 MILLIEQUIVALENT(S): at 08:53

## 2024-04-11 RX ADMIN — CHLORHEXIDINE GLUCONATE 1 APPLICATION(S): 213 SOLUTION TOPICAL at 11:49

## 2024-04-11 NOTE — PROGRESS NOTE ADULT - SUBJECTIVE AND OBJECTIVE BOX
INTERVAL HPI/OVERNIGHT EVENTS: remained on levophed overnight.    SUBJECTIVE: Patient seen and examined at bedside. offers no complaints.      VITAL SIGNS:  ICU Vital Signs Last 24 Hrs  T(C): 37.3 (10 Apr 2024 04:00), Max: 39.3 (09 Apr 2024 20:04)  T(F): 99.1 (10 Apr 2024 04:00), Max: 102.7 (09 Apr 2024 20:04)  HR: 72 (10 Apr 2024 05:00) (70 - 100)  BP: 107/54 (10 Apr 2024 05:00) (69/29 - 145/70)  BP(mean): 72 (10 Apr 2024 05:00) (42 - 85)  ABP: --  ABP(mean): --  RR: 20 (10 Apr 2024 05:00) (14 - 22)  SpO2: 100% (10 Apr 2024 05:00) (95% - 100%)    O2 Parameters below as of 10 Apr 2024 05:00  Patient On (Oxygen Delivery Method): nasal cannula  O2 Flow (L/min): 2          Plateau pressure:   P/F ratio:     CAPILLARY BLOOD GLUCOSE      PHYSICAL EXAM:    GENERAL: NAD  HEAD:  Atraumatic, Normocephalic  EYES: EOMI, PERRLA, conjunctiva and sclera clear  ENT: Moist mucous membranes  NECK: Supple, No elevated JVP.  CHEST/LUNG: right sided pleurx catheter   HEART: Regular rate and rhythm; No murmurs, rubs, or gallops  ABDOMEN: Bowel sounds present; Soft, nontender, nondistended  EXTREMITIES:  2+ Peripheral Pulses, brisk capillary refill. No clubbing, cyanosis, or edema  NERVOUS SYSTEM:  A&Ox2-3, no focal deficits   SKIN: No rashes or lesions      MEDICATIONS:  MEDICATIONS  (STANDING):  acetaminophen   IVPB .. 1000 milliGRAM(s) IV Intermittent once  ascorbic acid 500 milliGRAM(s) Oral daily  atorvastatin 80 milliGRAM(s) Oral at bedtime  carbidopa/levodopa  25/100 1.5 Tablet(s) Oral three times a day  carbidopa/levodopa  25/100 1 Tablet(s) Oral at bedtime  cholecalciferol 1000 Unit(s) Oral daily  cyanocobalamin 1000 MICROGram(s) Oral daily  heparin   Injectable 5000 Unit(s) SubCutaneous every 8 hours  influenza  Vaccine (HIGH DOSE) 0.7 milliLiter(s) IntraMuscular once  norepinephrine Infusion 0.05 MICROgram(s)/kG/Min (8.06 mL/Hr) IV Continuous <Continuous>  piperacillin/tazobactam IVPB.. 3.375 Gram(s) IV Intermittent every 8 hours    MEDICATIONS  (PRN):      ALLERGIES:  Allergies    No Known Allergies    Intolerances        LABS:                        11.6   29.38 )-----------( 302      ( 09 Apr 2024 20:38 )             37.3     04-09    142  |  99  |  36<H>  ----------------------------<  145<H>  3.7   |  26  |  1.87<H>    Ca    9.3      09 Apr 2024 20:38    TPro  7.4  /  Alb  3.9  /  TBili  0.3  /  DBili  x   /  AST  14  /  ALT  10  /  AlkPhos  88  04-09    PT/INR - ( 09 Apr 2024 20:38 )   PT: 13.9 sec;   INR: 1.25 ratio         PTT - ( 09 Apr 2024 20:38 )  PTT:34.6 sec  Urinalysis Basic - ( 09 Apr 2024 20:38 )    Color: x / Appearance: x / SG: x / pH: x  Gluc: 145 mg/dL / Ketone: x  / Bili: x / Urobili: x   Blood: x / Protein: x / Nitrite: x   Leuk Esterase: x / RBC: x / WBC x   Sq Epi: x / Non Sq Epi: x / Bacteria: x        RADIOLOGY & ADDITIONAL TESTS: Reviewed.

## 2024-04-11 NOTE — PROGRESS NOTE ADULT - ASSESSMENT
Patient is a 82 y/o male with a pmh of  HTN, HLD, ICD/ afib on Eliquis (last dose 3/30), Plavix (last dose 3/26), Parkinson's disease, history of chronic pleural effusions, R sided VATS Pleurx cathter placement, presents to the ED due to concerns of dyspnea and right sided chest pain around the Pleurx catheter site. Patient found to be in shock, likely due to sepsis.    NEUROLOGY  -Patient mentating well-AOX4  -lethargy improved    #Parkinson's  -Continue home Carbidopa-Levodopa     #Insomnia  #Mood disorder  -continue home Mirtazapine, Olanzapine and Desvenlafaxine     PULMONARY  #Bilateral Loculated Pleural effusions s/p R VATs and pleux catheter placement (2022)  -plefs believed to be secondary to CHF   -pt recently saw Dr. Oneal outpt, concern was for dislodged pleurx catheter which was planned to be removed today 4/10 however pt presented to ED prior  -CT chest: No pulmonary embolism. No significant interval change from the recent prior demonstrating bilateral moderate loculated pleural effusions with near total collapse of the bilateral lower lobes.  -attempted to drain pleurx this am however no output and fluid came out of insertion site when catheter was flushed w/ saline  -Thoracic surgery consulted,  PleurX removed at bedside 4/10  -cxr post removal stable  -continue diuresis w/ bumex 2mg IV bid    Pulmonary HTN  -unclear why on sildenafil, also only on daily. 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 on hold for shock      CARDIOVASCULAR  #Shock, likely septic shock -infectious source unclear  -On Abx as per ID plan below  -On levophed, will wean as tolerated     #Atrial-Fibrillation   -Eliquis on hold for Pleurx removal , last dose was 4/7. 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   -likely due to Amyloid seen on NM study in 2022  -continue home bumex 2mg IV bid  -hold home Entresto due to shock   -Takes Vyndamax at home- will need family to bring in      #CAD   -Hx of PCI in 2021  -Plavix held for Pleurx removal       RENAL  #CKD   -Cr-currently 1.87, SCr last admit was 2-3s  -Will renally dose medications   -voiding well via penis pouch, no means    #Left Pelvic Stone  -Ct showing- Left renal pelvic stone with fullness and stranding, unchanged - pt asymptomatic  -Ua neg for infx, f/up Ucx    GASTROINTESTINAL  -regular diet ordered    INFECTIOUS DISEASE  #Sepsis   -T-max on arrival was 102.7 and hypotensive SBP 80s MAP 50s, leukocytosis (WBC 29), LA 3.3>1.5, procal 0.54  -Infectious source unclear- may be pulmonary related to effusions  - flu/covid neg  -continue Zosyn (4/9- )and Vanco 1G qd (4/9- ) renally dosed; check vanc trough before 3rd dose tomorrow night  -MRSA pcr ordered   -f/up Bcx, Ucx  -urine legionella neg    ENDOCRINE  #No acute issues   -TSH wnls    HEME  #DVT ppx  -Heparin SC Patient is a 82 y/o male with a pmh of  HTN, HLD, ICD/ afib on Eliquis (last dose 3/30), Plavix (last dose 3/26), Parkinson's disease, history of chronic pleural effusions, R sided VATS Pleurx cathter placement, presents to the ED due to concerns of dyspnea and right sided chest pain around the Pleurx catheter site. Patient found to be in shock, likely due to sepsis.    NEUROLOGY  -Patient mentating at baseline   -lethargy improved    #Parkinson's  -Continue home Carbidopa-Levodopa     #Insomnia  #Mood disorder  -continue home Mirtazapine, Olanzapine and Desvenlafaxine     PULMONARY  #Bilateral Loculated Pleural effusions s/p R VATs and pleux catheter placement (2022)  -pleural effusions believed to be secondary to CHF   -pt recently saw Dr. Oneal outpt, concern was for dislodged pleurx catheter which was planned to be removed today 4/10 however pt presented to ED prior  -CT chest: No pulmonary embolism. No significant interval change from the recent prior demonstrating bilateral moderate loculated pleural effusions with near total collapse of the bilateral lower lobes.  -attempted to drain pleurx this am however no output and fluid came out of insertion site when catheter was flushed w/ saline  -Thoracic surgery consulted,  PleurX removed at bedside 4/10  -cxr post removal stable  -continue diuresis w/ bumex 2mg IV bid    Pulmonary HTN  -unclear why pt was on sildenafil, also only on daily. may have been rx for urological indication? 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 hold for shock.       CARDIOVASCULAR  #Shock, likely septic shock -infectious source unclear  -On Abx as per ID plan below  -On levophed, will wean as tolerated   -started midodrine 10mg tid    #Atrial-Fibrillation   -Eliquis on hold for Pleurx removal , last dose was 4/7. considering restarting full ac w/ heparin gtt vs Eliquis pending need for further procedures.  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      RENAL  #CKD   -Cr-currently 1.7, SCr last admit was 2-3s  -renally dose medications   -voiding well via penis pouch, no means    #Left Pelvic Stone  -Ct showing- Left renal pelvic stone with fullness and stranding, unchanged - pt asymptomatic  -Ua neg for infx, f/up Ucx    GASTROINTESTINAL  -regular diet ordered    INFECTIOUS DISEASE  #Sepsis   -T-max on arrival was 102.7 and hypotensive SBP 80s MAP 50s, leukocytosis (WBC 29), LA 3.3>1.5, procal 0.54  -Infectious source unclear- may be pulmonary related to effusions  -flu/covid neg  -s/p empiric Vanco (4/9-4/111), continue Zosyn (4/9- )  -MRSA pcr ordered   -urine legionella neg  -Bcx- NTD  -f/up Ucx    ENDOCRINE  #No acute issues   -TSH wnls  -am cortisol level 20    HEME  #DVT ppx  -Heparin SC    ETHICS  -Code status: DNR/DNI

## 2024-04-11 NOTE — PROCEDURE NOTE - ADDITIONAL PROCEDURE DETAILS
Critically ill pt requiring PIV access for medical management and/or pressor support. Under US guidance identified Rt UE vein, and successfully placed 20g x 5.71cm AccuCath into vessel.  Placement confirmed s/p with ultrasound and catheter determined to be in patent lumen of vein. Pt tolerated well w/o complication.

## 2024-04-11 NOTE — PROGRESS NOTE ADULT - CRITICAL CARE ATTENDING COMMENT
84 y/o male with a pmh of  HTN, HLD, PPM/ afib on Eliquis (last dose 3/30), Plavix (last dose 3/26), Parkinson's disease, amyloidosis, history of chronic pleural effusions, R sided VATS Pleurx cathter placement, who is presents with fevers and malaise.     Patient has had issues in the past with PLERUX, non-functioning. Saw outpatient, possible dislodgement, was planned for removal but on full a/c and plavix. Was pending removal by CT surgery. Here with fevers, leukocytosis and requiring pressors.     Has had chronic effusions, hx of loculation, previous MIST in previous admission. Pleural biopsy with chronic inflammation in 2022.   S/P Pleurx removal  Still on low dose vasopressiors.     # Shock on pressors  # HFpEF  # CKD  # pHTN  # Amyloidosis  # afib  # PD  # Chronic pleural effusions  - Likely infection given fevers, hypotension without clear etiology. loculated effusion on the right, some fluctuance under the insertion site. UA/urine culture pending. CT chest largely unchanged.  - F/U culture, c/w zosyn for now. D/C vanc, MRSA PCR negative.. D/W CT surgery about possible fluctuance and loculated fluid. Will f/u.  - C/W  bumex, at home doses. Overloaded on exam. Monitor renal function.  - CKD- hold nephrotoxic meds, trend. Near or better then baseline.  - pHTN, unclear why on sildenafil, also only on daily. 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 on hold for shock  - Wean pressors as tolerated, not volume responsive. Will add midodrine.  - Afib, rate control on hold for shock.  - Amyloidosis, - will need home meds brought. Not available in house  - PD- c/w home PD meds  - DVT ppx- SQH, will start full a/c if no plans for additional procedures.   - Dispo- GOC with wife at bedside. Previous admissions was also DNR/DNI. Will place order. MOLST filled out.

## 2024-04-11 NOTE — CHART NOTE - NSCHARTNOTEFT_GEN_A_CORE
Pt seen with Dr Gleason.   Pt's family at bedside.   Pt states feeling better today.     Vital Signs Last 24 Hrs  T(C): 37.7 (11 Apr 2024 16:00), Max: 37.7 (11 Apr 2024 16:00)  T(F): 99.8 (11 Apr 2024 16:00), Max: 99.8 (11 Apr 2024 16:00)  HR: 78 (11 Apr 2024 16:00) (75 - 99)  BP: 114/53 (11 Apr 2024 16:00) (95/45 - 140/66)  BP(mean): 70 (11 Apr 2024 16:00) (61 - 103)  RR: 15 (11 Apr 2024 15:00) (12 - 21)  SpO2: 97% (11 Apr 2024 15:00) (92% - 100%)    Parameters below as of 11 Apr 2024 08:00  Patient On (Oxygen Delivery Method): room air    A+O x 3  Old pleurx removed yesterday at bedside.   Leukocytosis improving.   No fevers.     As per Dr. Oneal, do not suspect that pleural space/pleural fluid is infected.   Would continue IV antibiotics.   Can remove Pleurx site dressing in 48hrs.   Would obtain  non contrast CT scan of chest on Sunday or Monday to  eval for residual fluid.  Further recommendations to follow on Monday from Dr. Oneal  after CT scan obtained and reviewed.   Continue care per primary MICU team  Will follow as needed.

## 2024-04-12 ENCOUNTER — RESULT REVIEW (OUTPATIENT)
Age: 84
End: 2024-04-12

## 2024-04-12 LAB
ALBUMIN SERPL ELPH-MCNC: 2.9 G/DL — LOW (ref 3.3–5)
ALP SERPL-CCNC: 70 U/L — SIGNIFICANT CHANGE UP (ref 40–120)
ALT FLD-CCNC: <5 U/L — SIGNIFICANT CHANGE UP (ref 4–41)
ANION GAP SERPL CALC-SCNC: 15 MMOL/L — HIGH (ref 7–14)
APTT BLD: 32.2 SEC — SIGNIFICANT CHANGE UP (ref 24.5–35.6)
APTT BLD: 92.9 SEC — HIGH (ref 24.5–35.6)
AST SERPL-CCNC: 14 U/L — SIGNIFICANT CHANGE UP (ref 4–40)
BASE EXCESS BLDV CALC-SCNC: 2.2 MMOL/L — SIGNIFICANT CHANGE UP (ref -2–3)
BASOPHILS # BLD AUTO: 0.04 K/UL — SIGNIFICANT CHANGE UP (ref 0–0.2)
BASOPHILS NFR BLD AUTO: 0.3 % — SIGNIFICANT CHANGE UP (ref 0–2)
BILIRUB SERPL-MCNC: 0.4 MG/DL — SIGNIFICANT CHANGE UP (ref 0.2–1.2)
BLOOD GAS VENOUS COMPREHENSIVE RESULT: SIGNIFICANT CHANGE UP
BUN SERPL-MCNC: 31 MG/DL — HIGH (ref 7–23)
CALCIUM SERPL-MCNC: 8.7 MG/DL — SIGNIFICANT CHANGE UP (ref 8.4–10.5)
CHLORIDE BLDV-SCNC: 104 MMOL/L — SIGNIFICANT CHANGE UP (ref 96–108)
CHLORIDE SERPL-SCNC: 103 MMOL/L — SIGNIFICANT CHANGE UP (ref 98–107)
CO2 BLDV-SCNC: 27.7 MMOL/L — HIGH (ref 22–26)
CO2 SERPL-SCNC: 21 MMOL/L — LOW (ref 22–31)
CREAT SERPL-MCNC: 1.82 MG/DL — HIGH (ref 0.5–1.3)
EGFR: 37 ML/MIN/1.73M2 — LOW
EOSINOPHIL # BLD AUTO: 0.13 K/UL — SIGNIFICANT CHANGE UP (ref 0–0.5)
EOSINOPHIL NFR BLD AUTO: 1 % — SIGNIFICANT CHANGE UP (ref 0–6)
GAS PNL BLDV: 136 MMOL/L — SIGNIFICANT CHANGE UP (ref 136–145)
GLUCOSE BLDV-MCNC: 117 MG/DL — HIGH (ref 70–99)
GLUCOSE SERPL-MCNC: 118 MG/DL — HIGH (ref 70–99)
HCO3 BLDV-SCNC: 26 MMOL/L — SIGNIFICANT CHANGE UP (ref 22–29)
HCT VFR BLD CALC: 28.8 % — LOW (ref 39–50)
HCT VFR BLDA CALC: 29 % — LOW (ref 39–51)
HGB BLD CALC-MCNC: 9.5 G/DL — LOW (ref 12.6–17.4)
HGB BLD-MCNC: 9.1 G/DL — LOW (ref 13–17)
IANC: 10.66 K/UL — HIGH (ref 1.8–7.4)
IMM GRANULOCYTES NFR BLD AUTO: 0.5 % — SIGNIFICANT CHANGE UP (ref 0–0.9)
INR BLD: 1.27 RATIO — HIGH (ref 0.85–1.18)
LACTATE BLDV-MCNC: 1 MMOL/L — SIGNIFICANT CHANGE UP (ref 0.5–2)
LYMPHOCYTES # BLD AUTO: 1.01 K/UL — SIGNIFICANT CHANGE UP (ref 1–3.3)
LYMPHOCYTES # BLD AUTO: 7.8 % — LOW (ref 13–44)
MAGNESIUM SERPL-MCNC: 1.9 MG/DL — SIGNIFICANT CHANGE UP (ref 1.6–2.6)
MCHC RBC-ENTMCNC: 30 PG — SIGNIFICANT CHANGE UP (ref 27–34)
MCHC RBC-ENTMCNC: 31.6 GM/DL — LOW (ref 32–36)
MCV RBC AUTO: 95 FL — SIGNIFICANT CHANGE UP (ref 80–100)
MONOCYTES # BLD AUTO: 1.03 K/UL — HIGH (ref 0–0.9)
MONOCYTES NFR BLD AUTO: 8 % — SIGNIFICANT CHANGE UP (ref 2–14)
NEUTROPHILS # BLD AUTO: 10.66 K/UL — HIGH (ref 1.8–7.4)
NEUTROPHILS NFR BLD AUTO: 82.4 % — HIGH (ref 43–77)
NRBC # BLD: 0 /100 WBCS — SIGNIFICANT CHANGE UP (ref 0–0)
NRBC # FLD: 0 K/UL — SIGNIFICANT CHANGE UP (ref 0–0)
PCO2 BLDV: 39 MMHG — LOW (ref 42–55)
PH BLDV: 7.44 — HIGH (ref 7.32–7.43)
PHOSPHATE SERPL-MCNC: 3.2 MG/DL — SIGNIFICANT CHANGE UP (ref 2.5–4.5)
PLATELET # BLD AUTO: 238 K/UL — SIGNIFICANT CHANGE UP (ref 150–400)
PO2 BLDV: 64 MMHG — HIGH (ref 25–45)
POTASSIUM BLDV-SCNC: 3.7 MMOL/L — SIGNIFICANT CHANGE UP (ref 3.5–5.1)
POTASSIUM SERPL-MCNC: 3.8 MMOL/L — SIGNIFICANT CHANGE UP (ref 3.5–5.3)
POTASSIUM SERPL-SCNC: 3.8 MMOL/L — SIGNIFICANT CHANGE UP (ref 3.5–5.3)
PROT SERPL-MCNC: 6.1 G/DL — SIGNIFICANT CHANGE UP (ref 6–8.3)
PROTHROM AB SERPL-ACNC: 14.1 SEC — HIGH (ref 9.5–13)
RBC # BLD: 3.03 M/UL — LOW (ref 4.2–5.8)
RBC # FLD: 13.4 % — SIGNIFICANT CHANGE UP (ref 10.3–14.5)
SAO2 % BLDV: 93.9 % — HIGH (ref 67–88)
SODIUM SERPL-SCNC: 139 MMOL/L — SIGNIFICANT CHANGE UP (ref 135–145)
WBC # BLD: 12.93 K/UL — HIGH (ref 3.8–10.5)
WBC # FLD AUTO: 12.93 K/UL — HIGH (ref 3.8–10.5)

## 2024-04-12 PROCEDURE — 93971 EXTREMITY STUDY: CPT | Mod: 26,LT

## 2024-04-12 PROCEDURE — 99291 CRITICAL CARE FIRST HOUR: CPT

## 2024-04-12 RX ORDER — MAGNESIUM SULFATE 500 MG/ML
2 VIAL (ML) INJECTION ONCE
Refills: 0 | Status: COMPLETED | OUTPATIENT
Start: 2024-04-12 | End: 2024-04-12

## 2024-04-12 RX ORDER — BUMETANIDE 0.25 MG/ML
2 INJECTION INTRAMUSCULAR; INTRAVENOUS EVERY 12 HOURS
Refills: 0 | Status: DISCONTINUED | OUTPATIENT
Start: 2024-04-12 | End: 2024-04-17

## 2024-04-12 RX ORDER — HEPARIN SODIUM 5000 [USP'U]/ML
INJECTION INTRAVENOUS; SUBCUTANEOUS
Qty: 25000 | Refills: 0 | Status: DISCONTINUED | OUTPATIENT
Start: 2024-04-12 | End: 2024-04-13

## 2024-04-12 RX ORDER — NOREPINEPHRINE BITARTRATE/D5W 8 MG/250ML
0.04 PLASTIC BAG, INJECTION (ML) INTRAVENOUS
Qty: 8 | Refills: 0 | Status: DISCONTINUED | OUTPATIENT
Start: 2024-04-12 | End: 2024-04-12

## 2024-04-12 RX ORDER — POTASSIUM CHLORIDE 20 MEQ
20 PACKET (EA) ORAL ONCE
Refills: 0 | Status: COMPLETED | OUTPATIENT
Start: 2024-04-12 | End: 2024-04-12

## 2024-04-12 RX ADMIN — MIDODRINE HYDROCHLORIDE 20 MILLIGRAM(S): 2.5 TABLET ORAL at 06:45

## 2024-04-12 RX ADMIN — MIDODRINE HYDROCHLORIDE 20 MILLIGRAM(S): 2.5 TABLET ORAL at 22:36

## 2024-04-12 RX ADMIN — Medication 25 GRAM(S): at 03:38

## 2024-04-12 RX ADMIN — PIPERACILLIN AND TAZOBACTAM 25 GRAM(S): 4; .5 INJECTION, POWDER, LYOPHILIZED, FOR SOLUTION INTRAVENOUS at 13:45

## 2024-04-12 RX ADMIN — PIPERACILLIN AND TAZOBACTAM 25 GRAM(S): 4; .5 INJECTION, POWDER, LYOPHILIZED, FOR SOLUTION INTRAVENOUS at 06:46

## 2024-04-12 RX ADMIN — Medication 1 DROP(S): at 06:45

## 2024-04-12 RX ADMIN — CARBIDOPA AND LEVODOPA 1.5 TABLET(S): 25; 100 TABLET ORAL at 22:37

## 2024-04-12 RX ADMIN — BUMETANIDE 2 MILLIGRAM(S): 0.25 INJECTION INTRAMUSCULAR; INTRAVENOUS at 12:15

## 2024-04-12 RX ADMIN — MIRTAZAPINE 15 MILLIGRAM(S): 45 TABLET, ORALLY DISINTEGRATING ORAL at 22:38

## 2024-04-12 RX ADMIN — Medication 500 MILLIGRAM(S): at 12:15

## 2024-04-12 RX ADMIN — Medication 20 MILLIEQUIVALENT(S): at 03:38

## 2024-04-12 RX ADMIN — Medication 1000 UNIT(S): at 12:14

## 2024-04-12 RX ADMIN — PIPERACILLIN AND TAZOBACTAM 25 GRAM(S): 4; .5 INJECTION, POWDER, LYOPHILIZED, FOR SOLUTION INTRAVENOUS at 22:36

## 2024-04-12 RX ADMIN — OLANZAPINE 10 MILLIGRAM(S): 15 TABLET, FILM COATED ORAL at 22:37

## 2024-04-12 RX ADMIN — DESVENLAFAXINE 50 MILLIGRAM(S): 50 TABLET, EXTENDED RELEASE ORAL at 12:15

## 2024-04-12 RX ADMIN — ATORVASTATIN CALCIUM 80 MILLIGRAM(S): 80 TABLET, FILM COATED ORAL at 22:37

## 2024-04-12 RX ADMIN — Medication 1 DROP(S): at 17:13

## 2024-04-12 RX ADMIN — MIDODRINE HYDROCHLORIDE 20 MILLIGRAM(S): 2.5 TABLET ORAL at 13:46

## 2024-04-12 RX ADMIN — CHLORHEXIDINE GLUCONATE 1 APPLICATION(S): 213 SOLUTION TOPICAL at 12:28

## 2024-04-12 RX ADMIN — CARBIDOPA AND LEVODOPA 1.5 TABLET(S): 25; 100 TABLET ORAL at 06:50

## 2024-04-12 RX ADMIN — CARBIDOPA AND LEVODOPA 1.5 TABLET(S): 25; 100 TABLET ORAL at 13:46

## 2024-04-12 RX ADMIN — Medication 6.97 MICROGRAM(S)/KG/MIN: at 12:09

## 2024-04-12 RX ADMIN — Medication 100 MILLIGRAM(S): at 17:14

## 2024-04-12 RX ADMIN — HEPARIN SODIUM 1700 UNIT(S)/HR: 5000 INJECTION INTRAVENOUS; SUBCUTANEOUS at 20:35

## 2024-04-12 RX ADMIN — CARBIDOPA AND LEVODOPA 1 TABLET(S): 25; 100 TABLET ORAL at 22:36

## 2024-04-12 RX ADMIN — HEPARIN SODIUM 1700 UNIT(S)/HR: 5000 INJECTION INTRAVENOUS; SUBCUTANEOUS at 12:09

## 2024-04-12 RX ADMIN — PREGABALIN 1000 MICROGRAM(S): 225 CAPSULE ORAL at 12:15

## 2024-04-12 RX ADMIN — HEPARIN SODIUM 5000 UNIT(S): 5000 INJECTION INTRAVENOUS; SUBCUTANEOUS at 06:46

## 2024-04-12 RX ADMIN — BUMETANIDE 2 MILLIGRAM(S): 0.25 INJECTION INTRAMUSCULAR; INTRAVENOUS at 00:32

## 2024-04-12 NOTE — CHART NOTE - NSCHARTNOTEFT_GEN_A_CORE
MICU Transfer Note    Transfer from MICU to    Transfer to: (X) Medicine       Accepting Physician:  Signout given to:       HPI/MICU: Patient is a 84 y/o male with a pmh of  HTN, HLD, ICD/ afib on Eliquis (last dose 3/30), Plavix (last dose 3/26), Parkinson's disease, history of chronic pleural effusions, R sided VATS Pleurx cathter placement who presented to the ED due to concerns of dyspnea and right sided chest pain around the Pleurx catheter site. The right sided PleurX was found to be dislodged and was removed. He was found to be febrile to 102.7 in the ED, with leukocytosis and hypotension refractory to fluids and started on Levophed for likely septic shock with unclear source as CTSx feels Pleural space unlikely source of infection. He was Diuresed with IV Bumex, now transitioned to PO. His leukocytosis improved on Zosyn and he is now afebrile. Blood and Urine cultures are still NGTD. He was weaned off of Levophed with the aid of Midodrine on 4/12. He remains at baseline mental status, tolerating his diet, breathing comfortably on room air, and hemodynamically stable off IV vasopressors. He is eligible for transfer to a general medical floor for continuation of IV Abx awaiting repeat CT scan Sunday and CTSx recommendations on Monday.        PAST MEDICAL & SURGICAL HISTORY:  Hypertension  Hyperlipidemia  BPH (benign prostatic hyperplasia)  s/p laser nucleation 09/20  Atrial fibrillation  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  Parkinsons disease  CAD (coronary artery disease)  s/p PCI 08/21  Pleural effusion, not elsewhere classified  COVID-19 vaccine series completed  w booster    History of hip replacement, total  R hip 2008 & L hip  Status post cataract extraction  right eye cataract extraction with IOL 6/26/2015  Presence of cardiac pacemaker  10/2021  H/O coronary angioplasty  8/2021 1 stent inserted          Vital Signs Last 24 Hrs  T(C): 37.2 (12 Apr 2024 20:00), Max: 37.9 (12 Apr 2024 00:00)  T(F): 98.9 (12 Apr 2024 20:00), Max: 100.2 (12 Apr 2024 00:00)  HR: 67 (12 Apr 2024 23:00) (61 - 82)  BP: 94/61 (12 Apr 2024 23:00) (94/61 - 132/62)  BP(mean): 68 (12 Apr 2024 23:00) (58 - 84)  RR: 22 (12 Apr 2024 23:00) (12 - 22)  SpO2: 98% (12 Apr 2024 23:00) (96% - 100%)    Parameters below as of 12 Apr 2024 23:00  Patient On (Oxygen Delivery Method): room air      I&O's Summary    11 Apr 2024 07:01  -  12 Apr 2024 07:00  --------------------------------------------------------  IN: 244.1 mL / OUT: 1750 mL / NET: -1505.9 mL    12 Apr 2024 07:01  -  12 Apr 2024 23:50  --------------------------------------------------------  IN: 151.9 mL / OUT: 100 mL / NET: 51.9 mL      Allergies    No Known Allergies    Intolerances      MEDICATIONS  (STANDING):  artificial tears (preservative free) Ophthalmic Solution 1 Drop(s) Both EYES two times a day  ascorbic acid 500 milliGRAM(s) Oral daily  atorvastatin 80 milliGRAM(s) Oral at bedtime  buMETAnide 2 milliGRAM(s) Oral every 12 hours  carbidopa/levodopa  25/100 1.5 Tablet(s) Oral three times a day  carbidopa/levodopa  25/100 1 Tablet(s) Oral at bedtime  chlorhexidine 2% Cloths 1 Application(s) Topical daily  cholecalciferol 1000 Unit(s) Oral daily  cyanocobalamin 1000 MICROGram(s) Oral daily  desvenlafaxine ER 50 milliGRAM(s) Oral daily  heparin  Infusion.  Unit(s)/Hr (17 mL/Hr) IV Continuous <Continuous>  influenza  Vaccine (HIGH DOSE) 0.7 milliLiter(s) IntraMuscular once  midodrine 20 milliGRAM(s) Oral every 8 hours  mirtazapine 15 milliGRAM(s) Oral at bedtime  OLANZapine 10 milliGRAM(s) Oral at bedtime  piperacillin/tazobactam IVPB.. 3.375 Gram(s) IV Intermittent every 8 hours  Tafamidis (Vyndamax) 61 mg 1 Capsule(s) 1 Capsule(s) Oral daily    MEDICATIONS  (PRN):  benzonatate 100 milliGRAM(s) Oral three times a day PRN Cough                            9.1    12.93 )-----------( 238      ( 12 Apr 2024 01:29 )             28.8     04-12    139  |  103  |  31<H>  ----------------------------<  118<H>  3.8   |  21<L>  |  1.82<H>    Ca    8.7      12 Apr 2024 01:29  Phos  3.2     04-12  Mg     1.90     04-12    TPro  6.1  /  Alb  2.9<L>  /  TBili  0.4  /  DBili  x   /  AST  14  /  ALT  <5  /  AlkPhos  70  04-12    PT/INR - ( 12 Apr 2024 01:29 )   PT: 14.1 sec;   INR: 1.27 ratio         PTT - ( 12 Apr 2024 18:15 )  PTT:92.9 sec      PHYSICAL EXAM:    GENERAL: NAD  HEAD:  Atraumatic, Normocephalic  EYES: EOMI, PERRLA, conjunctiva and sclera clear  ENT: Moist mucous membranes  NECK: Supple, No elevated JVP.  CHEST/LUNG: right sided pleurx catheter   HEART: Regular rate and rhythm; No murmurs, rubs, or gallops  ABDOMEN: Bowel sounds present; Soft, nontender, nondistended  EXTREMITIES:  2+ Peripheral Pulses, brisk capillary refill. No clubbing, cyanosis, or edema  NERVOUS SYSTEM:  A&Ox2-3, no focal deficits   SKIN: No rashes or lesions        ASSESSMENT & PLAN:       Patient is a 84 y/o male with a pmh of  HTN, HLD, ICD/ afib on Eliquis (last dose 3/30), Plavix (last dose 3/26), Parkinson's disease, history of chronic pleural effusions, R sided VATS Pleurx cathter placement, presents to the ED with a dislodged R PleurX, admitted to MICU with septic shock.       NEUROLOGY  -Patient mentating at baseline   -lethargy improved    #Parkinson's  -Continue home Carbidopa-Levodopa     #Insomnia  #Mood disorder  -continue home Mirtazapine, Olanzapine and Desvenlafaxine     PULMONARY  #Bilateral Loculated Pleural effusions s/p R VATs and pleux catheter placement (2022)  -pleural effusions believed to be secondary to CHF   -pt recently saw Dr. Oneal outpt, concern was for dislodged pleurx catheter which was planned to be removed today 4/10 however pt presented to ED prior  -CT chest: No pulmonary embolism. No significant interval change from the recent prior demonstrating bilateral moderate loculated pleural effusions with near total collapse of the bilateral lower lobes.  -attempted to drain pleurx this am however no output and fluid came out of insertion site when catheter was flushed w/ saline  -Thoracic surgery consulted,  PleurX removed at bedside 4/10  -cxr post removal stable  -as per Dr. Oneal, do not suspect that pleural space/pleural fluid is infected.   -can remove Pleurx site dressing tomorrow  -CT surg recs to obtain  non contrast CT scan of chest on Sunday or Monday to eval for residual fluid. Further recommendations to follow on Monday from Dr. Oneal after CT scan obtained and reviewed.   -continue diuresis w/ bumex 2mg bid    Pulmonary HTN  -unclear why pt was on sildenafil, also only on daily. may have been rx for urological indication? 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 hold for shock.       CARDIOVASCULAR  #Shock, likely septic shock -infectious source unclear  -On Abx as per ID plan below  -On levophed, goal systolic BP >95  -midodrine increased to 20mg tid    #Atrial-Fibrillation   -Eliquis was on hold for Pleurx removal , last dose was 4/7. 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      RENAL  #CKD   -SCr currently 1.8, SCr last admit was 2-3s  -renally dose medications   -voiding well via penis pouch, no means    #Left Pelvic Stone  -Ct showing- Left renal pelvic stone with fullness and stranding, unchanged - pt asymptomatic  -Ua and Ucx negative    GASTROINTESTINAL  -regular diet ordered    INFECTIOUS DISEASE  #Sepsis   -T-max on arrival was 102.7 and hypotensive SBP 80s MAP 50s, leukocytosis (WBC 29), LA 3.3>1.5, procal 0.54  -Infectious source unclear- may be pulmonary related to effusions  -flu/covid neg  -MRSA pcr neg  -urine legionella/strep pneumoiae ag neg  -Bcx/UA/Ucx negative  -s/p empiric Vanco (4/9-4/111), continue Zosyn (4/9- )    ENDOCRINE  #No acute issues   -TSH wnls  -am cortisol level 20    HEME  -LUE appears more swollen than RUE, doppler negative for DVT  #DVT ppx- Heparin gtt for hx afib    ETHICS  -Code status: DNR/DNI        FOR FOLLOW UP:    - can remove Pleurx site dressing tomorrow  - non contrast CT scan of chest on Sunday or Monday per CTSx, f/u recs  - continue diuresis w/ bumex 2mg PO bid  - midodrine 20mg tid, wean as tolerated, holding entresto and sildenafil in setting of shock  - c/w hep gtt pending possible CTSx intervention  - c/w Zosyn, BCx 4/9 w/ NGTD MICU Transfer Note    Transfer from MICU to    Transfer to: (X) Medicine       Accepting Physician:  Signout given to:       HPI/MICU: Patient is a 82 y/o male with a pmh of  HTN, HLD, ICD/ afib on Eliquis (last dose 3/30), Plavix (last dose 3/26), Parkinson's disease, history of chronic pleural effusions, R sided VATS Pleurx cathter placement who presented to the ED due to concerns of dyspnea and right sided chest pain around the Pleurx catheter site. The right sided PleurX was found to be dislodged and was removed. He was found to be febrile to 102.7 in the ED, with leukocytosis and hypotension refractory to fluids and started on Levophed for likely septic shock with unclear source as CTSx feels Pleural space unlikely source of infection. He was Diuresed with IV Bumex with improvement in ALAYNA, now transitioned to PO. His leukocytosis improved on Zosyn and he is now afebrile. Blood and Urine cultures are still NGTD. He was weaned off of Levophed with the aid of Midodrine on 4/12. He remains at baseline mental status, tolerating his diet, breathing comfortably on room air, and hemodynamically stable off IV vasopressors. He is eligible for transfer to a general medical floor for continuation of IV Abx awaiting repeat CT scan Sunday and CTSx recommendations on Monday.        PAST MEDICAL & SURGICAL HISTORY:  Hypertension  Hyperlipidemia  BPH (benign prostatic hyperplasia)  s/p laser nucleation 09/20  Atrial fibrillation  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  Parkinsons disease  CAD (coronary artery disease)  s/p PCI 08/21  Pleural effusion, not elsewhere classified  COVID-19 vaccine series completed  w booster    History of hip replacement, total  R hip 2008 & L hip  Status post cataract extraction  right eye cataract extraction with IOL 6/26/2015  Presence of cardiac pacemaker  10/2021  H/O coronary angioplasty  8/2021 1 stent inserted          Vital Signs Last 24 Hrs  T(C): 37.2 (12 Apr 2024 20:00), Max: 37.9 (12 Apr 2024 00:00)  T(F): 98.9 (12 Apr 2024 20:00), Max: 100.2 (12 Apr 2024 00:00)  HR: 67 (12 Apr 2024 23:00) (61 - 82)  BP: 94/61 (12 Apr 2024 23:00) (94/61 - 132/62)  BP(mean): 68 (12 Apr 2024 23:00) (58 - 84)  RR: 22 (12 Apr 2024 23:00) (12 - 22)  SpO2: 98% (12 Apr 2024 23:00) (96% - 100%)    Parameters below as of 12 Apr 2024 23:00  Patient On (Oxygen Delivery Method): room air      I&O's Summary    11 Apr 2024 07:01  -  12 Apr 2024 07:00  --------------------------------------------------------  IN: 244.1 mL / OUT: 1750 mL / NET: -1505.9 mL    12 Apr 2024 07:01  -  12 Apr 2024 23:50  --------------------------------------------------------  IN: 151.9 mL / OUT: 100 mL / NET: 51.9 mL      Allergies    No Known Allergies    Intolerances      MEDICATIONS  (STANDING):  artificial tears (preservative free) Ophthalmic Solution 1 Drop(s) Both EYES two times a day  ascorbic acid 500 milliGRAM(s) Oral daily  atorvastatin 80 milliGRAM(s) Oral at bedtime  buMETAnide 2 milliGRAM(s) Oral every 12 hours  carbidopa/levodopa  25/100 1.5 Tablet(s) Oral three times a day  carbidopa/levodopa  25/100 1 Tablet(s) Oral at bedtime  chlorhexidine 2% Cloths 1 Application(s) Topical daily  cholecalciferol 1000 Unit(s) Oral daily  cyanocobalamin 1000 MICROGram(s) Oral daily  desvenlafaxine ER 50 milliGRAM(s) Oral daily  heparin  Infusion.  Unit(s)/Hr (17 mL/Hr) IV Continuous <Continuous>  influenza  Vaccine (HIGH DOSE) 0.7 milliLiter(s) IntraMuscular once  midodrine 20 milliGRAM(s) Oral every 8 hours  mirtazapine 15 milliGRAM(s) Oral at bedtime  OLANZapine 10 milliGRAM(s) Oral at bedtime  piperacillin/tazobactam IVPB.. 3.375 Gram(s) IV Intermittent every 8 hours  Tafamidis (Vyndamax) 61 mg 1 Capsule(s) 1 Capsule(s) Oral daily    MEDICATIONS  (PRN):  benzonatate 100 milliGRAM(s) Oral three times a day PRN Cough                            9.1    12.93 )-----------( 238      ( 12 Apr 2024 01:29 )             28.8     04-12    139  |  103  |  31<H>  ----------------------------<  118<H>  3.8   |  21<L>  |  1.82<H>    Ca    8.7      12 Apr 2024 01:29  Phos  3.2     04-12  Mg     1.90     04-12    TPro  6.1  /  Alb  2.9<L>  /  TBili  0.4  /  DBili  x   /  AST  14  /  ALT  <5  /  AlkPhos  70  04-12    PT/INR - ( 12 Apr 2024 01:29 )   PT: 14.1 sec;   INR: 1.27 ratio         PTT - ( 12 Apr 2024 18:15 )  PTT:92.9 sec      PHYSICAL EXAM:    GENERAL: NAD  HEAD:  Atraumatic, Normocephalic  EYES: EOMI, PERRLA, conjunctiva and sclera clear  ENT: Moist mucous membranes  NECK: Supple, No elevated JVP.  CHEST/LUNG: right sided pleurx catheter   HEART: Regular rate and rhythm; No murmurs, rubs, or gallops  ABDOMEN: Bowel sounds present; Soft, nontender, nondistended  EXTREMITIES:  2+ Peripheral Pulses, brisk capillary refill. No clubbing, cyanosis, or edema  NERVOUS SYSTEM:  A&Ox2-3, no focal deficits   SKIN: No rashes or lesions        ASSESSMENT & PLAN:       Patient is a 82 y/o male with a pmh of  HTN, HLD, ICD/ afib on Eliquis (last dose 3/30), Plavix (last dose 3/26), Parkinson's disease, history of chronic pleural effusions, R sided VATS Pleurx cathter placement, presents to the ED with a dislodged R PleurX, admitted to MICU with septic shock.       NEUROLOGY  -Patient mentating at baseline   -lethargy improved    #Parkinson's  -Continue home Carbidopa-Levodopa     #Insomnia  #Mood disorder  -continue home Mirtazapine, Olanzapine and Desvenlafaxine     PULMONARY  #Bilateral Loculated Pleural effusions s/p R VATs and pleux catheter placement (2022)  -pleural effusions believed to be secondary to CHF   -pt recently saw Dr. Oneal outpt, concern was for dislodged pleurx catheter which was planned to be removed today 4/10 however pt presented to ED prior  -CT chest: No pulmonary embolism. No significant interval change from the recent prior demonstrating bilateral moderate loculated pleural effusions with near total collapse of the bilateral lower lobes.  -attempted to drain pleurx this am however no output and fluid came out of insertion site when catheter was flushed w/ saline  -Thoracic surgery consulted,  PleurX removed at bedside 4/10  -cxr post removal stable  -as per Dr. Oneal, do not suspect that pleural space/pleural fluid is infected.   -can remove Pleurx site dressing tomorrow  -CT surg recs to obtain  non contrast CT scan of chest on Sunday or Monday to eval for residual fluid. Further recommendations to follow on Monday from Dr. Oneal after CT scan obtained and reviewed.   -continue diuresis w/ bumex 2mg bid    Pulmonary HTN  -unclear why pt was on sildenafil, also only on daily. may have been rx for urological indication? 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 hold for shock.       CARDIOVASCULAR  #Shock, likely septic shock -infectious source unclear  -On Abx as per ID plan below  -On levophed, goal systolic BP >95  -midodrine increased to 20mg tid    #Atrial-Fibrillation   -Eliquis was on hold for Pleurx removal , last dose was 4/7. 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      RENAL  #CKD   -SCr currently 1.8, SCr last admit was 2-3s  -renally dose medications   -voiding well via penis pouch, no means    #Left Pelvic Stone  -Ct showing- Left renal pelvic stone with fullness and stranding, unchanged - pt asymptomatic  -Ua and Ucx negative    GASTROINTESTINAL  -regular diet ordered    INFECTIOUS DISEASE  #Sepsis   -T-max on arrival was 102.7 and hypotensive SBP 80s MAP 50s, leukocytosis (WBC 29), LA 3.3>1.5, procal 0.54  -Infectious source unclear- may be pulmonary related to effusions  -flu/covid neg  -MRSA pcr neg  -urine legionella/strep pneumoiae ag neg  -Bcx/UA/Ucx negative  -s/p empiric Vanco (4/9-4/111), continue Zosyn (4/9- )    ENDOCRINE  #No acute issues   -TSH wnls  -am cortisol level 20    HEME  -LUE appears more swollen than RUE, doppler negative for DVT  #DVT ppx- Heparin gtt for hx afib    ETHICS  -Code status: DNR/DNI        FOR FOLLOW UP:    - can remove Pleurx site dressing tomorrow  - non contrast CT scan of chest on Sunday or Monday per CTSx, f/u recs  - continue diuresis w/ bumex 2mg PO bid  - midodrine 20mg tid, wean as tolerated, holding entresto and sildenafil in setting of shock  - c/w hep gtt pending possible CTSx intervention  - c/w Zosyn, BCx 4/9 w/ NGTD MICU Transfer Note    Transfer from MICU to    Transfer to: (X) Medicine       Accepting Physician:  Signout given to:       HPI/MICU: Patient is a 84 y/o male with a pmh of  HTN, HLD, ICD/ afib on Eliquis (last dose 3/30), Plavix (last dose 3/26), Parkinson's disease, history of chronic pleural effusions, R sided VATS Pleurx cathter placement who presented to the ED due to concerns of dyspnea and right sided chest pain around the Pleurx catheter site. The right sided PleurX was found to be dislodged and was removed. He was found to be febrile to 102.7 in the ED, with leukocytosis and hypotension refractory to fluids and started on Levophed for likely septic shock with unclear source as CTSx feels Pleural space unlikely source of infection. He was Diuresed with IV Bumex with improvement in ALAYNA, now transitioned to PO. His leukocytosis improved on Zosyn and he is now afebrile. Blood and Urine cultures are still NGTD. He was weaned off of Levophed with the aid of Midodrine on 4/12. He remains at baseline mental status, tolerating his diet, breathing comfortably on room air, and hemodynamically stable off IV vasopressors. He is eligible for transfer to a general medical floor for continuation of IV Abx awaiting repeat CT scan Sunday and CTSx recommendations on Monday.        PAST MEDICAL & SURGICAL HISTORY:  Hypertension  Hyperlipidemia  BPH (benign prostatic hyperplasia)  s/p laser nucleation 09/20  Atrial fibrillation  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  Parkinsons disease  CAD (coronary artery disease)  s/p PCI 08/21  Pleural effusion, not elsewhere classified  COVID-19 vaccine series completed  w booster    History of hip replacement, total  R hip 2008 & L hip  Status post cataract extraction  right eye cataract extraction with IOL 6/26/2015  Presence of cardiac pacemaker  10/2021  H/O coronary angioplasty  8/2021 1 stent inserted          Vital Signs Last 24 Hrs  T(C): 37.2 (12 Apr 2024 20:00), Max: 37.9 (12 Apr 2024 00:00)  T(F): 98.9 (12 Apr 2024 20:00), Max: 100.2 (12 Apr 2024 00:00)  HR: 67 (12 Apr 2024 23:00) (61 - 82)  BP: 94/61 (12 Apr 2024 23:00) (94/61 - 132/62)  BP(mean): 68 (12 Apr 2024 23:00) (58 - 84)  RR: 22 (12 Apr 2024 23:00) (12 - 22)  SpO2: 98% (12 Apr 2024 23:00) (96% - 100%)    Parameters below as of 12 Apr 2024 23:00  Patient On (Oxygen Delivery Method): room air      I&O's Summary    11 Apr 2024 07:01  -  12 Apr 2024 07:00  --------------------------------------------------------  IN: 244.1 mL / OUT: 1750 mL / NET: -1505.9 mL    12 Apr 2024 07:01  -  12 Apr 2024 23:50  --------------------------------------------------------  IN: 151.9 mL / OUT: 100 mL / NET: 51.9 mL      Allergies    No Known Allergies    Intolerances      MEDICATIONS  (STANDING):  artificial tears (preservative free) Ophthalmic Solution 1 Drop(s) Both EYES two times a day  ascorbic acid 500 milliGRAM(s) Oral daily  atorvastatin 80 milliGRAM(s) Oral at bedtime  buMETAnide 2 milliGRAM(s) Oral every 12 hours  carbidopa/levodopa  25/100 1.5 Tablet(s) Oral three times a day  carbidopa/levodopa  25/100 1 Tablet(s) Oral at bedtime  chlorhexidine 2% Cloths 1 Application(s) Topical daily  cholecalciferol 1000 Unit(s) Oral daily  cyanocobalamin 1000 MICROGram(s) Oral daily  desvenlafaxine ER 50 milliGRAM(s) Oral daily  heparin  Infusion.  Unit(s)/Hr (17 mL/Hr) IV Continuous <Continuous>  influenza  Vaccine (HIGH DOSE) 0.7 milliLiter(s) IntraMuscular once  midodrine 20 milliGRAM(s) Oral every 8 hours  mirtazapine 15 milliGRAM(s) Oral at bedtime  OLANZapine 10 milliGRAM(s) Oral at bedtime  piperacillin/tazobactam IVPB.. 3.375 Gram(s) IV Intermittent every 8 hours  Tafamidis (Vyndamax) 61 mg 1 Capsule(s) 1 Capsule(s) Oral daily    MEDICATIONS  (PRN):  benzonatate 100 milliGRAM(s) Oral three times a day PRN Cough                            9.1    12.93 )-----------( 238      ( 12 Apr 2024 01:29 )             28.8     04-12    139  |  103  |  31<H>  ----------------------------<  118<H>  3.8   |  21<L>  |  1.82<H>    Ca    8.7      12 Apr 2024 01:29  Phos  3.2     04-12  Mg     1.90     04-12    TPro  6.1  /  Alb  2.9<L>  /  TBili  0.4  /  DBili  x   /  AST  14  /  ALT  <5  /  AlkPhos  70  04-12    PT/INR - ( 12 Apr 2024 01:29 )   PT: 14.1 sec;   INR: 1.27 ratio         PTT - ( 12 Apr 2024 18:15 )  PTT:92.9 sec      PHYSICAL EXAM:    GENERAL: NAD  HEAD:  Atraumatic, Normocephalic  EYES: EOMI, PERRLA, conjunctiva and sclera clear  ENT: Moist mucous membranes  NECK: Supple, No elevated JVP.  CHEST/LUNG: right sided pleurx catheter   HEART: Regular rate and rhythm; No murmurs, rubs, or gallops  ABDOMEN: Bowel sounds present; Soft, nontender, nondistended  EXTREMITIES:  2+ Peripheral Pulses, brisk capillary refill. No clubbing, cyanosis, or edema  NERVOUS SYSTEM:  A&Ox2-3, no focal deficits   SKIN: No rashes or lesions        ASSESSMENT & PLAN:       Patient is a 84 y/o male with a pmh of  HTN, HLD, ICD/ afib on Eliquis (last dose 3/30), Plavix (last dose 3/26), Parkinson's disease, history of chronic pleural effusions, R sided VATS Pleurx cathter placement, presents to the ED with a dislodged R PleurX, admitted to MICU with septic shock.       NEUROLOGY  -Patient mentating at baseline   -lethargy improved    #Parkinson's  -Continue home Carbidopa-Levodopa     #Insomnia  #Mood disorder  -continue home Mirtazapine, Olanzapine and Desvenlafaxine     PULMONARY  #Bilateral Loculated Pleural effusions s/p R VATs and pleux catheter placement (2022)  -pleural effusions believed to be secondary to CHF   -pt recently saw Dr. Oneal outpt, concern was for dislodged pleurx catheter which was planned to be removed today 4/10 however pt presented to ED prior  -CT chest: No pulmonary embolism. No significant interval change from the recent prior demonstrating bilateral moderate loculated pleural effusions with near total collapse of the bilateral lower lobes.  -attempted to drain pleurx this am however no output and fluid came out of insertion site when catheter was flushed w/ saline  -Thoracic surgery consulted,  PleurX removed at bedside 4/10  -cxr post removal stable  -as per Dr. Oneal, do not suspect that pleural space/pleural fluid is infected.   -can remove Pleurx site dressing tomorrow  -CT surg recs to obtain  non contrast CT scan of chest on Sunday or Monday to eval for residual fluid. Further recommendations to follow on Monday from Dr. Oneal after CT scan obtained and reviewed.   -continue diuresis w/ bumex 2mg bid    Pulmonary HTN  -unclear why pt was on sildenafil, also only on daily. may have been rx for urological indication? 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 hold for shock.       CARDIOVASCULAR  #Shock, likely septic shock -infectious source unclear  -On Abx as per ID plan below  -On levophed, goal systolic BP >95  -midodrine increased to 20mg tid    #Atrial-Fibrillation   -Eliquis was on hold for Pleurx removal , last dose was 4/7. 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      RENAL  #CKD   -SCr currently 1.8, SCr last admit was 2-3s  -renally dose medications   -voiding well via penis pouch, no means    #Left Pelvic Stone  -Ct showing- Left renal pelvic stone with fullness and stranding, unchanged - pt asymptomatic  -Ua and Ucx negative    GASTROINTESTINAL  -regular diet ordered    INFECTIOUS DISEASE  #Sepsis   -T-max on arrival was 102.7 and hypotensive SBP 80s MAP 50s, leukocytosis (WBC 29), LA 3.3>1.5, procal 0.54  -Infectious source unclear- may be pulmonary related to effusions  -flu/covid neg  -MRSA pcr neg  -urine legionella/strep pneumoiae ag neg  -Bcx/UA/Ucx negative  -s/p empiric Vanco (4/9-4/111), continue Zosyn (4/9- )    ENDOCRINE  #No acute issues   -TSH wnls  -am cortisol level 20    HEME  -LUE appears more swollen than RUE, doppler negative for DVT  #DVT ppx- Heparin gtt for hx afib    ETHICS  -Code status: DNR/DNI        FOR FOLLOW UP:    - can remove Pleurx site dressing tomorrow  - non contrast CT scan of chest on Sunday or Monday per CTSx, f/u recs  - continue diuresis w/ bumex 2mg PO bid  - midodrine 20mg tid, wean as tolerated, holding entresto and sildenafil in setting of shock  - c/w hep gtt pending possible CTSx intervention  - c/w Zosyn, BCx 4/9 w/ NGTD  - monitor ALAYNA, daily BMP MICU Transfer Note    Transfer from MICU to  902B    Transfer to: (X) Medicine       Accepting Physician: Dr Lalita Ocampo  Signout given to: Dr Lalita Ocampo      HPI/MICU: Patient is a 82 y/o male with a pmh of  HTN, HLD, ICD/ afib on Eliquis (last dose 3/30), Plavix (last dose 3/26), Parkinson's disease, history of chronic pleural effusions, R sided VATS Pleurx cathter placement who presented to the ED due to concerns of dyspnea and right sided chest pain around the Pleurx catheter site. The right sided PleurX was found to be dislodged and was removed. He was found to be febrile to 102.7 in the ED, with leukocytosis and hypotension refractory to fluids and started on Levophed for likely septic shock with unclear source as CTSx feels Pleural space unlikely source of infection. He was Diuresed with IV Bumex with improvement in ALAYNA, now transitioned to PO. His leukocytosis improved on Zosyn and he is now afebrile. Blood and Urine cultures are still NGTD. He was weaned off of Levophed with the aid of Midodrine on 4/12. He remains at baseline mental status, tolerating his diet, breathing comfortably on room air, and hemodynamically stable off IV vasopressors. He is eligible for transfer to a general medical floor for continuation of IV Abx awaiting repeat CT scan Sunday and CTSx recommendations on Monday.        PAST MEDICAL & SURGICAL HISTORY:  Hypertension  Hyperlipidemia  BPH (benign prostatic hyperplasia)  s/p laser nucleation 09/20  Atrial fibrillation  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  Parkinsons disease  CAD (coronary artery disease)  s/p PCI 08/21  Pleural effusion, not elsewhere classified  COVID-19 vaccine series completed  w booster    History of hip replacement, total  R hip 2008 & L hip  Status post cataract extraction  right eye cataract extraction with IOL 6/26/2015  Presence of cardiac pacemaker  10/2021  H/O coronary angioplasty  8/2021 1 stent inserted          Vital Signs Last 24 Hrs  T(C): 37.2 (12 Apr 2024 20:00), Max: 37.9 (12 Apr 2024 00:00)  T(F): 98.9 (12 Apr 2024 20:00), Max: 100.2 (12 Apr 2024 00:00)  HR: 67 (12 Apr 2024 23:00) (61 - 82)  BP: 94/61 (12 Apr 2024 23:00) (94/61 - 132/62)  BP(mean): 68 (12 Apr 2024 23:00) (58 - 84)  RR: 22 (12 Apr 2024 23:00) (12 - 22)  SpO2: 98% (12 Apr 2024 23:00) (96% - 100%)    Parameters below as of 12 Apr 2024 23:00  Patient On (Oxygen Delivery Method): room air      I&O's Summary    11 Apr 2024 07:01  -  12 Apr 2024 07:00  --------------------------------------------------------  IN: 244.1 mL / OUT: 1750 mL / NET: -1505.9 mL    12 Apr 2024 07:01  -  12 Apr 2024 23:50  --------------------------------------------------------  IN: 151.9 mL / OUT: 100 mL / NET: 51.9 mL      Allergies    No Known Allergies    Intolerances      MEDICATIONS  (STANDING):  artificial tears (preservative free) Ophthalmic Solution 1 Drop(s) Both EYES two times a day  ascorbic acid 500 milliGRAM(s) Oral daily  atorvastatin 80 milliGRAM(s) Oral at bedtime  buMETAnide 2 milliGRAM(s) Oral every 12 hours  carbidopa/levodopa  25/100 1.5 Tablet(s) Oral three times a day  carbidopa/levodopa  25/100 1 Tablet(s) Oral at bedtime  chlorhexidine 2% Cloths 1 Application(s) Topical daily  cholecalciferol 1000 Unit(s) Oral daily  cyanocobalamin 1000 MICROGram(s) Oral daily  desvenlafaxine ER 50 milliGRAM(s) Oral daily  heparin  Infusion.  Unit(s)/Hr (17 mL/Hr) IV Continuous <Continuous>  influenza  Vaccine (HIGH DOSE) 0.7 milliLiter(s) IntraMuscular once  midodrine 20 milliGRAM(s) Oral every 8 hours  mirtazapine 15 milliGRAM(s) Oral at bedtime  OLANZapine 10 milliGRAM(s) Oral at bedtime  piperacillin/tazobactam IVPB.. 3.375 Gram(s) IV Intermittent every 8 hours  Tafamidis (Vyndamax) 61 mg 1 Capsule(s) 1 Capsule(s) Oral daily    MEDICATIONS  (PRN):  benzonatate 100 milliGRAM(s) Oral three times a day PRN Cough                            9.1    12.93 )-----------( 238      ( 12 Apr 2024 01:29 )             28.8     04-12    139  |  103  |  31<H>  ----------------------------<  118<H>  3.8   |  21<L>  |  1.82<H>    Ca    8.7      12 Apr 2024 01:29  Phos  3.2     04-12  Mg     1.90     04-12    TPro  6.1  /  Alb  2.9<L>  /  TBili  0.4  /  DBili  x   /  AST  14  /  ALT  <5  /  AlkPhos  70  04-12    PT/INR - ( 12 Apr 2024 01:29 )   PT: 14.1 sec;   INR: 1.27 ratio         PTT - ( 12 Apr 2024 18:15 )  PTT:92.9 sec      PHYSICAL EXAM:    GENERAL: NAD  HEAD:  Atraumatic, Normocephalic  EYES: EOMI, PERRLA, conjunctiva and sclera clear  ENT: Moist mucous membranes  NECK: Supple, No elevated JVP.  CHEST/LUNG: right sided pleurx catheter   HEART: Regular rate and rhythm; No murmurs, rubs, or gallops  ABDOMEN: Bowel sounds present; Soft, nontender, nondistended  EXTREMITIES:  2+ Peripheral Pulses, brisk capillary refill. No clubbing, cyanosis, or edema  NERVOUS SYSTEM:  A&Ox2-3, no focal deficits   SKIN: No rashes or lesions        ASSESSMENT & PLAN:       Patient is a 82 y/o male with a pmh of  HTN, HLD, ICD/ afib on Eliquis (last dose 3/30), Plavix (last dose 3/26), Parkinson's disease, history of chronic pleural effusions, R sided VATS Pleurx cathter placement, presents to the ED with a dislodged R PleurX, admitted to MICU with septic shock.       NEUROLOGY  -Patient mentating at baseline   -lethargy improved    #Parkinson's  -Continue home Carbidopa-Levodopa     #Insomnia  #Mood disorder  -continue home Mirtazapine, Olanzapine and Desvenlafaxine     PULMONARY  #Bilateral Loculated Pleural effusions s/p R VATs and pleux catheter placement (2022)  -pleural effusions believed to be secondary to CHF   -pt recently saw Dr. Oneal outpt, concern was for dislodged pleurx catheter which was planned to be removed today 4/10 however pt presented to ED prior  -CT chest: No pulmonary embolism. No significant interval change from the recent prior demonstrating bilateral moderate loculated pleural effusions with near total collapse of the bilateral lower lobes.  -attempted to drain pleurx this am however no output and fluid came out of insertion site when catheter was flushed w/ saline  -Thoracic surgery consulted,  PleurX removed at bedside 4/10  -cxr post removal stable  -as per Dr. Oneal, do not suspect that pleural space/pleural fluid is infected.   -can remove Pleurx site dressing tomorrow  -CT surg recs to obtain  non contrast CT scan of chest on Sunday or Monday to eval for residual fluid. Further recommendations to follow on Monday from Dr. Oneal after CT scan obtained and reviewed.   -continue diuresis w/ bumex 2mg bid    Pulmonary HTN  -unclear why pt was on sildenafil, also only on daily. may have been rx for urological indication? 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 hold for shock.       CARDIOVASCULAR  #Shock, likely septic shock -infectious source unclear  -On Abx as per ID plan below  -On levophed, goal systolic BP >95  -midodrine increased to 20mg tid    #Atrial-Fibrillation   -Eliquis was on hold for Pleurx removal , last dose was 4/7. 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      RENAL  #CKD   -SCr currently 1.8, SCr last admit was 2-3s  -renally dose medications   -voiding well via penis pouch, no means    #Left Pelvic Stone  -Ct showing- Left renal pelvic stone with fullness and stranding, unchanged - pt asymptomatic  -Ua and Ucx negative    GASTROINTESTINAL  -regular diet ordered    INFECTIOUS DISEASE  #Sepsis   -T-max on arrival was 102.7 and hypotensive SBP 80s MAP 50s, leukocytosis (WBC 29), LA 3.3>1.5, procal 0.54  -Infectious source unclear- may be pulmonary related to effusions  -flu/covid neg  -MRSA pcr neg  -urine legionella/strep pneumoiae ag neg  -Bcx/UA/Ucx negative  -s/p empiric Vanco (4/9-4/111), continue Zosyn (4/9- )    ENDOCRINE  #No acute issues   -TSH wnls  -am cortisol level 20    HEME  -LUE appears more swollen than RUE, doppler negative for DVT  #DVT ppx- Heparin gtt for hx afib    ETHICS  -Code status: DNR/DNI        FOR FOLLOW UP:    - can remove Pleurx site dressing tomorrow  - non contrast CT scan of chest on Sunday or Monday per CTSx, f/u recs  - continue diuresis w/ bumex 2mg PO bid  - midodrine 20mg tid, wean as tolerated, holding entresto and sildenafil in setting of shock  - c/w hep gtt pending possible CTSx intervention  - c/w Zosyn, BCx 4/9 w/ NGTD  - monitor ALAYNA, daily BMP

## 2024-04-12 NOTE — PHYSICAL THERAPY INITIAL EVALUATION ADULT - GAIT DEVIATIONS NOTED, PT EVAL
patient required manual assist to progress Lower Extremities/decreased mini/decreased step length/decreased weight-shifting ability patient required manual assist to progress Lower Extremities and to increase medial to lateral weight shifting in standing/decreased mini/decreased step length/decreased weight-shifting ability

## 2024-04-12 NOTE — DIETITIAN INITIAL EVALUATION ADULT - PERTINENT MEDS FT
ascorbic acid   atorvastatin  buMETAnide IV  carbidopa/levodopa Tablet  cholecalciferol   cyanocobalamin  mirtazapine   norepinephrine Infusion 0.05 MICROgram(s)/kG/Min (8.06 mL/Hr) IV Continuous  piperacillin/tazobactam IV

## 2024-04-12 NOTE — PHYSICAL THERAPY INITIAL EVALUATION ADULT - PLANNED THERAPY INTERVENTIONS, PT EVAL
Patient left positioned for safety in chair position in bed in NAD, call bell in reach, all lines intact. GARTH Randall at bedside and aware. Wife at bedside./balance training/bed mobility training/gait training/strengthening/transfer training

## 2024-04-12 NOTE — PROGRESS NOTE ADULT - ASSESSMENT
Patient is a 82 y/o male with a pmh of  HTN, HLD, ICD/ afib on Eliquis (last dose 3/30), Plavix (last dose 3/26), Parkinson's disease, history of chronic pleural effusions, R sided VATS Pleurx cathter placement, presents to the ED due to concerns of dyspnea and right sided chest pain around the Pleurx catheter site. Patient found to be in shock, likely due to sepsis.    NEUROLOGY  -Patient mentating at baseline   -lethargy improved    #Parkinson's  -Continue home Carbidopa-Levodopa     #Insomnia  #Mood disorder  -continue home Mirtazapine, Olanzapine and Desvenlafaxine     PULMONARY  #Bilateral Loculated Pleural effusions s/p R VATs and pleux catheter placement (2022)  -pleural effusions believed to be secondary to CHF   -pt recently saw Dr. Oneal outpt, concern was for dislodged pleurx catheter which was planned to be removed today 4/10 however pt presented to ED prior  -CT chest: No pulmonary embolism. No significant interval change from the recent prior demonstrating bilateral moderate loculated pleural effusions with near total collapse of the bilateral lower lobes.  -attempted to drain pleurx this am however no output and fluid came out of insertion site when catheter was flushed w/ saline  -Thoracic surgery consulted,  PleurX removed at bedside 4/10  -cxr post removal stable  -continue diuresis w/ bumex 2mg IV bid    Pulmonary HTN  -unclear why pt was on sildenafil, also only on daily. may have been rx for urological indication? 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 hold for shock.       CARDIOVASCULAR  #Shock, likely septic shock -infectious source unclear  -On Abx as per ID plan below  -On levophed, will wean as tolerated   -started midodrine 10mg tid    #Atrial-Fibrillation   -Eliquis on hold for Pleurx removal , last dose was 4/7. considering restarting full ac w/ heparin gtt vs Eliquis pending need for further procedures.  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      RENAL  #CKD   -Cr-currently 1.7, SCr last admit was 2-3s  -renally dose medications   -voiding well via penis pouch, no means    #Left Pelvic Stone  -Ct showing- Left renal pelvic stone with fullness and stranding, unchanged - pt asymptomatic  -Ua neg for infx, f/up Ucx    GASTROINTESTINAL  -regular diet ordered    INFECTIOUS DISEASE  #Sepsis   -T-max on arrival was 102.7 and hypotensive SBP 80s MAP 50s, leukocytosis (WBC 29), LA 3.3>1.5, procal 0.54  -Infectious source unclear- may be pulmonary related to effusions  -flu/covid neg  -s/p empiric Vanco (4/9-4/111), continue Zosyn (4/9- )  -MRSA pcr ordered   -urine legionella neg  -Bcx- NTD  -f/up Ucx    ENDOCRINE  #No acute issues   -TSH wnls  -am cortisol level 20    HEME  #DVT ppx  -Heparin SC    ETHICS  -Code status: DNR/DNI Patient is a 84 y/o male with a pmh of  HTN, HLD, ICD/ afib on Eliquis (last dose 3/30), Plavix (last dose 3/26), Parkinson's disease, history of chronic pleural effusions, R sided VATS Pleurx cathter placement, presents to the ED due to concerns of dyspnea and right sided chest pain around the Pleurx catheter site. Patient found to be in shock, likely due to sepsis.    NEUROLOGY  -Patient mentating at baseline   -lethargy improved    #Parkinson's  -Continue home Carbidopa-Levodopa     #Insomnia  #Mood disorder  -continue home Mirtazapine, Olanzapine and Desvenlafaxine     PULMONARY  #Bilateral Loculated Pleural effusions s/p R VATs and pleux catheter placement (2022)  -pleural effusions believed to be secondary to CHF   -pt recently saw Dr. Oneal outpt, concern was for dislodged pleurx catheter which was planned to be removed today 4/10 however pt presented to ED prior  -CT chest: No pulmonary embolism. No significant interval change from the recent prior demonstrating bilateral moderate loculated pleural effusions with near total collapse of the bilateral lower lobes.  -attempted to drain pleurx this am however no output and fluid came out of insertion site when catheter was flushed w/ saline  -Thoracic surgery consulted,  PleurX removed at bedside 4/10  -cxr post removal stable  -as per Dr. Oneal, do not suspect that pleural space/pleural fluid is infected.   -Can remove Pleurx site dressing in 48hrs.   -TC surg recs to obtain  non contrast CT scan of chest on Sunday or Monday to eval for residual fluid. Further recommendations to follow on Monday from Dr. Oneal after CT scan obtained and reviewed.     Pulmonary HTN  -unclear why pt was on sildenafil, also only on daily. may have been rx for urological indication? 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 hold for shock.       CARDIOVASCULAR  #Shock, likely septic shock -infectious source unclear  -On Abx as per ID plan below  -On levophed, will wean as tolerated   -started midodrine 10mg tid    #Atrial-Fibrillation   -Eliquis on hold for Pleurx removal , last dose was 4/7. considering restarting full ac w/ heparin gtt vs Eliquis pending need for further procedures.  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      RENAL  #CKD   -Cr-currently 1.7, SCr last admit was 2-3s  -renally dose medications   -voiding well via penis pouch, no means    #Left Pelvic Stone  -Ct showing- Left renal pelvic stone with fullness and stranding, unchanged - pt asymptomatic  -Ua neg for infx, f/up Ucx    GASTROINTESTINAL  -regular diet ordered    INFECTIOUS DISEASE  #Sepsis   -T-max on arrival was 102.7 and hypotensive SBP 80s MAP 50s, leukocytosis (WBC 29), LA 3.3>1.5, procal 0.54  -Infectious source unclear- may be pulmonary related to effusions  -flu/covid neg  -s/p empiric Vanco (4/9-4/111), continue Zosyn (4/9- )  -MRSA pcr ordered   -urine legionella neg  -Bcx- NTD  -f/up Ucx    ENDOCRINE  #No acute issues   -TSH wnls  -am cortisol level 20    HEME  #DVT ppx  -Heparin SC    ETHICS  -Code status: DNR/DNI Patient is a 82 y/o male with a pmh of  HTN, HLD, ICD/ afib on Eliquis (last dose 3/30), Plavix (last dose 3/26), Parkinson's disease, history of chronic pleural effusions, R sided VATS Pleurx cathter placement, presents to the ED due to concerns of dyspnea and right sided chest pain around the Pleurx catheter site. Patient found to be in shock, likely due to sepsis.    NEUROLOGY  -Patient mentating at baseline   -lethargy improved    #Parkinson's  -Continue home Carbidopa-Levodopa     #Insomnia  #Mood disorder  -continue home Mirtazapine, Olanzapine and Desvenlafaxine     PULMONARY  #Bilateral Loculated Pleural effusions s/p R VATs and pleux catheter placement (2022)  -pleural effusions believed to be secondary to CHF   -pt recently saw Dr. Oneal outpt, concern was for dislodged pleurx catheter which was planned to be removed today 4/10 however pt presented to ED prior  -CT chest: No pulmonary embolism. No significant interval change from the recent prior demonstrating bilateral moderate loculated pleural effusions with near total collapse of the bilateral lower lobes.  -attempted to drain pleurx this am however no output and fluid came out of insertion site when catheter was flushed w/ saline  -Thoracic surgery consulted,  PleurX removed at bedside 4/10  -cxr post removal stable  -as per Dr. Oneal, do not suspect that pleural space/pleural fluid is infected.   -Can remove Pleurx site dressing in 48hrs.   -TC surg recs to obtain  non contrast CT scan of chest on Sunday or Monday to eval for residual fluid. Further recommendations to follow on Monday from Dr. Oneal after CT scan obtained and reviewed.   -continue diuresis w/ bumex 2mg bid    Pulmonary HTN  -unclear why pt was on sildenafil, also only on daily. may have been rx for urological indication? 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 hold for shock.       CARDIOVASCULAR  #Shock, likely septic shock -infectious source unclear  -On Abx as per ID plan below  -On levophed, goal systolic BP >95  -midodrine increased to 20mg tid    #Atrial-Fibrillation   -Eliquis was on hold for Pleurx removal , last dose was 4/7. 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      RENAL  #CKD   -SCr currently 1.8, SCr last admit was 2-3s  -renally dose medications   -voiding well via penis pouch, no means    #Left Pelvic Stone  -Ct showing- Left renal pelvic stone with fullness and stranding, unchanged - pt asymptomatic  -Ua and Ucx negative    GASTROINTESTINAL  -regular diet ordered    INFECTIOUS DISEASE  #Sepsis   -T-max on arrival was 102.7 and hypotensive SBP 80s MAP 50s, leukocytosis (WBC 29), LA 3.3>1.5, procal 0.54  -Infectious source unclear- may be pulmonary related to effusions  -flu/covid neg  -MRSA pcr neg  -urine legionella/strep pneumoiae ag neg  -Bcx/UA/Ucx negative  -s/p empiric Vanco (4/9-4/111), continue Zosyn (4/9- )    ENDOCRINE  #No acute issues   -TSH wnls  -am cortisol level 20    HEME  -LUE appears more swollen than RUE, check doppler ordered  #DVT ppx- Heparin gtt for hx afib    ETHICS  -Code status: DNR/DNI Patient is a 84 y/o male with a pmh of  HTN, HLD, ICD/ afib on Eliquis (last dose 3/30), Plavix (last dose 3/26), Parkinson's disease, history of chronic pleural effusions, R sided VATS Pleurx cathter placement, presents to the ED due to concerns of dyspnea and right sided chest pain around the Pleurx catheter site. Patient found to be in shock, likely due to sepsis.    NEUROLOGY  -Patient mentating at baseline   -lethargy improved    #Parkinson's  -Continue home Carbidopa-Levodopa     #Insomnia  #Mood disorder  -continue home Mirtazapine, Olanzapine and Desvenlafaxine     PULMONARY  #Bilateral Loculated Pleural effusions s/p R VATs and pleux catheter placement (2022)  -pleural effusions believed to be secondary to CHF   -pt recently saw Dr. Oneal outpt, concern was for dislodged pleurx catheter which was planned to be removed today 4/10 however pt presented to ED prior  -CT chest: No pulmonary embolism. No significant interval change from the recent prior demonstrating bilateral moderate loculated pleural effusions with near total collapse of the bilateral lower lobes.  -attempted to drain pleurx this am however no output and fluid came out of insertion site when catheter was flushed w/ saline  -Thoracic surgery consulted,  PleurX removed at bedside 4/10  -cxr post removal stable  -as per Dr. Oneal, do not suspect that pleural space/pleural fluid is infected.   -can remove Pleurx site dressing tomorrow  -CT surg recs to obtain  non contrast CT scan of chest on Sunday or Monday to eval for residual fluid. Further recommendations to follow on Monday from Dr. Oneal after CT scan obtained and reviewed.   -continue diuresis w/ bumex 2mg bid    Pulmonary HTN  -unclear why pt was on sildenafil, also only on daily. may have been rx for urological indication? 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 hold for shock.       CARDIOVASCULAR  #Shock, likely septic shock -infectious source unclear  -On Abx as per ID plan below  -On levophed, goal systolic BP >95  -midodrine increased to 20mg tid    #Atrial-Fibrillation   -Eliquis was on hold for Pleurx removal , last dose was 4/7. 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      RENAL  #CKD   -SCr currently 1.8, SCr last admit was 2-3s  -renally dose medications   -voiding well via penis pouch, no maens    #Left Pelvic Stone  -Ct showing- Left renal pelvic stone with fullness and stranding, unchanged - pt asymptomatic  -Ua and Ucx negative    GASTROINTESTINAL  -regular diet ordered    INFECTIOUS DISEASE  #Sepsis   -T-max on arrival was 102.7 and hypotensive SBP 80s MAP 50s, leukocytosis (WBC 29), LA 3.3>1.5, procal 0.54  -Infectious source unclear- may be pulmonary related to effusions  -flu/covid neg  -MRSA pcr neg  -urine legionella/strep pneumoiae ag neg  -Bcx/UA/Ucx negative  -s/p empiric Vanco (4/9-4/111), continue Zosyn (4/9- )    ENDOCRINE  #No acute issues   -TSH wnls  -am cortisol level 20    HEME  -LUE appears more swollen than RUE, doppler negative for DVT  #DVT ppx- Heparin gtt for hx afib    ETHICS  -Code status: DNR/DNI

## 2024-04-12 NOTE — DIETITIAN INITIAL EVALUATION ADULT - OTHER CALCULATIONS
Dosing weight (4/10) 204.8 lbs / 92.9 kg.  Recent chart weight (11/11/23) 200 lbs / 90.7 kg.  Ideal Body Weight: 154 lbs / 70 kg +/-10%

## 2024-04-12 NOTE — PHYSICAL THERAPY INITIAL EVALUATION ADULT - PERTINENT HX OF CURRENT PROBLEM, REHAB EVAL
As per chart, patient is an 83 year old male with a pmh of  HTN, HLD, ICD/ afib on Eliquis (last dose 3/30), Plavix (last dose 3/26), Parkinson's disease, history of chronic pleural effusions, R sided VATS Pleurx cathter placement, presents to the ED due to concerns of dyspnea and right sided chest pain around the Pleurx catheter site. Patient saw thoracic surgery outpatient on 4/8/24 for a dislodged Pleurx cath. Due to the patient being on Eliquis and Plavix he was sent home and was supposed to return on 4/10 for removal. However, due to his current symptoms, he now presented to the ED. Upon arrival to the ED, patient was febrile(T-max-102.7) and was given Vanc and Zosyn. He was also given 1L IVF but due to worsening hypotension was eventually started on Levophed. Patient now admitted to the MICU for further monitoring and treatment.

## 2024-04-12 NOTE — DIETITIAN INITIAL EVALUATION ADULT - PERTINENT LABORATORY DATA
(4/12) Na 139, BUN 31<H>, Cr 1.82<H>, <H>, K+ 3.8, Phos 3.2, Mg 1.90, Alk Phos 70, ALT/SGPT <5, AST/SGOT 14

## 2024-04-12 NOTE — DIETITIAN INITIAL EVALUATION ADULT - OTHER INFO
Per chart, pt is 83 year old male PMH HTN, HLD, ICD, Afib, Plavix, Parkinson's disease, history of chronic pleural effusions, right sided VATS Pleurx cathter placement presenting with dyspnea, right sided chest pain around the Pleurx catheter site found to be in shock likely due to sepsis. Thoracic Surgery was consulted, PleurX removed (4/10). DNR/DNI.     Pt confirms NKFA, denies difficulties chewing/swallowing. Pt lives at home, relies on takeout for meals. Pt reports generally good appetite/PO intake PTA, consumes a regular diet at baseline.     Pt reports good appetite during time of visit, breakfast tray visible with 100% consumption. No food preferences vocalized at this time. Offered to provide nutrition supplement, pt declines as he is eating well. Pt denies current GI distress, last BM today per flowsheets. No bowel regimen ordered at this time.

## 2024-04-12 NOTE — PHYSICAL THERAPY INITIAL EVALUATION ADULT - GENERAL OBSERVATIONS, REHAB EVAL
Pt found semi reclined in bed in MICU; +telemetry monitor; +urine pouch; +bilateral hand edema noted that wife, at bedside, reports has been for about 1 month; HR 81bpm; spoke with GARTH Maurer who advised patient may participate as tolerated.

## 2024-04-12 NOTE — PROGRESS NOTE ADULT - CRITICAL CARE ATTENDING COMMENT
84 y/o male with a pmh of  HTN, HLD, PPM/ afib on Eliquis (last dose 3/30), Plavix (last dose 3/26), Parkinson's disease, amyloidosis, history of chronic pleural effusions, R sided VATS Pleurx cathter placement, who is presents with fevers and malaise.     Patient has had issues in the past with PLERUX, non-functioning. Saw outpatient, possible dislodgement, was planned for removal but on full a/c and plavix. Was pending removal by CT surgery. Here with fevers, leukocytosis and requiring pressors.     Has had chronic effusions, hx of loculation, previous MIST in previous admission. Pleural biopsy with chronic inflammation in 2022.   S/P Pleurx removal  Still on low dose vasopressiors.     # Shock on pressors  # HFpEF  # CKD  # pHTN  # Amyloidosis  # afib  # PD  # Chronic pleural effusions  - Likely infection given fevers, hypotension without clear etiology. loculated effusion on the right, some fluctuance under the insertion site. Culture NT. CT chest largely unchanged.  - F/U culture, c/w zosyn for now. D/C vanc, MRSA PCR negative.. Per CT surgery does not think pleural effusion is source of infection. Recommending a repeat CT chest on Monday.   - C/W  bumex, at home doses. Overloaded on exam. Monitor renal function.  - CKD- hold nephrotoxic meds, trend. Near or better then baseline.  - pHTN, unclear why on sildenafil, also only on daily. 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 on hold for shock  - Wean pressors as tolerated, not volume responsive. Uptirated midodrine.  - Afib, rate control on hold for shock.  - Amyloidosis, - will need home meds brought. Not available in house  - PD- c/w home PD meds  - DVT ppx- Will start hep gtt given need for possible further intervention. Will hold plavix as well.   - Dispo- GOC with wife at bedside. Previous admissions was also DNR/DNI. Will place order. [facility]OL[facility] filled out.

## 2024-04-12 NOTE — PHYSICAL THERAPY INITIAL EVALUATION ADULT - ADDITIONAL COMMENTS
Pt lives in a private home with his wife and 24 hour home health aide. Pt has a ramp to enter his home and then remains on first floor. Pt owns a rolling walker, wheelchair, shower chair that he uses as commode, and canes.

## 2024-04-12 NOTE — CHART NOTE - NSCHARTNOTEFT_GEN_A_CORE
following pt s/p removal of R pleurX  Leukocytosis improving.   No fevers.     As per Dr. Oneal, do not suspect that pleural space/pleural fluid is infected.   Would continue IV antibiotics.   Can remove Pleurx site dressing tomorrow  Would obtain  non contrast CT scan of chest on Sunday or Monday to  eval for residual fluid.  Further recommendations to follow on Monday from Dr. Oneal  after CT scan obtained and reviewed.   Continue care per primary MICU team  Will follow as needed.

## 2024-04-12 NOTE — PROGRESS NOTE ADULT - SUBJECTIVE AND OBJECTIVE BOX
INTERVAL HPI/OVERNIGHT EVENTS: remained on low dose levophed overnight, midodrine increased to 20mg TID. Tmax 100.4.     SUBJECTIVE: Patient seen and examined at bedside.       VITAL SIGNS:  ICU Vital Signs Last 24 Hrs  T(C): 37.3 (10 Apr 2024 04:00), Max: 39.3 (09 Apr 2024 20:04)  T(F): 99.1 (10 Apr 2024 04:00), Max: 102.7 (09 Apr 2024 20:04)  HR: 72 (10 Apr 2024 05:00) (70 - 100)  BP: 107/54 (10 Apr 2024 05:00) (69/29 - 145/70)  BP(mean): 72 (10 Apr 2024 05:00) (42 - 85)  ABP: --  ABP(mean): --  RR: 20 (10 Apr 2024 05:00) (14 - 22)  SpO2: 100% (10 Apr 2024 05:00) (95% - 100%)    O2 Parameters below as of 10 Apr 2024 05:00  Patient On (Oxygen Delivery Method): nasal cannula  O2 Flow (L/min): 2          Plateau pressure:   P/F ratio:     CAPILLARY BLOOD GLUCOSE      PHYSICAL EXAM:    GENERAL: NAD  HEAD:  Atraumatic, Normocephalic  EYES: EOMI, PERRLA, conjunctiva and sclera clear  ENT: Moist mucous membranes  NECK: Supple, No elevated JVP.  CHEST/LUNG: right sided pleurx catheter   HEART: Regular rate and rhythm; No murmurs, rubs, or gallops  ABDOMEN: Bowel sounds present; Soft, nontender, nondistended  EXTREMITIES:  2+ Peripheral Pulses, brisk capillary refill. No clubbing, cyanosis, or edema  NERVOUS SYSTEM:  A&Ox2-3, no focal deficits   SKIN: No rashes or lesions      MEDICATIONS:  MEDICATIONS  (STANDING):  acetaminophen   IVPB .. 1000 milliGRAM(s) IV Intermittent once  ascorbic acid 500 milliGRAM(s) Oral daily  atorvastatin 80 milliGRAM(s) Oral at bedtime  carbidopa/levodopa  25/100 1.5 Tablet(s) Oral three times a day  carbidopa/levodopa  25/100 1 Tablet(s) Oral at bedtime  cholecalciferol 1000 Unit(s) Oral daily  cyanocobalamin 1000 MICROGram(s) Oral daily  heparin   Injectable 5000 Unit(s) SubCutaneous every 8 hours  influenza  Vaccine (HIGH DOSE) 0.7 milliLiter(s) IntraMuscular once  norepinephrine Infusion 0.05 MICROgram(s)/kG/Min (8.06 mL/Hr) IV Continuous <Continuous>  piperacillin/tazobactam IVPB.. 3.375 Gram(s) IV Intermittent every 8 hours    MEDICATIONS  (PRN):      ALLERGIES:  Allergies    No Known Allergies    Intolerances        LABS:                        11.6   29.38 )-----------( 302      ( 09 Apr 2024 20:38 )             37.3     04-09    142  |  99  |  36<H>  ----------------------------<  145<H>  3.7   |  26  |  1.87<H>    Ca    9.3      09 Apr 2024 20:38    TPro  7.4  /  Alb  3.9  /  TBili  0.3  /  DBili  x   /  AST  14  /  ALT  10  /  AlkPhos  88  04-09    PT/INR - ( 09 Apr 2024 20:38 )   PT: 13.9 sec;   INR: 1.25 ratio         PTT - ( 09 Apr 2024 20:38 )  PTT:34.6 sec  Urinalysis Basic - ( 09 Apr 2024 20:38 )    Color: x / Appearance: x / SG: x / pH: x  Gluc: 145 mg/dL / Ketone: x  / Bili: x / Urobili: x   Blood: x / Protein: x / Nitrite: x   Leuk Esterase: x / RBC: x / WBC x   Sq Epi: x / Non Sq Epi: x / Bacteria: x        RADIOLOGY & ADDITIONAL TESTS: Reviewed.   INTERVAL HPI/OVERNIGHT EVENTS: remained on low dose levophed overnight, midodrine increased to 20mg TID. Tmax 100.4.     SUBJECTIVE: Patient seen and examined at bedside. Offers no complaints.       VITAL SIGNS:  ICU Vital Signs Last 24 Hrs  T(C): 37.3 (10 Apr 2024 04:00), Max: 39.3 (09 Apr 2024 20:04)  T(F): 99.1 (10 Apr 2024 04:00), Max: 102.7 (09 Apr 2024 20:04)  HR: 72 (10 Apr 2024 05:00) (70 - 100)  BP: 107/54 (10 Apr 2024 05:00) (69/29 - 145/70)  BP(mean): 72 (10 Apr 2024 05:00) (42 - 85)  ABP: --  ABP(mean): --  RR: 20 (10 Apr 2024 05:00) (14 - 22)  SpO2: 100% (10 Apr 2024 05:00) (95% - 100%)    O2 Parameters below as of 10 Apr 2024 05:00  Patient On (Oxygen Delivery Method): nasal cannula  O2 Flow (L/min): 2          Plateau pressure:   P/F ratio:     CAPILLARY BLOOD GLUCOSE      PHYSICAL EXAM:    GENERAL: NAD  HEAD:  Atraumatic, Normocephalic  EYES: EOMI, PERRLA, conjunctiva and sclera clear  ENT: Moist mucous membranes  NECK: Supple, No elevated JVP.  CHEST/LUNG: right sided pleurx catheter   HEART: Regular rate and rhythm; No murmurs, rubs, or gallops  ABDOMEN: Bowel sounds present; Soft, nontender, nondistended  EXTREMITIES:  2+ Peripheral Pulses, brisk capillary refill. No clubbing, cyanosis, or edema  NERVOUS SYSTEM:  A&Ox2-3, no focal deficits   SKIN: No rashes or lesions      MEDICATIONS:  MEDICATIONS  (STANDING):  acetaminophen   IVPB .. 1000 milliGRAM(s) IV Intermittent once  ascorbic acid 500 milliGRAM(s) Oral daily  atorvastatin 80 milliGRAM(s) Oral at bedtime  carbidopa/levodopa  25/100 1.5 Tablet(s) Oral three times a day  carbidopa/levodopa  25/100 1 Tablet(s) Oral at bedtime  cholecalciferol 1000 Unit(s) Oral daily  cyanocobalamin 1000 MICROGram(s) Oral daily  heparin   Injectable 5000 Unit(s) SubCutaneous every 8 hours  influenza  Vaccine (HIGH DOSE) 0.7 milliLiter(s) IntraMuscular once  norepinephrine Infusion 0.05 MICROgram(s)/kG/Min (8.06 mL/Hr) IV Continuous <Continuous>  piperacillin/tazobactam IVPB.. 3.375 Gram(s) IV Intermittent every 8 hours    MEDICATIONS  (PRN):      ALLERGIES:  Allergies    No Known Allergies    Intolerances        LABS:                        11.6   29.38 )-----------( 302      ( 09 Apr 2024 20:38 )             37.3     04-09    142  |  99  |  36<H>  ----------------------------<  145<H>  3.7   |  26  |  1.87<H>    Ca    9.3      09 Apr 2024 20:38    TPro  7.4  /  Alb  3.9  /  TBili  0.3  /  DBili  x   /  AST  14  /  ALT  10  /  AlkPhos  88  04-09    PT/INR - ( 09 Apr 2024 20:38 )   PT: 13.9 sec;   INR: 1.25 ratio         PTT - ( 09 Apr 2024 20:38 )  PTT:34.6 sec  Urinalysis Basic - ( 09 Apr 2024 20:38 )    Color: x / Appearance: x / SG: x / pH: x  Gluc: 145 mg/dL / Ketone: x  / Bili: x / Urobili: x   Blood: x / Protein: x / Nitrite: x   Leuk Esterase: x / RBC: x / WBC x   Sq Epi: x / Non Sq Epi: x / Bacteria: x        RADIOLOGY & ADDITIONAL TESTS: Reviewed.

## 2024-04-12 NOTE — PHYSICAL THERAPY INITIAL EVALUATION ADULT - PRECAUTIONS/LIMITATIONS, REHAB EVAL
cardiac precautions/fall precautions/obesity precautions hx Parkinson's disease/cardiac precautions/fall precautions/obesity precautions

## 2024-04-13 DIAGNOSIS — F41.9 ANXIETY DISORDER, UNSPECIFIED: ICD-10-CM

## 2024-04-13 DIAGNOSIS — E85.9 AMYLOIDOSIS, UNSPECIFIED: ICD-10-CM

## 2024-04-13 DIAGNOSIS — I25.10 ATHEROSCLEROTIC HEART DISEASE OF NATIVE CORONARY ARTERY WITHOUT ANGINA PECTORIS: ICD-10-CM

## 2024-04-13 DIAGNOSIS — N18.9 CHRONIC KIDNEY DISEASE, UNSPECIFIED: ICD-10-CM

## 2024-04-13 DIAGNOSIS — G20.A1 PARKINSON'S DISEASE WITHOUT DYSKINESIA, WITHOUT MENTION OF FLUCTUATIONS: ICD-10-CM

## 2024-04-13 DIAGNOSIS — J90 PLEURAL EFFUSION, NOT ELSEWHERE CLASSIFIED: ICD-10-CM

## 2024-04-13 DIAGNOSIS — I48.20 CHRONIC ATRIAL FIBRILLATION, UNSPECIFIED: ICD-10-CM

## 2024-04-13 DIAGNOSIS — N20.0 CALCULUS OF KIDNEY: ICD-10-CM

## 2024-04-13 DIAGNOSIS — Z29.9 ENCOUNTER FOR PROPHYLACTIC MEASURES, UNSPECIFIED: ICD-10-CM

## 2024-04-13 DIAGNOSIS — A41.9 SEPSIS, UNSPECIFIED ORGANISM: ICD-10-CM

## 2024-04-13 LAB
ANION GAP SERPL CALC-SCNC: 15 MMOL/L — HIGH (ref 7–14)
APTT BLD: 119.8 SEC — SIGNIFICANT CHANGE UP (ref 24.5–35.6)
APTT BLD: 120.2 SEC — SIGNIFICANT CHANGE UP (ref 24.5–35.6)
APTT BLD: 94.8 SEC — HIGH (ref 24.5–35.6)
BUN SERPL-MCNC: 31 MG/DL — HIGH (ref 7–23)
CALCIUM SERPL-MCNC: 8.6 MG/DL — SIGNIFICANT CHANGE UP (ref 8.4–10.5)
CHLORIDE SERPL-SCNC: 101 MMOL/L — SIGNIFICANT CHANGE UP (ref 98–107)
CO2 SERPL-SCNC: 22 MMOL/L — SIGNIFICANT CHANGE UP (ref 22–31)
CREAT SERPL-MCNC: 1.67 MG/DL — HIGH (ref 0.5–1.3)
EGFR: 41 ML/MIN/1.73M2 — LOW
GLUCOSE SERPL-MCNC: 124 MG/DL — HIGH (ref 70–99)
HCT VFR BLD CALC: 27.6 % — LOW (ref 39–50)
HGB BLD-MCNC: 9 G/DL — LOW (ref 13–17)
INR BLD: 1.24 RATIO — HIGH (ref 0.85–1.18)
MAGNESIUM SERPL-MCNC: 2.2 MG/DL — SIGNIFICANT CHANGE UP (ref 1.6–2.6)
MCHC RBC-ENTMCNC: 30.7 PG — SIGNIFICANT CHANGE UP (ref 27–34)
MCHC RBC-ENTMCNC: 32.6 GM/DL — SIGNIFICANT CHANGE UP (ref 32–36)
MCV RBC AUTO: 94.2 FL — SIGNIFICANT CHANGE UP (ref 80–100)
NRBC # BLD: 0 /100 WBCS — SIGNIFICANT CHANGE UP (ref 0–0)
NRBC # FLD: 0 K/UL — SIGNIFICANT CHANGE UP (ref 0–0)
PHOSPHATE SERPL-MCNC: 3.1 MG/DL — SIGNIFICANT CHANGE UP (ref 2.5–4.5)
PLATELET # BLD AUTO: 231 K/UL — SIGNIFICANT CHANGE UP (ref 150–400)
POTASSIUM SERPL-MCNC: 3.6 MMOL/L — SIGNIFICANT CHANGE UP (ref 3.5–5.3)
POTASSIUM SERPL-SCNC: 3.6 MMOL/L — SIGNIFICANT CHANGE UP (ref 3.5–5.3)
PROTHROM AB SERPL-ACNC: 13.9 SEC — HIGH (ref 9.5–13)
RBC # BLD: 2.93 M/UL — LOW (ref 4.2–5.8)
RBC # FLD: 13.3 % — SIGNIFICANT CHANGE UP (ref 10.3–14.5)
SODIUM SERPL-SCNC: 138 MMOL/L — SIGNIFICANT CHANGE UP (ref 135–145)
WBC # BLD: 10.88 K/UL — HIGH (ref 3.8–10.5)
WBC # FLD AUTO: 10.88 K/UL — HIGH (ref 3.8–10.5)

## 2024-04-13 PROCEDURE — 99222 1ST HOSP IP/OBS MODERATE 55: CPT | Mod: GC

## 2024-04-13 RX ORDER — HEPARIN SODIUM 5000 [USP'U]/ML
1300 INJECTION INTRAVENOUS; SUBCUTANEOUS
Qty: 25000 | Refills: 0 | Status: DISCONTINUED | OUTPATIENT
Start: 2024-04-13 | End: 2024-04-16

## 2024-04-13 RX ORDER — MIDODRINE HYDROCHLORIDE 2.5 MG/1
20 TABLET ORAL EVERY 8 HOURS
Refills: 0 | Status: DISCONTINUED | OUTPATIENT
Start: 2024-04-13 | End: 2024-04-15

## 2024-04-13 RX ORDER — ACETAMINOPHEN 500 MG
650 TABLET ORAL EVERY 6 HOURS
Refills: 0 | Status: DISCONTINUED | OUTPATIENT
Start: 2024-04-13 | End: 2024-04-18

## 2024-04-13 RX ORDER — HEPARIN SODIUM 5000 [USP'U]/ML
INJECTION INTRAVENOUS; SUBCUTANEOUS
Qty: 25000 | Refills: 0 | Status: DISCONTINUED | OUTPATIENT
Start: 2024-04-13 | End: 2024-04-13

## 2024-04-13 RX ORDER — POTASSIUM CHLORIDE 20 MEQ
40 PACKET (EA) ORAL ONCE
Refills: 0 | Status: COMPLETED | OUTPATIENT
Start: 2024-04-13 | End: 2024-04-13

## 2024-04-13 RX ADMIN — Medication 650 MILLIGRAM(S): at 12:16

## 2024-04-13 RX ADMIN — Medication 1 DROP(S): at 05:22

## 2024-04-13 RX ADMIN — HEPARIN SODIUM 1500 UNIT(S)/HR: 5000 INJECTION INTRAVENOUS; SUBCUTANEOUS at 08:48

## 2024-04-13 RX ADMIN — MIDODRINE HYDROCHLORIDE 20 MILLIGRAM(S): 2.5 TABLET ORAL at 05:19

## 2024-04-13 RX ADMIN — CARBIDOPA AND LEVODOPA 1.5 TABLET(S): 25; 100 TABLET ORAL at 13:07

## 2024-04-13 RX ADMIN — MIDODRINE HYDROCHLORIDE 20 MILLIGRAM(S): 2.5 TABLET ORAL at 13:07

## 2024-04-13 RX ADMIN — CARBIDOPA AND LEVODOPA 1.5 TABLET(S): 25; 100 TABLET ORAL at 05:20

## 2024-04-13 RX ADMIN — Medication 40 MILLIEQUIVALENT(S): at 02:19

## 2024-04-13 RX ADMIN — Medication 650 MILLIGRAM(S): at 11:16

## 2024-04-13 RX ADMIN — PIPERACILLIN AND TAZOBACTAM 25 GRAM(S): 4; .5 INJECTION, POWDER, LYOPHILIZED, FOR SOLUTION INTRAVENOUS at 13:18

## 2024-04-13 RX ADMIN — HEPARIN SODIUM 1500 UNIT(S)/HR: 5000 INJECTION INTRAVENOUS; SUBCUTANEOUS at 02:22

## 2024-04-13 RX ADMIN — PIPERACILLIN AND TAZOBACTAM 25 GRAM(S): 4; .5 INJECTION, POWDER, LYOPHILIZED, FOR SOLUTION INTRAVENOUS at 05:17

## 2024-04-13 RX ADMIN — MIDODRINE HYDROCHLORIDE 20 MILLIGRAM(S): 2.5 TABLET ORAL at 22:00

## 2024-04-13 RX ADMIN — HEPARIN SODIUM 1300 UNIT(S)/HR: 5000 INJECTION INTRAVENOUS; SUBCUTANEOUS at 09:30

## 2024-04-13 RX ADMIN — CHLORHEXIDINE GLUCONATE 1 APPLICATION(S): 213 SOLUTION TOPICAL at 11:16

## 2024-04-13 RX ADMIN — OLANZAPINE 10 MILLIGRAM(S): 15 TABLET, FILM COATED ORAL at 22:00

## 2024-04-13 RX ADMIN — HEPARIN SODIUM 1300 UNIT(S)/HR: 5000 INJECTION INTRAVENOUS; SUBCUTANEOUS at 19:07

## 2024-04-13 RX ADMIN — CARBIDOPA AND LEVODOPA 1.5 TABLET(S): 25; 100 TABLET ORAL at 21:59

## 2024-04-13 RX ADMIN — BUMETANIDE 2 MILLIGRAM(S): 0.25 INJECTION INTRAMUSCULAR; INTRAVENOUS at 00:22

## 2024-04-13 RX ADMIN — MIRTAZAPINE 15 MILLIGRAM(S): 45 TABLET, ORALLY DISINTEGRATING ORAL at 22:00

## 2024-04-13 RX ADMIN — HEPARIN SODIUM 1300 UNIT(S)/HR: 5000 INJECTION INTRAVENOUS; SUBCUTANEOUS at 19:09

## 2024-04-13 RX ADMIN — Medication 500 MILLIGRAM(S): at 11:16

## 2024-04-13 RX ADMIN — PIPERACILLIN AND TAZOBACTAM 25 GRAM(S): 4; .5 INJECTION, POWDER, LYOPHILIZED, FOR SOLUTION INTRAVENOUS at 21:58

## 2024-04-13 RX ADMIN — Medication 1000 UNIT(S): at 11:16

## 2024-04-13 RX ADMIN — ATORVASTATIN CALCIUM 80 MILLIGRAM(S): 80 TABLET, FILM COATED ORAL at 22:00

## 2024-04-13 RX ADMIN — HEPARIN SODIUM 1300 UNIT(S)/HR: 5000 INJECTION INTRAVENOUS; SUBCUTANEOUS at 21:56

## 2024-04-13 RX ADMIN — Medication 1 DROP(S): at 17:17

## 2024-04-13 RX ADMIN — CARBIDOPA AND LEVODOPA 1 TABLET(S): 25; 100 TABLET ORAL at 22:00

## 2024-04-13 RX ADMIN — BUMETANIDE 2 MILLIGRAM(S): 0.25 INJECTION INTRAMUSCULAR; INTRAVENOUS at 13:07

## 2024-04-13 RX ADMIN — DESVENLAFAXINE 50 MILLIGRAM(S): 50 TABLET, EXTENDED RELEASE ORAL at 13:07

## 2024-04-13 RX ADMIN — PREGABALIN 1000 MICROGRAM(S): 225 CAPSULE ORAL at 11:16

## 2024-04-13 NOTE — CONSULT NOTE ADULT - SUBJECTIVE AND OBJECTIVE BOX
HPI: Mr. Banerjee is an 83 year-old man with history of multiple medical issues including hypertension, coronary artery disease, atrial fibrillation, and chronic bilateral pleural effusions, s/p recent R VATS/Pleurx placement. He saw Thoracic 4/8/24 as outpatient for management of a dislodged Pleurx cath; he was supposed to go home afterwards, but opted instead to go to the ER due to persistent shortness of breath/right-sided chest pain. He was noted in the ER to be febrile to 102.7 and was given IV Vancomycin, Zosyn, and 1L of IVF. He developed worsening hypotension in the ER and required placement on a  Levophed gtt/placement in the MICU. He has improved over the past few days such that he was able to be weaned off the Levophed; he is presently on Midodrine 20mg po q8h. Given his azotemia, a renal consultation was requested today.        PAST MEDICAL & SURGICAL HISTORY:  HTN  HLD  CAD - PCI 2021  PPM  AFib - Eliquis  Parkinson's disease  Pleural effusions - R VATS  BPH  B/L THR 2008  Cataract extraction 2015    Allergies  No Known Allergies    SOCIAL HISTORY:  Denies ETOh,Smoking,     FAMILY HISTORY:  Family history of lung cancer (Mother)  Family history of esophageal cancer (Father)  FH: myocardial infarction (Grandparent)    REVIEW OF SYSTEMS:  CONSTITUTIONAL: (+)fever  EYES/ENT: No visual changes;  No vertigo or throat pain   NECK: No pain or stiffness  RESPIRATORY: (+)SOB  CARDIOVASCULAR: (+)R chest pain  GASTROINTESTINAL: No abdominal or epigastric pain. No nausea, vomiting, or hematemesis; No diarrhea or constipation. No melena or hematochezia.  GENITOURINARY: No dysuria, frequency or hematuria  NEUROLOGICAL: No numbness or weakness  SKIN: No itching, burning, rashes, or lesions   All other review of systems is negative unless indicated above.    VITAL:  T(C): , Max: 37.2 (04-12-24 @ 20:00)  T(F): , Max: 98.9 (04-12-24 @ 20:00)  HR: 60 (04-13-24 @ 13:05)  BP: 101/47 (04-13-24 @ 13:05)  BP(mean): 66 (04-13-24 @ 08:00)  RR: 18 (04-13-24 @ 13:05)  SpO2: 98% (04-13-24 @ 13:05)  Wt(kg): --    PHYSICAL EXAM:  Constitutional: NAD, Alert  HEENT: NCAT, MMM  Neck: Supple, No JVD  Respiratory: coarse BS, (+)R Pleurx  Cardiovascular: RRR s1s2, no m/r/g  Gastrointestinal: BS+, soft, NT/ND  Extremities: No peripheral edema b/l  Neurological: no focal deficits; strength grossly intact  Back: no CVAT b/l  Skin: No rashes, no nevi    LABS:                        9.0    10.88 )-----------( 231      ( 13 Apr 2024 00:15 )             27.6     Na(138)/K(3.6)/Cl(101)/HCO3(22)/BUN(31)/Cr(1.67)Glu(124)/Ca(8.6)/Mg(2.20)/PO4(3.1)    04-13 @ 00:15  Na(139)/K(3.8)/Cl(103)/HCO3(21)/BUN(31)/Cr(1.82)Glu(118)/Ca(8.7)/Mg(1.90)/PO4(3.2)    04-12 @ 01:29  Na(140)/K(3.2)/Cl(101)/HCO3(25)/BUN(30)/Cr(1.73)Glu(125)/Ca(9.0)/Mg(1.90)/PO4(2.7)    04-11 @ 06:58    (4/4/24) - BUN 55, Cr 3.02  (2/12/24) - BUN 48, Cr 2.42    (4/2/24) - UA - 30prot, 5wbc, 1rbc    IMAGING:  < from: Xray Chest 1 View- PORTABLE-Urgent (Xray Chest 1 View- PORTABLE-Urgent .) (04.10.24 @ 14:45) >  No pneumothorax    < from: CT Angio Chest PE Protocol w/ IV Cont (04.09.24 @ 23:15) >  CTPA: No pulmonary embolism. No significant interval change from the   recent prior demonstrating bilateral moderate loculated pleural effusions   with near total collapse of the bilateral lower lobes.  CT abdomen and pelvis: No bowel obstruction. Left renal pelvic stone with   fullness and stranding, unchanged. Diverticulosis without evidence of   diverticulitis.    < from: TTE W or WO Ultrasound Enhancing Agent (04.03.24 @ 15:11) >   1. Technically difficult image quality.   2. Left ventricular systolic function is normal with an ejection fraction visually estimated at 60 to 65 %. There are no regional wall motion abnormalities seen.   3. The right ventricle is not well visualized. A device lead is visualized in the right heart.   4. Structurally normal mitral valve with normal leaflet excursion. There is calcification of the mitral valve annulus. There is mild mitral regurgitation.   5. The aortic valve was not well visualized. The aortic valve anatomy cannot be determined. There is calcification of the aortic valve leaflets. There is mild to moderate aorticregurgitation.      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    With warm regards,    Clifton Cool MD   [facility]  Office: [phone]  Cell: [phone]               HPI: Mr. Banerjee is an 83 year-old man with history of multiple medical issues including hypertension, coronary artery disease, atrial fibrillation, and chronic bilateral pleural effusions, s/p recent R VATS/Pleurx placement. He saw Thoracic 4/8/24 as outpatient for management of a dislodged Pleurx cath; he was supposed to go home afterwards, but opted instead to go to the ER due to persistent shortness of breath/right-sided chest pain. He was noted in the ER to be febrile to 102.7 and was given IV Vancomycin, Zosyn, and 1L of IVF. He developed worsening hypotension in the ER and required placement on a  Levophed gtt/placement in the MICU. He has improved over the past few days such that he was able to be weaned off the Levophed; he is presently on Midodrine 20mg po q8h. Given his azotemia, a renal consultation was requested today.    Mr. Jeffrey generally feels well at present and asks when he can go home. He denies notable urinary changes.    PAST MEDICAL & SURGICAL HISTORY:  HTN  HLD  CAD - PCI 2021  PPM  AFib - Eliquis  Parkinson's disease  Pleural effusions - R VATS  BPH  B/L THR 2008  Cataract extraction 2015    Allergies  No Known Allergies    SOCIAL HISTORY:  Denies ETOh,Smoking,     FAMILY HISTORY:  Family history of lung cancer (Mother)  Family history of esophageal cancer (Father)  FH: myocardial infarction (Grandparent)    REVIEW OF SYSTEMS:  CONSTITUTIONAL: (+)fever  EYES/ENT: No visual changes;  No vertigo or throat pain   NECK: No pain or stiffness  RESPIRATORY: (+)SOB  CARDIOVASCULAR: (+)R chest pain  GASTROINTESTINAL: No abdominal or epigastric pain. No nausea, vomiting, or hematemesis; No diarrhea or constipation. No melena or hematochezia.  GENITOURINARY: No dysuria, frequency or hematuria  NEUROLOGICAL: No numbness or weakness  SKIN: No itching, burning, rashes, or lesions   All other review of systems is negative unless indicated above.    VITAL:  T(C): , Max: 37.2 (04-12-24 @ 20:00)  T(F): , Max: 98.9 (04-12-24 @ 20:00)  HR: 60 (04-13-24 @ 13:05)  BP: 101/47 (04-13-24 @ 13:05)  BP(mean): 66 (04-13-24 @ 08:00)  RR: 18 (04-13-24 @ 13:05)  SpO2: 98% (04-13-24 @ 13:05)  Wt(kg): --    PHYSICAL EXAM:  Constitutional: NAD, Alert, obese  HEENT: NCAT, DMM  Neck: Supple, No JVD  Respiratory: coarse BS, (+)R Pleurx  Cardiovascular: RRR s1s2, no m/r/g  Gastrointestinal: BS+, soft, NT/ND  Extremities: 2+ b/l LE edema  Neurological: no focal deficits; strength grossly intact  Back: no CVAT b/l  Skin: No rashes, no nevi    LABS:                        9.0    10.88 )-----------( 231      ( 13 Apr 2024 00:15 )             27.6     Na(138)/K(3.6)/Cl(101)/HCO3(22)/BUN(31)/Cr(1.67)Glu(124)/Ca(8.6)/Mg(2.20)/PO4(3.1)    04-13 @ 00:15  Na(139)/K(3.8)/Cl(103)/HCO3(21)/BUN(31)/Cr(1.82)Glu(118)/Ca(8.7)/Mg(1.90)/PO4(3.2)    04-12 @ 01:29  Na(140)/K(3.2)/Cl(101)/HCO3(25)/BUN(30)/Cr(1.73)Glu(125)/Ca(9.0)/Mg(1.90)/PO4(2.7)    04-11 @ 06:58    (4/4/24) - BUN 55, Cr 3.02  (2/12/24) - BUN 48, Cr 2.42    (4/2/24) - UA - 30prot, 5wbc, 1rbc    IMAGING:  < from: Xray Chest 1 View- PORTABLE-Urgent (Xray Chest 1 View- PORTABLE-Urgent .) (04.10.24 @ 14:45) >  No pneumothorax    < from: CT Angio Chest PE Protocol w/ IV Cont (04.09.24 @ 23:15) >  CTPA: No pulmonary embolism. No significant interval change from the   recent prior demonstrating bilateral moderate loculated pleural effusions   with near total collapse of the bilateral lower lobes.  CT abdomen and pelvis: No bowel obstruction. Left renal pelvic stone with   fullness and stranding, unchanged. Diverticulosis without evidence of   diverticulitis.    < from: TTE W or WO Ultrasound Enhancing Agent (04.03.24 @ 15:11) >   1. Technically difficult image quality.   2. Left ventricular systolic function is normal with an ejection fraction visually estimated at 60 to 65 %. There are no regional wall motion abnormalities seen.   3. The right ventricle is not well visualized. A device lead is visualized in the right heart.   4. Structurally normal mitral valve with normal leaflet excursion. There is calcification of the mitral valve annulus. There is mild mitral regurgitation.   5. The aortic valve was not well visualized. The aortic valve anatomy cannot be determined. There is calcification of the aortic valve leaflets. There is mild to moderate aorticregurgitation.      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    With warm regards,    Clifton Cool MD   [facility]  Office: [phone]  Cell: [phone]

## 2024-04-13 NOTE — CHART NOTE - NSCHARTNOTEFT_GEN_A_CORE
MAR Accept Note  Transfer to:    Accepting Attending Physician:    Assigned Room:      Patient seen and examined.   Labs and data reviewed.   No findings precluding transfer of service.       HPI/MICU COURSE:   Please refer to MICU transfer note for full details. Briefly, this is an 82 y/o male with a pmh of  HTN, HLD, ICD/ afib on Eliquis (last dose 3/30), Plavix (last dose 3/26), Parkinson's disease, history of chronic pleural effusions, R sided VATS Pleurx cathter placement who presented to the ED due to concerns of dyspnea and right sided chest pain around the Pleurx catheter site. The right sided PleurX was found to be dislodged and was removed. He was found to be febrile to 102.7 in the ED, with leukocytosis and hypotension refractory to fluids and started on Levophed for likely septic shock with unclear source as CTSx feels Pleural space unlikely source of infection. He was Diuresed with IV Bumex with improvement in ALAYNA, now transitioned to PO. His leukocytosis improved on Zosyn and he is now afebrile. Blood and Urine cultures are still NGTD. He was weaned off of Levophed with the aid of Midodrine on 4/12. He remains at baseline mental status, tolerating his diet, breathing comfortably on room air, and hemodynamically stable off IV vasopressors. He is eligible for transfer to a general medical floor for continuation of IV Abx awaiting repeat CT scan Sunday and CTSx recommendations on Monday      FOR FOLLOW-UP:  - can remove Pleurx site dressing tomorrow  - non contrast CT scan of chest on Sunday or Monday per CTSx, f/u recs  - continue diuresis w/ bumex 2mg PO bid  - midodrine 20mg tid, wean as tolerated, holding entresto and sildenafil in setting of shock  - c/w hep gtt pending possible CTSx intervention  - c/w Zosyn, BCx 4/9 w/ NGTD  - monitor ALAYNA, daily BMP.      MD ZULMA Bautista

## 2024-04-13 NOTE — PROGRESS NOTE ADULT - PROBLEM SELECTOR PLAN 2
Bilateral loculated pleural effusions s/p R VATs and pleurx catheter placement (2022)   - Moderate b/l loculated effusions appreciable on CTC 4/9 without interval change, chronic  - Likely iso CHF   - Pt recently saw Dr. Oneal (Thoracic surg) outpt, concern was for dislodged pleurx catheter which was planned to be removed today 4/10 however pt presented to ED prior  - On arrival pleurx catheter displaced, removed at bedside 4/10, f/u CXR stable without PTX   - Per Dr. Oneal low c/f infection of pleural space/pleural fluid however will f/u with noncon CTC   - Continue diuresis w/ bumex 2mg bid

## 2024-04-13 NOTE — PROGRESS NOTE ADULT - PROBLEM SELECTOR PLAN 1
-T-max on arrival was 102.7 and hypotensive SBP 80s MAP 50s, leukocytosis (WBC 29), LA 3.3>1.5, procal 0.54  - Unclear infectious source  - Weaned off Levophed 4/12, now on midodrine 20mg TID, goal systolic BP >95  - f/u CT, mgmt as above  - Infectious w/up including flu/covid, MRSA, urine legionella/strep pneumoiae ag, Bcx/UA/Ucx negative  -s/p empiric Vanco (4/9-4/111), continue Zosyn (4/9- )

## 2024-04-13 NOTE — PROGRESS NOTE ADULT - ASSESSMENT
Patient is a 82 y/o male with a pmh of  HTN, HLD, ICD/ afib on Eliquis (last dose 3/30), Plavix (last dose 3/26), Parkinson's disease, history of chronic pleural effusions, R sided VATS Pleurx cathter placement, presents to the ED due to concerns of dyspnea and right sided chest pain around the Pleurx catheter site. Patient found to be in shock, likely due to sepsis.    NEUROLOGY  -Patient mentating at baseline   -lethargy improved    #Parkinson's  -Continue home Carbidopa-Levodopa     #Insomnia  #Mood disorder  -continue home Mirtazapine, Olanzapine and Desvenlafaxine     PULMONARY  #Bilateral Loculated Pleural effusions s/p R VATs and pleux catheter placement (2022)  -pleural effusions believed to be secondary to CHF   -pt recently saw Dr. Oneal outpt, concern was for dislodged pleurx catheter which was planned to be removed today 4/10 however pt presented to ED prior  -CT chest: No pulmonary embolism. No significant interval change from the recent prior demonstrating bilateral moderate loculated pleural effusions with near total collapse of the bilateral lower lobes.  -attempted to drain pleurx this am however no output and fluid came out of insertion site when catheter was flushed w/ saline  -Thoracic surgery consulted,  PleurX removed at bedside 4/10  -cxr post removal stable  -as per Dr. Oneal, do not suspect that pleural space/pleural fluid is infected.   -can remove Pleurx site dressing tomorrow  -CT surg recs to obtain  non contrast CT scan of chest on Sunday or Monday to eval for residual fluid. Further recommendations to follow on Monday from Dr. Oneal after CT scan obtained and reviewed.   -continue diuresis w/ bumex 2mg bid    Pulmonary HTN  -unclear why pt was on sildenafil, also only on daily. may have been rx for urological indication? 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 hold for shock.       CARDIOVASCULAR  #Shock, likely septic shock -infectious source unclear  -On Abx as per ID plan below  -On levophed, goal systolic BP >95  -midodrine increased to 20mg tid    #Atrial-Fibrillation   -Eliquis was on hold for Pleurx removal , last dose was 4/7. 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      RENAL  #CKD   -SCr currently 1.8, SCr last admit was 2-3s  -renally dose medications   -voiding well via penis pouch, no means    #Left Pelvic Stone  -Ct showing- Left renal pelvic stone with fullness and stranding, unchanged - pt asymptomatic  -Ua and Ucx negative    GASTROINTESTINAL  -regular diet ordered    INFECTIOUS DISEASE  #Sepsis   -T-max on arrival was 102.7 and hypotensive SBP 80s MAP 50s, leukocytosis (WBC 29), LA 3.3>1.5, procal 0.54  -Infectious source unclear- may be pulmonary related to effusions  -flu/covid neg  -MRSA pcr neg  -urine legionella/strep pneumoiae ag neg  -Bcx/UA/Ucx negative  -s/p empiric Vanco (4/9-4/111), continue Zosyn (4/9- )    ENDOCRINE  #No acute issues   -TSH wnls  -am cortisol level 20    HEME  -LUE appears more swollen than RUE, doppler negative for DVT  #DVT ppx- Heparin gtt for hx afib    ETHICS  -Code status: DNR/DNI Patient is a 84 y/o male with a pmh of  HTN, HLD, ICD/ afib on Eliquis (last dose 3/30), Plavix (last dose 3/26), Parkinson's disease, history of chronic pleural effusions, R sided VATS Pleurx cathter placement, presents to the ED with a dislodged R PleurX, admitted to MICU with septic shock now stable and downgraded to floors.

## 2024-04-13 NOTE — CONSULT NOTE ADULT - ASSESSMENT
WORK UP:      ANTIBIOTIC:      IMPRESSION:        SUGGESTIONS:        Above recommendations are Preliminary until Attending's Addendum which includes Final Recommendations      Ridge Kaba DO, PGY-5   ID fellow  [employer] Teams Preferred  After 5pm/weekends call [phone]   83M with HTN, HLD, ICD/ afib on Eliquis, Parkinson's disease, history of chronic pleural effusions, R sided VATS (2022) and Pleurx cathter placement, presents to the ED due to concerns of dyspnea and right sided chest pain around the Pleurx catheter site    Found to be febrile 102F, 2L NC with BP in low 80s  WBC 29 > 10  Initially admitted in MICU, now transferred to floors    UA UCx negative  CXR with loculated effusion in R  BCx NGTD  COVID Flu negative  Legionella and MRSA negative    ANTIBIOTIC:  s/p Vancomycin 4/9 - 4/11  on Zosyn 4/9 -->    IMPRESSION:  #Septic Shock (resolved)  #Fever  #Leukocytosis (improving)  #Bilateral Pleural Effusion s/p removal of R pleurex  - history of VATS in 2022, work up for infection and malignancy negative at that time, has been following Dr. Oneal since  - acute onset of fever day prior to arrival, with some mild cough and pain around the pleurex now resolved  - infectious work up so far negative, but no pleural fluid studies    SUGGESTIONS:  - continue zosyn 3.375 q8 for now  - monitor temperature curve  - trend WBC  - plan for repeat CT chest noncon on Monday  - CT surgery following      Above recommendations are Preliminary until Attending's Addendum which includes Final Recommendations      Ridge Kaba DO, PGY-5   ID fellow  [employer] Teams Preferred  After 5pm/weekends call [phone]   83M with HTN, HLD, ICD/ afib on Eliquis, Parkinson's disease, history of chronic pleural effusions, R sided VATS (2022) and Pleurx cathter placement, presents to the ED due to concerns of dyspnea and right sided chest pain around the Pleurx catheter site    Found to be febrile 102F, 2L NC with BP in low 80s  WBC 29 > 10  Initially admitted in MICU, now transferred to floors    UA UCx negative  CXR with loculated effusion in R  BCx NGTD  COVID Flu negative  Legionella and MRSA negative    ANTIBIOTIC:  s/p Vancomycin 4/9 - 4/11  on Zosyn 4/9 -->    IMPRESSION:  #Septic Shock (resolved)  #Fever  #Leukocytosis (improving)  #Bilateral Pleural Effusion s/p removal of R pleurex  - history of VATS in 2022, work up for infection and malignancy negative at that time, has been following Dr. Oneal since  - acute onset of fever day prior to arrival, with some mild cough and pain around the pleurex now resolved  - infectious work up so far negative, but no pleural fluid studies  - will presume lung infection    SUGGESTIONS:  - continue zosyn 3.375 q8 for now  - monitor temperature curve  - trend WBC  - plan for repeat CT chest noncon on Monday  - CT surgery following  - once stable, can d/c on Augmentin to complete 14-day course      Case d/w attending and primary team      Ridge Kaba DO, PGY-5   ID fellow  [employer] Teams Preferred  After 5pm/weekends call [phone]

## 2024-04-13 NOTE — CONSULT NOTE ADULT - ATTENDING COMMENTS
83 year old with Parkinson disease and htn/hyperlipidemia   He has chronic pleural effusions and prior VATS  Cultures and pathology were not diagnositic  At that time, he had pleural fluid sent for cytology and bacterial culture as well as pleural tissue sent for pathology  He presented with fever, shortness of breath and right sided chest pain  He had associated leukocytosis.     Imaging significant for known pleural effusions with right sided loculation    He was admitted to the ICU  He was given zosyn    He has clinically improved but diagnosis is not confirmed  My highest suspicion would be for a pulmonary infection  His pleural effusions are complicating picture.  They have been present prior to this admission, so not clear if they are involved.     At this time, he has improved    Follow up repeat CT planned for next week    I would plan a 14 day course of antibiotics  Continue zosyn while admitted  Change to Augmentin when stable for discharge    If symptoms recurr, I would favor re-sampling pleural fluid

## 2024-04-13 NOTE — CONSULT NOTE ADULT - SUBJECTIVE AND OBJECTIVE BOX
Patient is a 83y old  Male who presents with a chief complaint of Shock (13 Apr 2024 12:48)    HPI:  83M with HTN, HLD, ICD/ afib on Eliquis, Parkinson's disease, history of chronic pleural effusions, R sided VATS (2022) and Pleurx cathter placement, presents to the ED due to concerns of dyspnea and right sided chest pain around the Pleurx catheter site    Found to be febrile 102F, 2L NC with BP in low 80s  WBC 29 > 10  Initially admitted in MICU, now transferred to floors    UA UCx negative  CXR with loculated effusion in R  BCx NGTD  COVID Flu negative  Legionella and MRSA negative    s/p Vancomycin 4/9 - 4/11  on Zosyn 4/9 -->    prior hospital charts reviewed [  ]  primary team notes reviewed [  ]  other consultant notes reviewed [  ]    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   25 mL/Hr IV Intermittent (04-13-24 @ 13:18)   25 mL/Hr IV Intermittent (04-13-24 @ 05:17)   25 mL/Hr IV Intermittent (04-12-24 @ 22:36)   25 mL/Hr IV Intermittent (04-12-24 @ 13:45)   25 mL/Hr IV Intermittent (04-12-24 @ 06:46)   25 mL/Hr IV Intermittent (04-11-24 @ 22:33)   25 mL/Hr IV Intermittent (04-11-24 @ 13:15)   25 mL/Hr IV Intermittent (04-11-24 @ 05:11)   25 mL/Hr IV Intermittent (04-10-24 @ 23:00)   25 mL/Hr IV Intermittent (04-10-24 @ 13:36)   25 mL/Hr IV Intermittent (04-10-24 @ 06:00)    piperacillin/tazobactam IVPB...   200 mL/Hr IV Intermittent (04-09-24 @ 21:22)    vancomycin  IVPB   250 mL/Hr IV Intermittent (04-10-24 @ 21:06)    vancomycin  IVPB.   250 mL/Hr IV Intermittent (04-09-24 @ 21:45)        piperacillin/tazobactam IVPB.. 3.375 every 8 hours    MEDICATIONS  (STANDING):  acetaminophen     Tablet .. 650 every 6 hours PRN  atorvastatin 80 at bedtime  benzonatate 100 three times a day PRN  buMETAnide 2 every 12 hours  carbidopa/levodopa  25/100 1.5 three times a day  carbidopa/levodopa  25/100 1 at bedtime  desvenlafaxine ER 50 daily  heparin  Infusion.  <Continuous>  influenza  Vaccine (HIGH DOSE) 0.7 once  midodrine 20 every 8 hours  mirtazapine 15 at bedtime  OLANZapine 10 at bedtime    SOCIAL HISTORY:       FAMILY HISTORY:  Family history of lung cancer (Mother)    Family history of esophageal cancer (Father)    FH: myocardial infarction (Grandparent)      REVIEW OF SYSTEMS  [  ] ROS unobtainable because:    [  ] All other systems negative except as noted below:	    Constitutional:  [ ] fever [ ] chills  [ ] weight loss  [ ] weakness  Skin:  [ ] rash [ ] phlebitis	  Eyes: [ ] icterus [ ] pain  [ ] discharge	  ENMT: [ ] sore throat  [ ] thrush [ ] ulcers [ ] exudates  Respiratory: [ ] dyspnea [ ] hemoptysis [ ] cough [ ] sputum	  Cardiovascular:  [ ] chest pain [ ] palpitations [ ] edema	  Gastrointestinal:  [ ] nausea [ ] vomiting [ ] diarrhea [ ] constipation [ ] pain	  Genitourinary:  [ ] dysuria [ ] frequency [ ] hematuria [ ] discharge [ ] flank pain  [ ] incontinence  Musculoskeletal:  [ ] myalgias [ ] arthralgias [ ] arthritis  [ ] back pain  Neurological:  [ ] headache [ ] seizures  [ ] confusion/altered mental status  Psychiatric:  [ ] anxiety [ ] depression	  Hematology/Lymphatics:  [ ] lymphadenopathy  Endocrine:  [ ] adrenal [ ] thyroid  Allergic/Immunologic:	 [ ] transplant [ ] seasonal    Vital Signs Last 24 Hrs  T(F): 97.6 (04-13-24 @ 13:05), Max: 102.7 (04-09-24 @ 20:04)  Vital Signs Last 24 Hrs  HR: 60 (04-13-24 @ 13:05) (60 - 70)  BP: 101/47 (04-13-24 @ 13:05) (94/43 - 115/68)  RR: 18 (04-13-24 @ 13:05)  SpO2: 98% (04-13-24 @ 13:05) (96% - 99%)  Wt(kg): --    PHYSICAL EXAM:                              9.0    10.88 )-----------( 231      ( 13 Apr 2024 00:15 )             27.6   04-13    138  |  101  |  31<H>  ----------------------------<  124<H>  3.6   |  22  |  1.67<H>    Ca    8.6      13 Apr 2024 00:15  Phos  3.1     04-13  Mg     2.20     04-13    TPro  6.1  /  Alb  2.9<L>  /  TBili  0.4  /  DBili  x   /  AST  14  /  ALT  <5  /  AlkPhos  70  04-12    Urinalysis Basic - ( 13 Apr 2024 00:15 )    Color: x / Appearance: x / SG: x / pH: x  Gluc: 124 mg/dL / Ketone: x  / Bili: x / Urobili: x   Blood: x / Protein: x / Nitrite: x   Leuk Esterase: x / RBC: x / WBC x   Sq Epi: x / Non Sq Epi: x / Bacteria: x    MICROBIOLOGY:Vancomycin Level, Random: 7.9 (04-11 @ 06:58)  Vancomycin Level, Random: <4.0 (04-10 @ 06:00)    Culture - Urine (collected 10 Apr 2024 12:07)  Source: Clean Catch Clean Catch (Midstream)  Final Report (11 Apr 2024 13:59):    No growth    Culture - Blood (collected 09 Apr 2024 20:22)  Source: .Blood Blood-Peripheral  Preliminary Report (13 Apr 2024 05:01):    No growth at 72 Hours    Culture - Blood (collected 09 Apr 2024 20:10)  Source: .Blood Blood-Peripheral  Preliminary Report (13 Apr 2024 05:01):    No growth at 72 Hours                  RADIOLOGY:  imaging below personally reviewed and agree with findings  < from: Xray Chest 1 View- PORTABLE-Urgent (Xray Chest 1 View- PORTABLE-Urgent .) (04.10.24 @ 12:40) >  IMPRESSION: Decreasingbilateral pleural effusions with right-sided   Pleurx catheter.    < end of copied text >  < from: CT Abdomen and Pelvis w/ IV Cont (04.09.24 @ 23:16) >  IMPRESSION:  CTPA: No pulmonary embolism. No significant interval change from the   recent prior demonstrating bilateral moderate loculated pleural effusions   with near total collapse of the bilateral lower lobes.    CT abdomen and pelvis: No bowel obstruction. Left renal pelvic stone with   fullness and stranding, unchanged. Diverticulosis without evidence of   diverticulitis.    < end of copied text >   Patient is a 83y old  Male who presents with a chief complaint of Shock (13 Apr 2024 12:48)    HPI:  83M with HTN, HLD, ICD/ afib on Eliquis, Parkinson's disease, history of chronic pleural effusions, R sided VATS (2022) and Pleurx cathter placement, presents to the ED due to concerns of dyspnea and right sided chest pain around the Pleurx catheter site    Found to be febrile 102F, 2L NC with BP in low 80s  WBC 29 > 10  Initially admitted in MICU, now transferred to floors    UA UCx negative  CXR with loculated effusion in R  BCx NGTD  COVID Flu negative  Legionella and MRSA negative    s/p Vancomycin 4/9 - 4/11  on Zosyn 4/9 -->    prior hospital charts reviewed [ x ]  primary team notes reviewed [ x ]  other consultant notes reviewed [ x ]    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   25 mL/Hr IV Intermittent (04-13-24 @ 13:18)   25 mL/Hr IV Intermittent (04-13-24 @ 05:17)   25 mL/Hr IV Intermittent (04-12-24 @ 22:36)   25 mL/Hr IV Intermittent (04-12-24 @ 13:45)   25 mL/Hr IV Intermittent (04-12-24 @ 06:46)   25 mL/Hr IV Intermittent (04-11-24 @ 22:33)   25 mL/Hr IV Intermittent (04-11-24 @ 13:15)   25 mL/Hr IV Intermittent (04-11-24 @ 05:11)   25 mL/Hr IV Intermittent (04-10-24 @ 23:00)   25 mL/Hr IV Intermittent (04-10-24 @ 13:36)   25 mL/Hr IV Intermittent (04-10-24 @ 06:00)    piperacillin/tazobactam IVPB...   200 mL/Hr IV Intermittent (04-09-24 @ 21:22)    vancomycin  IVPB   250 mL/Hr IV Intermittent (04-10-24 @ 21:06)    vancomycin  IVPB.   250 mL/Hr IV Intermittent (04-09-24 @ 21:45)        piperacillin/tazobactam IVPB.. 3.375 every 8 hours    MEDICATIONS  (STANDING):  acetaminophen     Tablet .. 650 every 6 hours PRN  atorvastatin 80 at bedtime  benzonatate 100 three times a day PRN  buMETAnide 2 every 12 hours  carbidopa/levodopa  25/100 1.5 three times a day  carbidopa/levodopa  25/100 1 at bedtime  desvenlafaxine ER 50 daily  heparin  Infusion.  <Continuous>  influenza  Vaccine (HIGH DOSE) 0.7 once  midodrine 20 every 8 hours  mirtazapine 15 at bedtime  OLANZapine 10 at bedtime    SOCIAL HISTORY:   Denies alcohol, tobacco, recreational drug use  No recent travel  Lives with wife, has HHA    FAMILY HISTORY:  Family history of lung cancer (Mother)    Family history of esophageal cancer (Father)    FH: myocardial infarction (Grandparent)      REVIEW OF SYSTEMS  [  ] ROS unobtainable because:    [ x ] All other systems negative except as noted below:	    Constitutional:  [ ] fever [ ] chills  [ ] weight loss  [ ] weakness  Skin:  [ ] rash [ ] phlebitis	  Eyes: [ ] icterus [ ] pain  [ ] discharge	  ENMT: [ ] sore throat  [ ] thrush [ ] ulcers [ ] exudates  Respiratory: [ ] dyspnea [ ] hemoptysis [ ] cough [ ] sputum	  Cardiovascular:  [ ] chest pain [ ] palpitations [ ] edema	  Gastrointestinal:  [ ] nausea [ ] vomiting [ ] diarrhea [ ] constipation [ ] pain	  Genitourinary:  [ ] dysuria [ ] frequency [ ] hematuria [ ] discharge [ ] flank pain  [ ] incontinence  Musculoskeletal:  [ ] myalgias [ ] arthralgias [ ] arthritis  [ ] back pain  Neurological:  [ ] headache [ ] seizures  [ ] confusion/altered mental status  Psychiatric:  [ ] anxiety [ ] depression	  Hematology/Lymphatics:  [ ] lymphadenopathy  Endocrine:  [ ] adrenal [ ] thyroid  Allergic/Immunologic:	 [ ] transplant [ ] seasonal    Vital Signs Last 24 Hrs  T(F): 97.6 (04-13-24 @ 13:05), Max: 102.7 (04-09-24 @ 20:04)  Vital Signs Last 24 Hrs  HR: 60 (04-13-24 @ 13:05) (60 - 70)  BP: 101/47 (04-13-24 @ 13:05) (94/43 - 115/68)  RR: 18 (04-13-24 @ 13:05)  SpO2: 98% (04-13-24 @ 13:05) (96% - 99%)  Wt(kg): --    Physical Exam:  Constitutional:  well preserved, comfortable  Head/Eyes: no icterus  ENT:  supple, no cervical lymphadenopathy   LUNGS:  decrease breath sounds b/l  CVS:  regular rhythm, no murmur  Abd:  soft, non-tender; non-distended  Ext:  no edema  Vascular:  IV site no erythema tenderness or discharge  MSK:  joints without swelling  Neuro: AAO X 3, non- focal                              9.0    10.88 )-----------( 231      ( 13 Apr 2024 00:15 )             27.6   04-13    138  |  101  |  31<H>  ----------------------------<  124<H>  3.6   |  22  |  1.67<H>    Ca    8.6      13 Apr 2024 00:15  Phos  3.1     04-13  Mg     2.20     04-13    TPro  6.1  /  Alb  2.9<L>  /  TBili  0.4  /  DBili  x   /  AST  14  /  ALT  <5  /  AlkPhos  70  04-12    Urinalysis Basic - ( 13 Apr 2024 00:15 )    Color: x / Appearance: x / SG: x / pH: x  Gluc: 124 mg/dL / Ketone: x  / Bili: x / Urobili: x   Blood: x / Protein: x / Nitrite: x   Leuk Esterase: x / RBC: x / WBC x   Sq Epi: x / Non Sq Epi: x / Bacteria: x    MICROBIOLOGY:Vancomycin Level, Random: 7.9 (04-11 @ 06:58)  Vancomycin Level, Random: <4.0 (04-10 @ 06:00)    Culture - Urine (collected 10 Apr 2024 12:07)  Source: Clean Catch Clean Catch (Midstream)  Final Report (11 Apr 2024 13:59):    No growth    Culture - Blood (collected 09 Apr 2024 20:22)  Source: .Blood Blood-Peripheral  Preliminary Report (13 Apr 2024 05:01):    No growth at 72 Hours    Culture - Blood (collected 09 Apr 2024 20:10)  Source: .Blood Blood-Peripheral  Preliminary Report (13 Apr 2024 05:01):    No growth at 72 Hours                  RADIOLOGY:  imaging below personally reviewed and agree with findings  < from: Xray Chest 1 View- PORTABLE-Urgent (Xray Chest 1 View- PORTABLE-Urgent .) (04.10.24 @ 12:40) >  IMPRESSION: Decreasingbilateral pleural effusions with right-sided   Pleurx catheter.    < end of copied text >  < from: CT Abdomen and Pelvis w/ IV Cont (04.09.24 @ 23:16) >  IMPRESSION:  CTPA: No pulmonary embolism. No significant interval change from the   recent prior demonstrating bilateral moderate loculated pleural effusions   with near total collapse of the bilateral lower lobes.    CT abdomen and pelvis: No bowel obstruction. Left renal pelvic stone with   fullness and stranding, unchanged. Diverticulosis without evidence of   diverticulitis.    < end of copied text >

## 2024-04-13 NOTE — PROGRESS NOTE ADULT - NS PRO AD PATIENT TYPE ON CHART
- Continue all medications, treatments and therapies as ordered.   - Follow all instructions, recommendations as discussed.  - Maintain Safety Precautions at all times.  - Attend all medical appointments as scheduled.  - For worsening symptoms: call Primary Care Physician or Nurse Practitioner.  - For emergencies, call 911 or immediately report to the nearest emergency room.  - Limit Risks of environmental exposure to coronavirus/COVID-19 as discussed including: social distancing, hand hygiene, and limiting departures from the home for necessities only.      Medical Orders for Life-Sustaining Treatment (MOLST)

## 2024-04-14 LAB
ALBUMIN SERPL ELPH-MCNC: 3 G/DL — LOW (ref 3.3–5)
ALP SERPL-CCNC: 67 U/L — SIGNIFICANT CHANGE UP (ref 40–120)
ALT FLD-CCNC: <5 U/L — SIGNIFICANT CHANGE UP (ref 4–41)
ANION GAP SERPL CALC-SCNC: 17 MMOL/L — HIGH (ref 7–14)
APTT BLD: 70.6 SEC — HIGH (ref 24.5–35.6)
AST SERPL-CCNC: 15 U/L — SIGNIFICANT CHANGE UP (ref 4–40)
BASOPHILS # BLD AUTO: 0.07 K/UL — SIGNIFICANT CHANGE UP (ref 0–0.2)
BASOPHILS NFR BLD AUTO: 0.9 % — SIGNIFICANT CHANGE UP (ref 0–2)
BILIRUB SERPL-MCNC: 0.4 MG/DL — SIGNIFICANT CHANGE UP (ref 0.2–1.2)
BUN SERPL-MCNC: 34 MG/DL — HIGH (ref 7–23)
CALCIUM SERPL-MCNC: 8.4 MG/DL — SIGNIFICANT CHANGE UP (ref 8.4–10.5)
CHLORIDE SERPL-SCNC: 100 MMOL/L — SIGNIFICANT CHANGE UP (ref 98–107)
CO2 SERPL-SCNC: 20 MMOL/L — LOW (ref 22–31)
CREAT SERPL-MCNC: 2.08 MG/DL — HIGH (ref 0.5–1.3)
EGFR: 31 ML/MIN/1.73M2 — LOW
EOSINOPHIL # BLD AUTO: 0.16 K/UL — SIGNIFICANT CHANGE UP (ref 0–0.5)
EOSINOPHIL NFR BLD AUTO: 2.1 % — SIGNIFICANT CHANGE UP (ref 0–6)
GLUCOSE SERPL-MCNC: 101 MG/DL — HIGH (ref 70–99)
HCT VFR BLD CALC: 26.5 % — LOW (ref 39–50)
HCT VFR BLD CALC: 27.7 % — LOW (ref 39–50)
HGB BLD-MCNC: 8.4 G/DL — LOW (ref 13–17)
HGB BLD-MCNC: 8.9 G/DL — LOW (ref 13–17)
IANC: 5.92 K/UL — SIGNIFICANT CHANGE UP (ref 1.8–7.4)
IMM GRANULOCYTES NFR BLD AUTO: 0.5 % — SIGNIFICANT CHANGE UP (ref 0–0.9)
LYMPHOCYTES # BLD AUTO: 0.81 K/UL — LOW (ref 1–3.3)
LYMPHOCYTES # BLD AUTO: 10.4 % — LOW (ref 13–44)
MAGNESIUM SERPL-MCNC: 2.2 MG/DL — SIGNIFICANT CHANGE UP (ref 1.6–2.6)
MCHC RBC-ENTMCNC: 30.1 PG — SIGNIFICANT CHANGE UP (ref 27–34)
MCHC RBC-ENTMCNC: 30.2 PG — SIGNIFICANT CHANGE UP (ref 27–34)
MCHC RBC-ENTMCNC: 31.7 GM/DL — LOW (ref 32–36)
MCHC RBC-ENTMCNC: 32.1 GM/DL — SIGNIFICANT CHANGE UP (ref 32–36)
MCV RBC AUTO: 93.9 FL — SIGNIFICANT CHANGE UP (ref 80–100)
MCV RBC AUTO: 95 FL — SIGNIFICANT CHANGE UP (ref 80–100)
MONOCYTES # BLD AUTO: 0.76 K/UL — SIGNIFICANT CHANGE UP (ref 0–0.9)
MONOCYTES NFR BLD AUTO: 9.8 % — SIGNIFICANT CHANGE UP (ref 2–14)
NEUTROPHILS # BLD AUTO: 5.92 K/UL — SIGNIFICANT CHANGE UP (ref 1.8–7.4)
NEUTROPHILS NFR BLD AUTO: 76.3 % — SIGNIFICANT CHANGE UP (ref 43–77)
NRBC # BLD: 0 /100 WBCS — SIGNIFICANT CHANGE UP (ref 0–0)
NRBC # BLD: 0 /100 WBCS — SIGNIFICANT CHANGE UP (ref 0–0)
NRBC # FLD: 0 K/UL — SIGNIFICANT CHANGE UP (ref 0–0)
NRBC # FLD: 0 K/UL — SIGNIFICANT CHANGE UP (ref 0–0)
PHOSPHATE SERPL-MCNC: 3.6 MG/DL — SIGNIFICANT CHANGE UP (ref 2.5–4.5)
PLATELET # BLD AUTO: 230 K/UL — SIGNIFICANT CHANGE UP (ref 150–400)
PLATELET # BLD AUTO: 243 K/UL — SIGNIFICANT CHANGE UP (ref 150–400)
POTASSIUM SERPL-MCNC: 3.7 MMOL/L — SIGNIFICANT CHANGE UP (ref 3.5–5.3)
POTASSIUM SERPL-SCNC: 3.7 MMOL/L — SIGNIFICANT CHANGE UP (ref 3.5–5.3)
PROT SERPL-MCNC: 6.4 G/DL — SIGNIFICANT CHANGE UP (ref 6–8.3)
RBC # BLD: 2.79 M/UL — LOW (ref 4.2–5.8)
RBC # BLD: 2.95 M/UL — LOW (ref 4.2–5.8)
RBC # FLD: 13.3 % — SIGNIFICANT CHANGE UP (ref 10.3–14.5)
RBC # FLD: 13.4 % — SIGNIFICANT CHANGE UP (ref 10.3–14.5)
SODIUM SERPL-SCNC: 137 MMOL/L — SIGNIFICANT CHANGE UP (ref 135–145)
WBC # BLD: 10.19 K/UL — SIGNIFICANT CHANGE UP (ref 3.8–10.5)
WBC # BLD: 7.76 K/UL — SIGNIFICANT CHANGE UP (ref 3.8–10.5)
WBC # FLD AUTO: 10.19 K/UL — SIGNIFICANT CHANGE UP (ref 3.8–10.5)
WBC # FLD AUTO: 7.76 K/UL — SIGNIFICANT CHANGE UP (ref 3.8–10.5)

## 2024-04-14 PROCEDURE — 99232 SBSQ HOSP IP/OBS MODERATE 35: CPT

## 2024-04-14 PROCEDURE — 71250 CT THORAX DX C-: CPT | Mod: 26

## 2024-04-14 RX ADMIN — DESVENLAFAXINE 50 MILLIGRAM(S): 50 TABLET, EXTENDED RELEASE ORAL at 12:22

## 2024-04-14 RX ADMIN — HEPARIN SODIUM 1300 UNIT(S)/HR: 5000 INJECTION INTRAVENOUS; SUBCUTANEOUS at 07:37

## 2024-04-14 RX ADMIN — PREGABALIN 1000 MICROGRAM(S): 225 CAPSULE ORAL at 12:22

## 2024-04-14 RX ADMIN — BUMETANIDE 2 MILLIGRAM(S): 0.25 INJECTION INTRAMUSCULAR; INTRAVENOUS at 01:04

## 2024-04-14 RX ADMIN — OLANZAPINE 10 MILLIGRAM(S): 15 TABLET, FILM COATED ORAL at 21:46

## 2024-04-14 RX ADMIN — Medication 500 MILLIGRAM(S): at 12:22

## 2024-04-14 RX ADMIN — Medication 1000 UNIT(S): at 12:22

## 2024-04-14 RX ADMIN — HEPARIN SODIUM 1300 UNIT(S)/HR: 5000 INJECTION INTRAVENOUS; SUBCUTANEOUS at 01:36

## 2024-04-14 RX ADMIN — CARBIDOPA AND LEVODOPA 1.5 TABLET(S): 25; 100 TABLET ORAL at 21:54

## 2024-04-14 RX ADMIN — ATORVASTATIN CALCIUM 80 MILLIGRAM(S): 80 TABLET, FILM COATED ORAL at 21:46

## 2024-04-14 RX ADMIN — Medication 100 MILLIGRAM(S): at 13:37

## 2024-04-14 RX ADMIN — CARBIDOPA AND LEVODOPA 1.5 TABLET(S): 25; 100 TABLET ORAL at 06:15

## 2024-04-14 RX ADMIN — CHLORHEXIDINE GLUCONATE 1 APPLICATION(S): 213 SOLUTION TOPICAL at 12:27

## 2024-04-14 RX ADMIN — MIDODRINE HYDROCHLORIDE 20 MILLIGRAM(S): 2.5 TABLET ORAL at 13:38

## 2024-04-14 RX ADMIN — PIPERACILLIN AND TAZOBACTAM 25 GRAM(S): 4; .5 INJECTION, POWDER, LYOPHILIZED, FOR SOLUTION INTRAVENOUS at 13:37

## 2024-04-14 RX ADMIN — MIDODRINE HYDROCHLORIDE 20 MILLIGRAM(S): 2.5 TABLET ORAL at 06:15

## 2024-04-14 RX ADMIN — Medication 1 DROP(S): at 18:30

## 2024-04-14 RX ADMIN — HEPARIN SODIUM 1300 UNIT(S)/HR: 5000 INJECTION INTRAVENOUS; SUBCUTANEOUS at 18:28

## 2024-04-14 RX ADMIN — CARBIDOPA AND LEVODOPA 1 TABLET(S): 25; 100 TABLET ORAL at 21:46

## 2024-04-14 RX ADMIN — BUMETANIDE 2 MILLIGRAM(S): 0.25 INJECTION INTRAMUSCULAR; INTRAVENOUS at 12:22

## 2024-04-14 RX ADMIN — MIDODRINE HYDROCHLORIDE 20 MILLIGRAM(S): 2.5 TABLET ORAL at 21:47

## 2024-04-14 RX ADMIN — PIPERACILLIN AND TAZOBACTAM 25 GRAM(S): 4; .5 INJECTION, POWDER, LYOPHILIZED, FOR SOLUTION INTRAVENOUS at 06:15

## 2024-04-14 RX ADMIN — BUMETANIDE 2 MILLIGRAM(S): 0.25 INJECTION INTRAMUSCULAR; INTRAVENOUS at 23:55

## 2024-04-14 RX ADMIN — CARBIDOPA AND LEVODOPA 1.5 TABLET(S): 25; 100 TABLET ORAL at 13:38

## 2024-04-14 RX ADMIN — MIRTAZAPINE 15 MILLIGRAM(S): 45 TABLET, ORALLY DISINTEGRATING ORAL at 21:46

## 2024-04-14 RX ADMIN — PIPERACILLIN AND TAZOBACTAM 25 GRAM(S): 4; .5 INJECTION, POWDER, LYOPHILIZED, FOR SOLUTION INTRAVENOUS at 21:46

## 2024-04-14 RX ADMIN — HEPARIN SODIUM 1300 UNIT(S)/HR: 5000 INJECTION INTRAVENOUS; SUBCUTANEOUS at 19:22

## 2024-04-14 RX ADMIN — Medication 1 DROP(S): at 06:16

## 2024-04-14 NOTE — CONSULT NOTE ADULT - SUBJECTIVE AND OBJECTIVE BOX
CARDIOLOGY CONSULT - Dr. Mann         HPI:  Patient is a 84 y/o male with a pmh of  HTN, HLD, ICD/ afib on Eliquis (last dose 3/30), Plavix (last dose 3/26), Parkinson's disease, history of chronic pleural effusions, R sided VATS Pleurx cathter placement, presents to the ED due to concerns of dyspnea and right sided chest pain around the Pleurx catheter site. Patient saw thoracic surgery outpatient on 4/8/24 for a dislodged Pleurx cath. Due to the patient being on Eliquis and Plavix he was sent home and was supposed to return on 4/10 for removal. However, due to his current symptoms, he now presented to the ED. Patient denies headaches abdominal pain, nausea, vomiting, diarrhea or cough.       Upon arrival to the ED, patient was febrile(T-max-102.7) and was given Vanc and Zosyn. He was also given 1L IVF but due to worsening hypotension was eventually started on Levophed. Patient now admitted to the MICU for further monitoring and treatment.  (10 Apr 2024 02:47)    Now sp micu. Cardio consulted for hx CHF.    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      Parkinsons disease      CAD (coronary artery disease)  s/p PCI 08/21      Pleural effusion, not elsewhere classified      COVID-19 vaccine series completed  w booster      History of hip replacement, total  R hip 2008 & L hip      Status post cataract extraction  right eye cataract extraction with IOL 6/26/2015      Presence of cardiac pacemaker  10/2021      H/O coronary angioplasty  8/2021 1 stent inserted      PREVIOUS DIAGNOSTIC TESTING:    [x] Echocardiogram:  < from: TTE W or WO Ultrasound Enhancing Agent (04.03.24 @ 15:11) >  CONCLUSIONS:      1. Technically difficult image quality.   2. Left ventricular systolic function is normal with an ejection fraction visually estimated at 60 to 65 %. There are no regional wall motion abnormalities seen.   3. The right ventricle is not well visualized. A device lead is visualized in the right heart.   4. Structurally normal mitral valve with normal leaflet excursion. There is calcification of the mitral valve annulus. There is mild mitral regurgitation.   5. The aortic valve was not well visualized. The aortic valve anatomy cannot be determined. There is calcification of the aortic valve leaflets. There is mild to moderate aorticregurgitation.    < end of copied text >    [ ]  Catheterization:  [ ] Stress Test:  	    MEDICATIONS:  Home Medications:  ascorbic acid 500 mg oral tablet: 1 tab(s) orally once a day (10 Apr 2024 02:43)  carbidopa-levodopa 25 mg-100 mg oral tablet: 1.5 tab(s) orally 3 times a day (10 Apr 2024 02:44)  carbidopa-levodopa 25 mg-100 mg oral tablet: 1 tab(s) orally once a day (at bedtime) (10 Apr 2024 02:47)  Crestor 20 mg oral tablet: 1 tab(s) orally once a day (10 Apr 2024 02:43)  cyanocobalamin 100 mcg oral tablet: 1 tab(s) orally (10 Apr 2024 02:43)  D3 25 mcg (1000 intl units) oral tablet: 1 tab(s) orally once a day (10 Apr 2024 02:43)  desvenlafaxine (as base) 50 mg oral tablet, extended release: 50 milligram(s) orally once a day (10 Apr 2024 02:43)  Eliquis 2.5 mg oral tablet: 1 tab(s) orally 2 times a day (10 Apr 2024 02:43)  Entresto 24 mg-26 mg oral tablet: 1 tab(s) orally 2 times a day (10 Apr 2024 02:40)  ferrous sulfate 325 mg (65 mg elemental iron) oral delayed release tablet: 1 tab(s) orally 2 times a day (10 Apr 2024 02:48)  methenamine hippurate 1 g oral tablet: 1 tab(s) orally once a day (10 Apr 2024 02:41)  mirtazapine 15 mg oral tablet: 1 tab(s) orally once a day (at bedtime) (10 Apr 2024 02:43)  OLANZapine 10 mg oral tablet: 1 tab(s) orally once a day (at bedtime) (10 Apr 2024 02:43)  Plavix 75 mg oral tablet: 1 tab(s) orally once a day (10 Apr 2024 02:43)  tadalafil 5 mg oral tablet: 1 tab(s) orally once a day (10 Apr 2024 02:48)  Vyndamax 61 mg oral capsule: 1 orally once a day (10 Apr 2024 02:43)      MEDICATIONS  (STANDING):  artificial tears (preservative free) Ophthalmic Solution 1 Drop(s) Both EYES two times a day  ascorbic acid 500 milliGRAM(s) Oral daily  atorvastatin 80 milliGRAM(s) Oral at bedtime  buMETAnide 2 milliGRAM(s) Oral every 12 hours  carbidopa/levodopa  25/100 1.5 Tablet(s) Oral three times a day  carbidopa/levodopa  25/100 1 Tablet(s) Oral at bedtime  chlorhexidine 2% Cloths 1 Application(s) Topical daily  cholecalciferol 1000 Unit(s) Oral daily  cyanocobalamin 1000 MICROGram(s) Oral daily  desvenlafaxine ER 50 milliGRAM(s) Oral daily  heparin  Infusion. 1300 Unit(s)/Hr (13 mL/Hr) IV Continuous <Continuous>  influenza  Vaccine (HIGH DOSE) 0.7 milliLiter(s) IntraMuscular once  midodrine 20 milliGRAM(s) Oral every 8 hours  mirtazapine 15 milliGRAM(s) Oral at bedtime  OLANZapine 10 milliGRAM(s) Oral at bedtime  piperacillin/tazobactam IVPB.. 3.375 Gram(s) IV Intermittent every 8 hours  Tafamidis (Vyndamax) 61 mg 1 Capsule(s) 1 Capsule(s) Oral daily      FAMILY HISTORY:  Family history of lung cancer (Mother)    Family history of esophageal cancer (Father)    FH: myocardial infarction (Grandparent)        SOCIAL HISTORY:    [x] Non-smoker  [ ] Smoker  [ ] Alcohol    Allergies    No Known Allergies    Intolerances    	    REVIEW OF SYSTEMS:  CONSTITUTIONAL: No fever, weight loss, or fatigue  EYES: No eye pain, visual disturbances, or discharge  ENMT:  No difficulty hearing, tinnitus, vertigo; No sinus or throat pain  NECK: No pain or stiffness  RESPIRATORY: No cough, wheezing, chills or hemoptysis; No Shortness of Breath  CARDIOVASCULAR: No chest pain, palpitations, passing out, dizziness, or leg swelling  GASTROINTESTINAL: No abdominal or epigastric pain. No nausea, vomiting, or hematemesis; No diarrhea or constipation. No melena or hematochezia.  GENITOURINARY: No dysuria, frequency, hematuria, or incontinence  NEUROLOGICAL: No headaches, memory loss, loss of strength, numbness, or tremors  SKIN: No itching, burning, rashes, or lesions   	    [x] All others negative	  [ ] Unable to obtain    PHYSICAL EXAM:  T(C): 36.6 (04-14-24 @ 05:10), Max: 36.6 (04-14-24 @ 05:10)  HR: 62 (04-14-24 @ 05:10) (60 - 69)  BP: 103/55 (04-14-24 @ 05:10) (94/43 - 135/89)  RR: 17 (04-14-24 @ 05:10) (17 - 18)  SpO2: 95% (04-14-24 @ 05:10) (95% - 98%)  Wt(kg): --  I&O's Summary    13 Apr 2024 07:01  -  14 Apr 2024 07:00  --------------------------------------------------------  IN: 861 mL / OUT: 920 mL / NET: -59 mL        Appearance: Elderly male 	  Psychiatry: A & O x 3, Mood & affect appropriate  HEENT:   Normal oral mucosa, PERRL, EOMI	  Lymphatic: No lymphadenopathy  Cardiovascular: Normal S1 S2,RRR, No JVD, No murmurs  Respiratory: Somewhat diminished b/l  Gastrointestinal:  +distended but soft   Skin: No rashes, No ecchymoses, No cyanosis	  Neurologic: Non-focal  Extremities: Normal range of motion, No clubbing, cyanosis or edema  Vascular: Peripheral pulses palpable 2+ bilaterally    TELEMETRY: 	    ECG:  vpaced 87bpm	  RADIOLOGY:  < from: CT Angio Chest PE Protocol w/ IV Cont (04.09.24 @ 23:15) >  IMPRESSION:  CTPA: No pulmonary embolism. No significant interval change from the   recent prior demonstrating bilateral moderate loculated pleural effusions   with near total collapse of the bilateral lower lobes.    CT abdomen and pelvis: No bowel obstruction. Left renal pelvic stone with   fullness and stranding, unchanged. Diverticulosis without evidence of   diverticulitis.    --- End of Report ---    < end of copied text >    < from: Xray Chest 1 View- PORTABLE-Urgent (Xray Chest 1 View- PORTABLE-Urgent .) (04.10.24 @ 14:45) >    No evidence of pneumothorax. Stable opacification right lung base and mid   lung. Question small left pleural effusion.    IMPRESSION: No pneumothorax    --- End of Report ---    < end of copied text >      OTHER: 	  	  LABS:	 	    CARDIAC MARKERS:  Troponin T, High Sensitivity Result: 121 ng/L (04-09 @ 22:09)  Troponin T, High Sensitivity Result: 122 ng/L (04-09 @ 20:38)                                  8.9    7.76  )-----------( 243      ( 14 Apr 2024 05:30 )             27.7     04-14    137  |  100  |  34<H>  ----------------------------<  101<H>  3.7   |  20<L>  |  2.08<H>    Ca    8.4      14 Apr 2024 05:30  Phos  3.6     04-14  Mg     2.20     04-14    TPro  6.4  /  Alb  3.0<L>  /  TBili  0.4  /  DBili  x   /  AST  15  /  ALT  <5  /  AlkPhos  67  04-14    PT/INR - ( 13 Apr 2024 00:15 )   PT: 13.9 sec;   INR: 1.24 ratio         PTT - ( 14 Apr 2024 01:05 )  PTT:70.6 sec  proBNP:   Lipid Profile:   HgA1c:   TSH:

## 2024-04-14 NOTE — PROGRESS NOTE ADULT - ASSESSMENT
seen and examined earlier in AM on room air  resting  with wife at bedside  denies complaints    acetaminophen     Tablet .. 650 milliGRAM(s) Oral every 6 hours PRN  artificial tears (preservative free) Ophthalmic Solution 1 Drop(s) Both EYES two times a day  ascorbic acid 500 milliGRAM(s) Oral daily  atorvastatin 80 milliGRAM(s) Oral at bedtime  benzonatate 100 milliGRAM(s) Oral three times a day PRN  buMETAnide 2 milliGRAM(s) Oral every 12 hours  carbidopa/levodopa  25/100 1.5 Tablet(s) Oral three times a day  carbidopa/levodopa  25/100 1 Tablet(s) Oral at bedtime  chlorhexidine 2% Cloths 1 Application(s) Topical daily  cholecalciferol 1000 Unit(s) Oral daily  cyanocobalamin 1000 MICROGram(s) Oral daily  desvenlafaxine ER 50 milliGRAM(s) Oral daily  heparin  Infusion. 1300 Unit(s)/Hr IV Continuous <Continuous>  influenza  Vaccine (HIGH DOSE) 0.7 milliLiter(s) IntraMuscular once  midodrine 20 milliGRAM(s) Oral every 8 hours  mirtazapine 15 milliGRAM(s) Oral at bedtime  OLANZapine 10 milliGRAM(s) Oral at bedtime  piperacillin/tazobactam IVPB.. 3.375 Gram(s) IV Intermittent every 8 hours  Tafamidis (Vyndamax) 61 mg 1 Capsule(s) 1 Capsule(s) Oral daily      VITAL:  T(C): , Max: 37.2 (04-14-24 @ 13:16)  T(F): , Max: 99 (04-14-24 @ 13:16)  HR: 64 (04-14-24 @ 13:16)  BP: 135/70 (04-14-24 @ 13:16)  BP(mean): --  RR: 18 (04-14-24 @ 13:16)  SpO2: 98% (04-14-24 @ 13:16)  Wt(kg): --    04-13-24 @ 07:01  -  04-14-24 @ 07:00  --------------------------------------------------------  IN: 861 mL / OUT: 920 mL / NET: -59 mL    04-14-24 @ 07:01  -  04-14-24 @ 19:54  --------------------------------------------------------  IN: 0 mL / OUT: 450 mL / NET: -450 mL        PHYSICAL EXAM:  Constitutional: NAD, Alert, obese  HEENT: NCAT, DMM  Neck: Supple, No JVD  Respiratory: coarse BS, (+)R Pleurx  Cardiovascular: RRR s1s2, no m/r/g  Gastrointestinal: BS+, soft, NT/ND  Extremities: 2+ b/l LE edema  Neurological: no focal deficits; strength grossly intact  Back: no CVAT b/l  Skin: No rashes, no nevi    LABS:                          8.9    7.76  )-----------( 243      ( 14 Apr 2024 05:30 )             27.7     Na(137)/K(3.7)/Cl(100)/HCO3(20)/BUN(34)/Cr(2.08)Glu(101)/Ca(8.4)/Mg(2.20)/PO4(3.6)    04-14 @ 05:30  Na(138)/K(3.6)/Cl(101)/HCO3(22)/BUN(31)/Cr(1.67)Glu(124)/Ca(8.6)/Mg(2.20)/PO4(3.1)    04-13 @ 00:15  Na(139)/K(3.8)/Cl(103)/HCO3(21)/BUN(31)/Cr(1.82)Glu(118)/Ca(8.7)/Mg(1.90)/PO4(3.2)    04-12 @ 01:29    Urinalysis Basic - ( 14 Apr 2024 05:30 )    Color: x / Appearance: x / SG: x / pH: x  Gluc: 101 mg/dL / Ketone: x  / Bili: x / Urobili: x   Blood: x / Protein: x / Nitrite: x   Leuk Esterase: x / RBC: x / WBC x   Sq Epi: x / Non Sq Epi: x / Bacteria: x              ASSESSMENT:  (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

## 2024-04-14 NOTE — CONSULT NOTE ADULT - ASSESSMENT
Patient is a 84 y/o male with a pmh of  HTN, HLD, ICD/ afib on Eliquis (last dose 3/30), Plavix (last dose 3/26), Parkinson's disease, history of chronic pleural effusions, R sided VATS Pleurx cathter placement, presents to the ED due to concerns of dyspnea and right sided chest pain around the Pleurx catheter site. Patient saw thoracic surgery outpatient on 4/8/24 for a dislodged Pleurx cath. Due to the patient being on Eliquis and Plavix he was sent home and was supposed to return on 4/10 for removal. However, due to his current symptoms, he now presented to the ED. Patient denies headaches abdominal pain, nausea, vomiting, diarrhea or cough.   Upon arrival to the ED, patient was febrile(T-max-102.7) and was given Vanc and Zosyn. He was also given 1L IVF but due to worsening hypotension was eventually started on Levophed. Patient now admitted to the MICU for further monitoring and treatment.  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 pneumonia:  RML zone opacity:   -plan for ct tomorrow  Eliezer effusions:  right sided pleurax cath /RT VATS   -he had vats done on 2022: path showed chronic inflammation  no malignancy  ;  -no pleurx fell out  -last chest xray with RML zone opacity:  not much of effusion  HTN  -blood pressure is reasonable:  on midodrine   A FIB/ICD  -hr controlled:  on iv heparin    HLD  -on atorvastatin  AMYLOIDOSIS  -Tafamidis (Vyndamax) 61 mg 1 Capsule(s) 1 Capsule(s) Oral daily      dw resident:

## 2024-04-14 NOTE — CONSULT NOTE ADULT - ASSESSMENT
A/p  84 y/o male with a pmh of  HTN, HLD, ICD/ afib on Eliquis (last dose 3/30), Plavix (last dose 3/26), Parkinson's disease, history of chronic pleural effusions, R sided VATS Pleurx cathter placement, presents to the ED due to concerns of dyspnea and right sided chest pain around the Pleurx catheter site, found to be in septic shock, now sp micu course.    #Septic Shock  -Unclear source, likely pulm as per ID  -Abx, further w/u per ID  -sp micu course   -sp pleurx removal  -Cont midodrine for hypotension    #s/p pleurex cath  -Now removed as was not draining  -Per thoracic obtain CT chest non-con  -F/u further thoracic recs    #Chronic HFpEF, Cardiac Amyloid   -sp iv diuresis with bumex in micu  -Volume status stable  -Continue po bumex as ordered   -Entresto on hold given soft bp - eventually resume when bp can tolerate   -Cont Vyndamax    #CAD, s/p PCI  -stable, no chest pain  -Eventually resume plavix    #AF, s/p PPM  -stable off avn meds  -Continue heparin gtt for now

## 2024-04-14 NOTE — PROGRESS NOTE ADULT - SUBJECTIVE AND OBJECTIVE BOX
[facility] PGY3  Internal Medicine  Available on Teams     Progress Note  04-10-24 (4d)  Patient is a 83y old  Male who presents with a chief complaint of Shock (14 Apr 2024 10:07)    Subjective / Interval 24 Events :  - No acute events overnight.  - Pt seen and examined with wife at bedside this AM. Pt did not get any sleep overnight because of loud neighbor, very tired this morning. Otherwise breathing comfortably on room air without any new complaints     Additional ROS (if any): see above, otherwise negative     MEDICATIONS  (STANDING):  artificial tears (preservative free) Ophthalmic Solution 1 Drop(s) Both EYES two times a day  ascorbic acid 500 milliGRAM(s) Oral daily  atorvastatin 80 milliGRAM(s) Oral at bedtime  buMETAnide 2 milliGRAM(s) Oral every 12 hours  carbidopa/levodopa  25/100 1.5 Tablet(s) Oral three times a day  carbidopa/levodopa  25/100 1 Tablet(s) Oral at bedtime  chlorhexidine 2% Cloths 1 Application(s) Topical daily  cholecalciferol 1000 Unit(s) Oral daily  cyanocobalamin 1000 MICROGram(s) Oral daily  desvenlafaxine ER 50 milliGRAM(s) Oral daily  heparin  Infusion. 1300 Unit(s)/Hr (13 mL/Hr) IV Continuous <Continuous>  influenza  Vaccine (HIGH DOSE) 0.7 milliLiter(s) IntraMuscular once  midodrine 20 milliGRAM(s) Oral every 8 hours  mirtazapine 15 milliGRAM(s) Oral at bedtime  OLANZapine 10 milliGRAM(s) Oral at bedtime  piperacillin/tazobactam IVPB.. 3.375 Gram(s) IV Intermittent every 8 hours  Tafamidis (Vyndamax) 61 mg 1 Capsule(s) 1 Capsule(s) Oral daily    MEDICATIONS  (PRN):  acetaminophen     Tablet .. 650 milliGRAM(s) Oral every 6 hours PRN Temp greater or equal to 38C (100.4F), Mild Pain (1 - 3)  benzonatate 100 milliGRAM(s) Oral three times a day PRN Cough      CAPILLARY BLOOD GLUCOSE    I&O's Summary    13 Apr 2024 07:01  -  14 Apr 2024 07:00  --------------------------------------------------------  IN: 861 mL / OUT: 920 mL / NET: -59 mL      PHYSICAL EXAM:  Vital Signs Last 24 Hrs  T(C): 36.6 (14 Apr 2024 05:10), Max: 36.6 (14 Apr 2024 05:10)  T(F): 97.9 (14 Apr 2024 05:10), Max: 97.9 (14 Apr 2024 05:10)  HR: 62 (14 Apr 2024 05:10) (60 - 69)  BP: 103/55 (14 Apr 2024 05:10) (100/46 - 135/89)  BP(mean): --  RR: 17 (14 Apr 2024 05:10) (17 - 18)  SpO2: 95% (14 Apr 2024 05:10) (95% - 98%)    Parameters below as of 14 Apr 2024 05:10  Patient On (Oxygen Delivery Method): room air    General: NAD, non-toxic appearing   CV: RRR, normal S1 and S2  Lungs: normal respiratory effort, CTAB   Abd: soft, nontender, nondistended  Ext: no edema, warm, well perfused  Pysch: AAOx3, appropriate affect   Neuro: grossly non-focal, moving all extremities spontaneously   Skin: no rashes or lesions     LABS:                          8.9    7.76  )-----------( 243      ( 14 Apr 2024 05:30 )             27.7       WBC Trend: 7.76<--, 10.19<--, 10.88<--  Hb Trend: 8.9<--, 8.4<--, 9.0<--, 9.1<--, 10.2<--    04-14    137  |  100  |  34<H>  ----------------------------<  101<H>  3.7   |  20<L>  |  2.08<H>    Ca    8.4      14 Apr 2024 05:30  Phos  3.6     04-14  Mg     2.20     04-14    TPro  6.4  /  Alb  3.0<L>  /  TBili  0.4  /  DBili  x   /  AST  15  /  ALT  <5  /  AlkPhos  67  04-14    PT/INR - ( 13 Apr 2024 00:15 )   PT: 13.9 sec;   INR: 1.24 ratio    PTT - ( 14 Apr 2024 01:05 )  PTT:70.6 sec    Urinalysis Basic - ( 14 Apr 2024 05:30 )    Color: x / Appearance: x / SG: x / pH: x  Gluc: 101 mg/dL / Ketone: x  / Bili: x / Urobili: x   Blood: x / Protein: x / Nitrite: x   Leuk Esterase: x / RBC: x / WBC x   Sq Epi: x / Non Sq Epi: x / Bacteria: x      RADIOLOGY & ADDITIONAL TESTS: Reviewed  - Pending CT chest

## 2024-04-14 NOTE — PROGRESS NOTE ADULT - ASSESSMENT
83 year old with Parkinson disease and htn/hyperlipidemia   He has chronic pleural effusions and prior VATS  Cultures and pathology were not diagnositic  At that time, he had pleural fluid sent for cytology and bacterial culture as well as pleural tissue sent for pathology  He presented with fever, shortness of breath and right sided chest pain  He had associated leukocytosis.     Imaging significant for known pleural effusions with right sided loculation    He was admitted to the ICU  He was given zosyn    He has clinically improved but diagnosis is not confirmed  My highest suspicion would be for a pulmonary infection  His pleural effusions are complicating picture.  They have been present prior to this admission, so not clear if they are involved.     At this time, he has improved  Follow up report from repeat CT    I would plan a 14 day course of antibiotics  Continue zosyn while admitted  Change to Augmentin when stable for discharge    If symptoms recurr, I would favor re-sampling pleural fluid

## 2024-04-14 NOTE — CONSULT NOTE ADULT - SUBJECTIVE AND OBJECTIVE BOX
04-14-24 @ 10:08    Patient is a 83y old  Male who presents with a chief complaint of Shock (14 Apr 2024 08:01)      HPI:  Patient is a 84 y/o male with a pmh of  HTN, HLD, ICD/ afib on Eliquis (last dose 3/30), Plavix (last dose 3/26), Parkinson's disease, history of chronic pleural effusions, R sided VATS Pleurx cathter placement, presents to the ED due to concerns of dyspnea and right sided chest pain around the Pleurx catheter site. Patient saw thoracic surgery outpatient on 4/8/24 for a dislodged Pleurx cath. Due to the patient being on Eliquis and Plavix he was sent home and was supposed to return on 4/10 for removal. However, due to his current symptoms, he now presented to the ED. Patient denies headaches abdominal pain, nausea, vomiting, diarrhea or cough.   Upon arrival to the ED, patient was febrile(T-max-102.7) and was given Vanc and Zosyn. He was also given 1L IVF but due to worsening hypotension was eventually started on Levophed. Patient now admitted to the MICU for further monitoring and treatment.  (10 Apr 2024 02:47):    wife provided me with the history:  he came in with fever and was supposed to get pif tail removed at dr office:  he was on eliquis:  his pig tail was laved for recurrent pleural effusion secondary to cardiac disease:  has no malignancy  :         ?FOLLOWING PRESENT  [ x] Hx of PE/DVT, [ ]x Hx COPD, [x ] Hx of Asthma, [y ] Hx of Hospitalization, [ x]  Hx of BiPAP/CPAP use, x[ ] Hx of LIOR    Allergies    No Known Allergies    Intolerances        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      Parkinsons disease      CAD (coronary artery disease)  s/p PCI 08/21      Pleural effusion, not elsewhere classified      COVID-19 vaccine series completed  w booster      History of hip replacement, total  R hip 2008 & L hip      Status post cataract extraction  right eye cataract extraction with IOL 6/26/2015      Presence of cardiac pacemaker  10/2021      H/O coronary angioplasty  8/2021 1 stent inserted          FAMILY HISTORY:  Family history of lung cancer (Mother)    Family history of esophageal cancer (Father)    FH: myocardial infarction (Grandparent)        Social History: [ x ] TOBACCO                  [  x] ETOH                                 [ x ] IVDA/DRUGS    REVIEW OF SYSTEMS      General:	fever    Skin/Breast:x  	  Ophthalmologic:x  	  ENMT:	x    Respiratory and Thorax: no sob, no cough   	  Cardiovascular:	x    Gastrointestinal:	x    Genitourinary:	x    Musculoskeletal:	x    Neurological:	x    Psychiatric:	x    Hematology/Lymphatics:	x    Endocrine:x	    Allergic/Immunologic:	x    MEDICATIONS  (STANDING):  artificial tears (preservative free) Ophthalmic Solution 1 Drop(s) Both EYES two times a day  ascorbic acid 500 milliGRAM(s) Oral daily  atorvastatin 80 milliGRAM(s) Oral at bedtime  buMETAnide 2 milliGRAM(s) Oral every 12 hours  carbidopa/levodopa  25/100 1.5 Tablet(s) Oral three times a day  carbidopa/levodopa  25/100 1 Tablet(s) Oral at bedtime  chlorhexidine 2% Cloths 1 Application(s) Topical daily  cholecalciferol 1000 Unit(s) Oral daily  cyanocobalamin 1000 MICROGram(s) Oral daily  desvenlafaxine ER 50 milliGRAM(s) Oral daily  heparin  Infusion. 1300 Unit(s)/Hr (13 mL/Hr) IV Continuous <Continuous>  influenza  Vaccine (HIGH DOSE) 0.7 milliLiter(s) IntraMuscular once  midodrine 20 milliGRAM(s) Oral every 8 hours  mirtazapine 15 milliGRAM(s) Oral at bedtime  OLANZapine 10 milliGRAM(s) Oral at bedtime  piperacillin/tazobactam IVPB.. 3.375 Gram(s) IV Intermittent every 8 hours  Tafamidis (Vyndamax) 61 mg 1 Capsule(s) 1 Capsule(s) Oral daily    MEDICATIONS  (PRN):  acetaminophen     Tablet .. 650 milliGRAM(s) Oral every 6 hours PRN Temp greater or equal to 38C (100.4F), Mild Pain (1 - 3)  benzonatate 100 milliGRAM(s) Oral three times a day PRN Cough       Vital Signs Last 24 Hrs  T(C): 36.6 (14 Apr 2024 05:10), Max: 36.6 (14 Apr 2024 05:10)  T(F): 97.9 (14 Apr 2024 05:10), Max: 97.9 (14 Apr 2024 05:10)  HR: 62 (14 Apr 2024 05:10) (60 - 69)  BP: 103/55 (14 Apr 2024 05:10) (100/46 - 135/89)  BP(mean): --  RR: 17 (14 Apr 2024 05:10) (17 - 18)  SpO2: 95% (14 Apr 2024 05:10) (95% - 98%)    Parameters below as of 14 Apr 2024 05:10  Patient On (Oxygen Delivery Method): room air    Orthostatic VS          I&O's Summary    13 Apr 2024 07:01  -  14 Apr 2024 07:00  --------------------------------------------------------  IN: 861 mL / OUT: 920 mL / NET: -59 mL        Physical Exam:   GENERAL: NAD, well-groomed, well-developed  HEENT: MESFIN/   Atraumatic, Normocephalic  ENMT: No tonsillar erythema, exudates, or enlargement; Moist mucous membranes, Good dentition, No lesions  NECK: Supple, No JVD, Normal thyroid  CHEST/LUNG: Clear to auscultation bilaterally-no wheezing  CVS: Regular rate and rhythm; No murmurs, rubs, or gallops  GI: : Soft, Nontender, Nondistended; Bowel sounds present  NERVOUS SYSTEM: sleepy but not in any resp distress  EXTREMITIES:  Mild  edema  LYMPH: No lymphadenopathy noted  SKIN: No rashes or lesions  ENDOCRINOLOGY: No Thyromegaly  PSYCH: Appropriate    Labs:  2.2<39<4>>64<<7.445>>2.2<<3><<4><<5<<649>>, 1.5<43<4>>47<<7.405>>1.5<<3><<4><<5<<479>>, Venous<53<4>>31<<7.345>>Venous<<3><<4><<5<<319>>, Venous<53<4>>39<<7.315>>Venous<<3><<4><<5<<399>>, 2.6<58<4>>25<<7.325>>2.6<<3><<4><<5<<259>>SARS-CoV-2: Vontec (11 Nov 2023 10:31)                              8.9    7.76  )-----------( 243      ( 14 Apr 2024 05:30 )             27.7                         8.4    10.19 )-----------( 230      ( 14 Apr 2024 01:05 )             26.5                         9.0    10.88 )-----------( 231      ( 13 Apr 2024 00:15 )             27.6                         9.1    12.93 )-----------( 238      ( 12 Apr 2024 01:29 )             28.8                         10.2   17.16 )-----------( 259      ( 11 Apr 2024 06:58 )             32.7     04-14    137  |  100  |  34<H>  ----------------------------<  101<H>  3.7   |  20<L>  |  2.08<H>  04-13    138  |  101  |  31<H>  ----------------------------<  124<H>  3.6   |  22  |  1.67<H>  04-12    139  |  103  |  31<H>  ----------------------------<  118<H>  3.8   |  21<L>  |  1.82<H>  04-11    140  |  101  |  30<H>  ----------------------------<  125<H>  3.2<L>   |  25  |  1.73<H>    Ca    8.4      14 Apr 2024 05:30  Ca    8.6      13 Apr 2024 00:15  Phos  3.6     04-14  Phos  3.1     04-13  Mg     2.20     04-14  Mg     2.20     04-13    TPro  6.4  /  Alb  3.0<L>  /  TBili  0.4  /  DBili  x   /  AST  15  /  ALT  <5  /  AlkPhos  67  04-14  TPro  6.1  /  Alb  2.9<L>  /  TBili  0.4  /  DBili  x   /  AST  14  /  ALT  <5  /  AlkPhos  70  04-12  TPro  6.9  /  Alb  3.3  /  TBili  0.6  /  DBili  x   /  AST  6   /  ALT  <5  /  AlkPhos  78  04-11    CAPILLARY BLOOD GLUCOSE        LIVER FUNCTIONS - ( 14 Apr 2024 05:30 )  Alb: 3.0 g/dL / Pro: 6.4 g/dL / ALK PHOS: 67 U/L / ALT: <5 U/L / AST: 15 U/L / GGT: x           PT/INR - ( 13 Apr 2024 00:15 )   PT: 13.9 sec;   INR: 1.24 ratio         PTT - ( 14 Apr 2024 01:05 )  PTT:70.6 sec  Urinalysis Basic - ( 14 Apr 2024 05:30 )    Color: x / Appearance: x / SG: x / pH: x  Gluc: 101 mg/dL / Ketone: x  / Bili: x / Urobili: x   Blood: x / Protein: x / Nitrite: x   Leuk Esterase: x / RBC: x / WBC x   Sq Epi: x / Non Sq Epi: x / Bacteria: x      D DImer  Procalcitonin, Serum: 0.60 ng/mL (04-11 @ 06:58)      Studies  Chest X-RAY  CT SCAN Chest   CT Abdomen  Venous Dopplers: LE:   Others          rd< from: Xray Chest 1 View- PORTABLE-Urgent (Xray Chest 1 View- PORTABLE-Urgent .) (04.10.24 @ 14:45) >    ACC: [id number] EXAM:  XR CHEST PORTABLE URGENT 1V   ORDERED BY: JIM CONNELLY     PROCEDURE DATE:  04/10/2024          INTERPRETATION:  Exam:XR CHEST URGENT    clinical history:Chest tube removal    No evidence of pneumothorax. Stable opacification right lung base and mid   lung. Question small left pleural effusion.    IMPRESSION: No pneumothorax    --- End of Report ---            DESTINI KNOX MD; Attending Radiologist  This document has been electronically signed. Apr 10 2024  3:07PM    < end of copied text >  < from: Xray Chest 1 View- PORTABLE-Urgent (Xray Chest 1 View- PORTABLE-Urgent .) (04.10.24 @ 12:40) >    ACC: [id number] EXAM:  XR CHEST PORTABLE URGENT 1V   ORDERED BY: LIDA SERRANO     PROCEDURE DATE:  04/10/2024          INTERPRETATION:  CLINICAL INFORMATION: Follow-up with chest tube    TIME OF EXAMINATION: April 10, 2024 at 11:33 AM    EXAM: Portable chest    FINDINGS:    Right-sided Pleurx catheter unchanged.  Moderate loculated right effusion may be decreased along with layering   left effusion.  Visualized lungs are.  No pneumothorax.        COMPARISON: April 9        IMPRESSION: Decreasingbilateral pleural effusions with right-sided   Pleurx catheter.    < end of copied text >    ct< from: CT Angio Chest PE Protocol w/ IV Cont (04.09.24 @ 23:15) >    IMPRESSION:  CTPA: No pulmonary embolism. No significant interval change from the   recent prior demonstrating bilateral moderate loculated pleural effusions   with near total collapse of the bilateral lower lobes.    CT abdomen and pelvis: No bowel obstruction. Left renal pelvic stone with   fullness and stranding, unchanged. Diverticulosis without evidence of   diverticulitis.    --- End of Report ---    < end of copied text >    < from: TTE W or WO Ultrasound Enhancing Agent (04.03.24 @ 15:11) >  _______________________________________________________________________________________     CONCLUSIONS:      1. Technically difficult image quality.   2. Left ventricular systolic function is normal with an ejection fraction visually estimated at 60 to 65 %. There are no regional wall motion abnormalities seen.   3. The right ventricle is not well visualized. A device lead is visualized in the right heart.   4. Structurally normal mitral valve with normal leaflet excursion. There is calcification of the mitral valve annulus. There is mild mitral regurgitation.   5. The aortic valve was not well visualized. The aortic valve anatomy cannot be determined. There is calcification of the aortic valve leaflets. There is mild to moderate aorticregurgitation.    < end of copied text >

## 2024-04-14 NOTE — PROGRESS NOTE ADULT - SUBJECTIVE AND OBJECTIVE BOX
Follow Up:      Inverval History/ROS:Patient is a 83y old  Male who presents with a chief complaint of Shock (14 Apr 2024 10:07)      Allergies    No Known Allergies    Intolerances        ANTIMICROBIALS:  piperacillin/tazobactam IVPB.. 3.375 every 8 hours      OTHER MEDS:  acetaminophen     Tablet .. 650 milliGRAM(s) Oral every 6 hours PRN  artificial tears (preservative free) Ophthalmic Solution 1 Drop(s) Both EYES two times a day  ascorbic acid 500 milliGRAM(s) Oral daily  atorvastatin 80 milliGRAM(s) Oral at bedtime  benzonatate 100 milliGRAM(s) Oral three times a day PRN  buMETAnide 2 milliGRAM(s) Oral every 12 hours  carbidopa/levodopa  25/100 1.5 Tablet(s) Oral three times a day  carbidopa/levodopa  25/100 1 Tablet(s) Oral at bedtime  chlorhexidine 2% Cloths 1 Application(s) Topical daily  cholecalciferol 1000 Unit(s) Oral daily  cyanocobalamin 1000 MICROGram(s) Oral daily  desvenlafaxine ER 50 milliGRAM(s) Oral daily  heparin  Infusion. 1300 Unit(s)/Hr IV Continuous <Continuous>  influenza  Vaccine (HIGH DOSE) 0.7 milliLiter(s) IntraMuscular once  midodrine 20 milliGRAM(s) Oral every 8 hours  mirtazapine 15 milliGRAM(s) Oral at bedtime  OLANZapine 10 milliGRAM(s) Oral at bedtime  Tafamidis (Vyndamax) 61 mg 1 Capsule(s) 1 Capsule(s) Oral daily      Vital Signs Last 24 Hrs  T(C): 36.6 (14 Apr 2024 05:10), Max: 36.6 (14 Apr 2024 05:10)  T(F): 97.9 (14 Apr 2024 05:10), Max: 97.9 (14 Apr 2024 05:10)  HR: 62 (14 Apr 2024 05:10) (60 - 69)  BP: 103/55 (14 Apr 2024 05:10) (100/46 - 135/89)  BP(mean): --  RR: 17 (14 Apr 2024 05:10) (17 - 18)  SpO2: 95% (14 Apr 2024 05:10) (95% - 98%)    Parameters below as of 14 Apr 2024 05:10  Patient On (Oxygen Delivery Method): room air        PHYSICAL EXAM:  General: [ ] non-toxic  HEAD/EYES: [ ] PERRL [ ] white sclera [ ] icterus  ENT:  [ ] normal [ ] supple [ ] thrush [ ] pharyngeal exudate  Cardiovascular:   [ ] murmur [ ] normal [ ] PPM/AICD  Respiratory:  [ ] clear to ausculation bilaterally  GI:  [ ] soft, non-tender, normal bowel sounds  :  [ ] means [ ] no CVA tenderness   Musculoskeletal:  [ ] no synovitis  Neurologic:  [ ] non-focal exam   Skin:  [ ] no rash  Lymph: [ ] no lymphadenopathy  Psychiatric:  [ ] appropriate affect [ ] alert & oriented  Lines:  [ ] no phlebitis [ ] central line                                8.9    7.76  )-----------( 243      ( 14 Apr 2024 05:30 )             27.7       04-14    137  |  100  |  34<H>  ----------------------------<  101<H>  3.7   |  20<L>  |  2.08<H>    Ca    8.4      14 Apr 2024 05:30  Phos  3.6     04-14  Mg     2.20     04-14    TPro  6.4  /  Alb  3.0<L>  /  TBili  0.4  /  DBili  x   /  AST  15  /  ALT  <5  /  AlkPhos  67  04-14      Urinalysis Basic - ( 14 Apr 2024 05:30 )    Color: x / Appearance: x / SG: x / pH: x  Gluc: 101 mg/dL / Ketone: x  / Bili: x / Urobili: x   Blood: x / Protein: x / Nitrite: x   Leuk Esterase: x / RBC: x / WBC x   Sq Epi: x / Non Sq Epi: x / Bacteria: x        MICROBIOLOGY:Culture Results:   No growth (04-10-24 @ 12:07)  Culture Results:   No growth at 4 days (04-09-24 @ 20:22)  Culture Results:   No growth at 4 days (04-09-24 @ 20:10)      RADIOLOGY:

## 2024-04-14 NOTE — PROGRESS NOTE ADULT - ASSESSMENT
Patient is a 84 y/o male with a pmh of  HTN, HLD, ICD/ afib on Eliquis (last dose 3/30), Plavix (last dose 3/26), Parkinson's disease, history of chronic pleural effusions, R sided VATS Pleurx cathter placement, presents to the ED with a dislodged R PleurX, admitted to MICU with septic shock now stable and downgraded to floors.

## 2024-04-14 NOTE — CONSULT NOTE ADULT - TIME BILLING
Direct patient Interaction and evaluation, chart review, image review, medical decision making and care coordination
Patient seen and examined.  Agree with above ACP note.  A/p  82 y/o male with a pmh of  HTN, HLD, ICD/ afib on Eliquis (last dose 3/30), Plavix (last dose 3/26), Parkinson's disease, history of chronic pleural effusions, R sided VATS Pleurx cathter placement, presents to the ED due to concerns of dyspnea and right sided chest pain around the Pleurx catheter site, found to be in septic shock, now sp micu course.    #Septic Shock  -ID w/u in progress ? source  -sp micu course   -sp pleurx removal  -midodrine as needed  #s/p pleurex cath  -s/p removal   -thoracic f/u, ct chest  #Chronic HFpEF, Cardiac Amyloid   -sp iv diuresis with bumex in micu  -Volume status stable  -Continue po bumex as ordered   -Entresto on hold to minimize hypotension, eventual resume   -Cont Vyndamax  #CAD, s/p PCI  -stable, Eventually resume plavix if no CI  #AF, s/p PPM  -stable, cont a/c with iv hep

## 2024-04-15 ENCOUNTER — TRANSCRIPTION ENCOUNTER (OUTPATIENT)
Age: 84
End: 2024-04-15

## 2024-04-15 LAB
ALBUMIN SERPL ELPH-MCNC: 2.9 G/DL — LOW (ref 3.3–5)
ALP SERPL-CCNC: 69 U/L — SIGNIFICANT CHANGE UP (ref 40–120)
ALT FLD-CCNC: <5 U/L — SIGNIFICANT CHANGE UP (ref 4–41)
ANION GAP SERPL CALC-SCNC: 19 MMOL/L — HIGH (ref 7–14)
APTT BLD: 59.7 SEC — HIGH (ref 24.5–35.6)
AST SERPL-CCNC: 22 U/L — SIGNIFICANT CHANGE UP (ref 4–40)
BASOPHILS # BLD AUTO: 0.03 K/UL — SIGNIFICANT CHANGE UP (ref 0–0.2)
BASOPHILS NFR BLD AUTO: 0.4 % — SIGNIFICANT CHANGE UP (ref 0–2)
BILIRUB SERPL-MCNC: 0.5 MG/DL — SIGNIFICANT CHANGE UP (ref 0.2–1.2)
BUN SERPL-MCNC: 34 MG/DL — HIGH (ref 7–23)
CALCIUM SERPL-MCNC: 8.4 MG/DL — SIGNIFICANT CHANGE UP (ref 8.4–10.5)
CHLORIDE SERPL-SCNC: 100 MMOL/L — SIGNIFICANT CHANGE UP (ref 98–107)
CO2 SERPL-SCNC: 19 MMOL/L — LOW (ref 22–31)
CREAT SERPL-MCNC: 2.12 MG/DL — HIGH (ref 0.5–1.3)
CULTURE RESULTS: SIGNIFICANT CHANGE UP
CULTURE RESULTS: SIGNIFICANT CHANGE UP
EGFR: 31 ML/MIN/1.73M2 — LOW
EOSINOPHIL # BLD AUTO: 0.13 K/UL — SIGNIFICANT CHANGE UP (ref 0–0.5)
EOSINOPHIL NFR BLD AUTO: 1.9 % — SIGNIFICANT CHANGE UP (ref 0–6)
GLUCOSE SERPL-MCNC: 98 MG/DL — SIGNIFICANT CHANGE UP (ref 70–99)
HCT VFR BLD CALC: 28 % — LOW (ref 39–50)
HGB BLD-MCNC: 8.8 G/DL — LOW (ref 13–17)
IANC: 4.79 K/UL — SIGNIFICANT CHANGE UP (ref 1.8–7.4)
IMM GRANULOCYTES NFR BLD AUTO: 0.4 % — SIGNIFICANT CHANGE UP (ref 0–0.9)
LYMPHOCYTES # BLD AUTO: 0.96 K/UL — LOW (ref 1–3.3)
LYMPHOCYTES # BLD AUTO: 14.1 % — SIGNIFICANT CHANGE UP (ref 13–44)
MAGNESIUM SERPL-MCNC: 2.3 MG/DL — SIGNIFICANT CHANGE UP (ref 1.6–2.6)
MCHC RBC-ENTMCNC: 29.7 PG — SIGNIFICANT CHANGE UP (ref 27–34)
MCHC RBC-ENTMCNC: 31.4 GM/DL — LOW (ref 32–36)
MCV RBC AUTO: 94.6 FL — SIGNIFICANT CHANGE UP (ref 80–100)
MONOCYTES # BLD AUTO: 0.85 K/UL — SIGNIFICANT CHANGE UP (ref 0–0.9)
MONOCYTES NFR BLD AUTO: 12.5 % — SIGNIFICANT CHANGE UP (ref 2–14)
NEUTROPHILS # BLD AUTO: 4.79 K/UL — SIGNIFICANT CHANGE UP (ref 1.8–7.4)
NEUTROPHILS NFR BLD AUTO: 70.7 % — SIGNIFICANT CHANGE UP (ref 43–77)
NRBC # BLD: 0 /100 WBCS — SIGNIFICANT CHANGE UP (ref 0–0)
NRBC # FLD: 0 K/UL — SIGNIFICANT CHANGE UP (ref 0–0)
PHOSPHATE SERPL-MCNC: 3.7 MG/DL — SIGNIFICANT CHANGE UP (ref 2.5–4.5)
PLATELET # BLD AUTO: 236 K/UL — SIGNIFICANT CHANGE UP (ref 150–400)
POTASSIUM SERPL-MCNC: 3.1 MMOL/L — LOW (ref 3.5–5.3)
POTASSIUM SERPL-SCNC: 3.1 MMOL/L — LOW (ref 3.5–5.3)
PROT SERPL-MCNC: 6.3 G/DL — SIGNIFICANT CHANGE UP (ref 6–8.3)
RBC # BLD: 2.96 M/UL — LOW (ref 4.2–5.8)
RBC # FLD: 13.5 % — SIGNIFICANT CHANGE UP (ref 10.3–14.5)
SODIUM SERPL-SCNC: 138 MMOL/L — SIGNIFICANT CHANGE UP (ref 135–145)
SPECIMEN SOURCE: SIGNIFICANT CHANGE UP
SPECIMEN SOURCE: SIGNIFICANT CHANGE UP
WBC # BLD: 6.79 K/UL — SIGNIFICANT CHANGE UP (ref 3.8–10.5)
WBC # FLD AUTO: 6.79 K/UL — SIGNIFICANT CHANGE UP (ref 3.8–10.5)

## 2024-04-15 PROCEDURE — 99232 SBSQ HOSP IP/OBS MODERATE 35: CPT

## 2024-04-15 RX ORDER — MIDODRINE HYDROCHLORIDE 2.5 MG/1
10 TABLET ORAL EVERY 8 HOURS
Refills: 0 | Status: DISCONTINUED | OUTPATIENT
Start: 2024-04-15 | End: 2024-04-18

## 2024-04-15 RX ORDER — POTASSIUM CHLORIDE 20 MEQ
20 PACKET (EA) ORAL DAILY
Refills: 0 | Status: DISCONTINUED | OUTPATIENT
Start: 2024-04-16 | End: 2024-04-18

## 2024-04-15 RX ORDER — POTASSIUM CHLORIDE 20 MEQ
40 PACKET (EA) ORAL ONCE
Refills: 0 | Status: COMPLETED | OUTPATIENT
Start: 2024-04-15 | End: 2024-04-15

## 2024-04-15 RX ADMIN — MIRTAZAPINE 15 MILLIGRAM(S): 45 TABLET, ORALLY DISINTEGRATING ORAL at 22:19

## 2024-04-15 RX ADMIN — CARBIDOPA AND LEVODOPA 1.5 TABLET(S): 25; 100 TABLET ORAL at 22:19

## 2024-04-15 RX ADMIN — CHLORHEXIDINE GLUCONATE 1 APPLICATION(S): 213 SOLUTION TOPICAL at 14:56

## 2024-04-15 RX ADMIN — HEPARIN SODIUM 1300 UNIT(S)/HR: 5000 INJECTION INTRAVENOUS; SUBCUTANEOUS at 14:40

## 2024-04-15 RX ADMIN — Medication 1 DROP(S): at 17:52

## 2024-04-15 RX ADMIN — HEPARIN SODIUM 1300 UNIT(S)/HR: 5000 INJECTION INTRAVENOUS; SUBCUTANEOUS at 19:10

## 2024-04-15 RX ADMIN — BUMETANIDE 2 MILLIGRAM(S): 0.25 INJECTION INTRAMUSCULAR; INTRAVENOUS at 23:49

## 2024-04-15 RX ADMIN — Medication 1000 UNIT(S): at 13:09

## 2024-04-15 RX ADMIN — PIPERACILLIN AND TAZOBACTAM 25 GRAM(S): 4; .5 INJECTION, POWDER, LYOPHILIZED, FOR SOLUTION INTRAVENOUS at 22:17

## 2024-04-15 RX ADMIN — HEPARIN SODIUM 1300 UNIT(S)/HR: 5000 INJECTION INTRAVENOUS; SUBCUTANEOUS at 07:36

## 2024-04-15 RX ADMIN — ATORVASTATIN CALCIUM 80 MILLIGRAM(S): 80 TABLET, FILM COATED ORAL at 22:18

## 2024-04-15 RX ADMIN — CARBIDOPA AND LEVODOPA 1.5 TABLET(S): 25; 100 TABLET ORAL at 05:41

## 2024-04-15 RX ADMIN — PREGABALIN 1000 MICROGRAM(S): 225 CAPSULE ORAL at 13:09

## 2024-04-15 RX ADMIN — PIPERACILLIN AND TAZOBACTAM 25 GRAM(S): 4; .5 INJECTION, POWDER, LYOPHILIZED, FOR SOLUTION INTRAVENOUS at 05:40

## 2024-04-15 RX ADMIN — PIPERACILLIN AND TAZOBACTAM 25 GRAM(S): 4; .5 INJECTION, POWDER, LYOPHILIZED, FOR SOLUTION INTRAVENOUS at 14:51

## 2024-04-15 RX ADMIN — OLANZAPINE 10 MILLIGRAM(S): 15 TABLET, FILM COATED ORAL at 22:19

## 2024-04-15 RX ADMIN — CARBIDOPA AND LEVODOPA 1.5 TABLET(S): 25; 100 TABLET ORAL at 14:53

## 2024-04-15 RX ADMIN — DESVENLAFAXINE 50 MILLIGRAM(S): 50 TABLET, EXTENDED RELEASE ORAL at 14:55

## 2024-04-15 RX ADMIN — Medication 1 DROP(S): at 05:41

## 2024-04-15 RX ADMIN — CARBIDOPA AND LEVODOPA 1 TABLET(S): 25; 100 TABLET ORAL at 22:18

## 2024-04-15 RX ADMIN — MIDODRINE HYDROCHLORIDE 20 MILLIGRAM(S): 2.5 TABLET ORAL at 14:54

## 2024-04-15 RX ADMIN — Medication 500 MILLIGRAM(S): at 13:10

## 2024-04-15 RX ADMIN — Medication 40 MILLIEQUIVALENT(S): at 13:09

## 2024-04-15 RX ADMIN — BUMETANIDE 2 MILLIGRAM(S): 0.25 INJECTION INTRAMUSCULAR; INTRAVENOUS at 13:09

## 2024-04-15 RX ADMIN — HEPARIN SODIUM 1300 UNIT(S)/HR: 5000 INJECTION INTRAVENOUS; SUBCUTANEOUS at 15:52

## 2024-04-15 RX ADMIN — MIDODRINE HYDROCHLORIDE 10 MILLIGRAM(S): 2.5 TABLET ORAL at 22:20

## 2024-04-15 NOTE — PROGRESS NOTE ADULT - PROBLEM SELECTOR PLAN 1
-T-max on arrival was 102.7 and hypotensive SBP 80s MAP 50s, leukocytosis (WBC 29), LA 3.3>1.5, procal 0.54  - Unclear infectious source  - Weaned off Levophed 4/12, now on midodrine 20mg TID, goal systolic BP >95  - Can lower midodrine to home dose 10 TID   - CT 4/14 demonstrating interval increase in b/l loculated pleural effusions  - Infectious w/up including flu/covid, MRSA (negative), urine legionella/strep pneumoiae ag (negative), Bcx/UA/Ucx negative  -s/p empiric Vanco (4/9-4/11), continue Zosyn (4/9- ) plan for 14d course with transition to Augmentin upon d/c

## 2024-04-15 NOTE — PROGRESS NOTE ADULT - ASSESSMENT
Patient is a 82 y/o male with a pmh of  HTN, HLD, ICD/ afib on Eliquis (last dose 3/30), Plavix (last dose 3/26), Parkinson's disease, history of chronic pleural effusions, R sided VATS Pleurx cathter placement, presents to the ED due to concerns of dyspnea and right sided chest pain around the Pleurx catheter site. Patient saw thoracic surgery outpatient on 4/8/24 for a dislodged Pleurx cath. Due to the patient being on Eliquis and Plavix he was sent home and was supposed to return on 4/10 for removal. However, due to his current symptoms, he now presented to the ED. Patient denies headaches abdominal pain, nausea, vomiting, diarrhea or cough.   Upon arrival to the ED, patient was febrile(T-max-102.7) and was given Vanc and Zosyn. He was also given 1L IVF but due to worsening hypotension was eventually started on Levophed. Patient now admitted to the MICU for further monitoring and treatment.  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 pneumonia:  RML zone opacity:   -plan for ct tomorrow  4/15: ct chest repeated today : reported as Interval removal of the right sided chest tube. Moderate partially loculated right pleural effusion is enlarged from prior. Small to moderate partially loculated left pleural effusion is also enlarged from prior. Partial atelectasis both lower lobes, increased from prior.  -defer to surgery  : clinically he is not in any resp distress:  his last vbg WAS PRETTY GOOD:  NOT ON BIPAP     Eliezer effusions:  right sided pleurax cath /RT VATS   -he had vats done on 2022: path showed chronic inflammation  no malignancy  ;  -no pleurx fell out  -last chest xray with RML zone opacity:  not much of effusion  4/15: still with loculated effusion on both sides:"  slight increase:  defer to primary team on bumex q 12 hours    HTN  -blood pressure is reasonable:  on midodrine     A FIB/ICD  -hr controlled:  on iv heparin    HLD  -on atorvastatin    AMYLOIDOSIS  -Tafamidis (Vyndamax) 61 mg 1 Capsule(s) 1 Capsule(s) Oral daily      dw resident:

## 2024-04-15 NOTE — CHART NOTE - NSCHARTNOTEFT_GEN_A_CORE
Pt seen. Wife at bedside.  Wife expressed concerns with pt's sleepiness,  slow progress.     Pt in no distress. Resting  comfortably on NC. Pt currently  afebrile, no leukocytosis.     CT scan showing increased loculated pleural effusion  compared to prior exam.     Above d/w Dr. Oneal   Plan:  -Recommend IR consult for drainage of loculated Right pleural effusion given inc in size.   Would recommend IR guidance due to loculation. Will then be able to send   pleural fluid culture to r/o any source of infection.   Would not replace any Pleurx until infection ruled out  Above d/w IR fellow and primary team and pt's wife  Will follow

## 2024-04-15 NOTE — DISCHARGE NOTE PROVIDER - CARE PROVIDER_API CALL
Dior Ramsey  Geriatric Medicine  [address] [place] [address], [address]  [place] [address]  Phone: [phone]  Fax: [phone]  Established Patient  Follow Up Time: 1 week   Dior Ramsey  Geriatric Medicine  [address] [facility], [address]  [place] [address]  Phone: [phone]  Fax: [phone]  Established Patient  Follow Up Time: 1 week    Clifton Cool  Nephrology  [address] [facility], [address]  [place] [address]  Phone: [phone]  Fax: [phone]  Established Patient  Follow Up Time: 1 week    Rufus Oneal  Thoracic Surgery  [address] [place] [address] [place] [address]  Phone: [phone]  Fax: [phone]  Established Patient  Follow Up Time: 1 week   Dior Ramsey  Geriatric Medicine  [address] [facility], [address]  [place] [address]  Phone: [phone]  Fax: [phone]  Established Patient  Follow Up Time: 1 week    Clifton Cool  Nephrology  [address] [facility] [address]  [place] [address]  Phone: [phone]  Fax: [phone]  Established Patient  Follow Up Time: 1 week    Rufus Oneal  Thoracic Surgery  [address] [place] [address] [place] [address]  Phone: [phone]  Fax: [phone]  Established Patient  Follow Up Time: 1 week    Kenia Rodriguez  Internal Medicine  [address] [place] [address], [address]  [place] [address]  Phone: [phone]  Fax: [phone]  Follow Up Time:

## 2024-04-15 NOTE — DISCHARGE NOTE PROVIDER - NSDCCPCAREPLAN_GEN_ALL_CORE_FT
PRINCIPAL DISCHARGE DIAGNOSIS  Diagnosis: Pneumonia  Assessment and Plan of Treatment: You were treated with several days of broad coverage IV antibitoics for pneumonia. As your blood pressure improved and you were deemed stable for discharge on a course of oral antibiotics. While you were hopsitalized, a catheter was placed into your chest, and pleural fluid (the fluid from the sac surrounding your lungs) was collected and sent for testing. Upon completion of your course of antibioics. you are advised to followup with Thoracic surgery for placement of a pleurx catheter.   Please take your antibiotics as prescribed. Please seek medical attention if you develop signs and symptoms concerning for a worsening infection including fever, chills, shortness of breath, cough, nausea/vomiting.      SECONDARY DISCHARGE DIAGNOSES  Diagnosis: Hypotension  Assessment and Plan of Treatment: You will be discharged with a course of oral midodrine, a medication that is meant to raise your blood pressure. Please take this medication as prescribed until the time of your next PCP appointment.

## 2024-04-15 NOTE — PROGRESS NOTE ADULT - SUBJECTIVE AND OBJECTIVE BOX
Date of Service: 04-15-24 @ 10:28    Patient is a 83y old  Male who presents with a chief complaint of Shock (15 Apr 2024 08:42)      Any change in ROS: wife at bedside:  pt seems OK:  but very sleepy:  no overnight events per aide at bedside:     MEDICATIONS  (STANDING):  artificial tears (preservative free) Ophthalmic Solution 1 Drop(s) Both EYES two times a day  ascorbic acid 500 milliGRAM(s) Oral daily  atorvastatin 80 milliGRAM(s) Oral at bedtime  buMETAnide 2 milliGRAM(s) Oral every 12 hours  carbidopa/levodopa  25/100 1.5 Tablet(s) Oral three times a day  carbidopa/levodopa  25/100 1 Tablet(s) Oral at bedtime  chlorhexidine 2% Cloths 1 Application(s) Topical daily  cholecalciferol 1000 Unit(s) Oral daily  cyanocobalamin 1000 MICROGram(s) Oral daily  desvenlafaxine ER 50 milliGRAM(s) Oral daily  heparin  Infusion. 1300 Unit(s)/Hr (13 mL/Hr) IV Continuous <Continuous>  influenza  Vaccine (HIGH DOSE) 0.7 milliLiter(s) IntraMuscular once  midodrine 20 milliGRAM(s) Oral every 8 hours  mirtazapine 15 milliGRAM(s) Oral at bedtime  OLANZapine 10 milliGRAM(s) Oral at bedtime  piperacillin/tazobactam IVPB.. 3.375 Gram(s) IV Intermittent every 8 hours  potassium chloride   Powder 40 milliEquivalent(s) Oral once  Tafamidis (Vyndamax) 61 mg 1 Capsule(s) 1 Capsule(s) Oral daily    MEDICATIONS  (PRN):  acetaminophen     Tablet .. 650 milliGRAM(s) Oral every 6 hours PRN Temp greater or equal to 38C (100.4F), Mild Pain (1 - 3)  benzonatate 100 milliGRAM(s) Oral three times a day PRN Cough    Vital Signs Last 24 Hrs  T(C): 36.8 (15 Apr 2024 05:40), Max: 37.2 (14 Apr 2024 13:16)  T(F): 98.3 (15 Apr 2024 05:40), Max: 99 (14 Apr 2024 13:16)  HR: 62 (15 Apr 2024 05:40) (62 - 64)  BP: 132/60 (15 Apr 2024 05:40) (118/60 - 135/70)  BP(mean): --  RR: 18 (15 Apr 2024 05:40) (18 - 18)  SpO2: 94% (15 Apr 2024 05:40) (94% - 98%)    Parameters below as of 15 Apr 2024 05:40  Patient On (Oxygen Delivery Method): room air        I&O's Summary    14 Apr 2024 07:01  -  15 Apr 2024 07:00  --------------------------------------------------------  IN: 340 mL / OUT: 1550 mL / NET: -1210 mL          Physical Exam:   GENERAL: NAD, well-groomed, well-developed  HEENT: MESFIN/   Atraumatic, Normocephalic  ENMT: No tonsillar erythema, exudates, or enlargement; Moist mucous membranes, Good dentition, No lesions  NECK: Supple, No JVD, Normal thyroid  CHEST/LUNG: Clear to auscultaion-no wheezing  CVS: Regular rate and rhythm; No murmurs, rubs, or gallops  GI: : Soft, Nontender, Nondistended; Bowel sounds present  NERVOUS SYSTEM:  sleepy but arrousable   EXTREMITIES:-edema  LYMPH: No lymphadenopathy noted  SKIN: No rashes or lesions  ENDOCRINOLOGY: No Thyromegaly  PSYCH: calm : sleepy     Labs:  26, 27, 29, 27, 30                            8.8    6.79  )-----------( 236      ( 15 Apr 2024 06:07 )             28.0                         8.9    7.76  )-----------( 243      ( 14 Apr 2024 05:30 )             27.7                         8.4    10.19 )-----------( 230      ( 14 Apr 2024 01:05 )             26.5                         9.0    10.88 )-----------( 231      ( 13 Apr 2024 00:15 )             27.6                         9.1    12.93 )-----------( 238      ( 12 Apr 2024 01:29 )             28.8     04-15    138  |  100  |  34<H>  ----------------------------<  98  3.1<L>   |  19<L>  |  2.12<H>  04-14    137  |  100  |  34<H>  ----------------------------<  101<H>  3.7   |  20<L>  |  2.08<H>  04-13    138  |  101  |  31<H>  ----------------------------<  124<H>  3.6   |  22  |  1.67<H>  04-12    139  |  103  |  31<H>  ----------------------------<  118<H>  3.8   |  21<L>  |  1.82<H>    Ca    8.4      15 Apr 2024 06:07  Ca    8.4      14 Apr 2024 05:30  Phos  3.7     04-15  Phos  3.6     04-14  Mg     2.30     04-15  Mg     2.20     04-14    TPro  6.3  /  Alb  2.9<L>  /  TBili  0.5  /  DBili  x   /  AST  22  /  ALT  <5  /  AlkPhos  69  04-15  TPro  6.4  /  Alb  3.0<L>  /  TBili  0.4  /  DBili  x   /  AST  15  /  ALT  <5  /  AlkPhos  67  04-14  TPro  6.1  /  Alb  2.9<L>  /  TBili  0.4  /  DBili  x   /  AST  14  /  ALT  <5  /  AlkPhos  70  04-12    CAPILLARY BLOOD GLUCOSE          LIVER FUNCTIONS - ( 15 Apr 2024 06:07 )  Alb: 2.9 g/dL / Pro: 6.3 g/dL / ALK PHOS: 69 U/L / ALT: <5 U/L / AST: 22 U/L / GGT: x           PTT - ( 14 Apr 2024 01:05 )  PTT:70.6 sec  Urinalysis Basic - ( 15 Apr 2024 06:07 )    Color: x / Appearance: x / SG: x / pH: x  Gluc: 98 mg/dL / Ketone: x  / Bili: x / Urobili: x   Blood: x / Protein: x / Nitrite: x   Leuk Esterase: x / RBC: x / WBC x   Sq Epi: x / Non Sq Epi: x / Bacteria: x            RECENT CULTURES:  04-10 @ 12:07 Clean Catch Clean Catch (Midstream)         rad< from: CT Chest No Cont (04.14.24 @ 11:01) >  IMPRESSION:    Interval removal of the right sided chest tube. Moderate partially   loculated right pleural effusion is enlarged from prior. Small to  moderate partially loculated left pleural effusion is also enlarged from   prior. Partial atelectasis both lower lobes, increased from prior.      --- End of Report ---    < end of copied text >         No growth    04-09 @ 20:22 .Blood Blood-Peripheral                No growth at 5 days    04-09 @ 20:10 .Blood Blood-Peripheral                No growth at 5 days          RESPIRATORY CULTURES:          Studies  Chest X-RAY  CT SCAN Chest   Venous Dopplers: LE:   CT Abdomen  Others

## 2024-04-15 NOTE — CONSULT NOTE PEDS - SUBJECTIVE AND OBJECTIVE BOX
Interventional Radiology      HPI: 83y Male with HTN, HLD, AFib, Parkinson's disease, and chronic pleural effusions s/p VATS pleurx (removed) presents with septic shock. IR was consulted for right pleural effusion drainage and pigtail catheter placement.     Allergies: No Known Allergies    Medications (Abx/Cardiac/Anticoagulation/Blood Products)    buMETAnide: 2 milliGRAM(s) Oral (04-15 @ 13:09)  heparin  Infusion.: 1300 Unit(s)/Hr IV Continuous (04-15 @ 14:41)  midodrine: 20 milliGRAM(s) Oral (04-15 @ 14:54)  piperacillin/tazobactam IVPB..: 25 mL/Hr IV Intermittent (04-15 @ 14:51)    Data:    T(C): 36.4  HR: 60  BP: 105/49  RR: 18  SpO2: 97%    -WBC 6.79 / HgB 8.8 / Hct 28.0 / Plt 236  -Na 138 / Cl 100 / BUN 34 / Glucose 98  -K 3.1 / CO2 19 / Cr 2.12  -ALT <5 / Alk Phos 69 / T.Bili 0.5  -INR -- / PTT 59.7      Radiology:   Imaging reviewed.    Assessment/Plan:   83y Male with HTN, HLD, AFib, Parkinson's disease, and chronic pleural effusions s/p VATS pleurx (removed) presents with septic shock. IR was consulted for right pleural effusion drainage and pigtail catheter placement.     -- per pulmonology, patient is not in respiratory distress, with stable oxygen saturation, and no increased oxygen requirement.  -- interval imaging shows no significant change in the chronic bilateral pleural effusions.  -- monitor vitals and respiratory status closely  -- no role for emergent IR intervention at this time    --  Masoud Ospina MD PGY-3  Available on [employer] Teams    - Non-emergent consults: Place IR consult order in [place]  - Emergent issues (pager): [facility] [phone]; [facility] [phone]; [phone]  - Scheduling questions: [facility] [phone]; [facility] [phone]  - Clinic/outpatient booking: [facility] [phone]; [facility] [phone]

## 2024-04-15 NOTE — PROGRESS NOTE ADULT - SUBJECTIVE AND OBJECTIVE BOX
***************************************************************  Cami Orta, PGY 1   Internal Medicine   ***************************************************************    ALBINO RIVAS  83y  MRN: [id number]    Patient is a 83y old  Male who presents with a chief complaint of Shock (14 Apr 2024 19:53)      Subjective: no events ON. Denies fever, CP, SOB, abn pain, N/V, dysuria. Tolerating diet.      MEDICATIONS  (STANDING):  artificial tears (preservative free) Ophthalmic Solution 1 Drop(s) Both EYES two times a day  ascorbic acid 500 milliGRAM(s) Oral daily  atorvastatin 80 milliGRAM(s) Oral at bedtime  buMETAnide 2 milliGRAM(s) Oral every 12 hours  carbidopa/levodopa  25/100 1.5 Tablet(s) Oral three times a day  carbidopa/levodopa  25/100 1 Tablet(s) Oral at bedtime  chlorhexidine 2% Cloths 1 Application(s) Topical daily  cholecalciferol 1000 Unit(s) Oral daily  cyanocobalamin 1000 MICROGram(s) Oral daily  desvenlafaxine ER 50 milliGRAM(s) Oral daily  heparin  Infusion. 1300 Unit(s)/Hr (13 mL/Hr) IV Continuous <Continuous>  influenza  Vaccine (HIGH DOSE) 0.7 milliLiter(s) IntraMuscular once  midodrine 20 milliGRAM(s) Oral every 8 hours  mirtazapine 15 milliGRAM(s) Oral at bedtime  OLANZapine 10 milliGRAM(s) Oral at bedtime  piperacillin/tazobactam IVPB.. 3.375 Gram(s) IV Intermittent every 8 hours  Tafamidis (Vyndamax) 61 mg 1 Capsule(s) 1 Capsule(s) Oral daily    MEDICATIONS  (PRN):  acetaminophen     Tablet .. 650 milliGRAM(s) Oral every 6 hours PRN Temp greater or equal to 38C (100.4F), Mild Pain (1 - 3)  benzonatate 100 milliGRAM(s) Oral three times a day PRN Cough      Objective:    Vitals: Vital Signs Last 24 Hrs  T(C): 36.8 (04-15-24 @ 05:40), Max: 37.2 (04-14-24 @ 13:16)  T(F): 98.3 (04-15-24 @ 05:40), Max: 99 (04-14-24 @ 13:16)  HR: 62 (04-15-24 @ 05:40) (62 - 64)  BP: 132/60 (04-15-24 @ 05:40) (118/60 - 135/70)  BP(mean): --  RR: 18 (04-15-24 @ 05:40) (18 - 18)  SpO2: 94% (04-15-24 @ 05:40) (94% - 98%)            I&O's Summary    14 Apr 2024 07:01  -  15 Apr 2024 07:00  --------------------------------------------------------  IN: 340 mL / OUT: 1550 mL / NET: -1210 mL        PHYSICAL EXAM:  GENERAL: NAD  HEAD:  Atraumatic, Normocephalic  EYES: EOMI, conjunctiva and sclera clear  CHEST/LUNG: Clear to auscultation bilaterally; No rales, rhonchi, wheezing, or rubs  HEART: Regular rate and rhythm; No murmurs, rubs, or gallops  ABDOMEN: Soft, Nontender, Nondistended;   SKIN: No rashes or lesions  NERVOUS SYSTEM:  Alert & Oriented X3, no focal deficits    LABS:  04-14    137  |  100  |  34<H>  ----------------------------<  101<H>  3.7   |  20<L>  |  2.08<H>  04-13    138  |  101  |  31<H>  ----------------------------<  124<H>  3.6   |  22  |  1.67<H>    Ca    8.4      14 Apr 2024 05:30  Ca    8.6      13 Apr 2024 00:15  Phos  3.6     04-14  Mg     2.20     04-14    TPro  6.4  /  Alb  3.0<L>  /  TBili  0.4  /  DBili  x   /  AST  15  /  ALT  <5  /  AlkPhos  67  04-14      PTT - ( 14 Apr 2024 01:05 )  PTT:70.6 sec              Urinalysis Basic - ( 14 Apr 2024 05:30 )    Color: x / Appearance: x / SG: x / pH: x  Gluc: 101 mg/dL / Ketone: x  / Bili: x / Urobili: x   Blood: x / Protein: x / Nitrite: x   Leuk Esterase: x / RBC: x / WBC x   Sq Epi: x / Non Sq Epi: x / Bacteria: x                              8.8    6.79  )-----------( 236      ( 15 Apr 2024 06:07 )             28.0                         8.9    7.76  )-----------( 243      ( 14 Apr 2024 05:30 )             27.7                         8.4    10.19 )-----------( 230      ( 14 Apr 2024 01:05 )             26.5     CAPILLARY BLOOD GLUCOSE          RADIOLOGY & ADDITIONAL TESTS:    Imaging Personally Reviewed:  [x ] YES  [ ] NO    Consultants involved in case:   Consultant(s) Notes Reviewed:  [ x] YES  [ ] NO:   Care Discussed with Consultants/Other Providers [x ] YES  [ ] NO         ***************************************************************  Cami Orta, PGY 1   Internal Medicine   ***************************************************************    ALBINO RIVAS  83y  MRN: [id number]    Patient is a 83y old  Male who presents with a chief complaint of Shock (14 Apr 2024 19:53)      Subjective: no events ON. Seen at bedside with HHA. Feels well, somnolent. Denies fever, CP, SOB, abn pain, N/V.    MEDICATIONS  (STANDING):  artificial tears (preservative free) Ophthalmic Solution 1 Drop(s) Both EYES two times a day  ascorbic acid 500 milliGRAM(s) Oral daily  atorvastatin 80 milliGRAM(s) Oral at bedtime  buMETAnide 2 milliGRAM(s) Oral every 12 hours  carbidopa/levodopa  25/100 1.5 Tablet(s) Oral three times a day  carbidopa/levodopa  25/100 1 Tablet(s) Oral at bedtime  chlorhexidine 2% Cloths 1 Application(s) Topical daily  cholecalciferol 1000 Unit(s) Oral daily  cyanocobalamin 1000 MICROGram(s) Oral daily  desvenlafaxine ER 50 milliGRAM(s) Oral daily  heparin  Infusion. 1300 Unit(s)/Hr (13 mL/Hr) IV Continuous <Continuous>  influenza  Vaccine (HIGH DOSE) 0.7 milliLiter(s) IntraMuscular once  midodrine 20 milliGRAM(s) Oral every 8 hours  mirtazapine 15 milliGRAM(s) Oral at bedtime  OLANZapine 10 milliGRAM(s) Oral at bedtime  piperacillin/tazobactam IVPB.. 3.375 Gram(s) IV Intermittent every 8 hours  Tafamidis (Vyndamax) 61 mg 1 Capsule(s) 1 Capsule(s) Oral daily    MEDICATIONS  (PRN):  acetaminophen     Tablet .. 650 milliGRAM(s) Oral every 6 hours PRN Temp greater or equal to 38C (100.4F), Mild Pain (1 - 3)  benzonatate 100 milliGRAM(s) Oral three times a day PRN Cough      Objective:    Vitals: Vital Signs Last 24 Hrs  T(C): 36.8 (04-15-24 @ 05:40), Max: 37.2 (04-14-24 @ 13:16)  T(F): 98.3 (04-15-24 @ 05:40), Max: 99 (04-14-24 @ 13:16)  HR: 62 (04-15-24 @ 05:40) (62 - 64)  BP: 132/60 (04-15-24 @ 05:40) (118/60 - 135/70)  BP(mean): --  RR: 18 (04-15-24 @ 05:40) (18 - 18)  SpO2: 94% (04-15-24 @ 05:40) (94% - 98%)            I&O's Summary    14 Apr 2024 07:01  -  15 Apr 2024 07:00  --------------------------------------------------------  IN: 340 mL / OUT: 1550 mL / NET: -1210 mL        PHYSICAL EXAM:  GENERAL: NAD  HEAD:  Atraumatic, Normocephalic  EYES: EOMI, conjunctiva and sclera clear  CHEST/LUNG: Diminished breath sounds at lung bases; No rales, rhonchi, wheezing, or rubs. Well healed site of prior Pleurx  HEART: Regular rate and rhythm; No murmurs, rubs, or gallops  ABDOMEN: Soft, Nontender, Nondistended;   SKIN: No rashes or lesions  NERVOUS SYSTEM:  Alert & Oriented X3, no focal deficits    LABS:  04-14    137  |  100  |  34<H>  ----------------------------<  101<H>  3.7   |  20<L>  |  2.08<H>  04-13    138  |  101  |  31<H>  ----------------------------<  124<H>  3.6   |  22  |  1.67<H>    Ca    8.4      14 Apr 2024 05:30  Ca    8.6      13 Apr 2024 00:15  Phos  3.6     04-14  Mg     2.20     04-14    TPro  6.4  /  Alb  3.0<L>  /  TBili  0.4  /  DBili  x   /  AST  15  /  ALT  <5  /  AlkPhos  67  04-14      PTT - ( 14 Apr 2024 01:05 )  PTT:70.6 sec              Urinalysis Basic - ( 14 Apr 2024 05:30 )    Color: x / Appearance: x / SG: x / pH: x  Gluc: 101 mg/dL / Ketone: x  / Bili: x / Urobili: x   Blood: x / Protein: x / Nitrite: x   Leuk Esterase: x / RBC: x / WBC x   Sq Epi: x / Non Sq Epi: x / Bacteria: x                              8.8    6.79  )-----------( 236      ( 15 Apr 2024 06:07 )             28.0                         8.9    7.76  )-----------( 243      ( 14 Apr 2024 05:30 )             27.7                         8.4    10.19 )-----------( 230      ( 14 Apr 2024 01:05 )             26.5     CAPILLARY BLOOD GLUCOSE          RADIOLOGY & ADDITIONAL TESTS:    Imaging Personally Reviewed:  [x ] YES  [ ] NO    Consultants involved in case:   Consultant(s) Notes Reviewed:  [ x] YES  [ ] NO:   Care Discussed with Consultants/Other Providers [x ] YES  [ ] NO

## 2024-04-15 NOTE — DISCHARGE NOTE PROVIDER - NSFOLLOWUPCLINICS_GEN_ALL_ED_FT
[facility]  Pulmonology  [address] [place] [address], [place] [address]  Phone: [phone]  Fax:   Follow Up Time: 1 week

## 2024-04-15 NOTE — PROGRESS NOTE ADULT - PROBLEM SELECTOR PLAN 2
Bilateral loculated pleural effusions s/p R VATs and pleurx catheter placement (2022)   - Moderate b/l loculated effusions appreciable on CTC 4/9 without interval change, chronic  - Likely iso CHF   - Pt recently saw Dr. Oneal (Thoracic surg) outpt, concern was for dislodged pleurx catheter which was planned to be removed today 4/10 however pt presented to ED prior  - On arrival pleurx catheter displaced, removed at bedside 4/10, f/u CXR stable without PTX . now stable for replacement  - Per Dr. Oneal low c/f infection of pleural space/pleural fluid however will f/u with noncon CTC   - Continue diuresis w/ bumex 2mg bid

## 2024-04-15 NOTE — PROGRESS NOTE ADULT - SUBJECTIVE AND OBJECTIVE BOX
Follow Up:      Inverval History/ROS:Patient is a 83y old  Male who presents with a chief complaint of Shock (15 Apr 2024 10:28)    No fever      Allergies    No Known Allergies    Intolerances        ANTIMICROBIALS:  piperacillin/tazobactam IVPB.. 3.375 every 8 hours      OTHER MEDS:  acetaminophen     Tablet .. 650 milliGRAM(s) Oral every 6 hours PRN  artificial tears (preservative free) Ophthalmic Solution 1 Drop(s) Both EYES two times a day  ascorbic acid 500 milliGRAM(s) Oral daily  atorvastatin 80 milliGRAM(s) Oral at bedtime  benzonatate 100 milliGRAM(s) Oral three times a day PRN  buMETAnide 2 milliGRAM(s) Oral every 12 hours  carbidopa/levodopa  25/100 1.5 Tablet(s) Oral three times a day  carbidopa/levodopa  25/100 1 Tablet(s) Oral at bedtime  chlorhexidine 2% Cloths 1 Application(s) Topical daily  cholecalciferol 1000 Unit(s) Oral daily  cyanocobalamin 1000 MICROGram(s) Oral daily  desvenlafaxine ER 50 milliGRAM(s) Oral daily  heparin  Infusion. 1300 Unit(s)/Hr IV Continuous <Continuous>  influenza  Vaccine (HIGH DOSE) 0.7 milliLiter(s) IntraMuscular once  midodrine 20 milliGRAM(s) Oral every 8 hours  mirtazapine 15 milliGRAM(s) Oral at bedtime  OLANZapine 10 milliGRAM(s) Oral at bedtime  Tafamidis (Vyndamax) 61 mg 1 Capsule(s) 1 Capsule(s) Oral daily      Vital Signs Last 24 Hrs  T(C): 36.8 (15 Apr 2024 05:40), Max: 36.8 (15 Apr 2024 05:40)  T(F): 98.3 (15 Apr 2024 05:40), Max: 98.3 (15 Apr 2024 05:40)  HR: 62 (15 Apr 2024 05:40) (62 - 63)  BP: 132/60 (15 Apr 2024 05:40) (118/60 - 132/60)  BP(mean): --  RR: 18 (15 Apr 2024 05:40) (18 - 18)  SpO2: 94% (15 Apr 2024 05:40) (94% - 95%)    Parameters below as of 15 Apr 2024 05:40  Patient On (Oxygen Delivery Method): room air        PHYSICAL EXAM:  General: [ x] non-toxic  HEAD/EYES: [ ] PERRL [ x] white sclera [ ] icterus  ENT:  [ ] normal [x ] supple [ ] thrush [ ] pharyngeal exudate  Cardiovascular:   [ ] murmur x[ ] normal [ ] PPM/AICD  Respiratory:  [x ] clear to ausculation bilaterally  GI:  [ ]x soft, non-tender, normal bowel sounds  :  [ ] means [ ] no CVA tenderness   Musculoskeletal:  [ ] no synovitis  Neurologic:  [ ] non-focal exam   Skin:  [x ] no rash  Lymph: [x ] no lymphadenopathy  Psychiatric:  [ ] appropriate affect [ ] alert & oriented  Lines:  [ ] no phlebitis [ ] central line                                8.8    6.79  )-----------( 236      ( 15 Apr 2024 06:07 )             28.0       04-15    138  |  100  |  34<H>  ----------------------------<  98  3.1<L>   |  19<L>  |  2.12<H>    Ca    8.4      15 Apr 2024 06:07  Phos  3.7     04-15  Mg     2.30     04-15    TPro  6.3  /  Alb  2.9<L>  /  TBili  0.5  /  DBili  x   /  AST  22  /  ALT  <5  /  AlkPhos  69  04-15      Urinalysis Basic - ( 15 Apr 2024 06:07 )    Color: x / Appearance: x / SG: x / pH: x  Gluc: 98 mg/dL / Ketone: x  / Bili: x / Urobili: x   Blood: x / Protein: x / Nitrite: x   Leuk Esterase: x / RBC: x / WBC x   Sq Epi: x / Non Sq Epi: x / Bacteria: x        MICROBIOLOGY:Culture Results:   No growth (04-10-24 @ 12:07)  Culture Results:   No growth at 5 days (04-09-24 @ 20:22)  Culture Results:   No growth at 5 days (04-09-24 @ 20:10)      RADIOLOGY:

## 2024-04-15 NOTE — PROGRESS NOTE ADULT - SUBJECTIVE AND OBJECTIVE BOX
CARDIOLOGY FOLLOW UP - Dr. Mann  DATE OF SERVICE: 4/15/24    CC  No CV complaints     REVIEW OF SYSTEMS:  CONSTITUTIONAL: No fever, weight loss, or fatigue  RESPIRATORY: No cough, wheezing, chills or hemoptysis; No Shortness of Breath  CARDIOVASCULAR: No chest pain, palpitations, passing out, dizziness, or leg swelling  GASTROINTESTINAL: No abdominal or epigastric pain. No nausea, vomiting, or hematemesis; No diarrhea or constipation. No melena or hematochezia.  VASCULAR: No edema     PHYSICAL EXAM:  T(C): 36.8 (04-15-24 @ 05:40), Max: 37.2 (04-14-24 @ 13:16)  HR: 62 (04-15-24 @ 05:40) (62 - 64)  BP: 132/60 (04-15-24 @ 05:40) (118/60 - 135/70)  RR: 18 (04-15-24 @ 05:40) (18 - 18)  SpO2: 94% (04-15-24 @ 05:40) (94% - 98%)  Wt(kg): --  I&O's Summary    14 Apr 2024 07:01  -  15 Apr 2024 07:00  --------------------------------------------------------  IN: 340 mL / OUT: 1550 mL / NET: -1210 mL        Appearance: Elderly male 	  Cardiovascular: Normal S1 S2,RRR, No JVD, No murmurs  Respiratory: Diminished b/l  Gastrointestinal:  Soft, Non-tender, + BS	  Extremities: Normal range of motion, No clubbing, cyanosis or edema      Home Medications:  ascorbic acid 500 mg oral tablet: 1 tab(s) orally once a day (10 Apr 2024 02:43)  carbidopa-levodopa 25 mg-100 mg oral tablet: 1.5 tab(s) orally 3 times a day (10 Apr 2024 02:44)  carbidopa-levodopa 25 mg-100 mg oral tablet: 1 tab(s) orally once a day (at bedtime) (10 Apr 2024 02:47)  Crestor 20 mg oral tablet: 1 tab(s) orally once a day (10 Apr 2024 02:43)  cyanocobalamin 100 mcg oral tablet: 1 tab(s) orally (10 Apr 2024 02:43)  D3 25 mcg (1000 intl units) oral tablet: 1 tab(s) orally once a day (10 Apr 2024 02:43)  desvenlafaxine (as base) 50 mg oral tablet, extended release: 50 milligram(s) orally once a day (10 Apr 2024 02:43)  Eliquis 2.5 mg oral tablet: 1 tab(s) orally 2 times a day (10 Apr 2024 02:43)  Entresto 24 mg-26 mg oral tablet: 1 tab(s) orally 2 times a day (10 Apr 2024 02:40)  ferrous sulfate 325 mg (65 mg elemental iron) oral delayed release tablet: 1 tab(s) orally 2 times a day (10 Apr 2024 02:48)  methenamine hippurate 1 g oral tablet: 1 tab(s) orally once a day (10 Apr 2024 02:41)  mirtazapine 15 mg oral tablet: 1 tab(s) orally once a day (at bedtime) (10 Apr 2024 02:43)  OLANZapine 10 mg oral tablet: 1 tab(s) orally once a day (at bedtime) (10 Apr 2024 02:43)  Plavix 75 mg oral tablet: 1 tab(s) orally once a day (10 Apr 2024 02:43)  tadalafil 5 mg oral tablet: 1 tab(s) orally once a day (10 Apr 2024 02:48)  Vyndamax 61 mg oral capsule: 1 orally once a day (10 Apr 2024 02:43)      MEDICATIONS  (STANDING):  artificial tears (preservative free) Ophthalmic Solution 1 Drop(s) Both EYES two times a day  ascorbic acid 500 milliGRAM(s) Oral daily  atorvastatin 80 milliGRAM(s) Oral at bedtime  buMETAnide 2 milliGRAM(s) Oral every 12 hours  carbidopa/levodopa  25/100 1.5 Tablet(s) Oral three times a day  carbidopa/levodopa  25/100 1 Tablet(s) Oral at bedtime  chlorhexidine 2% Cloths 1 Application(s) Topical daily  cholecalciferol 1000 Unit(s) Oral daily  cyanocobalamin 1000 MICROGram(s) Oral daily  desvenlafaxine ER 50 milliGRAM(s) Oral daily  heparin  Infusion. 1300 Unit(s)/Hr (13 mL/Hr) IV Continuous <Continuous>  influenza  Vaccine (HIGH DOSE) 0.7 milliLiter(s) IntraMuscular once  midodrine 20 milliGRAM(s) Oral every 8 hours  mirtazapine 15 milliGRAM(s) Oral at bedtime  OLANZapine 10 milliGRAM(s) Oral at bedtime  piperacillin/tazobactam IVPB.. 3.375 Gram(s) IV Intermittent every 8 hours  bchloride   Powder 40 milliEquivalent(s) Oral once  Tafamidis (Vyndamax) 61 mg 1 Capsule(s) 1 Capsule(s) Oral daily      TELEMETRY: 	    ECG:  	  RADIOLOGY:   < from: CT Chest No Cont (04.14.24 @ 11:01) >  IMPRESSION:    Interval removal of the right sided chest tube. Moderate partially   loculated right pleural effusion is enlarged from prior. Small to  moderate partially loculated left pleural effusion is also enlarged from   prior. Partial atelectasis both lower lobes, increased from prior.      --- End of Report ---    < end of copied text >    DIAGNOSTIC TESTING:  [ ] Echocardiogram:  [ ]  Catheterization:  [ ] Stress Test:    OTHER: 	    LABS:	 	    Troponin T, High Sensitivity Result: 121 ng/L (04-09 @ 22:09)  Troponin T, High Sensitivity Result: 122 ng/L (04-09 @ 20:38)                          8.8    6.79  )-----------( 236      ( 15 Apr 2024 06:07 )             28.0     04-15    138  |  100  |  34<H>  ----------------------------<  98  3.1<L>   |  19<L>  |  2.12<H>    Ca    8.4      15 Apr 2024 06:07  Phos  3.7     04-15  Mg     2.30     04-15    TPro  6.3  /  Alb  2.9<L>  /  TBili  0.5  /  DBili  x   /  AST  22  /  ALT  <5  /  AlkPhos  69  04-15    PTT - ( 14 Apr 2024 01:05 )  PTT:70.6 sec

## 2024-04-15 NOTE — DISCHARGE NOTE PROVIDER - NSDCFUSCHEDAPPT_GEN_ALL_CORE_FT
Rufus Oneal  [facility] [id number]   Scheduled Appointment: 04/29/2024    Dior Ramsey  [facility]  GERIATRICS [address] [place] [address]   Scheduled Appointment: 04/30/2024

## 2024-04-15 NOTE — DISCHARGE NOTE PROVIDER - NSDCCAREPROVSEEN_GEN_ALL_CORE_FT
Team 6 [facility] Attending Dr. Christine Cleary Team 6 [facility] Attending Dr. Elizalde Team 6 [facility] Attending Dr. Kenia Mitchell

## 2024-04-15 NOTE — DISCHARGE NOTE PROVIDER - NSDCMRMEDTOKEN_GEN_ALL_CORE_FT
ascorbic acid 500 mg oral tablet: 1 tab(s) orally once a day  bumetanide 2 mg oral tablet: 1 tab(s) orally 2 times a day  carbidopa-levodopa 25 mg-100 mg oral tablet: 1.5 tab(s) orally 3 times a day  carbidopa-levodopa 25 mg-100 mg oral tablet: 1 tab(s) orally once a day (at bedtime)  Crestor 20 mg oral tablet: 1 tab(s) orally once a day  cyanocobalamin 100 mcg oral tablet: 1 tab(s) orally  D3 25 mcg (1000 intl units) oral tablet: 1 tab(s) orally once a day  desvenlafaxine (as base) 50 mg oral tablet, extended release: 50 milligram(s) orally once a day  Eliquis 2.5 mg oral tablet: 1 tab(s) orally 2 times a day  Entresto 24 mg-26 mg oral tablet: 1 tab(s) orally 2 times a day  ferrous sulfate 325 mg (65 mg elemental iron) oral delayed release tablet: 1 tab(s) orally 2 times a day  methenamine hippurate 1 g oral tablet: 1 tab(s) orally once a day  mirtazapine 15 mg oral tablet: 1 tab(s) orally once a day (at bedtime)  OLANZapine 10 mg oral tablet: 1 tab(s) orally once a day (at bedtime)  Plavix 75 mg oral tablet: 1 tab(s) orally once a day  tadalafil 5 mg oral tablet: 1 tab(s) orally once a day  Vyndamax 61 mg oral capsule: 1 orally once a day   amoxicillin-clavulanate 875 mg-125 mg oral tablet: 875 milligram(s) orally 2 times a day  ascorbic acid 500 mg oral tablet: 1 tab(s) orally once a day  bumetanide 2 mg oral tablet: 1 tab(s) orally 2 times a day  carbidopa-levodopa 25 mg-100 mg oral tablet: 1.5 tab(s) orally 3 times a day  carbidopa-levodopa 25 mg-100 mg oral tablet: 1 tab(s) orally once a day (at bedtime)  Crestor 20 mg oral tablet: 1 tab(s) orally once a day  cyanocobalamin 100 mcg oral tablet: 1 tab(s) orally once a day  D3 25 mcg (1000 intl units) oral tablet: 1 tab(s) orally once a day  desvenlafaxine (as base) 50 mg oral tablet, extended release: 50 milligram(s) orally once a day  Eliquis 2.5 mg oral tablet: 1 tab(s) orally 2 times a day  Entresto 24 mg-26 mg oral tablet: 1 tab(s) orally 2 times a day  ferrous sulfate 325 mg (65 mg elemental iron) oral delayed release tablet: 1 tab(s) orally 2 times a day  methenamine hippurate 1 g oral tablet: 1 tab(s) orally once a day  midodrine 10 mg oral tablet: 2 tab(s) orally 3 times a day  mirtazapine 15 mg oral tablet: 1 tab(s) orally once a day (at bedtime)  OLANZapine 10 mg oral tablet: 1 tab(s) orally once a day (at bedtime)  Plavix 75 mg oral tablet: 1 tab(s) orally once a day  tadalafil 5 mg oral tablet: 1 tab(s) orally once a day  Vyndamax 61 mg oral capsule: 1 orally once a day   Aldactone 25 mg oral tablet: 2 tab(s) orally once a day  amoxicillin-clavulanate 875 mg-125 mg oral tablet: 875 milligram(s) orally 2 times a day  ascorbic acid 500 mg oral tablet: 1 tab(s) orally once a day  bumetanide 2 mg oral tablet: 1 tab(s) orally once a day  carbidopa-levodopa 25 mg-100 mg oral tablet: 1.5 tab(s) orally 3 times a day  carbidopa-levodopa 25 mg-100 mg oral tablet: 1 tab(s) orally once a day (at bedtime)  Crestor 20 mg oral tablet: 1 tab(s) orally once a day  cyanocobalamin 100 mcg oral tablet: 1 tab(s) orally once a day  D3 25 mcg (1000 intl units) oral tablet: 1 tab(s) orally once a day  desvenlafaxine (as base) 50 mg oral tablet, extended release: 50 milligram(s) orally once a day  Eliquis 2.5 mg oral tablet: 1 tab(s) orally 2 times a day  ferrous sulfate 325 mg (65 mg elemental iron) oral delayed release tablet: 1 tab(s) orally 2 times a day  methenamine hippurate 1 g oral tablet: 1 tab(s) orally once a day  midodrine 10 mg oral tablet: 2 tab(s) orally 3 times a day  mirtazapine 15 mg oral tablet: 1 tab(s) orally once a day (at bedtime)  OLANZapine 10 mg oral tablet: 1 tab(s) orally once a day (at bedtime)  Plavix 75 mg oral tablet: 1 tab(s) orally once a day  tadalafil 5 mg oral tablet: 1 tab(s) orally once a day  Vyndamax 61 mg oral capsule: 1 orally once a day   Aldactone 25 mg oral tablet: 2 tab(s) orally once a day  amoxicillin-clavulanate 875 mg-125 mg oral tablet: 875 milligram(s) orally 2 times a day  ascorbic acid 500 mg oral tablet: 1 tab(s) orally once a day  bumetanide 2 mg oral tablet: 1 tab(s) orally once a day  carbidopa-levodopa 25 mg-100 mg oral tablet: 1.5 tab(s) orally 3 times a day  carbidopa-levodopa 25 mg-100 mg oral tablet: 1 tab(s) orally once a day (at bedtime)  Crestor 20 mg oral tablet: 1 tab(s) orally once a day  cyanocobalamin 100 mcg oral tablet: 1 tab(s) orally once a day  D3 25 mcg (1000 intl units) oral tablet: 1 tab(s) orally once a day  desvenlafaxine (as base) 50 mg oral tablet, extended release: 50 milligram(s) orally once a day  Eliquis 2.5 mg oral tablet: 1 tab(s) orally 2 times a day  ferrous sulfate 325 mg (65 mg elemental iron) oral delayed release tablet: 1 tab(s) orally 2 times a day  methenamine hippurate 1 g oral tablet: 1 tab(s) orally once a day  midodrine 10 mg oral tablet: 1 tab(s) orally 3 times a day  mirtazapine 15 mg oral tablet: 1 tab(s) orally once a day (at bedtime)  OLANZapine 10 mg oral tablet: 1 tab(s) orally once a day (at bedtime)  Plavix 75 mg oral tablet: 1 tab(s) orally once a day  tadalafil 5 mg oral tablet: 1 tab(s) orally once a day  Vyndamax 61 mg oral capsule: 1 orally once a day

## 2024-04-15 NOTE — DISCHARGE NOTE PROVIDER - NSDCCPTREATMENT_GEN_ALL_CORE_FT
PRINCIPAL PROCEDURE  Procedure: CT chest wo con  Findings and Treatment: IMPRESSION:  Interval removal of the right sided chest tube. Moderate partially   loculated right pleural effusion is enlarged from prior. Small to  moderate partially loculated left pleural effusion is also enlarged from   prior. Partial atelectasis both lower lobes, increased from prior.< from: CT Chest No Cont (04.14.24 @ 11:01) >      SECONDARY PROCEDURE  Procedure: CT of chest, abdomen, and pelvis  Findings and Treatment: IMPRESSION:  CTPA: No pulmonary embolism. No significant interval change from the   recent prior demonstrating bilateral moderate loculated pleural effusions with near total collapse of the bilateral lower lobes.  CT abdomen and pelvis: No bowel obstruction. Left renal pelvic stone with fullness and stranding, unchanged. Diverticulosis without evidence of diverticulitis.

## 2024-04-15 NOTE — DISCHARGE NOTE PROVIDER - NPI NUMBER (FOR SYSADMIN USE ONLY) :
[[id number] [[id number] [[id number]]

## 2024-04-15 NOTE — DISCHARGE NOTE PROVIDER - HOSPITAL COURSE
HPI:  Patient is a 84 y/o male with a pmh of  HTN, HLD, ICD/ afib on Eliquis (last dose 3/30), Plavix (last dose 3/26), Parkinson's disease, history of chronic pleural effusions, R sided VATS Pleurx cathter placement, presents to the ED due to concerns of dyspnea and right sided chest pain around the Pleurx catheter site. Patient saw thoracic surgery outpatient on 4/8/24 for a dislodged Pleurx cath. Due to the patient being on Eliquis and Plavix he was sent home and was supposed to return on 4/10 for removal. However, due to his current symptoms, he now presented to the ED. Patient denies headaches abdominal pain, nausea, vomiting, diarrhea or cough.       Upon arrival to the ED, patient was febrile(T-max-102.7) and was given Vanc and Zosyn. He was also given 1L IVF but due to worsening hypotension was eventually started on Levophed. Patient now admitted to the MICU for further monitoring and treatment.  (10 Apr 2024 02:47)    Hospital Course:      Important Medication Changes and Reason:    Active or Pending Issues Requiring Follow-up:    The patient is afebrile, hemodynamically stable and medically optimized for discharge to home with follow up with Pulmonology, wound care. On day of discharge, patient is clinically stable with no new exam findings or acute symptoms compared to prior. The patient was seen by the attending physician on the date of discharge and deemed stable and acceptable for discharge. The patient's chronic medical conditions were treated accordingly per the patient's home medication regimen. The patient's medication reconciliation (with changes made to chronic medications), follow up appointments, discharge orders, instructions, and significant lab and diagnostic studies are as noted.    Advanced Directives:   [ ] Full code  [X] DNR  [ ] Hospice    Discharge Diagnoses: Pneumonia         HPI:  Patient is a 84 y/o male with a pmh of  HTN, HLD, ICD/ afib on Eliquis (last dose 3/30), Plavix (last dose 3/26), Parkinson's disease, history of chronic pleural effusions, R sided VATS Pleurx cathter placement, presents to the ED due to concerns of dyspnea and right sided chest pain around the Pleurx catheter site. Patient saw thoracic surgery outpatient on 4/8/24 for a dislodged Pleurx cath. Due to the patient being on Eliquis and Plavix he was sent home and was supposed to return on 4/10 for removal. However, due to his current symptoms, he now presented to the ED. Patient denies headaches abdominal pain, nausea, vomiting, diarrhea or cough.       Upon arrival to the ED, patient was febrile(T-max-102.7) and was given Vanc and Zosyn. He was also given 1L IVF but due to worsening hypotension was eventually started on Levophed. Patient now admitted to the MICU for further monitoring and treatment.  (10 Apr 2024 02:47)    Hospital Course:  Admitted to medicine. Treatment initiated for     Important Medication Changes and Reason: Augmentin 875mg BID until 4/22    Active or Pending Issues Requiring Follow-up: Outpatient f/u Thoracic Surgery re: Pleurx placement     The patient is afebrile, hemodynamically stable and medically optimized for discharge to home with follow up with Pulmonology, wound care. On day of discharge, patient is clinically stable with no new exam findings or acute symptoms compared to prior. The patient was seen by the attending physician on the date of discharge and deemed stable and acceptable for discharge. The patient's chronic medical conditions were treated accordingly per the patient's home medication regimen. The patient's medication reconciliation (with changes made to chronic medications), follow up appointments, discharge orders, instructions, and significant lab and diagnostic studies are as noted.    Advanced Directives:   [ ] Full code  [X] DNR/ DNI  [ ] Hospice    Discharge Diagnoses: Pneumonia         Mr. Banerjee is an 82 y/o male with a PMHx of  HTN, HLD, ICD/ afib on Eliquis (last dose 3/30), Plavix (last dose 3/26), Parkinson's disease, history of chronic pleural effusions, R sided VATS Pleurx cathter placement, presents to the ED due to concerns of dyspnea and right sided chest pain around the Pleurx catheter site. Patient saw thoracic surgery outpatient on 4/8/24 for a dislodged Pleurx cath. Due to the patient being on Eliquis and Plavix he was sent home and was supposed to return on 4/10 for removal. However, due to his current symptoms, he now presented to the ED. Patient denies headaches abdominal pain, nausea, vomiting, diarrhea or cough.     Upon arrival to the ED, patient was febrile(T-max-102.7) and was given Vanc and Zosyn. He was also given 1L IVF but due to worsening hypotension was eventually started on Levophed. Patient now admitted to the MICU for further monitoring and treatment.  (10 Apr 2024 02:47)    Hospital Course:  Hypotension improved with course of IV antibiotics for suspected pneumonia. Continued to require Midodrine dosed 20mg TID for BP control. Stabilized for transfer to medicine floors, with continuation of IV Zosyn from 9/10 until time of discharge during which time was transitioned to Augmentin for completion of 14 course. Underwent IR guided pigtail placement 4/17, pleural fluid sent for testing. Deemed stable for discharge 4/17 with followups as noted below. The patient is afebrile, hemodynamically stable and medically optimized for discharge to home with follow up with Pulmonology, Thoracic surgery, PCP, Nephrology.     Important Medication Changes and Reason: Augmentin 875mg BID until 4/22, Midodrine 20mg TID     Active or Pending Issues Requiring Follow-up: Outpatient f/u Thoracic Surgery re: Pleurx placement after completion of treatment     Advanced Directives:   [ ] Full code  [X] DNR/ DNI  [ ] Hospice    Discharge Diagnoses: Pneumonia         Mr. Banerjee is an 82 y/o male with a PMHx of  HTN, HLD, ICD/ afib on Eliquis (last dose 3/30), Plavix (last dose 3/26), Parkinson's disease, history of chronic pleural effusions, R sided VATS Pleurx cathter placement, presents to the ED due to concerns of dyspnea and right sided chest pain around the Pleurx catheter site. Patient saw thoracic surgery outpatient on 4/8/24 for a dislodged Pleurx cath. Due to the patient being on Eliquis and Plavix he was sent home and was supposed to return on 4/10 for removal. However, due to his current symptoms, he now presented to the ED. Patient denies headaches abdominal pain, nausea, vomiting, diarrhea or cough.     Upon arrival to the ED, patient was febrile(T-max-102.7) and was given Vanc and Zosyn. He was also given 1L IVF but due to worsening hypotension was eventually started on Levophed. Patient now admitted to the MICU for further monitoring and treatment.  (10 Apr 2024 02:47)    Hospital Course:  Hypotension improved with course of IV antibiotics for suspected pneumonia. Continued to require Midodrine dosed 20mg TID for BP control. Stabilized for transfer to medicine floors, with continuation of IV Zosyn from 9/10 until time of discharge during which time was transitioned to Augmentin for completion of 14 course. Underwent IR guided pigtail placement 4/17, pleural fluid sent for testing, negative for infection. Deemed stable for discharge 4/18 with followups as noted below. The patient is afebrile, hemodynamically stable and medically optimized for discharge to home with follow up with Pulmonology, Thoracic surgery, PCP, Nephrology.     Important Medication Changes and Reason: Augmentin 875mg BID until 4/22, Midodrine 20mg TID     Active or Pending Issues Requiring Follow-up: Outpatient f/u Thoracic Surgery re: Pleurx placement after completion of treatment     Advanced Directives:   [ ] Full code  [X] DNR/ DNI  [ ] Hospice    Discharge Diagnoses: Pneumonia

## 2024-04-15 NOTE — PROGRESS NOTE ADULT - SUBJECTIVE AND OBJECTIVE BOX
Patient is a 83y old  Male who presents with a chief complaint of Shock (15 Apr 2024 14:55)      SUBJECTIVE / OVERNIGHT EVENTS: ptn is awaiting Pleurx placement in IR, Thoracic to arrange    MEDICATIONS  (STANDING):  artificial tears (preservative free) Ophthalmic Solution 1 Drop(s) Both EYES two times a day  ascorbic acid 500 milliGRAM(s) Oral daily  atorvastatin 80 milliGRAM(s) Oral at bedtime  buMETAnide 2 milliGRAM(s) Oral every 12 hours  carbidopa/levodopa  25/100 1.5 Tablet(s) Oral three times a day  carbidopa/levodopa  25/100 1 Tablet(s) Oral at bedtime  chlorhexidine 2% Cloths 1 Application(s) Topical daily  cholecalciferol 1000 Unit(s) Oral daily  cyanocobalamin 1000 MICROGram(s) Oral daily  desvenlafaxine ER 50 milliGRAM(s) Oral daily  heparin  Infusion. 1300 Unit(s)/Hr (13 mL/Hr) IV Continuous <Continuous>  influenza  Vaccine (HIGH DOSE) 0.7 milliLiter(s) IntraMuscular once  midodrine 10 milliGRAM(s) Oral every 8 hours  mirtazapine 15 milliGRAM(s) Oral at bedtime  OLANZapine 10 milliGRAM(s) Oral at bedtime  piperacillin/tazobactam IVPB.. 3.375 Gram(s) IV Intermittent every 8 hours  Tafamidis (Vyndamax) 61 mg 1 Capsule(s) 1 Capsule(s) Oral daily    MEDICATIONS  (PRN):  acetaminophen     Tablet .. 650 milliGRAM(s) Oral every 6 hours PRN Temp greater or equal to 38C (100.4F), Mild Pain (1 - 3)  benzonatate 100 milliGRAM(s) Oral three times a day PRN Cough      Vital Signs Last 24 Hrs  T(F): 97.6 (04-15-24 @ 14:40), Max: 98.3 (04-15-24 @ 05:40)  HR: 60 (04-15-24 @ 14:40) (60 - 63)  BP: 105/49 (04-15-24 @ 14:40) (105/49 - 132/60)  RR: 18 (04-15-24 @ 14:40) (18 - 18)  SpO2: 97% (04-15-24 @ 14:40) (94% - 97%)  Telemetry:   CAPILLARY BLOOD GLUCOSE        I&O's Summary    14 Apr 2024 07:01  -  15 Apr 2024 07:00  --------------------------------------------------------  IN: 340 mL / OUT: 1550 mL / NET: -1210 mL    15 Apr 2024 07:01  -  15 Apr 2024 19:23  --------------------------------------------------------  IN: 150 mL / OUT: 250 mL / NET: -100 mL        PHYSICAL EXAM:  GENERAL: NAD, well-developed  HEAD:  Atraumatic, Normocephalic  EYES: EOMI, PERRLA, conjunctiva and sclera clear  NECK: Supple, No JVD  CHEST/LUNG: Clear to auscultation bilaterally; No wheeze  HEART: Regular rate and rhythm; No murmurs, rubs, or gallops  ABDOMEN: Soft, Nontender, Nondistended; Bowel sounds present  EXTREMITIES:  2+ Peripheral Pulses, No clubbing, cyanosis, or edema  PSYCH: AAOx3  NEUROLOGY: non-focal  SKIN: No rashes or lesions    LABS:                        8.8    6.79  )-----------( 236      ( 15 Apr 2024 06:07 )             28.0     04-15    138  |  100  |  34<H>  ----------------------------<  98  3.1<L>   |  19<L>  |  2.12<H>    Ca    8.4      15 Apr 2024 06:07  Phos  3.7     04-15  Mg     2.30     04-15    TPro  6.3  /  Alb  2.9<L>  /  TBili  0.5  /  DBili  x   /  AST  22  /  ALT  <5  /  AlkPhos  69  04-15    PTT - ( 15 Apr 2024 13:00 )  PTT:59.7 sec      Urinalysis Basic - ( 15 Apr 2024 06:07 )    Color: x / Appearance: x / SG: x / pH: x  Gluc: 98 mg/dL / Ketone: x  / Bili: x / Urobili: x   Blood: x / Protein: x / Nitrite: x   Leuk Esterase: x / RBC: x / WBC x   Sq Epi: x / Non Sq Epi: x / Bacteria: x        RADIOLOGY & ADDITIONAL TESTS:    Imaging Personally Reviewed:    Consultant(s) Notes Reviewed:      Care Discussed with Consultants/Other Providers:

## 2024-04-15 NOTE — DISCHARGE NOTE PROVIDER - PROVIDER TOKENS
PROVIDER:[[id number]],FOLLOWUP:[1 week],ESTABLISHEDPATIENT:[T]] PROVIDER:[[id number]],FOLLOWUP:[1 week],ESTABLISHEDPATIENT:[T]],PROVIDER:[[id number]],FOLLOWUP:[1 week],ESTABLISHEDPATIENT:[T]],PROVIDER:[[id number]],FOLLOWUP:[1 week],ESTABLISHEDPATIENT:[T]] PROVIDER:[TOKEN:[[id number]],FOLLOWUP:[1 week],ESTABLISHEDPATIENT:[T]],PROVIDER:[[id number]],FOLLOWUP:[1 week],ESTABLISHEDPATIENT:[T]],PROVIDER:[[id number]],FOLLOWUP:[1 week],ESTABLISHEDPATIENT:[T]],PROVIDER:[TOKEN:[[id number]]]

## 2024-04-15 NOTE — DISCHARGE NOTE PROVIDER - NSDCFUADDAPPT_GEN_ALL_CORE_FT
APPTS ARE READY TO BE MADE: [X] YES    Best Family or Patient Contact (if needed): Spouse Roselyn Honeycuttdanilo [phone]    Additional Information about above appointments (if needed):    1: Nephrology  2: Thoracic Surgery   3: Pulmonology  4: PCP     Other comments or requests:    APPTS ARE READY TO BE MADE: [X] YES    Best Family or Patient Contact (if needed): Spouse Roselyn Banerjee [phone]    Additional Information about above appointments (if needed):    1: Nephrology  2: Thoracic Surgery   3: Pulmonology  4: PCP     Appointment was scheduled in [facility] for a telehalth on 08/15 at 5:45pm with . Office has been tasked for a sooner appointment    A [facility] office was contacted to secure an appointment, however the office will follow up with the patient/caregiver directly. Office has been tasked as patient prefers a telehealth appointment.-     Provider's office was contacted to secure an appointment, however the office will follow up with the patient/caregiver directly. Patient prefers telehealth appointment and office will be reaching out to set it up. - dr. ku    [facility] to [facility] appointment, however the office will follow up with the patient/caregiver directly. -dr. rabago  Other comments or requests:    APPTS ARE READY TO BE MADE: [X] YES    Best Family or Patient Contact (if needed): Spouse Roselyn Banerjee [phone]    Additional Information about above appointments (if needed):    1: Nephrology  2: Thoracic Surgery   3: Pulmonology  4: PCP     Appointment was scheduled in [facility] for a telehalth on 08/15 at 5:45pm with . Office has been tasked for a sooner appointment    Patient secured an appointment with the office directly after our initial conversation where referral details were relayed. on 04/30 at 2pm with     Provider's office was contacted to secure an appointment, however the office will follow up with the patient/caregiver directly. Patient prefers telehealth appointment and office will be reaching out to set it up. - dr. [place] providers office was contacted to secure an appointment, however the office will follow up with the patient/caregiver directly. -dr. rabago  Other comments or requests:

## 2024-04-15 NOTE — DISCHARGE NOTE PROVIDER - CARE PROVIDERS DIRECT ADDRESSES
[email or web address]

## 2024-04-15 NOTE — PROGRESS NOTE ADULT - SUBJECTIVE AND OBJECTIVE BOX
Overnight events noted      VITAL:  T(C): , Max: 36.8 (04-15-24 @ 05:40)  T(F): , Max: 98.3 (04-15-24 @ 05:40)  HR: 62 (04-15-24 @ 05:40)  BP: 132/60 (04-15-24 @ 05:40)  BP(mean): --  RR: 18 (04-15-24 @ 05:40)  SpO2: 94% (04-15-24 @ 05:40)  Wt(kg): --      PHYSICAL EXAM:  Constitutional: NAD, Alert, obese  HEENT: NCAT, DMM  Neck: Supple, No JVD  Respiratory: coarse BS, (+)R Pleurx  Cardiovascular: RRR s1s2, no m/r/g  Gastrointestinal: BS+, soft, NT/ND  Extremities: 2+ b/l LE edema  Neurological: no focal deficits; strength grossly intact  Back: no CVAT b/l  Skin: No rashes, no nevi    LABS:                        8.8    6.79  )-----------( 236      ( 15 Apr 2024 06:07 )             28.0     Na(138)/K(3.1)/Cl(100)/HCO3(19)/BUN(34)/Cr(2.12)Glu(98)/Ca(8.4)/Mg(2.30)/PO4(3.7)    04-15 @ 06:07  Na(137)/K(3.7)/Cl(100)/HCO3(20)/BUN(34)/Cr(2.08)Glu(101)/Ca(8.4)/Mg(2.20)/PO4(3.6)    04-14 @ 05:30  Na(138)/K(3.6)/Cl(101)/HCO3(22)/BUN(31)/Cr(1.67)Glu(124)/Ca(8.6)/Mg(2.20)/PO4(3.1)    04-13 @ 00:15          IMPRESSION: 83M w/ HTN, CAD, AFib, and chronic b/l pleural effusions s/p recent R VATS/Pleurx, 4/10/24 p/w PNA/pleuritis    (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 + component likely of irreversible renal injury related to past nephrolithiasis    (2)ALAYNA - prerenally mediated fluctuations in creat based on volume; acceptable numbers for now    (3)Hypokalemia - repleted PO today - indicated for standing PO KCl given his reliance on BID high-dose PO Bumex    (4)CV - on high-dose Midodrine/on high-dose oral Bumex - we can cut back on the Midodrine today    (5)ID - resolving pneumonia/pleuritis, on IV Zosyn    RECOMMEND:  (1)Add KCl 20meq po qd  (2)Reduce Midodrine from 20tid to 10tid        Clifton Cool MD  [facility]  Office/on call physician: [phone]  Cell (9w-7l): [phone]       No pain, no sob  Family at bedside    VITAL:  T(C): , Max: 36.8 (04-15-24 @ 05:40)  T(F): , Max: 98.3 (04-15-24 @ 05:40)  HR: 62 (04-15-24 @ 05:40)  BP: 132/60 (04-15-24 @ 05:40)  BP(mean): --  RR: 18 (04-15-24 @ 05:40)  SpO2: 94% (04-15-24 @ 05:40)  Wt(kg): --      PHYSICAL EXAM:  Constitutional: NAD, Alert, obese  HEENT: NCAT, DMM  Neck: Supple, No JVD  Respiratory: coarse BS, (+)R Pleurx  Cardiovascular: RRR s1s2, no m/r/g  Gastrointestinal: BS+, soft, NT/ND  Extremities: 2+ b/l LE edema  Neurological: no focal deficits; strength grossly intact  Back: no CVAT b/l  Skin: No rashes, no nevi    LABS:                        8.8    6.79  )-----------( 236      ( 15 Apr 2024 06:07 )             28.0     Na(138)/K(3.1)/Cl(100)/HCO3(19)/BUN(34)/Cr(2.12)Glu(98)/Ca(8.4)/Mg(2.30)/PO4(3.7)    04-15 @ 06:07  Na(137)/K(3.7)/Cl(100)/HCO3(20)/BUN(34)/Cr(2.08)Glu(101)/Ca(8.4)/Mg(2.20)/PO4(3.6)    04-14 @ 05:30  Na(138)/K(3.6)/Cl(101)/HCO3(22)/BUN(31)/Cr(1.67)Glu(124)/Ca(8.6)/Mg(2.20)/PO4(3.1)    04-13 @ 00:15          IMPRESSION: 83M w/ HTN, CAD, AFib, and chronic b/l pleural effusions s/p recent R VATS/Pleurx, 4/10/24 p/w PNA/pleuritis    (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  Office/on call physician: [phone]  Cell (7a-7p): [phone]

## 2024-04-15 NOTE — PROGRESS NOTE ADULT - ASSESSMENT
A/p  82 y/o male with a pmh of  HTN, HLD, ICD/ afib on Eliquis (last dose 3/30), Plavix (last dose 3/26), Parkinson's disease, history of chronic pleural effusions, R sided VATS Pleurx cathter placement, presents to the ED due to concerns of dyspnea and right sided chest pain around the Pleurx catheter site, found to be in septic shock, now sp micu course.    #Septic Shock  -Unclear source, likely pulm as per ID  -Abx, further w/u per ID  -sp micu course   -sp pleurx removal  -Cont midodrine for hypotension    #s/p pleurex cath  -Now removed as was not draining  -CT chest Moderate partially loculated right pleural effusion is enlarged from prior. Small to moderate partially loculated left pleural effusion is also enlarged from  prior.  -F/u further thoracic recs    #Chronic HFpEF, Cardiac Amyloid   -sp iv diuresis with bumex in micu  -Volume status stable  -Continue po bumex as ordered   -Entresto on hold given soft bp - eventually resume when bp can tolerate   -Cont Vyndamax    #CAD, s/p PCI  -stable, no chest pain  -Eventually resume plavix    #AF, s/p PPM  -stable off avn meds  -Continue heparin gtt for now

## 2024-04-16 LAB
APTT BLD: 53.1 SEC — HIGH (ref 24.5–35.6)
APTT BLD: 56.4 SEC — HIGH (ref 24.5–35.6)
HCT VFR BLD CALC: 28.8 % — LOW (ref 39–50)
HCT VFR BLD CALC: 29.8 % — LOW (ref 39–50)
HGB BLD-MCNC: 9.2 G/DL — LOW (ref 13–17)
HGB BLD-MCNC: 9.3 G/DL — LOW (ref 13–17)
MCHC RBC-ENTMCNC: 29.8 PG — SIGNIFICANT CHANGE UP (ref 27–34)
MCHC RBC-ENTMCNC: 30.3 PG — SIGNIFICANT CHANGE UP (ref 27–34)
MCHC RBC-ENTMCNC: 31.2 GM/DL — LOW (ref 32–36)
MCHC RBC-ENTMCNC: 31.9 GM/DL — LOW (ref 32–36)
MCV RBC AUTO: 94.7 FL — SIGNIFICANT CHANGE UP (ref 80–100)
MCV RBC AUTO: 95.5 FL — SIGNIFICANT CHANGE UP (ref 80–100)
NRBC # BLD: 0 /100 WBCS — SIGNIFICANT CHANGE UP (ref 0–0)
NRBC # BLD: 0 /100 WBCS — SIGNIFICANT CHANGE UP (ref 0–0)
NRBC # FLD: 0 K/UL — SIGNIFICANT CHANGE UP (ref 0–0)
NRBC # FLD: 0 K/UL — SIGNIFICANT CHANGE UP (ref 0–0)
PLATELET # BLD AUTO: 240 K/UL — SIGNIFICANT CHANGE UP (ref 150–400)
PLATELET # BLD AUTO: 257 K/UL — SIGNIFICANT CHANGE UP (ref 150–400)
RBC # BLD: 3.04 M/UL — LOW (ref 4.2–5.8)
RBC # BLD: 3.12 M/UL — LOW (ref 4.2–5.8)
RBC # FLD: 13.6 % — SIGNIFICANT CHANGE UP (ref 10.3–14.5)
RBC # FLD: 13.6 % — SIGNIFICANT CHANGE UP (ref 10.3–14.5)
WBC # BLD: 7.15 K/UL — SIGNIFICANT CHANGE UP (ref 3.8–10.5)
WBC # BLD: 7.5 K/UL — SIGNIFICANT CHANGE UP (ref 3.8–10.5)
WBC # FLD AUTO: 7.15 K/UL — SIGNIFICANT CHANGE UP (ref 3.8–10.5)
WBC # FLD AUTO: 7.5 K/UL — SIGNIFICANT CHANGE UP (ref 3.8–10.5)

## 2024-04-16 PROCEDURE — 99232 SBSQ HOSP IP/OBS MODERATE 35: CPT

## 2024-04-16 PROCEDURE — 93010 ELECTROCARDIOGRAM REPORT: CPT

## 2024-04-16 RX ORDER — HEPARIN SODIUM 5000 [USP'U]/ML
1300 INJECTION INTRAVENOUS; SUBCUTANEOUS
Qty: 25000 | Refills: 0 | Status: DISCONTINUED | OUTPATIENT
Start: 2024-04-16 | End: 2024-04-17

## 2024-04-16 RX ADMIN — Medication 1 DROP(S): at 05:40

## 2024-04-16 RX ADMIN — PIPERACILLIN AND TAZOBACTAM 25 GRAM(S): 4; .5 INJECTION, POWDER, LYOPHILIZED, FOR SOLUTION INTRAVENOUS at 13:38

## 2024-04-16 RX ADMIN — OLANZAPINE 10 MILLIGRAM(S): 15 TABLET, FILM COATED ORAL at 22:37

## 2024-04-16 RX ADMIN — DESVENLAFAXINE 50 MILLIGRAM(S): 50 TABLET, EXTENDED RELEASE ORAL at 13:39

## 2024-04-16 RX ADMIN — PREGABALIN 1000 MICROGRAM(S): 225 CAPSULE ORAL at 13:38

## 2024-04-16 RX ADMIN — MIDODRINE HYDROCHLORIDE 10 MILLIGRAM(S): 2.5 TABLET ORAL at 22:37

## 2024-04-16 RX ADMIN — CARBIDOPA AND LEVODOPA 1.5 TABLET(S): 25; 100 TABLET ORAL at 13:39

## 2024-04-16 RX ADMIN — BUMETANIDE 2 MILLIGRAM(S): 0.25 INJECTION INTRAMUSCULAR; INTRAVENOUS at 23:39

## 2024-04-16 RX ADMIN — HEPARIN SODIUM 1300 UNIT(S)/HR: 5000 INJECTION INTRAVENOUS; SUBCUTANEOUS at 13:36

## 2024-04-16 RX ADMIN — Medication 20 MILLIEQUIVALENT(S): at 13:38

## 2024-04-16 RX ADMIN — HEPARIN SODIUM 1300 UNIT(S)/HR: 5000 INJECTION INTRAVENOUS; SUBCUTANEOUS at 07:03

## 2024-04-16 RX ADMIN — CARBIDOPA AND LEVODOPA 1.5 TABLET(S): 25; 100 TABLET ORAL at 22:49

## 2024-04-16 RX ADMIN — MIRTAZAPINE 15 MILLIGRAM(S): 45 TABLET, ORALLY DISINTEGRATING ORAL at 22:37

## 2024-04-16 RX ADMIN — Medication 500 MILLIGRAM(S): at 13:40

## 2024-04-16 RX ADMIN — PIPERACILLIN AND TAZOBACTAM 25 GRAM(S): 4; .5 INJECTION, POWDER, LYOPHILIZED, FOR SOLUTION INTRAVENOUS at 05:40

## 2024-04-16 RX ADMIN — BUMETANIDE 2 MILLIGRAM(S): 0.25 INJECTION INTRAMUSCULAR; INTRAVENOUS at 12:45

## 2024-04-16 RX ADMIN — ATORVASTATIN CALCIUM 80 MILLIGRAM(S): 80 TABLET, FILM COATED ORAL at 22:37

## 2024-04-16 RX ADMIN — HEPARIN SODIUM 1500 UNIT(S)/HR: 5000 INJECTION INTRAVENOUS; SUBCUTANEOUS at 22:21

## 2024-04-16 RX ADMIN — CHLORHEXIDINE GLUCONATE 1 APPLICATION(S): 213 SOLUTION TOPICAL at 13:40

## 2024-04-16 RX ADMIN — MIDODRINE HYDROCHLORIDE 10 MILLIGRAM(S): 2.5 TABLET ORAL at 13:38

## 2024-04-16 RX ADMIN — PIPERACILLIN AND TAZOBACTAM 25 GRAM(S): 4; .5 INJECTION, POWDER, LYOPHILIZED, FOR SOLUTION INTRAVENOUS at 22:38

## 2024-04-16 RX ADMIN — CARBIDOPA AND LEVODOPA 1.5 TABLET(S): 25; 100 TABLET ORAL at 05:40

## 2024-04-16 RX ADMIN — CARBIDOPA AND LEVODOPA 1 TABLET(S): 25; 100 TABLET ORAL at 22:51

## 2024-04-16 RX ADMIN — Medication 1000 UNIT(S): at 13:40

## 2024-04-16 RX ADMIN — Medication 1 DROP(S): at 17:47

## 2024-04-16 RX ADMIN — HEPARIN SODIUM 1300 UNIT(S)/HR: 5000 INJECTION INTRAVENOUS; SUBCUTANEOUS at 19:30

## 2024-04-16 NOTE — PROGRESS NOTE ADULT - SUBJECTIVE AND OBJECTIVE BOX
Date of Service: 04-16-24 @ 11:03    Patient is a 83y old  Male who presents with a chief complaint of Shock (16 Apr 2024 07:28)      Any change in ROS: Heis alert and awake:  no sob:  no cough : no phlegm : wife at bedside: he is alert and awake    MEDICATIONS  (STANDING):  artificial tears (preservative free) Ophthalmic Solution 1 Drop(s) Both EYES two times a day  ascorbic acid 500 milliGRAM(s) Oral daily  atorvastatin 80 milliGRAM(s) Oral at bedtime  buMETAnide 2 milliGRAM(s) Oral every 12 hours  carbidopa/levodopa  25/100 1.5 Tablet(s) Oral three times a day  carbidopa/levodopa  25/100 1 Tablet(s) Oral at bedtime  chlorhexidine 2% Cloths 1 Application(s) Topical daily  cholecalciferol 1000 Unit(s) Oral daily  cyanocobalamin 1000 MICROGram(s) Oral daily  desvenlafaxine ER 50 milliGRAM(s) Oral daily  heparin  Infusion. 1300 Unit(s)/Hr (13 mL/Hr) IV Continuous <Continuous>  influenza  Vaccine (HIGH DOSE) 0.7 milliLiter(s) IntraMuscular once  midodrine 10 milliGRAM(s) Oral every 8 hours  mirtazapine 15 milliGRAM(s) Oral at bedtime  OLANZapine 10 milliGRAM(s) Oral at bedtime  piperacillin/tazobactam IVPB.. 3.375 Gram(s) IV Intermittent every 8 hours  potassium chloride    Tablet ER 20 milliEquivalent(s) Oral daily  Tafamidis (Vyndamax) 61 mg 1 Capsule(s) 1 Capsule(s) Oral daily    MEDICATIONS  (PRN):  acetaminophen     Tablet .. 650 milliGRAM(s) Oral every 6 hours PRN Temp greater or equal to 38C (100.4F), Mild Pain (1 - 3)  benzonatate 100 milliGRAM(s) Oral three times a day PRN Cough    Vital Signs Last 24 Hrs  T(C): 36.6 (16 Apr 2024 05:24), Max: 37.1 (15 Apr 2024 21:40)  T(F): 97.9 (16 Apr 2024 05:24), Max: 98.7 (15 Apr 2024 21:40)  HR: 62 (16 Apr 2024 05:24) (60 - 62)  BP: 109/58 (16 Apr 2024 05:24) (105/49 - 109/58)  BP(mean): --  RR: 17 (16 Apr 2024 05:24) (17 - 18)  SpO2: 97% (16 Apr 2024 05:24) (97% - 98%)    Parameters below as of 16 Apr 2024 05:24  Patient On (Oxygen Delivery Method): room air        I&O's Summary    15 Apr 2024 07:01  -  16 Apr 2024 07:00  --------------------------------------------------------  IN: 630 mL / OUT: 1050 mL / NET: -420 mL          Physical Exam:   GENERAL:obese  HEENT: MESFIN/   Atraumatic, Normocephalic  ENMT: No tonsillar erythema, exudates, or enlargement; Moist mucous membranes, Good dentition, No lesions  NECK: Supple, No JVD, Normal thyroid  CHEST/LUNG: Clear to auscultaion, ; No rales, rhonchi, wheezing, or rubs  CVS: Regular rate and rhythm; No murmurs, rubs, or gallops  GI: : Soft, Nontender, Nondistended; Bowel sounds present  NERVOUS SYSTEM:  Alert & Oriented X3  EXTREMITIES:  - edema  LYMPH: No lymphadenopathy noted  SKIN: No rashes or lesions  ENDOCRINOLOGY: No Thyromegaly  PSYCH: Appropriate    Labs:  26, 27, 29, 27, 30                            9.3    7.50  )-----------( 257      ( 16 Apr 2024 06:30 )             29.8                         8.8    6.79  )-----------( 236      ( 15 Apr 2024 06:07 )             28.0                         8.9    7.76  )-----------( 243      ( 14 Apr 2024 05:30 )             27.7                         8.4    10.19 )-----------( 230      ( 14 Apr 2024 01:05 )             26.5                         9.0    10.88 )-----------( 231      ( 13 Apr 2024 00:15 )             27.6     04-15    138  |  100  |  34<H>  ----------------------------<  98  3.1<L>   |  19<L>  |  2.12<H>  04-14    137  |  100  |  34<H>  ----------------------------<  101<H>  3.7   |  20<L>  |  2.08<H>  04-13    138  |  101  |  31<H>  ----------------------------<  124<H>  3.6   |  22  |  1.67<H>    Ca    8.4      15 Apr 2024 06:07  Phos  3.7     04-15  Mg     2.30     04-15    TPro  6.3  /  Alb  2.9<L>  /  TBili  0.5  /  DBili  x   /  AST  22  /  ALT  <5  /  AlkPhos  69  04-15  TPro  6.4  /  Alb  3.0<L>  /  TBili  0.4  /  DBili  x   /  AST  15  /  ALT  <5  /  AlkPhos  67  04-14    CAPILLARY BLOOD GLUCOSE          LIVER FUNCTIONS - ( 15 Apr 2024 06:07 )  Alb: 2.9 g/dL / Pro: 6.3 g/dL / ALK PHOS: 69 U/L / ALT: <5 U/L / AST: 22 U/L / GGT: x           PTT - ( 16 Apr 2024 06:30 )  PTT:56.4 sec  Urinalysis Basic - ( 15 Apr 2024 06:07 )    Color: x / Appearance: x / SG: x / pH: x  Gluc: 98 mg/dL / Ketone: x  / Bili: x / Urobili: x   Blood: x / Protein: x / Nitrite: x   Leuk Esterase: x / RBC: x / WBC x   Sq Epi: x / Non Sq Epi: x / Bacteria: x            RECENT CULTURES:  04-10 @ 12:07 Clean Catch Clean Catch (Midstream)         ra< from: CT Chest No Cont (04.14.24 @ 11:01) >  loculated right pleural effusion is enlarged from prior. Small to  moderate partially loculated left pleural effusion is also enlarged from   prior. Partial atelectasis both lower lobes, increased from prior.      --- End of Report ---          REBEKA WRAY MD; Resident Radiologist  This document has been electronically signed.  EDMUND DANIELSON MD; Attending Radiologist    < end of copied text >         No growth    04-09 @ 20:22 .Blood Blood-Peripheral                No growth at 5 days    04-09 @ 20:10 .Blood Blood-Peripheral                No growth at 5 days          RESPIRATORY CULTURES:          Studies  Chest X-RAY  CT SCAN Chest   Venous Dopplers: LE:   CT Abdomen  Others

## 2024-04-16 NOTE — PROGRESS NOTE ADULT - ASSESSMENT
A/p  84 y/o male with a pmh of  HTN, HLD, ICD/ afib on Eliquis (last dose 3/30), Plavix (last dose 3/26), Parkinson's disease, history of chronic pleural effusions, R sided VATS Pleurx cathter placement, presents to the ED due to concerns of dyspnea and right sided chest pain around the Pleurx catheter site, found to be in septic shock, now sp micu course.    #Septic Shock  -Unclear source, likely pulm as per ID  -Abx, further w/u per ID  -sp micu course   -sp pleurx removal  -Cont midodrine for hypotension    # pleural effusion   -s/p pleurex cath  -Now removed as was not draining  -CT chest Moderate partially loculated right pleural effusion is enlarged from prior. Small to moderate partially loculated left pleural effusion is also enlarged from  prior.  -F/u further thoracic recs- Recommend IR consult for drainage of loculated Right pleural effusion     #Chronic HFpEF, Cardiac Amyloid   -sp iv diuresis in micu  -Volume status stable  -Continue po bumex as ordered   -Entresto on hold given soft bp - eventually resume when bp can tolerate   -Cont Vyndamax    #CAD, s/p PCI  -stable, no chest pain  -Eventually resume plavix if no thoracic intervention planned     #AF, s/p PPM  -stable off avn meds  -Continue heparin gtt for now

## 2024-04-16 NOTE — PROGRESS NOTE ADULT - ASSESSMENT
Patient is a 82 y/o male with a pmh of  HTN, HLD, ICD/ afib on Eliquis (last dose 3/30), Plavix (last dose 3/26), Parkinson's disease, history of chronic pleural effusions, R sided VATS Pleurx cathter placement, presents to the ED with a dislodged R PleurX, admitted to MICU with septic shock now stable and downgraded to floors.

## 2024-04-16 NOTE — PROGRESS NOTE ADULT - ASSESSMENT
Patient is a 84 y/o male with a pmh of  HTN, HLD, ICD/ afib on Eliquis (last dose 3/30), Plavix (last dose 3/26), Parkinson's disease, history of chronic pleural effusions, R sided VATS Pleurx cathter placement, presents to the ED due to concerns of dyspnea and right sided chest pain around the Pleurx catheter site. Patient saw thoracic surgery outpatient on 4/8/24 for a dislodged Pleurx cath. Due to the patient being on Eliquis and Plavix he was sent home and was supposed to return on 4/10 for removal. However, due to his current symptoms, he now presented to the ED. Patient denies headaches abdominal pain, nausea, vomiting, diarrhea or cough.   Upon arrival to the ED, patient was febrile(T-max-102.7) and was given Vanc and Zosyn. He was also given 1L IVF but due to worsening hypotension was eventually started on Levophed. Patient now admitted to the MICU for further monitoring and treatment.  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 pneumonia:  RML zone opacity:   -plan for ct tomorrow  4/15: ct chest repeated today : reported as Interval removal of the right sided chest tube. Moderate partially loculated right pleural effusion is enlarged from prior. Small to moderate partially loculated left pleural effusion is also enlarged from prior. Partial atelectasis both lower lobes, increased from prior.  -defer to surgery  : clinically he is not in any resp distress:  his last vbg WAS PRETTY GOOD:  NOT ON BIPAP   4/16: to me he ooks better today  : he is not sob:  alert and awake:  no sob:  no cough : no phlegm : on room air     Eliezer effusions:  right sided pleurax cath /RT VATS   -he had vats done on 2022: path showed chronic inflammation  no malignancy  ;  -no pleurx fell out  -last chest xray with RML zone opacity:  not much of effusion  4/15: still with loculated effusion on both sides:"  slight increase:  defer to primary team on bumex q 12 hours  4/16 : c ont diuretics:  seen by or:  no tube placement for now     HTN  -blood pressure is reasonable:  on midodrine     A FIB/ICD  -hr controlled:  on iv heparin    HLD  -on atorvastatin    AMYLOIDOSIS  -Tafamidis (Vyndamax) 61 mg 1 Capsule(s) 1 Capsule(s) Oral daily      dw resident:

## 2024-04-16 NOTE — CHART NOTE - NSCHARTNOTEFT_GEN_A_CORE
Dr Oneal d/w Dr Donahue    plan for IR placement of R PTC tomorrow  npo p MN    order in   preprocedure note in    valid type and screen  valid coagulation profile    [place] [id number]

## 2024-04-16 NOTE — PROGRESS NOTE ADULT - ASSESSMENT
83 year old with Parkinson disease and htn/hyperlipidemia   He has chronic pleural effusions and prior VATS  Cultures and pathology were not diagnostic  At that time, he had pleural fluid sent for cytology and bacterial culture as well as pleural tissue sent for pathology  He presented with fever, shortness of breath and right sided chest pain  He had associated leukocytosis.     Imaging significant for known pleural effusions with right sided loculation    He was admitted to the ICU  He was given zosyn    He has clinically improved but diagnosis is not confirmed  My highest suspicion would be for a pulmonary infection  His pleural effusions are complicating picture.  They have been present prior to this admission, so not clear if they are involved.     At this time, he has improved    I would plan a 14 day course of antibiotics  Continue zosyn while admitted  Change to Augmentin when stable for discharge    If symptoms recurr, I would favor re-sampling pleural fluid

## 2024-04-16 NOTE — PROGRESS NOTE ADULT - SUBJECTIVE AND OBJECTIVE BOX
***************************************************************  Cami Orta, PGY 1   Internal Medicine   ***************************************************************    ALBINO RIVAS  83y  MRN: [id number]    Patient is a 83y old  Male who presents with a chief complaint of Shock (15 Apr 2024 19:23)      Subjective: no events ON. Denies fever, CP, SOB, abn pain, N/V, dysuria. Tolerating diet.      MEDICATIONS  (STANDING):  artificial tears (preservative free) Ophthalmic Solution 1 Drop(s) Both EYES two times a day  ascorbic acid 500 milliGRAM(s) Oral daily  atorvastatin 80 milliGRAM(s) Oral at bedtime  buMETAnide 2 milliGRAM(s) Oral every 12 hours  carbidopa/levodopa  25/100 1.5 Tablet(s) Oral three times a day  carbidopa/levodopa  25/100 1 Tablet(s) Oral at bedtime  chlorhexidine 2% Cloths 1 Application(s) Topical daily  cholecalciferol 1000 Unit(s) Oral daily  cyanocobalamin 1000 MICROGram(s) Oral daily  desvenlafaxine ER 50 milliGRAM(s) Oral daily  heparin  Infusion. 1300 Unit(s)/Hr (13 mL/Hr) IV Continuous <Continuous>  influenza  Vaccine (HIGH DOSE) 0.7 milliLiter(s) IntraMuscular once  midodrine 10 milliGRAM(s) Oral every 8 hours  mirtazapine 15 milliGRAM(s) Oral at bedtime  OLANZapine 10 milliGRAM(s) Oral at bedtime  piperacillin/tazobactam IVPB.. 3.375 Gram(s) IV Intermittent every 8 hours  potassium chloride    Tablet ER 20 milliEquivalent(s) Oral daily  Tafamidis (Vyndamax) 61 mg 1 Capsule(s) 1 Capsule(s) Oral daily    MEDICATIONS  (PRN):  acetaminophen     Tablet .. 650 milliGRAM(s) Oral every 6 hours PRN Temp greater or equal to 38C (100.4F), Mild Pain (1 - 3)  benzonatate 100 milliGRAM(s) Oral three times a day PRN Cough      Objective:    Vitals: Vital Signs Last 24 Hrs  T(C): 36.6 (04-16-24 @ 05:24), Max: 37.1 (04-15-24 @ 21:40)  T(F): 97.9 (04-16-24 @ 05:24), Max: 98.7 (04-15-24 @ 21:40)  HR: 62 (04-16-24 @ 05:24) (60 - 62)  BP: 109/58 (04-16-24 @ 05:24) (105/49 - 109/58)  BP(mean): --  RR: 17 (04-16-24 @ 05:24) (17 - 18)  SpO2: 97% (04-16-24 @ 05:24) (97% - 98%)            I&O's Summary    15 Apr 2024 07:01  -  16 Apr 2024 07:00  --------------------------------------------------------  IN: 630 mL / OUT: 1050 mL / NET: -420 mL        PHYSICAL EXAM:  GENERAL: NAD  HEAD:  Atraumatic, Normocephalic  EYES: EOMI, conjunctiva and sclera clear  CHEST/LUNG: Clear to auscultation bilaterally; No rales, rhonchi, wheezing, or rubs  HEART: Regular rate and rhythm; No murmurs, rubs, or gallops  ABDOMEN: Soft, Nontender, Nondistended;   SKIN: No rashes or lesions  NERVOUS SYSTEM:  Alert & Oriented X3, no focal deficits    LABS:  04-15    138  |  100  |  34<H>  ----------------------------<  98  3.1<L>   |  19<L>  |  2.12<H>  04-14    137  |  100  |  34<H>  ----------------------------<  101<H>  3.7   |  20<L>  |  2.08<H>    Ca    8.4      15 Apr 2024 06:07  Ca    8.4      14 Apr 2024 05:30  Phos  3.7     04-15  Mg     2.30     04-15    TPro  6.3  /  Alb  2.9<L>  /  TBili  0.5  /  DBili  x   /  AST  22  /  ALT  <5  /  AlkPhos  69  04-15  TPro  6.4  /  Alb  3.0<L>  /  TBili  0.4  /  DBili  x   /  AST  15  /  ALT  <5  /  AlkPhos  67  04-14      PTT - ( 15 Apr 2024 13:00 )  PTT:59.7 sec              Urinalysis Basic - ( 15 Apr 2024 06:07 )    Color: x / Appearance: x / SG: x / pH: x  Gluc: 98 mg/dL / Ketone: x  / Bili: x / Urobili: x   Blood: x / Protein: x / Nitrite: x   Leuk Esterase: x / RBC: x / WBC x   Sq Epi: x / Non Sq Epi: x / Bacteria: x                              8.8    6.79  )-----------( 236      ( 15 Apr 2024 06:07 )             28.0                         8.9    7.76  )-----------( 243      ( 14 Apr 2024 05:30 )             27.7                         8.4    10.19 )-----------( 230      ( 14 Apr 2024 01:05 )             26.5     CAPILLARY BLOOD GLUCOSE          RADIOLOGY & ADDITIONAL TESTS:    Imaging Personally Reviewed:  [x ] YES  [ ] NO    Consultants involved in case:   Consultant(s) Notes Reviewed:  [ x] YES  [ ] NO:   Care Discussed with Consultants/Other Providers [x ] YES  [ ] NO

## 2024-04-16 NOTE — PROGRESS NOTE ADULT - SUBJECTIVE AND OBJECTIVE BOX
No pain, no sob  Family at bedside    VITAL:  T(C): , Max: 36.8 (04-15-24 @ 05:40)  T(F): , Max: 98.3 (04-15-24 @ 05:40)  HR: 62 (04-15-24 @ 05:40)  BP: 132/60 (04-15-24 @ 05:40)  BP(mean): --  RR: 18 (04-15-24 @ 05:40)  SpO2: 94% (04-15-24 @ 05:40)  Wt(kg): --      PHYSICAL EXAM:  Constitutional: NAD, Alert, obese  HEENT: NCAT, DMM  Neck: Supple, No JVD  Respiratory: coarse BS, (+)R Pleurx  Cardiovascular: RRR s1s2, no m/r/g  Gastrointestinal: BS+, soft, NT/ND  Extremities: 2+ b/l LE edema  Neurological: no focal deficits; strength grossly intact  Back: no CVAT b/l  Skin: No rashes, no nevi    LABS:                        8.8    6.79  )-----------( 236      ( 15 Apr 2024 06:07 )             28.0     Na(138)/K(3.1)/Cl(100)/HCO3(19)/BUN(34)/Cr(2.12)Glu(98)/Ca(8.4)/Mg(2.30)/PO4(3.7)    04-15 @ 06:07  Na(137)/K(3.7)/Cl(100)/HCO3(20)/BUN(34)/Cr(2.08)Glu(101)/Ca(8.4)/Mg(2.20)/PO4(3.6)    04-14 @ 05:30  Na(138)/K(3.6)/Cl(101)/HCO3(22)/BUN(31)/Cr(1.67)Glu(124)/Ca(8.6)/Mg(2.20)/PO4(3.1)    04-13 @ 00:15          IMPRESSION: 83M w/ HTN, CAD, AFib, and chronic b/l pleural effusions s/p recent R VATS/Pleurx, 4/10/24 p/w PNA/pleuritis    (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    With warm regards    Tila Marroquin  [facility]  Office: [phone]

## 2024-04-16 NOTE — PROVIDER CONTACT NOTE (OTHER) - ACTION/TREATMENT ORDERED:
MD notified and aware. No interventions at this time. Continue to monitor.
MD notified and aware. No interventions at this time. Continue to monitor.
MD notified and aware. EKG done. MD states no changes and believes it gas related. No interventions at this time. Continue to monitor.
Provider notified. No new orders.

## 2024-04-16 NOTE — PROVIDER CONTACT NOTE (OTHER) - BACKGROUND
Patient's admitting diagnosis of Hypotension with pmh of atrial fibrillation, BPH, and CAD.

## 2024-04-16 NOTE — PROGRESS NOTE ADULT - SUBJECTIVE AND OBJECTIVE BOX
Follow Up:      Inverval History/ROS :Patient is a 83y old  Male who presents with a chief complaint of Shock (16 Apr 2024 12:48)    No fever  No events    Allergies    No Known Allergies    Intolerances        ANTIMICROBIALS:  piperacillin/tazobactam IVPB.. 3.375 every 8 hours      OTHER MEDS:  acetaminophen     Tablet .. 650 milliGRAM(s) Oral every 6 hours PRN  artificial tears (preservative free) Ophthalmic Solution 1 Drop(s) Both EYES two times a day  ascorbic acid 500 milliGRAM(s) Oral daily  atorvastatin 80 milliGRAM(s) Oral at bedtime  benzonatate 100 milliGRAM(s) Oral three times a day PRN  buMETAnide 2 milliGRAM(s) Oral every 12 hours  carbidopa/levodopa  25/100 1.5 Tablet(s) Oral three times a day  carbidopa/levodopa  25/100 1 Tablet(s) Oral at bedtime  chlorhexidine 2% Cloths 1 Application(s) Topical daily  cholecalciferol 1000 Unit(s) Oral daily  cyanocobalamin 1000 MICROGram(s) Oral daily  desvenlafaxine ER 50 milliGRAM(s) Oral daily  heparin  Infusion. 1300 Unit(s)/Hr IV Continuous <Continuous>  influenza  Vaccine (HIGH DOSE) 0.7 milliLiter(s) IntraMuscular once  midodrine 10 milliGRAM(s) Oral every 8 hours  mirtazapine 15 milliGRAM(s) Oral at bedtime  OLANZapine 10 milliGRAM(s) Oral at bedtime  potassium chloride    Tablet ER 20 milliEquivalent(s) Oral daily  Tafamidis (Vyndamax) 61 mg 1 Capsule(s) 1 Capsule(s) Oral daily      Vital Signs Last 24 Hrs  T(C): 36.4 (16 Apr 2024 13:26), Max: 37.1 (15 Apr 2024 21:40)  T(F): 97.6 (16 Apr 2024 13:26), Max: 98.7 (15 Apr 2024 21:40)  HR: 61 (16 Apr 2024 13:26) (60 - 62)  BP: 123/55 (16 Apr 2024 13:26) (105/60 - 123/55)  BP(mean): --  RR: 18 (16 Apr 2024 13:26) (17 - 18)  SpO2: 97% (16 Apr 2024 13:26) (97% - 98%)    Parameters below as of 16 Apr 2024 13:26  Patient On (Oxygen Delivery Method): room air        PHYSICAL EXAM:  General: [x ] non-toxic  HEAD/EYES: [ ] PERRL [ x] white sclera [ ] icterus  ENT:  [ ] normal x[ ] supple [ ] thrush [ ] pharyngeal exudate  Cardiovascular:   [ ] murmur [x ] normal [ ] PPM/AICD  Respiratory:  [x ] clear to ausculation bilaterally  GI:  [ x] soft, non-tender, normal bowel sounds  :  [ ] means [ ] no CVA tenderness   Musculoskeletal:  [ ] no synovitis  Neurologic:  [ ] non-focal exam   Skin:  [x ] no rash  Lymph: [ x] no lymphadenopathy  Psychiatric:  [ ] appropriate affect [ ] alert & oriented  Lines:  [x ] no phlebitis [ ] central line                                9.3    7.50  )-----------( 257      ( 16 Apr 2024 06:30 )             29.8       04-15    138  |  100  |  34<H>  ----------------------------<  98  3.1<L>   |  19<L>  |  2.12<H>    Ca    8.4      15 Apr 2024 06:07  Phos  3.7     04-15  Mg     2.30     04-15    TPro  6.3  /  Alb  2.9<L>  /  TBili  0.5  /  DBili  x   /  AST  22  /  ALT  <5  /  AlkPhos  69  04-15      Urinalysis Basic - ( 15 Apr 2024 06:07 )    Color: x / Appearance: x / SG: x / pH: x  Gluc: 98 mg/dL / Ketone: x  / Bili: x / Urobili: x   Blood: x / Protein: x / Nitrite: x   Leuk Esterase: x / RBC: x / WBC x   Sq Epi: x / Non Sq Epi: x / Bacteria: x        MICROBIOLOGY:Culture Results:   No growth (04-10-24 @ 12:07)  Culture Results:   No growth at 5 days (04-09-24 @ 20:22)  Culture Results:   No growth at 5 days (04-09-24 @ 20:10)      RADIOLOGY:

## 2024-04-16 NOTE — PROVIDER CONTACT NOTE (OTHER) - DATE AND TIME:
13-Apr-2024 13:06
15-Apr-2024 21:40
16-Apr-2024 01:32
13-Apr-2024 09:09
13-Apr-2024 12:36
16-Apr-2024 05:30

## 2024-04-16 NOTE — PROGRESS NOTE ADULT - SUBJECTIVE AND OBJECTIVE BOX
CARDIOLOGY FOLLOW UP - Dr. Mann  DATE OF SERVICE: 4/16/24    CC  events noted- c/o of chest pressure-- ecg with no ischemic changes   no complaints on exam        REVIEW OF SYSTEMS:  jose given mental status   PHYSICAL EXAM:  T(C): 36.6 (04-16-24 @ 05:24), Max: 37.1 (04-15-24 @ 21:40)  HR: 62 (04-16-24 @ 05:24) (60 - 62)  BP: 109/58 (04-16-24 @ 05:24) (105/49 - 109/58)  RR: 17 (04-16-24 @ 05:24) (17 - 18)  SpO2: 97% (04-16-24 @ 05:24) (97% - 98%)  Wt(kg): --  I&O's Summary    15 Apr 2024 07:01  -  16 Apr 2024 07:00  --------------------------------------------------------  IN: 630 mL / OUT: 1050 mL / NET: -420 mL        Appearance: Normal	  Cardiovascular: Normal S1 S2,RR  Respiratory: diminished   Gastrointestinal:  Soft, Non-tender, + BS	  Extremities:  le  edema      Home Medications:  ascorbic acid 500 mg oral tablet: 1 tab(s) orally once a day (10 Apr 2024 02:43)  carbidopa-levodopa 25 mg-100 mg oral tablet: 1.5 tab(s) orally 3 times a day (10 Apr 2024 02:44)  carbidopa-levodopa 25 mg-100 mg oral tablet: 1 tab(s) orally once a day (at bedtime) (10 Apr 2024 02:47)  Crestor 20 mg oral tablet: 1 tab(s) orally once a day (10 Apr 2024 02:43)  cyanocobalamin 100 mcg oral tablet: 1 tab(s) orally (10 Apr 2024 02:43)  D3 25 mcg (1000 intl units) oral tablet: 1 tab(s) orally once a day (10 Apr 2024 02:43)  desvenlafaxine (as base) 50 mg oral tablet, extended release: 50 milligram(s) orally once a day (10 Apr 2024 02:43)  Eliquis 2.5 mg oral tablet: 1 tab(s) orally 2 times a day (10 Apr 2024 02:43)  Entresto 24 mg-26 mg oral tablet: 1 tab(s) orally 2 times a day (10 Apr 2024 02:40)  ferrous sulfate 325 mg (65 mg elemental iron) oral delayed release tablet: 1 tab(s) orally 2 times a day (10 Apr 2024 02:48)  methenamine hippurate 1 g oral tablet: 1 tab(s) orally once a day (10 Apr 2024 02:41)  mirtazapine 15 mg oral tablet: 1 tab(s) orally once a day (at bedtime) (10 Apr 2024 02:43)  OLANZapine 10 mg oral tablet: 1 tab(s) orally once a day (at bedtime) (10 Apr 2024 02:43)  Plavix 75 mg oral tablet: 1 tab(s) orally once a day (10 Apr 2024 02:43)  tadalafil 5 mg oral tablet: 1 tab(s) orally once a day (10 Apr 2024 02:48)  Vyndamax 61 mg oral capsule: 1 orally once a day (10 Apr 2024 02:43)      MEDICATIONS  (STANDING):  artificial tears (preservative free) Ophthalmic Solution 1 Drop(s) Both EYES two times a day  ascorbic acid 500 milliGRAM(s) Oral daily  atorvastatin 80 milliGRAM(s) Oral at bedtime  buMETAnide 2 milliGRAM(s) Oral every 12 hours  carbidopa/levodopa  25/100 1.5 Tablet(s) Oral three times a day  carbidopa/levodopa  25/100 1 Tablet(s) Oral at bedtime  chlorhexidine 2% Cloths 1 Application(s) Topical daily  cholecalciferol 1000 Unit(s) Oral daily  cyanocobalamin 1000 MICROGram(s) Oral daily  desvenlafaxine ER 50 milliGRAM(s) Oral daily  heparin  Infusion. 1300 Unit(s)/Hr (13 mL/Hr) IV Continuous <Continuous>  influenza  Vaccine (HIGH DOSE) 0.7 milliLiter(s) IntraMuscular once  midodrine 10 milliGRAM(s) Oral every 8 hours  mirtazapine 15 milliGRAM(s) Oral at bedtime  OLANZapine 10 milliGRAM(s) Oral at bedtime  piperacillin/tazobactam IVPB.. 3.375 Gram(s) IV Intermittent every 8 hours  potassium chloride    Tablet ER 20 milliEquivalent(s) Oral daily  Tafamidis (Vyndamax) 61 mg 1 Capsule(s) 1 Capsule(s) Oral daily      TELEMETRY: 	    ECG:  	  RADIOLOGY:   DIAGNOSTIC TESTING:  [ ] Echocardiogram:  [ ]  Catheterization:  [ ] Stress Test:    OTHER: 	    LABS:	 	    Troponin T, High Sensitivity Result: 121 ng/L (04-09 @ 22:09)  Troponin T, High Sensitivity Result: 122 ng/L (04-09 @ 20:38)                          9.3    7.50  )-----------( 257      ( 16 Apr 2024 06:30 )             29.8     04-15    138  |  100  |  34<H>  ----------------------------<  98  3.1<L>   |  19<L>  |  2.12<H>    Ca    8.4      15 Apr 2024 06:07  Phos  3.7     04-15  Mg     2.30     04-15    TPro  6.3  /  Alb  2.9<L>  /  TBili  0.5  /  DBili  x   /  AST  22  /  ALT  <5  /  AlkPhos  69  04-15    PTT - ( 16 Apr 2024 06:30 )  PTT:56.4 sec

## 2024-04-16 NOTE — PROVIDER CONTACT NOTE (OTHER) - ASSESSMENT
Patient asymptomatic. No acute distress noted.
/57 HR 60. Patient asymptomatic. No acute distress noted.
/58 HR 62. Patient asymptomatic. No acute distress noted.
/58 hr 60, 97.8. HR 60. Patient asymptomatic. No acute distress noted.
Patient asymptomatic. No acute distress noted.
Patient asymptomatic. No acute distress noted.

## 2024-04-16 NOTE — PROGRESS NOTE ADULT - PROBLEM SELECTOR PLAN 1
-T-max on arrival was 102.7 and hypotensive SBP 80s MAP 50s, leukocytosis (WBC 29), LA 3.3>1.5, procal 0.54  - Unclear infectious source  - Weaned off Levophed 4/12, now on midodrine 20mg TID, goal systolic BP >95  - CT 4/14 demonstrating interval increase in b/l loculated pleural effusions  - Infectious w/up including flu/covid, MRSA (negative), urine legionella/strep pneumoniae ag (negative), Bcx/UA/Ucx negative  -s/p empiric Vanco (4/9-4/11), continue Zosyn (4/9- ) plan for 14d course with transition to Augmentin upon d/c

## 2024-04-17 LAB
ANION GAP SERPL CALC-SCNC: 15 MMOL/L — HIGH (ref 7–14)
APTT BLD: 61.3 SEC — HIGH (ref 24.5–35.6)
BASOPHILS # BLD AUTO: 0.03 K/UL — SIGNIFICANT CHANGE UP (ref 0–0.2)
BASOPHILS NFR BLD AUTO: 0.4 % — SIGNIFICANT CHANGE UP (ref 0–2)
BLD GP AB SCN SERPL QL: NEGATIVE — SIGNIFICANT CHANGE UP
BUN SERPL-MCNC: 36 MG/DL — HIGH (ref 7–23)
CALCIUM SERPL-MCNC: 8.6 MG/DL — SIGNIFICANT CHANGE UP (ref 8.4–10.5)
CHLORIDE SERPL-SCNC: 102 MMOL/L — SIGNIFICANT CHANGE UP (ref 98–107)
CO2 SERPL-SCNC: 22 MMOL/L — SIGNIFICANT CHANGE UP (ref 22–31)
CREAT SERPL-MCNC: 2.23 MG/DL — HIGH (ref 0.5–1.3)
EGFR: 29 ML/MIN/1.73M2 — LOW
EOSINOPHIL # BLD AUTO: 0.13 K/UL — SIGNIFICANT CHANGE UP (ref 0–0.5)
EOSINOPHIL NFR BLD AUTO: 1.7 % — SIGNIFICANT CHANGE UP (ref 0–6)
GLUCOSE BLDC GLUCOMTR-MCNC: 116 MG/DL — HIGH (ref 70–99)
GLUCOSE BLDC GLUCOMTR-MCNC: 130 MG/DL — HIGH (ref 70–99)
GLUCOSE SERPL-MCNC: 126 MG/DL — HIGH (ref 70–99)
HCT VFR BLD CALC: 26.5 % — LOW (ref 39–50)
HGB BLD-MCNC: 8.6 G/DL — LOW (ref 13–17)
IANC: 5.8 K/UL — SIGNIFICANT CHANGE UP (ref 1.8–7.4)
IMM GRANULOCYTES NFR BLD AUTO: 0.4 % — SIGNIFICANT CHANGE UP (ref 0–0.9)
INR BLD: 1.24 RATIO — HIGH (ref 0.85–1.18)
LYMPHOCYTES # BLD AUTO: 0.85 K/UL — LOW (ref 1–3.3)
LYMPHOCYTES # BLD AUTO: 11.4 % — LOW (ref 13–44)
MAGNESIUM SERPL-MCNC: 2.3 MG/DL — SIGNIFICANT CHANGE UP (ref 1.6–2.6)
MCHC RBC-ENTMCNC: 30.6 PG — SIGNIFICANT CHANGE UP (ref 27–34)
MCHC RBC-ENTMCNC: 32.5 GM/DL — SIGNIFICANT CHANGE UP (ref 32–36)
MCV RBC AUTO: 94.3 FL — SIGNIFICANT CHANGE UP (ref 80–100)
MONOCYTES # BLD AUTO: 0.63 K/UL — SIGNIFICANT CHANGE UP (ref 0–0.9)
MONOCYTES NFR BLD AUTO: 8.4 % — SIGNIFICANT CHANGE UP (ref 2–14)
NEUTROPHILS # BLD AUTO: 5.8 K/UL — SIGNIFICANT CHANGE UP (ref 1.8–7.4)
NEUTROPHILS NFR BLD AUTO: 77.7 % — HIGH (ref 43–77)
NRBC # BLD: 0 /100 WBCS — SIGNIFICANT CHANGE UP (ref 0–0)
NRBC # FLD: 0 K/UL — SIGNIFICANT CHANGE UP (ref 0–0)
PHOSPHATE SERPL-MCNC: 3.6 MG/DL — SIGNIFICANT CHANGE UP (ref 2.5–4.5)
PLATELET # BLD AUTO: 246 K/UL — SIGNIFICANT CHANGE UP (ref 150–400)
POTASSIUM SERPL-MCNC: 3.3 MMOL/L — LOW (ref 3.5–5.3)
POTASSIUM SERPL-SCNC: 3.3 MMOL/L — LOW (ref 3.5–5.3)
PROTHROM AB SERPL-ACNC: 13.8 SEC — HIGH (ref 9.5–13)
RBC # BLD: 2.81 M/UL — LOW (ref 4.2–5.8)
RBC # FLD: 13.7 % — SIGNIFICANT CHANGE UP (ref 10.3–14.5)
RH IG SCN BLD-IMP: NEGATIVE — SIGNIFICANT CHANGE UP
SODIUM SERPL-SCNC: 139 MMOL/L — SIGNIFICANT CHANGE UP (ref 135–145)
WBC # BLD: 7.47 K/UL — SIGNIFICANT CHANGE UP (ref 3.8–10.5)
WBC # FLD AUTO: 7.47 K/UL — SIGNIFICANT CHANGE UP (ref 3.8–10.5)

## 2024-04-17 PROCEDURE — 71045 X-RAY EXAM CHEST 1 VIEW: CPT | Mod: 26

## 2024-04-17 PROCEDURE — 32557 INSERT CATH PLEURA W/ IMAGE: CPT | Mod: RT

## 2024-04-17 RX ORDER — BUMETANIDE 0.25 MG/ML
2 INJECTION INTRAMUSCULAR; INTRAVENOUS DAILY
Refills: 0 | Status: DISCONTINUED | OUTPATIENT
Start: 2024-04-18 | End: 2024-04-18

## 2024-04-17 RX ORDER — ONDANSETRON 8 MG/1
4 TABLET, FILM COATED ORAL ONCE
Refills: 0 | Status: ACTIVE | OUTPATIENT
Start: 2024-04-17 | End: 2025-03-16

## 2024-04-17 RX ORDER — SPIRONOLACTONE 25 MG/1
50 TABLET, FILM COATED ORAL DAILY
Refills: 0 | Status: DISCONTINUED | OUTPATIENT
Start: 2024-04-17 | End: 2024-04-18

## 2024-04-17 RX ORDER — POTASSIUM CHLORIDE 20 MEQ
20 PACKET (EA) ORAL
Refills: 0 | Status: COMPLETED | OUTPATIENT
Start: 2024-04-17 | End: 2024-04-17

## 2024-04-17 RX ORDER — PREGABALIN 225 MG/1
1 CAPSULE ORAL
Qty: 0 | Refills: 0 | DISCHARGE

## 2024-04-17 RX ORDER — PIPERACILLIN AND TAZOBACTAM 4; .5 G/20ML; G/20ML
3.38 INJECTION, POWDER, LYOPHILIZED, FOR SOLUTION INTRAVENOUS EVERY 8 HOURS
Refills: 0 | Status: DISCONTINUED | OUTPATIENT
Start: 2024-04-17 | End: 2024-04-18

## 2024-04-17 RX ORDER — MIDODRINE HYDROCHLORIDE 2.5 MG/1
2 TABLET ORAL
Qty: 180 | Refills: 0
Start: 2024-04-17 | End: 2024-05-16

## 2024-04-17 RX ADMIN — BUMETANIDE 2 MILLIGRAM(S): 0.25 INJECTION INTRAMUSCULAR; INTRAVENOUS at 13:12

## 2024-04-17 RX ADMIN — Medication 500 MILLIGRAM(S): at 13:11

## 2024-04-17 RX ADMIN — Medication 20 MILLIEQUIVALENT(S): at 10:23

## 2024-04-17 RX ADMIN — HEPARIN SODIUM 1500 UNIT(S)/HR: 5000 INJECTION INTRAVENOUS; SUBCUTANEOUS at 05:21

## 2024-04-17 RX ADMIN — CARBIDOPA AND LEVODOPA 1.5 TABLET(S): 25; 100 TABLET ORAL at 13:10

## 2024-04-17 RX ADMIN — Medication 1000 UNIT(S): at 13:11

## 2024-04-17 RX ADMIN — Medication 20 MILLIEQUIVALENT(S): at 12:51

## 2024-04-17 RX ADMIN — CARBIDOPA AND LEVODOPA 1.5 TABLET(S): 25; 100 TABLET ORAL at 22:26

## 2024-04-17 RX ADMIN — PIPERACILLIN AND TAZOBACTAM 25 GRAM(S): 4; .5 INJECTION, POWDER, LYOPHILIZED, FOR SOLUTION INTRAVENOUS at 19:22

## 2024-04-17 RX ADMIN — CARBIDOPA AND LEVODOPA 1.5 TABLET(S): 25; 100 TABLET ORAL at 06:22

## 2024-04-17 RX ADMIN — DESVENLAFAXINE 50 MILLIGRAM(S): 50 TABLET, EXTENDED RELEASE ORAL at 13:11

## 2024-04-17 RX ADMIN — Medication 1 DROP(S): at 06:22

## 2024-04-17 RX ADMIN — Medication 20 MILLIEQUIVALENT(S): at 13:09

## 2024-04-17 RX ADMIN — CHLORHEXIDINE GLUCONATE 1 APPLICATION(S): 213 SOLUTION TOPICAL at 13:13

## 2024-04-17 RX ADMIN — MIRTAZAPINE 15 MILLIGRAM(S): 45 TABLET, ORALLY DISINTEGRATING ORAL at 22:27

## 2024-04-17 RX ADMIN — ATORVASTATIN CALCIUM 80 MILLIGRAM(S): 80 TABLET, FILM COATED ORAL at 22:27

## 2024-04-17 RX ADMIN — OLANZAPINE 10 MILLIGRAM(S): 15 TABLET, FILM COATED ORAL at 22:27

## 2024-04-17 RX ADMIN — Medication 1 DROP(S): at 19:22

## 2024-04-17 RX ADMIN — PIPERACILLIN AND TAZOBACTAM 25 GRAM(S): 4; .5 INJECTION, POWDER, LYOPHILIZED, FOR SOLUTION INTRAVENOUS at 11:47

## 2024-04-17 RX ADMIN — MIDODRINE HYDROCHLORIDE 10 MILLIGRAM(S): 2.5 TABLET ORAL at 13:10

## 2024-04-17 RX ADMIN — CARBIDOPA AND LEVODOPA 1 TABLET(S): 25; 100 TABLET ORAL at 22:26

## 2024-04-17 RX ADMIN — PREGABALIN 1000 MICROGRAM(S): 225 CAPSULE ORAL at 13:11

## 2024-04-17 NOTE — PROGRESS NOTE ADULT - PROBLEM SELECTOR PLAN 2
Bilateral loculated pleural effusions s/p R VATs and pleurx catheter placement (2022)   - Moderate b/l loculated effusions appreciable on CTC 4/9 without interval change, chronic  - Likely iso CHF   - Pt recently saw Dr. Oneal (Thoracic surg) outpt, concern was for dislodged pleurx catheter which was planned to be removed 4/10 however pt presented to ED prior  - On arrival pleurx catheter displaced, removed at bedside 4/10, f/u CXR stable without PTX . now stable for replacement  - Per Dr. Oneal low c/f infection of pleural space/pleural fluid however will f/u with noncon CTC   - Will undergo IR guided pigtail placement today 4/17, f/u pleural fluid analysis   - Continue diuresis w/ bumex 2mg bid

## 2024-04-17 NOTE — PROGRESS NOTE ADULT - SUBJECTIVE AND OBJECTIVE BOX
Seen and examined. S/P Pigtail cath placement by IR today        Vital Signs Last 24 Hrs  T(C): 36.6 (04-17-24 @ 12:45), Max: 36.8 (04-17-24 @ 05:16)  T(F): 97.9 (04-17-24 @ 12:45), Max: 98.3 (04-17-24 @ 05:16)  HR: 60 (04-17-24 @ 12:45) (60 - 60)  BP: 111/60 (04-17-24 @ 12:45) (111/60 - 117/52)  RR: 18 (04-17-24 @ 12:45) (17 - 18)  SpO2: 98% (04-17-24 @ 12:45) (98% - 100%)             Daily     Daily   Admit Wt: Drug Dosing Weight  Height (cm): 172.7 (13 Apr 2024 09:08)  Weight (kg): 98.6 (14 Apr 2024 06:22)  BMI (kg/m2): 33.1 (14 Apr 2024 06:22)  BSA (m2): 2.12 (14 Apr 2024 06:22)      CAPILLARY BLOOD GLUCOSE      POCT Blood Glucose.: 130 mg/dL (17 Apr 2024 12:06)  POCT Blood Glucose.: 116 mg/dL (17 Apr 2024 08:47)          MEDICATIONS  acetaminophen     Tablet .. 650 milliGRAM(s) Oral every 6 hours PRN  artificial tears (preservative free) Ophthalmic Solution 1 Drop(s) Both EYES two times a day  ascorbic acid 500 milliGRAM(s) Oral daily  atorvastatin 80 milliGRAM(s) Oral at bedtime  benzonatate 100 milliGRAM(s) Oral three times a day PRN  carbidopa/levodopa  25/100 1.5 Tablet(s) Oral three times a day  carbidopa/levodopa  25/100 1 Tablet(s) Oral at bedtime  chlorhexidine 2% Cloths 1 Application(s) Topical daily  cholecalciferol 1000 Unit(s) Oral daily  cyanocobalamin 1000 MICROGram(s) Oral daily  desvenlafaxine ER 50 milliGRAM(s) Oral daily  influenza  Vaccine (HIGH DOSE) 0.7 milliLiter(s) IntraMuscular once  midodrine 10 milliGRAM(s) Oral every 8 hours  mirtazapine 15 milliGRAM(s) Oral at bedtime  OLANZapine 10 milliGRAM(s) Oral at bedtime  ondansetron Injectable 4 milliGRAM(s) IV Push once PRN  piperacillin/tazobactam IVPB.. 3.375 Gram(s) IV Intermittent every 8 hours  potassium chloride    Tablet ER 20 milliEquivalent(s) Oral daily  spironolactone 50 milliGRAM(s) Oral daily  Tafamidis (Vyndamax) 61 mg 1 Capsule(s) 1 Capsule(s) Oral daily      PHYSICAL EXAM    Subjective:   Neurology: alert and oriented   CV : s1s1 reg no MRGS  Lungs: decreased right side   Drains: Right PTC to waterseal, no air leak noted   Abdomen: soft, nontender, nondistended, positive bowel sounds, last bowel movement   :    voids  Extremities:    no edema,    CXR:  Right Pigtail cath in place     LABS  04-17    139  |  102  |  36<H>  ----------------------------<  126<H>  3.3<L>   |  22  |  2.23<H>    Ca    8.6      17 Apr 2024 04:15  Phos  3.6     04-17  Mg     2.30     04-17                                   8.6    7.47  )-----------( 246      ( 17 Apr 2024 04:15 )             26.5          PT/INR - ( 17 Apr 2024 04:15 )   PT: 13.8 sec;   INR: 1.24 ratio         PTT - ( 17 Apr 2024 04:15 )  PTT:61.3 sec         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      Parkinsons disease      CAD (coronary artery disease)  s/p PCI 08/21      Pleural effusion, not elsewhere classified      COVID-19 vaccine series completed  w booster      History of hip replacement, total  R hip 2008 & L hip      Status post cataract extraction  right eye cataract extraction with IOL 6/26/2015      Presence of cardiac pacemaker  10/2021      H/O coronary angioplasty  8/2021 1 stent inserted       A/P: 82 y/o male with a pmh of  HTN, HLD, ICD/ afib on Eliquis (last dose 3/30), Plavix (last dose 3/26), Parkinson's disease, history of chronic pleural effusions, R sided VATS Pleurx cathter placement, presents to the ED due to concerns of dyspnea and right sided chest pain around the Pleurx catheter site, found to be in septic shock, now s/p micu course. R Pleurx cath dislodged and was removed at bedside without any complication.  CT showing moderate partially loculated right pleural effusion       pleural effusion   -s/p pleurex cath removal   --CT chest Moderate partially loculated right pleural effusion is enlarged from prior. Small to moderate partially loculated left pleural effusion is also enlarged from  prior.  -4/17 IR placed pigtail cath  Continue pigtail cath to water seal   Please get daily chest x-ray while chest tube in place   Follow up cultures from pleural fluid   Continue care as per primary team     Myriam Vasquez. ANP-C  [id number]

## 2024-04-17 NOTE — PROGRESS NOTE ADULT - PROBLEM SELECTOR PLAN 1
-T-max on arrival was 102.7 and hypotensive SBP 80s MAP 50s, leukocytosis (WBC 29), LA 3.3>1.5, procal 0.54  - Unclear infectious source  - Weaned off Levophed 4/12, now on midodrine 20mg TID, goal systolic BP >95  - CT 4/14 demonstrating interval increase in b/l loculated pleural effusions  - Infectious w/up including flu/covid, MRSA (negative), urine legionella/strep pneumoniae ag (negative), Bcx/UA/Ucx negative  -s/p empiric Vanco (4/9-4/11), continue Zosyn (4/9- ) plan for 14d course with transition to Augmentin upon d/c -T-max on arrival was 102.7 and hypotensive SBP 80s MAP 50s, leukocytosis (WBC 29), LA 3.3>1.5, procal 0.54  - Unclear infectious source  - Weaned off Levophed 4/12, now on midodrine 20mg TID, goal systolic BP >95  - CT 4/14 demonstrating interval increase in b/l loculated pleural effusions  - Infectious w/up including flu/covid, MRSA (negative), urine legionella/strep pneumoniae ag (negative), Bcx/UA/Ucx negative  -s/p empiric Vanco (4/9-4/11), continue Zosyn (4/9- 23 while inptn) plan for 14d course with transition to Augmentin upon d/c

## 2024-04-17 NOTE — PROGRESS NOTE ADULT - SUBJECTIVE AND OBJECTIVE BOX
Date of Service: 04-17-24 @ 13:18    Patient is a 83y old  Male who presents with a chief complaint of Shock (17 Apr 2024 10:55)      Any change in ROS: ptis sleeping  : no sob:  no cough : no phlegm : wife at bedside:  pt could not sleep wholenight:  now sleeping     MEDICATIONS  (STANDING):  artificial tears (preservative free) Ophthalmic Solution 1 Drop(s) Both EYES two times a day  ascorbic acid 500 milliGRAM(s) Oral daily  atorvastatin 80 milliGRAM(s) Oral at bedtime  buMETAnide 2 milliGRAM(s) Oral every 12 hours  carbidopa/levodopa  25/100 1.5 Tablet(s) Oral three times a day  carbidopa/levodopa  25/100 1 Tablet(s) Oral at bedtime  chlorhexidine 2% Cloths 1 Application(s) Topical daily  cholecalciferol 1000 Unit(s) Oral daily  cyanocobalamin 1000 MICROGram(s) Oral daily  desvenlafaxine ER 50 milliGRAM(s) Oral daily  influenza  Vaccine (HIGH DOSE) 0.7 milliLiter(s) IntraMuscular once  midodrine 10 milliGRAM(s) Oral every 8 hours  mirtazapine 15 milliGRAM(s) Oral at bedtime  OLANZapine 10 milliGRAM(s) Oral at bedtime  piperacillin/tazobactam IVPB.. 3.375 Gram(s) IV Intermittent every 8 hours  potassium chloride    Tablet ER 20 milliEquivalent(s) Oral daily  Tafamidis (Vyndamax) 61 mg 1 Capsule(s) 1 Capsule(s) Oral daily    MEDICATIONS  (PRN):  acetaminophen     Tablet .. 650 milliGRAM(s) Oral every 6 hours PRN Temp greater or equal to 38C (100.4F), Mild Pain (1 - 3)  benzonatate 100 milliGRAM(s) Oral three times a day PRN Cough    Vital Signs Last 24 Hrs  T(C): 36.6 (17 Apr 2024 12:45), Max: 36.8 (17 Apr 2024 05:16)  T(F): 97.9 (17 Apr 2024 12:45), Max: 98.3 (17 Apr 2024 05:16)  HR: 60 (17 Apr 2024 12:45) (60 - 63)  BP: 111/60 (17 Apr 2024 12:45) (111/60 - 123/55)  BP(mean): --  RR: 18 (17 Apr 2024 12:45) (17 - 18)  SpO2: 98% (17 Apr 2024 12:45) (97% - 100%)    Parameters below as of 17 Apr 2024 12:45  Patient On (Oxygen Delivery Method): room air        I&O's Summary        Physical Exam:   GENERAL: NAD, well-groomed, well-developed  HEENT: MESFIN/   Atraumatic, Normocephalic  ENMT: No tonsillar erythema, exudates, or enlargement; Moist mucous membranes, Good dentition, No lesions  NECK: Supple, No JVD, Normal thyroid  CHEST/LUNG: Clear to auscultaion  CVS: Regular rate and rhythm; No murmurs, rubs, or gallops  GI: : Soft, Nontender, Nondistended; Bowel sounds present  NERVOUS SYSTEM: sleepy but arousable:  on room air  EXTREMITIES:-edema  LYMPH: No lymphadenopathy noted  SKIN: No rashes or lesions  ENDOCRINOLOGY: No Thyromegaly  PSYCH: calm     Labs:  26, 27                            8.6    7.47  )-----------( 246      ( 17 Apr 2024 04:15 )             26.5                         9.2    7.15  )-----------( 240      ( 16 Apr 2024 20:15 )             28.8                         9.3    7.50  )-----------( 257      ( 16 Apr 2024 06:30 )             29.8                         8.8    6.79  )-----------( 236      ( 15 Apr 2024 06:07 )             28.0                         8.9    7.76  )-----------( 243      ( 14 Apr 2024 05:30 )             27.7                         8.4    10.19 )-----------( 230      ( 14 Apr 2024 01:05 )             26.5     04-17    139  |  102  |  36<H>  ----------------------------<  126<H>  3.3<L>   |  22  |  2.23<H>  04-15    138  |  100  |  34<H>  ----------------------------<  98  3.1<L>   |  19<L>  |  2.12<H>  04-14    137  |  100  |  34<H>  ----------------------------<  101<H>  3.7   |  20<L>  |  2.08<H>    Ca    8.6      17 Apr 2024 04:15  Phos  3.6     04-17  Mg     2.30     04-17    TPro  6.3  /  Alb  2.9<L>  /  TBili  0.5  /  DBili  x   /  AST  22  /  ALT  <5  /  AlkPhos  69  04-15  TPro  6.4  /  Alb  3.0<L>  /  TBili  0.4  /  DBili  x   /  AST  15  /  ALT  <5  /  AlkPhos  67  04-14    CAPILLARY BLOOD GLUCOSE      POCT Blood Glucose.: 130 mg/dL (17 Apr 2024 12:06)  POCT Blood Glucose.: 116 mg/dL (17 Apr 2024 08:47)        PT/INR - ( 17 Apr 2024 04:15 )   PT: 13.8 sec;   INR: 1.24 ratio         PTT - ( 17 Apr 2024 04:15 )  PTT:61.3 sec  Urinalysis Basic - ( 17 Apr 2024 04:15 )    Color: x / Appearance: x / SG: x / pH: x  Gluc: 126 mg/dL / Ketone: x  / Bili: x / Urobili: x   Blood: x / Protein: x / Nitrite: x   Leuk Esterase: x / RBC: x / WBC x   Sq Epi: x / Non Sq Epi: x / Bacteria: x    rad< from: CT Chest No Cont (04.14.24 @ 11:01) >    UPPER ABDOMEN: Stable large scattered hepatic cysts and subcentimeter   hypodense foci too small to characterize..    BONES/SOFT TISSUES: Degenerative changes of the spine. Bilateral   gynecomastia.    IMPRESSION:    Interval removal of the right sided chest tube. Moderate partially   loculated right pleural effusion is enlarged from prior. Small to  moderate partially loculated left pleural effusion is also enlarged from   prior. Partial atelectasis both lower lobes, increased from prior.      --- End of Report ---          REBEKA WRAY MD; Resident Radiologist  This document has been electronically signed.  EDMUND DANIELSON MD; Attending Radiologist  This document has been electronically signed. Apr 14 2024  2:01PM    < end of copied text >          RECENT CULTURES:        RESPIRATORY CULTURES:          Studies  Chest X-RAY  CT SCAN Chest   Venous Dopplers: LE:   CT Abdomen  Others

## 2024-04-17 NOTE — PROGRESS NOTE ADULT - SUBJECTIVE AND OBJECTIVE BOX
***************************************************************  Cami Orta, PGY 1   Internal Medicine   ***************************************************************    ALBINO RIVAS  83y  MRN: [id number]    Patient is a 83y old  Male who presents with a chief complaint of Shock (16 Apr 2024 17:39)      Subjective: no events ON. Denies fever, CP, SOB, abn pain, N/V, dysuria. Tolerating diet.      MEDICATIONS  (STANDING):  artificial tears (preservative free) Ophthalmic Solution 1 Drop(s) Both EYES two times a day  ascorbic acid 500 milliGRAM(s) Oral daily  atorvastatin 80 milliGRAM(s) Oral at bedtime  buMETAnide 2 milliGRAM(s) Oral every 12 hours  carbidopa/levodopa  25/100 1 Tablet(s) Oral at bedtime  carbidopa/levodopa  25/100 1.5 Tablet(s) Oral three times a day  chlorhexidine 2% Cloths 1 Application(s) Topical daily  cholecalciferol 1000 Unit(s) Oral daily  cyanocobalamin 1000 MICROGram(s) Oral daily  desvenlafaxine ER 50 milliGRAM(s) Oral daily  influenza  Vaccine (HIGH DOSE) 0.7 milliLiter(s) IntraMuscular once  midodrine 10 milliGRAM(s) Oral every 8 hours  mirtazapine 15 milliGRAM(s) Oral at bedtime  OLANZapine 10 milliGRAM(s) Oral at bedtime  potassium chloride    Tablet ER 20 milliEquivalent(s) Oral daily  potassium chloride    Tablet ER 20 milliEquivalent(s) Oral every 2 hours  Tafamidis (Vyndamax) 61 mg 1 Capsule(s) 1 Capsule(s) Oral daily    MEDICATIONS  (PRN):  acetaminophen     Tablet .. 650 milliGRAM(s) Oral every 6 hours PRN Temp greater or equal to 38C (100.4F), Mild Pain (1 - 3)  benzonatate 100 milliGRAM(s) Oral three times a day PRN Cough      Objective:    Vitals: Vital Signs Last 24 Hrs  T(C): 36.8 (04-17-24 @ 05:16), Max: 36.8 (04-17-24 @ 05:16)  T(F): 98.3 (04-17-24 @ 05:16), Max: 98.3 (04-17-24 @ 05:16)  HR: 60 (04-17-24 @ 05:16) (60 - 63)  BP: 117/52 (04-17-24 @ 05:16) (112/50 - 123/55)  BP(mean): --  RR: 17 (04-17-24 @ 05:16) (17 - 18)  SpO2: 100% (04-17-24 @ 05:16) (97% - 100%)            I&O's Summary      PHYSICAL EXAM:  GENERAL: NAD  HEAD:  Atraumatic, Normocephalic  EYES: EOMI, conjunctiva and sclera clear  CHEST/LUNG: Clear to auscultation bilaterally; No rales, rhonchi, wheezing, or rubs  HEART: Regular rate and rhythm; No murmurs, rubs, or gallops  ABDOMEN: Soft, Nontender, Nondistended;   SKIN: No rashes or lesions  NERVOUS SYSTEM:  Alert & Oriented X3, no focal deficits    LABS:  04-17    139  |  102  |  36<H>  ----------------------------<  126<H>  3.3<L>   |  22  |  2.23<H>  04-15    138  |  100  |  34<H>  ----------------------------<  98  3.1<L>   |  19<L>  |  2.12<H>    Ca    8.6      17 Apr 2024 04:15  Ca    8.4      15 Apr 2024 06:07  Phos  3.6     04-17  Mg     2.30     04-17    TPro  6.3  /  Alb  2.9<L>  /  TBili  0.5  /  DBili  x   /  AST  22  /  ALT  <5  /  AlkPhos  69  04-15      PT/INR - ( 17 Apr 2024 04:15 )   PT: 13.8 sec;   INR: 1.24 ratio         PTT - ( 17 Apr 2024 04:15 )  PTT:61.3 sec              Urinalysis Basic - ( 17 Apr 2024 04:15 )    Color: x / Appearance: x / SG: x / pH: x  Gluc: 126 mg/dL / Ketone: x  / Bili: x / Urobili: x   Blood: x / Protein: x / Nitrite: x   Leuk Esterase: x / RBC: x / WBC x   Sq Epi: x / Non Sq Epi: x / Bacteria: x                              8.6    7.47  )-----------( 246      ( 17 Apr 2024 04:15 )             26.5                         9.2    7.15  )-----------( 240      ( 16 Apr 2024 20:15 )             28.8                         9.3    7.50  )-----------( 257      ( 16 Apr 2024 06:30 )             29.8     CAPILLARY BLOOD GLUCOSE          RADIOLOGY & ADDITIONAL TESTS:    Imaging Personally Reviewed:  [x ] YES  [ ] NO    Consultants involved in case:   Consultant(s) Notes Reviewed:  [ x] YES  [ ] NO:   Care Discussed with Consultants/Other Providers [x ] YES  [ ] NO

## 2024-04-17 NOTE — PROGRESS NOTE ADULT - ASSESSMENT
A/p  82 y/o male with a pmh of  HTN, HLD, ICD/ afib on Eliquis (last dose 3/30), Plavix (last dose 3/26), Parkinson's disease, history of chronic pleural effusions, R sided VATS Pleurx cathter placement, presents to the ED due to concerns of dyspnea and right sided chest pain around the Pleurx catheter site, found to be in septic shock, now sp micu course.    #Septic Shock  -Unclear source, likely pulm as per ID  -Abx, further w/u per ID  -sp micu course   -sp pleurx removal  -Cont midodrine for hypotension    # pleural effusion   -s/p pleurex cath  -Now removed as was not draining  -CT chest Moderate partially loculated right pleural effusion is enlarged from prior. Small to moderate partially loculated left pleural effusion is also enlarged from  prior.  -F/u further thoracic recs-- plan for IR placement of R PTC tomorrow    #Chronic HFpEF, Cardiac Amyloid   -sp iv diuresis in micu  -Volume status stable  -Continue po bumex as ordered   -Entresto on hold given soft bp - eventually resume when bp can tolerate   -Cont Vyndamax    #CAD, s/p PCI  -stable, no chest pain  -Eventually resume plavix per  thoracic     #AF, s/p PPM  -stable off avn meds  -Continue heparin gtt for now

## 2024-04-17 NOTE — PRE PROCEDURE NOTE - PRE PROCEDURE EVALUATION
Vascular & Interventional Radiology Pre-Procedure Note    Procedure Name: right chest tube insertion    HPI: 83y Male with chronic b/l pleural effusions with prior R pleurx s/p removal now with loculated right pleural effusion presents for image guided chest tube insertion.     Allergies: No Known Allergies      Medications:     buMETAnide: 2 milliGRAM(s) Oral (04-17 @ 13:12)  heparin  Infusion.: 1300 Unit(s)/Hr IV Continuous (04-15 @ 19:11)  heparin  Infusion.: 1500 Unit(s)/Hr IV Continuous (04-16 @ 22:22)  midodrine: 10 milliGRAM(s) Oral (04-17 @ 13:10)  piperacillin/tazobactam IVPB..: 25 mL/Hr IV Intermittent (04-16 @ 22:38)  piperacillin/tazobactam IVPB..: 25 mL/Hr IV Intermittent (04-17 @ 11:47)      Data:  Vital Signs Last 24 Hrs  T(C): 36.6 (17 Apr 2024 12:45), Max: 36.8 (17 Apr 2024 05:16)  T(F): 97.9 (17 Apr 2024 12:45), Max: 98.3 (17 Apr 2024 05:16)  HR: 60 (17 Apr 2024 12:45) (60 - 63)  BP: 111/60 (17 Apr 2024 12:45) (111/60 - 117/52)  BP(mean): --  RR: 18 (17 Apr 2024 12:45) (17 - 18)  SpO2: 98% (17 Apr 2024 12:45) (97% - 100%)    Parameters below as of 17 Apr 2024 12:45  Patient On (Oxygen Delivery Method): room air        LABS:                        8.6    7.47  )-----------( 246      ( 17 Apr 2024 04:15 )             26.5     04-17    139  |  102  |  36<H>  ----------------------------<  126<H>  3.3<L>   |  22  |  2.23<H>    Ca    8.6      17 Apr 2024 04:15  Phos  3.6     04-17  Mg     2.30     04-17      PT/INR - ( 17 Apr 2024 04:15 )   PT: 13.8 sec;   INR: 1.24 ratio         PTT - ( 17 Apr 2024 04:15 )  PTT:61.3 sec    Plan:   -83y Male presents for right chest tube insertion  -Risks/Benefits/alternatives explained with the patient and witnessed informed consent obtained.

## 2024-04-17 NOTE — PROGRESS NOTE ADULT - SUBJECTIVE AND OBJECTIVE BOX
NEPHROLOGY-[facility] [phone]        Patient seen and examined plans for pleurx today noted.         MEDICATIONS  (STANDING):  artificial tears (preservative free) Ophthalmic Solution 1 Drop(s) Both EYES two times a day  ascorbic acid 500 milliGRAM(s) Oral daily  atorvastatin 80 milliGRAM(s) Oral at bedtime  buMETAnide 2 milliGRAM(s) Oral every 12 hours  carbidopa/levodopa  25/100 1.5 Tablet(s) Oral three times a day  carbidopa/levodopa  25/100 1 Tablet(s) Oral at bedtime  chlorhexidine 2% Cloths 1 Application(s) Topical daily  cholecalciferol 1000 Unit(s) Oral daily  cyanocobalamin 1000 MICROGram(s) Oral daily  desvenlafaxine ER 50 milliGRAM(s) Oral daily  influenza  Vaccine (HIGH DOSE) 0.7 milliLiter(s) IntraMuscular once  midodrine 10 milliGRAM(s) Oral every 8 hours  mirtazapine 15 milliGRAM(s) Oral at bedtime  OLANZapine 10 milliGRAM(s) Oral at bedtime  piperacillin/tazobactam IVPB.. 3.375 Gram(s) IV Intermittent every 8 hours  potassium chloride    Tablet ER 20 milliEquivalent(s) Oral daily  Tafamidis (Vyndamax) 61 mg 1 Capsule(s) 1 Capsule(s) Oral daily      VITAL:  T(C): , Max: 36.8 (04-17-24 @ 05:16)  T(F): , Max: 98.3 (04-17-24 @ 05:16)  HR: 60 (04-17-24 @ 12:45)  BP: 111/60 (04-17-24 @ 12:45)  BP(mean): --  RR: 18 (04-17-24 @ 12:45)  SpO2: 98% (04-17-24 @ 12:45)  Wt(kg): --    I and O's:        PHYSICAL EXAM:    Constitutional: NAD  Neck:  No JVD  Respiratory: CTAB/L  Cardiovascular: S1 and S2  Gastrointestinal: BS+, soft, NT/ND  Extremities: + peripheral edema  Neurological: no focal deficits  : + external catheter   Skin: No rashes  Access: Not applicable    LABS:                        8.6    7.47  )-----------( 246      ( 17 Apr 2024 04:15 )             26.5     04-17    139  |  102  |  36<H>  ----------------------------<  126<H>  3.3<L>   |  22  |  2.23<H>    Ca    8.6      17 Apr 2024 04:15  Phos  3.6     04-17  Mg     2.30     04-17            Urine Studies:  Urinalysis Basic - ( 17 Apr 2024 04:15 )    Color: x / Appearance: x / SG: x / pH: x  Gluc: 126 mg/dL / Ketone: x  / Bili: x / Urobili: x   Blood: x / Protein: x / Nitrite: x   Leuk Esterase: x / RBC: x / WBC x   Sq Epi: x / Non Sq Epi: x / Bacteria: x      IMPRESSION: 83M w/ HTN, CAD, AFib, and chronic b/l pleural effusions s/p recent R VATS/Pleurx, 4/10/24 p/w PNA/pleuritis    (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 + component likely of irreversible renal injury related to past nephrolithiasis    (2)ALAYNA - prerenally mediated fluctuations in creat based on volume; acceptable numbers for now    (3)Hypokalemia - now on standing PO KCl given his reliance on BID high-dose PO Bumex    (4)CV - on high-dose Midodrine/on high-dose oral Bumex - midodrine reduced yesterday     (5)ID - resolving pneumonia/pleuritis, on IV Zosyn    RECOMMEND:  (1)Reduce Bumex to 2mg daily  (2)Add Spirinolactone 50 mg daily   (3)Continue KCl 20meq po qd  (4)Continue Midodrine 10tid      [place] Rashaun DONALDSON  [facility]  Office/on call physician: [phone]       NEPHROLOGY-[facility] [phone]        Patient seen and examined plans for pleurx today noted.         MEDICATIONS  (STANDING):  artificial tears (preservative free) Ophthalmic Solution 1 Drop(s) Both EYES two times a day  ascorbic acid 500 milliGRAM(s) Oral daily  atorvastatin 80 milliGRAM(s) Oral at bedtime  buMETAnide 2 milliGRAM(s) Oral every 12 hours  carbidopa/levodopa  25/100 1.5 Tablet(s) Oral three times a day  carbidopa/levodopa  25/100 1 Tablet(s) Oral at bedtime  chlorhexidine 2% Cloths 1 Application(s) Topical daily  cholecalciferol 1000 Unit(s) Oral daily  cyanocobalamin 1000 MICROGram(s) Oral daily  desvenlafaxine ER 50 milliGRAM(s) Oral daily  influenza  Vaccine (HIGH DOSE) 0.7 milliLiter(s) IntraMuscular once  midodrine 10 milliGRAM(s) Oral every 8 hours  mirtazapine 15 milliGRAM(s) Oral at bedtime  OLANZapine 10 milliGRAM(s) Oral at bedtime  piperacillin/tazobactam IVPB.. 3.375 Gram(s) IV Intermittent every 8 hours  potassium chloride    Tablet ER 20 milliEquivalent(s) Oral daily  Tafamidis (Vyndamax) 61 mg 1 Capsule(s) 1 Capsule(s) Oral daily      VITAL:  T(C): , Max: 36.8 (04-17-24 @ 05:16)  T(F): , Max: 98.3 (04-17-24 @ 05:16)  HR: 60 (04-17-24 @ 12:45)  BP: 111/60 (04-17-24 @ 12:45)  BP(mean): --  RR: 18 (04-17-24 @ 12:45)  SpO2: 98% (04-17-24 @ 12:45)  Wt(kg): --    I and O's:        PHYSICAL EXAM:    Constitutional: NAD  Neck:  No JVD  Respiratory: CTAB/L  Cardiovascular: S1 and S2  Gastrointestinal: BS+, soft, NT/ND  Extremities: + peripheral edema  Neurological: no focal deficits  : + external catheter   Skin: No rashes  Access: Not applicable    LABS:                        8.6    7.47  )-----------( 246      ( 17 Apr 2024 04:15 )             26.5     04-17    139  |  102  |  36<H>  ----------------------------<  126<H>  3.3<L>   |  22  |  2.23<H>    Ca    8.6      17 Apr 2024 04:15  Phos  3.6     04-17  Mg     2.30     04-17            Urine Studies:  Urinalysis Basic - ( 17 Apr 2024 04:15 )    Color: x / Appearance: x / SG: x / pH: x  Gluc: 126 mg/dL / Ketone: x  / Bili: x / Urobili: x   Blood: x / Protein: x / Nitrite: x   Leuk Esterase: x / RBC: x / WBC x   Sq Epi: x / Non Sq Epi: x / Bacteria: x      IMPRESSION: 83M w/ HTN, CAD, AFib, and chronic b/l pleural effusions s/p recent R VATS/Pleurx, 4/10/24 p/w PNA/pleuritis    (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 + component likely of irreversible renal injury related to past nephrolithiasis    (2)ALAYNA - prerenally mediated fluctuations in creat based on volume; acceptable numbers for now    (3)Hypokalemia - now on standing PO KCl given his reliance on BID high-dose PO Bumex    (4)CV - on high-dose Midodrine/on high-dose oral Bumex - midodrine reduced yesterday     (5)ID - resolving pneumonia/pleuritis, on IV Zosyn    RECOMMEND:  (1)Reduce Bumex to 2mg daily  (2)Add Spirinolactone 50 mg daily   (3)Continue KCl 20meq po qd  (4)Continue Midodrine 10tid      [place] [facility] NP  [facility]  Office/on call physician: [phone]      RENAL ATTENDING NOTE  Patient seen and examined with NP. Agree with assessment and plan as above.  Cutting back on Bumex and adding Spironolactone should help prevent further hypokalemia without having too much effect on volume status.    Clifton Cool MD  [facility]  [phone]

## 2024-04-17 NOTE — PROGRESS NOTE ADULT - SUBJECTIVE AND OBJECTIVE BOX
CARDIOLOGY FOLLOW UP - Dr. Mann  DATE OF SERVICE: 4/17/24    CC no acute cv events       REVIEW OF SYSTEMS:  jose      PHYSICAL EXAM:  T(C): 36.8 (04-17-24 @ 05:16), Max: 36.8 (04-17-24 @ 05:16)  HR: 60 (04-17-24 @ 05:16) (60 - 63)  BP: 117/52 (04-17-24 @ 05:16) (112/50 - 123/55)  RR: 17 (04-17-24 @ 05:16) (17 - 18)  SpO2: 100% (04-17-24 @ 05:16) (97% - 100%)  Wt(kg): --  I&O's Summary      Appearance: Normal	  Cardiovascular: Normal S1 S2,RRR, No JVD, No murmurs  Respiratory: Lungs clear to auscultation	  Gastrointestinal:  Soft, Non-tender, + BS	  Extremities: Normal range of motion, No clubbing, cyanosis or edema      Home Medications:  ascorbic acid 500 mg oral tablet: 1 tab(s) orally once a day (10 Apr 2024 02:43)  carbidopa-levodopa 25 mg-100 mg oral tablet: 1.5 tab(s) orally 3 times a day (10 Apr 2024 02:44)  carbidopa-levodopa 25 mg-100 mg oral tablet: 1 tab(s) orally once a day (at bedtime) (10 Apr 2024 02:47)  Crestor 20 mg oral tablet: 1 tab(s) orally once a day (10 Apr 2024 02:43)  cyanocobalamin 100 mcg oral tablet: 1 tab(s) orally (10 Apr 2024 02:43)  D3 25 mcg (1000 intl units) oral tablet: 1 tab(s) orally once a day (10 Apr 2024 02:43)  desvenlafaxine (as base) 50 mg oral tablet, extended release: 50 milligram(s) orally once a day (10 Apr 2024 02:43)  Eliquis 2.5 mg oral tablet: 1 tab(s) orally 2 times a day (10 Apr 2024 02:43)  Entresto 24 mg-26 mg oral tablet: 1 tab(s) orally 2 times a day (10 Apr 2024 02:40)  ferrous sulfate 325 mg (65 mg elemental iron) oral delayed release tablet: 1 tab(s) orally 2 times a day (10 Apr 2024 02:48)  methenamine hippurate 1 g oral tablet: 1 tab(s) orally once a day (10 Apr 2024 02:41)  mirtazapine 15 mg oral tablet: 1 tab(s) orally once a day (at bedtime) (10 Apr 2024 02:43)  OLANZapine 10 mg oral tablet: 1 tab(s) orally once a day (at bedtime) (10 Apr 2024 02:43)  Plavix 75 mg oral tablet: 1 tab(s) orally once a day (10 Apr 2024 02:43)  tadalafil 5 mg oral tablet: 1 tab(s) orally once a day (10 Apr 2024 02:48)  Vyndamax 61 mg oral capsule: 1 orally once a day (10 Apr 2024 02:43)      MEDICATIONS  (STANDING):  artificial tears (preservative free) Ophthalmic Solution 1 Drop(s) Both EYES two times a day  ascorbic acid 500 milliGRAM(s) Oral daily  atorvastatin 80 milliGRAM(s) Oral at bedtime  buMETAnide 2 milliGRAM(s) Oral every 12 hours  carbidopa/levodopa  25/100 1.5 Tablet(s) Oral three times a day  carbidopa/levodopa  25/100 1 Tablet(s) Oral at bedtime  chlorhexidine 2% Cloths 1 Application(s) Topical daily  cholecalciferol 1000 Unit(s) Oral daily  cyanocobalamin 1000 MICROGram(s) Oral daily  desvenlafaxine ER 50 milliGRAM(s) Oral daily  influenza  Vaccine (HIGH DOSE) 0.7 milliLiter(s) IntraMuscular once  midodrine 10 milliGRAM(s) Oral every 8 hours  mirtazapine 15 milliGRAM(s) Oral at bedtime  OLANZapine 10 milliGRAM(s) Oral at bedtime  piperacillin/tazobactam IVPB.. 3.375 Gram(s) IV Intermittent every 8 hours  potassium chloride    Tablet ER 20 milliEquivalent(s) Oral every 2 hours  potassium chloride    Tablet ER 20 milliEquivalent(s) Oral daily  Tafamidis (Vyndamax) 61 mg 1 Capsule(s) 1 Capsule(s) Oral daily      TELEMETRY: 	    ECG:  	  RADIOLOGY:   DIAGNOSTIC TESTING:  [ ] Echocardiogram:  [ ]  Catheterization:  [ ] Stress Test:    OTHER: 	    LABS:	 	                            8.6    7.47  )-----------( 246      ( 17 Apr 2024 04:15 )             26.5     04-17    139  |  102  |  36<H>  ----------------------------<  126<H>  3.3<L>   |  22  |  2.23<H>    Ca    8.6      17 Apr 2024 04:15  Phos  3.6     04-17  Mg     2.30     04-17      PT/INR - ( 17 Apr 2024 04:15 )   PT: 13.8 sec;   INR: 1.24 ratio         PTT - ( 17 Apr 2024 04:15 )  PTT:61.3 sec

## 2024-04-17 NOTE — PROGRESS NOTE ADULT - ASSESSMENT
Patient is a 84 y/o male with a pmh of  HTN, HLD, ICD/ afib on Eliquis (last dose 3/30), Plavix (last dose 3/26), Parkinson's disease, history of chronic pleural effusions, R sided VATS Pleurx cathter placement, presents to the ED due to concerns of dyspnea and right sided chest pain around the Pleurx catheter site. Patient saw thoracic surgery outpatient on 4/8/24 for a dislodged Pleurx cath. Due to the patient being on Eliquis and Plavix he was sent home and was supposed to return on 4/10 for removal. However, due to his current symptoms, he now presented to the ED. Patient denies headaches abdominal pain, nausea, vomiting, diarrhea or cough.   Upon arrival to the ED, patient was febrile(T-max-102.7) and was given Vanc and Zosyn. He was also given 1L IVF but due to worsening hypotension was eventually started on Levophed. Patient now admitted to the MICU for further monitoring and treatment.  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 pneumonia:  RML zone opacity:   -plan for ct tomorrow  4/15: ct chest repeated today : reported as Interval removal of the right sided chest tube. Moderate partially loculated right pleural effusion is enlarged from prior. Small to moderate partially loculated left pleural effusion is also enlarged from prior. Partial atelectasis both lower lobes, increased from prior.  -defer to surgery  : clinically he is not in any resp distress:  his last vbg WAS PRETTY GOOD:  NOT ON BIPAP   4/16: to me he looks better today  : he is not sob:  alert and awake:  no sob:  no cough : no phlegm : on room air   4/17; seems OK:  on room air : on zosyn : no new events overnight     Eliezer effusions:  right sided pleurax cath /RT VATS   -he had vats done on 2022: path showed chronic inflammation  no malignancy  ;  -no pleurx fell out  -last chest xray with RML zone opacity:  not much of effusion  4/15: still with loculated effusion on both sides:"  slight increase:  defer to primary team on bumex q 12 hours  4/16 : cont diuretics:  seen by ir:  no tube placement for now   4/17: pulm wise seems stable:  no sob:  no cough : no wheezing    HTN  -blood pressure is reasonable:  on midodrine     A FIB/ICD  -hr controlled:  on iv heparin    HLD  -on atorvastatin    AMYLOIDOSIS  -Tafamidis (Vyndamax) 61 mg 1 Capsule(s) 1 Capsule(s) Oral daily      dw resident:

## 2024-04-18 ENCOUNTER — TRANSCRIPTION ENCOUNTER (OUTPATIENT)
Age: 84
End: 2024-04-18

## 2024-04-18 VITALS
RESPIRATION RATE: 17 BRPM | HEART RATE: 60 BPM | OXYGEN SATURATION: 100 % | TEMPERATURE: 98 F | SYSTOLIC BLOOD PRESSURE: 100 MMHG | DIASTOLIC BLOOD PRESSURE: 53 MMHG

## 2024-04-18 LAB
ALBUMIN SERPL ELPH-MCNC: 3 G/DL — LOW (ref 3.3–5)
ALP SERPL-CCNC: 61 U/L — SIGNIFICANT CHANGE UP (ref 40–120)
ALT FLD-CCNC: 6 U/L — SIGNIFICANT CHANGE UP (ref 4–41)
ANION GAP SERPL CALC-SCNC: 16 MMOL/L — HIGH (ref 7–14)
AST SERPL-CCNC: 15 U/L — SIGNIFICANT CHANGE UP (ref 4–40)
BASE EXCESS BLDV CALC-SCNC: -0.8 MMOL/L — SIGNIFICANT CHANGE UP (ref -2–3)
BASOPHILS # BLD AUTO: 0.05 K/UL — SIGNIFICANT CHANGE UP (ref 0–0.2)
BASOPHILS NFR BLD AUTO: 0.7 % — SIGNIFICANT CHANGE UP (ref 0–2)
BILIRUB SERPL-MCNC: 0.4 MG/DL — SIGNIFICANT CHANGE UP (ref 0.2–1.2)
BLOOD GAS VENOUS COMPREHENSIVE RESULT: SIGNIFICANT CHANGE UP
BUN SERPL-MCNC: 37 MG/DL — HIGH (ref 7–23)
CALCIUM SERPL-MCNC: 8.8 MG/DL — SIGNIFICANT CHANGE UP (ref 8.4–10.5)
CHLORIDE BLDV-SCNC: 108 MMOL/L — SIGNIFICANT CHANGE UP (ref 96–108)
CHLORIDE SERPL-SCNC: 105 MMOL/L — SIGNIFICANT CHANGE UP (ref 98–107)
CO2 BLDV-SCNC: 23.9 MMOL/L — SIGNIFICANT CHANGE UP (ref 22–26)
CO2 SERPL-SCNC: 22 MMOL/L — SIGNIFICANT CHANGE UP (ref 22–31)
CREAT SERPL-MCNC: 2.25 MG/DL — HIGH (ref 0.5–1.3)
EGFR: 29 ML/MIN/1.73M2 — LOW
EOSINOPHIL # BLD AUTO: 0.18 K/UL — SIGNIFICANT CHANGE UP (ref 0–0.5)
EOSINOPHIL NFR BLD AUTO: 2.5 % — SIGNIFICANT CHANGE UP (ref 0–6)
GAS PNL BLDV: 135 MMOL/L — LOW (ref 136–145)
GLUCOSE BLDV-MCNC: 99 MG/DL — SIGNIFICANT CHANGE UP (ref 70–99)
GLUCOSE SERPL-MCNC: 98 MG/DL — SIGNIFICANT CHANGE UP (ref 70–99)
GRAM STN FLD: SIGNIFICANT CHANGE UP
HCO3 BLDV-SCNC: 23 MMOL/L — SIGNIFICANT CHANGE UP (ref 22–29)
HCT VFR BLD CALC: 25.9 % — LOW (ref 39–50)
HCT VFR BLDA CALC: 24 % — LOW (ref 39–51)
HGB BLD CALC-MCNC: 7.9 G/DL — LOW (ref 12.6–17.4)
HGB BLD-MCNC: 7.9 G/DL — LOW (ref 13–17)
IANC: 4.98 K/UL — SIGNIFICANT CHANGE UP (ref 1.8–7.4)
IMM GRANULOCYTES NFR BLD AUTO: 0.3 % — SIGNIFICANT CHANGE UP (ref 0–0.9)
LACTATE BLDV-MCNC: 1.6 MMOL/L — SIGNIFICANT CHANGE UP (ref 0.5–2)
LYMPHOCYTES # BLD AUTO: 0.88 K/UL — LOW (ref 1–3.3)
LYMPHOCYTES # BLD AUTO: 12.3 % — LOW (ref 13–44)
MAGNESIUM SERPL-MCNC: 2.2 MG/DL — SIGNIFICANT CHANGE UP (ref 1.6–2.6)
MCHC RBC-ENTMCNC: 29.4 PG — SIGNIFICANT CHANGE UP (ref 27–34)
MCHC RBC-ENTMCNC: 30.5 GM/DL — LOW (ref 32–36)
MCV RBC AUTO: 96.3 FL — SIGNIFICANT CHANGE UP (ref 80–100)
MONOCYTES # BLD AUTO: 1.04 K/UL — HIGH (ref 0–0.9)
MONOCYTES NFR BLD AUTO: 14.5 % — HIGH (ref 2–14)
NEUTROPHILS # BLD AUTO: 4.98 K/UL — SIGNIFICANT CHANGE UP (ref 1.8–7.4)
NEUTROPHILS NFR BLD AUTO: 69.7 % — SIGNIFICANT CHANGE UP (ref 43–77)
NRBC # BLD: 0 /100 WBCS — SIGNIFICANT CHANGE UP (ref 0–0)
NRBC # FLD: 0 K/UL — SIGNIFICANT CHANGE UP (ref 0–0)
PCO2 BLDV: 33 MMHG — LOW (ref 42–55)
PH BLDV: 7.45 — HIGH (ref 7.32–7.43)
PHOSPHATE SERPL-MCNC: 4.2 MG/DL — SIGNIFICANT CHANGE UP (ref 2.5–4.5)
PLATELET # BLD AUTO: 263 K/UL — SIGNIFICANT CHANGE UP (ref 150–400)
PO2 BLDV: 172 MMHG — HIGH (ref 25–45)
POTASSIUM BLDV-SCNC: 3.8 MMOL/L — SIGNIFICANT CHANGE UP (ref 3.5–5.1)
POTASSIUM SERPL-MCNC: 3.9 MMOL/L — SIGNIFICANT CHANGE UP (ref 3.5–5.3)
POTASSIUM SERPL-SCNC: 3.9 MMOL/L — SIGNIFICANT CHANGE UP (ref 3.5–5.3)
PROT SERPL-MCNC: 6.3 G/DL — SIGNIFICANT CHANGE UP (ref 6–8.3)
RBC # BLD: 2.69 M/UL — LOW (ref 4.2–5.8)
RBC # FLD: 13.9 % — SIGNIFICANT CHANGE UP (ref 10.3–14.5)
SAO2 % BLDV: 98.2 % — HIGH (ref 67–88)
SODIUM SERPL-SCNC: 143 MMOL/L — SIGNIFICANT CHANGE UP (ref 135–145)
SPECIMEN SOURCE: SIGNIFICANT CHANGE UP
WBC # BLD: 7.15 K/UL — SIGNIFICANT CHANGE UP (ref 3.8–10.5)
WBC # FLD AUTO: 7.15 K/UL — SIGNIFICANT CHANGE UP (ref 3.8–10.5)

## 2024-04-18 PROCEDURE — 99232 SBSQ HOSP IP/OBS MODERATE 35: CPT

## 2024-04-18 PROCEDURE — 99231 SBSQ HOSP IP/OBS SF/LOW 25: CPT

## 2024-04-18 PROCEDURE — 71045 X-RAY EXAM CHEST 1 VIEW: CPT | Mod: 26

## 2024-04-18 RX ORDER — SACUBITRIL AND VALSARTAN 24; 26 MG/1; MG/1
1 TABLET, FILM COATED ORAL
Refills: 0 | DISCHARGE

## 2024-04-18 RX ORDER — BUMETANIDE 0.25 MG/ML
1 INJECTION INTRAMUSCULAR; INTRAVENOUS
Qty: 90 | Refills: 1
Start: 2024-04-18 | End: 2024-10-14

## 2024-04-18 RX ORDER — SPIRONOLACTONE 25 MG/1
2 TABLET, FILM COATED ORAL
Qty: 180 | Refills: 1
Start: 2024-04-18 | End: 2024-10-14

## 2024-04-18 RX ORDER — MIDODRINE HYDROCHLORIDE 2.5 MG/1
1 TABLET ORAL
Qty: 270 | Refills: 1
Start: 2024-04-18 | End: 2024-10-14

## 2024-04-18 RX ORDER — BUMETANIDE 0.25 MG/ML
1 INJECTION INTRAMUSCULAR; INTRAVENOUS
Qty: 0 | Refills: 0 | DISCHARGE
Start: 2024-04-18

## 2024-04-18 RX ADMIN — DESVENLAFAXINE 50 MILLIGRAM(S): 50 TABLET, EXTENDED RELEASE ORAL at 13:13

## 2024-04-18 RX ADMIN — PREGABALIN 1000 MICROGRAM(S): 225 CAPSULE ORAL at 13:14

## 2024-04-18 RX ADMIN — Medication 650 MILLIGRAM(S): at 09:43

## 2024-04-18 RX ADMIN — Medication 1000 UNIT(S): at 13:13

## 2024-04-18 RX ADMIN — MIDODRINE HYDROCHLORIDE 10 MILLIGRAM(S): 2.5 TABLET ORAL at 13:12

## 2024-04-18 RX ADMIN — Medication 20 MILLIEQUIVALENT(S): at 13:10

## 2024-04-18 RX ADMIN — CARBIDOPA AND LEVODOPA 1.5 TABLET(S): 25; 100 TABLET ORAL at 13:11

## 2024-04-18 RX ADMIN — PIPERACILLIN AND TAZOBACTAM 25 GRAM(S): 4; .5 INJECTION, POWDER, LYOPHILIZED, FOR SOLUTION INTRAVENOUS at 13:08

## 2024-04-18 RX ADMIN — Medication 500 MILLIGRAM(S): at 13:09

## 2024-04-18 RX ADMIN — CARBIDOPA AND LEVODOPA 1.5 TABLET(S): 25; 100 TABLET ORAL at 06:45

## 2024-04-18 RX ADMIN — CHLORHEXIDINE GLUCONATE 1 APPLICATION(S): 213 SOLUTION TOPICAL at 13:10

## 2024-04-18 RX ADMIN — Medication 650 MILLIGRAM(S): at 08:43

## 2024-04-18 RX ADMIN — PIPERACILLIN AND TAZOBACTAM 25 GRAM(S): 4; .5 INJECTION, POWDER, LYOPHILIZED, FOR SOLUTION INTRAVENOUS at 03:08

## 2024-04-18 RX ADMIN — Medication 1 DROP(S): at 06:45

## 2024-04-18 NOTE — PROGRESS NOTE ADULT - ATTENDING COMMENTS
82 y/o male with a pmh of  HTN, CKD, HLD, PPM,  afib on Eliquis (last dose 3/30), Plavix (last dose 3/26), Parkinson's disease, cardiac amyloidosis, history of chronic pleural effusions, R sided VATS Pleurx cathter placement, who presented with fevers and malaise on 4/10, admitted to MICU in shock.     Patient has had issues in the past with PLEURX, non-functioning. Saw outpatient, possible dislodgement, was planned for removal but on full a/c and plavix. Was pending removal by CT surgery. Here with fevers, leukocytosis and requiring pressors.     Has had chronic effusions, hx of loculation, previous MIST in previous admission. Pleural biopsy with chronic inflammation in 2022.   S/P Pleurx removal  awaiting pigtail catheter placement in IR by Dr Donahue in am of 4/17  On chronic Midodrine     cx from fluid form pleura: Culture NTD. CT chest largely unchanged.  will cont empiric ZOsyn for total of 5 days, will call ID  C/W  bumex, at home doses. Overloaded on exam. Monitor renal function. reconsult renal  CKD- hold nephrotoxic meds, Scr is better than at baseline previously   pHTN, unclear why on sildenafil, also only on daily. 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 on hold for shock  Wean pressors as tolerated, not volume responsive. adjust midodrine as necessary  Afib, rate control on hold for shock.  Amyloidosis, - will need home meds brought. Not available in house  PD- c/w home PD meds  DVT ppx- not necessary, on heparin drip  Dispo- GOC with wife at bedside. Previous admissions was also DNR/DNI. Will place order. MOLST filled out.
82 y/o male with a pmh of  HTN, CKD, HLD, PPM,  afib on Eliquis (last dose 3/30), Plavix (last dose 3/26), Parkinson's disease, cardiac amyloidosis, history of chronic pleural effusions, R sided VATS Pleurx cathter placement, who presented with fevers and malaise on 4/10, admitted to MICU in shock.     Patient has had issues in the past with PLEURX, non-functioning. Saw outpatient, possible dislodgement, was planned for removal but on full a/c and plavix. Was pending removal by CT surgery. Here with fevers, leukocytosis and requiring pressors.     Has had chronic effusions, hx of loculation, previous MIST in previous admission. Pleural biopsy with chronic inflammation in 2022.   S/P Pleurx removal  s/p pleurx placement in IR by Dr Donahue on  4/17  On chronic Midodrine 20 mg tid    cx from fluid form pleura: Culture NTD. CT chest largely unchanged.  will cont empiric ZOsyn for total of 14 days, ID following  C/W  bumex, at home doses. Overloaded on exam. Monitor renal function. reconsult renal  CKD- hold nephrotoxic meds, Scr is better than at baseline previously   pHTN, unclear why on sildenafil, also only on daily. 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 on hold for shock  Wean pressors as tolerated, not volume responsive. adjust midodrine as necessary  Afib, rate control on hold for shock.  Amyloidosis, - will need home meds brought. Not available in house  PD- c/w home PD meds  DVT ppx- not necessary, on heparin drip  Dispo- GOC with wife at bedside. Previous admissions was also DNR/DNI. Will place order. MOLST filled out.
82 y/o male with a pmh of  HTN, CKD, HLD, PPM,  afib on Eliquis (last dose 3/30), Plavix (last dose 3/26), Parkinson's disease, cardiac amyloidosis, history of chronic pleural effusions, R sided VATS Pleurx cathter placement, who presented with fevers and malaise on 4/10, admitted to MICU in shock.     Patient has had issues in the past with PLEURX, non-functioning. Saw outpatient, possible dislodgement, was planned for removal but on full a/c and plavix. Was pending removal by CT surgery. Here with fevers, leukocytosis and requiring pressors.     Has had chronic effusions, hx of loculation, previous MIST in previous admission. Pleural biopsy with chronic inflammation in 2022.   S/P Pleurx removal  tapered off pressors, all Cx NTD  On chronic Midodrine but was never cont after previous hospitalization    cx from fluid form pleura: Culture NTD. CT chest largely unchanged.  will cont empiric ZOsyn for total of 5 days, will call ID  C/W  bumex, at home doses. Overloaded on exam. Monitor renal function. reconsult renal  CKD- hold nephrotoxic meds, Scr is better than at baseline previously   pHTN, unclear why on sildenafil, also only on daily. 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 on hold for shock  Wean pressors as tolerated, not volume responsive. adjust midodrine as necessary  Afib, rate control on hold for shock.  Amyloidosis, - will need home meds brought. Not available in house  PD- c/w home PD meds  DVT ppx- not necessary, on heparin drip  Dispo- GOC with wife at bedside. Previous admissions was also DNR/DNI. Will place order. MOLST filled out.
84 y/o male with a pmh of  HTN, CKD, HLD, PPM,  afib on Eliquis (last dose 3/30), Plavix (last dose 3/26), Parkinson's disease, cardiac amyloidosis, history of chronic pleural effusions, R sided VATS Pleurx cathter placement, who presented with fevers and malaise on 4/10, admitted to MICU in shock.     Patient has had issues in the past with PLEURX, non-functioning. Saw outpatient, possible dislodgement, was planned for removal but on full a/c and plavix. Was pending removal by CT surgery. Here with fevers, leukocytosis and requiring pressors.     Has had chronic effusions, hx of loculation, previous MIST in previous admission. Pleural biopsy with chronic inflammation in 2022.   S/P Pleurx removal  s/p pigtail placement in IR by Dr Donahue on  4/17, 400 cc drained, catheter pulled by thoracic 4/18  On chronic Midodrine 20 mg tid    cx from fluid form pleura: Culture NTD. CT chest largely unchanged.  will cont empiric ZOsyn, transition to po ABx to complete 14 day course, ID following  C/W  bumex, at home doses. Overloaded on exam. Monitor renal function. reconsult renal  CKD- hold nephrotoxic meds, Scr is better than at baseline previously   pHTN, unclear why on sildenafil, also only on daily. 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 on hold for shock  Wean pressors as tolerated, not volume responsive. adjust midodrine as necessary  Afib, rate control on hold for shock.  Amyloidosis, - will need home meds brought. Not available in house  PD- c/w home PD meds  DVT ppx- not necessary, on heparin drip  Dispo- GOC with wife at bedside. Previous admissions was also DNR/DNI. Will place order. MOLST filled out.
84 y/o male with a pmh of  HTN, CKD, HLD, PPM,  afib on Eliquis (last dose 3/30), Plavix (last dose 3/26), Parkinson's disease, cardiac amyloidosis, history of chronic pleural effusions, R sided VATS Pleurx cathter placement, who presented with fevers and malaise on 4/10, admitted to MICU in shock.     Patient has had issues in the past with PLEURX, non-functioning. Saw outpatient, possible dislodgement, was planned for removal but on full a/c and plavix. Was pending removal by CT surgery. Here with fevers, leukocytosis and requiring pressors.     Has had chronic effusions, hx of loculation, previous MIST in previous admission. Pleural biopsy with chronic inflammation in 2022.   S/P Pleurx removal  tapered off pressors, all Cx NTD  On chronic Midodrine but was never cont after previous hospitalization    cx from fluid form pleura: Culture NTD. CT chest largely unchanged.  will cont empiric ZOsyn for total of 5 days, will call ID  C/W  bumex, at home doses. Overloaded on exam. Monitor renal function. reconsult renal  CKD- hold nephrotoxic meds, Scr is better than at baseline previously   pHTN, unclear why on sildenafil, also only on daily. 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 on hold for shock  Wean pressors as tolerated, not volume responsive. adjust midodrine as necessary  Afib, rate control on hold for shock.  Amyloidosis, - will need home meds brought. Not available in house  PD- c/w home PD meds  DVT ppx- not necessary, on heparin drip  Dispo- GOC with wife at bedside. Previous admissions was also DNR/DNI. Will place order. MOLST filled out.

## 2024-04-18 NOTE — PROGRESS NOTE ADULT - PROBLEM SELECTOR PLAN 6
- renally dose medications  - Voiding well, maintain I/Os   - Baseline per review 2-3s, currently 1.7

## 2024-04-18 NOTE — PROGRESS NOTE ADULT - PROBLEM SELECTOR PLAN 10
- Regular diet  - Hep GTT   - DNR/DNI
- Regular diet  - Hep GTT   - DNR/DNI
- NPO pending procedure  - Hep GTT   - DNR/DNI
- Regular diet  - Hep GTT   - DNR/DNI
- Regular diet  - Hep GTT   - DNR/DNI
- NPO pending procedure  - Hep GTT   - DNR/DNI
- Regular diet  - Hep GTT   - DNR/DNI

## 2024-04-18 NOTE — PROGRESS NOTE ADULT - SUBJECTIVE AND OBJECTIVE BOX
Date of Service: 04-18-24 @ 10:33    Patient is a 83y old  Male who presents with a chief complaint of Shock (18 Apr 2024 08:58)      Any change in ROS: seems OK:  has Parkinson's dementia:  wife at bedside:  had pig tail yesterday on rigth side     MEDICATIONS  (STANDING):  artificial tears (preservative free) Ophthalmic Solution 1 Drop(s) Both EYES two times a day  ascorbic acid 500 milliGRAM(s) Oral daily  atorvastatin 80 milliGRAM(s) Oral at bedtime  buMETAnide 2 milliGRAM(s) Oral daily  carbidopa/levodopa  25/100 1.5 Tablet(s) Oral three times a day  carbidopa/levodopa  25/100 1 Tablet(s) Oral at bedtime  chlorhexidine 2% Cloths 1 Application(s) Topical daily  cholecalciferol 1000 Unit(s) Oral daily  cyanocobalamin 1000 MICROGram(s) Oral daily  desvenlafaxine ER 50 milliGRAM(s) Oral daily  influenza  Vaccine (HIGH DOSE) 0.7 milliLiter(s) IntraMuscular once  midodrine 10 milliGRAM(s) Oral every 8 hours  mirtazapine 15 milliGRAM(s) Oral at bedtime  OLANZapine 10 milliGRAM(s) Oral at bedtime  piperacillin/tazobactam IVPB.. 3.375 Gram(s) IV Intermittent every 8 hours  potassium chloride    Tablet ER 20 milliEquivalent(s) Oral daily  spironolactone 50 milliGRAM(s) Oral daily  Tafamidis (Vyndamax) 61 mg 1 Capsule(s) 1 Capsule(s) Oral daily    MEDICATIONS  (PRN):  acetaminophen     Tablet .. 650 milliGRAM(s) Oral every 6 hours PRN Temp greater or equal to 38C (100.4F), Mild Pain (1 - 3)  benzonatate 100 milliGRAM(s) Oral three times a day PRN Cough  ondansetron Injectable 4 milliGRAM(s) IV Push once PRN Nausea and/or Vomiting    Vital Signs Last 24 Hrs  T(C): 36.7 (18 Apr 2024 05:54), Max: 36.7 (18 Apr 2024 05:54)  T(F): 98.1 (18 Apr 2024 05:54), Max: 98.1 (18 Apr 2024 05:54)  HR: 60 (18 Apr 2024 05:54) (60 - 60)  BP: 100/54 (18 Apr 2024 05:54) (100/54 - 111/60)  BP(mean): --  RR: 18 (18 Apr 2024 05:54) (17 - 18)  SpO2: 96% (18 Apr 2024 05:54) (96% - 98%)    Parameters below as of 18 Apr 2024 05:54  Patient On (Oxygen Delivery Method): room air        I&O's Summary    17 Apr 2024 07:01  -  18 Apr 2024 07:00  --------------------------------------------------------  IN: 0 mL / OUT: 260 mL / NET: -260 mL          Physical Exam:   GENERAL: NAD, well-groomed, well-developed  HEENT: MESFIN/   Atraumatic, Normocephalic  ENMT: No tonsillar erythema, exudates, or enlargement; Moist mucous membranes, Good dentition, No lesions  NECK: Supple, No JVD, Normal thyroid  CHEST/LUNG: decreased air entry gail  CVS: Regular rate and rhythm; No murmurs, rubs, or gallops  GI: : Soft, Nontender, Nondistended; Bowel sounds present  NERVOUS SYSTEM: mildly lethargic  has rigth sided chest tube  EXTREMITIES:  - edema  LYMPH: No lymphadenopathy noted  SKIN: No rashes or lesions  ENDOCRINOLOGY: No Thyromegaly  PSYCH: calm     Labs:  23, 26                            7.9    7.15  )-----------( 263      ( 18 Apr 2024 07:38 )             25.9                         8.6    7.47  )-----------( 246      ( 17 Apr 2024 04:15 )             26.5                         9.2    7.15  )-----------( 240      ( 16 Apr 2024 20:15 )             28.8                         9.3    7.50  )-----------( 257      ( 16 Apr 2024 06:30 )             29.8                         8.8    6.79  )-----------( 236      ( 15 Apr 2024 06:07 )             28.0     04-18    143  |  105  |  37<H>  ----------------------------<  98  3.9   |  22  |  2.25<H>  04-17    139  |  102  |  36<H>  ----------------------------<  126<H>  3.3<L>   |  22  |  2.23<H>  04-15    138  |  100  |  34<H>  ----------------------------<  98  3.1<L>   |  19<L>  |  2.12<H>    Ca    8.8      18 Apr 2024 07:38  Ca    8.6      17 Apr 2024 04:15  Phos  4.2     04-18  Phos  3.6     04-17  Mg     2.20     04-18  Mg     2.30     04-17    TPro  6.3  /  Alb  3.0<L>  /  TBili  0.4  /  DBili  x   /  AST  15  /  ALT  6   /  AlkPhos  61  04-18  TPro  6.3  /  Alb  2.9<L>  /  TBili  0.5  /  DBili  x   /  AST  22  /  ALT  <5  /  AlkPhos  69  04-15    CAPILLARY BLOOD GLUCOSE      POCT Blood Glucose.: 130 mg/dL (17 Apr 2024 12:06)      LIVER FUNCTIONS - ( 18 Apr 2024 07:38 )  Alb: 3.0 g/dL / Pro: 6.3 g/dL / ALK PHOS: 61 U/L / ALT: 6 U/L / AST: 15 U/L / GGT: x           PT/INR - ( 17 Apr 2024 04:15 )   PT: 13.8 sec;   INR: 1.24 ratio         PTT - ( 17 Apr 2024 04:15 )  PTT:61.3 sec  Urinalysis Basic - ( 18 Apr 2024 07:38 )    Color: x / Appearance: x / SG: x / pH: x  Gluc: 98 mg/dL / Ketone: x  / Bili: x / Urobili: x   Blood: x / Protein: x / Nitrite: x   Leuk Esterase: x / RBC: x / WBC x   Sq Epi: x / Non Sq Epi: x / Bacteria: x            RECENT CULTURES:  04-17 @ 16:00 .Body Fluid RIGHT PLEURAL FLUID       No polymorphonuclear cells seen  No organisms seen  by cytocentrifuge           Testing in progress      rad< from: IR Procedure (04.17.24 @ 15:49) >  PROCEDURE DATE:  04/17/2024          INTERPRETATION:  Procedure: CT-guided right chest tube insertion. Images   saved to PACS.    Clinical Information: 83-year-old male with chronic bilateral pleural   effusions and previous right Pleurx catheter status post removal now with   loculated right pleural effusion presents for image guided chest tube   placement.    Operators: Mallory Torres    Technique: Informed consent was obtained. The patient was placed supine   on the CT table. The right lateral hemithorax was prepped and draped in   the usual sterile fashion. Timeout performed. 1% lidocaine used for local   anesthesia.    Under intermittent axial CT guidance, a 5 Frenchvalved centesis needle   was percutaneously advanced into the right pleural space. Confirmation of   needle location within the right pleural space was confirmed with   aspiration of 2 cc serosanguineous fluid. A 0.035 wire was inserted   through thecentesis sheath into the pleural space and confirmed with   noncontrast CT. After tract dilation, a 14 Georgian pigtail catheter was   placed within the right pleural space and manual aspiration yielded   approximately 80 cc serosanguineous fluid. A sample was sent for   microbiological analysis. Repeat chest CT demonstrates decreased size of   the right pleural effusion with the pigtail catheter in appropriate   position. The catheter was secured to the skin with a silk suture and a   dry sterile dressing was applied. There were no immediate complications.    Sedation: Per anesthesiology.    DLP: 127 mGycm    Impression: Successful CT-guided right chest tube insertion.    --- End of Report ---          TETE MCDANIEL MD; Resident Radiologist  This document has been electronically signed.  BRENDA TORRES MD; Attending Radiologist  This document has been electronically signed. Apr 17 2024  4:37PM    < end of copied text >  < from: CT Chest No Cont (04.14.24 @ 11:01) >  both lower lobes, increased from prior.    UPPER ABDOMEN: Stable large scattered hepatic cysts and subcentimeter   hypodense foci too small to characterize..    BONES/SOFT TISSUES: Degenerative changes of the spine. Bilateral   gynecomastia.    IMPRESSION:    Interval removal of the right sided chest tube. Moderate partially   loculated right pleural effusion is enlarged from prior. Small to  moderate partially loculated left pleural effusion is also enlarged from   prior. Partial atelectasis both lower lobes, increased from prior.      --- End of Report ---          REBEKA WRAY MD; Resident Radiologist  This document has been electronically signed.  EDMUND DANIELSON MD; Attending Radiologist  This document has been electronically signed. Apr 14 2024  2:01PM    < end of copied text >      RESPIRATORY CULTURES:          Studies  Chest X-RAY  CT SCAN Chest   Venous Dopplers: LE:   CT Abdomen  Others

## 2024-04-18 NOTE — PROGRESS NOTE ADULT - ASSESSMENT
83 year old with Parkinson disease and htn/hyperlipidemia   He has chronic pleural effusions and prior VATS  Cultures and pathology were not diagnostic  At that time, he had pleural fluid sent for cytology and bacterial culture as well as pleural tissue sent for pathology  He presented with fever, shortness of breath and right sided chest pain  He had associated leukocytosis.     Imaging significant for known pleural effusions with right sided loculation    He was admitted to the ICU  He was given zosyn    My highest suspicion would be for a pulmonary infection  His pleural effusions are complicating picture.  They have been present prior to this admission, so not clear if they are involved.     At this time, he has improved    I would plan a 14 day course of antibiotics  Continue zosyn while admitted  Change to Augmentin when stable for discharge    If symptoms recurr, I would favor re-sampling pleural fluid    Will sign off  Call ID service with additional questions

## 2024-04-18 NOTE — DISCHARGE NOTE NURSING/CASE MANAGEMENT/SOCIAL WORK - NSDCPEELIQUISCOMP_GEN_ALL_CORE
[phone]    Central Scheduling       [phone]       Want to say \"thank you\" to your nurse?   Scan the QR code below to nominate an extraordinary nurse for The JOANN Award.       [email or web address]      Apixaban/Eliquis is used to treat and prevent blood clots. If you are not able to swallow the tablets whole, they may be crushed and mixed in water, apple juice, or applesauce and promptly taken within four hours. Never skip a dose of Apixaban/Eliquis. If you forget to take your Apixaban/Eliquis, take a dose as soon as you remember. If it is almost time for your next Apixaban/Eliquis dose, wait until then and take a regular dose. DO NOT take an extra pill to ‘catch up’.  NEVER TAKE A DOUBLE DOSE. Notify your doctor that you missed a dose. Take Apixaban/Eliquis at the same time each morning and evening. Apixaban/Eliquis may be taken with other medication or food.

## 2024-04-18 NOTE — PROGRESS NOTE ADULT - PROBLEM SELECTOR PROBLEM 7
Insomnia secondary to anxiety

## 2024-04-18 NOTE — PROGRESS NOTE ADULT - PROBLEM SELECTOR PLAN 2
Bilateral loculated pleural effusions s/p R VATs and pleurx catheter placement (2022)   - Moderate b/l loculated effusions appreciable on CTC 4/9 without interval change, chronic  - Likely iso CHF   - Pt recently saw Dr. Oneal (Thoracic surg) outpt, concern was for dislodged pleurx catheter which was planned to be removed 4/10 however pt presented to ED prior  - On arrival pleurx catheter displaced, removed at bedside 4/10, f/u CXR stable without PTX . now stable for replacement  - Per Dr. Oneal low c/f infection of pleural space/pleural fluid however will f/u with noncon CTC   - Will undergo IR guided pigtail placement today 4/17, f/u pleural fluid analysis   - Continue diuresis w/ bumex 2mg bid Bilateral loculated pleural effusions s/p R VATs and pleurx catheter placement (2022)   - Moderate b/l loculated effusions appreciable on CTC 4/9 without interval change, chronic  - Likely iso CHF   - Pt recently saw Dr. Oneal (Thoracic surg) outpt, concern was for dislodged pleurx catheter which was planned to be removed 4/10 however pt presented to ED prior  - On arrival pleurx catheter displaced, removed at bedside 4/10, f/u CXR stable without PTX . now stable for replacement  - Per Dr. Oneal low c/f infection of pleural space/pleural fluid however will f/u with noncon CTC   - s/p  IR guided pigtail placement today 4/17, 400 cc serosanguinous fluid removed, catheter pulled by thoracic after. f/u pleural fluid analysis   - Continue diuresis w/ bumex 2mg bid

## 2024-04-18 NOTE — PROGRESS NOTE ADULT - PROBLEM SELECTOR PROBLEM 2
Chronic bilateral pleural effusions

## 2024-04-18 NOTE — DISCHARGE NOTE NURSING/CASE MANAGEMENT/SOCIAL WORK - NSDCPEFALRISK_GEN_ALL_CORE
For information on Fall & Injury Prevention, visit: [email or web address] OR  [email or web address] OR  https://www.cdc.gov/steadi/patient.html no

## 2024-04-18 NOTE — PROGRESS NOTE ADULT - SUBJECTIVE AND OBJECTIVE BOX
Pt seen with DR. Oneal. Sleeping during  assessment. Pt's wife at bedside who is very anxious  to take pt home today. Wants him to be discharged.     Vital Signs Last 24 Hrs  T(C): 36.7 (18 Apr 2024 13:10), Max: 36.7 (18 Apr 2024 05:54)  T(F): 98.1 (18 Apr 2024 13:10), Max: 98.1 (18 Apr 2024 05:54)  HR: 60 (18 Apr 2024 13:10) (60 - 60)  BP: 100/53 (18 Apr 2024 13:10) (100/53 - 104/55)  BP(mean): --  RR: 17 (18 Apr 2024 13:10) (17 - 18)  SpO2: 100% (18 Apr 2024 13:10) (96% - 100%)    Parameters below as of 18 Apr 2024 13:10  Patient On (Oxygen Delivery Method): room air    Pt sleepy  s/p IR placement of Right pigtail on 4/17  Pt with about 400cc serosang fluid drained.   No purulence.   Pleural cult Gram stain negative.   CXR improved.     Case and plan discussed with Medical resident Dr. Cheng and   Dr. Oneal.   Medical service, pt's wife feels pt ready for discharge  Pt CXR improved  Low suspicion for infection.   Gram stain negative.   Thoracic surgery will remove pigtail cath today.   Will order follow up CXR  If post pull CXR stable, no objection to discharge from Thoracic standpoint  Pt can follow up with Dr. Oneal in office in 2-3 weeks.

## 2024-04-18 NOTE — PROGRESS NOTE ADULT - PROBLEM SELECTOR PLAN 3
- Continue with home dose Carbidopa-Levodopa  - Continue midodrine home dose upon discharge for autonomic dysregulation

## 2024-04-18 NOTE — PROGRESS NOTE ADULT - ASSESSMENT
A/p  84 y/o male with a pmh of  HTN, HLD, ICD/ afib on Eliquis (last dose 3/30), Plavix (last dose 3/26), Parkinson's disease, history of chronic pleural effusions, R sided VATS Pleurx cathter placement, presents to the ED due to concerns of dyspnea and right sided chest pain around the Pleurx catheter site, found to be in septic shock, now sp micu course.    #Septic Shock  -Unclear source, likely pulm as per ID  -Abx, further w/u per ID  -sp micu course   -sp pleurx removal  -Cont midodrine for hypotension    # pleural effusion   -s/p pleurex cath  -Now removed as was not draining  -CT chest Moderate partially loculated right pleural effusion is enlarged from prior. Small to moderate partially loculated left pleural effusion is also enlarged from  prior.  -F/u further thoracic recs--sp placement of R PTC    #Chronic HFpEF, Cardiac Amyloid   -sp iv diuresis in micu  -Volume status stable  -Continue po bumex as ordered   -Entresto on hold given soft bp - eventually resume when bp can tolerate   -Cont Vyndamax    #CAD, s/p PCI  -stable, no chest pain  -Eventually resume plavix per thoracic     #AF, s/p PPM  -stable off avn meds  -Resume heparin gtt

## 2024-04-18 NOTE — PROGRESS NOTE ADULT - PROBLEM SELECTOR PLAN 5
-Beverlyus held pending potential procedure   -Hep GTT per nomogram  -Currently rate controlled

## 2024-04-18 NOTE — DISCHARGE NOTE NURSING/CASE MANAGEMENT/SOCIAL WORK - PATIENT PORTAL LINK FT
You can access the FollowMyHealth Patient Portal offered by [facility] by registering at the following website: [email or web address]. By joining [employer]’s FollowMyHealth portal, you will also be able to view your health information using other applications (apps) compatible with our system.

## 2024-04-18 NOTE — PROGRESS NOTE ADULT - PROBLEM SELECTOR PLAN 1
-T-max on arrival was 102.7 and hypotensive SBP 80s MAP 50s, leukocytosis (WBC 29), LA 3.3>1.5, procal 0.54  - Unclear infectious source  - Weaned off Levophed 4/12, now on midodrine 20mg TID, goal systolic BP >95  - CT 4/14 demonstrating interval increase in b/l loculated pleural effusions  - Infectious w/up including flu/covid, MRSA (negative), urine legionella/strep pneumoniae ag (negative), Bcx/UA/Ucx negative  -s/p empiric Vanco (4/9-4/11), continue Zosyn (4/9- 23 while inptn) plan for 14d course with transition to Augmentin upon d/c

## 2024-04-18 NOTE — PROGRESS NOTE ADULT - PROBLEM SELECTOR PLAN 9
-Ct showing- Left renal pelvic stone with fullness and stranding, unchanged - pt asymptomatic  -Ua and Ucx negative

## 2024-04-18 NOTE — DISCHARGE NOTE NURSING/CASE MANAGEMENT/SOCIAL WORK - NSDCFUADDAPPT_GEN_ALL_CORE_FT
APPTS ARE READY TO BE MADE: [X] YES    Best Family or Patient Contact (if needed): Spouse Roselyn Honeycuttdanilo [phone]    Additional Information about above appointments (if needed):    1: Nephrology  2: Thoracic Surgery   3: Pulmonology  4: PCP     Other comments or requests:

## 2024-04-18 NOTE — PROGRESS NOTE ADULT - PROBLEM SELECTOR PLAN 4
-Hx of PCI in 2021  -Plavix held pending potential procedure

## 2024-04-18 NOTE — PROGRESS NOTE ADULT - PROBLEM SELECTOR PROBLEM 3
Parkinsons disease

## 2024-04-18 NOTE — PROGRESS NOTE ADULT - PROBLEM SELECTOR PLAN 7
- Continue with home Mirtazapine, Olanzapine, and Desvenlafaxine

## 2024-04-18 NOTE — CHART NOTE - NSCHARTNOTEFT_GEN_A_CORE
Source: Patient [ ]    Family [ ]     other [x ] Wife, chart review    Patient seen for nutrition f/u. 83 year old male with a PMH of HTN, HLD, Parkinson's disease, history of chronic pleural effusions, R sided VATS Pleurx catheter placement, presents to the ED with a dislodged R PleurX, admitted to MICU with septic shock now stable and downgraded to floors per chart.    Wife reports patient w/ fair appetite. Consumed a little of breakfast this morning per observation. Unable to determine overall PO intake at this time per chart. Food preferences reviewed. No GI distress or chewing/swallowing difficulties reported. Noted w/ +2 generalized edema and no pressure injuries per RN flow sheet.    Diet : Diet, DASH/TLC:   Sodium & Cholesterol Restricted (04-10-24 @ 14:16)    Current Weight: Weight (kg): 98.5 kg (4/16)  92.9 kg (4/10)  Weight change: weight changes likely r/t fluid shifts    Pertinent Medications: MEDICATIONS  (STANDING):  artificial tears (preservative free) Ophthalmic Solution 1 Drop(s) Both EYES two times a day  ascorbic acid 500 milliGRAM(s) Oral daily  atorvastatin 80 milliGRAM(s) Oral at bedtime  buMETAnide 2 milliGRAM(s) Oral daily  carbidopa/levodopa  25/100 1.5 Tablet(s) Oral three times a day  carbidopa/levodopa  25/100 1 Tablet(s) Oral at bedtime  chlorhexidine 2% Cloths 1 Application(s) Topical daily  cholecalciferol 1000 Unit(s) Oral daily  cyanocobalamin 1000 MICROGram(s) Oral daily  desvenlafaxine ER 50 milliGRAM(s) Oral daily  influenza  Vaccine (HIGH DOSE) 0.7 milliLiter(s) IntraMuscular once  midodrine 10 milliGRAM(s) Oral every 8 hours  mirtazapine 15 milliGRAM(s) Oral at bedtime  OLANZapine 10 milliGRAM(s) Oral at bedtime  piperacillin/tazobactam IVPB.. 3.375 Gram(s) IV Intermittent every 8 hours  potassium chloride    Tablet ER 20 milliEquivalent(s) Oral daily  spironolactone 50 milliGRAM(s) Oral daily  Tafamidis (Vyndamax) 61 mg 1 Capsule(s) 1 Capsule(s) Oral daily    MEDICATIONS  (PRN):  acetaminophen     Tablet .. 650 milliGRAM(s) Oral every 6 hours PRN Temp greater or equal to 38C (100.4F), Mild Pain (1 - 3)  benzonatate 100 milliGRAM(s) Oral three times a day PRN Cough  ondansetron Injectable 4 milliGRAM(s) IV Push once PRN Nausea and/or Vomiting    Pertinent Labs:  04-18 Na143 mmol/L Glu 98 mg/dL K+ 3.9 mmol/L Cr  2.25 mg/dL<H> BUN 37 mg/dL<H> 04-18 Phos 4.2 mg/dL 04-18 Alb 3.0 g/dL<L>    Estimated Needs: [x ] no change since previous assessment    Previous Nutrition Diagnosis: No active nutrition diagnosis at this time    Nutrition Diagnosis is [x ] ongoing  [ ] resolved [ ] not applicable     Education:    [  ] Given today    Type of education provided:    [  ] Given on previous assessment by RD    [  ] Not applicable 2/2 cognitive deficit    [  ] Pt refusal of education offered    [  ] Not applicable 2/2 current prognosis    [ x ] Not warranted at present    Recommend  - continue diet as ordered  - obtain weekly weight and monitor PO intake    Monitoring and Evaluation:     [x ] PO intake [x ] Tolerance to diet prescription [x ] weights [x ] follow up per protocol    [place], [id number] or TEAMS

## 2024-04-18 NOTE — PROGRESS NOTE ADULT - ASSESSMENT
Patient is a 82 y/o male with a pmh of  HTN, HLD, ICD/ afib on Eliquis (last dose 3/30), Plavix (last dose 3/26), Parkinson's disease, history of chronic pleural effusions, R sided VATS Pleurx cathter placement, presents to the ED due to concerns of dyspnea and right sided chest pain around the Pleurx catheter site. Patient saw thoracic surgery outpatient on 4/8/24 for a dislodged Pleurx cath. Due to the patient being on Eliquis and Plavix he was sent home and was supposed to return on 4/10 for removal. However, due to his current symptoms, he now presented to the ED. Patient denies headaches abdominal pain, nausea, vomiting, diarrhea or cough.   Upon arrival to the ED, patient was febrile(T-max-102.7) and was given Vanc and Zosyn. He was also given 1L IVF but due to worsening hypotension was eventually started on Levophed. Patient now admitted to the MICU for further monitoring and treatment.  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 pneumonia:  RML zone opacity:   -plan for ct tomorrow  4/15: ct chest repeated today : reported as Interval removal of the right sided chest tube. Moderate partially loculated right pleural effusion is enlarged from prior. Small to moderate partially loculated left pleural effusion is also enlarged from prior. Partial atelectasis both lower lobes, increased from prior.  -defer to surgery  : clinically he is not in any resp distress:  his last vbg WAS PRETTY GOOD:  NOT ON BIPAP   4/16: to me he looks better today  : he is not sob:  alert and awake:  no sob:  no cough : no phlegm : on room air   4/17; seems OK:  on room air : on zosyn : no new events overnight   4/18: s/p p ig tail on rigth side:  cultures so far negative:  had increasing right sided loculated effusion ; await full cultures:  ABG today is pretty good:  he is on room air and is not sob:     Eliezer effusions:  right sided pleurax cath /RT VATS   -he had vats done on 2022: path showed chronic inflammation  no malignancy  ;  -no pleurx fell out  -last chest xray with RML zone opacity:  not much of effusion  4/15: still with loculated effusion on both sides:"  slight increase:  defer to primary team on bumex q 12 hours  4/16 : cont diuretics:  seen by ir:  no tube placement for now   4/17: pulm wise seems stable:  no sob:  no cough : no wheezing  4/18: now he has pig tail cath on rigth side;  IR to follow     HTN  -blood pressure is reasonable:  on midodrine     A FIB/ICD  -hr controlled:  on iv heparin    HLD  -on atorvastatin    AMYLOIDOSIS  -Tafamidis (Vyndamax) 61 mg 1 Capsule(s) 1 Capsule(s) Oral daily      dw resident: and wife at bedside

## 2024-04-18 NOTE — PROGRESS NOTE ADULT - PROBLEM SELECTOR PLAN 8
HfPEF   - home regimen Bumex 2mg BID, Entresto, Vyndamax  - C/w Bumex 2mg BID  - Hold Entresto iso hypotension   - C/w Vyndamax 61mg (brought in by wife w/verification)    pHTN   -unclear why pt was on sildenafil, also only on daily. may have been rx for urological indication? 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 hold for shock.

## 2024-04-18 NOTE — CHART NOTE - NSCHARTNOTESELECT_GEN_ALL_CORE
Thoracic surgery
Thoracic/Event Note
IR pre procedure note/Event Note
MAR Accept Note/Event Note
MICU POCUS NOTE
Nutrition Services
Thoracic surgery
Transfer Note
thoracic surgery/Event Note
thoracic surgery/Event Note

## 2024-04-18 NOTE — PROGRESS NOTE ADULT - TIME BILLING
Agree with above ACP note.  cv stable  cont po diuretics   remains off entresto for now   med/renal f/u
agree with above  -Abx, further w/u per ID  -Cont midodrine for hypotension  -Volume status stable  -Continue po bumex as ordered   -Entresto on hold given soft bp - eventually resume when bp can tolerate   -Cont Vyndamax

## 2024-04-18 NOTE — PROGRESS NOTE ADULT - ATTENDING SUPERVISION STATEMENT
- Choledocholithiasis noted on last IR tube check of perc drain / mild biliary dilatation on CT ; Also w/ worsening peritoneal carcinomatosis  - S/P ERCP for choledocholithiasis removal  - Monitor lfts   - Soft diet as tolerated
Resident

## 2024-04-18 NOTE — PROGRESS NOTE ADULT - SUBJECTIVE AND OBJECTIVE BOX
Jj Cheng MD  Emergency Medicine & Internal Medicine PGY-2    PROGRESS NOTE:    ID #:     Patient is a 83y old  Male who presents with a chief complaint of Shock (17 Apr 2024 22:08)      MAJOR INTERVAL HOSPITAL EVENTS:     SUBJECTIVE / OVERNIGHT EVENTS: No acute overnight events.       MEDICATIONS  (STANDING):  artificial tears (preservative free) Ophthalmic Solution 1 Drop(s) Both EYES two times a day  ascorbic acid 500 milliGRAM(s) Oral daily  atorvastatin 80 milliGRAM(s) Oral at bedtime  buMETAnide 2 milliGRAM(s) Oral daily  carbidopa/levodopa  25/100 1.5 Tablet(s) Oral three times a day  carbidopa/levodopa  25/100 1 Tablet(s) Oral at bedtime  chlorhexidine 2% Cloths 1 Application(s) Topical daily  cholecalciferol 1000 Unit(s) Oral daily  cyanocobalamin 1000 MICROGram(s) Oral daily  desvenlafaxine ER 50 milliGRAM(s) Oral daily  influenza  Vaccine (HIGH DOSE) 0.7 milliLiter(s) IntraMuscular once  midodrine 10 milliGRAM(s) Oral every 8 hours  mirtazapine 15 milliGRAM(s) Oral at bedtime  OLANZapine 10 milliGRAM(s) Oral at bedtime  piperacillin/tazobactam IVPB.. 3.375 Gram(s) IV Intermittent every 8 hours  potassium chloride    Tablet ER 20 milliEquivalent(s) Oral daily  spironolactone 50 milliGRAM(s) Oral daily  Tafamidis (Vyndamax) 61 mg 1 Capsule(s) 1 Capsule(s) Oral daily    MEDICATIONS  (PRN):  acetaminophen     Tablet .. 650 milliGRAM(s) Oral every 6 hours PRN Temp greater or equal to 38C (100.4F), Mild Pain (1 - 3)  benzonatate 100 milliGRAM(s) Oral three times a day PRN Cough  ondansetron Injectable 4 milliGRAM(s) IV Push once PRN Nausea and/or Vomiting      CAPILLARY BLOOD GLUCOSE      POCT Blood Glucose.: 130 mg/dL (17 Apr 2024 12:06)  POCT Blood Glucose.: 116 mg/dL (17 Apr 2024 08:47)    I&O's Summary    17 Apr 2024 07:01  -  18 Apr 2024 07:00  --------------------------------------------------------  IN: 0 mL / OUT: 260 mL / NET: -260 mL        PHYSICAL EXAM:  Vital Signs Last 24 Hrs  T(C): 36.7 (18 Apr 2024 05:54), Max: 36.7 (18 Apr 2024 05:54)  T(F): 98.1 (18 Apr 2024 05:54), Max: 98.1 (18 Apr 2024 05:54)  HR: 60 (18 Apr 2024 05:54) (60 - 60)  BP: 100/54 (18 Apr 2024 05:54) (100/54 - 111/60)  BP(mean): --  RR: 18 (18 Apr 2024 05:54) (17 - 18)  SpO2: 96% (18 Apr 2024 05:54) (96% - 98%)    Parameters below as of 18 Apr 2024 05:54  Patient On (Oxygen Delivery Method): room air        GENERAL: No acute distress, well-developed  HEAD:  Atraumatic, Normocephalic  EYES: EOMI, PERRLA, conjunctiva and sclera clear  NECK: Supple, no lymphadenopathy, no JVD  CHEST/LUNG: CTAB; No wheezes, rales, or rhonchi  HEART: Regular rate and rhythm; Normal s1 and s2, No murmurs, rubs, or gallops  ABDOMEN: Soft, non-tender, non-distended; normal bowel sounds, no organomegaly  EXTREMITIES:  2+ peripheral pulses b/l, No clubbing, cyanosis, or edema  NEUROLOGY: A&O x 3, no focal deficits  SKIN: No rashes or lesions          LABS:                        8.6    7.47  )-----------( 246      ( 17 Apr 2024 04:15 )             26.5     04-17    139  |  102  |  36<H>  ----------------------------<  126<H>  3.3<L>   |  22  |  2.23<H>    Ca    8.6      17 Apr 2024 04:15  Phos  3.6     04-17  Mg     2.30     04-17      PT/INR - ( 17 Apr 2024 04:15 )   PT: 13.8 sec;   INR: 1.24 ratio         PTT - ( 17 Apr 2024 04:15 )  PTT:61.3 sec      Urinalysis Basic - ( 17 Apr 2024 04:15 )    Color: x / Appearance: x / SG: x / pH: x  Gluc: 126 mg/dL / Ketone: x  / Bili: x / Urobili: x   Blood: x / Protein: x / Nitrite: x   Leuk Esterase: x / RBC: x / WBC x   Sq Epi: x / Non Sq Epi: x / Bacteria: x        Culture - Body Fluid with Gram Stain (collected 17 Apr 2024 16:00)  Source: .Body Fluid RIGHT PLEURAL FLUID  Gram Stain (18 Apr 2024 06:09):    No polymorphonuclear cells seen    No organisms seen    by cytocentrifuge        RADIOLOGY & ADDITIONAL TESTS:  Results Reviewed:   Imaging Personally Reviewed:  Electrocardiogram Personally Reviewed:    COORDINATION OF CARE:  Care Discussed with Consultants/Other Providers [Y/N]:  Prior or Outpatient Records Reviewed [Y/N]:   Jj Cheng MD  Emergency Medicine & Internal Medicine PGY-2    PROGRESS NOTE:    ID #:     Patient is a 83y old  Male who presents with a chief complaint of Shock (17 Apr 2024 22:08)    SUBJECTIVE / OVERNIGHT EVENTS: Pig tail catheter placed by IR yesterday. Patient tolerated procedure well. Denies acute pain or sob. Denies other acute complains.       MEDICATIONS  (STANDING):  artificial tears (preservative free) Ophthalmic Solution 1 Drop(s) Both EYES two times a day  ascorbic acid 500 milliGRAM(s) Oral daily  atorvastatin 80 milliGRAM(s) Oral at bedtime  buMETAnide 2 milliGRAM(s) Oral daily  carbidopa/levodopa  25/100 1.5 Tablet(s) Oral three times a day  carbidopa/levodopa  25/100 1 Tablet(s) Oral at bedtime  chlorhexidine 2% Cloths 1 Application(s) Topical daily  cholecalciferol 1000 Unit(s) Oral daily  cyanocobalamin 1000 MICROGram(s) Oral daily  desvenlafaxine ER 50 milliGRAM(s) Oral daily  influenza  Vaccine (HIGH DOSE) 0.7 milliLiter(s) IntraMuscular once  midodrine 10 milliGRAM(s) Oral every 8 hours  mirtazapine 15 milliGRAM(s) Oral at bedtime  OLANZapine 10 milliGRAM(s) Oral at bedtime  piperacillin/tazobactam IVPB.. 3.375 Gram(s) IV Intermittent every 8 hours  potassium chloride    Tablet ER 20 milliEquivalent(s) Oral daily  spironolactone 50 milliGRAM(s) Oral daily  Tafamidis (Vyndamax) 61 mg 1 Capsule(s) 1 Capsule(s) Oral daily    MEDICATIONS  (PRN):  acetaminophen     Tablet .. 650 milliGRAM(s) Oral every 6 hours PRN Temp greater or equal to 38C (100.4F), Mild Pain (1 - 3)  benzonatate 100 milliGRAM(s) Oral three times a day PRN Cough  ondansetron Injectable 4 milliGRAM(s) IV Push once PRN Nausea and/or Vomiting      CAPILLARY BLOOD GLUCOSE      POCT Blood Glucose.: 130 mg/dL (17 Apr 2024 12:06)  POCT Blood Glucose.: 116 mg/dL (17 Apr 2024 08:47)    I&O's Summary    17 Apr 2024 07:01  -  18 Apr 2024 07:00  --------------------------------------------------------  IN: 0 mL / OUT: 260 mL / NET: -260 mL        PHYSICAL EXAM:  Vital Signs Last 24 Hrs  T(C): 36.7 (18 Apr 2024 05:54), Max: 36.7 (18 Apr 2024 05:54)  T(F): 98.1 (18 Apr 2024 05:54), Max: 98.1 (18 Apr 2024 05:54)  HR: 60 (18 Apr 2024 05:54) (60 - 60)  BP: 100/54 (18 Apr 2024 05:54) (100/54 - 111/60)  BP(mean): --  RR: 18 (18 Apr 2024 05:54) (17 - 18)  SpO2: 96% (18 Apr 2024 05:54) (96% - 98%)    Parameters below as of 18 Apr 2024 05:54  Patient On (Oxygen Delivery Method): room air      PHYSICAL EXAM:  GENERAL: NAD  HEAD:  Atraumatic, Normocephalic  CHEST/LUNG: +chest tube in place, Clear to auscultation bilaterally; No rales, rhonchi, wheezing, or rubs  HEART: Regular rate and rhythm; No murmurs, rubs, or gallops  ABDOMEN: Soft, Nontender, Nondistended;   SKIN: No rashes or lesions  NERVOUS SYSTEM:  Alert & Oriented X3, no focal deficits          LABS:                        8.6    7.47  )-----------( 246      ( 17 Apr 2024 04:15 )             26.5     04-17    139  |  102  |  36<H>  ----------------------------<  126<H>  3.3<L>   |  22  |  2.23<H>    Ca    8.6      17 Apr 2024 04:15  Phos  3.6     04-17  Mg     2.30     04-17      PT/INR - ( 17 Apr 2024 04:15 )   PT: 13.8 sec;   INR: 1.24 ratio         PTT - ( 17 Apr 2024 04:15 )  PTT:61.3 sec      Urinalysis Basic - ( 17 Apr 2024 04:15 )    Color: x / Appearance: x / SG: x / pH: x  Gluc: 126 mg/dL / Ketone: x  / Bili: x / Urobili: x   Blood: x / Protein: x / Nitrite: x   Leuk Esterase: x / RBC: x / WBC x   Sq Epi: x / Non Sq Epi: x / Bacteria: x        Culture - Body Fluid with Gram Stain (collected 17 Apr 2024 16:00)  Source: .Body Fluid RIGHT PLEURAL FLUID  Gram Stain (18 Apr 2024 06:09):    No polymorphonuclear cells seen    No organisms seen    by cytocentrifuge        RADIOLOGY & ADDITIONAL TESTS:  Results Reviewed:   Imaging Personally Reviewed:  Electrocardiogram Personally Reviewed:    COORDINATION OF CARE:  Care Discussed with Consultants/Other Providers [Y/N]:  Prior or Outpatient Records Reviewed [Y/N]:

## 2024-04-18 NOTE — PROGRESS NOTE ADULT - PROVIDER SPECIALTY LIST ADULT
Cardiology
Cardiology
Internal Medicine
Nephrology
Nephrology
Pulmonology
Infectious Disease
Internal Medicine
MICU
Nephrology
Pulmonology
CT Surgery
Cardiology
Infectious Disease
MICU
MICU
Nephrology
Nephrology
Thoracic Surgery
Cardiology
Infectious Disease
Infectious Disease
Pulmonology
Pulmonology
Internal Medicine

## 2024-04-18 NOTE — PROGRESS NOTE ADULT - SUBJECTIVE AND OBJECTIVE BOX
CARDIOLOGY [facility]/ UP - Dr. Mann  DATE OF SERVICE :4/18/24     CC  No cv complaints     REVIEW OF SYSTEMS:  CONSTITUTIONAL: No fever, weight loss, or fatigue  RESPIRATORY: No cough, wheezing, chills or hemoptysis; No Shortness of Breath  CARDIOVASCULAR: No chest pain, palpitations, passing out, dizziness, or leg swelling  GASTROINTESTINAL: No abdominal or epigastric pain. No nausea, vomiting, or hematemesis; No diarrhea or constipation. No melena or hematochezia.  VASCULAR: No edema     PHYSICAL EXAM:  T(C): 36.7 (04-18-24 @ 05:54), Max: 36.7 (04-18-24 @ 05:54)  HR: 60 (04-18-24 @ 05:54) (60 - 60)  BP: 100/54 (04-18-24 @ 05:54) (100/54 - 111/60)  RR: 18 (04-18-24 @ 05:54) (17 - 18)  SpO2: 96% (04-18-24 @ 05:54) (96% - 98%)  Wt(kg): --  I&O's Summary    17 Apr 2024 07:01  -  18 Apr 2024 07:00  --------------------------------------------------------  IN: 0 mL / OUT: 260 mL / NET: -260 mL        Appearance: Elderly male 	  Cardiovascular: Normal S1 S2,RRR, No JVD, No murmurs  Respiratory: Lungs clear to auscultation - +r pigtail  Gastrointestinal:  Soft, Non-tender, + BS	  Extremities: Normal range of motion, No clubbing, cyanosis or edema      Home Medications:  ascorbic acid 500 mg oral tablet: 1 tab(s) orally once a day (10 Apr 2024 02:43)  carbidopa-levodopa 25 mg-100 mg oral tablet: 1.5 tab(s) orally 3 times a day (10 Apr 2024 02:44)  carbidopa-levodopa 25 mg-100 mg oral tablet: 1 tab(s) orally once a day (at bedtime) (10 Apr 2024 02:47)  Crestor 20 mg oral tablet: 1 tab(s) orally once a day (10 Apr 2024 02:43)  cyanocobalamin 100 mcg oral tablet: 1 tab(s) orally once a day (17 Apr 2024 14:57)  D3 25 mcg (1000 intl units) oral tablet: 1 tab(s) orally once a day (10 Apr 2024 02:43)  desvenlafaxine (as base) 50 mg oral tablet, extended release: 50 milligram(s) orally once a day (10 Apr 2024 02:43)  Eliquis 2.5 mg oral tablet: 1 tab(s) orally 2 times a day (10 Apr 2024 02:43)  Entresto 24 mg-26 mg oral tablet: 1 tab(s) orally 2 times a day (10 Apr 2024 02:40)  ferrous sulfate 325 mg (65 mg elemental iron) oral delayed release tablet: 1 tab(s) orally 2 times a day (10 Apr 2024 02:48)  methenamine hippurate 1 g oral tablet: 1 tab(s) orally once a day (10 Apr 2024 02:41)  mirtazapine 15 mg oral tablet: 1 tab(s) orally once a day (at bedtime) (10 Apr 2024 02:43)  OLANZapine 10 mg oral tablet: 1 tab(s) orally once a day (at bedtime) (10 Apr 2024 02:43)  Plavix 75 mg oral tablet: 1 tab(s) orally once a day (10 Apr 2024 02:43)  tadalafil 5 mg oral tablet: 1 tab(s) orally once a day (10 Apr 2024 02:48)  Vyndamax 61 mg oral capsule: 1 orally once a day (10 Apr 2024 02:43)      MEDICATIONS  (STANDING):  artificial tears (preservative free) Ophthalmic Solution 1 Drop(s) Both EYES two times a day  ascorbic acid 500 milliGRAM(s) Oral daily  atorvastatin 80 milliGRAM(s) Oral at bedtime  buMETAnide 2 milliGRAM(s) Oral daily  carbidopa/levodopa  25/100 1.5 Tablet(s) Oral three times a day  carbidopa/levodopa  25/100 1 Tablet(s) Oral at bedtime  chlorhexidine 2% Cloths 1 Application(s) Topical daily  cholecalciferol 1000 Unit(s) Oral daily  cyanocobalamin 1000 MICROGram(s) Oral daily  desvenlafaxine ER 50 milliGRAM(s) Oral daily  influenza  Vaccine (HIGH DOSE) 0.7 milliLiter(s) IntraMuscular once  midodrine 10 milliGRAM(s) Oral every 8 hours  mirtazapine 15 milliGRAM(s) Oral at bedtime  OLANZapine 10 milliGRAM(s) Oral at bedtime  piperacillin/tazobactam IVPB.. 3.375 Gram(s) IV Intermittent every 8 hours  potassium chloride    Tablet ER 20 milliEquivalent(s) Oral daily  spironolactone 50 milliGRAM(s) Oral daily  Tafamidis (Vyndamax) 61 mg 1 Capsule(s) 1 Capsule(s) Oral daily      TELEMETRY: 	    ECG:  	  RADIOLOGY:     DIAGNOSTIC TESTING:  [ ] Echocardiogram:  [ ]  Catheterization:  [ ] Stress Test:    OTHER: 	    LABS:	 	                            7.9    7.15  )-----------( 263      ( 18 Apr 2024 07:38 )             25.9     04-18    143  |  105  |  37<H>  ----------------------------<  98  3.9   |  22  |  2.25<H>    Ca    8.8      18 Apr 2024 07:38  Phos  4.2     04-18  Mg     2.20     04-18    TPro  6.3  /  Alb  3.0<L>  /  TBili  0.4  /  DBili  x   /  AST  15  /  ALT  6   /  AlkPhos  61  04-18    PT/INR - ( 17 Apr 2024 04:15 )   PT: 13.8 sec;   INR: 1.24 ratio         PTT - ( 17 Apr 2024 04:15 )  PTT:61.3 sec

## 2024-04-18 NOTE — DISCHARGE NOTE NURSING/CASE MANAGEMENT/SOCIAL WORK - NSDCMOLSTYESNO_GEN_ALL_CORE
Subjective   History of Present Illness  20-year-old female past medical history of a heart murmur presents to the emergency room with frequent headaches which began in January.  Patient states her headache is accompanied by nausea and photophobia.  She denies nausea or vomiting.  Denies any recent illness.  She states that she has been seen by her primary care provider on numerous occasions and they continue to contribute to sinus issues which she states she does not believe that she has.  Aggravating factors include light.  Denies any alleviating factors.  Denies any other complaints or concerns at this time.    History provided by:  Patient   used: No        Review of Systems   Constitutional: Negative.  Negative for fever.   Eyes: Positive for photophobia.   Respiratory: Negative.    Cardiovascular: Negative.  Negative for chest pain.   Gastrointestinal: Positive for nausea. Negative for abdominal pain.   Endocrine: Negative.    Genitourinary: Negative.  Negative for dysuria.   Skin: Negative.    Neurological: Positive for headaches.   Psychiatric/Behavioral: Negative.    All other systems reviewed and are negative.      Past Medical History:   Diagnosis Date   • Heart murmur        No Known Allergies    Past Surgical History:   Procedure Laterality Date   • ADENOIDECTOMY     • TONSILLECTOMY         Family History   Problem Relation Age of Onset   • Hypertension Maternal Grandmother        Social History     Socioeconomic History   • Marital status: Single   Tobacco Use   • Smoking status: Never   • Smokeless tobacco: Never   Vaping Use   • Vaping Use: Never used   Substance and Sexual Activity   • Alcohol use: Never   • Drug use: Never   • Sexual activity: Defer           Objective   Physical Exam  Vitals and nursing note reviewed.   Constitutional:       General: She is not in acute distress.     Appearance: She is well-developed. She is not diaphoretic.   HENT:      Head: Normocephalic and  atraumatic.      Right Ear: External ear normal.      Left Ear: External ear normal.      Nose: Nose normal.   Eyes:      Conjunctiva/sclera: Conjunctivae normal.      Pupils: Pupils are equal, round, and reactive to light.   Neck:      Vascular: No JVD.      Trachea: No tracheal deviation.   Cardiovascular:      Rate and Rhythm: Normal rate and regular rhythm.      Heart sounds: Normal heart sounds. No murmur heard.  Pulmonary:      Effort: Pulmonary effort is normal. No respiratory distress.      Breath sounds: Normal breath sounds. No wheezing.   Abdominal:      General: Bowel sounds are normal.      Palpations: Abdomen is soft.      Tenderness: There is no abdominal tenderness.   Musculoskeletal:         General: No deformity. Normal range of motion.      Cervical back: Normal range of motion and neck supple.   Skin:     General: Skin is warm and dry.      Coloration: Skin is not pale.      Findings: No erythema or rash.   Neurological:      General: No focal deficit present.      Mental Status: She is alert and oriented to person, place, and time.      Cranial Nerves: Cranial nerves 2-12 are intact. No cranial nerve deficit.      Sensory: Sensation is intact.      Motor: Motor function is intact.      Coordination: Coordination is intact.      Gait: Gait is intact.   Psychiatric:         Behavior: Behavior normal.         Thought Content: Thought content normal.         Procedures           ED Course  ED Course as of 04/10/23 2329   Mon Apr 10, 2023   1959 CT Head Without Contrast [TK]      ED Course User Index  [TK] Mary Macias PA-C             Results for orders placed or performed during the hospital encounter of 04/10/23   Comprehensive Metabolic Panel    Specimen: Blood   Result Value Ref Range    Glucose 79 65 - 99 mg/dL    BUN 10 6 - 20 mg/dL    Creatinine 0.71 0.57 - 1.00 mg/dL    Sodium 141 136 - 145 mmol/L    Potassium 3.6 3.5 - 5.2 mmol/L    Chloride 104 98 - 107 mmol/L    CO2 25.4 22.0 -  29.0 mmol/L    Calcium 10.1 8.6 - 10.5 mg/dL    Total Protein 8.0 6.0 - 8.5 g/dL    Albumin 4.7 3.5 - 5.2 g/dL    ALT (SGPT) 12 1 - 33 U/L    AST (SGOT) 24 1 - 32 U/L    Alkaline Phosphatase 94 43 - 101 U/L    Total Bilirubin <0.2 0.0 - 1.2 mg/dL    Globulin 3.3 gm/dL    A/G Ratio 1.4 g/dL    BUN/Creatinine Ratio 14.1 7.0 - 25.0    Anion Gap 11.6 5.0 - 15.0 mmol/L    eGFR 126.6 >60.0 mL/min/1.73   Pregnancy, Urine - Urine, Clean Catch    Specimen: Urine, Clean Catch   Result Value Ref Range    HCG, Urine QL Negative Negative   Urinalysis With Microscopic If Indicated (No Culture) - Urine, Clean Catch    Specimen: Urine, Clean Catch   Result Value Ref Range    Color, UA Yellow Yellow, Straw    Appearance, UA Clear Clear    pH, UA <=5.0 5.0 - 8.0    Specific Gravity, UA 1.022 1.005 - 1.030    Glucose, UA Negative Negative    Ketones, UA Negative Negative    Bilirubin, UA Negative Negative    Blood, UA Negative Negative    Protein, UA Negative Negative    Leuk Esterase, UA Trace (A) Negative    Nitrite, UA Negative Negative    Urobilinogen, UA 0.2 E.U./dL 0.2 - 1.0 E.U./dL   C-reactive Protein    Specimen: Blood   Result Value Ref Range    C-Reactive Protein <0.30 0.00 - 0.50 mg/dL   CBC Auto Differential    Specimen: Blood   Result Value Ref Range    WBC 7.17 3.40 - 10.80 10*3/mm3    RBC 4.09 3.77 - 5.28 10*6/mm3    Hemoglobin 13.1 12.0 - 15.9 g/dL    Hematocrit 38.8 34.0 - 46.6 %    MCV 94.9 79.0 - 97.0 fL    MCH 32.0 26.6 - 33.0 pg    MCHC 33.8 31.5 - 35.7 g/dL    RDW 11.3 (L) 12.3 - 15.4 %    RDW-SD 39.4 37.0 - 54.0 fl    MPV 8.6 6.0 - 12.0 fL    Platelets 306 140 - 450 10*3/mm3    Neutrophil % 54.5 42.7 - 76.0 %    Lymphocyte % 33.8 19.6 - 45.3 %    Monocyte % 8.1 5.0 - 12.0 %    Eosinophil % 2.8 0.3 - 6.2 %    Basophil % 0.7 0.0 - 1.5 %    Immature Grans % 0.1 0.0 - 0.5 %    Neutrophils, Absolute 3.91 1.70 - 7.00 10*3/mm3    Lymphocytes, Absolute 2.42 0.70 - 3.10 10*3/mm3    Monocytes, Absolute 0.58 0.10 - 0.90  10*3/mm3    Eosinophils, Absolute 0.20 0.00 - 0.40 10*3/mm3    Basophils, Absolute 0.05 0.00 - 0.20 10*3/mm3    Immature Grans, Absolute 0.01 0.00 - 0.05 10*3/mm3    nRBC 0.0 0.0 - 0.2 /100 WBC   Urinalysis, Microscopic Only - Urine, Clean Catch    Specimen: Urine, Clean Catch   Result Value Ref Range    RBC, UA 0-2 None Seen, 0-2 /HPF    WBC, UA 3-5 (A) None Seen, 0-2 /HPF    Bacteria, UA Trace (A) None Seen /HPF    Squamous Epithelial Cells, UA 0-2 None Seen, 0-2 /HPF    Hyaline Casts, UA 0-2 None Seen /LPF    Calcium Oxalate Crystals, UA Small/1+ None Seen /HPF    Methodology Manual Light Microscopy    Sedimentation Rate    Specimen: Blood   Result Value Ref Range    Sed Rate 7 0 - 20 mm/hr   Green Top (Gel)   Result Value Ref Range    Extra Tube Hold for add-ons.    Lavender Top   Result Value Ref Range    Extra Tube hold for add-on    Gold Top - SST   Result Value Ref Range    Extra Tube Hold for add-ons.    Light Blue Top   Result Value Ref Range    Extra Tube Hold for add-ons.         CT Head Without Contrast   Final Result   No acute intracranial abnormality.               Signer Name: Janusz Bailon MD    Signed: 4/10/2023 7:46 PM    Workstation Name: [email or web address]     [facility]                                        Medical Decision Making  20-year-old female past medical history of a heart murmur presents to the emergency room with frequent headaches which began in January.  Patient states her headache is accompanied by nausea and photophobia.  She denies nausea or vomiting.  Denies any recent illness.  She states that she has been seen by her primary care provider on numerous occasions and they continue to contribute to sinus issues which she states she does not believe that she has.  Aggravating factors include light.  Denies any alleviating factors.  Denies any other complaints or concerns at this time.    Chronic nonintractable headache, unspecified headache type: acute  illness or injury  Amount and/or Complexity of Data Reviewed  Labs: ordered. Decision-making details documented in ED Course.  Radiology: ordered. Decision-making details documented in ED Course.      Risk  Prescription drug management.          Final diagnoses:   Chronic nonintractable headache, unspecified headache type       ED Disposition  ED Disposition     ED Disposition   Discharge    Condition   Stable    Comment   --             Ebony Chang MD  [address] [place] [address] [employer] [place] [id number]  [phone]    In 2 days           Medication List      No changes were made to your prescriptions during this visit.          Mary Macias PA-C  04/10/23 [id number]     yes

## 2024-04-18 NOTE — PROGRESS NOTE ADULT - SUBJECTIVE AND OBJECTIVE BOX
Follow Up:      Inverval History/ROS:Patient is a 83y old  Male who presents with a chief complaint of Shock (18 Apr 2024 14:17)    No fever  No events    Allergies    No Known Allergies    Intolerances        ANTIMICROBIALS:  piperacillin/tazobactam IVPB.. 3.375 every 8 hours      OTHER MEDS:  acetaminophen     Tablet .. 650 milliGRAM(s) Oral every 6 hours PRN  artificial tears (preservative free) Ophthalmic Solution 1 Drop(s) Both EYES two times a day  ascorbic acid 500 milliGRAM(s) Oral daily  atorvastatin 80 milliGRAM(s) Oral at bedtime  benzonatate 100 milliGRAM(s) Oral three times a day PRN  buMETAnide 2 milliGRAM(s) Oral daily  carbidopa/levodopa  25/100 1.5 Tablet(s) Oral three times a day  carbidopa/levodopa  25/100 1 Tablet(s) Oral at bedtime  chlorhexidine 2% Cloths 1 Application(s) Topical daily  cholecalciferol 1000 Unit(s) Oral daily  cyanocobalamin 1000 MICROGram(s) Oral daily  desvenlafaxine ER 50 milliGRAM(s) Oral daily  influenza  Vaccine (HIGH DOSE) 0.7 milliLiter(s) IntraMuscular once  midodrine 10 milliGRAM(s) Oral every 8 hours  mirtazapine 15 milliGRAM(s) Oral at bedtime  OLANZapine 10 milliGRAM(s) Oral at bedtime  spironolactone 50 milliGRAM(s) Oral daily  Tafamidis (Vyndamax) 61 mg 1 Capsule(s) 1 Capsule(s) Oral daily      Vital Signs Last 24 Hrs  T(C): 36.7 (18 Apr 2024 13:10), Max: 36.7 (18 Apr 2024 05:54)  T(F): 98.1 (18 Apr 2024 13:10), Max: 98.1 (18 Apr 2024 05:54)  HR: 60 (18 Apr 2024 13:10) (60 - 60)  BP: 100/53 (18 Apr 2024 13:10) (100/53 - 104/55)  BP(mean): --  RR: 17 (18 Apr 2024 13:10) (17 - 18)  SpO2: 100% (18 Apr 2024 13:10) (96% - 100%)    Parameters below as of 18 Apr 2024 13:10  Patient On (Oxygen Delivery Method): room air        PHYSICAL EXAM:  General: [x ] non-toxic  HEAD/EYES: [ ] PERRL [ x] white sclera [ ] icterus  ENT:  [ ] normal [x ] supple [ ] thrush [ ] pharyngeal exudate  Cardiovascular:   [ ] murmur [x ] normal [ ] PPM/AICD  Respiratory:  [ x] clear to ausculation bilaterally  GI:  [x ] soft, non-tender, normal bowel sounds  :  [ ] means [ x] no CVA tenderness   Musculoskeletal:  [ ] no synovitis  Neurologic:  [ ] non-focal exam   Skin:  x[ ] no rash  Lymph: x[ ] no lymphadenopathy  Psychiatric:  [ ] appropriate affect [ ] alert & oriented  Lines:  [x ] no phlebitis [ ] central line                                7.9    7.15  )-----------( 263      ( 18 Apr 2024 07:38 )             25.9       04-18    143  |  105  |  37<H>  ----------------------------<  98  3.9   |  22  |  2.25<H>    Ca    8.8      18 Apr 2024 07:38  Phos  4.2     04-18  Mg     2.20     04-18    TPro  6.3  /  Alb  3.0<L>  /  TBili  0.4  /  DBili  x   /  AST  15  /  ALT  6   /  AlkPhos  61  04-18      Urinalysis Basic - ( 18 Apr 2024 07:38 )    Color: x / Appearance: x / SG: x / pH: x  Gluc: 98 mg/dL / Ketone: x  / Bili: x / Urobili: x   Blood: x / Protein: x / Nitrite: x   Leuk Esterase: x / RBC: x / WBC x   Sq Epi: x / Non Sq Epi: x / Bacteria: x        MICROBIOLOGY:Culture Results:   Testing in progress (04-17-24 @ 16:00)      RADIOLOGY:

## 2024-04-18 NOTE — PROGRESS NOTE ADULT - SUBJECTIVE AND OBJECTIVE BOX
NEPHROLOGY[facility] [phone]        Patient seen and examined s/p R pigtail insertion yesterday.         MEDICATIONS  (STANDING):  artificial tears (preservative free) Ophthalmic Solution 1 Drop(s) Both EYES two times a day  ascorbic acid 500 milliGRAM(s) Oral daily  atorvastatin 80 milliGRAM(s) Oral at bedtime  buMETAnide 2 milliGRAM(s) Oral daily  carbidopa/levodopa  25/100 1 Tablet(s) Oral at bedtime  carbidopa/levodopa  25/100 1.5 Tablet(s) Oral three times a day  chlorhexidine 2% Cloths 1 Application(s) Topical daily  cholecalciferol 1000 Unit(s) Oral daily  cyanocobalamin 1000 MICROGram(s) Oral daily  desvenlafaxine ER 50 milliGRAM(s) Oral daily  influenza  Vaccine (HIGH DOSE) 0.7 milliLiter(s) IntraMuscular once  midodrine 10 milliGRAM(s) Oral every 8 hours  mirtazapine 15 milliGRAM(s) Oral at bedtime  OLANZapine 10 milliGRAM(s) Oral at bedtime  piperacillin/tazobactam IVPB.. 3.375 Gram(s) IV Intermittent every 8 hours  potassium chloride    Tablet ER 20 milliEquivalent(s) Oral daily  spironolactone 50 milliGRAM(s) Oral daily  Tafamidis (Vyndamax) 61 mg 1 Capsule(s) 1 Capsule(s) Oral daily      VITAL:  T(C): , Max: 36.7 (04-18-24 @ 05:54)  T(F): , Max: 98.1 (04-18-24 @ 05:54)  HR: 60 (04-18-24 @ 13:10)  BP: 100/53 (04-18-24 @ 13:10)  BP(mean): --  RR: 17 (04-18-24 @ 13:10)  SpO2: 100% (04-18-24 @ 13:10)  Wt(kg): --    I and O's:    04-17 @ 07:01  -  04-18 @ 07:00  --------------------------------------------------------  IN: 0 mL / OUT: 260 mL / NET: -260 mL          PHYSICAL EXAM:    Constitutional: NAD  Chest: R PTC  Respiratory: CTAB/L  Cardiovascular: S1 and S2  Gastrointestinal: BS+, soft, NT/ND  Extremities: + peripheral edema  Neurological: no focal deficits  : + external catheter   Skin: No rashes  Access: Not applicable    LABS:                        7.9    7.15  )-----------( 263      ( 18 Apr 2024 07:38 )             25.9     04-18    143  |  105  |  37<H>  ----------------------------<  98  3.9   |  22  |  2.25<H>    Ca    8.8      18 Apr 2024 07:38  Phos  4.2     04-18  Mg     2.20     04-18    TPro  6.3  /  Alb  3.0<L>  /  TBili  0.4  /  DBili  x   /  AST  15  /  ALT  6   /  AlkPhos  61  04-18          Urine Studies:  Urinalysis Basic - ( 18 Apr 2024 07:38 )    Color: x / Appearance: x / SG: x / pH: x  Gluc: 98 mg/dL / Ketone: x  / Bili: x / Urobili: x   Blood: x / Protein: x / Nitrite: x   Leuk Esterase: x / RBC: x / WBC x   Sq Epi: x / Non Sq Epi: x / Bacteria: x            RADIOLOGY & ADDITIONAL STUDIES:    < from: Xray Chest 1 View- PORTABLE-Routine (Xray Chest 1 View- PORTABLE-Routine in AM.) (04.18.24 @ 08:11) >  IMPRESSION: Follow-up post right-sided pigtail catheter without   pneumothorax and with bilateral effusions.    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 + component likely of irreversible renal injury related to past nephrolithiasis    (2)ALAYNA - prerenally mediated fluctuations in creat based on volume; renal fxn stable     (3)Hypokalemia - now on standing PO KCl , spironolactone added K+ improving      (4)CV - on high-dose Midodrine/on high-dose oral Bumex - midodrine reduced    (5)ID - resolving pneumonia/pleuritis, on IV Zosyn    RECOMMEND:  (1)Continue Bumex 2mg daily  (2)Continue Spironolactone 50 mg daily   (3)DC KCl 20meq po qd  (4)Continue Midodrine 10tid      [place] Rashaun DONALDSON  [facility]  Office/on call physician: [phone]       NEPHROLOGY[facility] [phone]        Patient seen and examined s/p R pigtail insertion yesterday.         MEDICATIONS  (STANDING):  artificial tears (preservative free) Ophthalmic Solution 1 Drop(s) Both EYES two times a day  ascorbic acid 500 milliGRAM(s) Oral daily  atorvastatin 80 milliGRAM(s) Oral at bedtime  buMETAnide 2 milliGRAM(s) Oral daily  carbidopa/levodopa  25/100 1 Tablet(s) Oral at bedtime  carbidopa/levodopa  25/100 1.5 Tablet(s) Oral three times a day  chlorhexidine 2% Cloths 1 Application(s) Topical daily  cholecalciferol 1000 Unit(s) Oral daily  cyanocobalamin 1000 MICROGram(s) Oral daily  desvenlafaxine ER 50 milliGRAM(s) Oral daily  influenza  Vaccine (HIGH DOSE) 0.7 milliLiter(s) IntraMuscular once  midodrine 10 milliGRAM(s) Oral every 8 hours  mirtazapine 15 milliGRAM(s) Oral at bedtime  OLANZapine 10 milliGRAM(s) Oral at bedtime  piperacillin/tazobactam IVPB.. 3.375 Gram(s) IV Intermittent every 8 hours  potassium chloride    Tablet ER 20 milliEquivalent(s) Oral daily  spironolactone 50 milliGRAM(s) Oral daily  Tafamidis (Vyndamax) 61 mg 1 Capsule(s) 1 Capsule(s) Oral daily      VITAL:  T(C): , Max: 36.7 (04-18-24 @ 05:54)  T(F): , Max: 98.1 (04-18-24 @ 05:54)  HR: 60 (04-18-24 @ 13:10)  BP: 100/53 (04-18-24 @ 13:10)  BP(mean): --  RR: 17 (04-18-24 @ 13:10)  SpO2: 100% (04-18-24 @ 13:10)  Wt(kg): --    I and O's:    04-17 @ 07:01  -  04-18 @ 07:00  --------------------------------------------------------  IN: 0 mL / OUT: 260 mL / NET: -260 mL          PHYSICAL EXAM:    Constitutional: NAD  Chest: R PTC  Respiratory: CTAB/L  Cardiovascular: S1 and S2  Gastrointestinal: BS+, soft, NT/ND  Extremities: + peripheral edema  Neurological: no focal deficits  : + external catheter   Skin: No rashes  Access: Not applicable    LABS:                        7.9    7.15  )-----------( 263      ( 18 Apr 2024 07:38 )             25.9     04-18    143  |  105  |  37<H>  ----------------------------<  98  3.9   |  22  |  2.25<H>    Ca    8.8      18 Apr 2024 07:38  Phos  4.2     04-18  Mg     2.20     04-18    TPro  6.3  /  Alb  3.0<L>  /  TBili  0.4  /  DBili  x   /  AST  15  /  ALT  6   /  AlkPhos  61  04-18          Urine Studies:  Urinalysis Basic - ( 18 Apr 2024 07:38 )    Color: x / Appearance: x / SG: x / pH: x  Gluc: 98 mg/dL / Ketone: x  / Bili: x / Urobili: x   Blood: x / Protein: x / Nitrite: x   Leuk Esterase: x / RBC: x / WBC x   Sq Epi: x / Non Sq Epi: x / Bacteria: x            RADIOLOGY & ADDITIONAL STUDIES:    < from: Xray Chest 1 View- PORTABLE-Routine (Xray Chest 1 View- PORTABLE-Routine in AM.) (04.18.24 @ 08:11) >  IMPRESSION: Follow-up post right-sided pigtail catheter without   pneumothorax and with bilateral effusions.    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 + component likely of irreversible renal injury related to past nephrolithiasis    (2)ALAYNA - prerenally mediated fluctuations in creat based on volume; renal fxn stable     (3)Hypokalemia - now on standing PO KCl , spironolactone added K+ improving      (4)CV - on high-dose Midodrine/on high-dose oral Bumex - midodrine reduced    (5)ID - resolving pneumonia/pleuritis, on IV Zosyn    RECOMMEND:  (1)Continue Bumex 2mg daily  (2)Continue Spironolactone 50 mg daily   (3)DC KCl 20meq po qd  (4)Continue Midodrine 10tid      [place] Rashaun DONALDSON  [facility]  Office/on call physician: [phone]      RENAL ATTENDING NOTE  Patient seen and examined with NP. Agree with assessment and plan as above.  If for discharge, can f/u at my office in 2-6 weeks    Clifton Cool MD  [facility]  [phone]

## 2024-04-18 NOTE — PROGRESS NOTE ADULT - REASON FOR ADMISSION
Shock

## 2024-04-19 ENCOUNTER — NON-APPOINTMENT (OUTPATIENT)
Age: 84
End: 2024-04-19

## 2024-04-23 LAB
CULTURE RESULTS: SIGNIFICANT CHANGE UP
SPECIMEN SOURCE: SIGNIFICANT CHANGE UP

## 2024-04-24 PROBLEM — J90 PLEURAL EFFUSION: Status: ACTIVE | Noted: 2023-06-19

## 2024-04-29 ENCOUNTER — OUTPATIENT (OUTPATIENT)
Dept: OUTPATIENT SERVICES | Facility: HOSPITAL | Age: 84
LOS: 1 days | End: 2024-04-29
Payer: COMMERCIAL

## 2024-04-29 ENCOUNTER — APPOINTMENT (OUTPATIENT)
Dept: RADIOLOGY | Facility: HOSPITAL | Age: 84
End: 2024-04-29

## 2024-04-29 ENCOUNTER — APPOINTMENT (OUTPATIENT)
Dept: THORACIC SURGERY | Facility: CLINIC | Age: 84
End: 2024-04-29
Payer: MEDICARE

## 2024-04-29 VITALS
RESPIRATION RATE: 16 BRPM | DIASTOLIC BLOOD PRESSURE: 71 MMHG | SYSTOLIC BLOOD PRESSURE: 127 MMHG | OXYGEN SATURATION: 92 % | HEART RATE: 64 BPM | HEIGHT: 68 IN | BODY MASS INDEX: 29.55 KG/M2 | WEIGHT: 195 LBS

## 2024-04-29 DIAGNOSIS — Z95.0 PRESENCE OF CARDIAC PACEMAKER: Chronic | ICD-10-CM

## 2024-04-29 DIAGNOSIS — J90 PLEURAL EFFUSION, NOT ELSEWHERE CLASSIFIED: ICD-10-CM

## 2024-04-29 DIAGNOSIS — Z96.649 PRESENCE OF UNSPECIFIED ARTIFICIAL HIP JOINT: Chronic | ICD-10-CM

## 2024-04-29 DIAGNOSIS — Z98.49 CATARACT EXTRACTION STATUS, UNSPECIFIED EYE: Chronic | ICD-10-CM

## 2024-04-29 DIAGNOSIS — Z98.61 CORONARY ANGIOPLASTY STATUS: Chronic | ICD-10-CM

## 2024-04-29 PROCEDURE — 99213 OFFICE O/P EST LOW 20 MIN: CPT

## 2024-04-29 PROCEDURE — 71046 X-RAY EXAM CHEST 2 VIEWS: CPT | Mod: 26

## 2024-04-29 NOTE — CONSULT LETTER
[Dear  ___] : Dear  [unfilled], [Courtesy Letter:] : I had the pleasure of seeing your patient, [unfilled], in my office today. [( Thank you for referring [unfilled] for consultation for _____ )] : Thank you for referring [unfilled] for consultation for [unfilled] [Please see my note below.] : Please see my note below. [Sincerely,] : Sincerely, [FreeTextEntry2] : Dr. Pavan Jamil (Pulm/Ref)\par  Santhosh Avila (PCP)\par  Dr. Clinton Dickey (Card)\par  Dr. Randolph (Card re: cardiac amyloid) [FreeTextEntry3] : Rufus Oneal MD \par  Attending Surgeon \par  Division of Thoracic Surgery \par  , Cardiovascular and Thoracic Surgery \par  [facility] [employer] [facility]\par  \par

## 2024-04-29 NOTE — HISTORY OF PRESENT ILLNESS
[FreeTextEntry1] : Mr. 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   Patient is s/p Right VATS, pleural bx and placement of Pleurx catheter on 03/17/2022. Path of right pleura bx revealed fragments of pleura with chronic inflammation and reactive changes. Fragments of skeletal muscle. Right pleural fluid negative for malignant cells.   Patient went to ED on 04/29/2022 for hallucinations, likely related to Parkinsons dementia. He was discharged on 5/12/22 to a rehab facility.    Of note, pt had recent SPECT scan demonstrating cardiac amyloid and was referred by his cardiologist Dr. Dickey to see Dr. Randolph.   CT chest on 08/17/2022: -  There is a trace right pleural effusion with Pleurx catheter in place, markedly decreased in size compared to the 2/6/2022 chest CT. A small left pleural effusion has also decreased compared to the 2/6/2022 scan, now trace.  Patient went to ED on 09/08/2022 c/o weakness, founded to have electrolyte disorders which were corrected but patient's symptoms did not improve. felt that presentation was most consistent with progression of Parkinson's disease.  Patient was seen on 01/10/2023 with clogged PleurX catheter. PleurX catheter was unclogged using three-way stop cock and flushes. Pt was drained about 700cc output, tolerated well.   Patient had bloody drainage, went to ED a few times.  Patient was drained three times a week with ~ 500 ml each drainage. On 03/0/6/2024, drainage was minimal.  CXR on 03/07/2024 reviewed and explained to patient, stable CXR. Patient denies SOB, cough or fever. No intervention needed at current time. Will decrease drainage to once a week for 2 weeks and RTC in 2 weeks with CT chest w/o contrast.   Patient is currently drained once a week for the past 2 wks: PleurX OUTPUT: 3/14 Thurs 5ml, 3/20 Wed few drops.  CT chest on 03/20/2024: - Right middle lobe bronchus narrowing secondary to nodularity is similar to 2023.   - Small to moderate partially loculated bilateral pleural effusions, unchanged on the left and enlarged on the right compared to the prior study. Right-sided chest tube. Partial atelectasis both lower lobes.  04/02-04/05/24: Admitted at [facility] with clogged pleurX catheter, s/p TPA.  last seen in office on 4/8/24: Right PleurX Catheter appears dislodged, I would like him to return to clinic on Wednesday (4/10) for Right pleurX Catheter removal.  Presented to ED on 4/10 w/ concerns of dyspnea and right sided chest pain around the Pleurx site. Found to be febrile and hypotensive. Ultimately admitted for shock. s/p IV ABX. Underwent IR guided pigtail placement on 4/17 400cc serosang fluid drained. pleural fluid sent for testing, negative for infection to date. Pigtail cath removed; Deemed stable for discharge on 4/18.  OF NOTE: Discharged on a 14 day course of Augmentin  Upon discharge, Plan for Outpatient f/u Thoracic Surgery re: Pleurx placement after completion of treatment.  CXR today: reviewed.   Patient presents today for outpatient follow up. Reports shortness of breath with exertion, and intermittent productive cough. Denies any fever or chills.

## 2024-04-29 NOTE — ASSESSMENT
[FreeTextEntry1] : Mr. 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  Patient is s/p Right VATS, pleural bx and placement of Pleurx catheter on 03/17/2022. Path of right pleura bx revealed fragments of pleura with chronic inflammation and reactive changes. Fragments of skeletal muscle. Right pleural fluid negative for malignant cells.  Presented to ED on 4/10 w/ concerns of dyspnea and right sided chest pain around the Pleurx site. Found to be febrile and hypotensive. Ultimately admitted for shock. s/p IV ABX. Underwent IR guided pigtail placement on 4/17 400cc serosang fluid drained. pleural fluid sent for testing, negative for infection to date. Pigtail cath removed; Deemed stable for discharge on 4/18.  OF NOTE: Discharged on a 14 day course of Augmentin.   Upon discharge, Plan for Outpatient f/u Thoracic Surgery re: Pleurx placement after completion of treatment  I have reviewed the patient's medical records and diagnostic images at time of this office consultation and have made the following recommendation: 1. CXR today with B/L pleural effusion which appears slightly improved when compared to CXR on 04/18/24. I recommend he returns to clinic in 3 months with CT Chest without contrast.    I have independently reviewed the medical records and imaging at the time of this office consultation.  Recommendations reviewed with patient during this office visit, and all questions answered; Patient instructed on the importance of follow up and verbalizes understanding.    I, PARK Gomez, personally performed the evaluation and management (E/M) services for this established patient. That E/M includes conducting the examination, assessing all new/exacerbated conditions, and establishing a new plan of care. Today, my ACP, Sal Luciano NP, was here to observe my evaluation and management services for this new problem/exacerbated condition to be followed going forward.

## 2024-04-29 NOTE — PHYSICAL EXAM
[FreeTextEntry1] : diminished base breath sounds [Heart Rate And Rhythm] : heart rate was normal and rhythm regular [Heart Sounds] : normal S1 and S2 [Heart Sounds Gallop] : no gallops [Murmurs] : no murmurs [Heart Sounds Pericardial Friction Rub] : no pericardial rub [Examination Of The Chest] : the chest was normal in appearance [Chest Visual Inspection Thoracic Asymmetry] : no chest asymmetry [Diminished Respiratory Excursion] : normal chest expansion

## 2024-04-30 ENCOUNTER — APPOINTMENT (OUTPATIENT)
Dept: GERIATRICS | Facility: CLINIC | Age: 84
End: 2024-04-30
Payer: MEDICARE

## 2024-04-30 DIAGNOSIS — E87.5 HYPERKALEMIA: ICD-10-CM

## 2024-04-30 DIAGNOSIS — I10 ESSENTIAL (PRIMARY) HYPERTENSION: ICD-10-CM

## 2024-04-30 DIAGNOSIS — G47.00 INSOMNIA, UNSPECIFIED: ICD-10-CM

## 2024-04-30 DIAGNOSIS — R44.3 HALLUCINATIONS, UNSPECIFIED: ICD-10-CM

## 2024-04-30 PROCEDURE — 99495 TRANSJ CARE MGMT MOD F2F 14D: CPT

## 2024-04-30 PROCEDURE — G2211 COMPLEX E/M VISIT ADD ON: CPT | Mod: NC

## 2024-04-30 RX ORDER — MULTIVIT-MINS/IRON/FOLIC/LYCOP 8-200-600
TABLET ORAL DAILY
Refills: 0 | Status: ACTIVE | COMMUNITY

## 2024-04-30 RX ORDER — ROSUVASTATIN CALCIUM 20 MG/1
20 TABLET, FILM COATED ORAL
Qty: 90 | Refills: 3 | Status: ACTIVE | COMMUNITY
Start: 1900-01-01 | End: 1900-01-01

## 2024-04-30 RX ORDER — MIRTAZAPINE 15 MG/1
15 TABLET, FILM COATED ORAL
Qty: 30 | Refills: 3 | Status: COMPLETED | COMMUNITY
Start: 2023-09-18 | End: 2024-04-30

## 2024-04-30 RX ORDER — SPIRONOLACTONE 25 MG/1
25 TABLET ORAL
Refills: 0 | Status: ACTIVE | COMMUNITY
Start: 2024-04-30

## 2024-04-30 RX ORDER — TAFAMIDIS 61 MG/1
61 CAPSULE, LIQUID FILLED ORAL
Qty: 90 | Refills: 3 | Status: ACTIVE | COMMUNITY
Start: 2022-07-05 | End: 1900-01-01

## 2024-04-30 RX ORDER — BUMETANIDE 2 MG/1
2 TABLET ORAL DAILY
Refills: 0 | Status: ACTIVE | COMMUNITY
Start: 2022-02-25

## 2024-04-30 RX ORDER — MAGNESIUM OXIDE 200 MG
10 TABLET,CHEWABLE ORAL
Refills: 0 | Status: ACTIVE | COMMUNITY

## 2024-04-30 NOTE — PHYSICAL EXAM
[No Focal Deficits] : no focal deficits [Normal] : normal affect and normal mood [de-identified] : chronically ill appearing

## 2024-04-30 NOTE — SOCIAL HISTORY
[With spouse] : lives with spouse [Female] : Female [FreeTextEntry1] : Opal [FreeTextEntry4] : wife [CaregiverAge] : 81

## 2024-04-30 NOTE — ASSESSMENT
[FreeTextEntry1] : Midodrine decreased to 5 mg three times a day (was at 10 mg tid) Trazodone 100 mg q noight just started yesterday. consider mirtazapine reinstated if needed (will contact Dr. Sykes) or klonopin. consider increasing olanzapine for insomnia to 12. 5 mg (will d/w Dr. Sykes) Ask about entresto, was stopped (will d/w Dr. Boucher) Home care Nilsa is nurse - to check bp with decrease with midodrine, automatic bp machine at home. Wife does not know how to use.

## 2024-04-30 NOTE — HISTORY OF PRESENT ILLNESS
[FreeTextEntry1] : 82 yo male with PD, pleurix catheter and has been in hospital twice.  Once was for catheter replacement and went home and then returned to hospital for fever and treated for pneumonia. Pt was discharged April 18 from [facility].  Hospitalized for for 9 days. Pt was concerned about sepsis. Doing a telehealth visit.  Pt has low blood pressure chronically runs 100/60.  Pt started midodrine at hospital. Entresto was stopped in the hospital bc of low BP.  Wife concerned about BP being too high. Running 130/70, seen by PT today.  Meds reviewed with wife.  Antibiotics stopped last week. Pt with soft stool, not diarrhea, not taking miralax.  PT came in today. /70  Eating a little less than at baseline or before hospital. Pt feels well, denies any issues. [No falls in past year] : Patient reported no falls in the past year [Completely Dependent] : Completely dependent. [FreeTextEntry8] : urinnating much more [0] : 2) Feeling down, depressed, or hopeless: Not at all (0) [PHQ-2 Negative - No further assessment needed] : PHQ-2 Negative - No further assessment needed [[id number]] : 0

## 2024-04-30 NOTE — REVIEW OF SYSTEMS
[Fever] : no fever [Chills] : no chills [Feeling Poorly] : not feeling poorly [Feeling Tired] : feeling tired [As Noted in HPI] : as noted in HPI [Sleep Disturbances] : sleep disturbances [Negative] : Heme/Lymph [FreeTextEntry7] : soft stool [FreeTextEntry8] : urinating more [de-identified] : not sleeping. was sleeping well prior to hospital but now not.

## 2024-05-02 ENCOUNTER — APPOINTMENT (OUTPATIENT)
Dept: GERIATRICS | Facility: CLINIC | Age: 84
End: 2024-05-02
Payer: MEDICARE

## 2024-05-02 DIAGNOSIS — R19.7 DIARRHEA, UNSPECIFIED: ICD-10-CM

## 2024-05-02 PROCEDURE — 99442: CPT | Mod: 93

## 2024-05-02 NOTE — ASSESSMENT
[FreeTextEntry1] : C diff tests and GI PCR ordered. Wife advised how to collect and where to deliver

## 2024-05-02 NOTE — HISTORY OF PRESENT ILLNESS
[FreeTextEntry1] : Wife called. Pt having liquid bms since we last spoke. He had about 6 episodes last night, last BM was at 6am Pt. does not have fever or chills.  Pt had antibiotics over past few weeks for pneumonia  and possilbe sepsis.  No n/v no abd pain.

## 2024-05-03 ENCOUNTER — APPOINTMENT (OUTPATIENT)
Dept: PULMONOLOGY | Facility: CLINIC | Age: 84
End: 2024-05-03
Payer: MEDICARE

## 2024-05-03 VITALS
HEIGHT: 68 IN | DIASTOLIC BLOOD PRESSURE: 74 MMHG | WEIGHT: 195 LBS | BODY MASS INDEX: 29.55 KG/M2 | RESPIRATION RATE: 16 BRPM | SYSTOLIC BLOOD PRESSURE: 130 MMHG | TEMPERATURE: 97.2 F | HEART RATE: 88 BPM | OXYGEN SATURATION: 96 %

## 2024-05-03 DIAGNOSIS — R06.02 SHORTNESS OF BREATH: ICD-10-CM

## 2024-05-03 DIAGNOSIS — G47.33 OBSTRUCTIVE SLEEP APNEA (ADULT) (PEDIATRIC): ICD-10-CM

## 2024-05-03 DIAGNOSIS — J90 PLEURAL EFFUSION, NOT ELSEWHERE CLASSIFIED: ICD-10-CM

## 2024-05-03 DIAGNOSIS — R93.89 ABNORMAL FINDINGS ON DIAGNOSTIC IMAGING OF OTHER SPECIFIED BODY STRUCTURES: ICD-10-CM

## 2024-05-03 PROCEDURE — 71046 X-RAY EXAM CHEST 2 VIEWS: CPT

## 2024-05-03 PROCEDURE — 94729 DIFFUSING CAPACITY: CPT

## 2024-05-03 PROCEDURE — 94010 BREATHING CAPACITY TEST: CPT

## 2024-05-03 PROCEDURE — 94727 GAS DIL/WSHOT DETER LNG VOL: CPT

## 2024-05-03 PROCEDURE — 99214 OFFICE O/P EST MOD 30 MIN: CPT | Mod: 25

## 2024-05-03 NOTE — ASSESSMENT
[FreeTextEntry1] : Mr. 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 (Vyndamax)- s/p hospitalization 2/2023- improved s/p removal of right PleurX catheter 4/18/2024 - stable except for EDS    The patient's SOB is felt to be multifactorial: -poor mechanics of breathing (poor balance)  -out of shape/overweight -Pulmonary     -Bilateral pleural effusion - c/w CHF -Cardiac (Billy Keli)     -CAD    -(+)Amyloid  Abnormal CT c/w B/l, right significantly larger than left  Pleural Effusion DDX: -Cardiac #1 -Drug -PE -CA such as Lymphoma   Problem 1: Pleural Effusion (right significantly larger than left) s/p PleurX catheter - removed 4/2024 -s/p various chemistries, cultures (all negative) -s/p Evaluation with Dr. Rufus Oneal - drainage 3x/week    Problem 2: Abnormal PFTs c/w restrictive dysfunction ;likely due to pleural effusion  -s/p D-Dimer WNL -s/p full PFTs with KULWINDER improved  Problem 3: + r/o LIOR (RF: Elevated Mallampati class, neck size 17, Parkinson's) (mild)- untreated  -recommended "Excite"  -complete home sleep study- DD unable  -Sleep apnea is associated with adverse clinical consequences which can affect most organ systems. Cardiovascular disease risk includes arrhythmias, atrial fibrillation, hypertension, coronary artery disease, and stroke. Metabolic disorders include diabetes type 2, non-alcoholic fatty liver disease. Mood disorder especially depression; and cognitive decline especially in the elderly. Associations with chronic reflux/Delacruz's esophagus some but not all inclusive.  -Reasons include arousal consistent with hypopnea; respiratory events most prominent in REM sleep or supine position; therefore sleep staging and body position are important for accurate diagnosis and estimation of AHI.   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 Sanchez and Dr Randolph)  Problem 5:overweight/out of shape - Weight loss, exercise and diet control were discussed and are highly encouraged. Treatment options were given such as aqua therapy, and contacting a nutritionist. Recommended to use the elliptical, stationary bike, less use of treadmill. Mindful eating was explained to the patient. Obesity is associated with worsening asthma, SOB, and potential for cardiac disease, diabetes, and other underlying medical conditions.  Problem 6: Poor mechanics of breathing (Poor Balance)  -Recommended Widustin Hof and Buteyko breathing techniques  - Proper breathing techniques were reviewed with an emphasis on exhalation. Patient instructed to breath in for 1 second and out for four seconds. Patient was encouraged not to talk while walking.  Problem 7: Health Maintenance -s/p flu shot 2022 -s/p COVID-19 vaccine x3  -recommended strep pneumonia vaccines: Prevnar-13 vaccine, follow by Pneumo vaccine 23 one year following (completed)  -recommended early intervention for URIs -recommended regular osteoporosis evaluations -recommended early dermatological evaluations -recommended after the age of 50 to the age of 70, colonoscopy every 5 years  f/u in 4-6 months  pt is encouraged to call or fax the office with any questions or concerns.

## 2024-05-03 NOTE — PROCEDURE
[FreeTextEntry1] : CM left permanent pacemaker, small left pleural effusion, and no fluid on the right  Full PFT reveals severe restrictive dysfunction; FEV1 was 0.94 L which is 36% of predicted; normal lung volumes; normal diffusion at 14.5, which is 91% of predicted; abnormal inspiratory limb PFTs were performed to evaluate for SOB  FENO was unable to be performed; a normal value being less than 25 Fractional exhaled nitric oxide (FENO) is regarded as a simple, noninvasive method for assessing eosinophilic airway inflammation. Produced by a variety of cells within the lung, nitric oxide (NO) concentrations are generally low in healthy individuals. However, high concentrations of NO appear to be involved in nonspecific host defense mechanisms and chronic inflammatory diseases such as asthma. The American Thoracic Society (ATS) therefore has recommended using FENO to aid in the diagnosis and monitoring of eosinophilic airway inflammation and asthma, and for identifying steroid responsive individuals whose chronic respiratory symptoms may be caused by airway inflammation.

## 2024-05-03 NOTE — PHYSICAL EXAM
[No Acute Distress] : no acute distress [Normal Oropharynx] : normal oropharynx [III] : Mallampati Class: III [Normal Appearance] : normal appearance [No Neck Mass] : no neck mass [Normal Rate/Rhythm] : normal rate/rhythm [Normal S1, S2] : normal s1, s2 [No Murmurs] : no murmurs [No Resp Distress] : no resp distress [Clear to Auscultation Bilaterally] : clear to auscultation bilaterally [Kyphosis] : kyphosis [Benign] : benign [Normal Gait] : normal gait [No Clubbing] : no clubbing [No Cyanosis] : no cyanosis [FROM] : FROM [Normal Color/ Pigmentation] : normal color/ pigmentation [No Focal Deficits] : no focal deficits [Oriented x3] : oriented x3 [Normal Affect] : normal affect [TextBox_2] : wheelchair [TextBox_68] : I:E: 1:3; clear [TextBox_105] : 2+ LE edema

## 2024-05-03 NOTE — ADDENDUM
[FreeTextEntry1] : Documented by Omid Mae acting as a scribe for Dr. Pavan Jamil on 05/03/2024 All medical record entries made by the Scribe were at my, Dr. Pavan Jamil's, direction and personally dictated by me on 05/03/2024 . I have reviewed the chart and agree that the record accurately reflects my personal performance of the history, physical exam, assessment and plan. I have also personally directed, reviewed, and agree with the discharge instructions.

## 2024-05-03 NOTE — HISTORY OF PRESENT ILLNESS
[TextBox_4] : Mr. RIVAS is a 82 year male with a history of Parkinson's (Dx 2016), Depression, BPH, CAD, stent placement, HLD who now comes in  for a follow-up pulmonary evaluation for Abnormal CT with pleural effusions, SOB. His chief complaint is  -he notes diarrhea -he notes less ankle swelling -he notes weight is stable -he notes poor quality of sleep s/p hospitalization -he notes energy levels are poor s/p hospitalization -he notes new medications:  -he notes hospitalization for pleural effusion -he notes two week long antibiotic treatment -he notes being discharged 4/18/2024 -he notes fatigue  -he notes GP cut down on Myocalm -he notes slightly elevated bp of 130    -he denies any headaches, nausea, emesis, fever, chills, sweats, chest pain, chest pressure, coughing, wheezing, palpitations, constipation, dysphagia, vertigo, arthralgias, myalgias, leg swelling, itchy eyes, itchy ears, heartburn, reflux, or sour taste in the mouth.

## 2024-05-06 LAB
ASTROVIRUS: DETECTED
BACTERIA STL CULT: NORMAL
C DIFF TOXIN B QL PCR REFLEX: NORMAL
GDH ANTIGEN: NOT DETECTED
GI PCR PANEL: DETECTED
TOXIN A AND B: NOT DETECTED

## 2024-05-08 NOTE — ASU PREOP CHECKLIST - ANTIBIOTIC
[facility]  Obstetrics & Gynecology  Discharge Summary    Patient Name: Saad Ayon  MRN: [id number]  Admission Date: 5/7/2024  Hospital Length of Stay: 1 days  Discharge Date and Time:  05/08/2024 1:07 PM  Attending Physician: Yvette Castellanos, *   Discharging Provider: Marylin Harper MD  Primary Care Provider: Wei Perez MD    HPI: 41 yo female admitted for management of uterine fibroids.    Hospital Course: The patient's surgery was uncomplicated. Please see Dr. Torres's note regarding abdominal myomectomy. Her postop course was uncomplicated. On day of discharge, she was voiding spontaneously. Her pain was controlled. She was tolerating PO, passing flatus, and ambulating.  She was deemed stable for discharge to home.    Procedure(s) (LRB):  ABDOMINAL MYOMECTOMY (N/A)       Significant Diagnostic Studies: Labs: CBC   Recent Labs   Lab 05/08/24  0810   WBC 15.84*   HGB 9.3*   HCT 28.7*          Pending Diagnostic Studies:       Procedure Component Value Units Date/Time    Specimen to Pathology, Surgery Gynecology and Obstetrics [[id number]] Collected: 05/07/24 1237    Order Status: Sent Lab Status: In process Updated: 05/07/24 1522    Specimen: Tissue           Final Active Diagnoses:    Diagnosis Date Noted POA    PRINCIPAL PROBLEM:  S/P myomectomy [Z98.890] 05/07/2024 Not Applicable      Problems Resolved During this Admission:        Discharged Condition: good    Disposition: Home or Self Care    Follow Up:   Follow-up Information       Yvette Castellanos MD. Schedule an appointment as soon as possible for a visit.    Specialties: Obstetrics, Obstetrics and Gynecology  Contact information:  [address] [facility] [address]  [address]  [facility] [address]  [phone]                           Patient Instructions:   No discharge procedures on file.  Medications:  Reconciled Home Medications:      Medication List        START taking these medications      HYDROcodone-acetaminophen  5-325 mg per tablet  Commonly known as: NORCO  Take 1 tablet by mouth every 6 (six) hours as needed for Pain.     ibuprofen 800 MG tablet  Commonly known as: ADVIL,MOTRIN  Take 1 tablet (800 mg total) by mouth every 8 (eight) hours as needed for Pain.     ondansetron 4 MG tablet  Commonly known as: ZOFRAN  Take 1 tablet (4 mg total) by mouth every 8 (eight) hours as needed for Nausea.            CONTINUE taking these medications      famotidine 40 MG tablet  Commonly known as: PEPCID  Take 0.5 tablets (20 mg total) by mouth once daily. for 14 days     fexofenadine 180 MG tablet  Commonly known as: ALLEGRA  Take 1 tablet (180 mg total) by mouth once daily.            STOP taking these medications      hydrOXYzine pamoate 25 MG Cap  Commonly known as: VISTARIL     SLYND 4 mg (28) Tab  Generic drug: drospirenone (contraceptive)     terconazole 0.4 % Crea  Commonly known as: TERAZOL 7     triamcinolone acetonide 0.5% 0.5 % Crea  Commonly known as: VAMSHI Harper MD  Obstetrics & Gynecology  [facility]     n/a

## 2024-05-09 PROCEDURE — 90833 PSYTX W PT W E/M 30 MIN: CPT

## 2024-05-09 PROCEDURE — 99214 OFFICE O/P EST MOD 30 MIN: CPT

## 2024-05-15 LAB
CULTURE RESULTS: SIGNIFICANT CHANGE UP
SPECIMEN SOURCE: SIGNIFICANT CHANGE UP

## 2024-06-04 NOTE — PATIENT PROFILE ADULT - FUNCTIONAL ASSESSMENT - DAILY ACTIVITY 2.
6/4/24: Pt is a 70 yo male with PMH of GERD, Parkinson's, CVA in 2018, HLD, asthma who presents for colonoscopy.   Patient had a colonoscopy about 5 yrs ago that was reportedly normal. He was told to repeat in 5 years. There is no family history of colon polyps or cancer. Patient denies change in bowel habits, appetite and weight. Patient reports blood in stool, but occured a very long time ago and has not recurred. Patient denies cardiopulmonary disease, intake of blood thinners or problems with anesthesia. Recent blood work normal. 2 = A lot of assistance

## 2024-06-10 NOTE — ED PROVIDER NOTE - HIV OFFER
----- Message from Sunny Richardson MD sent at 6/10/2024  2:16 PM EDT -----  Blood work looks good.  Cholesterol liver kidney sugar thyroid vitamin B12 vitamin D levels look good   Previously Declined (within the last year)

## 2024-06-12 ENCOUNTER — APPOINTMENT (OUTPATIENT)
Dept: CARDIOLOGY | Facility: CLINIC | Age: 84
End: 2024-06-12

## 2024-06-15 ENCOUNTER — RX RENEWAL (OUTPATIENT)
Age: 84
End: 2024-06-15

## 2024-06-19 PROBLEM — E78.5 HYPERLIPIDEMIA: Status: ACTIVE | Noted: 2024-03-28

## 2024-06-20 ENCOUNTER — APPOINTMENT (OUTPATIENT)
Dept: CARDIOLOGY | Facility: CLINIC | Age: 84
End: 2024-06-20
Payer: MEDICARE

## 2024-06-20 ENCOUNTER — NON-APPOINTMENT (OUTPATIENT)
Age: 84
End: 2024-06-20

## 2024-06-20 DIAGNOSIS — E78.5 HYPERLIPIDEMIA, UNSPECIFIED: ICD-10-CM

## 2024-06-20 PROCEDURE — G2211 COMPLEX E/M VISIT ADD ON: CPT

## 2024-06-20 PROCEDURE — 93000 ELECTROCARDIOGRAM COMPLETE: CPT

## 2024-06-20 PROCEDURE — 99215 OFFICE O/P EST HI 40 MIN: CPT

## 2024-06-20 RX ORDER — BUMETANIDE 1 MG/1
1 TABLET ORAL DAILY
Qty: 90 | Refills: 3 | Status: DISCONTINUED | COMMUNITY
Start: 2022-03-10 | End: 2024-06-20

## 2024-06-26 ENCOUNTER — RX RENEWAL (OUTPATIENT)
Age: 84
End: 2024-06-26

## 2024-06-26 RX ORDER — APIXABAN 2.5 MG/1
2.5 TABLET, FILM COATED ORAL
Qty: 180 | Refills: 3 | Status: ACTIVE | COMMUNITY
Start: 2022-02-03 | End: 1900-01-01

## 2024-06-27 ENCOUNTER — APPOINTMENT (OUTPATIENT)
Dept: GERIATRICS | Facility: CLINIC | Age: 84
End: 2024-06-27

## 2024-06-27 VITALS
TEMPERATURE: 97.2 F | WEIGHT: 178 LBS | HEART RATE: 81 BPM | RESPIRATION RATE: 16 BRPM | BODY MASS INDEX: 26.98 KG/M2 | OXYGEN SATURATION: 97 % | SYSTOLIC BLOOD PRESSURE: 112 MMHG | HEIGHT: 68 IN | DIASTOLIC BLOOD PRESSURE: 66 MMHG

## 2024-06-27 DIAGNOSIS — I48.19 OTHER PERSISTENT ATRIAL FIBRILLATION: ICD-10-CM

## 2024-06-27 DIAGNOSIS — I35.0 NONRHEUMATIC AORTIC (VALVE) STENOSIS: ICD-10-CM

## 2024-06-27 DIAGNOSIS — N40.1 BENIGN PROSTATIC HYPERPLASIA WITH LOWER URINARY TRACT SYMPMS: ICD-10-CM

## 2024-06-27 DIAGNOSIS — N13.8 BENIGN PROSTATIC HYPERPLASIA WITH LOWER URINARY TRACT SYMPMS: ICD-10-CM

## 2024-06-27 DIAGNOSIS — I43 ORGAN-LIMITED AMYLOIDOSIS: ICD-10-CM

## 2024-06-27 DIAGNOSIS — E85.4 ORGAN-LIMITED AMYLOIDOSIS: ICD-10-CM

## 2024-06-27 DIAGNOSIS — A09 INFECTIOUS GASTROENTERITIS AND COLITIS, UNSPECIFIED: ICD-10-CM

## 2024-06-27 DIAGNOSIS — G20.A1 PARKINSON'S DISEASE WITHOUT DYSKINESIA, WITHOUT MENTION OF FLUCTUATIONS: ICD-10-CM

## 2024-06-27 DIAGNOSIS — F41.9 ANXIETY DISORDER, UNSPECIFIED: ICD-10-CM

## 2024-06-27 DIAGNOSIS — I50.42 CHRONIC COMBINED SYSTOLIC (CONGESTIVE) AND DIASTOLIC (CONGESTIVE) HEART FAILURE: ICD-10-CM

## 2024-06-27 PROCEDURE — G0444 DEPRESSION SCREEN ANNUAL: CPT | Mod: 59

## 2024-06-27 PROCEDURE — G2211 COMPLEX E/M VISIT ADD ON: CPT

## 2024-06-27 PROCEDURE — 99214 OFFICE O/P EST MOD 30 MIN: CPT

## 2024-07-02 NOTE — PATIENT PROFILE ADULT - DO YOU LACK THE NECESSARY SUPPORT TO HELP YOU COPE WITH LIFE CHALLENGES?
Pt notified via My Chart   
Thad Bess, hasn't seen Dr. Gage for 10 months or so, will let Samantha address    Olga Sullivan M.D.    
no

## 2024-07-18 ENCOUNTER — APPOINTMENT (OUTPATIENT)
Dept: CT IMAGING | Facility: IMAGING CENTER | Age: 84
End: 2024-07-18
Payer: MEDICARE

## 2024-07-18 ENCOUNTER — OUTPATIENT (OUTPATIENT)
Dept: OUTPATIENT SERVICES | Facility: HOSPITAL | Age: 84
LOS: 1 days | End: 2024-07-18
Payer: MEDICARE

## 2024-07-18 DIAGNOSIS — Z98.61 CORONARY ANGIOPLASTY STATUS: Chronic | ICD-10-CM

## 2024-07-18 DIAGNOSIS — J90 PLEURAL EFFUSION, NOT ELSEWHERE CLASSIFIED: ICD-10-CM

## 2024-07-18 DIAGNOSIS — Z95.0 PRESENCE OF CARDIAC PACEMAKER: Chronic | ICD-10-CM

## 2024-07-18 DIAGNOSIS — Z96.649 PRESENCE OF UNSPECIFIED ARTIFICIAL HIP JOINT: Chronic | ICD-10-CM

## 2024-07-18 DIAGNOSIS — Z98.49 CATARACT EXTRACTION STATUS, UNSPECIFIED EYE: Chronic | ICD-10-CM

## 2024-07-18 PROCEDURE — 71250 CT THORAX DX C-: CPT

## 2024-07-18 PROCEDURE — 71250 CT THORAX DX C-: CPT | Mod: 26,MH

## 2024-07-19 ENCOUNTER — APPOINTMENT (OUTPATIENT)
Dept: NEUROLOGY | Facility: CLINIC | Age: 84
End: 2024-07-19
Payer: MEDICARE

## 2024-07-19 PROCEDURE — 99214 OFFICE O/P EST MOD 30 MIN: CPT

## 2024-07-29 ENCOUNTER — APPOINTMENT (OUTPATIENT)
Dept: THORACIC SURGERY | Facility: CLINIC | Age: 84
End: 2024-07-29
Payer: MEDICARE

## 2024-07-29 VITALS
OXYGEN SATURATION: 93 % | WEIGHT: 190 LBS | DIASTOLIC BLOOD PRESSURE: 61 MMHG | RESPIRATION RATE: 17 BRPM | BODY MASS INDEX: 28.79 KG/M2 | HEIGHT: 68 IN | HEART RATE: 67 BPM | SYSTOLIC BLOOD PRESSURE: 119 MMHG

## 2024-07-29 DIAGNOSIS — J90 PLEURAL EFFUSION, NOT ELSEWHERE CLASSIFIED: ICD-10-CM

## 2024-07-29 PROCEDURE — 99213 OFFICE O/P EST LOW 20 MIN: CPT

## 2024-07-29 NOTE — CONSULT LETTER
[Dear  ___] : Dear  [unfilled], [Courtesy Letter:] : I had the pleasure of seeing your patient, [unfilled], in my office today. [( Thank you for referring [unfilled] for consultation for _____ )] : Thank you for referring [unfilled] for consultation for [unfilled] [Please see my note below.] : Please see my note below. [Sincerely,] : Sincerely, [FreeTextEntry2] : Dr. Pavan Jamil (Pulm/Ref)\par  Santhosh Avila (PCP)\par  Dr. Clinton Dickey (Card)\par  Dr. Randolph (Card re: cardiac amyloid) [FreeTextEntry3] : Rufus Oneal MD \par  Attending Surgeon \par  Division of Thoracic Surgery \par  , Cardiovascular and Thoracic Surgery \par  [facility] [employer] [facility]\par  \par

## 2024-07-29 NOTE — HISTORY OF PRESENT ILLNESS
[FreeTextEntry1] : Mr. 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   Patient is s/p Right VATS, pleural bx and placement of Pleurx catheter on 03/17/2022. Path of right pleura bx revealed fragments of pleura with chronic inflammation and reactive changes. Fragments of skeletal muscle. Right pleural fluid negative for malignant cells.   Patient went to ED on 04/29/2022 for hallucinations, likely related to Parkinsons dementia. He was discharged on 5/12/22 to a rehab facility.    Of note, pt had recent SPECT scan demonstrating cardiac amyloid and was referred by his cardiologist Dr. Dickey to see Dr. Randolph.   CT chest on 08/17/2022: -  There is a trace right pleural effusion with Pleurx catheter in place, markedly decreased in size compared to the 2/6/2022 chest CT. A small left pleural effusion has also decreased compared to the 2/6/2022 scan, now trace.  Patient went to ED on 09/08/2022 c/o weakness, founded to have electrolyte disorders which were corrected but patient's symptoms did not improve. felt that presentation was most consistent with progression of Parkinson's disease.  Patient was seen on 01/10/2023 with clogged PleurX catheter. PleurX catheter was unclogged using three-way stop cock and flushes. Pt was drained about 700cc output, tolerated well.   Patient had bloody drainage, went to ED a few times.  Patient was drained three times a week with ~ 500 ml each drainage. On 03/0/6/2024, drainage was minimal.  CXR on 03/07/2024 reviewed and explained to patient, stable CXR. Patient denies SOB, cough or fever. No intervention needed at current time. Will decrease drainage to once a week for 2 weeks and RTC in 2 weeks with CT chest w/o contrast.   Patient is currently drained once a week for the past 2 wks: PleurX OUTPUT: 3/14 Thurs 5ml, 3/20 Wed few drops.  CT chest on 03/20/2024: - Right middle lobe bronchus narrowing secondary to nodularity is similar to 2023.   - Small to moderate partially loculated bilateral pleural effusions, unchanged on the left and enlarged on the right compared to the prior study. Right-sided chest tube. Partial atelectasis both lower lobes.  04/02-04/05/24: Admitted at [facility] with clogged pleurX catheter, s/p TPA.  last seen in office on 4/8/24: Right PleurX Catheter appears dislodged, I would like him to return to clinic on Wednesday (4/10) for Right pleurX Catheter removal.  Presented to ED on 4/10 w/ concerns of dyspnea and right sided chest pain around the Pleurx site. Found to be febrile and hypotensive. Ultimately admitted for shock. s/p IV ABX. Underwent IR guided pigtail placement on 4/17 400cc serosang fluid drained. pleural fluid sent for testing, negative for infection to date. Pigtail cath removed; Deemed stable for discharge on 4/18.  OF NOTE: Discharged on a 14 day course of Augmentin  Upon discharge, Plan for Outpatient f/u Thoracic Surgery re: Pleurx placement after completion of treatment.  CT chest on 07/18/2024:  -  Small bilateral pleural effusions are slightly decreased. Underlying right pleural thickening. Numerous small calcified nodules. Decreased bibasilar atelectasis. - Biventricular left pacer in place. Cardiomegaly. - Stable large confluent liver cysts.  Depression screening completed on 06/27/2024.   Patient is here today for a follow up. Patient c/o SOB on exertion, denies cough, chest pain, fever, chills.

## 2024-07-29 NOTE — ASSESSMENT
[FreeTextEntry1] : Mr. 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  Patient is s/p Right VATS, pleural bx and placement of Pleurx catheter on 03/17/2022. Path of right pleura bx revealed fragments of pleura with chronic inflammation and reactive changes. Fragments of skeletal muscle. Right pleural fluid negative for malignant cells.  Presented to ED on 4/10 w/ concerns of dyspnea and right sided chest pain around the Pleurx site. Found to be febrile and hypotensive. Ultimately admitted for shock. s/p IV ABX. Underwent IR guided pigtail placement on 4/17 400cc serosang fluid drained. pleural fluid sent for testing, negative for infection to date. Pigtail cath removed; Deemed stable for discharge on 4/18.  OF NOTE: Discharged on a 14 day course of Augmentin.   Upon discharge, Plan for Outpatient f/u Thoracic Surgery re: Pleurx placement after completion of treatment  I have reviewed the patient's medical records and diagnostic images at time of this office consultation and have made the following recommendation: 1. CT chest reviewed and explained to patient, small pleural effusions, patient is doing well.  No further thoracic surgery visits are required at this time, however, he may return to the office as needed if any issues arise. Recommendations reviewed with patient during this office visit, and all questions answered; Patient instructed on the importance of follow up and verbalizes understanding.  I, PARK Gomez, personally performed the evaluation and management (E/M) services for this established patient who follow up today with an existing condition.  That E/M includes conducting the examination, assessing all new/exacerbated/existing conditions, and establishing a plan of care.  Today, my ACP, JOHN FreemanC, was here to observe my evaluation and management services for this existing condition to be followed going forward.

## 2024-07-29 NOTE — CONSULT LETTER
[Dear  ___] : Dear  [unfilled], [Courtesy Letter:] : I had the pleasure of seeing your patient, [unfilled], in my office today. [( Thank you for referring [unfilled] for consultation for _____ )] : Thank you for referring [unfilled] for consultation for [unfilled] [Please see my note below.] : Please see my note below. [Sincerely,] : Sincerely, [FreeTextEntry2] : Dr. Pavan Jamil (Pulm/Ref)\par  Santhosh Avila (PCP)\par  Dr. Clinton Dickey (Card)\par  Dr. Randolph (Card re: cardiac amyloid) [FreeTextEntry3] : Rufus Oneal MD \par  Attending Surgeon \par  Division of Thoracic Surgery \par  , Cardiovascular and Thoracic Surgery \par  [facility] [employer] at [facility]\par  \par

## 2024-07-29 NOTE — CONSULT LETTER
[Dear  ___] : Dear  [unfilled], [Courtesy Letter:] : I had the pleasure of seeing your patient, [unfilled], in my office today. [( Thank you for referring [unfilled] for consultation for _____ )] : Thank you for referring [unfilled] for consultation for [unfilled] [Please see my note below.] : Please see my note below. [Sincerely,] : Sincerely, [FreeTextEntry2] : Dr. Pavan Jamil (Pulm/Ref)\par  Santhosh Avila (PCP)\par  Dr. Clinton iDckey (Card)\par  Dr. Randolph (Card re: cardiac amyloid) [FreeTextEntry3] : Rufus Oneal MD \par  Attending Surgeon \par  Division of Thoracic Surgery \par  , Cardiovascular and Thoracic Surgery \par  [facility] [employer] at [facility]\par  \par

## 2024-07-29 NOTE — HISTORY OF PRESENT ILLNESS
[FreeTextEntry1] : Mr. 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   Patient is s/p Right VATS, pleural bx and placement of Pleurx catheter on 03/17/2022. Path of right pleura bx revealed fragments of pleura with chronic inflammation and reactive changes. Fragments of skeletal muscle. Right pleural fluid negative for malignant cells.   Patient went to ED on 04/29/2022 for hallucinations, likely related to Parkinsons dementia. He was discharged on 5/12/22 to a rehab facility.    Of note, pt had recent SPECT scan demonstrating cardiac amyloid and was referred by his cardiologist Dr. Dickey to see Dr. Randolph.   CT chest on 08/17/2022: -  There is a trace right pleural effusion with Pleurx catheter in place, markedly decreased in size compared to the 2/6/2022 chest CT. A small left pleural effusion has also decreased compared to the 2/6/2022 scan, now trace.  Patient went to ED on 09/08/2022 c/o weakness, founded to have electrolyte disorders which were corrected but patient's symptoms did not improve. felt that presentation was most consistent with progression of Parkinson's disease.  Patient was seen on 01/10/2023 with clogged PleurX catheter. PleurX catheter was unclogged using three-way stop cock and flushes. Pt was drained about 700cc output, tolerated well.   Patient had bloody drainage, went to ED a few times.  Patient was drained three times a week with ~ 500 ml each drainage. On 03/0/6/2024, drainage was minimal.  CXR on 03/07/2024 reviewed and explained to patient, stable CXR. Patient denies SOB, cough or fever. No intervention needed at current time. Will decrease drainage to once a week for 2 weeks and RTC in 2 weeks with CT chest w/o contrast.   Patient is currently drained once a week for the past 2 wks: PleurX OUTPUT: 3/14 Thurs 5ml, 3/20 Wed few drops.  CT chest on 03/20/2024: - Right middle lobe bronchus narrowing secondary to nodularity is similar to 2023.   - Small to moderate partially loculated bilateral pleural effusions, unchanged on the left and enlarged on the right compared to the prior study. Right-sided chest tube. Partial atelectasis both lower lobes.  04/02-04/05/24: Admitted at [facility] with clogged pleurX catheter, s/p TPA.  last seen in office on 4/8/24: Right PleurX Catheter appears dislodged, I would like him to return to clinic on Wednesday (4/10) for Right pleurX Catheter removal.  Presented to ED on 4/10 w/ concerns of dyspnea and right sided chest pain around the Pleurx site. Found to be febrile and hypotensive. Ultimately admitted for shock. s/p IV ABX. Underwent IR guided pigtail placement on 4/17 400cc serosang fluid drained. pleural fluid sent for testing, negative for infection to date. Pigtail cath removed; Deemed stable for discharge on 4/18.  OF NOTE: Discharged on a 14 day course of Augmentin  Upon discharge, Plan for Outpatient f/u Thoracic Surgery re: Pleurx placement after completion of treatment.  CT chest on 07/18/2024:  -  Small bilateral pleural effusions are slightly decreased. Underlying right pleural thickening. Numerous small calcified nodules. Decreased bibasilar atelectasis. - Biventricular left pacer in place. Cardiomegaly. - Stable large confluent liver cysts.  Depression screening completed on 06/27/2024.   Patient is here today for a follow up. Patient c/o SOB on exertion, denies cough, chest pain, fever, chills.

## 2024-07-29 NOTE — HISTORY OF PRESENT ILLNESS
[FreeTextEntry1] : Mr. 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   Patient is s/p Right VATS, pleural bx and placement of Pleurx catheter on 03/17/2022. Path of right pleura bx revealed fragments of pleura with chronic inflammation and reactive changes. Fragments of skeletal muscle. Right pleural fluid negative for malignant cells.   Patient went to ED on 04/29/2022 for hallucinations, likely related to Parkinsons dementia. He was discharged on 5/12/22 to a rehab facility.    Of note, pt had recent SPECT scan demonstrating cardiac amyloid and was referred by his cardiologist Dr. Dickey to see Dr. Randolph.   CT chest on 08/17/2022: -  There is a trace right pleural effusion with Pleurx catheter in place, markedly decreased in size compared to the 2/6/2022 chest CT. A small left pleural effusion has also decreased compared to the 2/6/2022 scan, now trace.  Patient went to ED on 09/08/2022 c/o weakness, founded to have electrolyte disorders which were corrected but patient's symptoms did not improve. felt that presentation was most consistent with progression of Parkinson's disease.  Patient was seen on 01/10/2023 with clogged PleurX catheter. PleurX catheter was unclogged using three-way stop cock and flushes. Pt was drained about 700cc output, tolerated well.   Patient had bloody drainage, went to ED a few times.  Patient was drained three times a week with ~ 500 ml each drainage. On 03/0/6/2024, drainage was minimal.  CXR on 03/07/2024 reviewed and explained to patient, stable CXR. Patient denies SOB, cough or fever. No intervention needed at current time. Will decrease drainage to once a week for 2 weeks and RTC in 2 weeks with CT chest w/o contrast.   Patient is currently drained once a week for the past 2 wks: PleurX OUTPUT: 3/14 Thurs 5ml, 3/20 Wed few drops.  CT chest on 03/20/2024: - Right middle lobe bronchus narrowing secondary to nodularity is similar to 2023.   - Small to moderate partially loculated bilateral pleural effusions, unchanged on the left and enlarged on the right compared to the prior study. Right-sided chest tube. Partial atelectasis both lower lobes.  04/02-04/05/24: Admitted at [facility] with clogged pleurX catheter, s/p TPA.  last seen in office on 4/8/24: Right PleurX Catheter appears dislodged, I would like him to return to clinic on Wednesday (4/10) for Right pleurX Catheter removal.  Presented to ED on 4/10 w/ concerns of dyspnea and right sided chest pain around the Pleurx site. Found to be febrile and hypotensive. Ultimately admitted for shock. s/p IV ABX. Underwent IR guided pigtail placement on 4/17 400cc serosang fluid drained. pleural fluid sent for testing, negative for infection to date. Pigtail cath removed; Deemed stable for discharge on 4/18.  OF NOTE: Discharged on a 14 day course of Augmentin  Upon discharge, Plan for Outpatient f/u Thoracic Surgery re: Pleurx placement after completion of treatment.  CT chest on 07/18/2024:  -  Small bilateral pleural effusions are slightly decreased. Underlying right pleural thickening. Numerous small calcified nodules. Decreased bibasilar atelectasis. - Biventricular left pacer in place. Cardiomegaly. - Stable large confluent liver cysts.  Depression screening completed on 06/27/2024.   Patient is here today for a follow up. Patient c/o SOB on exertion, denies cough, chest pain, fever, chills.

## 2024-08-02 ENCOUNTER — APPOINTMENT (OUTPATIENT)
Dept: CARDIOLOGY | Facility: CLINIC | Age: 84
End: 2024-08-02
Payer: MEDICARE

## 2024-08-02 DIAGNOSIS — I50.42 CHRONIC COMBINED SYSTOLIC (CONGESTIVE) AND DIASTOLIC (CONGESTIVE) HEART FAILURE: ICD-10-CM

## 2024-08-02 DIAGNOSIS — I51.7 CARDIOMEGALY: ICD-10-CM

## 2024-08-02 DIAGNOSIS — E78.5 HYPERLIPIDEMIA, UNSPECIFIED: ICD-10-CM

## 2024-08-02 PROCEDURE — G2211 COMPLEX E/M VISIT ADD ON: CPT

## 2024-08-02 PROCEDURE — 99214 OFFICE O/P EST MOD 30 MIN: CPT

## 2024-08-08 PROBLEM — M81.8 OTHER OSTEOPOROSIS: Status: ACTIVE | Noted: 2024-08-08

## 2024-08-15 ENCOUNTER — APPOINTMENT (OUTPATIENT)
Dept: PULMONOLOGY | Facility: CLINIC | Age: 84
End: 2024-08-15
Payer: MEDICARE

## 2024-08-15 VITALS — WEIGHT: 185 LBS | HEIGHT: 68 IN | BODY MASS INDEX: 28.04 KG/M2

## 2024-08-15 DIAGNOSIS — J90 PLEURAL EFFUSION, NOT ELSEWHERE CLASSIFIED: ICD-10-CM

## 2024-08-15 DIAGNOSIS — F41.9 ANXIETY DISORDER, UNSPECIFIED: ICD-10-CM

## 2024-08-15 DIAGNOSIS — R06.02 SHORTNESS OF BREATH: ICD-10-CM

## 2024-08-15 DIAGNOSIS — G47.33 OBSTRUCTIVE SLEEP APNEA (ADULT) (PEDIATRIC): ICD-10-CM

## 2024-08-15 DIAGNOSIS — R06.83 SNORING: ICD-10-CM

## 2024-08-15 DIAGNOSIS — R93.89 ABNORMAL FINDINGS ON DIAGNOSTIC IMAGING OF OTHER SPECIFIED BODY STRUCTURES: ICD-10-CM

## 2024-08-15 PROCEDURE — 99214 OFFICE O/P EST MOD 30 MIN: CPT

## 2024-08-15 NOTE — HISTORY OF PRESENT ILLNESS
[Home] : at home, [unfilled] , at the time of the visit. [Medical Office: ([facility])___] : at the medical office located in  [Family Member] : family member [Verbal consent obtained from patient] : the patient, [unfilled] [TextBox_4] : Mr. RIVAS is a 82 year male with a history of Parkinson's (Dx 2016), Depression, BPH, CAD, stent placement, HLD who now comes in  for a follow-up pulmonary evaluation for Abnormal CT with pleural effusions, SOB. His chief complaint is  -he notes feeling generally well -he notes good quality of sleep -he notes energy levels are 5-6/10 -he notes diet is good -he notes appetite is stable -he notes urological issues  -he notes seeing a urologist soon ([place])  -he notes having one of his medications changed to Trazadone which he has since failed  -he notes being put on lorazepam   -he denies any headaches, nausea, emesis, fever, chills, sweats, chest pain, chest pressure, coughing, wheezing, palpitations, diarrhea, constipation, dysphagia, vertigo, arthralgias, myalgias, leg swelling, itchy eyes, itchy ears, heartburn, reflux, or sour taste in the mouth.

## 2024-08-15 NOTE — ADDENDUM
[FreeTextEntry1] : Documented by Erasto Castro acting as a scribe for Dr. Pavan Jamil on 08/15/2024. All medical record entries made by the Scribe were at my, Dr. Pavan Jamil's, direction and personally dictated by me on 08/15/2024. I have reviewed the chart and agree that the record accurately reflects my personal performance of the history, physical exam, assessment and plan. I have also personally directed, reviewed, and agree with the discharge instructions.

## 2024-08-15 NOTE — ASSESSMENT
[FreeTextEntry1] : Mr. 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 (Vyndamax)- s/p hospitalization 2/2023- improved s/p removal of right PleurX catheter 4/18/2024 - stable except for EDS (improved)     The patient's SOB is felt to be multifactorial: -poor mechanics of breathing (poor balance)  -out of shape/overweight -Pulmonary     -Bilateral pleural effusion - c/w CHF -Cardiac (Billy Keli)     -CAD    -(+)Amyloid  Abnormal CT c/w B/l, right significantly larger than left  Pleural Effusion DDX: -Cardiac #1 -Drug -PE -CA such as Lymphoma   Problem 1: Pleural Effusion (right significantly larger than left) s/p PleurX catheter - removed 4/2024 improved 7/2024 -s/p various chemistries, cultures (all negative) -s/p Evaluation with Dr. Rufus Oneal - drainage 3x/week    Problem 2: Abnormal PFTs c/w restrictive dysfunction ;likely due to pleural effusion  -s/p D-Dimer WNL -s/p full PFTs with KULWINDER improved  Problem 3: + r/o LIOR (RF: Elevated Mallampati class, neck size 17, Parkinson's) (mild)- untreated  -recommended "Excite"  -complete home sleep study- DD unable  -Sleep apnea is associated with adverse clinical consequences which can affect most organ systems. Cardiovascular disease risk includes arrhythmias, atrial fibrillation, hypertension, coronary artery disease, and stroke. Metabolic disorders include diabetes type 2, non-alcoholic fatty liver disease. Mood disorder especially depression; and cognitive decline especially in the elderly. Associations with chronic reflux/Delacruz's esophagus some but not all inclusive.  -Reasons include arousal consistent with hypopnea; respiratory events most prominent in REM sleep or supine position; therefore sleep staging and body position are important for accurate diagnosis and estimation of AHI.   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 Sanchez and Dr Randolph)  Problem 5:overweight/out of shape - Weight loss, exercise and diet control were discussed and are highly encouraged. Treatment options were given such as aqua therapy, and contacting a nutritionist. Recommended to use the elliptical, stationary bike, less use of treadmill. Mindful eating was explained to the patient. Obesity is associated with worsening asthma, SOB, and potential for cardiac disease, diabetes, and other underlying medical conditions.  Problem 6: Poor mechanics of breathing (Poor Balance)  -Recommended Widustin Hof and Buteyko breathing techniques  - Proper breathing techniques were reviewed with an emphasis on exhalation. Patient instructed to breath in for 1 second and out for four seconds. Patient was encouraged not to talk while walking.  Problem 7: Health Maintenance -s/p flu shot 2023 -s/p COVID-19 vaccine x3  -recommended strep pneumonia vaccines: Prevnar-13 vaccine, follow by Pneumo vaccine 23 one year following (completed)  -recommended early intervention for URIs -recommended regular osteoporosis evaluations -recommended early dermatological evaluations -recommended after the age of 50 to the age of 70, colonoscopy every 5 years  f/u in 4-6 months  pt is encouraged to call or fax the office with any questions or concerns.

## 2024-09-09 NOTE — ED PROVIDER NOTE - CHIEF COMPLAINT
Pap/HPV not indicated  Mammogram ordered    Has lost 20lbs since last visit. Has revamped diet and training. Doing great  Hot flashes still occurring daily.  Unclear if estrogen is helping.  May decide to stop this medication. Offered change of dose or addition of progesterone.     Encourage healthy diet, exercise, Calcium 1200mg per day and at least 800 iu Vitamin D daily.    
The patient is a 82y Male complaining of cough.

## 2024-09-17 NOTE — ED ADULT NURSE NOTE - NSICDXPASTMEDICALHX_GEN_ALL_CORE_FT
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

## 2024-09-18 NOTE — DIETITIAN INITIAL EVALUATION ADULT - CALCULATED TO (CAL/KG)
Follow up with PCP. Follow up with Cardiology.  Return to ED for new or worsening symptoms as discussed.    [id number]

## 2024-09-19 ENCOUNTER — APPOINTMENT (OUTPATIENT)
Dept: GERIATRICS | Facility: CLINIC | Age: 84
End: 2024-09-19
Payer: MEDICARE

## 2024-09-19 VITALS
OXYGEN SATURATION: 95 % | RESPIRATION RATE: 16 BRPM | HEIGHT: 68 IN | TEMPERATURE: 97.3 F | BODY MASS INDEX: 27.45 KG/M2 | SYSTOLIC BLOOD PRESSURE: 116 MMHG | DIASTOLIC BLOOD PRESSURE: 70 MMHG | HEART RATE: 83 BPM | WEIGHT: 181.13 LBS

## 2024-09-19 DIAGNOSIS — E78.5 HYPERLIPIDEMIA, UNSPECIFIED: ICD-10-CM

## 2024-09-19 DIAGNOSIS — D64.9 ANEMIA, UNSPECIFIED: ICD-10-CM

## 2024-09-19 DIAGNOSIS — F32.A DEPRESSION, UNSPECIFIED: ICD-10-CM

## 2024-09-19 DIAGNOSIS — N13.8 BENIGN PROSTATIC HYPERPLASIA WITH LOWER URINARY TRACT SYMPMS: ICD-10-CM

## 2024-09-19 DIAGNOSIS — G20.A1 PARKINSON'S DISEASE WITHOUT DYSKINESIA, WITHOUT MENTION OF FLUCTUATIONS: ICD-10-CM

## 2024-09-19 DIAGNOSIS — I48.19 OTHER PERSISTENT ATRIAL FIBRILLATION: ICD-10-CM

## 2024-09-19 DIAGNOSIS — N40.1 BENIGN PROSTATIC HYPERPLASIA WITH LOWER URINARY TRACT SYMPMS: ICD-10-CM

## 2024-09-19 DIAGNOSIS — R44.3 HALLUCINATIONS, UNSPECIFIED: ICD-10-CM

## 2024-09-19 PROCEDURE — G2211 COMPLEX E/M VISIT ADD ON: CPT

## 2024-09-19 PROCEDURE — 99214 OFFICE O/P EST MOD 30 MIN: CPT

## 2024-09-19 RX ORDER — CARBIDOPA AND LEVODOPA 25; 100 MG/1; MG/1
25-100 TABLET, EXTENDED RELEASE ORAL
Qty: 30 | Refills: 5 | Status: ACTIVE | COMMUNITY
Start: 2024-09-19

## 2024-09-19 NOTE — ADDENDUM
[FreeTextEntry1] : Labs dont need to be done. He had labs done on September 4, 2024 , seen on HIE.  hgb 10.4, Crn 1.8. Pt on iron. Pt may have some bleeding on eliquis and chronic disease picture.  Will get lipid levels in future

## 2024-09-19 NOTE — HISTORY OF PRESENT ILLNESS
[No falls in past year] : Patient reported no falls in the past year [Completely Independent] : Completely independent. [0] : 2) Feeling down, depressed, or hopeless: Not at all (0) [PHQ-2 Negative - No further assessment needed] : PHQ-2 Negative - No further assessment needed [FreeTextEntry1] : 82 yo male with PD and here for followup. Pt has had effusions, pleurix catheters and recurrent hospitalizations. Pt now here without the pleurix, effusions stable (CT scan done July 2024) and last hospitalization April 2024  Pt is not hallucinating as much, occasionally only. Pt is eating well Pt expressing interest in participating in clinical trials.  Wife and aide doing well. They are interested in having house calls program. [[id number]] : 0

## 2024-09-19 NOTE — HISTORY OF PRESENT ILLNESS
[No falls in past year] : Patient reported no falls in the past year [Completely Independent] : Completely independent. [0] : 2) Feeling down, depressed, or hopeless: Not at all (0) [PHQ-2 Negative - No further assessment needed] : PHQ-2 Negative - No further assessment needed [FreeTextEntry1] : 82 yo male with PD and here for followup. Pt has had effusions, pleurix catheters and recurrent hospitalizations. Pt now here without the pleurix, effusions stable (CT scan done July 2024) and last hospitalization April 2024  Pt is not hallucinating as much, occasionally only. Pt is eating well Pt expressing interest in participating in clinical trials.  Wife and aide doing well. They are interested in having house calls program. [[id number]] : 0

## 2024-09-19 NOTE — PHYSICAL EXAM
[Alert] : alert [No Acute Distress] : in no acute distress [Normal Outer Ear/Nose] : the ears and nose were normal in appearance [Normal Appearance] : the appearance of the neck was normal [Supple] : the neck was supple [No Respiratory Distress] : no respiratory distress [No Acc Muscle Use] : no accessory muscle use [Respiration, Rhythm And Depth] : normal respiratory rhythm and effort [Auscultation Breath Sounds / Voice Sounds] : lungs were clear to auscultation bilaterally [Heart Rate And Rhythm] : heart rate was normal and rhythm regular [Bowel Sounds] : normal bowel sounds [Abdomen Tenderness] : non-tender [Abdomen Soft] : soft [Normal] : no spinal tenderness [No Spinal Tenderness] : no spinal tenderness [Normal Color / Pigmentation] : normal skin color and pigmentation [Normal Turgor] : normal skin turgor [No Focal Deficits] : no focal deficits [Normal Affect] : the affect was normal [Normal Mood] : the mood was normal

## 2024-09-19 NOTE — ASSESSMENT
[FreeTextEntry1] : Clinical trials interested - will ask neurologist Will make a House calls referral  Pt may benefit from formal cognitive evaluation in future.

## 2024-09-23 NOTE — PROGRESS NOTE ADULT - PROBLEM SELECTOR PLAN 1
Patient has an appointment scheduled with lab on 9/30/24. He is asking that orders be placed for any lab work that he may need at that time.    Pt. w/ ALAYNA likely hemodynamic mediated and medication SE (TMP/SMX and Entresto). On review of sunrise/HIE patient likely has CKD stage 3A with baseline Scr 1.3-1.6. Last outpt labs reviewed on HIE showing Scr 1.64 on 4/20/23. Renal US 1/25/23 negative. On admission Scr 4.54, K 6.4, and SCO2 14. Pt received IHD 8/9, 8/10. Today labs reviewed, Scr 3.28 elevated but improving. 24hr UOP 540cc. Pt. remains on vasopressors, but off BIPAP. Saturating well on RA. Plan for IHD today. HD consent in chart. Obtain kidney and bladder US. Continue to hold Entresto and TMP/SMX. Monitor labs, VS and urine output. Avoid nephrotoxins. Dose medications as per eGFR.

## 2024-10-17 ENCOUNTER — APPOINTMENT (OUTPATIENT)
Dept: PULMONOLOGY | Facility: CLINIC | Age: 84
End: 2024-10-17
Payer: MEDICARE

## 2024-10-17 VITALS — WEIGHT: 182 LBS | BODY MASS INDEX: 27.67 KG/M2

## 2024-10-17 DIAGNOSIS — R93.89 ABNORMAL FINDINGS ON DIAGNOSTIC IMAGING OF OTHER SPECIFIED BODY STRUCTURES: ICD-10-CM

## 2024-10-17 DIAGNOSIS — J90 PLEURAL EFFUSION, NOT ELSEWHERE CLASSIFIED: ICD-10-CM

## 2024-10-17 DIAGNOSIS — G47.33 OBSTRUCTIVE SLEEP APNEA (ADULT) (PEDIATRIC): ICD-10-CM

## 2024-10-17 DIAGNOSIS — R06.02 SHORTNESS OF BREATH: ICD-10-CM

## 2024-10-17 PROCEDURE — 99214 OFFICE O/P EST MOD 30 MIN: CPT

## 2024-12-04 PROCEDURE — 99212 OFFICE O/P EST SF 10 MIN: CPT

## 2024-12-05 NOTE — ED PROVIDER NOTE - TOBACCO USE
Unknown if ever smoked Continue to monitor EKG, weight, BP, CBC, CMP, Hemoglobin A1c, fasting glucose and Lipid profile.

## 2024-12-12 PROCEDURE — 99213 OFFICE O/P EST LOW 20 MIN: CPT

## 2024-12-16 ENCOUNTER — APPOINTMENT (OUTPATIENT)
Dept: GERIATRICS | Facility: CLINIC | Age: 84
End: 2024-12-16

## 2024-12-19 NOTE — ED ADULT TRIAGE NOTE - INTERNATIONAL TRAVEL
If symptoms to genital area are improving I do not feel this is an allergic reaction. I think he would be having reaction where he is putting ointment. He did not want to pursue oral antibiotics due to he did not want to cause GI distress. If genital area is better he can stop use and see if reddened area to abdomen improves as well. If he decides he needs the oral I can send in but I really do not think the mupirocin is causing this.  No

## 2025-01-02 NOTE — PROGRESS NOTE ADULT - PROBLEM SELECTOR PLAN 1
Awaiting APC schedule, will defer message.   -T-max on arrival was 102.7 and hypotensive SBP 80s MAP 50s, leukocytosis (WBC 29), LA 3.3>1.5, procal 0.54  - Unclear infectious source  - Weaned off Levophed 4/12, now on midodrine 20mg TID, goal systolic BP >95  - Can lower midodrine to home dose 10 TID   - CT 4/14 demonstrating interval increase in b/l loculated pleural effusions  - Infectious w/up including flu/covid, MRSA (negative), urine legionella/strep pneumoiae ag (negative), Bcx/UA/Ucx negative  -s/p empiric Vanco (4/9-4/11), continue Zosyn (4/9- ) plan for 14d course with transition to Augmentin upon d/c show

## 2025-01-06 ENCOUNTER — APPOINTMENT (OUTPATIENT)
Dept: PULMONOLOGY | Facility: CLINIC | Age: 85
End: 2025-01-06
Payer: MEDICARE

## 2025-01-06 DIAGNOSIS — R93.89 ABNORMAL FINDINGS ON DIAGNOSTIC IMAGING OF OTHER SPECIFIED BODY STRUCTURES: ICD-10-CM

## 2025-01-06 DIAGNOSIS — R06.02 SHORTNESS OF BREATH: ICD-10-CM

## 2025-01-06 DIAGNOSIS — G47.33 OBSTRUCTIVE SLEEP APNEA (ADULT) (PEDIATRIC): ICD-10-CM

## 2025-01-06 DIAGNOSIS — R94.2 ABNORMAL RESULTS OF PULMONARY FUNCTION STUDIES: ICD-10-CM

## 2025-01-06 DIAGNOSIS — I50.42 CHRONIC COMBINED SYSTOLIC (CONGESTIVE) AND DIASTOLIC (CONGESTIVE) HEART FAILURE: ICD-10-CM

## 2025-01-06 PROCEDURE — 99214 OFFICE O/P EST MOD 30 MIN: CPT

## 2025-01-09 ENCOUNTER — NON-APPOINTMENT (OUTPATIENT)
Age: 85
End: 2025-01-09

## 2025-01-14 NOTE — PRE-ANESTHESIA EVALUATION ADULT - NSANTHOBSERVEDRD_ENT_A_CORE
Cpx 12/26/25  Per pdmp last filled 12/14/24 for 30 days  Please send if ok  
Refill sent. Notify pt to try to use sparingly.   
No

## 2025-01-16 ENCOUNTER — APPOINTMENT (OUTPATIENT)
Dept: GERIATRICS | Facility: CLINIC | Age: 85
End: 2025-01-16
Payer: MEDICARE

## 2025-01-16 VITALS
OXYGEN SATURATION: 97 % | HEART RATE: 70 BPM | BODY MASS INDEX: 26.67 KG/M2 | RESPIRATION RATE: 14 BRPM | TEMPERATURE: 96.8 F | DIASTOLIC BLOOD PRESSURE: 77 MMHG | HEIGHT: 68 IN | SYSTOLIC BLOOD PRESSURE: 129 MMHG | WEIGHT: 176 LBS

## 2025-01-16 DIAGNOSIS — G20.A1 PARKINSON'S DISEASE WITHOUT DYSKINESIA, WITHOUT MENTION OF FLUCTUATIONS: ICD-10-CM

## 2025-01-16 DIAGNOSIS — K59.00 CONSTIPATION, UNSPECIFIED: ICD-10-CM

## 2025-01-16 DIAGNOSIS — I48.19 OTHER PERSISTENT ATRIAL FIBRILLATION: ICD-10-CM

## 2025-01-16 DIAGNOSIS — D64.9 ANEMIA, UNSPECIFIED: ICD-10-CM

## 2025-01-16 DIAGNOSIS — G47.00 INSOMNIA, UNSPECIFIED: ICD-10-CM

## 2025-01-16 DIAGNOSIS — R58 HEMORRHAGE, NOT ELSEWHERE CLASSIFIED: ICD-10-CM

## 2025-01-16 DIAGNOSIS — F01.B18 VASCULAR DEMENTIA, MODERATE, WITH OTHER BEHAVIORAL DISTURBANCE: ICD-10-CM

## 2025-01-16 PROCEDURE — 99214 OFFICE O/P EST MOD 30 MIN: CPT

## 2025-01-16 PROCEDURE — G2211 COMPLEX E/M VISIT ADD ON: CPT

## 2025-01-20 PROBLEM — R58 BLEEDING: Status: ACTIVE | Noted: 2025-01-20

## 2025-01-21 PROBLEM — F01.B18 MODERATE VASCULAR DEMENTIA WITH OTHER BEHAVIORAL DISTURBANCE: Status: ACTIVE | Noted: 2025-01-21

## 2025-01-27 ENCOUNTER — NON-APPOINTMENT (OUTPATIENT)
Age: 85
End: 2025-01-27

## 2025-01-28 ENCOUNTER — NON-APPOINTMENT (OUTPATIENT)
Age: 85
End: 2025-01-28

## 2025-01-30 ENCOUNTER — NON-APPOINTMENT (OUTPATIENT)
Age: 85
End: 2025-01-30

## 2025-02-05 ENCOUNTER — NON-APPOINTMENT (OUTPATIENT)
Age: 85
End: 2025-02-05

## 2025-02-06 PROCEDURE — 99215 OFFICE O/P EST HI 40 MIN: CPT

## 2025-02-06 NOTE — REVIEW OF SYSTEMS
Addended by: NICHOLAS URIOSTEGUI on: 2/6/2025 05:21 PM     Modules accepted: Orders    
[Negative] : Heme/Lymph

## 2025-02-07 ENCOUNTER — APPOINTMENT (OUTPATIENT)
Dept: GERIATRICS | Facility: CLINIC | Age: 85
End: 2025-02-07

## 2025-02-07 DIAGNOSIS — I10 ESSENTIAL (PRIMARY) HYPERTENSION: ICD-10-CM

## 2025-02-07 DIAGNOSIS — F01.B18 VASCULAR DEMENTIA, MODERATE, WITH OTHER BEHAVIORAL DISTURBANCE: ICD-10-CM

## 2025-02-07 DIAGNOSIS — R39.15 URGENCY OF URINATION: ICD-10-CM

## 2025-02-07 PROCEDURE — 99214 OFFICE O/P EST MOD 30 MIN: CPT | Mod: 2W

## 2025-02-11 ENCOUNTER — LABORATORY RESULT (OUTPATIENT)
Age: 85
End: 2025-02-11

## 2025-02-14 ENCOUNTER — NON-APPOINTMENT (OUTPATIENT)
Age: 85
End: 2025-02-14

## 2025-02-27 ENCOUNTER — APPOINTMENT (OUTPATIENT)
Dept: GERIATRICS | Facility: CLINIC | Age: 85
End: 2025-02-27
Payer: MEDICARE

## 2025-02-27 DIAGNOSIS — F01.B18 VASCULAR DEMENTIA, MODERATE, WITH OTHER BEHAVIORAL DISTURBANCE: ICD-10-CM

## 2025-02-27 DIAGNOSIS — Z63.6 DEPENDENT RELATIVE NEEDING CARE AT HOME: ICD-10-CM

## 2025-02-27 DIAGNOSIS — I10 ESSENTIAL (PRIMARY) HYPERTENSION: ICD-10-CM

## 2025-02-27 DIAGNOSIS — R44.3 HALLUCINATIONS, UNSPECIFIED: ICD-10-CM

## 2025-02-27 DIAGNOSIS — G47.00 INSOMNIA, UNSPECIFIED: ICD-10-CM

## 2025-02-27 DIAGNOSIS — F32.A DEPRESSION, UNSPECIFIED: ICD-10-CM

## 2025-02-27 DIAGNOSIS — R00.1 BRADYCARDIA, UNSPECIFIED: ICD-10-CM

## 2025-02-27 DIAGNOSIS — E78.5 HYPERLIPIDEMIA, UNSPECIFIED: ICD-10-CM

## 2025-02-27 PROCEDURE — 99214 OFFICE O/P EST MOD 30 MIN: CPT | Mod: NC,2W

## 2025-02-27 RX ORDER — GABAPENTIN 100 MG/1
100 CAPSULE ORAL
Qty: 90 | Refills: 3 | Status: ACTIVE | COMMUNITY
Start: 2025-02-27

## 2025-02-27 SDOH — SOCIAL STABILITY - SOCIAL INSECURITY: DEPENDENT RELATIVE NEEDING CARE AT HOME: Z63.6

## 2025-02-28 PROBLEM — Z63.6 CAREGIVER STRESS: Status: ACTIVE | Noted: 2025-02-28

## 2025-03-20 ENCOUNTER — APPOINTMENT (OUTPATIENT)
Dept: NEUROLOGY | Facility: CLINIC | Age: 85
End: 2025-03-20
Payer: MEDICARE

## 2025-03-20 DIAGNOSIS — R41.89 OTHER SYMPTOMS AND SIGNS INVOLVING COGNITIVE FUNCTIONS AND AWARENESS: ICD-10-CM

## 2025-03-20 PROCEDURE — 99213 OFFICE O/P EST LOW 20 MIN: CPT | Mod: 2W

## 2025-03-20 RX ORDER — DONEPEZIL HYDROCHLORIDE 5 MG/1
5 TABLET ORAL
Qty: 30 | Refills: 3 | Status: ACTIVE | COMMUNITY
Start: 2025-03-20 | End: 1900-01-01

## 2025-03-24 ENCOUNTER — RX RENEWAL (OUTPATIENT)
Age: 85
End: 2025-03-24

## 2025-03-24 NOTE — PROVIDER CONTACT NOTE (OTHER) - REASON
I assume you will call her about her CT results and scheduled follow up accordingly. Her bladder looked good today. 
Pt /46 mmHg
BP 93/44 (56)
patient BP 94/52

## 2025-03-25 ENCOUNTER — RX RENEWAL (OUTPATIENT)
Age: 85
End: 2025-03-25

## 2025-03-26 ENCOUNTER — APPOINTMENT (OUTPATIENT)
Dept: GERIATRICS | Facility: CLINIC | Age: 85
End: 2025-03-26
Payer: MEDICARE

## 2025-03-26 VITALS — DIASTOLIC BLOOD PRESSURE: 80 MMHG | SYSTOLIC BLOOD PRESSURE: 140 MMHG | HEART RATE: 64 BPM

## 2025-03-26 DIAGNOSIS — J90 PLEURAL EFFUSION, NOT ELSEWHERE CLASSIFIED: ICD-10-CM

## 2025-03-26 DIAGNOSIS — R58 HEMORRHAGE, NOT ELSEWHERE CLASSIFIED: ICD-10-CM

## 2025-03-26 DIAGNOSIS — N18.32 CHRONIC KIDNEY DISEASE, STAGE 3B: ICD-10-CM

## 2025-03-26 DIAGNOSIS — F02.80 PARKINSON'S DISEASE WITHOUT DYSKINESIA, WITHOUT MENTION OF FLUCTUATIONS: ICD-10-CM

## 2025-03-26 DIAGNOSIS — F41.9 ANXIETY DISORDER, UNSPECIFIED: ICD-10-CM

## 2025-03-26 DIAGNOSIS — R44.3 HALLUCINATIONS, UNSPECIFIED: ICD-10-CM

## 2025-03-26 DIAGNOSIS — R00.1 BRADYCARDIA, UNSPECIFIED: ICD-10-CM

## 2025-03-26 DIAGNOSIS — G20.A1 PARKINSON'S DISEASE WITHOUT DYSKINESIA, WITHOUT MENTION OF FLUCTUATIONS: ICD-10-CM

## 2025-03-26 DIAGNOSIS — D64.9 ANEMIA, UNSPECIFIED: ICD-10-CM

## 2025-03-26 PROCEDURE — 99349 HOME/RES VST EST MOD MDM 40: CPT

## 2025-03-26 PROCEDURE — G0444 DEPRESSION SCREEN ANNUAL: CPT | Mod: NC,59

## 2025-04-02 ENCOUNTER — NON-APPOINTMENT (OUTPATIENT)
Age: 85
End: 2025-04-02

## 2025-04-04 ENCOUNTER — NON-APPOINTMENT (OUTPATIENT)
Age: 85
End: 2025-04-04

## 2025-04-04 ENCOUNTER — APPOINTMENT (OUTPATIENT)
Dept: OPHTHALMOLOGY | Facility: CLINIC | Age: 85
End: 2025-04-04
Payer: MEDICARE

## 2025-04-04 PROCEDURE — 92004 COMPRE OPH EXAM NEW PT 1/>: CPT

## 2025-04-09 ENCOUNTER — APPOINTMENT (OUTPATIENT)
Dept: GERIATRICS | Facility: CLINIC | Age: 85
End: 2025-04-09

## 2025-04-09 VITALS
RESPIRATION RATE: 14 BRPM | DIASTOLIC BLOOD PRESSURE: 52 MMHG | OXYGEN SATURATION: 98 % | HEART RATE: 69 BPM | SYSTOLIC BLOOD PRESSURE: 100 MMHG

## 2025-04-09 DIAGNOSIS — L89.153 PRESSURE ULCER OF SACRAL REGION, STAGE 3: ICD-10-CM

## 2025-04-09 DIAGNOSIS — F02.80 PARKINSON'S DISEASE WITHOUT DYSKINESIA, WITHOUT MENTION OF FLUCTUATIONS: ICD-10-CM

## 2025-04-09 DIAGNOSIS — G20.A1 PARKINSON'S DISEASE WITHOUT DYSKINESIA, WITHOUT MENTION OF FLUCTUATIONS: ICD-10-CM

## 2025-04-09 PROCEDURE — 99350 HOME/RES VST EST HIGH MDM 60: CPT

## 2025-04-10 ENCOUNTER — TRANSCRIPTION ENCOUNTER (OUTPATIENT)
Age: 85
End: 2025-04-10

## 2025-04-10 PROBLEM — L89.153 PRESSURE INJURY OF SACRAL REGION, STAGE 3: Status: ACTIVE | Noted: 2025-04-10

## 2025-04-14 ENCOUNTER — NON-APPOINTMENT (OUTPATIENT)
Age: 85
End: 2025-04-14

## 2025-04-15 ENCOUNTER — RX RENEWAL (OUTPATIENT)
Age: 85
End: 2025-04-15

## 2025-04-15 ENCOUNTER — NON-APPOINTMENT (OUTPATIENT)
Age: 85
End: 2025-04-15

## 2025-04-18 ENCOUNTER — NON-APPOINTMENT (OUTPATIENT)
Age: 85
End: 2025-04-18

## 2025-04-30 ENCOUNTER — NON-APPOINTMENT (OUTPATIENT)
Age: 85
End: 2025-04-30

## 2025-05-06 ENCOUNTER — NON-APPOINTMENT (OUTPATIENT)
Age: 85
End: 2025-05-06

## 2025-05-06 DIAGNOSIS — F01.B18 VASCULAR DEMENTIA, MODERATE, WITH OTHER BEHAVIORAL DISTURBANCE: ICD-10-CM

## 2025-05-06 DIAGNOSIS — G20.A1 PARKINSON'S DISEASE WITHOUT DYSKINESIA, WITHOUT MENTION OF FLUCTUATIONS: ICD-10-CM

## 2025-05-06 DIAGNOSIS — F02.80 PARKINSON'S DISEASE WITHOUT DYSKINESIA, WITHOUT MENTION OF FLUCTUATIONS: ICD-10-CM

## 2025-05-06 DIAGNOSIS — Z63.6 DEPENDENT RELATIVE NEEDING CARE AT HOME: ICD-10-CM

## 2025-05-06 SDOH — SOCIAL STABILITY - SOCIAL INSECURITY: DEPENDENT RELATIVE NEEDING CARE AT HOME: Z63.6

## 2025-05-09 ENCOUNTER — NON-APPOINTMENT (OUTPATIENT)
Age: 85
End: 2025-05-09

## 2025-05-14 ENCOUNTER — APPOINTMENT (OUTPATIENT)
Dept: HOME HEALTH SERVICES | Facility: HOME HEALTH | Age: 85
End: 2025-05-14

## 2025-05-16 NOTE — ED ADULT NURSE NOTE - NSFALLRSKOUTCOME_ED_ALL_ED
Prep Survey      Flowsheet Row Responses   [facility] patient discharged from? [place]   Is LACE score < 7 ? No   Eligibility [facility]   Date of Admission 05/12/25   Date of Discharge 05/16/25   Discharge Disposition Home-Health Care [facility]   Discharge diagnosis COPD exacerbation   Does the patient have one of the following disease processes/diagnoses(primary or secondary)? COPD   Does the patient have Home health ordered? Yes   What is the Home health agency?  [facility]   Prep survey completed? Yes            Meghan MCKEON - Registered Nurse           Fall Risk

## 2025-05-22 NOTE — PATIENT PROFILE ADULT - DEAF OR HARD OF HEARING?
No care due was identified.  [facility] Embedded Care Due Messages. Reference number: [id number].   5/22/2025 1:21:25 AM CDT   Refill Routing Note   Medication(s) are not appropriate for processing by [facility] for the following reason(s):        Required labs outdated  Required vitals outdated    [facility] action(s):  Defer               Appointments  past 12m or future 3m with PCP    Date Provider   Last Visit   3/27/2024 Yakov Barrett MD   Next Visit   Visit date not found Yakov Barrett MD   ED visits in past 90 days: 0        Note composed:5:42 AM 05/22/2025            Applied no

## 2025-06-10 ENCOUNTER — APPOINTMENT (OUTPATIENT)
Dept: PULMONOLOGY | Facility: CLINIC | Age: 85
End: 2025-06-10

## (undated) DEVICE — SOL IRR POUR H2O 250ML

## (undated) DEVICE — HAND-AID ARTERIAL WRIST SUPPORT

## (undated) DEVICE — DISSECTOR ENDO PEANUT 5MM

## (undated) DEVICE — DRSG STERISTRIPS 0.5 X 4"

## (undated) DEVICE — ENDOCATCH 10MM SPECIMEN POUCH

## (undated) DEVICE — DRAPE MAGNETIC INSTRUMENT MEDIUM

## (undated) DEVICE — DRSG TEGADERM 4X4.75"

## (undated) DEVICE — SUT VICRYL 2-0 27" UR-6

## (undated) DEVICE — ELCTR GROUNDING PAD ADULT COVIDIEN

## (undated) DEVICE — POSITIONER FOAM EGG CRATE ULNAR 2PCS (PINK)

## (undated) DEVICE — ELCTR EXTENSION STRAIGHT

## (undated) DEVICE — ENDOCATCH II 15MM

## (undated) DEVICE — SUT VICRYL 0 27" CT-1 UNDYED

## (undated) DEVICE — ADAPTER FIBEROPTIC BRONCHOSCOPE DUAL AXIS SWIVEL

## (undated) DEVICE — DRAPE LARGE SHEET 72X85"

## (undated) DEVICE — DRAPE TOWEL BLUE 17" X 24"

## (undated) DEVICE — CHEST DRAIN OASIS DRY SUCTION WATER SEAL

## (undated) DEVICE — SUT SILK 2-0 30" TIES

## (undated) DEVICE — SOL ANTI FOG

## (undated) DEVICE — CONNECTOR REDUCING STRAIGHT 3/8X0.25"

## (undated) DEVICE — VISITEC 4X4

## (undated) DEVICE — PREP CHLORAPREP HI-LITE ORANGE 26ML

## (undated) DEVICE — DRAPE IOBAN 33" X 23"

## (undated) DEVICE — ELCTR BOVIE TIP BLADE INSULATED 6.5" EDGE

## (undated) DEVICE — SUT VICRYL 1 27" CTX

## (undated) DEVICE — FOLEY TRAY 16FR 5CC LF UMETER CLOSED

## (undated) DEVICE — SUT SILK 0 18" TIES

## (undated) DEVICE — POSITIONER STRAP ARMBOARD VELCRO TS-30

## (undated) DEVICE — DRSG BENZOIN 0.6CC

## (undated) DEVICE — ELCTR BOVIE TIP BLADE INSULATED 2.75" EDGE

## (undated) DEVICE — STAPLER SKIN VISI-STAT 35 WIDE

## (undated) DEVICE — SUT MONOCRYL 4-0 27" PS-2 UNDYED

## (undated) DEVICE — SUT VICRYL 3-0 27" SH UNDYED

## (undated) DEVICE — WARMING BLANKET LOWER ADULT

## (undated) DEVICE — STAPLER ECHELON FLEX POWERED PLUS 340MM

## (undated) DEVICE — VENODYNE/SCD SLEEVE CALF MEDIUM

## (undated) DEVICE — TAPE SILK 3"

## (undated) DEVICE — SUCTION YANKAUER NO CONTROL VENT

## (undated) DEVICE — LAP PAD 18 X 18"

## (undated) DEVICE — TUBING SUCTION 20FT

## (undated) DEVICE — DRAPE IOBAN 23" X 23"

## (undated) DEVICE — DRSG TEGADERM 6"X8"

## (undated) DEVICE — GLV 8 PROTEXIS (WHITE)

## (undated) DEVICE — PACK MINOR

## (undated) DEVICE — PACK MAJOR ABDOMINAL W ENDO DRAPE

## (undated) DEVICE — GLV 7 PROTEXIS (WHITE)

## (undated) DEVICE — TUBING SUCTION NONCONDUCTIVE 6MM X 12FT

## (undated) DEVICE — SPECIMEN CONTAINER 100ML

## (undated) DEVICE — SUT SILK 2-0 24" TIES

## (undated) DEVICE — BLADE SCALPEL SAFETYLOCK #15

## (undated) DEVICE — SUT PROLENE 0 30" CT-1

## (undated) DEVICE — SOL IRR POUR NS 0.9% 500ML

## (undated) DEVICE — DRAIN PLEURX KIT WITH 500ML VACUUM BOTTLE

## (undated) DEVICE — MEDICATION LABELS W MARKER

## (undated) DEVICE — SYR SLIP 10CC

## (undated) DEVICE — DRSG TELFA 3 X 8

## (undated) DEVICE — GOWN TRIMAX LG

## (undated) DEVICE — GLV 7.5 PROTEXIS (WHITE)

## (undated) DEVICE — DISSECTOR ENDOSCOPIC KITTNER SINGLE TIP

## (undated) DEVICE — DRAPE INSTRUMENT POUCH 6.75" X 11"